# Patient Record
Sex: FEMALE | Race: BLACK OR AFRICAN AMERICAN | NOT HISPANIC OR LATINO | Employment: OTHER | URBAN - METROPOLITAN AREA
[De-identification: names, ages, dates, MRNs, and addresses within clinical notes are randomized per-mention and may not be internally consistent; named-entity substitution may affect disease eponyms.]

---

## 2019-04-19 ENCOUNTER — HOSPITAL ENCOUNTER (EMERGENCY)
Facility: HOSPITAL | Age: 79
Discharge: HOME | End: 2019-04-20
Attending: EMERGENCY MEDICINE
Payer: MEDICARE

## 2019-04-19 DIAGNOSIS — R55 NEAR SYNCOPE: Primary | ICD-10-CM

## 2019-04-19 LAB
ALBUMIN SERPL BCP-MCNC: 4.2 G/DL (ref 3.5–5.2)
ALP SERPL-CCNC: 55 U/L (ref 55–135)
ALT SERPL W/O P-5'-P-CCNC: <5 U/L (ref 10–44)
ANION GAP SERPL CALC-SCNC: 9 MMOL/L (ref 8–16)
APTT BLDCRRT: 25.9 SEC (ref 21–32)
AST SERPL-CCNC: 15 U/L (ref 10–40)
BASOPHILS # BLD AUTO: 0.01 K/UL (ref 0–0.2)
BASOPHILS NFR BLD: 0.2 % (ref 0–1.9)
BILIRUB SERPL-MCNC: 0.3 MG/DL (ref 0.1–1)
BUN SERPL-MCNC: 19 MG/DL (ref 8–23)
CALCIUM SERPL-MCNC: 9.7 MG/DL (ref 8.7–10.5)
CHLORIDE SERPL-SCNC: 106 MMOL/L (ref 95–110)
CO2 SERPL-SCNC: 24 MMOL/L (ref 23–29)
CREAT SERPL-MCNC: 1.2 MG/DL (ref 0.5–1.4)
DIFFERENTIAL METHOD: NORMAL
EOSINOPHIL # BLD AUTO: 0.1 K/UL (ref 0–0.5)
EOSINOPHIL NFR BLD: 0.8 % (ref 0–8)
ERYTHROCYTE [DISTWIDTH] IN BLOOD BY AUTOMATED COUNT: 13.7 % (ref 11.5–14.5)
EST. GFR  (AFRICAN AMERICAN): 49 ML/MIN/1.73 M^2
EST. GFR  (NON AFRICAN AMERICAN): 42 ML/MIN/1.73 M^2
GLUCOSE SERPL-MCNC: 168 MG/DL (ref 70–110)
HCT VFR BLD AUTO: 37.9 % (ref 37–48.5)
HGB BLD-MCNC: 12.4 G/DL (ref 12–16)
INR PPP: 1 (ref 0.8–1.2)
LYMPHOCYTES # BLD AUTO: 1.5 K/UL (ref 1–4.8)
LYMPHOCYTES NFR BLD: 22.8 % (ref 18–48)
MCH RBC QN AUTO: 30.3 PG (ref 27–31)
MCHC RBC AUTO-ENTMCNC: 32.7 G/DL (ref 32–36)
MCV RBC AUTO: 93 FL (ref 82–98)
MONOCYTES # BLD AUTO: 0.6 K/UL (ref 0.3–1)
MONOCYTES NFR BLD: 8.8 % (ref 4–15)
NEUTROPHILS # BLD AUTO: 4.3 K/UL (ref 1.8–7.7)
NEUTROPHILS NFR BLD: 67.4 % (ref 38–73)
PLATELET # BLD AUTO: 216 K/UL (ref 150–350)
PMV BLD AUTO: 10.4 FL (ref 9.2–12.9)
POCT GLUCOSE: 175 MG/DL (ref 70–110)
POTASSIUM SERPL-SCNC: 3.8 MMOL/L (ref 3.5–5.1)
PROT SERPL-MCNC: 7.3 G/DL (ref 6–8.4)
PROTHROMBIN TIME: 11.3 SEC (ref 9–12.5)
RBC # BLD AUTO: 4.09 M/UL (ref 4–5.4)
SODIUM SERPL-SCNC: 139 MMOL/L (ref 136–145)
TROPONIN I SERPL DL<=0.01 NG/ML-MCNC: 0.02 NG/ML (ref 0–0.03)
WBC # BLD AUTO: 6.39 K/UL (ref 3.9–12.7)

## 2019-04-19 PROCEDURE — 80053 COMPREHEN METABOLIC PANEL: CPT

## 2019-04-19 PROCEDURE — 93005 ELECTROCARDIOGRAM TRACING: CPT

## 2019-04-19 PROCEDURE — 85730 THROMBOPLASTIN TIME PARTIAL: CPT

## 2019-04-19 PROCEDURE — 85025 COMPLETE CBC W/AUTO DIFF WBC: CPT

## 2019-04-19 PROCEDURE — 84484 ASSAY OF TROPONIN QUANT: CPT

## 2019-04-19 PROCEDURE — 93010 EKG 12-LEAD: ICD-10-PCS | Mod: ,,, | Performed by: INTERNAL MEDICINE

## 2019-04-19 PROCEDURE — 99284 EMERGENCY DEPT VISIT MOD MDM: CPT

## 2019-04-19 PROCEDURE — 85610 PROTHROMBIN TIME: CPT

## 2019-04-19 PROCEDURE — 81000 URINALYSIS NONAUTO W/SCOPE: CPT

## 2019-04-19 PROCEDURE — 93010 ELECTROCARDIOGRAM REPORT: CPT | Mod: ,,, | Performed by: INTERNAL MEDICINE

## 2019-04-19 PROCEDURE — 82962 GLUCOSE BLOOD TEST: CPT

## 2019-04-19 RX ORDER — SPIRONOLACTONE 25 MG/1
25 TABLET ORAL DAILY
Status: ON HOLD | COMMUNITY
End: 2020-10-20 | Stop reason: HOSPADM

## 2019-04-19 RX ORDER — LOSARTAN POTASSIUM 100 MG/1
100 TABLET ORAL DAILY
COMMUNITY
End: 2021-01-27 | Stop reason: DRUGHIGH

## 2019-04-19 RX ORDER — CARBIDOPA AND LEVODOPA 25; 100 MG/1; MG/1
2 TABLET ORAL 4 TIMES DAILY
Status: ON HOLD | COMMUNITY
End: 2020-10-05 | Stop reason: HOSPADM

## 2019-04-19 RX ORDER — CLOPIDOGREL BISULFATE 75 MG/1
75 TABLET ORAL DAILY
Status: ON HOLD | COMMUNITY
End: 2022-03-29 | Stop reason: HOSPADM

## 2019-04-20 VITALS
SYSTOLIC BLOOD PRESSURE: 161 MMHG | HEIGHT: 62 IN | HEART RATE: 63 BPM | DIASTOLIC BLOOD PRESSURE: 90 MMHG | BODY MASS INDEX: 22.68 KG/M2 | RESPIRATION RATE: 11 BRPM | WEIGHT: 123.25 LBS | OXYGEN SATURATION: 100 % | TEMPERATURE: 98 F

## 2019-04-20 LAB
BACTERIA #/AREA URNS HPF: NORMAL /HPF
BILIRUB UR QL STRIP: NEGATIVE
CLARITY UR: CLEAR
COLOR UR: YELLOW
GLUCOSE UR QL STRIP: NEGATIVE
HGB UR QL STRIP: NEGATIVE
KETONES UR QL STRIP: NEGATIVE
LEUKOCYTE ESTERASE UR QL STRIP: ABNORMAL
MICROSCOPIC COMMENT: NORMAL
NITRITE UR QL STRIP: NEGATIVE
NON-SQ EPI CELLS #/AREA URNS HPF: 0 /HPF
PH UR STRIP: 6 [PH] (ref 5–8)
PROT UR QL STRIP: NEGATIVE
RBC #/AREA URNS HPF: 0 /HPF (ref 0–4)
SP GR UR STRIP: 1.01 (ref 1–1.03)
SQUAMOUS #/AREA URNS HPF: 0 /HPF
URN SPEC COLLECT METH UR: ABNORMAL
UROBILINOGEN UR STRIP-ACNC: NEGATIVE EU/DL
WBC #/AREA URNS HPF: 0 /HPF (ref 0–5)

## 2019-04-20 NOTE — ED NOTES
Pt with no c/o pain or discomfort at this time.   Discharge instructions explained to patient with verbalization of understanding of instructions.  Pt escorted per wc with daughter to registration desk by RN. Discharge paperwork given to registration for completion of discharge.  Pt to car per wc with RN

## 2019-04-20 NOTE — ED NOTES
Armband checked, patient verified. Verified by spelling and stated name on armband along with .   LOC: The patient is awake, alert and aware of environment with an appropriate affect, the patient is oriented x 3 and speaking appropriately. Pt has very soft voice due to polyps on vocal cord.   APPEARANCE: Patient resting comfortably and in no acute distress, patient is clean and well groomed  SKIN: The skin is warm and dry, color consistent with ethnicity, patient has normal skin turgor and moist mucus membranes, no breakdown or bruising noted.   MUSCULOSKELETAL: Patient moving all extremities to command. Pt right hand weaker from stroke a few years ago.   RESPIRATORY: Airway is open and patent, respirations are regular, even and non labored.  CARDIAC: Patient has a normal rate, no periphreal edema noted, capillary refill < 3 seconds.  ABDOMEN: Soft and non tender to palpation.  Daughter states pt gets low BP and passes out at home frequently. States had an episode again tonight. Pt denies pain, SOB or nausea.  Pt states had eaten tonight.   SR up x 2 with daughter at bedside. Call light in reach.

## 2019-04-20 NOTE — ED PROVIDER NOTES
SCRIBE #1 NOTE: I, Neha Villasenor, am scribing for, and in the presence of, Romain Kim Jr., MD. I have scribed the entire note.      History      Chief Complaint   Patient presents with    Loss of Consciousness       Review of patient's allergies indicates:  No Known Allergies     HPI   HPI    4/19/2019, 9:40 PM   History obtained from the patient and daughter      History of Present Illness: Tanya Madrid is a 79 y.o. female patient with a PMHx of HTN, laryngeal papillomatosis, who presents to the Emergency Department for an evaluation after a near-syncopal episode that occurred about 30-45 min ago. Symptoms are episodic and moderate in severity. Daughter reports patient began staring blankly and became dazed. Daughter states sxs usually occur with hypotension. Pt took BP medications before sxs occurred. No mitigating or exacerbating factors reported. No other sxs reported. Daughter states pt is at baseline at this time. Patient/daughter denies any CP, SOB, abd pain, back pain, fever, chills, diaphoresis, palpitations, extremity weakness/numbness, leg pain/swelling, dizziness, cough, n/v, and all other sxs at this time. No further complaints or concerns at this time.         Arrival mode: EMS      PCP: Marti Parsons MD       Past Medical History:  Past Medical History:   Diagnosis Date    Hyperlipemia     Hypertension     Parkinson's disease     Stroke 2016    Throat mass        Past Surgical History:  Past Surgical History:   Procedure Laterality Date    HYSTERECTOMY      THROAT SURGERY      TONSILLECTOMY           Family History:  History reviewed. No pertinent family history.    Social History:  Social History     Tobacco Use    Smoking status: Never Smoker    Smokeless tobacco: Never Used   Substance and Sexual Activity    Alcohol use: No    Drug use: No    Sexual activity: Not Currently       ROS   Review of Systems   Constitutional: Negative for chills, diaphoresis and fever.   HENT:  Negative for sore throat.    Respiratory: Negative for cough and shortness of breath.    Cardiovascular: Negative for chest pain, palpitations and leg swelling.        +hypotension   Gastrointestinal: Negative for abdominal pain, nausea and vomiting.   Genitourinary: Negative for dysuria.   Musculoskeletal: Negative for back pain and myalgias.   Skin: Negative for rash.   Neurological: Positive for syncope (near syncope). Negative for dizziness, weakness, numbness and headaches.   Hematological: Does not bruise/bleed easily.   All other systems reviewed and are negative.      Physical Exam      Initial Vitals [04/19/19 2138]   BP Pulse Resp Temp SpO2   (!) 149/90 64 12 97.8 °F (36.6 °C) 100 %      MAP       --          Physical Exam  Nursing Notes and Vital Signs Reviewed.  Constitutional: Patient is in no acute distress.   Head: Atraumatic. Normocephalic.  Eyes: PERRL. EOM intact. Conjunctivae are not pale. No scleral icterus.  ENT: Mucous membranes are moist. Oropharynx is clear and symmetric.  Chronic hoarseness.  Neck: Supple. Full ROM. No lymphadenopathy.  Cardiovascular: Regular rate. Regular rhythm. No murmurs, rubs, or gallops. Distal pulses are 2+ and symmetric.  Pulmonary/Chest: No respiratory distress. Clear to auscultation bilaterally. No wheezing or rales.  Abdominal: Soft and non-distended.  There is no tenderness.  No rebound, guarding, or rigidity.   Musculoskeletal: Moves all extremities. No obvious deformities. No edema. No calf tenderness.  Skin: Warm and dry.  Neurological:  Alert, awake, and appropriate.  Normal speech.  No acute focal neurological deficits are appreciated.  Psychiatric: Normal affect. Good eye contact. Appropriate in content.    ED Course    Procedures  ED Vital Signs:  Vitals:    04/19/19 2138 04/19/19 2201 04/19/19 2225 04/19/19 2226   BP: (!) 149/90  (!) 166/78 (!) 167/84   Pulse: 64  64 66   Resp: 12      Temp: 97.8 °F (36.6 °C)      TempSrc: Oral      SpO2: 100%     "  Weight:  55.9 kg (123 lb 4 oz)     Height: 5' 2" (1.575 m)       04/19/19 2227 04/19/19 2305   BP: (!) 172/115 (!) 189/84   Pulse: 72 65   Resp:  15   Temp:     TempSrc:     SpO2:  100%   Weight:     Height:         Abnormal Lab Results:  Labs Reviewed   COMPREHENSIVE METABOLIC PANEL - Abnormal; Notable for the following components:       Result Value    Glucose 168 (*)     ALT <5 (*)     eGFR if  49 (*)     eGFR if non  42 (*)     All other components within normal limits   URINALYSIS - Abnormal; Notable for the following components:    Leukocytes, UA Trace (*)     All other components within normal limits   POCT GLUCOSE - Abnormal; Notable for the following components:    POCT Glucose 175 (*)     All other components within normal limits   CBC W/ AUTO DIFFERENTIAL   PROTIME-INR   APTT   TROPONIN I   URINALYSIS MICROSCOPIC   POCT GLUCOSE MONITORING CONTINUOUS        All Lab Results:  Results for orders placed or performed during the hospital encounter of 04/19/19   CBC auto differential   Result Value Ref Range    WBC 6.39 3.90 - 12.70 K/uL    RBC 4.09 4.00 - 5.40 M/uL    Hemoglobin 12.4 12.0 - 16.0 g/dL    Hematocrit 37.9 37.0 - 48.5 %    MCV 93 82 - 98 fL    MCH 30.3 27.0 - 31.0 pg    MCHC 32.7 32.0 - 36.0 g/dL    RDW 13.7 11.5 - 14.5 %    Platelets 216 150 - 350 K/uL    MPV 10.4 9.2 - 12.9 fL    Gran # (ANC) 4.3 1.8 - 7.7 K/uL    Lymph # 1.5 1.0 - 4.8 K/uL    Mono # 0.6 0.3 - 1.0 K/uL    Eos # 0.1 0.0 - 0.5 K/uL    Baso # 0.01 0.00 - 0.20 K/uL    Gran% 67.4 38.0 - 73.0 %    Lymph% 22.8 18.0 - 48.0 %    Mono% 8.8 4.0 - 15.0 %    Eosinophil% 0.8 0.0 - 8.0 %    Basophil% 0.2 0.0 - 1.9 %    Differential Method Automated    Comprehensive metabolic panel   Result Value Ref Range    Sodium 139 136 - 145 mmol/L    Potassium 3.8 3.5 - 5.1 mmol/L    Chloride 106 95 - 110 mmol/L    CO2 24 23 - 29 mmol/L    Glucose 168 (H) 70 - 110 mg/dL    BUN, Bld 19 8 - 23 mg/dL    Creatinine 1.2 0.5 - " 1.4 mg/dL    Calcium 9.7 8.7 - 10.5 mg/dL    Total Protein 7.3 6.0 - 8.4 g/dL    Albumin 4.2 3.5 - 5.2 g/dL    Total Bilirubin 0.3 0.1 - 1.0 mg/dL    Alkaline Phosphatase 55 55 - 135 U/L    AST 15 10 - 40 U/L    ALT <5 (L) 10 - 44 U/L    Anion Gap 9 8 - 16 mmol/L    eGFR if African American 49 (A) >60 mL/min/1.73 m^2    eGFR if non African American 42 (A) >60 mL/min/1.73 m^2   Urinalysis - Clean Catch   Result Value Ref Range    Specimen UA Urine, Clean Catch     Color, UA Yellow Yellow, Straw, Kassi    Appearance, UA Clear Clear    pH, UA 6.0 5.0 - 8.0    Specific Gravity, UA 1.010 1.005 - 1.030    Protein, UA Negative Negative    Glucose, UA Negative Negative    Ketones, UA Negative Negative    Bilirubin (UA) Negative Negative    Occult Blood UA Negative Negative    Nitrite, UA Negative Negative    Urobilinogen, UA Negative <2.0 EU/dL    Leukocytes, UA Trace (A) Negative   Protime-INR   Result Value Ref Range    Prothrombin Time 11.3 9.0 - 12.5 sec    INR 1.0 0.8 - 1.2   APTT   Result Value Ref Range    aPTT 25.9 21.0 - 32.0 sec   Troponin I   Result Value Ref Range    Troponin I 0.016 0.000 - 0.026 ng/mL   Urinalysis Microscopic   Result Value Ref Range    RBC, UA 0 0 - 4 /hpf    WBC, UA 0 0 - 5 /hpf    Bacteria, UA None None-Occ /hpf    Squam Epithel, UA 0 /hpf    Non-Squam Epith 0 <1/hpf /hpf    Microscopic Comment SEE COMMENT    POCT glucose   Result Value Ref Range    POCT Glucose 175 (H) 70 - 110 mg/dL         Imaging Results:  Imaging Results          CT Head Without Contrast (Final result)  Result time 04/19/19 23:14:23    Final result by Abhijit Carmona Jr., MD (04/19/19 23:14:23)                 Impression:      1. No acute intracranial findings.  All CT scans at this facility are performed  using dose modulation techniques as appropriate to performed exam including the following:  automated exposure control; adjustment of mA and/or kV according to the patients size (this includes techniques or  standardized protocols for targeted exams where dose is matched to indication/reason for exam: i.e. extremities or head);  iterative reconstruction technique.      Electronically signed by: Abhijit Carmona Jr., MD  Date:    04/19/2019  Time:    23:14             Narrative:    EXAMINATION:  CT HEAD WITHOUT CONTRAST    CLINICAL HISTORY:  Syncope/fainting;    TECHNIQUE:  CT scan was obtained of the head without administration of contrast.    COMPARISON:  MRI on 08/09/2009.    FINDINGS:  Atrophy with periventricular white matter changes consistent with small vessel ischemic change.  Old lacunar infarct left basal ganglia.  No extra-axial acute fluid collection.Ventricles and basal cisterns are normal.  No hemorrhage, mass effect or midline shift.  No other cerebral or cerebellar parenchymal abnormality.  Mucosal thickening right this sphenoid sinus.  Mastoid air cells are clear.  Calvarium is intact.                               The EKG was ordered, reviewed, and independently interpreted by the ED provider.  Interpretation time: 2151  Rate: 66 BPM  Rhythm: normal sinus rhythm  Interpretation: Left axis deviation. Nonspecific T wave abnormality. No STEMI.             The Emergency Provider reviewed the vital signs and test results, which are outlined above.    ED Discussion     12:22 AM: Reassessed pt at this time.  Pt is awake, alert, and in NAD at this time. Discussed with pt all pertinent ED information and results. Discussed pt dx and plan of tx. Gave pt all f/u and return to the ED instructions. All questions and concerns were addressed at this time. Pt expresses understanding of information and instructions, and is comfortable with plan to discharge. Pt is stable for discharge.      Patient presents with a syncopal or near-syncopal episode. I have no suspicion that the event is secondary to an arrhythmia such as WPW, prolonged QT syndrome, or ventricular tachycardia. I have no suspicion for a seizure episode,  intracranial hemorrhage, pulmonary embolus, myocardial infarction, sepsis, ectopic pregnancy, hemorrhagic shock, or hypoglycemia. Based on my evaluation, there is no emergent medical condition. Syncope precautions were discussed with the patient and/or caretaker, specifically not to swim or bathe unattended, not to operate motor vehicles or other machinery, and to avoid heights or other areas where falls may occur until cleared by primary care physician. Patient is safe for discharge.    Current Discharge Medication List      CONTINUE these medications which have NOT CHANGED    Details   amlodipine (NORVASC) 10 MG tablet Take 5 mg by mouth once daily.       aspirin (ECOTRIN) 81 MG EC tablet Take 81 mg by mouth once daily.      carbidopa-levodopa  mg (SINEMET)  mg per tablet Take 2 tablets by mouth 4 (four) times daily.      clopidogrel (PLAVIX) 75 mg tablet Take 75 mg by mouth once daily.      losartan (COZAAR) 100 MG tablet Take 100 mg by mouth once daily.      metoprolol succinate (TOPROL-XL) 25 MG 24 hr tablet Take 50 mg by mouth 2 (two) times daily.       multivitamin capsule Take 1 capsule by mouth once daily.      omeprazole (PRILOSEC) 20 MG capsule Take 20 mg by mouth once daily.      spironolactone (ALDACTONE) 25 MG tablet Take 25 mg by mouth once daily.      potassium chloride (KLOR-CON) 10 MEQ TbSR Take 10 mEq by mouth once daily.      raloxifene (EVISTA) 60 mg tablet Take 60 mg by mouth once daily.      rosuvastatin (CRESTOR) 10 MG tablet Take 10 mg by mouth once daily.             ED Medication(s):  Medications - No data to display    Follow-up Information     Marti Parsons MD. Call in 3 days.    Specialty:  Family Medicine  Why:  to schedule appt for recheck  Contact information:  [address] [place] [address]  [address]  [facility] [id number] [phone]                     Medical Decision Making    Medical Decision Making:   Clinical Tests:   Lab Tests: Ordered and  Reviewed  Radiological Study: Ordered and Reviewed  Medical Tests: Ordered and Reviewed           Scribe Attestation:   Scribe #1: I performed the above scribed service and the documentation accurately describes the services I performed. I attest to the accuracy of the note.    Attending:   Physician Attestation Statement for Scribe #1: I, Romain Kim Jr., MD, personally performed the services described in this documentation, as scribed by Neha Villasenor, in my presence, and it is both accurate and complete.          Clinical Impression       ICD-10-CM ICD-9-CM   1. Near syncope R55 780.2       Disposition:   Disposition: Discharged  Condition: Stable         Romain Kim Jr., MD  04/20/19 0148

## 2019-07-23 ENCOUNTER — HOSPITAL ENCOUNTER (OUTPATIENT)
Facility: HOSPITAL | Age: 79
Discharge: HOME | End: 2019-07-24
Attending: EMERGENCY MEDICINE | Admitting: INTERNAL MEDICINE
Payer: MEDICARE

## 2019-07-23 DIAGNOSIS — R10.10 PAIN OF UPPER ABDOMEN: ICD-10-CM

## 2019-07-23 DIAGNOSIS — R06.02 SHORTNESS OF BREATH: ICD-10-CM

## 2019-07-23 DIAGNOSIS — R07.89 ATYPICAL CHEST PAIN: ICD-10-CM

## 2019-07-23 DIAGNOSIS — R91.1 PULMONARY NODULE, LEFT: ICD-10-CM

## 2019-07-23 DIAGNOSIS — I10 ACCELERATED HYPERTENSION: Primary | ICD-10-CM

## 2019-07-23 DIAGNOSIS — R79.89 ELEVATED TROPONIN: ICD-10-CM

## 2019-07-23 DIAGNOSIS — R06.00 DYSPNEA: ICD-10-CM

## 2019-07-23 DIAGNOSIS — R07.9 CHEST PAIN: ICD-10-CM

## 2019-07-23 LAB
ALBUMIN SERPL BCP-MCNC: 4.4 G/DL (ref 3.5–5.2)
ALP SERPL-CCNC: 66 U/L (ref 55–135)
ALT SERPL W/O P-5'-P-CCNC: 10 U/L (ref 10–44)
ANION GAP SERPL CALC-SCNC: 13 MMOL/L (ref 8–16)
AST SERPL-CCNC: 25 U/L (ref 10–40)
BASOPHILS # BLD AUTO: 0.01 K/UL (ref 0–0.2)
BASOPHILS NFR BLD: 0.2 % (ref 0–1.9)
BILIRUB SERPL-MCNC: 0.2 MG/DL (ref 0.1–1)
BNP SERPL-MCNC: 75 PG/ML (ref 0–99)
BUN SERPL-MCNC: 13 MG/DL (ref 8–23)
CALCIUM SERPL-MCNC: 10 MG/DL (ref 8.7–10.5)
CHLORIDE SERPL-SCNC: 105 MMOL/L (ref 95–110)
CO2 SERPL-SCNC: 25 MMOL/L (ref 23–29)
CREAT SERPL-MCNC: 0.9 MG/DL (ref 0.5–1.4)
DIFFERENTIAL METHOD: NORMAL
EOSINOPHIL # BLD AUTO: 0.1 K/UL (ref 0–0.5)
EOSINOPHIL NFR BLD: 0.9 % (ref 0–8)
ERYTHROCYTE [DISTWIDTH] IN BLOOD BY AUTOMATED COUNT: 14.3 % (ref 11.5–14.5)
EST. GFR  (AFRICAN AMERICAN): >60 ML/MIN/1.73 M^2
EST. GFR  (NON AFRICAN AMERICAN): >60 ML/MIN/1.73 M^2
GLUCOSE SERPL-MCNC: 85 MG/DL (ref 70–110)
HCT VFR BLD AUTO: 40.5 % (ref 37–48.5)
HGB BLD-MCNC: 13.5 G/DL (ref 12–16)
LIPASE SERPL-CCNC: 39 U/L (ref 4–60)
LYMPHOCYTES # BLD AUTO: 2 K/UL (ref 1–4.8)
LYMPHOCYTES NFR BLD: 29.9 % (ref 18–48)
MCH RBC QN AUTO: 30.5 PG (ref 27–31)
MCHC RBC AUTO-ENTMCNC: 33.3 G/DL (ref 32–36)
MCV RBC AUTO: 92 FL (ref 82–98)
MONOCYTES # BLD AUTO: 1 K/UL (ref 0.3–1)
MONOCYTES NFR BLD: 14.5 % (ref 4–15)
NEUTROPHILS # BLD AUTO: 3.6 K/UL (ref 1.8–7.7)
NEUTROPHILS NFR BLD: 54.8 % (ref 38–73)
PLATELET # BLD AUTO: 243 K/UL (ref 150–350)
PMV BLD AUTO: 10.2 FL (ref 9.2–12.9)
POTASSIUM SERPL-SCNC: 3.9 MMOL/L (ref 3.5–5.1)
PROT SERPL-MCNC: 8.1 G/DL (ref 6–8.4)
RBC # BLD AUTO: 4.42 M/UL (ref 4–5.4)
SODIUM SERPL-SCNC: 143 MMOL/L (ref 136–145)
TROPONIN I SERPL DL<=0.01 NG/ML-MCNC: 0.02 NG/ML (ref 0–0.03)
WBC # BLD AUTO: 6.56 K/UL (ref 3.9–12.7)

## 2019-07-23 PROCEDURE — 96374 THER/PROPH/DIAG INJ IV PUSH: CPT

## 2019-07-23 PROCEDURE — 80053 COMPREHEN METABOLIC PANEL: CPT

## 2019-07-23 PROCEDURE — 84484 ASSAY OF TROPONIN QUANT: CPT

## 2019-07-23 PROCEDURE — 63600175 PHARM REV CODE 636 W HCPCS: Performed by: EMERGENCY MEDICINE

## 2019-07-23 PROCEDURE — 93005 ELECTROCARDIOGRAM TRACING: CPT

## 2019-07-23 PROCEDURE — 93010 EKG 12-LEAD: ICD-10-PCS | Mod: ,,, | Performed by: INTERNAL MEDICINE

## 2019-07-23 PROCEDURE — 85025 COMPLETE CBC W/AUTO DIFF WBC: CPT

## 2019-07-23 PROCEDURE — 83690 ASSAY OF LIPASE: CPT

## 2019-07-23 PROCEDURE — 93010 ELECTROCARDIOGRAM REPORT: CPT | Mod: 76,,, | Performed by: INTERNAL MEDICINE

## 2019-07-23 PROCEDURE — 25000003 PHARM REV CODE 250: Performed by: EMERGENCY MEDICINE

## 2019-07-23 PROCEDURE — 93010 ELECTROCARDIOGRAM REPORT: CPT | Mod: ,,, | Performed by: INTERNAL MEDICINE

## 2019-07-23 PROCEDURE — 25500020 PHARM REV CODE 255: Performed by: EMERGENCY MEDICINE

## 2019-07-23 PROCEDURE — 99285 EMERGENCY DEPT VISIT HI MDM: CPT | Mod: 25

## 2019-07-23 PROCEDURE — 83880 ASSAY OF NATRIURETIC PEPTIDE: CPT

## 2019-07-23 RX ORDER — METOPROLOL SUCCINATE 25 MG/1
25 TABLET, EXTENDED RELEASE ORAL ONCE
Status: COMPLETED | OUTPATIENT
Start: 2019-07-23 | End: 2019-07-23

## 2019-07-23 RX ORDER — HYDRALAZINE HYDROCHLORIDE 20 MG/ML
10 INJECTION INTRAMUSCULAR; INTRAVENOUS
Status: COMPLETED | OUTPATIENT
Start: 2019-07-23 | End: 2019-07-23

## 2019-07-23 RX ORDER — NITROGLYCERIN 0.4 MG/1
0.4 TABLET SUBLINGUAL EVERY 5 MIN PRN
Status: COMPLETED | OUTPATIENT
Start: 2019-07-23 | End: 2019-07-23

## 2019-07-23 RX ADMIN — IOHEXOL 100 ML: 350 INJECTION, SOLUTION INTRAVENOUS at 11:07

## 2019-07-23 RX ADMIN — NITROGLYCERIN 0.4 MG: 0.4 TABLET, ORALLY DISINTEGRATING SUBLINGUAL at 09:07

## 2019-07-23 RX ADMIN — LIDOCAINE HYDROCHLORIDE 50 ML: 20 SOLUTION ORAL; TOPICAL at 10:07

## 2019-07-23 RX ADMIN — HYDRALAZINE HYDROCHLORIDE 10 MG: 20 INJECTION INTRAMUSCULAR; INTRAVENOUS at 09:07

## 2019-07-23 RX ADMIN — METOPROLOL SUCCINATE 25 MG: 25 TABLET, EXTENDED RELEASE ORAL at 09:07

## 2019-07-24 VITALS
SYSTOLIC BLOOD PRESSURE: 159 MMHG | HEIGHT: 62 IN | DIASTOLIC BLOOD PRESSURE: 74 MMHG | TEMPERATURE: 98 F | BODY MASS INDEX: 22.52 KG/M2 | HEART RATE: 81 BPM | OXYGEN SATURATION: 99 % | RESPIRATION RATE: 16 BRPM | WEIGHT: 122.38 LBS

## 2019-07-24 PROBLEM — R07.89 ATYPICAL CHEST PAIN: Status: ACTIVE | Noted: 2019-07-24

## 2019-07-24 PROBLEM — M06.9 RHEUMATOID ARTHRITIS: Status: ACTIVE | Noted: 2019-07-24

## 2019-07-24 PROBLEM — I10 MALIGNANT ESSENTIAL HYPERTENSION: Status: ACTIVE | Noted: 2019-07-24

## 2019-07-24 PROBLEM — I10 ACCELERATED HYPERTENSION: Status: RESOLVED | Noted: 2017-07-11 | Resolved: 2019-07-24

## 2019-07-24 PROBLEM — I10 ESSENTIAL HYPERTENSION: Chronic | Status: ACTIVE | Noted: 2017-07-11

## 2019-07-24 PROBLEM — R07.89 ATYPICAL CHEST PAIN: Status: RESOLVED | Noted: 2019-07-24 | Resolved: 2019-07-24

## 2019-07-24 PROBLEM — R13.14 PHARYNGOESOPHAGEAL DYSPHAGIA: Status: ACTIVE | Noted: 2017-07-11

## 2019-07-24 PROBLEM — G20.A1 PARKINSON DISEASE: Chronic | Status: ACTIVE | Noted: 2017-07-11

## 2019-07-24 PROBLEM — G47.00 INSOMNIA: Status: ACTIVE | Noted: 2019-07-24

## 2019-07-24 PROBLEM — E78.5 HYPERLIPIDEMIA: Status: ACTIVE | Noted: 2017-07-11

## 2019-07-24 PROBLEM — K21.9 GERD (GASTROESOPHAGEAL REFLUX DISEASE): Status: ACTIVE | Noted: 2017-07-11

## 2019-07-24 PROBLEM — R06.00 DYSPNEA: Status: ACTIVE | Noted: 2019-07-24

## 2019-07-24 PROBLEM — I10 ESSENTIAL HYPERTENSION: Status: ACTIVE | Noted: 2017-07-11

## 2019-07-24 PROBLEM — D14.1 LARYNGEAL PAPILLOMATOSIS: Status: ACTIVE | Noted: 2017-06-14

## 2019-07-24 PROBLEM — M40.204 KYPHOSIS OF THORACIC REGION: Status: ACTIVE | Noted: 2019-07-24

## 2019-07-24 PROBLEM — M41.9 SCOLIOSIS OF LUMBAR SPINE: Status: ACTIVE | Noted: 2019-07-24

## 2019-07-24 PROBLEM — G20.A1 PARKINSON DISEASE: Status: ACTIVE | Noted: 2017-07-11

## 2019-07-24 PROBLEM — R79.89 ELEVATED TROPONIN: Status: ACTIVE | Noted: 2019-07-24

## 2019-07-24 PROBLEM — Z86.73 HISTORY OF CVA (CEREBROVASCULAR ACCIDENT): Chronic | Status: ACTIVE | Noted: 2019-07-24

## 2019-07-24 PROBLEM — K21.9 GERD (GASTROESOPHAGEAL REFLUX DISEASE): Chronic | Status: ACTIVE | Noted: 2017-07-11

## 2019-07-24 LAB
ANION GAP SERPL CALC-SCNC: 10 MMOL/L (ref 8–16)
BASOPHILS # BLD AUTO: 0.01 K/UL (ref 0–0.2)
BASOPHILS NFR BLD: 0.2 % (ref 0–1.9)
BILIRUB UR QL STRIP: NEGATIVE
BUN SERPL-MCNC: 10 MG/DL (ref 8–23)
CALCIUM SERPL-MCNC: 9.4 MG/DL (ref 8.7–10.5)
CHLORIDE SERPL-SCNC: 105 MMOL/L (ref 95–110)
CHOLEST SERPL-MCNC: 197 MG/DL (ref 120–199)
CHOLEST/HDLC SERPL: 3.1 {RATIO} (ref 2–5)
CK MB SERPL-MCNC: 4.2 NG/ML (ref 0.1–6.5)
CK MB SERPL-RTO: 1.6 % (ref 0–5)
CK SERPL-CCNC: 269 U/L (ref 20–180)
CLARITY UR: CLEAR
CO2 SERPL-SCNC: 26 MMOL/L (ref 23–29)
COLOR UR: YELLOW
CREAT SERPL-MCNC: 0.8 MG/DL (ref 0.5–1.4)
DIASTOLIC DYSFUNCTION: NO
DIASTOLIC DYSFUNCTION: NO
DIFFERENTIAL METHOD: ABNORMAL
EOSINOPHIL # BLD AUTO: 0 K/UL (ref 0–0.5)
EOSINOPHIL NFR BLD: 0.3 % (ref 0–8)
ERYTHROCYTE [DISTWIDTH] IN BLOOD BY AUTOMATED COUNT: 14.1 % (ref 11.5–14.5)
EST. GFR  (AFRICAN AMERICAN): >60 ML/MIN/1.73 M^2
EST. GFR  (NON AFRICAN AMERICAN): >60 ML/MIN/1.73 M^2
ESTIMATED AVG GLUCOSE: 123 MG/DL (ref 68–131)
ESTIMATED PA SYSTOLIC PRESSURE: 34.63
GLUCOSE SERPL-MCNC: 116 MG/DL (ref 70–110)
GLUCOSE UR QL STRIP: NEGATIVE
HBA1C MFR BLD HPLC: 5.9 % (ref 4–5.6)
HCT VFR BLD AUTO: 36.5 % (ref 37–48.5)
HDLC SERPL-MCNC: 63 MG/DL (ref 40–75)
HDLC SERPL: 32 % (ref 20–50)
HGB BLD-MCNC: 11.9 G/DL (ref 12–16)
HGB UR QL STRIP: NEGATIVE
KETONES UR QL STRIP: NEGATIVE
LDLC SERPL CALC-MCNC: 120.6 MG/DL (ref 63–159)
LEUKOCYTE ESTERASE UR QL STRIP: NEGATIVE
LYMPHOCYTES # BLD AUTO: 1.3 K/UL (ref 1–4.8)
LYMPHOCYTES NFR BLD: 22 % (ref 18–48)
MCH RBC QN AUTO: 29.8 PG (ref 27–31)
MCHC RBC AUTO-ENTMCNC: 32.6 G/DL (ref 32–36)
MCV RBC AUTO: 91 FL (ref 82–98)
MONOCYTES # BLD AUTO: 0.7 K/UL (ref 0.3–1)
MONOCYTES NFR BLD: 12.7 % (ref 4–15)
NEUTROPHILS # BLD AUTO: 3.7 K/UL (ref 1.8–7.7)
NEUTROPHILS NFR BLD: 65 % (ref 38–73)
NITRITE UR QL STRIP: NEGATIVE
NONHDLC SERPL-MCNC: 134 MG/DL
PH UR STRIP: 8 [PH] (ref 5–8)
PLATELET # BLD AUTO: 233 K/UL (ref 150–350)
PMV BLD AUTO: 10.6 FL (ref 9.2–12.9)
POTASSIUM SERPL-SCNC: 3.7 MMOL/L (ref 3.5–5.1)
PROT UR QL STRIP: NEGATIVE
RBC # BLD AUTO: 4 M/UL (ref 4–5.4)
RETIRED EF AND QEF - SEE NOTES: 60 (ref 55–65)
SODIUM SERPL-SCNC: 141 MMOL/L (ref 136–145)
SP GR UR STRIP: 1.01 (ref 1–1.03)
TRIGL SERPL-MCNC: 67 MG/DL (ref 30–150)
TROPONIN I SERPL DL<=0.01 NG/ML-MCNC: 0.04 NG/ML (ref 0–0.03)
TROPONIN I SERPL DL<=0.01 NG/ML-MCNC: 0.04 NG/ML (ref 0–0.03)
TROPONIN I SERPL DL<=0.01 NG/ML-MCNC: 0.05 NG/ML (ref 0–0.03)
TSH SERPL DL<=0.005 MIU/L-ACNC: 1.01 UIU/ML (ref 0.4–4)
URN SPEC COLLECT METH UR: NORMAL
UROBILINOGEN UR STRIP-ACNC: NEGATIVE EU/DL
WBC # BLD AUTO: 5.74 K/UL (ref 3.9–12.7)

## 2019-07-24 PROCEDURE — 84484 ASSAY OF TROPONIN QUANT: CPT | Mod: 91

## 2019-07-24 PROCEDURE — 93018 CV STRESS TEST I&R ONLY: CPT | Mod: ,,, | Performed by: INTERNAL MEDICINE

## 2019-07-24 PROCEDURE — 63600175 PHARM REV CODE 636 W HCPCS: Performed by: NURSE PRACTITIONER

## 2019-07-24 PROCEDURE — 85025 COMPLETE CBC W/AUTO DIFF WBC: CPT

## 2019-07-24 PROCEDURE — 36415 COLL VENOUS BLD VENIPUNCTURE: CPT

## 2019-07-24 PROCEDURE — 93306 2D ECHO WITH COLOR FLOW DOPPLER: ICD-10-PCS | Mod: 26,,, | Performed by: INTERNAL MEDICINE

## 2019-07-24 PROCEDURE — 25000003 PHARM REV CODE 250: Performed by: NURSE PRACTITIONER

## 2019-07-24 PROCEDURE — 82550 ASSAY OF CK (CPK): CPT

## 2019-07-24 PROCEDURE — 80048 BASIC METABOLIC PNL TOTAL CA: CPT

## 2019-07-24 PROCEDURE — G0378 HOSPITAL OBSERVATION PER HR: HCPCS

## 2019-07-24 PROCEDURE — 99204 PR OFFICE/OUTPT VISIT, NEW, LEVL IV, 45-59 MIN: ICD-10-PCS | Mod: 25,,, | Performed by: INTERNAL MEDICINE

## 2019-07-24 PROCEDURE — 93306 TTE W/DOPPLER COMPLETE: CPT | Mod: 26,,, | Performed by: INTERNAL MEDICINE

## 2019-07-24 PROCEDURE — 94761 N-INVAS EAR/PLS OXIMETRY MLT: CPT

## 2019-07-24 PROCEDURE — 93306 TTE W/DOPPLER COMPLETE: CPT

## 2019-07-24 PROCEDURE — 78452 NM MULTI PHARM STRESS CARDIAC COMPONENT: ICD-10-PCS | Mod: 26,,, | Performed by: INTERNAL MEDICINE

## 2019-07-24 PROCEDURE — 84443 ASSAY THYROID STIM HORMONE: CPT

## 2019-07-24 PROCEDURE — 83036 HEMOGLOBIN GLYCOSYLATED A1C: CPT

## 2019-07-24 PROCEDURE — 81003 URINALYSIS AUTO W/O SCOPE: CPT

## 2019-07-24 PROCEDURE — 82553 CREATINE MB FRACTION: CPT

## 2019-07-24 PROCEDURE — 78452 HT MUSCLE IMAGE SPECT MULT: CPT | Mod: 26,,, | Performed by: INTERNAL MEDICINE

## 2019-07-24 PROCEDURE — 99204 OFFICE O/P NEW MOD 45 MIN: CPT | Mod: 25,,, | Performed by: INTERNAL MEDICINE

## 2019-07-24 PROCEDURE — 80061 LIPID PANEL: CPT

## 2019-07-24 PROCEDURE — 84484 ASSAY OF TROPONIN QUANT: CPT

## 2019-07-24 PROCEDURE — 93016 CV STRESS TEST SUPVJ ONLY: CPT | Mod: ,,, | Performed by: INTERNAL MEDICINE

## 2019-07-24 PROCEDURE — 93016 NM MULTI PHARM STRESS CARDIAC COMPONENT: ICD-10-PCS | Mod: ,,, | Performed by: INTERNAL MEDICINE

## 2019-07-24 PROCEDURE — 96372 THER/PROPH/DIAG INJ SC/IM: CPT | Mod: 59

## 2019-07-24 PROCEDURE — 93018 NM MULTI PHARM STRESS CARDIAC COMPONENT: ICD-10-PCS | Mod: ,,, | Performed by: INTERNAL MEDICINE

## 2019-07-24 PROCEDURE — 93017 CV STRESS TEST TRACING ONLY: CPT

## 2019-07-24 RX ORDER — NITROGLYCERIN 0.4 MG/1
0.4 TABLET SUBLINGUAL EVERY 5 MIN PRN
Status: DISCONTINUED | OUTPATIENT
Start: 2019-07-24 | End: 2019-07-24 | Stop reason: HOSPADM

## 2019-07-24 RX ORDER — AMLODIPINE BESYLATE 10 MG/1
10 TABLET ORAL DAILY
Status: DISCONTINUED | OUTPATIENT
Start: 2019-07-24 | End: 2019-07-24 | Stop reason: HOSPADM

## 2019-07-24 RX ORDER — LOSARTAN POTASSIUM 50 MG/1
100 TABLET ORAL DAILY
Status: DISCONTINUED | OUTPATIENT
Start: 2019-07-24 | End: 2019-07-24 | Stop reason: HOSPADM

## 2019-07-24 RX ORDER — RALOXIFENE HYDROCHLORIDE 60 MG/1
60 TABLET, FILM COATED ORAL DAILY
Status: DISCONTINUED | OUTPATIENT
Start: 2019-07-24 | End: 2019-07-24 | Stop reason: HOSPADM

## 2019-07-24 RX ORDER — HEPARIN SODIUM 5000 [USP'U]/ML
5000 INJECTION, SOLUTION INTRAVENOUS; SUBCUTANEOUS EVERY 8 HOURS
Status: DISCONTINUED | OUTPATIENT
Start: 2019-07-24 | End: 2019-07-24 | Stop reason: HOSPADM

## 2019-07-24 RX ORDER — CARBIDOPA AND LEVODOPA 25; 100 MG/1; MG/1
2 TABLET ORAL 4 TIMES DAILY
Status: DISCONTINUED | OUTPATIENT
Start: 2019-07-24 | End: 2019-07-24 | Stop reason: HOSPADM

## 2019-07-24 RX ORDER — AMLODIPINE BESYLATE 5 MG/1
5 TABLET ORAL DAILY
Status: DISCONTINUED | OUTPATIENT
Start: 2019-07-24 | End: 2019-07-24

## 2019-07-24 RX ORDER — HYDRALAZINE HYDROCHLORIDE 20 MG/ML
10 INJECTION INTRAMUSCULAR; INTRAVENOUS EVERY 8 HOURS PRN
Status: DISCONTINUED | OUTPATIENT
Start: 2019-07-24 | End: 2019-07-24 | Stop reason: HOSPADM

## 2019-07-24 RX ORDER — PANTOPRAZOLE SODIUM 40 MG/1
40 TABLET, DELAYED RELEASE ORAL DAILY
Status: DISCONTINUED | OUTPATIENT
Start: 2019-07-24 | End: 2019-07-24 | Stop reason: HOSPADM

## 2019-07-24 RX ORDER — ONDANSETRON 2 MG/ML
4 INJECTION INTRAMUSCULAR; INTRAVENOUS EVERY 6 HOURS PRN
Status: DISCONTINUED | OUTPATIENT
Start: 2019-07-24 | End: 2019-07-24 | Stop reason: HOSPADM

## 2019-07-24 RX ORDER — ASPIRIN 81 MG/1
81 TABLET ORAL DAILY
Status: DISCONTINUED | OUTPATIENT
Start: 2019-07-24 | End: 2019-07-24 | Stop reason: HOSPADM

## 2019-07-24 RX ORDER — CLOPIDOGREL BISULFATE 75 MG/1
75 TABLET ORAL DAILY
Status: DISCONTINUED | OUTPATIENT
Start: 2019-07-24 | End: 2019-07-24 | Stop reason: HOSPADM

## 2019-07-24 RX ORDER — ACETAMINOPHEN 325 MG/1
650 TABLET ORAL EVERY 6 HOURS PRN
Status: DISCONTINUED | OUTPATIENT
Start: 2019-07-24 | End: 2019-07-24 | Stop reason: HOSPADM

## 2019-07-24 RX ORDER — METOPROLOL SUCCINATE 25 MG/1
25 TABLET, EXTENDED RELEASE ORAL NIGHTLY
Status: DISCONTINUED | OUTPATIENT
Start: 2019-07-24 | End: 2019-07-24 | Stop reason: HOSPADM

## 2019-07-24 RX ORDER — IPRATROPIUM BROMIDE AND ALBUTEROL SULFATE 2.5; .5 MG/3ML; MG/3ML
3 SOLUTION RESPIRATORY (INHALATION) EVERY 6 HOURS PRN
Status: DISCONTINUED | OUTPATIENT
Start: 2019-07-24 | End: 2019-07-24 | Stop reason: HOSPADM

## 2019-07-24 RX ORDER — SODIUM CHLORIDE 9 MG/ML
INJECTION, SOLUTION INTRAVENOUS CONTINUOUS
Status: DISCONTINUED | OUTPATIENT
Start: 2019-07-24 | End: 2019-07-24

## 2019-07-24 RX ORDER — SPIRONOLACTONE 25 MG/1
25 TABLET ORAL DAILY
Status: DISCONTINUED | OUTPATIENT
Start: 2019-07-24 | End: 2019-07-24 | Stop reason: HOSPADM

## 2019-07-24 RX ORDER — METOPROLOL SUCCINATE 50 MG/1
50 TABLET, EXTENDED RELEASE ORAL EVERY MORNING
Status: DISCONTINUED | OUTPATIENT
Start: 2019-07-24 | End: 2019-07-24 | Stop reason: HOSPADM

## 2019-07-24 RX ORDER — REGADENOSON 0.08 MG/ML
0.4 INJECTION, SOLUTION INTRAVENOUS ONCE
Status: COMPLETED | OUTPATIENT
Start: 2019-07-24 | End: 2019-07-24

## 2019-07-24 RX ORDER — AMLODIPINE BESYLATE 10 MG/1
10 TABLET ORAL DAILY
Qty: 30 TABLET | Refills: 0 | Status: ON HOLD | OUTPATIENT
Start: 2019-07-25 | End: 2020-07-14 | Stop reason: SDUPTHER

## 2019-07-24 RX ORDER — ROSUVASTATIN CALCIUM 10 MG/1
10 TABLET, COATED ORAL DAILY
Status: DISCONTINUED | OUTPATIENT
Start: 2019-07-24 | End: 2019-07-24 | Stop reason: HOSPADM

## 2019-07-24 RX ORDER — POTASSIUM CHLORIDE 750 MG/1
10 TABLET, EXTENDED RELEASE ORAL DAILY
Status: DISCONTINUED | OUTPATIENT
Start: 2019-07-24 | End: 2019-07-24 | Stop reason: HOSPADM

## 2019-07-24 RX ADMIN — SPIRONOLACTONE 25 MG: 25 TABLET ORAL at 08:07

## 2019-07-24 RX ADMIN — HEPARIN SODIUM 5000 UNITS: 5000 INJECTION, SOLUTION INTRAVENOUS; SUBCUTANEOUS at 05:07

## 2019-07-24 RX ADMIN — METOPROLOL SUCCINATE 50 MG: 50 TABLET, EXTENDED RELEASE ORAL at 09:07

## 2019-07-24 RX ADMIN — METOPROLOL SUCCINATE 25 MG: 25 TABLET, EXTENDED RELEASE ORAL at 04:07

## 2019-07-24 RX ADMIN — AMLODIPINE BESYLATE 10 MG: 10 TABLET ORAL at 08:07

## 2019-07-24 RX ADMIN — HEPARIN SODIUM 5000 UNITS: 5000 INJECTION, SOLUTION INTRAVENOUS; SUBCUTANEOUS at 03:07

## 2019-07-24 RX ADMIN — SODIUM CHLORIDE: 0.9 INJECTION, SOLUTION INTRAVENOUS at 04:07

## 2019-07-24 RX ADMIN — REGADENOSON 0.4 MG: 0.08 INJECTION, SOLUTION INTRAVENOUS at 01:07

## 2019-07-24 RX ADMIN — RALOXIFENE 60 MG: 60 TABLET ORAL at 08:07

## 2019-07-24 RX ADMIN — CARBIDOPA AND LEVODOPA 2 TABLET: 25; 100 TABLET ORAL at 08:07

## 2019-07-24 RX ADMIN — ASPIRIN 81 MG: 81 TABLET, COATED ORAL at 08:07

## 2019-07-24 RX ADMIN — CARBIDOPA AND LEVODOPA 2 TABLET: 25; 100 TABLET ORAL at 03:07

## 2019-07-24 RX ADMIN — PANTOPRAZOLE SODIUM 40 MG: 40 TABLET, DELAYED RELEASE ORAL at 08:07

## 2019-07-24 RX ADMIN — POTASSIUM CHLORIDE 10 MEQ: 750 TABLET, FILM COATED, EXTENDED RELEASE ORAL at 08:07

## 2019-07-24 RX ADMIN — LOSARTAN POTASSIUM 100 MG: 50 TABLET, FILM COATED ORAL at 08:07

## 2019-07-24 RX ADMIN — CLOPIDOGREL BISULFATE 75 MG: 75 TABLET ORAL at 08:07

## 2019-07-24 RX ADMIN — ROSUVASTATIN CALCIUM 10 MG: 10 TABLET, COATED ORAL at 08:07

## 2019-07-24 NOTE — HOSPITAL COURSE
"On 7/23 patient was placed in OBS secondary to Atypical CP.  Initial troponin negative.  12 lead EKG unrevealing.  CTA chest negative for PE.  Her home ASA, Plavix, BB, and statin were continued.  Of note, patient is followed by Dr. Carmona with  Cardiology and underwent an MPI stress test in 7/2017 that showed no significant perfusion defects and a normal EF with no WMA.  BP also noted to be markedly elevated in the ER for which she was given IVP Hydralazine, and Toprol XL 25 mg, in addition to NTG SL x 3 doses with improvement.  Patient's home Amlodipine, Losartan, Toprol XL, and Spironolactone were continued.      As of 7/24 patient reports "I feel better than yesterday".  Second troponin 0.048, third pending.  Cardiology consulted and recommended an ECHO along with trending enzymes and adjusting medications to achieve better BP control.  Will await above results and further recommendations from cardiology.      As of this afternoon, patient is denying CP and has had an uneventful day.  Third troponin decreased to 0.040.  Nuclear ST today showed normal myocardial perfusion.  The perfusion scan is free of evidence for myocardial ischemia or injury.  There is a mild intensity fixed defect in the inferolateral wall of the left ventricle, likely secondary to diaphragm attenuation defect.  Resting wall motion is physiologic.  ECHO showed normal EF.  BP is under much better control with increasing Norvasc to 10 mg daily.  Case d/w Dr. Patel.  Ok to DC home today from cardiology standpoint with outpatient f/u with her cardiologist Dr. Carmona within 1 week.  Daughter was also instructed to f/u with PCP within 3 days for post hospital evaluation, and to keep a BP log for PCP and cardiology to review, verbalized + understanding.  Daughter is also aware of a left lower lobe pulmonary nodule, seen on CT scan that will need outpatient f/u and monitoring per PCP.  Case d/w Dr. Alvarez.  Patient seen and examined again this " afternoon and deemed medically stable to DC home today.  Medications reconciled for DC.

## 2019-07-24 NOTE — PLAN OF CARE
Informed patient of observation status , advised patient status may change per provider if needed . Patient understand ,signed , and received written notification . Informed patient of financial services contact number [phone] if needed. No further action taken .

## 2019-07-24 NOTE — ED PROVIDER NOTES
SCRIBE #1 NOTE: I, Natan Coughlin, am scribing for, and in the presence of, Le Meek DO. I have scribed the entire note.      History      Chief Complaint   Patient presents with    Shortness of Breath     reports having shortness of breath x 2 hrs PTA, reports having upper abd pain as well        Review of patient's allergies indicates:  No Known Allergies     HPI   HPI    7/23/2019, 8:05 PM   History obtained from the patient      History of Present Illness: Tanya Madrid is a 79 y.o. female patient who presents to the Emergency Department for SOB, onset 2 hours PTA. Pt states that her SOB had resolved upon arrival to the ED. No mitigating or exacerbating factors reported. Associated sxs include 7/10 upper abdominal tightness located in epigastric region.. Patient denies any fever, chills, n/v, CP, weakness, numbness, dizziness, headache, dysuria, urgency, frequency and all other sxs at this time. No prior Tx reported. No further complaints or concerns at this time.  Patient had taken 81 mg of aspirin and 75 of Plavix this morning.    Arrival mode: Personal vehicle    PCP: Marti Parsons MD       Past Medical History:  Past Medical History:   Diagnosis Date    Hyperlipemia     Hypertension     Parkinson's disease     Stroke 2016    Throat mass        Past Surgical History:  Past Surgical History:   Procedure Laterality Date    HYSTERECTOMY      THROAT SURGERY      TONSILLECTOMY           Family History:  History reviewed. No pertinent family history.    Social History:  Social History     Tobacco Use    Smoking status: Never Smoker    Smokeless tobacco: Never Used   Substance and Sexual Activity    Alcohol use: No    Drug use: No    Sexual activity: Not Currently       ROS   Review of Systems   Constitutional: Negative for chills, diaphoresis, fatigue and fever.   HENT: Negative for sore throat.    Respiratory: Positive for shortness of breath (resolved upon arrival to ED).     Cardiovascular: Negative for chest pain and leg swelling.   Gastrointestinal: Positive for abdominal pain (upper abdominal tightness). Negative for nausea.   Genitourinary: Negative for dysuria.   Musculoskeletal: Negative for back pain.   Skin: Negative for rash and wound.   Neurological: Negative for dizziness, weakness, light-headedness, numbness and headaches.   Hematological: Does not bruise/bleed easily.   All other systems reviewed and are negative.    Physical Exam      Initial Vitals [07/23/19 1814]   BP Pulse Resp Temp SpO2   (!) 195/93 87 16 98.1 °F (36.7 °C) 100 %      MAP       --          Physical Exam  Nursing Notes and Vital Signs Reviewed.  Constitutional: Patient is in no acute distress. Well-developed and well-nourished.  Head: Atraumatic. Normocephalic.  Eyes: PERRL. EOM intact. Conjunctivae are not pale. No scleral icterus.  ENT: Mucous membranes are moist. Oropharynx is clear and symmetric.    Neck: Supple. Full ROM. No lymphadenopathy.  Cardiovascular: Regular rate. Regular rhythm. No murmurs, rubs, or gallops. Distal pulses are 2+ and symmetric.  Pulmonary/Chest: No respiratory distress. Clear to auscultation bilaterally. No wheezing or rales.  Abdominal: Soft and non-distended.  There is mild epigastric tenderness.  No rebound, guarding, or rigidity. Good bowel sounds.  Musculoskeletal: Moves all extremities. No obvious deformities. No edema. No calf tenderness.  Skin: Warm and dry.  Neurological:  Alert, awake, and appropriate.  Normal speech.  No acute focal neurological deficits are appreciated.  Psychiatric: Normal affect. Good eye contact. Appropriate in content.    ED Course    Procedures  ED Vital Signs:  Vitals:    07/23/19 1814 07/23/19 2103 07/23/19 2113 07/23/19 2121   BP: (!) 195/93 (!) 201/94 (!) 167/85 (!) 183/84   Pulse: 87  83 91   Resp: 16  (!) 21 17   Temp: 98.1 °F (36.7 °C)      TempSrc: Oral      SpO2: 100%  100% 100%   Weight: 56.7 kg (124 lb 14.3 oz)      Height: 5'  "2" (1.575 m)       07/23/19 2147 07/23/19 2152 07/23/19 2159 07/23/19 2202   BP: (!) 191/89 (!) 173/85 (!) 146/79 (!) 148/80   Pulse: 93 108 102 103   Resp:  18 17 16   Temp:       TempSrc:       SpO2:  100% 100% 100%   Weight:       Height:        07/23/19 2252 07/23/19 2331 07/24/19 0035 07/24/19 0058   BP: (!) 146/82 (!) 152/79 (!) 161/76 (!) 158/79   Pulse: 89 82 72 71   Resp: 17 17 12 16   Temp:    98 °F (36.7 °C)   TempSrc:    Oral   SpO2: 100% 100% 100% 98%   Weight:    55.5 kg (122 lb 5.7 oz)   Height:           Abnormal Lab Results:  Labs Reviewed   CBC W/ AUTO DIFFERENTIAL   COMPREHENSIVE METABOLIC PANEL   TROPONIN I   B-TYPE NATRIURETIC PEPTIDE   LIPASE        All Lab Results:  Results for orders placed or performed during the hospital encounter of 07/23/19   CBC auto differential   Result Value Ref Range    WBC 6.56 3.90 - 12.70 K/uL    RBC 4.42 4.00 - 5.40 M/uL    Hemoglobin 13.5 12.0 - 16.0 g/dL    Hematocrit 40.5 37.0 - 48.5 %    Mean Corpuscular Volume 92 82 - 98 fL    Mean Corpuscular Hemoglobin 30.5 27.0 - 31.0 pg    Mean Corpuscular Hemoglobin Conc 33.3 32.0 - 36.0 g/dL    RDW 14.3 11.5 - 14.5 %    Platelets 243 150 - 350 K/uL    MPV 10.2 9.2 - 12.9 fL    Gran # (ANC) 3.6 1.8 - 7.7 K/uL    Lymph # 2.0 1.0 - 4.8 K/uL    Mono # 1.0 0.3 - 1.0 K/uL    Eos # 0.1 0.0 - 0.5 K/uL    Baso # 0.01 0.00 - 0.20 K/uL    Gran% 54.8 38.0 - 73.0 %    Lymph% 29.9 18.0 - 48.0 %    Mono% 14.5 4.0 - 15.0 %    Eosinophil% 0.9 0.0 - 8.0 %    Basophil% 0.2 0.0 - 1.9 %    Differential Method Automated    Comprehensive metabolic panel   Result Value Ref Range    Sodium 143 136 - 145 mmol/L    Potassium 3.9 3.5 - 5.1 mmol/L    Chloride 105 95 - 110 mmol/L    CO2 25 23 - 29 mmol/L    Glucose 85 70 - 110 mg/dL    BUN, Bld 13 8 - 23 mg/dL    Creatinine 0.9 0.5 - 1.4 mg/dL    Calcium 10.0 8.7 - 10.5 mg/dL    Total Protein 8.1 6.0 - 8.4 g/dL    Albumin 4.4 3.5 - 5.2 g/dL    Total Bilirubin 0.2 0.1 - 1.0 mg/dL    Alkaline " Phosphatase 66 55 - 135 U/L    AST 25 10 - 40 U/L    ALT 10 10 - 44 U/L    Anion Gap 13 8 - 16 mmol/L    eGFR if African American >60 >60 mL/min/1.73 m^2    eGFR if non African American >60 >60 mL/min/1.73 m^2   Troponin I   Result Value Ref Range    Troponin I 0.024 0.000 - 0.026 ng/mL   Brain natriuretic peptide   Result Value Ref Range    BNP 75 0 - 99 pg/mL   Lipase   Result Value Ref Range    Lipase 39 4 - 60 U/L     Imaging Results:  Imaging Results          CT Abdomen Pelvis With Contrast (Final result)  Result time 07/23/19 23:51:42    Final result by Abhijit Carmona Jr., MD (07/23/19 23:51:42)                 Impression:      <No abnormality identified in the abdomen or pelvis>.    All CT scans at this facility are performed  using dose modulation techniques as appropriate to performed exam including the following:  automated exposure control; adjustment of mA and/or kV according to the patients size (this includes techniques or standardized protocols for targeted exams where dose is matched to indication/reason for exam: i.e. extremities or head);  iterative reconstruction technique.      Electronically signed by: Abhijit Carmona Jr., MD  Date:    07/23/2019  Time:    23:51             Narrative:    EXAMINATION:  CT ABDOMEN PELVIS WITH CONTRAST    CLINICAL HISTORY:  epigastric pain;    TECHNIQUE:  Axial CT imaging was performed through the abdomen and pelvis with 100 mL of intravenous contrast. Multiplanar reformats were performed and interpreted.    COMPARISON:  None    FINDINGS:  <Lung bases are clear>.    1.7 cm cyst right hepatic lobe of the liver.  Portal vein is patent.  3.9 cm upper pole left renal cyst.  Right kidney is unremarkable.  No renal calculus or obstructive uropathy..    <The gallbladder is unremarkable>.    <No free fluid, free air, or inflammatory change>.    <The bowel is nondistended and within normal limits>.    <The urinary bladder is unremarkable>. Small bowel containing small right  inguinal hernia.    <No significant osseous abnormality is identified>.  Degenerative change at L4-L5 and L5-S1.                               CTA Chest Non-Coronary (PE Study) (Final result)  Result time 07/23/19 23:39:36    Final result by Abhijit Carmona Jr., MD (07/23/19 23:39:36)                 Impression:      No pulmonary embolus.    7 mm pulmonary nodule left lower lobe.  Recommend continued follow-up per Fleischner criteria.  For a solid nodule 6-8 mm, Fleischner Society 2017 guidelines recommend follow up with non-contrast chest CT at 6-12 months and 18-24 months after discovery.    All CT scan at this facility use dose modulation, iterative reconstruction, and/or weight base dosing when appropriate to reduce radiation dose to as low as reasonably achievable.      Electronically signed by: Abhijit Carmona Jr., MD  Date:    07/23/2019  Time:    23:39             Narrative:    EXAMINATION:  CTA CHEST NON CORONARY    CLINICAL HISTORY:  Chest pain, normal ekg;    TECHNIQUE:  Axial CTA images performed through the chest after the administration of 100 cc intravenous contrast. Maximum intensity projections were performed and interpreted.    FINDINGS:  There is no evidence of pulmonary embolus. Pulmonary artery caliber is normal. The heart, great vessels, and mediastinal structures are within normal limits. No thoracic adenopathy.  Densely calcified coronary arteries.    7 mm left lower lobe subpleural pulmonary nodule.  Dependent atelectasis.  Respiratory motion limits evaluation of the lower lobes.  No effusion or pneumothorax.    The upper abdominal visualized organs are within normal limits.    The bones are intact.                               X-Ray Chest AP Portable (Final result)  Result time 07/23/19 21:05:57    Final result by Cornell Meier MD (07/23/19 21:05:57)                 Impression:      No acute process seen.      Electronically signed by: Cornell Meier  MD  Date:    07/23/2019  Time:    21:05             Narrative:    EXAMINATION:  XR CHEST AP PORTABLE    CLINICAL HISTORY:  Shortness of breath    FINDINGS:  Single view of the chest.  Aorta demonstrates atherosclerotic disease.    Cardiac silhouette is normal.  The lungs demonstrate no evidence of active disease.  No evidence of pleural effusion or pneumothorax.  Bones demonstrate moderate degenerative changes.  Skin folds are seen overlying the left hemithorax.                               The EKG was ordered, reviewed, and independently interpreted by the ED provider.  Interpretation time: 18:18  Rate: 83 BPM  Rhythm: Sinus rhythm with occasional premature ventricular complexes.  Interpretation: Left axis deviation. No STEMI.    The EKG was ordered, reviewed, and independently interpreted by the ED provider.  Interpretation time: 21:41  Rate: 100 BPM  Rhythm: normal sinus rhythm  Interpretation: Left axis deviation. Nonspecific ST abnormality. No STEMI.           The Emergency Provider reviewed the vital signs and test results, which are outlined above.    ED Discussion     10:10 PM: Re-evaluated pt. Pt states that her upper abdominal pain has improved on NTG.  D/w pt all pertinent results. D/w pt any concerns expressed at this time. Answered all questions. Pt expresses understanding at this time.    12:05 AM: Discussed case with Dr. Tyler ([facility]). Dr. Tyler agrees with current care and management of pt and accepts admission.   Admitting Service: [facility]  Admitting Physician: Dr. Tyler  Admit to: Obs Tele    12:06 AM: Re-evaluated pt. I have discussed test results, shared treatment plan, and the need for admission with patient and family at bedside. Dicussed need to follow up of pulmonary nodule. Pt and family express understanding at this time and agree with all information. All questions answered. Pt and family have no further questions or concerns at this time. Pt is ready for  admit.        ED Medication(s):  Medications   hydrALAZINE injection 10 mg (10 mg Intravenous Given 7/23/19 2103)   nitroGLYCERIN SL tablet 0.4 mg (0.4 mg Sublingual Given 7/23/19 2158)   metoprolol succinate (TOPROL-XL) 24 hr tablet 25 mg (25 mg Oral Given 7/23/19 2147)   GI cocktail (mylanta 30 mL, lidocaine 2 % viscous 10 mL, dicyclomine 10 mL) 50 mL (50 mLs Oral Given 7/23/19 2232)   iohexol (OMNIPAQUE 350) injection 100 mL (100 mLs Intravenous Given 7/23/19 2320)          Current Discharge Medication List            Medical Decision Making    Medical Decision Making:   Initial Assessment:   Patient is a 79-year-old female presenting to emergency department with shortness of breath and lower chest/epigastric discomfort.  Onset was sudden.  No calf pain calf tenderness, orthopnea.  Differential Diagnosis:   Atypical ACS, pneumonia, pulmonary embolism, pancreatitis, biliary colic, GERD  Clinical Tests:   Lab Tests: Ordered and Reviewed  Radiological Study: Ordered and Reviewed  Medical Tests: Ordered and Reviewed  ED Management:  Patient had taken 81 mg of aspirin as well as 75 mg of Plavix this morning.  EKG did not show any acute ST segment elevation.  Chest x-ray was normal. Patient underwent CTA of the chest which was negative for pulmonary embolism.  There was a pulmonary nodule present.  This finding was discussed with the patient and family members.  I discussed the importance of surveillance of the pulmonary nodule.  Patient and family verbalized understanding.  Patient had partial relief of her discomfort was sublingual nitroglycerin.  Patient has elevated blood pressure was treated with oral metoprolol and IV hydralazine.  Patient had relief of the abdominal discomfort after GI cocktail.  Given patient's age and no recent nuclear medicine scan, the differential includes atypical ACS.  There for recommend patient be observed in the hospital.  Case was discussed with hospital medicine who recommended  observation status.  Other:   I have discussed this case with another health care provider.       <> Summary of the Discussion: Case discussed with Dr. Tyler.       Patient does not  Meet criteria for critical care.     Scribe Attestation:   Scribe #1: I performed the above scribed service and the documentation accurately describes the services I performed. I attest to the accuracy of the note.    Attending:   Physician Attestation Statement for Scribe #1: I, Le Meek DO, personally performed the services described in this documentation, as scribed by Natan Coughlin, in my presence, and it is both accurate and complete.          Clinical Impression       ICD-10-CM ICD-9-CM   1. Pulmonary nodule, left R91.1 793.11   2. Shortness of breath R06.02 786.05   3. Chest pain R07.9 786.50   4. Dyspnea R06.00 786.09   5. Pain of upper abdomen R10.10 789.09       Disposition:   Disposition: Placed in Observation  Condition: Fair         Le Meek DO  07/24/19 0253

## 2019-07-24 NOTE — ASSESSMENT & PLAN NOTE
- Likely secondary to #2.  No chest pain or anginal equivalent at present.  - Troponin negative x 1, EKG unrevealing.  Trend serial cardiac enzymes.  - Check FLP.  - Continue ASA, Plavix, BB, and high-intensity statin.  - Followed by Dr. Jeffrey Carmona ( Cardiology).  MPI stress test in 7/2017 at [facility] showed no significant perfusion defects and normal LVEF of 55% with no wall motion abnormalities.  - Further recs pending clinical course.

## 2019-07-24 NOTE — ASSESSMENT & PLAN NOTE
- Improved after 10 mg IV hydralazine, 25 mg Toprol XL, and SL NTG x 3 doses given in ED.  BP remains stable at present.  - Continue home amlodipine, losartan, Toprol XL, and spironolactone.  Follow BP trends and optimize as needed.

## 2019-07-24 NOTE — ASSESSMENT & PLAN NOTE
"-Presents with atypical epigastric "tightness" likely in setting of uncontrolled HTN  -Symptoms improved since admission  -Troponin 0.024>0.048>0.035  -Increase amlodipine to 10 mg daily for better BP control  -Continue Losartan, BB, Aldactone  -Consider addition of low dose nitrates if needed  -Check 2D echo  -MPI stress test today, further rec's to follow    "

## 2019-07-24 NOTE — PLAN OF CARE
Problem: Adult Inpatient Plan of Care  Goal: Plan of Care Review  Outcome: Ongoing (interventions implemented as appropriate)  POC reviewed. Pt and family verbalizes understanding. Telemetry monitoring. VSS. Comfort measures and safety measures addressed. Pt remains free from falls and injury. Will continue to monitor.

## 2019-07-24 NOTE — SIGNIFICANT EVENT
"On 7/23 patient was placed in OBS secondary to Atypical CP.  Initial troponin negative.  12 lead EKG unrevealing.  Her home ASA, Plavix, BB, and statin were continued.  Of note, patient is followed by Dr. Carmona with  Cardiology and underwent an MPI stress test in 7/2017 that showed no significant perfusion defects and a normal EF with no WMA.  BP also noted to be markedly elevated in the ER for which she was given IVP Hydralazine, and Toprol XL 25 mg, in addition to NTG SL x 3 doses with improvement.  Patient's home Amlodipine, Losartan, Toprol XL, and Spironolactone were continued.      As of 7/24 patient reports "I feel better than yesterday".  Second troponin 0.048, third pending.  Cardiology consulted and recommended an ECHO along with trending enzymes and adjusting medications to achieve better BP control.  Will await above results and further recommendations from cardiology.  Patient is awake and alert, resting comfortably in bed in NAD.  Daughter is present at the BS and updated on POC.    "

## 2019-07-24 NOTE — SUBJECTIVE & OBJECTIVE
Past Medical History:   Diagnosis Date    Hyperlipemia     Hypertension     Parkinson's disease     Stroke 2016    Throat mass        Past Surgical History:   Procedure Laterality Date    HYSTERECTOMY      THROAT SURGERY      TONSILLECTOMY         Review of patient's allergies indicates:  No Known Allergies    No current facility-administered medications on file prior to encounter.      Current Outpatient Medications on File Prior to Encounter   Medication Sig    amlodipine (NORVASC) 10 MG tablet Take 5 mg by mouth once daily.     aspirin (ECOTRIN) 81 MG EC tablet Take 81 mg by mouth once daily.    carbidopa-levodopa  mg (SINEMET)  mg per tablet Take 2 tablets by mouth 4 (four) times daily.    clopidogrel (PLAVIX) 75 mg tablet Take 75 mg by mouth once daily.    losartan (COZAAR) 100 MG tablet Take 100 mg by mouth once daily.    metoprolol succinate (TOPROL-XL) 25 MG 24 hr tablet Take 50 mg by mouth 2 (two) times daily.     multivitamin capsule Take 1 capsule by mouth once daily.    omeprazole (PRILOSEC) 20 MG capsule Take 20 mg by mouth once daily.    potassium chloride (KLOR-CON) 10 MEQ TbSR Take 10 mEq by mouth once daily.    rosuvastatin (CRESTOR) 10 MG tablet Take 10 mg by mouth once daily.    spironolactone (ALDACTONE) 25 MG tablet Take 25 mg by mouth once daily.    raloxifene (EVISTA) 60 mg tablet Take 60 mg by mouth once daily.     Family History     None        Tobacco Use    Smoking status: Never Smoker    Smokeless tobacco: Never Used   Substance and Sexual Activity    Alcohol use: No    Drug use: No    Sexual activity: Not Currently     Review of Systems   Constitution: Negative.   HENT: Negative.    Eyes: Negative.    Cardiovascular: Positive for chest pain.   Respiratory: Positive for shortness of breath.    Endocrine: Negative.    Hematologic/Lymphatic: Negative.    Skin: Negative.    Musculoskeletal: Negative.    Gastrointestinal: Negative.         Epigastric pain    Genitourinary: Negative.    Neurological: Negative.    Psychiatric/Behavioral: Negative.    Allergic/Immunologic: Negative.      Objective:     Vital Signs (Most Recent):  Temp: 98 °F (36.7 °C) (07/24/19 0817)  Pulse: 74 (07/24/19 0817)  Resp: 18 (07/24/19 0817)  BP: (!) 161/90 (07/24/19 0817)  SpO2: 100 % (07/24/19 0817) Vital Signs (24h Range):  Temp:  [98 °F (36.7 °C)-98.3 °F (36.8 °C)] 98 °F (36.7 °C)  Pulse:  [] 74  Resp:  [12-21] 18  SpO2:  [98 %-100 %] 100 %  BP: (146-201)/(76-94) 161/90     Weight: 55.5 kg (122 lb 5.7 oz)  Body mass index is 22.38 kg/m².    SpO2: 100 %  O2 Device (Oxygen Therapy): room air      Intake/Output Summary (Last 24 hours) at 7/24/2019 0924  Last data filed at 7/24/2019 0500  Gross per 24 hour   Intake --   Output 300 ml   Net -300 ml       Lines/Drains/Airways     Peripheral Intravenous Line                 Peripheral IV - Single Lumen 07/23/19 2301 20 G Left Antecubital less than 1 day                Physical Exam   Constitutional: She is oriented to person, place, and time. She appears well-developed and well-nourished. No distress.   HENT:   Head: Normocephalic and atraumatic.   Eyes: Pupils are equal, round, and reactive to light. Right eye exhibits no discharge. Left eye exhibits no discharge.   Neck: Neck supple. No JVD present.   Cardiovascular: Normal rate, regular rhythm, S1 normal, S2 normal and normal heart sounds.   No murmur heard.  Pulmonary/Chest: Effort normal and breath sounds normal. No respiratory distress. She has no wheezes. She has no rales.   Abdominal: Soft. She exhibits no distension.   Musculoskeletal: She exhibits no edema.   Neurological: She is alert and oriented to person, place, and time.   Skin: Skin is warm and dry. She is not diaphoretic. No erythema.   Psychiatric: She has a normal mood and affect. Her behavior is normal. Thought content normal.   Nursing note and vitals reviewed.      Significant Labs:   CMP   Recent Labs   Lab  07/23/19 2040 07/24/19  0349    141   K 3.9 3.7    105   CO2 25 26   GLU 85 116*   BUN 13 10   CREATININE 0.9 0.8   CALCIUM 10.0 9.4   PROT 8.1  --    ALBUMIN 4.4  --    BILITOT 0.2  --    ALKPHOS 66  --    AST 25  --    ALT 10  --    ANIONGAP 13 10   ESTGFRAFRICA >60 >60   EGFRNONAA >60 >60   , CBC   Recent Labs   Lab 07/23/19 2040 07/24/19  0349   WBC 6.56 5.74   HGB 13.5 11.9*   HCT 40.5 36.5*    233   , Troponin   Recent Labs   Lab 07/23/19 2040 07/24/19 0349 07/24/19  0835   TROPONINI 0.024 0.048* 0.035*    and All pertinent lab results from the last 24 hours have been reviewed.    Significant Imaging: Echocardiogram: 2D echo with color flow doppler: No results found for this or any previous visit., EKG: REviewed and X-Ray: CXR: X-Ray Chest 1 View (CXR): No results found for this visit on 07/23/19. and X-Ray Chest PA and Lateral (CXR): No results found for this visit on 07/23/19.

## 2019-07-24 NOTE — PLAN OF CARE
Problem: Adult Inpatient Plan of Care  Goal: Plan of Care Review  Outcome: Ongoing (interventions implemented as appropriate)  POC reviewed. Comfort measures and safety measures addressed. Pt remains free of falls and injury this shift. VSS. Telemetry monitoring as ordered. Will continue to monitor.

## 2019-07-24 NOTE — ASSESSMENT & PLAN NOTE
-Troponin 0.024>0.048>0.035  -Slightly elevated but flat, likely in setting of demand ischemia from accelerated HTN  -Continue ASA, Plavix, BB, ARB, statin  -Amlodipine dosage increased  -MPI stress test today; further rec's to follow

## 2019-07-24 NOTE — HPI
"Ms. Madrid is an 79 y.o. female with a PMHx of HTN, HLD, h/o CVA, and Parkinson's disease.  She presented to the ED with c/o epigastric chest pain that started suddenly ~2 hours PTA.  Pain was "tight" in nature, moderate in intensity, nonradiating, and nonexertional.  Associated SOB.  No aggravating or alleviating factors.  Symptoms had spontaneously resolved PTA.  Denies any diaphoresis, N/V, jaw or neck pain, palpitations, cough, orthopnea, edema, ABD pain or distention, diarrhea, constipation, dysuria, hematuria, back pain, HA, AMS, visual disturbance, lightheadedness, dizziness, syncope, focal deficits, weakness, fever or chills.  Patient had a MPI stress test in 7/2017 at [facility], which showed no perfusion defects and normal LVEF of 55%.  She is followed by Dr. Jeffrey Carmona ( Cardiology).  In the ED, patient noted to be significantly hypertensive with /94.  She received 10 mg IV hydralazine and SL NTG x 3 doses with improved BP of 146/79.  Work-up in ED was unremarkable with negative troponin, EKG, CXR, CT of ABD/pelvis, and CTA of the chest.  Hospital Medicine was called for admission.         "

## 2019-07-24 NOTE — DISCHARGE SUMMARY
"[facility] Medicine  Discharge Summary      Patient Name: Tanya Madrid  MRN: [id number]  Admission Date: 7/23/2019  Hospital Length of Stay: 0 days  Discharge Date and Time:  07/24/2019 5:45 PM  Attending Physician: Elvin Alvarez MD   Discharging Provider: Shannon Billingsley NP  Primary Care Provider: Marti Parsons MD      HPI:   Ms. Madrid is an 79 y.o. female  with a PMHx of HTN, HLD, h/o CVA, and Parkinson's disease.  She presented to the ED with c/o epigastric chest pain that started suddenly ~2 hours PTA.  Pain was "tight" in nature, moderate in intensity, nonradiating, and nonexertional.  Associated SOB.  No aggravating or alleviating factors.  Symptoms had spontaneously resolved PTA.  Denies any diaphoresis, N/V, jaw or neck pain, palpitations, cough, orthopnea, edema, ABD pain or distention, diarrhea, constipation, dysuria, hematuria, back pain, HA, AMS, visual disturbance, lightheadedness, dizziness, syncope, focal deficits, weakness, fever or chills.  Patient had a MPI stress test in 7/2017 at [facility], which showed no perfusion defects and normal LVEF of 55%.  She is followed by Dr. Jeffrey Carmona ( Cardiology).  In the ED, patient noted to be significantly hypertensive with /94.  She received 10 mg IV hydralazine and SL NTG x 3 doses with improved BP of 146/79.  Work-up in ED was unremarkable with negative troponin, EKG, CXR, CT of ABD/pelvis, and CTA of the chest.  Hospital Medicine was called for admission.              * No surgery found *      Hospital Course:   On 7/23 patient was placed in OBS secondary to Atypical CP.  Initial troponin negative.  12 lead EKG unrevealing.  CTA chest negative for PE.  Her home ASA, Plavix, BB, and statin were continued.  Of note, patient is followed by Dr. Carmona with  Cardiology and underwent an MPI stress test in 7/2017 that showed no significant perfusion defects and a normal EF with no WMA.  BP also noted to be " "markedly elevated in the ER for which she was given IVP Hydralazine, and Toprol XL 25 mg, in addition to NTG SL x 3 doses with improvement.  Patient's home Amlodipine, Losartan, Toprol XL, and Spironolactone were continued.      As of 7/24 patient reports "I feel better than yesterday".  Second troponin 0.048, third pending.  Cardiology consulted and recommended an ECHO along with trending enzymes and adjusting medications to achieve better BP control.  Will await above results and further recommendations from cardiology.      As of this afternoon, patient is denying CP and has had an uneventful day.  Third troponin decreased to 0.040.  Nuclear ST today showed normal myocardial perfusion.  The perfusion scan is free of evidence for myocardial ischemia or injury.  There is a mild intensity fixed defect in the inferolateral wall of the left ventricle, likely secondary to diaphragm attenuation defect.  Resting wall motion is physiologic.  ECHO showed normal EF.  BP is under much better control with increasing Norvasc to 10 mg daily.  Case d/w Dr. Patel.  Ok to DC home today from cardiology standpoint with outpatient f/u with her cardiologist Dr. Carmona within 1 week.  Daughter was also instructed to f/u with PCP within 3 days for post hospital evaluation, and to keep a BP log for PCP and cardiology to review, verbalized + understanding.  Daughter is also aware of a left lower lobe pulmonary nodule, seen on CT scan that will need outpatient f/u and monitoring per PCP.  Case d/w Dr. Alvarez.  Patient seen and examined again this afternoon and deemed medically stable to DC home today.  Medications reconciled for DC.       Consults:   Consults (From admission, onward)        Status Ordering Provider     Inpatient consult to Cardiology  Once     Provider:  Vern Patel MD    Completed AMAYA LEAL          No new Assessment & Plan notes have been filed under this hospital service since the last note was " generated.  Service: Hospital Medicine    Final Active Diagnoses:    Diagnosis Date Noted POA    History of CVA (cerebrovascular accident) [Z86.73] 07/24/2019 Not Applicable     Chronic    Elevated troponin [R74.8] 07/24/2019 Unknown    GERD (gastroesophageal reflux disease) [K21.9] 07/11/2017 Yes     Chronic    Parkinson disease [G20] 07/11/2017 Yes     Chronic      Problems Resolved During this Admission:    Diagnosis Date Noted Date Resolved POA    PRINCIPAL PROBLEM:  Atypical chest pain [R07.89] 07/24/2019 07/24/2019 Yes    Accelerated hypertension [I10] 07/11/2017 07/24/2019 Yes       Discharged Condition: good    Disposition: Home or Self Care    Follow Up:  Follow-up Information     Marti Parsons MD In 3 days.    Specialty:  Family Medicine  Why:  Follow up with PCP within 3 days for post hospital evaluation and monitoring of BP, and monitoring of pulmonary nodule seen on CT scan.  Contact information:  [address]  [address]  [facility]  [place] [address]  [phone]             Jeffrey Carmona MD In 1 week.    Specialty:  Cardiology  Why:  Follow up with Cardiology within 1 week for post hospital evaluation and monitoring of BP.  Contact information:  [address] [place] [address]  [phone]                 Patient Instructions:      Diet Cardiac     Notify your health care provider if you experience any of the following:  persistent nausea and vomiting or diarrhea     Notify your health care provider if you experience any of the following:  severe uncontrolled pain     Notify your health care provider if you experience any of the following:  difficulty breathing or increased cough     Notify your health care provider if you experience any of the following:  persistent dizziness, light-headedness, or visual disturbances     Notify your health care provider if you experience any of the following:  increased confusion or weakness     Activity as tolerated       Significant  Diagnostic Studies: Labs:   BMP:   Recent Labs   Lab 07/23/19 2040 07/24/19  0349   GLU 85 116*    141   K 3.9 3.7    105   CO2 25 26   BUN 13 10   CREATININE 0.9 0.8   CALCIUM 10.0 9.4   , CMP   Recent Labs   Lab 07/23/19 2040 07/24/19  0349    141   K 3.9 3.7    105   CO2 25 26   GLU 85 116*   BUN 13 10   CREATININE 0.9 0.8   CALCIUM 10.0 9.4   PROT 8.1  --    ALBUMIN 4.4  --    BILITOT 0.2  --    ALKPHOS 66  --    AST 25  --    ALT 10  --    ANIONGAP 13 10   ESTGFRAFRICA >60 >60   EGFRNONAA >60 >60   , CBC   Recent Labs   Lab 07/23/19 2040 07/24/19 0349   WBC 6.56 5.74   HGB 13.5 11.9*   HCT 40.5 36.5*    233   , Lipid Panel   Lab Results   Component Value Date    CHOL 197 07/24/2019    HDL 63 07/24/2019    LDLCALC 120.6 07/24/2019    TRIG 67 07/24/2019    CHOLHDL 32.0 07/24/2019   , Troponin   Recent Labs   Lab 07/24/19  1435   TROPONINI 0.040*    and A1C:   Recent Labs   Lab 07/24/19 0349   HGBA1C 5.9*       Pending Diagnostic Studies:     None         Imaging Results          CT Abdomen Pelvis With Contrast (Final result)  Result time 07/23/19 23:51:42    Final result by Abhijit Carmona Jr., MD (07/23/19 23:51:42)                 Impression:      <No abnormality identified in the abdomen or pelvis>.    All CT scans at this facility are performed  using dose modulation techniques as appropriate to performed exam including the following:  automated exposure control; adjustment of mA and/or kV according to the patients size (this includes techniques or standardized protocols for targeted exams where dose is matched to indication/reason for exam: i.e. extremities or head);  iterative reconstruction technique.      Electronically signed by: Abhijit Carmona Jr., MD  Date:    07/23/2019  Time:    23:51             Narrative:    EXAMINATION:  CT ABDOMEN PELVIS WITH CONTRAST    CLINICAL HISTORY:  epigastric pain;    TECHNIQUE:  Axial CT imaging was performed through the abdomen and  pelvis with 100 mL of intravenous contrast. Multiplanar reformats were performed and interpreted.    COMPARISON:  None    FINDINGS:  <Lung bases are clear>.    1.7 cm cyst right hepatic lobe of the liver.  Portal vein is patent.  3.9 cm upper pole left renal cyst.  Right kidney is unremarkable.  No renal calculus or obstructive uropathy..    <The gallbladder is unremarkable>.    <No free fluid, free air, or inflammatory change>.    <The bowel is nondistended and within normal limits>.    <The urinary bladder is unremarkable>. Small bowel containing small right inguinal hernia.    <No significant osseous abnormality is identified>.  Degenerative change at L4-L5 and L5-S1.                               CTA Chest Non-Coronary (PE Study) (Final result)  Result time 07/23/19 23:39:36    Final result by Abhijit Carmona Jr., MD (07/23/19 23:39:36)                 Impression:      No pulmonary embolus.    7 mm pulmonary nodule left lower lobe.  Recommend continued follow-up per Fleischner criteria.  For a solid nodule 6-8 mm, Fleischner Society 2017 guidelines recommend follow up with non-contrast chest CT at 6-12 months and 18-24 months after discovery.    All CT scan at this facility use dose modulation, iterative reconstruction, and/or weight base dosing when appropriate to reduce radiation dose to as low as reasonably achievable.      Electronically signed by: Abhijit Carmona Jr., MD  Date:    07/23/2019  Time:    23:39             Narrative:    EXAMINATION:  CTA CHEST NON CORONARY    CLINICAL HISTORY:  Chest pain, normal ekg;    TECHNIQUE:  Axial CTA images performed through the chest after the administration of 100 cc intravenous contrast. Maximum intensity projections were performed and interpreted.    FINDINGS:  There is no evidence of pulmonary embolus. Pulmonary artery caliber is normal. The heart, great vessels, and mediastinal structures are within normal limits. No thoracic adenopathy.  Densely calcified  coronary arteries.    7 mm left lower lobe subpleural pulmonary nodule.  Dependent atelectasis.  Respiratory motion limits evaluation of the lower lobes.  No effusion or pneumothorax.    The upper abdominal visualized organs are within normal limits.    The bones are intact.                               X-Ray Chest AP Portable (Final result)  Result time 07/23/19 21:05:57    Final result by Cornell Meire MD (07/23/19 21:05:57)                 Impression:      No acute process seen.      Electronically signed by: Cornell Meier MD  Date:    07/23/2019  Time:    21:05             Narrative:    EXAMINATION:  XR CHEST AP PORTABLE    CLINICAL HISTORY:  Shortness of breath    FINDINGS:  Single view of the chest.  Aorta demonstrates atherosclerotic disease.    Cardiac silhouette is normal.  The lungs demonstrate no evidence of active disease.  No evidence of pleural effusion or pneumothorax.  Bones demonstrate moderate degenerative changes.  Skin folds are seen overlying the left hemithorax.                                Medications:  Reconciled Home Medications:      Medication List      CHANGE how you take these medications    amLODIPine 10 MG tablet  Commonly known as:  NORVASC  Take 1 tablet (10 mg total) by mouth once daily.  Start taking on:  7/25/2019  What changed:  how much to take        CONTINUE taking these medications    aspirin 81 MG EC tablet  Commonly known as:  ECOTRIN  Take 81 mg by mouth once daily.     carbidopa-levodopa  mg  mg per tablet  Commonly known as:  SINEMET  Take 2 tablets by mouth 4 (four) times daily.     clopidogrel 75 mg tablet  Commonly known as:  PLAVIX  Take 75 mg by mouth once daily.     losartan 100 MG tablet  Commonly known as:  COZAAR  Take 100 mg by mouth once daily.     metoprolol succinate 25 MG 24 hr tablet  Commonly known as:  TOPROL-XL  Take 50 mg by mouth 2 (two) times daily.     multivitamin capsule  Take 1 capsule by mouth once daily.     omeprazole 20 MG  capsule  Commonly known as:  PRILOSEC  Take 20 mg by mouth once daily.     potassium chloride 10 MEQ Tbsr  Commonly known as:  KLOR-CON  Take 10 mEq by mouth once daily.     raloxifene 60 mg tablet  Commonly known as:  EVISTA  Take 60 mg by mouth once daily.     rosuvastatin 10 MG tablet  Commonly known as:  CRESTOR  Take 10 mg by mouth once daily.     spironolactone 25 MG tablet  Commonly known as:  ALDACTONE  Take 25 mg by mouth once daily.            Indwelling Lines/Drains at time of discharge:   Lines/Drains/Airways          None          Time spent on the discharge of patient: 45 minutes  Patient was seen and examined on the date of discharge and determined to be suitable for discharge.         Shannon Billingsley DNP, ACNP-BC  Department of Hospital Medicine  [facility]

## 2019-07-24 NOTE — CONSULTS
"[facility]  Cardiology  Consult Note    Patient Name: Tanya Madrid  MRN: [id number]  Admission Date: 7/23/2019  Hospital Length of Stay: 0 days  Code Status: Full Code   Attending Provider: Elvin Alvarez MD   Consulting Provider: Yeimy Gamez PA-C  Primary Care Physician: Marti Parsons MD  Principal Problem:Atypical chest pain    Patient information was obtained from patient, past medical records and ER records.     Inpatient consult to Cardiology  Consult performed by: Yeimy Gamez PA-C  Consult ordered by: Shannon Billingsley NP        Subjective:     Chief Complaint:  Chest pain    HPI:   Ms. Madrid is an 79 year old female patient whose current medical conditions include HTN, hyperlipidemia, Parkinson's disease, and history of CVA who presented to [facility] ED yesterday with a chief complaint of epigastric chest pain/"tightness" that began approximately 2 hours PTA while she was sitting on her cough. Associated symptoms included SOB. Patient denied any radiation of pain or any associated nausea, vomiting, diaphoresis, palpitations, near syncope, or syncope. Initial workup in ED revealed BP of 201/94 and patient was subsequently admitted for further evaluation and treatment. Overnight, repeat troponin increased to 0.048 and cardiology consulted to assist with management. Patient seen and examined today, resting in bed. Feels better since admission. Still complains of some mild epigastric "soreness". Denies emery pain. Reports SOB has resolved. Denies any prior history of CAD or MI. She reports compliance with her medications. States BP at home is variable. Followed as OP by Dr. Mathew Grady. Chart reviewed. Troponin 0.024>0.048>0.035. Repeat troponin and 2D echo pending. Previous stress test in 7/17 negative.     Past Medical History:   Diagnosis Date    Hyperlipemia     Hypertension     Parkinson's disease     Stroke 2016    Throat mass        Past Surgical History: "   Procedure Laterality Date    HYSTERECTOMY      THROAT SURGERY      TONSILLECTOMY         Review of patient's allergies indicates:  No Known Allergies    No current facility-administered medications on file prior to encounter.      Current Outpatient Medications on File Prior to Encounter   Medication Sig    amlodipine (NORVASC) 10 MG tablet Take 5 mg by mouth once daily.     aspirin (ECOTRIN) 81 MG EC tablet Take 81 mg by mouth once daily.    carbidopa-levodopa  mg (SINEMET)  mg per tablet Take 2 tablets by mouth 4 (four) times daily.    clopidogrel (PLAVIX) 75 mg tablet Take 75 mg by mouth once daily.    losartan (COZAAR) 100 MG tablet Take 100 mg by mouth once daily.    metoprolol succinate (TOPROL-XL) 25 MG 24 hr tablet Take 50 mg by mouth 2 (two) times daily.     multivitamin capsule Take 1 capsule by mouth once daily.    omeprazole (PRILOSEC) 20 MG capsule Take 20 mg by mouth once daily.    potassium chloride (KLOR-CON) 10 MEQ TbSR Take 10 mEq by mouth once daily.    rosuvastatin (CRESTOR) 10 MG tablet Take 10 mg by mouth once daily.    spironolactone (ALDACTONE) 25 MG tablet Take 25 mg by mouth once daily.    raloxifene (EVISTA) 60 mg tablet Take 60 mg by mouth once daily.     Family History     None        Tobacco Use    Smoking status: Never Smoker    Smokeless tobacco: Never Used   Substance and Sexual Activity    Alcohol use: No    Drug use: No    Sexual activity: Not Currently     Review of Systems   Constitution: Negative.   HENT: Negative.    Eyes: Negative.    Cardiovascular: Positive for chest pain.   Respiratory: Positive for shortness of breath.    Endocrine: Negative.    Hematologic/Lymphatic: Negative.    Skin: Negative.    Musculoskeletal: Negative.    Gastrointestinal: Negative.         Epigastric pain   Genitourinary: Negative.    Neurological: Negative.    Psychiatric/Behavioral: Negative.    Allergic/Immunologic: Negative.      Objective:     Vital Signs  (Most Recent):  Temp: 98 °F (36.7 °C) (07/24/19 0817)  Pulse: 74 (07/24/19 0817)  Resp: 18 (07/24/19 0817)  BP: (!) 161/90 (07/24/19 0817)  SpO2: 100 % (07/24/19 0817) Vital Signs (24h Range):  Temp:  [98 °F (36.7 °C)-98.3 °F (36.8 °C)] 98 °F (36.7 °C)  Pulse:  [] 74  Resp:  [12-21] 18  SpO2:  [98 %-100 %] 100 %  BP: (146-201)/(76-94) 161/90     Weight: 55.5 kg (122 lb 5.7 oz)  Body mass index is 22.38 kg/m².    SpO2: 100 %  O2 Device (Oxygen Therapy): room air      Intake/Output Summary (Last 24 hours) at 7/24/2019 0924  Last data filed at 7/24/2019 0500  Gross per 24 hour   Intake --   Output 300 ml   Net -300 ml       Lines/Drains/Airways     Peripheral Intravenous Line                 Peripheral IV - Single Lumen 07/23/19 2301 20 G Left Antecubital less than 1 day                Physical Exam   Constitutional: She is oriented to person, place, and time. She appears well-developed and well-nourished. No distress.   HENT:   Head: Normocephalic and atraumatic.   Eyes: Pupils are equal, round, and reactive to light. Right eye exhibits no discharge. Left eye exhibits no discharge.   Neck: Neck supple. No JVD present.   Cardiovascular: Normal rate, regular rhythm, S1 normal, S2 normal and normal heart sounds.   No murmur heard.  Pulmonary/Chest: Effort normal and breath sounds normal. No respiratory distress. She has no wheezes. She has no rales.   Abdominal: Soft. She exhibits no distension.   Musculoskeletal: She exhibits no edema.   Neurological: She is alert and oriented to person, place, and time.   Skin: Skin is warm and dry. She is not diaphoretic. No erythema.   Psychiatric: She has a normal mood and affect. Her behavior is normal. Thought content normal.   Nursing note and vitals reviewed.      Significant Labs:   CMP   Recent Labs   Lab 07/23/19 2040 07/24/19  0349    141   K 3.9 3.7    105   CO2 25 26   GLU 85 116*   BUN 13 10   CREATININE 0.9 0.8   CALCIUM 10.0 9.4   PROT 8.1  --   "  ALBUMIN 4.4  --    BILITOT 0.2  --    ALKPHOS 66  --    AST 25  --    ALT 10  --    ANIONGAP 13 10   ESTGFRAFRICA >60 >60   EGFRNONAA >60 >60   , CBC   Recent Labs   Lab 07/23/19 2040 07/24/19  0349   WBC 6.56 5.74   HGB 13.5 11.9*   HCT 40.5 36.5*    233   , Troponin   Recent Labs   Lab 07/23/19 2040 07/24/19  0349 07/24/19  0835   TROPONINI 0.024 0.048* 0.035*    and All pertinent lab results from the last 24 hours have been reviewed.    Significant Imaging: Echocardiogram: 2D echo with color flow doppler: No results found for this or any previous visit., EKG: REviewed and X-Ray: CXR: X-Ray Chest 1 View (CXR): No results found for this visit on 07/23/19. and X-Ray Chest PA and Lateral (CXR): No results found for this visit on 07/23/19.    Assessment and Plan:   Patient who presents with atypical chest pain and elevated troponin in setting of accelerated HTN. Meds adjusted, assess response. Check 2D echo and MPI stress test given age/risk factors. Further rec's to follow.    * Atypical chest pain  -Presents with atypical epigastric "tightness" likely in setting of uncontrolled HTN  -Symptoms improved since admission  -Troponin 0.024>0.048>0.035  -Increase amlodipine to 10 mg daily for better BP control  -Continue Losartan, BB, Aldactone  -Consider addition of low dose nitrates if needed  -Check 2D echo  -MPI stress test today, further rec's to follow      Elevated troponin  -Troponin 0.024>0.048>0.035  -Slightly elevated but flat, likely in setting of demand ischemia from accelerated HTN  -Continue ASA, Plavix, BB, ARB, statin  -Amlodipine dosage increased  -MPI stress test today; further rec's to follow    History of CVA (cerebrovascular accident)  -Continue ASA, Plavix statin    Parkinson disease  -Mgmt as per hospital medicine  -Continue home meds    Accelerated hypertension  -BP uncontrolled  -Continue Losartan, BB, Aldactone  -Increase amlodipine to 10 mg daily  -Monitor/assess response        VTE " Risk Mitigation (From admission, onward)        Ordered     heparin (porcine) injection 5,000 Units  Every 8 hours      07/24/19 0310     IP VTE HIGH RISK PATIENT  Once      07/24/19 0310     Place sequential compression device  Until discontinued      07/24/19 0310          Thank you for your consult. I will follow-up with patient. Please contact us if you have any additional questions.    Yeimy Gamez PA-C  Cardiology   [facility]     Chart reviewed. Dr. Patel examined patient and agrees with plan as outlined above.

## 2019-07-24 NOTE — SUBJECTIVE & OBJECTIVE
Past Medical History:   Diagnosis Date    Hyperlipemia     Hypertension     Parkinson's disease     Stroke 2016    Throat mass        Past Surgical History:   Procedure Laterality Date    HYSTERECTOMY      THROAT SURGERY      TONSILLECTOMY         Review of patient's allergies indicates:  No Known Allergies    No current facility-administered medications on file prior to encounter.      Current Outpatient Medications on File Prior to Encounter   Medication Sig    amlodipine (NORVASC) 10 MG tablet Take 5 mg by mouth once daily.     aspirin (ECOTRIN) 81 MG EC tablet Take 81 mg by mouth once daily.    carbidopa-levodopa  mg (SINEMET)  mg per tablet Take 2 tablets by mouth 4 (four) times daily.    clopidogrel (PLAVIX) 75 mg tablet Take 75 mg by mouth once daily.    losartan (COZAAR) 100 MG tablet Take 100 mg by mouth once daily.    metoprolol succinate (TOPROL-XL) 25 MG 24 hr tablet Take 50 mg by mouth 2 (two) times daily.     multivitamin capsule Take 1 capsule by mouth once daily.    omeprazole (PRILOSEC) 20 MG capsule Take 20 mg by mouth once daily.    potassium chloride (KLOR-CON) 10 MEQ TbSR Take 10 mEq by mouth once daily.    rosuvastatin (CRESTOR) 10 MG tablet Take 10 mg by mouth once daily.    spironolactone (ALDACTONE) 25 MG tablet Take 25 mg by mouth once daily.    raloxifene (EVISTA) 60 mg tablet Take 60 mg by mouth once daily.     Family History     Reviewed and not pertinent.         Tobacco Use    Smoking status: Never Smoker    Smokeless tobacco: Never Used   Substance and Sexual Activity    Alcohol use: No    Drug use: No    Sexual activity: Not Currently     Review of Systems   Constitutional: Negative for activity change, appetite change, chills, diaphoresis, fatigue, fever and unexpected weight change.   HENT: Negative for congestion, postnasal drip, rhinorrhea, sinus pressure and sore throat.    Respiratory: Positive for shortness of breath. Negative for cough and  wheezing.    Cardiovascular: Positive for chest pain. Negative for palpitations and leg swelling.   Gastrointestinal: Negative for abdominal distention, abdominal pain, blood in stool, constipation, diarrhea, nausea and vomiting.   Genitourinary: Negative for difficulty urinating, dysuria, flank pain, frequency, hematuria and urgency.   Musculoskeletal: Negative for arthralgias, back pain and myalgias.   Skin: Negative for pallor, rash and wound.   Neurological: Negative for dizziness, syncope, weakness, light-headedness, numbness and headaches.   Psychiatric/Behavioral: Negative for confusion. The patient is not nervous/anxious.    All other systems reviewed and are negative.    Objective:     Vital Signs (Most Recent):  Temp: 98.1 °F (36.7 °C) (07/23/19 1814)  Pulse: 82 (07/23/19 2331)  Resp: 17 (07/23/19 2331)  BP: (!) 152/79 (07/23/19 2331)  SpO2: 100 % (07/23/19 2331) Vital Signs (24h Range):  Temp:  [98.1 °F (36.7 °C)] 98.1 °F (36.7 °C)  Pulse:  [] 82  Resp:  [16-21] 17  SpO2:  [100 %] 100 %  BP: (146-201)/(79-94) 152/79     Weight: 56.7 kg (124 lb 14.3 oz)  Body mass index is 22.84 kg/m².    Physical Exam   Constitutional: She is oriented to person, place, and time. She appears well-developed and well-nourished. No distress.   Elderly.   HENT:   Head: Normocephalic and atraumatic.   Eyes: Conjunctivae are normal.   PERRL; EOM intact.   Neck: Normal range of motion. Neck supple. No JVD present.   Cardiovascular: Normal rate, regular rhythm, S1 normal, S2 normal and intact distal pulses.  No extrasystoles are present. Exam reveals no gallop and no friction rub.   No murmur heard.  Pulses:       Radial pulses are 2+ on the right side, and 2+ on the left side.        Dorsalis pedis pulses are 2+ on the right side, and 2+ on the left side.        Posterior tibial pulses are 2+ on the right side, and 2+ on the left side.   Pulmonary/Chest: Effort normal and breath sounds normal. No accessory muscle usage. No  tachypnea. No respiratory distress. She has no wheezes. She has no rhonchi. She has no rales. She exhibits tenderness (mild epigastric TTP).   Abdominal: Soft. Bowel sounds are normal. She exhibits no distension. There is no hepatosplenomegaly. There is tenderness in the epigastric area. There is no rigidity, no rebound, no guarding, no CVA tenderness and negative Guerra's sign. No hernia.   Musculoskeletal: Normal range of motion. She exhibits no edema, tenderness or deformity.   Neurological: She is alert and oriented to person, place, and time. No cranial nerve deficit or sensory deficit.   Skin: Skin is warm, dry and intact. Capillary refill takes less than 2 seconds. No rash noted. She is not diaphoretic. No cyanosis or erythema.   Psychiatric: She has a normal mood and affect. Her speech is normal and behavior is normal. Cognition and memory are normal.   Nursing note and vitals reviewed.          Significant Labs:  Results for orders placed or performed during the hospital encounter of 07/23/19   CBC auto differential   Result Value Ref Range    WBC 6.56 3.90 - 12.70 K/uL    RBC 4.42 4.00 - 5.40 M/uL    Hemoglobin 13.5 12.0 - 16.0 g/dL    Hematocrit 40.5 37.0 - 48.5 %    Mean Corpuscular Volume 92 82 - 98 fL    Mean Corpuscular Hemoglobin 30.5 27.0 - 31.0 pg    Mean Corpuscular Hemoglobin Conc 33.3 32.0 - 36.0 g/dL    RDW 14.3 11.5 - 14.5 %    Platelets 243 150 - 350 K/uL    MPV 10.2 9.2 - 12.9 fL    Gran # (ANC) 3.6 1.8 - 7.7 K/uL    Lymph # 2.0 1.0 - 4.8 K/uL    Mono # 1.0 0.3 - 1.0 K/uL    Eos # 0.1 0.0 - 0.5 K/uL    Baso # 0.01 0.00 - 0.20 K/uL    Gran% 54.8 38.0 - 73.0 %    Lymph% 29.9 18.0 - 48.0 %    Mono% 14.5 4.0 - 15.0 %    Eosinophil% 0.9 0.0 - 8.0 %    Basophil% 0.2 0.0 - 1.9 %    Differential Method Automated    Comprehensive metabolic panel   Result Value Ref Range    Sodium 143 136 - 145 mmol/L    Potassium 3.9 3.5 - 5.1 mmol/L    Chloride 105 95 - 110 mmol/L    CO2 25 23 - 29 mmol/L    Glucose  85 70 - 110 mg/dL    BUN, Bld 13 8 - 23 mg/dL    Creatinine 0.9 0.5 - 1.4 mg/dL    Calcium 10.0 8.7 - 10.5 mg/dL    Total Protein 8.1 6.0 - 8.4 g/dL    Albumin 4.4 3.5 - 5.2 g/dL    Total Bilirubin 0.2 0.1 - 1.0 mg/dL    Alkaline Phosphatase 66 55 - 135 U/L    AST 25 10 - 40 U/L    ALT 10 10 - 44 U/L    Anion Gap 13 8 - 16 mmol/L    eGFR if African American >60 >60 mL/min/1.73 m^2    eGFR if non African American >60 >60 mL/min/1.73 m^2   Troponin I   Result Value Ref Range    Troponin I 0.024 0.000 - 0.026 ng/mL   Brain natriuretic peptide   Result Value Ref Range    BNP 75 0 - 99 pg/mL   Lipase   Result Value Ref Range    Lipase 39 4 - 60 U/L      All pertinent labs within the past 24 hours have been reviewed.    Significant Imaging:  Imaging Results          CT Abdomen Pelvis With Contrast (Final result)  Result time 07/23/19 23:51:42    Final result by Abhijit Carmona Jr., MD (07/23/19 23:51:42)                 Impression:      <No abnormality identified in the abdomen or pelvis>.    All CT scans at this facility are performed  using dose modulation techniques as appropriate to performed exam including the following:  automated exposure control; adjustment of mA and/or kV according to the patients size (this includes techniques or standardized protocols for targeted exams where dose is matched to indication/reason for exam: i.e. extremities or head);  iterative reconstruction technique.      Electronically signed by: Abhijit Carmona Jr., MD  Date:    07/23/2019  Time:    23:51             Narrative:    EXAMINATION:  CT ABDOMEN PELVIS WITH CONTRAST    CLINICAL HISTORY:  epigastric pain;    TECHNIQUE:  Axial CT imaging was performed through the abdomen and pelvis with 100 mL of intravenous contrast. Multiplanar reformats were performed and interpreted.    COMPARISON:  None    FINDINGS:  <Lung bases are clear>.    1.7 cm cyst right hepatic lobe of the liver.  Portal vein is patent.  3.9 cm upper pole left renal cyst.   Right kidney is unremarkable.  No renal calculus or obstructive uropathy..    <The gallbladder is unremarkable>.    <No free fluid, free air, or inflammatory change>.    <The bowel is nondistended and within normal limits>.    <The urinary bladder is unremarkable>. Small bowel containing small right inguinal hernia.    <No significant osseous abnormality is identified>.  Degenerative change at L4-L5 and L5-S1.                               CTA Chest Non-Coronary (PE Study) (Final result)  Result time 07/23/19 23:39:36    Final result by Abhijit Carmona Jr., MD (07/23/19 23:39:36)                 Impression:      No pulmonary embolus.    7 mm pulmonary nodule left lower lobe.  Recommend continued follow-up per Fleischner criteria.  For a solid nodule 6-8 mm, Fleischner Society 2017 guidelines recommend follow up with non-contrast chest CT at 6-12 months and 18-24 months after discovery.    All CT scan at this facility use dose modulation, iterative reconstruction, and/or weight base dosing when appropriate to reduce radiation dose to as low as reasonably achievable.      Electronically signed by: Abhijit Carmona Jr., MD  Date:    07/23/2019  Time:    23:39             Narrative:    EXAMINATION:  CTA CHEST NON CORONARY    CLINICAL HISTORY:  Chest pain, normal ekg;    TECHNIQUE:  Axial CTA images performed through the chest after the administration of 100 cc intravenous contrast. Maximum intensity projections were performed and interpreted.    FINDINGS:  There is no evidence of pulmonary embolus. Pulmonary artery caliber is normal. The heart, great vessels, and mediastinal structures are within normal limits. No thoracic adenopathy.  Densely calcified coronary arteries.    7 mm left lower lobe subpleural pulmonary nodule.  Dependent atelectasis.  Respiratory motion limits evaluation of the lower lobes.  No effusion or pneumothorax.    The upper abdominal visualized organs are within normal limits.    The bones are  intact.                               X-Ray Chest AP Portable (Final result)  Result time 07/23/19 21:05:57    Final result by Cornell Meier MD (07/23/19 21:05:57)                 Impression:      No acute process seen.      Electronically signed by: Cornell Meier MD  Date:    07/23/2019  Time:    21:05             Narrative:    EXAMINATION:  XR CHEST AP PORTABLE    CLINICAL HISTORY:  Shortness of breath    FINDINGS:  Single view of the chest.  Aorta demonstrates atherosclerotic disease.    Cardiac silhouette is normal.  The lungs demonstrate no evidence of active disease.  No evidence of pleural effusion or pneumothorax.  Bones demonstrate moderate degenerative changes.  Skin folds are seen overlying the left hemithorax.                               I have reviewed all pertinent imaging results/findings within the past 24 hours.     EKG: (personally reviewed)  Normal sinus rhythm, LAD, nonspecific ST abnormality.  No acute ischemic ST-T changes from previous tracings.

## 2019-07-24 NOTE — NURSING
Went over discharge instructions with patient.   Stressed importance of making and keeping all follow ups as well as making prescribed medication changes.   Patient verbalized understanding and has no questions in regards to discharge.  IVs removed, catheters intact.  Telemetry box 8602 removed and returned to monitor tech.  Patient instructed to call nurse station once dressed and ready to be discharged via wheelchair to personal transportation.   Family present at bedside.  Primary nurse notified of pt's discharge status.

## 2019-07-24 NOTE — HPI
"Ms. Madrid is an 79 year old female patient whose current medical conditions include HTN, hyperlipidemia, Parkinson's disease, and history of CVA who presented to [facility] ED yesterday with a chief complaint of epigastric chest pain/"tightness" that began approximately 2 hours PTA while she was sitting on her cough. Associated symptoms included SOB. Patient denied any radiation of pain or any associated nausea, vomiting, diaphoresis, palpitations, near syncope, or syncope. Initial workup in ED revealed BP of 201/94 and patient was subsequently admitted for further evaluation and treatment. Overnight, repeat troponin increased to 0.048 and cardiology consulted to assist with management. Patient seen and examined today, resting in bed. Feels better since admission. Still complains of some mild epigastric "soreness". Denies emery pain. Reports SOB has resolved. Denies any prior history of CAD or MI. She reports compliance with her medications. States BP at home is variable. Followed as OP by Dr. Mathew Grady. Chart reviewed. Troponin 0.024>0.048>0.035. Repeat troponin and 2D echo pending. Previous stress test in 7/17 negative.   "

## 2019-07-24 NOTE — H&P
"[facility] Hospital Medicine  History & Physical    Patient Name: Tanya Madrid  MRN: [id number]  Admission Date: 7/24/2019  Attending Physician: Elvin Alvarez MD   Primary Care Provider: Marti Parsons MD         Patient information was obtained from patient, past medical records and ER records.     Subjective:     Principal Problem:Atypical chest pain    Chief Complaint:   Chief Complaint   Patient presents with    Shortness of Breath     reports having shortness of breath x 2 hrs PTA, reports having upper abd pain as well         HPI: Ms. Madrid is an 79 y.o. female  with a PMHx of HTN, HLD, h/o CVA, and Parkinson's disease.  She presented to the ED with c/o epigastric chest pain that started suddenly ~2 hours PTA.  Pain was "tight" in nature, moderate in intensity, nonradiating, and nonexertional.  Associated SOB.  No aggravating or alleviating factors.  Symptoms had spontaneously resolved PTA.  Denies any diaphoresis, N/V, jaw or neck pain, palpitations, cough, orthopnea, edema, ABD pain or distention, diarrhea, constipation, dysuria, hematuria, back pain, HA, AMS, visual disturbance, lightheadedness, dizziness, syncope, focal deficits, weakness, fever or chills.  Patient had a MPI stress test in 7/2017 at [facility], which showed no perfusion defects and normal LVEF of 55%.  She is followed by Dr. Jeffrey Carmona ( Cardiology).  In the ED, patient noted to be significantly hypertensive with /94.  She received 10 mg IV hydralazine and SL NTG x 3 doses with improved BP of 146/79.  Work-up in ED was unremarkable with negative troponin, EKG, CXR, CT of ABD/pelvis, and CTA of the chest.  Hospital Medicine was called for admission.            Past Medical History:   Diagnosis Date    Hyperlipemia     Hypertension     Parkinson's disease     Stroke 2016    Throat mass        Past Surgical History:   Procedure Laterality Date    HYSTERECTOMY      THROAT SURGERY      TONSILLECTOMY "         Review of patient's allergies indicates:  No Known Allergies    No current facility-administered medications on file prior to encounter.      Current Outpatient Medications on File Prior to Encounter   Medication Sig    amlodipine (NORVASC) 10 MG tablet Take 5 mg by mouth once daily.     aspirin (ECOTRIN) 81 MG EC tablet Take 81 mg by mouth once daily.    carbidopa-levodopa  mg (SINEMET)  mg per tablet Take 2 tablets by mouth 4 (four) times daily.    clopidogrel (PLAVIX) 75 mg tablet Take 75 mg by mouth once daily.    losartan (COZAAR) 100 MG tablet Take 100 mg by mouth once daily.    metoprolol succinate (TOPROL-XL) 25 MG 24 hr tablet Take 50 mg by mouth 2 (two) times daily.     multivitamin capsule Take 1 capsule by mouth once daily.    omeprazole (PRILOSEC) 20 MG capsule Take 20 mg by mouth once daily.    potassium chloride (KLOR-CON) 10 MEQ TbSR Take 10 mEq by mouth once daily.    rosuvastatin (CRESTOR) 10 MG tablet Take 10 mg by mouth once daily.    spironolactone (ALDACTONE) 25 MG tablet Take 25 mg by mouth once daily.    raloxifene (EVISTA) 60 mg tablet Take 60 mg by mouth once daily.     Family History     Reviewed and not pertinent.         Tobacco Use    Smoking status: Never Smoker    Smokeless tobacco: Never Used   Substance and Sexual Activity    Alcohol use: No    Drug use: No    Sexual activity: Not Currently     Review of Systems   Constitutional: Negative for activity change, appetite change, chills, diaphoresis, fatigue, fever and unexpected weight change.   HENT: Negative for congestion, postnasal drip, rhinorrhea, sinus pressure and sore throat.    Respiratory: Positive for shortness of breath. Negative for cough and wheezing.    Cardiovascular: Positive for chest pain. Negative for palpitations and leg swelling.   Gastrointestinal: Negative for abdominal distention, abdominal pain, blood in stool, constipation, diarrhea, nausea and vomiting.   Genitourinary:  Negative for difficulty urinating, dysuria, flank pain, frequency, hematuria and urgency.   Musculoskeletal: Negative for arthralgias, back pain and myalgias.   Skin: Negative for pallor, rash and wound.   Neurological: Negative for dizziness, syncope, weakness, light-headedness, numbness and headaches.   Psychiatric/Behavioral: Negative for confusion. The patient is not nervous/anxious.    All other systems reviewed and are negative.    Objective:     Vital Signs (Most Recent):  Temp: 98.1 °F (36.7 °C) (07/23/19 1814)  Pulse: 82 (07/23/19 2331)  Resp: 17 (07/23/19 2331)  BP: (!) 152/79 (07/23/19 2331)  SpO2: 100 % (07/23/19 2331) Vital Signs (24h Range):  Temp:  [98.1 °F (36.7 °C)] 98.1 °F (36.7 °C)  Pulse:  [] 82  Resp:  [16-21] 17  SpO2:  [100 %] 100 %  BP: (146-201)/(79-94) 152/79     Weight: 56.7 kg (124 lb 14.3 oz)  Body mass index is 22.84 kg/m².    Physical Exam   Constitutional: She is oriented to person, place, and time. She appears well-developed and well-nourished. No distress.   Elderly.   HENT:   Head: Normocephalic and atraumatic.   Eyes: Conjunctivae are normal.   PERRL; EOM intact.   Neck: Normal range of motion. Neck supple. No JVD present.   Cardiovascular: Normal rate, regular rhythm, S1 normal, S2 normal and intact distal pulses.  No extrasystoles are present. Exam reveals no gallop and no friction rub.   No murmur heard.  Pulses:       Radial pulses are 2+ on the right side, and 2+ on the left side.        Dorsalis pedis pulses are 2+ on the right side, and 2+ on the left side.        Posterior tibial pulses are 2+ on the right side, and 2+ on the left side.   Pulmonary/Chest: Effort normal and breath sounds normal. No accessory muscle usage. No tachypnea. No respiratory distress. She has no wheezes. She has no rhonchi. She has no rales. She exhibits tenderness (mild epigastric TTP).   Abdominal: Soft. Bowel sounds are normal. She exhibits no distension. There is no hepatosplenomegaly.  There is tenderness in the epigastric area. There is no rigidity, no rebound, no guarding, no CVA tenderness and negative Guerra's sign. No hernia.   Musculoskeletal: Normal range of motion. She exhibits no edema, tenderness or deformity.   Neurological: She is alert and oriented to person, place, and time. No cranial nerve deficit or sensory deficit.   Skin: Skin is warm, dry and intact. Capillary refill takes less than 2 seconds. No rash noted. She is not diaphoretic. No cyanosis or erythema.   Psychiatric: She has a normal mood and affect. Her speech is normal and behavior is normal. Cognition and memory are normal.   Nursing note and vitals reviewed.          Significant Labs:  Results for orders placed or performed during the hospital encounter of 07/23/19   CBC auto differential   Result Value Ref Range    WBC 6.56 3.90 - 12.70 K/uL    RBC 4.42 4.00 - 5.40 M/uL    Hemoglobin 13.5 12.0 - 16.0 g/dL    Hematocrit 40.5 37.0 - 48.5 %    Mean Corpuscular Volume 92 82 - 98 fL    Mean Corpuscular Hemoglobin 30.5 27.0 - 31.0 pg    Mean Corpuscular Hemoglobin Conc 33.3 32.0 - 36.0 g/dL    RDW 14.3 11.5 - 14.5 %    Platelets 243 150 - 350 K/uL    MPV 10.2 9.2 - 12.9 fL    Gran # (ANC) 3.6 1.8 - 7.7 K/uL    Lymph # 2.0 1.0 - 4.8 K/uL    Mono # 1.0 0.3 - 1.0 K/uL    Eos # 0.1 0.0 - 0.5 K/uL    Baso # 0.01 0.00 - 0.20 K/uL    Gran% 54.8 38.0 - 73.0 %    Lymph% 29.9 18.0 - 48.0 %    Mono% 14.5 4.0 - 15.0 %    Eosinophil% 0.9 0.0 - 8.0 %    Basophil% 0.2 0.0 - 1.9 %    Differential Method Automated    Comprehensive metabolic panel   Result Value Ref Range    Sodium 143 136 - 145 mmol/L    Potassium 3.9 3.5 - 5.1 mmol/L    Chloride 105 95 - 110 mmol/L    CO2 25 23 - 29 mmol/L    Glucose 85 70 - 110 mg/dL    BUN, Bld 13 8 - 23 mg/dL    Creatinine 0.9 0.5 - 1.4 mg/dL    Calcium 10.0 8.7 - 10.5 mg/dL    Total Protein 8.1 6.0 - 8.4 g/dL    Albumin 4.4 3.5 - 5.2 g/dL    Total Bilirubin 0.2 0.1 - 1.0 mg/dL    Alkaline Phosphatase 66  55 - 135 U/L    AST 25 10 - 40 U/L    ALT 10 10 - 44 U/L    Anion Gap 13 8 - 16 mmol/L    eGFR if African American >60 >60 mL/min/1.73 m^2    eGFR if non African American >60 >60 mL/min/1.73 m^2   Troponin I   Result Value Ref Range    Troponin I 0.024 0.000 - 0.026 ng/mL   Brain natriuretic peptide   Result Value Ref Range    BNP 75 0 - 99 pg/mL   Lipase   Result Value Ref Range    Lipase 39 4 - 60 U/L      All pertinent labs within the past 24 hours have been reviewed.    Significant Imaging:  Imaging Results          CT Abdomen Pelvis With Contrast (Final result)  Result time 07/23/19 23:51:42    Final result by Abhijit Carmona Jr., MD (07/23/19 23:51:42)                 Impression:      <No abnormality identified in the abdomen or pelvis>.    All CT scans at this facility are performed  using dose modulation techniques as appropriate to performed exam including the following:  automated exposure control; adjustment of mA and/or kV according to the patients size (this includes techniques or standardized protocols for targeted exams where dose is matched to indication/reason for exam: i.e. extremities or head);  iterative reconstruction technique.      Electronically signed by: Abhijit Carmona Jr., MD  Date:    07/23/2019  Time:    23:51             Narrative:    EXAMINATION:  CT ABDOMEN PELVIS WITH CONTRAST    CLINICAL HISTORY:  epigastric pain;    TECHNIQUE:  Axial CT imaging was performed through the abdomen and pelvis with 100 mL of intravenous contrast. Multiplanar reformats were performed and interpreted.    COMPARISON:  None    FINDINGS:  <Lung bases are clear>.    1.7 cm cyst right hepatic lobe of the liver.  Portal vein is patent.  3.9 cm upper pole left renal cyst.  Right kidney is unremarkable.  No renal calculus or obstructive uropathy..    <The gallbladder is unremarkable>.    <No free fluid, free air, or inflammatory change>.    <The bowel is nondistended and within normal limits>.    <The urinary  bladder is unremarkable>. Small bowel containing small right inguinal hernia.    <No significant osseous abnormality is identified>.  Degenerative change at L4-L5 and L5-S1.                               CTA Chest Non-Coronary (PE Study) (Final result)  Result time 07/23/19 23:39:36    Final result by Abhijit Carmona Jr., MD (07/23/19 23:39:36)                 Impression:      No pulmonary embolus.    7 mm pulmonary nodule left lower lobe.  Recommend continued follow-up per Fleischner criteria.  For a solid nodule 6-8 mm, Fleischner Society 2017 guidelines recommend follow up with non-contrast chest CT at 6-12 months and 18-24 months after discovery.    All CT scan at this facility use dose modulation, iterative reconstruction, and/or weight base dosing when appropriate to reduce radiation dose to as low as reasonably achievable.      Electronically signed by: Abhijit Carmona Jr., MD  Date:    07/23/2019  Time:    23:39             Narrative:    EXAMINATION:  CTA CHEST NON CORONARY    CLINICAL HISTORY:  Chest pain, normal ekg;    TECHNIQUE:  Axial CTA images performed through the chest after the administration of 100 cc intravenous contrast. Maximum intensity projections were performed and interpreted.    FINDINGS:  There is no evidence of pulmonary embolus. Pulmonary artery caliber is normal. The heart, great vessels, and mediastinal structures are within normal limits. No thoracic adenopathy.  Densely calcified coronary arteries.    7 mm left lower lobe subpleural pulmonary nodule.  Dependent atelectasis.  Respiratory motion limits evaluation of the lower lobes.  No effusion or pneumothorax.    The upper abdominal visualized organs are within normal limits.    The bones are intact.                               X-Ray Chest AP Portable (Final result)  Result time 07/23/19 21:05:57    Final result by Cornell Meier MD (07/23/19 21:05:57)                 Impression:      No acute process seen.      Electronically  signed by: Cornell Meier MD  Date:    07/23/2019  Time:    21:05             Narrative:    EXAMINATION:  XR CHEST AP PORTABLE    CLINICAL HISTORY:  Shortness of breath    FINDINGS:  Single view of the chest.  Aorta demonstrates atherosclerotic disease.    Cardiac silhouette is normal.  The lungs demonstrate no evidence of active disease.  No evidence of pleural effusion or pneumothorax.  Bones demonstrate moderate degenerative changes.  Skin folds are seen overlying the left hemithorax.                               I have reviewed all pertinent imaging results/findings within the past 24 hours.     EKG: (personally reviewed)  Normal sinus rhythm, LAD, nonspecific ST abnormality.  No acute ischemic ST-T changes from previous tracings.                 Assessment/Plan:     * Atypical chest pain  - Likely secondary to #2.  No chest pain or anginal equivalent at present.  - Troponin negative x 1, EKG unrevealing.  Trend serial cardiac enzymes.  - Check FLP.  - Continue ASA, Plavix, BB, and high-intensity statin.  - Followed by Dr. Jeffrey Carmona ( Cardiology).  MPI stress test in 7/2017 at [facility] showed no significant perfusion defects and normal LVEF of 55% with no wall motion abnormalities.  - Further recs pending clinical course.    Accelerated hypertension  - Improved after 10 mg IV hydralazine, 25 mg Toprol XL, and SL NTG x 3 doses given in ED.  BP remains stable at present.  - Continue home amlodipine, losartan, Toprol XL, and spironolactone.  Follow BP trends and optimize as needed.    History of CVA (cerebrovascular accident)  - Stable.  - Continue ASA, Plavix, and statin.    Parkinson disease  - Continue Sinemet.    GERD (gastroesophageal reflux disease)  - Possibly contributing to atypical chest pain.  - Continue PPI.      VTE Risk Mitigation (From admission, onward)        Ordered     heparin (porcine) injection 5,000 Units  Every 8 hours      07/24/19 0310     IP VTE HIGH RISK PATIENT  Once      07/24/19 0310      Place sequential compression device  Until discontinued      07/24/19 9102             Lissa Brown NP  Department of Hospital Medicine   [facility]

## 2019-07-24 NOTE — ASSESSMENT & PLAN NOTE
-BP uncontrolled  -Continue Losartan, BB, Aldactone  -Increase amlodipine to 10 mg daily  -Monitor/assess response

## 2020-01-05 ENCOUNTER — HOSPITAL ENCOUNTER (EMERGENCY)
Facility: HOSPITAL | Age: 80
Discharge: HOME | End: 2020-01-05
Attending: EMERGENCY MEDICINE
Payer: MEDICARE

## 2020-01-05 VITALS
SYSTOLIC BLOOD PRESSURE: 172 MMHG | OXYGEN SATURATION: 95 % | TEMPERATURE: 98 F | RESPIRATION RATE: 15 BRPM | HEART RATE: 61 BPM | DIASTOLIC BLOOD PRESSURE: 74 MMHG

## 2020-01-05 DIAGNOSIS — R55 NEAR SYNCOPE: Primary | ICD-10-CM

## 2020-01-05 DIAGNOSIS — Z86.69 HISTORY OF PARKINSON'S DISEASE: ICD-10-CM

## 2020-01-05 LAB
ALBUMIN SERPL BCP-MCNC: 4 G/DL (ref 3.5–5.2)
ALP SERPL-CCNC: 48 U/L (ref 55–135)
ALT SERPL W/O P-5'-P-CCNC: 6 U/L (ref 10–44)
ANION GAP SERPL CALC-SCNC: 12 MMOL/L (ref 8–16)
AST SERPL-CCNC: 20 U/L (ref 10–40)
BILIRUB SERPL-MCNC: 0.3 MG/DL (ref 0.1–1)
BUN SERPL-MCNC: 10 MG/DL (ref 8–23)
CALCIUM SERPL-MCNC: 9.2 MG/DL (ref 8.7–10.5)
CHLORIDE SERPL-SCNC: 105 MMOL/L (ref 95–110)
CK SERPL-CCNC: 184 U/L (ref 20–180)
CO2 SERPL-SCNC: 23 MMOL/L (ref 23–29)
CREAT SERPL-MCNC: 1.2 MG/DL (ref 0.5–1.4)
EST. GFR  (AFRICAN AMERICAN): 49 ML/MIN/1.73 M^2
EST. GFR  (NON AFRICAN AMERICAN): 42 ML/MIN/1.73 M^2
GLUCOSE SERPL-MCNC: 174 MG/DL (ref 70–110)
POTASSIUM SERPL-SCNC: 4.3 MMOL/L (ref 3.5–5.1)
PROT SERPL-MCNC: 7.3 G/DL (ref 6–8.4)
SODIUM SERPL-SCNC: 140 MMOL/L (ref 136–145)

## 2020-01-05 PROCEDURE — 80053 COMPREHEN METABOLIC PANEL: CPT

## 2020-01-05 PROCEDURE — 93005 ELECTROCARDIOGRAM TRACING: CPT

## 2020-01-05 PROCEDURE — 63600175 PHARM REV CODE 636 W HCPCS: Performed by: EMERGENCY MEDICINE

## 2020-01-05 PROCEDURE — 96361 HYDRATE IV INFUSION ADD-ON: CPT

## 2020-01-05 PROCEDURE — 93010 ELECTROCARDIOGRAM REPORT: CPT | Mod: ,,, | Performed by: INTERNAL MEDICINE

## 2020-01-05 PROCEDURE — 82550 ASSAY OF CK (CPK): CPT

## 2020-01-05 PROCEDURE — 99284 EMERGENCY DEPT VISIT MOD MDM: CPT | Mod: 25

## 2020-01-05 PROCEDURE — 93010 EKG 12-LEAD: ICD-10-PCS | Mod: ,,, | Performed by: INTERNAL MEDICINE

## 2020-01-05 PROCEDURE — 96360 HYDRATION IV INFUSION INIT: CPT

## 2020-01-05 RX ADMIN — SODIUM CHLORIDE 1000 ML: 0.9 INJECTION, SOLUTION INTRAVENOUS at 03:01

## 2020-01-05 NOTE — ED PROVIDER NOTES
"SCRIBE #1 NOTE: I, Shaniqua Ornelas, am scribing for, and in the presence of, Demetrice Dominguez MD. I have scribed the entire note.      History      Chief Complaint   Patient presents with    Fatigue     near syncope at [facility]       Review of patient's allergies indicates:  No Known Allergies     HPI   HPI    1/5/2020, 2:24 PM   History obtained from the patient and daughter      History of Present Illness: Tanya Madrid is a 79 y.o. female patient with a PMHx of Parkinson's disease, HTN, HLD, and Stroke who presents to the Emergency Department for evaluation of near-syncopal episode which occurred PTA. Per daughter, she noticed patient become "clammy" and fatigued stating she believes pt's BP had dropped at the time. Patient reports only eating a banana and juice this morning. Patient states she felt dizzy during the episode but currently feels better at this time. Patient denies any fever, N/V, visual disturbance, speech difficulty, chest pain, SOB, and all other sxs at this time. Patient is compliant with her HTN medications. No further complaints or concerns at this time.       Arrival mode: Ambulance Service    PCP: Marti Parsons MD       Past Medical History:  Past Medical History:   Diagnosis Date    Hyperlipemia     Hypertension     Parkinson's disease     Stroke 2016    Throat mass        Past Surgical History:  Past Surgical History:   Procedure Laterality Date    HYSTERECTOMY      THROAT SURGERY      TONSILLECTOMY           Family History:  History reviewed. No pertinent family history.    Social History:  Social History     Tobacco Use    Smoking status: Never Smoker    Smokeless tobacco: Never Used   Substance and Sexual Activity    Alcohol use: No    Drug use: No    Sexual activity: Not Currently       ROS   Review of Systems   Constitutional: Negative for fever.   HENT: Negative for sore throat.    Eyes: Negative for visual disturbance.   Respiratory: Negative for shortness of " breath.    Cardiovascular: Negative for chest pain.   Gastrointestinal: Negative for nausea and vomiting.   Genitourinary: Negative for dysuria.   Musculoskeletal: Negative for back pain.   Skin: Negative for rash.   Neurological: Positive for syncope (near). Negative for speech difficulty and weakness.   Hematological: Does not bruise/bleed easily.   All other systems reviewed and are negative.    Physical Exam      Initial Vitals   BP Pulse Resp Temp SpO2   01/05/20 1420 01/05/20 1408 01/05/20 1408 01/05/20 1408 01/05/20 1511   (!) 151/73 68 18 97.6 °F (36.4 °C) 100 %      MAP       --                 Physical Exam  Nursing Notes and Vital Signs Reviewed.  Constitutional: Patient is in no acute distress. Well-developed and well-nourished.  Head: Atraumatic. Normocephalic.  Eyes: PERRL. EOM intact. Conjunctivae are not pale. No scleral icterus.  ENT: Mucous membranes are moist.    Neck: Supple. Full ROM.  Cardiovascular: Regular rate. Regular rhythm. No murmurs, rubs, or gallops. Distal pulses are 2+ and symmetric.  Pulmonary/Chest: No respiratory distress. Clear to auscultation bilaterally. No wheezing or rales.  Abdominal: Soft and non-distended.  There is no tenderness.    Genitourinary: No CVA tenderness  Musculoskeletal: Moves all extremities. No obvious deformities. No edema.  Skin: Warm and dry.  Neurological:  Alert, awake, and appropriate.  Normal speech.  No acute focal neurological deficits are appreciated.  Psychiatric: Normal affect. Good eye contact. Appropriate in content.    ED Course    Procedures  ED Vital Signs:  Vitals:    01/05/20 1408 01/05/20 1416 01/05/20 1417 01/05/20 1420   BP:    (!) 151/73   Pulse: 68 60 60    Resp: 18  16    Temp: 97.6 °F (36.4 °C)      TempSrc: Oral      SpO2:        01/05/20 1422 01/05/20 1500 01/05/20 1501 01/05/20 1503   BP: (!) 151/73  (!) 159/73 (!) 168/91   Pulse: 63 63 64 71   Resp: 18 19     Temp:       TempSrc:       SpO2:        01/05/20 1505 01/05/20 1511  01/05/20 1532   BP: (!) 157/70  137/60   Pulse: 79  63   Resp:   20   Temp:      TempSrc:      SpO2:  100% 98%       Abnormal Lab Results:  Labs Reviewed   COMPREHENSIVE METABOLIC PANEL - Abnormal; Notable for the following components:       Result Value    Glucose 174 (*)     Alkaline Phosphatase 48 (*)     ALT 6 (*)     eGFR if  49 (*)     eGFR if non  42 (*)     All other components within normal limits   CK - Abnormal; Notable for the following components:     (*)     All other components within normal limits        All Lab Results:  Results for orders placed or performed during the hospital encounter of 01/05/20   Comprehensive metabolic panel   Result Value Ref Range    Sodium 140 136 - 145 mmol/L    Potassium 4.3 3.5 - 5.1 mmol/L    Chloride 105 95 - 110 mmol/L    CO2 23 23 - 29 mmol/L    Glucose 174 (H) 70 - 110 mg/dL    BUN, Bld 10 8 - 23 mg/dL    Creatinine 1.2 0.5 - 1.4 mg/dL    Calcium 9.2 8.7 - 10.5 mg/dL    Total Protein 7.3 6.0 - 8.4 g/dL    Albumin 4.0 3.5 - 5.2 g/dL    Total Bilirubin 0.3 0.1 - 1.0 mg/dL    Alkaline Phosphatase 48 (L) 55 - 135 U/L    AST 20 10 - 40 U/L    ALT 6 (L) 10 - 44 U/L    Anion Gap 12 8 - 16 mmol/L    eGFR if African American 49 (A) >60 mL/min/1.73 m^2    eGFR if non African American 42 (A) >60 mL/min/1.73 m^2   CPK   Result Value Ref Range     (H) 20 - 180 U/L         Imaging Results:  Imaging Results    None             The EKG was ordered, reviewed, and independently interpreted by the ED provider.  Interpretation time: 14:16  Rate: 60 BPM  Rhythm: normal sinus rhythm  Interpretation: T wave abnormality, consider inferior ischemia, consider anterolateral ischemia. No STEMI.        The Emergency Provider reviewed the vital signs and test results, which are outlined above.    ED Discussion     3:35 PM: Reassessed pt at this time.  Pt states her condition has improved at this time. Discussed with pt all pertinent ED information  and results. Discussed pt dx and plan of tx. Gave pt all f/u and return to the ED instructions. All questions and concerns were addressed at this time. Pt expresses understanding of information and instructions, and is comfortable with plan to discharge. Pt is stable for discharge.    I discussed with patient and/or family/caretaker that evaluation in the ED does not suggest any emergent or life threatening medical conditions requiring immediate intervention beyond what was provided in the ED, and I believe patient is safe for discharge.  Regardless, an unremarkable evaluation in the ED does not preclude the development or presence of a serious of life threatening condition. As such, patient was instructed to return immediately for any worsening or change in current symptoms.      ED Medication(s):  Medications   sodium chloride 0.9% bolus 1,000 mL (1,000 mLs Intravenous New Bag 1/5/20 3088)     Current Discharge Medication List          Follow-up Information     Marti Parsons MD. Schedule an appointment as soon as possible for a visit in 2 days.    Specialty:  Family Medicine  Why:  Return to the Emergency Room, If symptoms worsen  Contact information:  [address] [place] [address]  [address]  [facility] [address]  [phone]                     Medical Decision Making    Medical Decision Making:   Clinical Tests:   Lab Tests: Reviewed and Ordered  Medical Tests: Ordered and Reviewed           Scribe Attestation:   Scribe #1: I performed the above scribed service and the documentation accurately describes the services I performed. I attest to the accuracy of the note.    Attending:   Physician Attestation Statement for Scribe #1: I, Demetrice Dominguez MD, personally performed the services described in this documentation, as scribed by Shaniqua Ornelas, in my presence, and it is both accurate and complete.          Clinical Impression       ICD-10-CM ICD-9-CM   1. Near syncope R55 780.2   2. History of  Parkinson's disease Z86.69 V12.49       Disposition:   Disposition: Discharged  Condition: Stable           Demetrice Dominguez MD  01/05/20 1557

## 2020-01-05 NOTE — ED NOTES
Pt resting in ER stretcher, aaox4, rr e/u, NAD noted. Pt remains on cardiac monitor with vss noted. Bed low and locked, call light in reach, side rails up x2. Daughter at the bedside.  Pt verbalized understanding of status and POC; denies further needs. Will continue to monitor.

## 2020-02-13 ENCOUNTER — HOSPITAL ENCOUNTER (EMERGENCY)
Facility: HOSPITAL | Age: 80
Discharge: HOME | End: 2020-02-14
Attending: EMERGENCY MEDICINE
Payer: MEDICARE

## 2020-02-13 DIAGNOSIS — Z86.59 HISTORY OF PANIC ATTACKS: ICD-10-CM

## 2020-02-13 DIAGNOSIS — R41.82 ALTERED MENTAL STATUS, UNSPECIFIED ALTERED MENTAL STATUS TYPE: Primary | ICD-10-CM

## 2020-02-13 DIAGNOSIS — R41.82 ALTERED MENTAL STATUS: ICD-10-CM

## 2020-02-13 LAB
ALBUMIN SERPL BCP-MCNC: 4 G/DL (ref 3.5–5.2)
ALP SERPL-CCNC: 48 U/L (ref 55–135)
ALT SERPL W/O P-5'-P-CCNC: <5 U/L (ref 10–44)
ANION GAP SERPL CALC-SCNC: 9 MMOL/L (ref 8–16)
APTT BLDCRRT: 23.4 SEC (ref 21–32)
AST SERPL-CCNC: 20 U/L (ref 10–40)
BASOPHILS # BLD AUTO: 0.03 K/UL (ref 0–0.2)
BASOPHILS NFR BLD: 0.5 % (ref 0–1.9)
BILIRUB SERPL-MCNC: 0.3 MG/DL (ref 0.1–1)
BUN SERPL-MCNC: 13 MG/DL (ref 8–23)
CALCIUM SERPL-MCNC: 9.1 MG/DL (ref 8.7–10.5)
CHLORIDE SERPL-SCNC: 105 MMOL/L (ref 95–110)
CO2 SERPL-SCNC: 23 MMOL/L (ref 23–29)
CREAT SERPL-MCNC: 1.4 MG/DL (ref 0.5–1.4)
DIFFERENTIAL METHOD: ABNORMAL
EOSINOPHIL # BLD AUTO: 0 K/UL (ref 0–0.5)
EOSINOPHIL NFR BLD: 0.5 % (ref 0–8)
ERYTHROCYTE [DISTWIDTH] IN BLOOD BY AUTOMATED COUNT: 13.5 % (ref 11.5–14.5)
EST. GFR  (AFRICAN AMERICAN): 41 ML/MIN/1.73 M^2
EST. GFR  (NON AFRICAN AMERICAN): 35 ML/MIN/1.73 M^2
GLUCOSE SERPL-MCNC: 237 MG/DL (ref 70–110)
HCT VFR BLD AUTO: 36.4 % (ref 37–48.5)
HGB BLD-MCNC: 11.3 G/DL (ref 12–16)
IMM GRANULOCYTES # BLD AUTO: 0.04 K/UL (ref 0–0.04)
IMM GRANULOCYTES NFR BLD AUTO: 0.7 % (ref 0–0.5)
INR PPP: 1.1 (ref 0.8–1.2)
LYMPHOCYTES # BLD AUTO: 1.4 K/UL (ref 1–4.8)
LYMPHOCYTES NFR BLD: 23.7 % (ref 18–48)
MCH RBC QN AUTO: 30.2 PG (ref 27–31)
MCHC RBC AUTO-ENTMCNC: 31 G/DL (ref 32–36)
MCV RBC AUTO: 97 FL (ref 82–98)
MONOCYTES # BLD AUTO: 0.8 K/UL (ref 0.3–1)
MONOCYTES NFR BLD: 12.7 % (ref 4–15)
NEUTROPHILS # BLD AUTO: 3.7 K/UL (ref 1.8–7.7)
NEUTROPHILS NFR BLD: 61.9 % (ref 38–73)
NRBC BLD-RTO: 0 /100 WBC
PLATELET # BLD AUTO: 244 K/UL (ref 150–350)
PMV BLD AUTO: 10.3 FL (ref 9.2–12.9)
POCT GLUCOSE: 163 MG/DL (ref 70–110)
POTASSIUM SERPL-SCNC: 3.7 MMOL/L (ref 3.5–5.1)
PROT SERPL-MCNC: 7.2 G/DL (ref 6–8.4)
PROTHROMBIN TIME: 11.3 SEC (ref 9–12.5)
RBC # BLD AUTO: 3.74 M/UL (ref 4–5.4)
SODIUM SERPL-SCNC: 137 MMOL/L (ref 136–145)
TROPONIN I SERPL DL<=0.01 NG/ML-MCNC: 0.02 NG/ML (ref 0–0.03)
TSH SERPL DL<=0.005 MIU/L-ACNC: 1.55 UIU/ML (ref 0.4–4)
WBC # BLD AUTO: 6 K/UL (ref 3.9–12.7)

## 2020-02-13 PROCEDURE — 84484 ASSAY OF TROPONIN QUANT: CPT

## 2020-02-13 PROCEDURE — 85730 THROMBOPLASTIN TIME PARTIAL: CPT

## 2020-02-13 PROCEDURE — 36415 COLL VENOUS BLD VENIPUNCTURE: CPT

## 2020-02-13 PROCEDURE — 82962 GLUCOSE BLOOD TEST: CPT

## 2020-02-13 PROCEDURE — 93005 ELECTROCARDIOGRAM TRACING: CPT

## 2020-02-13 PROCEDURE — 85610 PROTHROMBIN TIME: CPT

## 2020-02-13 PROCEDURE — 85025 COMPLETE CBC W/AUTO DIFF WBC: CPT

## 2020-02-13 PROCEDURE — 80053 COMPREHEN METABOLIC PANEL: CPT

## 2020-02-13 PROCEDURE — 93010 EKG 12-LEAD: ICD-10-PCS | Mod: ,,, | Performed by: INTERNAL MEDICINE

## 2020-02-13 PROCEDURE — 99285 EMERGENCY DEPT VISIT HI MDM: CPT | Mod: 25

## 2020-02-13 PROCEDURE — 93010 ELECTROCARDIOGRAM REPORT: CPT | Mod: ,,, | Performed by: INTERNAL MEDICINE

## 2020-02-13 PROCEDURE — 84443 ASSAY THYROID STIM HORMONE: CPT

## 2020-02-13 PROCEDURE — 96374 THER/PROPH/DIAG INJ IV PUSH: CPT

## 2020-02-14 VITALS
WEIGHT: 122 LBS | RESPIRATION RATE: 16 BRPM | OXYGEN SATURATION: 100 % | SYSTOLIC BLOOD PRESSURE: 127 MMHG | TEMPERATURE: 99 F | HEART RATE: 69 BPM | BODY MASS INDEX: 22.45 KG/M2 | DIASTOLIC BLOOD PRESSURE: 68 MMHG | HEIGHT: 62 IN

## 2020-02-14 LAB
BILIRUB UR QL STRIP: NEGATIVE
CLARITY UR: CLEAR
COLOR UR: YELLOW
GLUCOSE UR QL STRIP: NEGATIVE
HGB UR QL STRIP: NEGATIVE
KETONES UR QL STRIP: NEGATIVE
LEUKOCYTE ESTERASE UR QL STRIP: NEGATIVE
NITRITE UR QL STRIP: NEGATIVE
PH UR STRIP: 6 [PH] (ref 5–8)
PROT UR QL STRIP: NEGATIVE
SP GR UR STRIP: 1.02 (ref 1–1.03)
URN SPEC COLLECT METH UR: NORMAL
UROBILINOGEN UR STRIP-ACNC: NEGATIVE EU/DL

## 2020-02-14 PROCEDURE — 63600175 PHARM REV CODE 636 W HCPCS: Performed by: EMERGENCY MEDICINE

## 2020-02-14 PROCEDURE — 81003 URINALYSIS AUTO W/O SCOPE: CPT

## 2020-02-14 RX ORDER — LORAZEPAM 0.5 MG/1
0.5 TABLET ORAL EVERY 8 HOURS PRN
Qty: 10 TABLET | Refills: 0 | Status: ON HOLD | OUTPATIENT
Start: 2020-02-14 | End: 2020-10-05

## 2020-02-14 RX ADMIN — LORAZEPAM 0.5 MG: 2 INJECTION INTRAMUSCULAR; INTRAVENOUS at 12:02

## 2020-02-14 NOTE — ED NOTES
Pt resting on cart in room. NAD. Pt very relaxed, no longer anxious after medication was given. VSS. No complaints at this time. Daughter at bedside, bed in low locked position, call light within reach. Will continue to monitor.

## 2020-02-14 NOTE — ED PROVIDER NOTES
"SCRIBE #1 NOTE: I, Shaniqua Ornelas, am scribing for, and in the presence of, Mj Nunez MD. I have scribed the entire note.      History      Chief Complaint   Patient presents with    Altered Mental Status     was at dinner sitting at table when she " zoned out" pt not responding appropriately       Review of patient's allergies indicates:  No Known Allergies     HPI   HPI    2/13/2020, 8:59 PM   History obtained from the daughter   HPI Limited due to: Mental Status change      History of Present Illness: Tanya Madrid is a 79 y.o. female patient who presents to the Emergency Department for evaluation of Altered Mental Status. Per EMS, patient was sitting at dinner table when she "zoned out" and became less responsive. Patient's daughter reports patient has had episodes similar to today's in the past in which her BP drops and responsiveness and alertness decreases after she takes her parkinson's disease medication. No further complaints or concerns at this time.       Arrival mode: EMS      PCP: Marti Parsons MD       Past Medical History:  Past Medical History:   Diagnosis Date    Hyperlipemia     Hypertension     Parkinson's disease     Stroke 2016    Throat mass        Past Surgical History:  Past Surgical History:   Procedure Laterality Date    HYSTERECTOMY      THROAT SURGERY      TONSILLECTOMY       Family History:  History reviewed. No pertinent family history.    Social History:  Social History     Tobacco Use    Smoking status: Never Smoker    Smokeless tobacco: Never Used   Substance and Sexual Activity    Alcohol use: No    Drug use: No    Sexual activity: Not Currently       ROS   Review of Systems   Unable to perform ROS: Mental status change     Physical Exam     Initial Vitals [02/13/20 2049]   BP Pulse Resp Temp SpO2   105/86 66 18 97 °F (36.1 °C) 100 %      MAP       --          Physical Exam  Nursing Notes and Vital Signs Reviewed.  Constitutional: Patient is in no acute distress. " Well-developed and well-nourished.  Head: Atraumatic. Normocephalic.  Eyes: PERRL. EOM intact. Conjunctivae are not pale. No scleral icterus.  ENT: Mucous membranes are moist.    Neck: Supple. Full ROM. No lymphadenopathy.  Cardiovascular: Regular rate. Regular rhythm. No murmurs, rubs, or gallops. Distal pulses are 2+ and symmetric.  Pulmonary/Chest: No respiratory distress. Clear to auscultation bilaterally. No wheezing or rales.  Abdominal: Soft and non-distended.  There is no tenderness.  No rebound, guarding, or rigidity.   Genitourinary: No CVA tenderness  Musculoskeletal: Moves all extremities. No obvious deformities. No edema.   Skin: Warm and dry.  Neurological:  Alert, awake, and appropriate. No acute focal neurological deficits are appreciated.  Psychiatric: Normal affect. Good eye contact. Appropriate in content.    ED Course    Procedures  ED Vital Signs:  Vitals:    02/13/20 2202 02/13/20 2245 02/13/20 2300 02/13/20 2302   BP: (!) 144/76   (!) 156/78   Pulse: 68 70 76    Resp: 13  12    Temp:       TempSrc:       SpO2: 100%  96%    Weight:       Height:        02/13/20 2337 02/14/20 0000 02/14/20 0030 02/14/20 0035   BP:   (!) 200/105    Pulse: 78 86 89    Resp: 18 16 15    Temp:       TempSrc:       SpO2: 100% 98%  100%   Weight:       Height:        02/14/20 0041 02/14/20 0048 02/14/20 0102 02/14/20 0103   BP: (!) 218/139   (!) 145/89   Pulse:  93  73   Resp:  16  14   Temp:       TempSrc:       SpO2:  99% 100%    Weight:       Height:        02/14/20 0121 02/14/20 0141 02/14/20 0145   BP: 130/74 127/68    Pulse: 89 69    Resp: 16 16    Temp:   98.8 °F (37.1 °C)   TempSrc:   Oral   SpO2: 100% 100%    Weight:      Height:          Abnormal Lab Results:  Labs Reviewed   CBC W/ AUTO DIFFERENTIAL - Abnormal; Notable for the following components:       Result Value    RBC 3.74 (*)     Hemoglobin 11.3 (*)     Hematocrit 36.4 (*)     Mean Corpuscular Hemoglobin Conc 31.0 (*)     Immature Granulocytes 0.7  (*)     All other components within normal limits   COMPREHENSIVE METABOLIC PANEL - Abnormal; Notable for the following components:    Glucose 237 (*)     Alkaline Phosphatase 48 (*)     ALT <5 (*)     eGFR if  41 (*)     eGFR if non  35 (*)     All other components within normal limits   POCT GLUCOSE - Abnormal; Notable for the following components:    POCT Glucose 163 (*)     All other components within normal limits   PROTIME-INR   APTT   TSH   TROPONIN I   URINALYSIS, REFLEX TO URINE CULTURE    Narrative:     Preferred Collection Type->Urine, Clean Catch        All Lab Results:  Results for orders placed or performed during the hospital encounter of 02/13/20   CBC auto differential   Result Value Ref Range    WBC 6.00 3.90 - 12.70 K/uL    RBC 3.74 (L) 4.00 - 5.40 M/uL    Hemoglobin 11.3 (L) 12.0 - 16.0 g/dL    Hematocrit 36.4 (L) 37.0 - 48.5 %    Mean Corpuscular Volume 97 82 - 98 fL    Mean Corpuscular Hemoglobin 30.2 27.0 - 31.0 pg    Mean Corpuscular Hemoglobin Conc 31.0 (L) 32.0 - 36.0 g/dL    RDW 13.5 11.5 - 14.5 %    Platelets 244 150 - 350 K/uL    MPV 10.3 9.2 - 12.9 fL    Immature Granulocytes 0.7 (H) 0.0 - 0.5 %    Gran # (ANC) 3.7 1.8 - 7.7 K/uL    Immature Grans (Abs) 0.04 0.00 - 0.04 K/uL    Lymph # 1.4 1.0 - 4.8 K/uL    Mono # 0.8 0.3 - 1.0 K/uL    Eos # 0.0 0.0 - 0.5 K/uL    Baso # 0.03 0.00 - 0.20 K/uL    nRBC 0 0 /100 WBC    Gran% 61.9 38.0 - 73.0 %    Lymph% 23.7 18.0 - 48.0 %    Mono% 12.7 4.0 - 15.0 %    Eosinophil% 0.5 0.0 - 8.0 %    Basophil% 0.5 0.0 - 1.9 %    Differential Method Automated    Comprehensive metabolic panel   Result Value Ref Range    Sodium 137 136 - 145 mmol/L    Potassium 3.7 3.5 - 5.1 mmol/L    Chloride 105 95 - 110 mmol/L    CO2 23 23 - 29 mmol/L    Glucose 237 (H) 70 - 110 mg/dL    BUN, Bld 13 8 - 23 mg/dL    Creatinine 1.4 0.5 - 1.4 mg/dL    Calcium 9.1 8.7 - 10.5 mg/dL    Total Protein 7.2 6.0 - 8.4 g/dL    Albumin 4.0 3.5 - 5.2 g/dL     Total Bilirubin 0.3 0.1 - 1.0 mg/dL    Alkaline Phosphatase 48 (L) 55 - 135 U/L    AST 20 10 - 40 U/L    ALT <5 (L) 10 - 44 U/L    Anion Gap 9 8 - 16 mmol/L    eGFR if African American 41 (A) >60 mL/min/1.73 m^2    eGFR if non African American 35 (A) >60 mL/min/1.73 m^2   Protime-INR   Result Value Ref Range    Prothrombin Time 11.3 9.0 - 12.5 sec    INR 1.1 0.8 - 1.2   APTT   Result Value Ref Range    aPTT 23.4 21.0 - 32.0 sec   TSH   Result Value Ref Range    TSH 1.551 0.400 - 4.000 uIU/mL   Troponin I   Result Value Ref Range    Troponin I 0.024 0.000 - 0.026 ng/mL   Urinalysis, Reflex to Urine Culture Urine, Clean Catch   Result Value Ref Range    Specimen UA Urine, Catheterized     Color, UA Yellow Yellow, Straw, Kassi    Appearance, UA Clear Clear    pH, UA 6.0 5.0 - 8.0    Specific Gravity, UA 1.020 1.005 - 1.030    Protein, UA Negative Negative    Glucose, UA Negative Negative    Ketones, UA Negative Negative    Bilirubin (UA) Negative Negative    Occult Blood UA Negative Negative    Nitrite, UA Negative Negative    Urobilinogen, UA Negative <2.0 EU/dL    Leukocytes, UA Negative Negative   POCT glucose   Result Value Ref Range    POCT Glucose 163 (H) 70 - 110 mg/dL       Imaging Results:  Imaging Results          X-Ray Chest AP Portable (Final result)  Result time 02/13/20 21:20:20    Final result by Ruben Mcadams MD (02/13/20 21:20:20)                 Impression:      No acute findings.      Electronically signed by: Ruben Mcadams MD  Date:    02/13/2020  Time:    21:20             Narrative:    EXAMINATION:  XR CHEST AP PORTABLE    CLINICAL HISTORY:  Respiratory distress.,    COMPARISON:  07/23/2019    FINDINGS:  Heart size is mildly enlarged.  The lung fields are clear. No acute cardiopulmonary infiltrate.                               CT Head Without Contrast (Final result)  Result time 02/13/20 21:16:44    Final result by Abhijit Carmona Jr., MD (02/13/20 21:16:44)                  Impression:      1. No acute intracranial findings.  Stable exam.  All CT scans at this facility are performed  using dose modulation techniques as appropriate to performed exam including the following:  automated exposure control; adjustment of mA and/or kV according to the patients size (this includes techniques or standardized protocols for targeted exams where dose is matched to indication/reason for exam: i.e. extremities or head);  iterative reconstruction technique.      Electronically signed by: Abhijit Carmona Jr., MD  Date:    02/13/2020  Time:    21:16             Narrative:    EXAMINATION:  CT HEAD WITHOUT CONTRAST    CLINICAL HISTORY:  Confusion/delirium, altered LOC, unexplained;    TECHNIQUE:  CT scan was obtained of the head without administration of contrast.    COMPARISON:  04/19/2019    FINDINGS:  Atrophy with periventricular white matter changes consistent with small vessel ischemic change.  Old lacunar infarcts within the basal ganglia.Ventricles and basal cisterns are normal.  No hemorrhage, mass effect or midline shift.  No other cerebral or cerebellar parenchymal abnormality.  Mucosal thickening of the right sphenoid and left maxillary sinuses.  Mastoid air cells are clear.  Calvarium is intact.                                    The EKG was ordered, reviewed, and independently interpreted by the ED provider.  Interpretation time: 21:35  Rate: 69 BPM  Rhythm: Sinus rhythm with ocassional premature ventricular complexes  Interpretation: Left axis deviation. Minimal voltage criteria for LVH. T wave abnormality, consider inferior ischemia. T wave abnormality, consider anterolateral ischemia. No STEMI.      The Emergency Provider reviewed the vital signs and test results, which are outlined above.    ED Discussion     11:06 PM: Re-evaluated pt. Pt is resting comfortably and is in no acute distress.  Pt's condition has improved and is more responsive at this time.      1:30 AM: Reassessed pt at this  time. Discussed with pt and daughter all pertinent ED information and results. Discussed pt dx and plan of tx. Gave pt and daughter all f/u and return to the ED instructions. All questions and concerns were addressed at this time. Pt and daughter expresses understanding of information and instructions, and is comfortable with plan to discharge. Pt is stable for discharge.    I discussed with patient and/or family/caretaker that evaluation in the ED does not suggest any emergent or life threatening medical conditions requiring immediate intervention beyond what was provided in the ED, and I believe patient is safe for discharge.  Regardless, an unremarkable evaluation in the ED does not preclude the development or presence of a serious of life threatening condition. As such, patient was instructed to return immediately for any worsening or change in current symptoms.           ED Medication(s):  Medications   lorazepam (ATIVAN) injection 0.5 mg (0.5 mg Intravenous Given 2/14/20 0050)     Current Discharge Medication List      START taking these medications    Details   LORazepam (ATIVAN) 0.5 MG tablet Take 1 tablet (0.5 mg total) by mouth every 8 (eight) hours as needed for Anxiety.  Qty: 10 tablet, Refills: 0             Follow-up Information     Marti Parsons MD In 3 days.    Specialty:  Family Medicine  Contact information:  [address]  [address]  [facility] [address] [phone]             [facility] .    Specialty:  Emergency Medicine  Why:  As needed, If symptoms worsen  Contact information:  [address]  [facility] [address] [phone]                   Medical Decision Making    Medical Decision Making:   Clinical Tests:   Lab Tests: Reviewed and Ordered  Radiological Study: Ordered and Reviewed  Medical Tests: Reviewed and Ordered           Scribe Attestation:   Scribe #1: I performed the above scribed service and the documentation  accurately describes the services I performed. I attest to the accuracy of the note.    Attending:   Physician Attestation Statement for Scribe #1: I, Mj Nunez MD, personally performed the services described in this documentation, as scribed by Shaniqua Ornelas, in my presence, and it is both accurate and complete.          Clinical Impression       ICD-10-CM ICD-9-CM   1. Altered mental status, unspecified altered mental status type R41.82 780.97   2. Altered mental status R41.82 780.97   3. History of panic attacks Z86.59 V11.8       Disposition:   Disposition: Discharged  Condition: Stable         Mj Nunez MD  02/14/20 0555

## 2020-02-14 NOTE — ED NOTES
Resting SF, awake and more alert than on arrival.  Daughter remains at bedside.  Patient requests water so that she can give urine specimen.

## 2020-02-14 NOTE — ED NOTES
Dr. Nunez informed about pt's blood pressure. Pt states she did not take any of her metoprolol today. Pt and daughter instructed to take her nighttime does of metoprolol, per Dr. Nunez. Pt given water to take her metoprolol.

## 2020-02-14 NOTE — ED NOTES
Pt resting on cart in room, NAD. Pt oriented to self. Pt and pt's daughter aware that we need urine sample for UA. Pt's daughter instructed to press call button when pt has to go to restroom for UA. VSS, call light within reach, bed in low locked position. Will continue to monitor.

## 2020-02-14 NOTE — ED NOTES
Resting SF without evidence of change.  Daughter attentive at bedside.  Respirations even and unlabored.  NAD noted.

## 2020-02-14 NOTE — ED NOTES
Pt stable for discharge at this time. Pt and pt's daughter verbalize understanding of discharge information and agree with discharge plan. Pt discharged without incident.

## 2020-02-14 NOTE — ED NOTES
Received patient resting SF on stretcher, alert and oriented to person. Able to follow command.  Physician to bedside for assessment when daughter entered stating this kind of episode has happened numerous times, that patient seems to go into a sort of stupor when she takes her Parkinson's med.

## 2020-07-11 ENCOUNTER — HOSPITAL ENCOUNTER (INPATIENT)
Facility: HOSPITAL | Age: 80
LOS: 2 days | Discharge: NURSING, REHAB OR LONG-TERM CARE | DRG: 469 | End: 2020-07-14
Attending: EMERGENCY MEDICINE | Admitting: INTERNAL MEDICINE
Payer: MEDICARE

## 2020-07-11 DIAGNOSIS — E87.5 HYPERKALEMIA: Primary | ICD-10-CM

## 2020-07-11 DIAGNOSIS — M25.552 LEFT HIP PAIN: ICD-10-CM

## 2020-07-11 DIAGNOSIS — S72.002A CLOSED LEFT HIP FRACTURE: ICD-10-CM

## 2020-07-11 DIAGNOSIS — S72.002A CLOSED FRACTURE OF LEFT HIP, INITIAL ENCOUNTER: ICD-10-CM

## 2020-07-11 DIAGNOSIS — R06.02 SOB (SHORTNESS OF BREATH): ICD-10-CM

## 2020-07-11 LAB
ALLENS TEST: ABNORMAL
BASOPHILS # BLD AUTO: 0.02 K/UL (ref 0–0.2)
BASOPHILS NFR BLD: 0.2 % (ref 0–1.9)
DELSYS: ABNORMAL
DIFFERENTIAL METHOD: ABNORMAL
EOSINOPHIL # BLD AUTO: 0.2 K/UL (ref 0–0.5)
EOSINOPHIL NFR BLD: 2 % (ref 0–8)
ERYTHROCYTE [DISTWIDTH] IN BLOOD BY AUTOMATED COUNT: 13.3 % (ref 11.5–14.5)
HCO3 UR-SCNC: 21.2 MMOL/L (ref 24–28)
HCT VFR BLD AUTO: 38.5 % (ref 37–48.5)
HGB BLD-MCNC: 12.4 G/DL (ref 12–16)
IMM GRANULOCYTES # BLD AUTO: 0.07 K/UL (ref 0–0.04)
IMM GRANULOCYTES NFR BLD AUTO: 0.6 % (ref 0–0.5)
LYMPHOCYTES # BLD AUTO: 1.7 K/UL (ref 1–4.8)
LYMPHOCYTES NFR BLD: 14.4 % (ref 18–48)
MCH RBC QN AUTO: 30.2 PG (ref 27–31)
MCHC RBC AUTO-ENTMCNC: 32.2 G/DL (ref 32–36)
MCV RBC AUTO: 94 FL (ref 82–98)
MODE: ABNORMAL
MONOCYTES # BLD AUTO: 1.2 K/UL (ref 0.3–1)
MONOCYTES NFR BLD: 9.8 % (ref 4–15)
NEUTROPHILS # BLD AUTO: 8.6 K/UL (ref 1.8–7.7)
NEUTROPHILS NFR BLD: 73 % (ref 38–73)
NRBC BLD-RTO: 0 /100 WBC
PCO2 BLDA: 27.2 MMHG (ref 35–45)
PH SMN: 7.5 [PH] (ref 7.35–7.45)
PLATELET # BLD AUTO: 250 K/UL (ref 150–350)
PMV BLD AUTO: 10.8 FL (ref 9.2–12.9)
PO2 BLDA: 85 MMHG (ref 80–100)
POC BE: -2 MMOL/L
POC SATURATED O2: 97 % (ref 95–100)
RBC # BLD AUTO: 4.1 M/UL (ref 4–5.4)
SAMPLE: ABNORMAL
SITE: ABNORMAL
WBC # BLD AUTO: 11.76 K/UL (ref 3.9–12.7)

## 2020-07-11 PROCEDURE — 93010 EKG 12-LEAD: ICD-10-PCS | Mod: ,,, | Performed by: INTERNAL MEDICINE

## 2020-07-11 PROCEDURE — 93010 ELECTROCARDIOGRAM REPORT: CPT | Mod: ,,, | Performed by: INTERNAL MEDICINE

## 2020-07-11 PROCEDURE — 51702 INSERT TEMP BLADDER CATH: CPT

## 2020-07-11 PROCEDURE — 96374 THER/PROPH/DIAG INJ IV PUSH: CPT

## 2020-07-11 PROCEDURE — 99900035 HC TECH TIME PER 15 MIN (STAT)

## 2020-07-11 PROCEDURE — 82803 BLOOD GASES ANY COMBINATION: CPT

## 2020-07-11 PROCEDURE — 25000242 PHARM REV CODE 250 ALT 637 W/ HCPCS: Performed by: EMERGENCY MEDICINE

## 2020-07-11 PROCEDURE — U0002 COVID-19 LAB TEST NON-CDC: HCPCS

## 2020-07-11 PROCEDURE — 36600 WITHDRAWAL OF ARTERIAL BLOOD: CPT

## 2020-07-11 PROCEDURE — 83880 ASSAY OF NATRIURETIC PEPTIDE: CPT

## 2020-07-11 PROCEDURE — 93005 ELECTROCARDIOGRAM TRACING: CPT

## 2020-07-11 PROCEDURE — 82550 ASSAY OF CK (CPK): CPT

## 2020-07-11 PROCEDURE — 80053 COMPREHEN METABOLIC PANEL: CPT

## 2020-07-11 PROCEDURE — 99285 EMERGENCY DEPT VISIT HI MDM: CPT | Mod: 25

## 2020-07-11 PROCEDURE — 96375 TX/PRO/DX INJ NEW DRUG ADDON: CPT

## 2020-07-11 PROCEDURE — 85025 COMPLETE CBC W/AUTO DIFF WBC: CPT

## 2020-07-11 PROCEDURE — 84484 ASSAY OF TROPONIN QUANT: CPT

## 2020-07-11 PROCEDURE — 83605 ASSAY OF LACTIC ACID: CPT

## 2020-07-11 PROCEDURE — 83735 ASSAY OF MAGNESIUM: CPT

## 2020-07-11 PROCEDURE — 94640 AIRWAY INHALATION TREATMENT: CPT

## 2020-07-11 RX ORDER — MORPHINE SULFATE 4 MG/ML
6 INJECTION, SOLUTION INTRAMUSCULAR; INTRAVENOUS
Status: COMPLETED | OUTPATIENT
Start: 2020-07-11 | End: 2020-07-12

## 2020-07-11 RX ORDER — IPRATROPIUM BROMIDE AND ALBUTEROL SULFATE 2.5; .5 MG/3ML; MG/3ML
3 SOLUTION RESPIRATORY (INHALATION)
Status: COMPLETED | OUTPATIENT
Start: 2020-07-11 | End: 2020-07-11

## 2020-07-11 RX ORDER — ONDANSETRON 2 MG/ML
4 INJECTION INTRAMUSCULAR; INTRAVENOUS
Status: COMPLETED | OUTPATIENT
Start: 2020-07-11 | End: 2020-07-12

## 2020-07-11 RX ADMIN — IPRATROPIUM BROMIDE AND ALBUTEROL SULFATE 3 ML: .5; 3 SOLUTION RESPIRATORY (INHALATION) at 11:07

## 2020-07-12 ENCOUNTER — ANESTHESIA EVENT (OUTPATIENT)
Dept: SURGERY | Facility: HOSPITAL | Age: 80
DRG: 469 | End: 2020-07-12
Payer: MEDICARE

## 2020-07-12 ENCOUNTER — ANESTHESIA (OUTPATIENT)
Dept: SURGERY | Facility: HOSPITAL | Age: 80
DRG: 469 | End: 2020-07-12
Payer: MEDICARE

## 2020-07-12 PROBLEM — E87.5 HYPERKALEMIA: Status: ACTIVE | Noted: 2020-07-12

## 2020-07-12 PROBLEM — S72.002A CLOSED LEFT HIP FRACTURE: Status: ACTIVE | Noted: 2020-07-12

## 2020-07-12 PROBLEM — I10 ESSENTIAL HYPERTENSION: Chronic | Status: ACTIVE | Noted: 2019-07-24

## 2020-07-12 LAB
ALBUMIN SERPL BCP-MCNC: 4.2 G/DL (ref 3.5–5.2)
ALP SERPL-CCNC: 58 U/L (ref 55–135)
ALT SERPL W/O P-5'-P-CCNC: 5 U/L (ref 10–44)
ANION GAP SERPL CALC-SCNC: 11 MMOL/L (ref 8–16)
ANION GAP SERPL CALC-SCNC: 17 MMOL/L (ref 8–16)
AST SERPL-CCNC: 38 U/L (ref 10–40)
BASOPHILS # BLD AUTO: 0.02 K/UL (ref 0–0.2)
BASOPHILS NFR BLD: 0.2 % (ref 0–1.9)
BILIRUB SERPL-MCNC: 0.4 MG/DL (ref 0.1–1)
BILIRUB UR QL STRIP: NEGATIVE
BNP SERPL-MCNC: 58 PG/ML (ref 0–99)
BUN SERPL-MCNC: 18 MG/DL (ref 8–23)
BUN SERPL-MCNC: 22 MG/DL (ref 8–23)
CALCIUM SERPL-MCNC: 10 MG/DL (ref 8.7–10.5)
CALCIUM SERPL-MCNC: 9.1 MG/DL (ref 8.7–10.5)
CHLORIDE SERPL-SCNC: 101 MMOL/L (ref 95–110)
CHLORIDE SERPL-SCNC: 103 MMOL/L (ref 95–110)
CK SERPL-CCNC: 211 U/L (ref 20–180)
CK SERPL-CCNC: 238 U/L (ref 20–180)
CLARITY UR: CLEAR
CO2 SERPL-SCNC: 19 MMOL/L (ref 23–29)
CO2 SERPL-SCNC: 23 MMOL/L (ref 23–29)
COLOR UR: YELLOW
CREAT SERPL-MCNC: 0.9 MG/DL (ref 0.5–1.4)
CREAT SERPL-MCNC: 1 MG/DL (ref 0.5–1.4)
DIFFERENTIAL METHOD: ABNORMAL
EOSINOPHIL # BLD AUTO: 0 K/UL (ref 0–0.5)
EOSINOPHIL NFR BLD: 0.5 % (ref 0–8)
ERYTHROCYTE [DISTWIDTH] IN BLOOD BY AUTOMATED COUNT: 13.2 % (ref 11.5–14.5)
EST. GFR  (AFRICAN AMERICAN): >60 ML/MIN/1.73 M^2
EST. GFR  (AFRICAN AMERICAN): >60 ML/MIN/1.73 M^2
EST. GFR  (NON AFRICAN AMERICAN): 53 ML/MIN/1.73 M^2
EST. GFR  (NON AFRICAN AMERICAN): 60 ML/MIN/1.73 M^2
GLUCOSE SERPL-MCNC: 105 MG/DL (ref 70–110)
GLUCOSE SERPL-MCNC: 156 MG/DL (ref 70–110)
GLUCOSE UR QL STRIP: NEGATIVE
HCT VFR BLD AUTO: 34.2 % (ref 37–48.5)
HGB BLD-MCNC: 10.8 G/DL (ref 12–16)
HGB UR QL STRIP: NEGATIVE
IMM GRANULOCYTES # BLD AUTO: 0.04 K/UL (ref 0–0.04)
IMM GRANULOCYTES NFR BLD AUTO: 0.5 % (ref 0–0.5)
KETONES UR QL STRIP: ABNORMAL
LACTATE SERPL-SCNC: 2.2 MMOL/L (ref 0.5–2.2)
LEUKOCYTE ESTERASE UR QL STRIP: NEGATIVE
LYMPHOCYTES # BLD AUTO: 0.6 K/UL (ref 1–4.8)
LYMPHOCYTES NFR BLD: 7.7 % (ref 18–48)
MAGNESIUM SERPL-MCNC: 1.9 MG/DL (ref 1.6–2.6)
MCH RBC QN AUTO: 30.3 PG (ref 27–31)
MCHC RBC AUTO-ENTMCNC: 31.6 G/DL (ref 32–36)
MCV RBC AUTO: 96 FL (ref 82–98)
MONOCYTES # BLD AUTO: 1 K/UL (ref 0.3–1)
MONOCYTES NFR BLD: 11.8 % (ref 4–15)
NEUTROPHILS # BLD AUTO: 6.5 K/UL (ref 1.8–7.7)
NEUTROPHILS NFR BLD: 79.3 % (ref 38–73)
NITRITE UR QL STRIP: NEGATIVE
NRBC BLD-RTO: 0 /100 WBC
PH UR STRIP: 6 [PH] (ref 5–8)
PLATELET # BLD AUTO: 222 K/UL (ref 150–350)
PMV BLD AUTO: 10.4 FL (ref 9.2–12.9)
POTASSIUM SERPL-SCNC: 4.3 MMOL/L (ref 3.5–5.1)
POTASSIUM SERPL-SCNC: 5.9 MMOL/L (ref 3.5–5.1)
PROT SERPL-MCNC: 8.8 G/DL (ref 6–8.4)
PROT UR QL STRIP: NEGATIVE
RBC # BLD AUTO: 3.57 M/UL (ref 4–5.4)
SARS-COV-2 RDRP RESP QL NAA+PROBE: NEGATIVE
SODIUM SERPL-SCNC: 137 MMOL/L (ref 136–145)
SODIUM SERPL-SCNC: 137 MMOL/L (ref 136–145)
SP GR UR STRIP: 1.01 (ref 1–1.03)
TROPONIN I SERPL DL<=0.01 NG/ML-MCNC: 0.01 NG/ML (ref 0–0.03)
URN SPEC COLLECT METH UR: ABNORMAL
UROBILINOGEN UR STRIP-ACNC: NEGATIVE EU/DL
WBC # BLD AUTO: 8.2 K/UL (ref 3.9–12.7)

## 2020-07-12 PROCEDURE — 99900037 HC PT THERAPY SCREENING (STAT)

## 2020-07-12 PROCEDURE — 25000003 PHARM REV CODE 250: Performed by: ORTHOPAEDIC SURGERY

## 2020-07-12 PROCEDURE — 21400001 HC TELEMETRY ROOM

## 2020-07-12 PROCEDURE — 27000221 HC OXYGEN, UP TO 24 HOURS

## 2020-07-12 PROCEDURE — 36000710: Performed by: ORTHOPAEDIC SURGERY

## 2020-07-12 PROCEDURE — 25000003 PHARM REV CODE 250: Performed by: NURSE ANESTHETIST, CERTIFIED REGISTERED

## 2020-07-12 PROCEDURE — 25000003 PHARM REV CODE 250: Performed by: NURSE PRACTITIONER

## 2020-07-12 PROCEDURE — 71000033 HC RECOVERY, INTIAL HOUR: Performed by: ORTHOPAEDIC SURGERY

## 2020-07-12 PROCEDURE — 36415 COLL VENOUS BLD VENIPUNCTURE: CPT

## 2020-07-12 PROCEDURE — 63600175 PHARM REV CODE 636 W HCPCS: Performed by: EMERGENCY MEDICINE

## 2020-07-12 PROCEDURE — 27201423 OPTIME MED/SURG SUP & DEVICES STERILE SUPPLY: Performed by: ORTHOPAEDIC SURGERY

## 2020-07-12 PROCEDURE — 63600175 PHARM REV CODE 636 W HCPCS: Performed by: NURSE ANESTHETIST, CERTIFIED REGISTERED

## 2020-07-12 PROCEDURE — 37000008 HC ANESTHESIA 1ST 15 MINUTES: Performed by: ORTHOPAEDIC SURGERY

## 2020-07-12 PROCEDURE — 37000009 HC ANESTHESIA EA ADD 15 MINS: Performed by: ORTHOPAEDIC SURGERY

## 2020-07-12 PROCEDURE — 36000711: Performed by: ORTHOPAEDIC SURGERY

## 2020-07-12 PROCEDURE — 99900035 HC TECH TIME PER 15 MIN (STAT)

## 2020-07-12 PROCEDURE — 11000001 HC ACUTE MED/SURG PRIVATE ROOM

## 2020-07-12 PROCEDURE — 85025 COMPLETE CBC W/AUTO DIFF WBC: CPT

## 2020-07-12 PROCEDURE — 25000003 PHARM REV CODE 250: Performed by: EMERGENCY MEDICINE

## 2020-07-12 PROCEDURE — 82550 ASSAY OF CK (CPK): CPT

## 2020-07-12 PROCEDURE — 80048 BASIC METABOLIC PNL TOTAL CA: CPT

## 2020-07-12 PROCEDURE — 63600175 PHARM REV CODE 636 W HCPCS: Performed by: ORTHOPAEDIC SURGERY

## 2020-07-12 PROCEDURE — 81003 URINALYSIS AUTO W/O SCOPE: CPT

## 2020-07-12 PROCEDURE — 94761 N-INVAS EAR/PLS OXIMETRY MLT: CPT

## 2020-07-12 PROCEDURE — 25000003 PHARM REV CODE 250: Performed by: INTERNAL MEDICINE

## 2020-07-12 PROCEDURE — C1776 JOINT DEVICE (IMPLANTABLE): HCPCS | Performed by: ORTHOPAEDIC SURGERY

## 2020-07-12 DEVICE — HEAD FEMORAL 28MM: Type: IMPLANTABLE DEVICE | Site: HIP | Status: FUNCTIONAL

## 2020-07-12 DEVICE — IMPLANTABLE DEVICE: Type: IMPLANTABLE DEVICE | Site: HIP | Status: FUNCTIONAL

## 2020-07-12 RX ORDER — ROSUVASTATIN CALCIUM 10 MG/1
10 TABLET, COATED ORAL DAILY
Status: DISCONTINUED | OUTPATIENT
Start: 2020-07-12 | End: 2020-07-14 | Stop reason: HOSPADM

## 2020-07-12 RX ORDER — PHENYLEPHRINE HYDROCHLORIDE 10 MG/ML
INJECTION INTRAVENOUS
Status: DISCONTINUED | OUTPATIENT
Start: 2020-07-12 | End: 2020-07-12

## 2020-07-12 RX ORDER — LORAZEPAM 0.5 MG/1
0.5 TABLET ORAL EVERY 8 HOURS PRN
Status: DISCONTINUED | OUTPATIENT
Start: 2020-07-12 | End: 2020-07-13

## 2020-07-12 RX ORDER — SODIUM CHLORIDE 9 MG/ML
INJECTION, SOLUTION INTRAVENOUS CONTINUOUS
Status: ACTIVE | OUTPATIENT
Start: 2020-07-12 | End: 2020-07-13

## 2020-07-12 RX ORDER — CEFAZOLIN SODIUM 1 G/3ML
INJECTION, POWDER, FOR SOLUTION INTRAMUSCULAR; INTRAVENOUS
Status: DISCONTINUED | OUTPATIENT
Start: 2020-07-12 | End: 2020-07-12

## 2020-07-12 RX ORDER — ONDANSETRON 2 MG/ML
INJECTION INTRAMUSCULAR; INTRAVENOUS
Status: DISCONTINUED | OUTPATIENT
Start: 2020-07-12 | End: 2020-07-12

## 2020-07-12 RX ORDER — HYDROCODONE BITARTRATE AND ACETAMINOPHEN 5; 325 MG/1; MG/1
1 TABLET ORAL EVERY 4 HOURS PRN
Status: DISCONTINUED | OUTPATIENT
Start: 2020-07-12 | End: 2020-07-14 | Stop reason: HOSPADM

## 2020-07-12 RX ORDER — IPRATROPIUM BROMIDE AND ALBUTEROL SULFATE 2.5; .5 MG/3ML; MG/3ML
3 SOLUTION RESPIRATORY (INHALATION) EVERY 4 HOURS PRN
Status: DISCONTINUED | OUTPATIENT
Start: 2020-07-12 | End: 2020-07-14 | Stop reason: HOSPADM

## 2020-07-12 RX ORDER — PANTOPRAZOLE SODIUM 40 MG/1
40 TABLET, DELAYED RELEASE ORAL DAILY
Status: DISCONTINUED | OUTPATIENT
Start: 2020-07-12 | End: 2020-07-14 | Stop reason: HOSPADM

## 2020-07-12 RX ORDER — HYDRALAZINE HYDROCHLORIDE 20 MG/ML
10 INJECTION INTRAMUSCULAR; INTRAVENOUS EVERY 8 HOURS PRN
Status: DISCONTINUED | OUTPATIENT
Start: 2020-07-12 | End: 2020-07-14 | Stop reason: HOSPADM

## 2020-07-12 RX ORDER — CHLORHEXIDINE GLUCONATE ORAL RINSE 1.2 MG/ML
10 SOLUTION DENTAL
Status: DISCONTINUED | OUTPATIENT
Start: 2020-07-12 | End: 2020-07-12

## 2020-07-12 RX ORDER — HYDROCODONE BITARTRATE AND ACETAMINOPHEN 10; 325 MG/1; MG/1
1 TABLET ORAL EVERY 6 HOURS PRN
Status: DISCONTINUED | OUTPATIENT
Start: 2020-07-12 | End: 2020-07-12

## 2020-07-12 RX ORDER — OXYCODONE AND ACETAMINOPHEN 5; 325 MG/1; MG/1
1 TABLET ORAL
Status: DISCONTINUED | OUTPATIENT
Start: 2020-07-12 | End: 2020-07-14 | Stop reason: HOSPADM

## 2020-07-12 RX ORDER — CARBIDOPA AND LEVODOPA 25; 100 MG/1; MG/1
2 TABLET ORAL 4 TIMES DAILY
Status: DISCONTINUED | OUTPATIENT
Start: 2020-07-12 | End: 2020-07-14 | Stop reason: HOSPADM

## 2020-07-12 RX ORDER — ASPIRIN 81 MG/1
81 TABLET ORAL DAILY
Status: DISCONTINUED | OUTPATIENT
Start: 2020-07-12 | End: 2020-07-14 | Stop reason: HOSPADM

## 2020-07-12 RX ORDER — ONDANSETRON 2 MG/ML
4 INJECTION INTRAMUSCULAR; INTRAVENOUS DAILY PRN
Status: DISCONTINUED | OUTPATIENT
Start: 2020-07-12 | End: 2020-07-14 | Stop reason: HOSPADM

## 2020-07-12 RX ORDER — CEFAZOLIN SODIUM 1 G/3ML
INJECTION, POWDER, FOR SOLUTION INTRAMUSCULAR; INTRAVENOUS
Status: DISCONTINUED | OUTPATIENT
Start: 2020-07-12 | End: 2020-07-12 | Stop reason: HOSPADM

## 2020-07-12 RX ORDER — SODIUM CHLORIDE 9 MG/ML
INJECTION, SOLUTION INTRAVENOUS CONTINUOUS
Status: ACTIVE | OUTPATIENT
Start: 2020-07-12 | End: 2020-07-12

## 2020-07-12 RX ORDER — CEFAZOLIN SODIUM 2 G/50ML
2 SOLUTION INTRAVENOUS
Status: DISCONTINUED | OUTPATIENT
Start: 2020-07-12 | End: 2020-07-12

## 2020-07-12 RX ORDER — PROPOFOL 10 MG/ML
VIAL (ML) INTRAVENOUS
Status: DISCONTINUED | OUTPATIENT
Start: 2020-07-12 | End: 2020-07-12

## 2020-07-12 RX ORDER — ACETAMINOPHEN 325 MG/1
650 TABLET ORAL EVERY 6 HOURS PRN
Status: DISCONTINUED | OUTPATIENT
Start: 2020-07-12 | End: 2020-07-14 | Stop reason: HOSPADM

## 2020-07-12 RX ORDER — AMLODIPINE BESYLATE 10 MG/1
10 TABLET ORAL DAILY
Status: DISCONTINUED | OUTPATIENT
Start: 2020-07-12 | End: 2020-07-13

## 2020-07-12 RX ORDER — ACETAMINOPHEN 500 MG
1000 TABLET ORAL EVERY 8 HOURS
Status: DISCONTINUED | OUTPATIENT
Start: 2020-07-12 | End: 2020-07-14 | Stop reason: HOSPADM

## 2020-07-12 RX ORDER — CHLORHEXIDINE GLUCONATE ORAL RINSE 1.2 MG/ML
10 SOLUTION DENTAL 2 TIMES DAILY
Status: DISCONTINUED | OUTPATIENT
Start: 2020-07-12 | End: 2020-07-14 | Stop reason: HOSPADM

## 2020-07-12 RX ORDER — ONDANSETRON 8 MG/1
8 TABLET, ORALLY DISINTEGRATING ORAL EVERY 8 HOURS PRN
Status: DISCONTINUED | OUTPATIENT
Start: 2020-07-12 | End: 2020-07-14 | Stop reason: HOSPADM

## 2020-07-12 RX ORDER — SODIUM CHLORIDE, SODIUM LACTATE, POTASSIUM CHLORIDE, CALCIUM CHLORIDE 600; 310; 30; 20 MG/100ML; MG/100ML; MG/100ML; MG/100ML
INJECTION, SOLUTION INTRAVENOUS CONTINUOUS PRN
Status: DISCONTINUED | OUTPATIENT
Start: 2020-07-12 | End: 2020-07-12

## 2020-07-12 RX ORDER — HYDROMORPHONE HYDROCHLORIDE 2 MG/ML
0.2 INJECTION, SOLUTION INTRAMUSCULAR; INTRAVENOUS; SUBCUTANEOUS EVERY 5 MIN PRN
Status: DISCONTINUED | OUTPATIENT
Start: 2020-07-12 | End: 2020-07-12

## 2020-07-12 RX ORDER — ONDANSETRON 2 MG/ML
4 INJECTION INTRAMUSCULAR; INTRAVENOUS DAILY PRN
Status: DISCONTINUED | OUTPATIENT
Start: 2020-07-12 | End: 2020-07-12

## 2020-07-12 RX ORDER — AMOXICILLIN 250 MG
1 CAPSULE ORAL 2 TIMES DAILY
Status: DISCONTINUED | OUTPATIENT
Start: 2020-07-12 | End: 2020-07-14 | Stop reason: HOSPADM

## 2020-07-12 RX ORDER — BACITRACIN 50000 [IU]/1
INJECTION, POWDER, FOR SOLUTION INTRAMUSCULAR
Status: DISCONTINUED | OUTPATIENT
Start: 2020-07-12 | End: 2020-07-12 | Stop reason: HOSPADM

## 2020-07-12 RX ORDER — HYDROMORPHONE HYDROCHLORIDE 2 MG/ML
0.2 INJECTION, SOLUTION INTRAMUSCULAR; INTRAVENOUS; SUBCUTANEOUS EVERY 5 MIN PRN
Status: DISCONTINUED | OUTPATIENT
Start: 2020-07-12 | End: 2020-07-14 | Stop reason: HOSPADM

## 2020-07-12 RX ORDER — FENTANYL CITRATE 50 UG/ML
INJECTION, SOLUTION INTRAMUSCULAR; INTRAVENOUS
Status: DISCONTINUED | OUTPATIENT
Start: 2020-07-12 | End: 2020-07-12

## 2020-07-12 RX ORDER — METOPROLOL SUCCINATE 50 MG/1
50 TABLET, EXTENDED RELEASE ORAL 2 TIMES DAILY
Status: DISCONTINUED | OUTPATIENT
Start: 2020-07-12 | End: 2020-07-14 | Stop reason: HOSPADM

## 2020-07-12 RX ORDER — ROCURONIUM BROMIDE 10 MG/ML
INJECTION, SOLUTION INTRAVENOUS
Status: DISCONTINUED | OUTPATIENT
Start: 2020-07-12 | End: 2020-07-12

## 2020-07-12 RX ORDER — TRAMADOL HYDROCHLORIDE 50 MG/1
50 TABLET ORAL EVERY 4 HOURS PRN
Status: DISCONTINUED | OUTPATIENT
Start: 2020-07-12 | End: 2020-07-14 | Stop reason: HOSPADM

## 2020-07-12 RX ORDER — ONDANSETRON 2 MG/ML
4 INJECTION INTRAMUSCULAR; INTRAVENOUS EVERY 6 HOURS PRN
Status: DISCONTINUED | OUTPATIENT
Start: 2020-07-12 | End: 2020-07-14 | Stop reason: HOSPADM

## 2020-07-12 RX ORDER — OXYCODONE HYDROCHLORIDE 5 MG/1
10 TABLET ORAL EVERY 4 HOURS PRN
Status: DISCONTINUED | OUTPATIENT
Start: 2020-07-12 | End: 2020-07-14 | Stop reason: HOSPADM

## 2020-07-12 RX ORDER — OMEPRAZOLE 20 MG/1
CAPSULE, DELAYED RELEASE ORAL
Status: ON HOLD | COMMUNITY
End: 2020-10-05 | Stop reason: HOSPADM

## 2020-07-12 RX ORDER — SUCCINYLCHOLINE CHLORIDE 20 MG/ML
INJECTION INTRAMUSCULAR; INTRAVENOUS
Status: DISCONTINUED | OUTPATIENT
Start: 2020-07-12 | End: 2020-07-12

## 2020-07-12 RX ORDER — LIDOCAINE HYDROCHLORIDE 20 MG/ML
INJECTION INTRAVENOUS
Status: DISCONTINUED | OUTPATIENT
Start: 2020-07-12 | End: 2020-07-12

## 2020-07-12 RX ORDER — LACTULOSE 10 G/15ML
20 SOLUTION ORAL EVERY 6 HOURS PRN
Status: DISCONTINUED | OUTPATIENT
Start: 2020-07-12 | End: 2020-07-14 | Stop reason: HOSPADM

## 2020-07-12 RX ORDER — NEOMYCIN AND POLYMYXIN B SULFATES 40; 200000 MG/ML; [USP'U]/ML
SOLUTION IRRIGATION
Status: DISCONTINUED | OUTPATIENT
Start: 2020-07-12 | End: 2020-07-12 | Stop reason: HOSPADM

## 2020-07-12 RX ORDER — HYDROCODONE BITARTRATE AND ACETAMINOPHEN 5; 325 MG/1; MG/1
1 TABLET ORAL EVERY 6 HOURS PRN
Status: DISCONTINUED | OUTPATIENT
Start: 2020-07-12 | End: 2020-07-12

## 2020-07-12 RX ADMIN — CARBIDOPA AND LEVODOPA 2 TABLET: 25; 100 TABLET ORAL at 04:07

## 2020-07-12 RX ADMIN — ASPIRIN 81 MG: 81 TABLET, COATED ORAL at 09:07

## 2020-07-12 RX ADMIN — CARBIDOPA AND LEVODOPA 2 TABLET: 25; 100 TABLET ORAL at 09:07

## 2020-07-12 RX ADMIN — MORPHINE SULFATE 6 MG: 4 INJECTION INTRAVENOUS at 12:07

## 2020-07-12 RX ADMIN — ROCURONIUM BROMIDE 5 MG: 10 INJECTION, SOLUTION INTRAVENOUS at 12:07

## 2020-07-12 RX ADMIN — SODIUM POLYSTYRENE SULFONATE 30 G: 15 SUSPENSION ORAL; RECTAL at 05:07

## 2020-07-12 RX ADMIN — ROSUVASTATIN 10 MG: 10 TABLET, FILM COATED ORAL at 09:07

## 2020-07-12 RX ADMIN — FENTANYL CITRATE 50 MCG: 50 INJECTION, SOLUTION INTRAMUSCULAR; INTRAVENOUS at 12:07

## 2020-07-12 RX ADMIN — Medication 100 MG: at 12:07

## 2020-07-12 RX ADMIN — ONDANSETRON 4 MG: 2 INJECTION, SOLUTION INTRAMUSCULAR; INTRAVENOUS at 01:07

## 2020-07-12 RX ADMIN — METOPROLOL SUCCINATE 50 MG: 50 TABLET, EXTENDED RELEASE ORAL at 09:07

## 2020-07-12 RX ADMIN — AMLODIPINE BESYLATE 10 MG: 10 TABLET ORAL at 09:07

## 2020-07-12 RX ADMIN — ONDANSETRON 4 MG: 2 INJECTION INTRAMUSCULAR; INTRAVENOUS at 12:07

## 2020-07-12 RX ADMIN — HYDROCODONE BITARTRATE AND ACETAMINOPHEN 1 TABLET: 10; 325 TABLET ORAL at 07:07

## 2020-07-12 RX ADMIN — SODIUM CHLORIDE: 0.9 INJECTION, SOLUTION INTRAVENOUS at 05:07

## 2020-07-12 RX ADMIN — STANDARDIZED SENNA CONCENTRATE AND DOCUSATE SODIUM 1 TABLET: 8.6; 5 TABLET ORAL at 09:07

## 2020-07-12 RX ADMIN — PROPOFOL 80 MG: 10 INJECTION, EMULSION INTRAVENOUS at 12:07

## 2020-07-12 RX ADMIN — SODIUM POLYSTYRENE SULFONATE 15 G: 15 SUSPENSION ORAL; RECTAL at 01:07

## 2020-07-12 RX ADMIN — PHENYLEPHRINE HYDROCHLORIDE 200 MCG: 10 INJECTION INTRAVENOUS at 01:07

## 2020-07-12 RX ADMIN — SODIUM CHLORIDE: 0.9 INJECTION, SOLUTION INTRAVENOUS at 03:07

## 2020-07-12 RX ADMIN — LORAZEPAM 0.5 MG: 0.5 TABLET ORAL at 11:07

## 2020-07-12 RX ADMIN — SUCCINYLCHOLINE CHLORIDE 140 MG: 20 INJECTION, SOLUTION INTRAMUSCULAR; INTRAVENOUS at 12:07

## 2020-07-12 RX ADMIN — SODIUM CHLORIDE, SODIUM LACTATE, POTASSIUM CHLORIDE, AND CALCIUM CHLORIDE: .6; .31; .03; .02 INJECTION, SOLUTION INTRAVENOUS at 12:07

## 2020-07-12 RX ADMIN — ACETAMINOPHEN 1000 MG: 500 TABLET ORAL at 09:07

## 2020-07-12 RX ADMIN — CHLORHEXIDINE GLUCONATE 0.12% ORAL RINSE 10 ML: 1.2 LIQUID ORAL at 09:07

## 2020-07-12 RX ADMIN — PANTOPRAZOLE SODIUM 40 MG: 40 TABLET, DELAYED RELEASE ORAL at 09:07

## 2020-07-12 RX ADMIN — CEFAZOLIN 2 G: 1 INJECTION, POWDER, FOR SOLUTION INTRAMUSCULAR; INTRAVENOUS at 12:07

## 2020-07-12 NOTE — ASSESSMENT & PLAN NOTE
- Initial potassium of 5.9.  EKG stable.  Monitor telemetry.   - Kayexalate given in the ED.  Will give additional dose now with repeat potassium level.  - Hold home losartan and spironolactone.   - IV hydration.   - Follow labs.

## 2020-07-12 NOTE — SUBJECTIVE & OBJECTIVE
Past Medical History:   Diagnosis Date    Hyperlipemia     Hypertension     Parkinson's disease     Stroke 2016    Throat mass        Past Surgical History:   Procedure Laterality Date    HYSTERECTOMY      THROAT SURGERY      TONSILLECTOMY         Review of patient's allergies indicates:  No Known Allergies    No current facility-administered medications on file prior to encounter.      Current Outpatient Medications on File Prior to Encounter   Medication Sig    amLODIPine (NORVASC) 10 MG tablet Take 1 tablet (10 mg total) by mouth once daily.    aspirin (ECOTRIN) 81 MG EC tablet Take 81 mg by mouth once daily.    carbidopa-levodopa  mg (SINEMET)  mg per tablet Take 2 tablets by mouth 4 (four) times daily.    clopidogrel (PLAVIX) 75 mg tablet Take 75 mg by mouth once daily.    LORazepam (ATIVAN) 0.5 MG tablet Take 1 tablet (0.5 mg total) by mouth every 8 (eight) hours as needed for Anxiety.    losartan (COZAAR) 100 MG tablet Take 100 mg by mouth once daily.    metoprolol succinate (TOPROL-XL) 25 MG 24 hr tablet Take 50 mg by mouth 2 (two) times daily.     multivitamin capsule Take 1 capsule by mouth once daily.    omeprazole (PRILOSEC) 20 MG capsule Take 20 mg by mouth once daily.    omeprazole (PRILOSEC) 20 MG capsule 1 capsule    potassium chloride (KLOR-CON) 10 MEQ TbSR Take 10 mEq by mouth once daily.    raloxifene (EVISTA) 60 mg tablet Take 60 mg by mouth once daily.    rosuvastatin (CRESTOR) 10 MG tablet Take 10 mg by mouth once daily.    spironolactone (ALDACTONE) 25 MG tablet Take 25 mg by mouth once daily.     Family History     Reviewed and not pertinent.         Tobacco Use    Smoking status: Never Smoker    Smokeless tobacco: Never Used   Substance and Sexual Activity    Alcohol use: No    Drug use: No    Sexual activity: Not Currently     Review of Systems   Constitutional: Negative for activity change, chills, diaphoresis, fatigue and fever.   HENT: Negative for  congestion, postnasal drip, rhinorrhea, sinus pressure and sore throat.    Respiratory: Negative for cough, shortness of breath and wheezing.    Cardiovascular: Negative for chest pain, palpitations and leg swelling.   Gastrointestinal: Negative for abdominal pain, constipation, diarrhea, nausea and vomiting.   Genitourinary: Negative for dysuria, hematuria and urgency.   Musculoskeletal: Positive for arthralgias (left hip pain) and gait problem. Negative for back pain, myalgias and neck pain.   Skin: Negative for pallor and rash.   Neurological: Negative for dizziness, syncope, weakness, light-headedness, numbness and headaches.   Psychiatric/Behavioral: The patient is not nervous/anxious.    All other systems reviewed and are negative.    Objective:     Vital Signs (Most Recent):  Temp: 98 °F (36.7 °C)   Pulse: 101   Resp: (!) 21   BP: (!) 159/78   SpO2: 99 %  Vital Signs (24h Range):  Temp:  [98 °F (36.7 °C)-99.8 °F (37.7 °C)] 98 °F (36.7 °C)  Pulse:  [] 101  Resp:  [17-60] 21  SpO2:  [95 %-100 %] 99 %  BP: (135-181)/() 159/78     Weight: 57.2 kg (126 lb 1.6 oz)  Body mass index is 23.06 kg/m².    Physical Exam  Vitals signs and nursing note reviewed.   Constitutional:       General: She is not in acute distress.     Appearance: She is well-developed. She is not diaphoretic.   HENT:      Head: Normocephalic and atraumatic.   Eyes:      Conjunctiva/sclera: Conjunctivae normal.      Comments: PERRL; EOM intact.   Neck:      Musculoskeletal: Normal range of motion and neck supple.   Cardiovascular:      Rate and Rhythm: Normal rate and regular rhythm.      Pulses:           Dorsalis pedis pulses are 2+ on the right side and 2+ on the left side.        Posterior tibial pulses are 2+ on the right side and 2+ on the left side.      Heart sounds: S1 normal and S2 normal. No murmur.   Pulmonary:      Effort: Pulmonary effort is normal. No tachypnea, accessory muscle usage or respiratory distress.      Breath  sounds: Normal breath sounds and air entry. No wheezing, rhonchi or rales.   Abdominal:      General: Bowel sounds are normal. There is no distension.      Palpations: Abdomen is soft.      Tenderness: There is no abdominal tenderness.   Musculoskeletal:      Left hip: She exhibits decreased range of motion (due to pain), tenderness and deformity.      Comments: Obvious deformity to left hip with tenderness.  Cap refill <2 seconds. 2+ DP and PT pulses bilaterally. No motor or sensory deficit.   Skin:     General: Skin is warm and dry.      Capillary Refill: Capillary refill takes less than 2 seconds.      Findings: No erythema or rash.   Neurological:      General: No focal deficit present.      Mental Status: She is alert and oriented to person, place, and time.   Psychiatric:         Mood and Affect: Mood and affect normal.         Behavior: Behavior normal.             Significant Labs:  Results for orders placed or performed during the hospital encounter of 07/11/20   CBC auto differential   Result Value Ref Range    WBC 11.76 3.90 - 12.70 K/uL    RBC 4.10 4.00 - 5.40 M/uL    Hemoglobin 12.4 12.0 - 16.0 g/dL    Hematocrit 38.5 37.0 - 48.5 %    Mean Corpuscular Volume 94 82 - 98 fL    Mean Corpuscular Hemoglobin 30.2 27.0 - 31.0 pg    Mean Corpuscular Hemoglobin Conc 32.2 32.0 - 36.0 g/dL    RDW 13.3 11.5 - 14.5 %    Platelets 250 150 - 350 K/uL    MPV 10.8 9.2 - 12.9 fL    Immature Granulocytes 0.6 (H) 0.0 - 0.5 %    Gran # (ANC) 8.6 (H) 1.8 - 7.7 K/uL    Immature Grans (Abs) 0.07 (H) 0.00 - 0.04 K/uL    Lymph # 1.7 1.0 - 4.8 K/uL    Mono # 1.2 (H) 0.3 - 1.0 K/uL    Eos # 0.2 0.0 - 0.5 K/uL    Baso # 0.02 0.00 - 0.20 K/uL    nRBC 0 0 /100 WBC    Gran% 73.0 38.0 - 73.0 %    Lymph% 14.4 (L) 18.0 - 48.0 %    Mono% 9.8 4.0 - 15.0 %    Eosinophil% 2.0 0.0 - 8.0 %    Basophil% 0.2 0.0 - 1.9 %    Differential Method Automated    Comprehensive metabolic panel   Result Value Ref Range    Sodium 137 136 - 145 mmol/L     Potassium 5.9 (H) 3.5 - 5.1 mmol/L    Chloride 101 95 - 110 mmol/L    CO2 19 (L) 23 - 29 mmol/L    Glucose 105 70 - 110 mg/dL    BUN, Bld 22 8 - 23 mg/dL    Creatinine 1.0 0.5 - 1.4 mg/dL    Calcium 10.0 8.7 - 10.5 mg/dL    Total Protein 8.8 (H) 6.0 - 8.4 g/dL    Albumin 4.2 3.5 - 5.2 g/dL    Total Bilirubin 0.4 0.1 - 1.0 mg/dL    Alkaline Phosphatase 58 55 - 135 U/L    AST 38 10 - 40 U/L    ALT 5 (L) 10 - 44 U/L    Anion Gap 17 (H) 8 - 16 mmol/L    eGFR if African American >60 >60 mL/min/1.73 m^2    eGFR if non African American 53 (A) >60 mL/min/1.73 m^2   Troponin I   Result Value Ref Range    Troponin I 0.013 0.000 - 0.026 ng/mL   Brain natriuretic peptide   Result Value Ref Range    BNP 58 0 - 99 pg/mL   Lactic acid, plasma   Result Value Ref Range    Lactate (Lactic Acid) 2.2 0.5 - 2.2 mmol/L   Magnesium   Result Value Ref Range    Magnesium 1.9 1.6 - 2.6 mg/dL   CPK   Result Value Ref Range     (H) 20 - 180 U/L   Urinalysis, Reflex to Urine Culture Urine, Clean Catch    Specimen: Urine   Result Value Ref Range    Specimen UA Urine, Catheterized     Color, UA Yellow Yellow, Straw, Kassi    Appearance, UA Clear Clear    pH, UA 6.0 5.0 - 8.0    Specific Gravity, UA 1.015 1.005 - 1.030    Protein, UA Negative Negative    Glucose, UA Negative Negative    Ketones, UA Trace (A) Negative    Bilirubin (UA) Negative Negative    Occult Blood UA Negative Negative    Nitrite, UA Negative Negative    Urobilinogen, UA Negative <2.0 EU/dL    Leukocytes, UA Negative Negative   COVID-19 Rapid Screening   Result Value Ref Range    SARS-CoV-2 RNA, Amplification, Qual Negative Negative   ISTAT PROCEDURE   Result Value Ref Range    POC PH 7.500 (H) 7.35 - 7.45    POC PCO2 27.2 (LL) 35 - 45 mmHg    POC PO2 85 80 - 100 mmHg    POC HCO3 21.2 (L) 24 - 28 mmol/L    POC BE -2 -2 to 2 mmol/L    POC SATURATED O2 97 95 - 100 %    Sample ARTERIAL     Site RR     Allens Test Pass     DelSys Room Air     Mode SPONT       All pertinent  labs within the past 24 hours have been reviewed.    Significant Imaging:  Imaging Results          X-Ray Hip 2 View Left (Preliminary result)  Result time 07/12/20 01:31:32    ED Interpretation by Ck Soto MD (07/12/20 01:31:32, [facility] - , Emergency Medicine)    Left hip fracture                             X-Ray Chest AP Portable (Preliminary result)  Result time 07/12/20 01:36:55    ED Interpretation by Ck Soto MD (07/12/20 01:36:55, [facility] [place], Emergency Medicine)    No acute infiltrate                             I have reviewed all pertinent imaging results/findings within the past 24 hours.     EKG: (personally reviewed)  Sinus tachycardia with occasional PVCs, no significant change from previous tracings.

## 2020-07-12 NOTE — ASSESSMENT & PLAN NOTE
- BP stable on admission.  - Continue home amlodipine and Toprol XL.  - Hold losartan and spironolactone as above.  - IV hydralazine PRN.  - Pain control.

## 2020-07-12 NOTE — ED PROVIDER NOTES
SCRIBE #1 NOTE: I, Shaniqua Ornelas, am scribing for, and in the presence of, Ck Soto MD. I have scribed the entire note.      History      Chief Complaint   Patient presents with    Respiratory Distress     patient found on floor by family, per ems pt breathing 60-80 x/min, left hip dislocation       Review of patient's allergies indicates:  No Known Allergies     HPI   HPI    7/11/2020, 11:40 PM   History obtained from the patient      History of Present Illness: Tanya Madrid is a 80 y.o. female patient who presents to the Emergency Department for evaluation of respiratory distress characterized by increased breathing rate that is severe. EMS report patient breathing 60-80 times per minute. Patient was found on the floor by family PTA, and report this is patient's second fall this week. Patient is c/o pain to her Left hip, which she states she previously fell on during her last fall. Patient denies any fever, N/V, head trauma, neck pain, back pain, leg pain/swelling, chest pain, cough, and all other sxs at this time. Patient is on 2L of oxygen nasal cannula at this time. No further complaints or concerns at this time.       Arrival mode: EMS    PCP: Marti Parsons MD       Past Medical History:  Past Medical History:   Diagnosis Date    Hyperlipemia     Hypertension     Parkinson's disease     Stroke 2016    Throat mass        Past Surgical History:  Past Surgical History:   Procedure Laterality Date    HYSTERECTOMY      THROAT SURGERY      TONSILLECTOMY        Family History:  History reviewed. No pertinent family history.    Social History:  Social History     Tobacco Use    Smoking status: Never Smoker    Smokeless tobacco: Never Used   Substance and Sexual Activity    Alcohol use: No    Drug use: No    Sexual activity: Not Currently       ROS   Review of Systems   Constitutional: Negative for fever.   HENT: Negative for sore throat.    Respiratory: Positive for shortness of breath. Negative  "for cough.    Cardiovascular: Negative for chest pain and leg swelling.   Gastrointestinal: Negative for nausea and vomiting.   Genitourinary: Negative for dysuria.   Musculoskeletal: Negative for back pain and neck pain.        (+) Left hip pain   Skin: Negative for rash.   Neurological: Negative for weakness.   Hematological: Does not bruise/bleed easily.   All other systems reviewed and are negative.    Physical Exam      Initial Vitals   BP Pulse Resp Temp SpO2   07/11/20 2342 07/11/20 2335 07/11/20 2335 07/11/20 2342 07/11/20 2335   (!) 181/109 105 (!) 45 99.8 °F (37.7 °C) 95 %      MAP       --                 Physical Exam  Nursing Notes and Vital Signs Reviewed.  Constitutional: Patient is in mild distress. Well-developed and well-nourished.  Head: Atraumatic. Normocephalic.  Eyes: PERRL. EOM intact. Conjunctivae are not pale. No scleral icterus.  ENT: Mucous membranes are moist.   Neck: Supple. Full ROM.   Cardiovascular: Regular rate. Regular rhythm. No murmurs, rubs, or gallops. Distal pulses are 2+ and symmetric.  Pulmonary/Chest: Patient is in respiratory distress and tachypneic. No wheezing or rales.  Abdominal: Soft and non-distended.  There is no tenderness.   MSK: No midline spine tenderness or stepoffs.   LLE: Obvious deformity to the hip. Left hip tenderness. Cap refill distally is <2 seconds. DP and PT pulses are equal and 2+ bilaterally. No motor deficit. No distal sensory deficit  Skin: Warm and dry.  Neurological:  Alert, awake, and appropriate.  Normal speech.  No acute focal neurological deficits are appreciated.  Psychiatric: Normal affect. Good eye contact. Appropriate in content.    ED Course    Procedures  ED Vital Signs:  Vitals:    07/11/20 2335 07/11/20 2342 07/12/20 0002 07/12/20 0031   BP:  (!) 181/109     Pulse: 105 (!) 111  110   Resp: (!) 45 (!) 60 18    Temp:  99.8 °F (37.7 °C)     TempSrc:  Rectal     SpO2: 95% 100%     Weight:  57.2 kg (126 lb 1.6 oz)     Height:  5' 2" (1.575 " m)      07/12/20 0047 07/12/20 0152 07/12/20 0230 07/12/20 0317   BP: (!) 168/74 (!) 142/77 135/73 (!) 159/78   Pulse: (!) 116 101 96 101   Resp: (!) 29 19 17 (!) 21   Temp:    98 °F (36.7 °C)   TempSrc:    Oral   SpO2: 99% 100% 100% 99%   Weight:       Height:        07/12/20 0533   BP:    Pulse: 82   Resp:    Temp:    TempSrc:    SpO2:    Weight:    Height:        Abnormal Lab Results:  Labs Reviewed   CBC W/ AUTO DIFFERENTIAL - Abnormal; Notable for the following components:       Result Value    Immature Granulocytes 0.6 (*)     Gran # (ANC) 8.6 (*)     Immature Grans (Abs) 0.07 (*)     Mono # 1.2 (*)     Lymph% 14.4 (*)     All other components within normal limits   COMPREHENSIVE METABOLIC PANEL - Abnormal; Notable for the following components:    Potassium 5.9 (*)     CO2 19 (*)     Total Protein 8.8 (*)     ALT 5 (*)     Anion Gap 17 (*)     eGFR if non  53 (*)     All other components within normal limits   CK - Abnormal; Notable for the following components:     (*)     All other components within normal limits   ISTAT PROCEDURE - Abnormal; Notable for the following components:    POC PH 7.500 (*)     POC PCO2 27.2 (*)     POC HCO3 21.2 (*)     All other components within normal limits   TROPONIN I   B-TYPE NATRIURETIC PEPTIDE   LACTIC ACID, PLASMA   MAGNESIUM   SARS-COV-2 RNA AMPLIFICATION, QUAL        All Lab Results:  Results for orders placed or performed during the hospital encounter of 07/11/20   CBC auto differential   Result Value Ref Range    WBC 11.76 3.90 - 12.70 K/uL    RBC 4.10 4.00 - 5.40 M/uL    Hemoglobin 12.4 12.0 - 16.0 g/dL    Hematocrit 38.5 37.0 - 48.5 %    Mean Corpuscular Volume 94 82 - 98 fL    Mean Corpuscular Hemoglobin 30.2 27.0 - 31.0 pg    Mean Corpuscular Hemoglobin Conc 32.2 32.0 - 36.0 g/dL    RDW 13.3 11.5 - 14.5 %    Platelets 250 150 - 350 K/uL    MPV 10.8 9.2 - 12.9 fL    Immature Granulocytes 0.6 (H) 0.0 - 0.5 %    Gran # (ANC) 8.6 (H) 1.8 - 7.7  K/uL    Immature Grans (Abs) 0.07 (H) 0.00 - 0.04 K/uL    Lymph # 1.7 1.0 - 4.8 K/uL    Mono # 1.2 (H) 0.3 - 1.0 K/uL    Eos # 0.2 0.0 - 0.5 K/uL    Baso # 0.02 0.00 - 0.20 K/uL    nRBC 0 0 /100 WBC    Gran% 73.0 38.0 - 73.0 %    Lymph% 14.4 (L) 18.0 - 48.0 %    Mono% 9.8 4.0 - 15.0 %    Eosinophil% 2.0 0.0 - 8.0 %    Basophil% 0.2 0.0 - 1.9 %    Differential Method Automated    Comprehensive metabolic panel   Result Value Ref Range    Sodium 137 136 - 145 mmol/L    Potassium 5.9 (H) 3.5 - 5.1 mmol/L    Chloride 101 95 - 110 mmol/L    CO2 19 (L) 23 - 29 mmol/L    Glucose 105 70 - 110 mg/dL    BUN, Bld 22 8 - 23 mg/dL    Creatinine 1.0 0.5 - 1.4 mg/dL    Calcium 10.0 8.7 - 10.5 mg/dL    Total Protein 8.8 (H) 6.0 - 8.4 g/dL    Albumin 4.2 3.5 - 5.2 g/dL    Total Bilirubin 0.4 0.1 - 1.0 mg/dL    Alkaline Phosphatase 58 55 - 135 U/L    AST 38 10 - 40 U/L    ALT 5 (L) 10 - 44 U/L    Anion Gap 17 (H) 8 - 16 mmol/L    eGFR if African American >60 >60 mL/min/1.73 m^2    eGFR if non African American 53 (A) >60 mL/min/1.73 m^2   Troponin I   Result Value Ref Range    Troponin I 0.013 0.000 - 0.026 ng/mL   Brain natriuretic peptide   Result Value Ref Range    BNP 58 0 - 99 pg/mL   Lactic acid, plasma   Result Value Ref Range    Lactate (Lactic Acid) 2.2 0.5 - 2.2 mmol/L   Magnesium   Result Value Ref Range    Magnesium 1.9 1.6 - 2.6 mg/dL   CPK   Result Value Ref Range     (H) 20 - 180 U/L   Urinalysis, Reflex to Urine Culture Urine, Clean Catch    Specimen: Urine   Result Value Ref Range    Specimen UA Urine, Catheterized     Color, UA Yellow Yellow, Straw, Kassi    Appearance, UA Clear Clear    pH, UA 6.0 5.0 - 8.0    Specific Gravity, UA 1.015 1.005 - 1.030    Protein, UA Negative Negative    Glucose, UA Negative Negative    Ketones, UA Trace (A) Negative    Bilirubin (UA) Negative Negative    Occult Blood UA Negative Negative    Nitrite, UA Negative Negative    Urobilinogen, UA Negative <2.0 EU/dL    Leukocytes, UA  Negative Negative   COVID-19 Rapid Screening   Result Value Ref Range    SARS-CoV-2 RNA, Amplification, Qual Negative Negative   CBC auto differential   Result Value Ref Range    WBC 8.20 3.90 - 12.70 K/uL    RBC 3.57 (L) 4.00 - 5.40 M/uL    Hemoglobin 10.8 (L) 12.0 - 16.0 g/dL    Hematocrit 34.2 (L) 37.0 - 48.5 %    Mean Corpuscular Volume 96 82 - 98 fL    Mean Corpuscular Hemoglobin 30.3 27.0 - 31.0 pg    Mean Corpuscular Hemoglobin Conc 31.6 (L) 32.0 - 36.0 g/dL    RDW 13.2 11.5 - 14.5 %    Platelets 222 150 - 350 K/uL    MPV 10.4 9.2 - 12.9 fL    Immature Granulocytes 0.5 0.0 - 0.5 %    Gran # (ANC) 6.5 1.8 - 7.7 K/uL    Immature Grans (Abs) 0.04 0.00 - 0.04 K/uL    Lymph # 0.6 (L) 1.0 - 4.8 K/uL    Mono # 1.0 0.3 - 1.0 K/uL    Eos # 0.0 0.0 - 0.5 K/uL    Baso # 0.02 0.00 - 0.20 K/uL    nRBC 0 0 /100 WBC    Gran% 79.3 (H) 38.0 - 73.0 %    Lymph% 7.7 (L) 18.0 - 48.0 %    Mono% 11.8 4.0 - 15.0 %    Eosinophil% 0.5 0.0 - 8.0 %    Basophil% 0.2 0.0 - 1.9 %    Differential Method Automated    Basic metabolic panel   Result Value Ref Range    Sodium 137 136 - 145 mmol/L    Potassium 4.3 3.5 - 5.1 mmol/L    Chloride 103 95 - 110 mmol/L    CO2 23 23 - 29 mmol/L    Glucose 156 (H) 70 - 110 mg/dL    BUN, Bld 18 8 - 23 mg/dL    Creatinine 0.9 0.5 - 1.4 mg/dL    Calcium 9.1 8.7 - 10.5 mg/dL    Anion Gap 11 8 - 16 mmol/L    eGFR if African American >60 >60 mL/min/1.73 m^2    eGFR if non African American 60 >60 mL/min/1.73 m^2   CK   Result Value Ref Range     (H) 20 - 180 U/L   ISTAT PROCEDURE   Result Value Ref Range    POC PH 7.500 (H) 7.35 - 7.45    POC PCO2 27.2 (LL) 35 - 45 mmHg    POC PO2 85 80 - 100 mmHg    POC HCO3 21.2 (L) 24 - 28 mmol/L    POC BE -2 -2 to 2 mmol/L    POC SATURATED O2 97 95 - 100 %    Sample ARTERIAL     Site RR     Allens Test Pass     DelSys Room Air     Mode SPONT          Imaging Results:  Imaging Results          X-Ray Hip 2 View Left (Preliminary result)  Result time 07/12/20 01:31:32     ED Interpretation by Ck Soto MD (07/12/20 01:31:32, [facility] , Emergency Medicine)    Left hip fracture                             X-Ray Chest AP Portable (Preliminary result)  Result time 07/12/20 01:36:55    ED Interpretation by Ck Soto MD (07/12/20 01:36:55, [facility], Emergency Medicine)    No acute infiltrate                                  The EKG was ordered, reviewed, and independently interpreted by the ED provider.  Interpretation time: 23:40  Rate: 112 BPM  Rhythm: Sinus tachycardia with occasional premature ventricular complexes  Interpretation: No acute ST changes. No STEMI.    The Emergency Provider reviewed the vital signs and test results, which are outlined above.    ED Discussion     1:38 AM: Discussed pt's case with Dr. Valdivia (Orthopedic Surgeon) who recommends admitting patient to hospital medicine.    1:48 AM: Discussed case with Lissa Brown NP (Hospital Medicine). Dr. Tyler agrees with current care and management of pt and accepts admission.   Admitting Service: Hospital Medicine  Admitting Physician: Dr. Tyler  Admit to: Inpatient Med [facility]    ED Course as of Jul 12 0711   Sun Jul 12, 2020   0710 Respirations normal after pain control.     [BA]      ED Course User Index  [BA] Ck oSto MD       ED Medication(s):  Medications   amLODIPine tablet 10 mg (has no administration in time range)   aspirin EC tablet 81 mg (has no administration in time range)   carbidopa-levodopa  mg per tablet 2 tablet (has no administration in time range)   LORazepam tablet 0.5 mg (has no administration in time range)   metoprolol succinate (TOPROL-XL) 24 hr tablet 50 mg (has no administration in time range)   pantoprazole EC tablet 40 mg (has no administration in time range)   rosuvastatin tablet 10 mg (has no administration in time range)   hydrALAZINE injection 10 mg (has no administration in time range)   ondansetron injection 4 mg (has no  administration in time range)   acetaminophen tablet 650 mg (has no administration in time range)   HYDROcodone-acetaminophen 5-325 mg per tablet 1 tablet (has no administration in time range)   HYDROcodone-acetaminophen  mg per tablet 1 tablet (has no administration in time range)   albuterol-ipratropium 2.5 mg-0.5 mg/3 mL nebulizer solution 3 mL (has no administration in time range)   0.9%  NaCl infusion ( Intravenous New Bag 7/12/20 0557)   albuterol-ipratropium 2.5 mg-0.5 mg/3 mL nebulizer solution 3 mL (3 mLs Nebulization Given 7/11/20 2335)   morphine injection 6 mg (6 mg Intravenous Given 7/12/20 0002)   ondansetron injection 4 mg (4 mg Intravenous Given 7/12/20 0002)   sodium polystyrene 15 gram/60 mL suspension 15 g (15 g Oral Given 7/12/20 0150)   sodium polystyrene 15 gram/60 mL suspension 30 g (30 g Oral Given 7/12/20 0558)             Medical Decision Making    Medical Decision Making:   Clinical Tests:   Lab Tests: Reviewed and Ordered  Radiological Study: Ordered and Reviewed  Medical Tests: Reviewed and Ordered           Scribe Attestation:   Scribe #1: I performed the above scribed service and the documentation accurately describes the services I performed. I attest to the accuracy of the note.    Attending:   Physician Attestation Statement for Scribe #1: I, Ck Soto MD, personally performed the services described in this documentation, as scribed by Shaniqua Ornelas, in my presence, and it is both accurate and complete.          Clinical Impression       ICD-10-CM ICD-9-CM   1. Hyperkalemia  E87.5 276.7   2. SOB (shortness of breath)  R06.02 786.05   3. Left hip pain  M25.552 719.45   4. Closed fracture of left hip, initial encounter  S72.002A 820.8   5. Closed left hip fracture  S72.002A 820.8       Disposition:   Disposition: Admitted  Condition: Stable         Ck Soto MD  07/12/20 3904

## 2020-07-12 NOTE — INTERVAL H&P NOTE
The patient has been examined and the H&P has been reviewed:    I concur with the findings and no changes have occurred since H&P was written.    Anesthesia/Surgery risks, benefits and alternative options discussed and understood by patient/family.          Active Hospital Problems    Diagnosis  POA    *Closed left hip fracture [S72.002A]  Yes    Hyperkalemia [E87.5]  Yes    Essential hypertension [I10]  Yes     Chronic    History of CVA (cerebrovascular accident) [Z86.73]  Not Applicable     Chronic    Parkinson disease [G20]  Yes     Chronic     Last Assessment & Plan:   Parkinson's likely contributing to symptoms as well she will be continued on Sinemet on discharge there is no interruption in therapy        Resolved Hospital Problems   No resolved problems to display.

## 2020-07-12 NOTE — CONSULTS
[facility]  Orthopedics  Consult Note    Patient Name: Tanya Madrid  MRN: [id number]  Admission Date: 7/11/2020  Hospital Length of Stay: 0 days  Attending Provider: Sj Juan, *  Primary Care Provider: Marti Parsons MD    Patient information was obtained from patient, past medical records and ER records.     Consults  Subjective:     Principal Problem:Closed left hip fracture    Chief Complaint:   Chief Complaint   Patient presents with    Respiratory Distress     patient found on floor by family, per ems pt breathing 60-80 x/min, left hip dislocation        HPI: 80 y.o. female  with a PMHx of HTN, HLD, h/o CVA, and Parkinson's disease.  She presented to the ED with c/o left hip pain that started after suffering a ground level fall just PTA.  Pain aggravating by movement; no alleviating factors.  Patient family reports this is her 2nd fall this week.  Upon arrival to the ED, patient was hyperventilating with RR in the 60s, O2 sat remained stable.  Patient reports respiratory distress due to uncontrolled pain.  6 mg IV Morphine given, which improved RR to 18.  She denies any head trauma or injury, LOC, neck or back pain, HA, lightheadedness, dizziness, AMS, weakness, numbness/tingling, CP, SOB, ABD pain, N/V/D, dysuria, hematuria, fever or chills.  Work-up in the ED showed: potassium of 5.9, , normal kidney function, unrevealing EKG, unremarkable CXR.  XR of left hip revealed acute fracture.  Case discussed with Ortho Surgery, who will see patient in consult with plans for ORIF.  Patient received Kayexalate x 1 dose in the ED.  Hospital Medicine was contacted for admission.     Past Medical History:   Diagnosis Date    Hyperlipemia     Hypertension     Parkinson's disease     Stroke 2016    Throat mass        Past Surgical History:   Procedure Laterality Date    HYSTERECTOMY      THROAT SURGERY      TONSILLECTOMY         Review of patient's allergies indicates:  No  "Known Allergies    Current Facility-Administered Medications   Medication    0.9%  NaCl infusion    acetaminophen tablet 650 mg    albuterol-ipratropium 2.5 mg-0.5 mg/3 mL nebulizer solution 3 mL    amLODIPine tablet 10 mg    aspirin EC tablet 81 mg    carbidopa-levodopa  mg per tablet 2 tablet    hydrALAZINE injection 10 mg    HYDROcodone-acetaminophen  mg per tablet 1 tablet    HYDROcodone-acetaminophen 5-325 mg per tablet 1 tablet    LORazepam tablet 0.5 mg    metoprolol succinate (TOPROL-XL) 24 hr tablet 50 mg    ondansetron injection 4 mg    pantoprazole EC tablet 40 mg    rosuvastatin tablet 10 mg     Family History     None        Tobacco Use    Smoking status: Never Smoker    Smokeless tobacco: Never Used   Substance and Sexual Activity    Alcohol use: No    Drug use: No    Sexual activity: Not Currently     Review of Systems   Constitution: Negative.   HENT: Negative.    Eyes: Negative.    Cardiovascular: Negative.    Respiratory: Negative.    Endocrine: Negative.    Hematologic/Lymphatic: Negative.    Skin: Negative.    Musculoskeletal: Negative.    Gastrointestinal: Negative.    Neurological: Negative.    Psychiatric/Behavioral: Negative.    Allergic/Immunologic: Negative.      Objective:     Vital Signs (Most Recent):  Temp: 98.8 °F (37.1 °C) (07/12/20 0722)  Pulse: 89 (07/12/20 0722)  Resp: 16 (07/12/20 0722)  BP: (!) 148/77 (07/12/20 0722)  SpO2: 100 % (07/12/20 0722) Vital Signs (24h Range):  Temp:  [98 °F (36.7 °C)-99.8 °F (37.7 °C)] 98.8 °F (37.1 °C)  Pulse:  [] 89  Resp:  [16-60] 16  SpO2:  [95 %-100 %] 100 %  BP: (135-181)/() 148/77     Weight: 57.2 kg (126 lb 1.6 oz)  Height: 5' 2" (157.5 cm)  Body mass index is 23.06 kg/m².      Intake/Output Summary (Last 24 hours) at 7/12/2020 0721  Last data filed at 7/12/2020 0629  Gross per 24 hour   Intake --   Output 450 ml   Net -450 ml       General    Nursing note and vitals reviewed.  Constitutional: She is " oriented to person, place, and time. She appears well-developed and well-nourished.   HENT:   Head: Normocephalic and atraumatic.   Eyes: EOM are normal.   Neck: Normal range of motion.   Cardiovascular: Normal rate, regular rhythm and intact distal pulses.    Pulmonary/Chest: Effort normal. No respiratory distress. She exhibits no tenderness.   Abdominal: Soft. She exhibits no distension. There is no abdominal tenderness.   Neurological: She is alert and oriented to person, place, and time.   Psychiatric: She has a normal mood and affect. Her behavior is normal. Judgment and thought content normal.             Left Knee Exam     Inspection   Alignment:  normal  Effusion: absentLeft Hip Exam     Inspection   Scars: absent  Swelling: present  Deformity of hip.  Quadriceps Atrophy:  negative  Erythema: absent  Bruising: present    Tenderness   The patient tender to palpation of the trochanteric bursa, psoas tendon and rectus insertion.    Crepitus   The patient has crepitus of the rectus insertion and psoas tendon.    Range of Motion   Abduction: abnormal   Adduction: abnormal   Extension: abnormal   Flexion: abnormal   External rotation: abnormal   Internal rotation: abnormal     Other   Sensation: normal    Comments:  Leg is shortened and externally rotated.  Neurovascularly intact. The skin is closed.  There is painful range of motion with flexion and extension as well as abduction and adduction.  Crepitus is present. Findings consistent with hip fracture.          Vascular Exam       Left Pulses  Dorsalis Pedis:      2+  Posterior Tibial:      2+        Capillary Refill  Left Hand: normal capillary refill      Significant Labs:   BMP:   Recent Labs   Lab 07/11/20 2352 07/12/20  0456    156*    137   K 5.9* 4.3    103   CO2 19* 23   BUN 22 18   CREATININE 1.0 0.9   CALCIUM 10.0 9.1   MG 1.9  --      CBC:   Recent Labs   Lab 07/11/20  2352 07/12/20  0456   WBC 11.76 8.20   HGB 12.4 10.8*   HCT  38.5 34.2*    222     CMP:   Recent Labs   Lab 07/11/20  2352 07/12/20  0456    137   K 5.9* 4.3    103   CO2 19* 23    156*   BUN 22 18   CREATININE 1.0 0.9   CALCIUM 10.0 9.1   PROT 8.8*  --    ALBUMIN 4.2  --    BILITOT 0.4  --    ALKPHOS 58  --    AST 38  --    ALT 5*  --    ANIONGAP 17* 11   EGFRNONAA 53* 60     Coagulation: No results for input(s): LABPROT, INR, APTT in the last 48 hours.  CRP: No results for input(s): CRP in the last 48 hours.  Lactic Acid:   Recent Labs   Lab 07/11/20  2352   LACTATE 2.2     All pertinent labs within the past 24 hours have been reviewed.    Significant Imaging: I have reviewed and interpreted all pertinent imaging results/findings.    Assessment/Plan:     * Closed left hip fracture  Patient will require bipolar hemiarthroplasty as treatment for her fracture.  Awaiting medical OK to proceed with OR.  If given today will try for this afternoon otherwise will schedule for tomorrow.        Thank you for your consult. I will follow-up with patient. Please contact us if you have any additional questions.    Humberto Valdivia, DO  Orthopedics  [facility]

## 2020-07-12 NOTE — OP NOTE
Preoperative diagnosis:  Displaced left subcapital hip fracture.    Postoperative diagnosis:  Same.    Procedure:  Bipolar hemiarthroplasty left hip for fracture.    Surgeon:  Shea.    Assistant:  None.    Anesthesia:  General.    Tourniquet time:  None.    Specimens:  None.    Estimated blood loss 100 cc.    Complications:  None.    Drains:  None.    Cultures:  None.    Disposition:  To recovery room in stable condition.    Indications:  This is an 80-year-old female who sustained a fall yesterday evening and was brought to the emergency room at [facility].  She underwent medical evaluation was deemed orthopedically and medically stable for bipolar hemiarthroplasty of the left hip on July 12, 2020.  The patient is explained the risks, benefits and alternatives to the procedure as well as her family.  They have elected to proceed.The patient was explained the surgical risks and benefits of the procedure which include but are not limited to infection, bleeding, stiffness, pain, nerve, artery, vein injury, hardware wear, hardware failure, fracture, nonunion, malunion, dislocation, subluxation, deep venous thrombosis, pulmonary embolus and death.      Procedure:  The patient was identified and marked preoperatively in the holding area at [facility] she was then transferred to the operative suite on her hospital bed where a general anesthetic is administered.  The patient was then transferred from her hospital bed to the surgical table.  She was positioned appropriately and sterilely prepped and draped in the standard fashion for anterior supine intermuscular bipolar hemiarthroplasty.  Once this was adequately completed 2 time-outs were performed identifying the operative extremity in the patient.  An incision is then made anterior to the hip through skin and subcutaneous tissue down to the level of the fascia.  The fascia was then split in line with the muscle fibers of  the tensor fascia flako and anterior lateral dissection is performed bluntly of the muscle beneath the fascia.  Perforating vessels beneath the tensor fascia flako identified and stat Bovie technique is utilized for hemostasis as well as the Aquamantys bipolar sealer.  The anterior hip fascia was then entered and retractors were placed superiorly and inferiorly about the hip capsule as well as 1 over the pelvic brim.  At this point a capsulectomy anteriorly, superiorly and inferiorly is performed resecting the anterior hip capsule in totality.  Once this was adequately resected the femoral neck is identified and osteotomy of the femoral neck is performed with an oscillating saw.  The resected femoral neck is removed and this allows for the femoral head to be grasped with a Steinmann pin and removed from the acetabulum.  This is sized to be a 44 mm head and a 44 mm trial from the Rescaleuy system is utilized to trial the acetabulum.  Once this is noted to fit well this was chosen as the final component and attention was then redirected towards the femur.    A box osteotome was then utilized to lateralize the insertion point of the femoral stem onto the femoral neck after the femur was appropriately positioned and posterior lateral releases were performed allowing for elevation of the femur into the incision.  Sequential broaching from a starter broach to a size 5 was then performed and a size 5 DePuy Tri Lock stem is then placed within the canal after it was copiously irrigated with antibiotic solution.  Having placed the stem in place a standard neck 28 mm inner head with a 44 mm outer head was assembled and impacted onto a dry trunnion.  An open reduction maneuver was then carried out reducing the femoral head into the acetabulum.  Once this was adequately seated copious irrigation again is carried out.  Again meticulous hemostasis was confirmed with the Aquamantys bipolar sealer.  Having adequately done this me like  lengths were measured at the medial malleoli and seen to be equal.  Therefore this was accepted and closure of the deep musculature was performed with an 0 Vicryl in an interrupted figure-of-eight stitch.  0 Vicryl was then utilized in a running locking stitch for closure of the fascia to the tensor fascia flako.  Two 0 Vicryl was utilized in a running stitch for subcutaneous approximation final skin closure was gained with staples and an Aquacel Ag dressing is placed as the patient was transferred recovery room in stable condition.    Postoperative recommendations:  The patient may be weight-bearing to tolerance on the left hip at this time.  Physical and occupational therapy will evaluate the patient for postoperative needs and possible placement.  The patient should have anticoagulation at the discretion of the hospitalist for 30 days.  Patient should follow up in the orthopedic clinic in 10-14 days for staple removal.  Anterior approach hip precautions are recommended.

## 2020-07-12 NOTE — TRANSFER OF CARE
"Anesthesia Transfer of Care Note    Patient: Tanya Madrid    Procedure(s) Performed: Procedure(s) (LRB):  HEMIARTHROPLASTY, HIP (Left)    Patient location: PACU    Anesthesia Type: general    Transport from OR: Transported from OR on room air with adequate spontaneous ventilation    Post pain: adequate analgesia    Post assessment: no apparent anesthetic complications and tolerated procedure well    Post vital signs: stable    Level of consciousness: awake    Nausea/Vomiting: no nausea/vomiting    Complications: none    Transfer of care protocol was followed      Last vitals:   Visit Vitals  BP (!) 148/77 (Patient Position: Lying)   Pulse 92   Temp 37.1 °C (98.8 °F) (Oral)   Resp 17   Ht 5' 2" (1.575 m)   Wt 57.2 kg (126 lb 1.6 oz)   SpO2 100%   Breastfeeding No   BMI 23.06 kg/m²     "

## 2020-07-12 NOTE — ANESTHESIA PREPROCEDURE EVALUATION
07/12/2020  Tanya Madrid is a 80 y.o., female.    Anesthesia Evaluation    I have reviewed the Patient Summary Reports.    I have reviewed the Nursing Notes.    I have reviewed the Medications.     Review of Systems  Anesthesia Hx:  No problems with previous Anesthesia    Social:  Non-Smoker    Hematology/Oncology:         -- Anemia:   Cardiovascular:   Hypertension hyperlipidemia    Pulmonary:  Pulmonary Normal    Renal/:  Renal/ Normal     Hepatic/GI:   GERD    Musculoskeletal:   Closed left hip fracture    Rheumatoid arthritis  Scoliosis of lumbar spine    Kyphosis of thoracic region   Neurological:   CVA Parkinson's disease   Endocrine:  Endocrine Normal      Patient Active Problem List   Diagnosis    Essential hypertension    GERD (gastroesophageal reflux disease)    Hyperlipidemia    Insomnia    Kyphosis of thoracic region    Benign neoplasm of larynx    Laryngeal papillomatosis    Laryngeal stenosis    Parkinson disease    Pharyngoesophageal dysphagia    Rheumatoid arthritis    Scoliosis of lumbar spine    History of CVA (cerebrovascular accident)    Elevated troponin    Closed left hip fracture    Hyperkalemia     No current facility-administered medications on file prior to encounter.      Current Outpatient Medications on File Prior to Encounter   Medication Sig Dispense Refill    amLODIPine (NORVASC) 10 MG tablet Take 1 tablet (10 mg total) by mouth once daily. 30 tablet 0    aspirin (ECOTRIN) 81 MG EC tablet Take 81 mg by mouth once daily.      carbidopa-levodopa  mg (SINEMET)  mg per tablet Take 2 tablets by mouth 4 (four) times daily.      clopidogrel (PLAVIX) 75 mg tablet Take 75 mg by mouth once daily.      LORazepam (ATIVAN) 0.5 MG tablet Take 1 tablet (0.5 mg total) by mouth every 8 (eight) hours as needed for Anxiety. 10 tablet 0    losartan  (COZAAR) 100 MG tablet Take 100 mg by mouth once daily.      metoprolol succinate (TOPROL-XL) 25 MG 24 hr tablet Take 50 mg by mouth 2 (two) times daily.       multivitamin capsule Take 1 capsule by mouth once daily.      omeprazole (PRILOSEC) 20 MG capsule Take 20 mg by mouth once daily.      omeprazole (PRILOSEC) 20 MG capsule 1 capsule      potassium chloride (KLOR-CON) 10 MEQ TbSR Take 10 mEq by mouth once daily.      raloxifene (EVISTA) 60 mg tablet Take 60 mg by mouth once daily.      rosuvastatin (CRESTOR) 10 MG tablet Take 10 mg by mouth once daily.      spironolactone (ALDACTONE) 25 MG tablet Take 25 mg by mouth once daily.       Past Surgical History:   Procedure Laterality Date    HYSTERECTOMY      THROAT SURGERY      TONSILLECTOMY           Physical Exam   Airway/Jaw/Neck:  Airway Findings: Mouth Opening: Normal Tongue: Normal  General Airway Assessment: Adult  Mallampati: II  TM Distance: 4 - 6 cm  Jaw/Neck Findings:  Neck ROM: Normal ROM      Dental:  Dental Findings: In tact   Chest/Lungs:  Chest/Lungs Findings: Clear to auscultation, Normal Respiratory Rate     Heart/Vascular:  Heart Findings: Rate: Normal  Rhythm: Regular Rhythm  Sounds: Normal        Mental Status:  Mental Status Findings:  Cooperative, Alert and Oriented         Anesthesia Plan  Type of Anesthesia, risks & benefits discussed:  Anesthesia Type:  general  Patient's Preference:   Intra-op Monitoring Plan: standard ASA monitors  Intra-op Monitoring Plan Comments:   Post Op Pain Control Plan: multimodal analgesia and per primary service following discharge from PACU  Post Op Pain Control Plan Comments:   Induction:   IV  Beta Blocker:  Patient is on a Beta-Blocker and has received one dose within the past 24 hours (No further documentation required).       Informed Consent: Patient understands risks and agrees with Anesthesia plan.  Questions answered. Anesthesia consent signed with patient.  ASA Score: 3     Day of Surgery  Review of History & Physical:  There are no significant changes.          Ready For Surgery From Anesthesia Perspective.       Chemistry        Component Value Date/Time     07/12/2020 0456    K 4.3 07/12/2020 0456     07/12/2020 0456    CO2 23 07/12/2020 0456    BUN 18 07/12/2020 0456    CREATININE 0.9 07/12/2020 0456     (H) 07/12/2020 0456        Component Value Date/Time    CALCIUM 9.1 07/12/2020 0456    ALKPHOS 58 07/11/2020 2352    AST 38 07/11/2020 2352    ALT 5 (L) 07/11/2020 2352    BILITOT 0.4 07/11/2020 2352    ESTGFRAFRICA >60 07/12/2020 0456    EGFRNONAA 60 07/12/2020 0456        Lab Results   Component Value Date    WBC 8.20 07/12/2020    HGB 10.8 (L) 07/12/2020    HCT 34.2 (L) 07/12/2020    MCV 96 07/12/2020     07/12/2020     Echo 7/24/19:  CONCLUSIONS     1 - Concentric hypertrophy.     2 - No wall motion abnormalities.     3 - Normal left ventricular systolic function (EF 60-65%).     4 - Normal left ventricular diastolic function.     5 - Normal right ventricular systolic function .     6 - The estimated PA systolic pressure is 35 mmHg.     7 - Trivial pulmonic regurgitation.     8 - Intermediate central venous pressure.       Pharm Stress 7/24/19:  Nuclear Quantitative Functional Analysis:   LVEF: 60 %   LVED Volume: 100 ml   LVES Volume: 40 ml     Impression: NORMAL MYOCARDIAL PERFUSION   1. The perfusion scan is free of evidence for myocardial ischemia or injury.   2. There is a mild intensity fixed defect in the inferolateral wall of the left ventricle, likely secondary to diaphragm attenuation defect.   3. Resting wall motion is physiologic.   4. Resting LV function is normal.   5. The ventricular volumes are normal at rest and stress.   6. The extracardiac distribution of radioactivity is normal.

## 2020-07-12 NOTE — ASSESSMENT & PLAN NOTE
Patient will require bipolar hemiarthroplasty as treatment for her fracture.  Awaiting medical OK to proceed with OR.  If given today will try for this afternoon otherwise will schedule for tomorrow.

## 2020-07-12 NOTE — PLAN OF CARE
Oriented pt to unit, hourly rounding and POC reviewed. Pt verbalized understanding no concerns at the moment. Fall precautions implemented. Pt remained free from falls/injuries. AAOx4 sinus tachy on monitor. . Family to remain at bedside. IVF infusing per orders. 2L O2 via NC pt tends to hyperventilate when she's anxious. C/o pain to L hip pain adequately managed. NPO status maintained. Safety precautions implemented. Chart reviewed. Will continue to monitor.

## 2020-07-12 NOTE — ANESTHESIA POSTPROCEDURE EVALUATION
Anesthesia Post Evaluation    Patient: Tanya Madrid    Procedure(s) Performed: Procedure(s) (LRB):  HEMIARTHROPLASTY, HIP (Left)    Final Anesthesia Type: general    Patient location during evaluation: PACU  Patient participation: Yes- Able to Participate  Level of consciousness: awake  Post-procedure vital signs: reviewed and stable  Pain management: adequate  Airway patency: patent    PONV status at discharge: No PONV  Anesthetic complications: no      Cardiovascular status: blood pressure returned to baseline and hemodynamically stable  Respiratory status: unassisted and spontaneous ventilation  Hydration status: euvolemic  Follow-up not needed.          Vitals Value Taken Time   /77 07/12/20 0722   Temp 37.1 °C (98.8 °F) 07/12/20 0722   Pulse 92 07/12/20 0752   Resp 17 07/12/20 0752   SpO2 100 % 07/12/20 0752         No case tracking events are documented in the log.      Pain/Annamarie Score: Pain Rating Prior to Med Admin: 7 (7/12/2020  7:11 AM)  Pain Rating Post Med Admin: 3 (7/12/2020  8:11 AM)

## 2020-07-12 NOTE — SUBJECTIVE & OBJECTIVE
Past Medical History:   Diagnosis Date    Hyperlipemia     Hypertension     Parkinson's disease     Stroke 2016    Throat mass        Past Surgical History:   Procedure Laterality Date    HYSTERECTOMY      THROAT SURGERY      TONSILLECTOMY         Review of patient's allergies indicates:  No Known Allergies    Current Facility-Administered Medications   Medication    0.9%  NaCl infusion    acetaminophen tablet 650 mg    albuterol-ipratropium 2.5 mg-0.5 mg/3 mL nebulizer solution 3 mL    amLODIPine tablet 10 mg    aspirin EC tablet 81 mg    carbidopa-levodopa  mg per tablet 2 tablet    hydrALAZINE injection 10 mg    HYDROcodone-acetaminophen  mg per tablet 1 tablet    HYDROcodone-acetaminophen 5-325 mg per tablet 1 tablet    LORazepam tablet 0.5 mg    metoprolol succinate (TOPROL-XL) 24 hr tablet 50 mg    ondansetron injection 4 mg    pantoprazole EC tablet 40 mg    rosuvastatin tablet 10 mg     Family History     None        Tobacco Use    Smoking status: Never Smoker    Smokeless tobacco: Never Used   Substance and Sexual Activity    Alcohol use: No    Drug use: No    Sexual activity: Not Currently     Review of Systems   Constitution: Negative.   HENT: Negative.    Eyes: Negative.    Cardiovascular: Negative.    Respiratory: Negative.    Endocrine: Negative.    Hematologic/Lymphatic: Negative.    Skin: Negative.    Musculoskeletal: Negative.    Gastrointestinal: Negative.    Neurological: Negative.    Psychiatric/Behavioral: Negative.    Allergic/Immunologic: Negative.      Objective:     Vital Signs (Most Recent):  Temp: 98.8 °F (37.1 °C) (07/12/20 0722)  Pulse: 89 (07/12/20 0722)  Resp: 16 (07/12/20 0722)  BP: (!) 148/77 (07/12/20 0722)  SpO2: 100 % (07/12/20 0722) Vital Signs (24h Range):  Temp:  [98 °F (36.7 °C)-99.8 °F (37.7 °C)] 98.8 °F (37.1 °C)  Pulse:  [] 89  Resp:  [16-60] 16  SpO2:  [95 %-100 %] 100 %  BP: (135-181)/() 148/77     Weight: 57.2 kg (126 lb  "1.6 oz)  Height: 5' 2" (157.5 cm)  Body mass index is 23.06 kg/m².      Intake/Output Summary (Last 24 hours) at 7/12/2020 0729  Last data filed at 7/12/2020 0629  Gross per 24 hour   Intake --   Output 450 ml   Net -450 ml       General    Nursing note and vitals reviewed.  Constitutional: She is oriented to person, place, and time. She appears well-developed and well-nourished.   HENT:   Head: Normocephalic and atraumatic.   Eyes: EOM are normal.   Neck: Normal range of motion.   Cardiovascular: Normal rate, regular rhythm and intact distal pulses.    Pulmonary/Chest: Effort normal. No respiratory distress. She exhibits no tenderness.   Abdominal: Soft. She exhibits no distension. There is no abdominal tenderness.   Neurological: She is alert and oriented to person, place, and time.   Psychiatric: She has a normal mood and affect. Her behavior is normal. Judgment and thought content normal.             Left Knee Exam     Inspection   Alignment:  normal  Effusion: absentLeft Hip Exam     Inspection   Scars: absent  Swelling: present  Deformity of hip.  Quadriceps Atrophy:  negative  Erythema: absent  Bruising: present    Tenderness   The patient tender to palpation of the trochanteric bursa, psoas tendon and rectus insertion.    Crepitus   The patient has crepitus of the rectus insertion and psoas tendon.    Range of Motion   Abduction: abnormal   Adduction: abnormal   Extension: abnormal   Flexion: abnormal   External rotation: abnormal   Internal rotation: abnormal     Other   Sensation: normal    Comments:  Leg is shortened and externally rotated.  Neurovascularly intact. The skin is closed.  There is painful range of motion with flexion and extension as well as abduction and adduction.  Crepitus is present. Findings consistent with hip fracture.          Vascular Exam       Left Pulses  Dorsalis Pedis:      2+  Posterior Tibial:      2+        Capillary Refill  Left Hand: normal capillary " refill      Significant Labs:   BMP:   Recent Labs   Lab 07/11/20 2352 07/12/20 0456    156*    137   K 5.9* 4.3    103   CO2 19* 23   BUN 22 18   CREATININE 1.0 0.9   CALCIUM 10.0 9.1   MG 1.9  --      CBC:   Recent Labs   Lab 07/11/20 2352 07/12/20 0456   WBC 11.76 8.20   HGB 12.4 10.8*   HCT 38.5 34.2*    222     CMP:   Recent Labs   Lab 07/11/20 2352 07/12/20 0456    137   K 5.9* 4.3    103   CO2 19* 23    156*   BUN 22 18   CREATININE 1.0 0.9   CALCIUM 10.0 9.1   PROT 8.8*  --    ALBUMIN 4.2  --    BILITOT 0.4  --    ALKPHOS 58  --    AST 38  --    ALT 5*  --    ANIONGAP 17* 11   EGFRNONAA 53* 60     Coagulation: No results for input(s): LABPROT, INR, APTT in the last 48 hours.  CRP: No results for input(s): CRP in the last 48 hours.  Lactic Acid:   Recent Labs   Lab 07/11/20 2352   LACTATE 2.2     All pertinent labs within the past 24 hours have been reviewed.    Significant Imaging: I have reviewed and interpreted all pertinent imaging results/findings.

## 2020-07-12 NOTE — HPI
80 y.o. female  with a PMHx of HTN, HLD, h/o CVA, and Parkinson's disease.  She presented to the ED with c/o left hip pain that started after suffering a ground level fall just PTA.  Pain aggravating by movement; no alleviating factors.  Patient family reports this is her 2nd fall this week.  Upon arrival to the ED, patient was hyperventilating with RR in the 60s, O2 sat remained stable.  Patient reports respiratory distress due to uncontrolled pain.  6 mg IV Morphine given, which improved RR to 18.  She denies any head trauma or injury, LOC, neck or back pain, HA, lightheadedness, dizziness, AMS, weakness, numbness/tingling, CP, SOB, ABD pain, N/V/D, dysuria, hematuria, fever or chills.  Work-up in the ED showed: potassium of 5.9, , normal kidney function, unrevealing EKG, unremarkable CXR.  XR of left hip revealed acute fracture.  Case discussed with Ortho Surgery, who will see patient in consult with plans for ORIF.  Patient received Kayexalate x 1 dose in the ED.  Hospital Medicine was contacted for admission.

## 2020-07-12 NOTE — PT/OT/SLP PROGRESS
Physical Therapy      Patient Name:  Tanya Madrid   MRN:  [id number]    Eval initiated via chart review in [facility]. Patient not seen today secondary to patient very sleepy and requested to start PT tomorrow. Will follow-up on next session.    Zac Betts, PT

## 2020-07-12 NOTE — PLAN OF CARE
Sw met with patient and her daughter Adenike at bedside to complete assessment. Pt recently returned from procedure and was asleep at time of visit. Pt lives with daughter Adenike. Dtr reports pt ambulated with a rolling walker around the home with some assistance. Ajay explained the discharge planning steps. Sw placed contact information on white board.    Pt's pcp is Qi Sue  Pt uses   [id number] - [place], [place] - [address] AT [facility], operated by [facility]  [address]  [place] [address]  Phone: [phone] Fax: [phone]    D/C Trans: Daughter Adenike       07/12/20 3140   Discharge Assessment   Assessment Type Discharge Planning Assessment   Confirmed/corrected address and phone number on facesheet? Yes   Assessment information obtained from? Patient;Caregiver   Communicated expected length of stay with patient/caregiver yes   Prior to hospitilization cognitive status: Alert/Oriented   Prior to hospitalization functional status: Assistive Equipment   Current cognitive status: Unable to Assess   Current Functional Status: Assistive Equipment   Facility Arrived From: home   Lives With child(anthony), adult   Able to Return to Prior Arrangements yes   Is patient able to care for self after discharge? Unable to determine at this time (comments)   Who are your caregiver(s) and their phone number(s)? Adenike Lemus, daughter [phone]   Patient's perception of discharge disposition home or selfcare   Readmission Within the Last 30 Days no previous admission in last 30 days   Patient currently being followed by outpatient case management? No   Patient currently receives any other outside agency services? No   Equipment Currently Used at Home walker, rolling   Do you have any problems affording any of your prescribed medications? No   Is the patient taking medications as prescribed? yes   Does the patient have transportation home? Yes   Transportation Anticipated family or  friend will provide   Does the patient receive services at the [facility]? No   Discharge Plan A Home with family   Discharge Plan B Home Health   DME Needed Upon Discharge  none   Patient/Family in Agreement with Plan yes

## 2020-07-12 NOTE — BRIEF OP NOTE
[facility] -   Brief Operative Note    SUMMARY     Surgery Date: 7/12/2020     Surgeon(s) and Role:     * Humberto Valdivia,  - Primary    Assisting Surgeon: None    Pre-op Diagnosis:  Closed fracture of left hip, initial encounter [S72.002A]    Post-op Diagnosis:  Post-Op Diagnosis Codes:     * Closed fracture of left hip, initial encounter [S72.002A]    Procedure(s) (LRB):  HEMIARTHROPLASTY, HIP (Left)    Anesthesia: General    Description of Procedure:  Bipolar hemiarthroplasty Left hip for fracture    Description of the findings of the procedure:  Displaced subcapital left hip fracture    Estimated Blood Loss: 100 mL         Specimens:   Specimen (12h ago, onward)    None

## 2020-07-12 NOTE — H&P
[facility]  History & Physical    Patient Name: Tanya Madrid  MRN: [id number]  Admission Date: 7/11/2020  Attending Physician: Sj Juan, *   Primary Care Provider: Marti Parsons MD         Patient information was obtained from patient, relative(s), EMS personnel, past medical records and ER records.     Subjective:     Principal Problem:Closed left hip fracture    Chief Complaint:   Chief Complaint   Patient presents with    Respiratory Distress     patient found on floor by family, per ems pt breathing 60-80 x/min, left hip dislocation        HPI: Ms. Madrid is an 80 y.o. female  with a PMHx of HTN, HLD, h/o CVA, and Parkinson's disease.  She presented to the ED with c/o left hip pain that started after suffering a ground level fall just PTA.  Pain aggravating by movement; no alleviating factors.  Patient family reports this is her 2nd fall this week.  Upon arrival to the ED, patient was hyperventilating with RR in the 60s, O2 sat remained stable.  Patient reports respiratory distress due to uncontrolled pain.  6 mg IV Morphine given, which improved RR to 18.  She denies any head trauma or injury, LOC, neck or back pain, HA, lightheadedness, dizziness, AMS, weakness, numbness/tingling, CP, SOB, ABD pain, N/V/D, dysuria, hematuria, fever or chills.  Work-up in the ED showed: potassium of 5.9, , normal kidney function, unrevealing EKG, unremarkable CXR.  XR of left hip revealed acute fracture.  Case discussed with Ortho Surgery, who will see patient in consult with plans for ORIF.  Patient received Kayexalate x 1 dose in the ED.  Hospital Medicine was contacted for admission.   Patient is a Full Code.  Adenike Lemus, daughter, is surrogate decision maker [phone].              Past Medical History:   Diagnosis Date    Hyperlipemia     Hypertension     Parkinson's disease     Stroke 2016    Throat mass        Past Surgical History:   Procedure  Laterality Date    HYSTERECTOMY      THROAT SURGERY      TONSILLECTOMY         Review of patient's allergies indicates:  No Known Allergies    No current facility-administered medications on file prior to encounter.      Current Outpatient Medications on File Prior to Encounter   Medication Sig    amLODIPine (NORVASC) 10 MG tablet Take 1 tablet (10 mg total) by mouth once daily.    aspirin (ECOTRIN) 81 MG EC tablet Take 81 mg by mouth once daily.    carbidopa-levodopa  mg (SINEMET)  mg per tablet Take 2 tablets by mouth 4 (four) times daily.    clopidogrel (PLAVIX) 75 mg tablet Take 75 mg by mouth once daily.    LORazepam (ATIVAN) 0.5 MG tablet Take 1 tablet (0.5 mg total) by mouth every 8 (eight) hours as needed for Anxiety.    losartan (COZAAR) 100 MG tablet Take 100 mg by mouth once daily.    metoprolol succinate (TOPROL-XL) 25 MG 24 hr tablet Take 50 mg by mouth 2 (two) times daily.     multivitamin capsule Take 1 capsule by mouth once daily.    omeprazole (PRILOSEC) 20 MG capsule Take 20 mg by mouth once daily.    omeprazole (PRILOSEC) 20 MG capsule 1 capsule    potassium chloride (KLOR-CON) 10 MEQ TbSR Take 10 mEq by mouth once daily.    raloxifene (EVISTA) 60 mg tablet Take 60 mg by mouth once daily.    rosuvastatin (CRESTOR) 10 MG tablet Take 10 mg by mouth once daily.    spironolactone (ALDACTONE) 25 MG tablet Take 25 mg by mouth once daily.     Family History     Reviewed and not pertinent.         Tobacco Use    Smoking status: Never Smoker    Smokeless tobacco: Never Used   Substance and Sexual Activity    Alcohol use: No    Drug use: No    Sexual activity: Not Currently     Review of Systems   Constitutional: Negative for activity change, chills, diaphoresis, fatigue and fever.   HENT: Negative for congestion, postnasal drip, rhinorrhea, sinus pressure and sore throat.    Respiratory: Negative for cough, shortness of breath and wheezing.    Cardiovascular: Negative for  chest pain, palpitations and leg swelling.   Gastrointestinal: Negative for abdominal pain, constipation, diarrhea, nausea and vomiting.   Genitourinary: Negative for dysuria, hematuria and urgency.   Musculoskeletal: Positive for arthralgias (left hip pain) and gait problem. Negative for back pain, myalgias and neck pain.   Skin: Negative for pallor and rash.   Neurological: Negative for dizziness, syncope, weakness, light-headedness, numbness and headaches.   Psychiatric/Behavioral: The patient is not nervous/anxious.    All other systems reviewed and are negative.    Objective:     Vital Signs (Most Recent):  Temp: 98 °F (36.7 °C)   Pulse: 101   Resp: (!) 21   BP: (!) 159/78   SpO2: 99 %  Vital Signs (24h Range):  Temp:  [98 °F (36.7 °C)-99.8 °F (37.7 °C)] 98 °F (36.7 °C)  Pulse:  [] 101  Resp:  [17-60] 21  SpO2:  [95 %-100 %] 99 %  BP: (135-181)/() 159/78     Weight: 57.2 kg (126 lb 1.6 oz)  Body mass index is 23.06 kg/m².    Physical Exam  Vitals signs and nursing note reviewed.   Constitutional:       General: She is not in acute distress.     Appearance: She is well-developed. She is not diaphoretic.   HENT:      Head: Normocephalic and atraumatic.   Eyes:      Conjunctiva/sclera: Conjunctivae normal.      Comments: PERRL; EOM intact.   Neck:      Musculoskeletal: Normal range of motion and neck supple.   Cardiovascular:      Rate and Rhythm: Normal rate and regular rhythm.      Pulses:           Dorsalis pedis pulses are 2+ on the right side and 2+ on the left side.        Posterior tibial pulses are 2+ on the right side and 2+ on the left side.      Heart sounds: S1 normal and S2 normal. No murmur.   Pulmonary:      Effort: Pulmonary effort is normal. No tachypnea, accessory muscle usage or respiratory distress.      Breath sounds: Normal breath sounds and air entry. No wheezing, rhonchi or rales.   Abdominal:      General: Bowel sounds are normal. There is no distension.      Palpations:  Abdomen is soft.      Tenderness: There is no abdominal tenderness.   Musculoskeletal:      Left hip: She exhibits decreased range of motion (due to pain), tenderness and deformity.      Comments: Obvious deformity to left hip with tenderness.  Cap refill <2 seconds. 2+ DP and PT pulses bilaterally. No motor or sensory deficit.   Skin:     General: Skin is warm and dry.      Capillary Refill: Capillary refill takes less than 2 seconds.      Findings: No erythema or rash.   Neurological:      General: No focal deficit present.      Mental Status: She is alert and oriented to person, place, and time.   Psychiatric:         Mood and Affect: Mood and affect normal.         Behavior: Behavior normal.             Significant Labs:  Results for orders placed or performed during the hospital encounter of 07/11/20   CBC auto differential   Result Value Ref Range    WBC 11.76 3.90 - 12.70 K/uL    RBC 4.10 4.00 - 5.40 M/uL    Hemoglobin 12.4 12.0 - 16.0 g/dL    Hematocrit 38.5 37.0 - 48.5 %    Mean Corpuscular Volume 94 82 - 98 fL    Mean Corpuscular Hemoglobin 30.2 27.0 - 31.0 pg    Mean Corpuscular Hemoglobin Conc 32.2 32.0 - 36.0 g/dL    RDW 13.3 11.5 - 14.5 %    Platelets 250 150 - 350 K/uL    MPV 10.8 9.2 - 12.9 fL    Immature Granulocytes 0.6 (H) 0.0 - 0.5 %    Gran # (ANC) 8.6 (H) 1.8 - 7.7 K/uL    Immature Grans (Abs) 0.07 (H) 0.00 - 0.04 K/uL    Lymph # 1.7 1.0 - 4.8 K/uL    Mono # 1.2 (H) 0.3 - 1.0 K/uL    Eos # 0.2 0.0 - 0.5 K/uL    Baso # 0.02 0.00 - 0.20 K/uL    nRBC 0 0 /100 WBC    Gran% 73.0 38.0 - 73.0 %    Lymph% 14.4 (L) 18.0 - 48.0 %    Mono% 9.8 4.0 - 15.0 %    Eosinophil% 2.0 0.0 - 8.0 %    Basophil% 0.2 0.0 - 1.9 %    Differential Method Automated    Comprehensive metabolic panel   Result Value Ref Range    Sodium 137 136 - 145 mmol/L    Potassium 5.9 (H) 3.5 - 5.1 mmol/L    Chloride 101 95 - 110 mmol/L    CO2 19 (L) 23 - 29 mmol/L    Glucose 105 70 - 110 mg/dL    BUN, Bld 22 8 - 23 mg/dL    Creatinine  1.0 0.5 - 1.4 mg/dL    Calcium 10.0 8.7 - 10.5 mg/dL    Total Protein 8.8 (H) 6.0 - 8.4 g/dL    Albumin 4.2 3.5 - 5.2 g/dL    Total Bilirubin 0.4 0.1 - 1.0 mg/dL    Alkaline Phosphatase 58 55 - 135 U/L    AST 38 10 - 40 U/L    ALT 5 (L) 10 - 44 U/L    Anion Gap 17 (H) 8 - 16 mmol/L    eGFR if African American >60 >60 mL/min/1.73 m^2    eGFR if non African American 53 (A) >60 mL/min/1.73 m^2   Troponin I   Result Value Ref Range    Troponin I 0.013 0.000 - 0.026 ng/mL   Brain natriuretic peptide   Result Value Ref Range    BNP 58 0 - 99 pg/mL   Lactic acid, plasma   Result Value Ref Range    Lactate (Lactic Acid) 2.2 0.5 - 2.2 mmol/L   Magnesium   Result Value Ref Range    Magnesium 1.9 1.6 - 2.6 mg/dL   CPK   Result Value Ref Range     (H) 20 - 180 U/L   Urinalysis, Reflex to Urine Culture Urine, Clean Catch    Specimen: Urine   Result Value Ref Range    Specimen UA Urine, Catheterized     Color, UA Yellow Yellow, Straw, Kassi    Appearance, UA Clear Clear    pH, UA 6.0 5.0 - 8.0    Specific Gravity, UA 1.015 1.005 - 1.030    Protein, UA Negative Negative    Glucose, UA Negative Negative    Ketones, UA Trace (A) Negative    Bilirubin (UA) Negative Negative    Occult Blood UA Negative Negative    Nitrite, UA Negative Negative    Urobilinogen, UA Negative <2.0 EU/dL    Leukocytes, UA Negative Negative   COVID-19 Rapid Screening   Result Value Ref Range    SARS-CoV-2 RNA, Amplification, Qual Negative Negative   ISTAT PROCEDURE   Result Value Ref Range    POC PH 7.500 (H) 7.35 - 7.45    POC PCO2 27.2 (LL) 35 - 45 mmHg    POC PO2 85 80 - 100 mmHg    POC HCO3 21.2 (L) 24 - 28 mmol/L    POC BE -2 -2 to 2 mmol/L    POC SATURATED O2 97 95 - 100 %    Sample ARTERIAL     Site RR     Allens Test Pass     DelSys Room Air     Mode SPONT       All pertinent labs within the past 24 hours have been reviewed.    Significant Imaging:  Imaging Results          X-Ray Hip 2 View Left (Preliminary result)  Result time 07/12/20  01:31:32    ED Interpretation by Ck Soto MD (07/12/20 01:31:32, [facility])    Left hip fracture                             X-Ray Chest AP Portable (Preliminary result)  Result time 07/12/20 01:36:55    ED Interpretation by Ck Soto MD (07/12/20 01:36:55, [facility])    No acute infiltrate                             I have reviewed all pertinent imaging results/findings within the past 24 hours.     EKG: (personally reviewed)  Sinus tachycardia with occasional PVCs, no significant change from previous tracings.           Assessment/Plan:     * Closed left hip fracture  - Ortho Surgery consult.  Will keep NPO for probable surgical intervention.   - Analgesics as needed.  - PT/OT post-op.  - Patient with full cardiac work-up in 7/2019.  2D echo showed LVEF 60-65% with no significant abnormalities.  MPI stress test negative for reversible perfusion defects.     Hyperkalemia  - Initial potassium of 5.9.  EKG stable.  Monitor telemetry.   - Kayexalate given in the ED.  Will give additional dose now with repeat potassium level.  - Hold home losartan and spironolactone.   - IV hydration.   - Follow labs.    History of CVA (cerebrovascular accident)  - Stable.  - Continue ASA and statin.  Plavix on hold for probable surgical intervention.    Parkinson disease  - Continue Sinemet.    Essential hypertension  - BP stable on admission.  - Continue home amlodipine and Toprol XL.  - Hold losartan and spironolactone as above.  - IV hydralazine PRN.  - Pain control.       VTE Risk Mitigation (From admission, onward)         Ordered     IP VTE HIGH RISK PATIENT  Once      07/12/20 0357     Place sequential compression device  Until discontinued      07/12/20 0357     Reason for No Pharmacological VTE Prophylaxis  Once     Question:  Reasons:  Answer:  Risk of Bleeding    07/12/20 0357                   Lissa Brown NP  Department of Hospital  Medicine   [facility]

## 2020-07-12 NOTE — ASSESSMENT & PLAN NOTE
- Ortho Surgery consult.  Will keep NPO for probable surgical intervention.   - Analgesics as needed.  - PT/OT post-op.  - Patient with full cardiac work-up in 7/2019.  2D echo showed LVEF 60-65% with no significant abnormalities.  MPI stress test negative for reversible perfusion defects.

## 2020-07-12 NOTE — HPI
Ms. Madrid is an 80 y.o. female  with a PMHx of HTN, HLD, h/o CVA, and Parkinson's disease.  She presented to the ED with c/o left hip pain that started after suffering a ground level fall just PTA.  Pain aggravating by movement; no alleviating factors.  Patient family reports this is her 2nd fall this week.  Upon arrival to the ED, patient was hyperventilating with RR in the 60s, O2 sat remained stable.  Patient reports respiratory distress due to uncontrolled pain.  6 mg IV Morphine given, which improved RR to 18.  She denies any head trauma or injury, LOC, neck or back pain, HA, lightheadedness, dizziness, AMS, weakness, numbness/tingling, CP, SOB, ABD pain, N/V/D, dysuria, hematuria, fever or chills.  Work-up in the ED showed: potassium of 5.9, , normal kidney function, unrevealing EKG, unremarkable CXR.  XR of left hip revealed acute fracture.  Case discussed with Ortho Surgery, who will see patient in consult with plans for ORIF.  Patient received Kayexalate x 1 dose in the ED.  Hospital Medicine was contacted for admission.   Patient is a Full Code.  Adenike Lemus, daughter, is surrogate decision maker [phone].

## 2020-07-13 PROBLEM — E87.5 HYPERKALEMIA: Status: RESOLVED | Noted: 2020-07-12 | Resolved: 2020-07-13

## 2020-07-13 PROBLEM — G93.41 ENCEPHALOPATHY, METABOLIC: Status: ACTIVE | Noted: 2020-07-13

## 2020-07-13 PROBLEM — D62 ACUTE BLOOD LOSS ANEMIA: Status: ACTIVE | Noted: 2020-07-13

## 2020-07-13 LAB
ABO + RH BLD: NORMAL
ANION GAP SERPL CALC-SCNC: 6 MMOL/L (ref 8–16)
BASOPHILS # BLD AUTO: 0.01 K/UL (ref 0–0.2)
BASOPHILS # BLD AUTO: 0.02 K/UL (ref 0–0.2)
BASOPHILS NFR BLD: 0.1 % (ref 0–1.9)
BASOPHILS NFR BLD: 0.2 % (ref 0–1.9)
BLD GP AB SCN CELLS X3 SERPL QL: NORMAL
BUN SERPL-MCNC: 14 MG/DL (ref 8–23)
CALCIUM SERPL-MCNC: 7.5 MG/DL (ref 8.7–10.5)
CHLORIDE SERPL-SCNC: 106 MMOL/L (ref 95–110)
CO2 SERPL-SCNC: 26 MMOL/L (ref 23–29)
CREAT SERPL-MCNC: 0.9 MG/DL (ref 0.5–1.4)
DIFFERENTIAL METHOD: ABNORMAL
DIFFERENTIAL METHOD: ABNORMAL
EOSINOPHIL # BLD AUTO: 0 K/UL (ref 0–0.5)
EOSINOPHIL # BLD AUTO: 0.1 K/UL (ref 0–0.5)
EOSINOPHIL NFR BLD: 0.2 % (ref 0–8)
EOSINOPHIL NFR BLD: 0.8 % (ref 0–8)
ERYTHROCYTE [DISTWIDTH] IN BLOOD BY AUTOMATED COUNT: 13.2 % (ref 11.5–14.5)
ERYTHROCYTE [DISTWIDTH] IN BLOOD BY AUTOMATED COUNT: 13.3 % (ref 11.5–14.5)
EST. GFR  (AFRICAN AMERICAN): >60 ML/MIN/1.73 M^2
EST. GFR  (NON AFRICAN AMERICAN): 60 ML/MIN/1.73 M^2
GLUCOSE SERPL-MCNC: 132 MG/DL (ref 70–110)
HCT VFR BLD AUTO: 24.8 % (ref 37–48.5)
HCT VFR BLD AUTO: 25.3 % (ref 37–48.5)
HGB BLD-MCNC: 7.5 G/DL (ref 12–16)
HGB BLD-MCNC: 7.7 G/DL (ref 12–16)
IMM GRANULOCYTES # BLD AUTO: 0.05 K/UL (ref 0–0.04)
IMM GRANULOCYTES # BLD AUTO: 0.06 K/UL (ref 0–0.04)
IMM GRANULOCYTES NFR BLD AUTO: 0.6 % (ref 0–0.5)
IMM GRANULOCYTES NFR BLD AUTO: 0.6 % (ref 0–0.5)
LYMPHOCYTES # BLD AUTO: 1 K/UL (ref 1–4.8)
LYMPHOCYTES # BLD AUTO: 1.1 K/UL (ref 1–4.8)
LYMPHOCYTES NFR BLD: 10.6 % (ref 18–48)
LYMPHOCYTES NFR BLD: 11.9 % (ref 18–48)
MCH RBC QN AUTO: 30 PG (ref 27–31)
MCH RBC QN AUTO: 30.6 PG (ref 27–31)
MCHC RBC AUTO-ENTMCNC: 29.6 G/DL (ref 32–36)
MCHC RBC AUTO-ENTMCNC: 31 G/DL (ref 32–36)
MCV RBC AUTO: 101 FL (ref 82–98)
MCV RBC AUTO: 98 FL (ref 82–98)
MONOCYTES # BLD AUTO: 1.4 K/UL (ref 0.3–1)
MONOCYTES # BLD AUTO: 1.4 K/UL (ref 0.3–1)
MONOCYTES NFR BLD: 13.8 % (ref 4–15)
MONOCYTES NFR BLD: 16.2 % (ref 4–15)
NEUTROPHILS # BLD AUTO: 6 K/UL (ref 1.8–7.7)
NEUTROPHILS # BLD AUTO: 7.7 K/UL (ref 1.8–7.7)
NEUTROPHILS NFR BLD: 71 % (ref 38–73)
NEUTROPHILS NFR BLD: 74 % (ref 38–73)
NRBC BLD-RTO: 0 /100 WBC
NRBC BLD-RTO: 0 /100 WBC
PLATELET # BLD AUTO: 164 K/UL (ref 150–350)
PLATELET # BLD AUTO: 165 K/UL (ref 150–350)
PMV BLD AUTO: 10.3 FL (ref 9.2–12.9)
PMV BLD AUTO: 11 FL (ref 9.2–12.9)
POCT GLUCOSE: 265 MG/DL (ref 70–110)
POTASSIUM SERPL-SCNC: 3.6 MMOL/L (ref 3.5–5.1)
RBC # BLD AUTO: 2.5 M/UL (ref 4–5.4)
RBC # BLD AUTO: 2.52 M/UL (ref 4–5.4)
SODIUM SERPL-SCNC: 138 MMOL/L (ref 136–145)
WBC # BLD AUTO: 10.43 K/UL (ref 3.9–12.7)
WBC # BLD AUTO: 8.51 K/UL (ref 3.9–12.7)

## 2020-07-13 PROCEDURE — 97530 THERAPEUTIC ACTIVITIES: CPT | Performed by: PHYSICAL THERAPIST

## 2020-07-13 PROCEDURE — 86901 BLOOD TYPING SEROLOGIC RH(D): CPT

## 2020-07-13 PROCEDURE — 97162 PT EVAL MOD COMPLEX 30 MIN: CPT

## 2020-07-13 PROCEDURE — 97530 THERAPEUTIC ACTIVITIES: CPT

## 2020-07-13 PROCEDURE — 25000003 PHARM REV CODE 250: Performed by: ORTHOPAEDIC SURGERY

## 2020-07-13 PROCEDURE — 36430 TRANSFUSION BLD/BLD COMPNT: CPT

## 2020-07-13 PROCEDURE — 85025 COMPLETE CBC W/AUTO DIFF WBC: CPT | Mod: 91

## 2020-07-13 PROCEDURE — 80048 BASIC METABOLIC PNL TOTAL CA: CPT

## 2020-07-13 PROCEDURE — 94799 UNLISTED PULMONARY SVC/PX: CPT

## 2020-07-13 PROCEDURE — 21400001 HC TELEMETRY ROOM

## 2020-07-13 PROCEDURE — 97166 OT EVAL MOD COMPLEX 45 MIN: CPT | Performed by: PHYSICAL THERAPIST

## 2020-07-13 PROCEDURE — 94761 N-INVAS EAR/PLS OXIMETRY MLT: CPT

## 2020-07-13 PROCEDURE — 86920 COMPATIBILITY TEST SPIN: CPT

## 2020-07-13 PROCEDURE — 27000221 HC OXYGEN, UP TO 24 HOURS

## 2020-07-13 PROCEDURE — 36415 COLL VENOUS BLD VENIPUNCTURE: CPT

## 2020-07-13 PROCEDURE — 99900035 HC TECH TIME PER 15 MIN (STAT)

## 2020-07-13 RX ORDER — HYDROCODONE BITARTRATE AND ACETAMINOPHEN 500; 5 MG/1; MG/1
TABLET ORAL
Status: DISCONTINUED | OUTPATIENT
Start: 2020-07-13 | End: 2020-07-14 | Stop reason: HOSPADM

## 2020-07-13 RX ADMIN — CHLORHEXIDINE GLUCONATE 0.12% ORAL RINSE 10 ML: 1.2 LIQUID ORAL at 09:07

## 2020-07-13 RX ADMIN — ACETAMINOPHEN 1000 MG: 500 TABLET ORAL at 09:07

## 2020-07-13 RX ADMIN — CARBIDOPA AND LEVODOPA 2 TABLET: 25; 100 TABLET ORAL at 12:07

## 2020-07-13 RX ADMIN — STANDARDIZED SENNA CONCENTRATE AND DOCUSATE SODIUM 1 TABLET: 8.6; 5 TABLET ORAL at 09:07

## 2020-07-13 RX ADMIN — ROSUVASTATIN 10 MG: 10 TABLET, FILM COATED ORAL at 08:07

## 2020-07-13 RX ADMIN — CARBIDOPA AND LEVODOPA 2 TABLET: 25; 100 TABLET ORAL at 09:07

## 2020-07-13 RX ADMIN — CARBIDOPA AND LEVODOPA 2 TABLET: 25; 100 TABLET ORAL at 05:07

## 2020-07-13 RX ADMIN — TRAMADOL HYDROCHLORIDE 50 MG: 50 TABLET, FILM COATED ORAL at 11:07

## 2020-07-13 RX ADMIN — METOPROLOL SUCCINATE 50 MG: 50 TABLET, EXTENDED RELEASE ORAL at 08:07

## 2020-07-13 RX ADMIN — CHLORHEXIDINE GLUCONATE 0.12% ORAL RINSE 10 ML: 1.2 LIQUID ORAL at 08:07

## 2020-07-13 RX ADMIN — ACETAMINOPHEN 1000 MG: 500 TABLET ORAL at 05:07

## 2020-07-13 RX ADMIN — AMLODIPINE BESYLATE 10 MG: 10 TABLET ORAL at 08:07

## 2020-07-13 RX ADMIN — ASPIRIN 81 MG: 81 TABLET, COATED ORAL at 08:07

## 2020-07-13 RX ADMIN — STANDARDIZED SENNA CONCENTRATE AND DOCUSATE SODIUM 1 TABLET: 8.6; 5 TABLET ORAL at 08:07

## 2020-07-13 RX ADMIN — ACETAMINOPHEN 1000 MG: 500 TABLET ORAL at 01:07

## 2020-07-13 RX ADMIN — LORAZEPAM 0.5 MG: 0.5 TABLET ORAL at 08:07

## 2020-07-13 RX ADMIN — CARBIDOPA AND LEVODOPA 2 TABLET: 25; 100 TABLET ORAL at 08:07

## 2020-07-13 RX ADMIN — PANTOPRAZOLE SODIUM 40 MG: 40 TABLET, DELAYED RELEASE ORAL at 08:07

## 2020-07-13 NOTE — HOSPITAL COURSE
Tanya Madrid is a 80 year old female who was admitted to [facility] for left hip fracture. Underwent bipolar hemiarthroplasty of left hip on 7/12/2020. PT/OT recommends SNF. On today 7/13/20 patient had became encephalopathic while sitting in chair. Unresponsive. Symptoms immediately improved upon transfer to bed in supine position. Daughter states this happens at home when blood pressure is low. Drop in hemoglobin today to 7.7. Her neurological symptoms likely related to anemia and hypovolemia. Will continue IVF's. Order one unit of blood today. CT head negative. Will hold amlodipine for now.    7/14/20  Patient is doing well today. Received one unit of PRBC last night. Hemoglobin up to 9.1 today. Amlodipine yesterday. Blood pressure up to 130's today. Will restart amlodipine at lower dose. [facility]. Will continue Eliquis for one week. Will need to follow up with Orthopedic in 1-2 weeks.

## 2020-07-13 NOTE — SUBJECTIVE & OBJECTIVE
"Principal Problem:Closed left hip fracture    Principal Orthopedic Problem: closed left hip fracture    Interval History: POD 1 s/p bipolar hemiarthroplasty of left hip. PT/OT recommending SNF. H&H decrease, seems relative vs true anemia    Review of patient's allergies indicates:  No Known Allergies    Current Facility-Administered Medications   Medication    0.9%  NaCl infusion    acetaminophen tablet 1,000 mg    acetaminophen tablet 650 mg    albuterol-ipratropium 2.5 mg-0.5 mg/3 mL nebulizer solution 3 mL    amLODIPine tablet 10 mg    aspirin EC tablet 81 mg    carbidopa-levodopa  mg per tablet 2 tablet    chlorhexidine 0.12 % solution 10 mL    hydrALAZINE injection 10 mg    HYDROcodone-acetaminophen 5-325 mg per tablet 1 tablet    hydromorphone (PF) injection 0.2 mg    lactulose 20 gram/30 mL solution Soln 20 g    LORazepam tablet 0.5 mg    metoprolol succinate (TOPROL-XL) 24 hr tablet 50 mg    nozaseptin (NOZIN) nasal     ondansetron disintegrating tablet 8 mg    ondansetron injection 4 mg    ondansetron injection 4 mg    oxyCODONE immediate release tablet 10 mg    oxyCODONE-acetaminophen 5-325 mg per tablet 1 tablet    pantoprazole EC tablet 40 mg    rosuvastatin tablet 10 mg    senna-docusate 8.6-50 mg per tablet 1 tablet    traMADoL tablet 50 mg     Objective:     Vital Signs (Most Recent):  Temp: 98.8 °F (37.1 °C) (07/13/20 0729)  Pulse: 78 (07/13/20 1035)  Resp: 16 (07/13/20 1144)  BP: (!) 147/57 (07/13/20 1035)  SpO2: 100 % (07/13/20 1035) Vital Signs (24h Range):  Temp:  [97.2 °F (36.2 °C)-98.9 °F (37.2 °C)] 98.8 °F (37.1 °C)  Pulse:  [] 78  Resp:  [14-24] 16  SpO2:  [96 %-100 %] 100 %  BP: (106-147)/(56-80) 147/57     Weight: 57.2 kg (126 lb 1.6 oz)  Height: 5' 2" (157.5 cm)  Body mass index is 23.06 kg/m².      Intake/Output Summary (Last 24 hours) at 7/13/2020 1419  Last data filed at 7/13/2020 0540  Gross per 24 hour   Intake --   Output 600 ml   Net -600 " ml       Ortho/SPM Exam   Dressing CDI. Minor quarter sized spotting seen contained w/n the Aquacel Ag.  Able to feel light touch sensation L4 L5 S1 dermatomes  Able to dorsi & plantar flex foot.  Calf soft non-tender      Significant Labs:   CBC:   Recent Labs   Lab 07/11/20  2352 07/12/20  0456 07/13/20  0504   WBC 11.76 8.20 8.51   HGB 12.4 10.8* 7.7*   HCT 38.5 34.2* 24.8*    222 165     All pertinent labs within the past 24 hours have been reviewed.    Significant Imaging: X-Ray: I have reviewed all pertinent results/findings and my personal findings are:  left hip: bipolar hemiarthroplasty seen well seated in near antomic positio. No signs of hardware complication

## 2020-07-13 NOTE — SUBJECTIVE & OBJECTIVE
Interval History: see hospital course    Review of Systems   Unable to perform ROS: Mental status change     Objective:     Vital Signs (Most Recent):  Temp: 97.8 °F (36.6 °C) (07/13/20 1533)  Pulse: 81 (07/13/20 1533)  Resp: 16 (07/13/20 1533)  BP: (!) 109/57 (07/13/20 1533)  SpO2: 100 % (07/13/20 1533) Vital Signs (24h Range):  Temp:  [97.2 °F (36.2 °C)-98.9 °F (37.2 °C)] 97.8 °F (36.6 °C)  Pulse:  [] 81  Resp:  [14-24] 16  SpO2:  [100 %] 100 %  BP: (106-147)/(56-65) 109/57     Weight: 57.2 kg (126 lb 1.6 oz)  Body mass index is 23.06 kg/m².    Intake/Output Summary (Last 24 hours) at 7/13/2020 171  Last data filed at 7/13/2020 0540  Gross per 24 hour   Intake --   Output 600 ml   Net -600 ml      Physical Exam  Vitals signs and nursing note reviewed.   Constitutional:       General: She is not in acute distress.     Appearance: She is well-developed. She is not diaphoretic.   HENT:      Head: Normocephalic and atraumatic.   Eyes:      Conjunctiva/sclera: Conjunctivae normal.      Comments: PERRL; EOM intact.   Neck:      Musculoskeletal: Normal range of motion and neck supple.   Cardiovascular:      Rate and Rhythm: Normal rate and regular rhythm.      Pulses:           Dorsalis pedis pulses are 2+ on the right side and 2+ on the left side.        Posterior tibial pulses are 2+ on the right side and 2+ on the left side.      Heart sounds: S1 normal and S2 normal. No murmur.   Pulmonary:      Effort: Pulmonary effort is normal. No tachypnea, accessory muscle usage or respiratory distress.      Breath sounds: Normal breath sounds and air entry. No wheezing, rhonchi or rales.   Abdominal:      General: Bowel sounds are normal. There is no distension.      Palpations: Abdomen is soft.      Tenderness: There is no abdominal tenderness.   Musculoskeletal:      Left hip: She exhibits decreased range of motion (due to pain), tenderness and deformity.      Comments: Right hip dressing in place   Skin:     General:  Skin is warm and dry.      Capillary Refill: Capillary refill takes less than 2 seconds.      Findings: No erythema or rash.   Neurological:      Mental Status: She is alert.      Motor: Weakness present.      Comments: Altered mental status change, but improved when supine   Psychiatric:         Mood and Affect: Mood and affect normal.         Behavior: Behavior normal.       Significant Labs:   CBC:   Recent Labs   Lab 07/11/20 2352 07/12/20  0456 07/13/20  0504   WBC 11.76 8.20 8.51   HGB 12.4 10.8* 7.7*   HCT 38.5 34.2* 24.8*    222 165     CMP:   Recent Labs   Lab 07/11/20 2352 07/12/20  0456 07/13/20  0504    137 138   K 5.9* 4.3 3.6    103 106   CO2 19* 23 26    156* 132*   BUN 22 18 14   CREATININE 1.0 0.9 0.9   CALCIUM 10.0 9.1 7.5*   PROT 8.8*  --   --    ALBUMIN 4.2  --   --    BILITOT 0.4  --   --    ALKPHOS 58  --   --    AST 38  --   --    ALT 5*  --   --    ANIONGAP 17* 11 6*   EGFRNONAA 53* 60 60       Significant Imaging: I have reviewed all pertinent imaging results/findings within the past 24 hours.

## 2020-07-13 NOTE — ASSESSMENT & PLAN NOTE
PT/OT continue current plan  -WBAT  Dressing change POD 7  Monitor H&H  SCDs for mechanical DVT prophylaxis, Anticoagulation recommended for 4 weeks agent to discretion to HM    F/u in clinic 2 weeks from DOS for xrays and staple removal

## 2020-07-13 NOTE — ASSESSMENT & PLAN NOTE
- Ortho Surgery consult.  Will keep NPO for probable surgical intervention.   - Analgesics as needed.  - PT/OT post-op.  - Patient with full cardiac work-up in 7/2019.  2D echo showed LVEF 60-65% with no significant abnormalities.  MPI stress test negative for reversible perfusion defects.     7/13/20  S/p hemiarthroplasty POD 1  Will need to transfuse one unit PRBC's  PT/OT recommends SNF

## 2020-07-13 NOTE — PLAN OF CARE
Spoke with patient's daughter.  She does not want patient to go to SNF.  Patient has been to [facility] in the past and would like her to go there.  Spoke with Shannon at [facility].  Requested records be sent and she will consider taking the patient.  Preference obtained for [facility].  Referral sent via [email or web address].       07/13/20 1543   Post-Acute Status   Post-Acute Authorization Placement   Post-Acute Placement Status Referrals Sent       Shannon from [facility] has accepted the patient pending being medically stable.

## 2020-07-13 NOTE — PLAN OF CARE
Fall precautions implemented. Pt remained free from falls/injuries.  IVF infusing per orders. Turned pt q2 with wedge. NSR on monitor. L hip surgical dressing intact with scant amount of dried drainage, area marked. LLE WBAT. Pain adequately controlled. Safety precautions implemented. Chart reviewed. Will continue to monitor.

## 2020-07-13 NOTE — PROGRESS NOTES
[facility] Hospital Medicine  Progress Note    Patient Name: Tanya Madrid  MRN: [id number]  Patient Class: IP- Inpatient   Admission Date: 7/11/2020  Length of Stay: 1 days  Attending Physician: Sj Juan, *  Primary Care Provider: Marti Parsons MD        Subjective:     Principal Problem:Closed left hip fracture        HPI:  Ms. Madrid is an 80 y.o. female  with a PMHx of HTN, HLD, h/o CVA, and Parkinson's disease.  She presented to the ED with c/o left hip pain that started after suffering a ground level fall just PTA.  Pain aggravating by movement; no alleviating factors.  Patient family reports this is her 2nd fall this week.  Upon arrival to the ED, patient was hyperventilating with RR in the 60s, O2 sat remained stable.  Patient reports respiratory distress due to uncontrolled pain.  6 mg IV Morphine given, which improved RR to 18.  She denies any head trauma or injury, LOC, neck or back pain, HA, lightheadedness, dizziness, AMS, weakness, numbness/tingling, CP, SOB, ABD pain, N/V/D, dysuria, hematuria, fever or chills.  Work-up in the ED showed: potassium of 5.9, , normal kidney function, unrevealing EKG, unremarkable CXR.  XR of left hip revealed acute fracture.  Case discussed with Ortho Surgery, who will see patient in consult with plans for ORIF.  Patient received Kayexalate x 1 dose in the ED.  Hospital Medicine was contacted for admission.   Patient is a Full Code.  Adenike Lemus, daughter, is surrogate decision maker [phone].              Overview/Hospital Course:  Tanya Madrid is a 80 year old female who was admitted to [facility] for left hip fracture. Underwent bipolar hemiarthroplasty of left hip on 7/12/2020. PT/OT recommends SNF. On today 7/13/20 patient had became encephalopathic while sitting in chair. Unresponsive. Symptoms immediately improved upon transfer to bed in supine position. Daughter states this happens at home  when blood pressure is low. Drop in hemoglobin today to 7.7. Her neurological symptoms likely related to anemia and hypovolemia. Will continue IVF's. Order one unit of blood today. CT head negative. Will hold amlodipine for now.    Interval History: see hospital course    Review of Systems   Unable to perform ROS: Mental status change     Objective:     Vital Signs (Most Recent):  Temp: 97.8 °F (36.6 °C) (07/13/20 1533)  Pulse: 81 (07/13/20 1533)  Resp: 16 (07/13/20 1533)  BP: (!) 109/57 (07/13/20 1533)  SpO2: 100 % (07/13/20 1533) Vital Signs (24h Range):  Temp:  [97.2 °F (36.2 °C)-98.9 °F (37.2 °C)] 97.8 °F (36.6 °C)  Pulse:  [] 81  Resp:  [14-24] 16  SpO2:  [100 %] 100 %  BP: (106-147)/(56-65) 109/57     Weight: 57.2 kg (126 lb 1.6 oz)  Body mass index is 23.06 kg/m².    Intake/Output Summary (Last 24 hours) at 7/13/2020 1717  Last data filed at 7/13/2020 0540  Gross per 24 hour   Intake --   Output 600 ml   Net -600 ml      Physical Exam  Vitals signs and nursing note reviewed.   Constitutional:       General: She is not in acute distress.     Appearance: She is well-developed. She is not diaphoretic.   HENT:      Head: Normocephalic and atraumatic.   Eyes:      Conjunctiva/sclera: Conjunctivae normal.      Comments: PERRL; EOM intact.   Neck:      Musculoskeletal: Normal range of motion and neck supple.   Cardiovascular:      Rate and Rhythm: Normal rate and regular rhythm.      Pulses:           Dorsalis pedis pulses are 2+ on the right side and 2+ on the left side.        Posterior tibial pulses are 2+ on the right side and 2+ on the left side.      Heart sounds: S1 normal and S2 normal. No murmur.   Pulmonary:      Effort: Pulmonary effort is normal. No tachypnea, accessory muscle usage or respiratory distress.      Breath sounds: Normal breath sounds and air entry. No wheezing, rhonchi or rales.   Abdominal:      General: Bowel sounds are normal. There is no distension.      Palpations: Abdomen is  soft.      Tenderness: There is no abdominal tenderness.   Musculoskeletal:      Left hip: She exhibits decreased range of motion (due to pain), tenderness and deformity.      Comments: Right hip dressing in place   Skin:     General: Skin is warm and dry.      Capillary Refill: Capillary refill takes less than 2 seconds.      Findings: No erythema or rash.   Neurological:      Mental Status: She is alert.      Motor: Weakness present.      Comments: Altered mental status change, but improved when supine   Psychiatric:         Mood and Affect: Mood and affect normal.         Behavior: Behavior normal.       Significant Labs:   CBC:   Recent Labs   Lab 07/11/20 2352 07/12/20 0456 07/13/20  0504   WBC 11.76 8.20 8.51   HGB 12.4 10.8* 7.7*   HCT 38.5 34.2* 24.8*    222 165     CMP:   Recent Labs   Lab 07/11/20 2352 07/12/20 0456 07/13/20  0504    137 138   K 5.9* 4.3 3.6    103 106   CO2 19* 23 26    156* 132*   BUN 22 18 14   CREATININE 1.0 0.9 0.9   CALCIUM 10.0 9.1 7.5*   PROT 8.8*  --   --    ALBUMIN 4.2  --   --    BILITOT 0.4  --   --    ALKPHOS 58  --   --    AST 38  --   --    ALT 5*  --   --    ANIONGAP 17* 11 6*   EGFRNONAA 53* 60 60       Significant Imaging: I have reviewed all pertinent imaging results/findings within the past 24 hours.      Assessment/Plan:      * Closed left hip fracture  - Ortho Surgery consult.  Will keep NPO for probable surgical intervention.   - Analgesics as needed.  - PT/OT post-op.  - Patient with full cardiac work-up in 7/2019.  2D echo showed LVEF 60-65% with no significant abnormalities.  MPI stress test negative for reversible perfusion defects.     7/13/20  S/p hemiarthroplasty POD 1  Will need to transfuse one unit PRBC's  PT/OT recommends SNF      History of CVA (cerebrovascular accident)  - Stable.  - Continue ASA and statin.  Plavix on hold for probable surgical intervention.    Parkinson disease  - Continue Sinemet.    Essential  hypertension  - BP stable on admission.  - Continue home amlodipine and Toprol XL.  - Hold losartan and spironolactone as above.  - IV hydralazine PRN.  - Pain control.     7/13/20  Blood pressure is marginally low.   Hold amlodipine    Encephalopathy  --likely from hypovolema and anemia  CT head, glucose were normal  Symptoms impoved once out of chair and in bed  Neuro checks every 4 hours    Acute blood loss anemia  --transfuse one unit today  VTE Risk Mitigation (From admission, onward)         Ordered     IP VTE HIGH RISK PATIENT  Once      07/12/20 0357     Place sequential compression device  Until discontinued      07/12/20 0357     Reason for No Pharmacological VTE Prophylaxis  Once     Question:  Reasons:  Answer:  Risk of Bleeding    07/12/20 0357                  Jose Saldivar NP  Department of Hospital Medicine   [facility]

## 2020-07-13 NOTE — PT/OT/SLP EVAL
Occupational Therapy   Evaluation    Name: Tanya Madrid  MRN: [id number]  Admitting Diagnosis:  Closed left hip fracture 1 Day Post-Op    Recommendations:     Discharge Recommendations: nursing facility, skilled  Discharge Equipment Recommendations:  (TBD)  Barriers to discharge:  None    Assessment:     Tanya Madrid is a 80 y.o. female with a medical diagnosis of Closed left hip fracture.  She presents with impaired functional mobility and ADLs. Performance deficits affecting function: weakness, impaired endurance, impaired self care skills, impaired functional mobilty, gait instability, impaired balance, decreased coordination, decreased lower extremity function, decreased safety awareness, pain.      Rehab Prognosis: Fair; patient would benefit from acute skilled OT services to address these deficits and reach maximum level of function.       Plan:     Patient to be seen 3 x/week to address the above listed problems via self-care/home management, therapeutic activities, therapeutic exercises  · Plan of Care Expires: 07/20/20  · Plan of Care Reviewed with: patient, daughter    Subjective     Chief Complaint: (L) Hip pain  Patient/Family Comments/goals: to get stronger    Occupational Profile:  Living Environment: lives with daughter in 1 story house with no steps  Previous level of function: Mod I with ADLs, Ambulates with RW  Roles and Routines: Does not drive  Equipment Used at Home:  walker, rolling  Assistance upon Discharge: daughter    Pain/Comfort:  · Pain Rating 1: 10/10  · Location - Side 1: Left  · Location 1: hip  · Pain Addressed 1: Pre-medicate for activity, Reposition, Distraction, Cessation of Activity, Nurse notified  · Pain Rating Post-Intervention 1: 10/10    Patients cultural, spiritual, [employer] conflicts given the current situation:      Objective:     Communicated with: Nurse Bentley and [facility] chart review prior to session.  Patient found supine with peripheral IV, telemetry, oxygen  upon OT entry to room.    General Precautions: Standard, fall   Orthopedic Precautions:LLE weight bearing as tolerated, LLE anterior precautions   Braces: N/A     Occupational Performance:    Bed Mobility:    · Patient completed Rolling/Turning to Left with  maximal assistance  · Patient completed Rolling/Turning to Right with maximal assistance  · Patient completed Scooting/Bridging with maximal assistance  · Patient completed Supine to Sit with maximal assistance and 2 persons    Functional Mobility/Transfers:  · Patient completed Sit <> Stand Transfer with maximal assistance and of 2 persons  with  rolling walker   · Patient completed Bed <> Chair Transfer using Step Transfer technique with maximal assistance and of 2 persons with rolling walker  · Functional Mobility: 3 steps to t/f to chair using RW with Max A x 2    Activities of Daily Living:  · Upper Body Dressing: moderate assistance .  · Lower Body Dressing: maximal assistance .    Cognitive/Visual Perceptual:  Cognitive/Psychosocial Skills:     -       Oriented to: Person, Place, Time and Situation   -       Follows Commands/attention:Follows two-step commands  -       Communication: clear/fluent and low voice  -       Memory: No Deficits noted  -       Safety awareness/insight to disability: impaired   Visual/Perceptual:      -Intact .    Physical Exam:  Dominant hand:    -       right  Upper Extremity Range of Motion:     -       Right Upper Extremity: WFL  -       Left Upper Extremity: WFL  Upper Extremity Strength:    -       Right Upper Extremity: 4/5 grossly  -       Left Upper Extremity: 4/5 grossly   Strength:    -       Right Upper Extremity: WFL  -       Left Upper Extremity: WFL    AMPAC 6 Click ADL:  AMPAC Total Score: 14    Treatment & Education:  OT eval completed as listed above. Pt educated in role of OT and POC.   Education:    Patient left up in chair with all lines intact, call button in reach, chair alarm on, Nurse Sheree notified  and daughter present    GOALS:   Multidisciplinary Problems     Occupational Therapy Goals        Problem: Occupational Therapy Goal    Goal Priority Disciplines Outcome Interventions   Occupational Therapy Goal     OT, PT/OT     Description: LTGs to be met by 7/20/20  1. Pt will perform LE dressing with Mod A  2. Pt will perform UE dressing with SBA  3. Pt will perform toilet t/f with Min A  4. Pt will perform (B) UE ROM exercises 1 x 20 reps                   History:     Past Medical History:   Diagnosis Date    Hyperlipemia     Hypertension     Parkinson's disease     Stroke 2016    Throat mass        Past Surgical History:   Procedure Laterality Date    HEMIARTHROPLASTY OF HIP Left 7/12/2020    Procedure: HEMIARTHROPLASTY, HIP;  Surgeon: Humberto Valdivia DO;  [facility]: [facility];  Service: Orthopedics;  Laterality: Left;    HYSTERECTOMY      THROAT SURGERY      TONSILLECTOMY         Time Tracking:     OT Date of Treatment: 07/13/20  OT Start Time: 1135  OT Stop Time: 1200  OT Total Time (min): 25 min    Billable Minutes:Evaluation 15 min  Therapeutic Activity 10 min    Vicki Bullock, PT/OT  7/13/2020

## 2020-07-13 NOTE — PLAN OF CARE
OT niko completed. Pt performed bed mobility with Max A x 2, t/f to chair with Max A x 2, UE dress Mod A, LE Max A.

## 2020-07-13 NOTE — ASSESSMENT & PLAN NOTE
- BP stable on admission.  - Continue home amlodipine and Toprol XL.  - Hold losartan and spironolactone as above.  - IV hydralazine PRN.  - Pain control.     7/13/20  Blood pressure is marginally low.   Hold amlodipine

## 2020-07-13 NOTE — PLAN OF CARE
PT T/F TO CHAIR WITH RW AND MAX A X 2 WITH INC LABORED BREATHING WITH INC ANXIETY. PT NURSE AWARE.

## 2020-07-13 NOTE — PT/OT/SLP EVAL
Physical Therapy Evaluation    Patient Name:  Tanya Madrid   MRN:  [id number]    Recommendations:     Discharge Recommendations:  nursing facility, skilled   Discharge Equipment Recommendations: (TBD)   Barriers to discharge: Decreased caregiver support    Assessment:     Tanya Madrid is a 80 y.o. female admitted with a medical diagnosis of Closed left hip fracture.  She presents with the following impairments/functional limitations:  weakness, impaired endurance, impaired self care skills, impaired functional mobilty, gait instability, impaired balance, pain, decreased safety awareness, decreased lower extremity function, decreased upper extremity function, decreased ROM .    Rehab Prognosis: Good; patient would benefit from acute skilled PT services to address these deficits and reach maximum level of function.    Recent Surgery: Procedure(s) (LRB):  HEMIARTHROPLASTY, HIP (Left) 1 Day Post-Op    Plan:     During this hospitalization, patient to be seen   to address the identified rehab impairments via gait training, therapeutic activities, therapeutic exercises and progress toward the following goals:    · Plan of Care Expires:  07/20/20    Subjective     Chief Complaint: PAIN /INC SOB  Patient/Family Comments/goals: NONE STATED   Pain/Comfort:  · Pain Rating 1: 10/10  · Location - Side 1: Left  · Location 1: leg  · Pain Rating Post-Intervention 1: 10/10    Patients cultural, spiritual, L[employer] conflicts given the current situation:      Living Environment:  PT LIVES AT HOME WITH DAUGHTER AND HAS NO STEPS TO ENTER HOME  Prior to admission, patients level of function was CHUCK WITH RW HH DISTANCES.  Equipment used at home: walker, rolling.  DME owned (not currently used): none.  Upon discharge, patient will have assistance from [facility].    Objective:     Communicated with NURSE ADLER AND Epic CHART REVIEW  prior to session.  Patient found LABORED BREATHING. with peripheral IV, telemetry, oxygen  upon PT  entry to room.    General Precautions: Standard, fall   Orthopedic Precautions:LLE weight bearing as tolerated   Braces:       Exams:  · RLE ROM: LIMITED  · RLE Strength: LIMITED  · LLE ROM: LIMITED  · LLE Strength: LIMITED    Functional Mobility:  PT MET IN RM SUP.SIT EOB WITH MAX A. PT SCOOTED TO EOB AND SEATED EOB AND EDUCATED ON PURSED LIP BREATHING. PT CALLED FOR PAIN MEDS AND TO CHECK ON PT D/T INC SOB. P.T. UNABLE TO GET O2 SAT READING AND RESPIRATORY AWARE TO ASSESS. PT STOOD WITH RW AND MAX A X 2 WITH RIGHT LATERAL LEAN . PT T/F TO CHAIR WITH RW AND MAX A WBAT. PT SEATED IN CHAIR FOR REST AND EDUCATED ON ROLL OF P.T.  PT LEFT WITH BREAKFAST SET UP AND ALL NEEDS MET     AM-PAC 6 CLICK MOBILITY  Total Score:10     Patient left up in chair with call button in reach and chair alarm on.    GOALS:   Multidisciplinary Problems     Physical Therapy Goals        Problem: Physical Therapy Goal    Goal Priority Disciplines Outcome Goal Variances Interventions   Physical Therapy Goal     PT, PT/OT      Description: PT WILL BE SEEN FOR P.T. FOR A MIN OF 5 OUT OF 7 DAYS A WEEK  LT20  1. PT WILL COMPLETE BED MOBILITY WITH MOD A  2. PT WILL STAND T/F TO CHAIR WITH RW AND MODA  3. PT WILL GT TRAIN X 25' WITH RW AND MOD A  4. PT WILL COMPLETE B LE TE X 20 REPS FOR STRENGTHENING.                    History:     Past Medical History:   Diagnosis Date    Hyperlipemia     Hypertension     Parkinson's disease     Stroke 2016    Throat mass        Past Surgical History:   Procedure Laterality Date    HEMIARTHROPLASTY OF HIP Left 2020    Procedure: HEMIARTHROPLASTY, HIP;  Surgeon: Humberto Valdivia DO;  Location: [facility];  Service: Orthopedics;  Laterality: Left;    HYSTERECTOMY      THROAT SURGERY      TONSILLECTOMY         Time Tracking:     PT Received On: 20  PT Start Time: 0745     PT Stop Time: 0810  PT Total Time (min): 25 min     Billable Minutes: Evaluation 15 and Therapeutic Activity  10      Michelle Franks, PT  07/13/2020

## 2020-07-13 NOTE — NURSING
Removed henley per orders using aseptic technique. Pt tolerated well no distress noted. Informed pt she has until 11:30 AM to void. Pt verbalized understanding.

## 2020-07-13 NOTE — PLAN OF CARE
AAO x 3. Up to chair. Pain controlled. Aseptic technique maintained. Free from injury. IV fluids. NADN. Call light in reach. Chart check completed.

## 2020-07-13 NOTE — PROGRESS NOTES
[facility]  Orthopedics  Progress Note    Patient Name: Tanya Madrid  MRN: [id number]  Admission Date: 7/11/2020  Hospital Length of Stay: 1 days  Attending Provider: Sj Juan, *  Primary Care Provider: Marti Parsons MD  Follow-up For: Procedure(s) (LRB):  HEMIARTHROPLASTY, HIP (Left)    Post-Operative Day: 1 Day Post-Op  Subjective:     Principal Problem:Closed left hip fracture    Principal Orthopedic Problem: closed left hip fracture    Interval History: POD 1 s/p bipolar hemiarthroplasty of left hip. PT/OT recommending SNF. H&H decrease, seems relative vs true anemia    Review of patient's allergies indicates:  No Known Allergies    Current Facility-Administered Medications   Medication    0.9%  NaCl infusion    acetaminophen tablet 1,000 mg    acetaminophen tablet 650 mg    albuterol-ipratropium 2.5 mg-0.5 mg/3 mL nebulizer solution 3 mL    amLODIPine tablet 10 mg    aspirin EC tablet 81 mg    carbidopa-levodopa  mg per tablet 2 tablet    chlorhexidine 0.12 % solution 10 mL    hydrALAZINE injection 10 mg    HYDROcodone-acetaminophen 5-325 mg per tablet 1 tablet    hydromorphone (PF) injection 0.2 mg    lactulose 20 gram/30 mL solution Soln 20 g    LORazepam tablet 0.5 mg    metoprolol succinate (TOPROL-XL) 24 hr tablet 50 mg    nozaseptin (NOZIN) nasal     ondansetron disintegrating tablet 8 mg    ondansetron injection 4 mg    ondansetron injection 4 mg    oxyCODONE immediate release tablet 10 mg    oxyCODONE-acetaminophen 5-325 mg per tablet 1 tablet    pantoprazole EC tablet 40 mg    rosuvastatin tablet 10 mg    senna-docusate 8.6-50 mg per tablet 1 tablet    traMADoL tablet 50 mg     Objective:     Vital Signs (Most Recent):  Temp: 98.8 °F (37.1 °C) (07/13/20 0729)  Pulse: 78 (07/13/20 1035)  Resp: 16 (07/13/20 1144)  BP: (!) 147/57 (07/13/20 1035)  SpO2: 100 % (07/13/20 1035) Vital Signs (24h Range):  Temp:  [97.2 °F (36.2 °C)-98.9 °F  "(37.2 °C)] 98.8 °F (37.1 °C)  Pulse:  [] 78  Resp:  [14-24] 16  SpO2:  [96 %-100 %] 100 %  BP: (106-147)/(56-80) 147/57     Weight: 57.2 kg (126 lb 1.6 oz)  Height: 5' 2" (157.5 cm)  Body mass index is 23.06 kg/m².      Intake/Output Summary (Last 24 hours) at 7/13/2020 1419  Last data filed at 7/13/2020 0540  Gross per 24 hour   Intake --   Output 600 ml   Net -600 ml       Ortho/SPM Exam   Dressing CDI. Minor quarter sized spotting seen contained w/n the Aquacel Ag.  Able to feel light touch sensation L4 L5 S1 dermatomes  Able to dorsi & plantar flex foot.  Calf soft non-tender      Significant Labs:   CBC:   Recent Labs   Lab 07/11/20  2352 07/12/20  0456 07/13/20  0504   WBC 11.76 8.20 8.51   HGB 12.4 10.8* 7.7*   HCT 38.5 34.2* 24.8*    222 165     All pertinent labs within the past 24 hours have been reviewed.    Significant Imaging: X-Ray: I have reviewed all pertinent results/findings and my personal findings are:  left hip: [facility] No signs of hardware complication    Assessment/Plan:     * Closed left hip fracture  PT/OT continue current plan  -WBAT  Dressing change POD 7  Monitor H&H  SCDs for mechanical DVT prophylaxis, Anticoagulation recommended for 4 weeks agent to discretion to HM    F/u in clinic 2 weeks from DOS for xrays and staple removal          Humberto Angulo PA-C  Orthopedics  [facility]  "

## 2020-07-14 VITALS
WEIGHT: 126.13 LBS | TEMPERATURE: 98 F | HEART RATE: 99 BPM | HEIGHT: 62 IN | RESPIRATION RATE: 20 BRPM | SYSTOLIC BLOOD PRESSURE: 133 MMHG | DIASTOLIC BLOOD PRESSURE: 70 MMHG | BODY MASS INDEX: 23.21 KG/M2 | OXYGEN SATURATION: 95 %

## 2020-07-14 PROBLEM — G93.41 ENCEPHALOPATHY, METABOLIC: Status: RESOLVED | Noted: 2020-07-13 | Resolved: 2020-07-14

## 2020-07-14 LAB
BASOPHILS # BLD AUTO: 0.02 K/UL (ref 0–0.2)
BASOPHILS NFR BLD: 0.2 % (ref 0–1.9)
BLD PROD TYP BPU: NORMAL
BLOOD UNIT EXPIRATION DATE: NORMAL
BLOOD UNIT TYPE CODE: 7300
BLOOD UNIT TYPE: NORMAL
CODING SYSTEM: NORMAL
DIFFERENTIAL METHOD: ABNORMAL
DISPENSE STATUS: NORMAL
EOSINOPHIL # BLD AUTO: 0 K/UL (ref 0–0.5)
EOSINOPHIL NFR BLD: 0.4 % (ref 0–8)
ERYTHROCYTE [DISTWIDTH] IN BLOOD BY AUTOMATED COUNT: 14.6 % (ref 11.5–14.5)
HCT VFR BLD AUTO: 29 % (ref 37–48.5)
HGB BLD-MCNC: 9.1 G/DL (ref 12–16)
IMM GRANULOCYTES # BLD AUTO: 0.07 K/UL (ref 0–0.04)
IMM GRANULOCYTES NFR BLD AUTO: 0.7 % (ref 0–0.5)
LYMPHOCYTES # BLD AUTO: 0.7 K/UL (ref 1–4.8)
LYMPHOCYTES NFR BLD: 6.9 % (ref 18–48)
MCH RBC QN AUTO: 29.8 PG (ref 27–31)
MCHC RBC AUTO-ENTMCNC: 31.4 G/DL (ref 32–36)
MCV RBC AUTO: 95 FL (ref 82–98)
MONOCYTES # BLD AUTO: 0.9 K/UL (ref 0.3–1)
MONOCYTES NFR BLD: 8.2 % (ref 4–15)
NEUTROPHILS # BLD AUTO: 8.8 K/UL (ref 1.8–7.7)
NEUTROPHILS NFR BLD: 83.6 % (ref 38–73)
NRBC BLD-RTO: 0 /100 WBC
NUM UNITS TRANS PACKED RBC: NORMAL
PLATELET # BLD AUTO: 187 K/UL (ref 150–350)
PMV BLD AUTO: 11.1 FL (ref 9.2–12.9)
RBC # BLD AUTO: 3.05 M/UL (ref 4–5.4)
WBC # BLD AUTO: 10.55 K/UL (ref 3.9–12.7)

## 2020-07-14 PROCEDURE — 97530 THERAPEUTIC ACTIVITIES: CPT

## 2020-07-14 PROCEDURE — 36415 COLL VENOUS BLD VENIPUNCTURE: CPT

## 2020-07-14 PROCEDURE — 94761 N-INVAS EAR/PLS OXIMETRY MLT: CPT

## 2020-07-14 PROCEDURE — 25000003 PHARM REV CODE 250: Performed by: ORTHOPAEDIC SURGERY

## 2020-07-14 PROCEDURE — P9016 RBC LEUKOCYTES REDUCED: HCPCS

## 2020-07-14 PROCEDURE — 97116 GAIT TRAINING THERAPY: CPT

## 2020-07-14 PROCEDURE — 94799 UNLISTED PULMONARY SVC/PX: CPT

## 2020-07-14 PROCEDURE — 85025 COMPLETE CBC W/AUTO DIFF WBC: CPT

## 2020-07-14 RX ORDER — HYDROCODONE BITARTRATE AND ACETAMINOPHEN 5; 325 MG/1; MG/1
1 TABLET ORAL EVERY 6 HOURS PRN
Qty: 10 TABLET | Refills: 0 | Status: ON HOLD | OUTPATIENT
Start: 2020-07-14 | End: 2020-10-05 | Stop reason: HOSPADM

## 2020-07-14 RX ORDER — OXYCODONE AND ACETAMINOPHEN 5; 325 MG/1; MG/1
1 TABLET ORAL EVERY 6 HOURS PRN
Qty: 12 TABLET | Refills: 0 | Status: ON HOLD
Start: 2020-07-14 | End: 2020-10-05 | Stop reason: HOSPADM

## 2020-07-14 RX ADMIN — CARBIDOPA AND LEVODOPA 2 TABLET: 25; 100 TABLET ORAL at 12:07

## 2020-07-14 RX ADMIN — PANTOPRAZOLE SODIUM 40 MG: 40 TABLET, DELAYED RELEASE ORAL at 08:07

## 2020-07-14 RX ADMIN — ASPIRIN 81 MG: 81 TABLET, COATED ORAL at 09:07

## 2020-07-14 RX ADMIN — HYDROCODONE BITARTRATE AND ACETAMINOPHEN 1 TABLET: 5; 325 TABLET ORAL at 09:07

## 2020-07-14 RX ADMIN — CARBIDOPA AND LEVODOPA 2 TABLET: 25; 100 TABLET ORAL at 08:07

## 2020-07-14 RX ADMIN — CHLORHEXIDINE GLUCONATE 0.12% ORAL RINSE 10 ML: 1.2 LIQUID ORAL at 09:07

## 2020-07-14 RX ADMIN — METOPROLOL SUCCINATE 50 MG: 50 TABLET, EXTENDED RELEASE ORAL at 08:07

## 2020-07-14 RX ADMIN — ACETAMINOPHEN 1000 MG: 500 TABLET ORAL at 02:07

## 2020-07-14 RX ADMIN — ROSUVASTATIN 10 MG: 10 TABLET, FILM COATED ORAL at 09:07

## 2020-07-14 RX ADMIN — HYDROCODONE BITARTRATE AND ACETAMINOPHEN 1 TABLET: 5; 325 TABLET ORAL at 02:07

## 2020-07-14 NOTE — PT/OT/SLP PROGRESS
Physical Therapy  Treatment    Tanya Madrid   MRN: [id number]   Admitting Diagnosis: Closed left hip fracture    PT Received On: 07/14/20  PT Start Time: 0850     PT Stop Time: 0915    PT Total Time (min): 25 min       Billable Minutes:  Gait Training 15 and Therapeutic Activity 10    Treatment Type: Treatment  PT/PTA: PT     PTA Visit Number: 0       General Precautions: Standard, fall  Orthopedic Precautions: LLE weight bearing as tolerated, LLE anterior precautions   Braces: N/A         Subjective:  Communicated with NURSE QUINTANA AND Epic CHART REVIEW  prior to session.  PT AGREED TO TX     Pain/Comfort  Pain Rating 1: 10/10  Location - Side 1: Left  Location 1: hip  Pain Rating Post-Intervention 1: 10/10    Objective:   Patient found with: peripheral IV, telemetry    Functional Mobility:  PT MET IN RM SUP.SIT EOB WITH MAX A . PT SCOOTED TO EOB WITH MAX A AND INC TIME TO COMPLETE. PT STOOD WITH RW AND MAX AX 2 WITH POST LEAN. PT GT TRAINED X 3' WITH RW AND MOD A X 2 WITH VERBAL CUES FOR SEQUENCING. PT SOILED SELF AND T/F TO BSC WITH MAXA. PT LEFT SEATED IN BSC FOR TOILETING WITH NURSE AWARE AND ALL NEEDS MET.      AM-PAC 6 CLICK MOBILITY  How much help from another person does this patient currently need?   1 = Unable, Total/Dependent Assistance  2 = A lot, Maximum/Moderate Assistance  3 = A little, Minimum/Contact Guard/Supervision  4 = None, Modified [place]/Independent    Turning over in bed (including adjusting bedclothes, sheets and blankets)?: 2  Sitting down on and standing up from a chair with arms (e.g., wheelchair, bedside commode, etc.): 2  Moving from lying on back to sitting on the side of the bed?: 2  Moving to and from a bed to a chair (including a wheelchair)?: 2  Need to walk in hospital room?: 2  Climbing 3-5 steps with a railing?: 1  Basic Mobility Total Score: 11    AM-PAC Raw Score CMS G-Code Modifier Level of Impairment Assistance   6 % Total / Unable   7 - 9 CM 80 - 100%  Maximal Assist   10 - 14 CL 60 - 80% Moderate Assist   15 - 19 CK 40 - 60% Moderate Assist   20 - 22 CJ 20 - 40% Minimal Assist   23 CI 1-20% SBA / CGA   24 CH 0% Independent/ Mod I     Patient left ON BSC  with call button in reach and NURSE present.    Assessment:  PT PROGRESSING WITH GT TRAINING HOWEVER LIMITED D/T FATIGUE AND SOILING SELF     Rehab identified problem list/impairments: Rehab identified problem list/impairments: weakness, impaired endurance, impaired self care skills, impaired functional mobilty, impaired balance, gait instability, pain, decreased safety awareness, decreased lower extremity function, decreased upper extremity function, orthopedic precautions, decreased ROM    Rehab potential is good.    Activity tolerance: Poor    Discharge recommendations: Discharge Facility/Level of Care Needs: nursing facility, skilled     Barriers to discharge:      Equipment recommendations: Equipment Needed After Discharge: none     GOALS:   Multidisciplinary Problems     Physical Therapy Goals        Problem: Physical Therapy Goal    Goal Priority Disciplines Outcome Goal Variances Interventions   Physical Therapy Goal     PT, PT/OT Ongoing, Progressing     Description: PT WILL BE SEEN FOR P.T. FOR A MIN OF 5 OUT OF 7 DAYS A WEEK  LT20  1. PT WILL COMPLETE BED MOBILITY WITH MOD A  2. PT WILL STAND T/F TO CHAIR WITH RW AND MODA  3. PT WILL GT TRAIN X 25' WITH RW AND MOD A  4. PT WILL COMPLETE B LE TE X 20 REPS FOR STRENGTHENING.                    PLAN:    Patient to be seen to address the above listed problems via gait training, therapeutic activities, therapeutic exercises  Plan of Care expires: 20  Plan of Care reviewed with: patient         Michelle Franks, PT  2020

## 2020-07-14 NOTE — PLAN OF CARE
Remains free from harm, no c/o pain, positioned every 2 hours, will continue to monitor. 24 chart check complete.

## 2020-07-14 NOTE — DISCHARGE SUMMARY
[facility] Hospital Medicine  Discharge Summary      Patient Name: Tanya Madrid  MRN: [id number]  Admission Date: 7/11/2020  Hospital Length of Stay: 2 days  Discharge Date and Time:  07/14/2020 3:32 PM  Attending Physician: Sj Juan, *   Discharging Provider: Jsoe Saldivar NP  Primary Care Provider: Marti Parsons MD      HPI:   Ms. Madrid is an 80 y.o. female  with a PMHx of HTN, HLD, h/o CVA, and Parkinson's disease.  She presented to the ED with c/o left hip pain that started after suffering a ground level fall just PTA.  Pain aggravating by movement; no alleviating factors.  Patient family reports this is her 2nd fall this week.  Upon arrival to the ED, patient was hyperventilating with RR in the 60s, O2 sat remained stable.  Patient reports respiratory distress due to uncontrolled pain.  6 mg IV Morphine given, which improved RR to 18.  She denies any head trauma or injury, LOC, neck or back pain, HA, lightheadedness, dizziness, AMS, weakness, numbness/tingling, CP, SOB, ABD pain, N/V/D, dysuria, hematuria, fever or chills.  Work-up in the ED showed: potassium of 5.9, , normal kidney function, unrevealing EKG, unremarkable CXR.  XR of left hip revealed acute fracture.  Case discussed with Ortho Surgery, who will see patient in consult with plans for ORIF.  Patient received Kayexalate x 1 dose in the ED.  Hospital Medicine was contacted for admission.   Patient is a Full Code.  Adenike Lemus, daughter, is surrogate decision maker [phone].              Procedure(s) (LRB):  HEMIARTHROPLASTY, HIP (Left)      Hospital Course:   Tanya Madrid is a 80 year old female who was admitted to [facility] for left hip fracture. Underwent bipolar hemiarthroplasty of left hip on 7/12/2020. PT/OT recommends SNF. On today 7/13/20 patient had became encephalopathic while sitting in chair. Unresponsive. Symptoms immediately improved upon transfer to bed in  supine position. Daughter states this happens at home when blood pressure is low. Drop in hemoglobin today to 7.7. Her neurological symptoms likely related to anemia and hypovolemia. Will continue IVF's. Order one unit of blood today. CT head negative. Will hold amlodipine for now.    7/14/20  Patient is doing well today. Received one unit of PRBC last night. Hemoglobin up to 9.1 today. Amlodipine yesterday. Blood pressure up to 130's today. Will restart amlodipine at lower dose. [facility]. Will continue Eliquis for one week. Will need to follow up with Orthopedic in 1-2 weeks.       Consults:   Consults (From admission, onward)        Status Ordering Provider     Inpatient consult to Orthopedic Surgery  Once     Provider:  DO Ekat Sam MICHAEL J.          No new Assessment & Plan notes have been filed under this hospital service since the last note was generated.  Service: Hospital Medicine    Final Active Diagnoses:    Diagnosis Date Noted POA    PRINCIPAL PROBLEM:  Closed left hip fracture [S72.002A] 07/12/2020 Yes    Acute blood loss anemia [D62] 07/13/2020 No    Essential hypertension [I10] 07/24/2019 Yes     Chronic    History of CVA (cerebrovascular accident) [Z86.73] 07/24/2019 Not Applicable     Chronic    Parkinson disease [G20] 07/11/2017 Yes     Chronic      Problems Resolved During this Admission:    Diagnosis Date Noted Date Resolved POA    Encephalopathy, metabolic [G93.41] 07/13/2020 07/14/2020 No    Hyperkalemia [E87.5] 07/12/2020 07/13/2020 Yes       Discharged Condition: stable    Disposition: Rehab Facility    Follow Up:    Patient Instructions:   No discharge procedures on file.    Significant Diagnostic Studies: Labs:   CMP   Recent Labs   Lab 07/13/20  0504      K 3.6      CO2 26   *   BUN 14   CREATININE 0.9   CALCIUM 7.5*   ANIONGAP 6*   ESTGFRAFRICA >60   EGFRNONAA 60    and CBC    Recent Labs   Lab 07/13/20  0504 07/13/20 2018 07/14/20  1000   WBC 8.51 10.43 10.55   HGB 7.7* 7.5* 9.1*   HCT 24.8* 25.3* 29.0*    164 187       Pending Diagnostic Studies:     None         Medications:  Reconciled Home Medications:      Medication List      START taking these medications    apixaban 2.5 mg Tab  Commonly known as: ELIQUIS  Take 1 tablet (2.5 mg total) by mouth 2 (two) times daily.     HYDROcodone-acetaminophen 5-325 mg per tablet  Commonly known as: NORCO  Take 1 tablet by mouth every 6 (six) hours as needed.     nozaseptin  nasal   Commonly known as: NOZIN  1 each by Each Nostril route 2 (two) times daily.     oxyCODONE-acetaminophen 5-325 mg per tablet  Commonly known as: PERCOCET  Take 1 tablet by mouth every 6 (six) hours as needed (moderate pain 2-5/10 pain scale).        CHANGE how you take these medications    INV amlodipine 2.5 MG Tab  Commonly known as: NORVASC  Take 1 tablet (2.5 mg total) by mouth once daily.  What changed:   · medication strength  · how much to take        CONTINUE taking these medications    aspirin 81 MG EC tablet  Commonly known as: ECOTRIN  Take 81 mg by mouth once daily.     carbidopa-levodopa  mg  mg per tablet  Commonly known as: SINEMET  Take 2 tablets by mouth 4 (four) times daily.     clopidogreL 75 mg tablet  Commonly known as: PLAVIX  Take 75 mg by mouth once daily.     LORazepam 0.5 MG tablet  Commonly known as: ATIVAN  Take 1 tablet (0.5 mg total) by mouth every 8 (eight) hours as needed for Anxiety.     losartan 100 MG tablet  Commonly known as: COZAAR  Take 100 mg by mouth once daily.     metoprolol succinate 25 MG 24 hr tablet  Commonly known as: TOPROL-XL  Take 50 mg by mouth 2 (two) times daily.     multivitamin capsule  Take 1 capsule by mouth once daily.     * omeprazole 20 MG capsule  Commonly known as: PRILOSEC  1 capsule     * omeprazole 20 MG capsule  Commonly known as: PRILOSEC  Take 20 mg by mouth once daily.      potassium chloride 10 MEQ Tbsr  Commonly known as: KLOR-CON  Take 10 mEq by mouth once daily.     raloxifene 60 mg tablet  Commonly known as: EVISTA  Take 60 mg by mouth once daily.     rosuvastatin 10 MG tablet  Commonly known as: CRESTOR  Take 10 mg by mouth once daily.     spironolactone 25 MG tablet  Commonly known as: ALDACTONE  Take 25 mg by mouth once daily.         * This list has 2 medication(s) that are the same as other medications prescribed for you. Read the directions carefully, and ask your doctor or other care provider to review them with you.                Indwelling Lines/Drains at time of discharge:   Lines/Drains/Airways     None                 Time spent on the discharge of patient: >35 minutes  Patient was seen and examined on the date of discharge and determined to be suitable for discharge.         Jose Saldivar NP  Department of Hospital Medicine  [facility]

## 2020-07-14 NOTE — SUBJECTIVE & OBJECTIVE
"Principal Problem:Closed left hip fracture    Principal Orthopedic Problem: closed left hip fracture    Interval History: POD 2 s/p bipolar hemiarthroplasty of left hip. PT/OT recommending SNF. 1 unit of PRBC transfused yesterday, H&H improved.    Review of patient's allergies indicates:  No Known Allergies    Current Facility-Administered Medications   Medication    0.9%  NaCl infusion (for blood administration)    acetaminophen tablet 1,000 mg    acetaminophen tablet 650 mg    albuterol-ipratropium 2.5 mg-0.5 mg/3 mL nebulizer solution 3 mL    apixaban tablet 2.5 mg    aspirin EC tablet 81 mg    carbidopa-levodopa  mg per tablet 2 tablet    chlorhexidine 0.12 % solution 10 mL    hydrALAZINE injection 10 mg    HYDROcodone-acetaminophen 5-325 mg per tablet 1 tablet    hydromorphone (PF) injection 0.2 mg    lactulose 20 gram/30 mL solution Soln 20 g    metoprolol succinate (TOPROL-XL) 24 hr tablet 50 mg    nozaseptin (NOZIN) nasal     ondansetron disintegrating tablet 8 mg    ondansetron injection 4 mg    ondansetron injection 4 mg    oxyCODONE immediate release tablet 10 mg    oxyCODONE-acetaminophen 5-325 mg per tablet 1 tablet    pantoprazole EC tablet 40 mg    rosuvastatin tablet 10 mg    senna-docusate 8.6-50 mg per tablet 1 tablet    traMADoL tablet 50 mg     Objective:     Vital Signs (Most Recent):  Temp: 97.7 °F (36.5 °C) (07/14/20 1405)  Pulse: 99 (07/14/20 1405)  Resp: (!) 22 (07/14/20 1405)  BP: 133/70 (07/14/20 1405)  SpO2: 95 % (07/14/20 0906) Vital Signs (24h Range):  Temp:  [97.4 °F (36.3 °C)-98.6 °F (37 °C)] 97.7 °F (36.5 °C)  Pulse:  [] 99  Resp:  [16-30] 22  SpO2:  [95 %-100 %] 95 %  BP: (109-162)/(53-78) 133/70     Weight: 57.2 kg (126 lb 1.6 oz)  Height: 5' 2" (157.5 cm)  Body mass index is 23.06 kg/m².      Intake/Output Summary (Last 24 hours) at 7/14/2020 1430  Last data filed at 7/14/2020 0546  Gross per 24 hour   Intake 888.33 ml   Output --   Net " 888.33 ml       Ortho/SPM Exam     Dressing CDI. Quarter sized serosanguinous spotting seen contained w/n the Aquacel Ag.  Able to feel light touch sensation L4 L5 S1 dermatomes  Able to dorsi & plantar flex foot.  Calf soft non-tender      Significant Labs:   CBC:   Recent Labs   Lab 07/13/20  0504 07/13/20 2018 07/14/20  1000   WBC 8.51 10.43 10.55   HGB 7.7* 7.5* 9.1*   HCT 24.8* 25.3* 29.0*    164 187     All pertinent labs within the past 24 hours have been reviewed.    Significant Imaging: X-Ray: I have reviewed all pertinent results/findings and my personal findings are:  left hip: bipolar hemiarthroplasty seen well seated in near antomic positio. No signs of hardware complication

## 2020-07-14 NOTE — PROGRESS NOTES
[facility]  Orthopedics  Progress Note    Patient Name: Tanya Madrid  MRN: [id number]  Admission Date: 7/11/2020  Hospital Length of Stay: 2 days  Attending Provider: Sj Juan, *  Primary Care Provider: Marti Parsons MD  Follow-up For: Procedure(s) (LRB):  HEMIARTHROPLASTY, HIP (Left)    Post-Operative Day: 2 Days Post-Op  Subjective:     Principal Problem:Closed left hip fracture    Principal Orthopedic Problem: closed left hip fracture    Interval History: POD 2 s/p bipolar hemiarthroplasty of left hip. PT/OT recommending SNF. 1 unit of PRBC transfused yesterday, H&H improved.    Review of patient's allergies indicates:  No Known Allergies    Current Facility-Administered Medications   Medication    0.9%  NaCl infusion (for blood administration)    acetaminophen tablet 1,000 mg    acetaminophen tablet 650 mg    albuterol-ipratropium 2.5 mg-0.5 mg/3 mL nebulizer solution 3 mL    apixaban tablet 2.5 mg    aspirin EC tablet 81 mg    carbidopa-levodopa  mg per tablet 2 tablet    chlorhexidine 0.12 % solution 10 mL    hydrALAZINE injection 10 mg    HYDROcodone-acetaminophen 5-325 mg per tablet 1 tablet    hydromorphone (PF) injection 0.2 mg    lactulose 20 gram/30 mL solution Soln 20 g    metoprolol succinate (TOPROL-XL) 24 hr tablet 50 mg    nozaseptin (NOZIN) nasal     ondansetron disintegrating tablet 8 mg    ondansetron injection 4 mg    ondansetron injection 4 mg    oxyCODONE immediate release tablet 10 mg    oxyCODONE-acetaminophen 5-325 mg per tablet 1 tablet    pantoprazole EC tablet 40 mg    rosuvastatin tablet 10 mg    senna-docusate 8.6-50 mg per tablet 1 tablet    traMADoL tablet 50 mg     Objective:     Vital Signs (Most Recent):  Temp: 97.7 °F (36.5 °C) (07/14/20 1405)  Pulse: 99 (07/14/20 1405)  Resp: (!) 22 (07/14/20 1405)  BP: 133/70 (07/14/20 1405)  SpO2: 95 % (07/14/20 0906) Vital Signs (24h Range):  Temp:  [97.4 °F (36.3  "°C)-98.6 °F (37 °C)] 97.7 °F (36.5 °C)  Pulse:  [] 99  Resp:  [16-30] 22  SpO2:  [95 %-100 %] 95 %  BP: (109-162)/(53-78) 133/70     Weight: 57.2 kg (126 lb 1.6 oz)  Height: 5' 2" (157.5 cm)  Body mass index is 23.06 kg/m².      Intake/Output Summary (Last 24 hours) at 7/14/2020 1430  Last data filed at 7/14/2020 0546  Gross per 24 hour   Intake 888.33 ml   Output --   Net 888.33 ml       Ortho/SPM Exam     Dressing CDI. Quarter sized serosanguinous spotting seen contained w/n the Aquacel Ag.  Able to feel light touch sensation L4 L5 S1 dermatomes  Able to dorsi & plantar flex foot.  Calf soft non-tender      Significant Labs:   CBC:   Recent Labs   Lab 07/13/20  0504 07/13/20  2018 07/14/20  1000   WBC 8.51 10.43 10.55   HGB 7.7* 7.5* 9.1*   HCT 24.8* 25.3* 29.0*    164 187     All pertinent labs within the past 24 hours have been reviewed.    Significant Imaging: X-Ray: I have reviewed all pertinent results/findings and my personal findings are:  left hip: bipolar [facility]arthroplasty [facility] No signs of hardware complication    Assessment/Plan:     * Closed left hip fracture  PT/OT continue current plan  -WBAT  Dressing change POD 7  Monitor H&H  SCDs for mechanical DVT prophylaxis, Anticoagulation recommended for 4 weeks agent to discretion to HM    F/u in clinic 2 weeks from DOS for xrays and staple removal          Humberto Angulo PA-C  Orthopedics  [facility]  "

## 2020-07-14 NOTE — PLAN OF CARE
07/14/20 1700   Final Note   Anticipated Discharge Disposition Rehab   Right Care Referral Info   Post Acute Recommendation IRF   Facility Name [facility]

## 2020-07-14 NOTE — PT/OT/SLP PROGRESS
Occupational Therapy   Treatment    Name: Tanya Madrid  MRN: [id number]  Admitting Diagnosis:  Closed left hip fracture  2 Days Post-Op    Recommendations:     Discharge Recommendations: nursing facility, skilled  Discharge Equipment Recommendations:  none  Barriers to discharge:       Assessment:     Tanya Madrid is a 80 y.o. female with a medical diagnosis of Closed left hip fracture.  She presents with debility and generalized weakness. Performance deficits affecting function are weakness, impaired functional mobilty, gait instability, impaired endurance, impaired balance, impaired self care skills.     Rehab Prognosis:  Fair; patient would benefit from acute skilled OT services to address these deficits and reach maximum level of function.       Plan:     Patient to be seen 3 x/week to address the above listed problems via self-care/home management, therapeutic activities, therapeutic exercises  · Plan of Care Expires:    · Plan of Care Reviewed with: patient    Subjective     Pain/Comfort:  Pain Rating 1: 0/10    Objective:     Communicated with: nurse and epic chart review prior to session.  Patient found HOB elevated with peripheral IV, telemetry, oxygen upon OT entry to room.    General Precautions: Standard, fall   Orthopedic Precautions:LLE weight bearing as tolerated, LLE anterior precautions   Braces: N/A     Occupational Performance:     Bed Mobility:    · Patient completed Rolling/Turning to Left with  maximal assistance  · Patient completed Scooting/Bridging with maximal assistance  · Patient completed Supine to Sit with maximal assistance     Functional Mobility/Transfers:  · Patient completed Sit <> Stand Transfer with maximal assistance and of 2 persons  with  rolling walker   · Patient completed Bed <> Chair Transfer using Step Transfer technique with maximal assistance and of 2 persons with rolling walker  · Patient completed Toilet Transfer Step Transfer technique with maximal  assistance and of 2 persons with  bedside commode  · Functional Mobility: pt ambulated max 2 with functional mobility x 3 feet    Activities of Daily Living:  · Upper Body Dressing: maximal assistance .  · Lower Body Dressing: maximal assistance .      AMPAC 6 Click ADL: 14    Treatment & Education:  Pt seen in room. Pt educated on proper breathing techniques and bed mobility. Pt req max a x 2 with scooting forward. Pt req max a x 2 with sit<>stand and ambulation x 3 feet with bsc.     Patient left up in chair with all lines intact, call button in reach, nurse larissa notified and nurse larissa presentEducation:      GOALS:   Multidisciplinary Problems     Occupational Therapy Goals        Problem: Occupational Therapy Goal    Goal Priority Disciplines Outcome Interventions   Occupational Therapy Goal     OT, PT/OT Ongoing, Progressing    Description: LTGs to be met by 7/20/20  1. Pt will perform LE dressing with Mod A  2. Pt will perform UE dressing with SBA  3. Pt will perform toilet t/f with Min A  4. Pt will perform (B) UE ROM exercises 1 x 20 reps                   Time Tracking:     OT Date of Treatment: 07/14/20  OT Start Time: 0855  OT Stop Time: 0920  OT Total Time (min): 25 min    Billable Minutes:Therapeutic Activity 25 minutes    Tanya Muse OT  7/14/2020

## 2020-07-14 NOTE — PLAN OF CARE
Patient transferring to [facility] today.  Please call report to [phone]. Transportation will  at 3:30pm    D/c orders, MAR, AVS sent via [facility].       07/14/20 1326   Post-Acute Status   Post-Acute Authorization Placement   Post-Acute Placement Status Set-up Complete

## 2020-07-14 NOTE — NURSING
During am assessment and VS, pt noted to be breathing fast, pulse ox 99 on RA. Encouraged pt to slow breathing and demonstrated relaxation exercises .Pt has hx of anxiety and panic attacks, and it was reported that in the am, anxiety is higher.

## 2020-07-24 ENCOUNTER — OFFICE VISIT (OUTPATIENT)
Dept: ORTHOPEDICS | Facility: CLINIC | Age: 80
End: 2020-07-24
Payer: MEDICARE

## 2020-07-24 ENCOUNTER — HOSPITAL ENCOUNTER (OUTPATIENT)
Dept: RADIOLOGY | Facility: HOSPITAL | Age: 80
Discharge: HOME | End: 2020-07-24
Attending: ORTHOPAEDIC SURGERY
Payer: MEDICARE

## 2020-07-24 ENCOUNTER — TELEPHONE (OUTPATIENT)
Dept: ORTHOPEDICS | Facility: CLINIC | Age: 80
End: 2020-07-24

## 2020-07-24 VITALS
SYSTOLIC BLOOD PRESSURE: 107 MMHG | WEIGHT: 126 LBS | DIASTOLIC BLOOD PRESSURE: 66 MMHG | BODY MASS INDEX: 23.19 KG/M2 | HEART RATE: 87 BPM | HEIGHT: 62 IN

## 2020-07-24 DIAGNOSIS — M25.552 LEFT HIP PAIN: Primary | ICD-10-CM

## 2020-07-24 DIAGNOSIS — Z96.649 STATUS POST HIP HEMIARTHROPLASTY: ICD-10-CM

## 2020-07-24 DIAGNOSIS — M25.552 LEFT HIP PAIN: ICD-10-CM

## 2020-07-24 DIAGNOSIS — S72.002D CLOSED FRACTURE OF LEFT HIP WITH ROUTINE HEALING, SUBSEQUENT ENCOUNTER: Primary | ICD-10-CM

## 2020-07-24 PROCEDURE — 99213 OFFICE O/P EST LOW 20 MIN: CPT | Mod: PBBFAC,25 | Performed by: PHYSICIAN ASSISTANT

## 2020-07-24 PROCEDURE — 99999 PR PBB SHADOW E&M-EST. PATIENT-LVL III: ICD-10-PCS | Mod: PBBFAC,,, | Performed by: PHYSICIAN ASSISTANT

## 2020-07-24 PROCEDURE — 73502 X-RAY EXAM HIP UNI 2-3 VIEWS: CPT | Mod: 26,LT,, | Performed by: RADIOLOGY

## 2020-07-24 PROCEDURE — 73502 X-RAY EXAM HIP UNI 2-3 VIEWS: CPT | Mod: TC,LT

## 2020-07-24 PROCEDURE — 99999 PR PBB SHADOW E&M-EST. PATIENT-LVL III: CPT | Mod: PBBFAC,,, | Performed by: PHYSICIAN ASSISTANT

## 2020-07-24 PROCEDURE — 73502 XR HIP 2 VIEW LEFT: ICD-10-PCS | Mod: 26,LT,, | Performed by: RADIOLOGY

## 2020-07-24 NOTE — PROGRESS NOTES
Post-Op Progress Note    Subjective:    Tanya Madrid is a 80 y.o.  female s/p bipolar hemiarthroplasty of left hip 2 weeks ago with Dr. Valdivia.  Patient has no complaints of pain today.  She [facility]  Family member room states she has been doing well.        Objective:  Incision well approximated, staples in place.  No signs of infection  Able to dorsi and plantar flex foot  Able to wiggle toes  Able to feel light touch sensation L4, L5, S1 dermatomes      Imaging:  X-ray of the left hip shows implant in good position without signs is subsidence.  Near anatomic.  No signs of hardware complication failure.  -please see radiologist dictation for complete report      Assessment/Plan:    Encounter Diagnoses   Name Primary?    Closed fracture of left hip with routine healing, subsequent encounter Yes    Status post hip hemiarthroplasty      X-rays show implant in near anatomic position.  Patient will continue weight-bearing as tolerated and continue her current physical therapy plan.  She will follow up in 4 weeks for subsequent x-rays.  Staples removed today    Follow-up:  In 4 weeks      -Discussed findings with patient. Patient expressed understanding. Patient was given the opportunity to ask questions and be an active participant in their medical care. Patient had no further questions or concerns at this time.     Disclaimer: This note was generated using a voice recognition system and may have sound alike errors within the text.

## 2020-07-25 ENCOUNTER — NURSE TRIAGE (OUTPATIENT)
Dept: ADMINISTRATIVE | Facility: CLINIC | Age: 80
End: 2020-07-25

## 2020-07-26 NOTE — TELEPHONE ENCOUNTER
Post Procedural Symptom Tracker. Pt had in office visit on 7/24/20 which was 12 days post-op from her procedure on 7/12/20. Per symptom tracker protocol, no contact made. No follow up needed.      Reason for Disposition   Caller has cancelled the call before the first contact    Protocols used: NO CONTACT OR DUPLICATE CONTACT CALL-A-AH

## 2020-08-24 ENCOUNTER — HOSPITAL ENCOUNTER (OUTPATIENT)
Dept: RADIOLOGY | Facility: HOSPITAL | Age: 80
Discharge: HOME | End: 2020-08-24
Attending: PHYSICIAN ASSISTANT
Payer: MEDICARE

## 2020-08-24 ENCOUNTER — OFFICE VISIT (OUTPATIENT)
Dept: ORTHOPEDICS | Facility: CLINIC | Age: 80
End: 2020-08-24
Payer: MEDICARE

## 2020-08-24 VITALS
HEIGHT: 62 IN | BODY MASS INDEX: 23.19 KG/M2 | WEIGHT: 126 LBS | HEART RATE: 71 BPM | DIASTOLIC BLOOD PRESSURE: 78 MMHG | SYSTOLIC BLOOD PRESSURE: 134 MMHG

## 2020-08-24 DIAGNOSIS — M25.552 LEFT HIP PAIN: ICD-10-CM

## 2020-08-24 DIAGNOSIS — M25.552 LEFT HIP PAIN: Primary | ICD-10-CM

## 2020-08-24 DIAGNOSIS — S72.002D CLOSED FRACTURE OF LEFT HIP WITH ROUTINE HEALING, SUBSEQUENT ENCOUNTER: Primary | ICD-10-CM

## 2020-08-24 PROCEDURE — 99999 PR PBB SHADOW E&M-EST. PATIENT-LVL III: CPT | Mod: PBBFAC,,, | Performed by: PHYSICIAN ASSISTANT

## 2020-08-24 PROCEDURE — 99999 PR PBB SHADOW E&M-EST. PATIENT-LVL III: ICD-10-PCS | Mod: PBBFAC,,, | Performed by: PHYSICIAN ASSISTANT

## 2020-08-24 PROCEDURE — 99213 OFFICE O/P EST LOW 20 MIN: CPT | Mod: PBBFAC,25 | Performed by: PHYSICIAN ASSISTANT

## 2020-08-24 PROCEDURE — 73502 XR HIP 2 VIEW LEFT: ICD-10-PCS | Mod: 26,LT,, | Performed by: RADIOLOGY

## 2020-08-24 PROCEDURE — 73502 X-RAY EXAM HIP UNI 2-3 VIEWS: CPT | Mod: 26,LT,, | Performed by: RADIOLOGY

## 2020-08-24 PROCEDURE — 73502 X-RAY EXAM HIP UNI 2-3 VIEWS: CPT | Mod: TC,LT

## 2020-08-24 NOTE — PROGRESS NOTES
Post-Op Progress Note    Subjective:    Tanya Madrid is a 80 y.o.  female s/p bipolar hemiarthroplasty of left hip 6 weeks ago.  Patient with complaints of stiffness at her hip and knee.  Minimal complaints of pain.  Has continued to do outpatient physical therapy for daughter.      Objective:  Incision well healed  Able to feel light touch sensation L4, L5, S1 dermatomes  Able dorsi and plantar flex ft  Calf soft nontender      Imaging:  X-ray left hip shows implant in near anatomic position.  No signs of hardware complication failure.  -please see radiologist dictation for complete report      Assessment/Plan:    Encounter Diagnosis   Name Primary?    Closed fracture of left hip with routine healing, subsequent encounter Yes     Overall alignment unchanged.  No signs of hardware complication failure.  Continue current PT/OT plan.  Follow-up in 6 weeks for anticipated finals.    Follow-up:  In 6 weeks      -Discussed findings with patient. Patient expressed understanding. Patient was given the opportunity to ask questions and be an active participant in their medical care. Patient had no further questions or concerns at this time.     Disclaimer: This note was generated using a voice recognition system and may have sound alike errors within the text.

## 2020-10-01 ENCOUNTER — HOSPITAL ENCOUNTER (INPATIENT)
Facility: HOSPITAL | Age: 80
LOS: 3 days | Discharge: NURSING, REHAB OR LONG-TERM CARE | DRG: 522 | End: 2020-10-05
Attending: EMERGENCY MEDICINE | Admitting: INTERNAL MEDICINE
Payer: MEDICARE

## 2020-10-01 DIAGNOSIS — S72.009A HIP FRACTURE: ICD-10-CM

## 2020-10-01 DIAGNOSIS — S72.001A CLOSED FRACTURE OF RIGHT HIP, INITIAL ENCOUNTER: Primary | ICD-10-CM

## 2020-10-01 DIAGNOSIS — S72.002D CLOSED FRACTURE OF LEFT HIP WITH ROUTINE HEALING: ICD-10-CM

## 2020-10-01 DIAGNOSIS — M25.551 RIGHT HIP PAIN: ICD-10-CM

## 2020-10-01 DIAGNOSIS — S72.001A CLOSED FRACTURE OF RIGHT HIP: ICD-10-CM

## 2020-10-01 LAB
ABO + RH BLD: NORMAL
ALBUMIN SERPL BCP-MCNC: 4 G/DL (ref 3.5–5.2)
ALP SERPL-CCNC: 81 U/L (ref 55–135)
ALT SERPL W/O P-5'-P-CCNC: 6 U/L (ref 10–44)
ANION GAP SERPL CALC-SCNC: 14 MMOL/L (ref 8–16)
APTT BLDCRRT: 24.1 SEC (ref 21–32)
AST SERPL-CCNC: 34 U/L (ref 10–40)
BASOPHILS # BLD AUTO: 0.04 K/UL (ref 0–0.2)
BASOPHILS NFR BLD: 0.4 % (ref 0–1.9)
BILIRUB SERPL-MCNC: 0.3 MG/DL (ref 0.1–1)
BLD GP AB SCN CELLS X3 SERPL QL: NORMAL
BUN SERPL-MCNC: 12 MG/DL (ref 8–23)
CALCIUM SERPL-MCNC: 9.8 MG/DL (ref 8.7–10.5)
CHLORIDE SERPL-SCNC: 101 MMOL/L (ref 95–110)
CO2 SERPL-SCNC: 24 MMOL/L (ref 23–29)
CREAT SERPL-MCNC: 0.9 MG/DL (ref 0.5–1.4)
DIFFERENTIAL METHOD: ABNORMAL
EOSINOPHIL # BLD AUTO: 0.1 K/UL (ref 0–0.5)
EOSINOPHIL NFR BLD: 0.6 % (ref 0–8)
ERYTHROCYTE [DISTWIDTH] IN BLOOD BY AUTOMATED COUNT: 14.1 % (ref 11.5–14.5)
EST. GFR  (AFRICAN AMERICAN): >60 ML/MIN/1.73 M^2
EST. GFR  (NON AFRICAN AMERICAN): 60 ML/MIN/1.73 M^2
GLUCOSE SERPL-MCNC: 124 MG/DL (ref 70–110)
HCT VFR BLD AUTO: 37.9 % (ref 37–48.5)
HGB BLD-MCNC: 11.8 G/DL (ref 12–16)
IMM GRANULOCYTES # BLD AUTO: 0.05 K/UL (ref 0–0.04)
IMM GRANULOCYTES NFR BLD AUTO: 0.5 % (ref 0–0.5)
INR PPP: 1 (ref 0.8–1.2)
LYMPHOCYTES # BLD AUTO: 1.2 K/UL (ref 1–4.8)
LYMPHOCYTES NFR BLD: 12.8 % (ref 18–48)
MCH RBC QN AUTO: 29.2 PG (ref 27–31)
MCHC RBC AUTO-ENTMCNC: 31.1 G/DL (ref 32–36)
MCV RBC AUTO: 94 FL (ref 82–98)
MONOCYTES # BLD AUTO: 0.9 K/UL (ref 0.3–1)
MONOCYTES NFR BLD: 9.2 % (ref 4–15)
NEUTROPHILS # BLD AUTO: 7.2 K/UL (ref 1.8–7.7)
NEUTROPHILS NFR BLD: 76.5 % (ref 38–73)
NRBC BLD-RTO: 0 /100 WBC
PLATELET # BLD AUTO: 359 K/UL (ref 150–350)
PMV BLD AUTO: 10.6 FL (ref 9.2–12.9)
POTASSIUM SERPL-SCNC: 3.8 MMOL/L (ref 3.5–5.1)
PROT SERPL-MCNC: 8.1 G/DL (ref 6–8.4)
PROTHROMBIN TIME: 10.9 SEC (ref 9–12.5)
RBC # BLD AUTO: 4.04 M/UL (ref 4–5.4)
SARS-COV-2 RDRP RESP QL NAA+PROBE: NEGATIVE
SODIUM SERPL-SCNC: 139 MMOL/L (ref 136–145)
WBC # BLD AUTO: 9.41 K/UL (ref 3.9–12.7)

## 2020-10-01 PROCEDURE — 51702 INSERT TEMP BLADDER CATH: CPT

## 2020-10-01 PROCEDURE — 85025 COMPLETE CBC W/AUTO DIFF WBC: CPT

## 2020-10-01 PROCEDURE — 96374 THER/PROPH/DIAG INJ IV PUSH: CPT

## 2020-10-01 PROCEDURE — 99285 EMERGENCY DEPT VISIT HI MDM: CPT | Mod: 25

## 2020-10-01 PROCEDURE — G0378 HOSPITAL OBSERVATION PER HR: HCPCS

## 2020-10-01 PROCEDURE — 36415 COLL VENOUS BLD VENIPUNCTURE: CPT

## 2020-10-01 PROCEDURE — 85610 PROTHROMBIN TIME: CPT

## 2020-10-01 PROCEDURE — 85730 THROMBOPLASTIN TIME PARTIAL: CPT

## 2020-10-01 PROCEDURE — 80053 COMPREHEN METABOLIC PANEL: CPT

## 2020-10-01 PROCEDURE — 96375 TX/PRO/DX INJ NEW DRUG ADDON: CPT

## 2020-10-01 PROCEDURE — 86850 RBC ANTIBODY SCREEN: CPT

## 2020-10-01 PROCEDURE — 63600175 PHARM REV CODE 636 W HCPCS: Performed by: EMERGENCY MEDICINE

## 2020-10-01 PROCEDURE — 25000003 PHARM REV CODE 250: Performed by: NURSE PRACTITIONER

## 2020-10-01 PROCEDURE — U0002 COVID-19 LAB TEST NON-CDC: HCPCS

## 2020-10-01 RX ORDER — ROSUVASTATIN CALCIUM 10 MG/1
10 TABLET, COATED ORAL DAILY
Status: DISCONTINUED | OUTPATIENT
Start: 2020-10-02 | End: 2020-10-05 | Stop reason: HOSPADM

## 2020-10-01 RX ORDER — MORPHINE SULFATE 4 MG/ML
2 INJECTION, SOLUTION INTRAMUSCULAR; INTRAVENOUS
Status: COMPLETED | OUTPATIENT
Start: 2020-10-01 | End: 2020-10-01

## 2020-10-01 RX ORDER — CARBIDOPA AND LEVODOPA 25; 100 MG/1; MG/1
2 TABLET ORAL 4 TIMES DAILY
Status: DISCONTINUED | OUTPATIENT
Start: 2020-10-01 | End: 2020-10-05

## 2020-10-01 RX ORDER — AMLODIPINE BESYLATE 2.5 MG/1
2.5 TABLET ORAL DAILY
Status: DISCONTINUED | OUTPATIENT
Start: 2020-10-02 | End: 2020-10-05 | Stop reason: HOSPADM

## 2020-10-01 RX ORDER — CLONAZEPAM 0.5 MG/1
0.5 TABLET ORAL 2 TIMES DAILY PRN
COMMUNITY
End: 2022-05-03

## 2020-10-01 RX ORDER — CARBIDOPA AND LEVODOPA 25; 100 MG/1; MG/1
2 TABLET ORAL 4 TIMES DAILY
Status: DISCONTINUED | OUTPATIENT
Start: 2020-10-02 | End: 2020-10-01

## 2020-10-01 RX ORDER — SPIRONOLACTONE 25 MG/1
25 TABLET ORAL DAILY
Status: DISCONTINUED | OUTPATIENT
Start: 2020-10-02 | End: 2020-10-05 | Stop reason: HOSPADM

## 2020-10-01 RX ORDER — METOPROLOL SUCCINATE 50 MG/1
50 TABLET, EXTENDED RELEASE ORAL 2 TIMES DAILY
Status: DISCONTINUED | OUTPATIENT
Start: 2020-10-01 | End: 2020-10-05 | Stop reason: HOSPADM

## 2020-10-01 RX ORDER — LOSARTAN POTASSIUM 50 MG/1
100 TABLET ORAL DAILY
Status: DISCONTINUED | OUTPATIENT
Start: 2020-10-02 | End: 2020-10-05 | Stop reason: HOSPADM

## 2020-10-01 RX ORDER — HYDRALAZINE HYDROCHLORIDE 20 MG/ML
10 INJECTION INTRAMUSCULAR; INTRAVENOUS EVERY 6 HOURS PRN
Status: DISCONTINUED | OUTPATIENT
Start: 2020-10-01 | End: 2020-10-01

## 2020-10-01 RX ORDER — SODIUM CHLORIDE 0.9 % (FLUSH) 0.9 %
10 SYRINGE (ML) INJECTION
Status: DISCONTINUED | OUTPATIENT
Start: 2020-10-01 | End: 2020-10-02 | Stop reason: SDUPTHER

## 2020-10-01 RX ORDER — ENALAPRILAT 1.25 MG/ML
1.25 INJECTION INTRAVENOUS EVERY 6 HOURS PRN
Status: DISCONTINUED | OUTPATIENT
Start: 2020-10-01 | End: 2020-10-05 | Stop reason: HOSPADM

## 2020-10-01 RX ORDER — PANTOPRAZOLE SODIUM 40 MG/1
40 TABLET, DELAYED RELEASE ORAL DAILY
Status: DISCONTINUED | OUTPATIENT
Start: 2020-10-02 | End: 2020-10-05 | Stop reason: HOSPADM

## 2020-10-01 RX ADMIN — MORPHINE SULFATE 2 MG: 4 INJECTION, SOLUTION INTRAMUSCULAR; INTRAVENOUS at 08:10

## 2020-10-01 RX ADMIN — CARBIDOPA AND LEVODOPA 2 TABLET: 25; 100 TABLET ORAL at 10:10

## 2020-10-01 RX ADMIN — METOPROLOL SUCCINATE 50 MG: 50 TABLET, EXTENDED RELEASE ORAL at 10:10

## 2020-10-01 RX ADMIN — ENALAPRILAT 1.25 MG: 1.25 INJECTION INTRAVENOUS at 10:10

## 2020-10-02 ENCOUNTER — ANESTHESIA EVENT (OUTPATIENT)
Dept: SURGERY | Facility: HOSPITAL | Age: 80
DRG: 522 | End: 2020-10-02
Payer: MEDICARE

## 2020-10-02 ENCOUNTER — ANESTHESIA (OUTPATIENT)
Dept: SURGERY | Facility: HOSPITAL | Age: 80
DRG: 522 | End: 2020-10-02
Payer: MEDICARE

## 2020-10-02 PROBLEM — M25.551 RIGHT HIP PAIN: Status: ACTIVE | Noted: 2020-10-02

## 2020-10-02 LAB
ANION GAP SERPL CALC-SCNC: 11 MMOL/L (ref 8–16)
BASOPHILS # BLD AUTO: 0.02 K/UL (ref 0–0.2)
BASOPHILS NFR BLD: 0.2 % (ref 0–1.9)
BUN SERPL-MCNC: 10 MG/DL (ref 8–23)
CALCIUM SERPL-MCNC: 8.9 MG/DL (ref 8.7–10.5)
CHLORIDE SERPL-SCNC: 106 MMOL/L (ref 95–110)
CO2 SERPL-SCNC: 22 MMOL/L (ref 23–29)
CREAT SERPL-MCNC: 0.8 MG/DL (ref 0.5–1.4)
DIFFERENTIAL METHOD: ABNORMAL
EOSINOPHIL # BLD AUTO: 0 K/UL (ref 0–0.5)
EOSINOPHIL NFR BLD: 0.4 % (ref 0–8)
ERYTHROCYTE [DISTWIDTH] IN BLOOD BY AUTOMATED COUNT: 14.1 % (ref 11.5–14.5)
EST. GFR  (AFRICAN AMERICAN): >60 ML/MIN/1.73 M^2
EST. GFR  (NON AFRICAN AMERICAN): >60 ML/MIN/1.73 M^2
GLUCOSE SERPL-MCNC: 106 MG/DL (ref 70–110)
HCT VFR BLD AUTO: 34.4 % (ref 37–48.5)
HGB BLD-MCNC: 10.7 G/DL (ref 12–16)
IMM GRANULOCYTES # BLD AUTO: 0.03 K/UL (ref 0–0.04)
IMM GRANULOCYTES NFR BLD AUTO: 0.4 % (ref 0–0.5)
LYMPHOCYTES # BLD AUTO: 1.1 K/UL (ref 1–4.8)
LYMPHOCYTES NFR BLD: 12.7 % (ref 18–48)
MAGNESIUM SERPL-MCNC: 2 MG/DL (ref 1.6–2.6)
MCH RBC QN AUTO: 29.3 PG (ref 27–31)
MCHC RBC AUTO-ENTMCNC: 31.1 G/DL (ref 32–36)
MCV RBC AUTO: 94 FL (ref 82–98)
MONOCYTES # BLD AUTO: 0.8 K/UL (ref 0.3–1)
MONOCYTES NFR BLD: 9.4 % (ref 4–15)
NEUTROPHILS # BLD AUTO: 6.4 K/UL (ref 1.8–7.7)
NEUTROPHILS NFR BLD: 76.9 % (ref 38–73)
NRBC BLD-RTO: 0 /100 WBC
PLATELET # BLD AUTO: 323 K/UL (ref 150–350)
PMV BLD AUTO: 10.4 FL (ref 9.2–12.9)
POTASSIUM SERPL-SCNC: 3.5 MMOL/L (ref 3.5–5.1)
RBC # BLD AUTO: 3.65 M/UL (ref 4–5.4)
SODIUM SERPL-SCNC: 139 MMOL/L (ref 136–145)
WBC # BLD AUTO: 8.28 K/UL (ref 3.9–12.7)

## 2020-10-02 PROCEDURE — 25000003 PHARM REV CODE 250: Performed by: NURSE PRACTITIONER

## 2020-10-02 PROCEDURE — 71000039 HC RECOVERY, EACH ADD'L HOUR: Performed by: ORTHOPAEDIC SURGERY

## 2020-10-02 PROCEDURE — C1776 JOINT DEVICE (IMPLANTABLE): HCPCS | Performed by: ORTHOPAEDIC SURGERY

## 2020-10-02 PROCEDURE — 37000009 HC ANESTHESIA EA ADD 15 MINS: Performed by: ORTHOPAEDIC SURGERY

## 2020-10-02 PROCEDURE — 36000711: Performed by: ORTHOPAEDIC SURGERY

## 2020-10-02 PROCEDURE — 85025 COMPLETE CBC W/AUTO DIFF WBC: CPT

## 2020-10-02 PROCEDURE — 27201423 OPTIME MED/SURG SUP & DEVICES STERILE SUPPLY: Performed by: ORTHOPAEDIC SURGERY

## 2020-10-02 PROCEDURE — 25000003 PHARM REV CODE 250: Performed by: ORTHOPAEDIC SURGERY

## 2020-10-02 PROCEDURE — 63600175 PHARM REV CODE 636 W HCPCS: Performed by: NURSE ANESTHETIST, CERTIFIED REGISTERED

## 2020-10-02 PROCEDURE — 37000008 HC ANESTHESIA 1ST 15 MINUTES: Performed by: ORTHOPAEDIC SURGERY

## 2020-10-02 PROCEDURE — C1713 ANCHOR/SCREW BN/BN,TIS/BN: HCPCS | Performed by: ORTHOPAEDIC SURGERY

## 2020-10-02 PROCEDURE — 11000001 HC ACUTE MED/SURG PRIVATE ROOM

## 2020-10-02 PROCEDURE — 63600175 PHARM REV CODE 636 W HCPCS: Performed by: ORTHOPAEDIC SURGERY

## 2020-10-02 PROCEDURE — 25000003 PHARM REV CODE 250: Performed by: NURSE ANESTHETIST, CERTIFIED REGISTERED

## 2020-10-02 PROCEDURE — 21400001 HC TELEMETRY ROOM

## 2020-10-02 PROCEDURE — 63600175 PHARM REV CODE 636 W HCPCS: Performed by: ANESTHESIOLOGY

## 2020-10-02 PROCEDURE — 36000710: Performed by: ORTHOPAEDIC SURGERY

## 2020-10-02 PROCEDURE — 71000033 HC RECOVERY, INTIAL HOUR: Performed by: ORTHOPAEDIC SURGERY

## 2020-10-02 PROCEDURE — 80048 BASIC METABOLIC PNL TOTAL CA: CPT

## 2020-10-02 PROCEDURE — 83735 ASSAY OF MAGNESIUM: CPT

## 2020-10-02 DEVICE — IMPLANTABLE DEVICE: Type: IMPLANTABLE DEVICE | Site: HIP | Status: FUNCTIONAL

## 2020-10-02 DEVICE — STEM FEM TAPER HIP SZ 5: Type: IMPLANTABLE DEVICE | Site: HIP | Status: FUNCTIONAL

## 2020-10-02 DEVICE — HEAD ARTICUL 28MM: Type: IMPLANTABLE DEVICE | Site: HIP | Status: FUNCTIONAL

## 2020-10-02 DEVICE — CEMENT BONE SMRTST GENTEC 40GR: Type: IMPLANTABLE DEVICE | Site: HIP | Status: FUNCTIONAL

## 2020-10-02 RX ORDER — ONDANSETRON 2 MG/ML
INJECTION INTRAMUSCULAR; INTRAVENOUS
Status: DISCONTINUED | OUTPATIENT
Start: 2020-10-02 | End: 2020-10-02

## 2020-10-02 RX ORDER — CEFAZOLIN SODIUM 2 G/50ML
2 SOLUTION INTRAVENOUS
Status: COMPLETED | OUTPATIENT
Start: 2020-10-02 | End: 2020-10-03

## 2020-10-02 RX ORDER — VANCOMYCIN HYDROCHLORIDE 1 G/20ML
INJECTION, POWDER, LYOPHILIZED, FOR SOLUTION INTRAVENOUS
Status: DISCONTINUED | OUTPATIENT
Start: 2020-10-02 | End: 2020-10-02 | Stop reason: ALTCHOICE

## 2020-10-02 RX ORDER — FENTANYL CITRATE 50 UG/ML
INJECTION, SOLUTION INTRAMUSCULAR; INTRAVENOUS
Status: DISCONTINUED | OUTPATIENT
Start: 2020-10-02 | End: 2020-10-02

## 2020-10-02 RX ORDER — CHLORHEXIDINE GLUCONATE ORAL RINSE 1.2 MG/ML
10 SOLUTION DENTAL
Status: DISCONTINUED | OUTPATIENT
Start: 2020-10-02 | End: 2020-10-02 | Stop reason: HOSPADM

## 2020-10-02 RX ORDER — TRANEXAMIC ACID 100 MG/ML
1000 INJECTION, SOLUTION INTRAVENOUS
Status: COMPLETED | OUTPATIENT
Start: 2020-10-02 | End: 2020-10-02

## 2020-10-02 RX ORDER — ENOXAPARIN SODIUM 100 MG/ML
40 INJECTION SUBCUTANEOUS EVERY 24 HOURS
Status: DISCONTINUED | OUTPATIENT
Start: 2020-10-03 | End: 2020-10-05 | Stop reason: HOSPADM

## 2020-10-02 RX ORDER — LIDOCAINE HYDROCHLORIDE 10 MG/ML
INJECTION, SOLUTION EPIDURAL; INFILTRATION; INTRACAUDAL; PERINEURAL
Status: DISCONTINUED | OUTPATIENT
Start: 2020-10-02 | End: 2020-10-02

## 2020-10-02 RX ORDER — PROPOFOL 10 MG/ML
VIAL (ML) INTRAVENOUS
Status: DISCONTINUED | OUTPATIENT
Start: 2020-10-02 | End: 2020-10-02

## 2020-10-02 RX ORDER — KETOROLAC TROMETHAMINE 30 MG/ML
INJECTION, SOLUTION INTRAMUSCULAR; INTRAVENOUS
Status: DISCONTINUED | OUTPATIENT
Start: 2020-10-02 | End: 2020-10-02 | Stop reason: HOSPADM

## 2020-10-02 RX ORDER — LORAZEPAM 0.5 MG/1
0.5 TABLET ORAL EVERY 8 HOURS PRN
Status: DISCONTINUED | OUTPATIENT
Start: 2020-10-02 | End: 2020-10-04

## 2020-10-02 RX ORDER — SODIUM CHLORIDE 0.9 % (FLUSH) 0.9 %
10 SYRINGE (ML) INJECTION
Status: DISCONTINUED | OUTPATIENT
Start: 2020-10-02 | End: 2020-10-05 | Stop reason: HOSPADM

## 2020-10-02 RX ORDER — HYDRALAZINE HYDROCHLORIDE 20 MG/ML
10 INJECTION INTRAMUSCULAR; INTRAVENOUS ONCE
Status: COMPLETED | OUTPATIENT
Start: 2020-10-02 | End: 2020-10-02

## 2020-10-02 RX ORDER — CEFAZOLIN SODIUM 2 G/50ML
2 SOLUTION INTRAVENOUS
Status: DISCONTINUED | OUTPATIENT
Start: 2020-10-02 | End: 2020-10-02 | Stop reason: HOSPADM

## 2020-10-02 RX ORDER — ONDANSETRON 2 MG/ML
4 INJECTION INTRAMUSCULAR; INTRAVENOUS DAILY PRN
Status: DISCONTINUED | OUTPATIENT
Start: 2020-10-02 | End: 2020-10-02 | Stop reason: HOSPADM

## 2020-10-02 RX ORDER — HYDROCODONE BITARTRATE AND ACETAMINOPHEN 5; 325 MG/1; MG/1
1 TABLET ORAL EVERY 4 HOURS PRN
Status: DISCONTINUED | OUTPATIENT
Start: 2020-10-02 | End: 2020-10-05 | Stop reason: HOSPADM

## 2020-10-02 RX ORDER — CEFAZOLIN SODIUM 1 G/3ML
INJECTION, POWDER, FOR SOLUTION INTRAMUSCULAR; INTRAVENOUS
Status: DISCONTINUED | OUTPATIENT
Start: 2020-10-02 | End: 2020-10-02

## 2020-10-02 RX ORDER — MORPHINE SULFATE 4 MG/ML
4 INJECTION, SOLUTION INTRAMUSCULAR; INTRAVENOUS ONCE
Status: COMPLETED | OUTPATIENT
Start: 2020-10-02 | End: 2020-10-02

## 2020-10-02 RX ORDER — SODIUM CHLORIDE, SODIUM LACTATE, POTASSIUM CHLORIDE, CALCIUM CHLORIDE 600; 310; 30; 20 MG/100ML; MG/100ML; MG/100ML; MG/100ML
INJECTION, SOLUTION INTRAVENOUS CONTINUOUS PRN
Status: DISCONTINUED | OUTPATIENT
Start: 2020-10-02 | End: 2020-10-02

## 2020-10-02 RX ORDER — ROCURONIUM BROMIDE 10 MG/ML
INJECTION, SOLUTION INTRAVENOUS
Status: DISCONTINUED | OUTPATIENT
Start: 2020-10-02 | End: 2020-10-02

## 2020-10-02 RX ORDER — PHENYLEPHRINE HYDROCHLORIDE 10 MG/ML
INJECTION INTRAVENOUS
Status: DISCONTINUED | OUTPATIENT
Start: 2020-10-02 | End: 2020-10-02

## 2020-10-02 RX ORDER — CEFAZOLIN SODIUM 2 G/50ML
2 SOLUTION INTRAVENOUS
Status: DISCONTINUED | OUTPATIENT
Start: 2020-10-02 | End: 2020-10-02

## 2020-10-02 RX ORDER — MIDAZOLAM HYDROCHLORIDE 1 MG/ML
INJECTION, SOLUTION INTRAMUSCULAR; INTRAVENOUS
Status: DISCONTINUED | OUTPATIENT
Start: 2020-10-02 | End: 2020-10-02

## 2020-10-02 RX ORDER — TRANEXAMIC ACID 100 MG/ML
1000 INJECTION, SOLUTION INTRAVENOUS
Status: DISCONTINUED | OUTPATIENT
Start: 2020-10-02 | End: 2020-10-02

## 2020-10-02 RX ORDER — CHLORHEXIDINE GLUCONATE ORAL RINSE 1.2 MG/ML
10 SOLUTION DENTAL 2 TIMES DAILY
Status: DISCONTINUED | OUTPATIENT
Start: 2020-10-02 | End: 2020-10-05 | Stop reason: HOSPADM

## 2020-10-02 RX ORDER — HYDROMORPHONE HYDROCHLORIDE 2 MG/ML
0.2 INJECTION, SOLUTION INTRAMUSCULAR; INTRAVENOUS; SUBCUTANEOUS EVERY 5 MIN PRN
Status: DISCONTINUED | OUTPATIENT
Start: 2020-10-02 | End: 2020-10-02 | Stop reason: HOSPADM

## 2020-10-02 RX ORDER — BUPIVACAINE HYDROCHLORIDE AND EPINEPHRINE 2.5; 5 MG/ML; UG/ML
INJECTION, SOLUTION EPIDURAL; INFILTRATION; INTRACAUDAL; PERINEURAL
Status: DISCONTINUED | OUTPATIENT
Start: 2020-10-02 | End: 2020-10-02 | Stop reason: HOSPADM

## 2020-10-02 RX ADMIN — TRANEXAMIC ACID 1000 MG: 100 INJECTION, SOLUTION INTRAVENOUS at 12:10

## 2020-10-02 RX ADMIN — CEFAZOLIN 2 G: 1 INJECTION, POWDER, FOR SOLUTION INTRAMUSCULAR; INTRAVENOUS at 12:10

## 2020-10-02 RX ADMIN — TRANEXAMIC ACID 1000 MG: 100 INJECTION, SOLUTION INTRAVENOUS at 01:10

## 2020-10-02 RX ADMIN — SODIUM CHLORIDE, SODIUM LACTATE, POTASSIUM CHLORIDE, AND CALCIUM CHLORIDE: .6; .31; .03; .02 INJECTION, SOLUTION INTRAVENOUS at 12:10

## 2020-10-02 RX ADMIN — SPIRONOLACTONE 25 MG: 25 TABLET ORAL at 09:10

## 2020-10-02 RX ADMIN — ROSUVASTATIN 10 MG: 10 TABLET, FILM COATED ORAL at 09:10

## 2020-10-02 RX ADMIN — PROPOFOL 70 MG: 10 INJECTION, EMULSION INTRAVENOUS at 12:10

## 2020-10-02 RX ADMIN — MORPHINE SULFATE 4 MG: 4 INJECTION, SOLUTION INTRAMUSCULAR; INTRAVENOUS at 11:10

## 2020-10-02 RX ADMIN — PANTOPRAZOLE SODIUM 40 MG: 40 TABLET, DELAYED RELEASE ORAL at 09:10

## 2020-10-02 RX ADMIN — METOPROLOL SUCCINATE 50 MG: 50 TABLET, EXTENDED RELEASE ORAL at 09:10

## 2020-10-02 RX ADMIN — ONDANSETRON 4 MG: 2 INJECTION, SOLUTION INTRAMUSCULAR; INTRAVENOUS at 02:10

## 2020-10-02 RX ADMIN — LORAZEPAM 0.5 MG: 0.5 TABLET ORAL at 01:10

## 2020-10-02 RX ADMIN — ROCURONIUM BROMIDE 30 MG: 10 INJECTION, SOLUTION INTRAVENOUS at 12:10

## 2020-10-02 RX ADMIN — CARBIDOPA AND LEVODOPA 2 TABLET: 25; 100 TABLET ORAL at 05:10

## 2020-10-02 RX ADMIN — PHENYLEPHRINE HYDROCHLORIDE 200 MCG: 10 INJECTION INTRAVENOUS at 01:10

## 2020-10-02 RX ADMIN — HYDRALAZINE HYDROCHLORIDE 10 MG: 20 INJECTION INTRAMUSCULAR; INTRAVENOUS at 03:10

## 2020-10-02 RX ADMIN — CHLORHEXIDINE GLUCONATE 0.12% ORAL RINSE 10 ML: 1.2 LIQUID ORAL at 09:10

## 2020-10-02 RX ADMIN — AMLODIPINE BESYLATE 2.5 MG: 2.5 TABLET ORAL at 09:10

## 2020-10-02 RX ADMIN — SODIUM CHLORIDE, SODIUM LACTATE, POTASSIUM CHLORIDE, AND CALCIUM CHLORIDE: .6; .31; .03; .02 INJECTION, SOLUTION INTRAVENOUS at 01:10

## 2020-10-02 RX ADMIN — MIDAZOLAM 2 MG: 1 INJECTION INTRAMUSCULAR; INTRAVENOUS at 12:10

## 2020-10-02 RX ADMIN — SUGAMMADEX 200 MG: 100 INJECTION, SOLUTION INTRAVENOUS at 02:10

## 2020-10-02 RX ADMIN — CARBIDOPA AND LEVODOPA 2 TABLET: 25; 100 TABLET ORAL at 09:10

## 2020-10-02 RX ADMIN — ROCURONIUM BROMIDE 20 MG: 10 INJECTION, SOLUTION INTRAVENOUS at 01:10

## 2020-10-02 RX ADMIN — LOSARTAN POTASSIUM 100 MG: 50 TABLET, FILM COATED ORAL at 09:10

## 2020-10-02 RX ADMIN — FENTANYL CITRATE 100 MCG: 50 INJECTION, SOLUTION INTRAMUSCULAR; INTRAVENOUS at 12:10

## 2020-10-02 RX ADMIN — CEFAZOLIN SODIUM 2 G: 2 SOLUTION INTRAVENOUS at 09:10

## 2020-10-02 RX ADMIN — ENALAPRILAT 1.25 MG: 1.25 INJECTION INTRAVENOUS at 03:10

## 2020-10-02 RX ADMIN — LIDOCAINE HYDROCHLORIDE 50 MG: 10 INJECTION, SOLUTION EPIDURAL; INFILTRATION; INTRACAUDAL; PERINEURAL at 12:10

## 2020-10-02 NOTE — H&P
[facility] Hospital Medicine  History & Physical    Patient Name: Tanya Madrid  MRN: [id number]  Admission Date: 10/1/2020  Attending Physician: Abhijit Fowler MD   Primary Care Provider: Marti Parsons MD         Patient information was obtained from patient, past medical records and ER records.     Subjective:     Principal Problem:Closed fracture of right hip    Chief Complaint:   Chief Complaint   Patient presents with    Knee Pain     slip and fall after taking double her dose of ativan, pt c/o R knee pain        HPI: 79 y/o AA F with hx of HTN, HLD, Parkinson's disease, stroked, L hip replacement to ED after experiencing an unwitnessed, ground-level trip and fall at home immediately PTA - daughter reports she found her mother on floor - pt denies LOC, states she lost her balance. This is the second fall in the past week per daughter, which immediately resulting in loss of ROM and pain to the R hip area. Denies chest pain, edema, palpitations, SOB, wheezing, abdominal pain, N/V/D, constipation, dysuria, flank pain, HA, dizziness, fever, cough, chills. Found in ED to have R femoral neck fracture - labs largely within normal limits and VSS, COVID-19 negative. Hospital medicine was called and pt was placed in observation on telemetry with plan for likely surgical intervention in AM. Pt is a full code - her daughter Josee Lemus is her surrogate decision maker.     Past Medical History:   Diagnosis Date    Hyperlipemia     Hypertension     Parkinson's disease     Stroke 2016    Throat mass        Past Surgical History:   Procedure Laterality Date    HEMIARTHROPLASTY OF HIP Left 7/12/2020    Procedure: HEMIARTHROPLASTY, HIP;  Surgeon: Humberto Valdivia DO;  Location: [facility] OR;  Service: Orthopedics;  Laterality: Left;    HYSTERECTOMY      THROAT SURGERY      TONSILLECTOMY         Review of patient's allergies indicates:  No Known Allergies    No current facility-administered  medications on file prior to encounter.      Current Outpatient Medications on File Prior to Encounter   Medication Sig    aspirin (ECOTRIN) 81 MG EC tablet Take 81 mg by mouth once daily.    carbidopa-levodopa  mg (SINEMET)  mg per tablet Take 2 tablets by mouth 4 (four) times daily.    clonazePAM (KLONOPIN) 0.5 MG tablet Take 0.5 mg by mouth 2 (two) times daily as needed for Anxiety.    LORazepam (ATIVAN) 0.5 MG tablet Take 1 tablet (0.5 mg total) by mouth every 8 (eight) hours as needed for Anxiety.    metoprolol succinate (TOPROL-XL) 25 MG 24 hr tablet Take 50 mg by mouth 2 (two) times daily.     omeprazole (PRILOSEC) 20 MG capsule 1 capsule    raloxifene (EVISTA) 60 mg tablet Take 60 mg by mouth once daily.    spironolactone (ALDACTONE) 25 MG tablet Take 25 mg by mouth once daily.    amLODIPine (NORVASC) 2.5 MG Tab Take 1 tablet (2.5 mg total) by mouth once daily.    clopidogrel (PLAVIX) 75 mg tablet Take 75 mg by mouth once daily.    HYDROcodone-acetaminophen (NORCO) 5-325 mg per tablet Take 1 tablet by mouth every 6 (six) hours as needed. (Patient not taking: Reported on 8/24/2020)    losartan (COZAAR) 100 MG tablet Take 100 mg by mouth once daily.    multivitamin capsule Take 1 capsule by mouth once daily.    nozaseptin (NOZIN) nasal  1 each by Each Nostril route 2 (two) times daily.    omeprazole (PRILOSEC) 20 MG capsule Take 20 mg by mouth once daily.    oxyCODONE-acetaminophen (PERCOCET) 5-325 mg per tablet Take 1 tablet by mouth every 6 (six) hours as needed (moderate pain 2-5/10 pain scale). (Patient not taking: Reported on 8/24/2020)    potassium chloride (KLOR-CON) 10 MEQ TbSR Take 10 mEq by mouth once daily.    rosuvastatin (CRESTOR) 10 MG tablet Take 10 mg by mouth once daily.     Family History     Reviewed and not pertinent.         Tobacco Use    Smoking status: Never Smoker    Smokeless tobacco: Never Used   Substance and Sexual Activity    Alcohol use:  No    Drug use: No    Sexual activity: Not Currently     Review of Systems   Constitutional: Positive for activity change. Negative for chills, diaphoresis, fatigue and fever.   HENT: Negative for congestion, trouble swallowing and voice change.    Eyes: Negative for photophobia and discharge.   Respiratory: Negative for cough, chest tightness, shortness of breath and wheezing.    Cardiovascular: Negative for chest pain, palpitations and leg swelling.   Gastrointestinal: Negative for abdominal pain, blood in stool, constipation, diarrhea, nausea and vomiting.   Endocrine: Negative for cold intolerance, heat intolerance, polydipsia, polyphagia and polyuria.   Genitourinary: Negative for difficulty urinating, dysuria, flank pain, frequency and urgency.   Musculoskeletal: Positive for arthralgias and gait problem (R hip fracture). Negative for back pain (R hip pain), joint swelling and myalgias.   Skin: Negative for rash and wound.   Neurological: Positive for weakness (R hip d/t fracture). Negative for dizziness, seizures, syncope, facial asymmetry, light-headedness, numbness and headaches.   Psychiatric/Behavioral: Negative for confusion and hallucinations.     Objective:     Vital Signs (Most Recent):  Temp: 98.8 °F (37.1 °C) (10/01/20 2136)  Pulse: 100 (10/01/20 2136)  Resp: 20 (10/01/20 2136)  BP: (!) 179/98 (10/01/20 2136)  SpO2: 99 % (10/01/20 2136) Vital Signs (24h Range):  Temp:  [97 °F (36.1 °C)-98.8 °F (37.1 °C)] 98.8 °F (37.1 °C)  Pulse:  [] 100  Resp:  [16-22] 20  SpO2:  [96 %-99 %] 99 %  BP: (134-179)/() 179/98     Weight: 59 kg (130 lb)  Body mass index is 23.78 kg/m².    Physical Exam  Vitals signs reviewed.   Constitutional:       General: She is not in acute distress.     Appearance: Normal appearance. She is not toxic-appearing or diaphoretic.   HENT:      Head: Normocephalic and atraumatic.      Nose: Nose normal. No congestion.      Mouth/Throat:      Mouth: Mucous membranes are  moist.      Pharynx: No oropharyngeal exudate.   Eyes:      General: No scleral icterus.     Pupils: Pupils are equal, round, and reactive to light.   Neck:      Musculoskeletal: Normal range of motion and neck supple. No muscular tenderness.   Cardiovascular:      Rate and Rhythm: Normal rate and regular rhythm.      Pulses: Normal pulses.      Heart sounds: Normal heart sounds.   Pulmonary:      Effort: Pulmonary effort is normal. No respiratory distress.      Breath sounds: Normal breath sounds. No wheezing or rhonchi.   Abdominal:      General: Abdomen is flat. Bowel sounds are normal. There is no distension.      Palpations: Abdomen is soft.      Tenderness: There is no abdominal tenderness. There is no rebound.      Hernia: A hernia (RLQ) is present.   Musculoskeletal: Normal range of motion.         General: No swelling or deformity.      Right lower leg: No edema.      Left lower leg: No edema.      Comments: RLE, shortened - externally rotated, limited ROM at hip   Skin:     General: Skin is warm and dry.      Capillary Refill: Capillary refill takes less than 2 seconds.      Coloration: Skin is not jaundiced.      Findings: No bruising or rash.      Comments: Scabbed abrasion to R knee, ALYSIA, no s/s infection   Neurological:      General: No focal deficit present.      Mental Status: She is alert and oriented to person, place, and time.      Cranial Nerves: No cranial nerve deficit.      Sensory: No sensory deficit.      Coordination: Coordination normal.   Psychiatric:         Mood and Affect: Mood normal. Affect is flat.         Speech: Speech is delayed.         Behavior: Behavior normal.         Thought Content: Thought content normal.         Cognition and Memory: Cognition is not impaired.         Judgment: Judgment normal.      Comments: Baseline per daughter (parkinsons)           CRANIAL NERVES     CN III, IV, VI   Pupils are equal, round, and reactive to light.       Significant Labs:   Results  for orders placed or performed during the hospital encounter of 10/01/20   CBC auto differential   Result Value Ref Range    WBC 9.41 3.90 - 12.70 K/uL    RBC 4.04 4.00 - 5.40 M/uL    Hemoglobin 11.8 (L) 12.0 - 16.0 g/dL    Hematocrit 37.9 37.0 - 48.5 %    Mean Corpuscular Volume 94 82 - 98 fL    Mean Corpuscular Hemoglobin 29.2 27.0 - 31.0 pg    Mean Corpuscular Hemoglobin Conc 31.1 (L) 32.0 - 36.0 g/dL    RDW 14.1 11.5 - 14.5 %    Platelets 359 (H) 150 - 350 K/uL    MPV 10.6 9.2 - 12.9 fL    Immature Granulocytes 0.5 0.0 - 0.5 %    Gran # (ANC) 7.2 1.8 - 7.7 K/uL    Immature Grans (Abs) 0.05 (H) 0.00 - 0.04 K/uL    Lymph # 1.2 1.0 - 4.8 K/uL    Mono # 0.9 0.3 - 1.0 K/uL    Eos # 0.1 0.0 - 0.5 K/uL    Baso # 0.04 0.00 - 0.20 K/uL    nRBC 0 0 /100 WBC    Gran% 76.5 (H) 38.0 - 73.0 %    Lymph% 12.8 (L) 18.0 - 48.0 %    Mono% 9.2 4.0 - 15.0 %    Eosinophil% 0.6 0.0 - 8.0 %    Basophil% 0.4 0.0 - 1.9 %    Differential Method Automated    Comprehensive metabolic panel   Result Value Ref Range    Sodium 139 136 - 145 mmol/L    Potassium 3.8 3.5 - 5.1 mmol/L    Chloride 101 95 - 110 mmol/L    CO2 24 23 - 29 mmol/L    Glucose 124 (H) 70 - 110 mg/dL    BUN, Bld 12 8 - 23 mg/dL    Creatinine 0.9 0.5 - 1.4 mg/dL    Calcium 9.8 8.7 - 10.5 mg/dL    Total Protein 8.1 6.0 - 8.4 g/dL    Albumin 4.0 3.5 - 5.2 g/dL    Total Bilirubin 0.3 0.1 - 1.0 mg/dL    Alkaline Phosphatase 81 55 - 135 U/L    AST 34 10 - 40 U/L    ALT 6 (L) 10 - 44 U/L    Anion Gap 14 8 - 16 mmol/L    eGFR if African American >60 >60 mL/min/1.73 m^2    eGFR if non African American 60 >60 mL/min/1.73 m^2   APTT   Result Value Ref Range    aPTT 24.1 21.0 - 32.0 sec   Protime-INR   Result Value Ref Range    Prothrombin Time 10.9 9.0 - 12.5 sec    INR 1.0 0.8 - 1.2   COVID-19 Rapid Screening   Result Value Ref Range    SARS-CoV-2 RNA, Amplification, Qual Negative Negative         Significant Imaging:   Imaging Results          X-Ray Hip 2 View Right (Final result)   Result time 10/01/20 19:58:16    Final result by Cornell Goldstein III, MD (10/01/20 19:58:16)                 Impression:      Acute/recent fracture through the right femoral neck with superolateral displacement of the femoral shaft.      Electronically signed by: Cornell Goldstein MD  Date:    10/01/2020  Time:    19:58             Narrative:    EXAMINATION:  XR HIP 2 VIEW RIGHT    CLINICAL HISTORY:  Pain in right hip    COMPARISON:  None    FINDINGS:  There is no acute/recent fracture through the right femoral neck with superolateral displacement of the main femoral shaft from the femoral head.  The head is projected over the acetabular cavity without dislocation.                              Assessment/Plan:     * Closed fracture of right hip  Orthopedic surgery consulted - plan to OR in AM  Type and screen sent  Neuro checks with VS  NPO after MN for AM procedure  PT/OT to eval/treat post-op  Holding home ASA, plavix for AM procedure - resume when cleared by surgery        Essential hypertension  Currently stable  Resumed home medications  Enlaprilat IV prn elevated BP  Monitor closely      Parkinson disease  Likely contributory to fall - continue home sinemet  Pt already scheduled for outpatient PT/OT at [place]  PT/OT to eval and treat while inpatient and post-op  Fall precautions      GERD (gastroesophageal reflux disease)  Continue home PPI      Hyperlipidemia  Continue home statin        VTE Risk Mitigation (From admission, onward)         Ordered     Place sequential compression device  Until discontinued      10/01/20 2132     Place sequential compression device  Until discontinued      10/01/20 2114     IP VTE HIGH RISK PATIENT  Once      10/01/20 2114                   Moon Martinez NP  Department of Hospital Medicine   [facility]

## 2020-10-02 NOTE — ASSESSMENT & PLAN NOTE
Likely contributory to fall - continue home sinemet  Pt already scheduled for outpatient PT/OT at [place]  PT/OT to eval and treat while inpatient and post-op  Fall precautions

## 2020-10-02 NOTE — ANESTHESIA PROCEDURE NOTES
Intubation  Performed by: Nalini Partida CRNA  Authorized by: Mariaelena Jones MD     Intubation:     Induction:  Intravenous    Intubated:  Postinduction    Mask Ventilation:  Easy mask    Attempts:  1    Attempted By:  CRNA    Method of Intubation:  Direct    Blade:  [place]field 2    Laryngeal View Grade: Grade I - full view of chords      Difficult Airway Encountered?: No      Complications:  None    Airway Device:  Oral endotracheal tube    Airway Device Size:  7.0    Style/Cuff Inflation:  Cuffed    Tube secured:  21    Secured at:  The lips    Placement Verified By:  Capnometry    Complicating Factors:  None    Findings Post-Intubation:  BS equal bilateral and atraumatic/condition of teeth unchanged

## 2020-10-02 NOTE — TRANSFER OF CARE
"Anesthesia Transfer of Care Note    Patient: Tanya Madrid    Procedure(s) Performed: Procedure(s) (LRB):  HEMIARTHROPLASTY, HIP (Right)    Patient location: PACU    Anesthesia Type: general    Transport from OR: Transported from OR on room air with adequate spontaneous ventilation    Post pain: adequate analgesia    Post assessment: no apparent anesthetic complications    Post vital signs: stable    Level of consciousness: responds to stimulation    Nausea/Vomiting: no nausea/vomiting    Complications: none    Transfer of care protocol was followed      Last vitals:   Visit Vitals  BP (!) 165/80 (BP Location: Left arm, Patient Position: Lying)   Pulse 100   Temp 37.7 °C (99.9 °F) (Oral)   Resp 16   Ht 5' 2" (1.575 m)   Wt 54.9 kg (121 lb)   SpO2 98%   Breastfeeding No   BMI 22.13 kg/m²     "

## 2020-10-02 NOTE — ASSESSMENT & PLAN NOTE
Likely contributory to fall - continue home sinemet  Pt already scheduled for outpatient PT/OT at [place]  PT/OT to eval and treat while inpatient and post-op  Fall precautions

## 2020-10-02 NOTE — OP NOTE
[facility]   General Surgery  Operative Note    SUMMARY     Date of Procedure: 10/2/2020     Procedure: Procedure(s) (LRB):  HEMIARTHROPLASTY, HIP (Right)       Surgeon(s) and Role:     * Loyd Kearney MD - Primary    Assisting Surgeon: None    Pre-Operative Diagnosis: Right hip pain [M25.551]    Post-Operative Diagnosis: Post-Op Diagnosis Codes:     * Right hip pain [M25.551]    Anesthesia: Choice    Technical Procedures Used:  Cemented right hip hemiarthroplasty    Description of the Findings of the Procedure:  Patient identified in preop holding area.  At that time we reviewed indications for surgery, advantages, disadvantages, alternatives, potential complications, likely postsurgical course.  This discussion was undertaken both the patient and her daughter who is power of .  Consent obtained surgical site marked.  Patient transferred the operating theater.  Anesthesia administered general anesthetic.  Patient is placed in lateral decubitus position.  All bony prominences well padded and axillary roll placed.  The extremity was prepped and draped in a standard aseptic manner.  Antibiotics administered time-out performed.  Posterolateral incision was made.  Sharp dissection down to the underlying fascia.  Fascia incised.  Charnley retractor placed.  Dissection down the external rotators.  The sciatic nerve was identified and protected.  The external rotators were tagged and ligated off their insertion on the proximal femur.  This allowed access the underlying femoral head.  An H-type capsulotomy was made.  Femoral head removed in its entirety.  The femoral neck cut was made approximately 1 fingerbreadth above the lesser trochanter in sequential broaching commenced insuring appropriate anteversion lateralization.  Final trial components placed hip reduced.  I achieved appropriate range of motion, stability, leg length Trial broach was removed.  Wound bed thoroughly irrigated and dried.   Cement placed.  Final stem placed.  Cement allowed to cure.  Trial head components replaced.  The hip reduced.  Patient had appropriate range of motion, stability, leg lengths.  Trial head components removed.  Wound bed thoroughly irrigated.  File head components placed.  Hip reduced.  Again achieved similar range of motion, leg length, and stability.  The external rotators repaired back to the proximal femur using bone tunnels.  The fascia was closed using 1 Vicryl.  Subcutaneous tissue 2 Monocryl cynthia 3 0 nylon for the skin.  Sterile dressings placed.  Abduction pillow placed.  Patient extubated returned to recovery without event.  Postop plan:  Patient will be weight-bearing as tolerated.  She will receive 24 hr IV antibiotics.  She will be on DVT prophylaxis for 4-6 weeks.  She will need her abduction pillow and posterior hip precautions for 6-8 weeks.    Significant Surgical Tasks Conducted by the Assistant(s), if Applicable: Humberto SON     Complications: No    Estimated Blood Loss (EBL): * No values recorded between 10/2/2020 12:39 PM and 10/2/2020  2:16 PM *           Implants:   Implant Name Type Inv. Item Serial No.  Lot No. LRB No. Used Action   CEMENT BONE SMRTST GENTEC 40GR - SN/A  CEMENT BONE SMRTST GENTEC 40GR N/A [employer] [id number] Right 2 Implanted   CENTRALIZER STEM FEM DST 11MM - SN/A  CENTRALIZER STEM FEM DST 11MM N/A [employer] [id number] Right 1 Implanted   STEM FEM TAPER HIP SZ 5 - SN/A  STEM FEM TAPER HIP SZ 5 N/A [employer] [id number] Right 1 Implanted   PREP KIT W/RESTRICTORS   N/A [employer] [id number] Right 1 Implanted   HEAD ARTICUL 28MM - SN/A  HEAD ARTICUL 28MM N/A [employer] [id number] Right 1 Implanted   SELF CENTERING BI-POLAR HEAD   N/A [employer] [id number] Right 1 Implanted       Specimens:   Specimen (12h ago, onward)    None                  Condition: Good    Disposition: PACU - hemodynamically stable.    Attestation: I was present and scrubbed for the entire procedure.

## 2020-10-02 NOTE — SUBJECTIVE & OBJECTIVE
Interval History: Surgery planned for today.     Review of Systems   Constitutional: Positive for activity change. Negative for chills, diaphoresis, fatigue and fever.   HENT: Negative for congestion, trouble swallowing and voice change.    Eyes: Negative for photophobia and discharge.   Respiratory: Negative for cough, chest tightness, shortness of breath and wheezing.    Cardiovascular: Negative for chest pain, palpitations and leg swelling.   Gastrointestinal: Negative for abdominal pain, blood in stool, constipation, diarrhea, nausea and vomiting.   Endocrine: Negative for cold intolerance, heat intolerance, polydipsia, polyphagia and polyuria.   Genitourinary: Negative for difficulty urinating, dysuria, flank pain, frequency and urgency.   Musculoskeletal: Positive for arthralgias and gait problem (R hip fracture). Negative for back pain (R hip pain), joint swelling and myalgias.   Skin: Negative for rash and wound.   Neurological: Positive for weakness (R hip d/t fracture). Negative for dizziness, seizures, syncope, facial asymmetry, light-headedness, numbness and headaches.   Psychiatric/Behavioral: Negative for confusion and hallucinations.     Objective:     Vital Signs (Most Recent):  Temp: 99.9 °F (37.7 °C) (10/02/20 0732)  Pulse: 100 (10/02/20 0732)  Resp: (Given to sterile field for MD use) (10/02/20 1145)  BP: (!) 165/80 (10/02/20 0732)  SpO2: 98 % (10/02/20 0732) Vital Signs (24h Range):  Temp:  [97 °F (36.1 °C)-99.9 °F (37.7 °C)] 99.9 °F (37.7 °C)  Pulse:  [] 100  Resp:  [16-22] 16  SpO2:  [96 %-100 %] 98 %  BP: (134-191)/() 165/80     Weight: 54.9 kg (121 lb)  Body mass index is 22.13 kg/m².    Intake/Output Summary (Last 24 hours) at 10/2/2020 1339  Last data filed at 10/2/2020 0600  Gross per 24 hour   Intake --   Output 1000 ml   Net -1000 ml      Physical Exam  Vitals signs reviewed.   Constitutional:       General: She is not in acute distress.     Appearance: Normal appearance. She  is not toxic-appearing or diaphoretic.   HENT:      Head: Normocephalic and atraumatic.      Nose: Nose normal. No congestion.      Mouth/Throat:      Mouth: Mucous membranes are moist.      Pharynx: No oropharyngeal exudate.   Eyes:      General: No scleral icterus.     Pupils: Pupils are equal, round, and reactive to light.   Neck:      Musculoskeletal: Normal range of motion and neck supple. No muscular tenderness.   Cardiovascular:      Rate and Rhythm: Normal rate and regular rhythm.      Pulses: Normal pulses.      Heart sounds: Normal heart sounds.   Pulmonary:      Effort: Pulmonary effort is normal. No respiratory distress.      Breath sounds: Normal breath sounds. No wheezing or rhonchi.   Abdominal:      General: Abdomen is flat. Bowel sounds are normal. There is no distension.      Palpations: Abdomen is soft.      Tenderness: There is no abdominal tenderness. There is no rebound.      Hernia: A hernia (RLQ) is present.   Musculoskeletal: Normal range of motion.         General: No swelling or deformity.      Right lower leg: No edema.      Left lower leg: No edema.      Comments: RLE, shortened - externally rotated, limited ROM at hip   Skin:     General: Skin is warm and dry.      Capillary Refill: Capillary refill takes less than 2 seconds.      Coloration: Skin is not jaundiced.      Findings: No bruising or rash.      Comments: Scabbed abrasion to R knee, ALYSIA, no s/s infection   Neurological:      General: No focal deficit present.      Mental Status: She is alert and oriented to person, place, and time.      Cranial Nerves: No cranial nerve deficit.      Sensory: No sensory deficit.      Coordination: Coordination normal.   Psychiatric:         Mood and Affect: Mood normal. Affect is flat.         Speech: Speech is delayed.         Behavior: Behavior normal.         Thought Content: Thought content normal.         Cognition and Memory: Cognition is not impaired.         Judgment: Judgment normal.       Comments: Baseline per daughter (parkinsons)         Significant Labs:   BMP:   Recent Labs   Lab 10/02/20  0717         K 3.5      CO2 22*   BUN 10   CREATININE 0.8   CALCIUM 8.9   MG 2.0     CBC:   Recent Labs   Lab 10/01/20  1951 10/02/20  0717   WBC 9.41 8.28   HGB 11.8* 10.7*   HCT 37.9 34.4*   * 323     CMP:   Recent Labs   Lab 10/01/20  1953 10/02/20  0717    139   K 3.8 3.5    106   CO2 24 22*   * 106   BUN 12 10   CREATININE 0.9 0.8   CALCIUM 9.8 8.9   PROT 8.1  --    ALBUMIN 4.0  --    BILITOT 0.3  --    ALKPHOS 81  --    AST 34  --    ALT 6*  --    ANIONGAP 14 11   EGFRNONAA 60 >60     Cardiac Markers: No results for input(s): CKMB, MYOGLOBIN, BNP, TROPISTAT in the last 48 hours.  Coagulation:   Recent Labs   Lab 10/01/20  1951   INR 1.0   APTT 24.1     All pertinent labs within the past 24 hours have been reviewed.    Significant Imaging:  Imaging Results          X-Ray Hip 2 View Right (Final result)  Result time 10/01/20 19:58:16    Final result by Cornell Goldstein III, MD (10/01/20 19:58:16)                 Impression:      Acute/recent fracture through the right femoral neck with superolateral displacement of the femoral shaft.      Electronically signed by: Cornell Goldstein MD  Date:    10/01/2020  Time:    19:58             Narrative:    EXAMINATION:  XR HIP 2 VIEW RIGHT    CLINICAL HISTORY:  Pain in right hip    COMPARISON:  None    FINDINGS:  There is no acute/recent fracture through the right femoral neck with superolateral displacement of the main femoral shaft from the femoral head.  The head is projected over the acetabular cavity without dislocation.

## 2020-10-02 NOTE — ASSESSMENT & PLAN NOTE
Orthopedic surgery consulted - plan to OR in AM  Type and screen sent  Neuro checks with VS  NPO after MN for AM procedure  PT/OT to eval/treat post-op  Holding home ASA, plavix for AM procedure - resume when cleared by surgery

## 2020-10-02 NOTE — CONSULTS
[facility] -   Orthopedics  Consult Note    Patient Name: Tanya Madrid  MRN: [id number]  Admission Date: 10/1/2020  Hospital Length of Stay: 0 days  Attending Provider: Elvin Alvarez MD  Primary Care Provider: Marti Parsons MD    Patient information was obtained from relative(s) and ER records.     Consults  Subjective:     Principal Problem:Closed fracture of right hip    Chief Complaint:   Chief Complaint   Patient presents with    Knee Pain     slip and fall after taking double her dose of ativan, pt c/o R knee pain        HPI: 79 y/o AA F with hx of HTN, HLD, Parkinson's disease, stroked, L hip replacement to ED after experiencing an unwitnessed, ground-level trip and fall at home immediately PTA - daughter reports she found her mother on floor - pt denies LOC, states she lost her balance. This is the second fall in the past week per daughter, which immediately resulting in loss of ROM and pain to the R hip area. Denies chest pain, edema, palpitations, SOB, wheezing, abdominal pain, N/V/D, constipation, dysuria, flank pain, HA, dizziness, fever, cough, chills. Found in ED to have R femoral neck fracture - labs largely within normal limits and VSS, COVID-19 negative. Hospital medicine was called and pt was placed in observation on telemetry with plan for likely surgical intervention in AM. Pt is a full code - her daughter Josee Lemus is her surrogate decision maker.     Past Medical History:   Diagnosis Date    Hyperlipemia     Hypertension     Parkinson's disease     Stroke 2016    Throat mass        Past Surgical History:   Procedure Laterality Date    HEMIARTHROPLASTY OF HIP Left 7/12/2020    Procedure: HEMIARTHROPLASTY, HIP;  Surgeon: Humberto Valdivia DO;  Location: [facility] OR;  Service: Orthopedics;  Laterality: Left;    HYSTERECTOMY      THROAT SURGERY      TONSILLECTOMY         Review of patient's allergies indicates:  No Known Allergies    Current Facility-Administered  Medications   Medication    amLODIPine tablet 2.5 mg    carbidopa-levodopa  mg per tablet 2 tablet    cefazolin (ANCEF) 2 gram in dextrose 5% 50 mL IVPB (premix)    enalaprilat injection 1.25 mg    influenza (QUADRIVALENT ADJUVANTED PF) vaccine 0.5 mL    LORazepam tablet 0.5 mg    losartan tablet 100 mg    metoprolol succinate (TOPROL-XL) 24 hr tablet 50 mg    morphine injection 4 mg    pantoprazole EC tablet 40 mg    rosuvastatin tablet 10 mg    sodium chloride 0.9% flush 10 mL    spironolactone tablet 25 mg     Family History     None        Tobacco Use    Smoking status: Never Smoker    Smokeless tobacco: Never Used   Substance and Sexual Activity    Alcohol use: No    Drug use: No    Sexual activity: Not Currently     ROS   Review of Systems   Constitutional: Positive for activity change. Negative for chills, diaphoresis, fatigue and fever.   HENT: Negative for congestion, trouble swallowing and voice change.    Eyes: Negative for photophobia and discharge.   Respiratory: Negative for cough, chest tightness, shortness of breath and wheezing.    Cardiovascular: Negative for chest pain, palpitations and leg swelling.   Gastrointestinal: Negative for abdominal pain, blood in stool, constipation, diarrhea, nausea and vomiting.   Endocrine: Negative for cold intolerance, heat intolerance, polydipsia, polyphagia and polyuria.   Genitourinary: Negative for difficulty urinating, dysuria, flank pain, frequency and urgency.   Musculoskeletal: Positive for arthralgias and gait problem (R hip fracture). Negative for back pain (R hip pain), joint swelling and myalgias.   Skin: Negative for rash and wound.   Neurological: Positive for weakness (R hip d/t fracture). Negative for dizziness, seizures, syncope, facial asymmetry, light-headedness, numbness and headaches.   Psychiatric/Behavioral: Negative for confusion and hallucinations.   Objective:     Vital Signs (Most Recent):  Temp: 99.9 °F (37.7 °C)  "(10/02/20 0732)  Pulse: 100 (10/02/20 0732)  Resp: 16 (10/02/20 0732)  BP: (!) 165/80 (10/02/20 0732)  SpO2: 98 % (10/02/20 0732) Vital Signs (24h Range):  Temp:  [97 °F (36.1 °C)-99.9 °F (37.7 °C)] 99.9 °F (37.7 °C)  Pulse:  [] 100  Resp:  [16-22] 16  SpO2:  [96 %-100 %] 98 %  BP: (134-191)/() 165/80     Weight: 54.9 kg (121 lb)  Height: 5' 2" (157.5 cm)  Body mass index is 22.13 kg/m².      Intake/Output Summary (Last 24 hours) at 10/2/2020 1133  Last data filed at 10/2/2020 0600  Gross per 24 hour   Intake --   Output 1000 ml   Net -1000 ml       Physical Exam  Vitals signs reviewed.   Constitutional:       General: She is not in acute distress.     Appearance: Normal appearance. She is not toxic-appearing or diaphoretic.   HENT:      Head: Normocephalic and atraumatic.      Nose: Nose normal. No congestion.      Mouth/Throat:      Mouth: Mucous membranes are moist.      Pharynx: No oropharyngeal exudate.   Eyes:      General: No scleral icterus.     Pupils: Pupils are equal, round, and reactive to light.   Neck:      Musculoskeletal: Normal range of motion and neck supple. No muscular tenderness.   Cardiovascular:      Rate and Rhythm: Normal rate and regular rhythm.      Pulses: Normal pulses.      Heart sounds: Normal heart sounds.   Pulmonary:      Effort: Pulmonary effort is normal. No respiratory distress.      Breath sounds: Normal breath sounds. No wheezing or rhonchi.   Abdominal:      General: Abdomen is flat. Bowel sounds are normal. There is no distension.      Palpations: Abdomen is soft.      Tenderness: There is no abdominal tenderness. There is no rebound.      Hernia: A hernia (RLQ) is present.   Musculoskeletal: Normal range of motion.         General: No swelling or deformity.      Right lower leg: No edema.      Left lower leg: No edema.      Comments: RLE, shortened - externally rotated, limited ROM at hip  hip flexion contractures  Skin:     General: Skin is warm and dry.      " Capillary Refill: Capillary refill takes less than 2 seconds.      Coloration: Skin is not jaundiced.      Findings: No bruising or rash.      Comments: Scabbed abrasion to R knee, ALYSIA, no s/s infection   Neurological:      General: No focal deficit present.      Mental Status: She is alert and oriented to person, place, and time.      Cranial Nerves: No cranial nerve deficit.      Sensory: No sensory deficit.      Coordination: Coordination normal.   Psychiatric:         Mood and Affect: Mood normal. Affect is flat.         Speech: Speech is delayed.         Behavior: Behavior normal.         Thought Content: Thought content normal.         Cognition and Memory: Cognition is not impaired.         Judgment: Judgment normal.      Comments: Baseline per daughter (parkinsons)      Significant Labs: All pertinent labs within the past 24 hours have been reviewed.    Significant Imaging: X-Ray: I have reviewed all pertinent results/findings and my personal findings are:  Displaced femoral neck fracture    Assessment/Plan:  Diagnosis and treatment options were discussed in detail.  Currently recommending right hip hemiarthroplasty.  I pursued a prolonged discussion with patient and her daughter who provides consent and power of .  I emphasized patient is high risk for surgery complication including but not limited to infection, wound complication, and  Dislocation.  Patient should be medically optimized, NPO, and anticipate surgery later today.     Active Diagnoses:    Diagnosis Date Noted POA    PRINCIPAL PROBLEM:  Closed fracture of right hip [S72.001A] 10/01/2020 Yes    Essential hypertension [I10] 07/24/2019 Yes     Chronic    GERD (gastroesophageal reflux disease) [K21.9] 07/11/2017 Yes     Chronic    Hyperlipidemia [E78.5] 07/11/2017 Yes    Parkinson disease [G20] 07/11/2017 Yes     Chronic      Problems Resolved During this Admission:       Thank you for your consult. I will follow-up with patient.  Please contact us if you have any additional questions.    Loyd Kearney MD  Orthopedics  [facility]

## 2020-10-02 NOTE — ED PROVIDER NOTES
SCRIBE #1 NOTE: I, Tamar uJan, am scribing for, and in the presence of, Cornell Salas MD. I have scribed the HPI, ROS, and PEx.      History     Chief Complaint   Patient presents with    Knee Pain     slip and fall after taking double her dose of ativan, pt c/o R knee pain     Review of patient's allergies indicates:  No Known Allergies      History of Present Illness     HPI    10/1/2020, 7:18 PM  History obtained from the patient and patient's daughter      History of Present Illness: Tanya Madrid is a 80 y.o. female patient with a h/o HLD, HTN, Parkinson's disease, stroke, who presents to the Emergency Department for evaluation of a fall which onset early this evening. Pt reports using a walker when ambulating and states that she slipped and fell. Pt's daughter found the pt on the floor when she got home from work and reports that the pt was unable to move her right leg. Daughter states that the pt broke her L hip back in July of this year. Associated sxs include R hip pain and R knee pain. Symptoms are constant and moderate in severity. Pt's sxs are worsened with movement. Patient denies hitting her head, any LOC, SOB, CP, light-headedness, dizziness, back pain, neck pain, HA, and all other sxs at this time. No prior Tx reported. No further complaints or concerns at this time.       Arrival mode: EMS    PCP: Marti Parsons MD      Past Medical History:  Past Medical History:   Diagnosis Date    Hyperlipemia     Hypertension     Parkinson's disease     Stroke 2016    Throat mass        Past Surgical History:  Past Surgical History:   Procedure Laterality Date    HEMIARTHROPLASTY OF HIP Left 7/12/2020    Procedure: HEMIARTHROPLASTY, HIP;  Surgeon: Humberto Valdivia DO;  Location: [facility];  Service: Orthopedics;  Laterality: Left;    HYSTERECTOMY      THROAT SURGERY      TONSILLECTOMY           Family History:  History reviewed. No pertinent family history.      Social History:   Social  History     Tobacco Use    Smoking status: Never Smoker    Smokeless tobacco: Never Used   Substance and Sexual Activity    Alcohol use: No    Drug use: No    Sexual activity: Not Currently        Review of Systems     Review of Systems   Constitutional: Negative for fever.   HENT: Negative for sore throat.    Respiratory: Negative for shortness of breath.    Cardiovascular: Negative for chest pain.   Gastrointestinal: Negative for nausea and vomiting.   Genitourinary: Negative for dysuria.   Musculoskeletal: Negative for back pain and neck pain.        (+) R hip pain  (+) R knee pain   Skin: Negative for rash.   Neurological: Negative for dizziness, weakness, light-headedness and headaches.        (-) LOC   Hematological: Does not bruise/bleed easily.   All other systems reviewed and are negative.       Physical Exam     Initial Vitals [10/01/20 1817]   BP Pulse Resp Temp SpO2   134/80 75 16 98.5 °F (36.9 °C) 98 %      MAP       --          Physical Exam  Nursing Notes and Vital Signs Reviewed.  Constitutional: Patient is in no acute distress distress. Awake and alert. Appropriate for age.   Head: Atraumatic. Midface is stable. No Raccoon's eyes. No Quintanilla's sign.   Eyes: EOM normal. Conjunctivae normal.   Ears: No hemotympanum.   Nose: No nasal deformity. No septal hematoma.   Mouth/Throat: Airway intact. No malocclusion. No dental trauma.   Neck: Trachea midline. No cervical bony tenderness, deformities, or step-offs.   Cardiovascular: Regular rate and rhythm. Heart sounds normal. Peripheral pulses are 2+ bilaterally in all extremities.   Pulmonary/Chest: Breath sounds are normal bilaterally. No decreased breath sounds. No respiratory distress. No external evidence of chest trauma based on inspection. Chest wall is non-tender. No crepitus. No asymmetric rise. No flail segment.   Abdominal: Soft and non-distended. No tenderness. No external evidence of abd trauma based on inspection.   Back: No midline bony  "tenderness, deformities, or step-offs of the T-spine or L-spine. No abrasions or ecchymosis.   Musculoskeletal: Tenderness to palpation of right hip. RLE is externally rotated and shortened. No TTP or effusion of the right knee. LLE exam WNL  Skin: Normal color. No abrasions. No lacerations.   Neurological: Alert and oriented x3. GCS 15. Strength is normal, equal, 5/5 in bilateral upper and lower extremities. No sensory deficits to light touch. Non-focal neurological examination.   Psychiatric: Normal affect.     ED Course   Procedures  ED Vital Signs:  Vitals:    10/01/20 1817   BP: 134/80   Pulse: 75   Resp: 16   Temp: 98.5 °F (36.9 °C)   TempSrc: Oral   SpO2: 98%   Weight: 59 kg (130 lb)   Height: 5' 2" (1.575 m)       Abnormal Lab Results:  Labs Reviewed   CBC W/ AUTO DIFFERENTIAL - Abnormal; Notable for the following components:       Result Value    Hemoglobin 11.8 (*)     Mean Corpuscular Hemoglobin Conc 31.1 (*)     Platelets 359 (*)     Immature Grans (Abs) 0.05 (*)     Gran% 76.5 (*)     Lymph% 12.8 (*)     All other components within normal limits   COMPREHENSIVE METABOLIC PANEL   APTT   PROTIME-INR   SARS-COV-2 RNA AMPLIFICATION, QUAL        All Lab Results:  Results for orders placed or performed during the hospital encounter of 10/01/20   CBC auto differential   Result Value Ref Range    WBC 9.41 3.90 - 12.70 K/uL    RBC 4.04 4.00 - 5.40 M/uL    Hemoglobin 11.8 (L) 12.0 - 16.0 g/dL    Hematocrit 37.9 37.0 - 48.5 %    Mean Corpuscular Volume 94 82 - 98 fL    Mean Corpuscular Hemoglobin 29.2 27.0 - 31.0 pg    Mean Corpuscular Hemoglobin Conc 31.1 (L) 32.0 - 36.0 g/dL    RDW 14.1 11.5 - 14.5 %    Platelets 359 (H) 150 - 350 K/uL    MPV 10.6 9.2 - 12.9 fL    Immature Granulocytes 0.5 0.0 - 0.5 %    Gran # (ANC) 7.2 1.8 - 7.7 K/uL    Immature Grans (Abs) 0.05 (H) 0.00 - 0.04 K/uL    Lymph # 1.2 1.0 - 4.8 K/uL    Mono # 0.9 0.3 - 1.0 K/uL    Eos # 0.1 0.0 - 0.5 K/uL    Baso # 0.04 0.00 - 0.20 K/uL    nRBC 0 " 0 /100 WBC    Gran% 76.5 (H) 38.0 - 73.0 %    Lymph% 12.8 (L) 18.0 - 48.0 %    Mono% 9.2 4.0 - 15.0 %    Eosinophil% 0.6 0.0 - 8.0 %    Basophil% 0.4 0.0 - 1.9 %    Differential Method Automated          Imaging Results          X-Ray Hip 2 View Right (Final result)  Result time 10/01/20 19:58:16    Final result by Cornell Goldstein III, MD (10/01/20 19:58:16)                 Impression:      Acute/recent fracture through the right femoral neck with superolateral displacement of the femoral shaft.      Electronically signed by: Cornell Goldstein MD  Date:    10/01/2020  Time:    19:58             Narrative:    EXAMINATION:  XR HIP 2 VIEW RIGHT    CLINICAL HISTORY:  Pain in right hip    COMPARISON:  None    FINDINGS:  There is no acute/recent fracture through the right femoral neck with superolateral displacement of the main femoral shaft from the femoral head.  The head is projected over the acetabular cavity without dislocation.                                   The Emergency Provider reviewed the vital signs and test results, which are outlined above.     ED Discussion     7:57 PM: Discussed pt's case with Dr. Kearney (Orthopedic Surgery) who recommends admitting pt to hospital medicine.      8:15 PM: Discussed case with Moon Martinez NP (Hospital Medicine). Dr. Fowler agrees with current care and management of pt and accepts admission.   Admitting Service: Hospital Medicine  Admit to: med-surg observation    Re-evaluated pt. I have discussed test results, shared treatment plan, and the need for admission with patient and family at bedside. Pt and family express understanding at this time and agree with all information. All questions answered. Pt and family have no further questions or concerns at this time. Pt is ready for admit.       MDM        Medical Decision Making:   Clinical Tests:   Lab Tests: Ordered and Reviewed  Radiological Study: Ordered and Reviewed           ED Medication(s):  Medications - No  data to display   morphine 2 mg IV    New Prescriptions    No medications on file               Scribe Attestation:   Scribe #1: I performed the above scribed service and the documentation accurately describes the services I performed. I attest to the accuracy of the note.     Attending:   Physician Attestation Statement for Scribe #1: I, Cornell Salas MD, personally performed the services described in this documentation, as scribed by Tamar Juan, in my presence, and it is both accurate and complete.       Scribe Attestation:   Scribe #2: I performed the above scribed service and the documentation accurately describes the services I performed. I attest to the accuracy of the note.    Attending Attestation:           Physician Attestation for Scribe:    Physician Attestation Statement for Scribe #2: I, Minerva Rondon MD, reviewed documentation, as scribed by Mis Fry in my presence, and it is both accurate and complete. I also acknowledge and confirm the content of the note done by Scribe #1.           Clinical Impression     1. Closed fracture of the right hip    Disposition:   Disposition: Placed in Observation  Condition: Fair         Cornell Salas MD  10/04/20 0055

## 2020-10-02 NOTE — PLAN OF CARE
Chart reviewed. Pt resting with call light and personal items within reach.  Vitals stable  Heart monitor 8660  Pain controlled  Anxiety controlled with PRN med  Pt is NPO  Neuro checks Q4H  Pt turns by self; frequent reminders  Pt has a henley in place  Pt daughter at bedside; bed alarm active  Pt is absent of injury will continue to monitor

## 2020-10-02 NOTE — ANESTHESIA POSTPROCEDURE EVALUATION
Anesthesia Post Evaluation    Patient: Tanya Madrid    Procedure(s) Performed: Procedure(s) (LRB):  HEMIARTHROPLASTY, HIP (Right)    Final Anesthesia Type: general    Patient location during evaluation: PACU  Patient participation: Yes- Able to Participate  Level of consciousness: awake and alert  Post-procedure vital signs: reviewed and stable  Pain management: adequate  Airway patency: patent  NIKKI mitigation strategies: Extubation while patient is awake  PONV status at discharge: No PONV  Anesthetic complications: no      Cardiovascular status: hemodynamically stable  Respiratory status: spontaneous ventilation  Hydration status: euvolemic  Follow-up not needed.          Vitals Value Taken Time   /56 10/02/20 1647   Temp 36.6 °C (97.8 °F) 10/02/20 1647   Pulse 81 10/02/20 1647   Resp 18 10/02/20 1647   SpO2 96 % 10/02/20 1647         Event Time   Out of Recovery 10/02/2020 15:26:07         Pain/Annamarie Score: Pain Rating Prior to Med Admin: 10 (10/1/2020  8:39 PM)  Annamarie Score: 8 (10/2/2020  3:15 PM)

## 2020-10-02 NOTE — PROGRESS NOTES
[facility] Medicine  Progress Note    Patient Name: Tanya Madrid  MRN: [id number]  Patient Class: IP- Inpatient   Admission Date: 10/1/2020  Length of Stay: 0 days  Attending Physician: Elvin Alvarez MD  Primary Care Provider: Marti Parsons MD        Subjective:     Principal Problem:Closed fracture of right hip        HPI:  79 y/o AA F with hx of HTN, HLD, Parkinson's disease, stroked, L hip replacement to ED after experiencing an unwitnessed, ground-level trip and fall at home immediately PTA - daughter reports she found her mother on floor - pt denies LOC, states she lost her balance. This is the second fall in the past week per daughter, which immediately resulting in loss of ROM and pain to the R hip area. Denies chest pain, edema, palpitations, SOB, wheezing, abdominal pain, N/V/D, constipation, dysuria, flank pain, HA, dizziness, fever, cough, chills. Found in ED to have R femoral neck fracture - labs largely within normal limits and VSS, COVID-19 negative. Hospital medicine was called and pt was placed in observation on telemetry with plan for likely surgical intervention in AM. Pt is a full code - her daughter Josee Lemus is her surrogate decision maker.     Overview/Hospital Course:  79 y/o female admitted for left hip fracture. Orthopedic surgery consulted. ASA and Plavix on hold. Surgery planned for today by Dr. Kearney.     Interval History: Surgery planned for today.     Review of Systems   Constitutional: Positive for activity change. Negative for chills, diaphoresis, fatigue and fever.   HENT: Negative for congestion, trouble swallowing and voice change.    Eyes: Negative for photophobia and discharge.   Respiratory: Negative for cough, chest tightness, shortness of breath and wheezing.    Cardiovascular: Negative for chest pain, palpitations and leg swelling.   Gastrointestinal: Negative for abdominal pain, blood in stool, constipation, diarrhea, nausea and  vomiting.   Endocrine: Negative for cold intolerance, heat intolerance, polydipsia, polyphagia and polyuria.   Genitourinary: Negative for difficulty urinating, dysuria, flank pain, frequency and urgency.   Musculoskeletal: Positive for arthralgias and gait problem (R hip fracture). Negative for back pain (R hip pain), joint swelling and myalgias.   Skin: Negative for rash and wound.   Neurological: Positive for weakness (R hip d/t fracture). Negative for dizziness, seizures, syncope, facial asymmetry, light-headedness, numbness and headaches.   Psychiatric/Behavioral: Negative for confusion and hallucinations.     Objective:     Vital Signs (Most Recent):  Temp: 99.9 °F (37.7 °C) (10/02/20 0732)  Pulse: 100 (10/02/20 0732)  Resp: (Given to sterile field for MD use) (10/02/20 1145)  BP: (!) 165/80 (10/02/20 0732)  SpO2: 98 % (10/02/20 0732) Vital Signs (24h Range):  Temp:  [97 °F (36.1 °C)-99.9 °F (37.7 °C)] 99.9 °F (37.7 °C)  Pulse:  [] 100  Resp:  [16-22] 16  SpO2:  [96 %-100 %] 98 %  BP: (134-191)/() 165/80     Weight: 54.9 kg (121 lb)  Body mass index is 22.13 kg/m².    Intake/Output Summary (Last 24 hours) at 10/2/2020 1339  Last data filed at 10/2/2020 0600  Gross per 24 hour   Intake --   Output 1000 ml   Net -1000 ml      Physical Exam  Vitals signs reviewed.   Constitutional:       General: She is not in acute distress.     Appearance: Normal appearance. She is not toxic-appearing or diaphoretic.   HENT:      Head: Normocephalic and atraumatic.      Nose: Nose normal. No congestion.      Mouth/Throat:      Mouth: Mucous membranes are moist.      Pharynx: No oropharyngeal exudate.   Eyes:      General: No scleral icterus.     Pupils: Pupils are equal, round, and reactive to light.   Neck:      Musculoskeletal: Normal range of motion and neck supple. No muscular tenderness.   Cardiovascular:      Rate and Rhythm: Normal rate and regular rhythm.      Pulses: Normal pulses.      Heart sounds: Normal  heart sounds.   Pulmonary:      Effort: Pulmonary effort is normal. No respiratory distress.      Breath sounds: Normal breath sounds. No wheezing or rhonchi.   Abdominal:      General: Abdomen is flat. Bowel sounds are normal. There is no distension.      Palpations: Abdomen is soft.      Tenderness: There is no abdominal tenderness. There is no rebound.      Hernia: A hernia (RLQ) is present.   Musculoskeletal: Normal range of motion.         General: No swelling or deformity.      Right lower leg: No edema.      Left lower leg: No edema.      Comments: RLE, shortened - externally rotated, limited ROM at hip   Skin:     General: Skin is warm and dry.      Capillary Refill: Capillary refill takes less than 2 seconds.      Coloration: Skin is not jaundiced.      Findings: No bruising or rash.      Comments: Scabbed abrasion to R knee, ALYSIA, no s/s infection   Neurological:      General: No focal deficit present.      Mental Status: She is alert and oriented to person, place, and time.      Cranial Nerves: No cranial nerve deficit.      Sensory: No sensory deficit.      Coordination: Coordination normal.   Psychiatric:         Mood and Affect: Mood normal. Affect is flat.         Speech: Speech is delayed.         Behavior: Behavior normal.         Thought Content: Thought content normal.         Cognition and Memory: Cognition is not impaired.         Judgment: Judgment normal.      Comments: Baseline per daughter (parkinsons)         Significant Labs:   BMP:   Recent Labs   Lab 10/02/20  0717         K 3.5      CO2 22*   BUN 10   CREATININE 0.8   CALCIUM 8.9   MG 2.0     CBC:   Recent Labs   Lab 10/01/20  1951 10/02/20  0717   WBC 9.41 8.28   HGB 11.8* 10.7*   HCT 37.9 34.4*   * 323     CMP:   Recent Labs   Lab 10/01/20  1953 10/02/20  0717    139   K 3.8 3.5    106   CO2 24 22*   * 106   BUN 12 10   CREATININE 0.9 0.8   CALCIUM 9.8 8.9   PROT 8.1  --    ALBUMIN 4.0  --     BILITOT 0.3  --    ALKPHOS 81  --    AST 34  --    ALT 6*  --    ANIONGAP 14 11   EGFRNONAA 60 >60     Cardiac Markers: No results for input(s): CKMB, MYOGLOBIN, BNP, TROPISTAT in the last 48 hours.  Coagulation:   Recent Labs   Lab 10/01/20  1951   INR 1.0   APTT 24.1     All pertinent labs within the past 24 hours have been reviewed.    Significant Imaging:  Imaging Results          X-Ray Hip 2 View Right (Final result)  Result time 10/01/20 19:58:16    Final result by Cornell Goldstein III, MD (10/01/20 19:58:16)                 Impression:      Acute/recent fracture through the right femoral neck with superolateral displacement of the femoral shaft.      Electronically signed by: Cornell Goldstein MD  Date:    10/01/2020  Time:    19:58             Narrative:    EXAMINATION:  XR HIP 2 VIEW RIGHT    CLINICAL HISTORY:  Pain in right hip    COMPARISON:  None    FINDINGS:  There is no acute/recent fracture through the right femoral neck with superolateral displacement of the main femoral shaft from the femoral head.  The head is projected over the acetabular cavity without dislocation.                                Assessment/Plan:      * Closed fracture of right hip  Orthopedic surgery consulted - plan to OR in AM  Type and screen sent  Neuro checks with VS  NPO after MN for AM procedure  PT/OT to eval/treat post-op  Holding home ASA, plavix for AM procedure - resume when cleared by surgery  10/2/20  -Surgery planned for today           Parkinson disease  Likely contributory to fall - continue home sinemet  Pt already scheduled for outpatient PT/OT at [place]  PT/OT to eval and treat while inpatient and post-op  Fall precautions      Hyperlipidemia  Continue home statin      GERD (gastroesophageal reflux disease)  Continue home PPI      Essential hypertension  Bp elevated   Resumed home medications  Enlaprilat IV prn   Monitor closely        VTE Risk Mitigation (From admission, onward)         Ordered     Place  sequential compression device  Until discontinued      10/01/20 2132     Place sequential compression device  Until discontinued      10/01/20 2114     IP VTE HIGH RISK PATIENT  Once      10/01/20 2114                Discharge Planning   PARMINDER:      Code Status: Full Code   Is the patient medically ready for discharge?:     Reason for patient still in hospital (select all that apply): Treatment                     Sandhya Harris NP  Department of Hospital Medicine   [facility]

## 2020-10-02 NOTE — NURSING
Called tele monitor tech room to notify them of admin of Enalaprilat for them to watch screen while med is given.

## 2020-10-02 NOTE — SUBJECTIVE & OBJECTIVE
Past Medical History:   Diagnosis Date    Hyperlipemia     Hypertension     Parkinson's disease     Stroke 2016    Throat mass        Past Surgical History:   Procedure Laterality Date    HEMIARTHROPLASTY OF HIP Left 7/12/2020    Procedure: HEMIARTHROPLASTY, HIP;  Surgeon: Humberto Valdivia DO;  Location: [facility];  Service: Orthopedics;  Laterality: Left;    HYSTERECTOMY      THROAT SURGERY      TONSILLECTOMY         Review of patient's allergies indicates:  No Known Allergies    No current facility-administered medications on file prior to encounter.      Current Outpatient Medications on File Prior to Encounter   Medication Sig    aspirin (ECOTRIN) 81 MG EC tablet Take 81 mg by mouth once daily.    carbidopa-levodopa  mg (SINEMET)  mg per tablet Take 2 tablets by mouth 4 (four) times daily.    clonazePAM (KLONOPIN) 0.5 MG tablet Take 0.5 mg by mouth 2 (two) times daily as needed for Anxiety.    LORazepam (ATIVAN) 0.5 MG tablet Take 1 tablet (0.5 mg total) by mouth every 8 (eight) hours as needed for Anxiety.    metoprolol succinate (TOPROL-XL) 25 MG 24 hr tablet Take 50 mg by mouth 2 (two) times daily.     omeprazole (PRILOSEC) 20 MG capsule 1 capsule    raloxifene (EVISTA) 60 mg tablet Take 60 mg by mouth once daily.    spironolactone (ALDACTONE) 25 MG tablet Take 25 mg by mouth once daily.    amLODIPine (NORVASC) 2.5 MG Tab Take 1 tablet (2.5 mg total) by mouth once daily.    clopidogrel (PLAVIX) 75 mg tablet Take 75 mg by mouth once daily.    HYDROcodone-acetaminophen (NORCO) 5-325 mg per tablet Take 1 tablet by mouth every 6 (six) hours as needed. (Patient not taking: Reported on 8/24/2020)    losartan (COZAAR) 100 MG tablet Take 100 mg by mouth once daily.    multivitamin capsule Take 1 capsule by mouth once daily.    nozaseptin (NOZIN) nasal  1 each by Each Nostril route 2 (two) times daily.    omeprazole (PRILOSEC) 20 MG capsule Take 20 mg by mouth once daily.     oxyCODONE-acetaminophen (PERCOCET) 5-325 mg per tablet Take 1 tablet by mouth every 6 (six) hours as needed (moderate pain 2-5/10 pain scale). (Patient not taking: Reported on 8/24/2020)    potassium chloride (KLOR-CON) 10 MEQ TbSR Take 10 mEq by mouth once daily.    rosuvastatin (CRESTOR) 10 MG tablet Take 10 mg by mouth once daily.     Family History     None        Tobacco Use    Smoking status: Never Smoker    Smokeless tobacco: Never Used   Substance and Sexual Activity    Alcohol use: No    Drug use: No    Sexual activity: Not Currently     Review of Systems   Constitutional: Positive for activity change. Negative for chills, diaphoresis, fatigue and fever.   HENT: Negative for congestion, trouble swallowing and voice change.    Eyes: Negative for photophobia and discharge.   Respiratory: Negative for cough, chest tightness, shortness of breath and wheezing.    Cardiovascular: Negative for chest pain, palpitations and leg swelling.   Gastrointestinal: Negative for abdominal pain, blood in stool, constipation, diarrhea, nausea and vomiting.   Endocrine: Negative for cold intolerance, heat intolerance, polydipsia, polyphagia and polyuria.   Genitourinary: Negative for difficulty urinating, dysuria, flank pain, frequency and urgency.   Musculoskeletal: Positive for arthralgias and gait problem (R hip fracture). Negative for back pain (R hip pain), joint swelling and myalgias.   Skin: Negative for rash and wound.   Neurological: Positive for weakness (R hip d/t fracture). Negative for dizziness, seizures, syncope, facial asymmetry, light-headedness, numbness and headaches.   Psychiatric/Behavioral: Negative for confusion and hallucinations.     Objective:     Vital Signs (Most Recent):  Temp: 98.8 °F (37.1 °C) (10/01/20 2136)  Pulse: 100 (10/01/20 2136)  Resp: 20 (10/01/20 2136)  BP: (!) 179/98 (10/01/20 2136)  SpO2: 99 % (10/01/20 2136) Vital Signs (24h Range):  Temp:  [97 °F (36.1 °C)-98.8 °F (37.1  °C)] 98.8 °F (37.1 °C)  Pulse:  [] 100  Resp:  [16-22] 20  SpO2:  [96 %-99 %] 99 %  BP: (134-179)/() 179/98     Weight: 59 kg (130 lb)  Body mass index is 23.78 kg/m².    Physical Exam  Vitals signs reviewed.   Constitutional:       General: She is not in acute distress.     Appearance: Normal appearance. She is not toxic-appearing or diaphoretic.   HENT:      Head: Normocephalic and atraumatic.      Nose: Nose normal. No congestion.      Mouth/Throat:      Mouth: Mucous membranes are moist.      Pharynx: No oropharyngeal exudate.   Eyes:      General: No scleral icterus.     Pupils: Pupils are equal, round, and reactive to light.   Neck:      Musculoskeletal: Normal range of motion and neck supple. No muscular tenderness.   Cardiovascular:      Rate and Rhythm: Normal rate and regular rhythm.      Pulses: Normal pulses.      Heart sounds: Normal heart sounds.   Pulmonary:      Effort: Pulmonary effort is normal. No respiratory distress.      Breath sounds: Normal breath sounds. No wheezing or rhonchi.   Abdominal:      General: Abdomen is flat. Bowel sounds are normal. There is no distension.      Palpations: Abdomen is soft.      Tenderness: There is no abdominal tenderness. There is no rebound.      Hernia: A hernia (RLQ) is present.   Musculoskeletal: Normal range of motion.         General: No swelling or deformity.      Right lower leg: No edema.      Left lower leg: No edema.      Comments: RLE, shortened - externally rotated, limited ROM at hip   Skin:     General: Skin is warm and dry.      Capillary Refill: Capillary refill takes less than 2 seconds.      Coloration: Skin is not jaundiced.      Findings: No bruising or rash.      Comments: Scabbed abrasion to R knee, ALYSIA, no s/s infection   Neurological:      General: No focal deficit present.      Mental Status: She is alert and oriented to person, place, and time.      Cranial Nerves: No cranial nerve deficit.      Sensory: No sensory  deficit.      Coordination: Coordination normal.   Psychiatric:         Mood and Affect: Mood normal. Affect is flat.         Speech: Speech is delayed.         Behavior: Behavior normal.         Thought Content: Thought content normal.         Cognition and Memory: Cognition is not impaired.         Judgment: Judgment normal.      Comments: Baseline per daughter (parkinsons)           CRANIAL NERVES     CN III, IV, VI   Pupils are equal, round, and reactive to light.       Significant Labs:   Results for orders placed or performed during the hospital encounter of 10/01/20   CBC auto differential   Result Value Ref Range    WBC 9.41 3.90 - 12.70 K/uL    RBC 4.04 4.00 - 5.40 M/uL    Hemoglobin 11.8 (L) 12.0 - 16.0 g/dL    Hematocrit 37.9 37.0 - 48.5 %    Mean Corpuscular Volume 94 82 - 98 fL    Mean Corpuscular Hemoglobin 29.2 27.0 - 31.0 pg    Mean Corpuscular Hemoglobin Conc 31.1 (L) 32.0 - 36.0 g/dL    RDW 14.1 11.5 - 14.5 %    Platelets 359 (H) 150 - 350 K/uL    MPV 10.6 9.2 - 12.9 fL    Immature Granulocytes 0.5 0.0 - 0.5 %    Gran # (ANC) 7.2 1.8 - 7.7 K/uL    Immature Grans (Abs) 0.05 (H) 0.00 - 0.04 K/uL    Lymph # 1.2 1.0 - 4.8 K/uL    Mono # 0.9 0.3 - 1.0 K/uL    Eos # 0.1 0.0 - 0.5 K/uL    Baso # 0.04 0.00 - 0.20 K/uL    nRBC 0 0 /100 WBC    Gran% 76.5 (H) 38.0 - 73.0 %    Lymph% 12.8 (L) 18.0 - 48.0 %    Mono% 9.2 4.0 - 15.0 %    Eosinophil% 0.6 0.0 - 8.0 %    Basophil% 0.4 0.0 - 1.9 %    Differential Method Automated    Comprehensive metabolic panel   Result Value Ref Range    Sodium 139 136 - 145 mmol/L    Potassium 3.8 3.5 - 5.1 mmol/L    Chloride 101 95 - 110 mmol/L    CO2 24 23 - 29 mmol/L    Glucose 124 (H) 70 - 110 mg/dL    BUN, Bld 12 8 - 23 mg/dL    Creatinine 0.9 0.5 - 1.4 mg/dL    Calcium 9.8 8.7 - 10.5 mg/dL    Total Protein 8.1 6.0 - 8.4 g/dL    Albumin 4.0 3.5 - 5.2 g/dL    Total Bilirubin 0.3 0.1 - 1.0 mg/dL    Alkaline Phosphatase 81 55 - 135 U/L    AST 34 10 - 40 U/L    ALT 6 (L) 10 - 44  U/L    Anion Gap 14 8 - 16 mmol/L    eGFR if African American >60 >60 mL/min/1.73 m^2    eGFR if non African American 60 >60 mL/min/1.73 m^2   APTT   Result Value Ref Range    aPTT 24.1 21.0 - 32.0 sec   Protime-INR   Result Value Ref Range    Prothrombin Time 10.9 9.0 - 12.5 sec    INR 1.0 0.8 - 1.2   COVID-19 Rapid Screening   Result Value Ref Range    SARS-CoV-2 RNA, Amplification, Qual Negative Negative         Significant Imaging:   Imaging Results          X-Ray Hip 2 View Right (Final result)  Result time 10/01/20 19:58:16    Final result by Cornell Goldstein III, MD (10/01/20 19:58:16)                 Impression:      Acute/recent fracture through the right femoral neck with superolateral displacement of the femoral shaft.      Electronically signed by: Cornell Goldstein MD  Date:    10/01/2020  Time:    19:58             Narrative:    EXAMINATION:  XR HIP 2 VIEW RIGHT    CLINICAL HISTORY:  Pain in right hip    COMPARISON:  None    FINDINGS:  There is no acute/recent fracture through the right femoral neck with superolateral displacement of the main femoral shaft from the femoral head.  The head is projected over the acetabular cavity without dislocation.

## 2020-10-02 NOTE — HOSPITAL COURSE
81 y/o female admitted for left hip fracture. Orthopedic surgery consulted. ASA and Plavix on hold. Surgery planned for today by Dr. Kearney. As of 10/3 POD 1 s/p right hip hemiarthroplasty, pt tolerated procedure well. Pain controlled. PT/OT consult. Lovenox for DVT prophylaxis. Social work consult for discharge planning. As of 10/4  POD 2, fever overnight 102.2F, WBCs 11K . CXR pending. Pt encouraged to deep breath and cough. PT/OT recommends SNF placement. As of 10/5 pt lying in bed, pain controlled. Labs reviewed and stable. CXR reviewed. Pt encouraged to use IS. Patient accepted at [facility]. Home meds reconciled. New prescriptions given. Eliquis for DVT prophylaxis. Patient to follow-up with PCP in 3 days for hospital follow-up. Patient also to follow-up with orthopedics outpatient. Patient seen and examined on the date of discharge and found suitable for discharge.

## 2020-10-02 NOTE — ANESTHESIA PREPROCEDURE EVALUATION
10/02/2020  Tanya Madrid is a 80 y.o., female.    Anesthesia Evaluation    I have reviewed the Patient Summary Reports.    I have reviewed the Nursing Notes. I have reviewed the NPO Status.   I have reviewed the Medications.     Review of Systems  Anesthesia Hx:  No problems with previous Anesthesia Denies Hx of Anesthetic complications  Denies Family Hx of Anesthesia complications.   Denies Personal Hx of Anesthesia complications.   Social:  Non-Smoker, No Alcohol Use    Cardiovascular:   Hypertension ECG has been reviewed.    Pulmonary:  Pulmonary Normal    Renal/:  Renal/ Normal     Hepatic/GI:   GERD    Neurological:   CVA    Endocrine:  Endocrine Normal    Psych:  Psychiatric Normal         Patient Active Problem List   Diagnosis    Essential hypertension    GERD (gastroesophageal reflux disease)    Hyperlipidemia    Insomnia    Kyphosis of thoracic region    Benign neoplasm of larynx    Laryngeal papillomatosis    Laryngeal stenosis    Parkinson disease    Pharyngoesophageal dysphagia    Rheumatoid arthritis    Scoliosis of lumbar spine    History of CVA (cerebrovascular accident)    Elevated troponin    Closed fracture of left hip with routine healing    Acute blood loss anemia    Status post hip hemiarthroplasty    Closed fracture of right hip     No current facility-administered medications on file prior to encounter.      Current Outpatient Medications on File Prior to Encounter   Medication Sig Dispense Refill    aspirin (ECOTRIN) 81 MG EC tablet Take 81 mg by mouth once daily.      carbidopa-levodopa  mg (SINEMET)  mg per tablet Take 2 tablets by mouth 4 (four) times daily.      clonazePAM (KLONOPIN) 0.5 MG tablet Take 0.5 mg by mouth 2 (two) times daily as needed for Anxiety.      LORazepam (ATIVAN) 0.5 MG tablet Take 1 tablet (0.5 mg total) by mouth  every 8 (eight) hours as needed for Anxiety. 10 tablet 0    metoprolol succinate (TOPROL-XL) 25 MG 24 hr tablet Take 50 mg by mouth 2 (two) times daily.       omeprazole (PRILOSEC) 20 MG capsule 1 capsule      raloxifene (EVISTA) 60 mg tablet Take 60 mg by mouth once daily.      spironolactone (ALDACTONE) 25 MG tablet Take 25 mg by mouth once daily.      amLODIPine (NORVASC) 2.5 MG Tab Take 1 tablet (2.5 mg total) by mouth once daily. 30 tablet 1    clopidogrel (PLAVIX) 75 mg tablet Take 75 mg by mouth once daily.      HYDROcodone-acetaminophen (NORCO) 5-325 mg per tablet Take 1 tablet by mouth every 6 (six) hours as needed. (Patient not taking: Reported on 8/24/2020) 10 tablet 0    losartan (COZAAR) 100 MG tablet Take 100 mg by mouth once daily.      multivitamin capsule Take 1 capsule by mouth once daily.      nozaseptin (NOZIN) nasal  1 each by Each Nostril route 2 (two) times daily. 1 applicator 0    omeprazole (PRILOSEC) 20 MG capsule Take 20 mg by mouth once daily.      oxyCODONE-acetaminophen (PERCOCET) 5-325 mg per tablet Take 1 tablet by mouth every 6 (six) hours as needed (moderate pain 2-5/10 pain scale). (Patient not taking: Reported on 8/24/2020) 12 tablet 0    potassium chloride (KLOR-CON) 10 MEQ TbSR Take 10 mEq by mouth once daily.      rosuvastatin (CRESTOR) 10 MG tablet Take 10 mg by mouth once daily.       Past Surgical History:   Procedure Laterality Date    HEMIARTHROPLASTY OF HIP Left 7/12/2020    Procedure: HEMIARTHROPLASTY, HIP;  Surgeon: Humberto Valdivia DO;  Location: [facility];  Service: Orthopedics;  Laterality: Left;    HYSTERECTOMY      THROAT SURGERY      TONSILLECTOMY           Physical Exam  General:  Malnutrition    Airway/Jaw/Neck:  Airway Findings: Mouth Opening: Normal Tongue: Normal  General Airway Assessment: Adult  Mallampati: II  TM Distance: 4 - 6 cm  Jaw/Neck Findings:  Neck ROM: Normal ROM      Dental:  Dental Findings: In tact    Chest/Lungs:  Chest/Lungs Findings: Clear to auscultation, Normal Respiratory Rate     Heart/Vascular:  Heart Findings: Rate: Normal  Rhythm: Regular Rhythm  Sounds: Normal        Mental Status:  Mental Status Findings:       Lab Results   Component Value Date    WBC 8.28 10/02/2020    HGB 10.7 (L) 10/02/2020    HCT 34.4 (L) 10/02/2020    MCV 94 10/02/2020     10/02/2020         Chemistry        Component Value Date/Time     10/02/2020 0717    K 3.5 10/02/2020 0717     10/02/2020 0717    CO2 22 (L) 10/02/2020 0717    BUN 10 10/02/2020 0717    CREATININE 0.8 10/02/2020 0717     10/02/2020 0717        Component Value Date/Time    CALCIUM 8.9 10/02/2020 0717    ALKPHOS 81 10/01/2020 1951    AST 34 10/01/2020 1951    ALT 6 (L) 10/01/2020 1951    BILITOT 0.3 10/01/2020 1951    ESTGFRAFRICA >60 10/02/2020 0717    EGFRNONAA >60 10/02/2020 0717        Echo:7/24/2019  CONCLUSIONS     1 - Concentric hypertrophy.     2 - No wall motion abnormalities.     3 - Normal left ventricular systolic function (EF 60-65%).     4 - Normal left ventricular diastolic function.     5 - Normal right ventricular systolic function .     6 - The estimated PA systolic pressure is 35 mmHg.     7 - Trivial pulmonic regurgitation.     8 - Intermediate central venous pressure.     Anesthesia Plan  Type of Anesthesia, risks & benefits discussed:  Anesthesia Type:  general  Patient's Preference:   Intra-op Monitoring Plan: standard ASA monitors  Intra-op Monitoring Plan Comments:   Post Op Pain Control Plan: per primary service following discharge from PACU  Post Op Pain Control Plan Comments:   Induction:   IV  Beta Blocker:  Patient is not currently on a Beta-Blocker (No further documentation required).       Informed Consent: Patient understands risks and agrees with Anesthesia plan.  Questions answered. Anesthesia consent signed with patient.  ASA Score: 3     Day of Surgery Review of History & Physical: I have  interviewed and examined the patient. I have reviewed the patient's H&P dated:  There are no significant changes.  H&P update referred to the surgeon.         Ready For Surgery From Anesthesia Perspective.

## 2020-10-02 NOTE — H&P (VIEW-ONLY)
[facility] -   Orthopedics  Consult Note    Patient Name: Tanya Madrid  MRN: [id number]  Admission Date: 10/1/2020  Hospital Length of Stay: 0 days  Attending Provider: Elvin Alvarez MD  Primary Care Provider: Marti Parsons MD    Patient information was obtained from relative(s) and ER records.     Consults  Subjective:     Principal Problem:Closed fracture of right hip    Chief Complaint:   Chief Complaint   Patient presents with    Knee Pain     slip and fall after taking double her dose of ativan, pt c/o R knee pain        HPI: 81 y/o AA F with hx of HTN, HLD, Parkinson's disease, stroked, L hip replacement to ED after experiencing an unwitnessed, ground-level trip and fall at home immediately PTA - daughter reports she found her mother on floor - pt denies LOC, states she lost her balance. This is the second fall in the past week per daughter, which immediately resulting in loss of ROM and pain to the R hip area. Denies chest pain, edema, palpitations, SOB, wheezing, abdominal pain, N/V/D, constipation, dysuria, flank pain, HA, dizziness, fever, cough, chills. Found in ED to have R femoral neck fracture - labs largely within normal limits and VSS, COVID-19 negative. Hospital medicine was called and pt was placed in observation on telemetry with plan for likely surgical intervention in AM. Pt is a full code - her daughter Josee Lemus is her surrogate decision maker.     Past Medical History:   Diagnosis Date    Hyperlipemia     Hypertension     Parkinson's disease     Stroke 2016    Throat mass        Past Surgical History:   Procedure Laterality Date    HEMIARTHROPLASTY OF HIP Left 7/12/2020    Procedure: HEMIARTHROPLASTY, HIP;  Surgeon: Humberto Valdivia DO;  Location: [facility] OR;  Service: Orthopedics;  Laterality: Left;    HYSTERECTOMY      THROAT SURGERY      TONSILLECTOMY         Review of patient's allergies indicates:  No Known Allergies    Current Facility-Administered  Medications   Medication    amLODIPine tablet 2.5 mg    carbidopa-levodopa  mg per tablet 2 tablet    cefazolin (ANCEF) 2 gram in dextrose 5% 50 mL IVPB (premix)    enalaprilat injection 1.25 mg    influenza (QUADRIVALENT ADJUVANTED PF) vaccine 0.5 mL    LORazepam tablet 0.5 mg    losartan tablet 100 mg    metoprolol succinate (TOPROL-XL) 24 hr tablet 50 mg    morphine injection 4 mg    pantoprazole EC tablet 40 mg    rosuvastatin tablet 10 mg    sodium chloride 0.9% flush 10 mL    spironolactone tablet 25 mg     Family History     None        Tobacco Use    Smoking status: Never Smoker    Smokeless tobacco: Never Used   Substance and Sexual Activity    Alcohol use: No    Drug use: No    Sexual activity: Not Currently     ROS   Review of Systems   Constitutional: Positive for activity change. Negative for chills, diaphoresis, fatigue and fever.   HENT: Negative for congestion, trouble swallowing and voice change.    Eyes: Negative for photophobia and discharge.   Respiratory: Negative for cough, chest tightness, shortness of breath and wheezing.    Cardiovascular: Negative for chest pain, palpitations and leg swelling.   Gastrointestinal: Negative for abdominal pain, blood in stool, constipation, diarrhea, nausea and vomiting.   Endocrine: Negative for cold intolerance, heat intolerance, polydipsia, polyphagia and polyuria.   Genitourinary: Negative for difficulty urinating, dysuria, flank pain, frequency and urgency.   Musculoskeletal: Positive for arthralgias and gait problem (R hip fracture). Negative for back pain (R hip pain), joint swelling and myalgias.   Skin: Negative for rash and wound.   Neurological: Positive for weakness (R hip d/t fracture). Negative for dizziness, seizures, syncope, facial asymmetry, light-headedness, numbness and headaches.   Psychiatric/Behavioral: Negative for confusion and hallucinations.   Objective:     Vital Signs (Most Recent):  Temp: 99.9 °F (37.7 °C)  "(10/02/20 0732)  Pulse: 100 (10/02/20 0732)  Resp: 16 (10/02/20 0732)  BP: (!) 165/80 (10/02/20 0732)  SpO2: 98 % (10/02/20 0732) Vital Signs (24h Range):  Temp:  [97 °F (36.1 °C)-99.9 °F (37.7 °C)] 99.9 °F (37.7 °C)  Pulse:  [] 100  Resp:  [16-22] 16  SpO2:  [96 %-100 %] 98 %  BP: (134-191)/() 165/80     Weight: 54.9 kg (121 lb)  Height: 5' 2" (157.5 cm)  Body mass index is 22.13 kg/m².      Intake/Output Summary (Last 24 hours) at 10/2/2020 1133  Last data filed at 10/2/2020 0600  Gross per 24 hour   Intake --   Output 1000 ml   Net -1000 ml       Physical Exam  Vitals signs reviewed.   Constitutional:       General: She is not in acute distress.     Appearance: Normal appearance. She is not toxic-appearing or diaphoretic.   HENT:      Head: Normocephalic and atraumatic.      Nose: Nose normal. No congestion.      Mouth/Throat:      Mouth: Mucous membranes are moist.      Pharynx: No oropharyngeal exudate.   Eyes:      General: No scleral icterus.     Pupils: Pupils are equal, round, and reactive to light.   Neck:      Musculoskeletal: Normal range of motion and neck supple. No muscular tenderness.   Cardiovascular:      Rate and Rhythm: Normal rate and regular rhythm.      Pulses: Normal pulses.      Heart sounds: Normal heart sounds.   Pulmonary:      Effort: Pulmonary effort is normal. No respiratory distress.      Breath sounds: Normal breath sounds. No wheezing or rhonchi.   Abdominal:      General: Abdomen is flat. Bowel sounds are normal. There is no distension.      Palpations: Abdomen is soft.      Tenderness: There is no abdominal tenderness. There is no rebound.      Hernia: A hernia (RLQ) is present.   Musculoskeletal: Normal range of motion.         General: No swelling or deformity.      Right lower leg: No edema.      Left lower leg: No edema.      Comments: RLE, shortened - externally rotated, limited ROM at hip  hip flexion contractures  Skin:     General: Skin is warm and dry.      " Capillary Refill: Capillary refill takes less than 2 seconds.      Coloration: Skin is not jaundiced.      Findings: No bruising or rash.      Comments: Scabbed abrasion to R knee, ALYSIA, no s/s infection   Neurological:      General: No focal deficit present.      Mental Status: She is alert and oriented to person, place, and time.      Cranial Nerves: No cranial nerve deficit.      Sensory: No sensory deficit.      Coordination: Coordination normal.   Psychiatric:         Mood and Affect: Mood normal. Affect is flat.         Speech: Speech is delayed.         Behavior: Behavior normal.         Thought Content: Thought content normal.         Cognition and Memory: Cognition is not impaired.         Judgment: Judgment normal.      Comments: Baseline per daughter (parkinsons)      Significant Labs: All pertinent labs within the past 24 hours have been reviewed.    Significant Imaging: X-Ray: I have reviewed all pertinent results/findings and my personal findings are:  Displaced femoral neck fracture    Assessment/Plan:  Diagnosis and treatment options were discussed in detail.  Currently recommending right hip hemiarthroplasty.  I pursued a prolonged discussion with patient and her daughter who provides consent and power of .  I emphasized patient is high risk for surgery complication including but not limited to infection, wound complication, and  Dislocation.  Patient should be medically optimized, NPO, and anticipate surgery later today.     Active Diagnoses:    Diagnosis Date Noted POA    PRINCIPAL PROBLEM:  Closed fracture of right hip [S72.001A] 10/01/2020 Yes    Essential hypertension [I10] 07/24/2019 Yes     Chronic    GERD (gastroesophageal reflux disease) [K21.9] 07/11/2017 Yes     Chronic    Hyperlipidemia [E78.5] 07/11/2017 Yes    Parkinson disease [G20] 07/11/2017 Yes     Chronic      Problems Resolved During this Admission:       Thank you for your consult. I will follow-up with patient.  Please contact us if you have any additional questions.    Loyd Kearney MD  Orthopedics  [facility]

## 2020-10-02 NOTE — ASSESSMENT & PLAN NOTE
Orthopedic surgery consulted - plan to OR in AM  Type and screen sent  Neuro checks with VS  NPO after MN for AM procedure  PT/OT to eval/treat post-op  Holding home ASA, plavix for AM procedure - resume when cleared by surgery  10/2/20  -Surgery planned for today

## 2020-10-03 LAB
ANION GAP SERPL CALC-SCNC: 11 MMOL/L (ref 8–16)
BASOPHILS # BLD AUTO: 0.04 K/UL (ref 0–0.2)
BASOPHILS NFR BLD: 0.4 % (ref 0–1.9)
BUN SERPL-MCNC: 13 MG/DL (ref 8–23)
CALCIUM SERPL-MCNC: 8.1 MG/DL (ref 8.7–10.5)
CHLORIDE SERPL-SCNC: 105 MMOL/L (ref 95–110)
CO2 SERPL-SCNC: 22 MMOL/L (ref 23–29)
CREAT SERPL-MCNC: 1 MG/DL (ref 0.5–1.4)
DIFFERENTIAL METHOD: ABNORMAL
EOSINOPHIL # BLD AUTO: 0.1 K/UL (ref 0–0.5)
EOSINOPHIL NFR BLD: 0.7 % (ref 0–8)
ERYTHROCYTE [DISTWIDTH] IN BLOOD BY AUTOMATED COUNT: 14.5 % (ref 11.5–14.5)
EST. GFR  (AFRICAN AMERICAN): >60 ML/MIN/1.73 M^2
EST. GFR  (NON AFRICAN AMERICAN): 53 ML/MIN/1.73 M^2
GLUCOSE SERPL-MCNC: 100 MG/DL (ref 70–110)
HCT VFR BLD AUTO: 30.2 % (ref 37–48.5)
HGB BLD-MCNC: 9.3 G/DL (ref 12–16)
IMM GRANULOCYTES # BLD AUTO: 0.07 K/UL (ref 0–0.04)
IMM GRANULOCYTES NFR BLD AUTO: 0.7 % (ref 0–0.5)
LYMPHOCYTES # BLD AUTO: 0.9 K/UL (ref 1–4.8)
LYMPHOCYTES NFR BLD: 9 % (ref 18–48)
MAGNESIUM SERPL-MCNC: 1.8 MG/DL (ref 1.6–2.6)
MCH RBC QN AUTO: 29.3 PG (ref 27–31)
MCHC RBC AUTO-ENTMCNC: 30.8 G/DL (ref 32–36)
MCV RBC AUTO: 95 FL (ref 82–98)
MONOCYTES # BLD AUTO: 1.1 K/UL (ref 0.3–1)
MONOCYTES NFR BLD: 10.5 % (ref 4–15)
NEUTROPHILS # BLD AUTO: 8.2 K/UL (ref 1.8–7.7)
NEUTROPHILS NFR BLD: 78.7 % (ref 38–73)
NRBC BLD-RTO: 0 /100 WBC
PLATELET # BLD AUTO: 275 K/UL (ref 150–350)
PMV BLD AUTO: 10.9 FL (ref 9.2–12.9)
POTASSIUM SERPL-SCNC: 3.4 MMOL/L (ref 3.5–5.1)
RBC # BLD AUTO: 3.17 M/UL (ref 4–5.4)
SODIUM SERPL-SCNC: 138 MMOL/L (ref 136–145)
WBC # BLD AUTO: 10.39 K/UL (ref 3.9–12.7)

## 2020-10-03 PROCEDURE — 25000003 PHARM REV CODE 250: Performed by: ORTHOPAEDIC SURGERY

## 2020-10-03 PROCEDURE — 97530 THERAPEUTIC ACTIVITIES: CPT

## 2020-10-03 PROCEDURE — 80048 BASIC METABOLIC PNL TOTAL CA: CPT

## 2020-10-03 PROCEDURE — 94761 N-INVAS EAR/PLS OXIMETRY MLT: CPT

## 2020-10-03 PROCEDURE — 21400001 HC TELEMETRY ROOM

## 2020-10-03 PROCEDURE — 97162 PT EVAL MOD COMPLEX 30 MIN: CPT

## 2020-10-03 PROCEDURE — 96372 THER/PROPH/DIAG INJ SC/IM: CPT

## 2020-10-03 PROCEDURE — 97167 OT EVAL HIGH COMPLEX 60 MIN: CPT

## 2020-10-03 PROCEDURE — 83735 ASSAY OF MAGNESIUM: CPT

## 2020-10-03 PROCEDURE — 11000001 HC ACUTE MED/SURG PRIVATE ROOM

## 2020-10-03 PROCEDURE — 85025 COMPLETE CBC W/AUTO DIFF WBC: CPT

## 2020-10-03 PROCEDURE — 36415 COLL VENOUS BLD VENIPUNCTURE: CPT

## 2020-10-03 PROCEDURE — 63600175 PHARM REV CODE 636 W HCPCS: Performed by: ORTHOPAEDIC SURGERY

## 2020-10-03 RX ADMIN — LORAZEPAM 0.5 MG: 0.5 TABLET ORAL at 10:10

## 2020-10-03 RX ADMIN — METOPROLOL SUCCINATE 50 MG: 50 TABLET, EXTENDED RELEASE ORAL at 09:10

## 2020-10-03 RX ADMIN — AMLODIPINE BESYLATE 2.5 MG: 2.5 TABLET ORAL at 09:10

## 2020-10-03 RX ADMIN — CHLORHEXIDINE GLUCONATE 0.12% ORAL RINSE 10 ML: 1.2 LIQUID ORAL at 09:10

## 2020-10-03 RX ADMIN — LORAZEPAM 0.5 MG: 0.5 TABLET ORAL at 12:10

## 2020-10-03 RX ADMIN — CARBIDOPA AND LEVODOPA 2 TABLET: 25; 100 TABLET ORAL at 09:10

## 2020-10-03 RX ADMIN — LORAZEPAM 0.5 MG: 0.5 TABLET ORAL at 01:10

## 2020-10-03 RX ADMIN — LOSARTAN POTASSIUM 100 MG: 50 TABLET, FILM COATED ORAL at 09:10

## 2020-10-03 RX ADMIN — CARBIDOPA AND LEVODOPA 2 TABLET: 25; 100 TABLET ORAL at 01:10

## 2020-10-03 RX ADMIN — CEFAZOLIN SODIUM 2 G: 2 SOLUTION INTRAVENOUS at 05:10

## 2020-10-03 RX ADMIN — CARBIDOPA AND LEVODOPA 2 TABLET: 25; 100 TABLET ORAL at 05:10

## 2020-10-03 RX ADMIN — ENOXAPARIN SODIUM 40 MG: 40 INJECTION SUBCUTANEOUS at 09:10

## 2020-10-03 RX ADMIN — SPIRONOLACTONE 25 MG: 25 TABLET ORAL at 09:10

## 2020-10-03 RX ADMIN — PANTOPRAZOLE SODIUM 40 MG: 40 TABLET, DELAYED RELEASE ORAL at 09:10

## 2020-10-03 RX ADMIN — ROSUVASTATIN 10 MG: 10 TABLET, FILM COATED ORAL at 09:10

## 2020-10-03 NOTE — ASSESSMENT & PLAN NOTE
Orthopedic surgery consulted - plan to OR in AM  Type and screen sent  Neuro checks with VS  NPO after MN for AM procedure  PT/OT to eval/treat post-op  Holding home ASA, plavix for AM procedure - resume when cleared by surgery  10/2/20  -Surgery planned for today   10/3/20  -POD 1 s/p right hip hemiarthroplasty, pt tolerated procedure well.   -Pain controlled.   -PT/OT consult.   -Lovenox for DVT prophylaxis.   -Social work consult for discharge planning.

## 2020-10-03 NOTE — SUBJECTIVE & OBJECTIVE
Interval History: POD 1 s/p right hip hemiarthroplasty, pt tolerated procedure well. Pain controlled. PT/OT consult. Lovenox for DVT prophylaxis. Social work consult for discharge planning.       Review of Systems   Constitutional: Positive for activity change. Negative for chills, diaphoresis, fatigue and fever.   HENT: Negative for congestion, trouble swallowing and voice change.    Eyes: Negative for photophobia and discharge.   Respiratory: Negative for cough, chest tightness, shortness of breath and wheezing.    Cardiovascular: Negative for chest pain, palpitations and leg swelling.   Gastrointestinal: Negative for abdominal pain, blood in stool, constipation, diarrhea, nausea and vomiting.   Endocrine: Negative for cold intolerance, heat intolerance, polydipsia, polyphagia and polyuria.   Genitourinary: Negative for difficulty urinating, dysuria, flank pain, frequency and urgency.   Musculoskeletal: Positive for arthralgias and gait problem (R hip fracture). Negative for back pain (R hip pain), joint swelling and myalgias.   Skin: Negative for rash and wound.   Neurological: Positive for weakness (R hip d/t fracture). Negative for dizziness, seizures, syncope, facial asymmetry, light-headedness, numbness and headaches.   Psychiatric/Behavioral: Negative for confusion and hallucinations.     Objective:     Vital Signs (Most Recent):  Temp: 98.8 °F (37.1 °C) (10/03/20 0735)  Pulse: 77 (10/03/20 0929)  Resp: 18 (10/03/20 0735)  BP: 139/77 (10/03/20 0735)  SpO2: 98 % (10/03/20 0735) Vital Signs (24h Range):  Temp:  [97.4 °F (36.3 °C)-99 °F (37.2 °C)] 98.8 °F (37.1 °C)  Pulse:  [] 77  Resp:  [12-26] 18  SpO2:  [92 %-100 %] 98 %  BP: (118-180)/() 139/77     Weight: 54.9 kg (121 lb)  Body mass index is 22.13 kg/m².    Intake/Output Summary (Last 24 hours) at 10/3/2020 1148  Last data filed at 10/3/2020 0600  Gross per 24 hour   Intake 1360 ml   Output 800 ml   Net 560 ml      Physical Exam  Vitals signs  reviewed.   Constitutional:       General: She is not in acute distress.     Appearance: Normal appearance. She is not toxic-appearing or diaphoretic.   HENT:      Head: Normocephalic and atraumatic.      Nose: Nose normal. No congestion.      Mouth/Throat:      Mouth: Mucous membranes are moist.      Pharynx: No oropharyngeal exudate.   Eyes:      General: No scleral icterus.     Pupils: Pupils are equal, round, and reactive to light.   Neck:      Musculoskeletal: Normal range of motion and neck supple. No muscular tenderness.   Cardiovascular:      Rate and Rhythm: Normal rate and regular rhythm.      Pulses: Normal pulses.      Heart sounds: Normal heart sounds.   Pulmonary:      Effort: Pulmonary effort is normal. No respiratory distress.      Breath sounds: Normal breath sounds. No wheezing or rhonchi.   Abdominal:      General: Abdomen is flat. Bowel sounds are normal. There is no distension.      Palpations: Abdomen is soft.      Tenderness: There is no abdominal tenderness. There is no rebound.      Hernia: A hernia (RLQ) is present.   Musculoskeletal:         General: No swelling or deformity.      Right hip: She exhibits decreased range of motion and tenderness.      Right lower leg: No edema.      Left lower leg: No edema.   Skin:     General: Skin is warm and dry.      Capillary Refill: Capillary refill takes less than 2 seconds.      Coloration: Skin is not jaundiced.      Findings: No bruising or rash.             Comments: Scabbed abrasion to R knee, ALYSIA, no s/s infection   Neurological:      General: No focal deficit present.      Mental Status: She is alert and oriented to person, place, and time.      Cranial Nerves: No cranial nerve deficit.      Sensory: No sensory deficit.      Coordination: Coordination normal.   Psychiatric:         Mood and Affect: Mood normal. Affect is flat.         Speech: Speech is delayed.         Behavior: Behavior normal.         Thought Content: Thought content  normal.         Cognition and Memory: Cognition is not impaired.         Judgment: Judgment normal.      Comments: Baseline per daughter (parkinsons)         Significant Labs:   BMP:   Recent Labs   Lab 10/03/20  0617         K 3.4*      CO2 22*   BUN 13   CREATININE 1.0   CALCIUM 8.1*   MG 1.8     CBC:   Recent Labs   Lab 10/01/20  1951 10/02/20  0717 10/03/20  0617   WBC 9.41 8.28 10.39   HGB 11.8* 10.7* 9.3*   HCT 37.9 34.4* 30.2*   * 323 275     CMP:   Recent Labs   Lab 10/01/20  1953 10/02/20  0717 10/03/20  0617    139 138   K 3.8 3.5 3.4*    106 105   CO2 24 22* 22*   * 106 100   BUN 12 10 13   CREATININE 0.9 0.8 1.0   CALCIUM 9.8 8.9 8.1*   PROT 8.1  --   --    ALBUMIN 4.0  --   --    BILITOT 0.3  --   --    ALKPHOS 81  --   --    AST 34  --   --    ALT 6*  --   --    ANIONGAP 14 11 11   EGFRNONAA 60 >60 53*       Significant Imaging:  Imaging Results          X-Ray Hip 2 View Right (Final result)  Result time 10/01/20 19:58:16    Final result by Cornell Goldstein III, MD (10/01/20 19:58:16)                 Impression:      Acute/recent fracture through the right femoral neck with superolateral displacement of the femoral shaft.      Electronically signed by: Cornell Goldstein MD  Date:    10/01/2020  Time:    19:58             Narrative:    EXAMINATION:  XR HIP 2 VIEW RIGHT    CLINICAL HISTORY:  Pain in right hip    COMPARISON:  None    FINDINGS:  There is no acute/recent fracture through the right femoral neck with superolateral displacement of the main femoral shaft from the femoral head.  The head is projected over the acetabular cavity without dislocation.

## 2020-10-03 NOTE — PT/OT/SLP EVAL
Physical Therapy Evaluation    Patient Name:  Tanya Madrid   MRN:  [id number]    Recommendations:     Discharge Recommendations:  nursing facility, skilled   Discharge Equipment Recommendations: bedside commode   Barriers to discharge: Decreased caregiver support    Assessment:     Tanya Madrid is a 80 y.o. female admitted with a medical diagnosis of Closed fracture of right hip.  She presents with the following impairments/functional limitations:  weakness, impaired endurance, impaired self care skills, impaired functional mobilty, gait instability, impaired balance, decreased lower extremity function, pain. Will benefit from acute care PT.    Rehab Prognosis: Good; patient would benefit from acute skilled PT services to address these deficits and reach maximum level of function.    Recent Surgery: Procedure(s) (LRB):  HEMIARTHROPLASTY, HIP (Right) 1 Day Post-Op    Plan:     During this hospitalization, patient to be seen 5 x/week to address the identified rehab impairments via gait training, therapeutic activities, therapeutic exercises and progress toward the following goals:    · Plan of Care Expires:       Subjective     Chief Complaint: pain  Patient/Family Comments/goals: improve mobility  Pain/Comfort:  · Pain Rating 1: 5/10  · Location - Side 1: Right  · Location 1: hip  · Pain Addressed 1: Reposition    Patients cultural, spiritual, Hoahaoism conflicts given the current situation:      Living Environment:  Obtained from Pt.  Pt disoriented so unsure of accuracy. Pt lives with daughter in 2 story house but stays on 1st floor. Uses RW for mobility and needs assist with dressing  Prior to admission, patients level of function was needs equipment and assist.  Equipment used at home: bath bench, walker, rolling.  DME owned (not currently used): none.  Upon discharge, patient will have assistance from daughter.    Objective:     Communicated with [facility] and nurse [place] prior to session.  Patient found  supine with    upon PT entry to room.    General Precautions: Standard,     Orthopedic Precautions:RLE posterior precautions   Braces:       Exams:  · RLE ROM: Deficits: impaired  · RLE Strength: Deficits: impaired  · LLE ROM: Deficits: impaired  · LLE Strength: Deficits: impaired    Functional Mobility:  · Bed Mobility:     · Supine to Sit: maximal assistance and of 2 persons  · Transfers:     · Sit to Stand:  total assistance and of 2 persons with rolling walker  · Gait: NT    Therapeutic Activities and Exercises:   Pt very rigid in upper and lower trunk. Poor sitting balance with rightward lead and R head tilt.  Total assist with sit to stand- unable to obtain full standing position    AM-PAC 6 CLICK MOBILITY  Total Score:9     Patient left supine with all lines intact, call button in reach and abd pillow.    GOALS:   Multidisciplinary Problems     Physical Therapy Goals        Problem: Physical Therapy Goal    Goal Priority Disciplines Outcome Goal Variances Interventions   Physical Therapy Goal     PT, PT/OT                      History:     Past Medical History:   Diagnosis Date    Hyperlipemia     Hypertension     Parkinson's disease     Stroke 2016    Throat mass        Past Surgical History:   Procedure Laterality Date    HEMIARTHROPLASTY OF HIP Left 7/12/2020    Procedure: HEMIARTHROPLASTY, HIP;  Surgeon: Humberto Valdivia DO;  Location: [facility];  Service: Orthopedics;  Laterality: Left;    HYSTERECTOMY      THROAT SURGERY      TONSILLECTOMY         Time Tracking:     PT Received On: 10/03/20  PT Start Time: 1000     PT Stop Time: 1030  PT Total Time (min): 30 min     Billable Minutes: Evaluation 15 and Therapeutic Activity 15      Ck Franks PT  10/03/2020

## 2020-10-03 NOTE — PLAN OF CARE
Pt remains free from falls/injuries this shift. Safety precautions maintained. Pain managed with oral medications. Abreu catheter in place post op. Neuro checks every 4 hours, no deficits noted. Abductor pillow in place. No s/s of acute distress noted. Will continue to monitor. Chart check completed.

## 2020-10-03 NOTE — PROGRESS NOTES
[facility] Medicine  Progress Note    Patient Name: Tanya Madrid  MRN: [id number]  Patient Class: IP- Inpatient   Admission Date: 10/1/2020  Length of Stay: 1 days  Attending Physician: Elvin Alvarez MD  Primary Care Provider: Marti Parsons MD        Subjective:     Principal Problem:Closed fracture of right hip        HPI:  81 y/o AA F with hx of HTN, HLD, Parkinson's disease, stroked, L hip replacement to ED after experiencing an unwitnessed, ground-level trip and fall at home immediately PTA - daughter reports she found her mother on floor - pt denies LOC, states she lost her balance. This is the second fall in the past week per daughter, which immediately resulting in loss of ROM and pain to the R hip area. Denies chest pain, edema, palpitations, SOB, wheezing, abdominal pain, N/V/D, constipation, dysuria, flank pain, HA, dizziness, fever, cough, chills. Found in ED to have R femoral neck fracture - labs largely within normal limits and VSS, COVID-19 negative. Hospital medicine was called and pt was placed in observation on telemetry with plan for likely surgical intervention in AM. Pt is a full code - her daughter Josee Lemus is her surrogate decision maker.     Overview/Hospital Course:  81 y/o female admitted for left hip fracture. Orthopedic surgery consulted. ASA and Plavix on hold. Surgery planned for today by Dr. Kearney. As of 10/3 POD 1 s/p right hip hemiarthroplasty, pt tolerated procedure well. Pain controlled. PT/OT consult. Lovenox for DVT prophylaxis. Social work consult for discharge planning.     Interval History: POD 1 s/p right hip hemiarthroplasty, pt tolerated procedure well. Pain controlled. PT/OT consult. Lovenox for DVT prophylaxis. Social work consult for discharge planning.       Review of Systems   Constitutional: Positive for activity change. Negative for chills, diaphoresis, fatigue and fever.   HENT: Negative for congestion, trouble swallowing  and voice change.    Eyes: Negative for photophobia and discharge.   Respiratory: Negative for cough, chest tightness, shortness of breath and wheezing.    Cardiovascular: Negative for chest pain, palpitations and leg swelling.   Gastrointestinal: Negative for abdominal pain, blood in stool, constipation, diarrhea, nausea and vomiting.   Endocrine: Negative for cold intolerance, heat intolerance, polydipsia, polyphagia and polyuria.   Genitourinary: Negative for difficulty urinating, dysuria, flank pain, frequency and urgency.   Musculoskeletal: Positive for arthralgias and gait problem (R hip fracture). Negative for back pain (R hip pain), joint swelling and myalgias.   Skin: Negative for rash and wound.   Neurological: Positive for weakness (R hip d/t fracture). Negative for dizziness, seizures, syncope, facial asymmetry, light-headedness, numbness and headaches.   Psychiatric/Behavioral: Negative for confusion and hallucinations.     Objective:     Vital Signs (Most Recent):  Temp: 98.8 °F (37.1 °C) (10/03/20 0735)  Pulse: 77 (10/03/20 0929)  Resp: 18 (10/03/20 0735)  BP: 139/77 (10/03/20 0735)  SpO2: 98 % (10/03/20 0735) Vital Signs (24h Range):  Temp:  [97.4 °F (36.3 °C)-99 °F (37.2 °C)] 98.8 °F (37.1 °C)  Pulse:  [] 77  Resp:  [12-26] 18  SpO2:  [92 %-100 %] 98 %  BP: (118-180)/() 139/77     Weight: 54.9 kg (121 lb)  Body mass index is 22.13 kg/m².    Intake/Output Summary (Last 24 hours) at 10/3/2020 1148  Last data filed at 10/3/2020 0600  Gross per 24 hour   Intake 1360 ml   Output 800 ml   Net 560 ml      Physical Exam  Vitals signs reviewed.   Constitutional:       General: She is not in acute distress.     Appearance: Normal appearance. She is not toxic-appearing or diaphoretic.   HENT:      Head: Normocephalic and atraumatic.      Nose: Nose normal. No congestion.      Mouth/Throat:      Mouth: Mucous membranes are moist.      Pharynx: No oropharyngeal exudate.   Eyes:      General: No  scleral icterus.     Pupils: Pupils are equal, round, and reactive to light.   Neck:      Musculoskeletal: Normal range of motion and neck supple. No muscular tenderness.   Cardiovascular:      Rate and Rhythm: Normal rate and regular rhythm.      Pulses: Normal pulses.      Heart sounds: Normal heart sounds.   Pulmonary:      Effort: Pulmonary effort is normal. No respiratory distress.      Breath sounds: Normal breath sounds. No wheezing or rhonchi.   Abdominal:      General: Abdomen is flat. Bowel sounds are normal. There is no distension.      Palpations: Abdomen is soft.      Tenderness: There is no abdominal tenderness. There is no rebound.      Hernia: A hernia (RLQ) is present.   Musculoskeletal:         General: No swelling or deformity.      Right hip: She exhibits decreased range of motion and tenderness.      Right lower leg: No edema.      Left lower leg: No edema.   Skin:     General: Skin is warm and dry.      Capillary Refill: Capillary refill takes less than 2 seconds.      Coloration: Skin is not jaundiced.      Findings: No bruising or rash.             Comments: Scabbed abrasion to R knee, ALYSIA, no s/s infection   Neurological:      General: No focal deficit present.      Mental Status: She is alert and oriented to person, place, and time.      Cranial Nerves: No cranial nerve deficit.      Sensory: No sensory deficit.      Coordination: Coordination normal.   Psychiatric:         Mood and Affect: Mood normal. Affect is flat.         Speech: Speech is delayed.         Behavior: Behavior normal.         Thought Content: Thought content normal.         Cognition and Memory: Cognition is not impaired.         Judgment: Judgment normal.      Comments: Baseline per daughter (parkinsons)         Significant Labs:   BMP:   Recent Labs   Lab 10/03/20  0617         K 3.4*      CO2 22*   BUN 13   CREATININE 1.0   CALCIUM 8.1*   MG 1.8     CBC:   Recent Labs   Lab 10/01/20  1951  10/02/20  0717 10/03/20  0617   WBC 9.41 8.28 10.39   HGB 11.8* 10.7* 9.3*   HCT 37.9 34.4* 30.2*   * 323 275     CMP:   Recent Labs   Lab 10/01/20  1951 10/02/20  0717 10/03/20  0617    139 138   K 3.8 3.5 3.4*    106 105   CO2 24 22* 22*   * 106 100   BUN 12 10 13   CREATININE 0.9 0.8 1.0   CALCIUM 9.8 8.9 8.1*   PROT 8.1  --   --    ALBUMIN 4.0  --   --    BILITOT 0.3  --   --    ALKPHOS 81  --   --    AST 34  --   --    ALT 6*  --   --    ANIONGAP 14 11 11   EGFRNONAA 60 >60 53*       Significant Imaging:  Imaging Results          X-Ray Hip 2 View Right (Final result)  Result time 10/01/20 19:58:16    Final result by Cornell Goldstein III, MD (10/01/20 19:58:16)                 Impression:      Acute/recent fracture through the right femoral neck with superolateral displacement of the femoral shaft.      Electronically signed by: Cornell Goldstein MD  Date:    10/01/2020  Time:    19:58             Narrative:    EXAMINATION:  XR HIP 2 VIEW RIGHT    CLINICAL HISTORY:  Pain in right hip    COMPARISON:  None    FINDINGS:  There is no acute/recent fracture through the right femoral neck with superolateral displacement of the main femoral shaft from the femoral head.  The head is projected over the acetabular cavity without dislocation.                                Assessment/Plan:      * Closed fracture of right hip  Orthopedic surgery consulted - plan to OR in AM  Type and screen sent  Neuro checks with VS  NPO after MN for AM procedure  PT/OT to eval/treat post-op  Holding home ASA, plavix for AM procedure - resume when cleared by surgery  10/2/20  -Surgery planned for today   10/3/20  -POD 1 s/p right hip hemiarthroplasty, pt tolerated procedure well.   -Pain controlled.   -PT/OT consult.   -Lovenox for DVT prophylaxis.   -Social work consult for discharge planning.           Parkinson disease  Likely contributory to fall - continue home sinemet  Pt already scheduled for outpatient  PT/OT at [place]  PT/OT   Fall precautions      Hyperlipidemia  Continue home statin      GERD (gastroesophageal reflux disease)  Continue home PPI      Essential hypertension  Bp stable  Resumed home medications  Enlaprilat IV prn   Monitor closely        VTE Risk Mitigation (From admission, onward)         Ordered     enoxaparin injection 40 mg  Every 24 hours      10/02/20 1915     IP VTE HIGH RISK PATIENT  Once      10/02/20 1915     Place sequential compression device  Until discontinued      10/01/20 2132     Place sequential compression device  Until discontinued      10/01/20 2114                Discharge Planning   PARMINDER:      Code Status: Full Code   Is the patient medically ready for discharge?:     Reason for patient still in hospital (select all that apply): Treatment  Discharge Plan A: Home with family                  Sandhya Harris NP  Department of Hospital Medicine   [facility]

## 2020-10-03 NOTE — PLAN OF CARE
PATIENT WOULD BENEFIT FROM CONT SKILLED OT INTERVENTION TO INCREASE SAFETY AND (I) TO (A) CAREGIVER WITH SELF CARE TASKS WITHIN HIP PRECAUTIONS.

## 2020-10-03 NOTE — PROGRESS NOTES
"[facility]  Orthopedics  Progress Note    Patient Name: Tanya Madrid  MRN: [id number]  Admission Date: 10/1/2020  Hospital Length of Stay: 1 days  Attending Provider: Elvin Alvarez MD  Primary Care Provider: Marti Parsons MD  Follow-up For: Procedure(s) (LRB):  HEMIARTHROPLASTY, HIP (Right)    Post-Operative Day: 1 Day Post-Op  Subjective:     Principal Problem:Closed fracture of right hip    Principal Orthopedic Problem:  Right femoral neck fracture    Interval History:  Resting comfortably in bed.  No complaints    Review of patient's allergies indicates:  No Known Allergies    Current Facility-Administered Medications   Medication    amLODIPine tablet 2.5 mg    carbidopa-levodopa  mg per tablet 2 tablet    chlorhexidine 0.12 % solution 10 mL    enalaprilat injection 1.25 mg    enoxaparin injection 40 mg    HYDROcodone-acetaminophen 5-325 mg per tablet 1 tablet    influenza (QUADRIVALENT ADJUVANTED PF) vaccine 0.5 mL    LORazepam tablet 0.5 mg    losartan tablet 100 mg    metoprolol succinate (TOPROL-XL) 24 hr tablet 50 mg    nozaseptin (NOZIN) nasal     pantoprazole EC tablet 40 mg    rosuvastatin tablet 10 mg    sodium chloride 0.9% flush 10 mL    spironolactone tablet 25 mg     Objective:     Vital Signs (Most Recent):  Temp: 98.8 °F (37.1 °C) (10/03/20 0735)  Pulse: 93 (10/03/20 0735)  Resp: 18 (10/03/20 0735)  BP: 139/77 (10/03/20 0735)  SpO2: 98 % (10/03/20 0735) Vital Signs (24h Range):  Temp:  [97.4 °F (36.3 °C)-99 °F (37.2 °C)] 98.8 °F (37.1 °C)  Pulse:  [] 93  Resp:  [12-26] 18  SpO2:  [92 %-100 %] 98 %  BP: (118-180)/() 139/77     Weight: 54.9 kg (121 lb)  Height: 5' 2" (157.5 cm)  Body mass index is 22.13 kg/m².      Intake/Output Summary (Last 24 hours) at 10/3/2020 0913  Last data filed at 10/3/2020 0600  Gross per 24 hour   Intake 1360 ml   Output 800 ml   Net 560 ml       Ortho/SPM Exam   Right thigh with mild " swelling  Soft  Dressing on saturated  Neuro stable  Brisk cap refill 5 digits    Significant Labs: All pertinent labs within the past 24 hours have been reviewed.    Significant Imaging: I have reviewed and interpreted all pertinent imaging results/findings.    Assessment/Plan:  Anticipated exam.  Healing appropriately.  Weight-bearing as tolerated  Posterior hip precautions  Abduction pillow at all times while lying in bed.     Active Diagnoses:    Diagnosis Date Noted POA    PRINCIPAL PROBLEM:  Closed fracture of right hip [S72.001A] 10/01/2020 Yes    Right hip pain [M25.551] 10/02/2020 Yes    Essential hypertension [I10] 07/24/2019 Yes     Chronic    GERD (gastroesophageal reflux disease) [K21.9] 07/11/2017 Yes     Chronic    Hyperlipidemia [E78.5] 07/11/2017 Yes    Parkinson disease [G20] 07/11/2017 Yes     Chronic      Problems Resolved During this Admission:         Loyd Kearney MD  Orthopedics  [facility]

## 2020-10-03 NOTE — ASSESSMENT & PLAN NOTE
Likely contributory to fall - continue home sinemet  Pt already scheduled for outpatient PT/OT at [place]  PT/OT   Fall precautions

## 2020-10-03 NOTE — PT/OT/SLP EVAL
"Occupational Therapy   Evaluation    Name: Tanya Madrid  MRN: [id number]  Admitting Diagnosis:  Closed fracture of right hip 1 Day Post-Op    Recommendations:     Discharge Recommendations: nursing facility, skilled  Discharge Equipment Recommendations:  (TBD)  Barriers to discharge:  None    Assessment:     Tanya Madrid is a 80 y.o. female with a medical diagnosis of Closed fracture of right hip.  She presents with SELF CARE DEBILITY. Performance deficits affecting function: weakness, impaired endurance, impaired self care skills, impaired functional mobilty, gait instability, impaired balance, impaired cognition, decreased lower extremity function, decreased safety awareness, pain.      Rehab Prognosis: Good; patient would benefit from acute skilled OT services to address these deficits and reach maximum level of function.       Plan:     Patient to be seen 3 x/week to address the above listed problems via self-care/home management, therapeutic activities, therapeutic exercises  · Plan of Care Expires: 10/10/20  · Plan of Care Reviewed with: patient    Subjective     Chief Complaint: "COLD."  Patient/Family Comments/goals: NONE STATED.    Occupational Profile:  Living Environment: PATIENT STATED SHE LIVES IN 2-STORY HOUSE WITH DTR WITH HER LIVING QUARTERS ON THE 1ST FLOOR.  Previous level of function: PATIENT STATED DTR DRESSES AND BATHES HER.  Roles and Routines: PATIENT STATED DTR PERFORMED ALL IADL'S.  Equipment Used at Home:  cane, straight, walker, rolling, bath bench  Assistance upon Discharge: DTR--PATIENT STATED DTR PERFORMS ALL IADL'S.    Pain/Comfort:  · Pain Rating 1: 0/10    Patients cultural, spiritual, Mu-ism conflicts given the current situation:      Objective:     Communicated with: NURSE prior to session.  Patient found right sidelying with telemetry, peripheral IV, henley catheter, hip abduction pillow, bed alarm upon OT entry to room.    General Precautions: Standard,     Orthopedic " "Precautions:RLE partial weight bearing   Braces:       Occupational Performance:    Bed Mobility:    · Patient completed Rolling/Turning to Left with  maximal assistance and 2  persons  · Patient completed Rolling/Turning to Right with maximal assistance and 2 persons  · Patient completed Scooting/Bridging with maximal assistance and 2 persons  · Patient completed Supine to Sit with maximal assistance  Patient completed Sit to Supine with maximal assistance and 2 persons     CORRECTION:  SUPINE > SIT IS MAX (A) OF 2 PERSONS.    Functional Mobility/Transfers:  · PATIENT REQUIRED MAX (A) OF 2 PERSONS WITH SIT <> STAND AND WITH STANDING BALANCE.  ·   Activities of Daily Living:  · Upper Body Dressing: dependence      · Lower Body Dressing: dependence      · Toileting: dependence        Cognitive/Visual Perceptual:  PATIENT STATED CURRENT MONTH IS "JANUARY"    Physical Exam:  PATIENT SITS WITH (L) LEAN AND (L) LATERAL NECK FLEXION.    AMPAC 6 Click ADL:  AMPAC Total Score: 8    Treatment & Education:  OT EVAL COMPLETED. PATIENT PRACTICED BED MOBILITY AS WELL SITTING & STANDING BALANCE.  Education:    Patient left supine with all lines intact, call button in reach and bed alarm on    GOALS:   Multidisciplinary Problems     Occupational Therapy Goals        Problem: Occupational Therapy Goal    Goal Priority Disciplines Outcome Interventions   Occupational Therapy Goal     OT, PT/OT     Description: Goals to be met by: 10/10/2020     Patient will increase functional independence with ADLs by performing:    UE Dressing with Minimal Assistance.  LE Dressing with Moderate Assistance.  Toilet transfer to bedside commode with Moderate Assistance.  Upper extremity exercise program x10-15 reps per handout, with assistance as needed.                     History:     Past Medical History:   Diagnosis Date    Hyperlipemia     Hypertension     Parkinson's disease     Stroke 2016    Throat mass        Past Surgical History: "   Procedure Laterality Date    HEMIARTHROPLASTY OF HIP Left 7/12/2020    Procedure: HEMIARTHROPLASTY, HIP;  Surgeon: Humberto Valdivia DO;  [facility];  Service: Orthopedics;  Laterality: Left;    HYSTERECTOMY      THROAT SURGERY      TONSILLECTOMY         Time Tracking:     OT Date of Treatment: 10/10/20  OT Start Time: 0930  OT Stop Time: 0954  OT Total Time (min): 24 min    Billable Minutes:Evaluation 10  Therapeutic Activity 14    Sheba Mcmullen OT  10/3/2020

## 2020-10-03 NOTE — PLAN OF CARE
Patient remains free from injury/fall.  IV antibiotics infusing as ordered, Telemetry intact.  Will continue to monitor, administer meds as ordered, provide comfort/safety measures and notify MD of any changes in condition.

## 2020-10-03 NOTE — PLAN OF CARE
Swer met with pt at bedside for initial assessment. Pt requested that swer speak with daughter. Swer spoke with pt's daughter Adenike. Pt lives with daughter, daughter's , and grand children and needed assistance prior to admission. Pt's daughter will provide transportation at discharge. Pt uses a walker, wheelchair, shower chair, and bedside commode. Adenike denied pt having home health in the past. Pt attended  rehab a couple of years ago and was scheduled to attend [facility] outpatient therapy on Monday 10/5. Adenike stated if therapy recommend rehab [facility]. Pt does not have a problem obtaining medication and daughter drives to medical appointments. Pt has bedside delivery. Pt does not have an advanced directive at this time. SWer provided a transitional care folder, information on advanced directives, information on pharmacy bedside delivery, and discharge planning begins on admission with contact information for any needs/questions.    Marti Parsons MD    [id number] - [place] - [address] AT [facility]  [address]  [place] [address]  Phone: [phone] Fax: [phone]     10/03/20 0930   Discharge Assessment   Assessment Type Discharge Planning Assessment   Confirmed/corrected address and phone number on facesheet? Yes   Assessment information obtained from? Caregiver   Communicated expected length of stay with patient/caregiver yes   Prior to hospitilization cognitive status: Alert/Oriented   Prior to hospitalization functional status: Assistive Equipment   Current cognitive status: Alert/Oriented   Current Functional Status: Assistive Equipment   Facility Arrived From: home   Lives With child(anthony), adult   Able to Return to Prior Arrangements yes   Is patient able to care for self after discharge? Unable to determine at this time (comments)   Who are your caregiver(s) and their phone number(s)? Adenike  Allan (Daughter) [phone]   Patient's perception of discharge disposition home or selfcare   Readmission Within the Last 30 Days no previous admission in last 30 days   Patient currently being followed by outpatient case management? No   Patient currently receives any other outside agency services? No   Equipment Currently Used at Home walker, rolling;wheelchair;shower chair;bedside commode   Do you have any problems affording any of your prescribed medications? No   Is the patient taking medications as prescribed? yes   Does the patient have transportation home? Yes   Transportation Anticipated car, drives self   Does the patient receive services at the [facility]? No   Discharge Plan A Home with family   DME Needed Upon Discharge  none   Patient/Family in Agreement with Plan yes

## 2020-10-04 LAB
ANION GAP SERPL CALC-SCNC: 12 MMOL/L (ref 8–16)
BASOPHILS # BLD AUTO: 0.03 K/UL (ref 0–0.2)
BASOPHILS NFR BLD: 0.3 % (ref 0–1.9)
BUN SERPL-MCNC: 15 MG/DL (ref 8–23)
CALCIUM SERPL-MCNC: 8.4 MG/DL (ref 8.7–10.5)
CHLORIDE SERPL-SCNC: 105 MMOL/L (ref 95–110)
CO2 SERPL-SCNC: 21 MMOL/L (ref 23–29)
CREAT SERPL-MCNC: 0.8 MG/DL (ref 0.5–1.4)
DIFFERENTIAL METHOD: ABNORMAL
EOSINOPHIL # BLD AUTO: 0.1 K/UL (ref 0–0.5)
EOSINOPHIL NFR BLD: 1.2 % (ref 0–8)
ERYTHROCYTE [DISTWIDTH] IN BLOOD BY AUTOMATED COUNT: 14.6 % (ref 11.5–14.5)
EST. GFR  (AFRICAN AMERICAN): >60 ML/MIN/1.73 M^2
EST. GFR  (NON AFRICAN AMERICAN): >60 ML/MIN/1.73 M^2
GLUCOSE SERPL-MCNC: 108 MG/DL (ref 70–110)
HCT VFR BLD AUTO: 30.1 % (ref 37–48.5)
HGB BLD-MCNC: 9.2 G/DL (ref 12–16)
IMM GRANULOCYTES # BLD AUTO: 0.08 K/UL (ref 0–0.04)
IMM GRANULOCYTES NFR BLD AUTO: 0.7 % (ref 0–0.5)
LYMPHOCYTES # BLD AUTO: 1 K/UL (ref 1–4.8)
LYMPHOCYTES NFR BLD: 9.2 % (ref 18–48)
MAGNESIUM SERPL-MCNC: 1.9 MG/DL (ref 1.6–2.6)
MCH RBC QN AUTO: 29 PG (ref 27–31)
MCHC RBC AUTO-ENTMCNC: 30.6 G/DL (ref 32–36)
MCV RBC AUTO: 95 FL (ref 82–98)
MONOCYTES # BLD AUTO: 1.2 K/UL (ref 0.3–1)
MONOCYTES NFR BLD: 10.9 % (ref 4–15)
NEUTROPHILS # BLD AUTO: 8.7 K/UL (ref 1.8–7.7)
NEUTROPHILS NFR BLD: 77.7 % (ref 38–73)
NRBC BLD-RTO: 0 /100 WBC
PLATELET # BLD AUTO: 246 K/UL (ref 150–350)
PMV BLD AUTO: 11 FL (ref 9.2–12.9)
POTASSIUM SERPL-SCNC: 3.5 MMOL/L (ref 3.5–5.1)
RBC # BLD AUTO: 3.17 M/UL (ref 4–5.4)
SODIUM SERPL-SCNC: 138 MMOL/L (ref 136–145)
WBC # BLD AUTO: 11.17 K/UL (ref 3.9–12.7)

## 2020-10-04 PROCEDURE — 25000003 PHARM REV CODE 250: Performed by: ORTHOPAEDIC SURGERY

## 2020-10-04 PROCEDURE — 97530 THERAPEUTIC ACTIVITIES: CPT

## 2020-10-04 PROCEDURE — 21400001 HC TELEMETRY ROOM

## 2020-10-04 PROCEDURE — 11000001 HC ACUTE MED/SURG PRIVATE ROOM

## 2020-10-04 PROCEDURE — 94761 N-INVAS EAR/PLS OXIMETRY MLT: CPT

## 2020-10-04 PROCEDURE — 80048 BASIC METABOLIC PNL TOTAL CA: CPT

## 2020-10-04 PROCEDURE — 96372 THER/PROPH/DIAG INJ SC/IM: CPT

## 2020-10-04 PROCEDURE — 25000003 PHARM REV CODE 250: Performed by: NURSE PRACTITIONER

## 2020-10-04 PROCEDURE — 51798 US URINE CAPACITY MEASURE: CPT

## 2020-10-04 PROCEDURE — 36415 COLL VENOUS BLD VENIPUNCTURE: CPT

## 2020-10-04 PROCEDURE — 83735 ASSAY OF MAGNESIUM: CPT

## 2020-10-04 PROCEDURE — 63600175 PHARM REV CODE 636 W HCPCS: Performed by: ORTHOPAEDIC SURGERY

## 2020-10-04 PROCEDURE — 85025 COMPLETE CBC W/AUTO DIFF WBC: CPT

## 2020-10-04 RX ORDER — ACETAMINOPHEN 325 MG/1
650 TABLET ORAL EVERY 6 HOURS PRN
Status: DISCONTINUED | OUTPATIENT
Start: 2020-10-04 | End: 2020-10-05 | Stop reason: HOSPADM

## 2020-10-04 RX ORDER — LORAZEPAM 0.5 MG/1
0.5 TABLET ORAL EVERY 8 HOURS PRN
Status: DISCONTINUED | OUTPATIENT
Start: 2020-10-04 | End: 2020-10-05 | Stop reason: HOSPADM

## 2020-10-04 RX ORDER — ALPRAZOLAM 0.5 MG/1
0.5 TABLET ORAL 3 TIMES DAILY PRN
Status: DISCONTINUED | OUTPATIENT
Start: 2020-10-04 | End: 2020-10-04

## 2020-10-04 RX ADMIN — ENOXAPARIN SODIUM 40 MG: 40 INJECTION SUBCUTANEOUS at 05:10

## 2020-10-04 RX ADMIN — SPIRONOLACTONE 25 MG: 25 TABLET ORAL at 09:10

## 2020-10-04 RX ADMIN — CHLORHEXIDINE GLUCONATE 0.12% ORAL RINSE 10 ML: 1.2 LIQUID ORAL at 09:10

## 2020-10-04 RX ADMIN — LOSARTAN POTASSIUM 100 MG: 50 TABLET, FILM COATED ORAL at 09:10

## 2020-10-04 RX ADMIN — CARBIDOPA AND LEVODOPA 2 TABLET: 25; 100 TABLET ORAL at 09:10

## 2020-10-04 RX ADMIN — LORAZEPAM 0.5 MG: 0.5 TABLET ORAL at 10:10

## 2020-10-04 RX ADMIN — ROSUVASTATIN 10 MG: 10 TABLET, FILM COATED ORAL at 09:10

## 2020-10-04 RX ADMIN — METOPROLOL SUCCINATE 50 MG: 50 TABLET, EXTENDED RELEASE ORAL at 09:10

## 2020-10-04 RX ADMIN — CARBIDOPA AND LEVODOPA 2 TABLET: 25; 100 TABLET ORAL at 05:10

## 2020-10-04 RX ADMIN — ACETAMINOPHEN 650 MG: 325 TABLET ORAL at 12:10

## 2020-10-04 RX ADMIN — AMLODIPINE BESYLATE 2.5 MG: 2.5 TABLET ORAL at 09:10

## 2020-10-04 RX ADMIN — ALPRAZOLAM 0.5 MG: 0.5 TABLET ORAL at 03:10

## 2020-10-04 RX ADMIN — PANTOPRAZOLE SODIUM 40 MG: 40 TABLET, DELAYED RELEASE ORAL at 09:10

## 2020-10-04 RX ADMIN — CARBIDOPA AND LEVODOPA 2 TABLET: 25; 100 TABLET ORAL at 01:10

## 2020-10-04 NOTE — PLAN OF CARE
Problem: Adult Inpatient Plan of Care  Goal: Plan of Care Review  Outcome: Ongoing, Progressing  Flowsheets (Taken 10/4/2020 0158)  Plan of Care Reviewed With: patient  Pt had no adverse events during shift. Pt free from falls. Call light in reach. SR's x2. Pain well controlled w/ PRN meds. Pt repositioned with wedge q2 hrs, pt tolerating well. Abductor pillow in place, pt tolerating well. IV saline locked as ordered. VTE prophylaxis - SCD's in use. Pt anxious during shift, prn medication given as needed to help relax patient. Essential visitor (daughter) at bedside. Elevated temp during shift, prn medication given as needed. Surgical incision CDI. Heart monitor in place (NSR. 80's - 90's). VSS. Chart reviewed. Will continue to monitor.

## 2020-10-04 NOTE — SUBJECTIVE & OBJECTIVE
Interval History:  POD 2, fever overnight 102.2F, WBCs 11K . CXR pending. Pt encouraged to deep breath and cough. PT/OT recommends SNF placement.     Review of Systems   Constitutional: Positive for activity change. Negative for chills, diaphoresis, fatigue and fever.   HENT: Negative for congestion, trouble swallowing and voice change.    Eyes: Negative for photophobia and discharge.   Respiratory: Negative for cough, chest tightness, shortness of breath and wheezing.    Cardiovascular: Negative for chest pain, palpitations and leg swelling.   Gastrointestinal: Negative for abdominal pain, blood in stool, constipation, diarrhea, nausea and vomiting.   Endocrine: Negative for cold intolerance, heat intolerance, polydipsia, polyphagia and polyuria.   Genitourinary: Negative for difficulty urinating, dysuria, flank pain, frequency and urgency.   Musculoskeletal: Positive for arthralgias and gait problem (R hip fracture). Negative for back pain (R hip pain), joint swelling and myalgias.   Skin: Negative for rash and wound.   Neurological: Positive for weakness (R hip d/t fracture). Negative for dizziness, seizures, syncope, facial asymmetry, light-headedness, numbness and headaches.   Psychiatric/Behavioral: Negative for confusion and hallucinations.     Objective:     Vital Signs (Most Recent):  Temp: 98.5 °F (36.9 °C) (10/04/20 0713)  Pulse: 91 (10/04/20 0929)  Resp: 18 (10/04/20 0713)  BP: 133/70 (10/04/20 0713)  SpO2: 99 % (10/04/20 0713) Vital Signs (24h Range):  Temp:  [97.9 °F (36.6 °C)-102.2 °F (39 °C)] 98.5 °F (36.9 °C)  Pulse:  [] 91  Resp:  [18-20] 18  SpO2:  [96 %-99 %] 99 %  BP: (101-148)/(59-72) 133/70     Weight: 54.9 kg (121 lb)  Body mass index is 22.13 kg/m².    Intake/Output Summary (Last 24 hours) at 10/4/2020 1136  Last data filed at 10/4/2020 1000  Gross per 24 hour   Intake 780 ml   Output 850 ml   Net -70 ml      Physical Exam  Vitals signs reviewed.   Constitutional:       General: She is  not in acute distress.     Appearance: Normal appearance. She is not toxic-appearing or diaphoretic.   HENT:      Head: Normocephalic and atraumatic.      Nose: Nose normal. No congestion.      Mouth/Throat:      Mouth: Mucous membranes are moist.      Pharynx: No oropharyngeal exudate.   Eyes:      General: No scleral icterus.     Pupils: Pupils are equal, round, and reactive to light.   Neck:      Musculoskeletal: Normal range of motion and neck supple. No muscular tenderness.   Cardiovascular:      Rate and Rhythm: Normal rate and regular rhythm.      Pulses: Normal pulses.      Heart sounds: Normal heart sounds.   Pulmonary:      Effort: Pulmonary effort is normal. No respiratory distress.      Breath sounds: Normal breath sounds. No wheezing or rhonchi.   Abdominal:      General: Abdomen is flat. Bowel sounds are normal. There is no distension.      Palpations: Abdomen is soft.      Tenderness: There is no abdominal tenderness. There is no rebound.      Hernia: A hernia (RLQ) is present.   Musculoskeletal:         General: No swelling or deformity.      Right hip: She exhibits decreased range of motion and tenderness.      Right lower leg: No edema.      Left lower leg: No edema.   Skin:     General: Skin is warm and dry.      Capillary Refill: Capillary refill takes less than 2 seconds.      Coloration: Skin is not jaundiced.      Findings: No bruising or rash.             Comments: Scabbed abrasion to R knee, ALYSIA, no s/s infection   Neurological:      General: No focal deficit present.      Mental Status: She is alert and oriented to person, place, and time.      Cranial Nerves: No cranial nerve deficit.      Sensory: No sensory deficit.      Coordination: Coordination normal.   Psychiatric:         Mood and Affect: Mood normal. Affect is flat.         Speech: Speech is delayed.         Behavior: Behavior normal.         Thought Content: Thought content normal.         Cognition and Memory: Cognition is not  impaired.         Judgment: Judgment normal.      Comments: Baseline per daughter (parkinsons)         Significant Labs:   BMP:   Recent Labs   Lab 10/04/20  0625         K 3.5      CO2 21*   BUN 15   CREATININE 0.8   CALCIUM 8.4*   MG 1.9     CBC:   Recent Labs   Lab 10/03/20  0617 10/04/20  0626   WBC 10.39 11.17   HGB 9.3* 9.2*   HCT 30.2* 30.1*    246     CMP:   Recent Labs   Lab 10/03/20  0617 10/04/20  0625    138   K 3.4* 3.5    105   CO2 22* 21*    108   BUN 13 15   CREATININE 1.0 0.8   CALCIUM 8.1* 8.4*   ANIONGAP 11 12   EGFRNONAA 53* >60       Significant Imaging:   Imaging Results          X-Ray Hip 2 View Right (Final result)  Result time 10/01/20 19:58:16    Final result by Cornell Goldstein III, MD (10/01/20 19:58:16)                 Impression:      Acute/recent fracture through the right femoral neck with superolateral displacement of the femoral shaft.      Electronically signed by: Cornell Goldstein MD  Date:    10/01/2020  Time:    19:58             Narrative:    EXAMINATION:  XR HIP 2 VIEW RIGHT    CLINICAL HISTORY:  Pain in right hip    COMPARISON:  None    FINDINGS:  There is no acute/recent fracture through the right femoral neck with superolateral displacement of the main femoral shaft from the femoral head.  The head is projected over the acetabular cavity without dislocation.

## 2020-10-04 NOTE — PT/OT/SLP PROGRESS
Physical Therapy Treatment    Patient Name:  Tanya Madrid   MRN:  [id number]    Recommendations:     Discharge Recommendations:  nursing facility, skilled   Discharge Equipment Recommendations: bedside commode   Barriers to discharge: Decreased caregiver support    Assessment:     Max A with bed mobility and max x 2 with sit to stand with RW.  To unsteady to attempt lois. Still unable to achieve full standing position    Rehab Prognosis: Fair; patient would benefit from acute skilled PT services to address these deficits and reach maximum level of function.    Recent Surgery: Procedure(s) (LRB):  HEMIARTHROPLASTY, HIP (Right) 2 Days Post-Op    Plan:     During this hospitalization, patient to be seen 5 x/week to address the identified rehab impairments via gait training, therapeutic activities, therapeutic exercises and progress toward the following goals:    · Plan of Care Expires:       Subjective     Chief Complaint: hip pain  Patient/Family Comments/goals: improve mobility  Pain/Comfort:  · Pain Rating 1: 7/10  · Location - Side 1: Right  · Location 1: hip  · Pain Addressed 2: Reposition      Objective:     Communicated with nursing prior to session.  Patient found supine with   upon PT entry to room.     General Precautions: Standard,     Orthopedic Precautions:RLE posterior precautions   Braces:       Functional Mobility:  · Bed Mobility:     · Supine to Sit: maximal assistance  · Transfers:     · Sit to Stand:  maximal assistance and of 2 persons with rolling walker      AM-PAC 6 CLICK MOBILITY  Turning over in bed (including adjusting bedclothes, sheets and blankets)?: 2  Sitting down on and standing up from a chair with arms (e.g., wheelchair, bedside commode, etc.): 2  Moving from lying on back to sitting on the side of the bed?: 2  Moving to and from a bed to a chair (including a wheelchair)?: 1  Need to walk in hospital room?: 1  Climbing 3-5 steps with a railing?: 1  Basic Mobility Total Score:  9       Therapeutic Activities and Exercises:   Max A with bed mobility and max x 2 with sit to stand. Unable to achieve full standing position. R hip and knee stay flexed with poor WB on RLE.  Max VC for post hip precautions    Patient left supine with all lines intact, call button in reach and ABD pillow..    GOALS:   Multidisciplinary Problems     Physical Therapy Goals        Problem: Physical Therapy Goal    Goal Priority Disciplines Outcome Goal Variances Interventions   Physical Therapy Goal     PT, PT/OT                      Time Tracking:     PT Received On: 10/04/20  PT Start Time: 1215     PT Stop Time: 1245  PT Total Time (min): 30 min     Billable Minutes: Therapeutic Activity 25    Treatment Type: Treatment  PT/PTA: PT           Ck Franks, PT  10/04/2020

## 2020-10-04 NOTE — NURSING
Patients henley was removed at 0600 on 10/4. Due to void in 6-8 hours ([id number]). Will continue to monitor.

## 2020-10-04 NOTE — ASSESSMENT & PLAN NOTE
Orthopedic surgery consulted - plan to OR in AM  Type and screen sent  Neuro checks with VS  NPO after MN for AM procedure  PT/OT to eval/treat post-op  Holding home ASA, plavix for AM procedure - resume when cleared by surgery  10/2/20  -Surgery planned for today   10/3/20  -POD 1 s/p right hip hemiarthroplasty, pt tolerated procedure well.   -Pain controlled.   -PT/OT consult.   -Lovenox for DVT prophylaxis.   -Social work consult for discharge planning.   10/4/20  - POD 2, fever overnight 102.2F, WBCs 11K .   -CXR pending.   -Pt encouraged to deep breath and cough.   -PT/OT recommends SNF placement.

## 2020-10-04 NOTE — PROGRESS NOTES
[facility] Hospital Medicine  Progress Note    Patient Name: Tanya Madrid  MRN: [id number]  Patient Class: IP- Inpatient   Admission Date: 10/1/2020  Length of Stay: 2 days  Attending Physician: Elvin Alvarez MD  Primary Care Provider: Marti Parsons MD        Subjective:     Principal Problem:Closed fracture of right hip        HPI:  81 y/o AA F with hx of HTN, HLD, Parkinson's disease, stroked, L hip replacement to ED after experiencing an unwitnessed, ground-level trip and fall at home immediately PTA - daughter reports she found her mother on floor - pt denies LOC, states she lost her balance. This is the second fall in the past week per daughter, which immediately resulting in loss of ROM and pain to the R hip area. Denies chest pain, edema, palpitations, SOB, wheezing, abdominal pain, N/V/D, constipation, dysuria, flank pain, HA, dizziness, fever, cough, chills. Found in ED to have R femoral neck fracture - labs largely within normal limits and VSS, COVID-19 negative. Hospital medicine was called and pt was placed in observation on telemetry with plan for likely surgical intervention in AM. Pt is a full code - her daughter Josee Lemus is her surrogate decision maker.     Overview/Hospital Course:  81 y/o female admitted for left hip fracture. Orthopedic surgery consulted. ASA and Plavix on hold. Surgery planned for today by Dr. Kearney. As of 10/3 POD 1 s/p right hip hemiarthroplasty, pt tolerated procedure well. Pain controlled. PT/OT consult. Lovenox for DVT prophylaxis. Social work consult for discharge planning. As of 10/4  POD 2, fever overnight 102.2F, WBCs 11K . CXR pending. Pt encouraged to deep breath and cough. PT/OT recommends SNF placement.     Interval History:  POD 2, fever overnight 102.2F, WBCs 11K . CXR pending. Pt encouraged to deep breath and cough. PT/OT recommends SNF placement.     Review of Systems   Constitutional: Positive for activity change. Negative for  chills, diaphoresis, fatigue and fever.   HENT: Negative for congestion, trouble swallowing and voice change.    Eyes: Negative for photophobia and discharge.   Respiratory: Negative for cough, chest tightness, shortness of breath and wheezing.    Cardiovascular: Negative for chest pain, palpitations and leg swelling.   Gastrointestinal: Negative for abdominal pain, blood in stool, constipation, diarrhea, nausea and vomiting.   Endocrine: Negative for cold intolerance, heat intolerance, polydipsia, polyphagia and polyuria.   Genitourinary: Negative for difficulty urinating, dysuria, flank pain, frequency and urgency.   Musculoskeletal: Positive for arthralgias and gait problem (R hip fracture). Negative for back pain (R hip pain), joint swelling and myalgias.   Skin: Negative for rash and wound.   Neurological: Positive for weakness (R hip d/t fracture). Negative for dizziness, seizures, syncope, facial asymmetry, light-headedness, numbness and headaches.   Psychiatric/Behavioral: Negative for confusion and hallucinations.     Objective:     Vital Signs (Most Recent):  Temp: 98.5 °F (36.9 °C) (10/04/20 0713)  Pulse: 91 (10/04/20 0929)  Resp: 18 (10/04/20 0713)  BP: 133/70 (10/04/20 0713)  SpO2: 99 % (10/04/20 0713) Vital Signs (24h Range):  Temp:  [97.9 °F (36.6 °C)-102.2 °F (39 °C)] 98.5 °F (36.9 °C)  Pulse:  [] 91  Resp:  [18-20] 18  SpO2:  [96 %-99 %] 99 %  BP: (101-148)/(59-72) 133/70     Weight: 54.9 kg (121 lb)  Body mass index is 22.13 kg/m².    Intake/Output Summary (Last 24 hours) at 10/4/2020 1136  Last data filed at 10/4/2020 1000  Gross per 24 hour   Intake 780 ml   Output 850 ml   Net -70 ml      Physical Exam  Vitals signs reviewed.   Constitutional:       General: She is not in acute distress.     Appearance: Normal appearance. She is not toxic-appearing or diaphoretic.   HENT:      Head: Normocephalic and atraumatic.      Nose: Nose normal. No congestion.      Mouth/Throat:      Mouth: Mucous  membranes are moist.      Pharynx: No oropharyngeal exudate.   Eyes:      General: No scleral icterus.     Pupils: Pupils are equal, round, and reactive to light.   Neck:      Musculoskeletal: Normal range of motion and neck supple. No muscular tenderness.   Cardiovascular:      Rate and Rhythm: Normal rate and regular rhythm.      Pulses: Normal pulses.      Heart sounds: Normal heart sounds.   Pulmonary:      Effort: Pulmonary effort is normal. No respiratory distress.      Breath sounds: Normal breath sounds. No wheezing or rhonchi.   Abdominal:      General: Abdomen is flat. Bowel sounds are normal. There is no distension.      Palpations: Abdomen is soft.      Tenderness: There is no abdominal tenderness. There is no rebound.      Hernia: A hernia (RLQ) is present.   Musculoskeletal:         General: No swelling or deformity.      Right hip: She exhibits decreased range of motion and tenderness.      Right lower leg: No edema.      Left lower leg: No edema.   Skin:     General: Skin is warm and dry.      Capillary Refill: Capillary refill takes less than 2 seconds.      Coloration: Skin is not jaundiced.      Findings: No bruising or rash.             Comments: Scabbed abrasion to R knee, ALYSIA, no s/s infection   Neurological:      General: No focal deficit present.      Mental Status: She is alert and oriented to person, place, and time.      Cranial Nerves: No cranial nerve deficit.      Sensory: No sensory deficit.      Coordination: Coordination normal.   Psychiatric:         Mood and Affect: Mood normal. Affect is flat.         Speech: Speech is delayed.         Behavior: Behavior normal.         Thought Content: Thought content normal.         Cognition and Memory: Cognition is not impaired.         Judgment: Judgment normal.      Comments: Baseline per daughter (parkinsons)         Significant Labs:   BMP:   Recent Labs   Lab 10/04/20  0625         K 3.5      CO2 21*   BUN 15    CREATININE 0.8   CALCIUM 8.4*   MG 1.9     CBC:   Recent Labs   Lab 10/03/20  0617 10/04/20  0626   WBC 10.39 11.17   HGB 9.3* 9.2*   HCT 30.2* 30.1*    246     CMP:   Recent Labs   Lab 10/03/20  0617 10/04/20  0625    138   K 3.4* 3.5    105   CO2 22* 21*    108   BUN 13 15   CREATININE 1.0 0.8   CALCIUM 8.1* 8.4*   ANIONGAP 11 12   EGFRNONAA 53* >60       Significant Imaging:   Imaging Results          X-Ray Hip 2 View Right (Final result)  Result time 10/01/20 19:58:16    Final result by Cornell Goldstein III, MD (10/01/20 19:58:16)                 Impression:      Acute/recent fracture through the right femoral neck with superolateral displacement of the femoral shaft.      Electronically signed by: Cornell Goldstein MD  Date:    10/01/2020  Time:    19:58             Narrative:    EXAMINATION:  XR HIP 2 VIEW RIGHT    CLINICAL HISTORY:  Pain in right hip    COMPARISON:  None    FINDINGS:  There is no acute/recent fracture through the right femoral neck with superolateral displacement of the main femoral shaft from the femoral head.  The head is projected over the acetabular cavity without dislocation.                                Assessment/Plan:      * Closed fracture of right hip  Orthopedic surgery consulted - plan to OR in AM  Type and screen sent  Neuro checks with VS  NPO after MN for AM procedure  PT/OT to eval/treat post-op  Holding home ASA, plavix for AM procedure - resume when cleared by surgery  10/2/20  -Surgery planned for today   10/3/20  -POD 1 s/p right hip hemiarthroplasty, pt tolerated procedure well.   -Pain controlled.   -PT/OT consult.   -Lovenox for DVT prophylaxis.   -Social work consult for discharge planning.   10/4/20  - POD 2, fever overnight 102.2F, WBCs 11K .   -CXR pending.   -Pt encouraged to deep breath and cough.   -PT/OT recommends SNF placement.             Parkinson disease  Likely contributory to fall - continue home sinemet  Pt already  scheduled for outpatient PT/OT at [facility]  PT/OT   Fall precautions      Hyperlipidemia  Continue home statin      GERD (gastroesophageal reflux disease)  Continue home PPI      Essential hypertension  Bp stable  Resumed home medications  Enlaprilat IV prn   Monitor closely        VTE Risk Mitigation (From admission, onward)         Ordered     enoxaparin injection 40 mg  Every 24 hours      10/02/20 1915     IP VTE HIGH RISK PATIENT  Once      10/02/20 1915     Place sequential compression device  Until discontinued      10/01/20 2132     Place sequential compression device  Until discontinued      10/01/20 2114                Discharge Planning   PARMINDER:      Code Status: Full Code   Is the patient medically ready for discharge?:     Reason for patient still in hospital (select all that apply): Treatment  Discharge Plan A: Home with family                  Sandhya Harris NP  Department of Hospital Medicine   [facility]

## 2020-10-04 NOTE — PLAN OF CARE
PT tx completed. Max A with bed mobility and transfers.  Still too unsafe to attempt gait.  Needs continued re-education about posterior hip precaution.

## 2020-10-04 NOTE — PLAN OF CARE
PATIENT WOULD BENEFIT FROM CONT SKILLED OT INTERVENTION TO INCREASE SAFETY AND (I) WITH SELF CARE TASKS WITHIN HIP PRECAUTIONS.

## 2020-10-04 NOTE — PT/OT/SLP PROGRESS
Occupational Therapy      Patient Name:  Tanya Madrid   MRN:  [id number]      TREATMENT TIME:[id number]    S:  PATIENT C/O BEING COLD.    O:  PATIENT FOUND WITH HOB ELEVATED TO APPROX 30 DEGREES WITH INCREASED LATERAL LEAN TO (L) DOZING OFF IN LUNCH TRAY.  PATIENT T/F'ED SUPINE > SIT WITH MAX (A), SCOOTED SEATED TO EOB WITH MAX (A) WITH INCREASED LEAN TO (R).  PATIENT PRACTICED ATTEMPTING TO MAINTAIN MIDLINE WITH MAX VC.  PATIENT T/F'ED SIT <> STAND WITH MAX (A) X2 WITH RW.  PATIENT STOOD WITH MAX (A) FOR BALANCE WITH (D) (A) TO CHANGE DIAPER.  PATIENT UNABLE TO TRY DONNING/DOFFING SOCKS.  AT END OF SESSION,  PATIENT POSITIONED WITH HOB ELEVATED SO THAT SHE COULD FINISH LUNCH.      A:  PATIENT WITH POOR SITTING BALANCE WHICH DECREASED (I) WITH LB DRESSING.    P:  CONT TO ADDRESS UNMET GOALS.      CHARGES:  TA2    Sheba Mcmullen OT  10/4/2020

## 2020-10-04 NOTE — PLAN OF CARE
Pt remains free from falls/injuries this shift. Safety precautions maintained. Neuro checks completed every 4 hours. Telemetry monitoring per orders. No s/s of acute distress noted. Will continue to monitor. Chart check completed.

## 2020-10-05 VITALS
BODY MASS INDEX: 22.26 KG/M2 | HEART RATE: 93 BPM | TEMPERATURE: 98 F | RESPIRATION RATE: 16 BRPM | OXYGEN SATURATION: 96 % | HEIGHT: 62 IN | DIASTOLIC BLOOD PRESSURE: 77 MMHG | SYSTOLIC BLOOD PRESSURE: 143 MMHG | WEIGHT: 121 LBS

## 2020-10-05 LAB
ANION GAP SERPL CALC-SCNC: 11 MMOL/L (ref 8–16)
BASOPHILS # BLD AUTO: 0.02 K/UL (ref 0–0.2)
BASOPHILS NFR BLD: 0.2 % (ref 0–1.9)
BUN SERPL-MCNC: 9 MG/DL (ref 8–23)
CALCIUM SERPL-MCNC: 8.1 MG/DL (ref 8.7–10.5)
CHLORIDE SERPL-SCNC: 105 MMOL/L (ref 95–110)
CO2 SERPL-SCNC: 19 MMOL/L (ref 23–29)
CREAT SERPL-MCNC: 0.8 MG/DL (ref 0.5–1.4)
DIFFERENTIAL METHOD: ABNORMAL
EOSINOPHIL # BLD AUTO: 0.2 K/UL (ref 0–0.5)
EOSINOPHIL NFR BLD: 2.3 % (ref 0–8)
ERYTHROCYTE [DISTWIDTH] IN BLOOD BY AUTOMATED COUNT: 14.2 % (ref 11.5–14.5)
EST. GFR  (AFRICAN AMERICAN): >60 ML/MIN/1.73 M^2
EST. GFR  (NON AFRICAN AMERICAN): >60 ML/MIN/1.73 M^2
GLUCOSE SERPL-MCNC: 107 MG/DL (ref 70–110)
HCT VFR BLD AUTO: 30.7 % (ref 37–48.5)
HGB BLD-MCNC: 9.5 G/DL (ref 12–16)
IMM GRANULOCYTES # BLD AUTO: 0.05 K/UL (ref 0–0.04)
IMM GRANULOCYTES NFR BLD AUTO: 0.5 % (ref 0–0.5)
LYMPHOCYTES # BLD AUTO: 1 K/UL (ref 1–4.8)
LYMPHOCYTES NFR BLD: 10.3 % (ref 18–48)
MAGNESIUM SERPL-MCNC: 1.8 MG/DL (ref 1.6–2.6)
MCH RBC QN AUTO: 29.1 PG (ref 27–31)
MCHC RBC AUTO-ENTMCNC: 30.9 G/DL (ref 32–36)
MCV RBC AUTO: 94 FL (ref 82–98)
MONOCYTES # BLD AUTO: 0.9 K/UL (ref 0.3–1)
MONOCYTES NFR BLD: 9 % (ref 4–15)
NEUTROPHILS # BLD AUTO: 7.9 K/UL (ref 1.8–7.7)
NEUTROPHILS NFR BLD: 77.7 % (ref 38–73)
NRBC BLD-RTO: 0 /100 WBC
PLATELET # BLD AUTO: 272 K/UL (ref 150–350)
PMV BLD AUTO: 11 FL (ref 9.2–12.9)
POTASSIUM SERPL-SCNC: 3.8 MMOL/L (ref 3.5–5.1)
RBC # BLD AUTO: 3.27 M/UL (ref 4–5.4)
SODIUM SERPL-SCNC: 135 MMOL/L (ref 136–145)
WBC # BLD AUTO: 10.13 K/UL (ref 3.9–12.7)

## 2020-10-05 PROCEDURE — 97110 THERAPEUTIC EXERCISES: CPT | Mod: CQ

## 2020-10-05 PROCEDURE — 99900035 HC TECH TIME PER 15 MIN (STAT)

## 2020-10-05 PROCEDURE — 36415 COLL VENOUS BLD VENIPUNCTURE: CPT

## 2020-10-05 PROCEDURE — 94761 N-INVAS EAR/PLS OXIMETRY MLT: CPT

## 2020-10-05 PROCEDURE — 85025 COMPLETE CBC W/AUTO DIFF WBC: CPT

## 2020-10-05 PROCEDURE — 25000003 PHARM REV CODE 250: Performed by: ORTHOPAEDIC SURGERY

## 2020-10-05 PROCEDURE — 97110 THERAPEUTIC EXERCISES: CPT

## 2020-10-05 PROCEDURE — 83735 ASSAY OF MAGNESIUM: CPT

## 2020-10-05 PROCEDURE — 25000003 PHARM REV CODE 250: Performed by: NURSE PRACTITIONER

## 2020-10-05 PROCEDURE — 80048 BASIC METABOLIC PNL TOTAL CA: CPT

## 2020-10-05 PROCEDURE — 97530 THERAPEUTIC ACTIVITIES: CPT

## 2020-10-05 PROCEDURE — 97530 THERAPEUTIC ACTIVITIES: CPT | Mod: CQ

## 2020-10-05 RX ORDER — CHLORHEXIDINE GLUCONATE ORAL RINSE 1.2 MG/ML
10 SOLUTION DENTAL 2 TIMES DAILY
Qty: 473 ML | Refills: 0 | Status: SHIPPED | OUTPATIENT
Start: 2020-10-05 | End: 2020-10-07

## 2020-10-05 RX ORDER — HYDROCODONE BITARTRATE AND ACETAMINOPHEN 5; 325 MG/1; MG/1
1 TABLET ORAL EVERY 4 HOURS PRN
Qty: 15 TABLET | Refills: 0 | Status: ON HOLD | OUTPATIENT
Start: 2020-10-05 | End: 2020-10-20 | Stop reason: HOSPADM

## 2020-10-05 RX ORDER — ASPIRIN 81 MG/1
81 TABLET ORAL DAILY
Status: DISCONTINUED | OUTPATIENT
Start: 2020-10-05 | End: 2020-10-05 | Stop reason: HOSPADM

## 2020-10-05 RX ORDER — CARBIDOPA AND LEVODOPA 25; 100 MG/1; MG/1
2 TABLET ORAL 4 TIMES DAILY
Status: DISCONTINUED | OUTPATIENT
Start: 2020-10-05 | End: 2020-10-05

## 2020-10-05 RX ORDER — CLOPIDOGREL BISULFATE 75 MG/1
75 TABLET ORAL DAILY
Status: DISCONTINUED | OUTPATIENT
Start: 2020-10-05 | End: 2020-10-05 | Stop reason: HOSPADM

## 2020-10-05 RX ORDER — CARBIDOPA AND LEVODOPA 25; 100 MG/1; MG/1
3 TABLET ORAL 4 TIMES DAILY
Status: ON HOLD
Start: 2020-10-05 | End: 2021-01-23 | Stop reason: SDUPTHER

## 2020-10-05 RX ORDER — LORAZEPAM 0.5 MG/1
0.5 TABLET ORAL EVERY 8 HOURS PRN
Qty: 10 TABLET | Refills: 0 | Status: SHIPPED | OUTPATIENT
Start: 2020-10-05 | End: 2021-01-27

## 2020-10-05 RX ORDER — CARBIDOPA AND LEVODOPA 25; 100 MG/1; MG/1
3 TABLET ORAL 4 TIMES DAILY
Status: DISCONTINUED | OUTPATIENT
Start: 2020-10-05 | End: 2020-10-05 | Stop reason: HOSPADM

## 2020-10-05 RX ADMIN — SPIRONOLACTONE 25 MG: 25 TABLET ORAL at 08:10

## 2020-10-05 RX ADMIN — ROSUVASTATIN 10 MG: 10 TABLET, FILM COATED ORAL at 08:10

## 2020-10-05 RX ADMIN — CLOPIDOGREL 75 MG: 75 TABLET, FILM COATED ORAL at 03:10

## 2020-10-05 RX ADMIN — CHLORHEXIDINE GLUCONATE 0.12% ORAL RINSE 10 ML: 1.2 LIQUID ORAL at 08:10

## 2020-10-05 RX ADMIN — CARBIDOPA AND LEVODOPA 2 TABLET: 25; 100 TABLET ORAL at 08:10

## 2020-10-05 RX ADMIN — ASPIRIN 81 MG: 81 TABLET, COATED ORAL at 03:10

## 2020-10-05 RX ADMIN — CARBIDOPA AND LEVODOPA 3 TABLET: 25; 100 TABLET ORAL at 01:10

## 2020-10-05 RX ADMIN — METOPROLOL SUCCINATE 50 MG: 50 TABLET, EXTENDED RELEASE ORAL at 08:10

## 2020-10-05 RX ADMIN — HYDROCODONE BITARTRATE AND ACETAMINOPHEN 1 TABLET: 5; 325 TABLET ORAL at 07:10

## 2020-10-05 RX ADMIN — AMLODIPINE BESYLATE 2.5 MG: 2.5 TABLET ORAL at 08:10

## 2020-10-05 RX ADMIN — PANTOPRAZOLE SODIUM 40 MG: 40 TABLET, DELAYED RELEASE ORAL at 08:10

## 2020-10-05 RX ADMIN — LOSARTAN POTASSIUM 100 MG: 50 TABLET, FILM COATED ORAL at 08:10

## 2020-10-05 NOTE — PROGRESS NOTES
[facility] Medicine  Progress Note    Patient Name: Tanya Madrid  MRN: [id number]  Patient Class: IP- Inpatient   Admission Date: 10/1/2020  Length of Stay: 3 days  Attending Physician: Elvin Alvarez MD  Primary Care Provider: Marti Parsons MD        Subjective:     Principal Problem:Closed fracture of right hip        HPI:  79 y/o AA F with hx of HTN, HLD, Parkinson's disease, stroked, L hip replacement to ED after experiencing an unwitnessed, ground-level trip and fall at home immediately PTA - daughter reports she found her mother on floor - pt denies LOC, states she lost her balance. This is the second fall in the past week per daughter, which immediately resulting in loss of ROM and pain to the R hip area. Denies chest pain, edema, palpitations, SOB, wheezing, abdominal pain, N/V/D, constipation, dysuria, flank pain, HA, dizziness, fever, cough, chills. Found in ED to have R femoral neck fracture - labs largely within normal limits and VSS, COVID-19 negative. Hospital medicine was called and pt was placed in observation on telemetry with plan for likely surgical intervention in AM. Pt is a full code - her daughter Josee Lemus is her surrogate decision maker.     Overview/Hospital Course:  79 y/o female admitted for left hip fracture. Orthopedic surgery consulted. ASA and Plavix on hold. Surgery planned for today by Dr. Kearney. As of 10/3 POD 1 s/p right hip hemiarthroplasty, pt tolerated procedure well. Pain controlled. PT/OT consult. Lovenox for DVT prophylaxis. Social work consult for discharge planning. As of 10/4  POD 2, fever overnight 102.2F, WBCs 11K . CXR pending. Pt encouraged to deep breath and cough. PT/OT recommends SNF placement. As of 10/5 pt lying in bed, pain controlled. Labs reviewed and stable. CXR reviewed. Pt encouraged to use IS. WBCs remain normal, afebrile. Awaiting on placement.     Interval History: No acute events overnight. pt lying in bed, pain  controlled. Labs reviewed and stable. CXR reviewed. Pt encouraged to use IS. WBCs remain normal, afebrile. Awaiting on placement.     Review of Systems   Constitutional: Positive for activity change. Negative for chills, diaphoresis, fatigue and fever.   HENT: Negative for congestion, trouble swallowing and voice change.    Eyes: Negative for photophobia and discharge.   Respiratory: Negative for cough, chest tightness, shortness of breath and wheezing.    Cardiovascular: Negative for chest pain, palpitations and leg swelling.   Gastrointestinal: Negative for abdominal pain, blood in stool, constipation, diarrhea, nausea and vomiting.   Endocrine: Negative for cold intolerance, heat intolerance, polydipsia, polyphagia and polyuria.   Genitourinary: Negative for difficulty urinating, dysuria, flank pain, frequency and urgency.   Musculoskeletal: Positive for arthralgias and gait problem (R hip fracture). Negative for back pain (R hip pain), joint swelling and myalgias.   Skin: Negative for rash and wound.   Neurological: Positive for weakness (R hip d/t fracture). Negative for dizziness, seizures, syncope, facial asymmetry, light-headedness, numbness and headaches.   Psychiatric/Behavioral: Negative for confusion and hallucinations.     Objective:     Vital Signs (Most Recent):  Temp: 98.8 °F (37.1 °C) (10/05/20 0706)  Pulse: 88 (10/05/20 1123)  Resp: 18 (10/05/20 0746)  BP: (!) 160/77 (10/05/20 0706)  SpO2: 96 % (10/05/20 0746) Vital Signs (24h Range):  Temp:  [97.5 °F (36.4 °C)-99.6 °F (37.6 °C)] 98.8 °F (37.1 °C)  Pulse:  [] 88  Resp:  [18] 18  SpO2:  [95 %-100 %] 96 %  BP: (125-160)/(60-77) 160/77     Weight: 54.9 kg (121 lb)  Body mass index is 22.13 kg/m².    Intake/Output Summary (Last 24 hours) at 10/5/2020 1315  Last data filed at 10/5/2020 0559  Gross per 24 hour   Intake 700 ml   Output 50 ml   Net 650 ml      Physical Exam  Vitals signs reviewed.   Constitutional:       General: She is not in acute  distress.     Appearance: Normal appearance. She is not toxic-appearing or diaphoretic.   HENT:      Head: Normocephalic and atraumatic.      Nose: Nose normal. No congestion.      Mouth/Throat:      Mouth: Mucous membranes are moist.      Pharynx: No oropharyngeal exudate.   Eyes:      General: No scleral icterus.     Pupils: Pupils are equal, round, and reactive to light.   Neck:      Musculoskeletal: Normal range of motion and neck supple. No muscular tenderness.   Cardiovascular:      Rate and Rhythm: Normal rate and regular rhythm.      Pulses: Normal pulses.      Heart sounds: Normal heart sounds.   Pulmonary:      Effort: Pulmonary effort is normal. No respiratory distress.      Breath sounds: Normal breath sounds. No wheezing or rhonchi.   Abdominal:      General: Abdomen is flat. Bowel sounds are normal. There is no distension.      Palpations: Abdomen is soft.      Tenderness: There is no abdominal tenderness. There is no rebound.      Hernia: A hernia (RLQ) is present.   Musculoskeletal:         General: No swelling or deformity.      Right hip: She exhibits decreased range of motion and tenderness.      Right lower leg: No edema.      Left lower leg: No edema.   Skin:     General: Skin is warm and dry.      Capillary Refill: Capillary refill takes less than 2 seconds.      Coloration: Skin is not jaundiced.      Findings: No bruising or rash.             Comments: Abrasion to R knee.    Neurological:      General: No focal deficit present.      Mental Status: She is alert and oriented to person, place, and time.      Cranial Nerves: No cranial nerve deficit.      Sensory: No sensory deficit.      Coordination: Coordination normal.   Psychiatric:         Mood and Affect: Mood normal. Affect is flat.         Speech: Speech is delayed.         Behavior: Behavior normal.         Thought Content: Thought content normal.         Cognition and Memory: Cognition is not impaired.         Judgment: Judgment  normal.      Comments: Baseline per daughter (parkinsons)         Significant Labs:   BMP:   Recent Labs   Lab 10/05/20  0843      *   K 3.8      CO2 19*   BUN 9   CREATININE 0.8   CALCIUM 8.1*   MG 1.8     CBC:   Recent Labs   Lab 10/04/20  0626 10/05/20  1100   WBC 11.17 10.13   HGB 9.2* 9.5*   HCT 30.1* 30.7*    272       Significant Imaging: I have reviewed all pertinent imaging results/findings within the past 24 hours.      Assessment/Plan:      * Closed fracture of right hip  Orthopedic surgery consulted - plan to OR in AM  Type and screen sent  Neuro checks with VS  NPO after MN for AM procedure  PT/OT to eval/treat post-op  Holding home ASA, plavix for AM procedure - resume when cleared by surgery  10/2/20  -Surgery planned for today   10/3/20  -POD 1 s/p right hip hemiarthroplasty, pt tolerated procedure well.   -Pain controlled.   -PT/OT consult.   -Lovenox for DVT prophylaxis.   -Social work consult for discharge planning.   10/4/20  - POD 2, fever overnight 102.2F, WBCs 11K .   -CXR pending.   -Pt encouraged to deep breath and cough.   -PT/OT recommends SNF placement.   10/5/20  -POD 3, pain controlled  -Awaiting SNF placement             History of CVA (cerebrovascular accident)  Resume home meds (ASA, Plavix, Statin)       Parkinson disease  Likely contributory to fall - continue home sinemet  PT/OT   Fall precautions      Hyperlipidemia  Continue home statin      GERD (gastroesophageal reflux disease)  Continue home PPI      Essential hypertension  Bp stable  Resumed home medications  Enlaprilat IV prn   Monitor closely        VTE Risk Mitigation (From admission, onward)         Ordered     enoxaparin injection 40 mg  Every 24 hours      10/02/20 1915     IP VTE HIGH RISK PATIENT  Once      10/02/20 1915     Place sequential compression device  Until discontinued      10/01/20 2132     Place sequential compression device  Until discontinued      10/01/20 2114                 Discharge Planning   PARMINDER:      Code Status: Full Code   Is the patient medically ready for discharge?:     Reason for patient still in hospital (select all that apply): Treatment  Discharge Plan A: Home with family                  Sandhya Harris NP  Department of Hospital Medicine   [facility]

## 2020-10-05 NOTE — NURSING
Pt being discharged to [facility] in stable condition. IV removed, catheter intact, pt tolerated well. Tele monitor removed, given to US. Discharge packet sent to [facility] by . Printed script will be given to [facility] staff at .

## 2020-10-05 NOTE — PLAN OF CARE
Accepted and authorized at [facility]. Received note that pt's daughter now wants her to go to [facility].    Damaso Dave LMSW 10/5/2020 2:21 PM

## 2020-10-05 NOTE — PLAN OF CARE
Problem: Adult Inpatient Plan of Care  Goal: Plan of Care Review  Outcome: Ongoing, Progressing  Flowsheets (Taken 10/5/2020 0301)  Plan of Care Reviewed With: patient  Pt had no adverse events during shift. Pt free from falls. Call light in reach. SR's x2. Pain well controlled w/ PRN meds. Pt repositioned with wedge q2 hrs, pt tolerating well. Abductor pillow in place, pt tolerating well. IV saline locked as ordered. VTE prophylaxis - SCD's in use. Essential visitor (daughter) at bedside. Surgical incision CDI. Heart monitor in place (NSR. 80's - 90's). VSS. Chart reviewed. Will continue to monitor.

## 2020-10-05 NOTE — ASSESSMENT & PLAN NOTE
Orthopedic surgery consulted - plan to OR in AM  Type and screen sent  Neuro checks with VS  NPO after MN for AM procedure  PT/OT to eval/treat post-op  Holding home ASA, plavix for AM procedure - resume when cleared by surgery  10/2/20  -Surgery planned for today   10/3/20  -POD 1 s/p right hip hemiarthroplasty, pt tolerated procedure well.   -Pain controlled.   -PT/OT consult.   -Lovenox for DVT prophylaxis.   -Social work consult for discharge planning.   10/4/20  - POD 2, fever overnight 102.2F, WBCs 11K .   -CXR pending.   -Pt encouraged to deep breath and cough.   -PT/OT recommends SNF placement.   10/5/20  -POD 3, pain controlled  -Awaiting SNF placement

## 2020-10-05 NOTE — PT/OT/SLP PROGRESS
Physical Therapy  Treatment    Tanya Madrid   MRN: [id number]   Admitting Diagnosis: Closed fracture of right hip    PT Received On: 10/05/20  PT Start Time: 1240     PT Stop Time: 1320    PT Total Time (min): 40 min       Billable Minutes:  Therapeutic Activity 30 and Therapeutic Exercise 10    Treatment Type: Treatment  PT/PTA: PTA     PTA Visit Number: 1       General Precautions: Standard, airborne  Orthopedic Precautions: RLE posterior precautions   Braces: N/A         Subjective:  Communicated with nurse Tar[place] and [facility] chart review completed prior to session.  Patient able to accurately verbalize 1/3 posterior hip precautions. Therapist reviewed precautions with patient but this will need further re enforcement.      Pain/Comfort  Pain Rating 1: 0/10    Objective:   Patient found with: peripheral IV, telemetry, hip abduction pillow    Functional Mobility:  Bed Mobility: supine <-> sit: Mod -Max A       Transfers: EOB <-> BSC: Max A x2 stand pivot (increased time to complete T/F)  STS from EOB to RW: Mod-Max A x2 trials      AM-PAC 6 CLICK MOBILITY  How much help from another person does this patient currently need?   1 = Unable, Total/Dependent Assistance  2 = A lot, Maximum/Moderate Assistance  3 = A little, Minimum/Contact Guard/Supervision  4 = None, Modified [place]/Independent    Turning over in bed (including adjusting bedclothes, sheets and blankets)?: 2  Sitting down on and standing up from a chair with arms (e.g., wheelchair, bedside commode, etc.): 2  Moving from lying on back to sitting on the side of the bed?: 2  Moving to and from a bed to a chair (including a wheelchair)?: 2  Need to walk in hospital room?: 1  Climbing 3-5 steps with a railing?: 1  Basic Mobility Total Score: 10    AM-PAC Raw Score CMS G-Code Modifier Level of Impairment Assistance   6 % Total / Unable   7 - 9 CM 80 - 100% Maximal Assist   10 - 14 CL 60 - 80% Moderate Assist   15 - 19 CK 40 - 60% Moderate Assist    20 - 22 CJ 20 - 40% Minimal Assist   23 CI 1-20% SBA / CGA   24 CH 0% Independent/ Mod I     Patient left with bed in chair position with call button in reach and daughter & nurse present.    Assessment:  Tanya Madrid is a 80 y.o. female with a medical diagnosis of Closed fracture of right hip and presents with overall decline in functional mobility. Patient would benefit from continued skilled PT to address functional limitations listed below to return to PLOF/ decrease caregiver burden. Patient required increased time to perform all activities due to significant SOB and shallow breathing throughout. Encouraged patient to perform pursed lip breathing but patient did not comply. Patient had significant difficulty during transfer to and from [facility].     Rehab identified problem list/impairments: Rehab identified problem list/impairments: weakness, impaired endurance, impaired self care skills, impaired functional mobilty, gait instability, impaired balance, decreased coordination, decreased lower extremity function, decreased safety awareness, pain, decreased ROM, impaired coordination, impaired cardiopulmonary response to activity, orthopedic precautions    Rehab potential is fair.    Activity tolerance: Poor    Discharge recommendations: Discharge Facility/Level of Care Needs: nursing facility, skilled     Barriers to discharge:      Equipment recommendations: Equipment Needed After Discharge: bedside commode     GOALS:   Multidisciplinary Problems     Physical Therapy Goals        Problem: Physical Therapy Goal    Goal Priority Disciplines Outcome Goal Variances Interventions   Physical Therapy Goal     PT, PT/OT                      PLAN:    Patient to be seen 5 x/week  to address the above listed problems via gait training, therapeutic activities, therapeutic exercises  Plan of Care expires:    Plan of Care reviewed with: patient, daughter         Purvi Peralta, PTA  10/05/2020

## 2020-10-05 NOTE — PLAN OF CARE
Pt remains free from falls/injuries this shift. Safety precautions maintained. Pain managed with PRN medications. Telemetry monitoring per orders. Abductor pillow in place. No s/s of acute distress noted. Will continue to monitor. Chart check completed.

## 2020-10-05 NOTE — DISCHARGE SUMMARY
[facility] Hospital Medicine  Discharge Summary      Patient Name: Tanya Madrid  MRN: [id number]  Admission Date: 10/1/2020  Hospital Length of Stay: 3 days  Discharge Date and Time:  10/05/2020 3:10 PM  Attending Physician: Elvin Alvarez MD   Discharging Provider: Sandhya Harris NP  Primary Care Provider: Matri Parsons MD      HPI:   81 y/o AA F with hx of HTN, HLD, Parkinson's disease, stroked, L hip replacement to ED after experiencing an unwitnessed, ground-level trip and fall at home immediately PTA - daughter reports she found her mother on floor - pt denies LOC, states she lost her balance. This is the second fall in the past week per daughter, which immediately resulting in loss of ROM and pain to the R hip area. Denies chest pain, edema, palpitations, SOB, wheezing, abdominal pain, N/V/D, constipation, dysuria, flank pain, HA, dizziness, fever, cough, chills. Found in ED to have R femoral neck fracture - labs largely within normal limits and VSS, COVID-19 negative. Hospital medicine was called and pt was placed in observation on telemetry with plan for likely surgical intervention in AM. Pt is a full code - her daughter Josee Lemus is her surrogate decision maker.     Procedure(s) (LRB):  HEMIARTHROPLASTY, HIP (Right)      Hospital Course:   81 y/o female admitted for left hip fracture. Orthopedic surgery consulted. ASA and Plavix on hold. Surgery planned for today by Dr. Kearney. As of 10/3 POD 1 s/p right hip hemiarthroplasty, pt tolerated procedure well. Pain controlled. PT/OT consult. Lovenox for DVT prophylaxis. Social work consult for discharge planning. As of 10/4  POD 2, fever overnight 102.2F, WBCs 11K . CXR pending. Pt encouraged to deep breath and cough. PT/OT recommends SNF placement. As of 10/5 pt lying in bed, pain controlled. Labs reviewed and stable. CXR reviewed. Pt encouraged to use IS. Patient accepted at [facility]. Home meds reconciled. New  prescriptions given. Eliquis for DVT prophylaxis. Patient to follow-up with PCP in 3 days for hospital follow-up. Patient also to follow-up with orthopedics outpatient. Patient seen and examined on the date of discharge and found suitable for discharge.      Consults:     No new Assessment & Plan notes have been filed under this hospital service since the last note was generated.  Service: Hospital Medicine    Final Active Diagnoses:    Diagnosis Date Noted POA    PRINCIPAL PROBLEM:  Closed fracture of right hip [S72.001A] 10/01/2020 Yes    Essential hypertension [I10] 07/24/2019 Yes     Chronic    History of CVA (cerebrovascular accident) [Z86.73] 07/24/2019 Not Applicable     Chronic    GERD (gastroesophageal reflux disease) [K21.9] 07/11/2017 Yes     Chronic    Hyperlipidemia [E78.5] 07/11/2017 Yes    Parkinson disease [G20] 07/11/2017 Yes     Chronic      Problems Resolved During this Admission:       Discharged Condition: stable    Disposition: Skilled Nursing Facility    Follow Up:  Follow-up Information     Marti Parsons MD In 3 days.    Specialty: Family Medicine  Why: for hospital follow-up   Contact information:  [address]  [address]  [facility]  [place] [address]  [phone]             Humberto Angulo PA-C In 2 weeks.    Specialty: Orthopedic Surgery  Why: for post-op  follow-up   Contact information:  [address] [place] [address] [address] [place] [address]  [phone]             [facility].    Contact information:  [address] [facility] [address]               Patient Instructions:      Notify your health care provider if you experience any of the following:  temperature >100.4     Notify your health care provider if you experience any of the following:  persistent nausea and vomiting or diarrhea     Notify your health care provider if you experience any of the following:  severe uncontrolled pain     Notify your health care provider if you  experience any of the following:  redness, tenderness, or signs of infection (pain, swelling, redness, odor or green/yellow discharge around incision site)     Notify your health care provider if you experience any of the following:  difficulty breathing or increased cough     Notify your health care provider if you experience any of the following:  persistent dizziness, light-headedness, or visual disturbances     Notify your health care provider if you experience any of the following:  increased confusion or weakness     Activity as tolerated       Significant Diagnostic Studies: Labs:   BMP:   Recent Labs   Lab 10/04/20  0625 10/05/20  0843    107    135*   K 3.5 3.8    105   CO2 21* 19*   BUN 15 9   CREATININE 0.8 0.8   CALCIUM 8.4* 8.1*   MG 1.9 1.8   , CMP   Recent Labs   Lab 10/04/20  0625 10/05/20  0843    135*   K 3.5 3.8    105   CO2 21* 19*    107   BUN 15 9   CREATININE 0.8 0.8   CALCIUM 8.4* 8.1*   ANIONGAP 12 11   ESTGFRAFRICA >60 >60   EGFRNONAA >60 >60   , CBC   Recent Labs   Lab 10/04/20  0626 10/05/20  1100   WBC 11.17 10.13   HGB 9.2* 9.5*   HCT 30.1* 30.7*    272    and All labs within the past 24 hours have been reviewed    Pending Diagnostic Studies:     None         Medications:  Reconciled Home Medications:      Medication List      START taking these medications    apixaban 2.5 mg Tab  Commonly known as: ELIQUIS  Take 1 tablet (2.5 mg total) by mouth 2 (two) times daily.     chlorhexidine 0.12 % solution  Commonly known as: PERIDEX  Use as directed 10 mLs in the mouth or throat 2 (two) times daily. for 2 days        CHANGE how you take these medications    carbidopa-levodopa  mg  mg per tablet  Commonly known as: SINEMET  Take 3 tablets by mouth 4 (four) times daily.  What changed: how much to take     HYDROcodone-acetaminophen 5-325 mg per tablet  Commonly known as: NORCO  Take 1 tablet by mouth every 4 (four) hours as needed.  What  changed: when to take this     omeprazole 20 MG capsule  Commonly known as: PRILOSEC  Take 20 mg by mouth once daily.  What changed: Another medication with the same name was removed. Continue taking this medication, and follow the directions you see here.        CONTINUE taking these medications    aspirin 81 MG EC tablet  Commonly known as: ECOTRIN  Take 81 mg by mouth once daily.     clonazePAM 0.5 MG tablet  Commonly known as: KLONOPIN  Take 0.5 mg by mouth 2 (two) times daily as needed for Anxiety.     clopidogreL 75 mg tablet  Commonly known as: PLAVIX  Take 75 mg by mouth once daily.     INV amlodipine 2.5 MG Tab  Commonly known as: NORVASC  Take 1 tablet (2.5 mg total) by mouth once daily.     LORazepam 0.5 MG tablet  Commonly known as: ATIVAN  Take 1 tablet (0.5 mg total) by mouth every 8 (eight) hours as needed for Anxiety.     losartan 100 MG tablet  Commonly known as: COZAAR  Take 100 mg by mouth once daily.     metoprolol succinate 25 MG 24 hr tablet  Commonly known as: TOPROL-XL  Take 50 mg by mouth 2 (two) times daily.     multivitamin capsule  Take 1 capsule by mouth once daily.     nozaseptin  nasal   Commonly known as: NOZIN  1 each by Each Nostril route 2 (two) times daily.     potassium chloride 10 MEQ Tbsr  Commonly known as: KLOR-CON  Take 10 mEq by mouth once daily.     raloxifene 60 mg tablet  Commonly known as: EVISTA  Take 60 mg by mouth once daily.     rosuvastatin 10 MG tablet  Commonly known as: CRESTOR  Take 10 mg by mouth once daily.     spironolactone 25 MG tablet  Commonly known as: ALDACTONE  Take 25 mg by mouth once daily.        STOP taking these medications    oxyCODONE-acetaminophen 5-325 mg per tablet  Commonly known as: PERCOCET            Indwelling Lines/Drains at time of discharge:   Lines/Drains/Airways     None                 Time spent on the discharge of patient: 64 minutes  Patient was seen and examined on the date of discharge and determined to be suitable  for discharge.         Sandhya Harris NP  Department of Hospital Medicine  [facility]

## 2020-10-05 NOTE — PLAN OF CARE
Received signed preference form for first available SNF. Pt's daughter wanted to check availability at [facility] and [facility] first.    Damaso Dave LMSW 10/5/2020 11:16 AM

## 2020-10-05 NOTE — SUBJECTIVE & OBJECTIVE
Interval History: No acute events overnight. pt lying in bed, pain controlled. Labs reviewed and stable. CXR reviewed. Pt encouraged to use IS. WBCs remain normal, afebrile. Awaiting on placement.     Review of Systems   Constitutional: Positive for activity change. Negative for chills, diaphoresis, fatigue and fever.   HENT: Negative for congestion, trouble swallowing and voice change.    Eyes: Negative for photophobia and discharge.   Respiratory: Negative for cough, chest tightness, shortness of breath and wheezing.    Cardiovascular: Negative for chest pain, palpitations and leg swelling.   Gastrointestinal: Negative for abdominal pain, blood in stool, constipation, diarrhea, nausea and vomiting.   Endocrine: Negative for cold intolerance, heat intolerance, polydipsia, polyphagia and polyuria.   Genitourinary: Negative for difficulty urinating, dysuria, flank pain, frequency and urgency.   Musculoskeletal: Positive for arthralgias and gait problem (R hip fracture). Negative for back pain (R hip pain), joint swelling and myalgias.   Skin: Negative for rash and wound.   Neurological: Positive for weakness (R hip d/t fracture). Negative for dizziness, seizures, syncope, facial asymmetry, light-headedness, numbness and headaches.   Psychiatric/Behavioral: Negative for confusion and hallucinations.     Objective:     Vital Signs (Most Recent):  Temp: 98.8 °F (37.1 °C) (10/05/20 0706)  Pulse: 88 (10/05/20 1123)  Resp: 18 (10/05/20 0746)  BP: (!) 160/77 (10/05/20 0706)  SpO2: 96 % (10/05/20 0746) Vital Signs (24h Range):  Temp:  [97.5 °F (36.4 °C)-99.6 °F (37.6 °C)] 98.8 °F (37.1 °C)  Pulse:  [] 88  Resp:  [18] 18  SpO2:  [95 %-100 %] 96 %  BP: (125-160)/(60-77) 160/77     Weight: 54.9 kg (121 lb)  Body mass index is 22.13 kg/m².    Intake/Output Summary (Last 24 hours) at 10/5/2020 1315  Last data filed at 10/5/2020 0595  Gross per 24 hour   Intake 700 ml   Output 50 ml   Net 650 ml      Physical Exam  Vitals  signs reviewed.   Constitutional:       General: She is not in acute distress.     Appearance: Normal appearance. She is not toxic-appearing or diaphoretic.   HENT:      Head: Normocephalic and atraumatic.      Nose: Nose normal. No congestion.      Mouth/Throat:      Mouth: Mucous membranes are moist.      Pharynx: No oropharyngeal exudate.   Eyes:      General: No scleral icterus.     Pupils: Pupils are equal, round, and reactive to light.   Neck:      Musculoskeletal: Normal range of motion and neck supple. No muscular tenderness.   Cardiovascular:      Rate and Rhythm: Normal rate and regular rhythm.      Pulses: Normal pulses.      Heart sounds: Normal heart sounds.   Pulmonary:      Effort: Pulmonary effort is normal. No respiratory distress.      Breath sounds: Normal breath sounds. No wheezing or rhonchi.   Abdominal:      General: Abdomen is flat. Bowel sounds are normal. There is no distension.      Palpations: Abdomen is soft.      Tenderness: There is no abdominal tenderness. There is no rebound.      Hernia: A hernia (RLQ) is present.   Musculoskeletal:         General: No swelling or deformity.      Right hip: She exhibits decreased range of motion and tenderness.      Right lower leg: No edema.      Left lower leg: No edema.   Skin:     General: Skin is warm and dry.      Capillary Refill: Capillary refill takes less than 2 seconds.      Coloration: Skin is not jaundiced.      Findings: No bruising or rash.             Comments: Abrasion to R knee.    Neurological:      General: No focal deficit present.      Mental Status: She is alert and oriented to person, place, and time.      Cranial Nerves: No cranial nerve deficit.      Sensory: No sensory deficit.      Coordination: Coordination normal.   Psychiatric:         Mood and Affect: Mood normal. Affect is flat.         Speech: Speech is delayed.         Behavior: Behavior normal.         Thought Content: Thought content normal.         Cognition and  Memory: Cognition is not impaired.         Judgment: Judgment normal.      Comments: Baseline per daughter (parkinsons)         Significant Labs:   BMP:   Recent Labs   Lab 10/05/20  0843      *   K 3.8      CO2 19*   BUN 9   CREATININE 0.8   CALCIUM 8.1*   MG 1.8     CBC:   Recent Labs   Lab 10/04/20  0626 10/05/20  1100   WBC 11.17 10.13   HGB 9.2* 9.5*   HCT 30.1* 30.7*    272       Significant Imaging: I have reviewed all pertinent imaging results/findings within the past 24 hours.

## 2020-10-05 NOTE — PT/OT/SLP PROGRESS
Occupational Therapy   Treatment    Name: Tanya Madrid  MRN: [id number]  Admitting Diagnosis:  Closed fracture of right hip  3 Days Post-Op    Recommendations:     Discharge Recommendations: nursing facility, skilled  Discharge Equipment Recommendations:  bedside commode  Barriers to discharge:  None    Assessment:     Tanya Madrid is a 80 y.o. female with a medical diagnosis of Closed fracture of right hip.  She presents with debility and generalized weakness. Performance deficits affecting function are weakness, impaired functional mobilty, impaired endurance, gait instability, impaired balance, impaired self care skills, decreased safety awareness.     Rehab Prognosis:  Fair; patient would benefit from acute skilled OT services to address these deficits and reach maximum level of function.       Plan:     Patient to be seen 3 x/week to address the above listed problems via self-care/home management, therapeutic activities, therapeutic exercises  · Plan of Care Expires: 10/12/20  · Plan of Care Reviewed with: patient, daughter    Subjective     Pain/Comfort:  · Pain Rating 1: 0/10    Objective:     Communicated with: nurse Sharma and epic chart review prior to session.  Patient found HOB elevated with telemetry, peripheral IV, henley catheter upon OT entry to room.    General Precautions: Standard, fall   Orthopedic Precautions:N/A   Braces: N/A     Occupational Performance:     Bed Mobility:    · Patient completed Rolling/Turning to Left with  maximal assistance  · Patient completed Rolling/Turning to Right with maximal assistance  · Patient completed Scooting/Bridging with maximal assistance  · Patient completed Supine to Sit with maximal assistance  · Patient completed Sit to Supine with maximal assistance     Functional Mobility/Transfers:  · Patient completed Sit <> Stand Transfer with maximal assistance  with  standard walker       Activities of Daily Living:  · Upper Body Dressing: moderate  assistance .      [facility] 6 Click ADL: 10    Treatment & Education:  Pt seen in room. Pt with no c/o pain  Secondary to receiving pain meds. Pt req max  With supine>sit, forward scooting and mod a with lateral scooting, pt req max a with sit<>stand x 4 attempts with max vc's for instructions and safety . Pt unable to complete task and perform a minimal  half stance.formed 1 set x 10 reps b ue rom exercise seated eob. Pt req max a with backward and lateral scooting.     Patient left HOB elevated with all lines intact, call button in reach, bed alarm on, nurse tan  notified and daughter presentEducation:      GOALS:   Multidisciplinary Problems     Occupational Therapy Goals        Problem: Occupational Therapy Goal    Goal Priority Disciplines Outcome Interventions   Occupational Therapy Goal     OT, PT/OT Ongoing, Progressing    Description: Goals to be met by: 10/10/2020     Patient will increase functional independence with ADLs by performing:    UE Dressing with Minimal Assistance.  LE Dressing with Moderate Assistance.  Toilet transfer to bedside commode with Moderate Assistance.  Upper extremity exercise program x10-15 reps per handout, with assistance as needed.                     Time Tracking:     OT Date of Treatment: 10/05/20  OT Start Time: 0915  OT Stop Time: 1000  OT Total Time (min): 45 min    Billable Minutes:Therapeutic Activity 25 minutes  Therapeutic Exercise 15 minutes    Tanya Muse OT  10/5/2020

## 2020-10-14 ENCOUNTER — HOSPITAL ENCOUNTER (INPATIENT)
Facility: HOSPITAL | Age: 80
LOS: 5 days | Discharge: HOME HEALTH | DRG: 498 | End: 2020-10-20
Attending: FAMILY MEDICINE | Admitting: INTERNAL MEDICINE
Payer: MEDICARE

## 2020-10-14 DIAGNOSIS — Z96.649 STATUS POST HIP HEMIARTHROPLASTY: ICD-10-CM

## 2020-10-14 DIAGNOSIS — S73.004A HIP DISLOCATION, RIGHT: Primary | ICD-10-CM

## 2020-10-14 DIAGNOSIS — M25.551 RIGHT HIP PAIN: ICD-10-CM

## 2020-10-14 DIAGNOSIS — Z01.818 PREOPERATIVE EVALUATION OF A MEDICAL CONDITION TO RULE OUT SURGICAL CONTRAINDICATIONS (TAR REQUIRED): ICD-10-CM

## 2020-10-14 LAB
ALBUMIN SERPL BCP-MCNC: 3 G/DL (ref 3.5–5.2)
ALP SERPL-CCNC: 79 U/L (ref 55–135)
ALT SERPL W/O P-5'-P-CCNC: 9 U/L (ref 10–44)
ANION GAP SERPL CALC-SCNC: 14 MMOL/L (ref 8–16)
AST SERPL-CCNC: 94 U/L (ref 10–40)
BASOPHILS # BLD AUTO: 0.05 K/UL (ref 0–0.2)
BASOPHILS NFR BLD: 0.4 % (ref 0–1.9)
BILIRUB SERPL-MCNC: 0.7 MG/DL (ref 0.1–1)
BUN SERPL-MCNC: 21 MG/DL (ref 8–23)
CALCIUM SERPL-MCNC: 9 MG/DL (ref 8.7–10.5)
CHLORIDE SERPL-SCNC: 100 MMOL/L (ref 95–110)
CO2 SERPL-SCNC: 20 MMOL/L (ref 23–29)
CREAT SERPL-MCNC: 0.8 MG/DL (ref 0.5–1.4)
DIFFERENTIAL METHOD: ABNORMAL
EOSINOPHIL # BLD AUTO: 0.3 K/UL (ref 0–0.5)
EOSINOPHIL NFR BLD: 2.2 % (ref 0–8)
ERYTHROCYTE [DISTWIDTH] IN BLOOD BY AUTOMATED COUNT: 14.5 % (ref 11.5–14.5)
EST. GFR  (AFRICAN AMERICAN): >60 ML/MIN/1.73 M^2
EST. GFR  (NON AFRICAN AMERICAN): >60 ML/MIN/1.73 M^2
GLUCOSE SERPL-MCNC: 102 MG/DL (ref 70–110)
HCT VFR BLD AUTO: 28 % (ref 37–48.5)
HGB BLD-MCNC: 8.5 G/DL (ref 12–16)
IMM GRANULOCYTES # BLD AUTO: 0.3 K/UL (ref 0–0.04)
IMM GRANULOCYTES NFR BLD AUTO: 2.2 % (ref 0–0.5)
INR PPP: 1.1 (ref 0.8–1.2)
LYMPHOCYTES # BLD AUTO: 1.6 K/UL (ref 1–4.8)
LYMPHOCYTES NFR BLD: 11.4 % (ref 18–48)
MCH RBC QN AUTO: 28.4 PG (ref 27–31)
MCHC RBC AUTO-ENTMCNC: 30.4 G/DL (ref 32–36)
MCV RBC AUTO: 94 FL (ref 82–98)
MONOCYTES # BLD AUTO: 1.7 K/UL (ref 0.3–1)
MONOCYTES NFR BLD: 12.1 % (ref 4–15)
NEUTROPHILS # BLD AUTO: 9.9 K/UL (ref 1.8–7.7)
NEUTROPHILS NFR BLD: 71.7 % (ref 38–73)
NRBC BLD-RTO: 0 /100 WBC
PLATELET # BLD AUTO: 671 K/UL (ref 150–350)
PMV BLD AUTO: 9.9 FL (ref 9.2–12.9)
POTASSIUM SERPL-SCNC: 5.2 MMOL/L (ref 3.5–5.1)
PROT SERPL-MCNC: 7.5 G/DL (ref 6–8.4)
PROTHROMBIN TIME: 11.4 SEC (ref 9–12.5)
RBC # BLD AUTO: 2.99 M/UL (ref 4–5.4)
SARS-COV-2 RDRP RESP QL NAA+PROBE: NEGATIVE
SODIUM SERPL-SCNC: 134 MMOL/L (ref 136–145)
WBC # BLD AUTO: 13.78 K/UL (ref 3.9–12.7)

## 2020-10-14 PROCEDURE — 96374 THER/PROPH/DIAG INJ IV PUSH: CPT

## 2020-10-14 PROCEDURE — G0378 HOSPITAL OBSERVATION PER HR: HCPCS

## 2020-10-14 PROCEDURE — U0002 COVID-19 LAB TEST NON-CDC: HCPCS

## 2020-10-14 PROCEDURE — 85610 PROTHROMBIN TIME: CPT

## 2020-10-14 PROCEDURE — 80053 COMPREHEN METABOLIC PANEL: CPT

## 2020-10-14 PROCEDURE — 85025 COMPLETE CBC W/AUTO DIFF WBC: CPT

## 2020-10-14 PROCEDURE — 99285 EMERGENCY DEPT VISIT HI MDM: CPT | Mod: 25

## 2020-10-14 PROCEDURE — 25000003 PHARM REV CODE 250: Performed by: FAMILY MEDICINE

## 2020-10-14 PROCEDURE — 27100108

## 2020-10-14 PROCEDURE — 27265 TREAT HIP DISLOCATION: CPT | Mod: 52

## 2020-10-14 PROCEDURE — 63600175 PHARM REV CODE 636 W HCPCS: Performed by: FAMILY MEDICINE

## 2020-10-14 PROCEDURE — 99900035 HC TECH TIME PER 15 MIN (STAT)

## 2020-10-14 PROCEDURE — 96361 HYDRATE IV INFUSION ADD-ON: CPT

## 2020-10-14 RX ORDER — CARBIDOPA AND LEVODOPA 25; 100 MG/1; MG/1
3 TABLET ORAL 4 TIMES DAILY
Status: DISCONTINUED | OUTPATIENT
Start: 2020-10-15 | End: 2020-10-20 | Stop reason: HOSPADM

## 2020-10-14 RX ORDER — AMLODIPINE BESYLATE 2.5 MG/1
2.5 TABLET ORAL DAILY
Status: DISCONTINUED | OUTPATIENT
Start: 2020-10-15 | End: 2020-10-20 | Stop reason: HOSPADM

## 2020-10-14 RX ORDER — ETOMIDATE 2 MG/ML
10 INJECTION INTRAVENOUS
Status: COMPLETED | OUTPATIENT
Start: 2020-10-14 | End: 2020-10-14

## 2020-10-14 RX ORDER — METOPROLOL SUCCINATE 50 MG/1
50 TABLET, EXTENDED RELEASE ORAL 2 TIMES DAILY
Status: DISCONTINUED | OUTPATIENT
Start: 2020-10-15 | End: 2020-10-16

## 2020-10-14 RX ORDER — CLONAZEPAM 0.5 MG/1
0.5 TABLET ORAL 2 TIMES DAILY PRN
Status: DISCONTINUED | OUTPATIENT
Start: 2020-10-15 | End: 2020-10-20 | Stop reason: HOSPADM

## 2020-10-14 RX ORDER — PANTOPRAZOLE SODIUM 40 MG/1
40 TABLET, DELAYED RELEASE ORAL DAILY
Status: DISCONTINUED | OUTPATIENT
Start: 2020-10-15 | End: 2020-10-16

## 2020-10-14 RX ORDER — LOSARTAN POTASSIUM 50 MG/1
100 TABLET ORAL DAILY
Status: DISCONTINUED | OUTPATIENT
Start: 2020-10-15 | End: 2020-10-20 | Stop reason: HOSPADM

## 2020-10-14 RX ORDER — SODIUM CHLORIDE 9 MG/ML
INJECTION, SOLUTION INTRAVENOUS CONTINUOUS
Status: DISCONTINUED | OUTPATIENT
Start: 2020-10-15 | End: 2020-10-20 | Stop reason: HOSPADM

## 2020-10-14 RX ORDER — ROSUVASTATIN CALCIUM 10 MG/1
10 TABLET, COATED ORAL DAILY
Status: DISCONTINUED | OUTPATIENT
Start: 2020-10-15 | End: 2020-10-16

## 2020-10-14 RX ORDER — CEFUROXIME AXETIL 500 MG/1
500 TABLET ORAL 2 TIMES DAILY
Status: ON HOLD | COMMUNITY
End: 2020-10-20 | Stop reason: HOSPADM

## 2020-10-14 RX ORDER — MORPHINE SULFATE 4 MG/ML
4 INJECTION, SOLUTION INTRAMUSCULAR; INTRAVENOUS
Status: COMPLETED | OUTPATIENT
Start: 2020-10-14 | End: 2020-10-14

## 2020-10-14 RX ORDER — ENALAPRILAT 1.25 MG/ML
1.25 INJECTION INTRAVENOUS EVERY 6 HOURS PRN
Status: DISCONTINUED | OUTPATIENT
Start: 2020-10-15 | End: 2020-10-20 | Stop reason: HOSPADM

## 2020-10-14 RX ORDER — MORPHINE SULFATE 4 MG/ML
2 INJECTION, SOLUTION INTRAMUSCULAR; INTRAVENOUS EVERY 4 HOURS PRN
Status: DISCONTINUED | OUTPATIENT
Start: 2020-10-15 | End: 2020-10-19

## 2020-10-14 RX ORDER — ONDANSETRON 2 MG/ML
4 INJECTION INTRAMUSCULAR; INTRAVENOUS EVERY 6 HOURS PRN
Status: DISCONTINUED | OUTPATIENT
Start: 2020-10-15 | End: 2020-10-20 | Stop reason: HOSPADM

## 2020-10-14 RX ADMIN — SODIUM CHLORIDE 1000 ML: 0.9 INJECTION, SOLUTION INTRAVENOUS at 06:10

## 2020-10-14 RX ADMIN — MORPHINE SULFATE 4 MG: 4 INJECTION, SOLUTION INTRAMUSCULAR; INTRAVENOUS at 05:10

## 2020-10-14 RX ADMIN — ETOMIDATE 10 MG: 2 INJECTION INTRAVENOUS at 08:10

## 2020-10-14 NOTE — ED PROVIDER NOTES
SCRIBE #1 NOTE: IPhong, am scribing for, and in the presence of, Minerva Rondon MD. I have scribed the entire note.       History     Chief Complaint   Patient presents with    Hip Pain     c/o pain to right hip - xray confirm dislocation after surgery. Pt at [facility]. Pt denies fall or any trauma to hip.     Review of patient's allergies indicates:   Allergen Reactions    Xanax [alprazolam]          History of Present Illness     HPI    10/14/2020, 5:20 PM  History obtained from the daughter, EMS and patient      History of Present Illness: Tanya Madrid is a 80 y.o. female patient with a PMHx of stroke, Parkinson's disease, HTN who presents to the Emergency Department for evaluation of right hip pain which onset this morning. Symptoms are constant and moderate in severity. No mitigating or exacerbating factors reported. X-ray from nursing home shows hip dislocation; unsure of mechanism. Pt is 2 weeks post op hip replacement secondary to fall with hip fracture. No associated sxs reported. Pt is hyperventilating, but daughter says this is her baseline. Patient denies any CP, SOB, fever, n/v/d, abd pain, back pain, leg pain, and all other sxs at this time. No prior Tx reported. No further complaints or concerns at this time.       Arrival mode: EMS    PCP: Maggie Sue NP        Past Medical History:  Past Medical History:   Diagnosis Date    Hyperlipemia     Hypertension     Parkinson's disease     Stroke 2016    Throat mass        Past Surgical History:  Past Surgical History:   Procedure Laterality Date    CLOSED REDUCTION Right 10/15/2020    Procedure: CLOSED REDUCTION;  Surgeon: Humberto Valdivia DO;  Location: [facility] OR;  Service: Orthopedics;  Laterality: Right;  ATTEMPTED    EVACUATION OF HEMATOMA Right 10/17/2020    Procedure: EVACUATION, HEMATOMA;  Surgeon: Humberto Valdivia DO;  Location: [facility] OR;  Service: Orthopedics;  Laterality: Right;    HEMIARTHROPLASTY OF  HIP Left 7/12/2020    Procedure: HEMIARTHROPLASTY, HIP;  Surgeon: Humberto Valdivia DO;  Location: [facility] OR;  Service: Orthopedics;  Laterality: Left;    HEMIARTHROPLASTY OF HIP Right 10/2/2020    Procedure: HEMIARTHROPLASTY, HIP;  Surgeon: Loyd Kearney MD;  Location: [facility] OR;  Service: Orthopedics;  Laterality: Right;    HYSTERECTOMY      OPEN REDUCTION OF DISLOCATION OF HIP Right 10/17/2020    Procedure: OPEN REDUCTION, DISLOCATION, HIP;  Surgeon: Humberto Valdivia DO;  Location: [facility] OR;  Service: Orthopedics;  Laterality: Right;    THROAT SURGERY      TONSILLECTOMY           Family History:  History reviewed. No pertinent family history.       Social History:  Social History     Tobacco Use    Smoking status: Never Smoker    Smokeless tobacco: Never Used   Substance and Sexual Activity    Alcohol use: No    Drug use: No    Sexual activity: Not Currently        Review of Systems     Review of Systems   Constitutional: Negative for activity change, appetite change, chills, diaphoresis and fever.   HENT: Negative for congestion, drooling, ear pain, mouth sores, rhinorrhea, sinus pain, sore throat and trouble swallowing.    Eyes: Negative for pain and discharge.   Respiratory: Negative for cough, chest tightness, shortness of breath, wheezing and stridor.    Cardiovascular: Negative for chest pain, palpitations and leg swelling.   Gastrointestinal: Negative for abdominal distention, abdominal pain, blood in stool, constipation, diarrhea, nausea and vomiting.   Genitourinary: Negative for difficulty urinating, dysuria, flank pain, frequency, hematuria and urgency.   Musculoskeletal: Positive for arthralgias (R hip). Negative for back pain, myalgias and neck pain.   Skin: Negative for pallor, rash and wound.   Neurological: Negative for dizziness, syncope, weakness, light-headedness and numbness.   All other systems reviewed and are negative.       Physical Exam     Initial Vitals [10/14/20 1710]   BP  "Pulse Resp Temp SpO2   103/79 66 (!) 25 98.1 °F (36.7 °C) 95 %      MAP       --          Physical Exam  Nursing Notes and Vital Signs Reviewed.  Constitutional: Patient is in no acute distress.   Head: Atraumatic. Normocephalic.  Eyes: PERRL. EOM intact. Conjunctivae are not pale. No scleral icterus.  ENT: Mucous membranes are moist. Oropharynx is clear and symmetric.    Neck: Supple. Full ROM. No lymphadenopathy.  Cardiovascular: Regular rate. Regular rhythm. No murmurs, rubs, or gallops. Distal pulses are 2+ and symmetric.  Pulmonary/Chest: Pt is hyperventilating, but at reported baseline.  Abdominal: Soft and non-distended.  There is no tenderness.  No rebound, guarding, or rigidity. Good bowel sounds.  Genitourinary: No CVA tenderness  Musculoskeletal: Hip is externally rotated and shortened. No edema. No calf tenderness.  Skin: Lateral hip incision is clean and intact. Warm and dry.  Neurological:  Alert, awake, and appropriate.  Normal speech.  No acute focal neurological deficits are appreciated.  Psychiatric: Normal affect. Good eye contact. Appropriate in content.     ED Course   Procedural Sedation        Date/Time: 10/14/2020 8:42 PM  Performed by: Minerva Rondon MD  Authorized by: Minerva Rondon MD   Consent Done: Yes  Consent: Verbal consent obtained.  Risks and benefits: risks, benefits and alternatives were discussed  Consent given by: patient  Patient understanding: patient states understanding of the procedure being performed  Patient consent: the patient's understanding of the procedure matches consent given  Required items: required blood products, implants, devices, and special equipment available  Patient identity confirmed:  and name  Time out: Immediately prior to procedure a "time out" was called to verify the correct patient, procedure, equipment, support staff and site/side marked as required.  ASA Class: Class 2 - Mild Illness without functional impairment.Equipment: on cardiac " monitor., on BP monitor., airway equipment available., suction available. and reversal drugs available.   Sedation type: moderate (conscious) sedation  (See MAR for exact dosages of medications).  Sedatives: etomidate  Sedation start date/time: 10/14/2020 8:30 PM  Sedation end date/time: 10/14/2020 8:40 PM  Vitals: Vital signs were monitored during sedation.  Complications: No complications.   Patient/Family history of anesthesia or sedation complications: No      ED Vital Signs:  Vitals:    10/18/20 0334 10/18/20 0718 10/18/20 1207 10/18/20 1547   BP: 128/69 136/72 (!) 121/57 (!) 157/78   Pulse: 81 95 81 92   Resp: 18 16 18 18   Temp: 97.9 °F (36.6 °C) 99 °F (37.2 °C) 98.8 °F (37.1 °C) 98.2 °F (36.8 °C)   TempSrc: Oral Oral Oral Oral   SpO2: 95% 99% 100% 100%   Weight:       Height:        10/18/20 1925 10/18/20 2030 10/18/20 2334 10/19/20 0331   BP: (!) 96/51  (!) 149/59 134/62   Pulse: 84  83 89   Resp: 18  18 18   Temp: 97.4 °F (36.3 °C)  98.2 °F (36.8 °C) 98.3 °F (36.8 °C)   TempSrc: Oral  Oral Oral   SpO2: 98% 98% 100% 95%   Weight:       Height:        10/19/20 0729 10/19/20 0745 10/19/20 1310 10/19/20 1331   BP:  (!) 129/100  (!) 140/65   Pulse: 101 104  95   Resp: 18 18 18 18   Temp:  99.3 °F (37.4 °C)  97.7 °F (36.5 °C)   TempSrc:    Oral   SpO2: 98% 99%  100%   Weight:       Height:        10/19/20 1723 10/19/20 1927 10/19/20 2055   BP: 126/62 130/64    Pulse: 83 83    Resp: 18 18    Temp: 98.4 °F (36.9 °C) 97.7 °F (36.5 °C)    TempSrc: Oral Oral    SpO2: 98% 100% 98%   Weight:      Height:          Abnormal Lab Results:  Labs Reviewed   CBC W/ AUTO DIFFERENTIAL - Abnormal; Notable for the following components:       Result Value    WBC 13.78 (*)     RBC 2.99 (*)     Hemoglobin 8.5 (*)     Hematocrit 28.0 (*)     Mean Corpuscular Hemoglobin Conc 30.4 (*)     Platelets 671 (*)     Immature Granulocytes 2.2 (*)     Gran # (ANC) 9.9 (*)     Immature Grans (Abs) 0.30 (*)     Mono # 1.7 (*)     Lymph% 11.4 (*)      All other components within normal limits   COMPREHENSIVE METABOLIC PANEL - Abnormal; Notable for the following components:    Sodium 134 (*)     Potassium 5.2 (*)     CO2 20 (*)     Albumin 3.0 (*)     AST 94 (*)     ALT 9 (*)     All other components within normal limits   BASIC METABOLIC PANEL - Abnormal; Notable for the following components:    Sodium 133 (*)     CO2 21 (*)     Calcium 8.2 (*)     Anion Gap 7 (*)     All other components within normal limits   CBC W/ AUTO DIFFERENTIAL - Abnormal; Notable for the following components:    RBC 2.31 (*)     Hemoglobin 6.7 (*)     Hematocrit 21.4 (*)     Mean Corpuscular Hemoglobin Conc 31.3 (*)     Platelets 573 (*)     Immature Granulocytes 1.9 (*)     Immature Grans (Abs) 0.18 (*)     Mono # 1.2 (*)     Lymph% 11.2 (*)     All other components within normal limits   BASIC METABOLIC PANEL - Abnormal; Notable for the following components:    Sodium 135 (*)     CO2 22 (*)     Calcium 8.1 (*)     Anion Gap 7 (*)     All other components within normal limits   PROTIME-INR   SARS-COV-2 RNA AMPLIFICATION, QUAL   MAGNESIUM   TYPE & SCREEN        All Lab Results:  Results for orders placed or performed during the hospital encounter of 10/14/20   CBC auto differential   Result Value Ref Range    WBC 13.78 (H) 3.90 - 12.70 K/uL    RBC 2.99 (L) 4.00 - 5.40 M/uL    Hemoglobin 8.5 (L) 12.0 - 16.0 g/dL    Hematocrit 28.0 (L) 37.0 - 48.5 %    Mean Corpuscular Volume 94 82 - 98 fL    Mean Corpuscular Hemoglobin 28.4 27.0 - 31.0 pg    Mean Corpuscular Hemoglobin Conc 30.4 (L) 32.0 - 36.0 g/dL    RDW 14.5 11.5 - 14.5 %    Platelets 671 (H) 150 - 350 K/uL    MPV 9.9 9.2 - 12.9 fL    Immature Granulocytes 2.2 (H) 0.0 - 0.5 %    Gran # (ANC) 9.9 (H) 1.8 - 7.7 K/uL    Immature Grans (Abs) 0.30 (H) 0.00 - 0.04 K/uL    Lymph # 1.6 1.0 - 4.8 K/uL    Mono # 1.7 (H) 0.3 - 1.0 K/uL    Eos # 0.3 0.0 - 0.5 K/uL    Baso # 0.05 0.00 - 0.20 K/uL    nRBC 0 0 /100 WBC    Gran% 71.7 38.0 - 73.0 %     Lymph% 11.4 (L) 18.0 - 48.0 %    Mono% 12.1 4.0 - 15.0 %    Eosinophil% 2.2 0.0 - 8.0 %    Basophil% 0.4 0.0 - 1.9 %    Differential Method Automated    Comprehensive metabolic panel   Result Value Ref Range    Sodium 134 (L) 136 - 145 mmol/L    Potassium 5.2 (H) 3.5 - 5.1 mmol/L    Chloride 100 95 - 110 mmol/L    CO2 20 (L) 23 - 29 mmol/L    Glucose 102 70 - 110 mg/dL    BUN, Bld 21 8 - 23 mg/dL    Creatinine 0.8 0.5 - 1.4 mg/dL    Calcium 9.0 8.7 - 10.5 mg/dL    Total Protein 7.5 6.0 - 8.4 g/dL    Albumin 3.0 (L) 3.5 - 5.2 g/dL    Total Bilirubin 0.7 0.1 - 1.0 mg/dL    Alkaline Phosphatase 79 55 - 135 U/L    AST 94 (H) 10 - 40 U/L    ALT 9 (L) 10 - 44 U/L    Anion Gap 14 8 - 16 mmol/L    eGFR if African American >60 >60 mL/min/1.73 m^2    eGFR if non African American >60 >60 mL/min/1.73 m^2   Protime-INR   Result Value Ref Range    Prothrombin Time 11.4 9.0 - 12.5 sec    INR 1.1 0.8 - 1.2   COVID-19 Rapid Screening   Result Value Ref Range    SARS-CoV-2 RNA, Amplification, Qual Negative Negative   Basic metabolic panel   Result Value Ref Range    Sodium 133 (L) 136 - 145 mmol/L    Potassium 4.3 3.5 - 5.1 mmol/L    Chloride 105 95 - 110 mmol/L    CO2 21 (L) 23 - 29 mmol/L    Glucose 92 70 - 110 mg/dL    BUN, Bld 15 8 - 23 mg/dL    Creatinine 0.7 0.5 - 1.4 mg/dL    Calcium 8.2 (L) 8.7 - 10.5 mg/dL    Anion Gap 7 (L) 8 - 16 mmol/L    eGFR if African American >60 >60 mL/min/1.73 m^2    eGFR if non African American >60 >60 mL/min/1.73 m^2   CBC auto differential   Result Value Ref Range    WBC 9.64 3.90 - 12.70 K/uL    RBC 2.31 (L) 4.00 - 5.40 M/uL    Hemoglobin 6.7 (L) 12.0 - 16.0 g/dL    Hematocrit 21.4 (L) 37.0 - 48.5 %    Mean Corpuscular Volume 93 82 - 98 fL    Mean Corpuscular Hemoglobin 29.0 27.0 - 31.0 pg    Mean Corpuscular Hemoglobin Conc 31.3 (L) 32.0 - 36.0 g/dL    RDW 14.5 11.5 - 14.5 %    Platelets 573 (H) 150 - 350 K/uL    MPV 9.8 9.2 - 12.9 fL    Immature Granulocytes 1.9 (H) 0.0 - 0.5 %    Gran #  (ANC) 6.7 1.8 - 7.7 K/uL    Immature Grans (Abs) 0.18 (H) 0.00 - 0.04 K/uL    Lymph # 1.1 1.0 - 4.8 K/uL    Mono # 1.2 (H) 0.3 - 1.0 K/uL    Eos # 0.4 0.0 - 0.5 K/uL    Baso # 0.04 0.00 - 0.20 K/uL    nRBC 0 0 /100 WBC    Gran% 69.9 38.0 - 73.0 %    Lymph% 11.2 (L) 18.0 - 48.0 %    Mono% 12.3 4.0 - 15.0 %    Eosinophil% 4.3 0.0 - 8.0 %    Basophil% 0.4 0.0 - 1.9 %    Differential Method Automated    Basic Metabolic Panel   Result Value Ref Range    Sodium 135 (L) 136 - 145 mmol/L    Potassium 4.3 3.5 - 5.1 mmol/L    Chloride 106 95 - 110 mmol/L    CO2 22 (L) 23 - 29 mmol/L    Glucose 86 70 - 110 mg/dL    BUN, Bld 14 8 - 23 mg/dL    Creatinine 0.7 0.5 - 1.4 mg/dL    Calcium 8.1 (L) 8.7 - 10.5 mg/dL    Anion Gap 7 (L) 8 - 16 mmol/L    eGFR if African American >60 >60 mL/min/1.73 m^2    eGFR if non African American >60 >60 mL/min/1.73 m^2   Magnesium   Result Value Ref Range    Magnesium 2.0 1.6 - 2.6 mg/dL   Urinalysis, Reflex to Urine Culture Urine, Clean Catch    Specimen: Urine   Result Value Ref Range    Specimen UA Urine, Catheterized     Color, UA Yellow Yellow, Straw, Kassi    Appearance, UA Clear Clear    pH, UA 6.0 5.0 - 8.0    Specific Gravity, UA 1.020 1.005 - 1.030    Protein, UA Negative Negative    Glucose, UA Negative Negative    Ketones, UA Negative Negative    Bilirubin (UA) Negative Negative    Occult Blood UA Negative Negative    Nitrite, UA Negative Negative    Urobilinogen, UA 1.0 <2.0 EU/dL    Leukocytes, UA Negative Negative   CBC auto differential   Result Value Ref Range    WBC 10.16 3.90 - 12.70 K/uL    RBC 2.94 (L) 4.00 - 5.40 M/uL    Hemoglobin 8.4 (L) 12.0 - 16.0 g/dL    Hematocrit 27.4 (L) 37.0 - 48.5 %    Mean Corpuscular Volume 93 82 - 98 fL    Mean Corpuscular Hemoglobin 28.6 27.0 - 31.0 pg    Mean Corpuscular Hemoglobin Conc 30.7 (L) 32.0 - 36.0 g/dL    RDW 14.9 (H) 11.5 - 14.5 %    Platelets 599 (H) 150 - 350 K/uL    MPV 9.9 9.2 - 12.9 fL    Immature Granulocytes 3.1 (H) 0.0 - 0.5 %     Gran # (ANC) 7.2 1.8 - 7.7 K/uL    Immature Grans (Abs) 0.31 (H) 0.00 - 0.04 K/uL    Lymph # 1.0 1.0 - 4.8 K/uL    Mono # 1.1 (H) 0.3 - 1.0 K/uL    Eos # 0.5 0.0 - 0.5 K/uL    Baso # 0.05 0.00 - 0.20 K/uL    nRBC 0 0 /100 WBC    Gran% 71.3 38.0 - 73.0 %    Lymph% 9.6 (L) 18.0 - 48.0 %    Mono% 10.8 4.0 - 15.0 %    Eosinophil% 4.7 0.0 - 8.0 %    Basophil% 0.5 0.0 - 1.9 %    Differential Method Automated    Basic Metabolic Panel   Result Value Ref Range    Sodium 136 136 - 145 mmol/L    Potassium 4.2 3.5 - 5.1 mmol/L    Chloride 104 95 - 110 mmol/L    CO2 22 (L) 23 - 29 mmol/L    Glucose 103 70 - 110 mg/dL    BUN, Bld 14 8 - 23 mg/dL    Creatinine 0.7 0.5 - 1.4 mg/dL    Calcium 8.1 (L) 8.7 - 10.5 mg/dL    Anion Gap 10 8 - 16 mmol/L    eGFR if African American >60 >60 mL/min/1.73 m^2    eGFR if non African American >60 >60 mL/min/1.73 m^2   Magnesium   Result Value Ref Range    Magnesium 1.8 1.6 - 2.6 mg/dL   Basic Metabolic Panel   Result Value Ref Range    Sodium 136 136 - 145 mmol/L    Potassium 4.3 3.5 - 5.1 mmol/L    Chloride 106 95 - 110 mmol/L    CO2 22 (L) 23 - 29 mmol/L    Glucose 91 70 - 110 mg/dL    BUN, Bld 13 8 - 23 mg/dL    Creatinine 0.7 0.5 - 1.4 mg/dL    Calcium 8.6 (L) 8.7 - 10.5 mg/dL    Anion Gap 8 8 - 16 mmol/L    eGFR if African American >60 >60 mL/min/1.73 m^2    eGFR if non African American >60 >60 mL/min/1.73 m^2   Magnesium   Result Value Ref Range    Magnesium 1.9 1.6 - 2.6 mg/dL   CBC auto differential   Result Value Ref Range    WBC 10.67 3.90 - 12.70 K/uL    RBC 3.23 (L) 4.00 - 5.40 M/uL    Hemoglobin 9.2 (L) 12.0 - 16.0 g/dL    Hematocrit 30.6 (L) 37.0 - 48.5 %    Mean Corpuscular Volume 95 82 - 98 fL    Mean Corpuscular Hemoglobin 28.5 27.0 - 31.0 pg    Mean Corpuscular Hemoglobin Conc 30.1 (L) 32.0 - 36.0 g/dL    RDW 14.8 (H) 11.5 - 14.5 %    Platelets 654 (H) 150 - 350 K/uL    MPV 9.8 9.2 - 12.9 fL    Immature Granulocytes 2.6 (H) 0.0 - 0.5 %    Gran # (ANC) 7.6 1.8 - 7.7 K/uL     Immature Grans (Abs) 0.28 (H) 0.00 - 0.04 K/uL    Lymph # 1.1 1.0 - 4.8 K/uL    Mono # 1.1 (H) 0.3 - 1.0 K/uL    Eos # 0.6 (H) 0.0 - 0.5 K/uL    Baso # 0.04 0.00 - 0.20 K/uL    nRBC 0 0 /100 WBC    Gran% 70.9 38.0 - 73.0 %    Lymph% 9.9 (L) 18.0 - 48.0 %    Mono% 10.6 4.0 - 15.0 %    Eosinophil% 5.6 0.0 - 8.0 %    Basophil% 0.4 0.0 - 1.9 %    Differential Method Automated    Basic Metabolic Panel   Result Value Ref Range    Sodium 135 (L) 136 - 145 mmol/L    Potassium 4.4 3.5 - 5.1 mmol/L    Chloride 107 95 - 110 mmol/L    CO2 22 (L) 23 - 29 mmol/L    Glucose 91 70 - 110 mg/dL    BUN, Bld 10 8 - 23 mg/dL    Creatinine 0.7 0.5 - 1.4 mg/dL    Calcium 8.1 (L) 8.7 - 10.5 mg/dL    Anion Gap 6 (L) 8 - 16 mmol/L    eGFR if African American >60 >60 mL/min/1.73 m^2    eGFR if non African American >60 >60 mL/min/1.73 m^2   Magnesium   Result Value Ref Range    Magnesium 1.8 1.6 - 2.6 mg/dL   CBC auto differential   Result Value Ref Range    WBC 11.89 3.90 - 12.70 K/uL    RBC 2.83 (L) 4.00 - 5.40 M/uL    Hemoglobin 8.2 (L) 12.0 - 16.0 g/dL    Hematocrit 27.1 (L) 37.0 - 48.5 %    Mean Corpuscular Volume 96 82 - 98 fL    Mean Corpuscular Hemoglobin 29.0 27.0 - 31.0 pg    Mean Corpuscular Hemoglobin Conc 30.3 (L) 32.0 - 36.0 g/dL    RDW 14.8 (H) 11.5 - 14.5 %    Platelets 565 (H) 150 - 350 K/uL    MPV 9.7 9.2 - 12.9 fL    Immature Granulocytes 3.6 (H) 0.0 - 0.5 %    Gran # (ANC) 8.1 (H) 1.8 - 7.7 K/uL    Immature Grans (Abs) 0.43 (H) 0.00 - 0.04 K/uL    Lymph # 1.2 1.0 - 4.8 K/uL    Mono # 1.5 (H) 0.3 - 1.0 K/uL    Eos # 0.6 (H) 0.0 - 0.5 K/uL    Baso # 0.03 0.00 - 0.20 K/uL    nRBC 0 0 /100 WBC    Gran% 68.4 38.0 - 73.0 %    Lymph% 9.8 (L) 18.0 - 48.0 %    Mono% 12.8 4.0 - 15.0 %    Eosinophil% 5.1 0.0 - 8.0 %    Basophil% 0.3 0.0 - 1.9 %    Differential Method Automated    CBC auto differential   Result Value Ref Range    WBC 11.89 3.90 - 12.70 K/uL    RBC 2.83 (L) 4.00 - 5.40 M/uL    Hemoglobin 8.2 (L) 12.0 - 16.0 g/dL     Hematocrit 27.1 (L) 37.0 - 48.5 %    Mean Corpuscular Volume 96 82 - 98 fL    Mean Corpuscular Hemoglobin 29.0 27.0 - 31.0 pg    Mean Corpuscular Hemoglobin Conc 30.3 (L) 32.0 - 36.0 g/dL    RDW 14.8 (H) 11.5 - 14.5 %    Platelets 565 (H) 150 - 350 K/uL    MPV 9.7 9.2 - 12.9 fL    Immature Granulocytes 3.6 (H) 0.0 - 0.5 %    Gran # (ANC) 8.1 (H) 1.8 - 7.7 K/uL    Immature Grans (Abs) 0.43 (H) 0.00 - 0.04 K/uL    Lymph # 1.2 1.0 - 4.8 K/uL    Mono # 1.5 (H) 0.3 - 1.0 K/uL    Eos # 0.6 (H) 0.0 - 0.5 K/uL    Baso # 0.03 0.00 - 0.20 K/uL    nRBC 0 0 /100 WBC    Gran% 68.4 38.0 - 73.0 %    Lymph% 9.8 (L) 18.0 - 48.0 %    Mono% 12.8 4.0 - 15.0 %    Eosinophil% 5.1 0.0 - 8.0 %    Basophil% 0.3 0.0 - 1.9 %    Differential Method Automated    Basic Metabolic Panel   Result Value Ref Range    Sodium 137 136 - 145 mmol/L    Potassium 4.0 3.5 - 5.1 mmol/L    Chloride 107 95 - 110 mmol/L    CO2 23 23 - 29 mmol/L    Glucose 93 70 - 110 mg/dL    BUN, Bld 8 8 - 23 mg/dL    Creatinine 0.6 0.5 - 1.4 mg/dL    Calcium 8.0 (L) 8.7 - 10.5 mg/dL    Anion Gap 7 (L) 8 - 16 mmol/L    eGFR if African American >60 >60 mL/min/1.73 m^2    eGFR if non African American >60 >60 mL/min/1.73 m^2   Magnesium   Result Value Ref Range    Magnesium 1.7 1.6 - 2.6 mg/dL   CBC auto differential   Result Value Ref Range    WBC 10.44 3.90 - 12.70 K/uL    RBC 2.73 (L) 4.00 - 5.40 M/uL    Hemoglobin 7.8 (L) 12.0 - 16.0 g/dL    Hematocrit 25.5 (L) 37.0 - 48.5 %    Mean Corpuscular Volume 93 82 - 98 fL    Mean Corpuscular Hemoglobin 28.6 27.0 - 31.0 pg    Mean Corpuscular Hemoglobin Conc 30.6 (L) 32.0 - 36.0 g/dL    RDW 14.8 (H) 11.5 - 14.5 %    Platelets 570 (H) 150 - 350 K/uL    MPV 9.3 9.2 - 12.9 fL    Immature Granulocytes 3.1 (H) 0.0 - 0.5 %    Gran # (ANC) 6.8 1.8 - 7.7 K/uL    Immature Grans (Abs) 0.32 (H) 0.00 - 0.04 K/uL    Lymph # 1.3 1.0 - 4.8 K/uL    Mono # 1.3 (H) 0.3 - 1.0 K/uL    Eos # 0.7 (H) 0.0 - 0.5 K/uL    Baso # 0.04 0.00 - 0.20 K/uL    nRBC  0 0 /100 WBC    Gran% 65.4 38.0 - 73.0 %    Lymph% 12.5 (L) 18.0 - 48.0 %    Mono% 12.1 4.0 - 15.0 %    Eosinophil% 6.5 0.0 - 8.0 %    Basophil% 0.4 0.0 - 1.9 %    Differential Method Automated    Type & Screen   Result Value Ref Range    Group & Rh B POS     Indirect Kenia NEG    Prepare RBC 1 Unit   Result Value Ref Range    UNIT NUMBER [id number]     Product Code G0904A84     DISPENSE STATUS TRANSFUSED     CODING SYSTEM DULL929     Unit Blood Type Code 7300     Unit Blood Type B POS     Unit Expiration [id number]    Prepare RBC 1 Unit   Result Value Ref Range    UNIT NUMBER [id number]     Product Code P2458D90     DISPENSE STATUS CROSSMATCHED     CODING SYSTEM [id number]     Unit Blood Type Code 7300     Unit Blood Type B POS     Unit Expiration [id number]          Imaging Results:  Imaging Results          X-Ray Hip 2 View Right (Final result)  Result time 10/14/20 21:54:40    Final result by Elvira Vásquez MD (10/14/20 21:54:40)                 Impression:      As above      Electronically signed by: Fahad Felix  Date:    10/14/2020  Time:    21:54             Narrative:    EXAMINATION:  XR HIP 2 VIEW RIGHT    CLINICAL HISTORY:  Unspecified dislocation of right hip, initial encounter    TECHNIQUE:  AP view of the pelvis and frog leg lateral view of the right hip were performed.    COMPARISON:  Prior    FINDINGS:  No change with right hip prosthesis dislocation.  Stable findings.  See prior report.                               X-Ray Hip 2 View Right (Final result)  Result time 10/14/20 18:32:46    Final result by Elvira Vásquez MD (10/14/20 18:32:46)                 Impression:      Dislocation of the right hip  prosthesis      Electronically signed by: Fahad Felix  Date:    10/14/2020  Time:    18:32             Narrative:    EXAMINATION:  XR HIP 2 VIEW RIGHT    CLINICAL HISTORY:  Pain in right hip    TECHNIQUE:  AP view of the pelvis and frog leg lateral view of the right hip were  performed.    COMPARISON:  Prior    FINDINGS:  Dislocation of the right hip prosthesis.  Left hip prosthesis appears intact.  Pubic osteitis.  Osteopenia.  Degenerative joint disease of the spine.                                          The Emergency Provider reviewed the vital signs and test results, which are outlined above.     ED Discussion       6:35 PM: Discussed pt's case with Dr. Kramer (orthopedic surgery) who recommends try to reduce here in ED or admit and he will do it in OR tomorrow.    8:53 PM: Allis maneuver for hip reduction unsuccessful.    9:52 PM: Discussed case with Moon Martinez, VANITA (Hospital Medicine). Dr. Fowler agrees with current care and management of pt and accepts admission.   Admitting Service: Hospital Medicine  Admitting Physician: Dr. Fowler  Admit to: Med surg, obs    9:56 PM: Re-evaluated pt. I have discussed test results, shared treatment plan, and the need for admission with patient and family at bedside. Pt and family express understanding at this time and agree with all information. All questions answered. Pt and family have no further questions or concerns at this time. Pt is ready for admit.           Medical Decision Making:   Clinical Tests:   Lab Tests: Ordered and Reviewed  Radiological Study: Ordered and Reviewed           ED Medication(s):  Medications   enalaprilat injection 1.25 mg (has no administration in time range)   0.9%  NaCl infusion ( Intravenous New Bag 10/19/20 1908)   ondansetron injection 4 mg (has no administration in time range)   amLODIPine tablet 2.5 mg (2.5 mg Oral Given 10/19/20 0833)   carbidopa-levodopa  mg per tablet 3 tablet (3 tablets Oral Given 10/19/20 2139)   clonazePAM tablet 0.5 mg (0.5 mg Oral Given 10/19/20 2141)   losartan tablet 100 mg (100 mg Oral Given 10/19/20 0833)   0.9%  NaCl infusion (for blood administration) (has no administration in time range)   acetaminophen tablet 1,000 mg (1,000 mg Oral Given 10/18/20 1536)    traMADoL tablet 50 mg (has no administration in time range)   HYDROcodone-acetaminophen 5-325 mg per tablet 1 tablet (1 tablet Oral Given 10/19/20 1310)   oxyCODONE immediate release tablet 10 mg (has no administration in time range)   ondansetron disintegrating tablet 8 mg (has no administration in time range)   metoprolol tartrate (LOPRESSOR) tablet 25 mg (25 mg Oral Given 10/19/20 2139)   polyethylene glycol packet 17 g (17 g Oral Given 10/19/20 0836)   chlorhexidine 0.12 % solution 10 mL (10 mLs Mouth/Throat Given 10/19/20 2139)   nozaseptin (NOZIN) nasal  ( Each Nostril Given 10/19/20 2140)   senna-docusate 8.6-50 mg per tablet 1 tablet (1 tablet Oral Given 10/19/20 2139)   lactulose 20 gram/30 mL solution Soln 20 g (has no administration in time range)   morphine injection 2 mg (has no administration in time range)   apixaban tablet 2.5 mg (2.5 mg Oral Given 10/19/20 2140)   morphine injection 4 mg (4 mg Intravenous Given 10/14/20 1746)   sodium chloride 0.9% bolus 1,000 mL (0 mLs Intravenous Stopped 10/14/20 2148)   etomidate injection 10 mg (10 mg Intravenous Given by Other 10/14/20 2037)   cefazolin (ANCEF) 2 gram in dextrose 5% 50 mL IVPB (premix) (2 g Intravenous Given 10/15/20 0942)   tranexamic acid (CYKLOKAPRON) 1,000 mg in sodium chloride 0.9 % 100 mL (ready to mix system) (1,000 mg Intravenous Given 10/17/20 0935)       Current Discharge Medication List                  Scribe Attestation:   Scribe #1: I performed the above scribed service and the documentation accurately describes the services I performed. I attest to the accuracy of the note.     Attending:   Physician Attestation Statement for Scribe #1: I, Minerva Rondon MD, personally performed the services described in this documentation, as scribed by Phong Sutton, in my presence, and it is both accurate and complete.           Clinical Impression       ICD-10-CM ICD-9-CM   1. Hip dislocation, right  S73.004A 835.00   2. Right  hip pain  M25.551 719.45   3. Preoperative evaluation of a medical condition to rule out surgical contraindications (TAR required)  Z01.818 V72.83   4. Status post hip hemiarthroplasty  Z96.649 V43.64       Disposition:   Disposition: Placed in Observation  Condition: Fair         Minerva Rondon MD  10/19/20 7461

## 2020-10-15 ENCOUNTER — ANESTHESIA EVENT (OUTPATIENT)
Dept: SURGERY | Facility: HOSPITAL | Age: 80
DRG: 498 | End: 2020-10-15
Payer: MEDICARE

## 2020-10-15 ENCOUNTER — ANESTHESIA (OUTPATIENT)
Dept: SURGERY | Facility: HOSPITAL | Age: 80
DRG: 498 | End: 2020-10-15
Payer: MEDICARE

## 2020-10-15 PROBLEM — S72.001A CLOSED FRACTURE OF RIGHT HIP: Status: RESOLVED | Noted: 2020-10-01 | Resolved: 2020-10-15

## 2020-10-15 PROBLEM — D64.9 NORMOCYTIC ANEMIA: Status: ACTIVE | Noted: 2020-10-15

## 2020-10-15 LAB
ABO + RH BLD: NORMAL
ANION GAP SERPL CALC-SCNC: 7 MMOL/L (ref 8–16)
ANION GAP SERPL CALC-SCNC: 7 MMOL/L (ref 8–16)
BASOPHILS # BLD AUTO: 0.04 K/UL (ref 0–0.2)
BASOPHILS NFR BLD: 0.4 % (ref 0–1.9)
BILIRUB UR QL STRIP: NEGATIVE
BLD GP AB SCN CELLS X3 SERPL QL: NORMAL
BLD PROD TYP BPU: NORMAL
BLOOD UNIT EXPIRATION DATE: NORMAL
BLOOD UNIT TYPE CODE: 7300
BLOOD UNIT TYPE: NORMAL
BUN SERPL-MCNC: 14 MG/DL (ref 8–23)
BUN SERPL-MCNC: 15 MG/DL (ref 8–23)
CALCIUM SERPL-MCNC: 8.1 MG/DL (ref 8.7–10.5)
CALCIUM SERPL-MCNC: 8.2 MG/DL (ref 8.7–10.5)
CHLORIDE SERPL-SCNC: 105 MMOL/L (ref 95–110)
CHLORIDE SERPL-SCNC: 106 MMOL/L (ref 95–110)
CLARITY UR: CLEAR
CO2 SERPL-SCNC: 21 MMOL/L (ref 23–29)
CO2 SERPL-SCNC: 22 MMOL/L (ref 23–29)
CODING SYSTEM: NORMAL
COLOR UR: YELLOW
CREAT SERPL-MCNC: 0.7 MG/DL (ref 0.5–1.4)
CREAT SERPL-MCNC: 0.7 MG/DL (ref 0.5–1.4)
DIFFERENTIAL METHOD: ABNORMAL
DISPENSE STATUS: NORMAL
EOSINOPHIL # BLD AUTO: 0.4 K/UL (ref 0–0.5)
EOSINOPHIL NFR BLD: 4.3 % (ref 0–8)
ERYTHROCYTE [DISTWIDTH] IN BLOOD BY AUTOMATED COUNT: 14.5 % (ref 11.5–14.5)
EST. GFR  (AFRICAN AMERICAN): >60 ML/MIN/1.73 M^2
EST. GFR  (AFRICAN AMERICAN): >60 ML/MIN/1.73 M^2
EST. GFR  (NON AFRICAN AMERICAN): >60 ML/MIN/1.73 M^2
EST. GFR  (NON AFRICAN AMERICAN): >60 ML/MIN/1.73 M^2
GLUCOSE SERPL-MCNC: 86 MG/DL (ref 70–110)
GLUCOSE SERPL-MCNC: 92 MG/DL (ref 70–110)
GLUCOSE UR QL STRIP: NEGATIVE
HCT VFR BLD AUTO: 21.4 % (ref 37–48.5)
HGB BLD-MCNC: 6.7 G/DL (ref 12–16)
HGB UR QL STRIP: NEGATIVE
IMM GRANULOCYTES # BLD AUTO: 0.18 K/UL (ref 0–0.04)
IMM GRANULOCYTES NFR BLD AUTO: 1.9 % (ref 0–0.5)
KETONES UR QL STRIP: NEGATIVE
LEUKOCYTE ESTERASE UR QL STRIP: NEGATIVE
LYMPHOCYTES # BLD AUTO: 1.1 K/UL (ref 1–4.8)
LYMPHOCYTES NFR BLD: 11.2 % (ref 18–48)
MAGNESIUM SERPL-MCNC: 2 MG/DL (ref 1.6–2.6)
MCH RBC QN AUTO: 29 PG (ref 27–31)
MCHC RBC AUTO-ENTMCNC: 31.3 G/DL (ref 32–36)
MCV RBC AUTO: 93 FL (ref 82–98)
MONOCYTES # BLD AUTO: 1.2 K/UL (ref 0.3–1)
MONOCYTES NFR BLD: 12.3 % (ref 4–15)
NEUTROPHILS # BLD AUTO: 6.7 K/UL (ref 1.8–7.7)
NEUTROPHILS NFR BLD: 69.9 % (ref 38–73)
NITRITE UR QL STRIP: NEGATIVE
NRBC BLD-RTO: 0 /100 WBC
NUM UNITS TRANS PACKED RBC: NORMAL
PH UR STRIP: 6 [PH] (ref 5–8)
PLATELET # BLD AUTO: 573 K/UL (ref 150–350)
PMV BLD AUTO: 9.8 FL (ref 9.2–12.9)
POTASSIUM SERPL-SCNC: 4.3 MMOL/L (ref 3.5–5.1)
POTASSIUM SERPL-SCNC: 4.3 MMOL/L (ref 3.5–5.1)
PROT UR QL STRIP: NEGATIVE
RBC # BLD AUTO: 2.31 M/UL (ref 4–5.4)
SODIUM SERPL-SCNC: 133 MMOL/L (ref 136–145)
SODIUM SERPL-SCNC: 135 MMOL/L (ref 136–145)
SP GR UR STRIP: 1.02 (ref 1–1.03)
URN SPEC COLLECT METH UR: NORMAL
UROBILINOGEN UR STRIP-ACNC: 1 EU/DL
WBC # BLD AUTO: 9.64 K/UL (ref 3.9–12.7)

## 2020-10-15 PROCEDURE — 63600175 PHARM REV CODE 636 W HCPCS: Performed by: NURSE ANESTHETIST, CERTIFIED REGISTERED

## 2020-10-15 PROCEDURE — 94799 UNLISTED PULMONARY SVC/PX: CPT

## 2020-10-15 PROCEDURE — P9016 RBC LEUKOCYTES REDUCED: HCPCS

## 2020-10-15 PROCEDURE — 86850 RBC ANTIBODY SCREEN: CPT

## 2020-10-15 PROCEDURE — 80048 BASIC METABOLIC PNL TOTAL CA: CPT | Mod: 91

## 2020-10-15 PROCEDURE — 37000008 HC ANESTHESIA 1ST 15 MINUTES: Performed by: ORTHOPAEDIC SURGERY

## 2020-10-15 PROCEDURE — 36000704 HC OR TIME LEV I 1ST 15 MIN: Performed by: ORTHOPAEDIC SURGERY

## 2020-10-15 PROCEDURE — 86920 COMPATIBILITY TEST SPIN: CPT

## 2020-10-15 PROCEDURE — 36415 COLL VENOUS BLD VENIPUNCTURE: CPT

## 2020-10-15 PROCEDURE — 63600175 PHARM REV CODE 636 W HCPCS: Performed by: NURSE PRACTITIONER

## 2020-10-15 PROCEDURE — 25000003 PHARM REV CODE 250: Performed by: ORTHOPAEDIC SURGERY

## 2020-10-15 PROCEDURE — 25000003 PHARM REV CODE 250: Performed by: NURSE ANESTHETIST, CERTIFIED REGISTERED

## 2020-10-15 PROCEDURE — 25000003 PHARM REV CODE 250: Performed by: NURSE PRACTITIONER

## 2020-10-15 PROCEDURE — 63600175 PHARM REV CODE 636 W HCPCS: Performed by: ORTHOPAEDIC SURGERY

## 2020-10-15 PROCEDURE — 96361 HYDRATE IV INFUSION ADD-ON: CPT

## 2020-10-15 PROCEDURE — 71000033 HC RECOVERY, INTIAL HOUR: Performed by: ORTHOPAEDIC SURGERY

## 2020-10-15 PROCEDURE — 83735 ASSAY OF MAGNESIUM: CPT

## 2020-10-15 PROCEDURE — 85025 COMPLETE CBC W/AUTO DIFF WBC: CPT

## 2020-10-15 PROCEDURE — 80048 BASIC METABOLIC PNL TOTAL CA: CPT

## 2020-10-15 PROCEDURE — 96376 TX/PRO/DX INJ SAME DRUG ADON: CPT

## 2020-10-15 PROCEDURE — 37000009 HC ANESTHESIA EA ADD 15 MINS: Performed by: ORTHOPAEDIC SURGERY

## 2020-10-15 PROCEDURE — 36000705 HC OR TIME LEV I EA ADD 15 MIN: Performed by: ORTHOPAEDIC SURGERY

## 2020-10-15 PROCEDURE — 11000001 HC ACUTE MED/SURG PRIVATE ROOM

## 2020-10-15 PROCEDURE — 81003 URINALYSIS AUTO W/O SCOPE: CPT

## 2020-10-15 PROCEDURE — 94761 N-INVAS EAR/PLS OXIMETRY MLT: CPT

## 2020-10-15 RX ORDER — HYDROCODONE BITARTRATE AND ACETAMINOPHEN 5; 325 MG/1; MG/1
1 TABLET ORAL EVERY 4 HOURS PRN
Status: DISCONTINUED | OUTPATIENT
Start: 2020-10-15 | End: 2020-10-20 | Stop reason: HOSPADM

## 2020-10-15 RX ORDER — CHLORHEXIDINE GLUCONATE ORAL RINSE 1.2 MG/ML
10 SOLUTION DENTAL
Status: DISCONTINUED | OUTPATIENT
Start: 2020-10-15 | End: 2020-10-15

## 2020-10-15 RX ORDER — SUCCINYLCHOLINE CHLORIDE 20 MG/ML
INJECTION INTRAMUSCULAR; INTRAVENOUS
Status: DISCONTINUED | OUTPATIENT
Start: 2020-10-15 | End: 2020-10-15

## 2020-10-15 RX ORDER — PHENYLEPHRINE HYDROCHLORIDE 10 MG/ML
INJECTION INTRAVENOUS
Status: DISCONTINUED | OUTPATIENT
Start: 2020-10-15 | End: 2020-10-15

## 2020-10-15 RX ORDER — HYDROCODONE BITARTRATE AND ACETAMINOPHEN 500; 5 MG/1; MG/1
TABLET ORAL
Status: DISCONTINUED | OUTPATIENT
Start: 2020-10-15 | End: 2020-10-20 | Stop reason: HOSPADM

## 2020-10-15 RX ORDER — ACETAMINOPHEN 500 MG
1000 TABLET ORAL EVERY 8 HOURS
Status: DISPENSED | OUTPATIENT
Start: 2020-10-15 | End: 2020-10-18

## 2020-10-15 RX ORDER — PROPOFOL 10 MG/ML
VIAL (ML) INTRAVENOUS
Status: DISCONTINUED | OUTPATIENT
Start: 2020-10-15 | End: 2020-10-15

## 2020-10-15 RX ORDER — FENTANYL CITRATE 50 UG/ML
25 INJECTION, SOLUTION INTRAMUSCULAR; INTRAVENOUS EVERY 5 MIN PRN
Status: DISCONTINUED | OUTPATIENT
Start: 2020-10-15 | End: 2020-10-15

## 2020-10-15 RX ORDER — AMOXICILLIN 250 MG
1 CAPSULE ORAL 2 TIMES DAILY
Status: DISCONTINUED | OUTPATIENT
Start: 2020-10-15 | End: 2020-10-16

## 2020-10-15 RX ORDER — LIDOCAINE HYDROCHLORIDE 10 MG/ML
INJECTION, SOLUTION EPIDURAL; INFILTRATION; INTRACAUDAL; PERINEURAL
Status: DISCONTINUED | OUTPATIENT
Start: 2020-10-15 | End: 2020-10-15

## 2020-10-15 RX ORDER — OXYCODONE AND ACETAMINOPHEN 5; 325 MG/1; MG/1
1 TABLET ORAL
Status: DISCONTINUED | OUTPATIENT
Start: 2020-10-15 | End: 2020-10-15

## 2020-10-15 RX ORDER — CEFAZOLIN SODIUM 2 G/50ML
2 SOLUTION INTRAVENOUS
Status: COMPLETED | OUTPATIENT
Start: 2020-10-15 | End: 2020-10-15

## 2020-10-15 RX ORDER — ONDANSETRON 2 MG/ML
4 INJECTION INTRAMUSCULAR; INTRAVENOUS DAILY PRN
Status: DISCONTINUED | OUTPATIENT
Start: 2020-10-15 | End: 2020-10-15

## 2020-10-15 RX ORDER — TRAMADOL HYDROCHLORIDE 50 MG/1
50 TABLET ORAL EVERY 4 HOURS PRN
Status: DISCONTINUED | OUTPATIENT
Start: 2020-10-15 | End: 2020-10-20 | Stop reason: HOSPADM

## 2020-10-15 RX ORDER — CHLORHEXIDINE GLUCONATE ORAL RINSE 1.2 MG/ML
10 SOLUTION DENTAL 2 TIMES DAILY
Status: DISCONTINUED | OUTPATIENT
Start: 2020-10-15 | End: 2020-10-17 | Stop reason: SDUPTHER

## 2020-10-15 RX ORDER — ONDANSETRON 8 MG/1
8 TABLET, ORALLY DISINTEGRATING ORAL EVERY 8 HOURS PRN
Status: DISCONTINUED | OUTPATIENT
Start: 2020-10-15 | End: 2020-10-20 | Stop reason: HOSPADM

## 2020-10-15 RX ORDER — LACTULOSE 10 G/15ML
20 SOLUTION ORAL EVERY 6 HOURS PRN
Status: DISCONTINUED | OUTPATIENT
Start: 2020-10-15 | End: 2020-10-17 | Stop reason: SDUPTHER

## 2020-10-15 RX ORDER — OXYCODONE HYDROCHLORIDE 5 MG/1
10 TABLET ORAL EVERY 4 HOURS PRN
Status: DISCONTINUED | OUTPATIENT
Start: 2020-10-15 | End: 2020-10-20 | Stop reason: HOSPADM

## 2020-10-15 RX ADMIN — CARBIDOPA AND LEVODOPA 3 TABLET: 25; 100 TABLET ORAL at 04:10

## 2020-10-15 RX ADMIN — METOPROLOL SUCCINATE 50 MG: 50 TABLET, EXTENDED RELEASE ORAL at 01:10

## 2020-10-15 RX ADMIN — PANTOPRAZOLE SODIUM 40 MG: 40 TABLET, DELAYED RELEASE ORAL at 12:10

## 2020-10-15 RX ADMIN — PROPOFOL 50 MG: 10 INJECTION, EMULSION INTRAVENOUS at 09:10

## 2020-10-15 RX ADMIN — SODIUM CHLORIDE: 0.9 INJECTION, SOLUTION INTRAVENOUS at 12:10

## 2020-10-15 RX ADMIN — STANDARDIZED SENNA CONCENTRATE AND DOCUSATE SODIUM 1 TABLET: 8.6; 5 TABLET ORAL at 08:10

## 2020-10-15 RX ADMIN — AMLODIPINE BESYLATE 2.5 MG: 2.5 TABLET ORAL at 12:10

## 2020-10-15 RX ADMIN — METOPROLOL SUCCINATE 50 MG: 50 TABLET, EXTENDED RELEASE ORAL at 08:10

## 2020-10-15 RX ADMIN — SODIUM CHLORIDE: 0.9 INJECTION, SOLUTION INTRAVENOUS at 09:10

## 2020-10-15 RX ADMIN — MORPHINE SULFATE 2 MG: 4 INJECTION, SOLUTION INTRAMUSCULAR; INTRAVENOUS at 02:10

## 2020-10-15 RX ADMIN — CARBIDOPA AND LEVODOPA 3 TABLET: 25; 100 TABLET ORAL at 08:10

## 2020-10-15 RX ADMIN — CARBIDOPA AND LEVODOPA 3 TABLET: 25; 100 TABLET ORAL at 12:10

## 2020-10-15 RX ADMIN — SUCCINYLCHOLINE CHLORIDE 80 MG: 20 INJECTION, SOLUTION INTRAMUSCULAR; INTRAVENOUS at 09:10

## 2020-10-15 RX ADMIN — SODIUM CHLORIDE: 0.9 INJECTION, SOLUTION INTRAVENOUS at 04:10

## 2020-10-15 RX ADMIN — CHLORHEXIDINE GLUCONATE 0.12% ORAL RINSE 10 ML: 1.2 LIQUID ORAL at 08:10

## 2020-10-15 RX ADMIN — CEFAZOLIN SODIUM 2 G: 2 SOLUTION INTRAVENOUS at 09:10

## 2020-10-15 RX ADMIN — PHENYLEPHRINE HYDROCHLORIDE 100 MCG: 10 INJECTION INTRAVENOUS at 10:10

## 2020-10-15 RX ADMIN — LOSARTAN POTASSIUM 100 MG: 50 TABLET, FILM COATED ORAL at 12:10

## 2020-10-15 RX ADMIN — LIDOCAINE HYDROCHLORIDE 50 MG: 10 INJECTION, SOLUTION EPIDURAL; INFILTRATION; INTRACAUDAL; PERINEURAL at 09:10

## 2020-10-15 NOTE — PROGRESS NOTES
[facility] Hospital Medicine  Progress Note    Patient Name: Tanya Madrid  MRN: [id number]  Patient Class: IP- Inpatient   Admission Date: 10/14/2020  Length of Stay: 0 days  Attending Physician: Abhijit Fowler MD  Primary Care Provider: Maggie Sue NP        Subjective:     Principal Problem:Hip dislocation, right        HPI:   79 y/o AA F with hx of HTN, HLD, Parkinson's disease, stroke, B hip replacement sent from [facility] to ED after complaining of sudden onset of R hip pain and being found to have a dislocated R hip prosthesis - unsure as to how this occurred, no family was present and pt states she did not fall but does not know how the injury happened. Symptoms are persistent and moderate in severity with pain exacerbated by movement/palpation and improved at rest. Of note, pt was admitted from 10/01/2020 to 10/05/2020 after experiencing a fall and R hip fracture and subsequent ORIF. Denies chest pain, edema, palpitations, SOB, wheezing, abdominal pain, N/V/D, constipation, dysuria, flank pain, HA, dizziness, weakness, fever, cough, chills, blurred vision. Found in ED to have dislocation of the right hip prosthesis, while labs were largely WNL aside from mild hyperkalemia (5.2), VSS and COVID-19 negative. Attempts were made to reduce the right hip but were unsuccessful. Pt was given etomidate, morphine and 1L IV fluid bolus with improvement in pain. Hospital medicine was called and pt was placed in observation on telemetry with plan for likely reduction in OR in AM. Pt is a full code - her daughter Josee Lemus is her surrogate decision maker.        Overview/Hospital Course:  10/15/20 Ortho attempted closed reduction of the right hip which was unsuccessful. Plan for open reduction of the right hip on Saturday once patient has been medically optimized. H/H today was noted to be 6.7/21.4. Will transfuse 1 unit PRBC today and recheck labs. Continue to hold plavix and eliquis.      Interval History: Ortho attempted closed reduction of the right hip which was unsuccessful. Plan for open reduction of the right hip on Saturday once patient has been medically optimized. H/H today was noted to be 6.7/21.4. Will transfuse 1 unit PRBC today and recheck labs. Continue to hold plavix and eliquis.       Review of Systems   Constitutional: Positive for activity change (d/t R hip pain). Negative for appetite change, chills, diaphoresis, fatigue, fever and unexpected weight change.   HENT: Negative for congestion, drooling, facial swelling, rhinorrhea, sinus pressure, sneezing, sore throat, trouble swallowing and voice change.    Eyes: Negative for photophobia, discharge, redness, itching and visual disturbance.   Respiratory: Negative for apnea, cough, choking, chest tightness, shortness of breath, wheezing and stridor.    Cardiovascular: Negative for chest pain, palpitations and leg swelling.   Gastrointestinal: Negative for abdominal distention, abdominal pain, anal bleeding, blood in stool, constipation, diarrhea, nausea and vomiting.   Endocrine: Negative for cold intolerance, heat intolerance, polydipsia, polyphagia and polyuria.   Genitourinary: Negative for decreased urine volume, difficulty urinating, dysuria, flank pain, frequency, hematuria, pelvic pain, urgency, vaginal bleeding and vaginal discharge.   Musculoskeletal: Positive for arthralgias and gait problem (R hip dislocation). Negative for back pain, joint swelling, myalgias, neck pain and neck stiffness.   Skin: Negative for color change, pallor, rash and wound.   Neurological: Positive for weakness (with limited ROM RLE). Negative for dizziness, seizures, syncope, facial asymmetry, speech difficulty, light-headedness, numbness and headaches.   Psychiatric/Behavioral: Negative for agitation, confusion, hallucinations and suicidal ideas.   All other systems reviewed and are negative.    Objective:     Vital Signs (Most Recent):  Temp:  98.2 °F (36.8 °C) (10/15/20 1215)  Pulse: 92 (10/15/20 1215)  Resp: 17 (10/15/20 1215)  BP: 137/75 (10/15/20 1215)  SpO2: (!) 79 % (10/15/20 1215) Vital Signs (24h Range):  Temp:  [97.9 °F (36.6 °C)-99.3 °F (37.4 °C)] 98.2 °F (36.8 °C)  Pulse:  [] 92  Resp:  [15-25] 17  SpO2:  [79 %-100 %] 79 %  BP: (103-184)/(58-89) 137/75     Weight: 54.9 kg (121 lb)  Body mass index is 22.13 kg/m².    Intake/Output Summary (Last 24 hours) at 10/15/2020 1229  Last data filed at 10/15/2020 1035  Gross per 24 hour   Intake 1300 ml   Output --   Net 1300 ml      Physical Exam  Vitals signs and nursing note reviewed.   Constitutional:       General: She is not in acute distress.     Appearance: Normal appearance. She is well-developed. She is not toxic-appearing or diaphoretic.   HENT:      Head: Normocephalic and atraumatic.      Nose: Nose normal. No congestion.      Mouth/Throat:      Mouth: Mucous membranes are moist.   Eyes:      General: No scleral icterus.     Conjunctiva/sclera: Conjunctivae normal.      Pupils: Pupils are equal, round, and reactive to light.   Neck:      Musculoskeletal: Normal range of motion and neck supple. No muscular tenderness.   Cardiovascular:      Rate and Rhythm: Normal rate and regular rhythm.      Pulses: Normal pulses.      Heart sounds: Normal heart sounds. No murmur.   Pulmonary:      Effort: Pulmonary effort is normal. No respiratory distress.      Breath sounds: Normal breath sounds. No wheezing or rhonchi.   Abdominal:      General: Abdomen is flat. Bowel sounds are normal. There is no distension.      Palpations: Abdomen is soft.      Tenderness: There is no abdominal tenderness.      Hernia: A hernia (RLQ) is present.   Musculoskeletal: Normal range of motion.         General: Deformity (RLE shortened with external rotation) present. No swelling or tenderness.   Skin:     General: Skin is warm and dry.      Capillary Refill: Capillary refill takes less than 2 seconds.       Coloration: Skin is not jaundiced.      Findings: No bruising, erythema or rash.      Comments: R hip incision CDI - no evidence of infection.   Neurological:      General: No focal deficit present.      Mental Status: She is alert and oriented to person, place, and time.      Deep Tendon Reflexes: Reflexes are normal and symmetric.   Psychiatric:         Mood and Affect: Mood normal. Affect is flat.         Speech: Speech is delayed.         Behavior: Behavior normal.         Thought Content: Thought content normal.         Judgment: Judgment normal.      Comments: Baseline per daughter - parkinson's dx         Significant Labs: All pertinent labs within the past 24 hours have been reviewed.    Significant Imaging:   Imaging Results          X-Ray Hip 2 View Right (Final result)  Result time 10/14/20 21:54:40    Final result by Elvria Vásquez MD (10/14/20 21:54:40)                 Impression:      As above      Electronically signed by: Fahad Felix  Date:    10/14/2020  Time:    21:54             Narrative:    EXAMINATION:  XR HIP 2 VIEW RIGHT    CLINICAL HISTORY:  Unspecified dislocation of right hip, initial encounter    TECHNIQUE:  AP view of the pelvis and frog leg lateral view of the right hip were performed.    COMPARISON:  Prior    FINDINGS:  No change with right hip prosthesis dislocation.  Stable findings.  See prior report.                               X-Ray Hip 2 View Right (Final result)  Result time 10/14/20 18:32:46    Final result by Elvira Vásquez MD (10/14/20 18:32:46)                 Impression:      Dislocation of the right hip  prosthesis      Electronically signed by: Fahad Felix  Date:    10/14/2020  Time:    18:32             Narrative:    EXAMINATION:  XR HIP 2 VIEW RIGHT    CLINICAL HISTORY:  Pain in right hip    TECHNIQUE:  AP view of the pelvis and frog leg lateral view of the right hip were performed.    COMPARISON:  Prior    FINDINGS:  Dislocation of the right hip prosthesis.  Left  hip prosthesis appears intact.  Pubic osteitis.  Osteopenia.  Degenerative joint disease of the spine.                                    Assessment/Plan:      * Hip prosthesis dislocation, right  Orthopedic surgery consulted - plan to OR in AM  Neuro checks with VS  NPO after MN for AM procedure  PT/OT to eval/treat post-op  Holding home ASA, plavix, xarelto in case surgery is required in AM  Resume above meds as appropriate  10/15/20 Ortho attempted closed reduction of the right hip which was unsuccessful. Plan for open reduction of the right hip on Saturday once patient has been medically optimized. H/H today was noted to be 6.7/21.4. Will transfuse 1 unit PRBC today and recheck labs. Continue to hold plavix and eliquis.       Normocytic anemia  10/15/20  H/H today was noted to be 6.7/21.4. Will transfuse 1 unit PRBC today and recheck labs. CBC in am       Hyperkalemia  K+ at 5.2 in ED  Hold home potassium supplementation  Trend levels closely - treat as appropriate  Gentle IV hydration  10/15/20 Resolved     Parkinson disease  Continue home sinemet      Hyperlipidemia  Continue home statin        GERD (gastroesophageal reflux disease)  10/15/20 Continue PPI      Essential hypertension  BP stable on admission  Continue home medications  IV enlaprilat prn elevated BP  Monitor closely      VTE Risk Mitigation (From admission, onward)         Ordered     Place sequential compression device  Until discontinued      10/14/20 2334     IP VTE LOW RISK PATIENT  Once      10/14/20 2156                Discharge Planning   PARMINDER:      Code Status: Full Code   Is the patient medically ready for discharge?:     Reason for patient still in hospital (select all that apply): Patient new problem, Patient trending condition, Laboratory test and Consult recommendations  Discharge Plan A: Rehab                  Humberto Clay NP  Department of Hospital Medicine   [facility]

## 2020-10-15 NOTE — HOSPITAL COURSE
10/15/20 Ortho attempted closed reduction of the right hip which was unsuccessful. Plan for open reduction of the right hip on Saturday once patient has been medically optimized. H/H today was noted to be 6.7/21.4. Will transfuse 1 unit PRBC today and recheck labs. Continue to hold plavix and eliquis.     10/16/2020 - Pt is planned for open reduction of right bipolar hemiarthroplasty, I & D and hardware exchange per Orthopedics. Pt reports mild pain to the right hip. Pt was given 1 unit PRBCs at time of admission due to blood loss anemia. Pt on ASA and Plavix due to hx of stroke - these meds held. Also, Eliquis DVT prophylaxis on hold for pending surgery.     10/17/2020 - Procedure(s) (LRB):  OPEN REDUCTION, DISLOCATION, HIP (Right)  EVACUATION, HEMATOMA (Right). S/P procedure today. Seen and examined upon return to the room. Pt is awake and denies any complaints. Daughter is at bedside and verbalizes no concerns. Daughter does not want mother to return to [facility]. Pt has hip abduction brace that must remain intact. PT/OT eval pending. Pt is taking Tylenol for pain control.     10/18/2020 - Vital signs and labs are stable. No concerns for acute blood loss anemia. Pt is wearing the abductor brace. She describes lower back pain. Other symptoms denied. PT/OT eval pending. To obtain disposition options from the patient's daughter.     10/19/2020 - H/H slowly trending down - pt did receive 1unit of PRBC upon admission. H/H 8.2/27.1 >> 7.8/25.5 Will continue to monitor. Pt denies any complaints. Discussed disposition with daughter and she verbalized home  VS SNF placement.  consult placed. S/P surgery 24 hours and anticoagulation held as recommended by Orthopedics.   10/20/2020 - Eliquis DVT prophylaxis resumed. Daughter has elected to take patient home as there is not a SNF that will allow family members to stay with the patient. [facility]  with nursing, PT/OT arranged. Daughter was advised regarding  toe touch weight bearing to the right leg and pt must wear the abduction brace at all time. Pt has worked with PT/OT and her mobility is very limited. DME ordered includes W/C, Gagan lift and hospital bed. Pt's vital signs and labs are stable. According to Orthopedics, wound vac to right lateral hip should be removed on 10/23/2020 and incision can be left open to air. Daughter was advised regarding all instructions and follow up appointment with Orthopedics in 10 to 14 days. Pt was seen and examined and determined to be safe and stable for discharge.

## 2020-10-15 NOTE — TRANSFER OF CARE
Anesthesia Transfer of Care Note    Patient: Tanya Madrid    Procedure(s) Performed: Procedure(s) (LRB):  CLOSED REDUCTION (Right)    Patient location: PACU    Anesthesia Type: general    Transport from OR: Transported from OR on room air with adequate spontaneous ventilation    Post pain: adequate analgesia    Post assessment: no apparent anesthetic complications    Post vital signs: stable    Level of consciousness: awake    Nausea/Vomiting: no nausea/vomiting    Complications: none    Transfer of care protocol was followed      Last vitals:   Visit Vitals  BP (!) 154/70   Pulse 90   Temp (P) 37.4 °C (99.3 °F) (Temporal)   Resp 17   Wt 54.9 kg (121 lb)   SpO2 100%   BMI 22.13 kg/m²

## 2020-10-15 NOTE — HPI
79 y/o AA F with hx of HTN, HLD, Parkinson's disease, stroke, B hip replacement sent from [facility] to ED after complaining of sudden onset of R hip pain and being found to have a dislocated R hip prosthesis - unsure as to how this occurred, no family was present and pt states she did not fall but does not know how the injury happened. Symptoms are persistent and moderate in severity with pain exacerbated by movement/palpation and improved at rest. Of note, pt was admitted from 10/01/2020 to 10/05/2020 after experiencing a fall and R hip fracture and subsequent ORIF. Denies chest pain, edema, palpitations, SOB, wheezing, abdominal pain, N/V/D, constipation, dysuria, flank pain, HA, dizziness, weakness, fever, cough, chills, blurred vision. Found in ED to have dislocation of the right hip prosthesis, while labs were largely WNL aside from mild hyperkalemia (5.2), VSS and COVID-19 negative. Attempts were made to reduce the right hip but were unsuccessful. Pt was given etomidate, morphine and 1L IV fluid bolus with improvement in pain. Hospital medicine was called and pt was placed in observation on telemetry with plan for likely reduction in OR in AM. Pt is a full code - her daughter Josee Lemus is her surrogate decision maker.

## 2020-10-15 NOTE — ASSESSMENT & PLAN NOTE
K+ at 5.2 in ED  Hold home potassium supplementation  Trend levels closely - treat as appropriate  Gentle IV hydration

## 2020-10-15 NOTE — ASSESSMENT & PLAN NOTE
10/15/20  H/H today was noted to be 6.7/21.4. Will transfuse 1 unit PRBC today and recheck labs. CBC in am

## 2020-10-15 NOTE — SUBJECTIVE & OBJECTIVE
Past Medical History:   Diagnosis Date    Hyperlipemia     Hypertension     Parkinson's disease     Stroke 2016    Throat mass        Past Surgical History:   Procedure Laterality Date    HEMIARTHROPLASTY OF HIP Left 7/12/2020    Procedure: HEMIARTHROPLASTY, HIP;  Surgeon: Humberto Valdivia DO;  Location: [facility] OR;  Service: Orthopedics;  Laterality: Left;    HEMIARTHROPLASTY OF HIP Right 10/2/2020    Procedure: HEMIARTHROPLASTY, HIP;  Surgeon: Loyd Kearney MD;  Location: [facility] OR;  Service: Orthopedics;  Laterality: Right;    HYSTERECTOMY      THROAT SURGERY      TONSILLECTOMY         Review of patient's allergies indicates:   Allergen Reactions    Xanax [alprazolam]        No current facility-administered medications on file prior to encounter.      Current Outpatient Medications on File Prior to Encounter   Medication Sig    amLODIPine (NORVASC) 2.5 MG Tab Take 1 tablet (2.5 mg total) by mouth once daily.    apixaban (ELIQUIS) 2.5 mg Tab Take 1 tablet (2.5 mg total) by mouth 2 (two) times daily.    carbidopa-levodopa  mg (SINEMET)  mg per tablet Take 3 tablets by mouth 4 (four) times daily.    cefUROXime (CEFTIN) 500 MG tablet Take 500 mg by mouth 2 (two) times daily.    clonazePAM (KLONOPIN) 0.5 MG tablet Take 0.5 mg by mouth 2 (two) times daily as needed for Anxiety.    HYDROcodone-acetaminophen (NORCO) 5-325 mg per tablet Take 1 tablet by mouth every 4 (four) hours as needed.    metoprolol succinate (TOPROL-XL) 25 MG 24 hr tablet Take 50 mg by mouth 2 (two) times daily.     omeprazole (PRILOSEC) 20 MG capsule Take 20 mg by mouth once daily.    potassium chloride (KLOR-CON) 10 MEQ TbSR Take 10 mEq by mouth once daily.    raloxifene (EVISTA) 60 mg tablet Take 60 mg by mouth once daily.    spironolactone (ALDACTONE) 25 MG tablet Take 25 mg by mouth once daily.    aspirin (ECOTRIN) 81 MG EC tablet Take 81 mg by mouth once daily.    clopidogrel (PLAVIX) 75 mg tablet Take 75 mg  by mouth once daily.    LORazepam (ATIVAN) 0.5 MG tablet Take 1 tablet (0.5 mg total) by mouth every 8 (eight) hours as needed for Anxiety.    losartan (COZAAR) 100 MG tablet Take 100 mg by mouth once daily.    multivitamin capsule Take 1 capsule by mouth once daily.    nozaseptin (NOZIN) nasal  1 each by Each Nostril route 2 (two) times daily.    rosuvastatin (CRESTOR) 10 MG tablet Take 10 mg by mouth once daily.     Family History     None        Tobacco Use    Smoking status: Never Smoker    Smokeless tobacco: Never Used   Substance and Sexual Activity    Alcohol use: No    Drug use: No    Sexual activity: Not Currently     Review of Systems   Constitutional: Positive for activity change (d/t R hip pain). Negative for chills, diaphoresis, fatigue and fever.   HENT: Negative for congestion, trouble swallowing and voice change.    Eyes: Negative for photophobia and discharge.   Respiratory: Negative for cough, chest tightness, shortness of breath and wheezing.    Cardiovascular: Negative for chest pain, palpitations and leg swelling.   Gastrointestinal: Negative for abdominal pain, blood in stool, constipation, diarrhea, nausea and vomiting.   Endocrine: Negative for cold intolerance, heat intolerance, polydipsia, polyphagia and polyuria.   Genitourinary: Negative for difficulty urinating, dysuria, flank pain, frequency and urgency.   Musculoskeletal: Positive for arthralgias and gait problem (R hip dislocation). Negative for back pain, joint swelling and myalgias.   Skin: Negative for rash and wound.   Neurological: Positive for weakness (with limited ROM RLE). Negative for dizziness, seizures, syncope, facial asymmetry, light-headedness, numbness and headaches.   Psychiatric/Behavioral: Negative for confusion and hallucinations.     Objective:     Vital Signs (Most Recent):  Temp: 98.1 °F (36.7 °C) (10/14/20 1710)  Pulse: 94 (10/15/20 0021)  Resp: 18 (10/15/20 0021)  BP: 134/71 (10/15/20  0021)  SpO2: 99 % (10/14/20 2204) Vital Signs (24h Range):  Temp:  [98.1 °F (36.7 °C)] 98.1 °F (36.7 °C)  Pulse:  [66-94] 94  Resp:  [15-25] 18  SpO2:  [94 %-100 %] 99 %  BP: (103-184)/(63-89) 134/71     Weight: 54.9 kg (121 lb)  Body mass index is 22.13 kg/m².    Physical Exam  Vitals signs reviewed.   Constitutional:       General: She is not in acute distress.     Appearance: Normal appearance. She is not toxic-appearing or diaphoretic.   HENT:      Head: Normocephalic and atraumatic.      Nose: Nose normal. No congestion.      Mouth/Throat:      Mouth: Mucous membranes are moist.   Eyes:      General: No scleral icterus.     Pupils: Pupils are equal, round, and reactive to light.   Neck:      Musculoskeletal: Normal range of motion and neck supple. No muscular tenderness.   Cardiovascular:      Rate and Rhythm: Normal rate and regular rhythm.      Pulses: Normal pulses.      Heart sounds: Normal heart sounds.   Pulmonary:      Effort: Pulmonary effort is normal. No respiratory distress.      Breath sounds: Normal breath sounds. No wheezing or rhonchi.   Abdominal:      General: Abdomen is flat. Bowel sounds are normal. There is no distension.      Palpations: Abdomen is soft.      Tenderness: There is no abdominal tenderness.      Hernia: A hernia (RLQ) is present.   Musculoskeletal: Normal range of motion.         General: Deformity (RLE shortened with external rotation) present. No swelling.   Skin:     General: Skin is warm and dry.      Capillary Refill: Capillary refill takes less than 2 seconds.      Coloration: Skin is not jaundiced.      Findings: No bruising or rash.      Comments: R hip incision CDI - no evidence of infection.   Neurological:      General: No focal deficit present.      Mental Status: She is alert and oriented to person, place, and time.   Psychiatric:         Mood and Affect: Mood normal. Affect is flat.         Speech: Speech is delayed.         Behavior: Behavior normal.          Thought Content: Thought content normal.         Judgment: Judgment normal.      Comments: Baseline per daughter - parkinson's dx          Significant Labs:   Results for orders placed or performed during the hospital encounter of 10/14/20   CBC auto differential   Result Value Ref Range    WBC 13.78 (H) 3.90 - 12.70 K/uL    RBC 2.99 (L) 4.00 - 5.40 M/uL    Hemoglobin 8.5 (L) 12.0 - 16.0 g/dL    Hematocrit 28.0 (L) 37.0 - 48.5 %    Mean Corpuscular Volume 94 82 - 98 fL    Mean Corpuscular Hemoglobin 28.4 27.0 - 31.0 pg    Mean Corpuscular Hemoglobin Conc 30.4 (L) 32.0 - 36.0 g/dL    RDW 14.5 11.5 - 14.5 %    Platelets 671 (H) 150 - 350 K/uL    MPV 9.9 9.2 - 12.9 fL    Immature Granulocytes 2.2 (H) 0.0 - 0.5 %    Gran # (ANC) 9.9 (H) 1.8 - 7.7 K/uL    Immature Grans (Abs) 0.30 (H) 0.00 - 0.04 K/uL    Lymph # 1.6 1.0 - 4.8 K/uL    Mono # 1.7 (H) 0.3 - 1.0 K/uL    Eos # 0.3 0.0 - 0.5 K/uL    Baso # 0.05 0.00 - 0.20 K/uL    nRBC 0 0 /100 WBC    Gran% 71.7 38.0 - 73.0 %    Lymph% 11.4 (L) 18.0 - 48.0 %    Mono% 12.1 4.0 - 15.0 %    Eosinophil% 2.2 0.0 - 8.0 %    Basophil% 0.4 0.0 - 1.9 %    Differential Method Automated    Comprehensive metabolic panel   Result Value Ref Range    Sodium 134 (L) 136 - 145 mmol/L    Potassium 5.2 (H) 3.5 - 5.1 mmol/L    Chloride 100 95 - 110 mmol/L    CO2 20 (L) 23 - 29 mmol/L    Glucose 102 70 - 110 mg/dL    BUN, Bld 21 8 - 23 mg/dL    Creatinine 0.8 0.5 - 1.4 mg/dL    Calcium 9.0 8.7 - 10.5 mg/dL    Total Protein 7.5 6.0 - 8.4 g/dL    Albumin 3.0 (L) 3.5 - 5.2 g/dL    Total Bilirubin 0.7 0.1 - 1.0 mg/dL    Alkaline Phosphatase 79 55 - 135 U/L    AST 94 (H) 10 - 40 U/L    ALT 9 (L) 10 - 44 U/L    Anion Gap 14 8 - 16 mmol/L    eGFR if African American >60 >60 mL/min/1.73 m^2    eGFR if non African American >60 >60 mL/min/1.73 m^2   Protime-INR   Result Value Ref Range    Prothrombin Time 11.4 9.0 - 12.5 sec    INR 1.1 0.8 - 1.2   COVID-19 Rapid Screening   Result Value Ref Range     SARS-CoV-2 RNA, Amplification, Qual Negative Negative         Significant Imaging:   Imaging Results          X-Ray Hip 2 View Right (Final result)  Result time 10/14/20 21:54:40    Final result by Elvira Vásquez MD (10/14/20 21:54:40)                 Impression:      As above      Electronically signed by: Fahad Felix  Date:    10/14/2020  Time:    21:54             Narrative:    EXAMINATION:  XR HIP 2 VIEW RIGHT    CLINICAL HISTORY:  Unspecified dislocation of right hip, initial encounter    TECHNIQUE:  AP view of the pelvis and frog leg lateral view of the right hip were performed.    COMPARISON:  Prior    FINDINGS:  No change with right hip prosthesis dislocation.  Stable findings.  See prior report.                               X-Ray Hip 2 View Right (Final result)  Result time 10/14/20 18:32:46    Final result by Elvira Vásquez MD (10/14/20 18:32:46)                 Impression:      Dislocation of the right hip  prosthesis      Electronically signed by: Fahad Felix  Date:    10/14/2020  Time:    18:32             Narrative:    EXAMINATION:  XR HIP 2 VIEW RIGHT    CLINICAL HISTORY:  Pain in right hip    TECHNIQUE:  AP view of the pelvis and frog leg lateral view of the right hip were performed.    COMPARISON:  Prior    FINDINGS:  Dislocation of the right hip prosthesis.  Left hip prosthesis appears intact.  Pubic osteitis.  Osteopenia.  Degenerative joint disease of the spine.

## 2020-10-15 NOTE — SUBJECTIVE & OBJECTIVE
Interval History: Ortho attempted closed reduction of the right hip which was unsuccessful. Plan for open reduction of the right hip on Saturday once patient has been medically optimized. H/H today was noted to be 6.7/21.4. Will transfuse 1 unit PRBC today and recheck labs. Continue to hold plavix and eliquis.       Review of Systems   Constitutional: Positive for activity change (d/t R hip pain). Negative for appetite change, chills, diaphoresis, fatigue, fever and unexpected weight change.   HENT: Negative for congestion, drooling, facial swelling, rhinorrhea, sinus pressure, sneezing, sore throat, trouble swallowing and voice change.    Eyes: Negative for photophobia, discharge, redness, itching and visual disturbance.   Respiratory: Negative for apnea, cough, choking, chest tightness, shortness of breath, wheezing and stridor.    Cardiovascular: Negative for chest pain, palpitations and leg swelling.   Gastrointestinal: Negative for abdominal distention, abdominal pain, anal bleeding, blood in stool, constipation, diarrhea, nausea and vomiting.   Endocrine: Negative for cold intolerance, heat intolerance, polydipsia, polyphagia and polyuria.   Genitourinary: Negative for decreased urine volume, difficulty urinating, dysuria, flank pain, frequency, hematuria, pelvic pain, urgency, vaginal bleeding and vaginal discharge.   Musculoskeletal: Positive for arthralgias and gait problem (R hip dislocation). Negative for back pain, joint swelling, myalgias, neck pain and neck stiffness.   Skin: Negative for color change, pallor, rash and wound.   Neurological: Positive for weakness (with limited ROM RLE). Negative for dizziness, seizures, syncope, facial asymmetry, speech difficulty, light-headedness, numbness and headaches.   Psychiatric/Behavioral: Negative for agitation, confusion, hallucinations and suicidal ideas.   All other systems reviewed and are negative.    Objective:     Vital Signs (Most Recent):  Temp: 98.2  °F (36.8 °C) (10/15/20 1215)  Pulse: 92 (10/15/20 1215)  Resp: 17 (10/15/20 1215)  BP: 137/75 (10/15/20 1215)  SpO2: (!) 79 % (10/15/20 1215) Vital Signs (24h Range):  Temp:  [97.9 °F (36.6 °C)-99.3 °F (37.4 °C)] 98.2 °F (36.8 °C)  Pulse:  [] 92  Resp:  [15-25] 17  SpO2:  [79 %-100 %] 79 %  BP: (103-184)/(58-89) 137/75     Weight: 54.9 kg (121 lb)  Body mass index is 22.13 kg/m².    Intake/Output Summary (Last 24 hours) at 10/15/2020 1229  Last data filed at 10/15/2020 1035  Gross per 24 hour   Intake 1300 ml   Output --   Net 1300 ml      Physical Exam  Vitals signs and nursing note reviewed.   Constitutional:       General: She is not in acute distress.     Appearance: Normal appearance. She is well-developed. She is not toxic-appearing or diaphoretic.   HENT:      Head: Normocephalic and atraumatic.      Nose: Nose normal. No congestion.      Mouth/Throat:      Mouth: Mucous membranes are moist.   Eyes:      General: No scleral icterus.     Conjunctiva/sclera: Conjunctivae normal.      Pupils: Pupils are equal, round, and reactive to light.   Neck:      Musculoskeletal: Normal range of motion and neck supple. No muscular tenderness.   Cardiovascular:      Rate and Rhythm: Normal rate and regular rhythm.      Pulses: Normal pulses.      Heart sounds: Normal heart sounds. No murmur.   Pulmonary:      Effort: Pulmonary effort is normal. No respiratory distress.      Breath sounds: Normal breath sounds. No wheezing or rhonchi.   Abdominal:      General: Abdomen is flat. Bowel sounds are normal. There is no distension.      Palpations: Abdomen is soft.      Tenderness: There is no abdominal tenderness.      Hernia: A hernia (RLQ) is present.   Musculoskeletal: Normal range of motion.         General: Deformity (RLE shortened with external rotation) present. No swelling or tenderness.   Skin:     General: Skin is warm and dry.      Capillary Refill: Capillary refill takes less than 2 seconds.      Coloration:  Skin is not jaundiced.      Findings: No bruising, erythema or rash.      Comments: R hip incision CDI - no evidence of infection.   Neurological:      General: No focal deficit present.      Mental Status: She is alert and oriented to person, place, and time.      Deep Tendon Reflexes: Reflexes are normal and symmetric.   Psychiatric:         Mood and Affect: Mood normal. Affect is flat.         Speech: Speech is delayed.         Behavior: Behavior normal.         Thought Content: Thought content normal.         Judgment: Judgment normal.      Comments: Baseline per daughter - parkinson's dx         Significant Labs: All pertinent labs within the past 24 hours have been reviewed.    Significant Imaging:   Imaging Results          X-Ray Hip 2 View Right (Final result)  Result time 10/14/20 21:54:40    Final result by Elvira Vásquez MD (10/14/20 21:54:40)                 Impression:      As above      Electronically signed by: Fahad Felix  Date:    10/14/2020  Time:    21:54             Narrative:    EXAMINATION:  XR HIP 2 VIEW RIGHT    CLINICAL HISTORY:  Unspecified dislocation of right hip, initial encounter    TECHNIQUE:  AP view of the pelvis and frog leg lateral view of the right hip were performed.    COMPARISON:  Prior    FINDINGS:  No change with right hip prosthesis dislocation.  Stable findings.  See prior report.                               X-Ray Hip 2 View Right (Final result)  Result time 10/14/20 18:32:46    Final result by Elvira Vásquez MD (10/14/20 18:32:46)                 Impression:      Dislocation of the right hip  prosthesis      Electronically signed by: Fahad Felix  Date:    10/14/2020  Time:    18:32             Narrative:    EXAMINATION:  XR HIP 2 VIEW RIGHT    CLINICAL HISTORY:  Pain in right hip    TECHNIQUE:  AP view of the pelvis and frog leg lateral view of the right hip were performed.    COMPARISON:  Prior    FINDINGS:  Dislocation of the right hip prosthesis.  Left hip  prosthesis appears intact.  Pubic osteitis.  Osteopenia.  Degenerative joint disease of the spine.

## 2020-10-15 NOTE — PLAN OF CARE
Patient received laying in bed awake, x3, in no acute distress. Vitals present within stable limits. Medications tolerated well. Safety precautions maintained. Will continue to monitor progress.

## 2020-10-15 NOTE — ASSESSMENT & PLAN NOTE
Orthopedic surgery consulted - plan to OR in AM  Neuro checks with VS  NPO after MN for AM procedure  PT/OT to eval/treat post-op  Holding home ASA, plavix, xarelto in case surgery is required in AM  Resume above meds as appropriate

## 2020-10-15 NOTE — OP NOTE
Orthopaedic Surgery  Consult Note    Ganesh Malik  06/[birth year]2019    CC: L arm injury    HPI: Ganesh Malik is a 7 y.o. male with no significant PMH who presents with a L distal forearm fracture he sustained last night while roller skating. He states that he lost his balance, fell onto his outstretched hand and rolled over his left arm. He had immediate pain and deformity to the forearm. He denies numbness or tingling. He freely moves all fingers. He did not hit his head or lose consciousness. He has never injured this arm before.       Past Medical History:   Diagnosis Date    Asthma     Eczema     Pneumonia     Seizures     Wheeze      Past Surgical History:   Procedure Laterality Date    ADENOIDECTOMY      IMAGING-(MRI) N/A 5/2/2017    Performed by [facility] Surgeon at [facility]    TYMPANOSTOMY TUBE PLACEMENT       Family History   Problem Relation Age of Onset    Seizures Mother     Asthma Sister     Cancer Maternal Grandfather      Social History     Socioeconomic History    Marital status: Single     Spouse name: Not on file    Number of children: Not on file    Years of education: Not on file    Highest education level: Not on file   Occupational History    Not on file   Social Needs    Financial resource strain: Not on file    Food insecurity:     Worry: Not on file     Inability: Not on file    Transportation needs:     Medical: Not on file     Non-medical: Not on file   Tobacco Use    Smoking status: Passive Smoke Exposure - Never Smoker    Tobacco comment: mother smokes   Substance and Sexual Activity    Alcohol use: Not on file    Drug use: Not on file    Sexual activity: Not on file   Lifestyle    Physical activity:     Days per week: Not on file     Minutes per session: Not on file    Stress: Not on file   Relationships    Social connections:     Talks on phone: Not on file     Gets together: Not on file     Attends Judaism service: Not on file     Active member of club or  Preoperative diagnosis:  Dislocated right hip hemiarthroplasty.    Postoperative diagnosis:  Same.    Procedure:  Attempted closed reduction right hip hemiarthroplasty.    Surgeon:  Shea.    Assistant:  None.    Specimen:  None.    Drains:  None.    Anesthesia:  General with paralysis.    Complications:  None.    Estimated blood loss:  0.    Disposition:  To recovery room stable.    Indications:  This is an 80-year-old female who was brought to the emergency room at [facility] yesterday from her [facility] care facility.  Evaluation of her right hip revealed a dislocated hemiarthroplasty.  The patient is approximately 2 weeks status post hemiarthroplasty of her right hip for fracture.  An attempt at closed reduction of the right hip in the emergency room was unsuccessful.  The patient was then admitted to the hospital for surgical intervention with paralysis attempting a closed reduction.  The patient and family were informed of the risks, benefits and alternatives of this.  The patient was also seen to be extremely anemic and therefore transfusion was started intraoperatively.The patient was explained the surgical risks and benefits of the procedure which include but are not limited to infection, bleeding, stiffness, pain, nerve, artery, vein injury, hardware wear, hardware failure, fracture, nonunion, malunion, dislocation, subluxation, deep venous thrombosis, pulmonary embolus and death.    Procedure:  The patient was identified and marked appropriately on October 15, 2020.  She was subsequently transferred to the operative suite on her hospital bed where a general anesthetic is administered.  The patient was then transferred from the hospital bed to the operative table and positioned appropriately.  Two time-outs were then performed identifying the operative extremity in the patient.  At this time paralysis is gained with a paralytic agent.  Fluoroscopic imaging is then utilized to  attempt a closed reduction of the right hip.  The femoral head of the prosthetic is noted to be high riding above the acetabulum and does not descend with traction or reduction maneuver.  It is felt this dislocation is likely older than 1-2 days and therefore will require open reduction of the right hip.  Because of this in the patient's medical condition the procedure is terminated and the patient is transferred to recovery room for medical optimization prior to invasive surgical reduction.    Disposition:  Patient is recommended nonweightbearing at this time to the right lower extremity.  Medical optimization over the next 24-48 hours with an anticipation of open reduction of the right hip to be performed on Saturday.  Attempts to discuss this with the family have been unsuccessful at this time.  Further information will be discussed with them in the immediate future.   organization: Not on file     Attends meetings of clubs or organizations: Not on file     Relationship status: Not on file   Other Topics Concern    Not on file   Social History Narrative    Pt lives with mom, sister, and grand mother. Dad . Cat outside.     No current facility-administered medications on file prior to encounter.      Current Outpatient Medications on File Prior to Encounter   Medication Sig    montelukast 4 MG chewable tablet TAKE ONE TABLET (4MG TOTAL) BY MOUTH EVERY EVENING    PHENobarbital (LUMINAL) 64.8 MG tablet 1 po q hs    [DISCONTINUED] albuterol (PROAIR HFA) 90 mcg/actuation inhaler Inhale 2 puffs into the lungs every 6 (six) hours as needed. INHALE 2 PUFFS INTO LUNGS EVERY 6 HOURS AS NEEDED FOR WHEEZING    [DISCONTINUED] albuterol (PROVENTIL) 2.5 mg /3 mL (0.083 %) nebulizer solution INHALE 3MLS BY NEBULIZATION EVERY 4 HOURS AS NEEDED    [DISCONTINUED] albuterol 90 mcg/actuation inhaler Inhale 2 puffs into the lungs every 4 (four) hours as needed for Wheezing or Shortness of Breath.    [DISCONTINUED] budesonide (PULMICORT) 0.5 mg/2 mL nebulizer solution Take 2 mLs (0.5 mg total) by nebulization 2 (two) times daily.    [DISCONTINUED] FLOVENT HFA 44 mcg/actuation inhaler INHALE TWO PUFFS BY MOUTH INTO THE LUNGS TWICE DAILY    [DISCONTINUED] fluticasone (FLONASE) 50 mcg/actuation nasal spray ONE SPRAY (50MCG TOTAL) BY EACH NARE ONCE DAILY    [DISCONTINUED] inhalation device (AEROCHAMBER PLUS FLOW-VU) Use as directed for inhalation.    [DISCONTINUED] ondansetron (ZOFRAN-ODT) 4 MG TbDL Take 1 tablet (4 mg total) by mouth every 8 (eight) hours as needed.    [DISCONTINUED] pediatric multivitamin chewable tablet Take 2 tablets by mouth once daily.     [DISCONTINUED] prednisoLONE (ORAPRED) 15 mg/5 mL (3 mg/mL) solution          Review of Systems:  Constitutional: negative for fevers  Eyes: negative visual changes  ENT: negative for hearing loss  Respiratory: negative for  dyspnea  Cardiovascular: negative for chest pain  Gastrointestinal: negative for abdominal pain  Genitourinary: negative for dysuria  Neurological: negative for headaches  Behavioral/Psych: negative for hallucinations  Endocrine: negative for temperature intolerance      Physical Exam:    Temp:  [98.1 °F (36.7 °C)-99.2 °F (37.3 °C)] 99.2 °F (37.3 °C)  Pulse:  [] 88  Resp:  [17-37] 29  SpO2:  [96 %-100 %] 97 %  BP: (103-134)/(57-99) 113/76    PE:    AA&O x 4.  NAD  HEENT:  NCAT, sclera nonicteric  Lungs:  Respirations are equal and unlabored.  CV:  2+ bilateral upper and lower extremity pulses.  Skin:  Intact throughout.    LUE:  Skin intact  No ecchymoses, erythema  Mild swelling and deformity to distal forearm   Sensation intact in M/U/R nerve distributions  Motor intact AIN/PIN/U/R nerves  TTP at distal third forearm  2+ DR pulse      Diagnostic Results: radiographs of the L forearm show displaced distal third radius fracture, nondisplaced distal third ulnar fracture    A/P:  7 y.o. male with L both bone forearm  - Reduced and splinted in ED under conscious sedation  - NWB to LUE, pt encouraged to keep extremity iced and elevated at all times  - signs and symptoms of compartment syndrome explained to parents who agree to bring patient back to ED immediately if symptoms arise  - Follow-up with [facility] next week(patient will be contacted with appointment details)    Procedure Note L forearm reduction under sedation  All risks, benefits, and alternatives to treatment explained and verbalized consent to proceed was given. Time out was performed and patient name, , site, and procedure were confirmed. Patient was sedated by ER staff physician. Fracture was reduced under fluoroscopy. Sugar tong splint was applied in typical fashion. Post-reduction films were performed and confirmed adequate reduction. Patient tolerated procedure well. No complications from sedation were encountered by the ED staff  physician.         Clive Hendrix MD  Orthopaedic Surgery Resident    Reviewed case and xrays with Dr. Hendrix.  Agree with above treatment and plan.

## 2020-10-15 NOTE — INTERVAL H&P NOTE
The patient has been examined and the H&P has been reviewed:    I concur with the findings and changes have been noted since the H&P was written: There has been an interval dislocation of the right hip.    Surgery risks, benefits and alternative options discussed and understood by patient/family.          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      Hyperlipidemia [E78.5]  Yes     Last Assessment & Plan:   1. Patient has been on Crestor and this will be continued.        Resolved Hospital Problems   No resolved problems to display.

## 2020-10-15 NOTE — PLAN OF CARE
CM met with patient/daughter at the bedside to assess for discharge needs.  Patient was at [facility] prior to this admission.  Daughter (Adenike) does not want her mother to go back to [facility].  Adenike stated that prior to the last surgery, patient was independent without the use of equipment.  She would like her mother to go to inpatient rehab if she meets the criteria.  CM provided both a SNF Medicare compare and a Rehab list.  Adenike understands that the patient will have to meet the criteria.  Plan is for surgery on Saturday.  CM will continue to follow.  CM provided a transitional care folder, information on advanced directives, information on pharmacy bedside delivery, and discharge planning begins on admission with contact information for any needs/questions.     D/C Plan: Rehab vs SNF  PCP: Dr Maggie Sue  [facility]erred [facility]acy[facility]  Discharge transportation: Facility   [facility]: Pending  Pharmacy Bedside Delivery: No     10/15/20 1119   Discharge Assessment   Assessment Type Discharge Planning Assessment   Confirmed/corrected address and phone number on facesheet? Yes   Assessment information obtained from? Patient;Medical Record;Caregiver   Expected Length of Stay (days)   (3-4)   Communicated expected length of stay with patient/caregiver yes   Prior to hospitilization cognitive status: Alert/Oriented   Prior to hospitalization functional status: Needs Assistance;Partially Dependent   Current cognitive status: Unable to Assess   Current Functional Status: Partially Dependent   Facility Arrived From: [facility]   Able to Return to Prior Arrangements no   Is patient able to care for self after discharge? No   Who are your caregiver(s) and their phone number(s)? Adenike Lemus, daughter [phone]   Patient's perception of discharge disposition rehab facility   Patient currently being followed by outpatient case management? No   Patient currently receives any other outside agency  services? No   Do you have any problems affording any of your prescribed medications? No   Is the patient taking medications as prescribed? yes   Does the patient have transportation home? Yes   Transportation Anticipated other (see comments)  (TBD)   Dialysis Name and Scheduled days NA   Does the patient receive services at the [facility]? No   Discharge Plan A Rehab   Discharge Plan B Skilled Nursing Facility   Patient/Family in Agreement with Plan yes

## 2020-10-15 NOTE — BRIEF OP NOTE
[facility] -   Brief Operative Note    SUMMARY     Surgery Date: 10/15/2020     Surgeon(s) and Role:     * Humberto Valdivia,  - Primary    Assisting Surgeon: None    Pre-op Diagnosis:  Hip dislocation, right [S73.004A]    Post-op Diagnosis:  Post-Op Diagnosis Codes:     * Hip dislocation, right [S73.004A]    Procedure(s) (LRB):  CLOSED REDUCTION (Right)    Anesthesia: General    Description of Procedure: Attempted closed reduction of right hip dislocation    Description of the findings of the procedure: Dislocated right Bipolar hip hemiarthroplasty    Estimated Blood Loss: * No values recorded between 10/15/2020  9:55 AM and 10/15/2020 10:13 AM *    Estimated Blood Loss has been documented.         Specimens:   Specimen (12h ago, onward)    None          [id number]

## 2020-10-15 NOTE — ASSESSMENT & PLAN NOTE
Orthopedic surgery consulted - plan to OR in AM  Neuro checks with VS  NPO after MN for AM procedure  PT/OT to eval/treat post-op  Holding home ASA, plavix, xarelto in case surgery is required in AM  Resume above meds as appropriate  10/15/20 Ortho attempted closed reduction of the right hip which was unsuccessful. Plan for open reduction of the right hip on Saturday once patient has been medically optimized. H/H today was noted to be 6.7/21.4. Will transfuse 1 unit PRBC today and recheck labs. Continue to hold plavix and eliquis.

## 2020-10-15 NOTE — ANESTHESIA POSTPROCEDURE EVALUATION
Anesthesia Post Evaluation    Patient: Tanya Madrid    Procedure(s) Performed: Procedure(s) (LRB):  CLOSED REDUCTION (Right)    Final Anesthesia Type: general    Patient location during evaluation: PACU  Patient participation: Yes- Able to Participate  Level of consciousness: awake and alert, oriented and awake  Post-procedure vital signs: reviewed and stable  Pain management: adequate  Airway patency: patent    PONV status at discharge: No PONV  Anesthetic complications: no      Cardiovascular status: blood pressure returned to baseline  Respiratory status: unassisted, spontaneous ventilation and room air  Hydration status: euvolemic  Follow-up not needed.          Vitals Value Taken Time   /58 10/15/20 1020   Temp 37.4 °C (99.3 °F) 10/15/20 1015   Pulse 94 10/15/20 1021   Resp 21 10/15/20 1021   SpO2 99 % 10/15/20 1020   Vitals shown include unvalidated device data.      No case tracking events are documented in the log.      Pain/Annamarie Score: Pain Rating Prior to Med Admin: 9 (10/15/2020  2:58 AM)  Annamarie Score: 9 (10/14/2020  9:00 PM)

## 2020-10-15 NOTE — ASSESSMENT & PLAN NOTE
K+ at 5.2 in ED  Hold home potassium supplementation  Trend levels closely - treat as appropriate  Gentle IV hydration  10/15/20 Resolved

## 2020-10-15 NOTE — H&P
[facility] Hospital Medicine  History & Physical    Patient Name: Tanya Madrid  MRN: [id number]  Admission Date: 10/14/2020  Attending Physician: Abhijit Fowler MD  Primary Care Provider: Marti Parsons MD         Patient information was obtained from patient, relative(s), past medical records and ER records.     Subjective:     Principal Problem:Hip dislocation, right    Chief Complaint:   Chief Complaint   Patient presents with    Hip Pain     c/o pain to right hip - xray confirm dislocation after surgery. Pt at [facility] rehab. Pt denies fall or any trauma to hip.        HPI:  81 y/o AA F with hx of HTN, HLD, Parkinson's disease, stroke, B hip replacement sent from [facility] SN[facility] to ED after complaining of sudden onset of R hip pain and being found to have a dislocated R hip prosthesis - unsure as to how this occurred, no family was present and pt states she did not fall but does not know how the injury happened. Symptoms are persistent and moderate in severity with pain exacerbated by movement/palpation and improved at rest. Of note, pt was admitted from 10/01/2020 to 10/05/2020 after experiencing a fall and R hip fracture and subsequent ORIF. Denies chest pain, edema, palpitations, SOB, wheezing, abdominal pain, N/V/D, constipation, dysuria, flank pain, HA, dizziness, weakness, fever, cough, chills, blurred vision. Found in ED to have dislocation of the right hip prosthesis, while labs were largely WNL aside from mild hyperkalemia (5.2), VSS and COVID-19 negative. Attempts were made to reduce the right hip but were unsuccessful. Pt was given etomidate, morphine and 1L IV fluid bolus with improvement in pain. Hospital medicine was called and pt was placed in observation on telemetry with plan for likely reduction in OR in AM. Pt is a full code - her daughter Josee Lemus is her surrogate decision maker.        Past Medical History:   Diagnosis Date    Hyperlipemia     Hypertension      Parkinson's disease     Stroke 2016    Throat mass        Past Surgical History:   Procedure Laterality Date    HEMIARTHROPLASTY OF HIP Left 7/12/2020    Procedure: HEMIARTHROPLASTY, HIP;  Surgeon: Humberto Valdivia DO;  Location: [facility] OR;  Service: Orthopedics;  Laterality: Left;    HEMIARTHROPLASTY OF HIP Right 10/2/2020    Procedure: HEMIARTHROPLASTY, HIP;  Surgeon: Loyd Kearney MD;  Location: [facility] OR;  Service: Orthopedics;  Laterality: Right;    HYSTERECTOMY      THROAT SURGERY      TONSILLECTOMY         Review of patient's allergies indicates:   Allergen Reactions    Xanax [alprazolam]        No current facility-administered medications on file prior to encounter.      Current Outpatient Medications on File Prior to Encounter   Medication Sig    amLODIPine (NORVASC) 2.5 MG Tab Take 1 tablet (2.5 mg total) by mouth once daily.    apixaban (ELIQUIS) 2.5 mg Tab Take 1 tablet (2.5 mg total) by mouth 2 (two) times daily.    carbidopa-levodopa  mg (SINEMET)  mg per tablet Take 3 tablets by mouth 4 (four) times daily.    cefUROXime (CEFTIN) 500 MG tablet Take 500 mg by mouth 2 (two) times daily.    clonazePAM (KLONOPIN) 0.5 MG tablet Take 0.5 mg by mouth 2 (two) times daily as needed for Anxiety.    HYDROcodone-acetaminophen (NORCO) 5-325 mg per tablet Take 1 tablet by mouth every 4 (four) hours as needed.    metoprolol succinate (TOPROL-XL) 25 MG 24 hr tablet Take 50 mg by mouth 2 (two) times daily.     omeprazole (PRILOSEC) 20 MG capsule Take 20 mg by mouth once daily.    potassium chloride (KLOR-CON) 10 MEQ TbSR Take 10 mEq by mouth once daily.    raloxifene (EVISTA) 60 mg tablet Take 60 mg by mouth once daily.    spironolactone (ALDACTONE) 25 MG tablet Take 25 mg by mouth once daily.    aspirin (ECOTRIN) 81 MG EC tablet Take 81 mg by mouth once daily.    clopidogrel (PLAVIX) 75 mg tablet Take 75 mg by mouth once daily.    LORazepam (ATIVAN) 0.5 MG tablet Take 1 tablet (0.5 mg  total) by mouth every 8 (eight) hours as needed for Anxiety.    losartan (COZAAR) 100 MG tablet Take 100 mg by mouth once daily.    multivitamin capsule Take 1 capsule by mouth once daily.    nozaseptin (NOZIN) nasal  1 each by Each Nostril route 2 (two) times daily.    rosuvastatin (CRESTOR) 10 MG tablet Take 10 mg by mouth once daily.     Family History     Reviewed and not pertinent.         Tobacco Use    Smoking status: Never Smoker    Smokeless tobacco: Never Used   Substance and Sexual Activity    Alcohol use: No    Drug use: No    Sexual activity: Not Currently     Review of Systems   Constitutional: Positive for activity change (d/t R hip pain). Negative for chills, diaphoresis, fatigue and fever.   HENT: Negative for congestion, trouble swallowing and voice change.    Eyes: Negative for photophobia and discharge.   Respiratory: Negative for cough, chest tightness, shortness of breath and wheezing.    Cardiovascular: Negative for chest pain, palpitations and leg swelling.   Gastrointestinal: Negative for abdominal pain, blood in stool, constipation, diarrhea, nausea and vomiting.   Endocrine: Negative for cold intolerance, heat intolerance, polydipsia, polyphagia and polyuria.   Genitourinary: Negative for difficulty urinating, dysuria, flank pain, frequency and urgency.   Musculoskeletal: Positive for arthralgias and gait problem (R hip dislocation). Negative for back pain, joint swelling and myalgias.   Skin: Negative for rash and wound.   Neurological: Positive for weakness (with limited ROM RLE). Negative for dizziness, seizures, syncope, facial asymmetry, light-headedness, numbness and headaches.   Psychiatric/Behavioral: Negative for confusion and hallucinations.     Objective:     Vital Signs (Most Recent):  Temp: 98.1 °F (36.7 °C) (10/14/20 1710)  Pulse: 94 (10/15/20 0021)  Resp: 18 (10/15/20 0021)  BP: 134/71 (10/15/20 0021)  SpO2: 99 % (10/14/20 2204) Vital Signs (24h  Range):  Temp:  [98.1 °F (36.7 °C)] 98.1 °F (36.7 °C)  Pulse:  [66-94] 94  Resp:  [15-25] 18  SpO2:  [94 %-100 %] 99 %  BP: (103-184)/(63-89) 134/71     Weight: 54.9 kg (121 lb)  Body mass index is 22.13 kg/m².    Physical Exam  Vitals signs reviewed.   Constitutional:       General: She is not in acute distress.     Appearance: Normal appearance. She is not toxic-appearing or diaphoretic.   HENT:      Head: Normocephalic and atraumatic.      Nose: Nose normal. No congestion.      Mouth/Throat:      Mouth: Mucous membranes are moist.   Eyes:      General: No scleral icterus.     Pupils: Pupils are equal, round, and reactive to light.   Neck:      Musculoskeletal: Normal range of motion and neck supple. No muscular tenderness.   Cardiovascular:      Rate and Rhythm: Normal rate and regular rhythm.      Pulses: Normal pulses.      Heart sounds: Normal heart sounds.   Pulmonary:      Effort: Pulmonary effort is normal. No respiratory distress.      Breath sounds: Normal breath sounds. No wheezing or rhonchi.   Abdominal:      General: Abdomen is flat. Bowel sounds are normal. There is no distension.      Palpations: Abdomen is soft.      Tenderness: There is no abdominal tenderness.      Hernia: A hernia (RLQ) is present.   Musculoskeletal: Normal range of motion.         General: Deformity (RLE shortened with external rotation) present. No swelling.   Skin:     General: Skin is warm and dry.      Capillary Refill: Capillary refill takes less than 2 seconds.      Coloration: Skin is not jaundiced.      Findings: No bruising or rash.      Comments: R hip incision CDI - no evidence of infection.   Neurological:      General: No focal deficit present.      Mental Status: She is alert and oriented to person, place, and time.   Psychiatric:         Mood and Affect: Mood normal. Affect is flat.         Speech: Speech is delayed.         Behavior: Behavior normal.         Thought Content: Thought content normal.          Judgment: Judgment normal.      Comments: Baseline per daughter - parkinson's dx          Significant Labs:   Results for orders placed or performed during the hospital encounter of 10/14/20   CBC auto differential   Result Value Ref Range    WBC 13.78 (H) 3.90 - 12.70 K/uL    RBC 2.99 (L) 4.00 - 5.40 M/uL    Hemoglobin 8.5 (L) 12.0 - 16.0 g/dL    Hematocrit 28.0 (L) 37.0 - 48.5 %    Mean Corpuscular Volume 94 82 - 98 fL    Mean Corpuscular Hemoglobin 28.4 27.0 - 31.0 pg    Mean Corpuscular Hemoglobin Conc 30.4 (L) 32.0 - 36.0 g/dL    RDW 14.5 11.5 - 14.5 %    Platelets 671 (H) 150 - 350 K/uL    MPV 9.9 9.2 - 12.9 fL    Immature Granulocytes 2.2 (H) 0.0 - 0.5 %    Gran # (ANC) 9.9 (H) 1.8 - 7.7 K/uL    Immature Grans (Abs) 0.30 (H) 0.00 - 0.04 K/uL    Lymph # 1.6 1.0 - 4.8 K/uL    Mono # 1.7 (H) 0.3 - 1.0 K/uL    Eos # 0.3 0.0 - 0.5 K/uL    Baso # 0.05 0.00 - 0.20 K/uL    nRBC 0 0 /100 WBC    Gran% 71.7 38.0 - 73.0 %    Lymph% 11.4 (L) 18.0 - 48.0 %    Mono% 12.1 4.0 - 15.0 %    Eosinophil% 2.2 0.0 - 8.0 %    Basophil% 0.4 0.0 - 1.9 %    Differential Method Automated    Comprehensive metabolic panel   Result Value Ref Range    Sodium 134 (L) 136 - 145 mmol/L    Potassium 5.2 (H) 3.5 - 5.1 mmol/L    Chloride 100 95 - 110 mmol/L    CO2 20 (L) 23 - 29 mmol/L    Glucose 102 70 - 110 mg/dL    BUN, Bld 21 8 - 23 mg/dL    Creatinine 0.8 0.5 - 1.4 mg/dL    Calcium 9.0 8.7 - 10.5 mg/dL    Total Protein 7.5 6.0 - 8.4 g/dL    Albumin 3.0 (L) 3.5 - 5.2 g/dL    Total Bilirubin 0.7 0.1 - 1.0 mg/dL    Alkaline Phosphatase 79 55 - 135 U/L    AST 94 (H) 10 - 40 U/L    ALT 9 (L) 10 - 44 U/L    Anion Gap 14 8 - 16 mmol/L    eGFR if African American >60 >60 mL/min/1.73 m^2    eGFR if non African American >60 >60 mL/min/1.73 m^2   Protime-INR   Result Value Ref Range    Prothrombin Time 11.4 9.0 - 12.5 sec    INR 1.1 0.8 - 1.2   COVID-19 Rapid Screening   Result Value Ref Range    SARS-CoV-2 RNA, Amplification, Qual Negative Negative          Significant Imaging:   Imaging Results          X-Ray Hip 2 View Right (Final result)  Result time 10/14/20 21:54:40    Final result by Elvira Vásquez MD (10/14/20 21:54:40)                 Impression:      As above      Electronically signed by: Fahad Felix  Date:    10/14/2020  Time:    21:54             Narrative:    EXAMINATION:  XR HIP 2 VIEW RIGHT    CLINICAL HISTORY:  Unspecified dislocation of right hip, initial encounter    TECHNIQUE:  AP view of the pelvis and frog leg lateral view of the right hip were performed.    COMPARISON:  Prior    FINDINGS:  No change with right hip prosthesis dislocation.  Stable findings.  See prior report.                               X-Ray Hip 2 View Right (Final result)  Result time 10/14/20 18:32:46    Final result by Elvira Vásquez MD (10/14/20 18:32:46)                 Impression:      Dislocation of the right hip  prosthesis      Electronically signed by: Fahad Felix  Date:    10/14/2020  Time:    18:32             Narrative:    EXAMINATION:  XR HIP 2 VIEW RIGHT    CLINICAL HISTORY:  Pain in right hip    TECHNIQUE:  AP view of the pelvis and frog leg lateral view of the right hip were performed.    COMPARISON:  Prior    FINDINGS:  Dislocation of the right hip prosthesis.  Left hip prosthesis appears intact.  Pubic osteitis.  Osteopenia.  Degenerative joint disease of the spine.                                    Assessment/Plan:     * Hip prosthesis dislocation, right  Orthopedic surgery consulted - plan to OR in AM  Neuro checks with VS  NPO after MN for AM procedure  PT/OT to eval/treat post-op  Holding home ASA, plavix, xarelto in case surgery is required in AM  Resume above meds as appropriate    Hyperkalemia  K+ at 5.2 in ED  Hold home potassium supplementation  Trend levels closely - treat as appropriate  Gentle IV hydration      Essential hypertension  BP stable on admission  Continue home medications  IV enlaprilat prn elevated BP  Monitor  closely    GERD (gastroesophageal reflux disease)  Continue home PPI      Hyperlipidemia  Continue home statin        Parkinson disease  Continue home sinemet        VTE Risk Mitigation (From admission, onward)         Ordered     Place sequential compression device  Until discontinued      10/14/20 2334     IP VTE LOW RISK PATIENT  Once      10/14/20 2156                   Moon Martinez NP  Department of Hospital Medicine   [facility]

## 2020-10-15 NOTE — ANESTHESIA PREPROCEDURE EVALUATION
10/15/2020  Tanya Madrid is a 80 y.o., female.      Patient Active Problem List   Diagnosis    Essential hypertension    GERD (gastroesophageal reflux disease)    Hyperlipidemia    Insomnia    Kyphosis of thoracic region    Benign neoplasm of larynx    Laryngeal papillomatosis    Laryngeal stenosis    Parkinson disease    Pharyngoesophageal dysphagia    Rheumatoid arthritis    Scoliosis of lumbar spine    History of CVA (cerebrovascular accident)    Elevated troponin    Closed fracture of left hip with routine healing    Hyperkalemia    Acute blood loss anemia    Status post hip hemiarthroplasty    Right hip pain    Hip prosthesis dislocation, right       Past Surgical History:   Procedure Laterality Date    HEMIARTHROPLASTY OF HIP Left 7/12/2020    Procedure: HEMIARTHROPLASTY, HIP;  Surgeon: Humberto Valdivia DO;  Location: [facility] OR;  Service: Orthopedics;  Laterality: Left;    HEMIARTHROPLASTY OF HIP Right 10/2/2020    Procedure: HEMIARTHROPLASTY, HIP;  Surgeon: Loyd Kearney MD;  Location: [facility] OR;  Service: Orthopedics;  Laterality: Right;    HYSTERECTOMY      THROAT SURGERY      TONSILLECTOMY         Anesthesia Evaluation    I have reviewed the Patient Summary Reports.    I have reviewed the Nursing Notes.    I have reviewed the Medications.     Review of Systems  Anesthesia Hx:  No problems with previous Anesthesia    Social:  Non-Smoker    Hematology/Oncology:         -- Anemia: Hematology Comments: Severe anemia. PRBC's ordered.    Cardiovascular:   Hypertension    Pulmonary:  Pulmonary Normal    Renal/:  Renal/ Normal     Hepatic/GI:   GERD    Neurological:   CVA    Endocrine:  Endocrine Normal        Physical Exam  General:  Well nourished    Airway/Jaw/Neck:  Airway Findings: Mouth Opening: Normal Tongue: Normal  General Airway Assessment: Adult  Mallampati: II   TM Distance: 4 - 6 cm  Jaw/Neck Findings:  Neck ROM: Normal ROM      Dental:  Dental Findings:   Chest/Lungs:  Chest/Lungs Findings: Clear to auscultation, Normal Respiratory Rate     Heart/Vascular:  Heart Findings: Rate: Normal  Rhythm: Regular Rhythm  Sounds: Normal        Mental Status:  Mental Status Findings:  Cooperative, Alert and Oriented         Anesthesia Plan  Type of Anesthesia, risks & benefits discussed:  Anesthesia Type:  general  Patient's Preference:   Intra-op Monitoring Plan: standard ASA monitors  Intra-op Monitoring Plan Comments:   Post Op Pain Control Plan: multimodal analgesia and per primary service following discharge from PACU  Post Op Pain Control Plan Comments:   Induction:   IV  Beta Blocker:  Patient is on a Beta-Blocker and has received one dose within the past 24 hours (No further documentation required).       Informed Consent: Patient understands risks and agrees with Anesthesia plan.  Questions answered. Anesthesia consent signed with patient.  ASA Score: 3     Day of Surgery Review of History & Physical:  There are no significant changes.          Ready For Surgery From Anesthesia Perspective.       Chemistry        Component Value Date/Time     (L) 10/15/2020 0616    K 4.3 10/15/2020 0616     10/15/2020 0616    CO2 22 (L) 10/15/2020 0616    BUN 14 10/15/2020 0616    CREATININE 0.7 10/15/2020 0616    GLU 86 10/15/2020 0616        Component Value Date/Time    CALCIUM 8.1 (L) 10/15/2020 0616    ALKPHOS 79 10/14/2020 1741    AST 94 (H) 10/14/2020 1741    ALT 9 (L) 10/14/2020 1741    BILITOT 0.7 10/14/2020 1741    ESTGFRAFRICA >60 10/15/2020 0616    EGFRNONAA >60 10/15/2020 0616        Lab Results   Component Value Date    WBC 9.64 10/15/2020    HGB 6.7 (L) 10/15/2020    HCT 21.4 (L) 10/15/2020    MCV 93 10/15/2020     (H) 10/15/2020       Echo 7/24/19:  CONCLUSIONS     1 - Concentric hypertrophy.     2 - No wall motion abnormalities.     3 - Normal left  ventricular systolic function (EF 60-65%).     4 - Normal left ventricular diastolic function.     5 - Normal right ventricular systolic function .     6 - The estimated PA systolic pressure is 35 mmHg.     7 - Trivial pulmonic regurgitation.     8 - Intermediate central venous pressure.       Pharm stress 7/24/19:  Nuclear Quantitative Functional Analysis:   LVEF: 60 %   LVED Volume: 100 ml   LVES Volume: 40 ml     Impression: NORMAL MYOCARDIAL PERFUSION   1. The perfusion scan is free of evidence for myocardial ischemia or injury.   2. There is a mild intensity fixed defect in the inferolateral wall of the left ventricle, likely secondary to diaphragm attenuation defect.   3. Resting wall motion is physiologic.   4. Resting LV function is normal.   5. The ventricular volumes are normal at rest and stress.   6. The extracardiac distribution of radioactivity is normal.

## 2020-10-16 ENCOUNTER — ANESTHESIA EVENT (OUTPATIENT)
Dept: SURGERY | Facility: HOSPITAL | Age: 80
DRG: 498 | End: 2020-10-16
Payer: MEDICARE

## 2020-10-16 ENCOUNTER — TELEPHONE (OUTPATIENT)
Dept: ORTHOPEDICS | Facility: CLINIC | Age: 80
End: 2020-10-16

## 2020-10-16 PROBLEM — E87.5 HYPERKALEMIA: Status: RESOLVED | Noted: 2020-07-12 | Resolved: 2020-10-16

## 2020-10-16 LAB
ANION GAP SERPL CALC-SCNC: 10 MMOL/L (ref 8–16)
BASOPHILS # BLD AUTO: 0.05 K/UL (ref 0–0.2)
BASOPHILS NFR BLD: 0.5 % (ref 0–1.9)
BUN SERPL-MCNC: 14 MG/DL (ref 8–23)
CALCIUM SERPL-MCNC: 8.1 MG/DL (ref 8.7–10.5)
CHLORIDE SERPL-SCNC: 104 MMOL/L (ref 95–110)
CO2 SERPL-SCNC: 22 MMOL/L (ref 23–29)
CREAT SERPL-MCNC: 0.7 MG/DL (ref 0.5–1.4)
DIFFERENTIAL METHOD: ABNORMAL
EOSINOPHIL # BLD AUTO: 0.5 K/UL (ref 0–0.5)
EOSINOPHIL NFR BLD: 4.7 % (ref 0–8)
ERYTHROCYTE [DISTWIDTH] IN BLOOD BY AUTOMATED COUNT: 14.9 % (ref 11.5–14.5)
EST. GFR  (AFRICAN AMERICAN): >60 ML/MIN/1.73 M^2
EST. GFR  (NON AFRICAN AMERICAN): >60 ML/MIN/1.73 M^2
GLUCOSE SERPL-MCNC: 103 MG/DL (ref 70–110)
HCT VFR BLD AUTO: 27.4 % (ref 37–48.5)
HGB BLD-MCNC: 8.4 G/DL (ref 12–16)
IMM GRANULOCYTES # BLD AUTO: 0.31 K/UL (ref 0–0.04)
IMM GRANULOCYTES NFR BLD AUTO: 3.1 % (ref 0–0.5)
LYMPHOCYTES # BLD AUTO: 1 K/UL (ref 1–4.8)
LYMPHOCYTES NFR BLD: 9.6 % (ref 18–48)
MAGNESIUM SERPL-MCNC: 1.8 MG/DL (ref 1.6–2.6)
MCH RBC QN AUTO: 28.6 PG (ref 27–31)
MCHC RBC AUTO-ENTMCNC: 30.7 G/DL (ref 32–36)
MCV RBC AUTO: 93 FL (ref 82–98)
MONOCYTES # BLD AUTO: 1.1 K/UL (ref 0.3–1)
MONOCYTES NFR BLD: 10.8 % (ref 4–15)
NEUTROPHILS # BLD AUTO: 7.2 K/UL (ref 1.8–7.7)
NEUTROPHILS NFR BLD: 71.3 % (ref 38–73)
NRBC BLD-RTO: 0 /100 WBC
PLATELET # BLD AUTO: 599 K/UL (ref 150–350)
PMV BLD AUTO: 9.9 FL (ref 9.2–12.9)
POTASSIUM SERPL-SCNC: 4.2 MMOL/L (ref 3.5–5.1)
RBC # BLD AUTO: 2.94 M/UL (ref 4–5.4)
SODIUM SERPL-SCNC: 136 MMOL/L (ref 136–145)
WBC # BLD AUTO: 10.16 K/UL (ref 3.9–12.7)

## 2020-10-16 PROCEDURE — 85025 COMPLETE CBC W/AUTO DIFF WBC: CPT

## 2020-10-16 PROCEDURE — 25000003 PHARM REV CODE 250: Performed by: NURSE PRACTITIONER

## 2020-10-16 PROCEDURE — 94799 UNLISTED PULMONARY SVC/PX: CPT

## 2020-10-16 PROCEDURE — 83735 ASSAY OF MAGNESIUM: CPT

## 2020-10-16 PROCEDURE — 36415 COLL VENOUS BLD VENIPUNCTURE: CPT

## 2020-10-16 PROCEDURE — 80048 BASIC METABOLIC PNL TOTAL CA: CPT

## 2020-10-16 PROCEDURE — 99900035 HC TECH TIME PER 15 MIN (STAT)

## 2020-10-16 PROCEDURE — 11000001 HC ACUTE MED/SURG PRIVATE ROOM

## 2020-10-16 PROCEDURE — 86920 COMPATIBILITY TEST SPIN: CPT

## 2020-10-16 PROCEDURE — 94761 N-INVAS EAR/PLS OXIMETRY MLT: CPT

## 2020-10-16 PROCEDURE — 25000003 PHARM REV CODE 250: Performed by: ORTHOPAEDIC SURGERY

## 2020-10-16 RX ORDER — POLYETHYLENE GLYCOL 3350 17 G/17G
17 POWDER, FOR SOLUTION ORAL DAILY
Status: DISCONTINUED | OUTPATIENT
Start: 2020-10-16 | End: 2020-10-20 | Stop reason: HOSPADM

## 2020-10-16 RX ORDER — METOPROLOL TARTRATE 25 MG/1
25 TABLET, FILM COATED ORAL 2 TIMES DAILY
Status: DISCONTINUED | OUTPATIENT
Start: 2020-10-16 | End: 2020-10-20 | Stop reason: HOSPADM

## 2020-10-16 RX ADMIN — METOPROLOL TARTRATE 25 MG: 25 TABLET, FILM COATED ORAL at 08:10

## 2020-10-16 RX ADMIN — LOSARTAN POTASSIUM 100 MG: 50 TABLET, FILM COATED ORAL at 08:10

## 2020-10-16 RX ADMIN — CARBIDOPA AND LEVODOPA 3 TABLET: 25; 100 TABLET ORAL at 12:10

## 2020-10-16 RX ADMIN — AMLODIPINE BESYLATE 2.5 MG: 2.5 TABLET ORAL at 08:10

## 2020-10-16 RX ADMIN — CHLORHEXIDINE GLUCONATE 0.12% ORAL RINSE 10 ML: 1.2 LIQUID ORAL at 08:10

## 2020-10-16 RX ADMIN — CARBIDOPA AND LEVODOPA 3 TABLET: 25; 100 TABLET ORAL at 08:10

## 2020-10-16 RX ADMIN — CARBIDOPA AND LEVODOPA 3 TABLET: 25; 100 TABLET ORAL at 04:10

## 2020-10-16 RX ADMIN — STANDARDIZED SENNA CONCENTRATE AND DOCUSATE SODIUM 1 TABLET: 8.6; 5 TABLET ORAL at 08:10

## 2020-10-16 RX ADMIN — POLYETHYLENE GLYCOL 3350 17 G: 17 POWDER, FOR SOLUTION ORAL at 12:10

## 2020-10-16 RX ADMIN — METOPROLOL TARTRATE 25 MG: 25 TABLET, FILM COATED ORAL at 12:10

## 2020-10-16 RX ADMIN — ACETAMINOPHEN 1000 MG: 500 TABLET ORAL at 01:10

## 2020-10-16 NOTE — CONSULTS
CM contacted the [facility] @[phone]    and verified that the brace order was received and processed.

## 2020-10-16 NOTE — PLAN OF CARE
Patient received lying in bed sleeping but easily awoken, x3, in no acute distress or discomfort. Vitals present within stable limits. Medications tolerated well. Dressing to RLE remains clean and intact; however pt. Refuses ice to site as  per MD recommendation. Pt. Remains in stable condition. Safety precautions maintained will continue to monitor progress.

## 2020-10-16 NOTE — ASSESSMENT & PLAN NOTE
Orthopedic surgery consulted - plan to OR in AM  Neuro checks with VS  NPO after MN for AM procedure  PT/OT to eval/treat post-op  Holding home ASA, plavix, xarelto in case surgery is required in AM  Resume above meds as appropriate  10/15/20 Ortho attempted closed reduction of the right hip which was unsuccessful. Plan for open reduction of the right hip on Saturday once patient has been medically optimized. H/H today was noted to be 6.7/21.4. Will transfuse 1 unit PRBC today and recheck labs. Continue to hold plavix and eliquis.   10/16/2020 - (open reduction of right bipolar hemiarthroplasty, Debridement and irrigation with hardware exchange as indicated)

## 2020-10-16 NOTE — SUBJECTIVE & OBJECTIVE
Interval History: Pt reports mild right hip pain. Daughter reports patient is sleeping mostly.     Review of Systems   Constitutional: Positive for activity change (d/t R hip pain). Negative for appetite change, chills, diaphoresis, fatigue, fever and unexpected weight change.   HENT: Negative for congestion, drooling, facial swelling, rhinorrhea, sinus pressure, sneezing, sore throat, trouble swallowing and voice change.    Eyes: Negative for photophobia, discharge, redness, itching and visual disturbance.   Respiratory: Negative for apnea, cough, choking, chest tightness, shortness of breath, wheezing and stridor.    Cardiovascular: Negative for chest pain, palpitations and leg swelling.   Gastrointestinal: Negative for abdominal distention, abdominal pain, anal bleeding, blood in stool, constipation, diarrhea, nausea and vomiting.   Endocrine: Negative for cold intolerance, heat intolerance, polydipsia, polyphagia and polyuria.   Genitourinary: Negative for decreased urine volume, difficulty urinating, dysuria, flank pain, frequency, hematuria, pelvic pain, urgency, vaginal bleeding and vaginal discharge.   Musculoskeletal: Positive for arthralgias and gait problem (R hip dislocation). Negative for back pain, joint swelling, myalgias, neck pain and neck stiffness.   Skin: Negative for color change, pallor, rash and wound.   Neurological: Positive for weakness (with limited ROM RLE). Negative for dizziness, seizures, syncope, facial asymmetry, speech difficulty, light-headedness, numbness and headaches.   Psychiatric/Behavioral: Negative for agitation, confusion, hallucinations and suicidal ideas.   All other systems reviewed and are negative.    Objective:     Vital Signs (Most Recent):  Temp: 98.5 °F (36.9 °C) (10/16/20 1303)  Pulse: 91 (10/16/20 1303)  Resp: 18 (10/16/20 1303)  BP: (!) 141/89 (10/16/20 1303)  SpO2: 98 % (10/16/20 1303) Vital Signs (24h Range):  Temp:  [96.8 °F (36 °C)-99.5 °F (37.5 °C)] 98.5 °F  (36.9 °C)  Pulse:  [86-98] 91  Resp:  [14-18] 18  SpO2:  [95 %-100 %] 98 %  BP: (120-146)/(56-89) 141/89     Weight: 54.9 kg (121 lb 0.5 oz)  Body mass index is 22.14 kg/m².    Intake/Output Summary (Last 24 hours) at 10/16/2020 1613  Last data filed at 10/16/2020 0737  Gross per 24 hour   Intake 1550 ml   Output --   Net 1550 ml      Physical Exam  Vitals signs and nursing note reviewed.   Constitutional:       General: She is not in acute distress.     Appearance: Normal appearance. She is not toxic-appearing or diaphoretic.   HENT:      Head: Normocephalic and atraumatic.      Nose: Nose normal. No congestion.      Mouth/Throat:      Mouth: Mucous membranes are moist.   Eyes:      General: No scleral icterus.     Conjunctiva/sclera: Conjunctivae normal.   Neck:      Musculoskeletal: Normal range of motion and neck supple. No muscular tenderness.   Cardiovascular:      Rate and Rhythm: Normal rate and regular rhythm.      Pulses: Normal pulses.      Heart sounds: Normal heart sounds.   Pulmonary:      Effort: Pulmonary effort is normal. No respiratory distress.      Breath sounds: Normal breath sounds. No wheezing or rhonchi.   Abdominal:      General: Abdomen is flat. Bowel sounds are normal. There is no distension.      Palpations: Abdomen is soft.      Tenderness: There is no abdominal tenderness.      Hernia: A hernia (RLQ) is present.   Musculoskeletal: Normal range of motion.         General: Deformity (RLE shortened with external rotation) present. No swelling.   Skin:     General: Skin is warm and dry.      Capillary Refill: Capillary refill takes less than 2 seconds.      Coloration: Skin is not jaundiced.      Findings: No bruising or rash.      Comments: R hip incision CDI - no evidence of infection.   Neurological:      General: No focal deficit present.      Mental Status: She is alert and oriented to person, place, and time.   Psychiatric:         Mood and Affect: Mood normal. Affect is flat.          Speech: Speech is delayed.         Behavior: Behavior normal.         Thought Content: Thought content normal.         Judgment: Judgment normal.      Comments: Baseline per daughter - parkinson's dx         Significant Labs:   CBC:   Recent Labs   Lab 10/14/20  1741 10/15/20  0616 10/16/20  0704   WBC 13.78* 9.64 10.16   HGB 8.5* 6.7* 8.4*   HCT 28.0* 21.4* 27.4*   * 573* 599*     CMP:   Recent Labs   Lab 10/14/20  1741 10/15/20  0251 10/15/20  0616 10/16/20  0704   * 133* 135* 136   K 5.2* 4.3 4.3 4.2    105 106 104   CO2 20* 21* 22* 22*    92 86 103   BUN 21 15 14 14   CREATININE 0.8 0.7 0.7 0.7   CALCIUM 9.0 8.2* 8.1* 8.1*   PROT 7.5  --   --   --    ALBUMIN 3.0*  --   --   --    BILITOT 0.7  --   --   --    ALKPHOS 79  --   --   --    AST 94*  --   --   --    ALT 9*  --   --   --    ANIONGAP 14 7* 7* 10   EGFRNONAA >60 >60 >60 >60     All pertinent labs within the past 24 hours have been reviewed.    Significant Imaging: I have reviewed and interpreted all pertinent imaging results/findings within the past 24 hours.

## 2020-10-16 NOTE — PROGRESS NOTES
([facility] XR1-DR)   Ref Range & Units 07:04 1d ago   WBC 3.90 - 12.70 K/uL 10.16  9.64    RBC 4.00 - 5.40 M/uL 2.94Low   2.31Low     Hemoglobin 12.0 - 16.0 g/dL 8.4Low   6.7Low     Hematocrit 37.0 - 48.5 % 27.4Low   21.4Low     Mean Corpuscular Volume 82 - 98 fL 93  93    Mean Corpuscular Hemoglobin 27.0 - 31.0 pg 28.6  29.0    Mean Corpuscular Hemoglobin Conc 32.0 - 36.0 g/dL 30.7Low   31.3Low     RDW 11.5 - 14.5 % 14.9High   14.5    Platelets 150 - 350 K/uL 599High   573High     MPV 9.2 - 12.9 fL 9.9  9.8    Immature Granulocytes 0.0 - 0.5 % 3.1High   1.9High     Gran # (ANC) 1.8 - 7.7 K/uL 7.2  6.7    Immature Grans (Abs) 0.00 - 0.04 K/uL 0.31High   0.18High  CM            A: Dislocated right Bipolar Hemiarthroplasty (irreducible closed)    P: Plan for OR tomorrow at 9 AM (open reduction of right bipolar hemiarthroplasty, Debridement and irrigation with hardware exchange as indicated)   NPO after MN   Antibiotics preop   TXA preop   Blood ordered with AM preop CBC   Equipment ordered

## 2020-10-16 NOTE — ASSESSMENT & PLAN NOTE
Continue home statin - daughter says that patient is not taking this medication - STATIN discontinued

## 2020-10-16 NOTE — PLAN OF CARE
Problem: Adult Inpatient Plan of Care  Goal: Plan of Care Review  Outcome: Ongoing, Progressing  Flowsheets (Taken 10/16/2020 0429)  Plan of Care Reviewed With: patient  Pt had no adverse events during shift. Pt free from falls. Call light in reach. SR's x2. Patient denies pain during shift. Pt repositioned with wedge q2 hrs, pt tolerating well. IVF's administered as ordered. VTE prophylaxis - SCD's in use, pt tolerating well. Daughter at bedside to reduce anxiety. VSS. Chart reviewed. Will continue to monitor.

## 2020-10-16 NOTE — ANESTHESIA PREPROCEDURE EVALUATION
10/16/2020  Tanya Madrid is a 80 y.o., female. Dislocated R hip prothesis.  Tried to reduce under sedation 2 d ago, unsuccessful.      Patient Active Problem List   Diagnosis    Essential hypertension    GERD (gastroesophageal reflux disease)    Hyperlipidemia    Insomnia    Kyphosis of thoracic region    Benign neoplasm of larynx    Laryngeal papillomatosis    Laryngeal stenosis    Parkinson disease    Pharyngoesophageal dysphagia    Rheumatoid arthritis    Scoliosis of lumbar spine    History of CVA (cerebrovascular accident)    Elevated troponin    Closed fracture of left hip with routine healing    Hyperkalemia    Acute blood loss anemia    Status post hip hemiarthroplasty    Right hip pain    Hip prosthesis dislocation, right    Normocytic anemia       Past Surgical History:   Procedure Laterality Date    CLOSED REDUCTION Right 10/15/2020    Procedure: CLOSED REDUCTION;  Surgeon: Humberto Valdivia DO;  Location: [facility] OR;  Service: Orthopedics;  Laterality: Right;  ATTEMPTED    HEMIARTHROPLASTY OF HIP Left 7/12/2020    Procedure: HEMIARTHROPLASTY, HIP;  Surgeon: Humberto Valdivia DO;  Location: [facility] OR;  Service: Orthopedics;  Laterality: Left;    HEMIARTHROPLASTY OF HIP Right 10/2/2020    Procedure: HEMIARTHROPLASTY, HIP;  Surgeon: Loyd Kearney MD;  Location: [facility] OR;  Service: Orthopedics;  Laterality: Right;    HYSTERECTOMY      THROAT SURGERY      TONSILLECTOMY         Pre-op Assessment    I have reviewed the Patient Summary Reports.     I have reviewed the Nursing Notes.    I have reviewed the Medications.     Review of Systems  Anesthesia Hx:  No problems with previous Anesthesia  History of prior surgery of interest to airway management or planning: Denies Family Hx of Anesthesia complications.   Denies Personal Hx of Anesthesia complications.    Social:  Non-Smoker    Hematology/Oncology:         -- Anemia: Hematology Comments: Severe anemia. PRBC's ordered.    Cardiovascular:   Hypertension    Pulmonary:  Pulmonary Normal    Renal/:  Renal/ Normal     Hepatic/GI:   GERD    Neurological:   CVA    Endocrine:  Endocrine Normal        Physical Exam  General:  Well nourished    Airway/Jaw/Neck:  Airway Findings: Mouth Opening: Small, but > 3cm Tongue: Normal  General Airway Assessment: Adult  Mallampati: II  TM Distance: 4 - 6 cm  Jaw/Neck Findings:  Neck ROM: Normal ROM      Dental:  Dental Findings: In tact   Chest/Lungs:  Chest/Lungs Findings: Clear to auscultation, Normal Respiratory Rate     Heart/Vascular:  Heart Findings: Rate: Normal  Rhythm: Regular Rhythm  Sounds: Normal        Mental Status:  Mental Status Findings: (Frail, quiet. Verbal- answers questions slowly. )  Cooperative, Alert and Oriented         Anesthesia Plan  Type of Anesthesia, risks & benefits discussed:  Anesthesia Type:  general  Patient's Preference:   Intra-op Monitoring Plan: standard ASA monitors  Intra-op Monitoring Plan Comments:   Post Op Pain Control Plan: multimodal analgesia and per primary service following discharge from PACU  Post Op Pain Control Plan Comments:   Induction:   IV  Beta Blocker:  Patient is on a Beta-Blocker and has received one dose within the past 24 hours (No further documentation required).       Informed Consent: Patient understands risks and agrees with Anesthesia plan.  Questions answered. Anesthesia consent signed with patient.  ASA Score: 3  emergent   Day of Surgery Review of History & Physical:  There are no significant changes.          Ready For Surgery From Anesthesia Perspective.       Chemistry        Component Value Date/Time     10/16/2020 0704    K 4.2 10/16/2020 0704     10/16/2020 0704    CO2 22 (L) 10/16/2020 0704    BUN 14 10/16/2020 0704    CREATININE 0.7 10/16/2020 0704     10/16/2020 0704        Component  Value Date/Time    CALCIUM 8.1 (L) 10/16/2020 0704    ALKPHOS 79 10/14/2020 1741    AST 94 (H) 10/14/2020 1741    ALT 9 (L) 10/14/2020 1741    BILITOT 0.7 10/14/2020 1741    ESTGFRAFRICA >60 10/16/2020 0704    EGFRNONAA >60 10/16/2020 0704        Lab Results   Component Value Date    WBC 10.16 10/16/2020    HGB 8.4 (L) 10/16/2020    HCT 27.4 (L) 10/16/2020    MCV 93 10/16/2020     (H) 10/16/2020       Echo 7/24/19:  CONCLUSIONS     1 - Concentric hypertrophy.     2 - No wall motion abnormalities.     3 - Normal left ventricular systolic function (EF 60-65%).     4 - Normal left ventricular diastolic function.     5 - Normal right ventricular systolic function .     6 - The estimated PA systolic pressure is 35 mmHg.     7 - Trivial pulmonic regurgitation.     8 - Intermediate central venous pressure.       Pharm stress 7/24/19:  Nuclear Quantitative Functional Analysis:   LVEF: 60 %   LVED Volume: 100 ml   LVES Volume: 40 ml     Impression: NORMAL MYOCARDIAL PERFUSION   1. The perfusion scan is free of evidence for myocardial ischemia or injury.   2. There is a mild intensity fixed defect in the inferolateral wall of the left ventricle, likely secondary to diaphragm attenuation defect.   3. Resting wall motion is physiologic.   4. Resting LV function is normal.   5. The ventricular volumes are normal at rest and stress.   6. The extracardiac distribution of radioactivity is normal.

## 2020-10-16 NOTE — PROGRESS NOTES
[facility] Hospital Medicine  Progress Note    Patient Name: Tanya Madrid  MRN: [id number]  Patient Class: IP- Inpatient   Admission Date: 10/14/2020  Length of Stay: 1 days  Attending Physician: Sj Juan, *  Primary Care Provider: Maggie Sue NP        Subjective:     Principal Problem:Hip dislocation, right        HPI:   79 y/o AA F with hx of HTN, HLD, Parkinson's disease, stroke, B hip replacement sent from [facility] to ED after complaining of sudden onset of R hip pain and being found to have a dislocated R hip prosthesis - unsure as to how this occurred, no family was present and pt states she did not fall but does not know how the injury happened. Symptoms are persistent and moderate in severity with pain exacerbated by movement/palpation and improved at rest. Of note, pt was admitted from 10/01/2020 to 10/05/2020 after experiencing a fall and R hip fracture and subsequent ORIF. Denies chest pain, edema, palpitations, SOB, wheezing, abdominal pain, N/V/D, constipation, dysuria, flank pain, HA, dizziness, weakness, fever, cough, chills, blurred vision. Found in ED to have dislocation of the right hip prosthesis, while labs were largely WNL aside from mild hyperkalemia (5.2), VSS and COVID-19 negative. Attempts were made to reduce the right hip but were unsuccessful. Pt was given etomidate, morphine and 1L IV fluid bolus with improvement in pain. Hospital medicine was called and pt was placed in observation on telemetry with plan for likely reduction in OR in AM. Pt is a full code - her daughter Josee Lemus is her surrogate decision maker.        Overview/Hospital Course:  10/15/20 Ortho attempted closed reduction of the right hip which was unsuccessful. Plan for open reduction of the right hip on Saturday once patient has been medically optimized. H/H today was noted to be 6.7/21.4. Will transfuse 1 unit PRBC today and recheck labs. Continue to hold plavix and  eliquis.     10/16/2020 - Pt is planned for open reduction of right bipolar hemiarthroplasty, I & D and hardware exchange per Orthopedics. Pt reports mild pain to the right hip. Pt was given 1 unit PRBCs at time of admission due to blood loss anemia. Pt on ASA and Plavix due to hx of stroke - these meds held. Also, Eliquis DVT prophylaxis on hold for pending surgery.     Interval History: Pt reports mild right hip pain. Daughter reports patient is sleeping mostly.     Review of Systems   Constitutional: Positive for activity change (d/t R hip pain). Negative for appetite change, chills, diaphoresis, fatigue, fever and unexpected weight change.   HENT: Negative for congestion, drooling, facial swelling, rhinorrhea, sinus pressure, sneezing, sore throat, trouble swallowing and voice change.    Eyes: Negative for photophobia, discharge, redness, itching and visual disturbance.   Respiratory: Negative for apnea, cough, choking, chest tightness, shortness of breath, wheezing and stridor.    Cardiovascular: Negative for chest pain, palpitations and leg swelling.   Gastrointestinal: Negative for abdominal distention, abdominal pain, anal bleeding, blood in stool, constipation, diarrhea, nausea and vomiting.   Endocrine: Negative for cold intolerance, heat intolerance, polydipsia, polyphagia and polyuria.   Genitourinary: Negative for decreased urine volume, difficulty urinating, dysuria, flank pain, frequency, hematuria, pelvic pain, urgency, vaginal bleeding and vaginal discharge.   Musculoskeletal: Positive for arthralgias and gait problem (R hip dislocation). Negative for back pain, joint swelling, myalgias, neck pain and neck stiffness.   Skin: Negative for color change, pallor, rash and wound.   Neurological: Positive for weakness (with limited ROM RLE). Negative for dizziness, seizures, syncope, facial asymmetry, speech difficulty, light-headedness, numbness and headaches.   Psychiatric/Behavioral: Negative for  agitation, confusion, hallucinations and suicidal ideas.   All other systems reviewed and are negative.    Objective:     Vital Signs (Most Recent):  Temp: 98.5 °F (36.9 °C) (10/16/20 1303)  Pulse: 91 (10/16/20 1303)  Resp: 18 (10/16/20 1303)  BP: (!) 141/89 (10/16/20 1303)  SpO2: 98 % (10/16/20 1303) Vital Signs (24h Range):  Temp:  [96.8 °F (36 °C)-99.5 °F (37.5 °C)] 98.5 °F (36.9 °C)  Pulse:  [86-98] 91  Resp:  [14-18] 18  SpO2:  [95 %-100 %] 98 %  BP: (120-146)/(56-89) 141/89     Weight: 54.9 kg (121 lb 0.5 oz)  Body mass index is 22.14 kg/m².    Intake/Output Summary (Last 24 hours) at 10/16/2020 1613  Last data filed at 10/16/2020 0737  Gross per 24 hour   Intake 1550 ml   Output --   Net 1550 ml      Physical Exam  Vitals signs and nursing note reviewed.   Constitutional:       General: She is not in acute distress.     Appearance: Normal appearance. She is not toxic-appearing or diaphoretic.   HENT:      Head: Normocephalic and atraumatic.      Nose: Nose normal. No congestion.      Mouth/Throat:      Mouth: Mucous membranes are moist.   Eyes:      General: No scleral icterus.     Conjunctiva/sclera: Conjunctivae normal.   Neck:      Musculoskeletal: Normal range of motion and neck supple. No muscular tenderness.   Cardiovascular:      Rate and Rhythm: Normal rate and regular rhythm.      Pulses: Normal pulses.      Heart sounds: Normal heart sounds.   Pulmonary:      Effort: Pulmonary effort is normal. No respiratory distress.      Breath sounds: Normal breath sounds. No wheezing or rhonchi.   Abdominal:      General: Abdomen is flat. Bowel sounds are normal. There is no distension.      Palpations: Abdomen is soft.      Tenderness: There is no abdominal tenderness.      Hernia: A hernia (RLQ) is present.   Musculoskeletal: Normal range of motion.         General: Deformity (RLE shortened with external rotation) present. No swelling.   Skin:     General: Skin is warm and dry.      Capillary Refill:  Capillary refill takes less than 2 seconds.      Coloration: Skin is not jaundiced.      Findings: No bruising or rash.      Comments: R hip incision CDI - no evidence of infection.   Neurological:      General: No focal deficit present.      Mental Status: She is alert and oriented to person, place, and time.   Psychiatric:         Mood and Affect: Mood normal. Affect is flat.         Speech: Speech is delayed.         Behavior: Behavior normal.         Thought Content: Thought content normal.         Judgment: Judgment normal.      Comments: Baseline per daughter - parkinson's dx         Significant Labs:   CBC:   Recent Labs   Lab 10/14/20  1741 10/15/20  0616 10/16/20  0704   WBC 13.78* 9.64 10.16   HGB 8.5* 6.7* 8.4*   HCT 28.0* 21.4* 27.4*   * 573* 599*     CMP:   Recent Labs   Lab 10/14/20  1741 10/15/20  0251 10/15/20  0616 10/16/20  0704   * 133* 135* 136   K 5.2* 4.3 4.3 4.2    105 106 104   CO2 20* 21* 22* 22*    92 86 103   BUN 21 15 14 14   CREATININE 0.8 0.7 0.7 0.7   CALCIUM 9.0 8.2* 8.1* 8.1*   PROT 7.5  --   --   --    ALBUMIN 3.0*  --   --   --    BILITOT 0.7  --   --   --    ALKPHOS 79  --   --   --    AST 94*  --   --   --    ALT 9*  --   --   --    ANIONGAP 14 7* 7* 10   EGFRNONAA >60 >60 >60 >60     All pertinent labs within the past 24 hours have been reviewed.    Significant Imaging: I have reviewed and interpreted all pertinent imaging results/findings within the past 24 hours.      Assessment/Plan:      * Hip prosthesis dislocation, right  Orthopedic surgery consulted - plan to OR in AM  Neuro checks with VS  NPO after MN for AM procedure  PT/OT to eval/treat post-op  Holding home ASA, plavix, xarelto in case surgery is required in AM  Resume above meds as appropriate  10/15/20 Ortho attempted closed reduction of the right hip which was unsuccessful. Plan for open reduction of the right hip on Saturday once patient has been medically optimized. H/H today was noted  to be 6.7/21.4. Will transfuse 1 unit PRBC today and recheck labs. Continue to hold plavix and eliquis.   10/16/2020 - (open reduction of right bipolar hemiarthroplasty, Debridement and irrigation with hardware exchange as indicated)      Normocytic anemia  10/15/20  H/H today was noted to be 6.7/21.4. Will transfuse 1 unit PRBC today and recheck labs. CBC in am       Parkinson disease  Continue home sinemet      Hyperlipidemia  Continue home statin - daughter says that patient is not taking this medication - STATIN discontinued        GERD (gastroesophageal reflux disease)  10/16/20 Continue PPI - Protonix stopped as daughter reports that patient is not taking      Essential hypertension  BP stable on admission  Continue home medications  IV enlaprilat prn elevated BP  Monitor closely      VTE Risk Mitigation (From admission, onward)         Ordered     Place sequential compression device  Until discontinued      10/14/20 2334     IP VTE LOW RISK PATIENT  Once      10/14/20 2156                Discharge Planning   PARMINDER:      Code Status: Full Code   Is the patient medically ready for discharge?:     Reason for patient still in hospital (select all that apply): Patient trending condition and Treatment  Discharge Plan A: Rehab                  Rose Mary Blackman NP  Department of Hospital Medicine   [facility]

## 2020-10-17 ENCOUNTER — ANESTHESIA (OUTPATIENT)
Dept: SURGERY | Facility: HOSPITAL | Age: 80
DRG: 498 | End: 2020-10-17
Payer: MEDICARE

## 2020-10-17 LAB
ANION GAP SERPL CALC-SCNC: 8 MMOL/L (ref 8–16)
BASOPHILS # BLD AUTO: 0.04 K/UL (ref 0–0.2)
BASOPHILS NFR BLD: 0.4 % (ref 0–1.9)
BUN SERPL-MCNC: 13 MG/DL (ref 8–23)
CALCIUM SERPL-MCNC: 8.6 MG/DL (ref 8.7–10.5)
CHLORIDE SERPL-SCNC: 106 MMOL/L (ref 95–110)
CO2 SERPL-SCNC: 22 MMOL/L (ref 23–29)
CREAT SERPL-MCNC: 0.7 MG/DL (ref 0.5–1.4)
DIFFERENTIAL METHOD: ABNORMAL
EOSINOPHIL # BLD AUTO: 0.6 K/UL (ref 0–0.5)
EOSINOPHIL NFR BLD: 5.6 % (ref 0–8)
ERYTHROCYTE [DISTWIDTH] IN BLOOD BY AUTOMATED COUNT: 14.8 % (ref 11.5–14.5)
EST. GFR  (AFRICAN AMERICAN): >60 ML/MIN/1.73 M^2
EST. GFR  (NON AFRICAN AMERICAN): >60 ML/MIN/1.73 M^2
GLUCOSE SERPL-MCNC: 91 MG/DL (ref 70–110)
HCT VFR BLD AUTO: 30.6 % (ref 37–48.5)
HGB BLD-MCNC: 9.2 G/DL (ref 12–16)
IMM GRANULOCYTES # BLD AUTO: 0.28 K/UL (ref 0–0.04)
IMM GRANULOCYTES NFR BLD AUTO: 2.6 % (ref 0–0.5)
LYMPHOCYTES # BLD AUTO: 1.1 K/UL (ref 1–4.8)
LYMPHOCYTES NFR BLD: 9.9 % (ref 18–48)
MAGNESIUM SERPL-MCNC: 1.9 MG/DL (ref 1.6–2.6)
MCH RBC QN AUTO: 28.5 PG (ref 27–31)
MCHC RBC AUTO-ENTMCNC: 30.1 G/DL (ref 32–36)
MCV RBC AUTO: 95 FL (ref 82–98)
MONOCYTES # BLD AUTO: 1.1 K/UL (ref 0.3–1)
MONOCYTES NFR BLD: 10.6 % (ref 4–15)
NEUTROPHILS # BLD AUTO: 7.6 K/UL (ref 1.8–7.7)
NEUTROPHILS NFR BLD: 70.9 % (ref 38–73)
NRBC BLD-RTO: 0 /100 WBC
PLATELET # BLD AUTO: 654 K/UL (ref 150–350)
PMV BLD AUTO: 9.8 FL (ref 9.2–12.9)
POTASSIUM SERPL-SCNC: 4.3 MMOL/L (ref 3.5–5.1)
RBC # BLD AUTO: 3.23 M/UL (ref 4–5.4)
SODIUM SERPL-SCNC: 136 MMOL/L (ref 136–145)
WBC # BLD AUTO: 10.67 K/UL (ref 3.9–12.7)

## 2020-10-17 PROCEDURE — 25000003 PHARM REV CODE 250: Performed by: ORTHOPAEDIC SURGERY

## 2020-10-17 PROCEDURE — 25000003 PHARM REV CODE 250: Performed by: NURSE ANESTHETIST, CERTIFIED REGISTERED

## 2020-10-17 PROCEDURE — 83735 ASSAY OF MAGNESIUM: CPT

## 2020-10-17 PROCEDURE — 27201423 OPTIME MED/SURG SUP & DEVICES STERILE SUPPLY: Performed by: ORTHOPAEDIC SURGERY

## 2020-10-17 PROCEDURE — 11000001 HC ACUTE MED/SURG PRIVATE ROOM

## 2020-10-17 PROCEDURE — 80048 BASIC METABOLIC PNL TOTAL CA: CPT

## 2020-10-17 PROCEDURE — 36415 COLL VENOUS BLD VENIPUNCTURE: CPT

## 2020-10-17 PROCEDURE — 37000008 HC ANESTHESIA 1ST 15 MINUTES: Performed by: ORTHOPAEDIC SURGERY

## 2020-10-17 PROCEDURE — 25000003 PHARM REV CODE 250: Performed by: NURSE PRACTITIONER

## 2020-10-17 PROCEDURE — 99900038 HC OT GENERIC THERAPY SCREENING (STAT)

## 2020-10-17 PROCEDURE — 94799 UNLISTED PULMONARY SVC/PX: CPT

## 2020-10-17 PROCEDURE — 99900037 HC PT THERAPY SCREENING (STAT)

## 2020-10-17 PROCEDURE — 85025 COMPLETE CBC W/AUTO DIFF WBC: CPT

## 2020-10-17 PROCEDURE — 36000710: Performed by: ORTHOPAEDIC SURGERY

## 2020-10-17 PROCEDURE — 71000033 HC RECOVERY, INTIAL HOUR: Performed by: ORTHOPAEDIC SURGERY

## 2020-10-17 PROCEDURE — 36000711: Performed by: ORTHOPAEDIC SURGERY

## 2020-10-17 PROCEDURE — 27000221 HC OXYGEN, UP TO 24 HOURS

## 2020-10-17 PROCEDURE — 37000009 HC ANESTHESIA EA ADD 15 MINS: Performed by: ORTHOPAEDIC SURGERY

## 2020-10-17 PROCEDURE — 94761 N-INVAS EAR/PLS OXIMETRY MLT: CPT

## 2020-10-17 PROCEDURE — 63600175 PHARM REV CODE 636 W HCPCS: Performed by: NURSE ANESTHETIST, CERTIFIED REGISTERED

## 2020-10-17 PROCEDURE — 99900035 HC TECH TIME PER 15 MIN (STAT)

## 2020-10-17 RX ORDER — ONDANSETRON 8 MG/1
8 TABLET, ORALLY DISINTEGRATING ORAL EVERY 8 HOURS PRN
Status: DISCONTINUED | OUTPATIENT
Start: 2020-10-17 | End: 2020-10-17 | Stop reason: SDUPTHER

## 2020-10-17 RX ORDER — CHLORHEXIDINE GLUCONATE ORAL RINSE 1.2 MG/ML
10 SOLUTION DENTAL
Status: DISCONTINUED | OUTPATIENT
Start: 2020-10-17 | End: 2020-10-17

## 2020-10-17 RX ORDER — SODIUM CHLORIDE 0.9 % (FLUSH) 0.9 %
3 SYRINGE (ML) INJECTION EVERY 8 HOURS
Status: DISCONTINUED | OUTPATIENT
Start: 2020-10-17 | End: 2020-10-17

## 2020-10-17 RX ORDER — LACTULOSE 10 G/15ML
20 SOLUTION ORAL EVERY 6 HOURS PRN
Status: DISCONTINUED | OUTPATIENT
Start: 2020-10-17 | End: 2020-10-20 | Stop reason: HOSPADM

## 2020-10-17 RX ORDER — HYDROMORPHONE HYDROCHLORIDE 2 MG/ML
0.2 INJECTION, SOLUTION INTRAMUSCULAR; INTRAVENOUS; SUBCUTANEOUS EVERY 5 MIN PRN
Status: DISCONTINUED | OUTPATIENT
Start: 2020-10-17 | End: 2020-10-17

## 2020-10-17 RX ORDER — SODIUM CHLORIDE, SODIUM LACTATE, POTASSIUM CHLORIDE, CALCIUM CHLORIDE 600; 310; 30; 20 MG/100ML; MG/100ML; MG/100ML; MG/100ML
INJECTION, SOLUTION INTRAVENOUS CONTINUOUS PRN
Status: DISCONTINUED | OUTPATIENT
Start: 2020-10-17 | End: 2020-10-17

## 2020-10-17 RX ORDER — ROCURONIUM BROMIDE 10 MG/ML
INJECTION, SOLUTION INTRAVENOUS
Status: DISCONTINUED | OUTPATIENT
Start: 2020-10-17 | End: 2020-10-17

## 2020-10-17 RX ORDER — AMOXICILLIN 250 MG
1 CAPSULE ORAL 2 TIMES DAILY
Status: DISCONTINUED | OUTPATIENT
Start: 2020-10-17 | End: 2020-10-20 | Stop reason: HOSPADM

## 2020-10-17 RX ORDER — TRANEXAMIC ACID 100 MG/ML
1000 INJECTION, SOLUTION INTRAVENOUS
Status: COMPLETED | OUTPATIENT
Start: 2020-10-17 | End: 2020-10-17

## 2020-10-17 RX ORDER — METOCLOPRAMIDE HYDROCHLORIDE 5 MG/ML
10 INJECTION INTRAMUSCULAR; INTRAVENOUS EVERY 10 MIN PRN
Status: DISCONTINUED | OUTPATIENT
Start: 2020-10-17 | End: 2020-10-17

## 2020-10-17 RX ORDER — PHENYLEPHRINE HYDROCHLORIDE 10 MG/ML
INJECTION INTRAVENOUS
Status: DISCONTINUED | OUTPATIENT
Start: 2020-10-17 | End: 2020-10-17

## 2020-10-17 RX ORDER — CEFAZOLIN SODIUM 1 G/3ML
INJECTION, POWDER, FOR SOLUTION INTRAMUSCULAR; INTRAVENOUS
Status: DISCONTINUED | OUTPATIENT
Start: 2020-10-17 | End: 2020-10-17

## 2020-10-17 RX ORDER — FENTANYL CITRATE 50 UG/ML
INJECTION, SOLUTION INTRAMUSCULAR; INTRAVENOUS
Status: DISCONTINUED | OUTPATIENT
Start: 2020-10-17 | End: 2020-10-17

## 2020-10-17 RX ORDER — PROPOFOL 10 MG/ML
VIAL (ML) INTRAVENOUS
Status: DISCONTINUED | OUTPATIENT
Start: 2020-10-17 | End: 2020-10-17

## 2020-10-17 RX ORDER — CHLORHEXIDINE GLUCONATE ORAL RINSE 1.2 MG/ML
10 SOLUTION DENTAL 2 TIMES DAILY
Status: DISCONTINUED | OUTPATIENT
Start: 2020-10-17 | End: 2020-10-20 | Stop reason: HOSPADM

## 2020-10-17 RX ORDER — MIDAZOLAM HYDROCHLORIDE 1 MG/ML
INJECTION, SOLUTION INTRAMUSCULAR; INTRAVENOUS
Status: DISCONTINUED | OUTPATIENT
Start: 2020-10-17 | End: 2020-10-17

## 2020-10-17 RX ORDER — SUCCINYLCHOLINE CHLORIDE 20 MG/ML
INJECTION INTRAMUSCULAR; INTRAVENOUS
Status: DISCONTINUED | OUTPATIENT
Start: 2020-10-17 | End: 2020-10-17

## 2020-10-17 RX ORDER — LIDOCAINE HYDROCHLORIDE 10 MG/ML
INJECTION, SOLUTION EPIDURAL; INFILTRATION; INTRACAUDAL; PERINEURAL
Status: DISCONTINUED | OUTPATIENT
Start: 2020-10-17 | End: 2020-10-17

## 2020-10-17 RX ORDER — DIPHENHYDRAMINE HYDROCHLORIDE 50 MG/ML
25 INJECTION INTRAMUSCULAR; INTRAVENOUS EVERY 6 HOURS PRN
Status: DISCONTINUED | OUTPATIENT
Start: 2020-10-17 | End: 2020-10-17

## 2020-10-17 RX ORDER — CEFAZOLIN SODIUM 2 G/50ML
2 SOLUTION INTRAVENOUS
Status: DISCONTINUED | OUTPATIENT
Start: 2020-10-17 | End: 2020-10-17

## 2020-10-17 RX ORDER — MEPERIDINE HYDROCHLORIDE 25 MG/ML
12.5 INJECTION INTRAMUSCULAR; INTRAVENOUS; SUBCUTANEOUS ONCE AS NEEDED
Status: DISCONTINUED | OUTPATIENT
Start: 2020-10-17 | End: 2020-10-17

## 2020-10-17 RX ADMIN — ACETAMINOPHEN 1000 MG: 500 TABLET ORAL at 01:10

## 2020-10-17 RX ADMIN — CHLORHEXIDINE GLUCONATE 0.12% ORAL RINSE 10 ML: 1.2 LIQUID ORAL at 09:10

## 2020-10-17 RX ADMIN — STANDARDIZED SENNA CONCENTRATE AND DOCUSATE SODIUM 1 TABLET: 8.6; 5 TABLET ORAL at 12:10

## 2020-10-17 RX ADMIN — PHENYLEPHRINE HYDROCHLORIDE 100 MCG: 10 INJECTION INTRAVENOUS at 10:10

## 2020-10-17 RX ADMIN — ACETAMINOPHEN 1000 MG: 500 TABLET ORAL at 09:10

## 2020-10-17 RX ADMIN — STANDARDIZED SENNA CONCENTRATE AND DOCUSATE SODIUM 1 TABLET: 8.6; 5 TABLET ORAL at 09:10

## 2020-10-17 RX ADMIN — CLONAZEPAM 0.5 MG: 0.5 TABLET ORAL at 09:10

## 2020-10-17 RX ADMIN — SUCCINYLCHOLINE CHLORIDE 120 MG: 20 INJECTION, SOLUTION INTRAMUSCULAR; INTRAVENOUS at 09:10

## 2020-10-17 RX ADMIN — LIDOCAINE HYDROCHLORIDE 50 MG: 10 INJECTION, SOLUTION EPIDURAL; INFILTRATION; INTRACAUDAL; PERINEURAL at 09:10

## 2020-10-17 RX ADMIN — CARBIDOPA AND LEVODOPA 3 TABLET: 25; 100 TABLET ORAL at 08:10

## 2020-10-17 RX ADMIN — TRANEXAMIC ACID 1000 MG: 100 INJECTION, SOLUTION INTRAVENOUS at 09:10

## 2020-10-17 RX ADMIN — LOSARTAN POTASSIUM 100 MG: 50 TABLET, FILM COATED ORAL at 08:10

## 2020-10-17 RX ADMIN — CEFAZOLIN 2 G: 1 INJECTION, POWDER, FOR SOLUTION INTRAMUSCULAR; INTRAVENOUS at 09:10

## 2020-10-17 RX ADMIN — FENTANYL CITRATE 25 MCG: 50 INJECTION, SOLUTION INTRAMUSCULAR; INTRAVENOUS at 10:10

## 2020-10-17 RX ADMIN — CARBIDOPA AND LEVODOPA 3 TABLET: 25; 100 TABLET ORAL at 04:10

## 2020-10-17 RX ADMIN — HYDROCODONE BITARTRATE AND ACETAMINOPHEN 1 TABLET: 5; 325 TABLET ORAL at 07:10

## 2020-10-17 RX ADMIN — CHLORHEXIDINE GLUCONATE 0.12% ORAL RINSE 10 ML: 1.2 LIQUID ORAL at 08:10

## 2020-10-17 RX ADMIN — CARBIDOPA AND LEVODOPA 3 TABLET: 25; 100 TABLET ORAL at 12:10

## 2020-10-17 RX ADMIN — SODIUM CHLORIDE: 0.9 INJECTION, SOLUTION INTRAVENOUS at 12:10

## 2020-10-17 RX ADMIN — MIDAZOLAM 2 MG: 1 INJECTION INTRAMUSCULAR; INTRAVENOUS at 09:10

## 2020-10-17 RX ADMIN — FENTANYL CITRATE 50 MCG: 50 INJECTION, SOLUTION INTRAMUSCULAR; INTRAVENOUS at 09:10

## 2020-10-17 RX ADMIN — AMLODIPINE BESYLATE 2.5 MG: 2.5 TABLET ORAL at 08:10

## 2020-10-17 RX ADMIN — CARBIDOPA AND LEVODOPA 3 TABLET: 25; 100 TABLET ORAL at 09:10

## 2020-10-17 RX ADMIN — PROPOFOL 70 MG: 10 INJECTION, EMULSION INTRAVENOUS at 09:10

## 2020-10-17 RX ADMIN — POLYETHYLENE GLYCOL 3350 17 G: 17 POWDER, FOR SOLUTION ORAL at 08:10

## 2020-10-17 RX ADMIN — SODIUM CHLORIDE, SODIUM LACTATE, POTASSIUM CHLORIDE, AND CALCIUM CHLORIDE: 600; 310; 30; 20 INJECTION, SOLUTION INTRAVENOUS at 09:10

## 2020-10-17 RX ADMIN — METOPROLOL TARTRATE 25 MG: 25 TABLET, FILM COATED ORAL at 09:10

## 2020-10-17 RX ADMIN — ROCURONIUM BROMIDE 30 MG: 10 INJECTION, SOLUTION INTRAVENOUS at 09:10

## 2020-10-17 RX ADMIN — METOPROLOL TARTRATE 25 MG: 25 TABLET, FILM COATED ORAL at 08:10

## 2020-10-17 RX ADMIN — SUGAMMADEX 200 MG: 100 INJECTION, SOLUTION INTRAVENOUS at 10:10

## 2020-10-17 NOTE — ASSESSMENT & PLAN NOTE
Orthopedic surgery consulted - plan to OR in AM  Neuro checks with VS  NPO after MN for AM procedure  PT/OT to eval/treat post-op  Holding home ASA, plavix, xarelto in case surgery is required in AM  Resume above meds as appropriate  10/15/20 Ortho attempted closed reduction of the right hip which was unsuccessful. Plan for open reduction of the right hip on Saturday once patient has been medically optimized. H/H today was noted to be 6.7/21.4. Will transfuse 1 unit PRBC today and recheck labs. Continue to hold plavix and eliquis.   10/16/2020 - (open reduction of right bipolar hemiarthroplasty, Debridement and irrigation with hardware exchange as indicated) - scheduled for AM 10/17  10/17 - Procedure(s) (LRB):  OPEN REDUCTION, DISLOCATION, HIP (Right)  EVACUATION, HEMATOMA (Right)  Pt without complaints - abduction brace intact

## 2020-10-17 NOTE — INTERVAL H&P NOTE
The patient has been examined and the H&P has been reviewed:    I concur with the findings and no changes have occurred since H&P was written.    Surgery risks, benefits and alternative options discussed and understood by patient/family.          Active Hospital Problems    Diagnosis  POA    *Hip prosthesis dislocation, right [S73.004A]  Yes    Normocytic anemia [D64.9]  Yes    Essential hypertension [I10]  Yes     Chronic    Parkinson disease [G20]  Yes     Chronic     Last Assessment & Plan:   Parkinson's likely contributing to symptoms as well she will be continued on Sinemet on discharge there is no interruption in therapy      GERD (gastroesophageal reflux disease) [K21.9]  Yes     Chronic     Last Assessment & Plan:   We are doubling her PPI dose of omeprazole from 20-40 mg on discharge given that some of this may be component of uncontrolled GERD.      Hyperlipidemia [E78.5]  Yes     Last Assessment & Plan:   1. Patient has been on Crestor and this will be continued.        Resolved Hospital Problems    Diagnosis Date Resolved POA    Hyperkalemia [E87.5] 10/16/2020 Yes

## 2020-10-17 NOTE — BRIEF OP NOTE
[facility] -   Brief Operative Note    SUMMARY     Surgery Date: 10/17/2020     Surgeon(s) and Role:     * Humberto Valdivia,  - Primary    Assisting Surgeon: None    Pre-op Diagnosis:  Hip dislocation, right [S73.004A]    Post-op Diagnosis:  Post-Op Diagnosis Codes:     * Hip dislocation, right [S73.004A]    Procedure(s) (LRB):  OPEN REDUCTION, DISLOCATION, HIP (Right)  EVACUATION, HEMATOMA (Right)    Anesthesia: General    Description of Procedure:  Open reduction right bipolar hemiarthroplasty    Description of the findings of the procedure:  Dislocated right bipolar hemiarthroplasty    Estimated Blood Loss: * No values recorded between 10/17/2020  9:49 AM and 10/17/2020 11:09 AM *    Estimated Blood Loss has been documented.         Specimens:   Specimen (12h ago, onward)    None          [id number]

## 2020-10-17 NOTE — OP NOTE
Preoperative diagnosis:  Irreducible right hip hemiarthroplasty dislocation.    Postoperative diagnosis:  Same.    Procedure:  Open reduction right hip hemiarthroplasty dislocation with irrigation debridement hematoma.    Surgeon:  Shea.    Assistant:  None.    Anesthesia:  General.    Drains:  Prevena wound VAC.    Specimens:  None.    Estimated blood loss:  150 cc.    Complications:  None.    Disposition:  To PACU in stable condition.    Indications:  This is a 80-year-old female who underwent bipolar hemiarthroplasty approximately 2 weeks ago by Dr. Kearney.  She subsequently presented to the emergency room on Thursday with a dislocation of her right bipolar hemiarthroplasty.  Attempts at closed reduction both in the emergency room as well as the operating room were unsuccessful and the patient was thought to require open reduction internal fixation.  Patient's hemoglobin was noted to be severely depressed and medical optimization was recommended.  After obtaining medical optimization of the patient the patient and family have elected to proceed with open reduction of the right bipolar hemiarthroplasty.  They are informed of the risks, benefits and alternatives of this.The patient was explained the surgical risks and benefits of the procedure which include but are not limited to infection, bleeding, stiffness, pain, nerve, artery, vein injury, hardware wear, hardware failure, fracture, nonunion, malunion, dislocation, subluxation, deep venous thrombosis, pulmonary embolus and death.      Procedure:  Patient was identified on October 17, 2020 marked appropriately the preop holding area at [facility] and subsequently transferred to the operative suite where a general anesthetic is administered on her hospital bed.  She was then transferred from the hospital bed to the operative table and positioned in the lateral recumbent position on a pegboard.  All bony prominences are padded at this  time.  The right lower extremity is sterilely prepped and draped in the standard fashion for hip surgery after the sutures are removed from the right hip wound.  Two time-outs were then performed identifying the operative extremity and the patient at which point an incision through the previous scar through skin and subcutaneous tissue is made.  A rent within the tensor fascia flako was identified and this was expanded proximally and distally along the previous incision.  An abundant hematoma was identified and the gluteus muscle and evacuated at this time.  Once this was adequately evacuated the femoral head is identified incarcerated within the abductors anteriorly.  An open extraction of the femoral head from its incarcerated position is performed without incident and it is translated posteriorly.  Hematoma again is evacuated and copious irrigation with 3000 cc of normal saline solution removes the remaining hematoma.  Torn short posterior rotators were identified and noted to be irreparable at this time.  An open reduction maneuver was then carried out reducing the hip however the patient is noted to have a flexion contraction of the right hip at this time which contributed to the it is irreducible nature.  After copious irrigation is completed the gluteus musculature was closed posteriorly with a 1.  Looped PDS.  The tensor fascia flako was then approximated with a 2nd 1.  Looped PDS in a running stitch.  Once this was adequately closed an 0 PDS is used for subcutaneous approximation in a running stitch.  Final skin closure is gained with a number 0 Prolene in a horizontal mattress running stitch.  A Prevena suction dressing is applied to the wound at this time.  The patient is then rolled onto her hospital bed once awakened and the abduction orthosis is placed in the operative suite.  The patient was then transferred recovery room in stable condition having tolerated procedure well.    Disposition:  Patient  transferred to recovery room in stable condition.  We will make her toe-touch weight-bearing on the right at this time with the abduction brace on at all times.  Follow up with orthopedics in 10-14 days.  I recommend holding the anticoagulant for 48 more hours at which point resumption of the anticoagulant could be performed.

## 2020-10-17 NOTE — PLAN OF CARE
Problem: Adult Inpatient Plan of Care  Goal: Plan of Care Review  Outcome: Ongoing, Progressing  Flowsheets (Taken 10/17/2020 0139)  Plan of Care Reviewed With:   patient   daughter  Pt had no adverse events during shift. Pt free from falls. Call light in reach. SR's x2. Patient denies pain during shift. Pt repositioned with wedge q2 hrs, pt tolerating well. IVF's administered as ordered. VTE prophylaxis - low risk patient. Daughter at bedside to reduce anxiety. VSS. NPO diet in use, surgery planned for this morning, pt tolerating diet well. Chart reviewed. Will continue to monitor.

## 2020-10-17 NOTE — TRANSFER OF CARE
"Anesthesia Transfer of Care Note    Patient: Tanya Madrid    Procedure(s) Performed: Procedure(s) (LRB):  OPEN REDUCTION, DISLOCATION, HIP (Right)  EVACUATION, HEMATOMA (Right)    Patient location: PACU    Anesthesia Type: general    Transport from OR: Transported from OR on room air with adequate spontaneous ventilation    Post pain: adequate analgesia    Post assessment: no apparent anesthetic complications    Post vital signs: stable    Level of consciousness: responds to stimulation    Nausea/Vomiting: no nausea/vomiting    Complications: none    Transfer of care protocol was followed      Last vitals:   Visit Vitals  BP (!) 162/75 (Patient Position: Lying)   Pulse 89   Temp 36.9 °C (98.4 °F) (Oral)   Resp 18   Ht 5' 2" (1.575 m)   Wt 54.9 kg (121 lb 0.5 oz)   SpO2 98%   BMI 22.14 kg/m²     "

## 2020-10-17 NOTE — ANESTHESIA PROCEDURE NOTES
Intubation  Performed by: Nalini Partida CRNA  Authorized by: Andrea Shrestha MD     Intubation:     Induction:  Intravenous    Intubated:  Postinduction    Mask Ventilation:  Easy mask    Attempts:  1    Attempted By:  CRNA    Method of Intubation:  Direct    Blade:  Whitfield 2    Laryngeal View Grade: Grade I - full view of chords      Difficult Airway Encountered?: No      Complications:  None    Airway Device:  Oral endotracheal tube    Airway Device Size:  7.0    Style/Cuff Inflation:  Cuffed    Tube secured:  21    Secured at:  The lips    Placement Verified By:  Capnometry    Complicating Factors:  None    Findings Post-Intubation:  BS equal bilateral and atraumatic/condition of teeth unchanged

## 2020-10-17 NOTE — PROGRESS NOTES
[facility] Hospital Medicine  Progress Note    Patient Name: Tanya Madrid  MRN: [id number]  Patient Class: IP- Inpatient   Admission Date: 10/14/2020  Length of Stay: 2 days  Attending Physician: Sj Juan, *  Primary Care Provider: Maggie Sue NP        Subjective:     Principal Problem:Hip dislocation, right        HPI:   81 y/o AA F with hx of HTN, HLD, Parkinson's disease, stroke, B hip replacement sent from [facility] to ED after complaining of sudden onset of R hip pain and being found to have a dislocated R hip prosthesis - unsure as to how this occurred, no family was present and pt states she did not fall but does not know how the injury happened. Symptoms are persistent and moderate in severity with pain exacerbated by movement/palpation and improved at rest. Of note, pt was admitted from 10/01/2020 to 10/05/2020 after experiencing a fall and R hip fracture and subsequent ORIF. Denies chest pain, edema, palpitations, SOB, wheezing, abdominal pain, N/V/D, constipation, dysuria, flank pain, HA, dizziness, weakness, fever, cough, chills, blurred vision. Found in ED to have dislocation of the right hip prosthesis, while labs were largely WNL aside from mild hyperkalemia (5.2), VSS and COVID-19 negative. Attempts were made to reduce the right hip but were unsuccessful. Pt was given etomidate, morphine and 1L IV fluid bolus with improvement in pain. Hospital medicine was called and pt was placed in observation on telemetry with plan for likely reduction in OR in AM. Pt is a full code - her daughter Josee Lemus is her surrogate decision maker.        Overview/Hospital Course:  10/15/20 Ortho attempted closed reduction of the right hip which was unsuccessful. Plan for open reduction of the right hip on Saturday once patient has been medically optimized. H/H today was noted to be 6.7/21.4. Will transfuse 1 unit PRBC today and recheck labs. Continue to hold plavix and  eliquis.     10/16/2020 - Pt is planned for open reduction of right bipolar hemiarthroplasty, I & D and hardware exchange per Orthopedics. Pt reports mild pain to the right hip. Pt was given 1 unit PRBCs at time of admission due to blood loss anemia. Pt on ASA and Plavix due to hx of stroke - these meds held. Also, Eliquis DVT prophylaxis on hold for pending surgery.     10/17/2020 - Procedure(s) (LRB):  OPEN REDUCTION, DISLOCATION, HIP (Right)  EVACUATION, HEMATOMA (Right). S/P procedure today. Seen and examined upon return to the room. Pt is awake and denies any complaints. Daughter is at bedside and verbalizes no concerns. Daughter does not want mother to return to [facility]. Pt has hip abduction brace that must remain intact. PT/OT eval pending. Pt is taking Tylenol for pain control.     Interval History:  Pt denies any complaints currently. She has eaten lunch. Discussed plan of care with daughter and agreeable.     Review of Systems   Constitutional: Positive for activity change (d/t R hip pain). Negative for appetite change, chills, diaphoresis, fatigue, fever and unexpected weight change.   HENT: Negative for congestion, drooling, facial swelling, rhinorrhea, sinus pressure, sneezing, sore throat, trouble swallowing and voice change.    Eyes: Negative for photophobia, discharge, redness, itching and visual disturbance.   Respiratory: Negative for apnea, cough, choking, chest tightness, shortness of breath, wheezing and stridor.    Cardiovascular: Negative for chest pain, palpitations and leg swelling.   Gastrointestinal: Negative for abdominal distention, abdominal pain, anal bleeding, blood in stool, constipation, diarrhea, nausea and vomiting.   Endocrine: Negative for cold intolerance, heat intolerance, polydipsia, polyphagia and polyuria.   Genitourinary: Negative for decreased urine volume, difficulty urinating, dysuria, flank pain, frequency, hematuria, pelvic pain, urgency, vaginal bleeding and  vaginal discharge.   Musculoskeletal: Positive for arthralgias and gait problem (R hip dislocation). Negative for back pain, joint swelling, myalgias, neck pain and neck stiffness.   Skin: Negative for color change, pallor, rash and wound.   Neurological: Positive for weakness (with limited ROM RLE). Negative for dizziness, seizures, syncope, facial asymmetry, speech difficulty, light-headedness, numbness and headaches.   Psychiatric/Behavioral: Negative for agitation, confusion, hallucinations and suicidal ideas.   All other systems reviewed and are negative.    Objective:     Vital Signs (Most Recent):  Temp: 98.2 °F (36.8 °C) (10/17/20 1211)  Pulse: 82 (10/17/20 1211)  Resp: 18 (10/17/20 1211)  BP: (!) 145/92 (10/17/20 1211)  SpO2: (!) 94 % (10/17/20 1211) Vital Signs (24h Range):  Temp:  [96.4 °F (35.8 °C)-98.9 °F (37.2 °C)] 98.2 °F (36.8 °C)  Pulse:  [68-89] 82  Resp:  [18-27] 18  SpO2:  [93 %-99 %] 94 %  BP: (111-163)/(62-92) 145/92     Weight: 54.9 kg (121 lb 0.5 oz)  Body mass index is 22.14 kg/m².    Intake/Output Summary (Last 24 hours) at 10/17/2020 1525  Last data filed at 10/17/2020 1119  Gross per 24 hour   Intake 2703.75 ml   Output 50 ml   Net 2653.75 ml      Physical Exam  Vitals signs and nursing note reviewed.   Constitutional:       General: She is not in acute distress.     Appearance: Normal appearance. She is not toxic-appearing or diaphoretic.   HENT:      Head: Normocephalic and atraumatic.      Nose: Nose normal. No congestion.      Mouth/Throat:      Mouth: Mucous membranes are moist.   Eyes:      General: No scleral icterus.     Conjunctiva/sclera: Conjunctivae normal.   Neck:      Musculoskeletal: Normal range of motion and neck supple. No muscular tenderness.   Cardiovascular:      Rate and Rhythm: Normal rate and regular rhythm.      Heart sounds: Normal heart sounds.   Pulmonary:      Effort: Pulmonary effort is normal. No respiratory distress.      Breath sounds: Normal breath  sounds. No wheezing or rhonchi.   Abdominal:      General: Abdomen is flat. Bowel sounds are normal. There is no distension.      Palpations: Abdomen is soft.      Tenderness: There is no abdominal tenderness.      Hernia: A hernia (RLQ) is present.      Comments: Wearing incontinent brief   Musculoskeletal: Normal range of motion.         General: No swelling or deformity.      Comments: Abduction brace to right hip/thigh area. Small wound vac to right lateral thigh. Sensation intact to right foot.    Skin:     General: Skin is warm and dry.      Capillary Refill: Capillary refill takes less than 2 seconds.      Coloration: Skin is not jaundiced.      Findings: No bruising or rash.      Comments: R hip incision CDI - no evidence of infection.   Neurological:      General: No focal deficit present.      Mental Status: She is alert and oriented to person, place, and time.   Psychiatric:         Mood and Affect: Mood normal. Affect is flat.         Speech: Speech is delayed.         Behavior: Behavior normal.      Comments: Baseline per daughter - parkinson's dx         Significant Labs:   CBC:   Recent Labs   Lab 10/16/20  0704 10/17/20  0726   WBC 10.16 10.67   HGB 8.4* 9.2*   HCT 27.4* 30.6*   * 654*     CMP:   Recent Labs   Lab 10/16/20  0704 10/17/20  0726    136   K 4.2 4.3    106   CO2 22* 22*    91   BUN 14 13   CREATININE 0.7 0.7   CALCIUM 8.1* 8.6*   ANIONGAP 10 8   EGFRNONAA >60 >60     All pertinent labs within the past 24 hours have been reviewed.    Significant Imaging: I have reviewed all pertinent imaging results/findings within the past 24 hours.      Assessment/Plan:      * Hip prosthesis dislocation, right  Orthopedic surgery consulted - plan to OR in AM  Neuro checks with VS  NPO after MN for AM procedure  PT/OT to eval/treat post-op  Holding home ASA, plavix, xarelto in case surgery is required in AM  Resume above meds as appropriate  10/15/20 Ortho attempted closed  reduction of the right hip which was unsuccessful. Plan for open reduction of the right hip on Saturday once patient has been medically optimized. H/H today was noted to be 6.7/21.4. Will transfuse 1 unit PRBC today and recheck labs. Continue to hold plavix and eliquis.   10/16/2020 - (open reduction of right bipolar hemiarthroplasty, Debridement and irrigation with hardware exchange as indicated) - scheduled for AM 10/17  10/17 - Procedure(s) (LRB):  OPEN REDUCTION, DISLOCATION, HIP (Right)  EVACUATION, HEMATOMA (Right)  Pt without complaints - abduction brace intact      Normocytic anemia  10/15/20  H/H today was noted to be 6.7/21.4. Will transfuse 1 unit PRBC today and recheck labs. CBC in am   Monitoring for acute blood loss anemia      Parkinson disease  Continue home sinemet      Hyperlipidemia  Continue home statin - daughter says that patient is not taking this medication - STATIN discontinued        GERD (gastroesophageal reflux disease)  10/16/20 Continue PPI - Protonix stopped as daughter reports that patient is not taking      Essential hypertension  BP stable on admission  Continue home medications  IV enlaprilat prn elevated BP  Monitor closely      VTE Risk Mitigation (From admission, onward)         Ordered     Place sequential compression device  Until discontinued      10/14/20 2334     IP VTE LOW RISK PATIENT  Once      10/14/20 2156                Discharge Planning   PARMINDER:      Code Status: Full Code   Is the patient medically ready for discharge?:     Reason for patient still in hospital (select all that apply): Treatment and Pending disposition  Discharge Plan A: Rehab                  Rose Mary Blackman NP  Department of Hospital Medicine   [facility]

## 2020-10-17 NOTE — SUBJECTIVE & OBJECTIVE
Interval History:  Pt denies any complaints currently. She has eaten lunch. Discussed plan of care with daughter and agreeable.     Review of Systems   Constitutional: Positive for activity change (d/t R hip pain). Negative for appetite change, chills, diaphoresis, fatigue, fever and unexpected weight change.   HENT: Negative for congestion, drooling, facial swelling, rhinorrhea, sinus pressure, sneezing, sore throat, trouble swallowing and voice change.    Eyes: Negative for photophobia, discharge, redness, itching and visual disturbance.   Respiratory: Negative for apnea, cough, choking, chest tightness, shortness of breath, wheezing and stridor.    Cardiovascular: Negative for chest pain, palpitations and leg swelling.   Gastrointestinal: Negative for abdominal distention, abdominal pain, anal bleeding, blood in stool, constipation, diarrhea, nausea and vomiting.   Endocrine: Negative for cold intolerance, heat intolerance, polydipsia, polyphagia and polyuria.   Genitourinary: Negative for decreased urine volume, difficulty urinating, dysuria, flank pain, frequency, hematuria, pelvic pain, urgency, vaginal bleeding and vaginal discharge.   Musculoskeletal: Positive for arthralgias and gait problem (R hip dislocation). Negative for back pain, joint swelling, myalgias, neck pain and neck stiffness.   Skin: Negative for color change, pallor, rash and wound.   Neurological: Positive for weakness (with limited ROM RLE). Negative for dizziness, seizures, syncope, facial asymmetry, speech difficulty, light-headedness, numbness and headaches.   Psychiatric/Behavioral: Negative for agitation, confusion, hallucinations and suicidal ideas.   All other systems reviewed and are negative.    Objective:     Vital Signs (Most Recent):  Temp: 98.2 °F (36.8 °C) (10/17/20 1211)  Pulse: 82 (10/17/20 1211)  Resp: 18 (10/17/20 1211)  BP: (!) 145/92 (10/17/20 1211)  SpO2: (!) 94 % (10/17/20 1211) Vital Signs (24h Range):  Temp:  [96.4  °F (35.8 °C)-98.9 °F (37.2 °C)] 98.2 °F (36.8 °C)  Pulse:  [68-89] 82  Resp:  [18-27] 18  SpO2:  [93 %-99 %] 94 %  BP: (111-163)/(62-92) 145/92     Weight: 54.9 kg (121 lb 0.5 oz)  Body mass index is 22.14 kg/m².    Intake/Output Summary (Last 24 hours) at 10/17/2020 1525  Last data filed at 10/17/2020 1119  Gross per 24 hour   Intake 2703.75 ml   Output 50 ml   Net 2653.75 ml      Physical Exam  Vitals signs and nursing note reviewed.   Constitutional:       General: She is not in acute distress.     Appearance: Normal appearance. She is not toxic-appearing or diaphoretic.   HENT:      Head: Normocephalic and atraumatic.      Nose: Nose normal. No congestion.      Mouth/Throat:      Mouth: Mucous membranes are moist.   Eyes:      General: No scleral icterus.     Conjunctiva/sclera: Conjunctivae normal.   Neck:      Musculoskeletal: Normal range of motion and neck supple. No muscular tenderness.   Cardiovascular:      Rate and Rhythm: Normal rate and regular rhythm.      Heart sounds: Normal heart sounds.   Pulmonary:      Effort: Pulmonary effort is normal. No respiratory distress.      Breath sounds: Normal breath sounds. No wheezing or rhonchi.   Abdominal:      General: Abdomen is flat. Bowel sounds are normal. There is no distension.      Palpations: Abdomen is soft.      Tenderness: There is no abdominal tenderness.      Hernia: A hernia (RLQ) is present.      Comments: Wearing incontinent brief   Musculoskeletal: Normal range of motion.         General: No swelling or deformity.      Comments: Abduction brace to right hip/thigh area. Small wound vac to right lateral thigh. Sensation intact to right foot.    Skin:     General: Skin is warm and dry.      Capillary Refill: Capillary refill takes less than 2 seconds.      Coloration: Skin is not jaundiced.      Findings: No bruising or rash.      Comments: R hip incision CDI - no evidence of infection.   Neurological:      General: No focal deficit present.       Mental Status: She is alert and oriented to person, place, and time.   Psychiatric:         Mood and Affect: Mood normal. Affect is flat.         Speech: Speech is delayed.         Behavior: Behavior normal.      Comments: Baseline per daughter - parkinson's dx         Significant Labs:   CBC:   Recent Labs   Lab 10/16/20  0704 10/17/20  0726   WBC 10.16 10.67   HGB 8.4* 9.2*   HCT 27.4* 30.6*   * 654*     CMP:   Recent Labs   Lab 10/16/20  0704 10/17/20  0726    136   K 4.2 4.3    106   CO2 22* 22*    91   BUN 14 13   CREATININE 0.7 0.7   CALCIUM 8.1* 8.6*   ANIONGAP 10 8   EGFRNONAA >60 >60     All pertinent labs within the past 24 hours have been reviewed.    Significant Imaging: I have reviewed all pertinent imaging results/findings within the past 24 hours.

## 2020-10-17 NOTE — ANESTHESIA POSTPROCEDURE EVALUATION
Anesthesia Post Evaluation    Patient: Tanya Madrid    Procedure(s) Performed: Procedure(s) (LRB):  OPEN REDUCTION, DISLOCATION, HIP (Right)  EVACUATION, HEMATOMA (Right)    Final Anesthesia Type: general    Patient location during evaluation: PACU  Patient participation: Yes- Able to Participate  Level of consciousness: confused and lethargic  Post-procedure vital signs: reviewed and stable  Pain management: adequate  Airway patency: patent    PONV status at discharge: No PONV  Anesthetic complications: no      Cardiovascular status: blood pressure returned to baseline  Respiratory status: unassisted  Hydration status: euvolemic  Follow-up not needed.  Comments: More confused than baseline.  No recall of intraop.  Mild pain.           Vitals Value Taken Time   /69 10/17/20 1145   Temp 37.2 °C (98.9 °F) 10/17/20 1125   Pulse 85 10/17/20 1151   Resp 49 10/17/20 1151   SpO2 86 % 10/17/20 1151   Vitals shown include unvalidated device data.      No case tracking events are documented in the log.      Pain/Annamarie Score: Pain Rating Prior to Med Admin: 2 (10/16/2020  1:02 PM)  Annamarie Score: 10 (10/17/2020 11:45 AM)

## 2020-10-17 NOTE — ASSESSMENT & PLAN NOTE
10/15/20  H/H today was noted to be 6.7/21.4. Will transfuse 1 unit PRBC today and recheck labs. CBC in am   Monitoring for acute blood loss anemia

## 2020-10-18 LAB
ANION GAP SERPL CALC-SCNC: 6 MMOL/L (ref 8–16)
BASOPHILS # BLD AUTO: 0.03 K/UL (ref 0–0.2)
BASOPHILS # BLD AUTO: 0.03 K/UL (ref 0–0.2)
BASOPHILS NFR BLD: 0.3 % (ref 0–1.9)
BASOPHILS NFR BLD: 0.3 % (ref 0–1.9)
BUN SERPL-MCNC: 10 MG/DL (ref 8–23)
CALCIUM SERPL-MCNC: 8.1 MG/DL (ref 8.7–10.5)
CHLORIDE SERPL-SCNC: 107 MMOL/L (ref 95–110)
CO2 SERPL-SCNC: 22 MMOL/L (ref 23–29)
CREAT SERPL-MCNC: 0.7 MG/DL (ref 0.5–1.4)
DIFFERENTIAL METHOD: ABNORMAL
DIFFERENTIAL METHOD: ABNORMAL
EOSINOPHIL # BLD AUTO: 0.6 K/UL (ref 0–0.5)
EOSINOPHIL # BLD AUTO: 0.6 K/UL (ref 0–0.5)
EOSINOPHIL NFR BLD: 5.1 % (ref 0–8)
EOSINOPHIL NFR BLD: 5.1 % (ref 0–8)
ERYTHROCYTE [DISTWIDTH] IN BLOOD BY AUTOMATED COUNT: 14.8 % (ref 11.5–14.5)
ERYTHROCYTE [DISTWIDTH] IN BLOOD BY AUTOMATED COUNT: 14.8 % (ref 11.5–14.5)
EST. GFR  (AFRICAN AMERICAN): >60 ML/MIN/1.73 M^2
EST. GFR  (NON AFRICAN AMERICAN): >60 ML/MIN/1.73 M^2
GLUCOSE SERPL-MCNC: 91 MG/DL (ref 70–110)
HCT VFR BLD AUTO: 27.1 % (ref 37–48.5)
HCT VFR BLD AUTO: 27.1 % (ref 37–48.5)
HGB BLD-MCNC: 8.2 G/DL (ref 12–16)
HGB BLD-MCNC: 8.2 G/DL (ref 12–16)
IMM GRANULOCYTES # BLD AUTO: 0.43 K/UL (ref 0–0.04)
IMM GRANULOCYTES # BLD AUTO: 0.43 K/UL (ref 0–0.04)
IMM GRANULOCYTES NFR BLD AUTO: 3.6 % (ref 0–0.5)
IMM GRANULOCYTES NFR BLD AUTO: 3.6 % (ref 0–0.5)
LYMPHOCYTES # BLD AUTO: 1.2 K/UL (ref 1–4.8)
LYMPHOCYTES # BLD AUTO: 1.2 K/UL (ref 1–4.8)
LYMPHOCYTES NFR BLD: 9.8 % (ref 18–48)
LYMPHOCYTES NFR BLD: 9.8 % (ref 18–48)
MAGNESIUM SERPL-MCNC: 1.8 MG/DL (ref 1.6–2.6)
MCH RBC QN AUTO: 29 PG (ref 27–31)
MCH RBC QN AUTO: 29 PG (ref 27–31)
MCHC RBC AUTO-ENTMCNC: 30.3 G/DL (ref 32–36)
MCHC RBC AUTO-ENTMCNC: 30.3 G/DL (ref 32–36)
MCV RBC AUTO: 96 FL (ref 82–98)
MCV RBC AUTO: 96 FL (ref 82–98)
MONOCYTES # BLD AUTO: 1.5 K/UL (ref 0.3–1)
MONOCYTES # BLD AUTO: 1.5 K/UL (ref 0.3–1)
MONOCYTES NFR BLD: 12.8 % (ref 4–15)
MONOCYTES NFR BLD: 12.8 % (ref 4–15)
NEUTROPHILS # BLD AUTO: 8.1 K/UL (ref 1.8–7.7)
NEUTROPHILS # BLD AUTO: 8.1 K/UL (ref 1.8–7.7)
NEUTROPHILS NFR BLD: 68.4 % (ref 38–73)
NEUTROPHILS NFR BLD: 68.4 % (ref 38–73)
NRBC BLD-RTO: 0 /100 WBC
NRBC BLD-RTO: 0 /100 WBC
PLATELET # BLD AUTO: 565 K/UL (ref 150–350)
PLATELET # BLD AUTO: 565 K/UL (ref 150–350)
PMV BLD AUTO: 9.7 FL (ref 9.2–12.9)
PMV BLD AUTO: 9.7 FL (ref 9.2–12.9)
POTASSIUM SERPL-SCNC: 4.4 MMOL/L (ref 3.5–5.1)
RBC # BLD AUTO: 2.83 M/UL (ref 4–5.4)
RBC # BLD AUTO: 2.83 M/UL (ref 4–5.4)
SODIUM SERPL-SCNC: 135 MMOL/L (ref 136–145)
WBC # BLD AUTO: 11.89 K/UL (ref 3.9–12.7)
WBC # BLD AUTO: 11.89 K/UL (ref 3.9–12.7)

## 2020-10-18 PROCEDURE — 25000003 PHARM REV CODE 250: Performed by: ORTHOPAEDIC SURGERY

## 2020-10-18 PROCEDURE — 97110 THERAPEUTIC EXERCISES: CPT

## 2020-10-18 PROCEDURE — 27000221 HC OXYGEN, UP TO 24 HOURS

## 2020-10-18 PROCEDURE — 94799 UNLISTED PULMONARY SVC/PX: CPT

## 2020-10-18 PROCEDURE — 36415 COLL VENOUS BLD VENIPUNCTURE: CPT

## 2020-10-18 PROCEDURE — 97535 SELF CARE MNGMENT TRAINING: CPT

## 2020-10-18 PROCEDURE — 83735 ASSAY OF MAGNESIUM: CPT

## 2020-10-18 PROCEDURE — 97530 THERAPEUTIC ACTIVITIES: CPT

## 2020-10-18 PROCEDURE — 11000001 HC ACUTE MED/SURG PRIVATE ROOM

## 2020-10-18 PROCEDURE — 94761 N-INVAS EAR/PLS OXIMETRY MLT: CPT

## 2020-10-18 PROCEDURE — 97167 OT EVAL HIGH COMPLEX 60 MIN: CPT

## 2020-10-18 PROCEDURE — 85025 COMPLETE CBC W/AUTO DIFF WBC: CPT

## 2020-10-18 PROCEDURE — 97162 PT EVAL MOD COMPLEX 30 MIN: CPT

## 2020-10-18 PROCEDURE — 80048 BASIC METABOLIC PNL TOTAL CA: CPT

## 2020-10-18 PROCEDURE — 99900035 HC TECH TIME PER 15 MIN (STAT)

## 2020-10-18 RX ADMIN — POLYETHYLENE GLYCOL 3350 17 G: 17 POWDER, FOR SOLUTION ORAL at 09:10

## 2020-10-18 RX ADMIN — CHLORHEXIDINE GLUCONATE 0.12% ORAL RINSE 10 ML: 1.2 LIQUID ORAL at 09:10

## 2020-10-18 RX ADMIN — CARBIDOPA AND LEVODOPA 3 TABLET: 25; 100 TABLET ORAL at 09:10

## 2020-10-18 RX ADMIN — LOSARTAN POTASSIUM 100 MG: 50 TABLET, FILM COATED ORAL at 09:10

## 2020-10-18 RX ADMIN — SODIUM CHLORIDE: 0.9 INJECTION, SOLUTION INTRAVENOUS at 05:10

## 2020-10-18 RX ADMIN — AMLODIPINE BESYLATE 2.5 MG: 2.5 TABLET ORAL at 09:10

## 2020-10-18 RX ADMIN — CARBIDOPA AND LEVODOPA 3 TABLET: 25; 100 TABLET ORAL at 03:10

## 2020-10-18 RX ADMIN — STANDARDIZED SENNA CONCENTRATE AND DOCUSATE SODIUM 1 TABLET: 8.6; 5 TABLET ORAL at 08:10

## 2020-10-18 RX ADMIN — STANDARDIZED SENNA CONCENTRATE AND DOCUSATE SODIUM 1 TABLET: 8.6; 5 TABLET ORAL at 09:10

## 2020-10-18 RX ADMIN — ACETAMINOPHEN 1000 MG: 500 TABLET ORAL at 03:10

## 2020-10-18 RX ADMIN — ACETAMINOPHEN 1000 MG: 500 TABLET ORAL at 05:10

## 2020-10-18 RX ADMIN — METOPROLOL TARTRATE 25 MG: 25 TABLET, FILM COATED ORAL at 09:10

## 2020-10-18 RX ADMIN — CARBIDOPA AND LEVODOPA 3 TABLET: 25; 100 TABLET ORAL at 08:10

## 2020-10-18 RX ADMIN — CHLORHEXIDINE GLUCONATE 0.12% ORAL RINSE 10 ML: 1.2 LIQUID ORAL at 08:10

## 2020-10-18 RX ADMIN — CARBIDOPA AND LEVODOPA 3 TABLET: 25; 100 TABLET ORAL at 05:10

## 2020-10-18 RX ADMIN — METOPROLOL TARTRATE 25 MG: 25 TABLET, FILM COATED ORAL at 08:10

## 2020-10-18 NOTE — PLAN OF CARE
Patient remains free from injury/fall.  IVFs infusing as ordered.  Brace to right leg in place.  Pain controlled with po pain med.  Negative pressure wound therapy in place. Will continue to monitor, administer meds as ordered, provide comfort/safety measures and notify MD of any changes in condtion.

## 2020-10-18 NOTE — PROGRESS NOTES
[facility] Hospital Medicine  Progress Note    Patient Name: Tanya Madrid  MRN: [id number]  Patient Class: IP- Inpatient   Admission Date: 10/14/2020  Length of Stay: 3 days  Attending Physician: Sj Juan, *  Primary Care Provider: Maggie Sue NP        Subjective:     Principal Problem:Hip dislocation, right        HPI:   81 y/o AA F with hx of HTN, HLD, Parkinson's disease, stroke, B hip replacement sent from [facility] to ED after complaining of sudden onset of R hip pain and being found to have a dislocated R hip prosthesis - unsure as to how this occurred, no family was present and pt states she did not fall but does not know how the injury happened. Symptoms are persistent and moderate in severity with pain exacerbated by movement/palpation and improved at rest. Of note, pt was admitted from 10/01/2020 to 10/05/2020 after experiencing a fall and R hip fracture and subsequent ORIF. Denies chest pain, edema, palpitations, SOB, wheezing, abdominal pain, N/V/D, constipation, dysuria, flank pain, HA, dizziness, weakness, fever, cough, chills, blurred vision. Found in ED to have dislocation of the right hip prosthesis, while labs were largely WNL aside from mild hyperkalemia (5.2), VSS and COVID-19 negative. Attempts were made to reduce the right hip but were unsuccessful. Pt was given etomidate, morphine and 1L IV fluid bolus with improvement in pain. Hospital medicine was called and pt was placed in observation on telemetry with plan for likely reduction in OR in AM. Pt is a full code - her daughter Josee Lemus is her surrogate decision maker.        Overview/Hospital Course:  10/15/20 Ortho attempted closed reduction of the right hip which was unsuccessful. Plan for open reduction of the right hip on Saturday once patient has been medically optimized. H/H today was noted to be 6.7/21.4. Will transfuse 1 unit PRBC today and recheck labs. Continue to hold plavix and  eliquis.     10/16/2020 - Pt is planned for open reduction of right bipolar hemiarthroplasty, I & D and hardware exchange per Orthopedics. Pt reports mild pain to the right hip. Pt was given 1 unit PRBCs at time of admission due to blood loss anemia. Pt on ASA and Plavix due to hx of stroke - these meds held. Also, Eliquis DVT prophylaxis on hold for pending surgery.     10/17/2020 - Procedure(s) (LRB):  OPEN REDUCTION, DISLOCATION, HIP (Right)  EVACUATION, HEMATOMA (Right). S/P procedure today. Seen and examined upon return to the room. Pt is awake and denies any complaints. Daughter is at bedside and verbalizes no concerns. Daughter does not want mother to return to [facility]. Pt has hip abduction brace that must remain intact. PT/OT eval pending. Pt is taking Tylenol for pain control.     10/18/2020 - Vital signs and labs are stable. No concerns for acute blood loss anemia. Pt is wearing the abductor brace. She describes lower back pain. Other symptoms denied. PT/OT eval pending. To obtain disposition options from the patient's daughter.     Interval History: Pt reports lower back pain. Other symptoms denied. PT/OT eval pending.    Review of Systems   Constitutional: Positive for activity change (d/t R hip pain). Negative for appetite change, chills, diaphoresis, fatigue, fever and unexpected weight change.   HENT: Negative for congestion, drooling, facial swelling, rhinorrhea, sinus pressure, sneezing, sore throat, trouble swallowing and voice change.    Eyes: Negative for photophobia, discharge, redness, itching and visual disturbance.   Respiratory: Negative for apnea, cough, choking, chest tightness, shortness of breath, wheezing and stridor.    Cardiovascular: Negative for chest pain, palpitations and leg swelling.   Gastrointestinal: Negative for abdominal distention, abdominal pain, anal bleeding, blood in stool, constipation, diarrhea, nausea and vomiting.   Endocrine: Negative for cold intolerance, heat  intolerance, polydipsia, polyphagia and polyuria.   Genitourinary: Negative for decreased urine volume, difficulty urinating, dysuria, flank pain, frequency, hematuria, pelvic pain, urgency, vaginal bleeding and vaginal discharge.   Musculoskeletal: Positive for arthralgias and gait problem (R hip dislocation). Negative for back pain, joint swelling, myalgias, neck pain and neck stiffness.   Skin: Negative for color change, pallor, rash and wound.   Neurological: Positive for weakness (with limited ROM RLE). Negative for dizziness, seizures, syncope, facial asymmetry, speech difficulty, light-headedness, numbness and headaches.   Psychiatric/Behavioral: Negative for agitation, confusion, hallucinations and suicidal ideas.   All other systems reviewed and are negative.    Objective:     Vital Signs (Most Recent):  Temp: 98.8 °F (37.1 °C) (10/18/20 1207)  Pulse: 81 (10/18/20 1207)  Resp: 18 (10/18/20 1207)  BP: (!) 121/57 (10/18/20 1207)  SpO2: 100 % (10/18/20 1207) Vital Signs (24h Range):  Temp:  [97.4 °F (36.3 °C)-99 °F (37.2 °C)] 98.8 °F (37.1 °C)  Pulse:  [81-95] 81  Resp:  [16-20] 18  SpO2:  [95 %-100 %] 100 %  BP: (112-137)/(55-72) 121/57     Weight: 54.9 kg (121 lb 0.5 oz)  Body mass index is 22.14 kg/m².    Intake/Output Summary (Last 24 hours) at 10/18/2020 1346  Last data filed at 10/18/2020 0800  Gross per 24 hour   Intake 1205 ml   Output --   Net 1205 ml      Physical Exam  Vitals signs and nursing note reviewed.   Constitutional:       General: She is not in acute distress.     Appearance: Normal appearance. She is not toxic-appearing or diaphoretic.   HENT:      Head: Normocephalic and atraumatic.      Nose: Nose normal. No congestion.      Mouth/Throat:      Mouth: Mucous membranes are moist.   Eyes:      General: No scleral icterus.     Conjunctiva/sclera: Conjunctivae normal.   Neck:      Musculoskeletal: Normal range of motion and neck supple. No muscular tenderness.   Cardiovascular:      Rate and  Rhythm: Normal rate and regular rhythm.      Heart sounds: Normal heart sounds.   Pulmonary:      Effort: Pulmonary effort is normal. No respiratory distress.      Breath sounds: Normal breath sounds. No wheezing or rhonchi.   Abdominal:      General: Abdomen is flat. Bowel sounds are normal. There is no distension.      Palpations: Abdomen is soft.      Tenderness: There is no abdominal tenderness.      Hernia: A hernia (RLQ) is present.      Comments: Wearing incontinent brief   Musculoskeletal: Normal range of motion.         General: No swelling or deformity.      Comments: Abduction brace to right hip/thigh area. Small wound vac to right lateral thigh. Sensation intact to right foot.    Skin:     General: Skin is warm and dry.      Capillary Refill: Capillary refill takes less than 2 seconds.      Coloration: Skin is not jaundiced.      Findings: No bruising or rash.      Comments: R hip incision CDI - no evidence of infection.   Neurological:      General: No focal deficit present.      Mental Status: She is alert and oriented to person, place, and time.   Psychiatric:         Mood and Affect: Mood normal. Affect is flat.         Behavior: Behavior normal.      Comments: Baseline per daughter - parkinson's dx         Significant Labs:   CBC:   Recent Labs   Lab 10/17/20  0726 10/18/20  0712   WBC 10.67 11.89  11.89   HGB 9.2* 8.2*  8.2*   HCT 30.6* 27.1*  27.1*   * 565*  565*     CMP:   Recent Labs   Lab 10/17/20  0726 10/18/20  0712    135*   K 4.3 4.4    107   CO2 22* 22*   GLU 91 91   BUN 13 10   CREATININE 0.7 0.7   CALCIUM 8.6* 8.1*   ANIONGAP 8 6*   EGFRNONAA >60 >60     All pertinent labs within the past 24 hours have been reviewed.    Significant Imaging: I have reviewed all pertinent imaging results/findings within the past 24 hours.      Assessment/Plan:      * Hip prosthesis dislocation, right  Orthopedic surgery consulted - plan to OR in AM  Neuro checks with VS  NPO after MN  for AM procedure  PT/OT to eval/treat post-op  Holding home ASA, plavix, xarelto in case surgery is required in AM  Resume above meds as appropriate  10/15/20 Ortho attempted closed reduction of the right hip which was unsuccessful. Plan for open reduction of the right hip on Saturday once patient has been medically optimized. H/H today was noted to be 6.7/21.4. Will transfuse 1 unit PRBC today and recheck labs. Continue to hold plavix and eliquis.   10/16/2020 - (open reduction of right bipolar hemiarthroplasty, Debridement and irrigation with hardware exchange as indicated) - scheduled for AM 10/17  10/17 - Procedure(s) (LRB):  OPEN REDUCTION, DISLOCATION, HIP (Right)  EVACUATION, HEMATOMA (Right)  Pt without complaints - abduction brace intact  10/18/2020 - PT/OT eval pending      Normocytic anemia  10/15/20  H/H today was noted to be 6.7/21.4. Will transfuse 1 unit PRBC today and recheck labs. CBC in am   Monitoring for acute blood loss anemia      Parkinson disease  Continue home sinemet      Hyperlipidemia  Continue home statin - daughter says that patient is not taking this medication - STATIN discontinued        GERD (gastroesophageal reflux disease)  10/16/20 Continue PPI - Protonix stopped as daughter reports that patient is not taking      Essential hypertension  BP stable on admission  Continue home medications  IV enlaprilat prn elevated BP  Monitor closely      VTE Risk Mitigation (From admission, onward)         Ordered     Place sequential compression device  Until discontinued      10/14/20 2334     IP VTE LOW RISK PATIENT  Once      10/14/20 2156                Discharge Planning   PARMINDER:      Code Status: Full Code   Is the patient medically ready for discharge?:     Reason for patient still in hospital (select all that apply): Patient trending condition and Pending disposition  Discharge Plan A: Rehab                  Rose Mary Blackman NP  Department of Hospital Medicine   [facility]

## 2020-10-18 NOTE — PT/OT/SLP PROGRESS
Occupational Therapy      Patient Name:  Tanya Madrid   MRN:  [id number]    MD ORDER RECEIVED FOR OT EVALUATION.  EVAL INITIATED WITH [facility] CHART REVIEW.  WILL FOLLOW UP.    Sheba Mcmullen OT  10/17/2020

## 2020-10-18 NOTE — PT/OT/SLP EVAL
Occupational Therapy   Evaluation    Name: Tanya Madrid  MRN: [id number]  Admitting Diagnosis:  Hip dislocation, right 1 Day Post-Op    Recommendations:     Discharge Recommendations: nursing facility, skilled  Discharge Equipment Recommendations:  (TBD)  Barriers to discharge:  (PATIENT WAS A SNF RECEIVING THERAPY)    Assessment:     Tanya Madrid is a 80 y.o. female with a medical diagnosis of Hip dislocation, right.  She presents with SELF CARE DEBILITY. Performance deficits affecting function: weakness, impaired endurance, impaired self care skills, impaired functional mobilty, gait instability, impaired balance, visual deficits, impaired cognition, decreased coordination, decreased upper extremity function, decreased lower extremity function, decreased safety awareness, pain, decreased ROM.      Rehab Prognosis: Good; patient would benefit from acute skilled OT services to address these deficits and reach maximum level of function.       Plan:     Patient to be seen 3 x/week to address the above listed problems via self-care/home management, therapeutic activities, therapeutic exercises  · Plan of Care Expires:    · Plan of Care Reviewed with: patient    Subjective     Chief Complaint: (R) HIP DISCOMFORT  Patient/Family Comments/goals: NONE STATED    Occupational Profile:  Living Environment: PATIENT WAS RECEIVING THERAPY AT SNF.  Previous level of function: PATIENT UNABLE TO PROVIDE INFO RE: PTA LEVEL.  Roles and Routines: PATIENT WAS RECEIVING THERAPY AT SNF.  Equipment Used at Home:  walker, rolling, cane, straight(PATIENT WITH DECREASED ALERTNESS...NOT SURE IF CURRENT EQUIP IS ACCURATE.)  Assistance upon Discharge: TBD    Pain/Comfort:  · Pain Rating 1: 5/10  · Location - Side 1: Right  · Location 1: hip  · Pain Addressed 1: Reposition, Distraction, Cessation of Activity    Patients cultural, spiritual, [employer] conflicts given the current situation:      Objective:     Communicated with: NURSE  prior to session.  Patient found HOB elevated with oxygen, peripheral IV(LLE SCD) upon OT entry to room.    General Precautions: Standard, fall   Orthopedic Precautions:RLE anterior precautions, RLE toe touch weight bearing   Braces: Hip abduction brace(RLE)     Occupational Performance:    Bed Mobility:    · Patient completed Rolling/Turning to Left with  maximal assistance and 2 persons  · Patient completed Rolling/Turning to Right with maximal assistance  · Patient completed Scooting/Bridging with maximal assistance and 2 persons  · Patient completed Supine to Sit with maximal assistance and 2 persons  · Patient completed Sit to Supine with maximal assistance and 2 persons    Functional Mobility/Transfers:  · Patient completed Sit <> Stand Transfer with maximal assistance  with  PATIENT HOLDING ON TO THERAPIST   · Functional Mobility: NT    Activities of Daily Living:  · Feeding:  maximal assistance      · Grooming: total assistance      · Bathing: total assistance      · Upper Body Dressing: total assistance      · Lower Body Dressing: total assistance      · Toileting: total assistance        Cognitive/Visual Perceptual:  PATIENT WITH DECREASED ALERTNESS WITH INCREASED C/O (R) HIP DISCOMFORT AND FEELING COLD.    Physical Exam:  Balance:    -       MAX (A) WITH STATIC STANDING  Upper Extremity Range of Motion:     -       Right Upper Extremity: DIFFICULTY TO ASSESS DUE TO DECREASED ALERTNESS.  PATIENT WAS ABLE TO USE BUE'S TO WRAP AROUND THERAPIST WITH SIT <> STAND.  -       Left Upper Extremity: DIFFICULT TO ASSESS DUE TO DECREASED ALERTNESS.  PATIENT WAS ABLE TO USE BUE'S TO WRAP AROUND THERAPIST WITH SIT  <> STAND.    AMPAC 6 Click ADL:  AMPAC Total Score: 8    Treatment & Education:  OT EVAL PERFORMED.  PATIENT REMINDED OF HIP PRECAUTIONS.  PATIENT PRACTICED BED MOBILITY DURING DIAPER CHANGE, SITTING BALANCE EOB, AND SIT <> STAND.  Education:    Patient left supine with all lines intact and NURSE AND PCT  present    GOALS:   Multidisciplinary Problems     Occupational Therapy Goals     Not on file                History:     Past Medical History:   Diagnosis Date    Hyperlipemia     Hypertension     Parkinson's disease     Stroke 2016    Throat mass        Past Surgical History:   Procedure Laterality Date    CLOSED REDUCTION Right 10/15/2020    Procedure: CLOSED REDUCTION;  Surgeon: Humberto Valdivia DO;  Location: [facility];  Service: Orthopedics;  Laterality: Right;  ATTEMPTED    HEMIARTHROPLASTY OF HIP Left 7/12/2020    Procedure: HEMIARTHROPLASTY, HIP;  Surgeon: Humberto Valdivia DO;  Location: [facility] OR;  Service: Orthopedics;  Laterality: Left;    HEMIARTHROPLASTY OF HIP Right 10/2/2020    Procedure: HEMIARTHROPLASTY, HIP;  Surgeon: Loyd Kearney MD;  Location: [facility] OR;  Service: Orthopedics;  Laterality: Right;    HYSTERECTOMY      THROAT SURGERY      TONSILLECTOMY         Time Tracking:     OT Date of Treatment: 10/18/20  OT Start Time: 0830  OT Stop Time: 0853  OT Total Time (min): 23 min    Billable Minutes:Evaluation 10  Self Care/Home Management 13    Sheba Mcmullen OT  10/18/2020

## 2020-10-18 NOTE — PT/OT/SLP EVAL
"Physical Therapy Evaluation    Patient Name:  Tanya Madrid   MRN:  [id number]    Recommendations:     Discharge Recommendations:  nursing facility, skilled   Discharge Equipment Recommendations: (tbd by next level of care)   Barriers to discharge: family unable to care for her patient at current mobiity status    Assessment:     Tanya Madrid is a 80 y.o. female admitted with a medical diagnosis of Hip dislocation, right.  She presents with the following impairments/functional limitations:  weakness, gait instability, decreased lower extremity function, impaired balance, impaired endurance, decreased safety awareness, orthopedic precautions, decreased ROM, decreased coordination, impaired functional mobilty, impaired self care skills, pain, impaired cognition.    Rehab Prognosis: Fair; patient would benefit from acute skilled PT services to address these deficits and reach maximum level of function.    Recent Surgery: Procedure(s) (LRB):  OPEN REDUCTION, DISLOCATION, HIP (Right)  EVACUATION, HEMATOMA (Right) 1 Day Post-Op    Plan:     During this hospitalization, patient to be seen 5 x/week to address the identified rehab impairments via gait training, therapeutic activities, therapeutic exercises and progress toward the following goals:    · Plan of Care Expires:  10/25/20    Subjective     Chief Complaint: pain and weakness; "cold"  Patient/Family Comments/goals: to go home  Pain/Comfort:  · Pain Rating 1: 5/10  · Location - Side 1: Right  · Location - Orientation 1: generalized  · Location 1: hip  · Pain Addressed 1: Reposition, Distraction, Cessation of Activity  · Pain Rating Post-Intervention 1: 0/10(at rest)    Patients cultural, spiritual, [employer] conflicts given the current situation:      Living Environment:  Lives with dtr per patient report; unsure of accuracy  Prior to admission, patients level of function was- patient was in SNF for therapy; unsure of prior level before first surgery on R " hip.  Equipment used at home: cane, straight, walker, rolling(unsure of accuracy - needs further assessment).  DME owned (not currently used): unknown.  Upon discharge, patient will have assistance from unsure of family support; needs more PT in SNF.    Objective:     Communicated with RN prior to session.  Patient found HOB elevated with wound vac, hip abduction brace, oxygen, pulse ox (continuous), peripheral IV  upon PT entry to room.    General Precautions: Standard, fall   Orthopedic Precautions:RLE toe touch weight bearing, RLE anterior precautions   Braces: Hip abduction brace     Exams:  · L LE ROM WFL except dec active DF and strength at least 3+/5; R LE rom wfl within hip abd brace/strength limited    Functional Mobility:  · Bed Mobility: B Rolling and supine to/from sit max A x 2 with cues for technique/move legs together; limited by fear of falling  · Transfers: sit to/from partial stand x 3 reps max A from elevated bed, with ue support of PT standing in front of patient  · Gt - unable to take a step    Therapeutic Activities and Exercises:   PT educated patient on on POC, B LE TE to prep mobility/dec stiffness AND ant hip/safety/fall precautions with mobility    AM-PAC 6 CLICK MOBILITY  Total Score:9     Patient left HOB elevated/wedge under L side with all lines intact, call button in reach and rN notified.    GOALS:   Multidisciplinary Problems     Physical Therapy Goals        Problem: Physical Therapy Goal    Goal Priority Disciplines Outcome Goal Variances Interventions   Physical Therapy Goal     PT, PT/OT      Description: 1. Patient will perform supine to/from sit mod A  2. Patient will perform sit to/from stand with RW mod A TTWB R LE  3. Patient will perform B LE TE with hip abd brace donned x 20 reps                   History:     Past Medical History:   Diagnosis Date    Hyperlipemia     Hypertension     Parkinson's disease     Stroke 2016    Throat mass        Past Surgical History:    Procedure Laterality Date    CLOSED REDUCTION Right 10/15/2020    Procedure: CLOSED REDUCTION;  Surgeon: Humberto Valdivia DO;  Location: [facility] OR;  Service: Orthopedics;  Laterality: Right;  ATTEMPTED    HEMIARTHROPLASTY OF HIP Left 7/12/2020    Procedure: HEMIARTHROPLASTY, HIP;  Surgeon: Humberto Valdivia DO;  Location: [facility] OR;  Service: Orthopedics;  Laterality: Left;    HEMIARTHROPLASTY OF HIP Right 10/2/2020    Procedure: HEMIARTHROPLASTY, HIP;  Surgeon: Loyd Kearney MD;  Location: [facility] OR;  Service: Orthopedics;  Laterality: Right;    HYSTERECTOMY      THROAT SURGERY      TONSILLECTOMY         Time Tracking:     PT Received On: 10/17/20  PT Start Time: 0710     PT Stop Time: 0750  PT Total Time (min): 40 min     Billable Minutes: Evaluation 15, Therapeutic Activity 15 and Therapeutic Exercise 10      Zac Betts, PT  10/18/2020

## 2020-10-18 NOTE — ASSESSMENT & PLAN NOTE
Orthopedic surgery consulted - plan to OR in AM  Neuro checks with VS  NPO after MN for AM procedure  PT/OT to eval/treat post-op  Holding home ASA, plavix, xarelto in case surgery is required in AM  Resume above meds as appropriate  10/15/20 Ortho attempted closed reduction of the right hip which was unsuccessful. Plan for open reduction of the right hip on Saturday once patient has been medically optimized. H/H today was noted to be 6.7/21.4. Will transfuse 1 unit PRBC today and recheck labs. Continue to hold plavix and eliquis.   10/16/2020 - (open reduction of right bipolar hemiarthroplasty, Debridement and irrigation with hardware exchange as indicated) - scheduled for AM 10/17  10/17 - Procedure(s) (LRB):  OPEN REDUCTION, DISLOCATION, HIP (Right)  EVACUATION, HEMATOMA (Right)  Pt without complaints - abduction brace intact  10/18/2020 - PT/OT eval pending

## 2020-10-18 NOTE — SUBJECTIVE & OBJECTIVE
Interval History: Pt reports lower back pain. Other symptoms denied. PT/OT eval pending.    Review of Systems   Constitutional: Positive for activity change (d/t R hip pain). Negative for appetite change, chills, diaphoresis, fatigue, fever and unexpected weight change.   HENT: Negative for congestion, drooling, facial swelling, rhinorrhea, sinus pressure, sneezing, sore throat, trouble swallowing and voice change.    Eyes: Negative for photophobia, discharge, redness, itching and visual disturbance.   Respiratory: Negative for apnea, cough, choking, chest tightness, shortness of breath, wheezing and stridor.    Cardiovascular: Negative for chest pain, palpitations and leg swelling.   Gastrointestinal: Negative for abdominal distention, abdominal pain, anal bleeding, blood in stool, constipation, diarrhea, nausea and vomiting.   Endocrine: Negative for cold intolerance, heat intolerance, polydipsia, polyphagia and polyuria.   Genitourinary: Negative for decreased urine volume, difficulty urinating, dysuria, flank pain, frequency, hematuria, pelvic pain, urgency, vaginal bleeding and vaginal discharge.   Musculoskeletal: Positive for arthralgias and gait problem (R hip dislocation). Negative for back pain, joint swelling, myalgias, neck pain and neck stiffness.   Skin: Negative for color change, pallor, rash and wound.   Neurological: Positive for weakness (with limited ROM RLE). Negative for dizziness, seizures, syncope, facial asymmetry, speech difficulty, light-headedness, numbness and headaches.   Psychiatric/Behavioral: Negative for agitation, confusion, hallucinations and suicidal ideas.   All other systems reviewed and are negative.    Objective:     Vital Signs (Most Recent):  Temp: 98.8 °F (37.1 °C) (10/18/20 1207)  Pulse: 81 (10/18/20 1207)  Resp: 18 (10/18/20 1207)  BP: (!) 121/57 (10/18/20 1207)  SpO2: 100 % (10/18/20 1207) Vital Signs (24h Range):  Temp:  [97.4 °F (36.3 °C)-99 °F (37.2 °C)] 98.8 °F (37.1  °C)  Pulse:  [81-95] 81  Resp:  [16-20] 18  SpO2:  [95 %-100 %] 100 %  BP: (112-137)/(55-72) 121/57     Weight: 54.9 kg (121 lb 0.5 oz)  Body mass index is 22.14 kg/m².    Intake/Output Summary (Last 24 hours) at 10/18/2020 1346  Last data filed at 10/18/2020 0800  Gross per 24 hour   Intake 1205 ml   Output --   Net 1205 ml      Physical Exam  Vitals signs and nursing note reviewed.   Constitutional:       General: She is not in acute distress.     Appearance: Normal appearance. She is not toxic-appearing or diaphoretic.   HENT:      Head: Normocephalic and atraumatic.      Nose: Nose normal. No congestion.      Mouth/Throat:      Mouth: Mucous membranes are moist.   Eyes:      General: No scleral icterus.     Conjunctiva/sclera: Conjunctivae normal.   Neck:      Musculoskeletal: Normal range of motion and neck supple. No muscular tenderness.   Cardiovascular:      Rate and Rhythm: Normal rate and regular rhythm.      Heart sounds: Normal heart sounds.   Pulmonary:      Effort: Pulmonary effort is normal. No respiratory distress.      Breath sounds: Normal breath sounds. No wheezing or rhonchi.   Abdominal:      General: Abdomen is flat. Bowel sounds are normal. There is no distension.      Palpations: Abdomen is soft.      Tenderness: There is no abdominal tenderness.      Hernia: A hernia (RLQ) is present.      Comments: Wearing incontinent brief   Musculoskeletal: Normal range of motion.         General: No swelling or deformity.      Comments: Abduction brace to right hip/thigh area. Small wound vac to right lateral thigh. Sensation intact to right foot.    Skin:     General: Skin is warm and dry.      Capillary Refill: Capillary refill takes less than 2 seconds.      Coloration: Skin is not jaundiced.      Findings: No bruising or rash.      Comments: R hip incision CDI - no evidence of infection.   Neurological:      General: No focal deficit present.      Mental Status: She is alert and oriented to person,  place, and time.   Psychiatric:         Mood and Affect: Mood normal. Affect is flat.         Behavior: Behavior normal.      Comments: Baseline per daughter - parkinson's dx         Significant Labs:   CBC:   Recent Labs   Lab 10/17/20  0726 10/18/20  0712   WBC 10.67 11.89  11.89   HGB 9.2* 8.2*  8.2*   HCT 30.6* 27.1*  27.1*   * 565*  565*     CMP:   Recent Labs   Lab 10/17/20  0726 10/18/20  0712    135*   K 4.3 4.4    107   CO2 22* 22*   GLU 91 91   BUN 13 10   CREATININE 0.7 0.7   CALCIUM 8.6* 8.1*   ANIONGAP 8 6*   EGFRNONAA >60 >60     All pertinent labs within the past 24 hours have been reviewed.    Significant Imaging: I have reviewed all pertinent imaging results/findings within the past 24 hours.

## 2020-10-18 NOTE — PLAN OF CARE
Bed mobility max A x 2; sit to/from partial stand x 3 reps with max A/PT standing in front of patient; Rec snf

## 2020-10-18 NOTE — PT/OT/SLP PROGRESS
Physical Therapy      Patient Name:  Tanya Madrid   MRN:  [id number]    Eval initiated via chart review in Epic and discussion with RN. Patient not seen today secondary to family requested to start tomorrow; patient just had surgery today. Will follow-up on next session.    Zac Betts, PT

## 2020-10-18 NOTE — PLAN OF CARE
PATIENT WOULD BENEFIT FROM CONT SKILLED OT INTERVENTION TO INCREASE STRENGTH/ENDURANCE/BALANCE TO INCREASE SAFETY AND (I) WITH SELF CARE WITHIN (R) HIP PRECAUTIONS.

## 2020-10-18 NOTE — PLAN OF CARE
NS @ 75mL/hr. Abduction brace on and in place. Requires max assist with turning & ADL's. Continuous negative pressure wound therapy to right hip. Toe touch weight bearing status to RLE. Neuro checks Q4hrs. Remains free from incident/injury. Call light in reach.

## 2020-10-19 LAB
ANION GAP SERPL CALC-SCNC: 7 MMOL/L (ref 8–16)
BASOPHILS # BLD AUTO: 0.04 K/UL (ref 0–0.2)
BASOPHILS NFR BLD: 0.4 % (ref 0–1.9)
BUN SERPL-MCNC: 8 MG/DL (ref 8–23)
CALCIUM SERPL-MCNC: 8 MG/DL (ref 8.7–10.5)
CHLORIDE SERPL-SCNC: 107 MMOL/L (ref 95–110)
CO2 SERPL-SCNC: 23 MMOL/L (ref 23–29)
CREAT SERPL-MCNC: 0.6 MG/DL (ref 0.5–1.4)
DIFFERENTIAL METHOD: ABNORMAL
EOSINOPHIL # BLD AUTO: 0.7 K/UL (ref 0–0.5)
EOSINOPHIL NFR BLD: 6.5 % (ref 0–8)
ERYTHROCYTE [DISTWIDTH] IN BLOOD BY AUTOMATED COUNT: 14.8 % (ref 11.5–14.5)
EST. GFR  (AFRICAN AMERICAN): >60 ML/MIN/1.73 M^2
EST. GFR  (NON AFRICAN AMERICAN): >60 ML/MIN/1.73 M^2
GLUCOSE SERPL-MCNC: 93 MG/DL (ref 70–110)
HCT VFR BLD AUTO: 25.5 % (ref 37–48.5)
HGB BLD-MCNC: 7.8 G/DL (ref 12–16)
IMM GRANULOCYTES # BLD AUTO: 0.32 K/UL (ref 0–0.04)
IMM GRANULOCYTES NFR BLD AUTO: 3.1 % (ref 0–0.5)
LYMPHOCYTES # BLD AUTO: 1.3 K/UL (ref 1–4.8)
LYMPHOCYTES NFR BLD: 12.5 % (ref 18–48)
MAGNESIUM SERPL-MCNC: 1.7 MG/DL (ref 1.6–2.6)
MCH RBC QN AUTO: 28.6 PG (ref 27–31)
MCHC RBC AUTO-ENTMCNC: 30.6 G/DL (ref 32–36)
MCV RBC AUTO: 93 FL (ref 82–98)
MONOCYTES # BLD AUTO: 1.3 K/UL (ref 0.3–1)
MONOCYTES NFR BLD: 12.1 % (ref 4–15)
NEUTROPHILS # BLD AUTO: 6.8 K/UL (ref 1.8–7.7)
NEUTROPHILS NFR BLD: 65.4 % (ref 38–73)
NRBC BLD-RTO: 0 /100 WBC
PLATELET # BLD AUTO: 570 K/UL (ref 150–350)
PMV BLD AUTO: 9.3 FL (ref 9.2–12.9)
POTASSIUM SERPL-SCNC: 4 MMOL/L (ref 3.5–5.1)
RBC # BLD AUTO: 2.73 M/UL (ref 4–5.4)
SODIUM SERPL-SCNC: 137 MMOL/L (ref 136–145)
WBC # BLD AUTO: 10.44 K/UL (ref 3.9–12.7)

## 2020-10-19 PROCEDURE — 83735 ASSAY OF MAGNESIUM: CPT

## 2020-10-19 PROCEDURE — 25000003 PHARM REV CODE 250: Performed by: NURSE PRACTITIONER

## 2020-10-19 PROCEDURE — 99900035 HC TECH TIME PER 15 MIN (STAT)

## 2020-10-19 PROCEDURE — 25000003 PHARM REV CODE 250: Performed by: ORTHOPAEDIC SURGERY

## 2020-10-19 PROCEDURE — 97110 THERAPEUTIC EXERCISES: CPT

## 2020-10-19 PROCEDURE — 94761 N-INVAS EAR/PLS OXIMETRY MLT: CPT

## 2020-10-19 PROCEDURE — 36415 COLL VENOUS BLD VENIPUNCTURE: CPT

## 2020-10-19 PROCEDURE — 97530 THERAPEUTIC ACTIVITIES: CPT

## 2020-10-19 PROCEDURE — 27000221 HC OXYGEN, UP TO 24 HOURS

## 2020-10-19 PROCEDURE — 11000001 HC ACUTE MED/SURG PRIVATE ROOM

## 2020-10-19 PROCEDURE — 80048 BASIC METABOLIC PNL TOTAL CA: CPT

## 2020-10-19 PROCEDURE — 85025 COMPLETE CBC W/AUTO DIFF WBC: CPT

## 2020-10-19 RX ORDER — ENOXAPARIN SODIUM 100 MG/ML
40 INJECTION SUBCUTANEOUS EVERY 24 HOURS
Status: DISCONTINUED | OUTPATIENT
Start: 2020-10-19 | End: 2020-10-19

## 2020-10-19 RX ORDER — MORPHINE SULFATE 4 MG/ML
2 INJECTION, SOLUTION INTRAMUSCULAR; INTRAVENOUS EVERY 4 HOURS PRN
Status: DISCONTINUED | OUTPATIENT
Start: 2020-10-19 | End: 2020-10-20 | Stop reason: HOSPADM

## 2020-10-19 RX ADMIN — STANDARDIZED SENNA CONCENTRATE AND DOCUSATE SODIUM 1 TABLET: 8.6; 5 TABLET ORAL at 08:10

## 2020-10-19 RX ADMIN — APIXABAN 2.5 MG: 2.5 TABLET, FILM COATED ORAL at 09:10

## 2020-10-19 RX ADMIN — CARBIDOPA AND LEVODOPA 3 TABLET: 25; 100 TABLET ORAL at 01:10

## 2020-10-19 RX ADMIN — CHLORHEXIDINE GLUCONATE 0.12% ORAL RINSE 10 ML: 1.2 LIQUID ORAL at 09:10

## 2020-10-19 RX ADMIN — CARBIDOPA AND LEVODOPA 3 TABLET: 25; 100 TABLET ORAL at 09:10

## 2020-10-19 RX ADMIN — CLONAZEPAM 0.5 MG: 0.5 TABLET ORAL at 09:10

## 2020-10-19 RX ADMIN — CARBIDOPA AND LEVODOPA 3 TABLET: 25; 100 TABLET ORAL at 04:10

## 2020-10-19 RX ADMIN — STANDARDIZED SENNA CONCENTRATE AND DOCUSATE SODIUM 1 TABLET: 8.6; 5 TABLET ORAL at 09:10

## 2020-10-19 RX ADMIN — CARBIDOPA AND LEVODOPA 3 TABLET: 25; 100 TABLET ORAL at 08:10

## 2020-10-19 RX ADMIN — POLYETHYLENE GLYCOL 3350 17 G: 17 POWDER, FOR SOLUTION ORAL at 08:10

## 2020-10-19 RX ADMIN — LOSARTAN POTASSIUM 100 MG: 50 TABLET, FILM COATED ORAL at 08:10

## 2020-10-19 RX ADMIN — SODIUM CHLORIDE: 0.9 INJECTION, SOLUTION INTRAVENOUS at 07:10

## 2020-10-19 RX ADMIN — METOPROLOL TARTRATE 25 MG: 25 TABLET, FILM COATED ORAL at 08:10

## 2020-10-19 RX ADMIN — CHLORHEXIDINE GLUCONATE 0.12% ORAL RINSE 10 ML: 1.2 LIQUID ORAL at 08:10

## 2020-10-19 RX ADMIN — HYDROCODONE BITARTRATE AND ACETAMINOPHEN 1 TABLET: 5; 325 TABLET ORAL at 01:10

## 2020-10-19 RX ADMIN — METOPROLOL TARTRATE 25 MG: 25 TABLET, FILM COATED ORAL at 09:10

## 2020-10-19 RX ADMIN — AMLODIPINE BESYLATE 2.5 MG: 2.5 TABLET ORAL at 08:10

## 2020-10-19 NOTE — PT/OT/SLP PROGRESS
Occupational Therapy   Treatment    Name: Tanya Madrid  MRN: [id number]  Admitting Diagnosis:  Hip dislocation, right  2 Days Post-Op    Recommendations:     Discharge Recommendations: nursing facility, skilled  Discharge Equipment Recommendations:  bedside commode, wheelchair, lift device, hospital bed  Barriers to discharge:       Assessment:     Tanya Madrid is a 80 y.o. female with a medical diagnosis of Hip dislocation, right.  She presents with DEBILITY AND GENERALIZED WEAKNESS. Performance deficits affecting function are weakness, impaired functional mobilty, decreased safety awareness, impaired endurance, gait instability, impaired balance, impaired self care skills.     Rehab Prognosis:  Fair; patient would benefit from acute skilled OT services to address these deficits and reach maximum level of function.       Plan:     Patient to be seen 3 x/week to address the above listed problems via self-care/home management, therapeutic activities, therapeutic exercises  · Plan of Care Expires:    · Plan of Care Reviewed with: patient    Subjective     Pain/Comfort:       Objective:     Communicated with: NURSE AND Epic CHART REVIEW  prior to session.  Patient found supine with peripheral IV, oxygen upon OT entry to room.    General Precautions: Standard, fall   Orthopedic Precautions:RLE anterior precautions, RLE toe touch weight bearing   Braces: N/A(HIP ABDUCTIONN BRACE)     Occupational Performance:     Bed Mobility:    · Patient completed Rolling/Turning to Left with  maximal assistance  · Patient completed Rolling/Turning to Right with maximal assistance  · Patient completed Scooting/Bridging with maximal assistance  · Patient completed Supine to Sit with total assistance  · Patient completed Sit to Supine with total assistance     Functional Mobility/Transfers:  · Patient completed Sit <> Stand Transfer with total assistance  with  hand-held assist   · Functional Mobility: UNABLE TO PERFORM  TASK  ·     Activities of Daily Living:  · Upper Body Dressing: maximal assistance .  · Lower Body Dressing: total assistance .      AMPAC 6 Click ADL: 6    Treatment & Education:  PT EDUCATED ON HEP . PT PERFORMED 1 SET X 10 REPS B UE A/AAROM IN ALL AVAIALABLE PLANES AND RANGES SEATED EOB  WITH POOR TO POOR- SITTING BALANCE.     Patient left supine with all lines intact, call button in reach and NURSE notifiedEducation:      GOALS:   Multidisciplinary Problems     Occupational Therapy Goals        Problem: Occupational Therapy Goal    Goal Priority Disciplines Outcome Interventions   Occupational Therapy Goal     OT, PT/OT     Description: OT GOALS TO BE MET BY 10-26-20  MOD A WITH BE MOBILITY  PT WILL TOLERATE 1 SET X 15 REPS B UE ROM EXERCISE   MOD A WITH SIMPLE G/H TASK SEATED EOB                   Time Tracking:     OT Date of Treatment: 10/19/20  OT Start Time: 1021  OT Stop Time: 1045  OT Total Time (min): 24 min    Billable Minutes:Therapeutic Activity 14 MINUTES  Therapeutic Exercise 10 MINUTES    Tanya Muse OT  10/19/2020

## 2020-10-19 NOTE — PLAN OF CARE
Pt is resting in bed with daughter at the bedside, remains free of falls and injuries this shift. VSS. No obvious s/sx of distress noted. POC reviewed with the pt and her daughter, verbalized understanding. Will continue to monitor.

## 2020-10-19 NOTE — PLAN OF CARE
Cm met with patient/Adenike (daughter) at the bedside to discuss the discharge plan.  CM updated Adenike on the recommendation by physical therapy for SNF.  Adenike had the list provided Friday 10-16 but does not feel comfortable with her mother going to another SNF.  She did not want her mother to go to a facility that she was not allowed to stay at.  Her plan is to care for her mother at home.  She is agreeable to home health services.  SHARI provided a Medicare Compare list for home health agencies.  Adenike will review the list and update SHARI when she decides which agency she would like to use.  Patient currently has a wheelchair, bedside commode, tub bench, and walker at home.  CM awaiting a call from Adenike with preference of home health agency.     10/19/20 1828   Discharge Reassessment   Assessment Type Discharge Planning Reassessment   Provided patient/caregiver education on the expected discharge date and the discharge plan Yes   Do you have any problems affording any of your prescribed medications? No   Discharge Plan A Home with family;Home Health   Discharge Plan B Home with family;Home Health   DME Needed Upon Discharge  none   Patient choice form signed by patient/caregiver No   Anticipated Discharge Disposition Home-Health

## 2020-10-19 NOTE — ASSESSMENT & PLAN NOTE
10/15/20  H/H today was noted to be 6.7/21.4. Will transfuse 1 unit PRBC today and recheck labs. CBC in am   Monitoring for acute blood loss anemia  10/19/2020 - slowly trending down - will continue to monitor

## 2020-10-19 NOTE — PROGRESS NOTES
[facility]  Orthopedics  Progress Note    Patient Name: Tanya Madrid  MRN: [id number]  Admission Date: 10/14/2020  Hospital Length of Stay: 4 days  Attending Provider: Sj Juan, *  Primary Care Provider: Maggie Sue NP  Follow-up For: Procedure(s) (LRB):  OPEN REDUCTION, DISLOCATION, HIP (Right)  EVACUATION, HEMATOMA (Right)    Post-Operative Day: 2 Days Post-Op  Subjective:     Principal Problem:Hip dislocation, right    Principal Orthopedic Problem: Right hip dislocation    Interval History: POD 2 s/p Open reduction left dislocated hip. Doing well. Participated in PT/OT today. Decided on HH upon d/c    Review of patient's allergies indicates:   Allergen Reactions    Xanax [alprazolam]        Current Facility-Administered Medications   Medication    0.9%  NaCl infusion (for blood administration)    0.9%  NaCl infusion    amLODIPine tablet 2.5 mg    carbidopa-levodopa  mg per tablet 3 tablet    chlorhexidine 0.12 % solution 10 mL    clonazePAM tablet 0.5 mg    enalaprilat injection 1.25 mg    HYDROcodone-acetaminophen 5-325 mg per tablet 1 tablet    lactulose 20 gram/30 mL solution Soln 20 g    losartan tablet 100 mg    metoprolol tartrate (LOPRESSOR) tablet 25 mg    morphine injection 2 mg    nozaseptin (NOZIN) nasal     ondansetron disintegrating tablet 8 mg    ondansetron injection 4 mg    oxyCODONE immediate release tablet 10 mg    polyethylene glycol packet 17 g    senna-docusate 8.6-50 mg per tablet 1 tablet    traMADoL tablet 50 mg     Objective:     Vital Signs (Most Recent):  Temp: 99.3 °F (37.4 °C) (10/19/20 0745)  Pulse: 104 (10/19/20 0745)  Resp: 18 (10/19/20 1310)  BP: (!) 129/100 (10/19/20 0745)  SpO2: 99 % (10/19/20 0745) Vital Signs (24h Range):  Temp:  [97.4 °F (36.3 °C)-99.3 °F (37.4 °C)] 99.3 °F (37.4 °C)  Pulse:  [] 104  Resp:  [18] 18  SpO2:  [95 %-100 %] 99 %  BP: ()/() 129/100     Weight: 54.9 kg (121  "lb 0.5 oz)  Height: 5' 2" (157.5 cm)  Body mass index is 22.14 kg/m².      Intake/Output Summary (Last 24 hours) at 10/19/2020 1328  Last data filed at 10/18/2020 1700  Gross per 24 hour   Intake 825 ml   Output --   Net 825 ml       Ortho/SPM Exam   Wound vac working appropriately  Hip abduction brace well applied  Able to plantar and dorsiflex foot  Able to feel light touch L4 L5 S1 dermatomes    Significant Labs:   CBC:   Recent Labs   Lab 10/18/20  0712 10/19/20  0719   WBC 11.89  11.89 10.44   HGB 8.2*  8.2* 7.8*   HCT 27.1*  27.1* 25.5*   *  565* 570*     All pertinent labs within the past 24 hours have been reviewed.    Significant Imaging: I have reviewed all pertinent imaging results/findings.    Assessment/Plan:     * Hip prosthesis dislocation, right  PT/OT continue current plan  -TTWB, hip abduction brace  Monitor H&H  Wound vac change POD 7  SCDs for mechanical dvt prophylaxis, anticoagulation per  team     F/u in clinic 10-14 days post op          Humberto Angulo PA-C  Orthopedics  [facility]  "

## 2020-10-19 NOTE — PHYSICIAN QUERY
PT Name: Tanya Madrid  MR #: [id number]    HEMATOLOGY CLARIFICATION      CDS/: Nikki Ding RN               Contact information: [email or web address]    This form is a permanent document in the medical record.      Query Date: October 19, 2020    By submitting this query, we are merely seeking further clarification of documentation. Please utilize your independent clinical judgment when addressing the question(s) below.    The Medical Record contains the following:   Indicators  Supporting Clinical Findings Location in Medical Record   x Anemia documented Pt was given 1 unit PRBCs at time of admission due to blood loss anemia.    Monitoring for acute blood loss anemia  10/19/2020 - slowly trending down - will continue to monitor Hospital Medicine PN 10/17      Hospital medicine note 10/19   x H&H H&H= 11.8/37.9  H&H=   8.5/28  H&H=   6.7/21.4  H&H=   9.2.30.6  H&H=   7.8/25.5  H&H=   7.3/24.2 Labs 10/1  Labs 10/14  Labs 10/15  Labs 10/17  Labs 10/19  Labs 10/20   x BP                    HR   BP: 137/75  HR 92  BP  141/89  HR 91  BP  162/75  HR 89  BP    96/51  HR 84   Vital signs 10/15@1215  Vital signs 10/16@1303  Vital signs 10/17@0707  Vital signs 10/18@1925    GI bleeding documented      Acute bleeding (Non GI site)     x Transfusion(s) Transfused with 1 unit PRBC's  Blood bank record 10/15   x Acute/Chronic illness R hip prosthesis dislocation, hyperkalemia, HTN, GERD,HLD, parkinson's disease, stroke history Hospital Medicine H&P    Treatments     x Other Procedure:  Open reduction right hip hemiarthroplasty dislocation with irrigation debridement hematoma.   cc OP note 10/17     Provider, please specify diagnosis or diagnoses associated with above clinical findings.   [   ] Acute blood loss anemia    [  x ] Acute blood loss anemia expected post-operatively    [   ] Other Hematological Diagnosis (please specify): _________________   [   ] Clinically Undetermined            Please  document in your progress notes daily for the duration of treatment, until resolved, and include in your discharge summary.

## 2020-10-19 NOTE — SUBJECTIVE & OBJECTIVE
Review of Systems   Constitutional: Positive for activity change (d/t R hip pain). Negative for appetite change, chills, diaphoresis, fatigue, fever and unexpected weight change.   HENT: Negative for congestion, drooling, facial swelling, rhinorrhea, sinus pressure, sneezing, sore throat, trouble swallowing and voice change.    Eyes: Negative for photophobia, discharge, redness, itching and visual disturbance.   Respiratory: Negative for apnea, cough, choking, chest tightness, shortness of breath, wheezing and stridor.    Cardiovascular: Negative for chest pain, palpitations and leg swelling.   Gastrointestinal: Negative for abdominal distention, abdominal pain, anal bleeding, blood in stool, constipation, diarrhea, nausea and vomiting.   Endocrine: Negative for cold intolerance, heat intolerance, polydipsia, polyphagia and polyuria.   Genitourinary: Negative for decreased urine volume, difficulty urinating, dysuria, flank pain, frequency, hematuria, pelvic pain, urgency, vaginal bleeding and vaginal discharge.   Musculoskeletal: Positive for arthralgias and gait problem (R hip dislocation). Negative for back pain, joint swelling, myalgias, neck pain and neck stiffness.   Skin: Negative for color change, pallor, rash and wound.   Neurological: Positive for weakness (with limited ROM RLE). Negative for dizziness, seizures, syncope, facial asymmetry, speech difficulty, light-headedness, numbness and headaches.   Psychiatric/Behavioral: Negative for agitation, confusion, hallucinations and suicidal ideas.   All other systems reviewed and are negative.    Objective:     Vital Signs (Most Recent):  Temp: 99.3 °F (37.4 °C) (10/19/20 0745)  Pulse: 104 (10/19/20 0745)  Resp: 18 (10/19/20 0745)  BP: (!) 129/100 (10/19/20 0745)  SpO2: 99 % (10/19/20 0745) Vital Signs (24h Range):  Temp:  [97.4 °F (36.3 °C)-99.3 °F (37.4 °C)] 99.3 °F (37.4 °C)  Pulse:  [] 104  Resp:  [18] 18  SpO2:  [95 %-100 %] 99 %  BP:  ()/() 129/100     Weight: 54.9 kg (121 lb 0.5 oz)  Body mass index is 22.14 kg/m².    Intake/Output Summary (Last 24 hours) at 10/19/2020 1232  Last data filed at 10/18/2020 1700  Gross per 24 hour   Intake 825 ml   Output --   Net 825 ml      Physical Exam  Vitals signs and nursing note reviewed.   Constitutional:       General: She is not in acute distress.     Appearance: Normal appearance. She is not toxic-appearing or diaphoretic.   HENT:      Head: Normocephalic and atraumatic.      Nose: Nose normal. No congestion.      Mouth/Throat:      Mouth: Mucous membranes are moist.   Eyes:      General: No scleral icterus.     Conjunctiva/sclera: Conjunctivae normal.   Neck:      Musculoskeletal: Normal range of motion and neck supple. No muscular tenderness.   Cardiovascular:      Rate and Rhythm: Normal rate and regular rhythm.      Heart sounds: Normal heart sounds.   Pulmonary:      Effort: Pulmonary effort is normal. No respiratory distress.      Breath sounds: Normal breath sounds. No wheezing or rhonchi.   Abdominal:      General: Abdomen is flat. Bowel sounds are normal. There is no distension.      Palpations: Abdomen is soft.      Tenderness: There is no abdominal tenderness.      Hernia: A hernia (RLQ) is present.      Comments: Wearing incontinent brief   Musculoskeletal: Normal range of motion.         General: No swelling or deformity.      Comments: Abduction brace to right hip/thigh area. Small wound vac to right lateral thigh. Sensation intact to right foot.    Skin:     General: Skin is warm and dry.      Capillary Refill: Capillary refill takes less than 2 seconds.      Coloration: Skin is not jaundiced.      Findings: No bruising or rash.      Comments: R hip incision CDI - no evidence of infection.   Neurological:      General: No focal deficit present.      Mental Status: She is alert and oriented to person, place, and time.   Psychiatric:         Mood and Affect: Mood normal. Affect  is flat.         Behavior: Behavior normal.      Comments: Baseline per daughter - parkinson's dx         Significant Labs:   CBC:   Recent Labs   Lab 10/18/20  0712 10/19/20  0719   WBC 11.89  11.89 10.44   HGB 8.2*  8.2* 7.8*   HCT 27.1*  27.1* 25.5*   *  565* 570*     CMP:   Recent Labs   Lab 10/18/20  0712 10/19/20  0719   * 137   K 4.4 4.0    107   CO2 22* 23   GLU 91 93   BUN 10 8   CREATININE 0.7 0.6   CALCIUM 8.1* 8.0*   ANIONGAP 6* 7*   EGFRNONAA >60 >60     All pertinent labs within the past 24 hours have been reviewed.    Significant Imaging: I have reviewed all pertinent imaging results/findings within the past 24 hours.

## 2020-10-19 NOTE — ASSESSMENT & PLAN NOTE
Orthopedic surgery consulted - plan to OR in AM  Neuro checks with VS  NPO after MN for AM procedure  PT/OT to eval/treat post-op  Holding home ASA, plavix, xarelto in case surgery is required in AM  Resume above meds as appropriate  10/15/20 Ortho attempted closed reduction of the right hip which was unsuccessful. Plan for open reduction of the right hip on Saturday once patient has been medically optimized. H/H today was noted to be 6.7/21.4. Will transfuse 1 unit PRBC today and recheck labs. Continue to hold plavix and eliquis.   10/16/2020 - (open reduction of right bipolar hemiarthroplasty, Debridement and irrigation with hardware exchange as indicated) - scheduled for AM 10/17  10/17 - Procedure(s) (LRB):  OPEN REDUCTION, DISLOCATION, HIP (Right)  EVACUATION, HEMATOMA (Right)  Pt without complaints - abduction brace intact  10/19/2020 - PT/OT eval - SNF vs home with home health

## 2020-10-19 NOTE — SUBJECTIVE & OBJECTIVE
"Principal Problem:Hip dislocation, right    Principal Orthopedic Problem: Right hip dislocation    Interval History: POD 2 s/p Open reduction left dislocated hip. Doing well. Participated in PT/OT today. Decided on HH upon d/c    Review of patient's allergies indicates:   Allergen Reactions    Xanax [alprazolam]        Current Facility-Administered Medications   Medication    0.9%  NaCl infusion (for blood administration)    0.9%  NaCl infusion    amLODIPine tablet 2.5 mg    carbidopa-levodopa  mg per tablet 3 tablet    chlorhexidine 0.12 % solution 10 mL    clonazePAM tablet 0.5 mg    enalaprilat injection 1.25 mg    HYDROcodone-acetaminophen 5-325 mg per tablet 1 tablet    lactulose 20 gram/30 mL solution Soln 20 g    losartan tablet 100 mg    metoprolol tartrate (LOPRESSOR) tablet 25 mg    morphine injection 2 mg    nozaseptin (NOZIN) nasal     ondansetron disintegrating tablet 8 mg    ondansetron injection 4 mg    oxyCODONE immediate release tablet 10 mg    polyethylene glycol packet 17 g    senna-docusate 8.6-50 mg per tablet 1 tablet    traMADoL tablet 50 mg     Objective:     Vital Signs (Most Recent):  Temp: 99.3 °F (37.4 °C) (10/19/20 0745)  Pulse: 104 (10/19/20 0745)  Resp: 18 (10/19/20 1310)  BP: (!) 129/100 (10/19/20 0745)  SpO2: 99 % (10/19/20 0745) Vital Signs (24h Range):  Temp:  [97.4 °F (36.3 °C)-99.3 °F (37.4 °C)] 99.3 °F (37.4 °C)  Pulse:  [] 104  Resp:  [18] 18  SpO2:  [95 %-100 %] 99 %  BP: ()/() 129/100     Weight: 54.9 kg (121 lb 0.5 oz)  Height: 5' 2" (157.5 cm)  Body mass index is 22.14 kg/m².      Intake/Output Summary (Last 24 hours) at 10/19/2020 1328  Last data filed at 10/18/2020 1700  Gross per 24 hour   Intake 825 ml   Output --   Net 825 ml       Ortho/SPM Exam   Wound vac working appropriately  Hip abduction brace well applied  Able to plantar and dorsiflex foot  Able to feel light touch L4 L5 S1 dermatomes    Significant Labs: "   CBC:   Recent Labs   Lab 10/18/20  0712 10/19/20  0719   WBC 11.89  11.89 10.44   HGB 8.2*  8.2* 7.8*   HCT 27.1*  27.1* 25.5*   *  565* 570*     All pertinent labs within the past 24 hours have been reviewed.    Significant Imaging: I have reviewed all pertinent imaging results/findings.

## 2020-10-19 NOTE — PROGRESS NOTES
[facility] Hospital Medicine  Progress Note    Patient Name: Tanya Madrid  MRN: [id number]  Patient Class: IP- Inpatient   Admission Date: 10/14/2020  Length of Stay: 4 days  Attending Physician: Sj Juan, *  Primary Care Provider: Maggie Sue NP        Subjective:     Principal Problem:Hip dislocation, right        HPI:   81 y/o AA F with hx of HTN, HLD, Parkinson's disease, stroke, B hip replacement sent from [facility] to ED after complaining of sudden onset of R hip pain and being found to have a dislocated R hip prosthesis - unsure as to how this occurred, no family was present and pt states she did not fall but does not know how the injury happened. Symptoms are persistent and moderate in severity with pain exacerbated by movement/palpation and improved at rest. Of note, pt was admitted from 10/01/2020 to 10/05/2020 after experiencing a fall and R hip fracture and subsequent ORIF. Denies chest pain, edema, palpitations, SOB, wheezing, abdominal pain, N/V/D, constipation, dysuria, flank pain, HA, dizziness, weakness, fever, cough, chills, blurred vision. Found in ED to have dislocation of the right hip prosthesis, while labs were largely WNL aside from mild hyperkalemia (5.2), VSS and COVID-19 negative. Attempts were made to reduce the right hip but were unsuccessful. Pt was given etomidate, morphine and 1L IV fluid bolus with improvement in pain. Hospital medicine was called and pt was placed in observation on telemetry with plan for likely reduction in OR in AM. Pt is a full code - her daughter Josee Lemus is her surrogate decision maker.        Overview/Hospital Course:  10/15/20 Ortho attempted closed reduction of the right hip which was unsuccessful. Plan for open reduction of the right hip on Saturday once patient has been medically optimized. H/H today was noted to be 6.7/21.4. Will transfuse 1 unit PRBC today and recheck labs. Continue to hold plavix and  eliquis.     10/16/2020 - Pt is planned for open reduction of right bipolar hemiarthroplasty, I & D and hardware exchange per Orthopedics. Pt reports mild pain to the right hip. Pt was given 1 unit PRBCs at time of admission due to blood loss anemia. Pt on ASA and Plavix due to hx of stroke - these meds held. Also, Eliquis DVT prophylaxis on hold for pending surgery.     10/17/2020 - Procedure(s) (LRB):  OPEN REDUCTION, DISLOCATION, HIP (Right)  EVACUATION, HEMATOMA (Right). S/P procedure today. Seen and examined upon return to the room. Pt is awake and denies any complaints. Daughter is at bedside and verbalizes no concerns. Daughter does not want mother to return to [facility]. Pt has hip abduction brace that must remain intact. PT/OT eval pending. Pt is taking Tylenol for pain control.     10/18/2020 - Vital signs and labs are stable. No concerns for acute blood loss anemia. Pt is wearing the abductor brace. She describes lower back pain. Other symptoms denied. PT/OT eval pending. To obtain disposition options from the patient's daughter.     10/19/2020 - H/H slowly trending down - pt did receive 1unit of PRBC upon admission. H/H 8.2/27.1 >> 7.8/25.5 Will continue to monitor. Pt denies any complaints. Discussed disposition with daughter and she verbalized home  VS SNF placement.  consult placed. S/P surgery 24 hours and anticoagulation held as recommended by Orthopedics.         Review of Systems   Constitutional: Positive for activity change (d/t R hip pain). Negative for appetite change, chills, diaphoresis, fatigue, fever and unexpected weight change.   HENT: Negative for congestion, drooling, facial swelling, rhinorrhea, sinus pressure, sneezing, sore throat, trouble swallowing and voice change.    Eyes: Negative for photophobia, discharge, redness, itching and visual disturbance.   Respiratory: Negative for apnea, cough, choking, chest tightness, shortness of breath, wheezing and stridor.     Cardiovascular: Negative for chest pain, palpitations and leg swelling.   Gastrointestinal: Negative for abdominal distention, abdominal pain, anal bleeding, blood in stool, constipation, diarrhea, nausea and vomiting.   Endocrine: Negative for cold intolerance, heat intolerance, polydipsia, polyphagia and polyuria.   Genitourinary: Negative for decreased urine volume, difficulty urinating, dysuria, flank pain, frequency, hematuria, pelvic pain, urgency, vaginal bleeding and vaginal discharge.   Musculoskeletal: Positive for arthralgias and gait problem (R hip dislocation). Negative for back pain, joint swelling, myalgias, neck pain and neck stiffness.   Skin: Negative for color change, pallor, rash and wound.   Neurological: Positive for weakness (with limited ROM RLE). Negative for dizziness, seizures, syncope, facial asymmetry, speech difficulty, light-headedness, numbness and headaches.   Psychiatric/Behavioral: Negative for agitation, confusion, hallucinations and suicidal ideas.   All other systems reviewed and are negative.    Objective:     Vital Signs (Most Recent):  Temp: 99.3 °F (37.4 °C) (10/19/20 0745)  Pulse: 104 (10/19/20 0745)  Resp: 18 (10/19/20 0745)  BP: (!) 129/100 (10/19/20 0745)  SpO2: 99 % (10/19/20 0745) Vital Signs (24h Range):  Temp:  [97.4 °F (36.3 °C)-99.3 °F (37.4 °C)] 99.3 °F (37.4 °C)  Pulse:  [] 104  Resp:  [18] 18  SpO2:  [95 %-100 %] 99 %  BP: ()/() 129/100     Weight: 54.9 kg (121 lb 0.5 oz)  Body mass index is 22.14 kg/m².    Intake/Output Summary (Last 24 hours) at 10/19/2020 1232  Last data filed at 10/18/2020 1700  Gross per 24 hour   Intake 825 ml   Output --   Net 825 ml      Physical Exam  Vitals signs and nursing note reviewed.   Constitutional:       General: She is not in acute distress.     Appearance: Normal appearance. She is not toxic-appearing or diaphoretic.   HENT:      Head: Normocephalic and atraumatic.      Nose: Nose normal. No congestion.       Mouth/Throat:      Mouth: Mucous membranes are moist.   Eyes:      General: No scleral icterus.     Conjunctiva/sclera: Conjunctivae normal.   Neck:      Musculoskeletal: Normal range of motion and neck supple. No muscular tenderness.   Cardiovascular:      Rate and Rhythm: Normal rate and regular rhythm.      Heart sounds: Normal heart sounds.   Pulmonary:      Effort: Pulmonary effort is normal. No respiratory distress.      Breath sounds: Normal breath sounds. No wheezing or rhonchi.   Abdominal:      General: Abdomen is flat. Bowel sounds are normal. There is no distension.      Palpations: Abdomen is soft.      Tenderness: There is no abdominal tenderness.      Hernia: A hernia (RLQ) is present.      Comments: Wearing incontinent brief   Musculoskeletal: Normal range of motion.         General: No swelling or deformity.      Comments: Abduction brace to right hip/thigh area. Small wound vac to right lateral thigh. Sensation intact to right foot.    Skin:     General: Skin is warm and dry.      Capillary Refill: Capillary refill takes less than 2 seconds.      Coloration: Skin is not jaundiced.      Findings: No bruising or rash.      Comments: R hip incision CDI - no evidence of infection.   Neurological:      General: No focal deficit present.      Mental Status: She is alert and oriented to person, place, and time.   Psychiatric:         Mood and Affect: Mood normal. Affect is flat.         Behavior: Behavior normal.      Comments: Baseline per daughter - parkinson's dx         Significant Labs:   CBC:   Recent Labs   Lab 10/18/20  0712 10/19/20  0719   WBC 11.89  11.89 10.44   HGB 8.2*  8.2* 7.8*   HCT 27.1*  27.1* 25.5*   *  565* 570*     CMP:   Recent Labs   Lab 10/18/20  0712 10/19/20  0719   * 137   K 4.4 4.0    107   CO2 22* 23   GLU 91 93   BUN 10 8   CREATININE 0.7 0.6   CALCIUM 8.1* 8.0*   ANIONGAP 6* 7*   EGFRNONAA >60 >60     All pertinent labs within the past 24 hours  have been reviewed.    Significant Imaging: I have reviewed all pertinent imaging results/findings within the past 24 hours.      Assessment/Plan:      * Hip prosthesis dislocation, right  Orthopedic surgery consulted - plan to OR in AM  Neuro checks with VS  NPO after MN for AM procedure  PT/OT to eval/treat post-op  Holding home ASA, plavix, xarelto in case surgery is required in AM  Resume above meds as appropriate  10/15/20 Ortho attempted closed reduction of the right hip which was unsuccessful. Plan for open reduction of the right hip on Saturday once patient has been medically optimized. H/H today was noted to be 6.7/21.4. Will transfuse 1 unit PRBC today and recheck labs. Continue to hold plavix and eliquis.   10/16/2020 - (open reduction of right bipolar hemiarthroplasty, Debridement and irrigation with hardware exchange as indicated) - scheduled for AM 10/17  10/17 - Procedure(s) (LRB):  OPEN REDUCTION, DISLOCATION, HIP (Right)  EVACUATION, HEMATOMA (Right)  Pt without complaints - abduction brace intact  10/19/2020 - PT/OT eval - SNF vs home with home health      Normocytic anemia  10/15/20  H/H today was noted to be 6.7/21.4. Will transfuse 1 unit PRBC today and recheck labs. CBC in am   Monitoring for acute blood loss anemia  10/19/2020 - slowly trending down - will continue to monitor      Parkinson disease  Continue home sinemet      Hyperlipidemia  Continue home statin - daughter says that patient is not taking this medication - STATIN discontinued        GERD (gastroesophageal reflux disease)  10/16/20 Continue PPI - Protonix stopped as daughter reports that patient is not taking      Essential hypertension  BP stable on admission  Continue home medications  IV enlaprilat prn elevated BP  Monitor closely      VTE Risk Mitigation (From admission, onward)         Ordered     Place sequential compression device  Until discontinued      10/14/20 4904     IP VTE LOW RISK PATIENT  Once      10/14/20 6473                 Discharge Planning   PARMINDER:      Code Status: Full Code   Is the patient medically ready for discharge?:     Reason for patient still in hospital (select all that apply): Patient trending condition and Pending disposition  Discharge Plan A: Rehab                  Rose Mary Blackman NP  Department of Hospital Medicine   [facility]

## 2020-10-19 NOTE — PT/OT/SLP PROGRESS
Physical Therapy  Treatment    Tanya Madrid   MRN: [id number]   Admitting Diagnosis: Hip dislocation, right    PT Received On: 10/19/20  PT Start Time: 0850     PT Stop Time: 0913    PT Total Time (min): 23 min       Billable Minutes:  Therapeutic Activity 13 and Therapeutic Exercise 10    Treatment Type: Treatment  PT/PTA: PT     PTA Visit Number: 0       General Precautions: Standard, fall  Orthopedic Precautions: RLE toe touch weight bearing, RLE posterior precautions   Braces: Hip abduction brace         Subjective:  Communicated with NURSE BENITES AND Epic CHART REVIEW  prior to session.  PT AGREED TO TX     Pain/Comfort  Pain Rating 1: 3/10  Location - Side 1: Right  Location 1: hip  Pain Addressed 1: Reposition  Pain Rating Post-Intervention 1: 3/10    Objective:   Patient found with: peripheral IV, oxygen    Functional Mobility:  PT MET IN RM SUP>SIT EOB WITH MAX A X 2. PT SCOOTED TO EOB AND PROPPED WITH PILLOWS FOR SUPPORT. PT COMPLETED AP, TKE, AND QUAD SETS X 10 REPS WITH VERBAL CUES AND LIMITED ROM. P.T. RE-EDUCATED PT AND SITTER ON POST HIP PRECAUTIONS. PT SCOOTED LEFT SEATED EOB PROPPED WITH  PILLOWS FOR SITTING TOLERANCE WITH SITTER PRESENT AND NURSE AWARE.     AM-PAC 6 CLICK MOBILITY  How much help from another person does this patient currently need?   1 = Unable, Total/Dependent Assistance  2 = A lot, Maximum/Moderate Assistance  3 = A little, Minimum/Contact Guard/Supervision  4 = None, Modified [facility]/Independent    Turning over in bed (including adjusting bedclothes, sheets and blankets)?: 2  Sitting down on and standing up from a chair with arms (e.g., wheelchair, bedside commode, etc.): 1  Moving from lying on back to sitting on the side of the bed?: 2  Moving to and from a bed to a chair (including a wheelchair)?: 1  Need to walk in hospital room?: 1  Climbing 3-5 steps with a railing?: 1  Basic Mobility Total Score: 8    AM-PAC Raw Score CMS G-Code Modifier Level of Impairment  Assistance   6 % Total / Unable   7 - 9 CM 80 - 100% Maximal Assist   10 - 14 CL 60 - 80% Moderate Assist   15 - 19 CK 40 - 60% Moderate Assist   20 - 22 CJ 20 - 40% Minimal Assist   23 CI 1-20% SBA / CGA   24 CH 0% Independent/ Mod I     Patient left SEATED EOB  with all lines intact, call button in reach, bed alarm on and SITTER present.    Assessment:  PT DOMINGA MIN TX. PT FATIGUES QUICKLY AND CONT TO NEED INC ASSIST.     Rehab identified problem list/impairments: Rehab identified problem list/impairments: weakness, impaired endurance, impaired self care skills, impaired functional mobilty, gait instability, impaired balance, pain, decreased safety awareness, decreased lower extremity function, decreased upper extremity function, decreased ROM, impaired fine motor    Rehab potential is fair.    Activity tolerance: Poor    Discharge recommendations: Discharge Facility/Level of Care Needs: nursing facility, skilled     Barriers to discharge:      Equipment recommendations: Equipment Needed After Discharge: none     GOALS:   Multidisciplinary Problems     Physical Therapy Goals        Problem: Physical Therapy Goal    Goal Priority Disciplines Outcome Goal Variances Interventions   Physical Therapy Goal     PT, PT/OT Ongoing, Progressing     Description: 1. Patient will perform supine to/from sit mod A  2. Patient will perform sit to/from stand with RW mod A TTWB R LE  3. Patient will perform B LE TE with hip abd brace donned x 20 reps                   PLAN:    Patient to be seen 5 x/week  to address the above listed problems via gait training, therapeutic activities, therapeutic exercises  Plan of Care expires: 10/25/20  Plan of Care reviewed with: patient         Michelle Franks, PT  10/19/2020

## 2020-10-19 NOTE — PLAN OF CARE
Patient remains free from injury/fall, pain controlled at this time, IVFs infusing as ordered.  Ortho brace and negative pressure wound therapy in place. Will continue to monitor, administer meds as ordered, provide comfort/safety measures and notify MD of any changes in condition.

## 2020-10-19 NOTE — ASSESSMENT & PLAN NOTE
PT/OT continue current plan  -TTWB, hip abduction brace  Monitor H&H  Wound vac change POD 7  SCDs for mechanical dvt prophylaxis, anticoagulation per HM team     F/u in clinic 10-14 days post op

## 2020-10-20 VITALS
RESPIRATION RATE: 18 BRPM | TEMPERATURE: 98 F | WEIGHT: 121.06 LBS | DIASTOLIC BLOOD PRESSURE: 60 MMHG | HEART RATE: 80 BPM | HEIGHT: 62 IN | SYSTOLIC BLOOD PRESSURE: 128 MMHG | BODY MASS INDEX: 22.28 KG/M2 | OXYGEN SATURATION: 98 %

## 2020-10-20 LAB
ANION GAP SERPL CALC-SCNC: 6 MMOL/L (ref 8–16)
BASOPHILS # BLD AUTO: 0.03 K/UL (ref 0–0.2)
BASOPHILS NFR BLD: 0.4 % (ref 0–1.9)
BLD PROD TYP BPU: NORMAL
BLOOD UNIT EXPIRATION DATE: NORMAL
BLOOD UNIT TYPE CODE: 7300
BLOOD UNIT TYPE: NORMAL
BUN SERPL-MCNC: 8 MG/DL (ref 8–23)
CALCIUM SERPL-MCNC: 8.1 MG/DL (ref 8.7–10.5)
CHLORIDE SERPL-SCNC: 108 MMOL/L (ref 95–110)
CO2 SERPL-SCNC: 23 MMOL/L (ref 23–29)
CODING SYSTEM: NORMAL
CREAT SERPL-MCNC: 0.6 MG/DL (ref 0.5–1.4)
DIFFERENTIAL METHOD: ABNORMAL
DISPENSE STATUS: NORMAL
EOSINOPHIL # BLD AUTO: 0.7 K/UL (ref 0–0.5)
EOSINOPHIL NFR BLD: 8.3 % (ref 0–8)
ERYTHROCYTE [DISTWIDTH] IN BLOOD BY AUTOMATED COUNT: 14.7 % (ref 11.5–14.5)
EST. GFR  (AFRICAN AMERICAN): >60 ML/MIN/1.73 M^2
EST. GFR  (NON AFRICAN AMERICAN): >60 ML/MIN/1.73 M^2
GLUCOSE SERPL-MCNC: 91 MG/DL (ref 70–110)
HCT VFR BLD AUTO: 24.2 % (ref 37–48.5)
HGB BLD-MCNC: 7.3 G/DL (ref 12–16)
IMM GRANULOCYTES # BLD AUTO: 0.27 K/UL (ref 0–0.04)
IMM GRANULOCYTES NFR BLD AUTO: 3.3 % (ref 0–0.5)
LYMPHOCYTES # BLD AUTO: 1 K/UL (ref 1–4.8)
LYMPHOCYTES NFR BLD: 12.1 % (ref 18–48)
MAGNESIUM SERPL-MCNC: 1.8 MG/DL (ref 1.6–2.6)
MCH RBC QN AUTO: 28.7 PG (ref 27–31)
MCHC RBC AUTO-ENTMCNC: 30.2 G/DL (ref 32–36)
MCV RBC AUTO: 95 FL (ref 82–98)
MONOCYTES # BLD AUTO: 1.1 K/UL (ref 0.3–1)
MONOCYTES NFR BLD: 13.3 % (ref 4–15)
NEUTROPHILS # BLD AUTO: 5.1 K/UL (ref 1.8–7.7)
NEUTROPHILS NFR BLD: 62.6 % (ref 38–73)
NRBC BLD-RTO: 0 /100 WBC
NUM UNITS TRANS PACKED RBC: NORMAL
PLATELET # BLD AUTO: 504 K/UL (ref 150–350)
PMV BLD AUTO: 9.3 FL (ref 9.2–12.9)
POTASSIUM SERPL-SCNC: 3.9 MMOL/L (ref 3.5–5.1)
RBC # BLD AUTO: 2.54 M/UL (ref 4–5.4)
SODIUM SERPL-SCNC: 137 MMOL/L (ref 136–145)
WBC # BLD AUTO: 8.15 K/UL (ref 3.9–12.7)

## 2020-10-20 PROCEDURE — 27000221 HC OXYGEN, UP TO 24 HOURS

## 2020-10-20 PROCEDURE — 25000003 PHARM REV CODE 250: Performed by: ORTHOPAEDIC SURGERY

## 2020-10-20 PROCEDURE — 97530 THERAPEUTIC ACTIVITIES: CPT

## 2020-10-20 PROCEDURE — 25000003 PHARM REV CODE 250: Performed by: NURSE PRACTITIONER

## 2020-10-20 PROCEDURE — 97110 THERAPEUTIC EXERCISES: CPT

## 2020-10-20 PROCEDURE — 80048 BASIC METABOLIC PNL TOTAL CA: CPT

## 2020-10-20 PROCEDURE — 99900035 HC TECH TIME PER 15 MIN (STAT)

## 2020-10-20 PROCEDURE — 36415 COLL VENOUS BLD VENIPUNCTURE: CPT

## 2020-10-20 PROCEDURE — 94761 N-INVAS EAR/PLS OXIMETRY MLT: CPT

## 2020-10-20 PROCEDURE — 83735 ASSAY OF MAGNESIUM: CPT

## 2020-10-20 PROCEDURE — 85025 COMPLETE CBC W/AUTO DIFF WBC: CPT

## 2020-10-20 RX ORDER — METOPROLOL TARTRATE 25 MG/1
25 TABLET, FILM COATED ORAL 2 TIMES DAILY
Qty: 60 TABLET | Refills: 1 | Status: ON HOLD | OUTPATIENT
Start: 2020-10-20 | End: 2021-01-23 | Stop reason: SDUPTHER

## 2020-10-20 RX ORDER — TRAMADOL HYDROCHLORIDE 50 MG/1
50 TABLET ORAL EVERY 6 HOURS PRN
Qty: 12 EACH | Refills: 0 | Status: SHIPPED | OUTPATIENT
Start: 2020-10-20 | End: 2021-01-27

## 2020-10-20 RX ORDER — POLYETHYLENE GLYCOL 3350 17 G/17G
17 POWDER, FOR SOLUTION ORAL DAILY
Refills: 0
Start: 2020-10-21 | End: 2021-01-27

## 2020-10-20 RX ADMIN — STANDARDIZED SENNA CONCENTRATE AND DOCUSATE SODIUM 1 TABLET: 8.6; 5 TABLET ORAL at 08:10

## 2020-10-20 RX ADMIN — POLYETHYLENE GLYCOL 3350 17 G: 17 POWDER, FOR SOLUTION ORAL at 08:10

## 2020-10-20 RX ADMIN — APIXABAN 2.5 MG: 2.5 TABLET, FILM COATED ORAL at 08:10

## 2020-10-20 RX ADMIN — HYDROCODONE BITARTRATE AND ACETAMINOPHEN 1 TABLET: 5; 325 TABLET ORAL at 08:10

## 2020-10-20 RX ADMIN — LOSARTAN POTASSIUM 100 MG: 50 TABLET, FILM COATED ORAL at 08:10

## 2020-10-20 RX ADMIN — AMLODIPINE BESYLATE 2.5 MG: 2.5 TABLET ORAL at 08:10

## 2020-10-20 RX ADMIN — CARBIDOPA AND LEVODOPA 3 TABLET: 25; 100 TABLET ORAL at 05:10

## 2020-10-20 RX ADMIN — METOPROLOL TARTRATE 25 MG: 25 TABLET, FILM COATED ORAL at 08:10

## 2020-10-20 RX ADMIN — CARBIDOPA AND LEVODOPA 3 TABLET: 25; 100 TABLET ORAL at 01:10

## 2020-10-20 RX ADMIN — CHLORHEXIDINE GLUCONATE 0.12% ORAL RINSE 10 ML: 1.2 LIQUID ORAL at 08:10

## 2020-10-20 RX ADMIN — CARBIDOPA AND LEVODOPA 3 TABLET: 25; 100 TABLET ORAL at 08:10

## 2020-10-20 NOTE — PLAN OF CARE
Wheelchair delivered to the bedside by [facility].  Ave with [facility] has arranged delivery of the hospital bed and patient lift with Adenike (daughter)

## 2020-10-20 NOTE — PT/OT/SLP PROGRESS
Physical Therapy  Treatment    Tanya Madrid   MRN: [id number]   Admitting Diagnosis: Hip dislocation, right    PT Received On: 10/20/20  PT Start Time: 0950     PT Stop Time: 1015    PT Total Time (min): 25 min       Billable Minutes:  Therapeutic Activity 15 and Therapeutic Exercise 10    Treatment Type: Treatment  PT/PTA: PT     PTA Visit Number: 0       General Precautions: Standard, fall  Orthopedic Precautions: RLE toe touch weight bearing, RLE posterior precautions   Braces: N/A         Subjective:  Communicated with NURSE MEIER AND [facility] CHART REVIEW  prior to session.   PT AGREED TO TX     Pain/Comfort  Pain Rating 1: 4/10  Location - Side 1: Right  Location 1: hip  Pain Rating Post-Intervention 1: 4/10    Objective:   Patient found with: peripheral IV    Functional Mobility:  PT MET IN RM SUP.SIT EOB WITH MAX A X 2. PT SCOOTED TO EOB AND SEATED WITH B UE SUPPORT AND MIN>SBA. PT COMPLETED B LE TE X 10 REPS OF AP, TKE. P.T. REVIEWED HIP PRECAUTIONS WITH PT AND DAUGHTER. PT LEFT SEATED EOB AND PROPPED WITH PILLOWS AND LEFT WITH ALL NEEDS MET. PT DAUGHTER PRESENT.     AM-PAC 6 CLICK MOBILITY  How much help from another person does this patient currently need?   1 = Unable, Total/Dependent Assistance  2 = A lot, Maximum/Moderate Assistance  3 = A little, Minimum/Contact Guard/Supervision  4 = None, Modified [place]/Independent    Turning over in bed (including adjusting bedclothes, sheets and blankets)?: 2  Sitting down on and standing up from a chair with arms (e.g., wheelchair, bedside commode, etc.): 1  Moving from lying on back to sitting on the side of the bed?: 2  Moving to and from a bed to a chair (including a wheelchair)?: 1  Need to walk in hospital room?: 1  Climbing 3-5 steps with a railing?: 1  Basic Mobility Total Score: 8    AM-PAC Raw Score CMS G-Code Modifier Level of Impairment Assistance   6 % Total / Unable   7 - 9 CM 80 - 100% Maximal Assist   10 - 14 CL 60 - 80% Moderate  Assist   15 - 19 CK 40 - 60% Moderate Assist   20 - 22 CJ 20 - 40% Minimal Assist   23 CI 1-20% SBA / CGA   24 CH 0% Independent/ Mod I     Patient left SEATED EOB with call button in reach.    Assessment:  PT PROGRESSING WITH SITTING TOLERANCE TODAY.     Rehab identified problem list/impairments: Rehab identified problem list/impairments: weakness, impaired endurance, impaired self care skills, impaired functional mobilty, gait instability, impaired balance, pain, decreased safety awareness, decreased lower extremity function, orthopedic precautions, decreased ROM    Rehab potential is fair.    Activity tolerance: Poor    Discharge recommendations: Discharge Facility/Level of Care Needs: nursing facility, skilled     Barriers to discharge:      Equipment recommendations: Equipment Needed After Discharge: wheelchair, lift device, hospital bed     GOALS:   Multidisciplinary Problems     Physical Therapy Goals        Problem: Physical Therapy Goal    Goal Priority Disciplines Outcome Goal Variances Interventions   Physical Therapy Goal     PT, PT/OT Ongoing, Progressing     Description: 1. Patient will perform supine to/from sit mod A  2. Patient will perform sit to/from stand with RW mod A TTWB R LE  3. Patient will perform B LE TE with hip abd brace donned x 20 reps                   PLAN:    Patient to be seen 5 x/week  to address the above listed problems via gait training, therapeutic activities, therapeutic exercises  Plan of Care expires: 10/25/20  Plan of Care reviewed with: patient         Michelle Blandonkrystian, PT  10/20/2020

## 2020-10-20 NOTE — SUBJECTIVE & OBJECTIVE
"Principal Problem:Hip dislocation, right    Principal Orthopedic Problem: Right hip dislocation    Interval History: POD 3 s/p Open reduction left dislocated hip. Only complaint is of abduction brace; will need to wear x 3 months. Ortho stable    Review of patient's allergies indicates:   Allergen Reactions    Xanax [alprazolam]        Current Facility-Administered Medications   Medication    0.9%  NaCl infusion (for blood administration)    0.9%  NaCl infusion    amLODIPine tablet 2.5 mg    apixaban tablet 2.5 mg    carbidopa-levodopa  mg per tablet 3 tablet    chlorhexidine 0.12 % solution 10 mL    clonazePAM tablet 0.5 mg    enalaprilat injection 1.25 mg    HYDROcodone-acetaminophen 5-325 mg per tablet 1 tablet    lactulose 20 gram/30 mL solution Soln 20 g    losartan tablet 100 mg    metoprolol tartrate (LOPRESSOR) tablet 25 mg    morphine injection 2 mg    nozaseptin (NOZIN) nasal     ondansetron disintegrating tablet 8 mg    ondansetron injection 4 mg    oxyCODONE immediate release tablet 10 mg    polyethylene glycol packet 17 g    senna-docusate 8.6-50 mg per tablet 1 tablet    traMADoL tablet 50 mg     Objective:     Vital Signs (Most Recent):  Temp: 98.7 °F (37.1 °C) (10/20/20 0732)  Pulse: 93 (10/20/20 0732)  Resp: 16 (10/20/20 0857)  BP: 129/60 (10/20/20 0732)  SpO2: 100 % (10/20/20 0740) Vital Signs (24h Range):  Temp:  [97.7 °F (36.5 °C)-99 °F (37.2 °C)] 98.7 °F (37.1 °C)  Pulse:  [83-95] 93  Resp:  [14-18] 16  SpO2:  [98 %-100 %] 100 %  BP: (126-140)/(60-65) 129/60     Weight: 54.9 kg (121 lb 0.5 oz)  Height: 5' 2" (157.5 cm)  Body mass index is 22.14 kg/m².      Intake/Output Summary (Last 24 hours) at 10/20/2020 1125  Last data filed at 10/20/2020 0935  Gross per 24 hour   Intake 2224 ml   Output --   Net 2224 ml       Ortho/SPM Exam     Wound vac working appropriately  Hip abduction brace well applied  Able to plantar and dorsiflex foot  Able to feel light touch " L4 L5 S1 dermatomes    Significant Labs:   CBC:   Recent Labs   Lab 10/19/20  0719 10/20/20  0721   WBC 10.44 8.15   HGB 7.8* 7.3*   HCT 25.5* 24.2*   * 504*     All pertinent labs within the past 24 hours have been reviewed.    Significant Imaging: I have reviewed all pertinent imaging results/findings.

## 2020-10-20 NOTE — ASSESSMENT & PLAN NOTE
PT/OT continue current plan  -TTWB, hip abduction brace x 3 months  Monitor H&H  Wound vac change POD 7  SCDs for mechanical dvt prophylaxis, anticoagulation per HM team   Ortho stable, ok to d/c when medically appropriate    F/u in clinic 10-14 days post op

## 2020-10-20 NOTE — PT/OT/SLP PROGRESS
Occupational Therapy   Treatment    Name: Tanya Madrid  MRN: [id number]  Admitting Diagnosis:  Hip dislocation, right  3 Days Post-Op    Recommendations:     Discharge Recommendations: nursing facility, skilled  Discharge Equipment Recommendations:  wheelchair, lift device, hospital bed  Barriers to discharge:       Assessment:     Tanya Madrid is a 80 y.o. female with a medical diagnosis of Hip dislocation, right.  She presents with  WITH DEBILITY AND GENERALIZED WEAKNESS. Performance deficits affecting function are weakness, impaired functional mobilty, impaired endurance, gait instability, impaired balance, impaired self care skills, decreased safety awareness.     Rehab Prognosis:  Fair; patient would benefit from acute skilled OT services to address these deficits and reach maximum level of function.       Plan:     Patient to be seen 3 x/week to address the above listed problems via self-care/home management, therapeutic activities, therapeutic exercises  · Plan of Care Expires:    · Plan of Care Reviewed with: patient    Subjective     Pain/Comfort:  · Pain Rating 1: 4/10  · Location - Side 1: Right  · Location - Orientation 1: generalized  · Location 1: hip    Objective:     Communicated with: DEBILITY AND GENERALIZED WEAKNESS prior to session.  Patient found HOB elevated with peripheral IV, telemetry upon OT entry to room.    General Precautions: Standard, fall   Orthopedic Precautions:RLE toe touch weight bearing, RLE posterior precautions   Braces: N/A     Occupational Performance:     Bed Mobility:    · Patient completed Rolling/Turning to Right with maximal assistance and 2 persons  · Patient completed Scooting/Bridging with total assistance  · Patient completed Supine to Sit with maximal assistance and 2 persons       [facility] 6 Click ADL: 6    Treatment & Education:  PT SEEN IN ROOM MAX A X 2 WITH BED MOBILITY . PT REQ TOTAL A WITH FORWARD SCOOTING. PT SAT EOB X 15 MINUTES POOR- TO POOR SITTING  BALANCE INTERMITTENTLY    Patient left SITTING EOB SECURE AND SUPPORTED WITH DAUGHTER PRESENT with all lines intact, call button in reach and DAUGHTER presentEducation:      GOALS:   Multidisciplinary Problems     Occupational Therapy Goals        Problem: Occupational Therapy Goal    Goal Priority Disciplines Outcome Interventions   Occupational Therapy Goal     OT, PT/OT Ongoing, Progressing    Description: OT GOALS TO BE MET BY 10-26-20  MOD A WITH BE MOBILITY  PT WILL TOLERATE 1 SET X 15 REPS B UE ROM EXERCISE   MOD A WITH SIMPLE G/H TASK SEATED EOB                   Time Tracking:     OT Date of Treatment: 10/20/20  OT Start Time: 1020  OT Stop Time: 1045  OT Total Time (min): 25 min    Billable Minutes:Therapeutic Activity 15 MINUTES  Therapeutic Exercise 8 MINUTES    Tanya Muse OT  10/20/2020

## 2020-10-20 NOTE — PROGRESS NOTES
[facility]  Orthopedics  Progress Note    Patient Name: Tanya Madrid  MRN: [id number]  Admission Date: 10/14/2020  Hospital Length of Stay: 5 days  Attending Provider: Sj Juan, *  Primary Care Provider: Maggie Sue NP  Follow-up For: Procedure(s) (LRB):  OPEN REDUCTION, DISLOCATION, HIP (Right)  EVACUATION, HEMATOMA (Right)    Post-Operative Day: 3 Days Post-Op  Subjective:     Principal Problem:Hip dislocation, right    Principal Orthopedic Problem: Right hip dislocation    Interval History: POD 3 s/p Open reduction left dislocated hip. Only complaint is of abduction brace; will need to wear x 3 months. Ortho stable    Review of patient's allergies indicates:   Allergen Reactions    Xanax [alprazolam]        Current Facility-Administered Medications   Medication    0.9%  NaCl infusion (for blood administration)    0.9%  NaCl infusion    amLODIPine tablet 2.5 mg    apixaban tablet 2.5 mg    carbidopa-levodopa  mg per tablet 3 tablet    chlorhexidine 0.12 % solution 10 mL    clonazePAM tablet 0.5 mg    enalaprilat injection 1.25 mg    HYDROcodone-acetaminophen 5-325 mg per tablet 1 tablet    lactulose 20 gram/30 mL solution Soln 20 g    losartan tablet 100 mg    metoprolol tartrate (LOPRESSOR) tablet 25 mg    morphine injection 2 mg    nozaseptin (NOZIN) nasal     ondansetron disintegrating tablet 8 mg    ondansetron injection 4 mg    oxyCODONE immediate release tablet 10 mg    polyethylene glycol packet 17 g    senna-docusate 8.6-50 mg per tablet 1 tablet    traMADoL tablet 50 mg     Objective:     Vital Signs (Most Recent):  Temp: 98.7 °F (37.1 °C) (10/20/20 0732)  Pulse: 93 (10/20/20 0732)  Resp: 16 (10/20/20 0857)  BP: 129/60 (10/20/20 0732)  SpO2: 100 % (10/20/20 0740) Vital Signs (24h Range):  Temp:  [97.7 °F (36.5 °C)-99 °F (37.2 °C)] 98.7 °F (37.1 °C)  Pulse:  [83-95] 93  Resp:  [14-18] 16  SpO2:  [98 %-100 %] 100 %  BP:  "(126-140)/(60-65) 129/60     Weight: 54.9 kg (121 lb 0.5 oz)  Height: 5' 2" (157.5 cm)  Body mass index is 22.14 kg/m².      Intake/Output Summary (Last 24 hours) at 10/20/2020 1125  Last data filed at 10/20/2020 0935  Gross per 24 hour   Intake 2224 ml   Output --   Net 2224 ml       Ortho/SPM Exam     Wound vac working appropriately  Hip abduction brace well applied  Able to plantar and dorsiflex foot  Able to feel light touch L4 L5 S1 dermatomes    Significant Labs:   CBC:   Recent Labs   Lab 10/19/20  0719 10/20/20  0721   WBC 10.44 8.15   HGB 7.8* 7.3*   HCT 25.5* 24.2*   * 504*     All pertinent labs within the past 24 hours have been reviewed.    Significant Imaging: I have reviewed all pertinent imaging results/findings.    Assessment/Plan:     * Hip prosthesis dislocation, right  PT/OT continue current plan  -TTWB, hip abduction brace x 3 months  Monitor H&H  Wound vac change POD 7  SCDs for mechanical dvt prophylaxis, anticoagulation per  team   Ortho stable, ok to d/c when medically appropriate    F/u in clinic 10-14 days post op          Humberto Angulo PA-C  Orthopedics  [facility]  "

## 2020-10-20 NOTE — DISCHARGE SUMMARY
[facility] Hospital Medicine  Discharge Summary      Patient Name: Tanya Madrid  MRN: [id number]  Admission Date: 10/14/2020  Hospital Length of Stay: 5 days  Discharge Date and Time:  10/20/2020 1:44 PM  Attending Physician: Sj Juan, *   Discharging Provider: Rose Mary Blackman NP  Primary Care Provider: Maggie Sue NP      HPI:    81 y/o AA F with hx of HTN, HLD, Parkinson's disease, stroke, B hip replacement sent from [facility] to ED after complaining of sudden onset of R hip pain and being found to have a dislocated R hip prosthesis - unsure as to how this occurred, no family was present and pt states she did not fall but does not know how the injury happened. Symptoms are persistent and moderate in severity with pain exacerbated by movement/palpation and improved at rest. Of note, pt was admitted from 10/01/2020 to 10/05/2020 after experiencing a fall and R hip fracture and subsequent ORIF. Denies chest pain, edema, palpitations, SOB, wheezing, abdominal pain, N/V/D, constipation, dysuria, flank pain, HA, dizziness, weakness, fever, cough, chills, blurred vision. Found in ED to have dislocation of the right hip prosthesis, while labs were largely WNL aside from mild hyperkalemia (5.2), VSS and COVID-19 negative. Attempts were made to reduce the right hip but were unsuccessful. Pt was given etomidate, morphine and 1L IV fluid bolus with improvement in pain. Hospital medicine was called and pt was placed in observation on telemetry with plan for likely reduction in OR in AM. Pt is a full code - her daughter Josee Lemus is her surrogate decision maker.        Procedure(s) (LRB):  OPEN REDUCTION, DISLOCATION, HIP (Right)  EVACUATION, HEMATOMA (Right)      Hospital Course:   10/15/20 Ortho attempted closed reduction of the right hip which was unsuccessful. Plan for open reduction of the right hip on Saturday once patient has been medically optimized. H/H today was  noted to be 6.7/21.4. Will transfuse 1 unit PRBC today and recheck labs. Continue to hold plavix and eliquis.     10/16/2020 - Pt is planned for open reduction of right bipolar hemiarthroplasty, I & D and hardware exchange per Orthopedics. Pt reports mild pain to the right hip. Pt was given 1 unit PRBCs at time of admission due to blood loss anemia. Pt on ASA and Plavix due to hx of stroke - these meds held. Also, Eliquis DVT prophylaxis on hold for pending surgery.     10/17/2020 - Procedure(s) (LRB):  OPEN REDUCTION, DISLOCATION, HIP (Right)  EVACUATION, HEMATOMA (Right). S/P procedure today. Seen and examined upon return to the room. Pt is awake and denies any complaints. Daughter is at bedside and verbalizes no concerns. Daughter does not want mother to return to [facility]. Pt has hip abduction brace that must remain intact. PT/OT eval pending. Pt is taking Tylenol for pain control.     10/18/2020 - Vital signs and labs are stable. No concerns for acute blood loss anemia. Pt is wearing the abductor brace. She describes lower back pain. Other symptoms denied. PT/OT eval pending. To obtain disposition options from the patient's daughter.     10/19/2020 - H/H slowly trending down - pt did receive 1unit of PRBC upon admission. H/H 8.2/27.1 >> 7.8/25.5 Will continue to monitor. Pt denies any complaints. Discussed disposition with daughter and she verbalized home  VS SNF placement.  consult placed. S/P surgery 24 hours and anticoagulation held as recommended by Orthopedics.   10/20/2020 - Eliquis DVT prophylaxis resumed. Daughter has elected to take patient home as there is not a SNF that will allow family members to stay with the patient. [facility]  with nursing, PT/OT arranged. Daughter was advised regarding toe touch weight bearing to the right leg and pt must wear the abduction brace at all time. Pt has worked with PT/OT and her mobility is very limited. DME ordered includes W/C, Gagan lift and  hospital bed. Pt's vital signs and labs are stable. According to Orthopedics, wound vac to right lateral hip should be removed on 10/23/2020 and incision can be left open to air. Daughter was advised regarding all instructions and follow up appointment with Orthopedics in 10 to 14 days. Pt was seen and examined and determined to be safe and stable for discharge.      Consults:   Consults (From admission, onward)        Status Ordering Provider     Inpatient consult to Orthopedic Surgery  Once     Provider:  Humberto Valdivia, HUMBERTO Ibarra     Inpatient consult to Social Work  Once     Provider:  (Not yet assigned)    Completed WEST SHARMA     Inpatient consult to Social Work  Once     Provider:  (Not yet assigned)    Completed HUMBERTO VALDIVIA     Inpatient consult to Social Work  Once     Provider:  (Not yet assigned)    Completed EMELIA MACHUCA          No new Assessment & Plan notes have been filed under this hospital service since the last note was generated.  Service: Hospital Medicine    Final Active Diagnoses:    Diagnosis Date Noted POA    PRINCIPAL PROBLEM:  Hip prosthesis dislocation, right [S73.004A] 10/14/2020 Yes    Normocytic anemia [D64.9] 10/15/2020 Yes    Essential hypertension [I10] 07/24/2019 Yes     Chronic    Parkinson disease [G20] 07/11/2017 Yes     Chronic    GERD (gastroesophageal reflux disease) [K21.9] 07/11/2017 Yes     Chronic    Hyperlipidemia [E78.5] 07/11/2017 Yes      Problems Resolved During this Admission:    Diagnosis Date Noted Date Resolved POA    Hyperkalemia [E87.5] 07/12/2020 10/16/2020 Yes       Discharged Condition: stable    Disposition: Home-Health Care [facility]    Follow Up:  Follow-up Information     [facility] In 10 days.    Why: Hospital Follow Up - See orthopedics in 10 to 14 days for follow up post surgery           [facility].    Specialty: Home Health Services  Why: Home Health  Contact  "information:  [address], [address]  [place] [address]  [phone]             [facility]    Specialty: DME Provider  Why: DME-   Contact information:  Jessie ROSS  [facility] [address]  [place] [address]  [phone]                 Patient Instructions:      HME - OTHER     Order Specific Question Answer Comments   Type of Equipment: Hip abduction orthosis for right hip    Height: 5' 2" (1.575 m)    Weight: 54.9 kg (121 lb 0.5 oz)      HOSPITAL BED FOR HOME USE     Order Specific Question Answer Comments   Type: Semi-electric    Length of need (1-99 months): 99    Does patient have medical equipment at home? walker, rolling    Height: 5' 2" (1.575 m)    Weight: 54.9 kg (121 lb 0.5 oz)    Please check all that apply: Patient requires frequent changes in body position and/or has an immediate need for a change in body position.      PATIENT (MIHIR) LIFT FOR HOME USE     Order Specific Question Answer Comments   Height: 5' 2" (1.575 m)    Weight: 54.9 kg (121 lb 0.5 oz)    Does patient have medical equipment at home? walker, rolling    Length of need (1-99 months): 99      WHEELCHAIR FOR HOME USE     Order Specific Question Answer Comments   Hours in W/C per day: 8    Type of Wheelchair: Standard    Size(Width): 18"(STD adult)    Leg Support: STD footrests    Arm Height: Detachable    Lap Belt: Velcro    Cushion: Basic    Height: 5' 2" (1.575 m)    Weight: 54.9 kg (121 lb 0.5 oz)    Does patient have medical equipment at home? walker, rolling    Length of need (1-99 months): 99    Please check all that apply: Caregiver is capable and willing to operate wheelchair safely.    Please check all that apply: The patient requires the use of a w/c for activities of daily living within the Home.    Please check all that apply: Patient mobility limitations cannot be sufficiently resolved by the use of other ambulatory therapies.    Vendor: [facility] delivered from [facility]   Expected Date of Delivery: " 10/20/2020      Other restrictions (specify):   Order Comments: Wear abductor brace at all times  May toe touch weight bearing to right leg     Notify your health care provider if you experience any of the following:  temperature >100.4     Notify your health care provider if you experience any of the following:  severe uncontrolled pain       Significant Diagnostic Studies: Labs:   CMP   Recent Labs   Lab 10/19/20  0719 10/20/20  0721    137   K 4.0 3.9    108   CO2 23 23   GLU 93 91   BUN 8 8   CREATININE 0.6 0.6   CALCIUM 8.0* 8.1*   ANIONGAP 7* 6*   ESTGFRAFRICA >60 >60   EGFRNONAA >60 >60    and CBC   Recent Labs   Lab 10/19/20  0719 10/20/20  0721   WBC 10.44 8.15   HGB 7.8* 7.3*   HCT 25.5* 24.2*   * 504*       Pending Diagnostic Studies:     None         Medications:  Reconciled Home Medications:      Medication List      START taking these medications    metoprolol tartrate 25 MG tablet  Commonly known as: LOPRESSOR  Take 1 tablet (25 mg total) by mouth 2 (two) times daily.     polyethylene glycol 17 gram Pwpk  Commonly known as: GLYCOLAX  Take 17 g by mouth once daily.  Start taking on: October 21, 2020     traMADoL 50 mg tablet  Commonly known as: ULTRAM  Take 1 tablet (50 mg total) by mouth every 6 (six) hours as needed for Pain.        CONTINUE taking these medications    apixaban 2.5 mg Tab  Commonly known as: ELIQUIS  Take 1 tablet (2.5 mg total) by mouth 2 (two) times daily.     aspirin 81 MG EC tablet  Commonly known as: ECOTRIN  Take 81 mg by mouth once daily.     carbidopa-levodopa  mg  mg per tablet  Commonly known as: SINEMET  Take 3 tablets by mouth 4 (four) times daily.     clonazePAM 0.5 MG tablet  Commonly known as: KLONOPIN  Take 0.5 mg by mouth 2 (two) times daily as needed for Anxiety.     clopidogreL 75 mg tablet  Commonly known as: PLAVIX  Take 75 mg by mouth once daily.     INV amlodipine 2.5 MG Tab  Commonly known as: NORVASC  Take 1 tablet (2.5 mg  total) by mouth once daily.     LORazepam 0.5 MG tablet  Commonly known as: ATIVAN  Take 1 tablet (0.5 mg total) by mouth every 8 (eight) hours as needed for Anxiety.     losartan 100 MG tablet  Commonly known as: COZAAR  Take 100 mg by mouth once daily.     multivitamin capsule  Take 1 capsule by mouth once daily.     nozaseptin  nasal   Commonly known as: NOZIN  1 each by Each Nostril route 2 (two) times daily.     omeprazole 20 MG capsule  Commonly known as: PRILOSEC  Take 20 mg by mouth once daily.     potassium chloride 10 MEQ Tbsr  Commonly known as: KLOR-CON  Take 10 mEq by mouth once daily.     raloxifene 60 mg tablet  Commonly known as: EVISTA  Take 60 mg by mouth once daily.        STOP taking these medications    cefUROXime 500 MG tablet  Commonly known as: CEFTIN     HYDROcodone-acetaminophen 5-325 mg per tablet  Commonly known as: NORCO     metoprolol succinate 25 MG 24 hr tablet  Commonly known as: TOPROL-XL     rosuvastatin 10 MG tablet  Commonly known as: CRESTOR     spironolactone 25 MG tablet  Commonly known as: ALDACTONE            Indwelling Lines/Drains at time of discharge:   Lines/Drains/Airways     None                 Time spent on the discharge of patient: > 30 minutes  Patient was seen and examined on the date of discharge and determined to be suitable for discharge.         Rose Mary Blackman NP  Department of Hospital Medicine  [facility]

## 2020-10-20 NOTE — PLAN OF CARE
SHARI received a call from Adenike (daughter) in regards to the discharge plan.  She would like to use [facility].  Choice form completed and witnessed.  Adenike also requested the recommended equipment (hospital bed, zachary lift, and wheelchair).  SHARI requested equipment orders from VANITA Bazzi

## 2020-10-20 NOTE — PLAN OF CARE
Patient rested quietly this shift with daughter at bedside. Free from fall/injury. No s/s of distress. No changes in condition. Will continue to monitor, administer meds as ordered, provide comfort/safety measures and notify MD of any changes in condition.

## 2020-10-21 ENCOUNTER — PATIENT OUTREACH (OUTPATIENT)
Dept: ADMINISTRATIVE | Facility: CLINIC | Age: 80
End: 2020-10-21

## 2020-10-21 NOTE — PLAN OF CARE
Pt rates her pain at a 10 with out pain medication... after pain med administration pt rates her pain level at a 0. Pt remains free from falls/injuries. Chart check done. IV removed with no problems. AVS printed. Will educate on AVS. Then wheel Pt down with transportation,.

## 2020-10-21 NOTE — PATIENT INSTRUCTIONS
Wound Check After Surgery, No Complication  Surgery involves cutting through layers of skin, fatty tissue, muscle, and sometimes bone and cartilage. Sutures (stitches) or staples are used to close all layers of the wound. The sutures on the inside will dissolve in about 2 to 3 weeks. Any sutures or staples used on the outside need to be removed in about 7 to 14 days, depending on the location.  It is normal to have some clear or bloody discharge on the wound covering or bandage (dressing) for the first few days after surgery. If your wound was sutured (sewn) closed, you should not have to change the dressing more than twice a day in the first few days. Bleeding or discharge requiring more frequent dressing changes can be a sign of a problem.  It is normal to feel pain at the incision site. The pain decreases as the wound heals. Most of the pain and soreness from the skin incision should go away by the time the sutures or staples are removed. Soreness and pain from deeper tissues may last another week or two.  Pain that continues more than a few weeks after surgery or pain that worsens anytime after surgery can be a sign of a problem, such as:  · Infection  · Separation of wound edges  · Collection of blood or other below the skin  Home care  Different types of surgery require different types of care and dressing changes. It is important to follow all instructions and advice from your surgeon, as well as other members of your healthcare team.  Wound care  · Keep the wound clean, as directed by your healthcare provider.  · Change the dressing as directed. Change the dressing sooner if it becomes wet or stained with blood or fluid from the wound.  · Bathe with a sponge (no shower or tub baths) for the first few days after surgery, or until there is no more drainage from the wound. Unless you received different instructions from your surgeon, you can then shower. Do not soak the area in water (no baths or swimming)  until the tape, sutures, or staples are removed and any wound opening has dried out and healed.  Changing the dressing  · Wash your hands before changing the dressings.  · Carefully remove the dressing and tape; dont just yank it off. If it sticks to the wound, you may need to wet it a little to remove it, unless your healthcare provider told you not to wet it.  · Wash your hands again before putting on a new, clean dressing.  · Gently clean the wound with clean water (or saline) using gauze or a clean washcloth. Do not rub it or pick at it.  · Do not use soap, alcohol, hydrogen peroxide, or any other cleanser.  · If you were told to dry the wound before putting on a new dressing, gently pat it dry. Do not rub.  · Throw out the old dressing. Do not reuse it!  · Wash your hands again when you are done.  Types of dressings  Your healthcare team will tell you what type of dressing to put on your wound. Follow your healthcare teams instructions carefully, and contact them if you have any questions. Two common types of dressings are described below. You may have one of these or another type.  · Dry dressing. Use dry gauze. If the wound is still draining, use a nonadherent dressing, which shouldnt stick to the wound.  · Wet-to-dry dressing. Wet the gauze, and squeeze out the excess water (or saline), before putting it on. Then, cover this with a dry pad.  Medicines  · If you were given antibiotics, take them until they are used up or your healthcare provider tells you to stop. It is important to finish the antibiotics even though you feel better, to make sure the infection has cleared.  · You can take acetaminophen or ibuprofen for pain, unless you were given a different pain medicine to use. (Note: If you have chronic liver or kidney disease, or have ever had a stomach ulcer or gastrointestinal bleeding, or are taking blood thinner medicines, talk with your healthcare provider before using these  medicines.)  · Aspirin should never be used in anyone under 18 years of age who is ill with a fever. It may cause severe liver damage.  Follow-up care  Follow up with your healthcare provider, or as advised, for your next wound check or removal of your sutures, staples, or tape.  · If a culture was done, you will be notified if the results will affect your treatment. You can call as directed for the results.  · If imaging tests, such as X-rays, an ultrasound, or CT scan were done, they will be reviewed by a specialist. You will be notified of the results, especially if they affect treatment.  Call 911  Call emergency services right away if any of these occur:  · Trouble breathing or swallowing, wheezing  · Hoarse voice or trouble speaking  · Extreme confusion  · Extreme drowsiness or trouble awakening  · Fainting or loss of consciousness  · Rapid heart rate or very slow heart rate  · Vomiting blood, or large amounts of blood in stool  · Discomfort in the center of the chest that feels like pressure, squeezing, a sense of fullness, or pain.  · Discomfort or pain in other upper body areas, such as the back, one or both arms, neck, jaw, or stomach  · Stroke symptoms (spot a stroke FAST)  ¨ F: Face drooping. One side of the face is numb or droops.  ¨ A: Arm weakness. One arm feels weak or numb.  ¨ S: Speech difficulty: Speech is slurred, or the person is unable to speak.  ¨ T: Time to call 911. Even if symptoms go away, call 911.  When to seek medical advice  Call your healthcare provider right away if any of the following occur:  · Increasing pain at the site of surgery  · Fever over 100.4º F (38º C)  · Redness around the wound  · Fluid, pus, or blood draining from the wound  · Vomiting, constipation, or diarrhea  Date Last Reviewed: 9/27/2015  © 0743-5266 [employer]. [address] [place] [address], [place] [address]. All rights reserved. This information is not intended as a substitute for professional  medical care. Always follow your healthcare professional's instructions.

## 2020-10-21 NOTE — PLAN OF CARE
[facility] for home home medical equipment     10/21/20 0729   Final Note   Assessment Type Final Discharge Note   Anticipated Discharge Disposition Home-Health   Hospital Follow Up  Appt(s) scheduled? Yes   Right Care Referral Info   Post Acute Recommendation Home-care   Facility Name [facility]

## 2020-10-22 ENCOUNTER — TELEPHONE (OUTPATIENT)
Dept: ORTHOPEDICS | Facility: HOSPITAL | Age: 80
End: 2020-10-22

## 2020-10-22 DIAGNOSIS — Z96.649 STATUS POST HIP HEMIARTHROPLASTY: Primary | ICD-10-CM

## 2020-10-22 DIAGNOSIS — S72.002D CLOSED FRACTURE OF LEFT HIP WITH ROUTINE HEALING, SUBSEQUENT ENCOUNTER: ICD-10-CM

## 2020-10-22 DIAGNOSIS — S73.004D DISLOCATION OF RIGHT HIP, SUBSEQUENT ENCOUNTER: ICD-10-CM

## 2020-10-22 NOTE — TELEPHONE ENCOUNTER
Orders placed for anti tippers for WC and elevating swing away leg rest. We will fax over to the number below      Humberto Angulo PA-C  Orthopedic Surgery      ----- Message from Anaid Templeton MA sent at 10/21/2020  3:57 PM CDT -----  Contact: Tiana    ----- Message -----  From: Sharan Mcmullen  Sent: 10/21/2020   3:30 PM CDT  To: On Ortho Clinical Staff    Would like to consult with nurse regarding a swing away elevating leg rest and anti tippers for patient's wheelchair.  Please contact Tiana/[facility] @ [phone]. Please send order in [facility] or fax order to [phone].   Thanks/As

## 2020-10-27 ENCOUNTER — DOCUMENT SCAN (OUTPATIENT)
Dept: HOME HEALTH SERVICES | Facility: HOSPITAL | Age: 80
End: 2020-10-27
Payer: MEDICARE

## 2020-10-28 ENCOUNTER — EXTERNAL HOME HEALTH (OUTPATIENT)
Dept: HOME HEALTH SERVICES | Facility: HOSPITAL | Age: 80
End: 2020-10-28

## 2020-10-28 ENCOUNTER — NURSE TRIAGE (OUTPATIENT)
Dept: ADMINISTRATIVE | Facility: CLINIC | Age: 80
End: 2020-10-28

## 2020-10-28 NOTE — TELEPHONE ENCOUNTER
Pt contacted through Post Procedural Symptom Tracking. Denies any cough, fever, or difficulty breathing since procedure.  Pt instructed to call [facility] or contact provider with any changes of condition or concerns.     Reason for Disposition   [1] Follow-up call to recent contact AND [2] information only call, no triage required    Protocols used: INFORMATION ONLY CALL - NO TRIAGE-A-

## 2020-11-02 ENCOUNTER — OFFICE VISIT (OUTPATIENT)
Dept: ORTHOPEDICS | Facility: CLINIC | Age: 80
End: 2020-11-02
Payer: MEDICARE

## 2020-11-02 ENCOUNTER — HOSPITAL ENCOUNTER (OUTPATIENT)
Dept: RADIOLOGY | Facility: HOSPITAL | Age: 80
Discharge: HOME | End: 2020-11-02
Attending: PHYSICIAN ASSISTANT
Payer: MEDICARE

## 2020-11-02 VITALS
HEIGHT: 62 IN | WEIGHT: 121 LBS | BODY MASS INDEX: 22.26 KG/M2 | SYSTOLIC BLOOD PRESSURE: 114 MMHG | HEART RATE: 95 BPM | DIASTOLIC BLOOD PRESSURE: 72 MMHG

## 2020-11-02 DIAGNOSIS — M25.552 LEFT HIP PAIN: ICD-10-CM

## 2020-11-02 DIAGNOSIS — S73.004D DISLOCATION OF RIGHT HIP, SUBSEQUENT ENCOUNTER: ICD-10-CM

## 2020-11-02 DIAGNOSIS — Z96.649 STATUS POST HIP HEMIARTHROPLASTY: Primary | ICD-10-CM

## 2020-11-02 PROCEDURE — 99999 PR PBB SHADOW E&M-EST. PATIENT-LVL V: CPT | Mod: PBBFAC,,, | Performed by: ORTHOPAEDIC SURGERY

## 2020-11-02 PROCEDURE — 99024 PR POST-OP FOLLOW-UP VISIT: ICD-10-PCS | Mod: POP,,, | Performed by: ORTHOPAEDIC SURGERY

## 2020-11-02 PROCEDURE — 73502 X-RAY EXAM HIP UNI 2-3 VIEWS: CPT | Mod: TC,RT

## 2020-11-02 PROCEDURE — 73502 X-RAY EXAM HIP UNI 2-3 VIEWS: CPT | Mod: 26,RT,, | Performed by: RADIOLOGY

## 2020-11-02 PROCEDURE — 99999 PR PBB SHADOW E&M-EST. PATIENT-LVL V: ICD-10-PCS | Mod: PBBFAC,,, | Performed by: ORTHOPAEDIC SURGERY

## 2020-11-02 PROCEDURE — 99024 POSTOP FOLLOW-UP VISIT: CPT | Mod: POP,,, | Performed by: ORTHOPAEDIC SURGERY

## 2020-11-02 PROCEDURE — 99215 OFFICE O/P EST HI 40 MIN: CPT | Mod: PBBFAC,25 | Performed by: ORTHOPAEDIC SURGERY

## 2020-11-02 PROCEDURE — 73502 XR HIP 2 VIEW RIGHT: ICD-10-PCS | Mod: 26,RT,, | Performed by: RADIOLOGY

## 2020-11-03 NOTE — PROGRESS NOTES
"Tanya Madrid is a 80 y.o. female patient.   1. Status post hip hemiarthroplasty    2. Dislocation of right hip, subsequent encounter      Past Medical History:   Diagnosis Date    Hyperlipemia     Hypertension     Parkinson's disease     Stroke 2016    Throat mass      No past surgical history pertinent negatives on file.  Scheduled Meds:  Continuous Infusions:  PRN Meds:    Review of patient's allergies indicates:   Allergen Reactions    Xanax [alprazolam]      There are no hospital problems to display for this patient.    Blood pressure 114/72, pulse 95, height 5' 2" (1.575 m), weight 54.9 kg (121 lb).    Subjective patient returns for follow-up of right hip bipolar hemiarthroplasty done October 2, 2020.  She is brought in by her daughter.  She had a postop complication of hip dislocation requiring reduction on October 15, now 18 days post dislocation.  She is in a hip abduction brace.  Still has sutures in place.  Not having much pain according to the daughter that she has been confined to bed with a nonweightbearing status.  Previously had left hip hemiarthroplasty July 11th.      Objective comes in by wheelchair.  She is awake and alert, minimally verbal.  Right hip with healed lateral incision.  The sutures are removed although with difficulty given the time since her surgical procedure.  She is in the abduction brace which fits adequately.  Skin is intact    X-rays of the right hip were reviewed.  She has a right-sided cemented hemiarthroplasty.  On the left there is a bipolar Press-Fit hemiarthroplasty.       Assessment & Plan the patient is recovering from right hip hemiarthroplasty.  The hip seems to be doing well at this point.  Recommended in-home therapy with progressive weight-bearing as tolerated with the brace on.  Return in 2 weeks with x-rays and consider discontinuing the brace.  She can full weightbear present.       Papo Ochoa MD  11/3/2020  "

## 2020-11-16 ENCOUNTER — OFFICE VISIT (OUTPATIENT)
Dept: ORTHOPEDICS | Facility: CLINIC | Age: 80
End: 2020-11-16
Payer: MEDICARE

## 2020-11-16 VITALS
HEART RATE: 95 BPM | DIASTOLIC BLOOD PRESSURE: 92 MMHG | WEIGHT: 121 LBS | BODY MASS INDEX: 22.26 KG/M2 | HEIGHT: 62 IN | SYSTOLIC BLOOD PRESSURE: 153 MMHG

## 2020-11-16 DIAGNOSIS — S73.004D DISLOCATION OF RIGHT HIP, SUBSEQUENT ENCOUNTER: Primary | ICD-10-CM

## 2020-11-16 PROCEDURE — 99024 POSTOP FOLLOW-UP VISIT: CPT | Mod: POP,,, | Performed by: ORTHOPAEDIC SURGERY

## 2020-11-16 PROCEDURE — 99024 PR POST-OP FOLLOW-UP VISIT: ICD-10-PCS | Mod: POP,,, | Performed by: ORTHOPAEDIC SURGERY

## 2020-11-16 PROCEDURE — 99999 PR PBB SHADOW E&M-EST. PATIENT-LVL IV: ICD-10-PCS | Mod: PBBFAC,,, | Performed by: ORTHOPAEDIC SURGERY

## 2020-11-16 PROCEDURE — 99999 PR PBB SHADOW E&M-EST. PATIENT-LVL IV: CPT | Mod: PBBFAC,,, | Performed by: ORTHOPAEDIC SURGERY

## 2020-11-16 PROCEDURE — 99214 OFFICE O/P EST MOD 30 MIN: CPT | Mod: PBBFAC | Performed by: ORTHOPAEDIC SURGERY

## 2020-11-16 NOTE — PROGRESS NOTES
"Tanya Madrid is a 80 y.o. female patient.   1. Dislocation of right hip, subsequent encounter      Past Medical History:   Diagnosis Date    Hyperlipemia     Hypertension     Parkinson's disease     Stroke 2016    Throat mass      No past surgical history pertinent negatives on file.  Scheduled Meds:  Continuous Infusions:  PRN Meds:    Review of patient's allergies indicates:   Allergen Reactions    Xanax [alprazolam]      There are no hospital problems to display for this patient.    Blood pressure (!) 153/92, pulse 95, height 5' 2" (1.575 m), weight 54.9 kg (121 lb).    Subjective the patient is brought in by her daughter.  Had right hip hemiarthroplasty for fracture October 2nd.  Complicated by dislocation with reduction on October 17.  Has been in a brace.  He gets out of bed.  Brace makes it very difficult for her to move and get about.  Does get in-home therapy.    Objective comes in by wheelchair.  The brace is intact.     Assessment & Plan the patient is now 30 days out from dislocation.  Will allow the therapist to remove the brace during the therapy sessions over the next week.  If that goes well the brace will be discontinued and she will return here in 2 weeks with x-rays of the right hip.  Discussed the risk of further dislocation.       Papo Ochoa MD  11/16/2020  "

## 2020-11-16 NOTE — PATIENT INSTRUCTIONS
The patient is now 30 days out from right hip dislocation following hemiarthroplasty October 2nd.  Dislocation was on October 17.  She has been in the hip brace.  Now instructed the physical therapist to see her at home, remove the brace during the next 3 visits for walker ambulation, weight-bearing as tolerated.  If all goes well at the end of the 3rd visit the brace can be discontinued.  Return here in 2 weeks with x-rays of the right hip.

## 2020-11-17 ENCOUNTER — DOCUMENT SCAN (OUTPATIENT)
Dept: HOME HEALTH SERVICES | Facility: HOSPITAL | Age: 80
End: 2020-11-17
Payer: MEDICARE

## 2020-11-18 ENCOUNTER — TELEPHONE (OUTPATIENT)
Dept: ORTHOPEDICS | Facility: HOSPITAL | Age: 80
End: 2020-11-18

## 2020-11-18 NOTE — TELEPHONE ENCOUNTER
Returned call to [place] LEMA. States there is a small lump around the dorsal incision area. No drainage, erythema, afebile, pain free. Stated for now we will monitor. Should any of those signs of infection arise or the lump increases in size we will see the patient back sooner.      Humberto Angulo PA-C  Orthopedic Surgery      ----- Message from Korin Dolan MA sent at 11/17/2020  4:10 PM CST -----  Regarding: FW: hip  [facility]  Please advise   ----- Message -----  From: Ashly Copeland  Sent: 11/17/2020   2:26 PM CST  To: [facility] Staff  Subject: hip                                              Mr Pate states that patient states that there is a lump right under the skin where incision is, he states he could not see anything but he could feel it. Please call Mr Stover back at [phone]

## 2020-11-30 ENCOUNTER — HOSPITAL ENCOUNTER (OUTPATIENT)
Dept: RADIOLOGY | Facility: HOSPITAL | Age: 80
Discharge: HOME | End: 2020-11-30
Attending: ORTHOPAEDIC SURGERY
Payer: MEDICARE

## 2020-11-30 ENCOUNTER — CLINICAL SUPPORT (OUTPATIENT)
Dept: REHABILITATION | Facility: HOSPITAL | Age: 80
End: 2020-11-30
Attending: ORTHOPAEDIC SURGERY
Payer: MEDICARE

## 2020-11-30 ENCOUNTER — OFFICE VISIT (OUTPATIENT)
Dept: ORTHOPEDICS | Facility: CLINIC | Age: 80
End: 2020-11-30
Payer: MEDICARE

## 2020-11-30 VITALS
HEIGHT: 62 IN | SYSTOLIC BLOOD PRESSURE: 183 MMHG | DIASTOLIC BLOOD PRESSURE: 107 MMHG | BODY MASS INDEX: 22.26 KG/M2 | HEART RATE: 79 BPM | WEIGHT: 121 LBS

## 2020-11-30 DIAGNOSIS — M25.551 RIGHT HIP PAIN: Primary | ICD-10-CM

## 2020-11-30 DIAGNOSIS — S73.004D DISLOCATION OF RIGHT HIP, SUBSEQUENT ENCOUNTER: ICD-10-CM

## 2020-11-30 DIAGNOSIS — Z96.649 STATUS POST HIP HEMIARTHROPLASTY: ICD-10-CM

## 2020-11-30 DIAGNOSIS — M25.669 DECREASED RANGE OF MOTION OF LOWER EXTREMITY, UNSPECIFIED LATERALITY: ICD-10-CM

## 2020-11-30 DIAGNOSIS — Z96.641 CHRONIC HIP PAIN AFTER TOTAL REPLACEMENT OF RIGHT HIP JOINT: Primary | ICD-10-CM

## 2020-11-30 DIAGNOSIS — R26.9 ABNORMALITY OF GAIT AND MOBILITY: ICD-10-CM

## 2020-11-30 DIAGNOSIS — S73.004D DISLOCATION OF RIGHT HIP, SUBSEQUENT ENCOUNTER: Primary | ICD-10-CM

## 2020-11-30 DIAGNOSIS — G89.29 CHRONIC HIP PAIN AFTER TOTAL REPLACEMENT OF RIGHT HIP JOINT: Primary | ICD-10-CM

## 2020-11-30 DIAGNOSIS — M25.551 CHRONIC HIP PAIN AFTER TOTAL REPLACEMENT OF RIGHT HIP JOINT: Primary | ICD-10-CM

## 2020-11-30 PROCEDURE — 97161 PT EVAL LOW COMPLEX 20 MIN: CPT

## 2020-11-30 PROCEDURE — 99024 PR POST-OP FOLLOW-UP VISIT: ICD-10-PCS | Mod: POP,,, | Performed by: PHYSICIAN ASSISTANT

## 2020-11-30 PROCEDURE — 99999 PR PBB SHADOW E&M-EST. PATIENT-LVL III: CPT | Mod: PBBFAC,,, | Performed by: PHYSICIAN ASSISTANT

## 2020-11-30 PROCEDURE — 99999 PR PBB SHADOW E&M-EST. PATIENT-LVL III: ICD-10-PCS | Mod: PBBFAC,,, | Performed by: PHYSICIAN ASSISTANT

## 2020-11-30 PROCEDURE — 73502 XR HIP 2 VIEW RIGHT: ICD-10-PCS | Mod: 26,RT,, | Performed by: RADIOLOGY

## 2020-11-30 PROCEDURE — 99213 OFFICE O/P EST LOW 20 MIN: CPT | Mod: PBBFAC,25 | Performed by: PHYSICIAN ASSISTANT

## 2020-11-30 PROCEDURE — 73502 X-RAY EXAM HIP UNI 2-3 VIEWS: CPT | Mod: TC,RT

## 2020-11-30 PROCEDURE — 73502 X-RAY EXAM HIP UNI 2-3 VIEWS: CPT | Mod: 26,RT,, | Performed by: RADIOLOGY

## 2020-11-30 PROCEDURE — 99024 POSTOP FOLLOW-UP VISIT: CPT | Mod: POP,,, | Performed by: PHYSICIAN ASSISTANT

## 2020-11-30 NOTE — PROGRESS NOTES
Post-Op Progress Note    Subjective:    Tanya Madrid is a 80 y.o.  female s/p bipolar hemiarthroplasty right hip 8 weeks ago with subsequent dislocation and open reduction 6 weeks ago.  At patient's visit 2 weeks ago she is instructed by Dr. Ochoa that she may begin to take off her hip abduction brace during therapy sessions and if all goes well she may discontinue with the brace completely.  Patient is with her daughter in clinic today.  States that she has mild pain that occurs occasionally over the area of the incision.  She has continued her physical therapy and has begun going outpatient.  There is persistent weakness and stiffness of the extremities for patient's daughter.      Objective:  Incision well healed and has scarred, there appears to be mild adhesions of the scar tissue underlying tissue.  No signs of infection  The area for light touch sensation L4, L5, S1 dermatomes  Able to wiggle all toes  Able to do a straight leg raise with assistance, able to flex hip  Persistent weakness      Imaging:  X-rays of the right hip shows implant articulating well within the joint.  No signs of dislocation or hardware failure.  -please see radiologist dictation for complete report      Assessment/Plan:    Encounter Diagnoses   Name Primary?    Dislocation of right hip, subsequent encounter Yes    Status post hip hemiarthroplasty      Doing well.  Has progressed in physical therapy.  Will continue weight-bearing as tolerated and on her current therapy plan.  Instructed to begin massage of the scar which may help the underlying adhesions.  Counseled to continue a home exercise program for improving strength and range of motion.    Follow-up:  In 6 weeks       Humberto Angulo PA-C  Orthopedic Surgery      -Discussed findings with patient. Patient expressed understanding. Patient was given the opportunity to ask questions and be an active participant in their medical care. Patient had no further questions or  concerns at this time.     Disclaimer: This note was generated using a voice recognition system and may have sound alike errors within the text.

## 2020-12-01 ENCOUNTER — DOCUMENT SCAN (OUTPATIENT)
Dept: HOME HEALTH SERVICES | Facility: HOSPITAL | Age: 80
End: 2020-12-01
Payer: MEDICARE

## 2020-12-01 NOTE — PLAN OF CARE
[facility]  Physical Therapy Initial Evaluation    Name: Tanya Madrid  Clinic Number: [id number]    Therapy Diagnosis:   Encounter Diagnoses   Name Primary?    Status post hip hemiarthroplasty     Chronic hip pain after total replacement of right hip joint Yes    Decreased range of motion of lower extremity, unspecified laterality     Abnormality of gait and mobility      Physician: Papo Ochoa MD    Physician Orders: PT Eval and Treat   Medical Diagnosis from Referral: Dislocation of right hip  Evaluation Date: 11/30/2020  Authorization Period Expiration: 11/16/2021  Plan of Care Expiration: 12/29/2020  Visit # / Visits authorized:  1/1    Time In: 4:15 pm  Time Out: 4:55 pm  Total Billable Time: 0 minutes    Precautions: Fall risk, Previous right hip dislocation, and Parkinson's    Subjective   Date of onset: 11/16/2020  History of current condition - Tanya reports: Hip fracture with hip replacement and dislocation.  Received home health.  She independent with gait, cooking, washing, etc..   Falls attributed to parkinson's disease.  Walked with home health using a walker.  States brace was discharged today by home health   Medical History:   Past Medical History:   Diagnosis Date    Hyperlipemia     Hypertension     Parkinson's disease     Stroke 2016    Throat mass        Surgical History:   Tanya Madrid  has a past surgical history that includes Throat surgery; Hysterectomy; Tonsillectomy; Hemiarthroplasty of hip (Left, 7/12/2020); Hemiarthroplasty of hip (Right, 10/2/2020); Closed reduction (Right, 10/15/2020); Open reduction of dislocation of hip (Right, 10/17/2020); and Evacuation of hematoma (Right, 10/17/2020).    Medications:   Tanya has a current medication list which includes the following prescription(s): inv amlodipine, aspirin, carbidopa-levodopa  mg, clonazepam, clopidogrel, lorazepam, losartan, metoprolol tartrate, multivitamin,  nozaseptin, omeprazole, polyethylene glycol, potassium chloride, raloxifene, and tramadol.    Allergies:   Review of patient's allergies indicates:   Allergen Reactions    Xanax [alprazolam]         Imaging:    Narrative & Impression     EXAMINATION:  XR HIP 2 VIEW RIGHT     CLINICAL HISTORY:  Unspecified dislocation of right hip, subsequent encounter     TECHNIQUE:  AP view of the pelvis and frog leg lateral view of the right hip were performed.     COMPARISON:  11/02/2020     FINDINGS:  Stable appearance of the bilateral hip arthroplasties without complicating process.  Suspected interval increased heterotopic ossification about the right hip arthroplasty.  Discogenic degenerative changes of the lower lumbar spine.  Degenerative changes of the SI joints and pubic symphysis noted as well.  No acute osseous abnormality.  Atherosclerosis.     Impression:     As above        Electronically signed by: Aime Horner MD  Date:                                            11/30/2020  Time:                                           09:26     Prior Therapy: Rehab and home health  Social History:  lives with their family  Occupation: none  Prior Level of Function: *Prior to initial hip fracture she was independent  Current Level of Function: Limited gait with rolling walker    Pain:  Current 5/10  Location: right hip surgical site   Description: Aching  Aggravating Factors: Sitting and Walking  Easing Factors: rest    Pts goals: to walk better    Objective       ROM:      Right (Degrees) Left (Degrees)   Hip Flexion 90 115   Hip Extension Unable to extend thigh flat to mat with head of bed elevated 60+ degrees Unable to extend thigh flat to mat with head of bed elevated 60+ degrees   Knee Flexion 115 130   Knee Extension  -15 -15     Strength   Right    Left   Gluteus Umberto 3-/5 3-/5   Gluteus Medius 3-/5 3-/5   Psoas NT 4+/5   Quadriceps 4/5 4/5   Hamstrings 4-/5 4-/5   Anterior Tibialis 4/5 4-/5   Gastroc/Soleus 4/5 4/5      Special Tests: Not indicated    Muscle Length: Decreased hip and knee extension    Gait Analysis: The patient ambulated with the use of rolling walker and presents with the following gait abnormalities: Decreased step length and decreased foot clearance    Other: Pt presents with postural abnormalities which include: forward head and rounded shoulders          CMS Impairment/Limitation/Restriction for FOTO Hip Survey    Therapist reviewed FOTO scores for Tanya Madrid on 11/30/2020.   FOTO documents entered into [employer] - see Media section.    Limitation Score: 69%         Education provided:   - The patient is to continue home program provided by home health which will be update as she progresses    Assessment   Tanya is a 80 y.o. female referred to outpatient Physical Therapy with a medical diagnosis of dislocation of the right hip. Pt presents with weakness and immobility from 2 separate hip fractures and a dislocation as well as a dx of Parkinson's with impairment in gait.  She has decreased ROM (B) hips and knees.  She demonstrates (B) weakness in the lower extremities.  The patient reports pain in the right hip incision area.   Transfers are slow and guarded with supervision to min assist.  Gait with RW short household distances with min assist for safety.   Gait demonstrated with short low steps and a fear of falling was noted.   She will benefit from PT to improve AROM, strength, balance, and mobility to improve functional independence.    Pt prognosis is Good.   Pt will benefit from skilled outpatient Physical Therapy to address the deficits stated above and in the chart below, provide pt/family education, and to maximize pt's level of independence.     Plan of care discussed with patient: Yes  Pt's spiritual, cultural and educational needs considered and patient is agreeable to the plan of care and goals as stated below:     Anticipated Barriers for therapy: Parkinson's Disease    Medical Necessity  is demonstrated by the following  History  Co-morbidities and personal factors that may impact the plan of care Co-morbidities:   advanced age and prior hip surgery    Personal Factors:   age     low   Examination  Body Structures and Functions, activity limitations and participation restrictions that may impact the plan of care Body Regions:   lower extremities    Body Systems:    gross symmetry  ROM  strength  gross coordinated movement  balance  gait  transfers  transitions    Participation Restrictions:   none    Activity limitations:   Learning and applying knowledge  no deficits    General Tasks and Commands  undertaking multiple tasks    Communication  no deficits    Mobility  lifting and carrying objects  fine hand use (grasping/picking up)  walking  moving around using equipment (WC)  using transportation (bus, train, plane, car)  driving (bike, car, motorcycle)    Self care  washing oneself (bathing, drying, washing hands)  caring for body parts (brushing teeth, shaving, grooming)  toileting  dressing  eating  drinking    Domestic Life  shopping  cooking  doing house work (cleaning house, washing dishes, laundry)  assisting others    Interactions/Relationships  no deficits    Life Areas  no deficits    Community and Social Life  community life  recreation and leisure         low   Clinical Presentation stable and uncomplicated low   Decision Making/ Complexity Score: low     Goals:  Short Term Goals: In 4 weeks:  1.I with HEP  2.Patient to demo increased AROM /PROM fromn-10 to -5 knee extension (B)  3.Patient to demo increase LE strength from 4-/5 to 4/5  4.Patient to have decreased pain to 4/10.  5.Patient to ambulate with  feet with min assist  6.Pt will improve FOTO disability score to 65% disability or less in order to improve overall QOL and return to PLOF.      Long Term Goals: In 8 weeks  1. Patient to perform daily activities including walking with device without assistance  2. Patient to  demonstrate increased knee AROM/PROM to 125 degrees flexion (R)  3.  Patient to demonstrate increased knee AROM/PROM to 0 degrees extension (B)  3. Patient to demonstrate increased LE strength to 4+/5  4. Patient to have decreased pain to 2/10.  5. Pt will improve FOTO disability score to 55% disability or less in order to improve overall QOL and return to PLOF.      Plan   Plan of care Certification: 11/30/2020 to 12/29/2020.    Outpatient Physical Therapy 2 times weekly for 8 weeks to include the following interventions: Electrical Stimulation as needed, Gait Training, Manual Therapy, Moist Heat/ Ice, Neuromuscular Re-ed, Patient Education, Therapeutic Activites and Therapeutic Exercise.     Abhijit Rodriguez, PT

## 2020-12-09 ENCOUNTER — CLINICAL SUPPORT (OUTPATIENT)
Dept: REHABILITATION | Facility: HOSPITAL | Age: 80
End: 2020-12-09
Attending: ORTHOPAEDIC SURGERY
Payer: MEDICARE

## 2020-12-09 DIAGNOSIS — R26.9 ABNORMALITY OF GAIT AND MOBILITY: ICD-10-CM

## 2020-12-09 DIAGNOSIS — G89.29 CHRONIC HIP PAIN AFTER TOTAL REPLACEMENT OF RIGHT HIP JOINT: ICD-10-CM

## 2020-12-09 DIAGNOSIS — M25.669 DECREASED RANGE OF MOTION OF LOWER EXTREMITY, UNSPECIFIED LATERALITY: ICD-10-CM

## 2020-12-09 DIAGNOSIS — Z96.641 CHRONIC HIP PAIN AFTER TOTAL REPLACEMENT OF RIGHT HIP JOINT: ICD-10-CM

## 2020-12-09 DIAGNOSIS — M25.551 CHRONIC HIP PAIN AFTER TOTAL REPLACEMENT OF RIGHT HIP JOINT: ICD-10-CM

## 2020-12-09 PROCEDURE — 97530 THERAPEUTIC ACTIVITIES: CPT

## 2020-12-09 PROCEDURE — 97110 THERAPEUTIC EXERCISES: CPT

## 2020-12-10 NOTE — PROGRESS NOTES
Physical Therapy Treatment Note     [id number]    Therapy Diagnosis:   Encounter Diagnoses   Name Primary?    Decreased range of motion of lower extremity, unspecified laterality     Abnormality of gait and mobility     Chronic hip pain after total replacement of right hip joint      Physician: Papo Ochoa MD    Visit Date: 12/9/2020    Physician Orders: PT Eval and Treat   Medical Diagnosis from Referral: Dislocation of right hip  Evaluation Date: 11/30/2020  Authorization Period Expiration: 11/16/2021  Plan of Care Expiration: 12/29/2020  Visit # / Visits authorized:  2/20    Time In: 1:10 pm  Time Out: 1:50 pm  Total Billable Time: 40 minutes    Precautions: Fall risk, Previous right hip dislocation, and Parkinson's    Subjective     Pt reports: she does not walk much at home.  She was compliant with home exercise program.  Response to previous treatment: N/A  Functional change: None at this time    Pain: 5/10   Location: right lateral thigh and knee      Objective     Tanya received therapeutic exercises to develop strength and ROM for 30 minutes including:  Heel slides assisted right  Quad sets with cues  iso hip adduction with ball  Hip abduction with yellow band  Bridges (low)  LAQ    Tanya participated in dynamic functional therapeutic activities to improve functional performance for 10 minutes, including:  Standing weight shifts  Gait in parallel bars  Backwards stepping    Tanya participated in gait training to improve functional mobility and safety for 0  minutes, including:      Home Exercises Provided and Patient Education Provided     Education provided:   - Review of home program    Written Home Exercises Provided: Patient instructed to cont prior HEP.  Exercises were reviewed and Tanya was able to demonstrate them prior to the end of the session.  Tanya demonstrated good  understanding of the education provided.     See EMR under Patient  Instructions for exercises provided prior visit.    Assessment     The patient is difficult to hear as she speaks with a very low voice which is reported to be prior to Parkinson's dx..  She is slow with movement and requires cues and support in gait activity.  She has anxiety with standing.   She requires assistance in performance of exercises with right LE and reports pain laterally in right thigh  Tanya is progressing well towards her goals.   Pt prognosis is Good.     Pt will continue to benefit from skilled outpatient physical therapy to address the deficits listed in the problem list box on initial evaluation, provide pt/family education and to maximize pt's level of independence in the home and community environment.     Pt's spiritual, cultural and educational needs considered and pt agreeable to plan of care and goals.     Anticipated barriers to physical therapy: Parkinson's    Goals: Short Term Goals: In 4 weeks:  1.I with HEP  2.Patient to demo increased AROM /PROM fromn-10 to -5 knee extension (B)  3.Patient to demo increase LE strength from 4-/5 to 4/5  4.Patient to have decreased pain to 4/10.  5.Patient to ambulate with  feet with min assist  6.Pt will improve FOTO disability score to 65% disability or less in order to improve overall QOL and return to PLOF.       Long Term Goals: In 8 weeks  1. Patient to perform daily activities including walking with device without assistance  2. Patient to demonstrate increased knee AROM/PROM to 125 degrees flexion (R)  3.  Patient to demonstrate increased knee AROM/PROM to 0 degrees extension (B)  3. Patient to demonstrate increased LE strength to 4+/5  4. Patient to have decreased pain to 2/10.  5. Pt will improve FOTO disability score to 55% disability or less in order to improve overall QOL and return to PLOF.      Plan     Outpatient Physical Therapy 2 times weekly for 8 weeks to include the following interventions: Electrical Stimulation as needed,  Gait Training, Manual Therapy, Moist Heat/ Ice, Neuromuscular Re-ed, Patient Education, Therapeutic Activites and Therapeutic Exercise.     Abhijit Rodriguez, PT

## 2020-12-11 ENCOUNTER — CLINICAL SUPPORT (OUTPATIENT)
Dept: REHABILITATION | Facility: HOSPITAL | Age: 80
End: 2020-12-11
Attending: ORTHOPAEDIC SURGERY
Payer: MEDICARE

## 2020-12-11 DIAGNOSIS — R26.9 ABNORMALITY OF GAIT AND MOBILITY: ICD-10-CM

## 2020-12-11 DIAGNOSIS — M25.551 CHRONIC HIP PAIN AFTER TOTAL REPLACEMENT OF RIGHT HIP JOINT: ICD-10-CM

## 2020-12-11 DIAGNOSIS — M25.669 DECREASED RANGE OF MOTION OF LOWER EXTREMITY, UNSPECIFIED LATERALITY: ICD-10-CM

## 2020-12-11 DIAGNOSIS — Z96.641 CHRONIC HIP PAIN AFTER TOTAL REPLACEMENT OF RIGHT HIP JOINT: ICD-10-CM

## 2020-12-11 DIAGNOSIS — G89.29 CHRONIC HIP PAIN AFTER TOTAL REPLACEMENT OF RIGHT HIP JOINT: ICD-10-CM

## 2020-12-11 PROCEDURE — 97110 THERAPEUTIC EXERCISES: CPT

## 2020-12-11 NOTE — PROGRESS NOTES
Physical Therapy Treatment Note     [id number]    Therapy Diagnosis:   Encounter Diagnoses   Name Primary?    Decreased range of motion of lower extremity, unspecified laterality     Abnormality of gait and mobility     Chronic hip pain after total replacement of right hip joint      Physician: Papo Ochoa MD    Visit Date: 12/11/2020    Physician Orders: PT Eval and Treat   Medical Diagnosis from Referral: Dislocation of right hip  Evaluation Date: 11/30/2020  Authorization Period Expiration: 11/16/2021  Plan of Care Expiration: 12/29/2020  Visit # / Visits authorized:  2/20    Time In: 1:45 pm  Time Out: 2:30 pm  Total Billable Time: 45 minutes    Precautions: Fall risk, Previous right hip dislocation, and Parkinson's    Subjective     Pt reports: her right thigh hurts  She was compliant with home exercise program.  Response to previous treatment: N/A  Functional change: None at this time    Pain: 5/10   Location: right lateral thigh and knee      Objective     Tanya received therapeutic exercises to develop strength and ROM for 30 minutes including:  Heel slides assisted right  Quad sets with cues  iso hip adduction with ball  Hip abduction with yellow band  Bridges (low)  LAQ  Seated march  Sit to stand from wheelchair    Tanya participated in dynamic functional therapeutic activities to improve functional performance for 10 minutes, including:  Standing weight shifts  Gait in parallel bars  Backwards stepping    Tanya participated in gait training to improve functional mobility and safety for 0  minutes, including:      Home Exercises Provided and Patient Education Provided     Education provided:   - Review of home program    Written Home Exercises Provided: Patient instructed to cont prior HEP.  Exercises were reviewed and Tanya was able to demonstrate them prior to the end of the session.  Tayna demonstrated good  understanding of the education  provided.     See EMR under Patient Instructions for exercises provided prior visit.    Assessment     The patient is difficult to hear as she speaks with a very low voice which is reported to be prior to Parkinson's dx..  She is slow with movement and requires cues and support in gait activity.  She has anxiety with standing.   She requires assistance in performance of exercises with right LE and reports pain laterally in right thigh  Tanya is progressing well towards her goals.   Pt prognosis is Good.     Pt will continue to benefit from skilled outpatient physical therapy to address the deficits listed in the problem list box on initial evaluation, provide pt/family education and to maximize pt's level of independence in the home and community environment.     Pt's spiritual, cultural and educational needs considered and pt agreeable to plan of care and goals.     Anticipated barriers to physical therapy: Parkinson's    Goals: Short Term Goals: In 4 weeks:  1.I with HEP  2.Patient to demo increased AROM /PROM fromn-10 to -5 knee extension (B)  3.Patient to demo increase LE strength from 4-/5 to 4/5  4.Patient to have decreased pain to 4/10.  5.Patient to ambulate with  feet with min assist  6.Pt will improve FOTO disability score to 65% disability or less in order to improve overall QOL and return to PLOF.       Long Term Goals: In 8 weeks  1. Patient to perform daily activities including walking with device without assistance  2. Patient to demonstrate increased knee AROM/PROM to 125 degrees flexion (R)  3.  Patient to demonstrate increased knee AROM/PROM to 0 degrees extension (B)  3. Patient to demonstrate increased LE strength to 4+/5  4. Patient to have decreased pain to 2/10.  5. Pt will improve FOTO disability score to 55% disability or less in order to improve overall QOL and return to PLOF.      Plan     Outpatient Physical Therapy 2 times weekly for 8 weeks to include the following  interventions: Electrical Stimulation as needed, Gait Training, Manual Therapy, Moist Heat/ Ice, Neuromuscular Re-ed, Patient Education, Therapeutic Activites and Therapeutic Exercise.     Abhijit Rodriguez, PT

## 2020-12-15 ENCOUNTER — CLINICAL SUPPORT (OUTPATIENT)
Dept: REHABILITATION | Facility: HOSPITAL | Age: 80
End: 2020-12-15
Attending: ORTHOPAEDIC SURGERY
Payer: MEDICARE

## 2020-12-15 DIAGNOSIS — M25.551 CHRONIC HIP PAIN AFTER TOTAL REPLACEMENT OF RIGHT HIP JOINT: ICD-10-CM

## 2020-12-15 DIAGNOSIS — R26.9 ABNORMALITY OF GAIT AND MOBILITY: ICD-10-CM

## 2020-12-15 DIAGNOSIS — M25.669 DECREASED RANGE OF MOTION OF LOWER EXTREMITY, UNSPECIFIED LATERALITY: ICD-10-CM

## 2020-12-15 DIAGNOSIS — Z96.641 CHRONIC HIP PAIN AFTER TOTAL REPLACEMENT OF RIGHT HIP JOINT: ICD-10-CM

## 2020-12-15 DIAGNOSIS — G89.29 CHRONIC HIP PAIN AFTER TOTAL REPLACEMENT OF RIGHT HIP JOINT: ICD-10-CM

## 2020-12-15 PROCEDURE — 97110 THERAPEUTIC EXERCISES: CPT

## 2020-12-17 ENCOUNTER — CLINICAL SUPPORT (OUTPATIENT)
Dept: REHABILITATION | Facility: HOSPITAL | Age: 80
End: 2020-12-17
Attending: ORTHOPAEDIC SURGERY
Payer: MEDICARE

## 2020-12-17 ENCOUNTER — DOCUMENTATION ONLY (OUTPATIENT)
Dept: REHABILITATION | Facility: HOSPITAL | Age: 80
End: 2020-12-17

## 2020-12-17 DIAGNOSIS — M25.669 DECREASED RANGE OF MOTION OF LOWER EXTREMITY, UNSPECIFIED LATERALITY: Primary | ICD-10-CM

## 2020-12-17 DIAGNOSIS — R26.9 ABNORMALITY OF GAIT AND MOBILITY: ICD-10-CM

## 2020-12-17 DIAGNOSIS — M25.551 CHRONIC HIP PAIN AFTER TOTAL REPLACEMENT OF RIGHT HIP JOINT: ICD-10-CM

## 2020-12-17 DIAGNOSIS — G89.29 CHRONIC HIP PAIN AFTER TOTAL REPLACEMENT OF RIGHT HIP JOINT: ICD-10-CM

## 2020-12-17 DIAGNOSIS — Z96.641 CHRONIC HIP PAIN AFTER TOTAL REPLACEMENT OF RIGHT HIP JOINT: ICD-10-CM

## 2020-12-17 PROCEDURE — 97110 THERAPEUTIC EXERCISES: CPT | Mod: CQ

## 2020-12-17 PROCEDURE — 97112 NEUROMUSCULAR REEDUCATION: CPT | Mod: CQ

## 2020-12-17 PROCEDURE — 97116 GAIT TRAINING THERAPY: CPT | Mod: CQ

## 2020-12-17 NOTE — PROGRESS NOTES
PT/PTA met face to face to discuss pt's treatment plan and progress towards established goals. Pt will be seen by a physical therapist minimally every 6th visit or every 30 days.        Bri Hoyos PTA

## 2020-12-17 NOTE — PROGRESS NOTES
Physical Therapist Assistant Treatment Note     Name: Tanya Machucaham  Clinic Number: [id number]    Therapy Diagnosis:   Encounter Diagnoses   Name Primary?    Decreased range of motion of lower extremity, unspecified laterality Yes    Abnormality of gait and mobility     Chronic hip pain after total replacement of right hip joint      Physician: Papo Ochoa MD    Visit Date: 12/17/2020    Physician Orders: PT Eval and Treat   Medical Diagnosis from Referral: Dislocation of right hip  Evaluation Date: 11/30/2020  Authorization Period Expiration: 11/16/2021  Plan of Care Expiration: 12/29/2020  Visit # / Visits authorized:  4/20    PTA VISIT #1    Time In: 1:05 pm  Time Out: 2:05 pm  Total Billable Time: 55 minutes    Precautions:  Standard and Fall risk, Previous right hip dislocation, and Parkinson's    Subjective     Pt reports: she continues to have pain in her right anterior/lateral thigh and groin  She was compliant with home exercise program.  Response to previous treatment: no issues  Functional change: None reported at this time however gait w/rw with improved gait mechanics    Pain: 5/10   Location: right anteriolateral thigh/ groin    Objective     Tanya received therapeutic exercises to develop strength and ROM for 37 minutes including:  Heel slides assisted on the right  Quad sets  Supine hip abduction slides on right  SAQ B    Hook lying Hip abduction/ER from neutral Hook lying hip flexion on the right  Supine alt arms/cervical rotation to left from neutral  PROM B ankles  Sit to stand from wheelchair    Tanya participated in dynamic functional therapeutic activities to improve functional performance for 0 minutes, including: na today  Tanya participated in neuro re-ed activities to improve posture , alignment, balance for 10 min , including:  Seated postural correction activities with mod to max verbal cues; min tactile cues  Tanya participated in gait training to improve functional  mobility and safety for 8 minutes, including: gait w/rw in the gym approximately 115 ft with min assist for postural stabilization; max verbal cues       Home Exercises Provided and Patient Education Provided     Education provided:   - Review of home program    Written Home Exercises Provided: Patient instructed to cont prior HEP.  Exercises were reviewed and Tanya was able to demonstrate them prior to the end of the session.  Tanya demonstrated good  understanding of the education provided.     See EMR under Patient Instructions for exercises provided prior visit.    Assessment     Tanay presents today slumped in her wheelchair with her neck positioned forward in right lateral flexion and rotation . She complains intermittently of right thigh pain with her exercises however no distress noted. Feel some of this may be just muscle activation as she is very sedentary at home. Today exercises were performed as previously with assist as needed for her RLE; addition of postural activities today sitting edge of mat followed by gait with a rolling walker. Tactile cues provided for improved hip extension/low back is very limited in extension. Verbal cues provided for increased base of support, steppage. She was more engaged in her walking today and demonstrated improvement in her gait mechanics and endurance as well as less anxiety. Spoke with patient and daughter regarding increasing activities at home; pt's daughter states patient does little.   Tanya is progressing well towards her goals.   Pt prognosis is Good.     Pt will continue to benefit from skilled outpatient physical therapy to address the deficits listed in the problem list box on initial evaluation, provide pt/family education and to maximize pt's level of independence in the home and community environment.     Pt's spiritual, cultural and educational needs considered and pt agreeable to plan of care and goals.     Anticipated barriers to physical  therapy: Parkinson's; HEP compliance    Goals: Short Term Goals: In 4 weeks:  1.I with HEP  2.Patient to demo increased AROM /PROM fromn-10 to -5 knee extension (B)  3.Patient to demo increase LE strength from 4-/5 to 4/5  4.Patient to have decreased pain to 4/10.  5.Patient to ambulate with  feet with min assist  6.Pt will improve FOTO disability score to 65% disability or less in order to improve overall QOL and return to PLOF.       Long Term Goals: In 8 weeks  1. Patient to perform daily activities including walking with device without assistance  2. Patient to demonstrate increased knee AROM/PROM to 125 degrees flexion (R)  3.  Patient to demonstrate increased knee AROM/PROM to 0 degrees extension (B)  3. Patient to demonstrate increased LE strength to 4+/5  4. Patient to have decreased pain to 2/10.  5. Pt will improve FOTO disability score to 55% disability or less in order to improve overall QOL and return to PLOF.      Plan     Outpatient Physical Therapy 2 times weekly for 8 weeks to include the following interventions: Electrical Stimulation as needed, Gait Training, Manual Therapy, Moist Heat/ Ice, Neuromuscular Re-ed, Patient Education, Therapeutic Activites and Therapeutic Exercise.     Bri Hoyos, PTA

## 2020-12-21 ENCOUNTER — CLINICAL SUPPORT (OUTPATIENT)
Dept: REHABILITATION | Facility: HOSPITAL | Age: 80
End: 2020-12-21
Attending: ORTHOPAEDIC SURGERY
Payer: MEDICARE

## 2020-12-21 DIAGNOSIS — G89.29 CHRONIC HIP PAIN AFTER TOTAL REPLACEMENT OF RIGHT HIP JOINT: ICD-10-CM

## 2020-12-21 DIAGNOSIS — Z96.641 CHRONIC HIP PAIN AFTER TOTAL REPLACEMENT OF RIGHT HIP JOINT: ICD-10-CM

## 2020-12-21 DIAGNOSIS — M25.669 DECREASED RANGE OF MOTION OF LOWER EXTREMITY, UNSPECIFIED LATERALITY: ICD-10-CM

## 2020-12-21 DIAGNOSIS — M25.551 CHRONIC HIP PAIN AFTER TOTAL REPLACEMENT OF RIGHT HIP JOINT: ICD-10-CM

## 2020-12-21 DIAGNOSIS — R26.9 ABNORMALITY OF GAIT AND MOBILITY: ICD-10-CM

## 2020-12-21 PROCEDURE — 97110 THERAPEUTIC EXERCISES: CPT | Mod: CQ

## 2020-12-21 PROCEDURE — 97116 GAIT TRAINING THERAPY: CPT | Mod: CQ

## 2020-12-21 NOTE — PROGRESS NOTES
Physical Therapist Assistant Treatment Note     Name: Tanya Madrid  Clinic Number: [id number]    Therapy Diagnosis:   Encounter Diagnoses   Name Primary?    Decreased range of motion of lower extremity, unspecified laterality     Abnormality of gait and mobility     Chronic hip pain after total replacement of right hip joint      Physician: Papo Ochoa MD    Visit Date: 12/21/2020    Physician Orders: PT Eval and Treat   Medical Diagnosis from Referral: Dislocation of right hip  Evaluation Date: 11/30/2020  Authorization Period Expiration: 11/16/2021  Plan of Care Expiration: 12/29/2020  Visit # / Visits authorized:  5/20    FOTO next visit    PTA VISIT #2    Time In: 1:55 pm  Patient 10 min late  Time Out: 2:40 pm  Total Billable Time: 45 minutes    Precautions:  Standard and Fall risk, Previous right hip dislocation, and Parkinson's    Subjective     Pt reports: she continues to have pain in her right anterior/lateral thigh and groin  She was not compliant with home exercise program.  Response to previous treatment: no issues  Functional change: None reported at this time however gait w/rw with improved gait mechanics    Pain: 5/10   Location: right anteriolateral thigh/ groin    Objective     Tanya received therapeutic exercises to develop strength and ROM for 37 minutes including:  Heel slides assisted on the right    Supine hip abduction slides on right      Hook lying Hip abduction/ER from neutral  -assistedHook lying hip flexion on the right-assisted  Supine alt arms/cervical rotation to left from neutral  PROM B ankles / hips  Sit to stand from wheelchair/mat    Tanya participated in dynamic functional therapeutic activities to improve functional performance for 0 minutes, including: na today  Tanya participated in neuro re-ed activities to improve posture , alignment, balance for 0 min , including:Na Today  Seated postural correction activities with mod to max verbal cues; min tactile  cues  Tanya participated in gait training to improve functional mobility and safety for 8 minutes, including: gait w/rw in the gym approximately 160 ft with min assist for postural stabilization; max verbal cues       Home Exercises Provided and Patient Education Provided     Education provided:   - Review of home program    Written Home Exercises Provided: Patient instructed to cont prior HEP.  Exercises were reviewed and Tanya was able to demonstrate them prior to the end of the session.  Tanya demonstrated good  understanding of the education provided.     See EMR under Patient Instructions for exercises provided prior visit.    Assessment     Tanya presents today slumped in her wheelchair with her neck positioned forward in right lateral flexion and rotation .Her alignment looks worse today than last session. She complains intermittently of right thigh pain with her exercises however no distress noted. Feel some of this may be just muscle activation as she is very sedentary at home. Today Tanya had increased muscle tone/rigidity and required assist with most mobility activities on the mat.. Her trunk was very limited. With gait, verbal cues were provided for increased base of support, steppage. She demonstrated improvement in her LE gait mechanics and endurance with distance up to approximately 160 ft. Tactile cues required for her trunk as she had more challenges with alignment today remaining very flexed. Patient's daughter exchanged the leg rests on the wheelchair as recommended on previous visit as Tanya needed longer leg rests for improved alignment.   Tanya is progressing well towards her goals.   Pt prognosis is Good.     Pt will continue to benefit from skilled outpatient physical therapy to address the deficits listed in the problem list box on initial evaluation, provide pt/family education and to maximize pt's level of independence in the home and community environment.     Pt's  spiritual, cultural and educational needs considered and pt agreeable to plan of care and goals.     Anticipated barriers to physical therapy: Parkinson's; HEP compliance    Goals: Short Term Goals: In 4 weeks:  1.I with HEP  2.Patient to demo increased AROM /PROM fromn-10 to -5 knee extension (B)  3.Patient to demo increase LE strength from 4-/5 to 4/5  4.Patient to have decreased pain to 4/10.  5.Patient to ambulate with  feet with min assist  6.Pt will improve FOTO disability score to 65% disability or less in order to improve overall QOL and return to PLOF.       Long Term Goals: In 8 weeks  1. Patient to perform daily activities including walking with device without assistance  2. Patient to demonstrate increased knee AROM/PROM to 125 degrees flexion (R)  3.  Patient to demonstrate increased knee AROM/PROM to 0 degrees extension (B)  3. Patient to demonstrate increased LE strength to 4+/5  4. Patient to have decreased pain to 2/10.  5. Pt will improve FOTO disability score to 55% disability or less in order to improve overall QOL and return to PLOF.      Plan     Outpatient Physical Therapy 2 times weekly for 8 weeks to include the following interventions: Electrical Stimulation as needed, Gait Training, Manual Therapy, Moist Heat/ Ice, Neuromuscular Re-ed, Patient Education, Therapeutic Activites and Therapeutic Exercise.     Bri Hoyos, PTA

## 2020-12-23 ENCOUNTER — CLINICAL SUPPORT (OUTPATIENT)
Dept: REHABILITATION | Facility: HOSPITAL | Age: 80
End: 2020-12-23
Attending: ORTHOPAEDIC SURGERY
Payer: MEDICARE

## 2020-12-23 DIAGNOSIS — Z96.641 CHRONIC HIP PAIN AFTER TOTAL REPLACEMENT OF RIGHT HIP JOINT: ICD-10-CM

## 2020-12-23 DIAGNOSIS — R53.1 WEAKNESS: Primary | ICD-10-CM

## 2020-12-23 DIAGNOSIS — G89.29 CHRONIC HIP PAIN AFTER TOTAL REPLACEMENT OF RIGHT HIP JOINT: ICD-10-CM

## 2020-12-23 DIAGNOSIS — M25.551 CHRONIC HIP PAIN AFTER TOTAL REPLACEMENT OF RIGHT HIP JOINT: ICD-10-CM

## 2020-12-23 DIAGNOSIS — R26.9 ABNORMALITY OF GAIT AND MOBILITY: ICD-10-CM

## 2020-12-23 DIAGNOSIS — M25.669 DECREASED RANGE OF MOTION OF LOWER EXTREMITY, UNSPECIFIED LATERALITY: ICD-10-CM

## 2020-12-23 PROCEDURE — 97110 THERAPEUTIC EXERCISES: CPT | Mod: CQ

## 2020-12-23 PROCEDURE — 97112 NEUROMUSCULAR REEDUCATION: CPT | Mod: CQ

## 2020-12-23 NOTE — PROGRESS NOTES
Physical Therapist Assistant Treatment Note     Name: Tanya Machucaham  Clinic Number: [id number]    Therapy Diagnosis:   Encounter Diagnoses   Name Primary?    Decreased range of motion of lower extremity, unspecified laterality     Abnormality of gait and mobility     Chronic hip pain after total replacement of right hip joint     Weakness Yes     Physician: Papo Ochoa MD    Visit Date: 12/23/2020    Physician Orders: PT Eval and Treat   Medical Diagnosis from Referral: Dislocation of right hip  Evaluation Date: 11/30/2020  Authorization Period Expiration: 11/16/2021  Plan of Care Expiration: 12/29/2020  Visit # / Visits authorized:  6/20    FOTO TAKEN 12/23/2020 (6TH VISIT)    PTA VISIT #3    Time In: 1:50  Time Out: 2:45  Total Billable Time: 53 minutes    Precautions:  Standard and Fall risk, Previous right hip dislocation, and Parkinson's    Subjective     Pt reports: she continues to have pain in her right anterior/lateral thigh and groin  She was not compliant with home exercise program.  Response to previous treatment: no issues  Functional change: None reported at this time however gait w/rw with improved gait mechanics    Pain: 5/10   Location: right anteriolateral thigh/ groin    Objective     Tanya received therapeutic exercises to develop strength and ROM for 30 minutes including:  NuStep for mobility/reciprical extremity movements 3.5 minLAQ  Seated lateral step forward/ step lateralSemi supine alt arms/cervical rotation to left from neutral  Sit to stand from wheelchair/mat      Tanya participated in dynamic functional therapeutic activities to improve functional performance for 0 minutes, including: na today  Tanya participated in neuro re-ed activities to improve posture , alignment, balance for 23 min , including:  Seated postural correction activities with mod to max verbal cues; min tactile cues  Sit to and from supine   Tanya participated in gait training to improve  "functional mobility and safety for 0 minutes, including:Na today  gait w/rw in the gym approximately 160 ft with min assist for postural stabilization; max verbal cues       Home Exercises Provided and Patient Education Provided     Education provided:   - Review of home program    Written Home Exercises Provided: Patient instructed to cont prior HEP.  Exercises were reviewed and Tanya was able to demonstrate them prior to the end of the session.  Tanya demonstrated good  understanding of the education provided.     See EMR under Patient Instructions for exercises provided prior visit.    Assessment     Tanya presents today slumped in her wheelchair with her neck positioned forward in right lateral flexion and rotation .Her alignment however looks a bit better today than last session. She complains intermittently of right thigh pain with her exercises however no distress noted. Feel some of this may be just muscle activation as she is very sedentary at home. Today, Tanya demonstrated improved performance with all of her activities and appeared to be more engaged. She was able to engage some of her spinal extensors in sitting today with tactile/verbal cueing. She did however demonstrate some shortness of breath not noted on previous treatments. Her SPO2 was 98%, her HR was irregular  and her BP was 160/90. Gait training was with held today. Tanya was in no noted distress and stated she felt "fine". Her daughter was informed of her vitals and was instructed to monitor them and call her doctor with changes or to call 911 if in distress/if needed. Abhijit Rodriguez PT notified of patient's status today.     FOTO TODAY WITH 7% IMPROVEMENT.    Tanya is progressing well towards her goals.   Pt prognosis is Good.     Pt will continue to benefit from skilled outpatient physical therapy to address the deficits listed in the problem list box on initial evaluation, provide pt/family education and to maximize pt's " level of independence in the home and community environment.     Pt's spiritual, cultural and educational needs considered and pt agreeable to plan of care and goals.     Anticipated barriers to physical therapy: Parkinson's; HEP compliance    Goals: Short Term Goals: In 4 weeks:  1.I with HEP  2.Patient to demo increased AROM /PROM fromn-10 to -5 knee extension (B)  3.Patient to demo increase LE strength from 4-/5 to 4/5  4.Patient to have decreased pain to 4/10.  5.Patient to ambulate with  feet with min assist  6.Pt will improve FOTO disability score to 65% disability or less in order to improve overall QOL and return to PLOF.       Long Term Goals: In 8 weeks  1. Patient to perform daily activities including walking with device without assistance  2. Patient to demonstrate increased knee AROM/PROM to 125 degrees flexion (R)  3.  Patient to demonstrate increased knee AROM/PROM to 0 degrees extension (B)  3. Patient to demonstrate increased LE strength to 4+/5  4. Patient to have decreased pain to 2/10.  5. Pt will improve FOTO disability score to 55% disability or less in order to improve overall QOL and return to PLOF.      Plan     Outpatient Physical Therapy 2 times weekly for 8 weeks to include the following interventions: Electrical Stimulation as needed, Gait Training, Manual Therapy, Moist Heat/ Ice, Neuromuscular Re-ed, Patient Education, Therapeutic Activites and Therapeutic Exercise.     Bri Hoyos PTA

## 2020-12-30 ENCOUNTER — CLINICAL SUPPORT (OUTPATIENT)
Dept: REHABILITATION | Facility: HOSPITAL | Age: 80
End: 2020-12-30
Attending: ORTHOPAEDIC SURGERY
Payer: MEDICARE

## 2020-12-30 DIAGNOSIS — Z96.641 CHRONIC HIP PAIN AFTER TOTAL REPLACEMENT OF RIGHT HIP JOINT: ICD-10-CM

## 2020-12-30 DIAGNOSIS — R26.9 ABNORMALITY OF GAIT AND MOBILITY: ICD-10-CM

## 2020-12-30 DIAGNOSIS — M25.551 CHRONIC HIP PAIN AFTER TOTAL REPLACEMENT OF RIGHT HIP JOINT: ICD-10-CM

## 2020-12-30 DIAGNOSIS — M25.669 DECREASED RANGE OF MOTION OF LOWER EXTREMITY, UNSPECIFIED LATERALITY: ICD-10-CM

## 2020-12-30 DIAGNOSIS — G89.29 CHRONIC HIP PAIN AFTER TOTAL REPLACEMENT OF RIGHT HIP JOINT: ICD-10-CM

## 2020-12-30 PROCEDURE — 97110 THERAPEUTIC EXERCISES: CPT

## 2020-12-31 NOTE — PLAN OF CARE
Outpatient Therapy Updated Plan of Care     Visit Date: 12/30/2020  Name: Tanya Madrid  Clinic Number: [id number]    Therapy Diagnosis:   Encounter Diagnoses   Name Primary?    Decreased range of motion of lower extremity, unspecified laterality     Abnormality of gait and mobility     Chronic hip pain after total replacement of right hip joint      Physician: Papo Ochoa MD    Physician Orders: PT Eval and Treat   Medical Diagnosis from Referral: Dislocation of right hip  Evaluation Date: 11/30/2020    Total Visits Received: 8  Cancelled Visits: 0  No Show Visits:0    Current Certification Period:  11/30/2020 to 12/29/2020  Precautions: Fall risk, Previous right hip dislocation, and Parkinson's  Visits from Evaluation Date:  7  Functional Level Prior to Evaluation:  Limited gait with rolling walker    Subjective     Update: The patient reports right lateral thigh pain and tenderness    Objective     Update: ROM:       Right (Degrees) Left (Degrees)   Hip Flexion 95 115   Hip Extension -20    Knee Flexion 115 130   Knee Extension  -15 -10      Strength    Right     Left   Gluteus Umberto 3/5 3/5   Gluteus Medius 3/5 3/5   Psoas NT 4+/5   Quadriceps 4/5 4/5   Hamstrings 4-/5 4-/5   Anterior Tibialis 4/5 4-/5   Gastroc/Soleus 4/5 4/5     Gait with  feet with cues and min assist/supervision    Assessment     Update: The patient has co-morbidity of Parkinson's which impairs progress.  She has made gains in strength, motion, and gait.  She requires continued PT to increase functional mobility    Previous Short Term Goals Status:   Goals are in progress  New Short Term Goals Status:   none  Long Term Goal Status:   continue per initial plan of care.  Reasons for Recertification of Therapy:   To increase strength, balance, and mobility    Plan     Updated Certification Period: 12/30/2020 to 1/29/2020  Recommended Treatment Plan: 2 times per week for 6 weeks: Gait Training, Manual Therapy, Moist Heat/  Ice, Neuromuscular Re-ed, Patient Education, Therapeutic Activites and Therapeutic Exercise  Other Recommendations: none    Abhijit Rodriguez, PT  12/30/2020      I CERTIFY THE NEED FOR THESE SERVICES FURNISHED UNDER THIS PLAN OF TREATMENT AND WHILE UNDER MY CARE    Physician's comments:        Physician's Signature: ___________________________________________________

## 2020-12-31 NOTE — PROGRESS NOTES
Physical Therapist Treatment Note     Name: Tanya Madrid  Clinic Number: [id number]    Therapy Diagnosis:   Encounter Diagnoses   Name Primary?    Decreased range of motion of lower extremity, unspecified laterality     Abnormality of gait and mobility     Chronic hip pain after total replacement of right hip joint      Physician: Papo Ochoa MD    Visit Date: 12/30/2020    Physician Orders: PT Eval and Treat   Medical Diagnosis from Referral: Dislocation of right hip  Evaluation Date: 11/30/2020  Authorization Period Expiration: 11/16/2021  Plan of Care Expiration: 1/29/2021  Visit # / Visits authorized:  7/20    FOTO TAKEN 12/23/2020 (6TH VISIT)    PTA VISIT #0    Time In: 2:05  Time Out: 2:50  Total Billable Time: 45 minutes    Precautions:  Standard and Fall risk, Previous right hip dislocation, and Parkinson's    Subjective     Pt reports: she complains of right lateral leg pain  She was not compliant with home exercise program.  Response to previous treatment: no issues  Functional change: None reported at this time however gait w/rw with improved gait mechanics    Pain: 4/10   Location: right anteriolateral thigh/ groin    Objective     Tanya received therapeutic exercises to develop strength and ROM for 30 minutes including:  Heel slides assisted on the right  Quad setsSAQ BHook lying Hip abduction/ER from neutral with yellow band -assistedBridges (low)NuStep for mobility/reciprical extremity movements 3.5 minLAQ  Seated lateral step forward/ step lateralSemi supine alt arms/cervical rotation to left from neutral  Sit to stand from wheelchair/mat      Tanya participated in dynamic functional therapeutic activities to improve functional performance for 0 minutes, including: na today  Tanya participated in neuro re-ed activities to improve posture , alignment, balance for 15 min , including:  Seated postural correction activities with mod to max verbal cues; min tactile cues  Sit to and  "from supine   Tanya participated in gait training to improve functional mobility and safety for 0 minutes, including:Na today  gait w/rw in the gym approximately 100 ft with min assist for postural stabilization; max verbal cues       Home Exercises Provided and Patient Education Provided     Education provided:   - Review of home program    Written Home Exercises Provided: Patient instructed to cont prior HEP.  Exercises were reviewed and Tanya was able to demonstrate them prior to the end of the session.  Tanya demonstrated good  understanding of the education provided.     See EMR under Patient Instructions for exercises provided prior visit.    Assessment     Tanya presents today slumped in her wheelchair with her neck positioned forward in right lateral flexion and rotation .Her alignment however looks a bit better today than last session. She complains intermittently of right thigh pain with her exercises however no distress noted. Feel some of this may be just muscle activation as she is very sedentary at home. Today, Tanya demonstrated improved performance with all of her activities and appeared to be more engaged. She was able to engage some of her spinal extensors in sitting today with tactile/verbal cueing. She did however demonstrate some shortness of breath not noted on previous treatments. Her SPO2 was 98%, her HR was irregular  and her BP was 160/90. Gait training was with held today. Tanya was in no noted distress and stated she felt "fine". Her daughter was informed of her vitals and was instructed to monitor them and call her doctor with changes or to call 911 if in distress/if needed. Abhijit Rodriguez PT notified of patient's status today.     FOTO TODAY WITH 7% IMPROVEMENT.    Tanya is progressing well towards her goals.   Pt prognosis is Good.     Pt will continue to benefit from skilled outpatient physical therapy to address the deficits listed in the problem list box on initial " evaluation, provide pt/family education and to maximize pt's level of independence in the home and community environment.     Pt's spiritual, cultural and educational needs considered and pt agreeable to plan of care and goals.     Anticipated barriers to physical therapy: Parkinson's; HEP compliance    Goals: Short Term Goals: In 4 weeks:  1.I with HEP  2.Patient to demo increased AROM /PROM fromn-10 to -5 knee extension (B)  3.Patient to demo increase LE strength from 4-/5 to 4/5  4.Patient to have decreased pain to 4/10.  5.Patient to ambulate with  feet with min assist  6.Pt will improve FOTO disability score to 65% disability or less in order to improve overall QOL and return to PLOF.       Long Term Goals: In 8 weeks  1. Patient to perform daily activities including walking with device without assistance  2. Patient to demonstrate increased knee AROM/PROM to 125 degrees flexion (R)  3.  Patient to demonstrate increased knee AROM/PROM to 0 degrees extension (B)  3. Patient to demonstrate increased LE strength to 4+/5  4. Patient to have decreased pain to 2/10.  5. Pt will improve FOTO disability score to 55% disability or less in order to improve overall QOL and return to PLOF.      Plan     Outpatient Physical Therapy 2 times weekly for 8 weeks to include the following interventions: Electrical Stimulation as needed, Gait Training, Manual Therapy, Moist Heat/ Ice, Neuromuscular Re-ed, Patient Education, Therapeutic Activites and Therapeutic Exercise.     Abhijit Rodriguez, PT

## 2021-01-05 ENCOUNTER — CLINICAL SUPPORT (OUTPATIENT)
Dept: REHABILITATION | Facility: HOSPITAL | Age: 81
End: 2021-01-05
Attending: ORTHOPAEDIC SURGERY
Payer: MEDICARE

## 2021-01-05 DIAGNOSIS — Z96.641 CHRONIC HIP PAIN AFTER TOTAL REPLACEMENT OF RIGHT HIP JOINT: ICD-10-CM

## 2021-01-05 DIAGNOSIS — R26.9 ABNORMALITY OF GAIT AND MOBILITY: ICD-10-CM

## 2021-01-05 DIAGNOSIS — M25.669 DECREASED RANGE OF MOTION OF LOWER EXTREMITY, UNSPECIFIED LATERALITY: ICD-10-CM

## 2021-01-05 DIAGNOSIS — G89.29 CHRONIC HIP PAIN AFTER TOTAL REPLACEMENT OF RIGHT HIP JOINT: ICD-10-CM

## 2021-01-05 DIAGNOSIS — M25.551 CHRONIC HIP PAIN AFTER TOTAL REPLACEMENT OF RIGHT HIP JOINT: ICD-10-CM

## 2021-01-05 DIAGNOSIS — R53.1 WEAKNESS: Primary | ICD-10-CM

## 2021-01-05 PROCEDURE — 97110 THERAPEUTIC EXERCISES: CPT | Mod: CQ

## 2021-01-05 PROCEDURE — 97116 GAIT TRAINING THERAPY: CPT | Mod: CQ

## 2021-01-05 PROCEDURE — 97112 NEUROMUSCULAR REEDUCATION: CPT | Mod: CQ

## 2021-01-07 ENCOUNTER — CLINICAL SUPPORT (OUTPATIENT)
Dept: REHABILITATION | Facility: HOSPITAL | Age: 81
End: 2021-01-07
Attending: ORTHOPAEDIC SURGERY
Payer: MEDICARE

## 2021-01-07 DIAGNOSIS — G89.29 CHRONIC HIP PAIN AFTER TOTAL REPLACEMENT OF RIGHT HIP JOINT: ICD-10-CM

## 2021-01-07 DIAGNOSIS — M25.551 CHRONIC HIP PAIN AFTER TOTAL REPLACEMENT OF RIGHT HIP JOINT: ICD-10-CM

## 2021-01-07 DIAGNOSIS — Z96.641 CHRONIC HIP PAIN AFTER TOTAL REPLACEMENT OF RIGHT HIP JOINT: ICD-10-CM

## 2021-01-07 DIAGNOSIS — R53.1 WEAKNESS: Primary | ICD-10-CM

## 2021-01-07 DIAGNOSIS — R26.9 ABNORMALITY OF GAIT AND MOBILITY: ICD-10-CM

## 2021-01-07 DIAGNOSIS — M25.669 DECREASED RANGE OF MOTION OF LOWER EXTREMITY, UNSPECIFIED LATERALITY: ICD-10-CM

## 2021-01-07 PROCEDURE — 97112 NEUROMUSCULAR REEDUCATION: CPT | Mod: CQ

## 2021-01-07 PROCEDURE — 97110 THERAPEUTIC EXERCISES: CPT | Mod: CQ

## 2021-01-07 PROCEDURE — 97116 GAIT TRAINING THERAPY: CPT | Mod: CQ

## 2021-01-12 ENCOUNTER — TELEPHONE (OUTPATIENT)
Dept: ORTHOPEDICS | Facility: CLINIC | Age: 81
End: 2021-01-12

## 2021-01-12 ENCOUNTER — CLINICAL SUPPORT (OUTPATIENT)
Dept: REHABILITATION | Facility: HOSPITAL | Age: 81
End: 2021-01-12
Attending: ORTHOPAEDIC SURGERY
Payer: MEDICARE

## 2021-01-12 DIAGNOSIS — R26.9 ABNORMALITY OF GAIT AND MOBILITY: ICD-10-CM

## 2021-01-12 DIAGNOSIS — Z96.641 CHRONIC HIP PAIN AFTER TOTAL REPLACEMENT OF RIGHT HIP JOINT: ICD-10-CM

## 2021-01-12 DIAGNOSIS — G89.29 CHRONIC HIP PAIN AFTER TOTAL REPLACEMENT OF RIGHT HIP JOINT: ICD-10-CM

## 2021-01-12 DIAGNOSIS — M25.669 DECREASED RANGE OF MOTION OF LOWER EXTREMITY, UNSPECIFIED LATERALITY: ICD-10-CM

## 2021-01-12 DIAGNOSIS — M25.551 CHRONIC HIP PAIN AFTER TOTAL REPLACEMENT OF RIGHT HIP JOINT: ICD-10-CM

## 2021-01-12 PROCEDURE — 97110 THERAPEUTIC EXERCISES: CPT

## 2021-01-14 ENCOUNTER — CLINICAL SUPPORT (OUTPATIENT)
Dept: REHABILITATION | Facility: HOSPITAL | Age: 81
End: 2021-01-14
Attending: ORTHOPAEDIC SURGERY
Payer: MEDICARE

## 2021-01-14 DIAGNOSIS — Z96.641 CHRONIC HIP PAIN AFTER TOTAL REPLACEMENT OF RIGHT HIP JOINT: ICD-10-CM

## 2021-01-14 DIAGNOSIS — G89.29 CHRONIC HIP PAIN AFTER TOTAL REPLACEMENT OF RIGHT HIP JOINT: ICD-10-CM

## 2021-01-14 DIAGNOSIS — M25.669 DECREASED RANGE OF MOTION OF LOWER EXTREMITY, UNSPECIFIED LATERALITY: ICD-10-CM

## 2021-01-14 DIAGNOSIS — R26.9 ABNORMALITY OF GAIT AND MOBILITY: ICD-10-CM

## 2021-01-14 DIAGNOSIS — M25.551 CHRONIC HIP PAIN AFTER TOTAL REPLACEMENT OF RIGHT HIP JOINT: ICD-10-CM

## 2021-01-14 PROCEDURE — 97110 THERAPEUTIC EXERCISES: CPT

## 2021-01-15 ENCOUNTER — HOSPITAL ENCOUNTER (OUTPATIENT)
Dept: RADIOLOGY | Facility: HOSPITAL | Age: 81
Discharge: HOME | DRG: 291 | End: 2021-01-15
Attending: PHYSICIAN ASSISTANT
Payer: MEDICARE

## 2021-01-15 ENCOUNTER — OFFICE VISIT (OUTPATIENT)
Dept: ORTHOPEDICS | Facility: CLINIC | Age: 81
DRG: 291 | End: 2021-01-15
Payer: MEDICARE

## 2021-01-15 ENCOUNTER — HOSPITAL ENCOUNTER (INPATIENT)
Facility: HOSPITAL | Age: 81
LOS: 7 days | Discharge: HOME | DRG: 291 | End: 2021-01-23
Attending: STUDENT IN AN ORGANIZED HEALTH CARE EDUCATION/TRAINING PROGRAM | Admitting: STUDENT IN AN ORGANIZED HEALTH CARE EDUCATION/TRAINING PROGRAM
Payer: MEDICARE

## 2021-01-15 VITALS
HEIGHT: 62 IN | WEIGHT: 121 LBS | HEART RATE: 86 BPM | DIASTOLIC BLOOD PRESSURE: 149 MMHG | SYSTOLIC BLOOD PRESSURE: 199 MMHG | BODY MASS INDEX: 22.26 KG/M2

## 2021-01-15 DIAGNOSIS — R06.02 SHORTNESS OF BREATH: ICD-10-CM

## 2021-01-15 DIAGNOSIS — S72.002D CLOSED FRACTURE OF LEFT HIP WITH ROUTINE HEALING, SUBSEQUENT ENCOUNTER: ICD-10-CM

## 2021-01-15 DIAGNOSIS — S73.004D DISLOCATION OF RIGHT HIP, SUBSEQUENT ENCOUNTER: Primary | ICD-10-CM

## 2021-01-15 DIAGNOSIS — R06.00 DYSPNEA: ICD-10-CM

## 2021-01-15 DIAGNOSIS — J90 BILATERAL PLEURAL EFFUSION: ICD-10-CM

## 2021-01-15 DIAGNOSIS — G20.A1 PARKINSON DISEASE: Chronic | ICD-10-CM

## 2021-01-15 DIAGNOSIS — R06.02 SOB (SHORTNESS OF BREATH): ICD-10-CM

## 2021-01-15 DIAGNOSIS — M25.551 RIGHT HIP PAIN: ICD-10-CM

## 2021-01-15 DIAGNOSIS — Z96.649 STATUS POST HIP HEMIARTHROPLASTY: ICD-10-CM

## 2021-01-15 DIAGNOSIS — R00.1 BRADYCARDIA: ICD-10-CM

## 2021-01-15 DIAGNOSIS — J96.01 ACUTE HYPOXEMIC RESPIRATORY FAILURE: ICD-10-CM

## 2021-01-15 DIAGNOSIS — I50.9 ACUTE CONGESTIVE HEART FAILURE, UNSPECIFIED HEART FAILURE TYPE: Primary | ICD-10-CM

## 2021-01-15 DIAGNOSIS — I50.9 CHF (CONGESTIVE HEART FAILURE): ICD-10-CM

## 2021-01-15 LAB
ALLENS TEST: ABNORMAL
BASOPHILS # BLD AUTO: 0.03 K/UL (ref 0–0.2)
BASOPHILS NFR BLD: 0.5 % (ref 0–1.9)
CTP QC/QA: YES
DELSYS: ABNORMAL
DIFFERENTIAL METHOD: ABNORMAL
EOSINOPHIL # BLD AUTO: 0.1 K/UL (ref 0–0.5)
EOSINOPHIL NFR BLD: 1.1 % (ref 0–8)
ERYTHROCYTE [DISTWIDTH] IN BLOOD BY AUTOMATED COUNT: 15.7 % (ref 11.5–14.5)
HCO3 UR-SCNC: 22.6 MMOL/L (ref 24–28)
HCT VFR BLD AUTO: 36.4 % (ref 37–48.5)
HGB BLD-MCNC: 11.1 G/DL (ref 12–16)
IMM GRANULOCYTES # BLD AUTO: 0.02 K/UL (ref 0–0.04)
IMM GRANULOCYTES NFR BLD AUTO: 0.3 % (ref 0–0.5)
LYMPHOCYTES # BLD AUTO: 1.6 K/UL (ref 1–4.8)
LYMPHOCYTES NFR BLD: 26 % (ref 18–48)
MCH RBC QN AUTO: 26.9 PG (ref 27–31)
MCHC RBC AUTO-ENTMCNC: 30.5 G/DL (ref 32–36)
MCV RBC AUTO: 88 FL (ref 82–98)
MODE: ABNORMAL
MONOCYTES # BLD AUTO: 1 K/UL (ref 0.3–1)
MONOCYTES NFR BLD: 15.2 % (ref 4–15)
NEUTROPHILS # BLD AUTO: 3.6 K/UL (ref 1.8–7.7)
NEUTROPHILS NFR BLD: 56.9 % (ref 38–73)
NRBC BLD-RTO: 0 /100 WBC
PCO2 BLDA: 32.4 MMHG (ref 35–45)
PH SMN: 7.45 [PH] (ref 7.35–7.45)
PLATELET # BLD AUTO: 288 K/UL (ref 150–350)
PMV BLD AUTO: 10.8 FL (ref 9.2–12.9)
PO2 BLDA: 69 MMHG (ref 80–100)
POC BE: -1 MMOL/L
POC SATURATED O2: 95 % (ref 95–100)
RBC # BLD AUTO: 4.13 M/UL (ref 4–5.4)
SAMPLE: ABNORMAL
SARS-COV-2 RDRP RESP QL NAA+PROBE: NEGATIVE
SITE: ABNORMAL
SP02: 93
WBC # BLD AUTO: 6.24 K/UL (ref 3.9–12.7)

## 2021-01-15 PROCEDURE — 73502 X-RAY EXAM HIP UNI 2-3 VIEWS: CPT | Mod: TC,RT

## 2021-01-15 PROCEDURE — 36415 COLL VENOUS BLD VENIPUNCTURE: CPT

## 2021-01-15 PROCEDURE — 99024 POSTOP FOLLOW-UP VISIT: CPT | Mod: POP,,, | Performed by: PHYSICIAN ASSISTANT

## 2021-01-15 PROCEDURE — 84484 ASSAY OF TROPONIN QUANT: CPT

## 2021-01-15 PROCEDURE — 93005 ELECTROCARDIOGRAM TRACING: CPT

## 2021-01-15 PROCEDURE — 83605 ASSAY OF LACTIC ACID: CPT

## 2021-01-15 PROCEDURE — 99024 PR POST-OP FOLLOW-UP VISIT: ICD-10-PCS | Mod: POP,,, | Performed by: PHYSICIAN ASSISTANT

## 2021-01-15 PROCEDURE — 93010 ELECTROCARDIOGRAM REPORT: CPT | Mod: ,,, | Performed by: INTERNAL MEDICINE

## 2021-01-15 PROCEDURE — 83880 ASSAY OF NATRIURETIC PEPTIDE: CPT

## 2021-01-15 PROCEDURE — 85610 PROTHROMBIN TIME: CPT

## 2021-01-15 PROCEDURE — 85025 COMPLETE CBC W/AUTO DIFF WBC: CPT

## 2021-01-15 PROCEDURE — 99900035 HC TECH TIME PER 15 MIN (STAT)

## 2021-01-15 PROCEDURE — 96367 TX/PROPH/DG ADDL SEQ IV INF: CPT

## 2021-01-15 PROCEDURE — 73502 X-RAY EXAM HIP UNI 2-3 VIEWS: CPT | Mod: 26,RT,, | Performed by: RADIOLOGY

## 2021-01-15 PROCEDURE — 82803 BLOOD GASES ANY COMBINATION: CPT

## 2021-01-15 PROCEDURE — 93010 EKG 12-LEAD: ICD-10-PCS | Mod: ,,, | Performed by: INTERNAL MEDICINE

## 2021-01-15 PROCEDURE — 80053 COMPREHEN METABOLIC PANEL: CPT

## 2021-01-15 PROCEDURE — 36600 WITHDRAWAL OF ARTERIAL BLOOD: CPT

## 2021-01-15 PROCEDURE — 99999 PR PBB SHADOW E&M-EST. PATIENT-LVL III: ICD-10-PCS | Mod: PBBFAC,,, | Performed by: PHYSICIAN ASSISTANT

## 2021-01-15 PROCEDURE — 96365 THER/PROPH/DIAG IV INF INIT: CPT

## 2021-01-15 PROCEDURE — 99999 PR PBB SHADOW E&M-EST. PATIENT-LVL III: CPT | Mod: PBBFAC,,, | Performed by: PHYSICIAN ASSISTANT

## 2021-01-15 PROCEDURE — 85379 FIBRIN DEGRADATION QUANT: CPT

## 2021-01-15 PROCEDURE — 99291 CRITICAL CARE FIRST HOUR: CPT | Mod: 25

## 2021-01-15 PROCEDURE — 73502 XR HIP 2 VIEW RIGHT: ICD-10-PCS | Mod: 26,RT,, | Performed by: RADIOLOGY

## 2021-01-15 PROCEDURE — U0002 COVID-19 LAB TEST NON-CDC: HCPCS | Performed by: NURSE PRACTITIONER

## 2021-01-15 PROCEDURE — 96375 TX/PRO/DX INJ NEW DRUG ADDON: CPT

## 2021-01-15 PROCEDURE — 99213 OFFICE O/P EST LOW 20 MIN: CPT | Mod: PBBFAC,25 | Performed by: PHYSICIAN ASSISTANT

## 2021-01-16 PROBLEM — J90 BILATERAL PLEURAL EFFUSION: Status: ACTIVE | Noted: 2021-01-16

## 2021-01-16 PROBLEM — J90 PLEURAL EFFUSION, BILATERAL: Status: ACTIVE | Noted: 2021-01-16

## 2021-01-16 PROBLEM — J96.01 ACUTE HYPOXEMIC RESPIRATORY FAILURE: Status: ACTIVE | Noted: 2021-01-16

## 2021-01-16 LAB
ALBUMIN SERPL BCP-MCNC: 4 G/DL (ref 3.5–5.2)
ALP SERPL-CCNC: 156 U/L (ref 55–135)
ALT SERPL W/O P-5'-P-CCNC: 103 U/L (ref 10–44)
ANION GAP SERPL CALC-SCNC: 11 MMOL/L (ref 8–16)
AST SERPL-CCNC: 71 U/L (ref 10–40)
BACTERIA #/AREA URNS HPF: NORMAL /HPF
BILIRUB SERPL-MCNC: 0.4 MG/DL (ref 0.1–1)
BILIRUB UR QL STRIP: NEGATIVE
BNP SERPL-MCNC: 2807 PG/ML (ref 0–99)
BUN SERPL-MCNC: 31 MG/DL (ref 8–23)
CALCIUM SERPL-MCNC: 9.3 MG/DL (ref 8.7–10.5)
CHLORIDE SERPL-SCNC: 108 MMOL/L (ref 95–110)
CLARITY UR: CLEAR
CO2 SERPL-SCNC: 23 MMOL/L (ref 23–29)
COLOR UR: YELLOW
CREAT SERPL-MCNC: 0.9 MG/DL (ref 0.5–1.4)
D DIMER PPP IA.FEU-MCNC: 5.77 MG/L FEU
EST. GFR  (AFRICAN AMERICAN): >60 ML/MIN/1.73 M^2
EST. GFR  (NON AFRICAN AMERICAN): 60 ML/MIN/1.73 M^2
GLUCOSE SERPL-MCNC: 109 MG/DL (ref 70–110)
GLUCOSE UR QL STRIP: NEGATIVE
HGB UR QL STRIP: NEGATIVE
HYALINE CASTS #/AREA URNS LPF: 0 /LPF
INR PPP: 1.2 (ref 0.8–1.2)
KETONES UR QL STRIP: NEGATIVE
LACTATE SERPL-SCNC: 1.3 MMOL/L (ref 0.5–2.2)
LACTATE SERPL-SCNC: 1.5 MMOL/L (ref 0.5–2.2)
LACTATE SERPL-SCNC: 2.1 MMOL/L (ref 0.5–2.2)
LACTATE SERPL-SCNC: 2.5 MMOL/L (ref 0.5–2.2)
LEUKOCYTE ESTERASE UR QL STRIP: NEGATIVE
MICROSCOPIC COMMENT: NORMAL
NITRITE UR QL STRIP: NEGATIVE
PH UR STRIP: 6 [PH] (ref 5–8)
POTASSIUM SERPL-SCNC: 4.2 MMOL/L (ref 3.5–5.1)
PROT SERPL-MCNC: 7.1 G/DL (ref 6–8.4)
PROT UR QL STRIP: ABNORMAL
PROTHROMBIN TIME: 12.3 SEC (ref 9–12.5)
RBC #/AREA URNS HPF: 0 /HPF (ref 0–4)
SODIUM SERPL-SCNC: 142 MMOL/L (ref 136–145)
SP GR UR STRIP: 1.02 (ref 1–1.03)
TROPONIN I SERPL DL<=0.01 NG/ML-MCNC: 0.03 NG/ML (ref 0–0.03)
TROPONIN I SERPL DL<=0.01 NG/ML-MCNC: 0.03 NG/ML (ref 0–0.03)
TROPONIN I SERPL DL<=0.01 NG/ML-MCNC: 0.04 NG/ML (ref 0–0.03)
URN SPEC COLLECT METH UR: ABNORMAL
UROBILINOGEN UR STRIP-ACNC: NEGATIVE EU/DL
WBC #/AREA URNS HPF: 0 /HPF (ref 0–5)

## 2021-01-16 PROCEDURE — 11000001 HC ACUTE MED/SURG PRIVATE ROOM

## 2021-01-16 PROCEDURE — 21400001 HC TELEMETRY ROOM

## 2021-01-16 PROCEDURE — 97530 THERAPEUTIC ACTIVITIES: CPT

## 2021-01-16 PROCEDURE — 25000003 PHARM REV CODE 250: Performed by: STUDENT IN AN ORGANIZED HEALTH CARE EDUCATION/TRAINING PROGRAM

## 2021-01-16 PROCEDURE — 25500020 PHARM REV CODE 255: Performed by: FAMILY MEDICINE

## 2021-01-16 PROCEDURE — 83605 ASSAY OF LACTIC ACID: CPT

## 2021-01-16 PROCEDURE — 81000 URINALYSIS NONAUTO W/SCOPE: CPT

## 2021-01-16 PROCEDURE — 36415 COLL VENOUS BLD VENIPUNCTURE: CPT

## 2021-01-16 PROCEDURE — 83605 ASSAY OF LACTIC ACID: CPT | Mod: 91

## 2021-01-16 PROCEDURE — 97166 OT EVAL MOD COMPLEX 45 MIN: CPT

## 2021-01-16 PROCEDURE — 25000003 PHARM REV CODE 250: Performed by: INTERNAL MEDICINE

## 2021-01-16 PROCEDURE — 63600175 PHARM REV CODE 636 W HCPCS: Performed by: STUDENT IN AN ORGANIZED HEALTH CARE EDUCATION/TRAINING PROGRAM

## 2021-01-16 PROCEDURE — 84484 ASSAY OF TROPONIN QUANT: CPT | Mod: 91

## 2021-01-16 PROCEDURE — 63600175 PHARM REV CODE 636 W HCPCS: Performed by: NURSE PRACTITIONER

## 2021-01-16 PROCEDURE — 87040 BLOOD CULTURE FOR BACTERIA: CPT

## 2021-01-16 PROCEDURE — 97162 PT EVAL MOD COMPLEX 30 MIN: CPT

## 2021-01-16 RX ORDER — ONDANSETRON 2 MG/ML
4 INJECTION INTRAMUSCULAR; INTRAVENOUS EVERY 8 HOURS PRN
Status: DISCONTINUED | OUTPATIENT
Start: 2021-01-16 | End: 2021-01-23 | Stop reason: HOSPADM

## 2021-01-16 RX ORDER — FUROSEMIDE 10 MG/ML
40 INJECTION INTRAMUSCULAR; INTRAVENOUS
Status: DISCONTINUED | OUTPATIENT
Start: 2021-01-16 | End: 2021-01-21

## 2021-01-16 RX ORDER — CLONAZEPAM 0.5 MG/1
0.5 TABLET ORAL 2 TIMES DAILY PRN
Status: DISCONTINUED | OUTPATIENT
Start: 2021-01-16 | End: 2021-01-23 | Stop reason: HOSPADM

## 2021-01-16 RX ORDER — LOSARTAN POTASSIUM 50 MG/1
100 TABLET ORAL DAILY
Status: DISCONTINUED | OUTPATIENT
Start: 2021-01-16 | End: 2021-01-18

## 2021-01-16 RX ORDER — LABETALOL HYDROCHLORIDE 5 MG/ML
10 INJECTION, SOLUTION INTRAVENOUS
Status: COMPLETED | OUTPATIENT
Start: 2021-01-16 | End: 2021-01-16

## 2021-01-16 RX ORDER — METOPROLOL TARTRATE 25 MG/1
25 TABLET, FILM COATED ORAL 2 TIMES DAILY
Status: DISCONTINUED | OUTPATIENT
Start: 2021-01-16 | End: 2021-01-18

## 2021-01-16 RX ORDER — IPRATROPIUM BROMIDE AND ALBUTEROL SULFATE 2.5; .5 MG/3ML; MG/3ML
3 SOLUTION RESPIRATORY (INHALATION) EVERY 4 HOURS PRN
Status: DISCONTINUED | OUTPATIENT
Start: 2021-01-16 | End: 2021-01-23 | Stop reason: HOSPADM

## 2021-01-16 RX ORDER — GUAIFENESIN 100 MG/5ML
200 SOLUTION ORAL EVERY 4 HOURS PRN
Status: DISCONTINUED | OUTPATIENT
Start: 2021-01-16 | End: 2021-01-23 | Stop reason: HOSPADM

## 2021-01-16 RX ORDER — ACETAMINOPHEN 325 MG/1
650 TABLET ORAL EVERY 6 HOURS PRN
Status: DISCONTINUED | OUTPATIENT
Start: 2021-01-16 | End: 2021-01-23 | Stop reason: HOSPADM

## 2021-01-16 RX ORDER — CARBIDOPA AND LEVODOPA 25; 100 MG/1; MG/1
2 TABLET ORAL 4 TIMES DAILY
Status: DISCONTINUED | OUTPATIENT
Start: 2021-01-16 | End: 2021-01-23

## 2021-01-16 RX ORDER — FUROSEMIDE 10 MG/ML
40 INJECTION INTRAMUSCULAR; INTRAVENOUS
Status: COMPLETED | OUTPATIENT
Start: 2021-01-16 | End: 2021-01-16

## 2021-01-16 RX ORDER — ASPIRIN 81 MG/1
81 TABLET ORAL DAILY
Status: DISCONTINUED | OUTPATIENT
Start: 2021-01-16 | End: 2021-01-23 | Stop reason: HOSPADM

## 2021-01-16 RX ADMIN — FUROSEMIDE 40 MG: 10 INJECTION, SOLUTION INTRAVENOUS at 06:01

## 2021-01-16 RX ADMIN — ASPIRIN 81 MG: 81 TABLET, COATED ORAL at 10:01

## 2021-01-16 RX ADMIN — CARBIDOPA AND LEVODOPA 2 TABLET: 25; 100 TABLET ORAL at 10:01

## 2021-01-16 RX ADMIN — FUROSEMIDE 40 MG: 10 INJECTION, SOLUTION INTRAMUSCULAR; INTRAVENOUS at 12:01

## 2021-01-16 RX ADMIN — LOSARTAN POTASSIUM 100 MG: 50 TABLET, FILM COATED ORAL at 10:01

## 2021-01-16 RX ADMIN — CARBIDOPA AND LEVODOPA 2 TABLET: 25; 100 TABLET ORAL at 06:01

## 2021-01-16 RX ADMIN — VANCOMYCIN HYDROCHLORIDE 1250 MG: 1.25 INJECTION, POWDER, LYOPHILIZED, FOR SOLUTION INTRAVENOUS at 03:01

## 2021-01-16 RX ADMIN — CARBIDOPA AND LEVODOPA 2 TABLET: 25; 100 TABLET ORAL at 08:01

## 2021-01-16 RX ADMIN — LABETALOL HYDROCHLORIDE 10 MG: 5 INJECTION INTRAVENOUS at 12:01

## 2021-01-16 RX ADMIN — METOPROLOL TARTRATE 25 MG: 25 TABLET, FILM COATED ORAL at 10:01

## 2021-01-16 RX ADMIN — PIPERACILLIN AND TAZOBACTAM 4.5 G: 4; .5 INJECTION, POWDER, LYOPHILIZED, FOR SOLUTION INTRAVENOUS; PARENTERAL at 02:01

## 2021-01-16 RX ADMIN — CARBIDOPA AND LEVODOPA 2 TABLET: 25; 100 TABLET ORAL at 01:01

## 2021-01-16 RX ADMIN — METOPROLOL TARTRATE 25 MG: 25 TABLET, FILM COATED ORAL at 08:01

## 2021-01-16 RX ADMIN — IOHEXOL 100 ML: 350 INJECTION, SOLUTION INTRAVENOUS at 08:01

## 2021-01-17 PROBLEM — I50.9 ACUTE CHF (CONGESTIVE HEART FAILURE): Status: ACTIVE | Noted: 2021-01-17

## 2021-01-17 LAB
ALBUMIN SERPL BCP-MCNC: 3.6 G/DL (ref 3.5–5.2)
ALP SERPL-CCNC: 148 U/L (ref 55–135)
ALT SERPL W/O P-5'-P-CCNC: 39 U/L (ref 10–44)
ANION GAP SERPL CALC-SCNC: 13 MMOL/L (ref 8–16)
AST SERPL-CCNC: 54 U/L (ref 10–40)
BASOPHILS # BLD AUTO: 0.03 K/UL (ref 0–0.2)
BASOPHILS NFR BLD: 0.5 % (ref 0–1.9)
BILIRUB SERPL-MCNC: 0.6 MG/DL (ref 0.1–1)
BUN SERPL-MCNC: 18 MG/DL (ref 8–23)
CALCIUM SERPL-MCNC: 8.7 MG/DL (ref 8.7–10.5)
CHLORIDE SERPL-SCNC: 102 MMOL/L (ref 95–110)
CO2 SERPL-SCNC: 23 MMOL/L (ref 23–29)
CREAT SERPL-MCNC: 0.9 MG/DL (ref 0.5–1.4)
DIFFERENTIAL METHOD: ABNORMAL
EOSINOPHIL # BLD AUTO: 0.1 K/UL (ref 0–0.5)
EOSINOPHIL NFR BLD: 1.7 % (ref 0–8)
ERYTHROCYTE [DISTWIDTH] IN BLOOD BY AUTOMATED COUNT: 15.4 % (ref 11.5–14.5)
EST. GFR  (AFRICAN AMERICAN): >60 ML/MIN/1.73 M^2
EST. GFR  (NON AFRICAN AMERICAN): 60 ML/MIN/1.73 M^2
GLUCOSE SERPL-MCNC: 93 MG/DL (ref 70–110)
HCT VFR BLD AUTO: 38.1 % (ref 37–48.5)
HGB BLD-MCNC: 11.3 G/DL (ref 12–16)
IMM GRANULOCYTES # BLD AUTO: 0.03 K/UL (ref 0–0.04)
IMM GRANULOCYTES NFR BLD AUTO: 0.5 % (ref 0–0.5)
LYMPHOCYTES # BLD AUTO: 1.1 K/UL (ref 1–4.8)
LYMPHOCYTES NFR BLD: 16.7 % (ref 18–48)
MAGNESIUM SERPL-MCNC: 1.7 MG/DL (ref 1.6–2.6)
MCH RBC QN AUTO: 26.7 PG (ref 27–31)
MCHC RBC AUTO-ENTMCNC: 29.7 G/DL (ref 32–36)
MCV RBC AUTO: 90 FL (ref 82–98)
MONOCYTES # BLD AUTO: 0.9 K/UL (ref 0.3–1)
MONOCYTES NFR BLD: 14.1 % (ref 4–15)
NEUTROPHILS # BLD AUTO: 4.3 K/UL (ref 1.8–7.7)
NEUTROPHILS NFR BLD: 66.5 % (ref 38–73)
NRBC BLD-RTO: 0 /100 WBC
PHOSPHATE SERPL-MCNC: 3.9 MG/DL (ref 2.7–4.5)
PLATELET # BLD AUTO: 267 K/UL (ref 150–350)
PMV BLD AUTO: 11.2 FL (ref 9.2–12.9)
POTASSIUM SERPL-SCNC: 3.9 MMOL/L (ref 3.5–5.1)
PROT SERPL-MCNC: 6.7 G/DL (ref 6–8.4)
RBC # BLD AUTO: 4.24 M/UL (ref 4–5.4)
SODIUM SERPL-SCNC: 138 MMOL/L (ref 136–145)
WBC # BLD AUTO: 6.45 K/UL (ref 3.9–12.7)

## 2021-01-17 PROCEDURE — 80053 COMPREHEN METABOLIC PANEL: CPT

## 2021-01-17 PROCEDURE — 97530 THERAPEUTIC ACTIVITIES: CPT

## 2021-01-17 PROCEDURE — 25000003 PHARM REV CODE 250: Performed by: INTERNAL MEDICINE

## 2021-01-17 PROCEDURE — 97110 THERAPEUTIC EXERCISES: CPT

## 2021-01-17 PROCEDURE — 94761 N-INVAS EAR/PLS OXIMETRY MLT: CPT

## 2021-01-17 PROCEDURE — 27000221 HC OXYGEN, UP TO 24 HOURS

## 2021-01-17 PROCEDURE — 83735 ASSAY OF MAGNESIUM: CPT

## 2021-01-17 PROCEDURE — 11000001 HC ACUTE MED/SURG PRIVATE ROOM

## 2021-01-17 PROCEDURE — 21400001 HC TELEMETRY ROOM

## 2021-01-17 PROCEDURE — 84100 ASSAY OF PHOSPHORUS: CPT

## 2021-01-17 PROCEDURE — 85025 COMPLETE CBC W/AUTO DIFF WBC: CPT

## 2021-01-17 PROCEDURE — 25000242 PHARM REV CODE 250 ALT 637 W/ HCPCS: Performed by: INTERNAL MEDICINE

## 2021-01-17 PROCEDURE — 63600175 PHARM REV CODE 636 W HCPCS: Performed by: NURSE PRACTITIONER

## 2021-01-17 PROCEDURE — 99900035 HC TECH TIME PER 15 MIN (STAT)

## 2021-01-17 PROCEDURE — 36415 COLL VENOUS BLD VENIPUNCTURE: CPT

## 2021-01-17 PROCEDURE — 94640 AIRWAY INHALATION TREATMENT: CPT

## 2021-01-17 RX ORDER — ENOXAPARIN SODIUM 100 MG/ML
40 INJECTION SUBCUTANEOUS EVERY 24 HOURS
Status: DISCONTINUED | OUTPATIENT
Start: 2021-01-17 | End: 2021-01-23 | Stop reason: HOSPADM

## 2021-01-17 RX ADMIN — FUROSEMIDE 40 MG: 10 INJECTION, SOLUTION INTRAVENOUS at 05:01

## 2021-01-17 RX ADMIN — IPRATROPIUM BROMIDE AND ALBUTEROL SULFATE 3 ML: .5; 3 SOLUTION RESPIRATORY (INHALATION) at 10:01

## 2021-01-17 RX ADMIN — CARBIDOPA AND LEVODOPA 2 TABLET: 25; 100 TABLET ORAL at 09:01

## 2021-01-17 RX ADMIN — METOPROLOL TARTRATE 25 MG: 25 TABLET, FILM COATED ORAL at 09:01

## 2021-01-17 RX ADMIN — CARBIDOPA AND LEVODOPA 2 TABLET: 25; 100 TABLET ORAL at 12:01

## 2021-01-17 RX ADMIN — ASPIRIN 81 MG: 81 TABLET, COATED ORAL at 08:01

## 2021-01-17 RX ADMIN — ENOXAPARIN SODIUM 40 MG: 40 INJECTION SUBCUTANEOUS at 05:01

## 2021-01-17 RX ADMIN — LOSARTAN POTASSIUM 100 MG: 50 TABLET, FILM COATED ORAL at 08:01

## 2021-01-17 RX ADMIN — CARBIDOPA AND LEVODOPA 2 TABLET: 25; 100 TABLET ORAL at 08:01

## 2021-01-17 RX ADMIN — METOPROLOL TARTRATE 25 MG: 25 TABLET, FILM COATED ORAL at 08:01

## 2021-01-17 RX ADMIN — CARBIDOPA AND LEVODOPA 2 TABLET: 25; 100 TABLET ORAL at 05:01

## 2021-01-18 LAB
ASCENDING AORTA: 2.6 CM
AV INDEX (PROSTH): 0.55
AV MEAN GRADIENT: 4 MMHG
AV PEAK GRADIENT: 7 MMHG
AV VALVE AREA: 1.37 CM2
AV VELOCITY RATIO: 0.62
BSA FOR ECHO PROCEDURE: 1.54 M2
CV ECHO LV RWT: 0.32 CM
DOP CALC AO PEAK VEL: 1.33 M/S
DOP CALC AO VTI: 23.21 CM
DOP CALC LVOT AREA: 2.5 CM2
DOP CALC LVOT DIAMETER: 1.78 CM
DOP CALC LVOT PEAK VEL: 0.83 M/S
DOP CALC LVOT STROKE VOLUME: 31.74 CM3
DOP CALC RVOT PEAK VEL: 0.62 M/S
DOP CALC RVOT VTI: 11.64 CM
DOP CALCLVOT PEAK VEL VTI: 12.76 CM
E WAVE DECELERATION TIME: 174.5 MSEC
E/A RATIO: 0.95
E/E' RATIO: 40.4 M/S
ECHO LV POSTERIOR WALL: 0.87 CM (ref 0.6–1.1)
FRACTIONAL SHORTENING: 11 % (ref 28–44)
INTERVENTRICULAR SEPTUM: 0.85 CM (ref 0.6–1.1)
IVRT: 117.03 MSEC
LA MAJOR: 7.4 CM
LA MINOR: 6.75 CM
LA WIDTH: 4.31 CM
LEFT ATRIUM SIZE: 4.52 CM
LEFT ATRIUM VOLUME INDEX: 76.3 ML/M2
LEFT ATRIUM VOLUME: 116.91 CM3
LEFT INTERNAL DIMENSION IN SYSTOLE: 4.77 CM (ref 2.1–4)
LEFT VENTRICLE DIASTOLIC VOLUME INDEX: 91.15 ML/M2
LEFT VENTRICLE DIASTOLIC VOLUME: 139.74 ML
LEFT VENTRICLE MASS INDEX: 110 G/M2
LEFT VENTRICLE SYSTOLIC VOLUME INDEX: 69.2 ML/M2
LEFT VENTRICLE SYSTOLIC VOLUME: 106.04 ML
LEFT VENTRICULAR INTERNAL DIMENSION IN DIASTOLE: 5.37 CM (ref 3.5–6)
LEFT VENTRICULAR MASS: 168.29 G
LV LATERAL E/E' RATIO: 50.5 M/S
LV SEPTAL E/E' RATIO: 33.67 M/S
MV PEAK A VEL: 1.06 M/S
MV PEAK E VEL: 1.01 M/S
PISA TR MAX VEL: 3.24 M/S
PV MEAN GRADIENT: 1 MMHG
RA MAJOR: 5 CM
RA PRESSURE: 8 MMHG
RA WIDTH: 4.26 CM
RIGHT VENTRICULAR END-DIASTOLIC DIMENSION: 2.61 CM
SINUS: 2.76 CM
STJ: 2.41 CM
TDI LATERAL: 0.02 M/S
TDI SEPTAL: 0.03 M/S
TDI: 0.03 M/S
TR MAX PG: 42 MMHG
TRICUSPID ANNULAR PLANE SYSTOLIC EXCURSION: 1.44 CM
TV REST PULMONARY ARTERY PRESSURE: 50 MMHG

## 2021-01-18 PROCEDURE — 97110 THERAPEUTIC EXERCISES: CPT

## 2021-01-18 PROCEDURE — 25000003 PHARM REV CODE 250: Performed by: INTERNAL MEDICINE

## 2021-01-18 PROCEDURE — 97110 THERAPEUTIC EXERCISES: CPT | Mod: CQ

## 2021-01-18 PROCEDURE — 21400001 HC TELEMETRY ROOM

## 2021-01-18 PROCEDURE — 99223 1ST HOSP IP/OBS HIGH 75: CPT | Mod: ,,, | Performed by: INTERNAL MEDICINE

## 2021-01-18 PROCEDURE — 97530 THERAPEUTIC ACTIVITIES: CPT

## 2021-01-18 PROCEDURE — 99223 PR INITIAL HOSPITAL CARE,LEVL III: ICD-10-PCS | Mod: ,,, | Performed by: INTERNAL MEDICINE

## 2021-01-18 PROCEDURE — 63600175 PHARM REV CODE 636 W HCPCS: Performed by: NURSE PRACTITIONER

## 2021-01-18 PROCEDURE — 11000001 HC ACUTE MED/SURG PRIVATE ROOM

## 2021-01-18 PROCEDURE — 97116 GAIT TRAINING THERAPY: CPT | Mod: CQ

## 2021-01-18 PROCEDURE — 96372 THER/PROPH/DIAG INJ SC/IM: CPT

## 2021-01-18 RX ORDER — METOPROLOL SUCCINATE 25 MG/1
25 TABLET, EXTENDED RELEASE ORAL DAILY
Status: DISCONTINUED | OUTPATIENT
Start: 2021-01-19 | End: 2021-01-23 | Stop reason: HOSPADM

## 2021-01-18 RX ORDER — AMLODIPINE BESYLATE 5 MG/1
5 TABLET ORAL DAILY
Status: DISCONTINUED | OUTPATIENT
Start: 2021-01-18 | End: 2021-01-20

## 2021-01-18 RX ADMIN — CARBIDOPA AND LEVODOPA 2 TABLET: 25; 100 TABLET ORAL at 08:01

## 2021-01-18 RX ADMIN — METOPROLOL TARTRATE 25 MG: 25 TABLET, FILM COATED ORAL at 08:01

## 2021-01-18 RX ADMIN — SACUBITRIL AND VALSARTAN 1 TABLET: 24; 26 TABLET, FILM COATED ORAL at 11:01

## 2021-01-18 RX ADMIN — FUROSEMIDE 40 MG: 10 INJECTION, SOLUTION INTRAVENOUS at 05:01

## 2021-01-18 RX ADMIN — ENOXAPARIN SODIUM 40 MG: 40 INJECTION SUBCUTANEOUS at 05:01

## 2021-01-18 RX ADMIN — CARBIDOPA AND LEVODOPA 2 TABLET: 25; 100 TABLET ORAL at 05:01

## 2021-01-18 RX ADMIN — CARBIDOPA AND LEVODOPA 2 TABLET: 25; 100 TABLET ORAL at 09:01

## 2021-01-18 RX ADMIN — FUROSEMIDE 40 MG: 10 INJECTION, SOLUTION INTRAVENOUS at 06:01

## 2021-01-18 RX ADMIN — CARBIDOPA AND LEVODOPA 2 TABLET: 25; 100 TABLET ORAL at 03:01

## 2021-01-18 RX ADMIN — LOSARTAN POTASSIUM 100 MG: 50 TABLET, FILM COATED ORAL at 08:01

## 2021-01-18 RX ADMIN — ASPIRIN 81 MG: 81 TABLET, COATED ORAL at 08:01

## 2021-01-18 RX ADMIN — AMLODIPINE BESYLATE 5 MG: 5 TABLET ORAL at 11:01

## 2021-01-19 LAB
ALBUMIN SERPL BCP-MCNC: 3.9 G/DL (ref 3.5–5.2)
ALP SERPL-CCNC: 142 U/L (ref 55–135)
ALT SERPL W/O P-5'-P-CCNC: 32 U/L (ref 10–44)
ANION GAP SERPL CALC-SCNC: 22 MMOL/L (ref 8–16)
AST SERPL-CCNC: 52 U/L (ref 10–40)
BASOPHILS # BLD AUTO: 0.03 K/UL (ref 0–0.2)
BASOPHILS NFR BLD: 0.3 % (ref 0–1.9)
BILIRUB SERPL-MCNC: 0.6 MG/DL (ref 0.1–1)
BUN SERPL-MCNC: 26 MG/DL (ref 8–23)
CALCIUM SERPL-MCNC: 9.2 MG/DL (ref 8.7–10.5)
CHLORIDE SERPL-SCNC: 100 MMOL/L (ref 95–110)
CO2 SERPL-SCNC: 15 MMOL/L (ref 23–29)
CREAT SERPL-MCNC: 1.2 MG/DL (ref 0.5–1.4)
DIFFERENTIAL METHOD: ABNORMAL
EOSINOPHIL # BLD AUTO: 0.2 K/UL (ref 0–0.5)
EOSINOPHIL NFR BLD: 1.7 % (ref 0–8)
ERYTHROCYTE [DISTWIDTH] IN BLOOD BY AUTOMATED COUNT: 15 % (ref 11.5–14.5)
EST. GFR  (AFRICAN AMERICAN): 49 ML/MIN/1.73 M^2
EST. GFR  (NON AFRICAN AMERICAN): 42 ML/MIN/1.73 M^2
GLUCOSE SERPL-MCNC: 148 MG/DL (ref 70–110)
HCT VFR BLD AUTO: 45.9 % (ref 37–48.5)
HGB BLD-MCNC: 13.9 G/DL (ref 12–16)
IMM GRANULOCYTES # BLD AUTO: 0.08 K/UL (ref 0–0.04)
IMM GRANULOCYTES NFR BLD AUTO: 0.9 % (ref 0–0.5)
LYMPHOCYTES # BLD AUTO: 1.8 K/UL (ref 1–4.8)
LYMPHOCYTES NFR BLD: 20.8 % (ref 18–48)
MAGNESIUM SERPL-MCNC: 2 MG/DL (ref 1.6–2.6)
MCH RBC QN AUTO: 26.7 PG (ref 27–31)
MCHC RBC AUTO-ENTMCNC: 30.3 G/DL (ref 32–36)
MCV RBC AUTO: 88 FL (ref 82–98)
MONOCYTES # BLD AUTO: 1.7 K/UL (ref 0.3–1)
MONOCYTES NFR BLD: 19.9 % (ref 4–15)
NEUTROPHILS # BLD AUTO: 4.9 K/UL (ref 1.8–7.7)
NEUTROPHILS NFR BLD: 56.4 % (ref 38–73)
NRBC BLD-RTO: 0 /100 WBC
PLATELET # BLD AUTO: 349 K/UL (ref 150–350)
PMV BLD AUTO: 11.2 FL (ref 9.2–12.9)
POTASSIUM SERPL-SCNC: 4.6 MMOL/L (ref 3.5–5.1)
PROT SERPL-MCNC: 8 G/DL (ref 6–8.4)
RBC # BLD AUTO: 5.21 M/UL (ref 4–5.4)
SODIUM SERPL-SCNC: 137 MMOL/L (ref 136–145)
WBC # BLD AUTO: 8.64 K/UL (ref 3.9–12.7)

## 2021-01-19 PROCEDURE — 11000001 HC ACUTE MED/SURG PRIVATE ROOM

## 2021-01-19 PROCEDURE — 80053 COMPREHEN METABOLIC PANEL: CPT

## 2021-01-19 PROCEDURE — 85025 COMPLETE CBC W/AUTO DIFF WBC: CPT

## 2021-01-19 PROCEDURE — 63600175 PHARM REV CODE 636 W HCPCS: Performed by: NURSE PRACTITIONER

## 2021-01-19 PROCEDURE — 36415 COLL VENOUS BLD VENIPUNCTURE: CPT

## 2021-01-19 PROCEDURE — 83735 ASSAY OF MAGNESIUM: CPT

## 2021-01-19 PROCEDURE — 99233 PR SUBSEQUENT HOSPITAL CARE,LEVL III: ICD-10-PCS | Mod: ,,, | Performed by: INTERNAL MEDICINE

## 2021-01-19 PROCEDURE — 21400001 HC TELEMETRY ROOM

## 2021-01-19 PROCEDURE — 97530 THERAPEUTIC ACTIVITIES: CPT

## 2021-01-19 PROCEDURE — 25000242 PHARM REV CODE 250 ALT 637 W/ HCPCS: Performed by: INTERNAL MEDICINE

## 2021-01-19 PROCEDURE — 97110 THERAPEUTIC EXERCISES: CPT

## 2021-01-19 PROCEDURE — 25000003 PHARM REV CODE 250: Performed by: INTERNAL MEDICINE

## 2021-01-19 PROCEDURE — 94640 AIRWAY INHALATION TREATMENT: CPT

## 2021-01-19 PROCEDURE — 97116 GAIT TRAINING THERAPY: CPT

## 2021-01-19 PROCEDURE — 97535 SELF CARE MNGMENT TRAINING: CPT

## 2021-01-19 PROCEDURE — 25000003 PHARM REV CODE 250: Performed by: NURSE PRACTITIONER

## 2021-01-19 PROCEDURE — 99233 SBSQ HOSP IP/OBS HIGH 50: CPT | Mod: ,,, | Performed by: INTERNAL MEDICINE

## 2021-01-19 RX ORDER — DOCUSATE SODIUM 100 MG/1
100 CAPSULE, LIQUID FILLED ORAL DAILY
Status: DISCONTINUED | OUTPATIENT
Start: 2021-01-19 | End: 2021-01-23 | Stop reason: HOSPADM

## 2021-01-19 RX ORDER — POLYETHYLENE GLYCOL 3350 17 G/17G
17 POWDER, FOR SOLUTION ORAL DAILY
Status: DISCONTINUED | OUTPATIENT
Start: 2021-01-19 | End: 2021-01-23 | Stop reason: HOSPADM

## 2021-01-19 RX ADMIN — DOCUSATE SODIUM 100 MG: 100 CAPSULE, LIQUID FILLED ORAL at 01:01

## 2021-01-19 RX ADMIN — IPRATROPIUM BROMIDE AND ALBUTEROL SULFATE 3 ML: .5; 3 SOLUTION RESPIRATORY (INHALATION) at 04:01

## 2021-01-19 RX ADMIN — FUROSEMIDE 40 MG: 10 INJECTION, SOLUTION INTRAVENOUS at 06:01

## 2021-01-19 RX ADMIN — CARBIDOPA AND LEVODOPA 2 TABLET: 25; 100 TABLET ORAL at 09:01

## 2021-01-19 RX ADMIN — AMLODIPINE BESYLATE 5 MG: 5 TABLET ORAL at 09:01

## 2021-01-19 RX ADMIN — CARBIDOPA AND LEVODOPA 2 TABLET: 25; 100 TABLET ORAL at 06:01

## 2021-01-19 RX ADMIN — FUROSEMIDE 40 MG: 10 INJECTION, SOLUTION INTRAVENOUS at 05:01

## 2021-01-19 RX ADMIN — SACUBITRIL AND VALSARTAN 1 TABLET: 24; 26 TABLET, FILM COATED ORAL at 09:01

## 2021-01-19 RX ADMIN — POLYETHYLENE GLYCOL 3350 17 G: 17 POWDER, FOR SOLUTION ORAL at 01:01

## 2021-01-19 RX ADMIN — METOPROLOL SUCCINATE 25 MG: 25 TABLET, EXTENDED RELEASE ORAL at 09:01

## 2021-01-19 RX ADMIN — ASPIRIN 81 MG: 81 TABLET, COATED ORAL at 09:01

## 2021-01-19 RX ADMIN — CARBIDOPA AND LEVODOPA 2 TABLET: 25; 100 TABLET ORAL at 12:01

## 2021-01-19 RX ADMIN — ENOXAPARIN SODIUM 40 MG: 40 INJECTION SUBCUTANEOUS at 06:01

## 2021-01-20 LAB
ALBUMIN SERPL BCP-MCNC: 3.4 G/DL (ref 3.5–5.2)
ALBUMIN SERPL BCP-MCNC: 3.4 G/DL (ref 3.5–5.2)
ALP SERPL-CCNC: 120 U/L (ref 55–135)
ALP SERPL-CCNC: 122 U/L (ref 55–135)
ALT SERPL W/O P-5'-P-CCNC: 20 U/L (ref 10–44)
ALT SERPL W/O P-5'-P-CCNC: 9 U/L (ref 10–44)
ANION GAP SERPL CALC-SCNC: 9 MMOL/L (ref 8–16)
ANION GAP SERPL CALC-SCNC: 9 MMOL/L (ref 8–16)
AST SERPL-CCNC: 22 U/L (ref 10–40)
AST SERPL-CCNC: 22 U/L (ref 10–40)
BASOPHILS # BLD AUTO: 0.02 K/UL (ref 0–0.2)
BASOPHILS NFR BLD: 0.4 % (ref 0–1.9)
BILIRUB SERPL-MCNC: 0.3 MG/DL (ref 0.1–1)
BILIRUB SERPL-MCNC: 0.4 MG/DL (ref 0.1–1)
BNP SERPL-MCNC: 207 PG/ML (ref 0–99)
BUN SERPL-MCNC: 25 MG/DL (ref 8–23)
BUN SERPL-MCNC: 26 MG/DL (ref 8–23)
CALCIUM SERPL-MCNC: 9 MG/DL (ref 8.7–10.5)
CALCIUM SERPL-MCNC: 9.1 MG/DL (ref 8.7–10.5)
CHLORIDE SERPL-SCNC: 100 MMOL/L (ref 95–110)
CHLORIDE SERPL-SCNC: 99 MMOL/L (ref 95–110)
CO2 SERPL-SCNC: 31 MMOL/L (ref 23–29)
CO2 SERPL-SCNC: 32 MMOL/L (ref 23–29)
CREAT SERPL-MCNC: 1 MG/DL (ref 0.5–1.4)
CREAT SERPL-MCNC: 1.2 MG/DL (ref 0.5–1.4)
DIFFERENTIAL METHOD: ABNORMAL
EOSINOPHIL # BLD AUTO: 0.2 K/UL (ref 0–0.5)
EOSINOPHIL NFR BLD: 3.1 % (ref 0–8)
ERYTHROCYTE [DISTWIDTH] IN BLOOD BY AUTOMATED COUNT: 15.1 % (ref 11.5–14.5)
EST. GFR  (AFRICAN AMERICAN): 49 ML/MIN/1.73 M^2
EST. GFR  (AFRICAN AMERICAN): >60 ML/MIN/1.73 M^2
EST. GFR  (NON AFRICAN AMERICAN): 42 ML/MIN/1.73 M^2
EST. GFR  (NON AFRICAN AMERICAN): 53 ML/MIN/1.73 M^2
GLUCOSE SERPL-MCNC: 104 MG/DL (ref 70–110)
GLUCOSE SERPL-MCNC: 107 MG/DL (ref 70–110)
HCT VFR BLD AUTO: 42 % (ref 37–48.5)
HGB BLD-MCNC: 12.6 G/DL (ref 12–16)
IMM GRANULOCYTES # BLD AUTO: 0.03 K/UL (ref 0–0.04)
IMM GRANULOCYTES NFR BLD AUTO: 0.6 % (ref 0–0.5)
LACTATE SERPL-SCNC: 3.3 MMOL/L (ref 0.5–2.2)
LYMPHOCYTES # BLD AUTO: 1.3 K/UL (ref 1–4.8)
LYMPHOCYTES NFR BLD: 25.7 % (ref 18–48)
MAGNESIUM SERPL-MCNC: 1.9 MG/DL (ref 1.6–2.6)
MCH RBC QN AUTO: 26.6 PG (ref 27–31)
MCHC RBC AUTO-ENTMCNC: 30 G/DL (ref 32–36)
MCV RBC AUTO: 89 FL (ref 82–98)
MONOCYTES # BLD AUTO: 1 K/UL (ref 0.3–1)
MONOCYTES NFR BLD: 19 % (ref 4–15)
NEUTROPHILS # BLD AUTO: 2.7 K/UL (ref 1.8–7.7)
NEUTROPHILS NFR BLD: 51.2 % (ref 38–73)
NRBC BLD-RTO: 0 /100 WBC
PHOSPHATE SERPL-MCNC: 3.5 MG/DL (ref 2.7–4.5)
PLATELET # BLD AUTO: 326 K/UL (ref 150–350)
PMV BLD AUTO: 10.8 FL (ref 9.2–12.9)
POCT GLUCOSE: 165 MG/DL (ref 70–110)
POTASSIUM SERPL-SCNC: 3.2 MMOL/L (ref 3.5–5.1)
POTASSIUM SERPL-SCNC: 3.9 MMOL/L (ref 3.5–5.1)
PROCALCITONIN SERPL IA-MCNC: 0.08 NG/ML
PROT SERPL-MCNC: 6.5 G/DL (ref 6–8.4)
PROT SERPL-MCNC: 6.6 G/DL (ref 6–8.4)
RBC # BLD AUTO: 4.73 M/UL (ref 4–5.4)
SODIUM SERPL-SCNC: 140 MMOL/L (ref 136–145)
SODIUM SERPL-SCNC: 140 MMOL/L (ref 136–145)
WBC # BLD AUTO: 5.21 K/UL (ref 3.9–12.7)

## 2021-01-20 PROCEDURE — 83735 ASSAY OF MAGNESIUM: CPT

## 2021-01-20 PROCEDURE — 94761 N-INVAS EAR/PLS OXIMETRY MLT: CPT

## 2021-01-20 PROCEDURE — 99233 SBSQ HOSP IP/OBS HIGH 50: CPT | Mod: ,,, | Performed by: INTERNAL MEDICINE

## 2021-01-20 PROCEDURE — 25000003 PHARM REV CODE 250: Performed by: INTERNAL MEDICINE

## 2021-01-20 PROCEDURE — 93010 EKG 12-LEAD: ICD-10-PCS | Mod: ,,, | Performed by: INTERNAL MEDICINE

## 2021-01-20 PROCEDURE — 97110 THERAPEUTIC EXERCISES: CPT | Mod: CQ

## 2021-01-20 PROCEDURE — 84100 ASSAY OF PHOSPHORUS: CPT

## 2021-01-20 PROCEDURE — 85025 COMPLETE CBC W/AUTO DIFF WBC: CPT

## 2021-01-20 PROCEDURE — 83605 ASSAY OF LACTIC ACID: CPT

## 2021-01-20 PROCEDURE — 80053 COMPREHEN METABOLIC PANEL: CPT | Mod: 91

## 2021-01-20 PROCEDURE — 83880 ASSAY OF NATRIURETIC PEPTIDE: CPT

## 2021-01-20 PROCEDURE — 63600175 PHARM REV CODE 636 W HCPCS: Performed by: NURSE PRACTITIONER

## 2021-01-20 PROCEDURE — 93005 ELECTROCARDIOGRAM TRACING: CPT

## 2021-01-20 PROCEDURE — 25000003 PHARM REV CODE 250: Performed by: NURSE PRACTITIONER

## 2021-01-20 PROCEDURE — 21400001 HC TELEMETRY ROOM

## 2021-01-20 PROCEDURE — 36415 COLL VENOUS BLD VENIPUNCTURE: CPT

## 2021-01-20 PROCEDURE — 93010 ELECTROCARDIOGRAM REPORT: CPT | Mod: ,,, | Performed by: INTERNAL MEDICINE

## 2021-01-20 PROCEDURE — 84145 PROCALCITONIN (PCT): CPT

## 2021-01-20 PROCEDURE — 99233 PR SUBSEQUENT HOSPITAL CARE,LEVL III: ICD-10-PCS | Mod: ,,, | Performed by: INTERNAL MEDICINE

## 2021-01-20 PROCEDURE — 97116 GAIT TRAINING THERAPY: CPT | Mod: CQ

## 2021-01-20 PROCEDURE — 96372 THER/PROPH/DIAG INJ SC/IM: CPT

## 2021-01-20 RX ORDER — CLOPIDOGREL BISULFATE 75 MG/1
75 TABLET ORAL DAILY
Status: DISCONTINUED | OUTPATIENT
Start: 2021-01-20 | End: 2021-01-23 | Stop reason: HOSPADM

## 2021-01-20 RX ORDER — REGADENOSON 0.08 MG/ML
0.4 INJECTION, SOLUTION INTRAVENOUS ONCE
Status: DISCONTINUED | OUTPATIENT
Start: 2021-01-20 | End: 2021-01-22

## 2021-01-20 RX ORDER — FAMOTIDINE 20 MG/1
20 TABLET, FILM COATED ORAL DAILY
Status: DISCONTINUED | OUTPATIENT
Start: 2021-01-20 | End: 2021-01-23 | Stop reason: HOSPADM

## 2021-01-20 RX ORDER — SODIUM CHLORIDE 9 MG/ML
INJECTION, SOLUTION INTRAVENOUS ONCE
Status: COMPLETED | OUTPATIENT
Start: 2021-01-20 | End: 2021-01-20

## 2021-01-20 RX ADMIN — POTASSIUM BICARBONATE 25 MEQ: 977.5 TABLET, EFFERVESCENT ORAL at 11:01

## 2021-01-20 RX ADMIN — AMLODIPINE BESYLATE 5 MG: 5 TABLET ORAL at 08:01

## 2021-01-20 RX ADMIN — FAMOTIDINE 20 MG: 20 TABLET, FILM COATED ORAL at 02:01

## 2021-01-20 RX ADMIN — SACUBITRIL AND VALSARTAN 1 TABLET: 24; 26 TABLET, FILM COATED ORAL at 08:01

## 2021-01-20 RX ADMIN — CARBIDOPA AND LEVODOPA 2 TABLET: 25; 100 TABLET ORAL at 08:01

## 2021-01-20 RX ADMIN — SODIUM CHLORIDE 250 ML/HR: 0.9 INJECTION, SOLUTION INTRAVENOUS at 11:01

## 2021-01-20 RX ADMIN — ASPIRIN 81 MG: 81 TABLET, COATED ORAL at 08:01

## 2021-01-20 RX ADMIN — DOCUSATE SODIUM 100 MG: 100 CAPSULE, LIQUID FILLED ORAL at 08:01

## 2021-01-20 RX ADMIN — CARBIDOPA AND LEVODOPA 2 TABLET: 25; 100 TABLET ORAL at 04:01

## 2021-01-20 RX ADMIN — POTASSIUM BICARBONATE 25 MEQ: 977.5 TABLET, EFFERVESCENT ORAL at 09:01

## 2021-01-20 RX ADMIN — FUROSEMIDE 40 MG: 10 INJECTION, SOLUTION INTRAVENOUS at 05:01

## 2021-01-20 RX ADMIN — METOPROLOL SUCCINATE 25 MG: 25 TABLET, EXTENDED RELEASE ORAL at 08:01

## 2021-01-20 RX ADMIN — ENOXAPARIN SODIUM 40 MG: 40 INJECTION SUBCUTANEOUS at 04:01

## 2021-01-20 RX ADMIN — POLYETHYLENE GLYCOL 3350 17 G: 17 POWDER, FOR SOLUTION ORAL at 08:01

## 2021-01-20 RX ADMIN — CLOPIDOGREL 75 MG: 75 TABLET, FILM COATED ORAL at 11:01

## 2021-01-21 LAB
ALBUMIN SERPL BCP-MCNC: 3.5 G/DL (ref 3.5–5.2)
ALP SERPL-CCNC: 122 U/L (ref 55–135)
ALT SERPL W/O P-5'-P-CCNC: 13 U/L (ref 10–44)
ANION GAP SERPL CALC-SCNC: 9 MMOL/L (ref 8–16)
AST SERPL-CCNC: 25 U/L (ref 10–40)
BACTERIA BLD CULT: NORMAL
BACTERIA BLD CULT: NORMAL
BASOPHILS # BLD AUTO: 0.03 K/UL (ref 0–0.2)
BASOPHILS NFR BLD: 0.7 % (ref 0–1.9)
BILIRUB SERPL-MCNC: 0.4 MG/DL (ref 0.1–1)
BUN SERPL-MCNC: 22 MG/DL (ref 8–23)
CALCIUM SERPL-MCNC: 9.2 MG/DL (ref 8.7–10.5)
CHLORIDE SERPL-SCNC: 99 MMOL/L (ref 95–110)
CO2 SERPL-SCNC: 33 MMOL/L (ref 23–29)
CREAT SERPL-MCNC: 1 MG/DL (ref 0.5–1.4)
DIFFERENTIAL METHOD: ABNORMAL
EOSINOPHIL # BLD AUTO: 0.2 K/UL (ref 0–0.5)
EOSINOPHIL NFR BLD: 4.4 % (ref 0–8)
ERYTHROCYTE [DISTWIDTH] IN BLOOD BY AUTOMATED COUNT: 14.8 % (ref 11.5–14.5)
EST. GFR  (AFRICAN AMERICAN): >60 ML/MIN/1.73 M^2
EST. GFR  (NON AFRICAN AMERICAN): 53 ML/MIN/1.73 M^2
GLUCOSE SERPL-MCNC: 98 MG/DL (ref 70–110)
HCT VFR BLD AUTO: 42.1 % (ref 37–48.5)
HGB BLD-MCNC: 12.6 G/DL (ref 12–16)
IMM GRANULOCYTES # BLD AUTO: 0.02 K/UL (ref 0–0.04)
IMM GRANULOCYTES NFR BLD AUTO: 0.5 % (ref 0–0.5)
LYMPHOCYTES # BLD AUTO: 1.3 K/UL (ref 1–4.8)
LYMPHOCYTES NFR BLD: 30.8 % (ref 18–48)
MCH RBC QN AUTO: 26.4 PG (ref 27–31)
MCHC RBC AUTO-ENTMCNC: 29.9 G/DL (ref 32–36)
MCV RBC AUTO: 88 FL (ref 82–98)
MONOCYTES # BLD AUTO: 0.8 K/UL (ref 0.3–1)
MONOCYTES NFR BLD: 18.3 % (ref 4–15)
NEUTROPHILS # BLD AUTO: 2 K/UL (ref 1.8–7.7)
NEUTROPHILS NFR BLD: 45.3 % (ref 38–73)
NRBC BLD-RTO: 0 /100 WBC
PLATELET # BLD AUTO: 337 K/UL (ref 150–350)
PMV BLD AUTO: 10.4 FL (ref 9.2–12.9)
POTASSIUM SERPL-SCNC: 3.7 MMOL/L (ref 3.5–5.1)
PROT SERPL-MCNC: 6.7 G/DL (ref 6–8.4)
RBC # BLD AUTO: 4.77 M/UL (ref 4–5.4)
SODIUM SERPL-SCNC: 141 MMOL/L (ref 136–145)
WBC # BLD AUTO: 4.32 K/UL (ref 3.9–12.7)

## 2021-01-21 PROCEDURE — 80053 COMPREHEN METABOLIC PANEL: CPT

## 2021-01-21 PROCEDURE — 99233 PR SUBSEQUENT HOSPITAL CARE,LEVL III: ICD-10-PCS | Mod: ,,, | Performed by: INTERNAL MEDICINE

## 2021-01-21 PROCEDURE — 21400001 HC TELEMETRY ROOM

## 2021-01-21 PROCEDURE — 25000003 PHARM REV CODE 250: Performed by: INTERNAL MEDICINE

## 2021-01-21 PROCEDURE — 85025 COMPLETE CBC W/AUTO DIFF WBC: CPT

## 2021-01-21 PROCEDURE — 25000003 PHARM REV CODE 250: Performed by: NURSE PRACTITIONER

## 2021-01-21 PROCEDURE — 96372 THER/PROPH/DIAG INJ SC/IM: CPT

## 2021-01-21 PROCEDURE — 99233 SBSQ HOSP IP/OBS HIGH 50: CPT | Mod: ,,, | Performed by: INTERNAL MEDICINE

## 2021-01-21 PROCEDURE — 63600175 PHARM REV CODE 636 W HCPCS: Performed by: NURSE PRACTITIONER

## 2021-01-21 PROCEDURE — 36415 COLL VENOUS BLD VENIPUNCTURE: CPT

## 2021-01-21 RX ORDER — SODIUM CHLORIDE 9 MG/ML
INJECTION, SOLUTION INTRAVENOUS ONCE
Status: COMPLETED | OUTPATIENT
Start: 2021-01-21 | End: 2021-01-21

## 2021-01-21 RX ADMIN — ASPIRIN 81 MG: 81 TABLET, COATED ORAL at 09:01

## 2021-01-21 RX ADMIN — FAMOTIDINE 20 MG: 20 TABLET, FILM COATED ORAL at 09:01

## 2021-01-21 RX ADMIN — GUAIFENESIN 200 MG: 200 SOLUTION ORAL at 02:01

## 2021-01-21 RX ADMIN — CARBIDOPA AND LEVODOPA 2 TABLET: 25; 100 TABLET ORAL at 09:01

## 2021-01-21 RX ADMIN — DOCUSATE SODIUM 100 MG: 100 CAPSULE, LIQUID FILLED ORAL at 09:01

## 2021-01-21 RX ADMIN — FUROSEMIDE 40 MG: 10 INJECTION, SOLUTION INTRAVENOUS at 05:01

## 2021-01-21 RX ADMIN — CLOPIDOGREL 75 MG: 75 TABLET, FILM COATED ORAL at 09:01

## 2021-01-21 RX ADMIN — CARBIDOPA AND LEVODOPA 2 TABLET: 25; 100 TABLET ORAL at 05:01

## 2021-01-21 RX ADMIN — SODIUM CHLORIDE 200 ML/HR: 0.9 INJECTION, SOLUTION INTRAVENOUS at 05:01

## 2021-01-21 RX ADMIN — ENOXAPARIN SODIUM 40 MG: 40 INJECTION SUBCUTANEOUS at 05:01

## 2021-01-21 RX ADMIN — POLYETHYLENE GLYCOL 3350 17 G: 17 POWDER, FOR SOLUTION ORAL at 09:01

## 2021-01-22 LAB
ALBUMIN SERPL BCP-MCNC: 3.5 G/DL (ref 3.5–5.2)
ALP SERPL-CCNC: 114 U/L (ref 55–135)
ALT SERPL W/O P-5'-P-CCNC: 30 U/L (ref 10–44)
ANION GAP SERPL CALC-SCNC: 11 MMOL/L (ref 8–16)
AST SERPL-CCNC: 22 U/L (ref 10–40)
BILIRUB SERPL-MCNC: 0.3 MG/DL (ref 0.1–1)
BUN SERPL-MCNC: 23 MG/DL (ref 8–23)
CALCIUM SERPL-MCNC: 8.9 MG/DL (ref 8.7–10.5)
CHLORIDE SERPL-SCNC: 101 MMOL/L (ref 95–110)
CO2 SERPL-SCNC: 27 MMOL/L (ref 23–29)
CREAT SERPL-MCNC: 0.9 MG/DL (ref 0.5–1.4)
EST. GFR  (AFRICAN AMERICAN): >60 ML/MIN/1.73 M^2
EST. GFR  (NON AFRICAN AMERICAN): 60 ML/MIN/1.73 M^2
GLUCOSE SERPL-MCNC: 98 MG/DL (ref 70–110)
POCT GLUCOSE: 121 MG/DL (ref 70–110)
POTASSIUM SERPL-SCNC: 3.7 MMOL/L (ref 3.5–5.1)
PROT SERPL-MCNC: 6.6 G/DL (ref 6–8.4)
SODIUM SERPL-SCNC: 139 MMOL/L (ref 136–145)

## 2021-01-22 PROCEDURE — 25000003 PHARM REV CODE 250: Performed by: NURSE PRACTITIONER

## 2021-01-22 PROCEDURE — 36415 COLL VENOUS BLD VENIPUNCTURE: CPT

## 2021-01-22 PROCEDURE — 97116 GAIT TRAINING THERAPY: CPT

## 2021-01-22 PROCEDURE — 21400001 HC TELEMETRY ROOM

## 2021-01-22 PROCEDURE — 80053 COMPREHEN METABOLIC PANEL: CPT

## 2021-01-22 PROCEDURE — 63600175 PHARM REV CODE 636 W HCPCS: Performed by: NURSE PRACTITIONER

## 2021-01-22 PROCEDURE — 99233 SBSQ HOSP IP/OBS HIGH 50: CPT | Mod: ,,, | Performed by: INTERNAL MEDICINE

## 2021-01-22 PROCEDURE — 97530 THERAPEUTIC ACTIVITIES: CPT

## 2021-01-22 PROCEDURE — 94761 N-INVAS EAR/PLS OXIMETRY MLT: CPT

## 2021-01-22 PROCEDURE — 99233 PR SUBSEQUENT HOSPITAL CARE,LEVL III: ICD-10-PCS | Mod: ,,, | Performed by: INTERNAL MEDICINE

## 2021-01-22 PROCEDURE — 96372 THER/PROPH/DIAG INJ SC/IM: CPT

## 2021-01-22 PROCEDURE — 25000003 PHARM REV CODE 250: Performed by: INTERNAL MEDICINE

## 2021-01-22 PROCEDURE — 97535 SELF CARE MNGMENT TRAINING: CPT

## 2021-01-22 RX ORDER — LOSARTAN POTASSIUM 25 MG/1
25 TABLET ORAL DAILY
Status: DISCONTINUED | OUTPATIENT
Start: 2021-01-22 | End: 2021-01-22

## 2021-01-22 RX ADMIN — ASPIRIN 81 MG: 81 TABLET, COATED ORAL at 09:01

## 2021-01-22 RX ADMIN — METOPROLOL SUCCINATE 25 MG: 25 TABLET, EXTENDED RELEASE ORAL at 02:01

## 2021-01-22 RX ADMIN — FAMOTIDINE 20 MG: 20 TABLET, FILM COATED ORAL at 09:01

## 2021-01-22 RX ADMIN — DOCUSATE SODIUM 100 MG: 100 CAPSULE, LIQUID FILLED ORAL at 09:01

## 2021-01-22 RX ADMIN — CLOPIDOGREL 75 MG: 75 TABLET, FILM COATED ORAL at 09:01

## 2021-01-22 RX ADMIN — ENOXAPARIN SODIUM 40 MG: 40 INJECTION SUBCUTANEOUS at 05:01

## 2021-01-22 RX ADMIN — POLYETHYLENE GLYCOL 3350 17 G: 17 POWDER, FOR SOLUTION ORAL at 09:01

## 2021-01-22 RX ADMIN — CARBIDOPA AND LEVODOPA 2 TABLET: 25; 100 TABLET ORAL at 10:01

## 2021-01-23 VITALS
HEART RATE: 89 BPM | OXYGEN SATURATION: 99 % | HEIGHT: 62 IN | TEMPERATURE: 98 F | BODY MASS INDEX: 20.9 KG/M2 | WEIGHT: 113.56 LBS | SYSTOLIC BLOOD PRESSURE: 139 MMHG | DIASTOLIC BLOOD PRESSURE: 96 MMHG | RESPIRATION RATE: 18 BRPM

## 2021-01-23 PROBLEM — J96.01 ACUTE HYPOXEMIC RESPIRATORY FAILURE: Status: RESOLVED | Noted: 2021-01-16 | Resolved: 2021-01-23

## 2021-01-23 PROBLEM — J90 BILATERAL PLEURAL EFFUSION: Status: RESOLVED | Noted: 2021-01-16 | Resolved: 2021-01-23

## 2021-01-23 PROBLEM — R79.89 ELEVATED TROPONIN: Status: RESOLVED | Noted: 2019-07-24 | Resolved: 2021-01-23

## 2021-01-23 PROBLEM — R26.9 ABNORMALITY OF GAIT AND MOBILITY: Status: RESOLVED | Noted: 2020-11-30 | Resolved: 2021-01-23

## 2021-01-23 PROCEDURE — 97166 OT EVAL MOD COMPLEX 45 MIN: CPT

## 2021-01-23 PROCEDURE — 25000003 PHARM REV CODE 250: Performed by: NURSE PRACTITIONER

## 2021-01-23 PROCEDURE — 97110 THERAPEUTIC EXERCISES: CPT | Mod: CQ

## 2021-01-23 PROCEDURE — 99233 SBSQ HOSP IP/OBS HIGH 50: CPT | Mod: ,,, | Performed by: NURSE PRACTITIONER

## 2021-01-23 PROCEDURE — 25000003 PHARM REV CODE 250: Performed by: INTERNAL MEDICINE

## 2021-01-23 PROCEDURE — 99238 PR HOSPITAL DISCHARGE DAY,<30 MIN: ICD-10-PCS | Mod: ,,, | Performed by: FAMILY MEDICINE

## 2021-01-23 PROCEDURE — 99238 HOSP IP/OBS DSCHRG MGMT 30/<: CPT | Mod: ,,, | Performed by: FAMILY MEDICINE

## 2021-01-23 PROCEDURE — 97116 GAIT TRAINING THERAPY: CPT | Mod: CQ

## 2021-01-23 PROCEDURE — 99233 PR SUBSEQUENT HOSPITAL CARE,LEVL III: ICD-10-PCS | Mod: ,,, | Performed by: NURSE PRACTITIONER

## 2021-01-23 PROCEDURE — 25000003 PHARM REV CODE 250: Performed by: FAMILY MEDICINE

## 2021-01-23 PROCEDURE — 97530 THERAPEUTIC ACTIVITIES: CPT

## 2021-01-23 RX ORDER — CARBIDOPA AND LEVODOPA 25; 100 MG/1; MG/1
2 TABLET ORAL 3 TIMES DAILY
Status: DISCONTINUED | OUTPATIENT
Start: 2021-01-23 | End: 2021-01-23 | Stop reason: HOSPADM

## 2021-01-23 RX ORDER — CARBIDOPA AND LEVODOPA 25; 100 MG/1; MG/1
2 TABLET ORAL 3 TIMES DAILY
Qty: 180 TABLET | Refills: 11 | Status: ON HOLD | OUTPATIENT
Start: 2021-01-23 | End: 2022-03-29 | Stop reason: HOSPADM

## 2021-01-23 RX ORDER — FUROSEMIDE 20 MG/1
40 TABLET ORAL DAILY PRN
Qty: 120 TABLET | Refills: 11 | Status: SHIPPED | OUTPATIENT
Start: 2021-01-23 | End: 2021-01-27

## 2021-01-23 RX ORDER — CARBIDOPA AND LEVODOPA 25; 100 MG/1; MG/1
2 TABLET ORAL 3 TIMES DAILY
Status: DISCONTINUED | OUTPATIENT
Start: 2021-01-23 | End: 2021-01-23 | Stop reason: SDUPTHER

## 2021-01-23 RX ORDER — METOPROLOL TARTRATE 25 MG/1
12.5 TABLET, FILM COATED ORAL 2 TIMES DAILY
Qty: 60 TABLET | Refills: 11 | Status: SHIPPED | OUTPATIENT
Start: 2021-01-23 | End: 2021-01-27 | Stop reason: ALTCHOICE

## 2021-01-23 RX ADMIN — POLYETHYLENE GLYCOL 3350 17 G: 17 POWDER, FOR SOLUTION ORAL at 08:01

## 2021-01-23 RX ADMIN — CARBIDOPA AND LEVODOPA 2 TABLET: 25; 100 TABLET ORAL at 02:01

## 2021-01-23 RX ADMIN — FAMOTIDINE 20 MG: 20 TABLET, FILM COATED ORAL at 08:01

## 2021-01-23 RX ADMIN — METOPROLOL SUCCINATE 25 MG: 25 TABLET, EXTENDED RELEASE ORAL at 11:01

## 2021-01-23 RX ADMIN — ASPIRIN 81 MG: 81 TABLET, COATED ORAL at 08:01

## 2021-01-23 RX ADMIN — DOCUSATE SODIUM 100 MG: 100 CAPSULE, LIQUID FILLED ORAL at 08:01

## 2021-01-23 RX ADMIN — CLOPIDOGREL 75 MG: 75 TABLET, FILM COATED ORAL at 08:01

## 2021-01-25 ENCOUNTER — TELEPHONE (OUTPATIENT)
Dept: TRANSPLANT | Facility: CLINIC | Age: 81
End: 2021-01-25

## 2021-01-25 ENCOUNTER — TELEPHONE (OUTPATIENT)
Dept: CARDIOLOGY | Facility: CLINIC | Age: 81
End: 2021-01-25

## 2021-01-25 ENCOUNTER — PATIENT OUTREACH (OUTPATIENT)
Dept: ADMINISTRATIVE | Facility: CLINIC | Age: 81
End: 2021-01-25

## 2021-01-27 ENCOUNTER — OFFICE VISIT (OUTPATIENT)
Dept: CARDIOLOGY | Facility: CLINIC | Age: 81
End: 2021-01-27
Payer: MEDICARE

## 2021-01-27 VITALS
DIASTOLIC BLOOD PRESSURE: 72 MMHG | BODY MASS INDEX: 19.52 KG/M2 | OXYGEN SATURATION: 99 % | SYSTOLIC BLOOD PRESSURE: 130 MMHG | HEART RATE: 78 BPM | WEIGHT: 106.69 LBS

## 2021-01-27 DIAGNOSIS — Z86.73 HISTORY OF CVA (CEREBROVASCULAR ACCIDENT): Chronic | ICD-10-CM

## 2021-01-27 DIAGNOSIS — I10 ESSENTIAL HYPERTENSION: Chronic | ICD-10-CM

## 2021-01-27 DIAGNOSIS — I50.41 ACUTE COMBINED SYSTOLIC AND DIASTOLIC CONGESTIVE HEART FAILURE: Primary | ICD-10-CM

## 2021-01-27 PROBLEM — J90 PLEURAL EFFUSION, BILATERAL: Status: RESOLVED | Noted: 2021-01-16 | Resolved: 2021-01-27

## 2021-01-27 PROCEDURE — 99214 PR OFFICE/OUTPT VISIT, EST, LEVL IV, 30-39 MIN: ICD-10-PCS | Mod: S$PBB,,, | Performed by: NURSE PRACTITIONER

## 2021-01-27 PROCEDURE — 99999 PR PBB SHADOW E&M-EST. PATIENT-LVL III: ICD-10-PCS | Mod: PBBFAC,,, | Performed by: NURSE PRACTITIONER

## 2021-01-27 PROCEDURE — 99213 OFFICE O/P EST LOW 20 MIN: CPT | Mod: PBBFAC | Performed by: NURSE PRACTITIONER

## 2021-01-27 PROCEDURE — 99999 PR PBB SHADOW E&M-EST. PATIENT-LVL III: CPT | Mod: PBBFAC,,, | Performed by: NURSE PRACTITIONER

## 2021-01-27 PROCEDURE — 99214 OFFICE O/P EST MOD 30 MIN: CPT | Mod: S$PBB,,, | Performed by: NURSE PRACTITIONER

## 2021-01-27 RX ORDER — LOSARTAN POTASSIUM 25 MG/1
12.5 TABLET ORAL DAILY
Qty: 15 TABLET | Refills: 6 | Status: SHIPPED | OUTPATIENT
Start: 2021-01-27 | End: 2021-02-10 | Stop reason: SDUPTHER

## 2021-01-27 RX ORDER — FUROSEMIDE 20 MG/1
20 TABLET ORAL DAILY PRN
Qty: 120 TABLET | Refills: 11
Start: 2021-01-27 | End: 2021-04-01

## 2021-01-27 RX ORDER — METOPROLOL SUCCINATE 25 MG/1
25 TABLET, EXTENDED RELEASE ORAL DAILY
Qty: 30 TABLET | Refills: 6
Start: 2021-01-27 | End: 2021-02-02 | Stop reason: SDUPTHER

## 2021-01-27 RX ORDER — POTASSIUM CHLORIDE 750 MG/1
10 TABLET, EXTENDED RELEASE ORAL DAILY
Qty: 30 TABLET | Refills: 3 | Status: SHIPPED | OUTPATIENT
Start: 2021-01-27 | End: 2021-05-25

## 2021-02-02 DIAGNOSIS — I50.41 ACUTE COMBINED SYSTOLIC AND DIASTOLIC CONGESTIVE HEART FAILURE: Primary | ICD-10-CM

## 2021-02-03 ENCOUNTER — EXTERNAL HOME HEALTH (OUTPATIENT)
Dept: HOME HEALTH SERVICES | Facility: HOSPITAL | Age: 81
End: 2021-02-03
Payer: MEDICARE

## 2021-02-03 ENCOUNTER — TELEPHONE (OUTPATIENT)
Dept: TRANSPLANT | Facility: CLINIC | Age: 81
End: 2021-02-03

## 2021-02-03 RX ORDER — METOPROLOL SUCCINATE 25 MG/1
25 TABLET, EXTENDED RELEASE ORAL DAILY
Qty: 30 TABLET | Refills: 6 | Status: SHIPPED | OUTPATIENT
Start: 2021-02-03 | End: 2021-04-01

## 2021-02-10 ENCOUNTER — TELEPHONE (OUTPATIENT)
Dept: CARDIOLOGY | Facility: CLINIC | Age: 81
End: 2021-02-10

## 2021-02-10 RX ORDER — LOSARTAN POTASSIUM 25 MG/1
25 TABLET ORAL DAILY
Qty: 30 TABLET | Refills: 6 | Status: SHIPPED | OUTPATIENT
Start: 2021-02-10 | End: 2021-02-18

## 2021-02-18 ENCOUNTER — OFFICE VISIT (OUTPATIENT)
Dept: CARDIOLOGY | Facility: CLINIC | Age: 81
End: 2021-02-18
Payer: MEDICARE

## 2021-02-18 VITALS — DIASTOLIC BLOOD PRESSURE: 118 MMHG | BODY MASS INDEX: 20.12 KG/M2 | WEIGHT: 110 LBS | SYSTOLIC BLOOD PRESSURE: 162 MMHG

## 2021-02-18 DIAGNOSIS — I10 ESSENTIAL HYPERTENSION: Chronic | ICD-10-CM

## 2021-02-18 DIAGNOSIS — I50.42 CHRONIC COMBINED SYSTOLIC AND DIASTOLIC CONGESTIVE HEART FAILURE: Primary | ICD-10-CM

## 2021-02-18 PROCEDURE — 99213 PR OFFICE/OUTPT VISIT, EST, LEVL III, 20-29 MIN: ICD-10-PCS | Mod: 95,,, | Performed by: NURSE PRACTITIONER

## 2021-02-18 PROCEDURE — 99213 OFFICE O/P EST LOW 20 MIN: CPT | Mod: 95,,, | Performed by: NURSE PRACTITIONER

## 2021-02-18 RX ORDER — LOSARTAN POTASSIUM 50 MG/1
50 TABLET ORAL DAILY
Qty: 30 TABLET | Refills: 6 | Status: SHIPPED | OUTPATIENT
Start: 2021-02-18 | End: 2021-03-11

## 2021-03-11 ENCOUNTER — OFFICE VISIT (OUTPATIENT)
Dept: CARDIOLOGY | Facility: CLINIC | Age: 81
End: 2021-03-11
Payer: MEDICARE

## 2021-03-11 VITALS — SYSTOLIC BLOOD PRESSURE: 140 MMHG | HEART RATE: 73 BPM | DIASTOLIC BLOOD PRESSURE: 84 MMHG

## 2021-03-11 DIAGNOSIS — I10 ESSENTIAL HYPERTENSION: Chronic | ICD-10-CM

## 2021-03-11 DIAGNOSIS — I50.42 CHRONIC COMBINED SYSTOLIC AND DIASTOLIC CONGESTIVE HEART FAILURE: Primary | ICD-10-CM

## 2021-03-11 PROCEDURE — 99213 OFFICE O/P EST LOW 20 MIN: CPT | Mod: 95,,, | Performed by: NURSE PRACTITIONER

## 2021-03-11 PROCEDURE — 99213 PR OFFICE/OUTPT VISIT, EST, LEVL III, 20-29 MIN: ICD-10-PCS | Mod: 95,,, | Performed by: NURSE PRACTITIONER

## 2021-03-11 RX ORDER — LOSARTAN POTASSIUM 50 MG/1
50 TABLET ORAL 2 TIMES DAILY
Qty: 60 TABLET | Refills: 6 | Status: SHIPPED | OUTPATIENT
Start: 2021-03-11 | End: 2021-04-20

## 2021-04-01 ENCOUNTER — TELEPHONE (OUTPATIENT)
Dept: CARDIOLOGY | Facility: CLINIC | Age: 81
End: 2021-04-01

## 2021-04-01 ENCOUNTER — OFFICE VISIT (OUTPATIENT)
Dept: CARDIOLOGY | Facility: CLINIC | Age: 81
End: 2021-04-01
Payer: MEDICARE

## 2021-04-01 VITALS — SYSTOLIC BLOOD PRESSURE: 135 MMHG | DIASTOLIC BLOOD PRESSURE: 81 MMHG | HEART RATE: 82 BPM

## 2021-04-01 DIAGNOSIS — I10 ESSENTIAL HYPERTENSION: Primary | Chronic | ICD-10-CM

## 2021-04-01 DIAGNOSIS — I50.42 CHRONIC COMBINED SYSTOLIC AND DIASTOLIC CONGESTIVE HEART FAILURE: ICD-10-CM

## 2021-04-01 DIAGNOSIS — I50.41 ACUTE COMBINED SYSTOLIC AND DIASTOLIC CONGESTIVE HEART FAILURE: ICD-10-CM

## 2021-04-01 PROCEDURE — 99213 OFFICE O/P EST LOW 20 MIN: CPT | Mod: 95,,, | Performed by: NURSE PRACTITIONER

## 2021-04-01 PROCEDURE — 99213 PR OFFICE/OUTPT VISIT, EST, LEVL III, 20-29 MIN: ICD-10-PCS | Mod: 95,,, | Performed by: NURSE PRACTITIONER

## 2021-04-01 RX ORDER — METOPROLOL SUCCINATE 50 MG/1
50 TABLET, EXTENDED RELEASE ORAL 2 TIMES DAILY
Qty: 60 TABLET | Refills: 6 | Status: ON HOLD | OUTPATIENT
Start: 2021-04-01 | End: 2022-03-29 | Stop reason: HOSPADM

## 2021-04-01 RX ORDER — FUROSEMIDE 20 MG/1
TABLET ORAL
Qty: 20 TABLET | Refills: 11 | Status: SHIPPED | OUTPATIENT
Start: 2021-04-01 | End: 2021-04-14

## 2021-04-08 ENCOUNTER — HOSPITAL ENCOUNTER (INPATIENT)
Facility: HOSPITAL | Age: 81
LOS: 3 days | Discharge: HOME HEALTH | DRG: 304 | End: 2021-04-11
Attending: EMERGENCY MEDICINE | Admitting: INTERNAL MEDICINE
Payer: MEDICARE

## 2021-04-08 DIAGNOSIS — R06.00 DYSPNEA: ICD-10-CM

## 2021-04-08 DIAGNOSIS — R06.02 SOB (SHORTNESS OF BREATH): ICD-10-CM

## 2021-04-08 DIAGNOSIS — I16.1 HYPERTENSIVE EMERGENCY: ICD-10-CM

## 2021-04-08 DIAGNOSIS — R79.89 TROPONIN I ABOVE REFERENCE RANGE: ICD-10-CM

## 2021-04-08 DIAGNOSIS — I50.9 ACUTE CONGESTIVE HEART FAILURE, UNSPECIFIED HEART FAILURE TYPE: Primary | ICD-10-CM

## 2021-04-08 PROBLEM — E78.5 HYPERLIPIDEMIA: Chronic | Status: ACTIVE | Noted: 2017-07-11

## 2021-04-08 PROBLEM — I50.43 ACUTE ON CHRONIC COMBINED SYSTOLIC AND DIASTOLIC CONGESTIVE HEART FAILURE: Status: ACTIVE | Noted: 2021-01-17

## 2021-04-08 LAB
ALBUMIN SERPL BCP-MCNC: 4 G/DL (ref 3.5–5.2)
ALP SERPL-CCNC: 122 U/L (ref 55–135)
ALT SERPL W/O P-5'-P-CCNC: 56 U/L (ref 10–44)
ANION GAP SERPL CALC-SCNC: 16 MMOL/L (ref 8–16)
AST SERPL-CCNC: 60 U/L (ref 10–40)
BASOPHILS # BLD AUTO: 0.03 K/UL (ref 0–0.2)
BASOPHILS NFR BLD: 0.6 % (ref 0–1.9)
BILIRUB SERPL-MCNC: 0.7 MG/DL (ref 0.1–1)
BILIRUB UR QL STRIP: NEGATIVE
BNP SERPL-MCNC: 3064 PG/ML (ref 0–99)
BUN SERPL-MCNC: 23 MG/DL (ref 8–23)
CALCIUM SERPL-MCNC: 9.3 MG/DL (ref 8.7–10.5)
CHLORIDE SERPL-SCNC: 105 MMOL/L (ref 95–110)
CLARITY UR: CLEAR
CO2 SERPL-SCNC: 22 MMOL/L (ref 23–29)
COLOR UR: YELLOW
CREAT SERPL-MCNC: 0.9 MG/DL (ref 0.5–1.4)
DIFFERENTIAL METHOD: ABNORMAL
EOSINOPHIL # BLD AUTO: 0 K/UL (ref 0–0.5)
EOSINOPHIL NFR BLD: 0.8 % (ref 0–8)
ERYTHROCYTE [DISTWIDTH] IN BLOOD BY AUTOMATED COUNT: 20.5 % (ref 11.5–14.5)
EST. GFR  (AFRICAN AMERICAN): >60 ML/MIN/1.73 M^2
EST. GFR  (NON AFRICAN AMERICAN): 59 ML/MIN/1.73 M^2
GLUCOSE SERPL-MCNC: 127 MG/DL (ref 70–110)
GLUCOSE UR QL STRIP: NEGATIVE
HCT VFR BLD AUTO: 41.8 % (ref 37–48.5)
HGB BLD-MCNC: 13.2 G/DL (ref 12–16)
HGB UR QL STRIP: ABNORMAL
IMM GRANULOCYTES # BLD AUTO: 0.03 K/UL (ref 0–0.04)
IMM GRANULOCYTES NFR BLD AUTO: 0.6 % (ref 0–0.5)
KETONES UR QL STRIP: NEGATIVE
LEUKOCYTE ESTERASE UR QL STRIP: NEGATIVE
LYMPHOCYTES # BLD AUTO: 1 K/UL (ref 1–4.8)
LYMPHOCYTES NFR BLD: 20.5 % (ref 18–48)
MCH RBC QN AUTO: 27.9 PG (ref 27–31)
MCHC RBC AUTO-ENTMCNC: 31.6 G/DL (ref 32–36)
MCV RBC AUTO: 88 FL (ref 82–98)
MONOCYTES # BLD AUTO: 0.8 K/UL (ref 0.3–1)
MONOCYTES NFR BLD: 15.1 % (ref 4–15)
NEUTROPHILS # BLD AUTO: 3.1 K/UL (ref 1.8–7.7)
NEUTROPHILS NFR BLD: 62.4 % (ref 38–73)
NITRITE UR QL STRIP: NEGATIVE
NRBC BLD-RTO: 0 /100 WBC
PH UR STRIP: 7 [PH] (ref 5–8)
PLATELET # BLD AUTO: 206 K/UL (ref 150–450)
PMV BLD AUTO: 11.1 FL (ref 9.2–12.9)
POTASSIUM SERPL-SCNC: 3.7 MMOL/L (ref 3.5–5.1)
PROT SERPL-MCNC: 7.5 G/DL (ref 6–8.4)
PROT UR QL STRIP: ABNORMAL
RBC # BLD AUTO: 4.73 M/UL (ref 4–5.4)
SARS-COV-2 RDRP RESP QL NAA+PROBE: NEGATIVE
SODIUM SERPL-SCNC: 143 MMOL/L (ref 136–145)
SP GR UR STRIP: 1.01 (ref 1–1.03)
TROPONIN I SERPL DL<=0.01 NG/ML-MCNC: 0.03 NG/ML (ref 0–0.03)
URN SPEC COLLECT METH UR: ABNORMAL
UROBILINOGEN UR STRIP-ACNC: NEGATIVE EU/DL
WBC # BLD AUTO: 5.03 K/UL (ref 3.9–12.7)

## 2021-04-08 PROCEDURE — 94640 AIRWAY INHALATION TREATMENT: CPT

## 2021-04-08 PROCEDURE — 96375 TX/PRO/DX INJ NEW DRUG ADDON: CPT

## 2021-04-08 PROCEDURE — 81003 URINALYSIS AUTO W/O SCOPE: CPT | Performed by: EMERGENCY MEDICINE

## 2021-04-08 PROCEDURE — 96374 THER/PROPH/DIAG INJ IV PUSH: CPT

## 2021-04-08 PROCEDURE — 11000001 HC ACUTE MED/SURG PRIVATE ROOM

## 2021-04-08 PROCEDURE — 83880 ASSAY OF NATRIURETIC PEPTIDE: CPT | Performed by: EMERGENCY MEDICINE

## 2021-04-08 PROCEDURE — 93005 ELECTROCARDIOGRAM TRACING: CPT

## 2021-04-08 PROCEDURE — 25000003 PHARM REV CODE 250: Performed by: EMERGENCY MEDICINE

## 2021-04-08 PROCEDURE — 93010 EKG 12-LEAD: ICD-10-PCS | Mod: ,,, | Performed by: INTERNAL MEDICINE

## 2021-04-08 PROCEDURE — 85025 COMPLETE CBC W/AUTO DIFF WBC: CPT | Performed by: EMERGENCY MEDICINE

## 2021-04-08 PROCEDURE — 63600175 PHARM REV CODE 636 W HCPCS: Performed by: EMERGENCY MEDICINE

## 2021-04-08 PROCEDURE — 93010 ELECTROCARDIOGRAM REPORT: CPT | Mod: ,,, | Performed by: INTERNAL MEDICINE

## 2021-04-08 PROCEDURE — 25000003 PHARM REV CODE 250: Performed by: NURSE PRACTITIONER

## 2021-04-08 PROCEDURE — 93010 ELECTROCARDIOGRAM REPORT: CPT | Mod: 76,,, | Performed by: INTERNAL MEDICINE

## 2021-04-08 PROCEDURE — 25000242 PHARM REV CODE 250 ALT 637 W/ HCPCS: Performed by: EMERGENCY MEDICINE

## 2021-04-08 PROCEDURE — 80053 COMPREHEN METABOLIC PANEL: CPT | Performed by: EMERGENCY MEDICINE

## 2021-04-08 PROCEDURE — 99291 CRITICAL CARE FIRST HOUR: CPT | Mod: 25

## 2021-04-08 PROCEDURE — 82962 GLUCOSE BLOOD TEST: CPT

## 2021-04-08 PROCEDURE — U0002 COVID-19 LAB TEST NON-CDC: HCPCS | Performed by: EMERGENCY MEDICINE

## 2021-04-08 PROCEDURE — 84484 ASSAY OF TROPONIN QUANT: CPT | Performed by: EMERGENCY MEDICINE

## 2021-04-08 RX ORDER — LOSARTAN POTASSIUM 50 MG/1
50 TABLET ORAL 2 TIMES DAILY
Status: DISCONTINUED | OUTPATIENT
Start: 2021-04-08 | End: 2021-04-09

## 2021-04-08 RX ORDER — CARBIDOPA AND LEVODOPA 25; 100 MG/1; MG/1
2 TABLET ORAL 3 TIMES DAILY
Status: DISCONTINUED | OUTPATIENT
Start: 2021-04-09 | End: 2021-04-11 | Stop reason: HOSPADM

## 2021-04-08 RX ORDER — FUROSEMIDE 10 MG/ML
60 INJECTION INTRAMUSCULAR; INTRAVENOUS
Status: COMPLETED | OUTPATIENT
Start: 2021-04-08 | End: 2021-04-08

## 2021-04-08 RX ORDER — ASPIRIN 325 MG
325 TABLET ORAL
Status: COMPLETED | OUTPATIENT
Start: 2021-04-08 | End: 2021-04-08

## 2021-04-08 RX ORDER — IPRATROPIUM BROMIDE AND ALBUTEROL SULFATE 2.5; .5 MG/3ML; MG/3ML
3 SOLUTION RESPIRATORY (INHALATION)
Status: COMPLETED | OUTPATIENT
Start: 2021-04-08 | End: 2021-04-08

## 2021-04-08 RX ORDER — METOPROLOL SUCCINATE 50 MG/1
50 TABLET, EXTENDED RELEASE ORAL 2 TIMES DAILY
Status: DISCONTINUED | OUTPATIENT
Start: 2021-04-08 | End: 2021-04-09

## 2021-04-08 RX ORDER — POTASSIUM CHLORIDE 20 MEQ/1
20 TABLET, EXTENDED RELEASE ORAL ONCE
Status: COMPLETED | OUTPATIENT
Start: 2021-04-08 | End: 2021-04-08

## 2021-04-08 RX ORDER — LABETALOL HYDROCHLORIDE 5 MG/ML
20 INJECTION, SOLUTION INTRAVENOUS
Status: COMPLETED | OUTPATIENT
Start: 2021-04-08 | End: 2021-04-08

## 2021-04-08 RX ORDER — ASPIRIN 81 MG/1
81 TABLET ORAL DAILY
Status: DISCONTINUED | OUTPATIENT
Start: 2021-04-09 | End: 2021-04-11 | Stop reason: HOSPADM

## 2021-04-08 RX ORDER — ACETAMINOPHEN 325 MG/1
650 TABLET ORAL EVERY 6 HOURS PRN
Status: DISCONTINUED | OUTPATIENT
Start: 2021-04-08 | End: 2021-04-11 | Stop reason: HOSPADM

## 2021-04-08 RX ORDER — FUROSEMIDE 10 MG/ML
40 INJECTION INTRAMUSCULAR; INTRAVENOUS DAILY
Status: DISCONTINUED | OUTPATIENT
Start: 2021-04-09 | End: 2021-04-11 | Stop reason: HOSPADM

## 2021-04-08 RX ORDER — CLONIDINE HYDROCHLORIDE 0.2 MG/1
0.2 TABLET ORAL EVERY 8 HOURS PRN
Status: DISCONTINUED | OUTPATIENT
Start: 2021-04-08 | End: 2021-04-11 | Stop reason: HOSPADM

## 2021-04-08 RX ORDER — IPRATROPIUM BROMIDE AND ALBUTEROL SULFATE 2.5; .5 MG/3ML; MG/3ML
3 SOLUTION RESPIRATORY (INHALATION) EVERY 4 HOURS PRN
Status: DISCONTINUED | OUTPATIENT
Start: 2021-04-08 | End: 2021-04-11 | Stop reason: HOSPADM

## 2021-04-08 RX ORDER — SODIUM CHLORIDE 0.9 % (FLUSH) 0.9 %
10 SYRINGE (ML) INJECTION
Status: DISCONTINUED | OUTPATIENT
Start: 2021-04-08 | End: 2021-04-11 | Stop reason: HOSPADM

## 2021-04-08 RX ORDER — CLOPIDOGREL BISULFATE 75 MG/1
75 TABLET ORAL DAILY
Status: DISCONTINUED | OUTPATIENT
Start: 2021-04-09 | End: 2021-04-11 | Stop reason: HOSPADM

## 2021-04-08 RX ORDER — ONDANSETRON 2 MG/ML
4 INJECTION INTRAMUSCULAR; INTRAVENOUS EVERY 8 HOURS PRN
Status: DISCONTINUED | OUTPATIENT
Start: 2021-04-08 | End: 2021-04-11 | Stop reason: HOSPADM

## 2021-04-08 RX ORDER — CLONIDINE HYDROCHLORIDE 0.2 MG/1
0.2 TABLET ORAL
Status: COMPLETED | OUTPATIENT
Start: 2021-04-08 | End: 2021-04-08

## 2021-04-08 RX ADMIN — CLONIDINE HYDROCHLORIDE 0.2 MG: 0.2 TABLET ORAL at 07:04

## 2021-04-08 RX ADMIN — ASPIRIN 325 MG ORAL TABLET 325 MG: 325 PILL ORAL at 09:04

## 2021-04-08 RX ADMIN — POTASSIUM CHLORIDE 20 MEQ: 1500 TABLET, EXTENDED RELEASE ORAL at 11:04

## 2021-04-08 RX ADMIN — METOPROLOL SUCCINATE 50 MG: 50 TABLET, EXTENDED RELEASE ORAL at 11:04

## 2021-04-08 RX ADMIN — IPRATROPIUM BROMIDE AND ALBUTEROL SULFATE 3 ML: .5; 2.5 SOLUTION RESPIRATORY (INHALATION) at 07:04

## 2021-04-08 RX ADMIN — NITROGLYCERIN 1 INCH: 20 OINTMENT TOPICAL at 09:04

## 2021-04-08 RX ADMIN — FUROSEMIDE 60 MG: 10 INJECTION, SOLUTION INTRAMUSCULAR; INTRAVENOUS at 09:04

## 2021-04-08 RX ADMIN — LABETALOL HYDROCHLORIDE 20 MG: 5 INJECTION INTRAVENOUS at 09:04

## 2021-04-09 PROBLEM — R79.89 ELEVATED TROPONIN: Status: ACTIVE | Noted: 2021-04-09

## 2021-04-09 LAB
ANION GAP SERPL CALC-SCNC: 13 MMOL/L (ref 8–16)
BASOPHILS # BLD AUTO: 0.03 K/UL (ref 0–0.2)
BASOPHILS NFR BLD: 0.7 % (ref 0–1.9)
BUN SERPL-MCNC: 20 MG/DL (ref 8–23)
CALCIUM SERPL-MCNC: 8.5 MG/DL (ref 8.7–10.5)
CHLORIDE SERPL-SCNC: 106 MMOL/L (ref 95–110)
CO2 SERPL-SCNC: 24 MMOL/L (ref 23–29)
CREAT SERPL-MCNC: 0.9 MG/DL (ref 0.5–1.4)
DIFFERENTIAL METHOD: ABNORMAL
EOSINOPHIL # BLD AUTO: 0.1 K/UL (ref 0–0.5)
EOSINOPHIL NFR BLD: 1.5 % (ref 0–8)
ERYTHROCYTE [DISTWIDTH] IN BLOOD BY AUTOMATED COUNT: 20.5 % (ref 11.5–14.5)
EST. GFR  (AFRICAN AMERICAN): >60 ML/MIN/1.73 M^2
EST. GFR  (NON AFRICAN AMERICAN): 59 ML/MIN/1.73 M^2
GLUCOSE SERPL-MCNC: 96 MG/DL (ref 70–110)
HCT VFR BLD AUTO: 36.5 % (ref 37–48.5)
HGB BLD-MCNC: 10.9 G/DL (ref 12–16)
IMM GRANULOCYTES # BLD AUTO: 0.01 K/UL (ref 0–0.04)
IMM GRANULOCYTES NFR BLD AUTO: 0.2 % (ref 0–0.5)
LYMPHOCYTES # BLD AUTO: 1.2 K/UL (ref 1–4.8)
LYMPHOCYTES NFR BLD: 29.4 % (ref 18–48)
MAGNESIUM SERPL-MCNC: 1.8 MG/DL (ref 1.6–2.6)
MCH RBC QN AUTO: 27.5 PG (ref 27–31)
MCHC RBC AUTO-ENTMCNC: 29.9 G/DL (ref 32–36)
MCV RBC AUTO: 92 FL (ref 82–98)
MONOCYTES # BLD AUTO: 0.8 K/UL (ref 0.3–1)
MONOCYTES NFR BLD: 19.4 % (ref 4–15)
NEUTROPHILS # BLD AUTO: 2 K/UL (ref 1.8–7.7)
NEUTROPHILS NFR BLD: 48.8 % (ref 38–73)
NRBC BLD-RTO: 0 /100 WBC
PHOSPHATE SERPL-MCNC: 3.7 MG/DL (ref 2.7–4.5)
PLATELET # BLD AUTO: 196 K/UL (ref 150–450)
PMV BLD AUTO: 11.1 FL (ref 9.2–12.9)
POCT GLUCOSE: 113 MG/DL (ref 70–110)
POTASSIUM SERPL-SCNC: 3.2 MMOL/L (ref 3.5–5.1)
RBC # BLD AUTO: 3.97 M/UL (ref 4–5.4)
SODIUM SERPL-SCNC: 143 MMOL/L (ref 136–145)
TROPONIN I SERPL DL<=0.01 NG/ML-MCNC: 0.03 NG/ML (ref 0–0.03)
TROPONIN I SERPL DL<=0.01 NG/ML-MCNC: 0.03 NG/ML (ref 0–0.03)
WBC # BLD AUTO: 4.02 K/UL (ref 3.9–12.7)

## 2021-04-09 PROCEDURE — 83735 ASSAY OF MAGNESIUM: CPT | Performed by: NURSE PRACTITIONER

## 2021-04-09 PROCEDURE — 63600175 PHARM REV CODE 636 W HCPCS: Performed by: NURSE PRACTITIONER

## 2021-04-09 PROCEDURE — 99223 PR INITIAL HOSPITAL CARE,LEVL III: ICD-10-PCS | Mod: ,,, | Performed by: INTERNAL MEDICINE

## 2021-04-09 PROCEDURE — 99223 1ST HOSP IP/OBS HIGH 75: CPT | Mod: ,,, | Performed by: INTERNAL MEDICINE

## 2021-04-09 PROCEDURE — 85025 COMPLETE CBC W/AUTO DIFF WBC: CPT | Performed by: NURSE PRACTITIONER

## 2021-04-09 PROCEDURE — 36415 COLL VENOUS BLD VENIPUNCTURE: CPT | Performed by: NURSE PRACTITIONER

## 2021-04-09 PROCEDURE — 25000003 PHARM REV CODE 250: Performed by: FAMILY MEDICINE

## 2021-04-09 PROCEDURE — 25000003 PHARM REV CODE 250: Performed by: NURSE PRACTITIONER

## 2021-04-09 PROCEDURE — 84100 ASSAY OF PHOSPHORUS: CPT | Performed by: NURSE PRACTITIONER

## 2021-04-09 PROCEDURE — 84484 ASSAY OF TROPONIN QUANT: CPT | Performed by: NURSE PRACTITIONER

## 2021-04-09 PROCEDURE — 80048 BASIC METABOLIC PNL TOTAL CA: CPT | Performed by: NURSE PRACTITIONER

## 2021-04-09 PROCEDURE — 21400001 HC TELEMETRY ROOM

## 2021-04-09 RX ORDER — LOSARTAN POTASSIUM 50 MG/1
50 TABLET ORAL 2 TIMES DAILY
Status: DISCONTINUED | OUTPATIENT
Start: 2021-04-09 | End: 2021-04-11 | Stop reason: HOSPADM

## 2021-04-09 RX ORDER — POTASSIUM CHLORIDE 20 MEQ/1
40 TABLET, EXTENDED RELEASE ORAL ONCE
Status: COMPLETED | OUTPATIENT
Start: 2021-04-09 | End: 2021-04-09

## 2021-04-09 RX ORDER — CLONAZEPAM 0.5 MG/1
0.5 TABLET ORAL 2 TIMES DAILY PRN
Status: DISCONTINUED | OUTPATIENT
Start: 2021-04-09 | End: 2021-04-11 | Stop reason: HOSPADM

## 2021-04-09 RX ORDER — METOPROLOL SUCCINATE 50 MG/1
50 TABLET, EXTENDED RELEASE ORAL 2 TIMES DAILY
Status: DISCONTINUED | OUTPATIENT
Start: 2021-04-09 | End: 2021-04-11 | Stop reason: HOSPADM

## 2021-04-09 RX ADMIN — THERA TABS 1 TABLET: TAB at 08:04

## 2021-04-09 RX ADMIN — METOPROLOL SUCCINATE 50 MG: 50 TABLET, EXTENDED RELEASE ORAL at 05:04

## 2021-04-09 RX ADMIN — LOSARTAN POTASSIUM 50 MG: 50 TABLET, FILM COATED ORAL at 05:04

## 2021-04-09 RX ADMIN — FUROSEMIDE 40 MG: 10 INJECTION, SOLUTION INTRAMUSCULAR; INTRAVENOUS at 08:04

## 2021-04-09 RX ADMIN — CARBIDOPA AND LEVODOPA 2 TABLET: 25; 100 TABLET ORAL at 08:04

## 2021-04-09 RX ADMIN — ACETAMINOPHEN 650 MG: 325 TABLET ORAL at 01:04

## 2021-04-09 RX ADMIN — CLOPIDOGREL 75 MG: 75 TABLET, FILM COATED ORAL at 08:04

## 2021-04-09 RX ADMIN — POTASSIUM CHLORIDE 40 MEQ: 1500 TABLET, EXTENDED RELEASE ORAL at 12:04

## 2021-04-09 RX ADMIN — ASPIRIN 81 MG: 81 TABLET, COATED ORAL at 08:04

## 2021-04-09 RX ADMIN — CARBIDOPA AND LEVODOPA 2 TABLET: 25; 100 TABLET ORAL at 02:04

## 2021-04-10 LAB
ANION GAP SERPL CALC-SCNC: 13 MMOL/L (ref 8–16)
BASOPHILS # BLD AUTO: 0.04 K/UL (ref 0–0.2)
BASOPHILS NFR BLD: 0.9 % (ref 0–1.9)
BUN SERPL-MCNC: 25 MG/DL (ref 8–23)
CALCIUM SERPL-MCNC: 8.9 MG/DL (ref 8.7–10.5)
CHLORIDE SERPL-SCNC: 106 MMOL/L (ref 95–110)
CO2 SERPL-SCNC: 23 MMOL/L (ref 23–29)
CREAT SERPL-MCNC: 1 MG/DL (ref 0.5–1.4)
DIFFERENTIAL METHOD: ABNORMAL
EOSINOPHIL # BLD AUTO: 0.1 K/UL (ref 0–0.5)
EOSINOPHIL NFR BLD: 1.9 % (ref 0–8)
ERYTHROCYTE [DISTWIDTH] IN BLOOD BY AUTOMATED COUNT: 20.6 % (ref 11.5–14.5)
EST. GFR  (AFRICAN AMERICAN): >60 ML/MIN/1.73 M^2
EST. GFR  (NON AFRICAN AMERICAN): 52 ML/MIN/1.73 M^2
GLUCOSE SERPL-MCNC: 111 MG/DL (ref 70–110)
HCT VFR BLD AUTO: 40.5 % (ref 37–48.5)
HGB BLD-MCNC: 12.5 G/DL (ref 12–16)
IMM GRANULOCYTES # BLD AUTO: 0.03 K/UL (ref 0–0.04)
IMM GRANULOCYTES NFR BLD AUTO: 0.7 % (ref 0–0.5)
LYMPHOCYTES # BLD AUTO: 1.2 K/UL (ref 1–4.8)
LYMPHOCYTES NFR BLD: 28.4 % (ref 18–48)
MCH RBC QN AUTO: 27.4 PG (ref 27–31)
MCHC RBC AUTO-ENTMCNC: 30.9 G/DL (ref 32–36)
MCV RBC AUTO: 89 FL (ref 82–98)
MONOCYTES # BLD AUTO: 0.6 K/UL (ref 0.3–1)
MONOCYTES NFR BLD: 14.6 % (ref 4–15)
NEUTROPHILS # BLD AUTO: 2.3 K/UL (ref 1.8–7.7)
NEUTROPHILS NFR BLD: 53.5 % (ref 38–73)
NRBC BLD-RTO: 0 /100 WBC
PLATELET # BLD AUTO: 218 K/UL (ref 150–450)
PMV BLD AUTO: 11.6 FL (ref 9.2–12.9)
POTASSIUM SERPL-SCNC: 3.9 MMOL/L (ref 3.5–5.1)
RBC # BLD AUTO: 4.56 M/UL (ref 4–5.4)
SODIUM SERPL-SCNC: 142 MMOL/L (ref 136–145)
WBC # BLD AUTO: 4.26 K/UL (ref 3.9–12.7)

## 2021-04-10 PROCEDURE — 97530 THERAPEUTIC ACTIVITIES: CPT

## 2021-04-10 PROCEDURE — 21400001 HC TELEMETRY ROOM

## 2021-04-10 PROCEDURE — 85025 COMPLETE CBC W/AUTO DIFF WBC: CPT | Performed by: NURSE PRACTITIONER

## 2021-04-10 PROCEDURE — 97166 OT EVAL MOD COMPLEX 45 MIN: CPT

## 2021-04-10 PROCEDURE — 97162 PT EVAL MOD COMPLEX 30 MIN: CPT

## 2021-04-10 PROCEDURE — 36415 COLL VENOUS BLD VENIPUNCTURE: CPT | Performed by: NURSE PRACTITIONER

## 2021-04-10 PROCEDURE — 99232 SBSQ HOSP IP/OBS MODERATE 35: CPT | Mod: ,,, | Performed by: INTERNAL MEDICINE

## 2021-04-10 PROCEDURE — 25000003 PHARM REV CODE 250: Performed by: FAMILY MEDICINE

## 2021-04-10 PROCEDURE — 80048 BASIC METABOLIC PNL TOTAL CA: CPT | Performed by: NURSE PRACTITIONER

## 2021-04-10 PROCEDURE — 94761 N-INVAS EAR/PLS OXIMETRY MLT: CPT

## 2021-04-10 PROCEDURE — 99232 PR SUBSEQUENT HOSPITAL CARE,LEVL II: ICD-10-PCS | Mod: ,,, | Performed by: INTERNAL MEDICINE

## 2021-04-10 PROCEDURE — 63600175 PHARM REV CODE 636 W HCPCS: Performed by: NURSE PRACTITIONER

## 2021-04-10 PROCEDURE — 25000003 PHARM REV CODE 250: Performed by: NURSE PRACTITIONER

## 2021-04-10 RX ADMIN — CARBIDOPA AND LEVODOPA 2 TABLET: 25; 100 TABLET ORAL at 08:04

## 2021-04-10 RX ADMIN — ASPIRIN 81 MG: 81 TABLET, COATED ORAL at 08:04

## 2021-04-10 RX ADMIN — LOSARTAN POTASSIUM 50 MG: 50 TABLET, FILM COATED ORAL at 05:04

## 2021-04-10 RX ADMIN — CLOPIDOGREL 75 MG: 75 TABLET, FILM COATED ORAL at 08:04

## 2021-04-10 RX ADMIN — CARBIDOPA AND LEVODOPA 2 TABLET: 25; 100 TABLET ORAL at 02:04

## 2021-04-10 RX ADMIN — THERA TABS 1 TABLET: TAB at 08:04

## 2021-04-10 RX ADMIN — METOPROLOL SUCCINATE 50 MG: 50 TABLET, EXTENDED RELEASE ORAL at 05:04

## 2021-04-10 RX ADMIN — LOSARTAN POTASSIUM 50 MG: 50 TABLET, FILM COATED ORAL at 06:04

## 2021-04-10 RX ADMIN — METOPROLOL SUCCINATE 50 MG: 50 TABLET, EXTENDED RELEASE ORAL at 06:04

## 2021-04-10 RX ADMIN — FUROSEMIDE 40 MG: 10 INJECTION, SOLUTION INTRAMUSCULAR; INTRAVENOUS at 08:04

## 2021-04-11 VITALS
TEMPERATURE: 97 F | RESPIRATION RATE: 18 BRPM | BODY MASS INDEX: 20.45 KG/M2 | OXYGEN SATURATION: 94 % | HEIGHT: 62 IN | DIASTOLIC BLOOD PRESSURE: 99 MMHG | HEART RATE: 75 BPM | SYSTOLIC BLOOD PRESSURE: 166 MMHG | WEIGHT: 111.13 LBS

## 2021-04-11 PROBLEM — I16.1 HYPERTENSIVE EMERGENCY: Status: RESOLVED | Noted: 2021-04-08 | Resolved: 2021-04-11

## 2021-04-11 PROBLEM — I50.43 ACUTE ON CHRONIC COMBINED SYSTOLIC AND DIASTOLIC CONGESTIVE HEART FAILURE: Status: RESOLVED | Noted: 2021-01-17 | Resolved: 2021-04-11

## 2021-04-11 PROCEDURE — 25000003 PHARM REV CODE 250: Performed by: FAMILY MEDICINE

## 2021-04-11 PROCEDURE — 99231 SBSQ HOSP IP/OBS SF/LOW 25: CPT | Mod: ,,, | Performed by: INTERNAL MEDICINE

## 2021-04-11 PROCEDURE — 99231 PR SUBSEQUENT HOSPITAL CARE,LEVL I: ICD-10-PCS | Mod: ,,, | Performed by: INTERNAL MEDICINE

## 2021-04-11 PROCEDURE — 25000003 PHARM REV CODE 250: Performed by: NURSE PRACTITIONER

## 2021-04-11 PROCEDURE — 97530 THERAPEUTIC ACTIVITIES: CPT | Mod: CQ

## 2021-04-11 PROCEDURE — 63600175 PHARM REV CODE 636 W HCPCS: Performed by: NURSE PRACTITIONER

## 2021-04-11 PROCEDURE — 97110 THERAPEUTIC EXERCISES: CPT | Mod: CQ

## 2021-04-11 RX ADMIN — LOSARTAN POTASSIUM 50 MG: 50 TABLET, FILM COATED ORAL at 05:04

## 2021-04-11 RX ADMIN — METOPROLOL SUCCINATE 50 MG: 50 TABLET, EXTENDED RELEASE ORAL at 05:04

## 2021-04-11 RX ADMIN — ASPIRIN 81 MG: 81 TABLET, COATED ORAL at 08:04

## 2021-04-11 RX ADMIN — FUROSEMIDE 40 MG: 10 INJECTION, SOLUTION INTRAMUSCULAR; INTRAVENOUS at 08:04

## 2021-04-11 RX ADMIN — THERA TABS 1 TABLET: TAB at 08:04

## 2021-04-11 RX ADMIN — CLOPIDOGREL 75 MG: 75 TABLET, FILM COATED ORAL at 08:04

## 2021-04-11 RX ADMIN — CARBIDOPA AND LEVODOPA 2 TABLET: 25; 100 TABLET ORAL at 08:04

## 2021-04-12 ENCOUNTER — PATIENT OUTREACH (OUTPATIENT)
Dept: ADMINISTRATIVE | Facility: CLINIC | Age: 81
End: 2021-04-12

## 2021-04-13 ENCOUNTER — TELEPHONE (OUTPATIENT)
Dept: TRANSPLANT | Facility: CLINIC | Age: 81
End: 2021-04-13

## 2021-04-14 ENCOUNTER — TELEPHONE (OUTPATIENT)
Dept: CARDIOLOGY | Facility: HOSPITAL | Age: 81
End: 2021-04-14

## 2021-04-14 ENCOUNTER — TELEPHONE (OUTPATIENT)
Dept: CARDIOLOGY | Facility: CLINIC | Age: 81
End: 2021-04-14

## 2021-04-14 ENCOUNTER — OFFICE VISIT (OUTPATIENT)
Dept: CARDIOLOGY | Facility: CLINIC | Age: 81
End: 2021-04-14
Payer: MEDICARE

## 2021-04-14 ENCOUNTER — LAB VISIT (OUTPATIENT)
Dept: LAB | Facility: HOSPITAL | Age: 81
End: 2021-04-14
Attending: PHYSICIAN ASSISTANT
Payer: MEDICARE

## 2021-04-14 VITALS
OXYGEN SATURATION: 97 % | SYSTOLIC BLOOD PRESSURE: 128 MMHG | WEIGHT: 108.25 LBS | BODY MASS INDEX: 19.8 KG/M2 | HEART RATE: 72 BPM | DIASTOLIC BLOOD PRESSURE: 68 MMHG

## 2021-04-14 DIAGNOSIS — G20.A1 PARKINSON DISEASE: Chronic | ICD-10-CM

## 2021-04-14 DIAGNOSIS — I50.43 ACUTE ON CHRONIC COMBINED SYSTOLIC AND DIASTOLIC CHF (CONGESTIVE HEART FAILURE): Primary | ICD-10-CM

## 2021-04-14 DIAGNOSIS — I50.43 ACUTE ON CHRONIC COMBINED SYSTOLIC AND DIASTOLIC CHF (CONGESTIVE HEART FAILURE): ICD-10-CM

## 2021-04-14 DIAGNOSIS — Z86.73 HISTORY OF CVA (CEREBROVASCULAR ACCIDENT): Chronic | ICD-10-CM

## 2021-04-14 DIAGNOSIS — E78.5 HYPERLIPIDEMIA, UNSPECIFIED HYPERLIPIDEMIA TYPE: Chronic | ICD-10-CM

## 2021-04-14 LAB
ANION GAP SERPL CALC-SCNC: 11 MMOL/L (ref 8–16)
BNP SERPL-MCNC: 2910 PG/ML (ref 0–99)
BUN SERPL-MCNC: 21 MG/DL (ref 8–23)
CALCIUM SERPL-MCNC: 9.1 MG/DL (ref 8.7–10.5)
CHLORIDE SERPL-SCNC: 106 MMOL/L (ref 95–110)
CO2 SERPL-SCNC: 23 MMOL/L (ref 23–29)
CREAT SERPL-MCNC: 0.8 MG/DL (ref 0.5–1.4)
EST. GFR  (AFRICAN AMERICAN): >60 ML/MIN/1.73 M^2
EST. GFR  (NON AFRICAN AMERICAN): >60 ML/MIN/1.73 M^2
GLUCOSE SERPL-MCNC: 106 MG/DL (ref 70–110)
POTASSIUM SERPL-SCNC: 3.7 MMOL/L (ref 3.5–5.1)
SODIUM SERPL-SCNC: 140 MMOL/L (ref 136–145)

## 2021-04-14 PROCEDURE — 99214 PR OFFICE/OUTPT VISIT, EST, LEVL IV, 30-39 MIN: ICD-10-PCS | Mod: S$PBB,,, | Performed by: PHYSICIAN ASSISTANT

## 2021-04-14 PROCEDURE — 99213 OFFICE O/P EST LOW 20 MIN: CPT | Mod: PBBFAC | Performed by: PHYSICIAN ASSISTANT

## 2021-04-14 PROCEDURE — 99999 PR PBB SHADOW E&M-EST. PATIENT-LVL III: CPT | Mod: PBBFAC,,, | Performed by: PHYSICIAN ASSISTANT

## 2021-04-14 PROCEDURE — 80048 BASIC METABOLIC PNL TOTAL CA: CPT | Performed by: PHYSICIAN ASSISTANT

## 2021-04-14 PROCEDURE — 83880 ASSAY OF NATRIURETIC PEPTIDE: CPT | Performed by: PHYSICIAN ASSISTANT

## 2021-04-14 PROCEDURE — 99214 OFFICE O/P EST MOD 30 MIN: CPT | Mod: S$PBB,,, | Performed by: PHYSICIAN ASSISTANT

## 2021-04-14 PROCEDURE — 36415 COLL VENOUS BLD VENIPUNCTURE: CPT | Performed by: PHYSICIAN ASSISTANT

## 2021-04-14 PROCEDURE — 99999 PR PBB SHADOW E&M-EST. PATIENT-LVL III: ICD-10-PCS | Mod: PBBFAC,,, | Performed by: PHYSICIAN ASSISTANT

## 2021-04-14 RX ORDER — FUROSEMIDE 20 MG/1
TABLET ORAL
Qty: 40 TABLET | Refills: 11 | Status: ON HOLD | OUTPATIENT
Start: 2021-04-14 | End: 2022-03-29 | Stop reason: SDUPTHER

## 2021-04-20 ENCOUNTER — TELEPHONE (OUTPATIENT)
Dept: CARDIOLOGY | Facility: CLINIC | Age: 81
End: 2021-04-20

## 2021-04-20 ENCOUNTER — HOSPITAL ENCOUNTER (INPATIENT)
Facility: HOSPITAL | Age: 81
LOS: 4 days | Discharge: HOME HEALTH | DRG: 292 | End: 2021-04-24
Attending: EMERGENCY MEDICINE | Admitting: INTERNAL MEDICINE
Payer: MEDICARE

## 2021-04-20 DIAGNOSIS — R06.02 SHORTNESS OF BREATH: ICD-10-CM

## 2021-04-20 DIAGNOSIS — I50.43 ACUTE ON CHRONIC COMBINED SYSTOLIC AND DIASTOLIC HEART FAILURE: Primary | ICD-10-CM

## 2021-04-20 DIAGNOSIS — J90 CHRONIC BILATERAL PLEURAL EFFUSIONS: ICD-10-CM

## 2021-04-20 DIAGNOSIS — R65.10 SIRS (SYSTEMIC INFLAMMATORY RESPONSE SYNDROME): ICD-10-CM

## 2021-04-20 LAB
ALBUMIN SERPL BCP-MCNC: 4.1 G/DL (ref 3.5–5.2)
ALP SERPL-CCNC: 107 U/L (ref 55–135)
ALT SERPL W/O P-5'-P-CCNC: 22 U/L (ref 10–44)
ANION GAP SERPL CALC-SCNC: 17 MMOL/L (ref 8–16)
APTT BLDCRRT: 25 SEC (ref 21–32)
AST SERPL-CCNC: 21 U/L (ref 10–40)
BASOPHILS # BLD AUTO: 0.02 K/UL (ref 0–0.2)
BASOPHILS NFR BLD: 0.2 % (ref 0–1.9)
BILIRUB SERPL-MCNC: 1.1 MG/DL (ref 0.1–1)
BILIRUB UR QL STRIP: NEGATIVE
BNP SERPL-MCNC: 2806 PG/ML (ref 0–99)
BUN SERPL-MCNC: 17 MG/DL (ref 8–23)
CALCIUM SERPL-MCNC: 9.5 MG/DL (ref 8.7–10.5)
CHLORIDE SERPL-SCNC: 103 MMOL/L (ref 95–110)
CLARITY UR: CLEAR
CO2 SERPL-SCNC: 21 MMOL/L (ref 23–29)
COLOR UR: YELLOW
CREAT SERPL-MCNC: 1 MG/DL (ref 0.5–1.4)
CTP QC/QA: YES
DIFFERENTIAL METHOD: ABNORMAL
EOSINOPHIL # BLD AUTO: 0 K/UL (ref 0–0.5)
EOSINOPHIL NFR BLD: 0 % (ref 0–8)
ERYTHROCYTE [DISTWIDTH] IN BLOOD BY AUTOMATED COUNT: 19.4 % (ref 11.5–14.5)
EST. GFR  (AFRICAN AMERICAN): >60 ML/MIN/1.73 M^2
EST. GFR  (NON AFRICAN AMERICAN): 52 ML/MIN/1.73 M^2
EXTRA GOLD TOP RAINBOW: NORMAL
EXTRA RED TOP RAINBOW: NORMAL
GLUCOSE SERPL-MCNC: 133 MG/DL (ref 70–110)
GLUCOSE UR QL STRIP: NEGATIVE
HCT VFR BLD AUTO: 43.9 % (ref 37–48.5)
HGB BLD-MCNC: 14 G/DL (ref 12–16)
HGB UR QL STRIP: NEGATIVE
IMM GRANULOCYTES # BLD AUTO: 0.05 K/UL (ref 0–0.04)
IMM GRANULOCYTES NFR BLD AUTO: 0.5 % (ref 0–0.5)
INR PPP: 1.1 (ref 0.8–1.2)
KETONES UR QL STRIP: NEGATIVE
LACTATE SERPL-SCNC: 2 MMOL/L (ref 0.5–2.2)
LEUKOCYTE ESTERASE UR QL STRIP: NEGATIVE
LYMPHOCYTES # BLD AUTO: 0.9 K/UL (ref 1–4.8)
LYMPHOCYTES NFR BLD: 9.5 % (ref 18–48)
MCH RBC QN AUTO: 28.2 PG (ref 27–31)
MCHC RBC AUTO-ENTMCNC: 31.9 G/DL (ref 32–36)
MCV RBC AUTO: 89 FL (ref 82–98)
MONOCYTES # BLD AUTO: 1.4 K/UL (ref 0.3–1)
MONOCYTES NFR BLD: 14.8 % (ref 4–15)
NEUTROPHILS # BLD AUTO: 6.9 K/UL (ref 1.8–7.7)
NEUTROPHILS NFR BLD: 75 % (ref 38–73)
NITRITE UR QL STRIP: NEGATIVE
NRBC BLD-RTO: 0 /100 WBC
PH UR STRIP: 7 [PH] (ref 5–8)
PLATELET # BLD AUTO: 263 K/UL (ref 150–450)
PLATELET BLD QL SMEAR: ABNORMAL
PMV BLD AUTO: 11.6 FL (ref 9.2–12.9)
POTASSIUM SERPL-SCNC: 3.8 MMOL/L (ref 3.5–5.1)
PROCALCITONIN SERPL IA-MCNC: 0.1 NG/ML
PROT SERPL-MCNC: 7.9 G/DL (ref 6–8.4)
PROT UR QL STRIP: NEGATIVE
PROTHROMBIN TIME: 12 SEC (ref 9–12.5)
RBC # BLD AUTO: 4.96 M/UL (ref 4–5.4)
SARS-COV-2 RDRP RESP QL NAA+PROBE: NEGATIVE
SODIUM SERPL-SCNC: 141 MMOL/L (ref 136–145)
SP GR UR STRIP: 1.01 (ref 1–1.03)
TROPONIN I SERPL DL<=0.01 NG/ML-MCNC: 0.03 NG/ML (ref 0–0.03)
URN SPEC COLLECT METH UR: NORMAL
UROBILINOGEN UR STRIP-ACNC: NEGATIVE EU/DL
WBC # BLD AUTO: 9.26 K/UL (ref 3.9–12.7)

## 2021-04-20 PROCEDURE — 25000003 PHARM REV CODE 250: Performed by: EMERGENCY MEDICINE

## 2021-04-20 PROCEDURE — 93005 ELECTROCARDIOGRAM TRACING: CPT

## 2021-04-20 PROCEDURE — 85730 THROMBOPLASTIN TIME PARTIAL: CPT | Performed by: EMERGENCY MEDICINE

## 2021-04-20 PROCEDURE — 93010 ELECTROCARDIOGRAM REPORT: CPT | Mod: ,,, | Performed by: INTERNAL MEDICINE

## 2021-04-20 PROCEDURE — 83880 ASSAY OF NATRIURETIC PEPTIDE: CPT | Performed by: EMERGENCY MEDICINE

## 2021-04-20 PROCEDURE — 81003 URINALYSIS AUTO W/O SCOPE: CPT | Performed by: EMERGENCY MEDICINE

## 2021-04-20 PROCEDURE — 99291 CRITICAL CARE FIRST HOUR: CPT | Mod: 25

## 2021-04-20 PROCEDURE — 93010 EKG 12-LEAD: ICD-10-PCS | Mod: ,,, | Performed by: INTERNAL MEDICINE

## 2021-04-20 PROCEDURE — 80053 COMPREHEN METABOLIC PANEL: CPT | Performed by: EMERGENCY MEDICINE

## 2021-04-20 PROCEDURE — 36415 COLL VENOUS BLD VENIPUNCTURE: CPT | Performed by: EMERGENCY MEDICINE

## 2021-04-20 PROCEDURE — 21400001 HC TELEMETRY ROOM

## 2021-04-20 PROCEDURE — 83605 ASSAY OF LACTIC ACID: CPT | Performed by: EMERGENCY MEDICINE

## 2021-04-20 PROCEDURE — 25000003 PHARM REV CODE 250: Performed by: INTERNAL MEDICINE

## 2021-04-20 PROCEDURE — 84145 PROCALCITONIN (PCT): CPT | Performed by: EMERGENCY MEDICINE

## 2021-04-20 PROCEDURE — 96372 THER/PROPH/DIAG INJ SC/IM: CPT

## 2021-04-20 PROCEDURE — 85025 COMPLETE CBC W/AUTO DIFF WBC: CPT | Performed by: EMERGENCY MEDICINE

## 2021-04-20 PROCEDURE — 84484 ASSAY OF TROPONIN QUANT: CPT | Performed by: EMERGENCY MEDICINE

## 2021-04-20 PROCEDURE — 87040 BLOOD CULTURE FOR BACTERIA: CPT | Performed by: EMERGENCY MEDICINE

## 2021-04-20 PROCEDURE — 85610 PROTHROMBIN TIME: CPT | Performed by: EMERGENCY MEDICINE

## 2021-04-20 PROCEDURE — 96374 THER/PROPH/DIAG INJ IV PUSH: CPT

## 2021-04-20 PROCEDURE — 63600175 PHARM REV CODE 636 W HCPCS: Performed by: INTERNAL MEDICINE

## 2021-04-20 PROCEDURE — U0002 COVID-19 LAB TEST NON-CDC: HCPCS | Performed by: EMERGENCY MEDICINE

## 2021-04-20 PROCEDURE — 63600175 PHARM REV CODE 636 W HCPCS: Performed by: EMERGENCY MEDICINE

## 2021-04-20 RX ORDER — ENOXAPARIN SODIUM 100 MG/ML
40 INJECTION SUBCUTANEOUS EVERY 24 HOURS
Status: DISCONTINUED | OUTPATIENT
Start: 2021-04-20 | End: 2021-04-24 | Stop reason: HOSPADM

## 2021-04-20 RX ORDER — FUROSEMIDE 10 MG/ML
60 INJECTION INTRAMUSCULAR; INTRAVENOUS
Status: COMPLETED | OUTPATIENT
Start: 2021-04-20 | End: 2021-04-20

## 2021-04-20 RX ORDER — DIPHENHYDRAMINE HCL 25 MG
25 CAPSULE ORAL EVERY 6 HOURS PRN
Status: DISCONTINUED | OUTPATIENT
Start: 2021-04-20 | End: 2021-04-24 | Stop reason: HOSPADM

## 2021-04-20 RX ORDER — FUROSEMIDE 10 MG/ML
40 INJECTION INTRAMUSCULAR; INTRAVENOUS
Status: COMPLETED | OUTPATIENT
Start: 2021-04-21 | End: 2021-04-21

## 2021-04-20 RX ORDER — CARBIDOPA AND LEVODOPA 25; 100 MG/1; MG/1
2 TABLET ORAL 3 TIMES DAILY
Status: DISCONTINUED | OUTPATIENT
Start: 2021-04-20 | End: 2021-04-23

## 2021-04-20 RX ORDER — ONDANSETRON 2 MG/ML
4 INJECTION INTRAMUSCULAR; INTRAVENOUS EVERY 8 HOURS PRN
Status: DISCONTINUED | OUTPATIENT
Start: 2021-04-20 | End: 2021-04-24 | Stop reason: HOSPADM

## 2021-04-20 RX ORDER — MAG HYDROX/ALUMINUM HYD/SIMETH 200-200-20
30 SUSPENSION, ORAL (FINAL DOSE FORM) ORAL EVERY 6 HOURS PRN
Status: DISCONTINUED | OUTPATIENT
Start: 2021-04-20 | End: 2021-04-24 | Stop reason: HOSPADM

## 2021-04-20 RX ORDER — ASPIRIN 81 MG/1
81 TABLET ORAL DAILY
Status: DISCONTINUED | OUTPATIENT
Start: 2021-04-21 | End: 2021-04-24 | Stop reason: HOSPADM

## 2021-04-20 RX ORDER — ACETAMINOPHEN 325 MG/1
650 TABLET ORAL
Status: COMPLETED | OUTPATIENT
Start: 2021-04-20 | End: 2021-04-20

## 2021-04-20 RX ORDER — ACETAMINOPHEN 325 MG/1
650 TABLET ORAL EVERY 6 HOURS PRN
Status: DISCONTINUED | OUTPATIENT
Start: 2021-04-20 | End: 2021-04-24 | Stop reason: HOSPADM

## 2021-04-20 RX ORDER — IPRATROPIUM BROMIDE AND ALBUTEROL SULFATE 2.5; .5 MG/3ML; MG/3ML
3 SOLUTION RESPIRATORY (INHALATION) EVERY 4 HOURS PRN
Status: DISCONTINUED | OUTPATIENT
Start: 2021-04-20 | End: 2021-04-24 | Stop reason: HOSPADM

## 2021-04-20 RX ORDER — GUAIFENESIN 100 MG/5ML
200 SOLUTION ORAL EVERY 4 HOURS PRN
Status: DISCONTINUED | OUTPATIENT
Start: 2021-04-20 | End: 2021-04-24 | Stop reason: HOSPADM

## 2021-04-20 RX ORDER — METOPROLOL SUCCINATE 50 MG/1
50 TABLET, EXTENDED RELEASE ORAL 2 TIMES DAILY
Status: DISCONTINUED | OUTPATIENT
Start: 2021-04-20 | End: 2021-04-24 | Stop reason: HOSPADM

## 2021-04-20 RX ADMIN — CARBIDOPA AND LEVODOPA 2 TABLET: 25; 100 TABLET ORAL at 10:04

## 2021-04-20 RX ADMIN — CEFTRIAXONE 1 G: 1 INJECTION, SOLUTION INTRAVENOUS at 08:04

## 2021-04-20 RX ADMIN — FUROSEMIDE 60 MG: 10 INJECTION, SOLUTION INTRAMUSCULAR; INTRAVENOUS at 07:04

## 2021-04-20 RX ADMIN — METOPROLOL SUCCINATE 50 MG: 50 TABLET, EXTENDED RELEASE ORAL at 10:04

## 2021-04-20 RX ADMIN — ACETAMINOPHEN 650 MG: 325 TABLET ORAL at 08:04

## 2021-04-20 RX ADMIN — ENOXAPARIN SODIUM 40 MG: 40 INJECTION SUBCUTANEOUS at 10:04

## 2021-04-21 LAB
ALBUMIN SERPL BCP-MCNC: 3.9 G/DL (ref 3.5–5.2)
ALP SERPL-CCNC: 102 U/L (ref 55–135)
ALT SERPL W/O P-5'-P-CCNC: 7 U/L (ref 10–44)
ANION GAP SERPL CALC-SCNC: 15 MMOL/L (ref 8–16)
AST SERPL-CCNC: 21 U/L (ref 10–40)
BASOPHILS # BLD AUTO: 0.04 K/UL (ref 0–0.2)
BASOPHILS NFR BLD: 0.5 % (ref 0–1.9)
BILIRUB SERPL-MCNC: 0.9 MG/DL (ref 0.1–1)
BUN SERPL-MCNC: 15 MG/DL (ref 8–23)
CALCIUM SERPL-MCNC: 9.3 MG/DL (ref 8.7–10.5)
CHLORIDE SERPL-SCNC: 102 MMOL/L (ref 95–110)
CO2 SERPL-SCNC: 23 MMOL/L (ref 23–29)
CREAT SERPL-MCNC: 1 MG/DL (ref 0.5–1.4)
DIFFERENTIAL METHOD: ABNORMAL
EOSINOPHIL # BLD AUTO: 0 K/UL (ref 0–0.5)
EOSINOPHIL NFR BLD: 0.4 % (ref 0–8)
ERYTHROCYTE [DISTWIDTH] IN BLOOD BY AUTOMATED COUNT: 19.8 % (ref 11.5–14.5)
EST. GFR  (AFRICAN AMERICAN): >60 ML/MIN/1.73 M^2
EST. GFR  (NON AFRICAN AMERICAN): 52 ML/MIN/1.73 M^2
GLUCOSE SERPL-MCNC: 143 MG/DL (ref 70–110)
HCT VFR BLD AUTO: 43.6 % (ref 37–48.5)
HGB BLD-MCNC: 13.5 G/DL (ref 12–16)
IMM GRANULOCYTES # BLD AUTO: 0.04 K/UL (ref 0–0.04)
IMM GRANULOCYTES NFR BLD AUTO: 0.5 % (ref 0–0.5)
LYMPHOCYTES # BLD AUTO: 1.1 K/UL (ref 1–4.8)
LYMPHOCYTES NFR BLD: 13.4 % (ref 18–48)
MAGNESIUM SERPL-MCNC: 1.9 MG/DL (ref 1.6–2.6)
MCH RBC QN AUTO: 28.1 PG (ref 27–31)
MCHC RBC AUTO-ENTMCNC: 31 G/DL (ref 32–36)
MCV RBC AUTO: 91 FL (ref 82–98)
MONOCYTES # BLD AUTO: 1.3 K/UL (ref 0.3–1)
MONOCYTES NFR BLD: 15.3 % (ref 4–15)
NEUTROPHILS # BLD AUTO: 5.8 K/UL (ref 1.8–7.7)
NEUTROPHILS NFR BLD: 69.9 % (ref 38–73)
NRBC BLD-RTO: 0 /100 WBC
PLATELET # BLD AUTO: 240 K/UL (ref 150–450)
PMV BLD AUTO: 11.6 FL (ref 9.2–12.9)
POTASSIUM SERPL-SCNC: 4.2 MMOL/L (ref 3.5–5.1)
PROT SERPL-MCNC: 7.7 G/DL (ref 6–8.4)
RBC # BLD AUTO: 4.8 M/UL (ref 4–5.4)
SODIUM SERPL-SCNC: 140 MMOL/L (ref 136–145)
WBC # BLD AUTO: 8.23 K/UL (ref 3.9–12.7)

## 2021-04-21 PROCEDURE — 25000003 PHARM REV CODE 250: Performed by: PHYSICIAN ASSISTANT

## 2021-04-21 PROCEDURE — 83735 ASSAY OF MAGNESIUM: CPT | Performed by: INTERNAL MEDICINE

## 2021-04-21 PROCEDURE — 99222 1ST HOSP IP/OBS MODERATE 55: CPT | Mod: ,,, | Performed by: PHYSICIAN ASSISTANT

## 2021-04-21 PROCEDURE — 25000003 PHARM REV CODE 250: Performed by: INTERNAL MEDICINE

## 2021-04-21 PROCEDURE — 36415 COLL VENOUS BLD VENIPUNCTURE: CPT | Performed by: INTERNAL MEDICINE

## 2021-04-21 PROCEDURE — 99222 PR INITIAL HOSPITAL CARE,LEVL II: ICD-10-PCS | Mod: ,,, | Performed by: PHYSICIAN ASSISTANT

## 2021-04-21 PROCEDURE — 63600175 PHARM REV CODE 636 W HCPCS: Performed by: INTERNAL MEDICINE

## 2021-04-21 PROCEDURE — 21400001 HC TELEMETRY ROOM

## 2021-04-21 PROCEDURE — 85025 COMPLETE CBC W/AUTO DIFF WBC: CPT | Performed by: INTERNAL MEDICINE

## 2021-04-21 PROCEDURE — 63600175 PHARM REV CODE 636 W HCPCS: Performed by: EMERGENCY MEDICINE

## 2021-04-21 PROCEDURE — 80053 COMPREHEN METABOLIC PANEL: CPT | Performed by: INTERNAL MEDICINE

## 2021-04-21 RX ORDER — METOLAZONE 5 MG/1
5 TABLET ORAL ONCE
Status: COMPLETED | OUTPATIENT
Start: 2021-04-21 | End: 2021-04-21

## 2021-04-21 RX ORDER — LOSARTAN POTASSIUM 50 MG/1
50 TABLET ORAL DAILY
Status: DISCONTINUED | OUTPATIENT
Start: 2021-04-21 | End: 2021-04-22

## 2021-04-21 RX ADMIN — CARBIDOPA AND LEVODOPA 2 TABLET: 25; 100 TABLET ORAL at 04:04

## 2021-04-21 RX ADMIN — ASPIRIN 81 MG: 81 TABLET, COATED ORAL at 10:04

## 2021-04-21 RX ADMIN — LOSARTAN POTASSIUM 50 MG: 50 TABLET, FILM COATED ORAL at 02:04

## 2021-04-21 RX ADMIN — FUROSEMIDE 40 MG: 10 INJECTION, SOLUTION INTRAMUSCULAR; INTRAVENOUS at 10:04

## 2021-04-21 RX ADMIN — FUROSEMIDE 40 MG: 10 INJECTION, SOLUTION INTRAMUSCULAR; INTRAVENOUS at 08:04

## 2021-04-21 RX ADMIN — ENOXAPARIN SODIUM 40 MG: 40 INJECTION SUBCUTANEOUS at 04:04

## 2021-04-21 RX ADMIN — CARBIDOPA AND LEVODOPA 2 TABLET: 25; 100 TABLET ORAL at 10:04

## 2021-04-21 RX ADMIN — METOPROLOL SUCCINATE 50 MG: 50 TABLET, EXTENDED RELEASE ORAL at 10:04

## 2021-04-21 RX ADMIN — CEFTRIAXONE 1 G: 1 INJECTION, SOLUTION INTRAVENOUS at 08:04

## 2021-04-21 RX ADMIN — CARBIDOPA AND LEVODOPA 2 TABLET: 25; 100 TABLET ORAL at 08:04

## 2021-04-21 RX ADMIN — METOLAZONE 5 MG: 5 TABLET ORAL at 02:04

## 2021-04-22 LAB
ALBUMIN SERPL BCP-MCNC: 3.4 G/DL (ref 3.5–5.2)
ANION GAP SERPL CALC-SCNC: 14 MMOL/L (ref 8–16)
BASOPHILS # BLD AUTO: 0.02 K/UL (ref 0–0.2)
BASOPHILS NFR BLD: 0.2 % (ref 0–1.9)
BUN SERPL-MCNC: 20 MG/DL (ref 8–23)
CALCIUM SERPL-MCNC: 9 MG/DL (ref 8.7–10.5)
CHLORIDE SERPL-SCNC: 98 MMOL/L (ref 95–110)
CO2 SERPL-SCNC: 24 MMOL/L (ref 23–29)
CREAT SERPL-MCNC: 1 MG/DL (ref 0.5–1.4)
DIFFERENTIAL METHOD: ABNORMAL
EOSINOPHIL # BLD AUTO: 0.1 K/UL (ref 0–0.5)
EOSINOPHIL NFR BLD: 0.7 % (ref 0–8)
ERYTHROCYTE [DISTWIDTH] IN BLOOD BY AUTOMATED COUNT: 19.4 % (ref 11.5–14.5)
EST. GFR  (AFRICAN AMERICAN): >60 ML/MIN/1.73 M^2
EST. GFR  (NON AFRICAN AMERICAN): 52 ML/MIN/1.73 M^2
GLUCOSE SERPL-MCNC: 91 MG/DL (ref 70–110)
HCT VFR BLD AUTO: 40.1 % (ref 37–48.5)
HGB BLD-MCNC: 12.9 G/DL (ref 12–16)
IMM GRANULOCYTES # BLD AUTO: 0.06 K/UL (ref 0–0.04)
IMM GRANULOCYTES NFR BLD AUTO: 0.7 % (ref 0–0.5)
LYMPHOCYTES # BLD AUTO: 1.3 K/UL (ref 1–4.8)
LYMPHOCYTES NFR BLD: 14.5 % (ref 18–48)
MAGNESIUM SERPL-MCNC: 1.8 MG/DL (ref 1.6–2.6)
MCH RBC QN AUTO: 28.5 PG (ref 27–31)
MCHC RBC AUTO-ENTMCNC: 32.2 G/DL (ref 32–36)
MCV RBC AUTO: 89 FL (ref 82–98)
MONOCYTES # BLD AUTO: 1.3 K/UL (ref 0.3–1)
MONOCYTES NFR BLD: 14.2 % (ref 4–15)
NEUTROPHILS # BLD AUTO: 6.3 K/UL (ref 1.8–7.7)
NEUTROPHILS NFR BLD: 69.7 % (ref 38–73)
NRBC BLD-RTO: 0 /100 WBC
PHOSPHATE SERPL-MCNC: 3.5 MG/DL (ref 2.7–4.5)
PLATELET # BLD AUTO: 235 K/UL (ref 150–450)
PMV BLD AUTO: 11.9 FL (ref 9.2–12.9)
POCT GLUCOSE: 147 MG/DL (ref 70–110)
POCT GLUCOSE: 161 MG/DL (ref 70–110)
POTASSIUM SERPL-SCNC: 3.2 MMOL/L (ref 3.5–5.1)
RBC # BLD AUTO: 4.53 M/UL (ref 4–5.4)
SODIUM SERPL-SCNC: 136 MMOL/L (ref 136–145)
WBC # BLD AUTO: 9.01 K/UL (ref 3.9–12.7)

## 2021-04-22 PROCEDURE — 85025 COMPLETE CBC W/AUTO DIFF WBC: CPT | Performed by: INTERNAL MEDICINE

## 2021-04-22 PROCEDURE — 63600175 PHARM REV CODE 636 W HCPCS: Performed by: EMERGENCY MEDICINE

## 2021-04-22 PROCEDURE — 25000003 PHARM REV CODE 250: Performed by: NURSE PRACTITIONER

## 2021-04-22 PROCEDURE — 80069 RENAL FUNCTION PANEL: CPT | Performed by: INTERNAL MEDICINE

## 2021-04-22 PROCEDURE — 25000003 PHARM REV CODE 250: Performed by: INTERNAL MEDICINE

## 2021-04-22 PROCEDURE — 83735 ASSAY OF MAGNESIUM: CPT | Performed by: INTERNAL MEDICINE

## 2021-04-22 PROCEDURE — 63600175 PHARM REV CODE 636 W HCPCS: Performed by: INTERNAL MEDICINE

## 2021-04-22 PROCEDURE — 25000003 PHARM REV CODE 250: Performed by: PHYSICIAN ASSISTANT

## 2021-04-22 PROCEDURE — 99233 SBSQ HOSP IP/OBS HIGH 50: CPT | Mod: ,,, | Performed by: NURSE PRACTITIONER

## 2021-04-22 PROCEDURE — 36415 COLL VENOUS BLD VENIPUNCTURE: CPT | Performed by: INTERNAL MEDICINE

## 2021-04-22 PROCEDURE — 21400001 HC TELEMETRY ROOM

## 2021-04-22 PROCEDURE — 99233 PR SUBSEQUENT HOSPITAL CARE,LEVL III: ICD-10-PCS | Mod: ,,, | Performed by: NURSE PRACTITIONER

## 2021-04-22 RX ORDER — POTASSIUM CHLORIDE 20 MEQ/1
40 TABLET, EXTENDED RELEASE ORAL ONCE
Status: COMPLETED | OUTPATIENT
Start: 2021-04-22 | End: 2021-04-22

## 2021-04-22 RX ORDER — METOLAZONE 5 MG/1
5 TABLET ORAL ONCE
Status: COMPLETED | OUTPATIENT
Start: 2021-04-22 | End: 2021-04-22

## 2021-04-22 RX ADMIN — POTASSIUM CHLORIDE 40 MEQ: 1500 TABLET, EXTENDED RELEASE ORAL at 11:04

## 2021-04-22 RX ADMIN — ENOXAPARIN SODIUM 40 MG: 40 INJECTION SUBCUTANEOUS at 04:04

## 2021-04-22 RX ADMIN — CEFTRIAXONE 1 G: 1 INJECTION, SOLUTION INTRAVENOUS at 09:04

## 2021-04-22 RX ADMIN — METOLAZONE 5 MG: 5 TABLET ORAL at 09:04

## 2021-04-22 RX ADMIN — SODIUM CHLORIDE, SODIUM LACTATE, POTASSIUM CHLORIDE, AND CALCIUM CHLORIDE 250 ML: .6; .31; .03; .02 INJECTION, SOLUTION INTRAVENOUS at 06:04

## 2021-04-22 RX ADMIN — CARBIDOPA AND LEVODOPA 2 TABLET: 25; 100 TABLET ORAL at 09:04

## 2021-04-22 RX ADMIN — LOSARTAN POTASSIUM 50 MG: 50 TABLET, FILM COATED ORAL at 09:04

## 2021-04-22 RX ADMIN — METOPROLOL SUCCINATE 50 MG: 50 TABLET, EXTENDED RELEASE ORAL at 09:04

## 2021-04-22 RX ADMIN — CARBIDOPA AND LEVODOPA 2 TABLET: 25; 100 TABLET ORAL at 04:04

## 2021-04-22 RX ADMIN — ASPIRIN 81 MG: 81 TABLET, COATED ORAL at 09:04

## 2021-04-23 LAB
ALBUMIN SERPL BCP-MCNC: 3 G/DL (ref 3.5–5.2)
ANION GAP SERPL CALC-SCNC: 11 MMOL/L (ref 8–16)
BASOPHILS # BLD AUTO: 0.02 K/UL (ref 0–0.2)
BASOPHILS NFR BLD: 0.3 % (ref 0–1.9)
BUN SERPL-MCNC: 21 MG/DL (ref 8–23)
CALCIUM SERPL-MCNC: 8.9 MG/DL (ref 8.7–10.5)
CHLORIDE SERPL-SCNC: 100 MMOL/L (ref 95–110)
CO2 SERPL-SCNC: 24 MMOL/L (ref 23–29)
CREAT SERPL-MCNC: 0.9 MG/DL (ref 0.5–1.4)
DIFFERENTIAL METHOD: ABNORMAL
EOSINOPHIL # BLD AUTO: 0 K/UL (ref 0–0.5)
EOSINOPHIL NFR BLD: 0.5 % (ref 0–8)
ERYTHROCYTE [DISTWIDTH] IN BLOOD BY AUTOMATED COUNT: 18.6 % (ref 11.5–14.5)
EST. GFR  (AFRICAN AMERICAN): >60 ML/MIN/1.73 M^2
EST. GFR  (NON AFRICAN AMERICAN): 59 ML/MIN/1.73 M^2
GLUCOSE SERPL-MCNC: 173 MG/DL (ref 70–110)
HCT VFR BLD AUTO: 37.6 % (ref 37–48.5)
HGB BLD-MCNC: 12.1 G/DL (ref 12–16)
IMM GRANULOCYTES # BLD AUTO: 0.04 K/UL (ref 0–0.04)
IMM GRANULOCYTES NFR BLD AUTO: 0.7 % (ref 0–0.5)
LYMPHOCYTES # BLD AUTO: 0.8 K/UL (ref 1–4.8)
LYMPHOCYTES NFR BLD: 13.1 % (ref 18–48)
MCH RBC QN AUTO: 28.3 PG (ref 27–31)
MCHC RBC AUTO-ENTMCNC: 32.2 G/DL (ref 32–36)
MCV RBC AUTO: 88 FL (ref 82–98)
MONOCYTES # BLD AUTO: 0.8 K/UL (ref 0.3–1)
MONOCYTES NFR BLD: 12.6 % (ref 4–15)
NEUTROPHILS # BLD AUTO: 4.4 K/UL (ref 1.8–7.7)
NEUTROPHILS NFR BLD: 72.8 % (ref 38–73)
NRBC BLD-RTO: 0 /100 WBC
PHOSPHATE SERPL-MCNC: 2.7 MG/DL (ref 2.7–4.5)
PLATELET # BLD AUTO: 259 K/UL (ref 150–450)
PMV BLD AUTO: 11.6 FL (ref 9.2–12.9)
POTASSIUM SERPL-SCNC: 3.3 MMOL/L (ref 3.5–5.1)
RBC # BLD AUTO: 4.27 M/UL (ref 4–5.4)
SODIUM SERPL-SCNC: 135 MMOL/L (ref 136–145)
WBC # BLD AUTO: 6.1 K/UL (ref 3.9–12.7)

## 2021-04-23 PROCEDURE — 80069 RENAL FUNCTION PANEL: CPT | Performed by: INTERNAL MEDICINE

## 2021-04-23 PROCEDURE — 25000003 PHARM REV CODE 250: Performed by: NURSE PRACTITIONER

## 2021-04-23 PROCEDURE — 21400001 HC TELEMETRY ROOM

## 2021-04-23 PROCEDURE — 25000003 PHARM REV CODE 250: Performed by: INTERNAL MEDICINE

## 2021-04-23 PROCEDURE — 63600175 PHARM REV CODE 636 W HCPCS: Performed by: INTERNAL MEDICINE

## 2021-04-23 PROCEDURE — 85025 COMPLETE CBC W/AUTO DIFF WBC: CPT | Performed by: INTERNAL MEDICINE

## 2021-04-23 PROCEDURE — 99233 PR SUBSEQUENT HOSPITAL CARE,LEVL III: ICD-10-PCS | Mod: ,,, | Performed by: NURSE PRACTITIONER

## 2021-04-23 PROCEDURE — 63600175 PHARM REV CODE 636 W HCPCS: Performed by: EMERGENCY MEDICINE

## 2021-04-23 PROCEDURE — 99233 SBSQ HOSP IP/OBS HIGH 50: CPT | Mod: ,,, | Performed by: NURSE PRACTITIONER

## 2021-04-23 RX ORDER — CARBIDOPA AND LEVODOPA 25; 100 MG/1; MG/1
2 TABLET ORAL
Status: DISCONTINUED | OUTPATIENT
Start: 2021-04-23 | End: 2021-04-24 | Stop reason: HOSPADM

## 2021-04-23 RX ADMIN — ENOXAPARIN SODIUM 40 MG: 40 INJECTION SUBCUTANEOUS at 04:04

## 2021-04-23 RX ADMIN — SACUBITRIL AND VALSARTAN 1 TABLET: 24; 26 TABLET, FILM COATED ORAL at 08:04

## 2021-04-23 RX ADMIN — CEFTRIAXONE 1 G: 1 INJECTION, SOLUTION INTRAVENOUS at 09:04

## 2021-04-23 RX ADMIN — CARBIDOPA AND LEVODOPA 2 TABLET: 25; 100 TABLET ORAL at 04:04

## 2021-04-23 RX ADMIN — ASPIRIN 81 MG: 81 TABLET, COATED ORAL at 08:04

## 2021-04-23 RX ADMIN — CARBIDOPA AND LEVODOPA 2 TABLET: 25; 100 TABLET ORAL at 08:04

## 2021-04-24 VITALS
TEMPERATURE: 97 F | HEIGHT: 62 IN | BODY MASS INDEX: 20.05 KG/M2 | HEART RATE: 75 BPM | WEIGHT: 108.94 LBS | SYSTOLIC BLOOD PRESSURE: 130 MMHG | RESPIRATION RATE: 16 BRPM | DIASTOLIC BLOOD PRESSURE: 95 MMHG | OXYGEN SATURATION: 94 %

## 2021-04-24 PROBLEM — R65.10 SIRS (SYSTEMIC INFLAMMATORY RESPONSE SYNDROME): Status: RESOLVED | Noted: 2021-04-20 | Resolved: 2021-04-24

## 2021-04-24 PROBLEM — I50.43 ACUTE ON CHRONIC COMBINED SYSTOLIC AND DIASTOLIC HEART FAILURE: Status: RESOLVED | Noted: 2021-04-20 | Resolved: 2021-04-24

## 2021-04-24 LAB
ALBUMIN SERPL BCP-MCNC: 2.9 G/DL (ref 3.5–5.2)
ANION GAP SERPL CALC-SCNC: 12 MMOL/L (ref 8–16)
BASOPHILS # BLD AUTO: 0.02 K/UL (ref 0–0.2)
BASOPHILS NFR BLD: 0.4 % (ref 0–1.9)
BUN SERPL-MCNC: 22 MG/DL (ref 8–23)
CALCIUM SERPL-MCNC: 8.8 MG/DL (ref 8.7–10.5)
CHLORIDE SERPL-SCNC: 101 MMOL/L (ref 95–110)
CO2 SERPL-SCNC: 24 MMOL/L (ref 23–29)
CREAT SERPL-MCNC: 0.8 MG/DL (ref 0.5–1.4)
DIFFERENTIAL METHOD: ABNORMAL
EOSINOPHIL # BLD AUTO: 0.1 K/UL (ref 0–0.5)
EOSINOPHIL NFR BLD: 1.4 % (ref 0–8)
ERYTHROCYTE [DISTWIDTH] IN BLOOD BY AUTOMATED COUNT: 18.6 % (ref 11.5–14.5)
EST. GFR  (AFRICAN AMERICAN): >60 ML/MIN/1.73 M^2
EST. GFR  (NON AFRICAN AMERICAN): >60 ML/MIN/1.73 M^2
GLUCOSE SERPL-MCNC: 100 MG/DL (ref 70–110)
HCT VFR BLD AUTO: 43.6 % (ref 37–48.5)
HGB BLD-MCNC: 13.4 G/DL (ref 12–16)
IMM GRANULOCYTES # BLD AUTO: 0.05 K/UL (ref 0–0.04)
IMM GRANULOCYTES NFR BLD AUTO: 0.9 % (ref 0–0.5)
LYMPHOCYTES # BLD AUTO: 1.1 K/UL (ref 1–4.8)
LYMPHOCYTES NFR BLD: 19.1 % (ref 18–48)
MCH RBC QN AUTO: 27.7 PG (ref 27–31)
MCHC RBC AUTO-ENTMCNC: 30.7 G/DL (ref 32–36)
MCV RBC AUTO: 90 FL (ref 82–98)
MONOCYTES # BLD AUTO: 0.9 K/UL (ref 0.3–1)
MONOCYTES NFR BLD: 15.7 % (ref 4–15)
NEUTROPHILS # BLD AUTO: 3.5 K/UL (ref 1.8–7.7)
NEUTROPHILS NFR BLD: 62.5 % (ref 38–73)
NRBC BLD-RTO: 0 /100 WBC
PHOSPHATE SERPL-MCNC: 3 MG/DL (ref 2.7–4.5)
PLATELET # BLD AUTO: 276 K/UL (ref 150–450)
PMV BLD AUTO: 10.7 FL (ref 9.2–12.9)
POTASSIUM SERPL-SCNC: 3.7 MMOL/L (ref 3.5–5.1)
RBC # BLD AUTO: 4.84 M/UL (ref 4–5.4)
SODIUM SERPL-SCNC: 137 MMOL/L (ref 136–145)
WBC # BLD AUTO: 5.55 K/UL (ref 3.9–12.7)

## 2021-04-24 PROCEDURE — 36415 COLL VENOUS BLD VENIPUNCTURE: CPT | Performed by: INTERNAL MEDICINE

## 2021-04-24 PROCEDURE — 25000003 PHARM REV CODE 250: Performed by: NURSE PRACTITIONER

## 2021-04-24 PROCEDURE — 25000003 PHARM REV CODE 250: Performed by: INTERNAL MEDICINE

## 2021-04-24 PROCEDURE — 99233 SBSQ HOSP IP/OBS HIGH 50: CPT | Mod: ,,, | Performed by: NURSE PRACTITIONER

## 2021-04-24 PROCEDURE — 99233 PR SUBSEQUENT HOSPITAL CARE,LEVL III: ICD-10-PCS | Mod: ,,, | Performed by: NURSE PRACTITIONER

## 2021-04-24 PROCEDURE — 80069 RENAL FUNCTION PANEL: CPT | Performed by: INTERNAL MEDICINE

## 2021-04-24 PROCEDURE — 85025 COMPLETE CBC W/AUTO DIFF WBC: CPT | Performed by: INTERNAL MEDICINE

## 2021-04-24 RX ADMIN — CARBIDOPA AND LEVODOPA 2 TABLET: 25; 100 TABLET ORAL at 11:04

## 2021-04-24 RX ADMIN — SACUBITRIL AND VALSARTAN 1 TABLET: 24; 26 TABLET, FILM COATED ORAL at 09:04

## 2021-04-24 RX ADMIN — METOPROLOL SUCCINATE 50 MG: 50 TABLET, EXTENDED RELEASE ORAL at 09:04

## 2021-04-24 RX ADMIN — CARBIDOPA AND LEVODOPA 2 TABLET: 25; 100 TABLET ORAL at 05:04

## 2021-04-24 RX ADMIN — ASPIRIN 81 MG: 81 TABLET, COATED ORAL at 09:04

## 2021-04-26 ENCOUNTER — PATIENT OUTREACH (OUTPATIENT)
Dept: ADMINISTRATIVE | Facility: CLINIC | Age: 81
End: 2021-04-26

## 2021-04-26 ENCOUNTER — PATIENT MESSAGE (OUTPATIENT)
Dept: ADMINISTRATIVE | Facility: CLINIC | Age: 81
End: 2021-04-26

## 2021-04-26 LAB
BACTERIA BLD CULT: NORMAL
BACTERIA BLD CULT: NORMAL

## 2021-04-27 ENCOUNTER — TELEPHONE (OUTPATIENT)
Dept: TRANSPLANT | Facility: CLINIC | Age: 81
End: 2021-04-27

## 2021-04-28 ENCOUNTER — TELEPHONE (OUTPATIENT)
Dept: TRANSPLANT | Facility: CLINIC | Age: 81
End: 2021-04-28

## 2021-05-05 ENCOUNTER — OFFICE VISIT (OUTPATIENT)
Dept: CARDIOLOGY | Facility: CLINIC | Age: 81
End: 2021-05-05
Payer: MEDICARE

## 2021-05-05 ENCOUNTER — LAB VISIT (OUTPATIENT)
Dept: LAB | Facility: HOSPITAL | Age: 81
End: 2021-05-05
Attending: NURSE PRACTITIONER
Payer: MEDICARE

## 2021-05-05 ENCOUNTER — TELEPHONE (OUTPATIENT)
Dept: CARDIOLOGY | Facility: CLINIC | Age: 81
End: 2021-05-05

## 2021-05-05 VITALS
SYSTOLIC BLOOD PRESSURE: 128 MMHG | BODY MASS INDEX: 19.27 KG/M2 | HEART RATE: 72 BPM | DIASTOLIC BLOOD PRESSURE: 90 MMHG | WEIGHT: 105.38 LBS

## 2021-05-05 DIAGNOSIS — R79.89 ELEVATED TROPONIN: ICD-10-CM

## 2021-05-05 DIAGNOSIS — Z86.73 HISTORY OF CVA (CEREBROVASCULAR ACCIDENT): Chronic | ICD-10-CM

## 2021-05-05 DIAGNOSIS — I50.43 ACUTE ON CHRONIC COMBINED SYSTOLIC AND DIASTOLIC CHF (CONGESTIVE HEART FAILURE): ICD-10-CM

## 2021-05-05 DIAGNOSIS — I50.43 ACUTE ON CHRONIC COMBINED SYSTOLIC AND DIASTOLIC CHF (CONGESTIVE HEART FAILURE): Primary | ICD-10-CM

## 2021-05-05 DIAGNOSIS — E78.5 HYPERLIPIDEMIA, UNSPECIFIED HYPERLIPIDEMIA TYPE: Chronic | ICD-10-CM

## 2021-05-05 DIAGNOSIS — I10 ESSENTIAL HYPERTENSION: Chronic | ICD-10-CM

## 2021-05-05 PROCEDURE — 99214 PR OFFICE/OUTPT VISIT, EST, LEVL IV, 30-39 MIN: ICD-10-PCS | Mod: S$PBB,,, | Performed by: PHYSICIAN ASSISTANT

## 2021-05-05 PROCEDURE — 99213 OFFICE O/P EST LOW 20 MIN: CPT | Mod: PBBFAC | Performed by: PHYSICIAN ASSISTANT

## 2021-05-05 PROCEDURE — 99999 PR PBB SHADOW E&M-EST. PATIENT-LVL III: ICD-10-PCS | Mod: PBBFAC,,, | Performed by: PHYSICIAN ASSISTANT

## 2021-05-05 PROCEDURE — 99214 OFFICE O/P EST MOD 30 MIN: CPT | Mod: S$PBB,,, | Performed by: PHYSICIAN ASSISTANT

## 2021-05-05 PROCEDURE — 99999 PR PBB SHADOW E&M-EST. PATIENT-LVL III: CPT | Mod: PBBFAC,,, | Performed by: PHYSICIAN ASSISTANT

## 2021-05-07 ENCOUNTER — TELEPHONE (OUTPATIENT)
Dept: CARDIOLOGY | Facility: CLINIC | Age: 81
End: 2021-05-07

## 2021-05-07 DIAGNOSIS — I50.43 ACUTE ON CHRONIC COMBINED SYSTOLIC AND DIASTOLIC CHF (CONGESTIVE HEART FAILURE): Primary | ICD-10-CM

## 2021-05-10 ENCOUNTER — LAB VISIT (OUTPATIENT)
Dept: LAB | Facility: HOSPITAL | Age: 81
End: 2021-05-10
Attending: PHYSICIAN ASSISTANT
Payer: MEDICARE

## 2021-05-10 DIAGNOSIS — I50.43 ACUTE ON CHRONIC COMBINED SYSTOLIC AND DIASTOLIC HEART FAILURE: Primary | ICD-10-CM

## 2021-05-10 LAB
ANION GAP SERPL CALC-SCNC: 13 MMOL/L (ref 8–16)
BUN SERPL-MCNC: 14 MG/DL (ref 8–23)
CALCIUM SERPL-MCNC: 9.4 MG/DL (ref 8.7–10.5)
CHLORIDE SERPL-SCNC: 103 MMOL/L (ref 95–110)
CO2 SERPL-SCNC: 24 MMOL/L (ref 23–29)
CREAT SERPL-MCNC: 0.8 MG/DL (ref 0.5–1.4)
EST. GFR  (AFRICAN AMERICAN): >60 ML/MIN/1.73 M^2
EST. GFR  (NON AFRICAN AMERICAN): >60 ML/MIN/1.73 M^2
GLUCOSE SERPL-MCNC: 78 MG/DL (ref 70–110)
POTASSIUM SERPL-SCNC: 4.4 MMOL/L (ref 3.5–5.1)
SODIUM SERPL-SCNC: 140 MMOL/L (ref 136–145)

## 2021-05-10 PROCEDURE — 36415 COLL VENOUS BLD VENIPUNCTURE: CPT | Performed by: PHYSICIAN ASSISTANT

## 2021-05-10 PROCEDURE — 80048 BASIC METABOLIC PNL TOTAL CA: CPT | Performed by: PHYSICIAN ASSISTANT

## 2021-05-25 ENCOUNTER — OFFICE VISIT (OUTPATIENT)
Dept: CARDIOLOGY | Facility: CLINIC | Age: 81
End: 2021-05-25
Payer: MEDICARE

## 2021-05-25 ENCOUNTER — HOSPITAL ENCOUNTER (OUTPATIENT)
Dept: RADIOLOGY | Facility: HOSPITAL | Age: 81
Discharge: HOME | End: 2021-05-25
Attending: PHYSICIAN ASSISTANT
Payer: MEDICARE

## 2021-05-25 ENCOUNTER — TELEPHONE (OUTPATIENT)
Dept: CARDIOLOGY | Facility: CLINIC | Age: 81
End: 2021-05-25

## 2021-05-25 VITALS
DIASTOLIC BLOOD PRESSURE: 86 MMHG | OXYGEN SATURATION: 99 % | WEIGHT: 107.13 LBS | HEART RATE: 60 BPM | BODY MASS INDEX: 19.6 KG/M2 | SYSTOLIC BLOOD PRESSURE: 158 MMHG

## 2021-05-25 DIAGNOSIS — R79.89 ELEVATED TROPONIN: ICD-10-CM

## 2021-05-25 DIAGNOSIS — Z86.73 HISTORY OF CVA (CEREBROVASCULAR ACCIDENT): Chronic | ICD-10-CM

## 2021-05-25 DIAGNOSIS — R60.9 EDEMA, UNSPECIFIED TYPE: ICD-10-CM

## 2021-05-25 DIAGNOSIS — M79.89 LEFT LEG SWELLING: ICD-10-CM

## 2021-05-25 DIAGNOSIS — R60.9 EDEMA, UNSPECIFIED TYPE: Primary | ICD-10-CM

## 2021-05-25 DIAGNOSIS — I50.43 ACUTE ON CHRONIC COMBINED SYSTOLIC AND DIASTOLIC CHF (CONGESTIVE HEART FAILURE): ICD-10-CM

## 2021-05-25 DIAGNOSIS — E78.5 HYPERLIPIDEMIA, UNSPECIFIED HYPERLIPIDEMIA TYPE: Chronic | ICD-10-CM

## 2021-05-25 DIAGNOSIS — I10 ESSENTIAL HYPERTENSION: Chronic | ICD-10-CM

## 2021-05-25 DIAGNOSIS — G20.A1 PARKINSON DISEASE: Chronic | ICD-10-CM

## 2021-05-25 PROCEDURE — 99999 PR PBB SHADOW E&M-EST. PATIENT-LVL III: ICD-10-PCS | Mod: PBBFAC,,, | Performed by: PHYSICIAN ASSISTANT

## 2021-05-25 PROCEDURE — 99214 PR OFFICE/OUTPT VISIT, EST, LEVL IV, 30-39 MIN: ICD-10-PCS | Mod: S$PBB,,, | Performed by: PHYSICIAN ASSISTANT

## 2021-05-25 PROCEDURE — 99213 OFFICE O/P EST LOW 20 MIN: CPT | Mod: PBBFAC | Performed by: PHYSICIAN ASSISTANT

## 2021-05-25 PROCEDURE — 99214 OFFICE O/P EST MOD 30 MIN: CPT | Mod: S$PBB,,, | Performed by: PHYSICIAN ASSISTANT

## 2021-05-25 PROCEDURE — 93971 EXTREMITY STUDY: CPT | Mod: TC,LT

## 2021-05-25 PROCEDURE — 99999 PR PBB SHADOW E&M-EST. PATIENT-LVL III: CPT | Mod: PBBFAC,,, | Performed by: PHYSICIAN ASSISTANT

## 2021-05-28 ENCOUNTER — TELEPHONE (OUTPATIENT)
Dept: CARDIOLOGY | Facility: CLINIC | Age: 81
End: 2021-05-28

## 2021-05-28 ENCOUNTER — LAB VISIT (OUTPATIENT)
Dept: LAB | Facility: HOSPITAL | Age: 81
End: 2021-05-28
Attending: PHYSICIAN ASSISTANT
Payer: MEDICARE

## 2021-05-28 DIAGNOSIS — J96.01 ACUTE RESPIRATORY FAILURE WITH HYPOXIA: ICD-10-CM

## 2021-05-28 DIAGNOSIS — I11.0 HYPERTENSIVE HEART DISEASE WITH CONGESTIVE HEART FAILURE: Primary | ICD-10-CM

## 2021-05-28 DIAGNOSIS — Z91.81 HISTORY OF FALLING: ICD-10-CM

## 2021-05-28 DIAGNOSIS — Z86.73 PERSONAL HISTORY OF TRANSIENT CEREBRAL ISCHEMIA: ICD-10-CM

## 2021-05-28 DIAGNOSIS — G20.A1 PARKINSON DISEASE: ICD-10-CM

## 2021-05-28 DIAGNOSIS — I50.43 ACUTE ON CHRONIC COMBINED SYSTOLIC AND DIASTOLIC HEART FAILURE: ICD-10-CM

## 2021-05-28 DIAGNOSIS — I50.43 ACUTE ON CHRONIC COMBINED SYSTOLIC AND DIASTOLIC CHF (CONGESTIVE HEART FAILURE): Primary | ICD-10-CM

## 2021-05-28 LAB
ANION GAP SERPL CALC-SCNC: 9 MMOL/L (ref 8–16)
BUN SERPL-MCNC: 20 MG/DL (ref 8–23)
CALCIUM SERPL-MCNC: 8.6 MG/DL (ref 8.7–10.5)
CHLORIDE SERPL-SCNC: 107 MMOL/L (ref 95–110)
CO2 SERPL-SCNC: 23 MMOL/L (ref 23–29)
CREAT SERPL-MCNC: 0.8 MG/DL (ref 0.5–1.4)
EST. GFR  (AFRICAN AMERICAN): >60 ML/MIN/1.73 M^2
EST. GFR  (NON AFRICAN AMERICAN): >60 ML/MIN/1.73 M^2
GLUCOSE SERPL-MCNC: 115 MG/DL (ref 70–110)
POTASSIUM SERPL-SCNC: 4.3 MMOL/L (ref 3.5–5.1)
SODIUM SERPL-SCNC: 139 MMOL/L (ref 136–145)

## 2021-05-28 PROCEDURE — 36415 COLL VENOUS BLD VENIPUNCTURE: CPT | Performed by: PHYSICIAN ASSISTANT

## 2021-05-28 PROCEDURE — 80048 BASIC METABOLIC PNL TOTAL CA: CPT | Performed by: PHYSICIAN ASSISTANT

## 2021-05-31 ENCOUNTER — DOCUMENT SCAN (OUTPATIENT)
Dept: HOME HEALTH SERVICES | Facility: HOSPITAL | Age: 81
End: 2021-05-31
Payer: MEDICARE

## 2021-06-02 ENCOUNTER — LAB VISIT (OUTPATIENT)
Dept: LAB | Facility: HOSPITAL | Age: 81
End: 2021-06-02
Attending: PHYSICIAN ASSISTANT
Payer: MEDICARE

## 2021-06-02 DIAGNOSIS — I50.43 ACUTE ON CHRONIC COMBINED SYSTOLIC AND DIASTOLIC HEART FAILURE: Primary | ICD-10-CM

## 2021-06-02 LAB
ANION GAP SERPL CALC-SCNC: 15 MMOL/L (ref 8–16)
BUN SERPL-MCNC: 19 MG/DL (ref 8–23)
CALCIUM SERPL-MCNC: 9 MG/DL (ref 8.7–10.5)
CHLORIDE SERPL-SCNC: 103 MMOL/L (ref 95–110)
CO2 SERPL-SCNC: 23 MMOL/L (ref 23–29)
CREAT SERPL-MCNC: 0.8 MG/DL (ref 0.5–1.4)
EST. GFR  (AFRICAN AMERICAN): >60 ML/MIN/1.73 M^2
EST. GFR  (NON AFRICAN AMERICAN): >60 ML/MIN/1.73 M^2
GLUCOSE SERPL-MCNC: 125 MG/DL (ref 70–110)
POTASSIUM SERPL-SCNC: 4.4 MMOL/L (ref 3.5–5.1)
SODIUM SERPL-SCNC: 141 MMOL/L (ref 136–145)

## 2021-06-02 PROCEDURE — 36415 COLL VENOUS BLD VENIPUNCTURE: CPT | Performed by: PHYSICIAN ASSISTANT

## 2021-06-02 PROCEDURE — 80048 BASIC METABOLIC PNL TOTAL CA: CPT | Performed by: PHYSICIAN ASSISTANT

## 2021-06-03 ENCOUNTER — TELEPHONE (OUTPATIENT)
Dept: CARDIOLOGY | Facility: CLINIC | Age: 81
End: 2021-06-03

## 2021-06-14 ENCOUNTER — TELEPHONE (OUTPATIENT)
Dept: TRANSPLANT | Facility: CLINIC | Age: 81
End: 2021-06-14

## 2021-06-15 ENCOUNTER — TELEPHONE (OUTPATIENT)
Dept: TRANSPLANT | Facility: CLINIC | Age: 81
End: 2021-06-15

## 2021-06-15 DIAGNOSIS — I50.43 ACUTE ON CHRONIC COMBINED SYSTOLIC AND DIASTOLIC CHF (CONGESTIVE HEART FAILURE): Primary | ICD-10-CM

## 2021-06-16 DIAGNOSIS — I50.43 ACUTE ON CHRONIC COMBINED SYSTOLIC AND DIASTOLIC CHF (CONGESTIVE HEART FAILURE): ICD-10-CM

## 2021-06-22 ENCOUNTER — LAB VISIT (OUTPATIENT)
Dept: LAB | Facility: HOSPITAL | Age: 81
End: 2021-06-22
Attending: PHYSICIAN ASSISTANT
Payer: MEDICARE

## 2021-06-22 ENCOUNTER — TELEPHONE (OUTPATIENT)
Dept: CARDIOLOGY | Facility: CLINIC | Age: 81
End: 2021-06-22

## 2021-06-22 DIAGNOSIS — I50.43 ACUTE ON CHRONIC COMBINED SYSTOLIC AND DIASTOLIC CHF (CONGESTIVE HEART FAILURE): ICD-10-CM

## 2021-06-22 LAB
ANION GAP SERPL CALC-SCNC: 12 MMOL/L (ref 8–16)
BUN SERPL-MCNC: 22 MG/DL (ref 8–23)
CALCIUM SERPL-MCNC: 9.6 MG/DL (ref 8.7–10.5)
CHLORIDE SERPL-SCNC: 104 MMOL/L (ref 95–110)
CO2 SERPL-SCNC: 26 MMOL/L (ref 23–29)
CREAT SERPL-MCNC: 0.9 MG/DL (ref 0.5–1.4)
EST. GFR  (AFRICAN AMERICAN): >60 ML/MIN/1.73 M^2
EST. GFR  (NON AFRICAN AMERICAN): 59 ML/MIN/1.73 M^2
GLUCOSE SERPL-MCNC: 75 MG/DL (ref 70–110)
POTASSIUM SERPL-SCNC: 4.1 MMOL/L (ref 3.5–5.1)
SODIUM SERPL-SCNC: 142 MMOL/L (ref 136–145)

## 2021-06-22 PROCEDURE — 36415 COLL VENOUS BLD VENIPUNCTURE: CPT | Performed by: PHYSICIAN ASSISTANT

## 2021-06-22 PROCEDURE — 80048 BASIC METABOLIC PNL TOTAL CA: CPT | Performed by: PHYSICIAN ASSISTANT

## 2021-06-25 ENCOUNTER — DOCUMENT SCAN (OUTPATIENT)
Dept: HOME HEALTH SERVICES | Facility: HOSPITAL | Age: 81
End: 2021-06-25
Payer: MEDICARE

## 2021-06-28 ENCOUNTER — DOCUMENT SCAN (OUTPATIENT)
Dept: HOME HEALTH SERVICES | Facility: HOSPITAL | Age: 81
End: 2021-06-28
Payer: MEDICARE

## 2021-06-29 ENCOUNTER — TELEPHONE (OUTPATIENT)
Dept: CARDIOLOGY | Facility: CLINIC | Age: 81
End: 2021-06-29

## 2021-07-01 ENCOUNTER — TELEPHONE (OUTPATIENT)
Dept: TRANSPLANT | Facility: CLINIC | Age: 81
End: 2021-07-01

## 2021-07-07 ENCOUNTER — OFFICE VISIT (OUTPATIENT)
Dept: CARDIOLOGY | Facility: CLINIC | Age: 81
End: 2021-07-07
Payer: MEDICARE

## 2021-07-07 ENCOUNTER — LAB VISIT (OUTPATIENT)
Dept: LAB | Facility: HOSPITAL | Age: 81
End: 2021-07-07
Payer: MEDICARE

## 2021-07-07 ENCOUNTER — HOSPITAL ENCOUNTER (OUTPATIENT)
Dept: CARDIOLOGY | Facility: HOSPITAL | Age: 81
Discharge: HOME | End: 2021-07-07
Payer: MEDICARE

## 2021-07-07 VITALS
WEIGHT: 108 LBS | OXYGEN SATURATION: 90 % | DIASTOLIC BLOOD PRESSURE: 56 MMHG | HEART RATE: 48 BPM | BODY MASS INDEX: 19.75 KG/M2 | SYSTOLIC BLOOD PRESSURE: 112 MMHG

## 2021-07-07 DIAGNOSIS — I50.43 ACUTE ON CHRONIC COMBINED SYSTOLIC AND DIASTOLIC CHF (CONGESTIVE HEART FAILURE): ICD-10-CM

## 2021-07-07 DIAGNOSIS — R00.1 BRADYCARDIA: ICD-10-CM

## 2021-07-07 DIAGNOSIS — Z86.73 HISTORY OF CVA (CEREBROVASCULAR ACCIDENT): Chronic | ICD-10-CM

## 2021-07-07 DIAGNOSIS — K21.9 GASTROESOPHAGEAL REFLUX DISEASE, UNSPECIFIED WHETHER ESOPHAGITIS PRESENT: Chronic | ICD-10-CM

## 2021-07-07 DIAGNOSIS — E78.5 HYPERLIPIDEMIA, UNSPECIFIED HYPERLIPIDEMIA TYPE: Chronic | ICD-10-CM

## 2021-07-07 DIAGNOSIS — G20.A1 PARKINSON DISEASE: Chronic | ICD-10-CM

## 2021-07-07 DIAGNOSIS — I10 ESSENTIAL HYPERTENSION: Chronic | ICD-10-CM

## 2021-07-07 DIAGNOSIS — R00.1 BRADYCARDIA: Primary | ICD-10-CM

## 2021-07-07 LAB
ANION GAP SERPL CALC-SCNC: 10 MMOL/L (ref 8–16)
BUN SERPL-MCNC: 24 MG/DL (ref 8–23)
CALCIUM SERPL-MCNC: 9.4 MG/DL (ref 8.7–10.5)
CHLORIDE SERPL-SCNC: 108 MMOL/L (ref 95–110)
CO2 SERPL-SCNC: 24 MMOL/L (ref 23–29)
CREAT SERPL-MCNC: 0.9 MG/DL (ref 0.5–1.4)
EST. GFR  (AFRICAN AMERICAN): >60 ML/MIN/1.73 M^2
EST. GFR  (NON AFRICAN AMERICAN): 59 ML/MIN/1.73 M^2
GLUCOSE SERPL-MCNC: 105 MG/DL (ref 70–110)
MAGNESIUM SERPL-MCNC: 2.1 MG/DL (ref 1.6–2.6)
POTASSIUM SERPL-SCNC: 3.8 MMOL/L (ref 3.5–5.1)
SODIUM SERPL-SCNC: 142 MMOL/L (ref 136–145)

## 2021-07-07 PROCEDURE — 99999 PR PBB SHADOW E&M-EST. PATIENT-LVL III: CPT | Mod: PBBFAC,,, | Performed by: PHYSICIAN ASSISTANT

## 2021-07-07 PROCEDURE — 93010 EKG 12-LEAD: ICD-10-PCS | Mod: ,,, | Performed by: INTERNAL MEDICINE

## 2021-07-07 PROCEDURE — 99213 OFFICE O/P EST LOW 20 MIN: CPT | Mod: PBBFAC | Performed by: PHYSICIAN ASSISTANT

## 2021-07-07 PROCEDURE — 36415 COLL VENOUS BLD VENIPUNCTURE: CPT | Performed by: PHYSICIAN ASSISTANT

## 2021-07-07 PROCEDURE — 83735 ASSAY OF MAGNESIUM: CPT | Performed by: PHYSICIAN ASSISTANT

## 2021-07-07 PROCEDURE — 93005 ELECTROCARDIOGRAM TRACING: CPT

## 2021-07-07 PROCEDURE — 99999 PR PBB SHADOW E&M-EST. PATIENT-LVL III: ICD-10-PCS | Mod: PBBFAC,,, | Performed by: PHYSICIAN ASSISTANT

## 2021-07-07 PROCEDURE — 99214 OFFICE O/P EST MOD 30 MIN: CPT | Mod: S$PBB,,, | Performed by: PHYSICIAN ASSISTANT

## 2021-07-07 PROCEDURE — 80048 BASIC METABOLIC PNL TOTAL CA: CPT | Performed by: PHYSICIAN ASSISTANT

## 2021-07-07 PROCEDURE — 99214 PR OFFICE/OUTPT VISIT, EST, LEVL IV, 30-39 MIN: ICD-10-PCS | Mod: S$PBB,,, | Performed by: PHYSICIAN ASSISTANT

## 2021-07-07 PROCEDURE — 93010 ELECTROCARDIOGRAM REPORT: CPT | Mod: ,,, | Performed by: INTERNAL MEDICINE

## 2021-07-09 ENCOUNTER — TELEPHONE (OUTPATIENT)
Dept: CARDIOLOGY | Facility: CLINIC | Age: 81
End: 2021-07-09

## 2021-07-24 PROCEDURE — G0180 PR HOME HEALTH MD CERTIFICATION: ICD-10-PCS | Mod: ,,, | Performed by: PHYSICIAN ASSISTANT

## 2021-07-24 PROCEDURE — G0180 MD CERTIFICATION HHA PATIENT: HCPCS | Mod: ,,, | Performed by: PHYSICIAN ASSISTANT

## 2021-07-30 ENCOUNTER — EXTERNAL HOME HEALTH (OUTPATIENT)
Dept: HOME HEALTH SERVICES | Facility: HOSPITAL | Age: 81
End: 2021-07-30
Payer: MEDICARE

## 2021-08-06 ENCOUNTER — IMMUNIZATION (OUTPATIENT)
Dept: INTERNAL MEDICINE | Facility: CLINIC | Age: 81
End: 2021-08-06
Payer: MEDICARE

## 2021-08-06 DIAGNOSIS — Z23 NEED FOR VACCINATION: Primary | ICD-10-CM

## 2021-08-06 PROCEDURE — 0001A COVID-19, MRNA, LNP-S, PF, 30 MCG/0.3 ML DOSE VACCINE: CPT | Mod: CV19,,, | Performed by: FAMILY MEDICINE

## 2021-08-06 PROCEDURE — 91300 COVID-19, MRNA, LNP-S, PF, 30 MCG/0.3 ML DOSE VACCINE: ICD-10-PCS | Mod: ,,, | Performed by: FAMILY MEDICINE

## 2021-08-06 PROCEDURE — 0001A COVID-19, MRNA, LNP-S, PF, 30 MCG/0.3 ML DOSE VACCINE: ICD-10-PCS | Mod: CV19,,, | Performed by: FAMILY MEDICINE

## 2021-08-06 PROCEDURE — 91300 COVID-19, MRNA, LNP-S, PF, 30 MCG/0.3 ML DOSE VACCINE: CPT | Mod: ,,, | Performed by: FAMILY MEDICINE

## 2021-08-09 ENCOUNTER — TELEPHONE (OUTPATIENT)
Dept: CARDIOLOGY | Facility: HOSPITAL | Age: 81
End: 2021-08-09

## 2021-08-27 ENCOUNTER — IMMUNIZATION (OUTPATIENT)
Dept: PRIMARY CARE CLINIC | Facility: CLINIC | Age: 81
End: 2021-08-27
Payer: MEDICARE

## 2021-08-27 DIAGNOSIS — Z23 NEED FOR VACCINATION: Primary | ICD-10-CM

## 2021-08-27 PROCEDURE — 0002A COVID-19, MRNA, LNP-S, PF, 30 MCG/0.3 ML DOSE VACCINE: CPT | Mod: CV19,S$GLB,, | Performed by: FAMILY MEDICINE

## 2021-08-27 PROCEDURE — 91300 COVID-19, MRNA, LNP-S, PF, 30 MCG/0.3 ML DOSE VACCINE: ICD-10-PCS | Mod: S$GLB,,, | Performed by: FAMILY MEDICINE

## 2021-08-27 PROCEDURE — 0002A COVID-19, MRNA, LNP-S, PF, 30 MCG/0.3 ML DOSE VACCINE: ICD-10-PCS | Mod: CV19,S$GLB,, | Performed by: FAMILY MEDICINE

## 2021-08-27 PROCEDURE — 91300 COVID-19, MRNA, LNP-S, PF, 30 MCG/0.3 ML DOSE VACCINE: CPT | Mod: S$GLB,,, | Performed by: FAMILY MEDICINE

## 2021-09-10 ENCOUNTER — DOCUMENT SCAN (OUTPATIENT)
Dept: HOME HEALTH SERVICES | Facility: HOSPITAL | Age: 81
End: 2021-09-10
Payer: MEDICARE

## 2021-09-12 ENCOUNTER — DOCUMENT SCAN (OUTPATIENT)
Dept: ADMINISTRATIVE | Facility: CLINIC | Age: 81
End: 2021-09-12

## 2021-10-19 DIAGNOSIS — I50.43 ACUTE ON CHRONIC COMBINED SYSTOLIC AND DIASTOLIC CHF (CONGESTIVE HEART FAILURE): ICD-10-CM

## 2021-10-19 RX ORDER — SACUBITRIL AND VALSARTAN 49; 51 MG/1; MG/1
TABLET, FILM COATED ORAL
Qty: 60 TABLET | Refills: 3 | Status: SHIPPED | OUTPATIENT
Start: 2021-10-19 | End: 2022-02-24

## 2021-10-20 ENCOUNTER — LAB VISIT (OUTPATIENT)
Dept: LAB | Facility: HOSPITAL | Age: 81
End: 2021-10-20
Attending: NURSE PRACTITIONER
Payer: MEDICARE

## 2021-10-20 ENCOUNTER — OFFICE VISIT (OUTPATIENT)
Dept: CARDIOLOGY | Facility: CLINIC | Age: 81
End: 2021-10-20
Payer: MEDICARE

## 2021-10-20 VITALS
BODY MASS INDEX: 20.65 KG/M2 | WEIGHT: 112.88 LBS | SYSTOLIC BLOOD PRESSURE: 154 MMHG | HEART RATE: 66 BPM | OXYGEN SATURATION: 99 % | DIASTOLIC BLOOD PRESSURE: 100 MMHG

## 2021-10-20 DIAGNOSIS — I10 ESSENTIAL HYPERTENSION: Chronic | ICD-10-CM

## 2021-10-20 DIAGNOSIS — I50.43 ACUTE ON CHRONIC COMBINED SYSTOLIC AND DIASTOLIC CHF (CONGESTIVE HEART FAILURE): Primary | ICD-10-CM

## 2021-10-20 DIAGNOSIS — G20.A1 PARKINSON DISEASE: Chronic | ICD-10-CM

## 2021-10-20 DIAGNOSIS — Z86.73 HISTORY OF CVA (CEREBROVASCULAR ACCIDENT): Chronic | ICD-10-CM

## 2021-10-20 DIAGNOSIS — R79.89 ELEVATED TROPONIN: ICD-10-CM

## 2021-10-20 DIAGNOSIS — I50.43 ACUTE ON CHRONIC COMBINED SYSTOLIC AND DIASTOLIC CHF (CONGESTIVE HEART FAILURE): ICD-10-CM

## 2021-10-20 DIAGNOSIS — E78.5 HYPERLIPIDEMIA, UNSPECIFIED HYPERLIPIDEMIA TYPE: Chronic | ICD-10-CM

## 2021-10-20 LAB
ANION GAP SERPL CALC-SCNC: 12 MMOL/L (ref 8–16)
BUN SERPL-MCNC: 18 MG/DL (ref 8–23)
CALCIUM SERPL-MCNC: 10 MG/DL (ref 8.7–10.5)
CHLORIDE SERPL-SCNC: 106 MMOL/L (ref 95–110)
CO2 SERPL-SCNC: 27 MMOL/L (ref 23–29)
CREAT SERPL-MCNC: 0.9 MG/DL (ref 0.5–1.4)
EST. GFR  (AFRICAN AMERICAN): >60 ML/MIN/1.73 M^2
EST. GFR  (NON AFRICAN AMERICAN): 59 ML/MIN/1.73 M^2
GLUCOSE SERPL-MCNC: 88 MG/DL (ref 70–110)
POTASSIUM SERPL-SCNC: 3.7 MMOL/L (ref 3.5–5.1)
SODIUM SERPL-SCNC: 145 MMOL/L (ref 136–145)

## 2021-10-20 PROCEDURE — 99214 OFFICE O/P EST MOD 30 MIN: CPT | Mod: S$PBB,,, | Performed by: PHYSICIAN ASSISTANT

## 2021-10-20 PROCEDURE — 80048 BASIC METABOLIC PNL TOTAL CA: CPT | Performed by: PHYSICIAN ASSISTANT

## 2021-10-20 PROCEDURE — 99214 OFFICE O/P EST MOD 30 MIN: CPT | Mod: PBBFAC | Performed by: PHYSICIAN ASSISTANT

## 2021-10-20 PROCEDURE — 99999 PR PBB SHADOW E&M-EST. PATIENT-LVL IV: CPT | Mod: PBBFAC,,, | Performed by: PHYSICIAN ASSISTANT

## 2021-10-20 PROCEDURE — 99214 PR OFFICE/OUTPT VISIT, EST, LEVL IV, 30-39 MIN: ICD-10-PCS | Mod: S$PBB,,, | Performed by: PHYSICIAN ASSISTANT

## 2021-10-20 PROCEDURE — 36415 COLL VENOUS BLD VENIPUNCTURE: CPT | Performed by: PHYSICIAN ASSISTANT

## 2021-10-20 PROCEDURE — 99999 PR PBB SHADOW E&M-EST. PATIENT-LVL IV: ICD-10-PCS | Mod: PBBFAC,,, | Performed by: PHYSICIAN ASSISTANT

## 2021-10-27 ENCOUNTER — TELEPHONE (OUTPATIENT)
Dept: TRANSPLANT | Facility: CLINIC | Age: 81
End: 2021-10-27
Payer: MEDICARE

## 2021-11-03 ENCOUNTER — TELEPHONE (OUTPATIENT)
Dept: TRANSPLANT | Facility: CLINIC | Age: 81
End: 2021-11-03
Payer: MEDICARE

## 2022-02-23 DIAGNOSIS — D84.9 IMMUNOSUPPRESSED STATUS: ICD-10-CM

## 2022-02-24 ENCOUNTER — TELEPHONE (OUTPATIENT)
Dept: TRANSPLANT | Facility: CLINIC | Age: 82
End: 2022-02-24
Payer: MEDICARE

## 2022-02-24 DIAGNOSIS — I50.43 ACUTE ON CHRONIC COMBINED SYSTOLIC AND DIASTOLIC CHF (CONGESTIVE HEART FAILURE): Primary | ICD-10-CM

## 2022-02-24 NOTE — TELEPHONE ENCOUNTER
"I spoke with Adenike, pts daughter. States pt is doing well.   States pt is not having any SOB. Swelling is "the same" for her , only slight LE swelling in ankles and feet.     bp running 123/81, 138/84, 112/80-after meds.     No other c/o at this time.    Labs and f/u scheduled in 1 week.   "

## 2022-02-24 NOTE — TELEPHONE ENCOUNTER
----- Message from Yeimy Gamez PA-C sent at 2/24/2022  9:08 AM CST -----  Symptom CHF check, she needs appt

## 2022-03-03 ENCOUNTER — OFFICE VISIT (OUTPATIENT)
Dept: TRANSPLANT | Facility: CLINIC | Age: 82
End: 2022-03-03
Payer: MEDICARE

## 2022-03-03 ENCOUNTER — LAB VISIT (OUTPATIENT)
Dept: LAB | Facility: HOSPITAL | Age: 82
End: 2022-03-03
Attending: NURSE PRACTITIONER
Payer: MEDICARE

## 2022-03-03 VITALS
BODY MASS INDEX: 20.08 KG/M2 | SYSTOLIC BLOOD PRESSURE: 104 MMHG | HEIGHT: 62 IN | WEIGHT: 109.13 LBS | DIASTOLIC BLOOD PRESSURE: 84 MMHG

## 2022-03-03 DIAGNOSIS — I50.42 CHRONIC COMBINED SYSTOLIC AND DIASTOLIC CHF (CONGESTIVE HEART FAILURE): Primary | ICD-10-CM

## 2022-03-03 DIAGNOSIS — I35.0 MILD AORTIC STENOSIS: ICD-10-CM

## 2022-03-03 DIAGNOSIS — Z86.73 H/O: CVA (CEREBROVASCULAR ACCIDENT): ICD-10-CM

## 2022-03-03 DIAGNOSIS — I50.43 ACUTE ON CHRONIC COMBINED SYSTOLIC AND DIASTOLIC CHF (CONGESTIVE HEART FAILURE): ICD-10-CM

## 2022-03-03 DIAGNOSIS — I10 ESSENTIAL HYPERTENSION: ICD-10-CM

## 2022-03-03 DIAGNOSIS — E78.5 HYPERLIPIDEMIA, UNSPECIFIED HYPERLIPIDEMIA TYPE: ICD-10-CM

## 2022-03-03 DIAGNOSIS — G20.A1 PARKINSON DISEASE: ICD-10-CM

## 2022-03-03 LAB
ANION GAP SERPL CALC-SCNC: 18 MMOL/L (ref 8–16)
BUN SERPL-MCNC: 20 MG/DL (ref 8–23)
CALCIUM SERPL-MCNC: 9.9 MG/DL (ref 8.7–10.5)
CHLORIDE SERPL-SCNC: 103 MMOL/L (ref 95–110)
CO2 SERPL-SCNC: 21 MMOL/L (ref 23–29)
CREAT SERPL-MCNC: 1 MG/DL (ref 0.5–1.4)
EST. GFR  (AFRICAN AMERICAN): 60 ML/MIN/1.73 M^2
EST. GFR  (NON AFRICAN AMERICAN): 52 ML/MIN/1.73 M^2
GLUCOSE SERPL-MCNC: 146 MG/DL (ref 70–110)
POTASSIUM SERPL-SCNC: 3.7 MMOL/L (ref 3.5–5.1)
SODIUM SERPL-SCNC: 142 MMOL/L (ref 136–145)

## 2022-03-03 PROCEDURE — 99214 OFFICE O/P EST MOD 30 MIN: CPT | Mod: S$PBB,,, | Performed by: NURSE PRACTITIONER

## 2022-03-03 PROCEDURE — 83880 ASSAY OF NATRIURETIC PEPTIDE: CPT | Performed by: PHYSICIAN ASSISTANT

## 2022-03-03 PROCEDURE — 80048 BASIC METABOLIC PNL TOTAL CA: CPT | Performed by: PHYSICIAN ASSISTANT

## 2022-03-03 PROCEDURE — 99214 PR OFFICE/OUTPT VISIT, EST, LEVL IV, 30-39 MIN: ICD-10-PCS | Mod: S$PBB,,, | Performed by: NURSE PRACTITIONER

## 2022-03-03 PROCEDURE — 99213 OFFICE O/P EST LOW 20 MIN: CPT | Mod: PBBFAC | Performed by: NURSE PRACTITIONER

## 2022-03-03 PROCEDURE — 99999 PR PBB SHADOW E&M-EST. PATIENT-LVL III: ICD-10-PCS | Mod: PBBFAC,,, | Performed by: NURSE PRACTITIONER

## 2022-03-03 PROCEDURE — 36415 COLL VENOUS BLD VENIPUNCTURE: CPT | Performed by: PHYSICIAN ASSISTANT

## 2022-03-03 PROCEDURE — 99999 PR PBB SHADOW E&M-EST. PATIENT-LVL III: CPT | Mod: PBBFAC,,, | Performed by: NURSE PRACTITIONER

## 2022-03-03 NOTE — PROGRESS NOTES
"Subjective:   Patient ID:  Tanya Madrid is a 82 y.o. female who presents for follow-up of Congestive Heart Failure    Primary Cardiologist - has seen Yeimy Gamez PA-C    Cardiac Problems  1. Chronic combined systolic and diastolic heart failure//HFreF; stage C, FC 3; EF 25% (1/2021)  2. HTN  3. HLD  4. H/o CVA  5. Mild aortic stenosis  6. Parkinson Disease     HPI   Ms. Madrid presents today for routine 6mo follow up of the problems listed above. Overall, the patient is feeling well. She continues to have intermittent leg edema that her daughter gives her an extra lasix for. She also continues with tachypnea or what her daughter calls "anxiety breathing." Her daughter tells me she has had an extensive workup for this in the past and ultimately was told it had to do mainly with anxiety. She does have Parkinson's and is fairly sedentary at baseline.    Patient denies new or worsening chest pain/tightness, SOB, FORD, PND, orthopnea, edema, palpitations, lightheadedness, dizziness, fainting, vision changes, weakness, or abnormal bleeding.     Patient states they are compliant with all medications and offers no side effects.     Review of Systems   Constitutional: Negative.   HENT: Negative.    Eyes: Negative.  Negative for blurred vision, double vision, vision loss in left eye, vision loss in right eye, visual disturbance and visual halos.   Cardiovascular: Positive for dyspnea on exertion. Negative for chest pain, claudication, cyanosis, irregular heartbeat, leg swelling, near-syncope, orthopnea, palpitations and paroxysmal nocturnal dyspnea.   Respiratory: Positive for shortness of breath. Negative for cough, hemoptysis, sleep disturbances due to breathing and wheezing.    Endocrine: Negative.    Hematologic/Lymphatic: Negative.  Negative for bleeding problem. Does not bruise/bleed easily.   Skin: Negative.  Negative for nail changes.   Musculoskeletal: Negative.  Negative for falls, muscle cramps, muscle " weakness and myalgias.   Gastrointestinal: Negative.  Negative for bloating, abdominal pain, anorexia, hematemesis, hematochezia, melena, nausea and vomiting.   Genitourinary: Negative.  Negative for dysuria, flank pain and hematuria.   Neurological: Negative.  Negative for brief paralysis, dizziness, focal weakness, headaches, light-headedness, numbness, paresthesias and weakness.   Psychiatric/Behavioral: Negative.    Allergic/Immunologic: Negative.        Objective:   Physical Exam  Vitals and nursing note reviewed.   Constitutional:       General: She is not in acute distress.     Appearance: Normal appearance. She is not ill-appearing.   HENT:      Head: Normocephalic and atraumatic.   Eyes:      Extraocular Movements: Extraocular movements intact.      Pupils: Pupils are equal, round, and reactive to light.   Neck:      Vascular: No carotid bruit.   Cardiovascular:      Rate and Rhythm: Normal rate and regular rhythm.      Pulses: Normal pulses.      Heart sounds: Normal heart sounds. No murmur heard.  Pulmonary:      Effort: Pulmonary effort is normal. Tachypnea present.      Breath sounds: Decreased breath sounds present.   Abdominal:      General: Bowel sounds are normal. There is no distension.      Palpations: Abdomen is soft.      Tenderness: There is no abdominal tenderness.   Musculoskeletal:         General: No swelling or tenderness. Normal range of motion.      Cervical back: Normal range of motion and neck supple. No tenderness.      Right lower leg: Edema present.      Left lower leg: Edema present.      Comments: Trace BLE edema   Lymphadenopathy:      Cervical: No cervical adenopathy.   Skin:     General: Skin is warm and dry.      Capillary Refill: Capillary refill takes less than 2 seconds.   Neurological:      General: No focal deficit present.      Mental Status: She is alert and oriented to person, place, and time. Mental status is at baseline.   Psychiatric:         Mood and Affect: Mood  "normal.         Behavior: Behavior normal.         Thought Content: Thought content normal.         Judgment: Judgment normal.         /84 (BP Location: Right arm, Patient Position: Sitting)   Ht 5' 2" (1.575 m)   Wt 49.5 kg (109 lb 2 oz)   BMI 19.96 kg/m²     Assessment:     1. Chronic combined systolic and diastolic CHF (congestive heart failure)    2. Essential hypertension    3. Hyperlipidemia, unspecified hyperlipidemia type    4. H/O: CVA (cerebrovascular accident)    5. Mild aortic stenosis    6. Parkinson disease      Labs/Imaging Data Reviewed:  Component      Latest Ref Rng & Units 3/3/2022   Sodium      136 - 145 mmol/L 142   Potassium      3.5 - 5.1 mmol/L 3.7   Chloride      95 - 110 mmol/L 103   CO2      23 - 29 mmol/L 21 (L)   Glucose      70 - 110 mg/dL 146 (H)   BUN      8 - 23 mg/dL 20   Creatinine      0.5 - 1.4 mg/dL 1.0   Calcium      8.7 - 10.5 mg/dL 9.9   Anion Gap      8 - 16 mmol/L 18 (H)   eGFR if African American      >60 mL/min/1.73 m:2 60   eGFR if non African American      >60 mL/min/1.73 m:2 52 (A)     1. TTE 1/17/2021  Summary  · The left ventricle is severely enlarged with eccentric hypertrophy and severely decreased systolic function. The estimated ejection fraction is 25%  · Grade II left ventricular diastolic dysfunction.  · Normal right ventricular size with moderately reduced right ventricular systolic function.  · Severe left atrial enlargement.  · There is pulmonary hypertension.  · There is mild aortic valve stenosis.  · Aortic valve area is 1.37 cm2; peak velocity is 1.33 m/s; mean gradient is 4 mmHg.  · Mild to moderate tricuspid regurgitation.  · Intermediate central venous pressure (8 mmHg).  · The estimated PA systolic pressure is 50 mmHg.  · Trivial pericardial effusion.    2. Nuclear Stress Test 7/24/2019  Impression: NORMAL MYOCARDIAL PERFUSION   The perfusion scan is free of evidence for myocardial ischemia or injury.   There is a mild intensity fixed defect " in the inferolateral wall of the left ventricle, likely secondary to diaphragm attenuation defect.   Resting wall motion is physiologic.   Resting LV function is normal.   The ventricular volumes are normal at rest and stress.   The extracardiac distribution of radioactivity is normal.     Plan:   1. Chronic combined systolic and diastolic CHF (congestive heart failure)  - overall stable, well compensated  - continue Entresto 49-51mg BID   - continue metop XL 50mg BID  - continue lasix 20mg daily  - BMP today  - not a great candidate for any invasive interventions, would not want a device  - strict daily weights  - 2L daily (64oz) fluid restriction  - 2000mg daily sodium restriction  - instructed patient to call if weight gain 2-3lbs overnight or 5lbs in one week  - instructed patient to call if they develop worsening shortness of breath, increased leg swelling, increased abdominal bloating, or shortness of breath when you are sleeping     2. Essential hypertension  - BP WNL    3. Hyperlipidemia, unspecified hyperlipidemia type  - last  (7/2019)  - ASCVD N/A d/t age     4. H/O: CVA (cerebrovascular accident)  - stable  - continue aspirin 81mg daily  - continue Plavix 75mg daily     5. Mild aortic stenosis  - get updated echo    6. Parkinson disease  - stable          Yessenia Herring NP  [facility] [place]    This note was created using M*Modal voice recognition software that occasionally misinterpreted phrases or words. Please excuse any grammatical errors.

## 2022-03-05 LAB — NT-PROBNP SERPL-MCNC: ABNORMAL PG/ML

## 2022-03-10 ENCOUNTER — HOSPITAL ENCOUNTER (INPATIENT)
Facility: HOSPITAL | Age: 82
LOS: 19 days | Discharge: HOME HEALTH | DRG: 870 | End: 2022-03-29
Attending: FAMILY MEDICINE | Admitting: INTERNAL MEDICINE
Payer: MEDICARE

## 2022-03-10 DIAGNOSIS — J34.89 SPHENOID MASS, RIGHT: ICD-10-CM

## 2022-03-10 DIAGNOSIS — G93.1 ANOXIC ENCEPHALOPATHY: ICD-10-CM

## 2022-03-10 DIAGNOSIS — J38.6 LARYNGEAL STENOSIS: Primary | ICD-10-CM

## 2022-03-10 DIAGNOSIS — I50.9 ACUTE ON CHRONIC CONGESTIVE HEART FAILURE, UNSPECIFIED HEART FAILURE TYPE: ICD-10-CM

## 2022-03-10 DIAGNOSIS — N17.9 AKI (ACUTE KIDNEY INJURY): ICD-10-CM

## 2022-03-10 DIAGNOSIS — I46.9 CARDIAC ARREST: ICD-10-CM

## 2022-03-10 DIAGNOSIS — Z45.2 ENCOUNTER FOR CENTRAL LINE PLACEMENT: ICD-10-CM

## 2022-03-10 DIAGNOSIS — J34.89 MASS OF SPHENOID SINUS: ICD-10-CM

## 2022-03-10 DIAGNOSIS — Z51.5 PALLIATIVE CARE ENCOUNTER: ICD-10-CM

## 2022-03-10 DIAGNOSIS — I50.43 ACUTE ON CHRONIC COMBINED SYSTOLIC AND DIASTOLIC CHF (CONGESTIVE HEART FAILURE): ICD-10-CM

## 2022-03-10 LAB
ALBUMIN SERPL BCP-MCNC: 2.9 G/DL (ref 3.5–5.2)
ALLENS TEST: ABNORMAL
ALP SERPL-CCNC: 90 U/L (ref 55–135)
ALT SERPL W/O P-5'-P-CCNC: 12 U/L (ref 10–44)
ANION GAP SERPL CALC-SCNC: 17 MMOL/L (ref 8–16)
AST SERPL-CCNC: 53 U/L (ref 10–40)
BACTERIA #/AREA URNS HPF: NORMAL /HPF
BASOPHILS # BLD AUTO: 0.03 K/UL (ref 0–0.2)
BASOPHILS NFR BLD: 0.2 % (ref 0–1.9)
BILIRUB SERPL-MCNC: 0.9 MG/DL (ref 0.1–1)
BILIRUB UR QL STRIP: NEGATIVE
BNP SERPL-MCNC: >4900 PG/ML (ref 0–99)
BUN SERPL-MCNC: 19 MG/DL (ref 8–23)
CALCIUM SERPL-MCNC: 8.1 MG/DL (ref 8.7–10.5)
CHLORIDE SERPL-SCNC: 105 MMOL/L (ref 95–110)
CLARITY UR: CLEAR
CO2 SERPL-SCNC: 19 MMOL/L (ref 23–29)
COLOR UR: YELLOW
CREAT SERPL-MCNC: 1.2 MG/DL (ref 0.5–1.4)
CTP QC/QA: YES
DELSYS: ABNORMAL
DIFFERENTIAL METHOD: ABNORMAL
EOSINOPHIL # BLD AUTO: 0.1 K/UL (ref 0–0.5)
EOSINOPHIL NFR BLD: 0.4 % (ref 0–8)
ERYTHROCYTE [DISTWIDTH] IN BLOOD BY AUTOMATED COUNT: 15.8 % (ref 11.5–14.5)
ERYTHROCYTE [SEDIMENTATION RATE] IN BLOOD BY WESTERGREN METHOD: 12 MM/H
EST. GFR  (AFRICAN AMERICAN): 48 ML/MIN/1.73 M^2
EST. GFR  (NON AFRICAN AMERICAN): 42 ML/MIN/1.73 M^2
FIO2: 100
GLUCOSE SERPL-MCNC: 179 MG/DL (ref 70–110)
GLUCOSE UR QL STRIP: NEGATIVE
HCO3 UR-SCNC: 23.3 MMOL/L (ref 24–28)
HCT VFR BLD AUTO: 29.8 % (ref 37–48.5)
HGB BLD-MCNC: 9.4 G/DL (ref 12–16)
HGB UR QL STRIP: ABNORMAL
HYALINE CASTS #/AREA URNS LPF: NORMAL /LPF
IMM GRANULOCYTES # BLD AUTO: 0.25 K/UL (ref 0–0.04)
IMM GRANULOCYTES NFR BLD AUTO: 1.3 % (ref 0–0.5)
KETONES UR QL STRIP: NEGATIVE
LACTATE SERPL-SCNC: 3.6 MMOL/L (ref 0.5–2.2)
LACTATE SERPL-SCNC: 5.7 MMOL/L (ref 0.5–2.2)
LEUKOCYTE ESTERASE UR QL STRIP: NEGATIVE
LYMPHOCYTES # BLD AUTO: 0.5 K/UL (ref 1–4.8)
LYMPHOCYTES NFR BLD: 2.8 % (ref 18–48)
MCH RBC QN AUTO: 30 PG (ref 27–31)
MCHC RBC AUTO-ENTMCNC: 31.5 G/DL (ref 32–36)
MCV RBC AUTO: 95 FL (ref 82–98)
MICROSCOPIC COMMENT: NORMAL
MODE: ABNORMAL
MONOCYTES # BLD AUTO: 0.6 K/UL (ref 0.3–1)
MONOCYTES NFR BLD: 2.9 % (ref 4–15)
NEUTROPHILS # BLD AUTO: 17.9 K/UL (ref 1.8–7.7)
NEUTROPHILS NFR BLD: 92.4 % (ref 38–73)
NITRITE UR QL STRIP: NEGATIVE
NRBC BLD-RTO: 0 /100 WBC
PCO2 BLDA: 40.1 MMHG (ref 35–45)
PEEP: 5
PH SMN: 7.37 [PH] (ref 7.35–7.45)
PH UR STRIP: 6 [PH] (ref 5–8)
PLATELET # BLD AUTO: 258 K/UL (ref 150–450)
PMV BLD AUTO: 11 FL (ref 9.2–12.9)
PO2 BLDA: 209 MMHG (ref 80–100)
POC BE: -2 MMOL/L
POC SATURATED O2: 100 % (ref 95–100)
POTASSIUM SERPL-SCNC: 2.9 MMOL/L (ref 3.5–5.1)
PROCALCITONIN SERPL IA-MCNC: 2.08 NG/ML
PROT SERPL-MCNC: 6.2 G/DL (ref 6–8.4)
PROT UR QL STRIP: ABNORMAL
RBC # BLD AUTO: 3.13 M/UL (ref 4–5.4)
RBC #/AREA URNS HPF: NORMAL /HPF (ref 0–4)
SAMPLE: ABNORMAL
SARS-COV-2 RDRP RESP QL NAA+PROBE: NEGATIVE
SITE: ABNORMAL
SODIUM SERPL-SCNC: 141 MMOL/L (ref 136–145)
SP GR UR STRIP: 1.02 (ref 1–1.03)
TROPONIN I SERPL DL<=0.01 NG/ML-MCNC: 0.04 NG/ML (ref 0–0.03)
URN SPEC COLLECT METH UR: ABNORMAL
UROBILINOGEN UR STRIP-ACNC: NEGATIVE EU/DL
VT: 450
WBC # BLD AUTO: 19.37 K/UL (ref 3.9–12.7)
WBC #/AREA URNS HPF: NORMAL /HPF (ref 0–5)

## 2022-03-10 PROCEDURE — 94761 N-INVAS EAR/PLS OXIMETRY MLT: CPT

## 2022-03-10 PROCEDURE — 87077 CULTURE AEROBIC IDENTIFY: CPT | Performed by: FAMILY MEDICINE

## 2022-03-10 PROCEDURE — 36556 INSERT NON-TUNNEL CV CATH: CPT | Mod: RT

## 2022-03-10 PROCEDURE — 25000003 PHARM REV CODE 250

## 2022-03-10 PROCEDURE — 63600175 PHARM REV CODE 636 W HCPCS: Performed by: FAMILY MEDICINE

## 2022-03-10 PROCEDURE — 93010 ELECTROCARDIOGRAM REPORT: CPT | Mod: ,,, | Performed by: INTERNAL MEDICINE

## 2022-03-10 PROCEDURE — 87186 SC STD MICRODIL/AGAR DIL: CPT | Performed by: FAMILY MEDICINE

## 2022-03-10 PROCEDURE — U0002 COVID-19 LAB TEST NON-CDC: HCPCS | Performed by: FAMILY MEDICINE

## 2022-03-10 PROCEDURE — 84145 PROCALCITONIN (PCT): CPT | Performed by: FAMILY MEDICINE

## 2022-03-10 PROCEDURE — 25000003 PHARM REV CODE 250: Performed by: FAMILY MEDICINE

## 2022-03-10 PROCEDURE — 87040 BLOOD CULTURE FOR BACTERIA: CPT | Performed by: FAMILY MEDICINE

## 2022-03-10 PROCEDURE — 99900026 HC AIRWAY MAINTENANCE (STAT)

## 2022-03-10 PROCEDURE — 99900035 HC TECH TIME PER 15 MIN (STAT)

## 2022-03-10 PROCEDURE — 20000000 HC ICU ROOM

## 2022-03-10 PROCEDURE — 93010 EKG 12-LEAD: ICD-10-PCS | Mod: ,,, | Performed by: INTERNAL MEDICINE

## 2022-03-10 PROCEDURE — 83880 ASSAY OF NATRIURETIC PEPTIDE: CPT | Performed by: FAMILY MEDICINE

## 2022-03-10 PROCEDURE — 51702 INSERT TEMP BLADDER CATH: CPT

## 2022-03-10 PROCEDURE — 85347 COAGULATION TIME ACTIVATED: CPT

## 2022-03-10 PROCEDURE — 96366 THER/PROPH/DIAG IV INF ADDON: CPT

## 2022-03-10 PROCEDURE — 27100108

## 2022-03-10 PROCEDURE — 94002 VENT MGMT INPAT INIT DAY: CPT

## 2022-03-10 PROCEDURE — 99291 CRITICAL CARE FIRST HOUR: CPT | Mod: 25

## 2022-03-10 PROCEDURE — 96375 TX/PRO/DX INJ NEW DRUG ADDON: CPT | Mod: 59

## 2022-03-10 PROCEDURE — 83605 ASSAY OF LACTIC ACID: CPT | Mod: 91 | Performed by: FAMILY MEDICINE

## 2022-03-10 PROCEDURE — 36600 WITHDRAWAL OF ARTERIAL BLOOD: CPT

## 2022-03-10 PROCEDURE — 27200966 HC CLOSED SUCTION SYSTEM

## 2022-03-10 PROCEDURE — 31500 INSERT EMERGENCY AIRWAY: CPT

## 2022-03-10 PROCEDURE — 63600175 PHARM REV CODE 636 W HCPCS

## 2022-03-10 PROCEDURE — 93005 ELECTROCARDIOGRAM TRACING: CPT

## 2022-03-10 PROCEDURE — 84484 ASSAY OF TROPONIN QUANT: CPT | Performed by: FAMILY MEDICINE

## 2022-03-10 PROCEDURE — 85025 COMPLETE CBC W/AUTO DIFF WBC: CPT | Performed by: FAMILY MEDICINE

## 2022-03-10 PROCEDURE — 96374 THER/PROPH/DIAG INJ IV PUSH: CPT | Mod: 59

## 2022-03-10 PROCEDURE — 96365 THER/PROPH/DIAG IV INF INIT: CPT

## 2022-03-10 PROCEDURE — 81000 URINALYSIS NONAUTO W/SCOPE: CPT | Performed by: FAMILY MEDICINE

## 2022-03-10 PROCEDURE — 96361 HYDRATE IV INFUSION ADD-ON: CPT | Mod: 59

## 2022-03-10 PROCEDURE — 80053 COMPREHEN METABOLIC PANEL: CPT | Performed by: FAMILY MEDICINE

## 2022-03-10 RX ORDER — PROPOFOL 10 MG/ML
INJECTION, EMULSION INTRAVENOUS
Status: COMPLETED
Start: 2022-03-10 | End: 2022-03-10

## 2022-03-10 RX ORDER — HEPARIN SODIUM 5000 [USP'U]/ML
5000 INJECTION, SOLUTION INTRAVENOUS; SUBCUTANEOUS EVERY 8 HOURS
Status: DISCONTINUED | OUTPATIENT
Start: 2022-03-11 | End: 2022-03-29 | Stop reason: HOSPADM

## 2022-03-10 RX ORDER — ROCURONIUM BROMIDE 10 MG/ML
INJECTION, SOLUTION INTRAVENOUS
Status: COMPLETED
Start: 2022-03-10 | End: 2022-03-10

## 2022-03-10 RX ORDER — ETOMIDATE 2 MG/ML
INJECTION INTRAVENOUS
Status: DISPENSED
Start: 2022-03-10 | End: 2022-03-11

## 2022-03-10 RX ORDER — CHLORHEXIDINE GLUCONATE ORAL RINSE 1.2 MG/ML
15 SOLUTION DENTAL 2 TIMES DAILY
Status: DISCONTINUED | OUTPATIENT
Start: 2022-03-11 | End: 2022-03-18

## 2022-03-10 RX ORDER — FUROSEMIDE 10 MG/ML
40 INJECTION INTRAMUSCULAR; INTRAVENOUS
Status: COMPLETED | OUTPATIENT
Start: 2022-03-10 | End: 2022-03-10

## 2022-03-10 RX ORDER — FAMOTIDINE 10 MG/ML
20 INJECTION INTRAVENOUS DAILY
Status: DISCONTINUED | OUTPATIENT
Start: 2022-03-11 | End: 2022-03-21

## 2022-03-10 RX ORDER — DILTIAZEM HYDROCHLORIDE 5 MG/ML
INJECTION INTRAVENOUS
Status: COMPLETED
Start: 2022-03-10 | End: 2022-03-10

## 2022-03-10 RX ORDER — ROCURONIUM BROMIDE 10 MG/ML
INJECTION, SOLUTION INTRAVENOUS
Status: DISCONTINUED
Start: 2022-03-10 | End: 2022-03-10 | Stop reason: WASHOUT

## 2022-03-10 RX ORDER — POTASSIUM CHLORIDE 7.45 MG/ML
10 INJECTION INTRAVENOUS
Status: COMPLETED | OUTPATIENT
Start: 2022-03-10 | End: 2022-03-10

## 2022-03-10 RX ORDER — PROPOFOL 10 MG/ML
0-50 INJECTION, EMULSION INTRAVENOUS CONTINUOUS
Status: DISCONTINUED | OUTPATIENT
Start: 2022-03-10 | End: 2022-03-11

## 2022-03-10 RX ORDER — PROPOFOL 10 MG/ML
INJECTION, EMULSION INTRAVENOUS
Status: DISPENSED
Start: 2022-03-10 | End: 2022-03-11

## 2022-03-10 RX ORDER — PROPOFOL 10 MG/ML
INJECTION, EMULSION INTRAVENOUS
Status: DISCONTINUED
Start: 2022-03-10 | End: 2022-03-10 | Stop reason: WASHOUT

## 2022-03-10 RX ADMIN — PROPOFOL 5 MCG/KG/MIN: 10 INJECTION, EMULSION INTRAVENOUS at 07:03

## 2022-03-10 RX ADMIN — PIPERACILLIN SODIUM AND TAZOBACTAM SODIUM 4.5 G: 4; .5 INJECTION, POWDER, FOR SOLUTION INTRAVENOUS at 09:03

## 2022-03-10 RX ADMIN — SODIUM CHLORIDE, SODIUM LACTATE, POTASSIUM CHLORIDE, AND CALCIUM CHLORIDE 1572 ML: .6; .31; .03; .02 INJECTION, SOLUTION INTRAVENOUS at 08:03

## 2022-03-10 RX ADMIN — FUROSEMIDE 40 MG: 10 INJECTION, SOLUTION INTRAMUSCULAR; INTRAVENOUS at 09:03

## 2022-03-10 RX ADMIN — POTASSIUM CHLORIDE 10 MEQ: 7.46 INJECTION, SOLUTION INTRAVENOUS at 10:03

## 2022-03-10 RX ADMIN — DILTIAZEM HYDROCHLORIDE 10 MG: 5 INJECTION INTRAVENOUS at 07:03

## 2022-03-11 PROBLEM — E87.6 HYPOKALEMIA: Status: ACTIVE | Noted: 2022-03-11

## 2022-03-11 PROBLEM — J69.0 ASPIRATION PNEUMONIA: Status: ACTIVE | Noted: 2022-03-11

## 2022-03-11 PROBLEM — J34.89 MASS OF SPHENOID SINUS: Status: ACTIVE | Noted: 2022-03-11

## 2022-03-11 PROBLEM — I46.9 CARDIAC ARREST: Status: ACTIVE | Noted: 2022-03-11

## 2022-03-11 PROBLEM — Z99.11 ON MECHANICALLY ASSISTED VENTILATION: Status: ACTIVE | Noted: 2022-03-11

## 2022-03-11 PROBLEM — G93.1 ANOXIC ENCEPHALOPATHY: Status: ACTIVE | Noted: 2022-03-11

## 2022-03-11 LAB
ALBUMIN SERPL BCP-MCNC: 3 G/DL (ref 3.5–5.2)
ALLENS TEST: ABNORMAL
ALP SERPL-CCNC: 92 U/L (ref 55–135)
ALT SERPL W/O P-5'-P-CCNC: 21 U/L (ref 10–44)
ANION GAP SERPL CALC-SCNC: 15 MMOL/L (ref 8–16)
ANION GAP SERPL CALC-SCNC: 20 MMOL/L (ref 8–16)
ANION GAP SERPL CALC-SCNC: 21 MMOL/L (ref 8–16)
ANION GAP SERPL CALC-SCNC: 23 MMOL/L (ref 8–16)
ANISOCYTOSIS BLD QL SMEAR: SLIGHT
AORTIC ROOT ANNULUS: 2.71 CM
APTT BLDCRRT: 28.6 SEC (ref 21–32)
ASCENDING AORTA: 2.55 CM
AST SERPL-CCNC: 59 U/L (ref 10–40)
AV INDEX (PROSTH): 0.38
AV MEAN GRADIENT: 5 MMHG
AV PEAK GRADIENT: 10 MMHG
AV VALVE AREA: 1.08 CM2
AV VELOCITY RATIO: 0.35
BASOPHILS # BLD AUTO: 0.03 K/UL (ref 0–0.2)
BASOPHILS NFR BLD: 0.1 % (ref 0–1.9)
BILIRUB SERPL-MCNC: 1.1 MG/DL (ref 0.1–1)
BSA FOR ECHO PROCEDURE: 1.52 M2
BUN SERPL-MCNC: 21 MG/DL (ref 8–23)
BUN SERPL-MCNC: 22 MG/DL (ref 8–23)
BUN SERPL-MCNC: 23 MG/DL (ref 8–23)
BUN SERPL-MCNC: 24 MG/DL (ref 8–23)
BUN SERPL-MCNC: 26 MG/DL (ref 8–23)
BUN SERPL-MCNC: 28 MG/DL (ref 8–23)
CALCIUM SERPL-MCNC: 8.2 MG/DL (ref 8.7–10.5)
CALCIUM SERPL-MCNC: 8.5 MG/DL (ref 8.7–10.5)
CALCIUM SERPL-MCNC: 8.6 MG/DL (ref 8.7–10.5)
CALCIUM SERPL-MCNC: 8.6 MG/DL (ref 8.7–10.5)
CALCIUM SERPL-MCNC: 8.7 MG/DL (ref 8.7–10.5)
CALCIUM SERPL-MCNC: 8.7 MG/DL (ref 8.7–10.5)
CHLORIDE SERPL-SCNC: 100 MMOL/L (ref 95–110)
CHLORIDE SERPL-SCNC: 101 MMOL/L (ref 95–110)
CHLORIDE SERPL-SCNC: 102 MMOL/L (ref 95–110)
CK SERPL-CCNC: 260 U/L (ref 20–180)
CO2 SERPL-SCNC: 15 MMOL/L (ref 23–29)
CO2 SERPL-SCNC: 15 MMOL/L (ref 23–29)
CO2 SERPL-SCNC: 16 MMOL/L (ref 23–29)
CO2 SERPL-SCNC: 23 MMOL/L (ref 23–29)
CREAT SERPL-MCNC: 1.6 MG/DL (ref 0.5–1.4)
CREAT SERPL-MCNC: 1.8 MG/DL (ref 0.5–1.4)
CREAT SERPL-MCNC: 1.9 MG/DL (ref 0.5–1.4)
CREAT SERPL-MCNC: 1.9 MG/DL (ref 0.5–1.4)
CREAT SERPL-MCNC: 2 MG/DL (ref 0.5–1.4)
CREAT SERPL-MCNC: 2.1 MG/DL (ref 0.5–1.4)
CV ECHO LV RWT: 0.42 CM
DELSYS: ABNORMAL
DIFFERENTIAL METHOD: ABNORMAL
DOP CALC AO PEAK VEL: 1.62 M/S
DOP CALC AO VTI: 29.8 CM
DOP CALC LVOT AREA: 2.9 CM2
DOP CALC LVOT DIAMETER: 1.91 CM
DOP CALC LVOT PEAK VEL: 0.56 M/S
DOP CALC LVOT STROKE VOLUME: 32.07 CM3
DOP CALC RVOT PEAK VEL: 0.3 M/S
DOP CALC RVOT VTI: 6.5 CM
DOP CALCLVOT PEAK VEL VTI: 11.2 CM
E WAVE DECELERATION TIME: 196.02 MSEC
E/A RATIO: 2.13
E/E' RATIO: 2.26 M/S
ECHO EF ESTIMATED: 9 %
ECHO LV POSTERIOR WALL: 1.06 CM (ref 0.6–1.1)
EJECTION FRACTION: 10 %
EOSINOPHIL # BLD AUTO: 0 K/UL (ref 0–0.5)
EOSINOPHIL NFR BLD: 0 % (ref 0–8)
ERYTHROCYTE [DISTWIDTH] IN BLOOD BY AUTOMATED COUNT: 15.9 % (ref 11.5–14.5)
ERYTHROCYTE [SEDIMENTATION RATE] IN BLOOD BY WESTERGREN METHOD: 10 MM/H
EST. GFR  (AFRICAN AMERICAN): 24 ML/MIN/1.73 M^2
EST. GFR  (AFRICAN AMERICAN): 26 ML/MIN/1.73 M^2
EST. GFR  (AFRICAN AMERICAN): 28 ML/MIN/1.73 M^2
EST. GFR  (AFRICAN AMERICAN): 28 ML/MIN/1.73 M^2
EST. GFR  (AFRICAN AMERICAN): 29 ML/MIN/1.73 M^2
EST. GFR  (AFRICAN AMERICAN): 34 ML/MIN/1.73 M^2
EST. GFR  (NON AFRICAN AMERICAN): 21 ML/MIN/1.73 M^2
EST. GFR  (NON AFRICAN AMERICAN): 22 ML/MIN/1.73 M^2
EST. GFR  (NON AFRICAN AMERICAN): 24 ML/MIN/1.73 M^2
EST. GFR  (NON AFRICAN AMERICAN): 24 ML/MIN/1.73 M^2
EST. GFR  (NON AFRICAN AMERICAN): 25 ML/MIN/1.73 M^2
EST. GFR  (NON AFRICAN AMERICAN): 29 ML/MIN/1.73 M^2
FIO2: 35
FIO2: 35
FIO2: 40
FIO2: 65
FRACTIONAL SHORTENING: 4 % (ref 28–44)
GLUCOSE SERPL-MCNC: 105 MG/DL (ref 70–110)
GLUCOSE SERPL-MCNC: 115 MG/DL (ref 70–110)
GLUCOSE SERPL-MCNC: 115 MG/DL (ref 70–110)
GLUCOSE SERPL-MCNC: 135 MG/DL (ref 70–110)
GLUCOSE SERPL-MCNC: 135 MG/DL (ref 70–110)
GLUCOSE SERPL-MCNC: 165 MG/DL (ref 70–110)
GLUCOSE SERPL-MCNC: 165 MG/DL (ref 70–110)
GLUCOSE SERPL-MCNC: 179 MG/DL (ref 70–110)
GLUCOSE SERPL-MCNC: 181 MG/DL (ref 70–110)
HCO3 UR-SCNC: 15.2 MMOL/L (ref 24–28)
HCO3 UR-SCNC: 20.6 MMOL/L (ref 24–28)
HCO3 UR-SCNC: 24 MMOL/L (ref 24–28)
HCO3 UR-SCNC: 26.2 MMOL/L (ref 24–28)
HCT VFR BLD AUTO: 34.3 % (ref 37–48.5)
HGB BLD-MCNC: 10.8 G/DL (ref 12–16)
HYPOCHROMIA BLD QL SMEAR: ABNORMAL
IMM GRANULOCYTES # BLD AUTO: 0.25 K/UL (ref 0–0.04)
IMM GRANULOCYTES NFR BLD AUTO: 1 % (ref 0–0.5)
INR PPP: 1.2 (ref 0.8–1.2)
INTERVENTRICULAR SEPTUM: 1.15 CM (ref 0.6–1.1)
IP: 8
IVC DIAMETER: 2.99 CM
IVRT: 121.79 MSEC
LA MAJOR: 6.36 CM
LA MINOR: 4.19 CM
LA WIDTH: 4.79 CM
LACTATE SERPL-SCNC: 10.8 MMOL/L (ref 0.5–2.2)
LACTATE SERPL-SCNC: 10.8 MMOL/L (ref 0.5–2.2)
LACTATE SERPL-SCNC: 3.4 MMOL/L (ref 0.5–2.2)
LACTATE SERPL-SCNC: 8.5 MMOL/L (ref 0.5–2.2)
LEFT ATRIUM SIZE: 4.61 CM
LEFT ATRIUM VOLUME INDEX: 62.4 ML/M2
LEFT ATRIUM VOLUME: 94.82 CM3
LEFT INTERNAL DIMENSION IN SYSTOLE: 4.82 CM (ref 2.1–4)
LEFT VENTRICLE DIASTOLIC VOLUME INDEX: 78.86 ML/M2
LEFT VENTRICLE DIASTOLIC VOLUME: 119.87 ML
LEFT VENTRICLE MASS INDEX: 138 G/M2
LEFT VENTRICLE SYSTOLIC VOLUME INDEX: 71.5 ML/M2
LEFT VENTRICLE SYSTOLIC VOLUME: 108.61 ML
LEFT VENTRICULAR INTERNAL DIMENSION IN DIASTOLE: 5.03 CM (ref 3.5–6)
LEFT VENTRICULAR MASS: 210.46 G
LV LATERAL E/E' RATIO: 37.67 M/S
LV SEPTAL E/E' RATIO: 1.16 M/S
LVOT MG: 0.6 MMHG
LVOT MV: 0.36 CM/S
LYMPHOCYTES # BLD AUTO: 1 K/UL (ref 1–4.8)
LYMPHOCYTES NFR BLD: 3.9 % (ref 18–48)
MAGNESIUM SERPL-MCNC: 1.7 MG/DL (ref 1.6–2.6)
MAGNESIUM SERPL-MCNC: 2.2 MG/DL (ref 1.6–2.6)
MAGNESIUM SERPL-MCNC: 2.2 MG/DL (ref 1.6–2.6)
MAGNESIUM SERPL-MCNC: 2.3 MG/DL (ref 1.6–2.6)
MAGNESIUM SERPL-MCNC: 2.4 MG/DL (ref 1.6–2.6)
MAGNESIUM SERPL-MCNC: 2.4 MG/DL (ref 1.6–2.6)
MCH RBC QN AUTO: 29.9 PG (ref 27–31)
MCHC RBC AUTO-ENTMCNC: 31.5 G/DL (ref 32–36)
MCV RBC AUTO: 95 FL (ref 82–98)
MODE: ABNORMAL
MONOCYTES # BLD AUTO: 1 K/UL (ref 0.3–1)
MONOCYTES NFR BLD: 4 % (ref 4–15)
MV PEAK A VEL: 0.53 M/S
MV PEAK E VEL: 1.13 M/S
MV STENOSIS PRESSURE HALF TIME: 56.84 MS
MV VALVE AREA P 1/2 METHOD: 3.87 CM2
NEUTROPHILS # BLD AUTO: 22.9 K/UL (ref 1.8–7.7)
NEUTROPHILS NFR BLD: 91 % (ref 38–73)
NRBC BLD-RTO: 0 /100 WBC
OVALOCYTES BLD QL SMEAR: ABNORMAL
PCO2 BLDA: 23.1 MMHG (ref 35–45)
PCO2 BLDA: 27.5 MMHG (ref 35–45)
PCO2 BLDA: 30.1 MMHG (ref 35–45)
PCO2 BLDA: 35.4 MMHG (ref 35–45)
PEEP: 5
PH SMN: 7.43 [PH] (ref 7.35–7.45)
PH SMN: 7.48 [PH] (ref 7.35–7.45)
PH SMN: 7.48 [PH] (ref 7.35–7.45)
PH SMN: 7.51 [PH] (ref 7.35–7.45)
PHOSPHATE SERPL-MCNC: 4.6 MG/DL (ref 2.7–4.5)
PHOSPHATE SERPL-MCNC: 4.8 MG/DL (ref 2.7–4.5)
PHOSPHATE SERPL-MCNC: 4.8 MG/DL (ref 2.7–4.5)
PHOSPHATE SERPL-MCNC: 4.9 MG/DL (ref 2.7–4.5)
PHOSPHATE SERPL-MCNC: 4.9 MG/DL (ref 2.7–4.5)
PISA TR MAX VEL: 4.11 M/S
PLATELET # BLD AUTO: 271 K/UL (ref 150–450)
PLATELET BLD QL SMEAR: ABNORMAL
PMV BLD AUTO: 11.3 FL (ref 9.2–12.9)
PO2 BLDA: 188 MMHG (ref 80–100)
PO2 BLDA: 194 MMHG (ref 80–100)
PO2 BLDA: 199 MMHG (ref 80–100)
PO2 BLDA: 233 MMHG (ref 80–100)
POC BE: -3 MMOL/L
POC BE: -9 MMOL/L
POC BE: 1 MMOL/L
POC BE: 3 MMOL/L
POC SATURATED O2: 100 % (ref 95–100)
POIKILOCYTOSIS BLD QL SMEAR: SLIGHT
POLYCHROMASIA BLD QL SMEAR: ABNORMAL
POTASSIUM SERPL-SCNC: 3.6 MMOL/L (ref 3.5–5.1)
POTASSIUM SERPL-SCNC: 3.7 MMOL/L (ref 3.5–5.1)
POTASSIUM SERPL-SCNC: 3.9 MMOL/L (ref 3.5–5.1)
POTASSIUM SERPL-SCNC: 4 MMOL/L (ref 3.5–5.1)
POTASSIUM SERPL-SCNC: 4.1 MMOL/L (ref 3.5–5.1)
POTASSIUM SERPL-SCNC: 4.8 MMOL/L (ref 3.5–5.1)
PROT SERPL-MCNC: 6.2 G/DL (ref 6–8.4)
PROTHROMBIN TIME: 13.5 SEC (ref 9–12.5)
PV MEAN GRADIENT: 0.19 MMHG
RA MAJOR: 5.34 CM
RA WIDTH: 5.53 CM
RBC # BLD AUTO: 3.61 M/UL (ref 4–5.4)
RIGHT VENTRICULAR END-DIASTOLIC DIMENSION: 4.5 CM
SAMPLE: ABNORMAL
SINUS: 2.81 CM
SITE: ABNORMAL
SODIUM SERPL-SCNC: 137 MMOL/L (ref 136–145)
SODIUM SERPL-SCNC: 138 MMOL/L (ref 136–145)
SODIUM SERPL-SCNC: 138 MMOL/L (ref 136–145)
SODIUM SERPL-SCNC: 139 MMOL/L (ref 136–145)
SODIUM SERPL-SCNC: 139 MMOL/L (ref 136–145)
SODIUM SERPL-SCNC: 140 MMOL/L (ref 136–145)
STJ: 2.1 CM
TDI LATERAL: 0.03 M/S
TDI SEPTAL: 0.97 M/S
TDI: 0.5 M/S
TR MAX PG: 68 MMHG
TRICUSPID ANNULAR PLANE SYSTOLIC EXCURSION: 1.28 CM
TROPONIN I SERPL DL<=0.01 NG/ML-MCNC: 0.24 NG/ML (ref 0–0.03)
TROPONIN I SERPL DL<=0.01 NG/ML-MCNC: 0.4 NG/ML (ref 0–0.03)
TROPONIN I SERPL DL<=0.01 NG/ML-MCNC: 0.44 NG/ML (ref 0–0.03)
VT: 450
WBC # BLD AUTO: 25.17 K/UL (ref 3.9–12.7)

## 2022-03-11 PROCEDURE — 99900026 HC AIRWAY MAINTENANCE (STAT)

## 2022-03-11 PROCEDURE — 80048 BASIC METABOLIC PNL TOTAL CA: CPT | Mod: 91 | Performed by: INTERNAL MEDICINE

## 2022-03-11 PROCEDURE — 51702 INSERT TEMP BLADDER CATH: CPT

## 2022-03-11 PROCEDURE — 99900035 HC TECH TIME PER 15 MIN (STAT)

## 2022-03-11 PROCEDURE — 63600175 PHARM REV CODE 636 W HCPCS: Performed by: NURSE PRACTITIONER

## 2022-03-11 PROCEDURE — 84100 ASSAY OF PHOSPHORUS: CPT | Mod: 91 | Performed by: INTERNAL MEDICINE

## 2022-03-11 PROCEDURE — 99497 ADVNCD CARE PLAN 30 MIN: CPT | Mod: 25,,, | Performed by: NURSE PRACTITIONER

## 2022-03-11 PROCEDURE — 83735 ASSAY OF MAGNESIUM: CPT | Mod: 91 | Performed by: INTERNAL MEDICINE

## 2022-03-11 PROCEDURE — 63600175 PHARM REV CODE 636 W HCPCS: Performed by: INTERNAL MEDICINE

## 2022-03-11 PROCEDURE — 99497 PR ADVNCD CARE PLAN 30 MIN: ICD-10-PCS | Mod: 25,,, | Performed by: NURSE PRACTITIONER

## 2022-03-11 PROCEDURE — 85730 THROMBOPLASTIN TIME PARTIAL: CPT | Performed by: INTERNAL MEDICINE

## 2022-03-11 PROCEDURE — 83735 ASSAY OF MAGNESIUM: CPT | Performed by: INTERNAL MEDICINE

## 2022-03-11 PROCEDURE — 99223 1ST HOSP IP/OBS HIGH 75: CPT | Mod: ,,, | Performed by: INTERNAL MEDICINE

## 2022-03-11 PROCEDURE — 85025 COMPLETE CBC W/AUTO DIFF WBC: CPT | Performed by: INTERNAL MEDICINE

## 2022-03-11 PROCEDURE — 84484 ASSAY OF TROPONIN QUANT: CPT | Mod: 91 | Performed by: INTERNAL MEDICINE

## 2022-03-11 PROCEDURE — 43752 NASAL/OROGASTRIC W/TUBE PLMT: CPT

## 2022-03-11 PROCEDURE — 87205 SMEAR GRAM STAIN: CPT | Performed by: INTERNAL MEDICINE

## 2022-03-11 PROCEDURE — 20000000 HC ICU ROOM

## 2022-03-11 PROCEDURE — 83605 ASSAY OF LACTIC ACID: CPT | Mod: 91 | Performed by: NURSE PRACTITIONER

## 2022-03-11 PROCEDURE — 25000003 PHARM REV CODE 250: Performed by: INTERNAL MEDICINE

## 2022-03-11 PROCEDURE — 80053 COMPREHEN METABOLIC PANEL: CPT | Performed by: INTERNAL MEDICINE

## 2022-03-11 PROCEDURE — 82803 BLOOD GASES ANY COMBINATION: CPT

## 2022-03-11 PROCEDURE — A4216 STERILE WATER/SALINE, 10 ML: HCPCS | Performed by: INTERNAL MEDICINE

## 2022-03-11 PROCEDURE — 85610 PROTHROMBIN TIME: CPT | Performed by: INTERNAL MEDICINE

## 2022-03-11 PROCEDURE — 93005 ELECTROCARDIOGRAM TRACING: CPT

## 2022-03-11 PROCEDURE — 93010 EKG 12-LEAD: ICD-10-PCS | Mod: ,,, | Performed by: INTERNAL MEDICINE

## 2022-03-11 PROCEDURE — 99223 1ST HOSP IP/OBS HIGH 75: CPT | Mod: ,,, | Performed by: NURSE PRACTITIONER

## 2022-03-11 PROCEDURE — 93010 ELECTROCARDIOGRAM REPORT: CPT | Mod: ,,, | Performed by: INTERNAL MEDICINE

## 2022-03-11 PROCEDURE — 99223 PR INITIAL HOSPITAL CARE,LEVL III: ICD-10-PCS | Mod: ,,, | Performed by: INTERNAL MEDICINE

## 2022-03-11 PROCEDURE — 37799 UNLISTED PX VASCULAR SURGERY: CPT

## 2022-03-11 PROCEDURE — 63600175 PHARM REV CODE 636 W HCPCS: Performed by: FAMILY MEDICINE

## 2022-03-11 PROCEDURE — 99223 PR INITIAL HOSPITAL CARE,LEVL III: ICD-10-PCS | Mod: ,,, | Performed by: NURSE PRACTITIONER

## 2022-03-11 PROCEDURE — 94003 VENT MGMT INPAT SUBQ DAY: CPT

## 2022-03-11 PROCEDURE — 87070 CULTURE OTHR SPECIMN AEROBIC: CPT | Performed by: INTERNAL MEDICINE

## 2022-03-11 PROCEDURE — 82550 ASSAY OF CK (CPK): CPT | Performed by: INTERNAL MEDICINE

## 2022-03-11 PROCEDURE — 83605 ASSAY OF LACTIC ACID: CPT | Performed by: INTERNAL MEDICINE

## 2022-03-11 PROCEDURE — 83520 IMMUNOASSAY QUANT NOS NONAB: CPT | Performed by: INTERNAL MEDICINE

## 2022-03-11 PROCEDURE — 99291 CRITICAL CARE FIRST HOUR: CPT | Mod: ,,, | Performed by: NURSE PRACTITIONER

## 2022-03-11 PROCEDURE — 99291 PR CRITICAL CARE, E/M 30-74 MINUTES: ICD-10-PCS | Mod: ,,, | Performed by: NURSE PRACTITIONER

## 2022-03-11 RX ORDER — ACETAMINOPHEN 325 MG/1
650 TABLET ORAL EVERY 4 HOURS PRN
Status: DISCONTINUED | OUTPATIENT
Start: 2022-03-11 | End: 2022-03-11

## 2022-03-11 RX ORDER — FUROSEMIDE 10 MG/ML
60 INJECTION INTRAMUSCULAR; INTRAVENOUS ONCE
Status: COMPLETED | OUTPATIENT
Start: 2022-03-11 | End: 2022-03-11

## 2022-03-11 RX ORDER — MELATONIN 1 MG/ML
6 LIQUID (ML) ORAL NIGHTLY PRN
Status: DISCONTINUED | OUTPATIENT
Start: 2022-03-11 | End: 2022-03-29 | Stop reason: HOSPADM

## 2022-03-11 RX ORDER — HYDROCODONE BITARTRATE AND ACETAMINOPHEN 5; 325 MG/1; MG/1
1 TABLET ORAL EVERY 6 HOURS PRN
Status: DISCONTINUED | OUTPATIENT
Start: 2022-03-11 | End: 2022-03-29 | Stop reason: HOSPADM

## 2022-03-11 RX ORDER — IBUPROFEN 200 MG
16 TABLET ORAL
Status: DISCONTINUED | OUTPATIENT
Start: 2022-03-11 | End: 2022-03-11

## 2022-03-11 RX ORDER — ACETAMINOPHEN 650 MG/20.3ML
650 LIQUID ORAL EVERY 6 HOURS
Status: DISCONTINUED | OUTPATIENT
Start: 2022-03-11 | End: 2022-03-11

## 2022-03-11 RX ORDER — SODIUM,POTASSIUM PHOSPHATES 280-250MG
2 POWDER IN PACKET (EA) ORAL
Status: DISCONTINUED | OUTPATIENT
Start: 2022-03-11 | End: 2022-03-11

## 2022-03-11 RX ORDER — ASPIRIN 81 MG/1
81 TABLET ORAL DAILY
Status: DISCONTINUED | OUTPATIENT
Start: 2022-03-11 | End: 2022-03-11

## 2022-03-11 RX ORDER — IBUPROFEN 200 MG
24 TABLET ORAL
Status: DISCONTINUED | OUTPATIENT
Start: 2022-03-11 | End: 2022-03-29 | Stop reason: HOSPADM

## 2022-03-11 RX ORDER — SODIUM CHLORIDE 0.9 % (FLUSH) 0.9 %
10 SYRINGE (ML) INJECTION EVERY 8 HOURS
Status: DISCONTINUED | OUTPATIENT
Start: 2022-03-11 | End: 2022-03-29 | Stop reason: HOSPADM

## 2022-03-11 RX ORDER — MUPIROCIN 20 MG/G
OINTMENT TOPICAL 2 TIMES DAILY
Status: COMPLETED | OUTPATIENT
Start: 2022-03-11 | End: 2022-03-15

## 2022-03-11 RX ORDER — CLOPIDOGREL BISULFATE 75 MG/1
75 TABLET ORAL DAILY
Status: DISCONTINUED | OUTPATIENT
Start: 2022-03-11 | End: 2022-03-11

## 2022-03-11 RX ORDER — GLUCAGON 1 MG
1 KIT INJECTION
Status: DISCONTINUED | OUTPATIENT
Start: 2022-03-11 | End: 2022-03-17

## 2022-03-11 RX ORDER — ACETAMINOPHEN 650 MG/20.3ML
650 LIQUID ORAL EVERY 4 HOURS PRN
Status: DISCONTINUED | OUTPATIENT
Start: 2022-03-11 | End: 2022-03-29 | Stop reason: HOSPADM

## 2022-03-11 RX ORDER — NAPROXEN SODIUM 220 MG/1
81 TABLET, FILM COATED ORAL DAILY
Status: DISCONTINUED | OUTPATIENT
Start: 2022-03-12 | End: 2022-03-21

## 2022-03-11 RX ORDER — TALC
6 POWDER (GRAM) TOPICAL NIGHTLY PRN
Status: DISCONTINUED | OUTPATIENT
Start: 2022-03-11 | End: 2022-03-11

## 2022-03-11 RX ORDER — IBUPROFEN 200 MG
16 TABLET ORAL
Status: DISCONTINUED | OUTPATIENT
Start: 2022-03-11 | End: 2022-03-29 | Stop reason: HOSPADM

## 2022-03-11 RX ORDER — LANOLIN ALCOHOL/MO/W.PET/CERES
800 CREAM (GRAM) TOPICAL
Status: DISCONTINUED | OUTPATIENT
Start: 2022-03-11 | End: 2022-03-11

## 2022-03-11 RX ORDER — DOBUTAMINE HYDROCHLORIDE 400 MG/100ML
2.5 INJECTION INTRAVENOUS CONTINUOUS
Status: DISCONTINUED | OUTPATIENT
Start: 2022-03-11 | End: 2022-03-18

## 2022-03-11 RX ORDER — HYDRALAZINE HYDROCHLORIDE 20 MG/ML
10 INJECTION INTRAMUSCULAR; INTRAVENOUS EVERY 6 HOURS PRN
Status: DISCONTINUED | OUTPATIENT
Start: 2022-03-11 | End: 2022-03-17

## 2022-03-11 RX ORDER — POLYETHYLENE GLYCOL 3350 17 G/17G
17 POWDER, FOR SOLUTION ORAL DAILY PRN
Status: DISCONTINUED | OUTPATIENT
Start: 2022-03-11 | End: 2022-03-11

## 2022-03-11 RX ORDER — POLYETHYLENE GLYCOL 3350 17 G/17G
17 POWDER, FOR SOLUTION ORAL DAILY PRN
Status: DISCONTINUED | OUTPATIENT
Start: 2022-03-11 | End: 2022-03-29 | Stop reason: HOSPADM

## 2022-03-11 RX ORDER — MAGNESIUM SULFATE HEPTAHYDRATE 40 MG/ML
2 INJECTION, SOLUTION INTRAVENOUS
Status: COMPLETED | OUTPATIENT
Start: 2022-03-11 | End: 2022-03-11

## 2022-03-11 RX ORDER — HYDROCODONE BITARTRATE AND ACETAMINOPHEN 5; 325 MG/1; MG/1
1 TABLET ORAL EVERY 6 HOURS PRN
Status: DISCONTINUED | OUTPATIENT
Start: 2022-03-11 | End: 2022-03-11

## 2022-03-11 RX ORDER — CLOPIDOGREL BISULFATE 75 MG/1
75 TABLET ORAL DAILY
Status: DISCONTINUED | OUTPATIENT
Start: 2022-03-12 | End: 2022-03-17

## 2022-03-11 RX ORDER — ACETAMINOPHEN 650 MG/20.3ML
650 LIQUID ORAL EVERY 6 HOURS
Status: DISCONTINUED | OUTPATIENT
Start: 2022-03-11 | End: 2022-03-15

## 2022-03-11 RX ORDER — IBUPROFEN 200 MG
24 TABLET ORAL
Status: DISCONTINUED | OUTPATIENT
Start: 2022-03-11 | End: 2022-03-11

## 2022-03-11 RX ORDER — NALOXONE HCL 0.4 MG/ML
0.02 VIAL (ML) INJECTION
Status: DISCONTINUED | OUTPATIENT
Start: 2022-03-11 | End: 2022-03-29 | Stop reason: HOSPADM

## 2022-03-11 RX ORDER — PROPOFOL 10 MG/ML
0-50 INJECTION, EMULSION INTRAVENOUS CONTINUOUS
Status: DISCONTINUED | OUTPATIENT
Start: 2022-03-11 | End: 2022-03-14

## 2022-03-11 RX ADMIN — PROPOFOL 5 MCG/KG/MIN: 10 INJECTION, EMULSION INTRAVENOUS at 07:03

## 2022-03-11 RX ADMIN — CHLORHEXIDINE GLUCONATE 0.12% ORAL RINSE 15 ML: 1.2 LIQUID ORAL at 09:03

## 2022-03-11 RX ADMIN — HYDRALAZINE HYDROCHLORIDE 10 MG: 20 INJECTION, SOLUTION INTRAMUSCULAR; INTRAVENOUS at 04:03

## 2022-03-11 RX ADMIN — Medication 10 ML: at 09:03

## 2022-03-11 RX ADMIN — FUROSEMIDE 60 MG: 10 INJECTION, SOLUTION INTRAVENOUS at 01:03

## 2022-03-11 RX ADMIN — CLOPIDOGREL 75 MG: 75 TABLET, FILM COATED ORAL at 09:03

## 2022-03-11 RX ADMIN — MUPIROCIN: 20 OINTMENT TOPICAL at 09:03

## 2022-03-11 RX ADMIN — HEPARIN SODIUM 5000 UNITS: 5000 INJECTION INTRAVENOUS; SUBCUTANEOUS at 06:03

## 2022-03-11 RX ADMIN — FAMOTIDINE 20 MG: 10 INJECTION INTRAVENOUS at 08:03

## 2022-03-11 RX ADMIN — MAGNESIUM SULFATE HEPTAHYDRATE 2 G: 2 INJECTION, SOLUTION INTRAVENOUS at 02:03

## 2022-03-11 RX ADMIN — CHLORHEXIDINE GLUCONATE 0.12% ORAL RINSE 15 ML: 1.2 LIQUID ORAL at 08:03

## 2022-03-11 RX ADMIN — PROPOFOL 30 MCG/KG/MIN: 10 INJECTION, EMULSION INTRAVENOUS at 06:03

## 2022-03-11 RX ADMIN — ACETAMINOPHEN 650 MG: 160 SOLUTION ORAL at 06:03

## 2022-03-11 RX ADMIN — HEPARIN SODIUM 5000 UNITS: 5000 INJECTION INTRAVENOUS; SUBCUTANEOUS at 01:03

## 2022-03-11 RX ADMIN — SACUBITRIL AND VALSARTAN 1 TABLET: 49; 51 TABLET, FILM COATED ORAL at 09:03

## 2022-03-11 RX ADMIN — DOBUTAMINE IN DEXTROSE 2.5 MCG/KG/MIN: 400 INJECTION, SOLUTION INTRAVENOUS at 01:03

## 2022-03-11 RX ADMIN — MUPIROCIN: 20 OINTMENT TOPICAL at 08:03

## 2022-03-11 RX ADMIN — HEPARIN SODIUM 5000 UNITS: 5000 INJECTION INTRAVENOUS; SUBCUTANEOUS at 09:03

## 2022-03-11 RX ADMIN — ACETAMINOPHEN 650 MG: 160 SOLUTION ORAL at 12:03

## 2022-03-11 RX ADMIN — CEFTRIAXONE 2 G: 2 INJECTION, SOLUTION INTRAVENOUS at 01:03

## 2022-03-11 RX ADMIN — POTASSIUM BICARBONATE 40 MEQ: 391 TABLET, EFFERVESCENT ORAL at 01:03

## 2022-03-11 RX ADMIN — Medication 10 ML: at 06:03

## 2022-03-11 RX ADMIN — ASPIRIN 81 MG: 81 TABLET, COATED ORAL at 09:03

## 2022-03-11 RX ADMIN — Medication 10 ML: at 04:03

## 2022-03-11 RX ADMIN — FAMOTIDINE 20 MG: 10 INJECTION INTRAVENOUS at 01:03

## 2022-03-11 NOTE — ASSESSMENT & PLAN NOTE
She had witnessed cardiac arrest.  Last Echo -1/21-  EF -25%  Diastolic dysfunction.  EKG -Sinus rhythm with occasional Premature ventricular complexes and Premature atrial complexes  Left axis deviation  Cardiology consult   Trend troponin  Diuresis with lasix -monitor urine output .  She is well known to cardiology -last clinic visit -03/03/22  Resume Entresto

## 2022-03-11 NOTE — HPI
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 CPR initiated after call to EMS. ROSC obtained by EMS (downtime not reported but required one shock and arrived to ED ~ one hour after EMS called)  Intubated in ED  Eval revealed leukocytosis, hypokalemia 2.9, compensated anion gap acidosis, BNP > 4900, troponin 0.041, lactate 3.6, procalcitonin 2.08  Admitted to ICU on mechanical ventilation; cooling for targeted temperature management for coma post arrest initiated

## 2022-03-11 NOTE — SUBJECTIVE & OBJECTIVE
Past Medical History:   Diagnosis Date    Acute CHF (congestive heart failure) 1/17/2021    Hyperlipemia     Hypertension     Parkinson's disease     Stroke 2016    Throat mass        Past Surgical History:   Procedure Laterality Date    CLOSED REDUCTION Right 10/15/2020    Procedure: CLOSED REDUCTION;  Surgeon: Humberto Valdivia DO;  Location: [facility];  Service: Orthopedics;  Laterality: Right;  ATTEMPTED    EVACUATION OF HEMATOMA Right 10/17/2020    Procedure: EVACUATION, HEMATOMA;  Surgeon: Humberto Valdivia DO;  Location: [facility] OR;  Service: Orthopedics;  Laterality: Right;    HEMIARTHROPLASTY OF HIP Left 7/12/2020    Procedure: HEMIARTHROPLASTY, HIP;  Surgeon: Humberto Valdivia DO;  Location: [facility] OR;  Service: Orthopedics;  Laterality: Left;    HEMIARTHROPLASTY OF HIP Right 10/2/2020    Procedure: HEMIARTHROPLASTY, HIP;  Surgeon: Loyd Kearney MD;  Location: [facility] OR;  Service: Orthopedics;  Laterality: Right;    HYSTERECTOMY      OPEN REDUCTION OF DISLOCATION OF HIP Right 10/17/2020    Procedure: OPEN REDUCTION, DISLOCATION, HIP;  Surgeon: Humberto Valdivia DO;  Location: [facility];  Service: Orthopedics;  Laterality: Right;    THROAT SURGERY      TONSILLECTOMY         Review of patient's allergies indicates:   Allergen Reactions    Xanax [alprazolam]        No current facility-administered medications on file prior to encounter.     Current Outpatient Medications on File Prior to Encounter   Medication Sig    aspirin (ECOTRIN) 81 MG EC tablet Take 81 mg by mouth once daily.    carbidopa-levodopa  mg (SINEMET)  mg per tablet Take 2 tablets by mouth 3 (three) times daily.    clonazePAM (KLONOPIN) 0.5 MG tablet Take 0.5 mg by mouth 2 (two) times daily as needed for Anxiety.    clopidogrel (PLAVIX) 75 mg tablet Take 75 mg by mouth once daily.    ENTRESTO 49-51 mg per tablet TAKE 1 TABLET BY MOUTH TWICE DAILY    furosemide (LASIX) 20 MG tablet Take 1 pill daily and an extra tab as needed for edema,  SOB or weight gain (Patient taking differently: once daily.)    multivitamin capsule Take 1 capsule by mouth once daily.    metoprolol succinate (TOPROL-XL) 50 MG 24 hr tablet Take 1 tablet (50 mg total) by mouth 2 (two) times daily.    [DISCONTINUED] losartan (COZAAR) 50 MG tablet Take 1 tablet (50 mg total) by mouth 2 (two) times a day.     Family History    None       Tobacco Use    Smoking status: Never Smoker    Smokeless tobacco: Never Used   Substance and Sexual Activity    Alcohol use: No    Drug use: No    Sexual activity: Not Currently     Review of Systems   Unable to perform ROS: Intubated   Objective:     Vital Signs (Most Recent):  Temp: 98.3 °F (36.8 °C) (03/11/22 0000)  Pulse: 68 (03/11/22 0000)  Resp: 15 (03/11/22 0000)  BP: (!) 110/53 (03/11/22 0000)  SpO2: 99 % (03/10/22 2217)   Vital Signs (24h Range):  Temp:  [98.3 °F (36.8 °C)-100.3 °F (37.9 °C)] 98.3 °F (36.8 °C)  Pulse:  [] 68  Resp:  [10-36] 15  SpO2:  [77 %-100 %] 99 %  BP: (102-157)/() 110/53     Weight: 53 kg (116 lb 13.5 oz)  Body mass index is 21.37 kg/m².    Physical Exam  Vitals and nursing note reviewed.   Constitutional:       Comments: Intubated, sedated    HENT:      Head: Normocephalic.      Right Ear: Tympanic membrane normal.      Nose: Nose normal.      Mouth/Throat:      Mouth: Mucous membranes are moist.   Eyes:      Comments: Pupils pinpoint   Cardiovascular:      Rate and Rhythm: Tachycardia present.   Pulmonary:      Comments: Intubated,sedated  Abdominal:      General: Abdomen is flat.   Musculoskeletal:         General: Swelling present.   Skin:     General: Skin is warm.   Neurological:      Comments: Intubated, has gag reflex,sedated           Significant Labs: All pertinent labs within the past 24 hours have been reviewed.  BMP:   Recent Labs   Lab 03/10/22  2018   *      K 2.9*      CO2 19*   BUN 19   CREATININE 1.2   CALCIUM 8.1*     CBC:   Recent Labs   Lab 03/10/22  2018   WBC  19.37*   HGB 9.4*   HCT 29.8*        CMP:   Recent Labs   Lab 03/10/22  2018      K 2.9*      CO2 19*   *   BUN 19   CREATININE 1.2   CALCIUM 8.1*   PROT 6.2   ALBUMIN 2.9*   BILITOT 0.9   ALKPHOS 90   AST 53*   ALT 12   ANIONGAP 17*   EGFRNONAA 42*       Significant Imaging: I have reviewed all pertinent imaging results/findings within the past 24 hours.

## 2022-03-11 NOTE — PLAN OF CARE
RD Recommendations     1. As medically able, initiate enteral nutrition: Isosource 1.5, continuous    - Advance as tolerated to goal: 45 mL/hr     - 100mL H2O flush q 4-6 hours or per MD/NP.     - Provides 1620 calories (100%EEN), 73g protein (100%EPN), 190g CHO, and 825ml H20 + FWF.     - Monitor Na, Mg, K, Phos, and glucose; correct as indicated.      2. If/when pt able to tolerate PO diet, consider SLP consult for swallow study and texture recommendation   - As medically able, advance diet as tolerated, goal: cardiac (texture per SLP) (fluid per MD/NP)    - Consider feeding assistance with all meals    3. RD to follow up to monitor intake, tolerance, and labs.   *Please send consult if acute nutrition needs arise.    Goals:    1. Initiate enteral nutrition within 48 hours.     2. Pt will tolerate >75% estimated energy and protein needs by RD follow up.       Jes Carroll, MS, RD, LDN

## 2022-03-11 NOTE — ASSESSMENT & PLAN NOTE
-Troponin 0.041>0.239, repeat pending  -Elevation likely secondary to demand ischemia  -Prior MPI stress test 2/19 negative for ischemia/injury  -Echo pending  -Continue OMT as tolerated-ASA, Plavix, Toprol  -Not candidate for any invasive workup given advanced age, co-morbidities, frailty

## 2022-03-11 NOTE — HOSPITAL COURSE
Seen and examined at start of shift  Sedated on propofol (for shivering), stopped for exam.  3/12 Patient seen and examined.Day 1post intubation.Paralysis None Sedation Propofol Vasopressors Dobutamine Urine output last 24 hrs 175 ml  Fluid balance since admit 2 L Tmax 100.3 T minimum 92, started rewarming Last BM 3/12 Chest X-ray and ABG reviewed .  3/13 Patient seen and examined.Day 2post intubation.Paralysis None Sedation None Vasopressors Dobutamine Urine output last 24 hrs 235 ml   creatinine increasing to 3.1Fluid balance since admit 4 L Tmax 98 Last BM 3/11 Chest X-ray and ABG reviewed .  Blood culture alpha strep     03/14: seen and examined: on Vent RR 10 breathing 15/min, flexes to stimuli, spont eye opening, T max: 98.6F, UO: 525mls, Spoke with Adenike today,scopolamine was added    3/15: chart reviewed. On vent. Off all sedation for 2 days. Open eyes, not f/u commands. On minimal vent setting. BP stable. No fever.  3/16:  Appears to be more alert.  Open eyes and tracking.  Not following commands.  Events noted yesterday with tachycardia, to tachypnea and increased P pressure when biting the tube. Precedex drip started for comfort. No Fever. BP stable.   3/17:  Patient is minimal settings on the vent.  Tolerating SBT, but his poor mental status.  ETT with cuff leak. Plan to extubate.   3/18: stable post extubation. On 2 L NC. No fever. Mild hypertensive.  Remains nonverbal, not follow commands.  Poor cough effort    03/19: seen and examined on nasal canula, T max: 98.2F, UO  805 mls on tube feeds, weak cough. Na 150, Blood cultures from 03/15 NGTD.    03/20: seen and examined on nasal canula, T max: 99.0 F, UO  1520 mls on tube feeds, weak cough. Na 148, Blood cultures from 03/15 NGTD. ,

## 2022-03-11 NOTE — EICU
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  PMH/O:EF 25%(1/2021),Throat mass,HTN,HLD,Stroke,Parkinsons disease    On camera assessment intubated and sedated on propofol 35 mcg/kg/min  GA 81,/58,RR10,SPO2 100%  CMV/10/450/65%/PEEP5  MV 4.05,PIP 25    Lab Data  Potassium 2.9  Magnesium 1.7  Lactate 5.7  pro calcitonin 2.08  WBC 19.37  Troponin 0.041  BNP>4900  EKG with PVC s  ABG 7.509/30.1/194      Assessment and plan:  1.Cardiac arrest-cardiogenic most likely,may be related to hypokalemia-trend troponin,potassium correction,ECHO in AM,hypothermia protocol initiated,cardiology recommendations  2.Sphenoid mass-ENT recommendations,keep intubated for airway protection now  3.Acute respiratory failure-daily SAT/SBT,avoid alkalosis by keeping lowe minute ventilation  4.sepsis-?source -on ceftriaxone,deescalate with cultures  DVT prophylaxis-SQ heparin  SUP-famotidine

## 2022-03-11 NOTE — SUBJECTIVE & OBJECTIVE
Past Medical History:   Diagnosis Date    Acute CHF (congestive heart failure) 1/17/2021    Hyperlipemia     Hypertension     Parkinson's disease     Stroke 2016    Throat mass        Past Surgical History:   Procedure Laterality Date    CLOSED REDUCTION Right 10/15/2020    Procedure: CLOSED REDUCTION;  Surgeon: Humberto Valdivia DO;  Location: [facility];  Service: Orthopedics;  Laterality: Right;  ATTEMPTED    EVACUATION OF HEMATOMA Right 10/17/2020    Procedure: EVACUATION, HEMATOMA;  Surgeon: Humberto Valdivia DO;  Location: [facility] OR;  Service: Orthopedics;  Laterality: Right;    HEMIARTHROPLASTY OF HIP Left 7/12/2020    Procedure: HEMIARTHROPLASTY, HIP;  Surgeon: Humberto Valdivia DO;  Location: [facility] OR;  Service: Orthopedics;  Laterality: Left;    HEMIARTHROPLASTY OF HIP Right 10/2/2020    Procedure: HEMIARTHROPLASTY, HIP;  Surgeon: Loyd Kearney MD;  Location: [facility] OR;  Service: Orthopedics;  Laterality: Right;    HYSTERECTOMY      OPEN REDUCTION OF DISLOCATION OF HIP Right 10/17/2020    Procedure: OPEN REDUCTION, DISLOCATION, HIP;  Surgeon: Humberto Valdivia DO;  Location: [facility];  Service: Orthopedics;  Laterality: Right;    THROAT SURGERY      TONSILLECTOMY         Review of patient's allergies indicates:   Allergen Reactions    Xanax [alprazolam]        No current facility-administered medications on file prior to encounter.     Current Outpatient Medications on File Prior to Encounter   Medication Sig    aspirin (ECOTRIN) 81 MG EC tablet Take 81 mg by mouth once daily.    carbidopa-levodopa  mg (SINEMET)  mg per tablet Take 2 tablets by mouth 3 (three) times daily.    clonazePAM (KLONOPIN) 0.5 MG tablet Take 0.5 mg by mouth 2 (two) times daily as needed for Anxiety.    clopidogrel (PLAVIX) 75 mg tablet Take 75 mg by mouth once daily.    ENTRESTO 49-51 mg per tablet TAKE 1 TABLET BY MOUTH TWICE DAILY    furosemide (LASIX) 20 MG tablet Take 1 pill daily and an extra tab as needed for edema,  SOB or weight gain (Patient taking differently: once daily.)    multivitamin capsule Take 1 capsule by mouth once daily.    metoprolol succinate (TOPROL-XL) 50 MG 24 hr tablet Take 1 tablet (50 mg total) by mouth 2 (two) times daily.    [DISCONTINUED] losartan (COZAAR) 50 MG tablet Take 1 tablet (50 mg total) by mouth 2 (two) times a day.     Family History    None       Tobacco Use    Smoking status: Never Smoker    Smokeless tobacco: Never Used   Substance and Sexual Activity    Alcohol use: No    Drug use: No    Sexual activity: Not Currently     Review of Systems   Unable to perform ROS: Intubated   Objective:     Vital Signs (Most Recent):  Temp: (!) 91.58 °F (33.1 °C) (03/11/22 0905)  Pulse: 71 (03/11/22 0905)  Resp: (!) 22 (03/11/22 0905)  BP: (!) 132/91 (03/11/22 0715)  SpO2: 100 % (03/11/22 0531)   Vital Signs (24h Range):  Temp:  [89.96 °F (32.2 °C)-100.3 °F (37.9 °C)] 91.58 °F (33.1 °C)  Pulse:  [] 71  Resp:  [10-36] 22  SpO2:  [77 %-100 %] 100 %  BP: (102-179)/() 132/91  Arterial Line BP: (108-143)/(67-84) 140/81     Weight: 52.6 kg (116 lb)  Body mass index is 21.22 kg/m².    SpO2: 100 %  O2 Device (Oxygen Therapy): ventilator      Intake/Output Summary (Last 24 hours) at 3/11/2022 0940  Last data filed at 3/11/2022 0800  Gross per 24 hour   Intake 2146.13 ml   Output 3 ml   Net 2143.13 ml       Lines/Drains/Airways       Central Venous Catheter Line  Duration             Percutaneous Central Line Insertion/Assessment - Triple Lumen  03/10/22 2215 right subclavian <1 day              Drain  Duration                  NG/OG Tube 03/10/22 1957 16 Fr. Center mouth <1 day         Urethral Catheter 03/10/22 2032 Non-latex 16 Fr. <1 day              Airway  Duration                  Airway <1 day         Airway - Non-Surgical 03/10/22 1945 Endotracheal Tube <1 day              Arterial Line  Duration             Arterial Line 03/11/22 0310 Right Radial <1 day              Peripheral Intravenous  Line  Duration                  Peripheral IV - Single Lumen 03/10/22 1945 18 G Right Forearm <1 day         Peripheral IV - Single Lumen 03/10/22 2200 18 G Anterior;Distal;Left Upper Arm <1 day                    Physical Exam  Vitals and nursing note reviewed.   Constitutional:       General: She is not in acute distress.     Appearance: She is well-developed. She is ill-appearing. She is not diaphoretic.      Comments: Intubated on TTM   HENT:      Head: Normocephalic and atraumatic.   Eyes:      General:         Right eye: No discharge.         Left eye: No discharge.      Pupils: Pupils are equal, round, and reactive to light.   Neck:      Thyroid: No thyromegaly.      Vascular: No JVD.      Trachea: No tracheal deviation.   Cardiovascular:      Rate and Rhythm: Normal rate and regular rhythm. Occasional Extrasystoles are present.     Heart sounds: Normal heart sounds, S1 normal and S2 normal. No murmur heard.  Pulmonary:      Effort: Pulmonary effort is normal. No respiratory distress.      Breath sounds: No wheezing.      Comments: Diminished BS at bases/rales  Abdominal:      General: There is no distension.      Tenderness: There is no rebound.   Musculoskeletal:      Cervical back: Neck supple.      Right lower leg: No edema.      Left lower leg: Edema present.   Skin:     General: Skin is warm and dry.      Findings: No erythema.   Neurological:      Mental Status: She is alert.      Comments: Intubated       Significant Labs: CMP   Recent Labs   Lab 03/10/22  2018 03/11/22  0228 03/11/22  0545    138 137   K 2.9* 4.8 4.1    100 102   CO2 19* 23 15*   * 179*  179*  179* 181*  181*  181*   BUN 19 21 23   CREATININE 1.2 1.6* 1.8*   CALCIUM 8.1* 8.2* 8.5*   PROT 6.2 6.2  --    ALBUMIN 2.9* 3.0*  --    BILITOT 0.9 1.1*  --    ALKPHOS 90 92  --    AST 53* 59*  --    ALT 12 21  --    ANIONGAP 17* 15 20*   ESTGFRAFRICA 48* 34* 29*   EGFRNONAA 42* 29* 25*   , CBC   Recent Labs   Lab  03/10/22  2018 03/11/22  0545   WBC 19.37* 25.17*   HGB 9.4* 10.8*   HCT 29.8* 34.3*    271   , Troponin   Recent Labs   Lab 03/10/22  2018 03/11/22  0228   TROPONINI 0.041* 0.239*   , and All pertinent lab results from the last 24 hours have been reviewed.    Significant Imaging: Echocardiogram: Transthoracic echo (TTE) complete (Cupid Only):   Results for orders placed or performed during the hospital encounter of 03/10/22   Echo   Result Value Ref Range    BSA 1.52 m2    TDI SEPTAL 0.97 m/s    LV LATERAL E/E' RATIO 37.67 m/s    LV SEPTAL E/E' RATIO 1.16 m/s    LA WIDTH 4.79 cm    IVC diameter 2.99 cm    Left Ventricular Outflow Tract Mean Velocity 0.08041640222970 cm/s    Left Ventricular Outflow Tract Mean Gradient 0.60 mmHg    TDI LATERAL 0.03 m/s    LVIDd 5.03 3.5 - 6.0 cm    IVS 1.15 (A) 0.6 - 1.1 cm    Posterior Wall 1.06 0.6 - 1.1 cm    Ao root annulus 2.71 cm    LVIDs 4.82 (A) 2.1 - 4.0 cm    FS 4 28 - 44 %    LA volume 94.82 cm3    Sinus 2.81 cm    STJ 2.10 cm    Ascending aorta 2.55 cm    LV mass 210.46 g    LA size 4.61 cm    RVDD 4.50 cm    TAPSE 1.28 cm    Left Ventricle Relative Wall Thickness 0.42 cm    AV mean gradient 5 mmHg    AV valve area 1.08 cm2    AV Velocity Ratio 0.35     AV index (prosthetic) 0.38     MV valve area p 1/2 method 3.87 cm2    E/A ratio 2.13     Mean e' 0.50 m/s    E wave deceleration time 196.609055279274236 msec    IVRT 121.962301958142218 msec    LVOT diameter 1.91 cm    LVOT area 2.9 cm2    LVOT peak harsha 0.56 m/s    LVOT peak VTI 11.20 cm    Ao peak harsha 1.62 m/s    Ao VTI 29.8 cm    RVOT peak harsha 0.30 m/s    RVOT peak VTI 6.5 cm    LVOT stroke volume 32.07 cm3    AV peak gradient 10 mmHg    PV mean gradient 0.19 mmHg    E/E' ratio 2.26 m/s    MV Peak E Harsha 1.13 m/s    TR Max Harsha 4.11 m/s    MV stenosis pressure 1/2 time 56.706128769 ms    MV Peak A Harsha 0.53 m/s    LV Systolic Volume 108.61 mL    LV Systolic Volume Index 71.5 mL/m2    LV Diastolic Volume 119.87 mL     LV Diastolic Volume Index 78.86 mL/m2    LA Volume Index 62.4 mL/m2    LV Mass Index 138 g/m2    Echo EF Estimated 9 %    RA Major Axis 5.34 cm    Left Atrium Minor Axis 4.19 cm    Left Atrium Major Axis 6.36 cm    Triscuspid Valve Regurgitation Peak Gradient 68 mmHg    RA Width 5.53 cm   , EKG: Reviewed, and X-Ray: CXR: X-Ray Chest 1 View (CXR):   Results for orders placed or performed during the hospital encounter of 03/10/22   X-Ray Chest 1 View    Narrative    EXAMINATION:  XR CHEST 1 VIEW    CLINICAL HISTORY:  et position;    TECHNIQUE:  Single frontal view of the chest was performed.    COMPARISON:  Multiple priors.    FINDINGS:  Endotracheal tube tip 4.5 cm from the kermit in good position.  Right central venous catheter in place unchanged.  Enteric tube tip and side hole overlie the stomach.    Slightly improved appearance of the lungs.      Impression    Endotracheal tube tip 4.5 cm from the kermit in good position.      Electronically signed by: Levi Booker  Date:    03/10/2022  Time:    23:46    and X-Ray Chest PA and Lateral (CXR): No results found for this visit on 03/10/22.

## 2022-03-11 NOTE — CONSULTS
Advance Care Planning    Consult Note  Palliative Medicine      Consult Requested By: SONA Baldwin NP with ICU  Reason for Consult: Goals of care and Advance care planning  SUBJECTIVE:     History of Present Illness:  Tanya Madrid is a 82 y.o. 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  She presented to hospital after sustained witnessed cardiac arrest.  Admitted to ICU on mechanical ventilation; cooling for targeted temperature management for coma post arrest initiated.  We were consulted to assist with goals of care in elderly patient critically ill in ICU with unknown down time.      NARRATIVE     I, along with palliative RN JONO Guerrier, had the opportunity to meet patient's only daughter, Adenike, at the bedside.  Introduced palliative medicine, our supportive role and why we have been invited to participate in her mom's care in the ICU.  We reviewed her mom's current condition, events leading up the hospital and baseline condition.  Her daughter has been her primary caregiver and able to articulate most of the medical facts.  However, their seems to be some disconnect between what her mom would want versus current interventions in place.  We talked openly about her mom's wishes to avoid aggressive interventions for her weak heart (ie lifevest, invasive procedures) but currently intubated on mechanical ventilation following cardiac arrest.  On one hand, daughter is able to say mom would not want to be prolonged in this position but also feels the need to give her every opportunity to overcome her current circumstances.  I prepared her for the fact she may face difficult decisions in the near future depending on how things unfold in the next 24-48 hours.  I pointed out her mom's frailty, multiple chronic life-limiting conditions that may prevent a good recovery.  She seems to understand but  obviously still processing events.  She was asked about code status during cardiology's visit and she wishes for her mom to remain a full code.  I did explain chest compressions/shocking of the heart for someone already intubated.  She was counseled on the appropriateness of a DNR and will give this some more thought before making a decision.      In any case, she is aware palliative medicine is not here on the weekends.  Encouraged her to consider her mom's experience and be mindful of any potential suffering.  We will follow up on Monday and re-engage pending her course.        ASSESSEMENT / PLAN     1.  Encounter for Palliative Care   -Full Code, contemplating DNR   -Continue current management   -Family/Emotional Support    2.   ? Anoxic encephaloapthy   -s/p cardiac arrest   -TTM initiated; await completion of protocol   -sedation held; still unresponsive    3.   Acute on chronic CHF   -cardiology consulted   -awaiting echo (last EF 25%)   -bnp >4900   -continue ICU care    4.   Parkinson's disease/Frailty   -supportive care    5.   Sphenoid mass, h/o papillomas   -ENT    Thank you for allowing Palliative Medicine to be involved in the care of Tanya Madrid.    Past Medical History:   Diagnosis Date    Acute CHF (congestive heart failure) 1/17/2021    Hyperlipemia     Hypertension     Parkinson's disease     Stroke 2016    Throat mass      Past Surgical History:   Procedure Laterality Date    CLOSED REDUCTION Right 10/15/2020    Procedure: CLOSED REDUCTION;  Surgeon: Humberto Valdivia DO;  Location: [facility] OR;  Service: Orthopedics;  Laterality: Right;  ATTEMPTED    EVACUATION OF HEMATOMA Right 10/17/2020    Procedure: EVACUATION, HEMATOMA;  Surgeon: Humberto Valdivia DO;  Location: [facility] OR;  Service: Orthopedics;  Laterality: Right;    HEMIARTHROPLASTY OF HIP Left 7/12/2020    Procedure: HEMIARTHROPLASTY, HIP;  Surgeon: Humberto Valdivia DO;  Location: [facility] OR;  Service: Orthopedics;   Laterality: Left;    HEMIARTHROPLASTY OF HIP Right 10/2/2020    Procedure: HEMIARTHROPLASTY, HIP;  Surgeon: Loyd Kearney MD;  Location: [facility] OR;  Service: Orthopedics;  Laterality: Right;    HYSTERECTOMY      OPEN REDUCTION OF DISLOCATION OF HIP Right 10/17/2020    Procedure: OPEN REDUCTION, DISLOCATION, HIP;  Surgeon: Humberto Valdivia DO;  Location: [facility] OR;  Service: Orthopedics;  Laterality: Right;    THROAT SURGERY      TONSILLECTOMY       No family history on file.  Social History     Socioeconomic History    Marital status:    Tobacco Use    Smoking status: Never Smoker    Smokeless tobacco: Never Used   Substance and Sexual Activity    Alcohol use: No    Drug use: No    Sexual activity: Not Currently      Review of patient's allergies indicates:   Allergen Reactions    Xanax [alprazolam]        Medications:    Current Facility-Administered Medications:     acetaminophen oral solution 650 mg, 650 mg, Per NG tube, Q6H, Donavan Rangel MD, 650 mg at 03/11/22 0601    acetaminophen tablet 650 mg, 650 mg, Oral, Q4H PRN, Abhijit Fowler MD    aspirin EC tablet 81 mg, 81 mg, Oral, Daily, Abhijit Fowler MD, 81 mg at 03/11/22 0900    cefTRIAXone (ROCEPHIN) 2 g/50 mL D5W IVPB, 2 g, Intravenous, Q24H, Abhijit Fowler MD, Stopped at 03/11/22 0203    chlorhexidine 0.12 % solution 15 mL, 15 mL, Mouth/Throat, BID, Donavan Rangel MD, 15 mL at 03/11/22 0859    clopidogreL tablet 75 mg, 75 mg, Oral, Daily, Abhijit Fowler MD, 75 mg at 03/11/22 0900    dextrose 10% bolus 125 mL, 12.5 g, Intravenous, PRN, Abhijit Fowler MD    dextrose 10% bolus 250 mL, 25 g, Intravenous, PRN, Abhijit Fowler MD    famotidine (PF) injection 20 mg, 20 mg, Intravenous, Daily, Donavan Rangel MD, 20 mg at 03/11/22 0859    glucagon (human recombinant) injection 1 mg, 1 mg, Intramuscular, PRN, Abhijit Fowler MD    glucose chewable tablet 16 g, 16 g, Oral, PRN, Abhijit Fowler MD     glucose chewable tablet 24 g, 24 g, Oral, PRN, Abhijit Fowler MD    heparin (porcine) injection 5,000 Units, 5,000 Units, Subcutaneous, Q8H, Donavan Rangel MD, 5,000 Units at 03/11/22 0601    HYDROcodone-acetaminophen 5-325 mg per tablet 1 tablet, 1 tablet, Oral, Q6H PRN, Abhijit Fowler MD    melatonin tablet 6 mg, 6 mg, Oral, Nightly PRN, Abhijit Fowler MD    mupirocin 2 % ointment, , Nasal, BID, Sj Juan MD, Given at 03/11/22 0859    naloxone 0.4 mg/mL injection 0.02 mg, 0.02 mg, Intravenous, PRN, Abhijit Fowler MD    pneumoc 13-lopez conj-dip cr(PF) (PREVNAR 13 (PF)) 0.5 mL, 0.5 mL, Intramuscular, vaccine x 1 dose, Sj Juan MD    polyethylene glycol packet 17 g, 17 g, Oral, Daily PRN, Abhijit Fowler MD    sacubitriL-valsartan 49-51 mg per tablet 1 tablet, 1 tablet, Oral, BID, Abhijit Fowler MD, 1 tablet at 03/11/22 0900    sodium chloride 0.9% flush 10 mL, 10 mL, Intravenous, Q8H, Abhijit Fowler MD, 10 mL at 03/11/22 0602    ROS:  Review of Systems   Unable to perform ROS: Intubated       OBJECTIVE:     Physical Exam:  Vitals: Temp: (!) 91.76 °F (33.2 °C) (03/11/22 1030)  Pulse: 75 (03/11/22 1030)  Resp: (!) 22 (03/11/22 1030)  BP: (!) 155/107 (03/11/22 1000)  SpO2: 100 % (03/11/22 0531)    Physical Exam  Vitals reviewed.   Constitutional:       Appearance: She is well-groomed. She is ill-appearing.      Interventions: She is intubated.   HENT:      Head: Atraumatic.   Cardiovascular:      Rate and Rhythm: Normal rate. Rhythm irregular.   Pulmonary:      Effort: She is intubated.   Abdominal:      General: Abdomen is flat. There is no distension.   Genitourinary:     Comments: Indwelling catheter  Musculoskeletal:      Right lower leg: Edema present.      Left lower leg: Edema present.   Skin:     General: Skin is warm and dry.   Neurological:      Mental Status: She is unresponsive.   Psychiatric:      Comments: Unable to assess due to cognition         Review of  Symptoms    Symptom Assessment (ESAS 0-10 Scale)  Unable to complete assessment due to Intubated         Living Arrangements:  Lives with family and Lives in home    Psychosocial/Cultural: Lives with daughter and granddaughter, she had twin daughter's but one is  at 33 due to MI      Advance Directives:   [place]POST: No    Do Not Resuscitate Status: No    Medical Power of : No      Decision Making:  Family answered questions and Patient unable to communicate due to disease severity/cognitive impairment      Labs:  WBC   Date Value Ref Range Status   2022 25.17 (H) 3.90 - 12.70 K/uL Final     Hemoglobin   Date Value Ref Range Status   2022 10.8 (L) 12.0 - 16.0 g/dL Final     Hematocrit   Date Value Ref Range Status   2022 34.3 (L) 37.0 - 48.5 % Final     MCV   Date Value Ref Range Status   2022 95 82 - 98 fL Final     Platelets   Date Value Ref Range Status   2022 271 150 - 450 K/uL Final       BMP  Lab Results   Component Value Date     2022    K 4.0 2022     2022    CO2 16 (L) 2022    BUN 22 2022    CREATININE 1.9 (H) 2022    CALCIUM 8.6 (L) 2022    ANIONGAP 21 (H) 2022    ESTGFRAFRICA 28 (A) 2022    EGFRNONAA 24 (A) 2022       Lab Results   Component Value Date    AST 59 (H) 2022    ALKPHOS 92 2022    BILITOT 1.1 (H) 2022       Albumin   Date Value Ref Range Status   2022 3.0 (L) 3.5 - 5.2 g/dL Final       Radiology:CT: I have reviewed all pertinent results/findings within the past 24 hours:  03/10/2022   Impression:   No hemorrhage or acute major vascular distribution infarction.   Dehiscence of the posterior wall of the sphenoid sinus such as on series 4, image [id number]. There is high density material possibly related to paranasal sinus disease or neoplasm which extends towards the pituitary and along the right towards the intracranial ICA.  Further evaluation with contrast  "enhanced MRI when clinically appropriate would be recommended.  Suggest including 3D/volumetric postcontrast imaging.  ENT consult as clinically warranted.    Chest xray 03/10/2022    Impression:   Right central venous catheter in good position.   Worsening right-sided pulmonary opacities possibly infection, aspiration, or edema.  Correlation is advised.     Discussed case and visit details with cardiology, ICU       Medical decision making: HIGH based on high risk of death high risk medications poor prognosis management of more than one chronic illness in exacerbation or progression of disease    Plan required increased review of medication choice, interaction, dosing, frequency, and route due to patient complexity. Patient complexity increased by: advanced age, altered mentation, renal insufficiency and NPO    25 min ACP time spent discussing: code status, assessed patient specific goals and addressed the best way to achieve them, coordination of care and emotional support, formulating and communicating prognosis, exploring burden/ benefit of various approaches of treatment, inquired about existing or willingness to complete advance directive documents.        Essence Whitfield, NP  Palliative Medicine    Portions of this note may have been created with voice recognition software. Occasional "wrong word" or "sound alike" substitutions may have occurred due to the inherent limitations of voice recognition software. Please, read the note carefully and recognize, using context, where substitutions have occurred.     "

## 2022-03-11 NOTE — CONSULTS
"[facility] ([facility])  Cardiology  Consult Note    Patient Name: Tanya Madrid  MRN: [id number]  Admission Date: 3/10/2022  Hospital Length of Stay: 1 days  Code Status: Full Code   Attending Provider: Sj Juan, *   Consulting Provider: Yeimy Gamez PA-C  Primary Care Physician: Maggie Sue NP  Principal Problem:Cardiac arrest    Patient information was obtained from relative(s), past medical records and ER records.     Inpatient consult to Cardiology  Consult performed by: Yeimy Gamez PA-C  Consult ordered by: Sj Juan MD        Subjective:     Chief Complaint: Cardiac arrest    HPI:   HPI obtained from chart and patient's daughter as patient is currently intubated on TTM protocol    Ms. 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 Patient was reportedly eating supper when she developed a "blank stare" and become unresponsive. CPR was initiated by her daughter and ROSC was obtained en route after 1 shock by EMS (actual downtime remains unknown). Initial workup in ED revealed K of 2.9, BNP > 4,9000, troponin of 0.041, and lactic acidosis (5.7) and patient was emergently intubated and admitted to ICU and placed on hypothermia protocol. Cardiology consulted to assist with management. Patient seen and examined today, remains intubated. Ill appearing. Well known to cardiology service and recently evaluated in CHF clinic on 3/3/22 and was stable at that time per clinic notes. Daughter reported increased SOB over the past few days and recently increased po Lasix dosage (unfortunately CHF clinic was unaware of worsening symptoms). Patient is compliant with OP medications. BNP is chronically elevated. Chart reviewed. Troponin " 0.041>0.239. lactate still rising. Echo showed EF of 10%, BI-V failure, mod-severe MR, pulmonic regurgitation. CXR with worsening right-sided opacities, concerning for edema, infectious process, and possibly aspiration. 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 ENT consulted. Palliative care following.                Past Medical History:   Diagnosis Date    Acute CHF (congestive heart failure) 1/17/2021    Hyperlipemia     Hypertension     Parkinson's disease     Stroke 2016    Throat mass        Past Surgical History:   Procedure Laterality Date    CLOSED REDUCTION Right 10/15/2020    Procedure: CLOSED REDUCTION;  Surgeon: Humberto Valdivia DO;  Location: [facility] OR;  Service: Orthopedics;  Laterality: Right;  ATTEMPTED    EVACUATION OF HEMATOMA Right 10/17/2020    Procedure: EVACUATION, HEMATOMA;  Surgeon: Humberto Valdivia DO;  Location: [facility] OR;  Service: Orthopedics;  Laterality: Right;    HEMIARTHROPLASTY OF HIP Left 7/12/2020    Procedure: HEMIARTHROPLASTY, HIP;  Surgeon: Humberto Valdivia DO;  Location: [facility] OR;  Service: Orthopedics;  Laterality: Left;    HEMIARTHROPLASTY OF HIP Right 10/2/2020    Procedure: HEMIARTHROPLASTY, HIP;  Surgeon: Loyd Kearney MD;  Location: [facility] OR;  Service: Orthopedics;  Laterality: Right;    HYSTERECTOMY      OPEN REDUCTION OF DISLOCATION OF HIP Right 10/17/2020    Procedure: OPEN REDUCTION, DISLOCATION, HIP;  Surgeon: Humberto Valdivia DO;  Location: [facility] OR;  Service: Orthopedics;  Laterality: Right;    THROAT SURGERY      TONSILLECTOMY         Review of patient's allergies indicates:   Allergen Reactions    Xanax [alprazolam]        No current facility-administered medications on file prior to encounter.     Current Outpatient Medications on File Prior to Encounter   Medication Sig    aspirin (ECOTRIN) 81 MG EC tablet Take 81 mg by  mouth once daily.    carbidopa-levodopa  mg (SINEMET)  mg per tablet Take 2 tablets by mouth 3 (three) times daily.    clonazePAM (KLONOPIN) 0.5 MG tablet Take 0.5 mg by mouth 2 (two) times daily as needed for Anxiety.    clopidogrel (PLAVIX) 75 mg tablet Take 75 mg by mouth once daily.    ENTRESTO 49-51 mg per tablet TAKE 1 TABLET BY MOUTH TWICE DAILY    furosemide (LASIX) 20 MG tablet Take 1 pill daily and an extra tab as needed for edema, SOB or weight gain (Patient taking differently: once daily.)    multivitamin capsule Take 1 capsule by mouth once daily.    metoprolol succinate (TOPROL-XL) 50 MG 24 hr tablet Take 1 tablet (50 mg total) by mouth 2 (two) times daily.    [DISCONTINUED] losartan (COZAAR) 50 MG tablet Take 1 tablet (50 mg total) by mouth 2 (two) times a day.     Family History    None       Tobacco Use    Smoking status: Never Smoker    Smokeless tobacco: Never Used   Substance and Sexual Activity    Alcohol use: No    Drug use: No    Sexual activity: Not Currently     Review of Systems   Unable to perform ROS: Intubated   Objective:     Vital Signs (Most Recent):  Temp: (!) 91.58 °F (33.1 °C) (03/11/22 0905)  Pulse: 71 (03/11/22 0905)  Resp: (!) 22 (03/11/22 0905)  BP: (!) 132/91 (03/11/22 0715)  SpO2: 100 % (03/11/22 0531)   Vital Signs (24h Range):  Temp:  [89.96 °F (32.2 °C)-100.3 °F (37.9 °C)] 91.58 °F (33.1 °C)  Pulse:  [] 71  Resp:  [10-36] 22  SpO2:  [77 %-100 %] 100 %  BP: (102-179)/() 132/91  Arterial Line BP: (108-143)/(67-84) 140/81     Weight: 52.6 kg (116 lb)  Body mass index is 21.22 kg/m².    SpO2: 100 %  O2 Device (Oxygen Therapy): ventilator      Intake/Output Summary (Last 24 hours) at 3/11/2022 0940  Last data filed at 3/11/2022 0800  Gross per 24 hour   Intake 2146.13 ml   Output 3 ml   Net 2143.13 ml       Lines/Drains/Airways       Central Venous Catheter Line  Duration             Percutaneous Central Line Insertion/Assessment - Triple  Lumen  03/10/22 2215 right subclavian <1 day              Drain  Duration                  NG/OG Tube 03/10/22 1957 16 Fr. Center mouth <1 day         Urethral Catheter 03/10/22 2032 Non-latex 16 Fr. <1 day              Airway  Duration                  Airway <1 day         Airway - Non-Surgical 03/10/22 1945 Endotracheal Tube <1 day              Arterial Line  Duration             Arterial Line 03/11/22 0310 Right Radial <1 day              Peripheral Intravenous Line  Duration                  Peripheral IV - Single Lumen 03/10/22 1945 18 G Right Forearm <1 day         Peripheral IV - Single Lumen 03/10/22 2200 18 G Anterior;Distal;Left Upper Arm <1 day                    Physical Exam  Vitals and nursing note reviewed.   Constitutional:       General: She is not in acute distress.     Appearance: She is well-developed. She is ill-appearing. She is not diaphoretic.      Comments: Intubated on TTM   HENT:      Head: Normocephalic and atraumatic.   Eyes:      General:         Right eye: No discharge.         Left eye: No discharge.      Pupils: Pupils are equal, round, and reactive to light.   Neck:      Thyroid: No thyromegaly.      Vascular: No JVD.      Trachea: No tracheal deviation.   Cardiovascular:      Rate and Rhythm: Normal rate and regular rhythm. Occasional Extrasystoles are present.     Heart sounds: Normal heart sounds, S1 normal and S2 normal. No murmur heard.  Pulmonary:      Effort: Pulmonary effort is normal. No respiratory distress.      Breath sounds: No wheezing.      Comments: Diminished BS at bases/rales  Abdominal:      General: There is no distension.      Tenderness: There is no rebound.   Musculoskeletal:      Cervical back: Neck supple.      Right lower leg: No edema.      Left lower leg: Edema present.   Skin:     General: Skin is warm and dry.      Findings: No erythema.   Neurological:      Mental Status: She is alert.      Comments: Intubated       Significant Labs: CMP   Recent Labs    Lab 03/10/22  2018 03/11/22  0228 03/11/22  0545    138 137   K 2.9* 4.8 4.1    100 102   CO2 19* 23 15*   * 179*  179*  179* 181*  181*  181*   BUN 19 21 23   CREATININE 1.2 1.6* 1.8*   CALCIUM 8.1* 8.2* 8.5*   PROT 6.2 6.2  --    ALBUMIN 2.9* 3.0*  --    BILITOT 0.9 1.1*  --    ALKPHOS 90 92  --    AST 53* 59*  --    ALT 12 21  --    ANIONGAP 17* 15 20*   ESTGFRAFRICA 48* 34* 29*   EGFRNONAA 42* 29* 25*   , CBC   Recent Labs   Lab 03/10/22  2018 03/11/22  0545   WBC 19.37* 25.17*   HGB 9.4* 10.8*   HCT 29.8* 34.3*    271   , Troponin   Recent Labs   Lab 03/10/22  2018 03/11/22  0228   TROPONINI 0.041* 0.239*   , and All pertinent lab results from the last 24 hours have been reviewed.    Significant Imaging: Echocardiogram: Transthoracic echo (TTE) complete (Cupid Only):   Results for orders placed or performed during the hospital encounter of 03/10/22   Echo   Result Value Ref Range    BSA 1.52 m2    TDI SEPTAL 0.97 m/s    LV LATERAL E/E' RATIO 37.67 m/s    LV SEPTAL E/E' RATIO 1.16 m/s    LA WIDTH 4.79 cm    IVC diameter 2.99 cm    Left Ventricular Outflow Tract Mean Velocity 0.86351943223269 cm/s    Left Ventricular Outflow Tract Mean Gradient 0.60 mmHg    TDI LATERAL 0.03 m/s    LVIDd 5.03 3.5 - 6.0 cm    IVS 1.15 (A) 0.6 - 1.1 cm    Posterior Wall 1.06 0.6 - 1.1 cm    Ao root annulus 2.71 cm    LVIDs 4.82 (A) 2.1 - 4.0 cm    FS 4 28 - 44 %    LA volume 94.82 cm3    Sinus 2.81 cm    STJ 2.10 cm    Ascending aorta 2.55 cm    LV mass 210.46 g    LA size 4.61 cm    RVDD 4.50 cm    TAPSE 1.28 cm    Left Ventricle Relative Wall Thickness 0.42 cm    AV mean gradient 5 mmHg    AV valve area 1.08 cm2    AV Velocity Ratio 0.35     AV index (prosthetic) 0.38     MV valve area p 1/2 method 3.87 cm2    E/A ratio 2.13     Mean e' 0.50 m/s    E wave deceleration time 196.609646487333918 msec    IVRT 121.245679800316691 msec    LVOT diameter 1.91 cm    LVOT area 2.9 cm2    LVOT peak angel 0.56  m/s    LVOT peak VTI 11.20 cm    Ao peak harsha 1.62 m/s    Ao VTI 29.8 cm    RVOT peak harsha 0.30 m/s    RVOT peak VTI 6.5 cm    LVOT stroke volume 32.07 cm3    AV peak gradient 10 mmHg    PV mean gradient 0.19 mmHg    E/E' ratio 2.26 m/s    MV Peak E Harsha 1.13 m/s    TR Max Harsha 4.11 m/s    MV stenosis pressure 1/2 time 56.083331942 ms    MV Peak A Harsha 0.53 m/s    LV Systolic Volume 108.61 mL    LV Systolic Volume Index 71.5 mL/m2    LV Diastolic Volume 119.87 mL    LV Diastolic Volume Index 78.86 mL/m2    LA Volume Index 62.4 mL/m2    LV Mass Index 138 g/m2    Echo EF Estimated 9 %    RA Major Axis 5.34 cm    Left Atrium Minor Axis 4.19 cm    Left Atrium Major Axis 6.36 cm    Triscuspid Valve Regurgitation Peak Gradient 68 mmHg    RA Width 5.53 cm   , EKG: Reviewed, and X-Ray: CXR: X-Ray Chest 1 View (CXR):   Results for orders placed or performed during the hospital encounter of 03/10/22   X-Ray Chest 1 View    Narrative    EXAMINATION:  XR CHEST 1 VIEW    CLINICAL HISTORY:  et position;    TECHNIQUE:  Single frontal view of the chest was performed.    COMPARISON:  Multiple priors.    FINDINGS:  Endotracheal tube tip 4.5 cm from the kermit in good position.  Right central venous catheter in place unchanged.  Enteric tube tip and side hole overlie the stomach.    Slightly improved appearance of the lungs.      Impression    Endotracheal tube tip 4.5 cm from the kermit in good position.      Electronically signed by: Levi Booker  Date:    03/10/2022  Time:    23:46    and X-Ray Chest PA and Lateral (CXR): No results found for this visit on 03/10/22.    Assessment and Plan:   Patient who presents s/p cardiac arrest. Remains critically ill. EF worsened, 10% with BI-V failure. Agree with dobutamine initiation. Continue optimal medical management. Not candidate for any invasive cardiac procedures.       * Cardiac arrest  -Unclear etiology, certainly has pre-disposition for cardiac events/arrythmias but may have also had  aspiration event/respiratory arrest as well  -Known severe NICM with, recently seen in CHF clinic on 3/3/22  -Repeat echo this admission shows EF of 10%, Bi-V failure  -Agree with dobutamine initiation  -Continue Entresto, BB as tolerated  -Diurese if able  -Conservative medical management  -Patient is not candidate for any invasive workup given advanced age co-morbidities, frailty  -Previously declined AICD      Anoxic encephalopathy  -Mgmt as per primary team    Hypokalemia  -Repletion as per primary team, K > 4    Aspiration pneumonia  -On abx, mgmt as per primary team    Mass of sphenoid sinus  -ENT consulted    Elevated troponin  -Troponin 0.041>0.239, repeat pending  -Elevation likely secondary to demand ischemia  -Prior MPI stress test 2/19 negative for ischemia/injury  -Echo pending  -Continue OMT as tolerated-ASA, Plavix, Toprol  -Not candidate for any invasive workup given advanced age, co-morbidities, frailty    Acute on chronic combined systolic and diastolic CHF (congestive heart failure)  -Decompensated, BNP chronically elevated  -Assess response to inotropic support  -Continue BB, Entresto as BP permits  -Diurese if able  -Echo shows worsening EF of 10%, Bi-V failure          History of CVA (cerebrovascular accident)  -Continue ASA, Plavix    Parkinson disease  -Mgmt as per primary team    Essential hypertension  -Continue BB, Entresto        VTE Risk Mitigation (From admission, onward)         Ordered     heparin (porcine) injection 5,000 Units  Every 8 hours         03/10/22 2351     IP VTE HIGH RISK PATIENT  Once         03/10/22 2345     Place sequential compression device  Until discontinued         03/10/22 2345                Thank you for your consult. I will follow-up with patient. Please contact us if you have any additional questions.    Yeimy Gamez PA-C  Cardiology   [facility] ([facility])

## 2022-03-11 NOTE — CONSULTS
[facility] (H[facility])  Adult Nutrition  Consult Note    SUMMARY     Recommendations     1. As medically able, initiate enteral nutrition: Isosource 1.5, continuous    - Advance as tolerated to goal: 45 mL/hr     - 100mL H2O flush q 4-6 hours or per MD/NP.     - Provides 1620 calories (100%EEN), 73g protein (100%EPN), 190g CHO, and 825ml H20 + FWF.     - Monitor Na, Mg, K, Phos, and glucose; correct as indicated.      2. If/when pt able to tolerate PO diet, consider SLP consult for swallow study and texture recommendation   - As medically able, advance diet as tolerated, goal: cardiac (texture per SLP) (fluid per MD/NP)    - Consider feeding assistance with all meals    3. RD to follow up to monitor intake, tolerance, and labs.   *Please send consult if acute nutrition needs arise.    Goals:    1. Initiate enteral nutrition within 48 hours.     2. Pt will tolerate >75% estimated energy and protein needs by RD follow up.    Nutrition Goal Status: new   Communication of RD recs: POC, sticky note, secure chat and second sign    Assessment and Plan  Nutrition Problem    Inadequate energy and protein intake  Related to (etiology):      Inability to consume adequate energy    Increased energy and protein needs    Medical condition  Signs and Symptoms (as evidenced by):     NPO with no alternate source of nutrition  Interventions:    Enteral nutrition    Collaboration with other care providers  Nutrition Diagnosis Status:   New     Reason for Assessment  Reason for assessment: consult (enteral feeds)  Diagnosis: Cardiac arrest   Relevant medical history: CHF, HTN, HLD, GERD, CVA, dysphagia, anoxic encephalopathy    General information comments:   03/11/2022: RD consulted for TF recs. Pt admitted today following cardiac arrest, now intubated in ICU. Propofol d/c'd today. Altered renal labs noted. Will continue to monitor.     Nutrition Discharge Planning - pending medical course    Nutrition Risk Screen       "  Nutrition Risk Screen: tube feeding or parenteral nutrition, dysphagia or difficulty swallowing    Nutrition/Diet History  Patient Reported Diet/Restrictions/Preferences: [place]   Food Allergies: NKFA  Factors Affecting Nutritional Intake: difficulty/impaired swallowing, NPO and on mechanical ventilation   Spiritual, Cultural Beliefs, [employer]ybeterian Practices, Values that Affect Care: no Spiritual, Cultural Beliefs, [employer]ybeterian Practices, Values that Affect Care: no    Anthropometrics  Temp: (!) 91.76 °F (33.2 °C)  Height: 5' 2" (157.5 cm)  Height (inches): 62 in  Weight Method: Bed Scale  Weight: 52.6 kg (116 lb)  Weight (lb): 116 lb  Ideal Body Weight (IBW), Female: 110 lb  % Ideal Body Weight, Female (lb): 105.45 %  BMI (Calculated): 21.2       Labs  Pertinent Labs: reviewed  Lab Results   Component Value Date    HGB 10.8 (L) 03/11/2022    HCT 34.3 (L) 03/11/2022     03/11/2022    CALCIUM 8.6 (L) 03/11/2022    K 4.0 03/11/2022    PHOS 4.9 (H) 03/11/2022    BUN 22 03/11/2022    CREATININE 1.9 (H) 03/11/2022    ESTGFRAFRICA 28 (A) 03/11/2022    EGFRNONAA 24 (A) 03/11/2022    ALBUMIN 3.0 (L) 03/11/2022     Lab Results   Component Value Date    ALT 21 03/11/2022    AST 59 (H) 03/11/2022    ALKPHOS 92 03/11/2022    BILITOT 1.1 (H) 03/11/2022     Meds  Pertinent Medications: reviewed    acetaminophen  650 mg Per NG tube Q6H    aspirin  81 mg Oral Daily    cefTRIAXone (ROCEPHIN) IVPB  2 g Intravenous Q24H    chlorhexidine  15 mL Mouth/Throat BID    clopidogreL  75 mg Oral Daily    famotidine (PF)  20 mg Intravenous Daily    heparin (porcine)  5,000 Units Subcutaneous Q8H    mupirocin   Nasal BID    sacubitriL-valsartan  1 tablet Oral BID    sodium chloride 0.9%  10 mL Intravenous Q8H     Continuous Infusions:  PRN Meds:acetaminophen, dextrose 10%, dextrose 10%, glucagon (human recombinant), glucose, glucose, HYDROcodone-acetaminophen, melatonin, naloxone, pneumoc 13-lopez conj-dip cr(PF), polyethylene glycol "     Physical Findings/Assessment  Nausea/Vomiting Signs/Symptoms: other (see comments) (no s/s)  Wounds: not indicated   Edema:   not indicated   Last Bowel Movement: 03/11/22      Carlos Score: 13  Mouth/Teeth WDL: WDL    O2 Device (Oxygen Therapy): ventilator  NFPE to be performed by on site RD at f/u    Malnutrition Assessment  Patient does not meet at least 2 ASPEN criteria for malnutrition at this time.   Will continue to monitor.       Estimated/Assessed Needs  Weight Used For Calorie Calculations: 52.6 kg (115 lb 15.4 oz)  Energy Calorie Requirements (kcal): 1537 (ICU on vent, non-obese)  Energy Need Method: [facility]  Weight Used For Protein Calculations: 52.6 kg (115 lb 15.4 oz)  Protein Requirements:  (1.2-2g/kg, ICU on vent)  RDA Method (mL): 1537ml fluid or per MD/NP  CHO Requirement: 192g (50% CHO)    Nutrition Prescription Ordered  NPO     Evaluation of Received Nutrients  Energy Calories Required: not meeting needs  Protein Required: not meeting needs  Tolerance: n/a, pt is NPO  % Intake of Estimated Energy Needs: 0 - 25 %  % Meal Intake: NPO    Monitor and Evaluation  Food and Nutrient Intake: energy intake  Food and Nutrient Administration: diet order and enteral nutrition administration  Anthropometric Measurements: weight, weight change, body mass index  Biochemical Data, Medical Tests and Procedures: electrolyte and renal panel, lipid profile, gastrointestinal profile, glucose/endocrine profile, Inflammatory profile  Nutrition-Focused Physical Findings: overall appearance     Nutrition Follow-Up  Level of nutrition risk: moderate  Frequency of follow-up: Twice weekly   Tentative Next Date to be Seen by RD: 03/15/22  Jes Carroll, MS, RD, LDN

## 2022-03-11 NOTE — HPI
"HPI obtained from chart and patient's daughter as patient is currently intubated on TTM protocol    Ms. 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 Patient was reportedly eating supper when she developed a "blank stare" and become unresponsive. CPR was initiated by her daughter and ROSC was obtained en route after 1 shock by EMS (actual downtime remains unknown). Initial workup in ED revealed K of 2.9, BNP > 4,9000, troponin of 0.041, and lactic acidosis (5.7) and patient was emergently intubated and admitted to ICU and placed on hypothermia protocol. Cardiology consulted to assist with management. Patient seen and examined today, remains intubated. Ill appearing. Well known to cardiology service and recently evaluated in CHF clinic on 3/3/22 and was stable at that time per clinic notes. Daughter reported increased SOB over the past few days and recently increased po Lasix dosage (unfortunately CHF clinic was unaware of worsening symptoms). Patient is compliant with OP medications. BNP is chronically elevated. Chart reviewed. Troponin 0.041>0.239. lactate still rising. Echo showed EF of 10%, BI-V failure, mod-severe MR, pulmonic regurgitation. CXR with worsening right-sided opacities, concerning for edema, infectious process, and possibly aspiration. 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 ENT consulted. Palliative care following.            "

## 2022-03-11 NOTE — PROGRESS NOTES
Echo findings noted. Previous EF 25%, now 10%  Repeat lactate continues rising, now > 10  Will initiate dobutamine to optimize tissue perfusion  Monitor hemodynamics for new arrhythmia or hypotension    JOSUE Brenner-BC  [facility]

## 2022-03-11 NOTE — ASSESSMENT & PLAN NOTE
Ct head showed  Dehiscence of the posterior wall of the sphenoid sinus such as on series 4, image [id number]. There is high density material possibly related to paranasal sinus disease or neoplasm which extends towards the pituitary and along the right towards the intracranial ICA.  Further evaluation with contrast enhanced MRI when clinically appropriate would be recommended.  Suggest including 3D/volumetric postcontrast imaging.  ENT consult as clinically warranted.    Will consult ENT

## 2022-03-11 NOTE — ASSESSMENT & PLAN NOTE
Recurrent since childhood with MANY surgical interventions  Followed by Dr Harrison, last rigid laryngoscopy on 10/26/2021 per EMR  Unclear if choking/airway compromise played role in cardiac arrest at dinner

## 2022-03-11 NOTE — PROGRESS NOTES
Spoke with patient's daughter regarding course of events that lead up to admission.  Daughter, Adenike states she is the patient's primary caregiver and had noticed that her mother was breathing harder than usual the past few days.  She reports on 3/9, she administered Lasix 20mg PO daily as perscribed but noticed her mother was breathing hard later in the day so she administered 1 more Lasix.  As the day progressed, she administered 2 more Lasix tablets for a total of 80mg on 3/9.  She stated this appeared to help her mother's breathing.  On 3/10, she administered an additional dose of Lasix for a total of 40mg for the same observations.  When asked about K+ supplements, she stated they have some at home but that her mother does not take them because everytime she gets lab work, they are told her K+ level is WDL.  When asked if she administered K+ when administering the extra doses of Lasix the past 2 days, she stated she had not.

## 2022-03-11 NOTE — PLAN OF CARE
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  Home Phone: [phone]  Mobile Phone: [phone]  Relation: Daughter    Discharge Plan A: Other (TBD)         [id number] - [place] - [address] AT [facility]  [address]  [place] [address]  Phone: [phone] Fax: [phone]      Initial Assessment (most recent)     Adult Discharge Assessment - 03/11/22 1333        Discharge Assessment    Assessment Type Discharge Planning Assessment     Confirmed/corrected address, phone number and insurance Yes     Confirmed Demographics Correct on Facesheet     Source of Information family     When was your last doctors appointment? 03/03/22   Cardiology    Communicated PARMINDER with patient/caregiver Date not available/Unable to determine     Reason For Admission Cardiac arrest     Lives With child(anthony), adult     Facility Arrived From: home     Do you expect to return to your current living situation? Yes     Do you have help at home or someone to help you manage your care at home? Yes     Who are your caregiver(s) and their phone number(s)? Adenike Lemus (Daughter)     Prior to hospitilization cognitive status: Alert/Oriented     Current cognitive status: Coma/Sedated/Intubated     Walking or Climbing Stairs Difficulty ambulation difficulty, requires equipment     Mobility Management rolling  walker     Dressing/Bathing Difficulty bathing difficulty, requires equipment     Dressing/Bathing Management shower chair     Home Accessibility wheelchair accessible     Home Layout Able to live on 1st floor     Equipment Currently Used at Home bedside commode;grab bar;walker, rolling;shower chair     Readmission within 30 days? No     Patient currently being followed by outpatient case management? No     Do you currently have service(s) that help you manage your care at home? No     Do you take prescription medications? Yes     Do you have prescription coverage? Yes     Do you have any problems affording any of your prescribed medications? No     Is the patient taking medications as prescribed? yes     Who is going to help you get home at discharge? Adenike Lemus (Daughter)     How do you get to doctors appointments? family or friend will provide     Are you on dialysis? No     Do you take coumadin? No     Discharge Plan A Other   TBD    Discharge Plan discussed with: Adult children        Relationship/Environment    Name(s) of Who Lives With Patient Adenike Lemus (Daughter)               Anticipated DC dispo: TBD  Prior Level of Function: Dependent with ADLS, uses walker for ambulation, lives with daughter   PCP: Maggie Sue NP    Comments:  CM met with patient's daughter at bedside to introduce role and discuss discharge planning. Patient lives with daughterAdenike who will also be help at home and can provide transport. CM discharge needs depends on hospital progress. CM will continue following to assist with other needs.

## 2022-03-11 NOTE — ASSESSMENT & PLAN NOTE
-Decompensated, BNP chronically elevated  -Assess response to inotropic support  -Continue BB, Entresto as BP permits  -Diurese if able  -Echo shows worsening EF of 10%, Bi-V failure

## 2022-03-11 NOTE — SUBJECTIVE & OBJECTIVE
Past Medical History:   Diagnosis Date    Acute CHF (congestive heart failure) 1/17/2021    Hyperlipemia     Hypertension     Parkinson's disease     Stroke 2016    Throat mass        Past Surgical History:   Procedure Laterality Date    CLOSED REDUCTION Right 10/15/2020    Procedure: CLOSED REDUCTION;  Surgeon: Humberto Valdivia DO;  Location: [facility] OR;  Service: Orthopedics;  Laterality: Right;  ATTEMPTED    EVACUATION OF HEMATOMA Right 10/17/2020    Procedure: EVACUATION, HEMATOMA;  Surgeon: Humberto Valdivia DO;  Location: [facility] OR;  Service: Orthopedics;  Laterality: Right;    HEMIARTHROPLASTY OF HIP Left 7/12/2020    Procedure: HEMIARTHROPLASTY, HIP;  Surgeon: Humberto Valdivia DO;  Location: [facility] OR;  Service: Orthopedics;  Laterality: Left;    HEMIARTHROPLASTY OF HIP Right 10/2/2020    Procedure: HEMIARTHROPLASTY, HIP;  Surgeon: Loyd Kearney MD;  Location: [facility] OR;  Service: Orthopedics;  Laterality: Right;    HYSTERECTOMY      OPEN REDUCTION OF DISLOCATION OF HIP Right 10/17/2020    Procedure: OPEN REDUCTION, DISLOCATION, HIP;  Surgeon: Humberto Valdivia DO;  Location: [facility];  Service: Orthopedics;  Laterality: Right;    THROAT SURGERY      TONSILLECTOMY         Review of patient's allergies indicates:   Allergen Reactions    Xanax [alprazolam]        Family History    None       Tobacco Use    Smoking status: Never Smoker    Smokeless tobacco: Never Used   Substance and Sexual Activity    Alcohol use: No    Drug use: No    Sexual activity: Not Currently         Review of Systems   Unable to perform ROS: Patient unresponsive   Objective:     Vital Signs (Most Recent):  Temp: (!) 90.14 °F (32.3 °C) (03/11/22 0800)  Pulse: 64 (03/11/22 0800)  Resp: 19 (03/11/22 0800)  BP: (!) 132/91 (03/11/22 0715)  SpO2: 100 % (03/11/22 0531)   Vital Signs (24h Range):  Temp:  [89.96 °F (32.2 °C)-100.3 °F (37.9 °C)] 90.14 °F (32.3 °C)  Pulse:  [] 64  Resp:  [10-36] 19  SpO2:  [77 %-100 %] 100 %  BP:  (102-179)/() 132/91  Arterial Line BP: (108-143)/(67-84) 140/81     Weight: 53 kg (116 lb 13.5 oz)  Body mass index is 21.37 kg/m².      Intake/Output Summary (Last 24 hours) at 3/11/2022 0818  Last data filed at 3/11/2022 0800  Gross per 24 hour   Intake 2146.13 ml   Output 3 ml   Net 2143.13 ml      propofoL Stopped (22 07)       Physical Exam  Vitals and nursing note reviewed.   Constitutional:       Appearance: She is underweight. She is ill-appearing.      Interventions: She is intubated.   HENT:      Head: Atraumatic.   Eyes:      Conjunctiva/sclera: Conjunctivae normal.      Comments: Pupils 2mm bilaterally and reactive to light  No corneal reflex noted   Cardiovascular:      Rate and Rhythm: Regular rhythm. Bradycardia present. Occasional Extrasystoles are present.     Pulses:           Radial pulses are 2+ on the right side and 2+ on the left side.        Dorsalis pedis pulses are 2+ on the right side and 2+ on the left side.      Heart sounds: Murmur heard.   Systolic murmur is present.   Pulmonary:      Effort: She is intubated.      Breath sounds: Decreased breath sounds present. No wheezing, rhonchi or rales.   Abdominal:      General: Abdomen is flat. Bowel sounds are absent. There is no distension.      Palpations: Abdomen is soft.   Musculoskeletal:      Right lower le+ Edema present.      Left lower le+ Edema present.   Skin:     General: Skin is cool and dry.      Capillary Refill: Capillary refill takes 2 to 3 seconds.   Neurological:      GCS: GCS eye subscore is 1. GCS verbal subscore is 1. GCS motor subscore is 4.      Comments: Positive cough and gag reflexes  Withdraws from noxious stimuli with BLE and RUE       Vents:  Vent Mode: A/C (22)  Set Rate: (S) 8 BPM (22)  Vt Set: 450 mL (22)  Pressure Support: 0 cmH20 (22)  PEEP/CPAP: 5 cmH20 (22)  Oxygen Concentration (%): 35 (22)  Peak Airway Pressure: 40  cmH2O (03/11/22 0531)  Plateau Pressure: 8.2 cmH20 (03/11/22 0531)  Total Ve: 15.6 mL (03/11/22 0531)  F/VT Ratio<105 (RSBI): (!) 40.42 (03/11/22 0531)    Lines/Drains/Airways       Central Venous Catheter Line  Duration             Percutaneous Central Line Insertion/Assessment - Triple Lumen  03/10/22 2215 right subclavian <1 day              Drain  Duration                  NG/OG Tube 03/10/22 1957 16 Fr. Center mouth <1 day         Urethral Catheter 03/10/22 2032 Non-latex 16 Fr. <1 day              Airway  Duration                  Airway <1 day         Airway - Non-Surgical 03/10/22 1945 Endotracheal Tube <1 day              Arterial Line  Duration             Arterial Line 03/11/22 0310 Right Radial <1 day              Peripheral Intravenous Line  Duration                  Peripheral IV - Single Lumen 03/10/22 1945 18 G Right Forearm <1 day         Peripheral IV - Single Lumen 03/10/22 2200 18 G Anterior;Distal;Left Upper Arm <1 day                    Significant Labs:    CBC/Anemia Profile:  Recent Labs   Lab 03/10/22  2018 03/11/22  0545   WBC 19.37* 25.17*   HGB 9.4* 10.8*   HCT 29.8* 34.3*    271   MCV 95 95   RDW 15.8* 15.9*        Chemistries:  Recent Labs   Lab 03/10/22  2018 03/11/22  0130 03/11/22  0228 03/11/22  0545     --  138 137   K 2.9*  --  4.8 4.1     --  100 102   CO2 19*  --  23 15*   BUN 19  --  21 23   CREATININE 1.2  --  1.6* 1.8*   CALCIUM 8.1*  --  8.2* 8.5*   ALBUMIN 2.9*  --  3.0*  --    PROT 6.2  --  6.2  --    BILITOT 0.9  --  1.1*  --    ALKPHOS 90  --  92  --    ALT 12  --  21  --    AST 53*  --  59*  --    MG  --  1.7  --  2.3   PHOS  --   --   --  4.9*       Lactic Acid:   Recent Labs   Lab 03/10/22  2247 03/11/22  0228 03/11/22  0545   LACTATE 3.6* 3.4* 8.5*     All pertinent labs within the past 24 hours have been reviewed.    Significant Imaging:   I have reviewed all pertinent imaging results/findings within the past 24 hours.

## 2022-03-11 NOTE — HPI
"History was taken from the electronic chart and from the daughter -  Adenike Lemus Daughter [phone]        82 y.o. female patient with a PMHx of HTN,  CHF-last EF-01/2021-25%, with diastolic dysfunction  , and HLD who presents to the Emergency Department for evaluation of cardiac arrest which onset suddenly 1 hour pta.  Daughter reports that over the last 3 days -she has been complaining of worsening dyspnoea  and was given extra lasix tablets. She usually takes 20mg lasix daily and was given 2 extra tablets with the episodes of  dyspnea.  This evening while having dinner, she "looked blank" and stopped breathing . Daughter called EMS and was advised to start CPR.. Pt achieved ROSC en route. After I shock . Prior Tx includes 50 mcg of fentanyl and 50 mg of ketamine from EMS.   Code status discussed.  She is Full code.  Labs and imaging test reviewed.  CT head -No hemorrhage or acute major vascular distribution infarction.   Dehiscence of the posterior wall of the sphenoid sinus such as on series 4, image 49-52. There is high density material possibly related to paranasal sinus disease or neoplasm which extends towards the pituitary and along the right towards the intracranial ICA.  Further evaluation with contrast enhanced MRI when clinically appropriate would be recommended.       Component      Latest Ref Rng & Units 3/10/2022 3/3/2022              Potassium      3.5 - 5.1 mmol/L 2.9 (L) 3.7   CBC showed wbc -19   Component      Latest Ref Rng & Units 3/10/2022 3/10/2022 4/20/2021          10:47 PM  8:18 PM    Lactate, Lex      0.5 - 2.2 mmol/L 3.6 (HH) 5.7 (HH) 2.0     Component      Latest Ref Rng & Units 3/10/2022 4/20/2021   BNP      0 - 99 pg/mL >4,900 (H) 2,806 (H)   Code status discussed -she is full code  Her daughter is the SDM.  Adenike Lemus Daughter [phone]     "

## 2022-03-11 NOTE — ASSESSMENT & PLAN NOTE
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

## 2022-03-11 NOTE — ASSESSMENT & PLAN NOTE
Post cardiac arrest  Current high quality data suggests limited if any benefit but cooling for TTM initiated overnight and reached goal temp prior to our consult/exam. Will complete TTM per protocol  Updated daughter at bedside on exam findings, plan of care, and expected clinical course  Palliative care team consulted for additional support with expected difficult conversations over next 48-72 hours

## 2022-03-11 NOTE — CONSULTS
Consulted on this 83 y/o F patient by RRT due to wound to upper lip. She was admitted s/p out of hospital cardiac arrest. She is currently intubated, TTM protocol. TTM pads to body noted, unable to assess skin underlying. Foam pad applied to bilateral ankles under attachment site to prevent breakdown. Nurse reports blanchable redness to sacrum, turning q2 hours with wedge.  Small tears x2 noted to upper lip consistent with trauma during intubation, bruising also noted to inner lower lip also consistent with trauma from intubation. sween cream applied to lips.   Recommend pressure injury prevention measures. Heel offloading boots in use, turn q2 hours. On ICU MADHURI bed. Will follow prn.

## 2022-03-11 NOTE — PLAN OF CARE
81 y/o AAF admitted to ICU with a dx of cardiac arrest , CODY , acute hypoxic respiratory failure  and  Anoxic encephalopathy . Started on hypothermia protocol , IVAB , asa , plavix  And entresto . Cardiology and palliative care consulted. Tx reviewed . Cont current tx . Prognosis Poor

## 2022-03-11 NOTE — PLAN OF CARE
Pt admitted to ICU, post cardiac arrest at home. Daughter was educated about proper use and dosage of medications and their side effects by CN. Pt VSS. Remains intubated. An arterial line was placed earlier this morning and TTM started at 0410 with a goal of 33 degrees.PT Aberu still has not produced much but scant hematuria. Pt observed to have possible inguinal hernia in groin. Pt turned q2 hours and oral care was performed q4 hours. MD aware. All safety precautions in place.

## 2022-03-11 NOTE — ASSESSMENT & PLAN NOTE
Last EF 25%  Recent increase in home lasix due to BLE edema  Per cardiology notes, pt would not desire invasive procedures or support  Now with CODY, anuria; fluid volume management will be complicated  Avoid extraneous fluid as able  Continue entresto as tolerated  ICU hemodynamic monitoring  Monitor electrolytes and keep K 4-5 and Mag > 2.0

## 2022-03-11 NOTE — H&P
"[facility] ([facility] Medicine  History & Physical    Patient Name: Tanya Madrid  MRN: [id number]  Patient Class: IP- Inpatient  Admission Date: 3/10/2022  Attending Physician: Abhijit Fowler MD   Primary Care Provider: Maggie Sue NP         Patient information was obtained from patient, past medical records and ER records.     Subjective:     Principal Problem:Cardiac arrest    Chief Complaint:   Chief Complaint   Patient presents with    Cardiac Arrest     Witnessed cardiac arrest while eating dinner. CPR x3, shock x1. ROSC achieved en route.         HPI:   History was taken from the electronic chart and from the daughter -  Adenike Lemus Daughter [phone]        82 y.o. female patient with a PMHx of HTN,  CHF-last EF-01/2021-25%, with diastolic dysfunction  , and HLD who presents to the Emergency Department for evaluation of cardiac arrest which onset suddenly 1 hour pta.  Daughter reports that over the last 3 days -she has been complaining of worsening dyspnoea  and was given extra lasix tablets. She usually takes 20mg lasix daily and was given 2 extra tablets with the episodes of  dyspnea.  This evening while having dinner, she "looked blank" and stopped breathing . Daughter called EMS and was advised to start CPR.. Pt achieved ROSC en route. After I shock . Prior Tx includes 50 mcg of fentanyl and 50 mg of ketamine from EMS.   Code status discussed.  She is Full code.  Labs and imaging test reviewed.  CT head -No hemorrhage or acute major vascular distribution infarction.   Dehiscence of the posterior wall of the sphenoid sinus such as on series 4, image 49-52. There is high density material possibly related to paranasal sinus disease or neoplasm which extends towards the pituitary and along the right towards the intracranial ICA.  Further evaluation with contrast enhanced MRI when clinically appropriate would be recommended.       Component      Latest Ref " Rng & Units 3/10/2022 3/3/2022              Potassium      3.5 - 5.1 mmol/L 2.9 (L) 3.7   CBC showed wbc -19   Component      Latest Ref Rng & Units 3/10/2022 3/10/2022 4/20/2021          10:47 PM  8:18 PM    Lactate, Lex      0.5 - 2.2 mmol/L 3.6 (HH) 5.7 (HH) 2.0     Component      Latest Ref Rng & Units 3/10/2022 4/20/2021   BNP      0 - 99 pg/mL >4,900 (H) 2,806 (H)   Code status discussed -she is full code  Her daughter is the SDM.  Adenike Lemus Daughter [phone]       Past Medical History:   Diagnosis Date    Acute CHF (congestive heart failure) 1/17/2021    Hyperlipemia     Hypertension     Parkinson's disease     Stroke 2016    Throat mass        Past Surgical History:   Procedure Laterality Date    CLOSED REDUCTION Right 10/15/2020    Procedure: CLOSED REDUCTION;  Surgeon: Humberto Valdivia DO;  Location: [facility] OR;  Service: Orthopedics;  Laterality: Right;  ATTEMPTED    EVACUATION OF HEMATOMA Right 10/17/2020    Procedure: EVACUATION, HEMATOMA;  Surgeon: Humberto Valdivia DO;  Location: [facility] OR;  Service: Orthopedics;  Laterality: Right;    HEMIARTHROPLASTY OF HIP Left 7/12/2020    Procedure: HEMIARTHROPLASTY, HIP;  Surgeon: Humberto Valdivia DO;  Location: [facility] OR;  Service: Orthopedics;  Laterality: Left;    HEMIARTHROPLASTY OF HIP Right 10/2/2020    Procedure: HEMIARTHROPLASTY, HIP;  Surgeon: Loyd Kearney MD;  Location: [facility] OR;  Service: Orthopedics;  Laterality: Right;    HYSTERECTOMY      OPEN REDUCTION OF DISLOCATION OF HIP Right 10/17/2020    Procedure: OPEN REDUCTION, DISLOCATION, HIP;  Surgeon: Humberto Valdivia DO;  Location: [facility] OR;  Service: Orthopedics;  Laterality: Right;    THROAT SURGERY      TONSILLECTOMY         Review of patient's allergies indicates:   Allergen Reactions    Xanax [alprazolam]        No current facility-administered medications on file prior to encounter.     Current Outpatient Medications on File Prior to Encounter   Medication  Sig    aspirin (ECOTRIN) 81 MG EC tablet Take 81 mg by mouth once daily.    carbidopa-levodopa  mg (SINEMET)  mg per tablet Take 2 tablets by mouth 3 (three) times daily.    clonazePAM (KLONOPIN) 0.5 MG tablet Take 0.5 mg by mouth 2 (two) times daily as needed for Anxiety.    clopidogrel (PLAVIX) 75 mg tablet Take 75 mg by mouth once daily.    ENTRESTO 49-51 mg per tablet TAKE 1 TABLET BY MOUTH TWICE DAILY    furosemide (LASIX) 20 MG tablet Take 1 pill daily and an extra tab as needed for edema, SOB or weight gain (Patient taking differently: once daily.)    multivitamin capsule Take 1 capsule by mouth once daily.    metoprolol succinate (TOPROL-XL) 50 MG 24 hr tablet Take 1 tablet (50 mg total) by mouth 2 (two) times daily.    [DISCONTINUED] losartan (COZAAR) 50 MG tablet Take 1 tablet (50 mg total) by mouth 2 (two) times a day.     Family History    None       Tobacco Use    Smoking status: Never Smoker    Smokeless tobacco: Never Used   Substance and Sexual Activity    Alcohol use: No    Drug use: No    Sexual activity: Not Currently     Review of Systems   Unable to perform ROS: Intubated   Objective:     Vital Signs (Most Recent):  Temp: 98.3 °F (36.8 °C) (03/11/22 0000)  Pulse: 68 (03/11/22 0000)  Resp: 15 (03/11/22 0000)  BP: (!) 110/53 (03/11/22 0000)  SpO2: 99 % (03/10/22 2217)   Vital Signs (24h Range):  Temp:  [98.3 °F (36.8 °C)-100.3 °F (37.9 °C)] 98.3 °F (36.8 °C)  Pulse:  [] 68  Resp:  [10-36] 15  SpO2:  [77 %-100 %] 99 %  BP: (102-157)/() 110/53     Weight: 53 kg (116 lb 13.5 oz)  Body mass index is 21.37 kg/m².    Physical Exam  Vitals and nursing note reviewed.   Constitutional:       Comments: Intubated, sedated    HENT:      Head: Normocephalic.      Right Ear: Tympanic membrane normal.      Nose: Nose normal.      Mouth/Throat:      Mouth: Mucous membranes are moist.   Eyes:      Comments: Pupils pinpoint   Cardiovascular:      Rate and Rhythm: Tachycardia  present.   Pulmonary:      Comments: Intubated,sedated  Abdominal:      General: Abdomen is flat.   Musculoskeletal:         General: Swelling present.   Skin:     General: Skin is warm.   Neurological:      Comments: Intubated, has gag reflex,sedated           Significant Labs: All pertinent labs within the past 24 hours have been reviewed.  BMP:   Recent Labs   Lab 03/10/22  2018   *      K 2.9*      CO2 19*   BUN 19   CREATININE 1.2   CALCIUM 8.1*     CBC:   Recent Labs   Lab 03/10/22  2018   WBC 19.37*   HGB 9.4*   HCT 29.8*        CMP:   Recent Labs   Lab 03/10/22  2018      K 2.9*      CO2 19*   *   BUN 19   CREATININE 1.2   CALCIUM 8.1*   PROT 6.2   ALBUMIN 2.9*   BILITOT 0.9   ALKPHOS 90   AST 53*   ALT 12   ANIONGAP 17*   EGFRNONAA 42*       Significant Imaging: I have reviewed all pertinent imaging results/findings within the past 24 hours.    Assessment/Plan:     * Cardiac arrest    She had witnessed cardiac arrest.  Last Echo -1/21-  EF -25%  Diastolic dysfunction.  EKG -Sinus rhythm with occasional Premature ventricular complexes and Premature atrial complexes  Left axis deviation  Cardiology consult   Trend troponin  Diuresis with lasix -monitor urine output .  She is well known to cardiology -last clinic visit -03/03/22  Resume Entresto    Mass of sphenoid sinus    Ct head showed  Dehiscence of the posterior wall of the sphenoid sinus such as on series 4, image 49-52. There is high density material possibly related to paranasal sinus disease or neoplasm which extends towards the pituitary and along the right towards the intracranial ICA.  Further evaluation with contrast enhanced MRI when clinically appropriate would be recommended.  Suggest including 3D/volumetric postcontrast imaging.  ENT consult as clinically warranted.    Will consult ENT    Acute on chronic combined systolic and diastolic CHF (congestive heart failure)  Patient is identified as having  Combined Systolic and Diastolic heart failure that is Acute on chronic. CHF is currently uncontrolled due to Pulmonary edema/pleural effusion on CXR. Latest ECHO performed and demonstrates- Results for orders placed during the hospital encounter of 01/15/21    Echo Color Flow Doppler? Yes    Interpretation Summary  · The left ventricle is severely enlarged with eccentric hypertrophy and severely decreased systolic function. The estimated ejection fraction is 25%  · Grade II left ventricular diastolic dysfunction.  · Normal right ventricular size with moderately reduced right ventricular systolic function.  · Severe left atrial enlargement.  · There is pulmonary hypertension.  · There is mild aortic valve stenosis.  · Aortic valve area is 1.37 cm2; peak velocity is 1.33 m/s; mean gradient is 4 mmHg.  · Mild to moderate tricuspid regurgitation.  · Intermediate central venous pressure (8 mmHg).  · The estimated PA systolic pressure is 50 mmHg.  · Trivial pericardial effusion.  . Continue Nitrate/Vasodilator and monitor clinical status closely. Monitor on telemetry. Patient is on CHF pathway.  Monitor strict Is&Os and daily weights.  Place on fluid restriction of 1.5 L. Continue to stress to patient importance of self efficacy and  on diet for CHF. Last BNP reviewed- and noted below   Recent Labs   Lab 03/10/22  2018   BNP >4,900*   .      History of CVA (cerebrovascular accident)    On aspirin/plavix    Parkinson disease  Continue supportive care       Essential hypertension    continue entresto , monitor BP      VTE Risk Mitigation (From admission, onward)         Ordered     heparin (porcine) injection 5,000 Units  Every 8 hours         03/10/22 2351     IP VTE HIGH RISK PATIENT  Once         03/10/22 2345     Place sequential compression device  Until discontinued         03/10/22 2345            Aspiration pneumonia - continue Rocephin , follow blood cultures  Hypokalemia-Will replete, add serum mag , follow  BMP in AM  Critical care time spent on the evaluation and treatment of severe organ dysfunction, review of pertinent labs and imaging studies, discussions with consulting providers and discussions with patient/family:  50 minutes.     Abhijit Fowler MD  Department of Hospital Medicine   [facility] - Intensive Care ([facility])

## 2022-03-11 NOTE — ASSESSMENT & PLAN NOTE
-Unclear etiology, certainly has pre-disposition for cardiac events/arrythmias but may have also had aspiration event/respiratory arrest as well  -Known severe NICM with, recently seen in CHF clinic on 3/3/22  -Repeat echo this admission shows EF of 10%, Bi-V failure  -Agree with dobutamine initiation  -Continue Entresto, BB as tolerated  -Diurese if able  -Conservative medical management  -Patient is not candidate for any invasive workup given advanced age co-morbidities, frailty  -Previously declined AICD

## 2022-03-11 NOTE — ASSESSMENT & PLAN NOTE
Etiology unclear but airway compromise, hypokalemia, and sudden cardiac death related to poor EF all possible primary or contributing factors  Repeat echo pending  Cardiology consulted

## 2022-03-11 NOTE — ASSESSMENT & PLAN NOTE
For airway protection post cardiac arrest  Vent settings reviewed and adjusted to optimize gas exchange  VAP prophylaxis  Empiric abx for high risk aspiration with arrest + significant leukocytosis  abg daily and prn to assess response to therapy

## 2022-03-11 NOTE — CONSULTS
[facility] - [facility] (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 CPR initiated after call to EMS. ROSC obtained by EMS (downtime not reported but required one shock and arrived to ED ~ one hour after EMS called)  Intubated in ED  Eval revealed leukocytosis, hypokalemia 2.9, compensated anion gap acidosis, BNP > 4900, troponin 0.041, lactate 3.6, procalcitonin 2.08  Admitted to ICU on mechanical ventilation; cooling for targeted temperature management for coma post arrest initiated       Hospital/ICU Course:  Seen and examined at start of shift  Sedated on propofol (for shivering), stopped for exam.      Past Medical History:   Diagnosis Date    Acute CHF (congestive heart failure) 1/17/2021    Hyperlipemia     Hypertension     Parkinson's disease     Stroke 2016    Throat mass        Past Surgical History:   Procedure Laterality Date    CLOSED REDUCTION Right 10/15/2020    Procedure: CLOSED REDUCTION;  Surgeon: Humberto Valdivia DO;  Location: [facility] OR;  Service: Orthopedics;  Laterality: Right;  ATTEMPTED    EVACUATION OF HEMATOMA Right 10/17/2020    Procedure: EVACUATION, HEMATOMA;  Surgeon: Humberto Valdivia DO;  Location: [facility] OR;  Service: Orthopedics;  Laterality: Right;    HEMIARTHROPLASTY OF HIP Left 7/12/2020    Procedure: HEMIARTHROPLASTY, HIP;  Surgeon: Humberto Valdivia DO;  Location: [facility] OR;   Service: Orthopedics;  Laterality: Left;    HEMIARTHROPLASTY OF HIP Right 10/2/2020    Procedure: HEMIARTHROPLASTY, HIP;  Surgeon: Loyd Kearney MD;  Location: [facility] OR;  Service: Orthopedics;  Laterality: Right;    HYSTERECTOMY      OPEN REDUCTION OF DISLOCATION OF HIP Right 10/17/2020    Procedure: OPEN REDUCTION, DISLOCATION, HIP;  Surgeon: Humberto Valdivia DO;  Location: [facility] OR;  Service: Orthopedics;  Laterality: Right;    THROAT SURGERY      TONSILLECTOMY         Review of patient's allergies indicates:   Allergen Reactions    Xanax [alprazolam]        Family History    None       Tobacco Use    Smoking status: Never Smoker    Smokeless tobacco: Never Used   Substance and Sexual Activity    Alcohol use: No    Drug use: No    Sexual activity: Not Currently         Review of Systems   Unable to perform ROS: Patient unresponsive   Objective:     Vital Signs (Most Recent):  Temp: (!) 90.14 °F (32.3 °C) (03/11/22 0800)  Pulse: 64 (03/11/22 0800)  Resp: 19 (03/11/22 0800)  BP: (!) 132/91 (03/11/22 0715)  SpO2: 100 % (03/11/22 0531)   Vital Signs (24h Range):  Temp:  [89.96 °F (32.2 °C)-100.3 °F (37.9 °C)] 90.14 °F (32.3 °C)  Pulse:  [] 64  Resp:  [10-36] 19  SpO2:  [77 %-100 %] 100 %  BP: (102-179)/() 132/91  Arterial Line BP: (108-143)/(67-84) 140/81     Weight: 53 kg (116 lb 13.5 oz)  Body mass index is 21.37 kg/m².      Intake/Output Summary (Last 24 hours) at 3/11/2022 0818  Last data filed at 3/11/2022 0800  Gross per 24 hour   Intake 2146.13 ml   Output 3 ml   Net 2143.13 ml      propofoL Stopped (03/11/22 0733)       Physical Exam  Vitals and nursing note reviewed.   Constitutional:       Appearance: She is underweight. She is ill-appearing.      Interventions: She is intubated.   HENT:      Head: Atraumatic.   Eyes:      Conjunctiva/sclera: Conjunctivae normal.      Comments: Pupils 2mm bilaterally and reactive to light  No corneal reflex noted   Cardiovascular:      Rate and  Rhythm: Regular rhythm. Bradycardia present. Occasional Extrasystoles are present.     Pulses:           Radial pulses are 2+ on the right side and 2+ on the left side.        Dorsalis pedis pulses are 2+ on the right side and 2+ on the left side.      Heart sounds: Murmur heard.   Systolic murmur is present.   Pulmonary:      Effort: She is intubated.      Breath sounds: Decreased breath sounds present. No wheezing, rhonchi or rales.   Abdominal:      General: Abdomen is flat. Bowel sounds are absent. There is no distension.      Palpations: Abdomen is soft.   Musculoskeletal:      Right lower le+ Edema present.      Left lower le+ Edema present.   Skin:     General: Skin is cool and dry.      Capillary Refill: Capillary refill takes 2 to 3 seconds.   Neurological:      GCS: GCS eye subscore is 1. GCS verbal subscore is 1. GCS motor subscore is 4.      Comments: Positive cough and gag reflexes  Withdraws from noxious stimuli with BLE and RUE       Vents:  Vent Mode: A/C (22)  Set Rate: (S) 8 BPM (22)  Vt Set: 450 mL (22)  Pressure Support: 0 cmH20 (22)  PEEP/CPAP: 5 cmH20 (22)  Oxygen Concentration (%): 35 (22 0800)  Peak Airway Pressure: 40 cmH2O (22)  Plateau Pressure: 8.2 cmH20 (22)  Total Ve: 15.6 mL (22)  F/VT Ratio<105 (RSBI): (!) 40.42 (22)    Lines/Drains/Airways       Central Venous Catheter Line  Duration             Percutaneous Central Line Insertion/Assessment - Triple Lumen  03/10/22 2215 right subclavian <1 day              Drain  Duration                  NG/OG Tube 03/10/22 1957 16 Fr. Center mouth <1 day         Urethral Catheter 03/10/22 2032 Non-latex 16 Fr. <1 day              Airway  Duration                  Airway <1 day         Airway - Non-Surgical 03/10/22 194 Endotracheal Tube <1 day              Arterial Line  Duration             Arterial Line 22 0310 Right Radial <1  day              Peripheral Intravenous Line  Duration                  Peripheral IV - Single Lumen 03/10/22 1945 18 G Right Forearm <1 day         Peripheral IV - Single Lumen 03/10/22 2200 18 G Anterior;Distal;Left Upper Arm <1 day                    Significant Labs:    CBC/Anemia Profile:  Recent Labs   Lab 03/10/22  2018 03/11/22  0545   WBC 19.37* 25.17*   HGB 9.4* 10.8*   HCT 29.8* 34.3*    271   MCV 95 95   RDW 15.8* 15.9*        Chemistries:  Recent Labs   Lab 03/10/22  2018 03/11/22  0130 03/11/22 0228 03/11/22  0545     --  138 137   K 2.9*  --  4.8 4.1     --  100 102   CO2 19*  --  23 15*   BUN 19  --  21 23   CREATININE 1.2  --  1.6* 1.8*   CALCIUM 8.1*  --  8.2* 8.5*   ALBUMIN 2.9*  --  3.0*  --    PROT 6.2  --  6.2  --    BILITOT 0.9  --  1.1*  --    ALKPHOS 90  --  92  --    ALT 12  --  21  --    AST 53*  --  59*  --    MG  --  1.7  --  2.3   PHOS  --   --   --  4.9*       Lactic Acid:   Recent Labs   Lab 03/10/22  2247 03/11/22  0228 03/11/22  0545   LACTATE 3.6* 3.4* 8.5*     All pertinent labs within the past 24 hours have been reviewed.    Significant Imaging:   I have reviewed all pertinent imaging results/findings within the past 24 hours.      ABG  Recent Labs   Lab 03/11/22  0503   PH 7.481*   PO2 188*   PCO2 27.5*   HCO3 20.6*   BE -3     Assessment/Plan:     Neuro  Anoxic encephalopathy  Post cardiac arrest  Current high quality data suggests limited if any benefit but cooling for TTM initiated overnight and reached goal temp prior to our consult/exam. Will complete TTM per protocol  Updated daughter at bedside on exam findings, plan of care, and expected clinical course  Palliative care team consulted for additional support with expected difficult conversations over next 48-72 hours    ENT  Mass of sphenoid sinus  Noted on CT head post cardiac arrest  ?relationship to recurrent laryngeal papillomas  ENT consulted  Anticipate work up delay until neuro  recovery/prognosis post cardiac arrest resulting in comatose state    Pulmonary  On mechanically assisted ventilation  For airway protection post cardiac arrest  Vent settings reviewed and adjusted to optimize gas exchange  VAP prophylaxis  Empiric abx for high risk aspiration with arrest + significant leukocytosis  abg daily and prn to assess response to therapy    Cardiac/Vascular  * Cardiac arrest  Etiology unclear but airway compromise, hypokalemia, and sudden cardiac death related to poor EF all possible primary or contributing factors  Repeat echo pending  Cardiology consulted    Acute on chronic combined systolic and diastolic CHF (congestive heart failure)  Last EF 25%  Recent increase in home lasix due to BLE edema  Per cardiology notes, pt would not desire invasive procedures or support  Now with CODY, anuria; fluid volume management will be complicated  Avoid extraneous fluid as able  Continue entresto as tolerated  ICU hemodynamic monitoring  Monitor electrolytes and keep K 4-5 and Mag > 2.0    Oncology  Laryngeal papillomatosis  Recurrent since childhood with MANY surgical interventions  Followed by Dr Harrison, last rigid laryngoscopy on 10/26/2021 per EMR  Unclear if choking/airway compromise played role in cardiac arrest at dinner      Critical Care Daily Checklist:    A: Awake: RASS Goal/Actual Goal: RASS Goal: -4-->deep sedation  Actual: Coffey Agitation Sedation Scale (RASS): Deep sedation   B: Spontaneous Breathing Trial Performed?     C: SAT & SBT Coordinated?  Not candidate                      D: Delirium: CAM-ICU Overall CAM-ICU: Positive   E: Early Mobility Performed? Yes   F: Feeding Goal:    Status:     Current Diet Order   Procedures    Diet NPO      AS: Analgesia/Sedation none   T: Thromboembolic Prophylaxis heparin   H: HOB > 300 Yes   U: Stress Ulcer Prophylaxis (if needed) pepcid   G: Glucose Control Monitor with Q 4 BMP   B: Bowel Function Stool Occurrence: 0   I: Indwelling  Catheter (Lines & Abreu) Necessity reviewed   D: De-escalation of Antimicrobials/Pharmacotherapies reviewed    Plan for the day/ETD As above    Code Status:  Family/Goals of Care: Full Code  Daughter Adenike is SDM   I have discussed case and plan of care in detail with Dr Vargas and Dr Hart; Status and plan of care were discussed with team on multidisciplinary rounds.    Critical Care Time: 70 minutes  Critical secondary to anoxic encephalopathy post cardiac arrest out of hospital witnessed but undocumented down [place]     Critical care was time spent personally by me on the following activities: development of treatment plan with patient or surrogate and bedside caregivers, discussions with consultants, evaluation of patient's response to treatment, examination of patient, ordering and performing treatments and interventions, ordering and review of laboratory studies, ordering and review of radiographic studies, pulse oximetry, re-evaluation of patient's condition. This critical care time did not overlap with that of any other provider or involve time for any procedures.    Thank you for your consult.      JOSUE Brenner-BC  Critical Care Medicine  [facility] - Intensive Care ([facility])

## 2022-03-12 PROBLEM — R78.81 GRAM-POSITIVE BACTEREMIA: Status: ACTIVE | Noted: 2022-03-12

## 2022-03-12 PROBLEM — N17.9 AKI (ACUTE KIDNEY INJURY): Status: ACTIVE | Noted: 2022-03-12

## 2022-03-12 LAB
ALLENS TEST: ABNORMAL
ANION GAP SERPL CALC-SCNC: 13 MMOL/L (ref 8–16)
ANION GAP SERPL CALC-SCNC: 13 MMOL/L (ref 8–16)
ANION GAP SERPL CALC-SCNC: 14 MMOL/L (ref 8–16)
ANION GAP SERPL CALC-SCNC: 16 MMOL/L (ref 8–16)
ANION GAP SERPL CALC-SCNC: 19 MMOL/L (ref 8–16)
ANION GAP SERPL CALC-SCNC: 21 MMOL/L (ref 8–16)
APTT BLDCRRT: 34.2 SEC (ref 21–32)
BASOPHILS # BLD AUTO: 0.02 K/UL (ref 0–0.2)
BASOPHILS NFR BLD: 0.1 % (ref 0–1.9)
BUN SERPL-MCNC: 30 MG/DL (ref 8–23)
BUN SERPL-MCNC: 34 MG/DL (ref 8–23)
BUN SERPL-MCNC: 35 MG/DL (ref 8–23)
BUN SERPL-MCNC: 35 MG/DL (ref 8–23)
BUN SERPL-MCNC: 36 MG/DL (ref 8–23)
BUN SERPL-MCNC: 37 MG/DL (ref 8–23)
CALCIUM SERPL-MCNC: 7.6 MG/DL (ref 8.7–10.5)
CALCIUM SERPL-MCNC: 7.7 MG/DL (ref 8.7–10.5)
CALCIUM SERPL-MCNC: 7.8 MG/DL (ref 8.7–10.5)
CALCIUM SERPL-MCNC: 8.4 MG/DL (ref 8.7–10.5)
CHLORIDE SERPL-SCNC: 101 MMOL/L (ref 95–110)
CHLORIDE SERPL-SCNC: 101 MMOL/L (ref 95–110)
CHLORIDE SERPL-SCNC: 102 MMOL/L (ref 95–110)
CHLORIDE SERPL-SCNC: 104 MMOL/L (ref 95–110)
CHLORIDE SERPL-SCNC: 104 MMOL/L (ref 95–110)
CHLORIDE SERPL-SCNC: 105 MMOL/L (ref 95–110)
CO2 SERPL-SCNC: 18 MMOL/L (ref 23–29)
CO2 SERPL-SCNC: 20 MMOL/L (ref 23–29)
CO2 SERPL-SCNC: 20 MMOL/L (ref 23–29)
CO2 SERPL-SCNC: 21 MMOL/L (ref 23–29)
CO2 SERPL-SCNC: 21 MMOL/L (ref 23–29)
CO2 SERPL-SCNC: 22 MMOL/L (ref 23–29)
CREAT SERPL-MCNC: 2.1 MG/DL (ref 0.5–1.4)
CREAT SERPL-MCNC: 2.2 MG/DL (ref 0.5–1.4)
CREAT SERPL-MCNC: 2.3 MG/DL (ref 0.5–1.4)
CREAT SERPL-MCNC: 2.3 MG/DL (ref 0.5–1.4)
CREAT SERPL-MCNC: 2.4 MG/DL (ref 0.5–1.4)
CREAT SERPL-MCNC: 2.6 MG/DL (ref 0.5–1.4)
DELSYS: ABNORMAL
DIFFERENTIAL METHOD: ABNORMAL
EOSINOPHIL # BLD AUTO: 0 K/UL (ref 0–0.5)
EOSINOPHIL NFR BLD: 0.1 % (ref 0–8)
ERYTHROCYTE [DISTWIDTH] IN BLOOD BY AUTOMATED COUNT: 15.6 % (ref 11.5–14.5)
ERYTHROCYTE [SEDIMENTATION RATE] IN BLOOD BY WESTERGREN METHOD: 10 MM/H
EST. GFR  (AFRICAN AMERICAN): 19 ML/MIN/1.73 M^2
EST. GFR  (AFRICAN AMERICAN): 21 ML/MIN/1.73 M^2
EST. GFR  (AFRICAN AMERICAN): 22 ML/MIN/1.73 M^2
EST. GFR  (AFRICAN AMERICAN): 22 ML/MIN/1.73 M^2
EST. GFR  (AFRICAN AMERICAN): 23 ML/MIN/1.73 M^2
EST. GFR  (AFRICAN AMERICAN): 24 ML/MIN/1.73 M^2
EST. GFR  (NON AFRICAN AMERICAN): 16 ML/MIN/1.73 M^2
EST. GFR  (NON AFRICAN AMERICAN): 18 ML/MIN/1.73 M^2
EST. GFR  (NON AFRICAN AMERICAN): 19 ML/MIN/1.73 M^2
EST. GFR  (NON AFRICAN AMERICAN): 19 ML/MIN/1.73 M^2
EST. GFR  (NON AFRICAN AMERICAN): 20 ML/MIN/1.73 M^2
EST. GFR  (NON AFRICAN AMERICAN): 21 ML/MIN/1.73 M^2
FIO2: 35
GLUCOSE SERPL-MCNC: 104 MG/DL (ref 70–110)
GLUCOSE SERPL-MCNC: 106 MG/DL (ref 70–110)
GLUCOSE SERPL-MCNC: 111 MG/DL (ref 70–110)
GLUCOSE SERPL-MCNC: 116 MG/DL (ref 70–110)
GLUCOSE SERPL-MCNC: 124 MG/DL (ref 70–110)
GLUCOSE SERPL-MCNC: 130 MG/DL (ref 70–110)
HCO3 UR-SCNC: 21.1 MMOL/L (ref 24–28)
HCT VFR BLD AUTO: 38.4 % (ref 37–48.5)
HGB BLD-MCNC: 12.2 G/DL (ref 12–16)
IMM GRANULOCYTES # BLD AUTO: 0.2 K/UL (ref 0–0.04)
IMM GRANULOCYTES NFR BLD AUTO: 1 % (ref 0–0.5)
INR PPP: 1.4 (ref 0.8–1.2)
IP: 8
IT: 1
LACTATE SERPL-SCNC: 4.2 MMOL/L (ref 0.5–2.2)
LYMPHOCYTES # BLD AUTO: 0.6 K/UL (ref 1–4.8)
LYMPHOCYTES NFR BLD: 3.1 % (ref 18–48)
MAGNESIUM SERPL-MCNC: 1.9 MG/DL (ref 1.6–2.6)
MAGNESIUM SERPL-MCNC: 2.1 MG/DL (ref 1.6–2.6)
MAGNESIUM SERPL-MCNC: 2.2 MG/DL (ref 1.6–2.6)
MAGNESIUM SERPL-MCNC: 2.4 MG/DL (ref 1.6–2.6)
MCH RBC QN AUTO: 29.6 PG (ref 27–31)
MCHC RBC AUTO-ENTMCNC: 31.8 G/DL (ref 32–36)
MCV RBC AUTO: 93 FL (ref 82–98)
MODE: ABNORMAL
MONOCYTES # BLD AUTO: 0.6 K/UL (ref 0.3–1)
MONOCYTES NFR BLD: 2.9 % (ref 4–15)
NEUTROPHILS # BLD AUTO: 18 K/UL (ref 1.8–7.7)
NEUTROPHILS NFR BLD: 92.8 % (ref 38–73)
NRBC BLD-RTO: 0 /100 WBC
PCO2 BLDA: 30.7 MMHG (ref 35–45)
PEEP: 5
PH SMN: 7.45 [PH] (ref 7.35–7.45)
PHOSPHATE SERPL-MCNC: 4.7 MG/DL (ref 2.7–4.5)
PHOSPHATE SERPL-MCNC: 4.8 MG/DL (ref 2.7–4.5)
PHOSPHATE SERPL-MCNC: 4.8 MG/DL (ref 2.7–4.5)
PHOSPHATE SERPL-MCNC: 5.1 MG/DL (ref 2.7–4.5)
PHOSPHATE SERPL-MCNC: 5.1 MG/DL (ref 2.7–4.5)
PHOSPHATE SERPL-MCNC: 5.2 MG/DL (ref 2.7–4.5)
PLATELET # BLD AUTO: 245 K/UL (ref 150–450)
PMV BLD AUTO: 11.6 FL (ref 9.2–12.9)
PO2 BLDA: 159 MMHG (ref 80–100)
POC BE: -3 MMOL/L
POC SATURATED O2: 100 % (ref 95–100)
POTASSIUM SERPL-SCNC: 3 MMOL/L (ref 3.5–5.1)
POTASSIUM SERPL-SCNC: 3.2 MMOL/L (ref 3.5–5.1)
POTASSIUM SERPL-SCNC: 3.5 MMOL/L (ref 3.5–5.1)
POTASSIUM SERPL-SCNC: 3.6 MMOL/L (ref 3.5–5.1)
POTASSIUM SERPL-SCNC: 3.6 MMOL/L (ref 3.5–5.1)
POTASSIUM SERPL-SCNC: 3.7 MMOL/L (ref 3.5–5.1)
PROTHROMBIN TIME: 15.1 SEC (ref 9–12.5)
RBC # BLD AUTO: 4.12 M/UL (ref 4–5.4)
SAMPLE: ABNORMAL
SITE: ABNORMAL
SODIUM SERPL-SCNC: 138 MMOL/L (ref 136–145)
SODIUM SERPL-SCNC: 138 MMOL/L (ref 136–145)
SODIUM SERPL-SCNC: 139 MMOL/L (ref 136–145)
SODIUM SERPL-SCNC: 140 MMOL/L (ref 136–145)
WBC # BLD AUTO: 19.41 K/UL (ref 3.9–12.7)

## 2022-03-12 PROCEDURE — 85730 THROMBOPLASTIN TIME PARTIAL: CPT | Performed by: INTERNAL MEDICINE

## 2022-03-12 PROCEDURE — 63600175 PHARM REV CODE 636 W HCPCS: Performed by: NURSE PRACTITIONER

## 2022-03-12 PROCEDURE — 63600175 PHARM REV CODE 636 W HCPCS: Performed by: INTERNAL MEDICINE

## 2022-03-12 PROCEDURE — 80048 BASIC METABOLIC PNL TOTAL CA: CPT | Mod: 91 | Performed by: INTERNAL MEDICINE

## 2022-03-12 PROCEDURE — 82803 BLOOD GASES ANY COMBINATION: CPT

## 2022-03-12 PROCEDURE — 83605 ASSAY OF LACTIC ACID: CPT | Performed by: INTERNAL MEDICINE

## 2022-03-12 PROCEDURE — 94003 VENT MGMT INPAT SUBQ DAY: CPT

## 2022-03-12 PROCEDURE — 84100 ASSAY OF PHOSPHORUS: CPT | Mod: 91 | Performed by: INTERNAL MEDICINE

## 2022-03-12 PROCEDURE — 99900035 HC TECH TIME PER 15 MIN (STAT)

## 2022-03-12 PROCEDURE — A4216 STERILE WATER/SALINE, 10 ML: HCPCS | Performed by: INTERNAL MEDICINE

## 2022-03-12 PROCEDURE — 99291 CRITICAL CARE FIRST HOUR: CPT | Mod: ,,, | Performed by: INTERNAL MEDICINE

## 2022-03-12 PROCEDURE — 85610 PROTHROMBIN TIME: CPT | Performed by: INTERNAL MEDICINE

## 2022-03-12 PROCEDURE — 99291 PR CRITICAL CARE, E/M 30-74 MINUTES: ICD-10-PCS | Mod: ,,, | Performed by: INTERNAL MEDICINE

## 2022-03-12 PROCEDURE — 27000221 HC OXYGEN, UP TO 24 HOURS

## 2022-03-12 PROCEDURE — 25000003 PHARM REV CODE 250: Performed by: INTERNAL MEDICINE

## 2022-03-12 PROCEDURE — 83735 ASSAY OF MAGNESIUM: CPT | Mod: 91 | Performed by: INTERNAL MEDICINE

## 2022-03-12 PROCEDURE — 83735 ASSAY OF MAGNESIUM: CPT | Performed by: INTERNAL MEDICINE

## 2022-03-12 PROCEDURE — 80048 BASIC METABOLIC PNL TOTAL CA: CPT | Performed by: INTERNAL MEDICINE

## 2022-03-12 PROCEDURE — 25000003 PHARM REV CODE 250: Performed by: HOSPITALIST

## 2022-03-12 PROCEDURE — 84100 ASSAY OF PHOSPHORUS: CPT | Performed by: INTERNAL MEDICINE

## 2022-03-12 PROCEDURE — 37799 UNLISTED PX VASCULAR SURGERY: CPT

## 2022-03-12 PROCEDURE — 20000000 HC ICU ROOM

## 2022-03-12 PROCEDURE — 85025 COMPLETE CBC W/AUTO DIFF WBC: CPT | Performed by: INTERNAL MEDICINE

## 2022-03-12 RX ORDER — POTASSIUM CHLORIDE 20 MEQ/1
20 TABLET, EXTENDED RELEASE ORAL ONCE
Status: DISCONTINUED | OUTPATIENT
Start: 2022-03-12 | End: 2022-03-12

## 2022-03-12 RX ORDER — SCOLOPAMINE TRANSDERMAL SYSTEM 1 MG/1
1 PATCH, EXTENDED RELEASE TRANSDERMAL
Status: DISCONTINUED | OUTPATIENT
Start: 2022-03-12 | End: 2022-03-16

## 2022-03-12 RX ORDER — NOREPINEPHRINE BITARTRATE/D5W 4MG/250ML
0-3 PLASTIC BAG, INJECTION (ML) INTRAVENOUS CONTINUOUS
Status: DISCONTINUED | OUTPATIENT
Start: 2022-03-12 | End: 2022-03-14

## 2022-03-12 RX ORDER — MAGNESIUM SULFATE 1 G/100ML
1 INJECTION INTRAVENOUS ONCE
Status: COMPLETED | OUTPATIENT
Start: 2022-03-12 | End: 2022-03-12

## 2022-03-12 RX ADMIN — POTASSIUM BICARBONATE 20 MEQ: 391 TABLET, EFFERVESCENT ORAL at 03:03

## 2022-03-12 RX ADMIN — HEPARIN SODIUM 5000 UNITS: 5000 INJECTION INTRAVENOUS; SUBCUTANEOUS at 09:03

## 2022-03-12 RX ADMIN — SCOPOLAMINE 1 PATCH: 1.5 PATCH, EXTENDED RELEASE TRANSDERMAL at 09:03

## 2022-03-12 RX ADMIN — Medication 10 ML: at 05:03

## 2022-03-12 RX ADMIN — Medication 10 ML: at 09:03

## 2022-03-12 RX ADMIN — PROPOFOL 5 MCG/KG/MIN: 10 INJECTION, EMULSION INTRAVENOUS at 05:03

## 2022-03-12 RX ADMIN — ACETAMINOPHEN 650 MG: 160 SOLUTION ORAL at 05:03

## 2022-03-12 RX ADMIN — MAGNESIUM SULFATE 1 G: 1 INJECTION INTRAVENOUS at 03:03

## 2022-03-12 RX ADMIN — CEFTRIAXONE 2 G: 2 INJECTION, SOLUTION INTRAVENOUS at 01:03

## 2022-03-12 RX ADMIN — ACETAMINOPHEN 650 MG: 160 SOLUTION ORAL at 12:03

## 2022-03-12 RX ADMIN — SACUBITRIL AND VALSARTAN 1 TABLET: 49; 51 TABLET, FILM COATED ORAL at 08:03

## 2022-03-12 RX ADMIN — HEPARIN SODIUM 5000 UNITS: 5000 INJECTION INTRAVENOUS; SUBCUTANEOUS at 02:03

## 2022-03-12 RX ADMIN — ASPIRIN 81 MG CHEWABLE TABLET 81 MG: 81 TABLET CHEWABLE at 08:03

## 2022-03-12 RX ADMIN — CHLORHEXIDINE GLUCONATE 0.12% ORAL RINSE 15 ML: 1.2 LIQUID ORAL at 09:03

## 2022-03-12 RX ADMIN — Medication 10 ML: at 03:03

## 2022-03-12 RX ADMIN — FAMOTIDINE 20 MG: 10 INJECTION INTRAVENOUS at 08:03

## 2022-03-12 RX ADMIN — SODIUM CHLORIDE 1000 ML: 0.9 INJECTION, SOLUTION INTRAVENOUS at 12:03

## 2022-03-12 RX ADMIN — SODIUM CHLORIDE 500 ML: 0.9 INJECTION, SOLUTION INTRAVENOUS at 10:03

## 2022-03-12 RX ADMIN — MUPIROCIN: 20 OINTMENT TOPICAL at 08:03

## 2022-03-12 RX ADMIN — CHLORHEXIDINE GLUCONATE 0.12% ORAL RINSE 15 ML: 1.2 LIQUID ORAL at 08:03

## 2022-03-12 RX ADMIN — CLOPIDOGREL 75 MG: 75 TABLET, FILM COATED ORAL at 08:03

## 2022-03-12 RX ADMIN — SODIUM CHLORIDE 500 ML: 0.9 INJECTION, SOLUTION INTRAVENOUS at 08:03

## 2022-03-12 RX ADMIN — HEPARIN SODIUM 5000 UNITS: 5000 INJECTION INTRAVENOUS; SUBCUTANEOUS at 05:03

## 2022-03-12 RX ADMIN — Medication 0.02 MCG/KG/MIN: at 02:03

## 2022-03-12 RX ADMIN — MUPIROCIN: 20 OINTMENT TOPICAL at 09:03

## 2022-03-12 NOTE — ASSESSMENT & PLAN NOTE
3/12 gram-positive bacteremia resembling strep x2.  On IV Rocephin.  Respiratory cultures normal marcial

## 2022-03-12 NOTE — PROGRESS NOTES
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 CPR initiated after call to EMS. ROSC obtained by EMS (downtime not reported but required one shock and arrived to ED ~ one hour after EMS called)  Intubated in ED  Eval revealed leukocytosis, hypokalemia 2.9, compensated anion gap acidosis, BNP > 4900, troponin 0.041, lactate 3.6, procalcitonin 2.08  Admitted to ICU on mechanical ventilation; cooling for targeted temperature management for coma post arrest initiated       Hospital/ICU Course:  Seen and examined at start of shift  Sedated on propofol (for shivering), stopped for exam.  3/12 Patient seen and examined.Day 1post intubation.Paralysis None Sedation Propofol Vasopressors Dobutamine Urine output last 24 hrs 175 ml  Fluid balance since admit 2 L Tmax 100.3 T minimum 92, started rewarming Last BM 3/12 Chest X-ray and ABG reviewed .        Interval History: 3/12 Patient seen and examined.Day 1post intubation.Paralysis None Sedation Propofol Vasopressors Dobutamine Urine output last 24 hrs 175 ml  Fluid balance since admit 2 L Tmax 100.3 T minimum 92, started rewarming Last BM 3/12 Chest X-ray and ABG reviewed .    Review of Systems   Unable to perform ROS: Intubated       Objective:     Vital Signs (Most Recent):  Temp: (!) 92.12 °F (33.4 °C) (03/12/22 1124)  Pulse: 77 (03/12/22 1124)  Resp: 16 (03/12/22  1124)  BP: (!) 68/50 (03/12/22 1100)  SpO2: 95 % (03/11/22 2300)   Vital Signs (24h Range):  Temp:  [89.42 °F (31.9 °C)-92.12 °F (33.4 °C)] 92.12 °F (33.4 °C)  Pulse:  [61-92] 77  Resp:  [14-27] 16  SpO2:  [53 %-99 %] 95 %  BP: ()/() 68/50  Arterial Line BP: ()/() 87/44     Weight: 52.6 kg (116 lb)  Body mass index is 21.22 kg/m².      Intake/Output Summary (Last 24 hours) at 3/12/2022 1206  Last data filed at 3/12/2022 1000  Gross per 24 hour   Intake 287.58 ml   Output 165 ml   Net 122.58 ml       Physical Exam  Constitutional:       Appearance: She is ill-appearing and toxic-appearing.   HENT:      Mouth/Throat:      Comments: ET tube OG tube  Neck:      Comments: right subclavian triple-lumen CT  Cardiovascular:      Rate and Rhythm: Normal rate and regular rhythm.   Pulmonary:      Breath sounds: Rhonchi present.   Genitourinary:     Comments: Abreu catheter  Musculoskeletal:         General: Deformity present.   Skin:     General: Skin is warm and dry.   Neurological:      Comments: Breathing over the vent with going to pain not following commands       Vents:  Vent Mode: A/C (03/12/22 1124)  Set Rate: 10 BPM (03/12/22 1124)  Vt Set: 0 mL (03/12/22 1124)  Pressure Support: 0 cmH20 (03/12/22 1124)  PEEP/CPAP: 5 cmH20 (03/12/22 1124)  Oxygen Concentration (%): 30 (03/12/22 1124)  Peak Airway Pressure: 14 cmH2O (03/12/22 1124)  Plateau Pressure: 13 cmH20 (03/12/22 1124)  Total Ve: 13.3 mL (03/12/22 1124)  F/VT Ratio<105 (RSBI): (!) 35.32 (03/12/22 1124)    Lines/Drains/Airways       Central Venous Catheter Line  Duration             Percutaneous Central Line Insertion/Assessment - Triple Lumen  03/10/22 2215 right subclavian 1 day              Drain  Duration                  NG/OG Tube 03/10/22 1957 16 Fr. Center mouth 1 day         Urethral Catheter 03/10/22 2032 Non-latex 16 Fr. 1 day              Airway  Duration                  Airway 1 day         Airway - Non-Surgical 03/10/22 1945  Endotracheal Tube 1 day              Arterial Line  Duration             Arterial Line 03/11/22 0310 Right Radial 1 day              Peripheral Intravenous Line  Duration                  Peripheral IV - Single Lumen 03/10/22 1945 18 G Right Forearm 1 day         Peripheral IV - Single Lumen 03/10/22 2200 18 G Anterior;Distal;Left Upper Arm 1 day                    Significant Labs:    CBC/Anemia Profile:  Recent Labs   Lab 03/10/22  2018 03/11/22  0545 03/12/22  0526   WBC 19.37* 25.17* 19.41*   HGB 9.4* 10.8* 12.2   HCT 29.8* 34.3* 38.4    271 245   MCV 95 95 93   RDW 15.8* 15.9* 15.6*        Chemistries:  Recent Labs   Lab 03/10/22  2018 03/11/22  0130 03/11/22  0228 03/11/22 0545 03/12/22  0004 03/12/22  0541 03/12/22  0837     --  138   < > 140 140 138   K 2.9*  --  4.8   < > 3.6 3.7 3.6     --  100   < > 101 101 102   CO2 19*  --  23   < > 18* 20* 20*   BUN 19  --  21   < > 30* 34* 35*   CREATININE 1.2  --  1.6*   < > 2.1* 2.3* 2.3*   CALCIUM 8.1*  --  8.2*   < > 8.4* 8.4* 8.4*   ALBUMIN 2.9*  --  3.0*  --   --   --   --    PROT 6.2  --  6.2  --   --   --   --    BILITOT 0.9  --  1.1*  --   --   --   --    ALKPHOS 90  --  92  --   --   --   --    ALT 12  --  21  --   --   --   --    AST 53*  --  59*  --   --   --   --    MG  --    < >  --    < > 2.1 2.1 2.1   PHOS  --   --   --    < > 4.8* 5.1* 5.1*    < > = values in this interval not displayed.       A1C: No results for input(s): HGBA1C in the last 48 hours.  ABGs:   Recent Labs   Lab 03/12/22  0438   PH 7.445   PCO2 30.7*   HCO3 21.1*   POCSATURATED 100   BE -3     Bilirubin:   Recent Labs   Lab 03/10/22  2018 03/11/22  0228   BILITOT 0.9 1.1*     Blood Culture:   Recent Labs   Lab 03/10/22  2002 03/10/22  2129   LABBLOO Gram stain aer bottle: Gram positive cocci in chains resembling Strep  Positive results previously called 03/12/2022  11:08 Gram stain aer bottle: Gram positive cocci in chains resembling Strep   Results called to and  read back by:Nalini Mora 03/12/2022  05:03  Gram stain laurie bottle: Gram positive cocci in chains resembling Strep   Positive results previously called 03/12/2022  11:07     Cardiac Markers: No results for input(s): CKMB, TROPONINT, MYOGLOBIN in the last 48 hours.  Coagulation:   Recent Labs   Lab 03/12/22  0418   INR 1.4*   APTT 34.2*      Latest Reference Range & Units 03/10/22 20:18 03/11/22 02:28 03/11/22 10:25 03/11/22 18:13   BNP 0 - 99 pg/mL >4,900 (H) [1]      CPK 20 - 180 U/L  260 (H)     Troponin I 0.000 - 0.026 ng/mL 0.041 (H) [2] 0.239 (H) [3] 0.438 (H) [4] 0.398 (H) [5]   (H): Data is abnormally high  [1] Values of less than 100 pg/ml are consistent with non-CHF populations.  [2] The reference interval for Troponin I represents the 99th percentile   cutoff   for our facility and is consistent with 3rd generation assay   performance.    [3] The reference interval for Troponin I represents the 99th percentile   cutoff   for our facility and is consistent with 3rd generation assay   performance.    [4] The reference interval for Troponin I represents the 99th percentile   cutoff   for our facility and is consistent with 3rd generation assay   performance.    [5] The reference interval for Troponin I represents the 99th percentile   cutoff   for our facility and is consistent with 3rd generation assay   performance.    Lactic Acid:   Recent Labs   Lab 03/11/22  1232 03/11/22  1813 03/12/22  0852   LACTATE 10.8* 10.8* 4.2*     Respiratory Culture:   Recent Labs   Lab 03/11/22  0319   GSRESP <10 epithelial cells per low power field.  Moderate WBC's  Many Gram positive cocci  Few Gram positive rods   RESPIRATORYC Normal respiratory marcial     Urine Culture: No results for input(s): LABURIN in the last 48 hours.    2D echo 03/11/2022  · The left ventricle is mildly enlarged with eccentric hypertrophy and severely decreased systolic function.  · Grade III left ventricular diastolic dysfunction.  · Moderate  right ventricular enlargement with moderately reduced right ventricular systolic function.  · Severe left atrial enlargement.  · The estimated ejection fraction is 10%.  · There is severe left ventricular global hypokinesis.  · Severe right atrial enlargement.  · Moderate-to-severe mitral regurgitation.  · Moderate to severe tricuspid regurgitation.  · Moderate pulmonic regurgitation.  · Mechanically ventilated; cannot use inferior caval vein diameter to estimate central venous pressure.  · There is a large left pleural effusion.          2D echo 2019    1 - Concentric hypertrophy.     2 - No wall motion abnormalities.     3 - Normal left ventricular systolic function (EF 60-65%).     4 - Normal left ventricular diastolic function.     5 - Normal right ventricular systolic function .     6 - The estimated PA systolic pressure is 35 mmHg.     7 - Trivial pulmonic regurgitation.     8 - Intermediate central venous pressure.  Significant Imaging:      CT head 0 3/10 no acute findings    Chest x-ray 03/10/2022      COMPARISON:  Multiple priors.     FINDINGS:  Endotracheal tube tip 4.5 cm from the kermit in good position.  Right central venous catheter in place unchanged.  Enteric tube tip and side hole overlie the stomach.     Slightly improved appearance of the lungs.     Impression:     Endotracheal tube tip 4.5 cm from the kermit in good position.              ABG  Recent Labs   Lab 03/12/22  0438   PH 7.445   PO2 159*   PCO2 30.7*   HCO3 21.1*   BE -3     Assessment/Plan:     Neuro  Anoxic encephalopathy  Post cardiac arrest  Current high quality data suggests limited if any benefit but cooling for TTM initiated overnight and reached goal temp prior to our consult/exam. Will complete TTM per protocol  Updated daughter at bedside on exam findings, plan of care, and expected clinical course  Palliative care team consulted for additional support with expected difficult conversations over next 48-72 hours  3/12 assess  mental status after rewarming completed and of sedation.  Patient DNR.  Palliative care consult for comfort measures.    ENT  Mass of sphenoid sinus  Noted on CT head post cardiac arrest  ?relationship to recurrent laryngeal papillomas  ENT consulted  Anticipate work up delay until neuro recovery/prognosis post cardiac arrest resulting in comatose state    Pulmonary  On mechanically assisted ventilation  For airway protection post cardiac arrest  Vent settings reviewed and adjusted to optimize gas exchange  VAP prophylaxis  Empiric abx for high risk aspiration with arrest + significant leukocytosis  abg daily and prn to assess response to therapy  3/12 on FiO2 35%.  PaO2 160 mm Hg.  Check daily chest x-ray and ABG.  Assess mental status after rewarming completed and of sedation.    Cardiac/Vascular  * Cardiac arrest  Etiology unclear but airway compromise, hypokalemia, and sudden cardiac death related to poor EF all possible primary or contributing factors  Repeat echo pending  Cardiology consulted    Acute on chronic combined systolic and diastolic CHF (congestive heart failure)  Last EF 25%  Recent increase in home lasix due to BLE edema  Per cardiology notes, pt would not desire invasive procedures or support  Now with CODY, anuria; fluid volume management will be complicated  Avoid extraneous fluid as able  Continue entresto as tolerated  ICU hemodynamic monitoring  Monitor electrolytes and keep K 4-5 and Mag > 2.0  3/12 strict I&Os.  On dobutamine.  Cardiogenic plus septic shock.  Treat gram-positive bacteremia.  On target map above 65 mm Hg.  Add Levophed.  Received 2 L bolus    Renal/  CODY (acute kidney injury)  3/12 oliguric strict I&Os.  Receiving NS boluses today.    ID  Gram-positive bacteremia  3/12 gram-positive bacteremia resembling strep x2.  On IV Rocephin.  Respiratory cultures normal marcial    Oncology  Laryngeal papillomatosis  Recurrent since childhood with MANY surgical interventions  Followed  by Dr Harrison, last rigid laryngoscopy on 10/26/2021 per EMR  Unclear if choking/airway compromise played role in cardiac arrest at dinner       Critical Care Daily Checklist:    A: Awake: RASS Goal/Actual Goal: RASS Goal: -3-->moderate sedation  Actual: Coffey Agitation Sedation Scale (RASS): Moderate sedation   B: Spontaneous Breathing Trial Performed?     C: SAT & SBT Coordinated?  Being rewarmed today                      D: Delirium: CAM-ICU Overall CAM-ICU: Positive   E: Early Mobility Performed? Yes   F: Feeding Goal:    Status:     Current Diet Order   Procedures    Diet NPO      AS: Analgesia/Sedation Off sedation   T: Thromboembolic Prophylaxis Subcutaneous heparin   H: HOB > 300 Yes   U: Stress Ulcer Prophylaxis (if needed) Famotidine   G: Glucose Control Fingerstick p.r.n.   B: Bowel Function Stool Occurrence: 0   I: Indwelling Catheter (Lines & Abreu) Necessity Critically ill keep Abreu central line A-line   D: De-escalation of Antimicrobials/Pharmacotherapies IV Rocephin for bacteremia resembling strep    Plan for the day/ETD Y    Code Status:  Family/Goals of Care: DNR  Y     Critical Care Time: 35 minutes  Critical secondary to Patient has a condition that poses threat to life and bodily function:  Shock cardiogenic and septic complicating gram-positive bacteremia complicated by CODY complicated by endotracheal intubation      Critical care was time spent personally by me on the following activities: development of treatment plan with patient or surrogate and bedside caregivers, discussions with consultants, evaluation of patient's response to treatment, examination of patient, ordering and performing treatments and interventions, ordering and review of laboratory studies, ordering and review of radiographic studies, pulse oximetry, re-evaluation of patient's condition. This critical care time did not overlap with that of any other provider or involve time for any procedures.     Charli HEALY  MD Sam  Critical Care Medicine  Formerly [facility] - Intensive Care [facility])

## 2022-03-12 NOTE — ASSESSMENT & PLAN NOTE
She had witnessed cardiac arrest.  Last Echo -1/21-  EF -25%  Diastolic dysfunction.  EKG -Sinus rhythm with occasional Premature ventricular complexes and Premature atrial complexes  Left axis deviation  Cardiology consult   Prognosis poor

## 2022-03-12 NOTE — ASSESSMENT & PLAN NOTE
Last EF 25%  Recent increase in home lasix due to BLE edema  Per cardiology notes, pt would not desire invasive procedures or support  Now with CODY, anuria; fluid volume management will be complicated  Avoid extraneous fluid as able  Continue entresto as tolerated  ICU hemodynamic monitoring  Monitor electrolytes and keep K 4-5 and Mag > 2.0  3/12 strict I&Os.  On dobutamine.  Cardiogenic plus septic shock.  Treat gram-positive bacteremia.  On target map above 65 mm Hg.  Add Levophed.  Received 2 L bolus

## 2022-03-12 NOTE — PLAN OF CARE
Pt remains intubated and ongoing TTM at goal of 33 C. Will begin rewarming at 0615. Pt is waking up more but only grimacing and posturing. She is not following commands, tracking or showing purposeful movement. Pt had a few episodes of hypotension last night but pausing/turning off propofol corrected this. She is becoming increasingly agitated this am so is back on low dose propofol at 10mg/kg/min. She also remains on Dobutamine. Pt arterial line is not reading correctly and is extremely positional r/t patient agitation and wrist movement at the beginning of shift. Pt oliguric. Pt also has copious, clear oral secretions. She was turned q2 hours and oral care/suctioning performed q4 hours and PRN. POC reviewed with family and all safety precautions maintained.

## 2022-03-12 NOTE — NURSING
No change in BP after bolus. Dr. Vargas notified. Discussed pt EF, fluid status, and trouble with SpO2 monitoring. Additional 1000cc bolus ordered. MD en route to speak with family.

## 2022-03-12 NOTE — SUBJECTIVE & OBJECTIVE
Interval History:     Review of Systems   Unable to perform ROS: Patient unresponsive   Objective:     Vital Signs (Most Recent):  Temp: (!) 91.04 °F (32.8 °C) (22)  Pulse: 75 (22)  Resp: 18 (22)  BP: (!) 72/52 (22)  SpO2: 95 % (22 2300)   Vital Signs (24h Range):  Temp:  [89.42 °F (31.9 °C)-91.76 °F (33.2 °C)] 91.04 °F (32.8 °C)  Pulse:  [61-92] 75  Resp:  [14-27] 18  SpO2:  [53 %-99 %] 95 %  BP: ()/() 72/52  Arterial Line BP: ()/() 89/44     Weight: 52.6 kg (116 lb)  Body mass index is 21.22 kg/m².    Intake/Output Summary (Last 24 hours) at 3/12/2022 1004  Last data filed at 3/12/2022 0900  Gross per 24 hour   Intake 295.61 ml   Output 175 ml   Net 120.61 ml      Physical Exam  Vitals and nursing note reviewed.   Constitutional:       Appearance: She is underweight. She is ill-appearing.      Interventions: She is intubated.   HENT:      Head: Atraumatic.   Eyes:      Conjunctiva/sclera: Conjunctivae normal.      Comments: Pupils 2mm bilaterally and reactive to light  No corneal reflex noted   Cardiovascular:      Rate and Rhythm: Regular rhythm. Bradycardia present. Occasional Extrasystoles are present.     Pulses:           Radial pulses are 2+ on the right side and 2+ on the left side.        Dorsalis pedis pulses are 2+ on the right side and 2+ on the left side.      Heart sounds: Murmur heard.   Systolic murmur is present.   Pulmonary:      Effort: She is intubated.      Breath sounds: Decreased breath sounds present. No wheezing, rhonchi or rales.   Abdominal:      General: Abdomen is flat. Bowel sounds are absent. There is no distension.      Palpations: Abdomen is soft.   Musculoskeletal:      Right lower le+ Edema present.      Left lower le+ Edema present.   Skin:     General: Skin is cool and dry.      Capillary Refill: Capillary refill takes 2 to 3 seconds.   Neurological:      GCS: GCS eye subscore is 1. GCS verbal  subscore is 1. GCS motor subscore is 4.      Comments: Positive cough and gag reflexes  Withdraws from noxious stimuli with BLE and RUE       Significant Labs: All pertinent labs within the past 24 hours have been reviewed.      Significant Imaging: I have reviewed all pertinent imaging results/findings within the past 24 hours.

## 2022-03-12 NOTE — PROGRESS NOTES
"[facility] - Int[facility] ([facility])  [facility] Medicine  Progress Note    Patient Name: Tanya Madrid  MRN: [id number]  Patient Class: IP- Inpatient   Admission Date: 3/10/2022  Length of Stay: 2 days  Attending Physician: Sj Juan, *  Primary Care Provider: Maggie Sue NP        Subjective:     Principal Problem:Cardiac arrest        HPI:  History was taken from the electronic chart and from the daughter -  Adenike Lemus Daughter [phone]        82 y.o. female patient with a PMHx of HTN,  CHF-last EF-01/2021-25%, with diastolic dysfunction  , and HLD who presents to the Emergency Department for evaluation of cardiac arrest which onset suddenly 1 hour pta.  Daughter reports that over the last 3 days -she has been complaining of worsening dyspnoea  and was given extra lasix tablets. She usually takes 20mg lasix daily and was given 2 extra tablets with the episodes of  dyspnea.  This evening while having dinner, she "looked blank" and stopped breathing . Daughter called EMS and was advised to start CPR.. Pt achieved ROSC en route. After I shock . Prior Tx includes 50 mcg of fentanyl and 50 mg of ketamine from EMS.   Code status discussed.  She is Full code.  Labs and imaging test reviewed.  CT head -No hemorrhage or acute major vascular distribution infarction.   Dehiscence of the posterior wall of the sphenoid sinus such as on series 4, image 49-52. There is high density material possibly related to paranasal sinus disease or neoplasm which extends towards the pituitary and along the right towards the intracranial ICA.  Further evaluation with contrast enhanced MRI when clinically appropriate would be recommended.       Component      Latest Ref Rng & Units 3/10/2022 3/3/2022              Potassium      3.5 - 5.1 mmol/L 2.9 (L) 3.7   CBC showed wbc -19   Component      Latest Ref Rng & Units 3/10/2022 3/10/2022 4/20/2021          10:47 PM  8:18 PM    Lactate, Lex      0.5 - 2.2 " mmol/L 3.6 (HH) 5.7 (HH) 2.0     Component      Latest Ref Rng & Units 3/10/2022 4/20/2021   BNP      0 - 99 pg/mL >4,900 (H) 2,806 (H)   Code status discussed -she is full code  Her daughter is the SDM.  Adenike Lemus Daughter [phone]       Overview/Hospital Course:  81 y/o AAF admitted to ICU with a dx of cardiac arrest , CODY , acute hypoxic respiratory failure  and  Anoxic encephalopathy . Started on hypothermia protocol , IVAB , asa , plavix  And entresto . Cardiology and palliative care consulted. Tx reviewed . Cont current tx . Prognosis Poor      3/12  She remain critically ill . Now on rewarming phase . Started on propofol due to  only grimacing and posturing . Kidney  function is trending up . She  is on low dose of dobutamine . Case d/w he daughter at bedside . She changed code status to DNR . Prognosis poor .      Interval History:     Review of Systems   Unable to perform ROS: Patient unresponsive   Objective:     Vital Signs (Most Recent):  Temp: (!) 91.04 °F (32.8 °C) (03/12/22 0906)  Pulse: 75 (03/12/22 0906)  Resp: 18 (03/12/22 0906)  BP: (!) 72/52 (03/12/22 0906)  SpO2: 95 % (03/11/22 2300)   Vital Signs (24h Range):  Temp:  [89.42 °F (31.9 °C)-91.76 °F (33.2 °C)] 91.04 °F (32.8 °C)  Pulse:  [61-92] 75  Resp:  [14-27] 18  SpO2:  [53 %-99 %] 95 %  BP: ()/() 72/52  Arterial Line BP: ()/() 89/44     Weight: 52.6 kg (116 lb)  Body mass index is 21.22 kg/m².    Intake/Output Summary (Last 24 hours) at 3/12/2022 1004  Last data filed at 3/12/2022 0900  Gross per 24 hour   Intake 295.61 ml   Output 175 ml   Net 120.61 ml      Physical Exam  Vitals and nursing note reviewed.   Constitutional:       Appearance: She is underweight. She is ill-appearing.      Interventions: She is intubated.   HENT:      Head: Atraumatic.   Eyes:      Conjunctiva/sclera: Conjunctivae normal.      Comments: Pupils 2mm bilaterally and reactive to light  No corneal reflex noted    Cardiovascular:      Rate and Rhythm: Regular rhythm. Bradycardia present. Occasional Extrasystoles are present.     Pulses:           Radial pulses are 2+ on the right side and 2+ on the left side.        Dorsalis pedis pulses are 2+ on the right side and 2+ on the left side.      Heart sounds: Murmur heard.   Systolic murmur is present.   Pulmonary:      Effort: She is intubated.      Breath sounds: Decreased breath sounds present. No wheezing, rhonchi or rales.   Abdominal:      General: Abdomen is flat. Bowel sounds are absent. There is no distension.      Palpations: Abdomen is soft.   Musculoskeletal:      Right lower le+ Edema present.      Left lower le+ Edema present.   Skin:     General: Skin is cool and dry.      Capillary Refill: Capillary refill takes 2 to 3 seconds.   Neurological:      GCS: GCS eye subscore is 1. GCS verbal subscore is 1. GCS motor subscore is 4.      Comments: Positive cough and gag reflexes  Withdraws from noxious stimuli with BLE and RUE       Significant Labs: All pertinent labs within the past 24 hours have been reviewed.      Significant Imaging: I have reviewed all pertinent imaging results/findings within the past 24 hours.        Assessment/Plan:      * Cardiac arrest    She had witnessed cardiac arrest.  Last Echo --  EF -25%  Diastolic dysfunction.  EKG -Sinus rhythm with occasional Premature ventricular complexes and Premature atrial complexes  Left axis deviation  Cardiology consult   Prognosis poor     CODY (acute kidney injury)  2/2 to cardiac arrest   No a good candidate for CRT/RRT      Anoxic encephalopathy  2/2 to cardiac arrest       Hypokalemia    Will replete, add serum mag , follow BMP in AM    Aspiration pneumonia    Will add Rocephin, follow blood cultures    Mass of sphenoid sinus    Ct head showed  Dehiscence of the posterior wall of the sphenoid sinus such as on series 4, image 49-52. There is high density material possibly related to paranasal  sinus disease or neoplasm which extends towards the pituitary and along the right towards the intracranial ICA.  Further evaluation with contrast enhanced MRI when clinically appropriate would be recommended.  Suggest including 3D/volumetric postcontrast imaging.  ENT consult as clinically warranted.    Will consult ENT    Elevated troponin  Cont asa/plavix  Cardiology on board       Acute on chronic combined systolic and diastolic CHF (congestive heart failure)  Patient is identified as having Combined Systolic and Diastolic heart failure that is Acute on chronic. CHF is currently uncontrolled due to Pulmonary edema/pleural effusion on CXR. Latest ECHO performed and demonstrates- Results for orders placed during the hospital encounter of 01/15/21    Echo Color Flow Doppler? Yes    Interpretation Summary  · The left ventricle is severely enlarged with eccentric hypertrophy and severely decreased systolic function. The estimated ejection fraction is 25%  · Grade II left ventricular diastolic dysfunction.  · Normal right ventricular size with moderately reduced right ventricular systolic function.  · Severe left atrial enlargement.  · There is pulmonary hypertension.  · There is mild aortic valve stenosis.  · Aortic valve area is 1.37 cm2; peak velocity is 1.33 m/s; mean gradient is 4 mmHg.  · Mild to moderate tricuspid regurgitation.  · Intermediate central venous pressure (8 mmHg).  · The estimated PA systolic pressure is 50 mmHg.  · Trivial pericardial effusion.  . Continue Nitrate/Vasodilator and monitor clinical status closely. Monitor on telemetry. Patient is on CHF pathway.  Monitor strict Is&Os and daily weights.  Place on fluid restriction of 1.5 L. Continue to stress to patient importance of self efficacy and  on diet for CHF. Last BNP reviewed- and noted below   Recent Labs   Lab 03/10/22  2018   BNP >4,900*   .      History of CVA (cerebrovascular accident)    On aspirin/plavix    Parkinson  disease  Continue supportive care       Laryngeal papillomatosis  Cont supportive tx       Essential hypertension    Hold  entresto due to hypotension  On dobutamine due to map < 65       VTE Risk Mitigation (From admission, onward)         Ordered     heparin (porcine) injection 5,000 Units  Every 8 hours         03/10/22 2351     IP VTE HIGH RISK PATIENT  Once         03/10/22 2345     Place sequential compression device  Until discontinued         03/10/22 2345                Discharge Planning   PARMINDER:      Code Status: DNR   Is the patient medically ready for discharge?:     Reason for patient still in hospital (select all that apply): Patient unstable and Treatment  Discharge Plan A: Other (TBD)            Critical care time spent on the evaluation and treatment of severe organ dysfunction, review of pertinent labs and imaging studies, discussions with consulting providers and discussions with patient/family: 38 minutes.      Sj Juan MD  Department of Hospital Medicine   '[place] - Intensive [facility] ([facility])

## 2022-03-12 NOTE — ASSESSMENT & PLAN NOTE
Patient is identified as having Combined Systolic and Diastolic heart failure that is Acute on chronic. CHF is currently uncontrolled due to Pulmonary edema/pleural effusion on CXR. Latest ECHO performed and demonstrates- Results for orders placed during the hospital encounter of 01/15/21    Echo Color Flow Doppler? Yes    Interpretation Summary  · The left ventricle is severely enlarged with eccentric hypertrophy and severely decreased systolic function. The estimated ejection fraction is 25%  · Grade II left ventricular diastolic dysfunction.  · Normal right ventricular size with moderately reduced right ventricular systolic function.  · Severe left atrial enlargement.  · There is pulmonary hypertension.  · There is mild aortic valve stenosis.  · Aortic valve area is 1.37 cm2; peak velocity is 1.33 m/s; mean gradient is 4 mmHg.  · Mild to moderate tricuspid regurgitation.  · Intermediate central venous pressure (8 mmHg).  · The estimated PA systolic pressure is 50 mmHg.  · Trivial pericardial effusion.  . Continue Nitrate/Vasodilator and monitor clinical status closely. Monitor on telemetry. Patient is on CHF pathway.  Monitor strict Is&Os and daily weights.  Place on fluid restriction of 1.5 L. Continue to stress to patient importance of self efficacy and  on diet for CHF. Last BNP reviewed- and noted below   Recent Labs   Lab 03/10/22  2018   BNP >4,900*   .

## 2022-03-12 NOTE — PLAN OF CARE
Advance Care Planning     Date: 03/12/2022    Code Status  In light of the patients advanced and life limiting illness,I engaged the the family and healthcare power of   in a conversation about the patient's preferences for care  at the very end of life. The patient wishes to have a natural, peaceful death.  Along those lines, the patient does not wish to have CPR or other invasive treatments performed when her heart and/or breathing stops. I communicated to the family and healthcare power of   that a DNR order would be placed in her medical record to reflect this preference.  I spent a total of 35 minutes engaging the patient in this advance care planning discussion.           Advance Care Planning     Date: 03/12/2022    [facility]  I engaged the family and healthcare power of   in a conversation about advance care planning and we specifically addressed what the goals of care would be moving forward, in light of the patient's change in clinical status, specifically .  We did specifically address the patient's likely prognosis, which is poor.  We explored the patient's values and preferences for future care.  The family and healthcare power of   endorses that what is most important right now is to focus on improvement in condition but with limits to invasive therapies    Accordingly, we have decided that the best plan to meet the patient's goals includes continuing with treatment    I did explain the role for hospice care at this stage of the patient's illness, including its ability to help the patient live with the best quality of life possible.    I spent a total of 35 minutes engaging the patient in this advance care planning discussion.

## 2022-03-12 NOTE — HOSPITAL COURSE
81 y/o AAF admitted to ICU with a dx of cardiac arrest , CODY , acute hypoxic respiratory failure  and  Anoxic encephalopathy . Started on hypothermia protocol , IVAB , asa , plavix  And entresto . Cardiology and palliative care consulted. Tx reviewed . Cont current tx . Prognosis Poor      3/12  She remain critically ill . Now on rewarming phase . Started on propofol due to  only grimacing and posturing . Kidney  function is trending up . She  is on low dose of dobutamine . Case d/w he daughter at bedside . She changed code status to DNR . Prognosis poor .    3/13 Reamin critically ill  on mechanical ventilation . Temp is now > 36 . Last night  was on levophed and dobutamine to keep a map > 65  . Levophed wean off this am . She has been on propofol on and off for posturing  movement . Sedation stop for 2 hrs  .  She has positve  corneal , pupil and gag reflex .  No respond to verval or pain stimuli . She withdraw to pain . She is breathing overt the vent .  Neurology consulted . The UOP has sligly increased since yesterday . The kidney function  cont trending up . The Blood cx are (+) ALPHA HEMOLYTIC STREP    3/14 No significant neurologuical changes since yesterday . She opne her eyes on tactile simlus . She  does not follow commads . Ha been odff sedation for more than 24 hrs .  The Blood cx (+) for STREPTOCOCCUS ANGINOSUS  and EIKENELLA CORRODENS . The kidney function is sligly improving  . Prognsosis POOR .  POC d/w  her duaghter .    3/15- Remains intubated with FiO2 30%/PEEP 5 . Afebrile. Off sedation x 2 days. Remains comatose.  Intermittent spontaneous eye opening but does not seem purposeful. No verbal  response , does not follow commands or track voice. Intermittent withdrawal to pain stimulus. Corneal, pupillary , cough and gag reflex present. Remains on Dobutamine gtt. EF 10%. Tube feed in progress. UOP improved 1L yesterday, however creatinine bumped to 3.8. Rocephin for Streptococcal  bacteremia  continued. TTE negative for vegetation. Palliative Medicine is following. Prognosis appears poor.     3/16- Aferbile.Off sedation. Remains intubated with minimal vent support FiO2 30%/ PEEP 5. However pt is not awake enough to manage secretion and protect airways. Intermittent eye opening but does not seem purposeful. Brain stem reflexes are present. Withdraws to pain stimulus and facial grimace noted with sternal rub. Remains on dobutamine gtt.  ml yesterday . BNPCreatinine stable at 3.7. BNP down to 1493 from 4900 on admit. Will re consult Neurology to assess prognosis . Palliative Medicine is following.     3/17- Eyes are open but not purposeful, No verbal response , withdraws to pain stimulus. ETT cuff leak noted and needed to be exchanged. CC team extubated pt after SBT. Plan to assess clinical course from respiratory stand point with no plan for re intubation. NG tube placement will be attempted for feeding and medication administration. Dobutamine gtt continues.  High risk of sudden demise from cardiac stand point given EF 10%.     3/18- No verbal response, does not follow commands, eyes are open, intermittently seems pt tracks voice other time not. Withdraws to pain stimulus. Weak cough , requiring frequent suctioning to clear airway . Pt bites down suction tube. SpO2 satisfactory on FiO2 2L/min. Afebrile . NG tube feeds in progress , so far tolerating . UOP 1 liter yesterday. Cr 3.2. WBC dhara to 15K. Antibiotic Rocephin continues . Pt remains on Dobutamine gtt. Awaiting Cardiology input. Family declined Palliative Medicine follow up. Will get follow up CT head today.     3/19- Afebrile . On RA. SpO2 100%. Repeat CT head with preserved gray-white matter junction and no cerebral edema, hemorrhage or other acute process. GCS is difficult to assess. Eyes remains open. No verbal response , withdraws to pain. Pt remains with poor cough effort and lot of secretion requiring suction and breatrh sounds are  "coarse. CXR with no new new infiltrate or consolidation as of today. WBC 14K, Cr down to 2.9. . Gentle hydration initiated .  ml yesterday. Spoke to marlin at bedside . She is debating continuing current care and PEG tube placement vs Hospice referral. Pt had 10 beats NSVT yesterday .Dobutamine gtt discontinued.     3/20- Afebrile. Remains on RA. At times seems more awake and tracks voice. Moves head and ext intermittently. UOP increased to 1.5L yesterday . Creatinine down to 2.2. NG tube accidentally removed during PT. Daughter has not made her mind on PEG tube placement yet. WBC 14K. Flagyl added. Na 148.     3/21- On RA. Pt with poor secretion management . Daughter elected PEG tube placement . Plavix and ASA held from today. IR consulted for PEG tube. Notify IR once pt remains off antiplatelet x 5 days. WBC 17K. PCT down to 0.51. Falgyl added with current Rocephin. Cr further improved to 1.8    3/22 She is aao x 0  in nad .Intermittent spontaneous  upper extremities movement but does not seem purposeful. No verbal  response , does not follow commands but track voice. Plavix  on hold yesterday  . NAEON . No family at bedside .     3/23 No neurological changes from yesterday .  Plan for PEG tube placement . Decadron  IV wean down . NAEON .     3/24 Last night  the NGT displaced and d/c . RN has not be able to placed a new one despite multiple attempts .  She has   multiple episode of hypoglycemia which resolved with D5 . The Na level is trending up  . Pt started on d5 at 50 cc/hr due to hypoglycemia and  hypernatremia / IR contacted for PEG tube placement tomorrow . The  WBC is trending up , Pt has  been on decadron iv which was d/c  today .     3/25- Afebrile . On RA. Pt is awake and non verbal . Spontaneous head and left arm movement noted. Does not follow commands, however daughter at bedside reports she would occasionally says " what" and squeeze her hands. WBC normalized . Noted Hgb 8.5 today. " No signs of active bleeding reported. Na 148. Creatinine 1.3. Possible PEG tube placement by  IR today. Disposition- PT suggested basic NH placement , however family wishes to take pt home.     3/26- S/P PEG tube placement yesterday by IR. No immediate post procedure complications reported . Awaiting IR clearance to use PEG tube. No new concern at this time. Hgb stable at 8.6, Na 147, Cr 1.3. Clinically does not seem volume overloaded , however BNP is elevated at 2382.     3/27- Pt seems more awake today. Opens eyes and move head to commands , tracks voice and occasionally squeeze hands to commands. PEG feeding started yesterday and tolerating . BP is labile. Coreg dose adjusted and Entresto held. RD consulted for bolus PEF feeding . Disposition - Possible DC home to family care in the next 24h     3/28- PEG tube feeds transitioned to bolus . CM consulted to setup home Health and Home enteral feeding formula . Possible DC  home with Home Health to family care once home enteral feeding setup completed     3/29- Pt is awake , mostly non verbal , not necessarily follows commands but tracks voices and moves eyes and facial expression seems she is oriented. Breathing is spontaneous on RA, non labored  and no distress noted. No peripheral edema noted on exam. Abreu catheter exchanged today. Pt is examined and deemed stable and suitable for discharge. Family desires to take pt home with Home Health service PT/OT, SN, speech therapy , Aide service and Social service , home NP visit. Virtual visit with PCP and Cardiology post discharge.

## 2022-03-12 NOTE — SUBJECTIVE & OBJECTIVE
Interval History: 3/12 Patient seen and examined.Day 1post intubation.Paralysis None Sedation Propofol Vasopressors Dobutamine Urine output last 24 hrs 175 ml  Fluid balance since admit 2 L Tmax 100.3 T minimum 92, started rewarming Last BM 3/12 Chest X-ray and ABG reviewed .    Review of Systems   Unable to perform ROS: Intubated       Objective:     Vital Signs (Most Recent):  Temp: (!) 92.12 °F (33.4 °C) (03/12/22 1124)  Pulse: 77 (03/12/22 1124)  Resp: 16 (03/12/22 1124)  BP: (!) 68/50 (03/12/22 1100)  SpO2: 95 % (03/11/22 2300)   Vital Signs (24h Range):  Temp:  [89.42 °F (31.9 °C)-92.12 °F (33.4 °C)] 92.12 °F (33.4 °C)  Pulse:  [61-92] 77  Resp:  [14-27] 16  SpO2:  [53 %-99 %] 95 %  BP: ()/() 68/50  Arterial Line BP: ()/() 87/44     Weight: 52.6 kg (116 lb)  Body mass index is 21.22 kg/m².      Intake/Output Summary (Last 24 hours) at 3/12/2022 1206  Last data filed at 3/12/2022 1000  Gross per 24 hour   Intake 287.58 ml   Output 165 ml   Net 122.58 ml       Physical Exam  Constitutional:       Appearance: She is ill-appearing and toxic-appearing.   HENT:      Mouth/Throat:      Comments: ET tube OG tube  Neck:      Comments: right subclavian triple-lumen CT  Cardiovascular:      Rate and Rhythm: Normal rate and regular rhythm.   Pulmonary:      Breath sounds: Rhonchi present.   Genitourinary:     Comments: Abreu catheter  Musculoskeletal:         General: Deformity present.   Skin:     General: Skin is warm and dry.   Neurological:      Comments: Breathing over the vent with going to pain not following commands       Vents:  Vent Mode: A/C (03/12/22 1124)  Set Rate: 10 BPM (03/12/22 1124)  Vt Set: 0 mL (03/12/22 1124)  Pressure Support: 0 cmH20 (03/12/22 1124)  PEEP/CPAP: 5 cmH20 (03/12/22 1124)  Oxygen Concentration (%): 30 (03/12/22 1124)  Peak Airway Pressure: 14 cmH2O (03/12/22 1124)  Plateau Pressure: 13 cmH20 (03/12/22 1124)  Total Ve: 13.3 mL (03/12/22 1124)  F/VT Ratio<105  (RSBI): (!) 35.32 (03/12/22 1124)    Lines/Drains/Airways       Central Venous Catheter Line  Duration             Percutaneous Central Line Insertion/Assessment - Triple Lumen  03/10/22 2215 right subclavian 1 day              Drain  Duration                  NG/OG Tube 03/10/22 1957 16 Fr. Center mouth 1 day         Urethral Catheter 03/10/22 2032 Non-latex 16 Fr. 1 day              Airway  Duration                  Airway 1 day         Airway - Non-Surgical 03/10/22 1945 Endotracheal Tube 1 day              Arterial Line  Duration             Arterial Line 03/11/22 0310 Right Radial 1 day              Peripheral Intravenous Line  Duration                  Peripheral IV - Single Lumen 03/10/22 1945 18 G Right Forearm 1 day         Peripheral IV - Single Lumen 03/10/22 2200 18 G Anterior;Distal;Left Upper Arm 1 day                    Significant Labs:    CBC/Anemia Profile:  Recent Labs   Lab 03/10/22  2018 03/11/22  0545 03/12/22  0526   WBC 19.37* 25.17* 19.41*   HGB 9.4* 10.8* 12.2   HCT 29.8* 34.3* 38.4    271 245   MCV 95 95 93   RDW 15.8* 15.9* 15.6*        Chemistries:  Recent Labs   Lab 03/10/22  2018 03/11/22  0130 03/11/22  0228 03/11/22  0545 03/12/22  0004 03/12/22  0541 03/12/22  0837     --  138   < > 140 140 138   K 2.9*  --  4.8   < > 3.6 3.7 3.6     --  100   < > 101 101 102   CO2 19*  --  23   < > 18* 20* 20*   BUN 19  --  21   < > 30* 34* 35*   CREATININE 1.2  --  1.6*   < > 2.1* 2.3* 2.3*   CALCIUM 8.1*  --  8.2*   < > 8.4* 8.4* 8.4*   ALBUMIN 2.9*  --  3.0*  --   --   --   --    PROT 6.2  --  6.2  --   --   --   --    BILITOT 0.9  --  1.1*  --   --   --   --    ALKPHOS 90  --  92  --   --   --   --    ALT 12  --  21  --   --   --   --    AST 53*  --  59*  --   --   --   --    MG  --    < >  --    < > 2.1 2.1 2.1   PHOS  --   --   --    < > 4.8* 5.1* 5.1*    < > = values in this interval not displayed.       A1C: No results for input(s): HGBA1C in the last 48 hours.  ABGs:    Recent Labs   Lab 03/12/22  0438   PH 7.445   PCO2 30.7*   HCO3 21.1*   POCSATURATED 100   BE -3     Bilirubin:   Recent Labs   Lab 03/10/22  2018 03/11/22  0228   BILITOT 0.9 1.1*     Blood Culture:   Recent Labs   Lab 03/10/22  2002 03/10/22  2129   LABBLOO Gram stain aer bottle: Gram positive cocci in chains resembling Strep  Positive results previously called 03/12/2022  11:08 Gram stain aer bottle: Gram positive cocci in chains resembling Strep   Results called to and read back by:Nalini Mora 03/12/2022  05:03  Gram stain laurie bottle: Gram positive cocci in chains resembling Strep   Positive results previously called 03/12/2022  11:07     Cardiac Markers: No results for input(s): CKMB, TROPONINT, MYOGLOBIN in the last 48 hours.  Coagulation:   Recent Labs   Lab 03/12/22 0418   INR 1.4*   APTT 34.2*      Latest Reference Range & Units 03/10/22 20:18 03/11/22 02:28 03/11/22 10:25 03/11/22 18:13   BNP 0 - 99 pg/mL >4,900 (H) [1]      CPK 20 - 180 U/L  260 (H)     Troponin I 0.000 - 0.026 ng/mL 0.041 (H) [2] 0.239 (H) [3] 0.438 (H) [4] 0.398 (H) [5]   (H): Data is abnormally high  [1] Values of less than 100 pg/ml are consistent with non-CHF populations.  [2] The reference interval for Troponin I represents the 99th percentile   cutoff   for our facility and is consistent with 3rd generation assay   performance.    [3] The reference interval for Troponin I represents the 99th percentile   cutoff   for our facility and is consistent with 3rd generation assay   performance.    [4] The reference interval for Troponin I represents the 99th percentile   cutoff   for our facility and is consistent with 3rd generation assay   performance.    [5] The reference interval for Troponin I represents the 99th percentile   cutoff   for our facility and is consistent with 3rd generation assay   performance.    Lactic Acid:   Recent Labs   Lab 03/11/22  1232 03/11/22  1813 03/12/22  0852   LACTATE 10.8* 10.8* 4.2*      Respiratory Culture:   Recent Labs   Lab 03/11/22  0319   GSRESP <10 epithelial cells per low power field.  Moderate WBC's  Many Gram positive cocci  Few Gram positive rods   RESPIRATORYC Normal respiratory marcial     Urine Culture: No results for input(s): LABURIN in the last 48 hours.    2D echo 03/11/2022  The left ventricle is mildly enlarged with eccentric hypertrophy and severely decreased systolic function.  Grade III left ventricular diastolic dysfunction.  Moderate right ventricular enlargement with moderately reduced right ventricular systolic function.  Severe left atrial enlargement.  The estimated ejection fraction is 10%.  There is severe left ventricular global hypokinesis.  Severe right atrial enlargement.  Moderate-to-severe mitral regurgitation.  Moderate to severe tricuspid regurgitation.  Moderate pulmonic regurgitation.  Mechanically ventilated; cannot use inferior caval vein diameter to estimate central venous pressure.  There is a large left pleural effusion.          2D echo 2019    1 - Concentric hypertrophy.     2 - No wall motion abnormalities.     3 - Normal left ventricular systolic function (EF 60-65%).     4 - Normal left ventricular diastolic function.     5 - Normal right ventricular systolic function .     6 - The estimated PA systolic pressure is 35 mmHg.     7 - Trivial pulmonic regurgitation.     8 - Intermediate central venous pressure.  Significant Imaging:      CT head 0 3/10 no acute findings    Chest x-ray 03/10/2022      COMPARISON:  Multiple priors.     FINDINGS:  Endotracheal tube tip 4.5 cm from the kermit in good position.  Right central venous catheter in place unchanged.  Enteric tube tip and side hole overlie the stomach.     Slightly improved appearance of the lungs.     Impression:     Endotracheal tube tip 4.5 cm from the kermit in good position.

## 2022-03-12 NOTE — ASSESSMENT & PLAN NOTE
For airway protection post cardiac arrest  Vent settings reviewed and adjusted to optimize gas exchange  VAP prophylaxis  Empiric abx for high risk aspiration with arrest + significant leukocytosis  abg daily and prn to assess response to therapy  3/12 on FiO2 35%.  PaO2 160 mm Hg.  Check daily chest x-ray and ABG.  Assess mental status after rewarming completed and of sedation.

## 2022-03-12 NOTE — ASSESSMENT & PLAN NOTE
Post cardiac arrest  Current high quality data suggests limited if any benefit but cooling for TTM initiated overnight and reached goal temp prior to our consult/exam. Will complete TTM per protocol  Updated daughter at bedside on exam findings, plan of care, and expected clinical course  Palliative care team consulted for additional support with expected difficult conversations over next 48-72 hours  3/12 assess mental status after rewarming completed and of sedation.  Patient DNR.  Palliative care consult for comfort measures.

## 2022-03-13 LAB
ALLENS TEST: ABNORMAL
ANION GAP SERPL CALC-SCNC: 14 MMOL/L (ref 8–16)
ANION GAP SERPL CALC-SCNC: 14 MMOL/L (ref 8–16)
ANION GAP SERPL CALC-SCNC: 15 MMOL/L (ref 8–16)
ANION GAP SERPL CALC-SCNC: 16 MMOL/L (ref 8–16)
APTT BLDCRRT: 57 SEC (ref 21–32)
BASOPHILS # BLD AUTO: 0.02 K/UL (ref 0–0.2)
BASOPHILS NFR BLD: 0.1 % (ref 0–1.9)
BUN SERPL-MCNC: 38 MG/DL (ref 8–23)
BUN SERPL-MCNC: 39 MG/DL (ref 8–23)
BUN SERPL-MCNC: 41 MG/DL (ref 8–23)
BUN SERPL-MCNC: 42 MG/DL (ref 8–23)
BUN SERPL-MCNC: 43 MG/DL (ref 8–23)
BUN SERPL-MCNC: 43 MG/DL (ref 8–23)
CALCIUM SERPL-MCNC: 7.6 MG/DL (ref 8.7–10.5)
CALCIUM SERPL-MCNC: 7.8 MG/DL (ref 8.7–10.5)
CALCIUM SERPL-MCNC: 8 MG/DL (ref 8.7–10.5)
CALCIUM SERPL-MCNC: 8.1 MG/DL (ref 8.7–10.5)
CALCIUM SERPL-MCNC: 8.2 MG/DL (ref 8.7–10.5)
CALCIUM SERPL-MCNC: 8.4 MG/DL (ref 8.7–10.5)
CHLORIDE SERPL-SCNC: 102 MMOL/L (ref 95–110)
CHLORIDE SERPL-SCNC: 103 MMOL/L (ref 95–110)
CHLORIDE SERPL-SCNC: 104 MMOL/L (ref 95–110)
CHLORIDE SERPL-SCNC: 105 MMOL/L (ref 95–110)
CO2 SERPL-SCNC: 20 MMOL/L (ref 23–29)
CO2 SERPL-SCNC: 20 MMOL/L (ref 23–29)
CO2 SERPL-SCNC: 21 MMOL/L (ref 23–29)
CREAT SERPL-MCNC: 2.7 MG/DL (ref 0.5–1.4)
CREAT SERPL-MCNC: 2.9 MG/DL (ref 0.5–1.4)
CREAT SERPL-MCNC: 3.1 MG/DL (ref 0.5–1.4)
CREAT SERPL-MCNC: 3.2 MG/DL (ref 0.5–1.4)
CREAT SERPL-MCNC: 3.3 MG/DL (ref 0.5–1.4)
CREAT SERPL-MCNC: 3.5 MG/DL (ref 0.5–1.4)
DELSYS: ABNORMAL
DIFFERENTIAL METHOD: ABNORMAL
EOSINOPHIL # BLD AUTO: 0 K/UL (ref 0–0.5)
EOSINOPHIL NFR BLD: 0.1 % (ref 0–8)
ERYTHROCYTE [DISTWIDTH] IN BLOOD BY AUTOMATED COUNT: 15.3 % (ref 11.5–14.5)
ERYTHROCYTE [SEDIMENTATION RATE] IN BLOOD BY WESTERGREN METHOD: 10 MM/H
EST. GFR  (AFRICAN AMERICAN): 13 ML/MIN/1.73 M^2
EST. GFR  (AFRICAN AMERICAN): 14 ML/MIN/1.73 M^2
EST. GFR  (AFRICAN AMERICAN): 15 ML/MIN/1.73 M^2
EST. GFR  (AFRICAN AMERICAN): 15 ML/MIN/1.73 M^2
EST. GFR  (AFRICAN AMERICAN): 17 ML/MIN/1.73 M^2
EST. GFR  (AFRICAN AMERICAN): 18 ML/MIN/1.73 M^2
EST. GFR  (NON AFRICAN AMERICAN): 11 ML/MIN/1.73 M^2
EST. GFR  (NON AFRICAN AMERICAN): 12 ML/MIN/1.73 M^2
EST. GFR  (NON AFRICAN AMERICAN): 13 ML/MIN/1.73 M^2
EST. GFR  (NON AFRICAN AMERICAN): 13 ML/MIN/1.73 M^2
EST. GFR  (NON AFRICAN AMERICAN): 14 ML/MIN/1.73 M^2
EST. GFR  (NON AFRICAN AMERICAN): 16 ML/MIN/1.73 M^2
FIO2: 30
GLUCOSE SERPL-MCNC: 122 MG/DL (ref 70–110)
GLUCOSE SERPL-MCNC: 134 MG/DL (ref 70–110)
GLUCOSE SERPL-MCNC: 134 MG/DL (ref 70–110)
GLUCOSE SERPL-MCNC: 138 MG/DL (ref 70–110)
GLUCOSE SERPL-MCNC: 142 MG/DL (ref 70–110)
GLUCOSE SERPL-MCNC: 150 MG/DL (ref 70–110)
HCO3 UR-SCNC: 22.1 MMOL/L (ref 24–28)
HCT VFR BLD AUTO: 33.5 % (ref 37–48.5)
HGB BLD-MCNC: 11.1 G/DL (ref 12–16)
IMM GRANULOCYTES # BLD AUTO: 0.19 K/UL (ref 0–0.04)
IMM GRANULOCYTES NFR BLD AUTO: 1 % (ref 0–0.5)
INR PPP: 1.2 (ref 0.8–1.2)
IP: 8
LYMPHOCYTES # BLD AUTO: 0.6 K/UL (ref 1–4.8)
LYMPHOCYTES NFR BLD: 3.1 % (ref 18–48)
MAGNESIUM SERPL-MCNC: 2.3 MG/DL (ref 1.6–2.6)
MCH RBC QN AUTO: 29.9 PG (ref 27–31)
MCHC RBC AUTO-ENTMCNC: 33.1 G/DL (ref 32–36)
MCV RBC AUTO: 90 FL (ref 82–98)
MODE: ABNORMAL
MONOCYTES # BLD AUTO: 0.8 K/UL (ref 0.3–1)
MONOCYTES NFR BLD: 4.4 % (ref 4–15)
NEUTROPHILS # BLD AUTO: 17.4 K/UL (ref 1.8–7.7)
NEUTROPHILS NFR BLD: 91.3 % (ref 38–73)
NRBC BLD-RTO: 0 /100 WBC
PCO2 BLDA: 32.6 MMHG (ref 35–45)
PEEP: 5
PH SMN: 7.44 [PH] (ref 7.35–7.45)
PHOSPHATE SERPL-MCNC: 5.4 MG/DL (ref 2.7–4.5)
PHOSPHATE SERPL-MCNC: 5.7 MG/DL (ref 2.7–4.5)
PHOSPHATE SERPL-MCNC: 5.9 MG/DL (ref 2.7–4.5)
PLATELET # BLD AUTO: 263 K/UL (ref 150–450)
PMV BLD AUTO: 12 FL (ref 9.2–12.9)
PO2 BLDA: 111 MMHG (ref 80–100)
POC BE: -2 MMOL/L
POC SATURATED O2: 99 % (ref 95–100)
POTASSIUM SERPL-SCNC: 3.3 MMOL/L (ref 3.5–5.1)
POTASSIUM SERPL-SCNC: 3.4 MMOL/L (ref 3.5–5.1)
POTASSIUM SERPL-SCNC: 3.5 MMOL/L (ref 3.5–5.1)
POTASSIUM SERPL-SCNC: 3.5 MMOL/L (ref 3.5–5.1)
POTASSIUM SERPL-SCNC: 3.6 MMOL/L (ref 3.5–5.1)
POTASSIUM SERPL-SCNC: 3.7 MMOL/L (ref 3.5–5.1)
PROTHROMBIN TIME: 13.2 SEC (ref 9–12.5)
RBC # BLD AUTO: 3.71 M/UL (ref 4–5.4)
SAMPLE: ABNORMAL
SITE: ABNORMAL
SODIUM SERPL-SCNC: 137 MMOL/L (ref 136–145)
SODIUM SERPL-SCNC: 139 MMOL/L (ref 136–145)
SODIUM SERPL-SCNC: 140 MMOL/L (ref 136–145)
WBC # BLD AUTO: 19.08 K/UL (ref 3.9–12.7)

## 2022-03-13 PROCEDURE — 63600175 PHARM REV CODE 636 W HCPCS: Performed by: INTERNAL MEDICINE

## 2022-03-13 PROCEDURE — 99291 CRITICAL CARE FIRST HOUR: CPT | Mod: ,,, | Performed by: PSYCHIATRY & NEUROLOGY

## 2022-03-13 PROCEDURE — 80048 BASIC METABOLIC PNL TOTAL CA: CPT | Performed by: INTERNAL MEDICINE

## 2022-03-13 PROCEDURE — 25000003 PHARM REV CODE 250: Performed by: INTERNAL MEDICINE

## 2022-03-13 PROCEDURE — 83520 IMMUNOASSAY QUANT NOS NONAB: CPT | Performed by: INTERNAL MEDICINE

## 2022-03-13 PROCEDURE — 99291 PR CRITICAL CARE, E/M 30-74 MINUTES: ICD-10-PCS | Mod: ,,, | Performed by: PSYCHIATRY & NEUROLOGY

## 2022-03-13 PROCEDURE — 99900035 HC TECH TIME PER 15 MIN (STAT)

## 2022-03-13 PROCEDURE — 82803 BLOOD GASES ANY COMBINATION: CPT

## 2022-03-13 PROCEDURE — 99232 SBSQ HOSP IP/OBS MODERATE 35: CPT | Mod: ,,, | Performed by: INTERNAL MEDICINE

## 2022-03-13 PROCEDURE — 80048 BASIC METABOLIC PNL TOTAL CA: CPT | Mod: 91 | Performed by: INTERNAL MEDICINE

## 2022-03-13 PROCEDURE — 27000221 HC OXYGEN, UP TO 24 HOURS

## 2022-03-13 PROCEDURE — 85025 COMPLETE CBC W/AUTO DIFF WBC: CPT | Performed by: INTERNAL MEDICINE

## 2022-03-13 PROCEDURE — 20000000 HC ICU ROOM

## 2022-03-13 PROCEDURE — 85610 PROTHROMBIN TIME: CPT | Performed by: INTERNAL MEDICINE

## 2022-03-13 PROCEDURE — 84100 ASSAY OF PHOSPHORUS: CPT | Mod: 91 | Performed by: INTERNAL MEDICINE

## 2022-03-13 PROCEDURE — 85730 THROMBOPLASTIN TIME PARTIAL: CPT | Performed by: INTERNAL MEDICINE

## 2022-03-13 PROCEDURE — 63600175 PHARM REV CODE 636 W HCPCS: Performed by: NURSE PRACTITIONER

## 2022-03-13 PROCEDURE — A4216 STERILE WATER/SALINE, 10 ML: HCPCS | Performed by: INTERNAL MEDICINE

## 2022-03-13 PROCEDURE — 37799 UNLISTED PX VASCULAR SURGERY: CPT

## 2022-03-13 PROCEDURE — 83735 ASSAY OF MAGNESIUM: CPT | Mod: 91 | Performed by: INTERNAL MEDICINE

## 2022-03-13 PROCEDURE — 99291 CRITICAL CARE FIRST HOUR: CPT | Mod: ,,, | Performed by: INTERNAL MEDICINE

## 2022-03-13 PROCEDURE — 84100 ASSAY OF PHOSPHORUS: CPT | Performed by: INTERNAL MEDICINE

## 2022-03-13 PROCEDURE — 99232 PR SUBSEQUENT HOSPITAL CARE,LEVL II: ICD-10-PCS | Mod: ,,, | Performed by: INTERNAL MEDICINE

## 2022-03-13 PROCEDURE — 99291 PR CRITICAL CARE, E/M 30-74 MINUTES: ICD-10-PCS | Mod: ,,, | Performed by: INTERNAL MEDICINE

## 2022-03-13 PROCEDURE — 94003 VENT MGMT INPAT SUBQ DAY: CPT

## 2022-03-13 PROCEDURE — 83735 ASSAY OF MAGNESIUM: CPT | Performed by: INTERNAL MEDICINE

## 2022-03-13 RX ADMIN — Medication 10 ML: at 02:03

## 2022-03-13 RX ADMIN — CHLORHEXIDINE GLUCONATE 0.12% ORAL RINSE 15 ML: 1.2 LIQUID ORAL at 09:03

## 2022-03-13 RX ADMIN — ACETAMINOPHEN 650 MG: 160 SOLUTION ORAL at 12:03

## 2022-03-13 RX ADMIN — CLOPIDOGREL 75 MG: 75 TABLET, FILM COATED ORAL at 08:03

## 2022-03-13 RX ADMIN — Medication 10 ML: at 06:03

## 2022-03-13 RX ADMIN — CEFTRIAXONE 2 G: 2 INJECTION, SOLUTION INTRAVENOUS at 01:03

## 2022-03-13 RX ADMIN — ASPIRIN 81 MG CHEWABLE TABLET 81 MG: 81 TABLET CHEWABLE at 08:03

## 2022-03-13 RX ADMIN — ACETAMINOPHEN 650 MG: 160 SOLUTION ORAL at 06:03

## 2022-03-13 RX ADMIN — MUPIROCIN: 20 OINTMENT TOPICAL at 09:03

## 2022-03-13 RX ADMIN — CHLORHEXIDINE GLUCONATE 0.12% ORAL RINSE 15 ML: 1.2 LIQUID ORAL at 08:03

## 2022-03-13 RX ADMIN — PROPOFOL 10 MCG/KG/MIN: 10 INJECTION, EMULSION INTRAVENOUS at 07:03

## 2022-03-13 RX ADMIN — HEPARIN SODIUM 5000 UNITS: 5000 INJECTION INTRAVENOUS; SUBCUTANEOUS at 02:03

## 2022-03-13 RX ADMIN — HEPARIN SODIUM 5000 UNITS: 5000 INJECTION INTRAVENOUS; SUBCUTANEOUS at 06:03

## 2022-03-13 RX ADMIN — Medication 10 ML: at 10:03

## 2022-03-13 RX ADMIN — HEPARIN SODIUM 5000 UNITS: 5000 INJECTION INTRAVENOUS; SUBCUTANEOUS at 09:03

## 2022-03-13 RX ADMIN — FAMOTIDINE 20 MG: 10 INJECTION INTRAVENOUS at 08:03

## 2022-03-13 NOTE — SUBJECTIVE & OBJECTIVE
Interval History:     Review of Systems   Unable to perform ROS: Patient unresponsive   Objective:     Vital Signs (Most Recent):  Temp: 98.6 °F (37 °C) (22 0615)  Pulse: 70 (22 1430)  Resp: 13 (22 1430)  BP: 122/73 (22 1400)  SpO2: 100 % (22 1430) Vital Signs (24h Range):  Temp:  [70.88 °F (21.6 °C)-98.8 °F (37.1 °C)] 98.6 °F (37 °C)  Pulse:  [58-97] 70  Resp:  [11-46] 13  SpO2:  [81 %-100 %] 100 %  BP: ()/(46-77) 122/73  Arterial Line BP: ()/(31-73) 127/59     Weight: 52.6 kg (116 lb)  Body mass index is 21.22 kg/m².    Intake/Output Summary (Last 24 hours) at 3/13/2022 1453  Last data filed at 3/13/2022 1400  Gross per 24 hour   Intake 1659.75 ml   Output 220 ml   Net 1439.75 ml        Physical Exam  Vitals and nursing note reviewed.   Constitutional:       Appearance: She is underweight. She is ill-appearing.      Interventions: She is intubated.   HENT:      Head: Atraumatic.   Eyes:      Conjunctiva/sclera: Conjunctivae normal.      Comments: Pupils reactive to light  (+) corneal      Cardiovascular:      Rate and Rhythm: Normal rate and regular rhythm. Occasional Extrasystoles are present.     Pulses:           Radial pulses are 2+ on the right side and 2+ on the left side.        Dorsalis pedis pulses are 2+ on the right side and 2+ on the left side.      Heart sounds: Murmur heard.   Systolic murmur is present.   Pulmonary:      Effort: She is intubated.      Breath sounds: Decreased breath sounds present. No wheezing, rhonchi or rales.   Abdominal:      General: Abdomen is flat. Bowel sounds are absent. There is no distension.      Palpations: Abdomen is soft.   Musculoskeletal:      Right lower le+ Edema present.      Left lower le+ Edema present.   Skin:     General: Skin is cool and dry.      Capillary Refill: Capillary refill takes 2 to 3 seconds.   Neurological:      GCS: GCS eye subscore is 1. GCS verbal subscore is 1. GCS motor subscore is 4.       Comments: Positive cough and gag reflexes  Withdraws from noxious stimuli with BLE and RUE       Significant Labs: All pertinent labs within the past 24 hours have been reviewed.      Significant Imaging: I have reviewed all pertinent imaging results/findings within the past 24 hours.

## 2022-03-13 NOTE — ASSESSMENT & PLAN NOTE
Last EF 25%  Recent increase in home lasix due to BLE edema  Per cardiology notes, pt would not desire invasive procedures or support  Now with CODY, anuria; fluid volume management will be complicated  Avoid extraneous fluid as able  Continue entresto as tolerated  ICU hemodynamic monitoring  Monitor electrolytes and keep K 4-5 and Mag > 2.0  3/12 strict I&Os.  On dobutamine.  Cardiogenic plus septic shock.  Treat gram-positive bacteremia.  On target map above 65 mm Hg.  Add Levophed.  Received 2 L bolus  3/13 strict I&Os.  Oliguric.  Needed 2 L NS bolus yesterday for severe hypotension.  On Levophed drip.  Continue dobutamine.   If she can tolerate hemodynamically will start Lasix drip.  Volume contraction with dialysis might not benefit the overall outcome

## 2022-03-13 NOTE — SUBJECTIVE & OBJECTIVE
Interval History:   3/13 Patient seen and examined.Day 2post intubation.Paralysis None Sedation None Vasopressors Dobutamine Urine output last 24 hrs 235 ml   creatinine increasing to 3.1Fluid balance since admit 4 L Tmax 98 Last BM 3/11 Chest X-ray and ABG reviewed .  Blood culture alpha strep         Review of Systems   Unable to perform ROS: Intubated       Objective:     Vital Signs (Most Recent):  Temp: 98.6 °F (37 °C) (03/13/22 0615)  Pulse: 82 (03/13/22 0930)  Resp: 15 (03/13/22 0930)  BP: (!) 114/58 (03/13/22 0900)  SpO2: 100 % (03/13/22 0930)   Vital Signs (24h Range):  Temp:  [70.88 °F (21.6 °C)-98.8 °F (37.1 °C)] 98.6 °F (37 °C)  Pulse:  [58-97] 82  Resp:  [13-46] 15  SpO2:  [81 %-100 %] 100 %  BP: ()/(46-77) 114/58  Arterial Line BP: ()/(31-72) 128/65     Weight: 52.6 kg (116 lb)  Body mass index is 21.22 kg/m².      Intake/Output Summary (Last 24 hours) at 3/13/2022 0945  Last data filed at 3/13/2022 0900  Gross per 24 hour   Intake 2640.91 ml   Output 255 ml   Net 2385.91 ml         Physical Exam  Constitutional:       Appearance: She is ill-appearing and toxic-appearing.   HENT:      Mouth/Throat:      Comments: ET tube OG tube  Neck:      Comments: right subclavian triple-lumen CT  Cardiovascular:      Rate and Rhythm: Normal rate and regular rhythm.   Pulmonary:      Breath sounds: Rhonchi present.   Genitourinary:     Comments: Abreu catheter  Musculoskeletal:         General: Deformity present.      Comments: Right radial arterial line   Skin:     General: Skin is warm and dry.   Neurological:      Comments: Breathing over the vent with going to pain not following commands       Vents:  Vent Mode: A/C (03/13/22 0918)  Set Rate: 10 BPM (03/13/22 0918)  Vt Set: 0 mL (03/13/22 0918)  Pressure Support: 0 cmH20 (03/13/22 0918)  PEEP/CPAP: 5 cmH20 (03/13/22 0918)  Oxygen Concentration (%): 30 (03/13/22 0930)  Peak Airway Pressure: 13 cmH2O (03/13/22 0918)  Plateau Pressure: 13 cmH20  (03/13/22 0918)  Total Ve: 8.13 mL (03/13/22 0918)  F/VT Ratio<105 (RSBI): (!) 44.04 (03/13/22 0918)    Lines/Drains/Airways       Central Venous Catheter Line  Duration             Percutaneous Central Line Insertion/Assessment - Triple Lumen  03/10/22 2215 right subclavian 2 days              Drain  Duration                  NG/OG Tube 03/10/22 1957 16 Fr. Center mouth 2 days         Urethral Catheter 03/10/22 2032 Non-latex 16 Fr. 2 days              Airway  Duration                  Airway 2 days         Airway - Non-Surgical 03/10/22 1945 Endotracheal Tube 2 days              Arterial Line  Duration             Arterial Line 03/11/22 0310 Right Radial 2 days              Peripheral Intravenous Line  Duration                  Peripheral IV - Single Lumen 03/10/22 1945 18 G Right Forearm 2 days         Peripheral IV - Single Lumen 03/10/22 2200 18 G Anterior;Distal;Left Upper Arm 2 days                    Significant Labs:    CBC/Anemia Profile:  Recent Labs   Lab 03/12/22  0526 03/13/22 0428   WBC 19.41* 19.08*   HGB 12.2 11.1*   HCT 38.4 33.5*    263   MCV 93 90   RDW 15.6* 15.3*          Chemistries:  Recent Labs   Lab 03/13/22  0000 03/13/22  0428 03/13/22  0837    139 139   K 3.6 3.7 3.5    103 103   CO2 20* 21* 21*   BUN 38* 39* 41*   CREATININE 2.7* 2.9* 3.1*   CALCIUM 7.6* 7.8* 8.0*   MG 2.3 2.3 2.3   PHOS 5.4* 5.9* 5.7*         A1C: No results for input(s): HGBA1C in the last 48 hours.  ABGs:   Recent Labs   Lab 03/13/22  0342   PH 7.440   PCO2 32.6*   HCO3 22.1*   POCSATURATED 99   BE -2       Bilirubin:   Recent Labs   Lab 03/10/22  2018 03/11/22  0228   BILITOT 0.9 1.1*       Blood Culture:   No results for input(s): LABBLOO in the last 48 hours.    Cardiac Markers: No results for input(s): CKMB, TROPONINT, MYOGLOBIN in the last 48 hours.  Coagulation:   Recent Labs   Lab 03/13/22  0428   INR 1.2   APTT 57.0*        Latest Reference Range & Units 03/10/22 20:18 03/11/22 02:28  03/11/22 10:25 03/11/22 18:13   BNP 0 - 99 pg/mL >4,900 (H) [1]      CPK 20 - 180 U/L  260 (H)     Troponin I 0.000 - 0.026 ng/mL 0.041 (H) [2] 0.239 (H) [3] 0.438 (H) [4] 0.398 (H) [5]      Latest Reference Range & Units 03/13/22 03:42   POC PH 7.35 - 7.45  7.440   POC PCO2 35 - 45 mmHg 32.6 (L)   POC PO2 80 - 100 mmHg 111 (H)   POC BE -2 to 2 mmol/L -2   POC HCO3 24 - 28 mmol/L 22.1 (L)   POC SATURATED O2 95 - 100 % 99   FiO2  30   PEEP  5   Sample  ARTERIAL   DelSys  Adult Vent   Allens Test  N/A   Site  Bi/UAC   Mode  PCV   Rate  10   (L): Data is abnormally low  (H): Data is abnormally high  Recent Labs   Lab 03/11/22  1232 03/11/22  1813 03/12/22  0852   LACTATE 10.8* 10.8* 4.2*     Blood culture x two cultures. Draw prior to antibiotics. [[id number]] (Abnormal) Collected: 03/10/22 2129   Order Status: Completed Specimen: Blood from Line, Subclavian, Right Updated: 03/13/22 0754    Blood Culture, Routine Gram stain aer bottle: Gram positive cocci in chains resembling Strep      Results called to and read back by:Nalini Mora 03/12/2022  05:03     Gram stain laurie bottle: Gram positive cocci in chains resembling Strep      Positive results previously called 03/12/2022  11:07     ALPHA HEMOLYTIC STREP   Identification pending   For susceptibility see order #[id number]      Respiratory Culture:   No results for input(s): GSRESP, RESPIRATORYC in the last 48 hours.    Urine Culture: No results for input(s): LABURIN in the last 48 hours.    2D echo 03/11/2022  The left ventricle is mildly enlarged with eccentric hypertrophy and severely decreased systolic function.  Grade III left ventricular diastolic dysfunction.  Moderate right ventricular enlargement with moderately reduced right ventricular systolic function.  Severe left atrial enlargement.  The estimated ejection fraction is 10%.  There is severe left ventricular global hypokinesis.  Severe right atrial enlargement.  Moderate-to-severe mitral  regurgitation.  Moderate to severe tricuspid regurgitation.  Moderate pulmonic regurgitation.  Mechanically ventilated; cannot use inferior caval vein diameter to estimate central venous pressure.  There is a large left pleural effusion.          2D echo 2019    1 - Concentric hypertrophy.     2 - No wall motion abnormalities.     3 - Normal left ventricular systolic function (EF 60-65%).     4 - Normal left ventricular diastolic function.     5 - Normal right ventricular systolic function .     6 - The estimated PA systolic pressure is 35 mmHg.     7 - Trivial pulmonic regurgitation.     8 - Intermediate central venous pressure.  Significant Imaging:      CT head 0 3/10 no acute findings    Chest x-ray 03/13/2022    COMPARISON:  03/10/2022     FINDINGS:  Endotracheal tube at the T3 level.  NG tube courses through the films in good position.     The cardiac silhouette is enlarged.  Blunting of the costophrenic angles bilaterally consistent with bilateral pleural effusions.     Central line present with the tip overlying the superior vena cava.     Impression:     Tubes in good position.  Cardiomegaly.  Bibasilar pulmonary infiltrates and or bibasilar effusions.

## 2022-03-13 NOTE — ASSESSMENT & PLAN NOTE
3/12 oliguric strict I&Os.  Receiving NS boluses today.  3/13 strict I&Os.  Oliguric.  Needed 2 L NS bolus yesterday for severe hypotension.  On Levophed drip.  Continue dobutamine.   If she can tolerate hemodynamically will start Lasix drip.  Volume contraction with dialysis might not benefit the overall outcome

## 2022-03-13 NOTE — ASSESSMENT & PLAN NOTE
Post cardiac arrest  Current high quality data suggests limited if any benefit but cooling for TTM initiated overnight and reached goal temp prior to our consult/exam. Will complete TTM per protocol  Updated daughter at bedside on exam findings, plan of care, and expected clinical course  Palliative care team consulted for additional support with expected difficult conversations over next 48-72 hours  3/12 assess mental status after rewarming completed and of sedation.  Patient DNR.  Palliative care consult for comfort measures.  3/13 of sedation breathes over the vent opens eyes spontaneously does not track does not follow commands positive gag and cough reflexes.

## 2022-03-13 NOTE — H&P (VIEW-ONLY)
"Consult Note  Neurological ICU      Consult Requested By: Sj Juan, *  Reason for Consult: Hypoxic encephalopathy due to out of hospital arrest    SUBJECTIVE:     History of Present Illness:  The patient's daughter provides history and the chart has been reviewed.    The daughter states that the patient was sitting in a chair when she was seen to suddenly slump over with a blank stare and was unresponsive.  The daughter was able to dial 911 and was instructed to place the patient on the floor and begin chest compressions, which she did do until EMS arrived in 5-10 minutes time.  The daughter indicates that EMS took over resuscitation efforts and were finally able to obtain a pulse, but apparently required at least one shock.  She was also given fentanyl 50 mcg and ketamine 50 mcg.    In the ER she was emergently intubated and central line access was obtained.  She was initially cooled, but has now been rewarmed.  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  ECHO showed ejection fraction of 10%.    Since admit to the ICU, the patient has remained on the ventilator.  The daughter indicates that yesterday, the patient was "more alert" and would squeeze the hand of her granddaughter. She had her eyes open.  Today, the patient has been much less active, keeping eyes closed and not following any commands from the family.  She retains brainstem reflexes and is breathing over the vent settings.         Review of patient's allergies indicates:   Allergen Reactions    Xanax [alprazolam]        Past Medical History:   Diagnosis Date    Acute CHF (congestive heart failure) 1/17/2021    Hyperlipemia     Hypertension     Parkinson's disease     Stroke 2016    Throat mass      Past Surgical History:   Procedure Laterality Date    CLOSED REDUCTION Right 10/15/2020    Procedure: CLOSED REDUCTION;  Surgeon: Humberto Valdivia DO;  Location: [facility];  Service: Orthopedics;  Laterality: Right;  ATTEMPTED    " EVACUATION OF HEMATOMA Right 10/17/2020    Procedure: EVACUATION, HEMATOMA;  Surgeon: Humberto Valdivia DO;  Location: [facility] OR;  Service: Orthopedics;  Laterality: Right;    HEMIARTHROPLASTY OF HIP Left 7/12/2020    Procedure: HEMIARTHROPLASTY, HIP;  Surgeon: Humberto Valdivia DO;  Location: [facility] OR;  Service: Orthopedics;  Laterality: Left;    HEMIARTHROPLASTY OF HIP Right 10/2/2020    Procedure: HEMIARTHROPLASTY, HIP;  Surgeon: Loyd Kearney MD;  Location: [facility] OR;  Service: Orthopedics;  Laterality: Right;    HYSTERECTOMY      OPEN REDUCTION OF DISLOCATION OF HIP Right 10/17/2020    Procedure: OPEN REDUCTION, DISLOCATION, HIP;  Surgeon: Humberto Valdivia DO;  Location: [facility] OR;  Service: Orthopedics;  Laterality: Right;    THROAT SURGERY      TONSILLECTOMY       No family history on file.  Social History     Tobacco Use    Smoking status: Never Smoker    Smokeless tobacco: Never Used   Substance Use Topics    Alcohol use: No    Drug use: No        Review of Systems:  Review of systems not obtained due to patient factors Patient is unresponisve, on the vent and intubated.    OBJECTIVE:     Vital Signs (Most Recent)  Temp: 98.6 °F (37 °C) (03/13/22 0615)  Pulse: 75 (03/13/22 1400)  Resp: 15 (03/13/22 1400)  BP: 122/73 (03/13/22 1400)  SpO2: 100 % (03/13/22 1400)    Vital Signs Range (Last 24H):  Temp:  [70.88 °F (21.6 °C)-98.8 °F (37.1 °C)]   Pulse:  [58-97]   Resp:  [11-46]   BP: ()/(46-77)   SpO2:  [81 %-100 %]   Arterial Line BP: ()/(31-73)   Ventilator Data (Last 24H):     Vent Mode: A/C  Oxygen Concentration (%):  [30] 30  Resp Rate Total:  [12 br/min-49 br/min] 12 br/min  Vt Set:  [0 mL] 0 mL  PEEP/CPAP:  [5 cmH20] 5 cmH20  Pressure Support:  [0 cmH20] 0 cmH20  Mean Airway Pressure:  [7.2 cmH20-9.5 cmH20] 8 cmH20    Hemodynamic Parameters (Last 24H):       Physical Exam:  General: appears acutely ill, intubated, appears stated age  Head: normocephalic, atraumatic  Eyes:  pupils  responsive  Neck: supple, symmetrical, trachea midline, no JVD  Lungs:  rhonchi bilaterally  Heart: regular rate and rhythm  Neurologic: Mental status: alertness: stuperous  Cranial nerves: II: pupils direct pupil reaction to light bilaterally, III,IV,VI: extraocular muscles Eyes conjugate with intact oculocephalic reflexes, V,VII: corneal reflex present bilaterally, IX,X: gag reflex present  Sensory: Prompt withdrawal from nail bed pressure, both upper and lower extremities  Motor:Muscle tone is flaccid with PROM, but has active withdrawal from pain stimulus  Reflexes: Has 1+ stretch reflexes in both upper and both lower extremities.  Plantar stimulation produces withdrawal but no obvious Babinski signs    Lines/Drains:  Percutaneous Central Line Insertion/Assessment - Triple Lumen  03/10/22 2215 right subclavian (Active)   Verification by X-ray Yes 03/13/22 1115   Site Assessment No drainage;No redness;No swelling;No warmth 03/13/22 1115   Line Securement Device Secured with sutures 03/13/22 1115   Dressing Type Biopatch in place;Central line dressing 03/13/22 1115   Dressing Status Clean;Dry;Intact 03/13/22 1115   Dressing Intervention Integrity maintained 03/13/22 1115   Date on Dressing 03/10/22 03/13/22 1115   Dressing Due to be Changed 03/14/22 03/13/22 1115   Distal Patency/Care flushed w/o difficulty;normal saline locked 03/13/22 1115   Medial 1 Patency/Care infusing 03/13/22 1115   Proximal 1 Patency/Care flushed w/o difficulty;normal saline locked 03/13/22 1115   Line Necessity Review Poor venous access 03/13/22 1115   Number of days: 2            Peripheral IV - Single Lumen 03/10/22 1945 18 G Right Forearm (Active)   Site Assessment Clean;Intact;Dry 03/13/22 1115   Extremity Assessment Distal to IV No abnormal discoloration 03/13/22 1115   Line Status Flushed 03/13/22 1115   Dressing Status Clean;Dry;Intact 03/13/22 1115   Dressing Intervention Integrity maintained 03/13/22 1115   Dressing Change Due  03/14/22 03/13/22 1115   Site Change Due 03/14/22 03/13/22 1115   Reason Not Rotated Poor venous access 03/13/22 1115   Number of days: 2            Peripheral IV - Single Lumen 03/10/22 2200 18 G Anterior;Distal;Left Upper Arm (Active)   Site Assessment Clean;Dry;Intact 03/13/22 1115   Extremity Assessment Distal to IV No abnormal discoloration;No redness;No swelling;No warmth 03/13/22 1115   Line Status Flushed;Saline locked 03/13/22 1115   Dressing Status Clean;Dry;Intact 03/13/22 1115   Dressing Intervention Integrity maintained 03/13/22 1115   Dressing Change Due 03/14/22 03/13/22 1115   Site Change Due 03/14/22 03/13/22 1115   Reason Not Rotated Not due 03/13/22 1115   Number of days: 2       Arterial Line 03/11/22 0310 Right Radial (Active)   Site Assessment Dry;Intact;Clean 03/13/22 1115   Line Status Pulsatile blood flow 03/13/22 1115   Art Line Waveform Appropriate 03/13/22 1115   Arterial Line Interventions Zeroed and calibrated;Leveled;Connections checked and tightened;Tubing changed;Flushed per protocol;Line pulled back 03/13/22 1115   Color/Movement/Sensation Cool fingers/toes 03/13/22 1115   Dressing Type Biopatch in place;Central line dressing 03/13/22 1115   Dressing Status Clean;Dry;Intact 03/13/22 1115   Dressing Intervention Integrity maintained 03/13/22 1115   Dressing Change Due 03/18/22 03/13/22 1115   Tubing Change Due 03/18/22 03/13/22 1115   Number of days: 2            NG/OG Tube 03/10/22 1957 16 Fr. Center mouth (Active)   $ NG/OG Tube Placement Complete 03/11/22 0000   Placement Check placement verified by x-ray 03/13/22 1115   Tolerance no signs/symptoms of discomfort 03/13/22 1115   Securement secured to commercial device 03/13/22 1115   Clamp Status/Tolerance unclamped;no abdominal discomfort;no abdominal distention 03/13/22 1115   Suction Setting/Drainage Method suction at the bedside 03/13/22 1115   Water Bolus (mL) 60 mL 03/13/22 0600   Number of days: 2            Urethral  "Catheter 03/10/22 2032 Non-latex 16 Fr. (Active)   $ Abreu Insertion Complete 03/11/22 0000   Site Assessment Clean;Intact 03/13/22 1115   Collection Container Urimeter 03/13/22 1115   Securement Method secured to top of thigh w/ adhesive device 03/13/22 1115   Catheter Care Performed yes 03/13/22 1115   Reason for Continuing Urinary Catheterization Critically ill in ICU and requiring hourly monitoring of intake/output 03/13/22 1115   CAUTI Prevention Bundle Securement Device in place with 1" slack;Intact seal between catheter & drainage tubing;Drainage bag/urimeter off the floor;No dependent loops or kinks;Drainage bag/urimeter not overfilled (<2/3 full);Drainage bag/urimeter below bladder 03/13/22 1115   Output (mL) 20 mL 03/13/22 0800   Number of days: 2       Laboratory:  I have reviewed all pertinent lab results within the past 24 hours.  CBC:   Recent Labs   Lab 03/13/22  0428   WBC 19.08*   RBC 3.71*   HGB 11.1*   HCT 33.5*      MCV 90   MCH 29.9   MCHC 33.1     CMP:   Recent Labs   Lab 03/11/22  0228 03/11/22  0545 03/13/22  1245   *  179*  179*   < > 122*   CALCIUM 8.2*   < > 8.1*   ALBUMIN 3.0*  --   --    PROT 6.2  --   --       < > 137   K 4.8   < > 3.3*   CO2 23   < > 21*      < > 102   BUN 21   < > 42*   CREATININE 1.6*   < > 3.2*   ALKPHOS 92  --   --    ALT 21  --   --    AST 59*  --   --    BILITOT 1.1*  --   --     < > = values in this interval not displayed.     ABGs:   Recent Labs   Lab 03/13/22  0342   PH 7.440   PCO2 32.6*   PO2 111*   HCO3 22.1*   POCSATURATED 99   BE -2       Chest X-Ray: cardiomegaly, infiltrates: lower lobe bilaterally    Diagnostic Results:  No Further    ASSESSMENT/PLAN:     1.  Hypoxic encephalopathy due to out of hospital arrest  2.  CHF  3.  Sphenoid sinus mass  Current FOUR score is 7/16    Discussion:  The patient has evidence of encephalopathy but has shown some signs of recovery.  The family clearly understands the poor prognosis and has " decided on a DNR order, which is appropriate in this situation.  In the interim, continue aggressive medical support, understanding that return to home and return to previous level of functioning is not likely.      Plan:  See comments above.  Family voices understanding of prognosis.    Critical Care Time greater than: 1 Hour 15 Minutes

## 2022-03-13 NOTE — ASSESSMENT & PLAN NOTE
For airway protection post cardiac arrest  Vent settings reviewed and adjusted to optimize gas exchange  VAP prophylaxis  Empiric abx for high risk aspiration with arrest + significant leukocytosis  abg daily and prn to assess response to therapy  3/12 on FiO2 35%.  PaO2 160 mm Hg.  Check daily chest x-ray and ABG.  Assess mental status after rewarming completed and of sedation.  3/13 O2/MV /pulmonary toilet.  Chest x-ray reviewed.  Treatment alpha strep bacteremia.  Start Lasix if hemodynamically stable.  Discuss goal of care/ comfort care with family.  Patient is currently DNR.

## 2022-03-13 NOTE — PLAN OF CARE
Pt remains intubated and sedated on propofol for shivers and vent compliance. Off sedation, pt does not do anything purposeful- coughs and gags on breathing tube. Pt began rewarming yesterday and achieved goal temp about 1900. Pt has frequent PVCs and bigeminy on EKG. BP extremely sensitive and labile requiring frequent titration of levophed and propofol (see chart). Pt is currently a DNR. Pt has excessive oral secretions/bubbling so a scopolamine pt was ordered and placed behind left eat. Pt turned q2 hours and oral care performed q4 hours. POC reviewed with family and all safety measures followed throughout shift

## 2022-03-13 NOTE — PROGRESS NOTES
"[facility] - Intensive Care (Hospital)  Cardiology  Progress Note    Patient Name: Tanya Madrid  MRN: [id number]  Admission Date: 3/10/2022  Hospital Length of Stay: 3 days  Code Status: DNR   Attending Physician: Sj Juan, *   Primary Care Physician: Maggie Sue NP  Expected Discharge Date:   Principal Problem:Cardiac arrest    Subjective:     Hospital Course:   Ms. 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 Patient was reportedly eating supper when she developed a "blank stare" and become unresponsive. CPR was initiated by her daughter and ROSC was obtained en route after 1 shock by EMS (actual downtime remains unknown). Initial workup in ED revealed K of 2.9, BNP > 4,9000, troponin of 0.041, and lactic acidosis (5.7) and patient was emergently intubated and admitted to ICU and placed on hypothermia protocol. Cardiology consulted to assist with management. Patient seen and examined today, remains intubated. Ill appearing. Well known to cardiology service and recently evaluated in CHF clinic on 3/3/22 and was stable at that time per clinic notes. Daughter reported increased SOB over the past few days and recently increased po Lasix dosage (unfortunately CHF clinic was unaware of worsening symptoms). Patient is compliant with OP medications. BNP is chronically elevated. Chart reviewed. Troponin 0.041>0.239. lactate still rising. Echo showed EF of 10%, BI-V failure, mod-severe MR, pulmonic regurgitation. CXR with worsening right-sided opacities, concerning for edema, infectious process, and possibly aspiration. CT of head with high density material related to paransal sinus disease or neoplasm extending toward the pituitary and along the right towards the " intracranial ICA. ENT consulted. Palliative care following.    3.13.2022  Rewarmed , not tolerating Ventilation when off sedation   Still sedated   Spoke to her daughter       Interval History:     Review of Systems   Unable to perform ROS: Patient unresponsive   Objective:     Vital Signs (Most Recent):  Temp: 98.6 °F (37 °C) (22 0615)  Pulse: 70 (22 1430)  Resp: 13 (22 1430)  BP: 122/73 (22 1400)  SpO2: 100 % (22 1430) Vital Signs (24h Range):  Temp:  [70.88 °F (21.6 °C)-98.8 °F (37.1 °C)] 98.6 °F (37 °C)  Pulse:  [58-97] 70  Resp:  [11-46] 13  SpO2:  [81 %-100 %] 100 %  BP: ()/(46-77) 122/73  Arterial Line BP: ()/(31-73) 127/59     Weight: 52.6 kg (116 lb)  Body mass index is 21.22 kg/m².    Intake/Output Summary (Last 24 hours) at 3/13/2022 1453  Last data filed at 3/13/2022 1400  Gross per 24 hour   Intake 1659.75 ml   Output 220 ml   Net 1439.75 ml        Physical Exam  Vitals and nursing note reviewed.   Constitutional:       Appearance: She is underweight. She is ill-appearing.      Interventions: She is intubated.   HENT:      Head: Atraumatic.   Eyes:      Conjunctiva/sclera: Conjunctivae normal.      Comments: Pupils reactive to light  (+) corneal      Cardiovascular:      Rate and Rhythm: Normal rate and regular rhythm. Occasional Extrasystoles are present.     Pulses:           Radial pulses are 2+ on the right side and 2+ on the left side.        Dorsalis pedis pulses are 2+ on the right side and 2+ on the left side.      Heart sounds: Murmur heard.   Systolic murmur is present.   Pulmonary:      Effort: She is intubated.      Breath sounds: Decreased breath sounds present. No wheezing, rhonchi or rales.   Abdominal:      General: Abdomen is flat. Bowel sounds are absent. There is no distension.      Palpations: Abdomen is soft.   Musculoskeletal:      Right lower le+ Edema present.      Left lower le+ Edema present.   Skin:     General: Skin is cool  and dry.      Capillary Refill: Capillary refill takes 2 to 3 seconds.   Neurological:      GCS: GCS eye subscore is 1. GCS verbal subscore is 1. GCS motor subscore is 4.      Comments: Positive cough and gag reflexes  Withdraws from noxious stimuli with BLE and RUE       Significant Labs: All pertinent labs within the past 24 hours have been reviewed.      Significant Imaging: I have reviewed all pertinent imaging results/findings within the past 24 hours.        Assessment and Plan:         * Cardiac arrest  -Unclear etiology, certainly has pre-disposition for cardiac events/arrythmias but may have also had aspiration event/respiratory arrest as well  -Known severe NICM with, recently seen in CHF clinic on 3/3/22  -Repeat echo this admission shows EF of 10%, Bi-V failure  -Agree with dobutamine initiation  -Continue Entresto, BB as tolerated  -Diurese if able  -Conservative medical management  -Patient is not candidate for any invasive workup given advanced age co-morbidities, frailty  -Previously declined AICD      Anoxic encephalopathy  -Mgmt as per primary team    Hypokalemia  -Repletion as per primary team, K > 4    Aspiration pneumonia  -On abx, mgmt as per primary team    Mass of sphenoid sinus  -ENT consulted    Elevated troponin  -Troponin 0.041>0.239, repeat pending  -Elevation likely secondary to demand ischemia  -Prior MPI stress test 2/19 negative for ischemia/injury  -Echo pending  -Continue OMT as tolerated-ASA, Plavix, Toprol  -Not candidate for any invasive workup given advanced age, co-morbidities, frailty    Acute on chronic combined systolic and diastolic CHF (congestive heart failure)  -Decompensated, BNP chronically elevated  -Assess response to inotropic support  -Continue BB, Entresto as BP permits  -Diurese if able  -Echo shows worsening EF of 10%, Bi-V failure    3.13.2022  Supportive pressors   Wean when tolerated   Poor prognosis   DNR   UO improving   Prognosis remains grim            History of CVA (cerebrovascular accident)  -Continue ASA, Plavix    Parkinson disease  -Mgmt as per primary team    Essential hypertension  -Continue BB, Entresto      PLEASE CALL PRN WITH QUESTIONS   VTE Risk Mitigation (From admission, onward)         Ordered     heparin (porcine) injection 5,000 Units  Every 8 hours         03/10/22 2351     IP VTE HIGH RISK PATIENT  Once         03/10/22 2345     Place sequential compression device  Until discontinued         03/10/22 2345                Violetta Vargas MD  Cardiology  [facility] - Intensive Care ([facility])

## 2022-03-13 NOTE — EICU
Rounding (Video Assessment):  No    Intervention Initiated From:  Bedside    Sae Communicated with Bedside Nurse regarding:  Medication    Nurse Notified:  No    Doctor Notified:  Yes    Comments: bedside nurse called to get order for a Scopolamine patch, due to excessive oral secretions. Informed .

## 2022-03-13 NOTE — ASSESSMENT & PLAN NOTE
Etiology unclear but airway compromise, hypokalemia, and sudden cardiac death related to poor EF all possible primary or contributing factors  Repeat echo pending  Cardiology consulted  3/13 low EF.  On dobutamine.  Complicated by anoxic brain injury.  Does not follow commands currently.  DNR.  Will continue discussion about goal of care/comfort care with family

## 2022-03-13 NOTE — PLAN OF CARE
Patient GCS of 7. Propofol restarted for vent compliance. NSR, Dobutamine continued, Levophed initiated to keep MAP>65, Oxygenating well on FiO2 30%, PEEP +5, Voiding per catheter with minimal urine output 5-10 ml/hr. New lip lesion noted today. Patient began rewarming at 0630. Patient turned with wedge support, heel boots in place. Q4 BMP continue. Code status changed to DNR today. Bed low, wheels locked. Alarms audible. POC reviewed with daughter today.

## 2022-03-13 NOTE — PROGRESS NOTES
"[facility] ([facility])  [facility]  Progress Note    Patient Name: Tanya Madrid  MRN: [id number]  Patient Class: IP- Inpatient   Admission Date: 3/10/2022  Length of Stay: 3 days  Attending Physician: Sj Juan, *  Primary Care Provider: Maggie Sue NP        Subjective:     Principal Problem:Cardiac arrest        HPI:  History was taken from the electronic chart and from the daughter -  Adenike Lemus Daughter [phone]        82 y.o. female patient with a PMHx of HTN,  CHF-last EF-01/2021-25%, with diastolic dysfunction  , and HLD who presents to the Emergency Department for evaluation of cardiac arrest which onset suddenly 1 hour pta.  Daughter reports that over the last 3 days -she has been complaining of worsening dyspnoea  and was given extra lasix tablets. She usually takes 20mg lasix daily and was given 2 extra tablets with the episodes of  dyspnea.  This evening while having dinner, she "looked blank" and stopped breathing . Daughter called EMS and was advised to start CPR.. Pt achieved ROSC en route. After I shock . Prior Tx includes 50 mcg of fentanyl and 50 mg of ketamine from EMS.   Code status discussed.  She is Full code.  Labs and imaging test reviewed.  CT head -No hemorrhage or acute major vascular distribution infarction.   Dehiscence of the posterior wall of the sphenoid sinus such as on series 4, image 49-52. There is high density material possibly related to paranasal sinus disease or neoplasm which extends towards the pituitary and along the right towards the intracranial ICA.  Further evaluation with contrast enhanced MRI when clinically appropriate would be recommended.       Component      Latest Ref Rng & Units 3/10/2022 3/3/2022              Potassium      3.5 - 5.1 mmol/L 2.9 (L) 3.7   CBC showed wbc -19   Component      Latest Ref Rng & Units 3/10/2022 3/10/2022 4/20/2021          10:47 PM  8:18 PM    Lactate, Lex      0.5 - 2.2 " mmol/L 3.6 (HH) 5.7 (HH) 2.0     Component      Latest Ref Rng & Units 3/10/2022 4/20/2021   BNP      0 - 99 pg/mL >4,900 (H) 2,806 (H)   Code status discussed -she is full code  Her daughter is the SDM.  Adenike Lemus Daughter [phone]       Overview/Hospital Course:  83 y/o AAF admitted to ICU with a dx of cardiac arrest , CODY , acute hypoxic respiratory failure  and  Anoxic encephalopathy . Started on hypothermia protocol , IVAB , asa , plavix  And entresto . Cardiology and palliative care consulted. Tx reviewed . Cont current tx . Prognosis Poor      3/12  She remain critically ill . Now on rewarming phase . Started on propofol due to  only grimacing and posturing . Kidney  function is trending up . She  is on low dose of dobutamine . Case d/w he daughter at bedside . She changed code status to DNR . Prognosis poor .    3/13 Reamin critically ill  on mechanical ventilation . Temp is now > 36 . Last night  was on levophed and dobutamine to keep a map > 65  . Levophed wean off this am . She has been on propofol on and off for posturing  movement . Sedation stop for 2 hrs  .  She has positve  corneal , pupil and gag reflex .  No respond to verval or pain stimuli . She withdraw to pain . She is breathing overt the vent .  Neurology consulted . The UOP has sligly increased since yesterday . The kidney function  cont trending up . The Blood cx are (+) ALPHA HEMOLYTIC STREP      Interval History:     Review of Systems   Unable to perform ROS: Patient unresponsive   Objective:     Vital Signs (Most Recent):  Temp: 98.6 °F (37 °C) (03/13/22 0615)  Pulse: 69 (03/13/22 1145)  Resp: 18 (03/13/22 1145)  BP: 109/69 (03/13/22 1130)  SpO2: 100 % (03/13/22 1145) Vital Signs (24h Range):  Temp:  [70.88 °F (21.6 °C)-98.8 °F (37.1 °C)] 98.6 °F (37 °C)  Pulse:  [58-97] 69  Resp:  [13-46] 18  SpO2:  [81 %-100 %] 100 %  BP: ()/(46-77) 109/69  Arterial Line BP: ()/(31-73) 127/59     Weight: 52.6 kg (116  lb)  Body mass index is 21.22 kg/m².    Intake/Output Summary (Last 24 hours) at 3/13/2022 1241  Last data filed at 3/13/2022 1200  Gross per 24 hour   Intake 2641.13 ml   Output 240 ml   Net 2401.13 ml      Physical Exam  Vitals and nursing note reviewed.   Constitutional:       Appearance: She is underweight. She is ill-appearing.      Interventions: She is intubated.   HENT:      Head: Atraumatic.   Eyes:      Conjunctiva/sclera: Conjunctivae normal.      Comments: Pupils reactive to light  (+) corneal      Cardiovascular:      Rate and Rhythm: Normal rate and regular rhythm. Occasional Extrasystoles are present.     Pulses:           Radial pulses are 2+ on the right side and 2+ on the left side.        Dorsalis pedis pulses are 2+ on the right side and 2+ on the left side.      Heart sounds: Murmur heard.   Systolic murmur is present.   Pulmonary:      Effort: She is intubated.      Breath sounds: Decreased breath sounds present. No wheezing, rhonchi or rales.   Abdominal:      General: Abdomen is flat. Bowel sounds are absent. There is no distension.      Palpations: Abdomen is soft.   Musculoskeletal:      Right lower le+ Edema present.      Left lower le+ Edema present.   Skin:     General: Skin is cool and dry.      Capillary Refill: Capillary refill takes 2 to 3 seconds.   Neurological:      GCS: GCS eye subscore is 1. GCS verbal subscore is 1. GCS motor subscore is 4.      Comments: Positive cough and gag reflexes  Withdraws from noxious stimuli with BLE and RUE       Significant Labs: All pertinent labs within the past 24 hours have been reviewed.      Significant Imaging: I have reviewed all pertinent imaging results/findings within the past 24 hours.        Assessment/Plan:      * Cardiac arrest    She had witnessed cardiac arrest.  Last Echo --  EF -25%  Diastolic dysfunction.  EKG -Sinus rhythm with occasional Premature ventricular complexes and Premature atrial complexes  Left axis  deviation  Cardiology consult   Prognosis poor     Gram-positive bacteremia  Cont rocephin   ALPHA HEMOLYTIC STREP      CODY (acute kidney injury)  2/2 to cardiac arrest   No a good candidate for CRT/RRT  Monitor UOP         Anoxic encephalopathy  2/2 to cardiac arrest   Neurology consulted        Hypokalemia    Will replete, add serum mag , follow BMP in AM    Aspiration pneumonia  Cont  Rocephin   Blood cx (+) for ALPHA HEMOLYTIC STREP    Mass of sphenoid sinus    Ct head showed  Dehiscence of the posterior wall of the sphenoid sinus such as on series 4, image 49-52. There is high density material possibly related to paranasal sinus disease or neoplasm which extends towards the pituitary and along the right towards the intracranial ICA.  Further evaluation with contrast enhanced MRI when clinically appropriate would be recommended.  Suggest including 3D/volumetric postcontrast imaging.  ENT consult as clinically warranted.    Will consult ENT    Elevated troponin  Cont asa/plavix  Cardiology on board       Acute on chronic combined systolic and diastolic CHF (congestive heart failure)  Patient is identified as having Combined Systolic and Diastolic heart failure that is Acute on chronic. CHF is currently uncontrolled due to Pulmonary edema/pleural effusion on CXR. Latest ECHO performed and demonstrates- Results for orders placed during the hospital encounter of 01/15/21    Echo Color Flow Doppler? Yes    Interpretation Summary  · The left ventricle is severely enlarged with eccentric hypertrophy and severely decreased systolic function. The estimated ejection fraction is 25%  · Grade II left ventricular diastolic dysfunction.  · Normal right ventricular size with moderately reduced right ventricular systolic function.  · Severe left atrial enlargement.  · There is pulmonary hypertension.  · There is mild aortic valve stenosis.  · Aortic valve area is 1.37 cm2; peak velocity is 1.33 m/s; mean gradient is 4 mmHg.  ·  Mild to moderate tricuspid regurgitation.  · Intermediate central venous pressure (8 mmHg).  · The estimated PA systolic pressure is 50 mmHg.  · Trivial pericardial effusion.  . Continue Nitrate/Vasodilator and monitor clinical status closely. Monitor on telemetry. Patient is on CHF pathway.  Monitor strict Is&Os and daily weights.  Place on fluid restriction of 1.5 L. Continue to stress to patient importance of self efficacy and  on diet for CHF. Last BNP reviewed- and noted below   Recent Labs   Lab 03/10/22  2018   BNP >4,900*   .      History of CVA (cerebrovascular accident)    On aspirin/plavix    Parkinson disease  Continue supportive care       Laryngeal papillomatosis  Cont supportive tx       Essential hypertension    Hold  entresto due to hypotension  On dobutamine due to map < 65       VTE Risk Mitigation (From admission, onward)         Ordered     heparin (porcine) injection 5,000 Units  Every 8 hours         03/10/22 2351     IP VTE HIGH RISK PATIENT  Once         03/10/22 2345     Place sequential compression device  Until discontinued         03/10/22 2345                Discharge Planning   PARMINDER:      Code Status: DNR   Is the patient medically ready for discharge?:     Reason for patient still in hospital (select all that apply): Treatment  Discharge Plan A: Other (TBD)            Critical care time spent on the evaluation and treatment of severe organ dysfunction, review of pertinent labs and imaging studies, discussions with consulting providers and discussions with patient/family: 38 minutes.      Sj Juan MD  Department of Hospital Medicine   [facility] ([facility])

## 2022-03-13 NOTE — CONSULTS
"Consult Note  Neurological ICU      Consult Requested By: Sj Juan, *  Reason for Consult: Hypoxic encephalopathy due to out of hospital arrest    SUBJECTIVE:     History of Present Illness:  The patient's daughter provides history and the chart has been reviewed.    The daughter states that the patient was sitting in a chair when she was seen to suddenly slump over with a blank stare and was unresponsive.  The daughter was able to dial 911 and was instructed to place the patient on the floor and begin chest compressions, which she did do until EMS arrived in 5-10 minutes time.  The daughter indicates that EMS took over resuscitation efforts and were finally able to obtain a pulse, but apparently required at least one shock.  She was also given fentanyl 50 mcg and ketamine 50 mcg.    In the ER she was emergently intubated and central line access was obtained.  She was initially cooled, but has now been rewarmed.  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  ECHO showed ejection fraction of 10%.    Since admit to the ICU, the patient has remained on the ventilator.  The daughter indicates that yesterday, the patient was "more alert" and would squeeze the hand of her granddaughter. She had her eyes open.  Today, the patient has been much less active, keeping eyes closed and not following any commands from the family.  She retains brainstem reflexes and is breathing over the vent settings.         Review of patient's allergies indicates:   Allergen Reactions    Xanax [alprazolam]        Past Medical History:   Diagnosis Date    Acute CHF (congestive heart failure) 1/17/2021    Hyperlipemia     Hypertension     Parkinson's disease     Stroke 2016    Throat mass      Past Surgical History:   Procedure Laterality Date    CLOSED REDUCTION Right 10/15/2020    Procedure: CLOSED REDUCTION;  Surgeon: Humberto Valdivia DO;  Location: [facility];  Service: Orthopedics;  Laterality: Right;  ATTEMPTED    " EVACUATION OF HEMATOMA Right 10/17/2020    Procedure: EVACUATION, HEMATOMA;  Surgeon: Humberto Valdivia DO;  Location: [facility] OR;  Service: Orthopedics;  Laterality: Right;    HEMIARTHROPLASTY OF HIP Left 7/12/2020    Procedure: HEMIARTHROPLASTY, HIP;  Surgeon: Humberto Valdivia DO;  Location: [facility] OR;  Service: Orthopedics;  Laterality: Left;    HEMIARTHROPLASTY OF HIP Right 10/2/2020    Procedure: HEMIARTHROPLASTY, HIP;  Surgeon: Loyd Kearney MD;  Location: [facility] OR;  Service: Orthopedics;  Laterality: Right;    HYSTERECTOMY      OPEN REDUCTION OF DISLOCATION OF HIP Right 10/17/2020    Procedure: OPEN REDUCTION, DISLOCATION, HIP;  Surgeon: Humberto Valdivia DO;  Location: [facility] OR;  Service: Orthopedics;  Laterality: Right;    THROAT SURGERY      TONSILLECTOMY       No family history on file.  Social History     Tobacco Use    Smoking status: Never Smoker    Smokeless tobacco: Never Used   Substance Use Topics    Alcohol use: No    Drug use: No        Review of Systems:  Review of systems not obtained due to patient factors Patient is unresponisve, on the vent and intubated.    OBJECTIVE:     Vital Signs (Most Recent)  Temp: 98.6 °F (37 °C) (03/13/22 0615)  Pulse: 75 (03/13/22 1400)  Resp: 15 (03/13/22 1400)  BP: 122/73 (03/13/22 1400)  SpO2: 100 % (03/13/22 1400)    Vital Signs Range (Last 24H):  Temp:  [70.88 °F (21.6 °C)-98.8 °F (37.1 °C)]   Pulse:  [58-97]   Resp:  [11-46]   BP: ()/(46-77)   SpO2:  [81 %-100 %]   Arterial Line BP: ()/(31-73)   Ventilator Data (Last 24H):     Vent Mode: A/C  Oxygen Concentration (%):  [30] 30  Resp Rate Total:  [12 br/min-49 br/min] 12 br/min  Vt Set:  [0 mL] 0 mL  PEEP/CPAP:  [5 cmH20] 5 cmH20  Pressure Support:  [0 cmH20] 0 cmH20  Mean Airway Pressure:  [7.2 cmH20-9.5 cmH20] 8 cmH20    Hemodynamic Parameters (Last 24H):       Physical Exam:  General: appears acutely ill, intubated, appears stated age  Head: normocephalic, atraumatic  Eyes:  pupils  responsive  Neck: supple, symmetrical, trachea midline, no JVD  Lungs:  rhonchi bilaterally  Heart: regular rate and rhythm  Neurologic: Mental status: alertness: stuperous  Cranial nerves: II: pupils direct pupil reaction to light bilaterally, III,IV,VI: extraocular muscles Eyes conjugate with intact oculocephalic reflexes, V,VII: corneal reflex present bilaterally, IX,X: gag reflex present  Sensory: Prompt withdrawal from nail bed pressure, both upper and lower extremities  Motor:Muscle tone is flaccid with PROM, but has active withdrawal from pain stimulus  Reflexes: Has 1+ stretch reflexes in both upper and both lower extremities.  Plantar stimulation produces withdrawal but no obvious Babinski signs    Lines/Drains:  Percutaneous Central Line Insertion/Assessment - Triple Lumen  03/10/22 2215 right subclavian (Active)   Verification by X-ray Yes 03/13/22 1115   Site Assessment No drainage;No redness;No swelling;No warmth 03/13/22 1115   Line Securement Device Secured with sutures 03/13/22 1115   Dressing Type Biopatch in place;Central line dressing 03/13/22 1115   Dressing Status Clean;Dry;Intact 03/13/22 1115   Dressing Intervention Integrity maintained 03/13/22 1115   Date on Dressing 03/10/22 03/13/22 1115   Dressing Due to be Changed 03/14/22 03/13/22 1115   Distal Patency/Care flushed w/o difficulty;normal saline locked 03/13/22 1115   Medial 1 Patency/Care infusing 03/13/22 1115   Proximal 1 Patency/Care flushed w/o difficulty;normal saline locked 03/13/22 1115   Line Necessity Review Poor venous access 03/13/22 1115   Number of days: 2            Peripheral IV - Single Lumen 03/10/22 1945 18 G Right Forearm (Active)   Site Assessment Clean;Intact;Dry 03/13/22 1115   Extremity Assessment Distal to IV No abnormal discoloration 03/13/22 1115   Line Status Flushed 03/13/22 1115   Dressing Status Clean;Dry;Intact 03/13/22 1115   Dressing Intervention Integrity maintained 03/13/22 1115   Dressing Change Due  03/14/22 03/13/22 1115   Site Change Due 03/14/22 03/13/22 1115   Reason Not Rotated Poor venous access 03/13/22 1115   Number of days: 2            Peripheral IV - Single Lumen 03/10/22 2200 18 G Anterior;Distal;Left Upper Arm (Active)   Site Assessment Clean;Dry;Intact 03/13/22 1115   Extremity Assessment Distal to IV No abnormal discoloration;No redness;No swelling;No warmth 03/13/22 1115   Line Status Flushed;Saline locked 03/13/22 1115   Dressing Status Clean;Dry;Intact 03/13/22 1115   Dressing Intervention Integrity maintained 03/13/22 1115   Dressing Change Due 03/14/22 03/13/22 1115   Site Change Due 03/14/22 03/13/22 1115   Reason Not Rotated Not due 03/13/22 1115   Number of days: 2       Arterial Line 03/11/22 0310 Right Radial (Active)   Site Assessment Dry;Intact;Clean 03/13/22 1115   Line Status Pulsatile blood flow 03/13/22 1115   Art Line Waveform Appropriate 03/13/22 1115   Arterial Line Interventions Zeroed and calibrated;Leveled;Connections checked and tightened;Tubing changed;Flushed per protocol;Line pulled back 03/13/22 1115   Color/Movement/Sensation Cool fingers/toes 03/13/22 1115   Dressing Type Biopatch in place;Central line dressing 03/13/22 1115   Dressing Status Clean;Dry;Intact 03/13/22 1115   Dressing Intervention Integrity maintained 03/13/22 1115   Dressing Change Due 03/18/22 03/13/22 1115   Tubing Change Due 03/18/22 03/13/22 1115   Number of days: 2            NG/OG Tube 03/10/22 1957 16 Fr. Center mouth (Active)   $ NG/OG Tube Placement Complete 03/11/22 0000   Placement Check placement verified by x-ray 03/13/22 1115   Tolerance no signs/symptoms of discomfort 03/13/22 1115   Securement secured to commercial device 03/13/22 1115   Clamp Status/Tolerance unclamped;no abdominal discomfort;no abdominal distention 03/13/22 1115   Suction Setting/Drainage Method suction at the bedside 03/13/22 1115   Water Bolus (mL) 60 mL 03/13/22 0600   Number of days: 2            Urethral  "Catheter 03/10/22 2032 Non-latex 16 Fr. (Active)   $ Abreu Insertion Complete 03/11/22 0000   Site Assessment Clean;Intact 03/13/22 1115   Collection Container Urimeter 03/13/22 1115   Securement Method secured to top of thigh w/ adhesive device 03/13/22 1115   Catheter Care Performed yes 03/13/22 1115   Reason for Continuing Urinary Catheterization Critically ill in ICU and requiring hourly monitoring of intake/output 03/13/22 1115   CAUTI Prevention Bundle Securement Device in place with 1" slack;Intact seal between catheter & drainage tubing;Drainage bag/urimeter off the floor;No dependent loops or kinks;Drainage bag/urimeter not overfilled (<2/3 full);Drainage bag/urimeter below bladder 03/13/22 1115   Output (mL) 20 mL 03/13/22 0800   Number of days: 2       Laboratory:  I have reviewed all pertinent lab results within the past 24 hours.  CBC:   Recent Labs   Lab 03/13/22  0428   WBC 19.08*   RBC 3.71*   HGB 11.1*   HCT 33.5*      MCV 90   MCH 29.9   MCHC 33.1     CMP:   Recent Labs   Lab 03/11/22  0228 03/11/22  0545 03/13/22  1245   *  179*  179*   < > 122*   CALCIUM 8.2*   < > 8.1*   ALBUMIN 3.0*  --   --    PROT 6.2  --   --       < > 137   K 4.8   < > 3.3*   CO2 23   < > 21*      < > 102   BUN 21   < > 42*   CREATININE 1.6*   < > 3.2*   ALKPHOS 92  --   --    ALT 21  --   --    AST 59*  --   --    BILITOT 1.1*  --   --     < > = values in this interval not displayed.     ABGs:   Recent Labs   Lab 03/13/22  0342   PH 7.440   PCO2 32.6*   PO2 111*   HCO3 22.1*   POCSATURATED 99   BE -2       Chest X-Ray: cardiomegaly, infiltrates: lower lobe bilaterally    Diagnostic Results:  No Further    ASSESSMENT/PLAN:     1.  Hypoxic encephalopathy due to out of hospital arrest  2.  CHF  3.  Sphenoid sinus mass  Current FOUR score is 7/16    Discussion:  The patient has evidence of encephalopathy but has shown some signs of recovery.  The family clearly understands the poor prognosis and has " decided on a DNR order, which is appropriate in this situation.  In the interim, continue aggressive medical support, understanding that return to home and return to previous level of functioning is not likely.      Plan:  See comments above.  Family voices understanding of prognosis.    Critical Care Time greater than: 1 Hour 15 Minutes

## 2022-03-13 NOTE — PLAN OF CARE
Patient GCS of 7. Propofol and levophed off since morning. NSR, Dobutamine continued, Oxygenating well on FiO2 30%, PEEP +5, Voiding per catheter with slightly increased urine output 20-30 ml/hr.Patient will complete rewarming at 1900. Patient turned with wedge support, heel boots in place. Q4 BMP continue. Code status DNR. Bed low, wheels locked. Alarms audible. POC reviewed with daughter today.

## 2022-03-13 NOTE — SUBJECTIVE & OBJECTIVE
Interval History:     Review of Systems   Unable to perform ROS: Patient unresponsive   Objective:     Vital Signs (Most Recent):  Temp: 98.6 °F (37 °C) (22 0615)  Pulse: 69 (22 1145)  Resp: 18 (22 1145)  BP: 109/69 (22 1130)  SpO2: 100 % (22 1145) Vital Signs (24h Range):  Temp:  [70.88 °F (21.6 °C)-98.8 °F (37.1 °C)] 98.6 °F (37 °C)  Pulse:  [58-97] 69  Resp:  [13-46] 18  SpO2:  [81 %-100 %] 100 %  BP: ()/(46-77) 109/69  Arterial Line BP: ()/(31-73) 127/59     Weight: 52.6 kg (116 lb)  Body mass index is 21.22 kg/m².    Intake/Output Summary (Last 24 hours) at 3/13/2022 1241  Last data filed at 3/13/2022 1200  Gross per 24 hour   Intake 2641.13 ml   Output 240 ml   Net 2401.13 ml      Physical Exam  Vitals and nursing note reviewed.   Constitutional:       Appearance: She is underweight. She is ill-appearing.      Interventions: She is intubated.   HENT:      Head: Atraumatic.   Eyes:      Conjunctiva/sclera: Conjunctivae normal.      Comments: Pupils reactive to light  (+) corneal      Cardiovascular:      Rate and Rhythm: Normal rate and regular rhythm. Occasional Extrasystoles are present.     Pulses:           Radial pulses are 2+ on the right side and 2+ on the left side.        Dorsalis pedis pulses are 2+ on the right side and 2+ on the left side.      Heart sounds: Murmur heard.   Systolic murmur is present.   Pulmonary:      Effort: She is intubated.      Breath sounds: Decreased breath sounds present. No wheezing, rhonchi or rales.   Abdominal:      General: Abdomen is flat. Bowel sounds are absent. There is no distension.      Palpations: Abdomen is soft.   Musculoskeletal:      Right lower le+ Edema present.      Left lower le+ Edema present.   Skin:     General: Skin is cool and dry.      Capillary Refill: Capillary refill takes 2 to 3 seconds.   Neurological:      GCS: GCS eye subscore is 1. GCS verbal subscore is 1. GCS motor subscore is 4.       Comments: Positive cough and gag reflexes  Withdraws from noxious stimuli with BLE and RUE       Significant Labs: All pertinent labs within the past 24 hours have been reviewed.      Significant Imaging: I have reviewed all pertinent imaging results/findings within the past 24 hours.

## 2022-03-13 NOTE — PROGRESS NOTES
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 CPR initiated after call to EMS. ROSC obtained by EMS (downtime not reported but required one shock and arrived to ED ~ one hour after EMS called)  Intubated in ED  Eval revealed leukocytosis, hypokalemia 2.9, compensated anion gap acidosis, BNP > 4900, troponin 0.041, lactate 3.6, procalcitonin 2.08  Admitted to ICU on mechanical ventilation; cooling for targeted temperature management for coma post arrest initiated       Hospital/ICU Course:  Seen and examined at start of shift  Sedated on propofol (for shivering), stopped for exam.  3/12 Patient seen and examined.Day 1post intubation.Paralysis None Sedation Propofol Vasopressors Dobutamine Urine output last 24 hrs 175 ml  Fluid balance since admit 2 L Tmax 100.3 T minimum 92, started rewarming Last BM 3/12 Chest X-ray and ABG reviewed .  3/13 Patient seen and examined.Day 2post intubation.Paralysis None Sedation None Vasopressors Dobutamine Urine output last 24 hrs 235 ml   creatinine increasing to 3.1Fluid balance since admit 4 L Tmax 98 Last BM 3/11 Chest X-ray and ABG reviewed .  Blood culture alpha strep           Interval History:   3/13 Patient seen and examined.Day 2post intubation.Paralysis None Sedation None Vasopressors Dobutamine Urine output last 24 hrs 235 ml   creatinine increasing to 3.1Fluid  balance since admit 4 L Tmax 98 Last BM 3/11 Chest X-ray and ABG reviewed .  Blood culture alpha strep         Review of Systems   Unable to perform ROS: Intubated       Objective:     Vital Signs (Most Recent):  Temp: 98.6 °F (37 °C) (03/13/22 0615)  Pulse: 82 (03/13/22 0930)  Resp: 15 (03/13/22 0930)  BP: (!) 114/58 (03/13/22 0900)  SpO2: 100 % (03/13/22 0930)   Vital Signs (24h Range):  Temp:  [70.88 °F (21.6 °C)-98.8 °F (37.1 °C)] 98.6 °F (37 °C)  Pulse:  [58-97] 82  Resp:  [13-46] 15  SpO2:  [81 %-100 %] 100 %  BP: ()/(46-77) 114/58  Arterial Line BP: ()/(31-72) 128/65     Weight: 52.6 kg (116 lb)  Body mass index is 21.22 kg/m².      Intake/Output Summary (Last 24 hours) at 3/13/2022 0945  Last data filed at 3/13/2022 0900  Gross per 24 hour   Intake 2640.91 ml   Output 255 ml   Net 2385.91 ml         Physical Exam  Constitutional:       Appearance: She is ill-appearing and toxic-appearing.   HENT:      Mouth/Throat:      Comments: ET tube OG tube  Neck:      Comments: right subclavian triple-lumen CT  Cardiovascular:      Rate and Rhythm: Normal rate and regular rhythm.   Pulmonary:      Breath sounds: Rhonchi present.   Genitourinary:     Comments: Abreu catheter  Musculoskeletal:         General: Deformity present.      Comments: Right radial arterial line   Skin:     General: Skin is warm and dry.   Neurological:      Comments: Breathing over the vent with going to pain not following commands       Vents:  Vent Mode: A/C (03/13/22 0918)  Set Rate: 10 BPM (03/13/22 0918)  Vt Set: 0 mL (03/13/22 0918)  Pressure Support: 0 cmH20 (03/13/22 0918)  PEEP/CPAP: 5 cmH20 (03/13/22 0918)  Oxygen Concentration (%): 30 (03/13/22 0930)  Peak Airway Pressure: 13 cmH2O (03/13/22 0918)  Plateau Pressure: 13 cmH20 (03/13/22 0918)  Total Ve: 8.13 mL (03/13/22 0918)  F/VT Ratio<105 (RSBI): (!) 44.04 (03/13/22 0918)    Lines/Drains/Airways       Central Venous Catheter Line  Duration             Percutaneous  Central Line Insertion/Assessment - Triple Lumen  03/10/22 2215 right subclavian 2 days              Drain  Duration                  NG/OG Tube 03/10/22 1957 16 Fr. Center mouth 2 days         Urethral Catheter 03/10/22 2032 Non-latex 16 Fr. 2 days              Airway  Duration                  Airway 2 days         Airway - Non-Surgical 03/10/22 1945 Endotracheal Tube 2 days              Arterial Line  Duration             Arterial Line 03/11/22 0310 Right Radial 2 days              Peripheral Intravenous Line  Duration                  Peripheral IV - Single Lumen 03/10/22 1945 18 G Right Forearm 2 days         Peripheral IV - Single Lumen 03/10/22 2200 18 G Anterior;Distal;Left Upper Arm 2 days                    Significant Labs:    CBC/Anemia Profile:  Recent Labs   Lab 03/12/22  0526 03/13/22 0428   WBC 19.41* 19.08*   HGB 12.2 11.1*   HCT 38.4 33.5*    263   MCV 93 90   RDW 15.6* 15.3*          Chemistries:  Recent Labs   Lab 03/13/22  0000 03/13/22  0428 03/13/22  0837    139 139   K 3.6 3.7 3.5    103 103   CO2 20* 21* 21*   BUN 38* 39* 41*   CREATININE 2.7* 2.9* 3.1*   CALCIUM 7.6* 7.8* 8.0*   MG 2.3 2.3 2.3   PHOS 5.4* 5.9* 5.7*         A1C: No results for input(s): HGBA1C in the last 48 hours.  ABGs:   Recent Labs   Lab 03/13/22  0342   PH 7.440   PCO2 32.6*   HCO3 22.1*   POCSATURATED 99   BE -2       Bilirubin:   Recent Labs   Lab 03/10/22  2018 03/11/22  0228   BILITOT 0.9 1.1*       Blood Culture:   No results for input(s): LABBLOO in the last 48 hours.    Cardiac Markers: No results for input(s): CKMB, TROPONINT, MYOGLOBIN in the last 48 hours.  Coagulation:   Recent Labs   Lab 03/13/22 0428   INR 1.2   APTT 57.0*        Latest Reference Range & Units 03/10/22 20:18 03/11/22 02:28 03/11/22 10:25 03/11/22 18:13   BNP 0 - 99 pg/mL >4,900 (H) [1]      CPK 20 - 180 U/L  260 (H)     Troponin I 0.000 - 0.026 ng/mL 0.041 (H) [2] 0.239 (H) [3] 0.438 (H) [4] 0.398 (H) [5]      Latest  Reference Range & Units 03/13/22 03:42   POC PH 7.35 - 7.45  7.440   POC PCO2 35 - 45 mmHg 32.6 (L)   POC PO2 80 - 100 mmHg 111 (H)   POC BE -2 to 2 mmol/L -2   POC HCO3 24 - 28 mmol/L 22.1 (L)   POC SATURATED O2 95 - 100 % 99   FiO2  30   PEEP  5   Sample  ARTERIAL   DelSys  Adult Vent   Allens Test  N/A   Site  Bi/UAC   Mode  PCV   Rate  10   (L): Data is abnormally low  (H): Data is abnormally high  Recent Labs   Lab 03/11/22  1232 03/11/22  1813 03/12/22  0852   LACTATE 10.8* 10.8* 4.2*     Blood culture x two cultures. Draw prior to antibiotics. [[id number]] (Abnormal) Collected: 03/10/22 2129   Order Status: Completed Specimen: Blood from Line, Subclavian, Right Updated: 03/13/22 0754    Blood Culture, Routine Gram stain aer bottle: Gram positive cocci in chains resembling Strep      Results called to and read back by:Nalini Mora 03/12/2022  05:03     Gram stain laurie bottle: Gram positive cocci in chains resembling Strep      Positive results previously called 03/12/2022  11:07     ALPHA HEMOLYTIC STREP   Identification pending   For susceptibility see order #[id number]      Respiratory Culture:   No results for input(s): GSRESP, RESPIRATORYC in the last 48 hours.    Urine Culture: No results for input(s): LABURIN in the last 48 hours.    2D echo 03/11/2022  · The left ventricle is mildly enlarged with eccentric hypertrophy and severely decreased systolic function.  · Grade III left ventricular diastolic dysfunction.  · Moderate right ventricular enlargement with moderately reduced right ventricular systolic function.  · Severe left atrial enlargement.  · The estimated ejection fraction is 10%.  · There is severe left ventricular global hypokinesis.  · Severe right atrial enlargement.  · Moderate-to-severe mitral regurgitation.  · Moderate to severe tricuspid regurgitation.  · Moderate pulmonic regurgitation.  · Mechanically ventilated; cannot use inferior caval vein diameter to estimate central venous  pressure.  · There is a large left pleural effusion.          2D echo 2019    1 - Concentric hypertrophy.     2 - No wall motion abnormalities.     3 - Normal left ventricular systolic function (EF 60-65%).     4 - Normal left ventricular diastolic function.     5 - Normal right ventricular systolic function .     6 - The estimated PA systolic pressure is 35 mmHg.     7 - Trivial pulmonic regurgitation.     8 - Intermediate central venous pressure.  Significant Imaging:      CT head 0 3/10 no acute findings    Chest x-ray 03/13/2022    COMPARISON:  03/10/2022     FINDINGS:  Endotracheal tube at the T3 level.  NG tube courses through the films in good position.     The cardiac silhouette is enlarged.  Blunting of the costophrenic angles bilaterally consistent with bilateral pleural effusions.     Central line present with the tip overlying the superior vena cava.     Impression:     Tubes in good position.  Cardiomegaly.  Bibasilar pulmonary infiltrates and or bibasilar effusions.                      ABG  Recent Labs   Lab 03/13/22  0342   PH 7.440   PO2 111*   PCO2 32.6*   HCO3 22.1*   BE -2     Assessment/Plan:     Neuro  Anoxic encephalopathy  Post cardiac arrest  Current high quality data suggests limited if any benefit but cooling for TTM initiated overnight and reached goal temp prior to our consult/exam. Will complete TTM per protocol  Updated daughter at bedside on exam findings, plan of care, and expected clinical course  Palliative care team consulted for additional support with expected difficult conversations over next 48-72 hours  3/12 assess mental status after rewarming completed and of sedation.  Patient DNR.  Palliative care consult for comfort measures.  3/13 of sedation breathes over the vent opens eyes spontaneously does not track does not follow commands positive gag and cough reflexes.    ENT  Mass of sphenoid sinus  Noted on CT head post cardiac arrest  ?relationship to recurrent laryngeal  papillomas  ENT consulted  Anticipate work up delay until neuro recovery/prognosis post cardiac arrest resulting in comatose state    Pulmonary  On mechanically assisted ventilation  For airway protection post cardiac arrest  Vent settings reviewed and adjusted to optimize gas exchange  VAP prophylaxis  Empiric abx for high risk aspiration with arrest + significant leukocytosis  abg daily and prn to assess response to therapy  3/12 on FiO2 35%.  PaO2 160 mm Hg.  Check daily chest x-ray and ABG.  Assess mental status after rewarming completed and of sedation.  3/13 O2/MV /pulmonary toilet.  Chest x-ray reviewed.  Treatment alpha strep bacteremia.  Start Lasix if hemodynamically stable.  Discuss goal of care/ comfort care with family.  Patient is currently DNR.    Cardiac/Vascular  * Cardiac arrest  Etiology unclear but airway compromise, hypokalemia, and sudden cardiac death related to poor EF all possible primary or contributing factors  Repeat echo pending  Cardiology consulted  3/13 low EF.  On dobutamine.  Complicated by anoxic brain injury.  Does not follow commands currently.  DNR.  Will continue discussion about goal of care/comfort care with family    Acute on chronic combined systolic and diastolic CHF (congestive heart failure)  Last EF 25%  Recent increase in home lasix due to BLE edema  Per cardiology notes, pt would not desire invasive procedures or support  Now with CODY, anuria; fluid volume management will be complicated  Avoid extraneous fluid as able  Continue entresto as tolerated  ICU hemodynamic monitoring  Monitor electrolytes and keep K 4-5 and Mag > 2.0  3/12 strict I&Os.  On dobutamine.  Cardiogenic plus septic shock.  Treat gram-positive bacteremia.  On target map above 65 mm Hg.  Add Levophed.  Received 2 L bolus  3/13 strict I&Os.  Oliguric.  Needed 2 L NS bolus yesterday for severe hypotension.  On Levophed drip.  Continue dobutamine.   If she can tolerate hemodynamically will start Lasix  drip.  Volume contraction with dialysis might not benefit the overall outcome    Renal/  CODY (acute kidney injury)  3/12 oliguric strict I&Os.  Receiving NS boluses today.  3/13 strict I&Os.  Oliguric.  Needed 2 L NS bolus yesterday for severe hypotension.  On Levophed drip.  Continue dobutamine.   If she can tolerate hemodynamically will start Lasix drip.  Volume contraction with dialysis might not benefit the overall outcome    ID  Gram-positive bacteremia  3/12 gram-positive bacteremia resembling strep x2.  On IV Rocephin.  Respiratory cultures normal marcial  3/13 of aspirin bacteremia.  On IV Rocephin 2 g daily.  Possible endocarditis.  No evidence of endocarditis on 2D echo.  Will have to discuss with family goal of care /comfort measures prior to deciding on pursuing MACHO.    Oncology  Laryngeal papillomatosis  Recurrent since childhood with MANY surgical interventions  Followed by Dr Harrison, last rigid laryngoscopy on 10/26/2021 per EMR  Unclear if choking/airway compromise played role in cardiac arrest at dinner     Critical Care Daily Checklist:    A: Awake: RASS Goal/Actual Goal: RASS Goal: -3-->moderate sedation  Actual: Coffey Agitation Sedation Scale (RASS): Moderate sedation   B: Spontaneous Breathing Trial Performed?  Y   C: SAT & SBT Coordinated?   yes                    D: Delirium: CAM-ICU Overall CAM-ICU: Positive   E: Early Mobility Performed? Yes   F: Feeding Goal:    Status:     Current Diet Order   Procedures    Diet NPO      AS: Analgesia/Sedation Off sedation   T: Thromboembolic Prophylaxis Subcutaneous heparin   H: HOB > 300 Yes   U: Stress Ulcer Prophylaxis (if needed) Famotidine   G: Glucose Control Fingerstick p.r.n.   B: Bowel Function Stool Occurrence: 0   I: Indwelling Catheter (Lines & Abreu) Necessity Critically ill keep Abreu central line A-line   D: De-escalation of Antimicrobials/Pharmacotherapies IV Rocephin for  ultrasound    Plan for the day/ETD Y    Code  Status:  Family/Goals of Care: DNR  Y daughter at bedside     Critical Care Time: 35 minutes  Critical secondary to Patient has a condition that poses threat to life and bodily function:  Shock cardiogenic and septic complicating  ALPHA  strep bacteremia complicated by CODY complicated by endotracheal intubation      Critical care was time spent personally by me on the following activities: development of treatment plan with patient or surrogate and bedside caregivers, discussions with consultants, evaluation of patient's response to treatment, examination of patient, ordering and performing treatments and interventions, ordering and review of laboratory studies, ordering and review of radiographic studies, pulse oximetry, re-evaluation of patient's condition. This critical care time did not overlap with that of any other provider or involve time for any procedures.     Charli Vargas MD  Critical Care Medicine  [facility] - Intensive Care ([facility])

## 2022-03-13 NOTE — ASSESSMENT & PLAN NOTE
3/12 gram-positive bacteremia resembling strep x2.  On IV Rocephin.  Respiratory cultures normal marcial  3/13 of aspirin bacteremia.  On IV Rocephin 2 g daily.  Possible endocarditis.  No evidence of endocarditis on 2D echo.  Will have to discuss with family goal of care /comfort measures prior to deciding on pursuing MACHO.

## 2022-03-13 NOTE — HOSPITAL COURSE
"Ms. 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 Patient was reportedly eating supper when she developed a "blank stare" and become unresponsive. CPR was initiated by her daughter and ROSC was obtained en route after 1 shock by EMS (actual downtime remains unknown). Initial workup in ED revealed K of 2.9, BNP > 4,9000, troponin of 0.041, and lactic acidosis (5.7) and patient was emergently intubated and admitted to ICU and placed on hypothermia protocol. Cardiology consulted to assist with management. Patient seen and examined today, remains intubated. Ill appearing. Well known to cardiology service and recently evaluated in CHF clinic on 3/3/22 and was stable at that time per clinic notes. Daughter reported increased SOB over the past few days and recently increased po Lasix dosage (unfortunately CHF clinic was unaware of worsening symptoms). Patient is compliant with OP medications. BNP is chronically elevated. Chart reviewed. Troponin 0.041>0.239. lactate still rising. Echo showed EF of 10%, BI-V failure, mod-severe MR, pulmonic regurgitation. CXR with worsening right-sided opacities, concerning for edema, infectious process, and possibly aspiration. 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 ENT consulted. Palliative care following.    3.13.2022  Rewarmed , not tolerating Ventilation when off sedation   Still sedated   Spoke to her daughter   "

## 2022-03-13 NOTE — ASSESSMENT & PLAN NOTE
-Decompensated, BNP chronically elevated  -Assess response to inotropic support  -Continue BB, Entresto as BP permits  -Diurese if able  -Echo shows worsening EF of 10%, Bi-V failure    3.13.2022  Supportive pressors   Wean when tolerated   Poor prognosis   DNR   UO improving   Prognosis remains grim

## 2022-03-14 LAB
ALLENS TEST: ABNORMAL
ANION GAP SERPL CALC-SCNC: 15 MMOL/L (ref 8–16)
ANION GAP SERPL CALC-SCNC: 15 MMOL/L (ref 8–16)
APTT BLDCRRT: 31.2 SEC (ref 21–32)
BACTERIA SPEC AEROBE CULT: NORMAL
BACTERIA SPEC AEROBE CULT: NORMAL
BASOPHILS # BLD AUTO: 0.02 K/UL (ref 0–0.2)
BASOPHILS NFR BLD: 0.2 % (ref 0–1.9)
BUN SERPL-MCNC: 46 MG/DL (ref 8–23)
BUN SERPL-MCNC: 48 MG/DL (ref 8–23)
CALCIUM SERPL-MCNC: 8.4 MG/DL (ref 8.7–10.5)
CALCIUM SERPL-MCNC: 8.5 MG/DL (ref 8.7–10.5)
CHLORIDE SERPL-SCNC: 104 MMOL/L (ref 95–110)
CHLORIDE SERPL-SCNC: 104 MMOL/L (ref 95–110)
CO2 SERPL-SCNC: 20 MMOL/L (ref 23–29)
CO2 SERPL-SCNC: 20 MMOL/L (ref 23–29)
CREAT SERPL-MCNC: 3.3 MG/DL (ref 0.5–1.4)
CREAT SERPL-MCNC: 3.5 MG/DL (ref 0.5–1.4)
DELSYS: ABNORMAL
DIFFERENTIAL METHOD: ABNORMAL
EOSINOPHIL # BLD AUTO: 0 K/UL (ref 0–0.5)
EOSINOPHIL NFR BLD: 0.2 % (ref 0–8)
ERYTHROCYTE [DISTWIDTH] IN BLOOD BY AUTOMATED COUNT: 15.8 % (ref 11.5–14.5)
ERYTHROCYTE [SEDIMENTATION RATE] IN BLOOD BY WESTERGREN METHOD: 10 MM/H
EST. GFR  (AFRICAN AMERICAN): 13 ML/MIN/1.73 M^2
EST. GFR  (AFRICAN AMERICAN): 14 ML/MIN/1.73 M^2
EST. GFR  (NON AFRICAN AMERICAN): 11 ML/MIN/1.73 M^2
EST. GFR  (NON AFRICAN AMERICAN): 12 ML/MIN/1.73 M^2
FIO2: 30
GLUCOSE SERPL-MCNC: 110 MG/DL (ref 70–110)
GLUCOSE SERPL-MCNC: 133 MG/DL (ref 70–110)
GRAM STN SPEC: NORMAL
HCO3 UR-SCNC: 22.8 MMOL/L (ref 24–28)
HCT VFR BLD AUTO: 33.2 % (ref 37–48.5)
HGB BLD-MCNC: 11 G/DL (ref 12–16)
IMM GRANULOCYTES # BLD AUTO: 0.19 K/UL (ref 0–0.04)
IMM GRANULOCYTES NFR BLD AUTO: 1.5 % (ref 0–0.5)
INR PPP: 1 (ref 0.8–1.2)
IP: 8
LYMPHOCYTES # BLD AUTO: 0.7 K/UL (ref 1–4.8)
LYMPHOCYTES NFR BLD: 5.5 % (ref 18–48)
MAGNESIUM SERPL-MCNC: 2.3 MG/DL (ref 1.6–2.6)
MAGNESIUM SERPL-MCNC: 2.4 MG/DL (ref 1.6–2.6)
MCH RBC QN AUTO: 29.6 PG (ref 27–31)
MCHC RBC AUTO-ENTMCNC: 33.1 G/DL (ref 32–36)
MCV RBC AUTO: 89 FL (ref 82–98)
MODE: ABNORMAL
MONOCYTES # BLD AUTO: 0.8 K/UL (ref 0.3–1)
MONOCYTES NFR BLD: 6.3 % (ref 4–15)
NEUTROPHILS # BLD AUTO: 10.8 K/UL (ref 1.8–7.7)
NEUTROPHILS NFR BLD: 86.3 % (ref 38–73)
NRBC BLD-RTO: 0 /100 WBC
PCO2 BLDA: 34.9 MMHG (ref 35–45)
PEEP: 5
PH SMN: 7.42 [PH] (ref 7.35–7.45)
PHOSPHATE SERPL-MCNC: 5.9 MG/DL (ref 2.7–4.5)
PHOSPHATE SERPL-MCNC: 5.9 MG/DL (ref 2.7–4.5)
PLATELET # BLD AUTO: 247 K/UL (ref 150–450)
PMV BLD AUTO: 12.1 FL (ref 9.2–12.9)
PO2 BLDA: 121 MMHG (ref 80–100)
POC BE: -2 MMOL/L
POC SATURATED O2: 99 % (ref 95–100)
POTASSIUM SERPL-SCNC: 3.7 MMOL/L (ref 3.5–5.1)
POTASSIUM SERPL-SCNC: 3.7 MMOL/L (ref 3.5–5.1)
PROTHROMBIN TIME: 11.6 SEC (ref 9–12.5)
RBC # BLD AUTO: 3.72 M/UL (ref 4–5.4)
SAMPLE: ABNORMAL
SITE: ABNORMAL
SODIUM SERPL-SCNC: 139 MMOL/L (ref 136–145)
SODIUM SERPL-SCNC: 139 MMOL/L (ref 136–145)
WBC # BLD AUTO: 12.52 K/UL (ref 3.9–12.7)

## 2022-03-14 PROCEDURE — 25000003 PHARM REV CODE 250: Performed by: INTERNAL MEDICINE

## 2022-03-14 PROCEDURE — A4216 STERILE WATER/SALINE, 10 ML: HCPCS | Performed by: INTERNAL MEDICINE

## 2022-03-14 PROCEDURE — 20000000 HC ICU ROOM

## 2022-03-14 PROCEDURE — 94003 VENT MGMT INPAT SUBQ DAY: CPT

## 2022-03-14 PROCEDURE — 37799 UNLISTED PX VASCULAR SURGERY: CPT

## 2022-03-14 PROCEDURE — 63600175 PHARM REV CODE 636 W HCPCS: Performed by: INTERNAL MEDICINE

## 2022-03-14 PROCEDURE — 99900026 HC AIRWAY MAINTENANCE (STAT)

## 2022-03-14 PROCEDURE — 80048 BASIC METABOLIC PNL TOTAL CA: CPT | Performed by: INTERNAL MEDICINE

## 2022-03-14 PROCEDURE — 85610 PROTHROMBIN TIME: CPT | Performed by: INTERNAL MEDICINE

## 2022-03-14 PROCEDURE — 84100 ASSAY OF PHOSPHORUS: CPT | Performed by: INTERNAL MEDICINE

## 2022-03-14 PROCEDURE — 99291 PR CRITICAL CARE, E/M 30-74 MINUTES: ICD-10-PCS | Mod: ,,, | Performed by: INTERNAL MEDICINE

## 2022-03-14 PROCEDURE — 63600175 PHARM REV CODE 636 W HCPCS: Performed by: NURSE PRACTITIONER

## 2022-03-14 PROCEDURE — 99497 ADVNCD CARE PLAN 30 MIN: CPT | Mod: ,,, | Performed by: NURSE PRACTITIONER

## 2022-03-14 PROCEDURE — 83520 IMMUNOASSAY QUANT NOS NONAB: CPT | Performed by: INTERNAL MEDICINE

## 2022-03-14 PROCEDURE — 99900035 HC TECH TIME PER 15 MIN (STAT)

## 2022-03-14 PROCEDURE — 99497 PR ADVNCD CARE PLAN 30 MIN: ICD-10-PCS | Mod: ,,, | Performed by: NURSE PRACTITIONER

## 2022-03-14 PROCEDURE — 99233 SBSQ HOSP IP/OBS HIGH 50: CPT | Mod: ,,, | Performed by: NURSE PRACTITIONER

## 2022-03-14 PROCEDURE — 85025 COMPLETE CBC W/AUTO DIFF WBC: CPT | Performed by: INTERNAL MEDICINE

## 2022-03-14 PROCEDURE — 82803 BLOOD GASES ANY COMBINATION: CPT

## 2022-03-14 PROCEDURE — 85730 THROMBOPLASTIN TIME PARTIAL: CPT | Performed by: INTERNAL MEDICINE

## 2022-03-14 PROCEDURE — 99233 PR SUBSEQUENT HOSPITAL CARE,LEVL III: ICD-10-PCS | Mod: ,,, | Performed by: NURSE PRACTITIONER

## 2022-03-14 PROCEDURE — 99291 CRITICAL CARE FIRST HOUR: CPT | Mod: ,,, | Performed by: INTERNAL MEDICINE

## 2022-03-14 PROCEDURE — 83735 ASSAY OF MAGNESIUM: CPT | Performed by: INTERNAL MEDICINE

## 2022-03-14 PROCEDURE — 27000221 HC OXYGEN, UP TO 24 HOURS

## 2022-03-14 RX ORDER — FUROSEMIDE 10 MG/ML
40 INJECTION INTRAMUSCULAR; INTRAVENOUS ONCE
Status: COMPLETED | OUTPATIENT
Start: 2022-03-14 | End: 2022-03-14

## 2022-03-14 RX ORDER — SYRING-NEEDL,DISP,INSUL,0.3 ML 29 G X1/2"
296 SYRINGE, EMPTY DISPOSABLE MISCELLANEOUS ONCE
Status: COMPLETED | OUTPATIENT
Start: 2022-03-14 | End: 2022-03-14

## 2022-03-14 RX ADMIN — FAMOTIDINE 20 MG: 10 INJECTION INTRAVENOUS at 09:03

## 2022-03-14 RX ADMIN — HYDRALAZINE HYDROCHLORIDE 10 MG: 20 INJECTION, SOLUTION INTRAMUSCULAR; INTRAVENOUS at 11:03

## 2022-03-14 RX ADMIN — MUPIROCIN: 20 OINTMENT TOPICAL at 09:03

## 2022-03-14 RX ADMIN — CLOPIDOGREL 75 MG: 75 TABLET, FILM COATED ORAL at 10:03

## 2022-03-14 RX ADMIN — MUPIROCIN: 20 OINTMENT TOPICAL at 08:03

## 2022-03-14 RX ADMIN — Medication 10 ML: at 05:03

## 2022-03-14 RX ADMIN — MAGNESIUM CITRATE 296 ML: 1.75 LIQUID ORAL at 09:03

## 2022-03-14 RX ADMIN — Medication 10 ML: at 06:03

## 2022-03-14 RX ADMIN — CHLORHEXIDINE GLUCONATE 0.12% ORAL RINSE 15 ML: 1.2 LIQUID ORAL at 09:03

## 2022-03-14 RX ADMIN — FUROSEMIDE 40 MG: 40 INJECTION, SOLUTION INTRAMUSCULAR; INTRAVENOUS at 09:03

## 2022-03-14 RX ADMIN — CEFTRIAXONE 2 G: 2 INJECTION, SOLUTION INTRAVENOUS at 01:03

## 2022-03-14 RX ADMIN — ACETAMINOPHEN 650 MG: 160 SOLUTION ORAL at 11:03

## 2022-03-14 RX ADMIN — HEPARIN SODIUM 5000 UNITS: 5000 INJECTION INTRAVENOUS; SUBCUTANEOUS at 09:03

## 2022-03-14 RX ADMIN — Medication 10 ML: at 10:03

## 2022-03-14 RX ADMIN — HEPARIN SODIUM 5000 UNITS: 5000 INJECTION INTRAVENOUS; SUBCUTANEOUS at 05:03

## 2022-03-14 RX ADMIN — ACETAMINOPHEN 650 MG: 160 SOLUTION ORAL at 05:03

## 2022-03-14 RX ADMIN — ASPIRIN 81 MG CHEWABLE TABLET 81 MG: 81 TABLET CHEWABLE at 09:03

## 2022-03-14 RX ADMIN — CHLORHEXIDINE GLUCONATE 0.12% ORAL RINSE 15 ML: 1.2 LIQUID ORAL at 08:03

## 2022-03-14 RX ADMIN — HEPARIN SODIUM 5000 UNITS: 5000 INJECTION INTRAVENOUS; SUBCUTANEOUS at 06:03

## 2022-03-14 NOTE — PROGRESS NOTES
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 CPR initiated after call to EMS. ROSC obtained by EMS (downtime not reported but required one shock and arrived to ED ~ one hour after EMS called)  Intubated in ED  Eval revealed leukocytosis, hypokalemia 2.9, compensated anion gap acidosis, BNP > 4900, troponin 0.041, lactate 3.6, procalcitonin 2.08  Admitted to ICU on mechanical ventilation; cooling for targeted temperature management for coma post arrest initiated       Hospital/ICU Course:  Seen and examined at start of shift  Sedated on propofol (for shivering), stopped for exam.  3/12 Patient seen and examined.Day 1post intubation.Paralysis None Sedation Propofol Vasopressors Dobutamine Urine output last 24 hrs 175 ml  Fluid balance since admit 2 L Tmax 100.3 T minimum 92, started rewarming Last BM 3/12 Chest X-ray and ABG reviewed .  3/13 Patient seen and examined.Day 2post intubation.Paralysis None Sedation None Vasopressors Dobutamine Urine output last 24 hrs 235 ml   creatinine increasing to 3.1Fluid balance since admit 4 L Tmax 98 Last BM 3/11 Chest X-ray and ABG reviewed .  Blood culture alpha strep     03/14: seen and examined: on Vent RR 10 breathing 15/min, flexes to stimuli, spont eye opening, T max: 98.6F, UO: 525mls, Spoke with Adenike today,scopolamine was added      Interval History:  03/14:  seen and examined: on Vent RR 10 breathing 15/min, flexes to stimuli, spont eye opening, T max: 98.6F, UO: 525mls, Spoke with daughter Adenike,scopolamine was added     DOBUTamine IV infusion (non-titrating) 2.5 mcg/kg/min (03/14/22 0600)    NORepinephrine bitartrate-D5W Stopped (03/13/22 0905)    propofoL Stopped (03/13/22 0815)      Review of Systems   Unable to perform ROS: Intubated     Vent Mode: A/C  Oxygen Concentration (%):  [30] 30  Resp Rate Total:  [10 br/min-49 br/min] 12 br/min  Vt Set:  [0 mL] 0 mL  PEEP/CPAP:  [5 cmH20] 5 cmH20  Pressure Support:  [0 cmH20] 0 cmH20  Mean Airway Pressure:  [7.5 iiG18-70 cmH20] 9 cmH20     Objective:     Vital Signs (Most Recent):  Temp: 98.6 °F (37 °C) (03/14/22 0315)  Pulse: 74 (03/14/22 0530)  Resp: 10 (03/14/22 0530)  BP: 136/78 (03/14/22 0400)  SpO2: 99 % (03/14/22 0530) Vital Signs (24h Range):  Temp:  [98.6 °F (37 °C)] 98.6 °F (37 °C)  Pulse:  [63-92] 74  Resp:  [10-23] 10  SpO2:  [98 %-100 %] 99 %  BP: ()/(45-78) 136/78  Arterial Line BP: ()/() 153/75     Weight: 50.9 kg (112 lb 3.4 oz)  Body mass index is 20.52 kg/m².      Intake/Output Summary (Last 24 hours) at 3/14/2022 0721  Last data filed at 3/14/2022 0600  Gross per 24 hour   Intake 74.52 ml   Output 625 ml   Net -550.48 ml       Physical Exam  Vitals and nursing note reviewed.   Constitutional:       Appearance: She is ill-appearing.      Interventions: She is intubated.       HENT:      Head: Normocephalic and atraumatic.      Nose: Nose normal.   Cardiovascular:      Rate and Rhythm: Normal rate.      Pulses: Normal pulses.   Pulmonary:      Effort: Pulmonary effort is normal. She is intubated.   Abdominal:      General: Bowel sounds are normal.      Palpations: Abdomen is soft.   Musculoskeletal:         General: Normal range of motion.   Skin:     General: Skin is warm.   Neurological:      Mental Status: She is lethargic.       Vents:  Vent Mode: A/C (03/14/22 0437)  Set Rate:  10 BPM (03/14/22 0437)  Vt Set: 0 mL (03/14/22 0437)  Pressure Support: 0 cmH20 (03/14/22 0437)  PEEP/CPAP: 5 cmH20 (03/14/22 0437)  Oxygen Concentration (%): 30 (03/14/22 0530)  Peak Airway Pressure: 14 cmH2O (03/14/22 0437)  Plateau Pressure: 16 cmH20 (03/14/22 0437)  Total Ve: 6.96 mL (03/14/22 0437)  F/VT Ratio<105 (RSBI): (!) 19.97 (03/14/22 0437)    Lines/Drains/Airways       Central Venous Catheter Line  Duration             Percutaneous Central Line Insertion/Assessment - Triple Lumen  03/10/22 2215 right subclavian 3 days              Drain  Duration                  NG/OG Tube 03/10/22 1957 16 Fr. Center mouth 3 days         Urethral Catheter 03/10/22 2032 Non-latex 16 Fr. 3 days              Airway  Duration                  Airway 3 days         Airway - Non-Surgical 03/10/22 1945 Endotracheal Tube 3 days              Arterial Line  Duration             Arterial Line 03/11/22 0310 Right Radial 3 days              Peripheral Intravenous Line  Duration                  Peripheral IV - Single Lumen 03/10/22 1945 18 G Right Forearm 3 days         Peripheral IV - Single Lumen 03/10/22 2200 18 G Anterior;Distal;Left Upper Arm 3 days                    Significant Labs:    CBC/Anemia Profile:  Recent Labs   Lab 03/13/22 0428 03/14/22 0457   WBC 19.08* 12.52   HGB 11.1* 11.0*   HCT 33.5* 33.2*    247   MCV 90 89   RDW 15.3* 15.8*        Chemistries:  Recent Labs   Lab 03/13/22 2120 03/13/22  2319 03/14/22 0457    139 139   K 3.5 3.7 3.7    104 104   CO2 20* 20* 20*   BUN 43* 46* 48*   CREATININE 3.5* 3.5* 3.3*   CALCIUM 8.4* 8.4* 8.5*   MG 2.3 2.3 2.4   PHOS 5.7* 5.9* 5.9*      Blood Culture, Routine Gram stain aer bottle: Gram positive cocci in chains resembling Strep      Results called to and read back by:Nalini Mora 03/12/2022  05:03     Gram stain laurie bottle: Gram positive cocci in chains resembling Strep      Positive results previously called 03/12/2022  11:07     STREPTOCOCCUS  SLADE   For susceptibility see order #[id number]       ABGs:   Recent Labs   Lab 03/14/22  0434   PH 7.423   PCO2 34.9*   HCO3 22.8*   POCSATURATED 99   BE -2     Cardiac Markers: No results for input(s): CKMB, TROPONINT, MYOGLOBIN in the last 48 hours.  POCT Glucose: No results for input(s): POCTGLUCOSE in the last 48 hours.  Respiratory Culture: No results for input(s): GSRESP, RESPIRATORYC in the last 48 hours.  All pertinent labs within the past 24 hours have been reviewed.    Significant Imaging:  I have reviewed all pertinent imaging results/findings within the past 24 hours.    X-Ray Chest AP Portable  Narrative: EXAMINATION:  XR CHEST AP PORTABLE    CLINICAL HISTORY:  ET Placement;    TECHNIQUE:  Single frontal view of the chest was performed.    COMPARISON:  03/10/2022    FINDINGS:  Endotracheal tube at the T3 level.  NG tube courses through the films in good position.    The cardiac silhouette is enlarged.  Blunting of the costophrenic angles bilaterally consistent with bilateral pleural effusions.    Central line present with the tip overlying the superior vena cava.  Impression: Tubes in good position.  Cardiomegaly.  Bibasilar pulmonary infiltrates and or bibasilar effusions.  Chest x-ray similar to 03/10/2022.    Electronically signed by: Cornell Joshi MD  Date:    03/13/2022  Time:    09:33       ABG  Recent Labs   Lab 03/14/22  0434   PH 7.423   PO2 121*   PCO2 34.9*   HCO3 22.8*   BE -2     Assessment/Plan:     Neuro  Anoxic encephalopathy  Post cardiac arrest  Current high quality data suggests limited if any benefit but cooling for TTM initiated overnight and reached goal temp prior to our consult/exam. Will complete TTM per protocol  Updated daughter at bedside on exam findings, plan of care, and expected clinical course  Palliative care team consulted for additional support with expected difficult conversations over next 48-72 hours  3/12 assess mental status after rewarming completed and of  sedation.  Patient DNR.  Palliative care consult for comfort measures.  3/13 of sedation breathes over the vent opens eyes spontaneously does not track does not follow commands positive gag and cough reflexes.  03/14: FOUR SCORE 10 points E3 M2 B4 R1: Lower scores indicate higher coma severity: DNR, daily monitor progress, will need trach, PEG, PICC , spoke with  Daughter Adenike    ENT  Mass of sphenoid sinus  Noted on CT head post cardiac arrest  ?relationship to recurrent laryngeal papillomas  ENT consulted  Anticipate work up delay until neuro recovery/prognosis post cardiac arrest resulting in comatose state    Pulmonary  On mechanically assisted ventilation  For airway protection post cardiac arrest  Vent settings reviewed and adjusted to optimize gas exchange  VAP prophylaxis  Empiric abx for high risk aspiration with arrest + significant leukocytosis  abg daily and prn to assess response to therapy  3/12 on FiO2 35%.  PaO2 160 mm Hg.  Check daily chest x-ray and ABG.  Assess mental status after rewarming completed and of sedation.  3/13 O2/MV /pulmonary toilet.  Chest x-ray reviewed.  Treatment alpha strep bacteremia.  Start Lasix if hemodynamically stable.  Discuss goal of care/ comfort care with family.  Patient is currently DNR.  03/14: cont Vent support: not awake for SBT or extubation, Trach recommended, Monitor ETT for cuff intergity    Cardiac/Vascular  * Cardiac arrest  Etiology unclear but airway compromise, hypokalemia, and sudden cardiac death related to poor EF all possible primary or contributing factors  Repeat echo pending  Cardiology consulted  3/13 low EF.  On dobutamine.  Complicated by anoxic brain injury.  Does not follow commands currently.  DNR.  Will continue discussion about goal of care/comfort care with family  03/14: Low EF 10%, on Dobutamine 2.5 mcg. Lasix bolus given, GFR 14, Four score 10 points E3 M2 B4 R1    Acute on chronic combined systolic and diastolic CHF (congestive  heart failure)  Last EF 25%  Recent increase in home lasix due to BLE edema  Per cardiology notes, pt would not desire invasive procedures or support  Now with CODY, anuria; fluid volume management will be complicated  Avoid extraneous fluid as able  Continue entresto as tolerated  ICU hemodynamic monitoring  Monitor electrolytes and keep K 4-5 and Mag > 2.0  3/12 strict I&Os.  On dobutamine.  Cardiogenic plus septic shock.  Treat gram-positive bacteremia.  On target map above 65 mm Hg.  Add Levophed.  Received 2 L bolus  3/13 strict I&Os.  Oliguric.  Needed 2 L NS bolus yesterday for severe hypotension.  On Levophed drip.  Continue dobutamine.   If she can tolerate hemodynamically will start Lasix drip.  Volume contraction with dialysis might not benefit the overall outcome  03/14: continue Dobutamine    Renal/  CODY (acute kidney injury)  3/12 oliguric strict I&Os.  Receiving NS boluses today.  3/13 strict I&Os.  Oliguric.  Needed 2 L NS bolus yesterday for severe hypotension.  On Levophed drip.  Continue dobutamine.   If she can tolerate hemodynamically will start Lasix drip.  Volume contraction with dialysis might not benefit the overall outcome  03/14:Monitor I&O, Lasix, estimated creatinine clearance is 10 mL/min (A) (based on SCr of 3.3 mg/dL (H)).     ID  Gram-positive bacteremia  3/12 gram-positive bacteremia resembling strep x2.  On IV Rocephin.  Respiratory cultures normal marcial  3/13 of aspirin bacteremia.  On IV Rocephin 2 g daily.  Possible endocarditis.  No evidence of endocarditis on 2D echo.  Will have to discuss with family goal of care /comfort measures prior to deciding on pursuing MACHO.  03/14: repeat cultures, ABX CEFTRIAXONE    Oncology  Laryngeal papillomatosis  Recurrent since childhood with MANY surgical interventions  Followed by Dr Harrison, last rigid laryngoscopy on 10/26/2021 per EMR  Unclear if choking/airway compromise played role in cardiac arrest at dinner     Critical Care Daily  Checklist:    A: Awake: RASS Goal/Actual Goal: RASS Goal: -1-->drowsy  Actual: Coffey Agitation Sedation Scale (RASS): Moderate sedation   B: Spontaneous Breathing Trial Performed?     C: SAT & SBT Coordinated?  N/A                      D: Delirium: CAM-ICU Overall CAM-ICU: Positive   E: Early Mobility Performed? Yes   F: Feeding Goal:    Status:     Current Diet Order   Procedures    Diet NPO      AS: Analgesia/Sedation Monitor   T: Thromboembolic Prophylaxis HEPARIN   H: HOB > 300 Yes   U: Stress Ulcer Prophylaxis (if needed) PEPCID   G: Glucose Control SSI   B: Bowel Function Stool Occurrence: 0   I: Indwelling Catheter (Lines & Watson) Necessity TLC, WATSON,    D: De-escalation of Antimicrobials/Pharmacotherapies CEFTRIAXONE    Plan for the day/ETD LAsix    Code Status:  Family/Goals of Care: DNR  Discuss GOC with Daughter Adenike     Critical Care Time: 35 minutes  Critical secondary to Patient has a condition that poses threat to life and bodily function: Acute Renal Failure and Hypoxic encephalopathy, Acute respiratory failure, bacteremia  Patient is currently on drug therapy requiring intensive monitoring for toxicity: Dobutrex      Critical care was time spent personally by me on the following activities: development of treatment plan with patient or surrogate and bedside caregivers, discussions with consultants, evaluation of patient's response to treatment, examination of patient, ordering and performing treatments and interventions, ordering and review of laboratory studies, ordering and review of radiographic studies, pulse oximetry, re-evaluation of patient's condition. This critical care time did not overlap with that of any other provider or involve time for any procedures.     Chencho Lechuga MD  Critical Care Medicine  formerly [facility])

## 2022-03-14 NOTE — PROGRESS NOTES
"[facility] ([facility]  [facility] Medicine  Progress Note    Patient Name: Tanya Madrid  MRN: [id number]  Patient Class: IP- Inpatient   Admission Date: 3/10/2022  Length of Stay: 4 days  Attending Physician: Sj Juan, *  Primary Care Provider: Maggie Sue NP        Subjective:     Principal Problem:Cardiac arrest        HPI:  History was taken from the electronic chart and from the daughter -  Adenike Lemus Daughter [phone]        82 y.o. female patient with a PMHx of HTN,  CHF-last EF-01/2021-25%, with diastolic dysfunction  , and HLD who presents to the Emergency Department for evaluation of cardiac arrest which onset suddenly 1 hour pta.  Daughter reports that over the last 3 days -she has been complaining of worsening dyspnoea  and was given extra lasix tablets. She usually takes 20mg lasix daily and was given 2 extra tablets with the episodes of  dyspnea.  This evening while having dinner, she "looked blank" and stopped breathing . Daughter called EMS and was advised to start CPR.. Pt achieved ROSC en route. After I shock . Prior Tx includes 50 mcg of fentanyl and 50 mg of ketamine from EMS.   Code status discussed.  She is Full code.  Labs and imaging test reviewed.  CT head -No hemorrhage or acute major vascular distribution infarction.   Dehiscence of the posterior wall of the sphenoid sinus such as on series 4, image 49-52. There is high density material possibly related to paranasal sinus disease or neoplasm which extends towards the pituitary and along the right towards the intracranial ICA.  Further evaluation with contrast enhanced MRI when clinically appropriate would be recommended.       Component      Latest Ref Rng & Units 3/10/2022 3/3/2022              Potassium      3.5 - 5.1 mmol/L 2.9 (L) 3.7   CBC showed wbc -19   Component      Latest Ref Rng & Units 3/10/2022 3/10/2022 4/20/2021          10:47 PM  8:18 PM    Lactate, Lex      0.5 - 2.2 " mmol/L 3.6 (HH) 5.7 (HH) 2.0     Component      Latest Ref Rng & Units 3/10/2022 4/20/2021   BNP      0 - 99 pg/mL >4,900 (H) 2,806 (H)   Code status discussed -she is full code  Her daughter is the SDM.  Adenike Lemus Daughter [phone]       Overview/Hospital Course:  81 y/o AAF admitted to ICU with a dx of cardiac arrest , CODY , acute hypoxic respiratory failure  and  Anoxic encephalopathy . Started on hypothermia protocol , IVAB , asa , plavix  And entresto . Cardiology and palliative care consulted. Tx reviewed . Cont current tx . Prognosis Poor      3/12  She remain critically ill . Now on rewarming phase . Started on propofol due to  only grimacing and posturing . Kidney  function is trending up . She  is on low dose of dobutamine . Case d/w he daughter at bedside . She changed code status to DNR . Prognosis poor .    3/13 Reamin critically ill  on mechanical ventilation . Temp is now > 36 . Last night  was on levophed and dobutamine to keep a map > 65  . Levophed wean off this am . She has been on propofol on and off for posturing  movement . Sedation stop for 2 hrs  .  She has positve  corneal , pupil and gag reflex .  No respond to verval or pain stimuli . She withdraw to pain . She is breathing overt the vent .  Neurology consulted . The UOP has sligly increased since yesterday . The kidney function  cont trending up . The Blood cx are (+) ALPHA HEMOLYTIC STREP    3/14 No significant neurologuical changes since yesterday . She opne her eyes on tactile simlus . She  does not follow commads . Ha been odff sedation for more than 24 hrs .  The Blood cx (+) for STREPTOCOCCUS ANGINOSUS  and EIKENELLA CORRODENS . The kidney function is sligly improving  . Prognsosis POOR .  POC d/w  her duaghter .      Interval History:     Review of Systems   Unable to perform ROS: Patient unresponsive   Objective:     Vital Signs (Most Recent):  Temp: 97.8 °F (36.6 °C) (03/14/22 0700)  Pulse: 78 (03/14/22  1539)  Resp: 12 (03/14/22 1539)  BP: (!) 177/86 (03/14/22 1142)  SpO2: 100 % (03/14/22 1539)   Vital Signs (24h Range):  Temp:  [97.8 °F (36.6 °C)-98.6 °F (37 °C)] 97.8 °F (36.6 °C)  Pulse:  [65-92] 78  Resp:  [10-21] 12  SpO2:  [97 %-100 %] 100 %  BP: (109-177)/(61-86) 177/86  Arterial Line BP: ()/() 108/57     Weight: 50.9 kg (112 lb 3.4 oz)  Body mass index is 20.52 kg/m².    Intake/Output Summary (Last 24 hours) at 3/14/2022 1628  Last data filed at 3/14/2022 1300  Gross per 24 hour   Intake 27.55 ml   Output 745 ml   Net -717.45 ml      Physical Exam  Vitals and nursing note reviewed.   Constitutional:       Appearance: She is ill-appearing.      Interventions: She is intubated.       HENT:      Head: Normocephalic and atraumatic.      Nose: Nose normal.   Cardiovascular:      Rate and Rhythm: Normal rate.      Pulses: Normal pulses.   Pulmonary:      Effort: Pulmonary effort is normal. She is intubated.   Abdominal:      General: Bowel sounds are normal.      Palpations: Abdomen is soft.   Musculoskeletal:         General: Normal range of motion.   Skin:     General: Skin is warm.   Neurological:      Mental Status: She is lethargic.       Significant Labs: All pertinent labs within the past 24 hours have been reviewed.      Significant Imaging: I have reviewed all pertinent imaging results/findings within the past 24 hours.        Assessment/Plan:      * Cardiac arrest    She had witnessed cardiac arrest.  Last Echo -1/21-  EF -25%  Diastolic dysfunction.  EKG -Sinus rhythm with occasional Premature ventricular complexes and Premature atrial complexes  Left axis deviation  Cardiology consult   Prognosis poor     Gram-positive bacteremia  Cont rocephin   ALPHA HEMOLYTIC STREP and EIKENELLA CORRODENS  F/U final sensitivity       CODY (acute kidney injury)  2/2 to cardiac arrest   No a good candidate for CRT/RRT  Monitor UOP         Anoxic encephalopathy  2/2 to cardiac arrest   Neurology consulted         Hypokalemia    Will replete, add serum mag , follow BMP in AM    Aspiration pneumonia  Cont  Rocephin   Blood cx (+) for ALPHA HEMOLYTIC STREP    Mass of sphenoid sinus    Ct head showed  Dehiscence of the posterior wall of the sphenoid sinus such as on series 4, image 49-52. There is high density material possibly related to paranasal sinus disease or neoplasm which extends towards the pituitary and along the right towards the intracranial ICA.  Further evaluation with contrast enhanced MRI when clinically appropriate would be recommended.  Suggest including 3D/volumetric postcontrast imaging.  ENT consult as clinically warranted.    Will consult ENT    Elevated troponin  Cont asa/plavix  Cardiology on board       Acute on chronic combined systolic and diastolic CHF (congestive heart failure)  Patient is identified as having Combined Systolic and Diastolic heart failure that is Acute on chronic. CHF is currently uncontrolled due to Pulmonary edema/pleural effusion on CXR. Latest ECHO performed and demonstrates- Results for orders placed during the hospital encounter of 01/15/21    Echo Color Flow Doppler? Yes    Interpretation Summary  · The left ventricle is severely enlarged with eccentric hypertrophy and severely decreased systolic function. The estimated ejection fraction is 25%  · Grade II left ventricular diastolic dysfunction.  · Normal right ventricular size with moderately reduced right ventricular systolic function.  · Severe left atrial enlargement.  · There is pulmonary hypertension.  · There is mild aortic valve stenosis.  · Aortic valve area is 1.37 cm2; peak velocity is 1.33 m/s; mean gradient is 4 mmHg.  · Mild to moderate tricuspid regurgitation.  · Intermediate central venous pressure (8 mmHg).  · The estimated PA systolic pressure is 50 mmHg.  · Trivial pericardial effusion.  . Continue Nitrate/Vasodilator and monitor clinical status closely. Monitor on telemetry. Patient is on CHF pathway.   Monitor strict Is&Os and daily weights.  Place on fluid restriction of 1.5 L. Continue to stress to patient importance of self efficacy and  on diet for CHF. Last BNP reviewed- and noted below   Recent Labs   Lab 03/10/22  2018   BNP >4,900*   .      History of CVA (cerebrovascular accident)    On aspirin/plavix    Parkinson disease  Continue supportive care       Laryngeal papillomatosis  Cont supportive tx       Essential hypertension    Hold  entresto due to hypotension  On dobutamine due to map < 65       VTE Risk Mitigation (From admission, onward)         Ordered     heparin (porcine) injection 5,000 Units  Every 8 hours         03/10/22 2351     IP VTE HIGH RISK PATIENT  Once         03/10/22 2345     Place sequential compression device  Until discontinued         03/10/22 2345                Discharge Planning   PARMINDER:      Code Status: DNR   Is the patient medically ready for discharge?:     Reason for patient still in hospital (select all that apply): Patient unstable and Treatment  Discharge Plan A: Other (TBD)            Critical care time spent on the evaluation and treatment of severe organ dysfunction, review of pertinent labs and imaging studies, discussions with consulting providers and discussions with patient/family: 39 minutes.      Sj Juan MD  Department of Hospital Medicine   [facility] ([facility])

## 2022-03-14 NOTE — ASSESSMENT & PLAN NOTE
3/12 oliguric strict I&Os.  Receiving NS boluses today.  3/13 strict I&Os.  Oliguric.  Needed 2 L NS bolus yesterday for severe hypotension.  On Levophed drip.  Continue dobutamine.   If she can tolerate hemodynamically will start Lasix drip.  Volume contraction with dialysis might not benefit the overall outcome  03/14:Monitor I&O, Lasix, estimated creatinine clearance is 10 mL/min (A) (based on SCr of 3.3 mg/dL (H)).

## 2022-03-14 NOTE — ASSESSMENT & PLAN NOTE
3/12 gram-positive bacteremia resembling strep x2.  On IV Rocephin.  Respiratory cultures normal marcial  3/13 of aspirin bacteremia.  On IV Rocephin 2 g daily.  Possible endocarditis.  No evidence of endocarditis on 2D echo.  Will have to discuss with family goal of care /comfort measures prior to deciding on pursuing MACHO.  03/14: repeat cultures, ABX CEFTRIAXONE

## 2022-03-14 NOTE — SUBJECTIVE & OBJECTIVE
Interval History:  03/14: seen and examined: on Vent RR 10 breathing 15/min, flexes to stimuli, spont eye opening, T max: 98.6F, UO: 525mls, Spoke with daughter Adenike,scopolamine was added     DOBUTamine IV infusion (non-titrating) 2.5 mcg/kg/min (03/14/22 0600)    NORepinephrine bitartrate-D5W Stopped (03/13/22 0905)    propofoL Stopped (03/13/22 0815)      Review of Systems   Unable to perform ROS: Intubated     Vent Mode: A/C  Oxygen Concentration (%):  [30] 30  Resp Rate Total:  [10 br/min-49 br/min] 12 br/min  Vt Set:  [0 mL] 0 mL  PEEP/CPAP:  [5 cmH20] 5 cmH20  Pressure Support:  [0 cmH20] 0 cmH20  Mean Airway Pressure:  [7.5 nsN83-48 cmH20] 9 cmH20     Objective:     Vital Signs (Most Recent):  Temp: 98.6 °F (37 °C) (03/14/22 0315)  Pulse: 74 (03/14/22 0530)  Resp: 10 (03/14/22 0530)  BP: 136/78 (03/14/22 0400)  SpO2: 99 % (03/14/22 0530) Vital Signs (24h Range):  Temp:  [98.6 °F (37 °C)] 98.6 °F (37 °C)  Pulse:  [63-92] 74  Resp:  [10-23] 10  SpO2:  [98 %-100 %] 99 %  BP: ()/(45-78) 136/78  Arterial Line BP: ()/() 153/75     Weight: 50.9 kg (112 lb 3.4 oz)  Body mass index is 20.52 kg/m².      Intake/Output Summary (Last 24 hours) at 3/14/2022 0721  Last data filed at 3/14/2022 0600  Gross per 24 hour   Intake 74.52 ml   Output 625 ml   Net -550.48 ml       Physical Exam  Vitals and nursing note reviewed.   Constitutional:       Appearance: She is ill-appearing.      Interventions: She is intubated.       HENT:      Head: Normocephalic and atraumatic.      Nose: Nose normal.   Cardiovascular:      Rate and Rhythm: Normal rate.      Pulses: Normal pulses.   Pulmonary:      Effort: Pulmonary effort is normal. She is intubated.   Abdominal:      General: Bowel sounds are normal.      Palpations: Abdomen is soft.   Musculoskeletal:         General: Normal range of motion.   Skin:     General: Skin is warm.   Neurological:      Mental Status: She is lethargic.       Vents:  Vent Mode: A/C  (03/14/22 0437)  Set Rate: 10 BPM (03/14/22 0437)  Vt Set: 0 mL (03/14/22 0437)  Pressure Support: 0 cmH20 (03/14/22 0437)  PEEP/CPAP: 5 cmH20 (03/14/22 0437)  Oxygen Concentration (%): 30 (03/14/22 0530)  Peak Airway Pressure: 14 cmH2O (03/14/22 0437)  Plateau Pressure: 16 cmH20 (03/14/22 0437)  Total Ve: 6.96 mL (03/14/22 0437)  F/VT Ratio<105 (RSBI): (!) 19.97 (03/14/22 0437)    Lines/Drains/Airways       Central Venous Catheter Line  Duration             Percutaneous Central Line Insertion/Assessment - Triple Lumen  03/10/22 2215 right subclavian 3 days              Drain  Duration                  NG/OG Tube 03/10/22 1957 16 Fr. Center mouth 3 days         Urethral Catheter 03/10/22 2032 Non-latex 16 Fr. 3 days              Airway  Duration                  Airway 3 days         Airway - Non-Surgical 03/10/22 1945 Endotracheal Tube 3 days              Arterial Line  Duration             Arterial Line 03/11/22 0310 Right Radial 3 days              Peripheral Intravenous Line  Duration                  Peripheral IV - Single Lumen 03/10/22 1945 18 G Right Forearm 3 days         Peripheral IV - Single Lumen 03/10/22 2200 18 G Anterior;Distal;Left Upper Arm 3 days                    Significant Labs:    CBC/Anemia Profile:  Recent Labs   Lab 03/13/22 0428 03/14/22  0457   WBC 19.08* 12.52   HGB 11.1* 11.0*   HCT 33.5* 33.2*    247   MCV 90 89   RDW 15.3* 15.8*        Chemistries:  Recent Labs   Lab 03/13/22 2120 03/13/22  2319 03/14/22  0457    139 139   K 3.5 3.7 3.7    104 104   CO2 20* 20* 20*   BUN 43* 46* 48*   CREATININE 3.5* 3.5* 3.3*   CALCIUM 8.4* 8.4* 8.5*   MG 2.3 2.3 2.4   PHOS 5.7* 5.9* 5.9*      Blood Culture, Routine Gram stain aer bottle: Gram positive cocci in chains resembling Strep      Results called to and read back by:Nalini Mora 03/12/2022  05:03     Gram stain laurie bottle: Gram positive cocci in chains resembling Strep      Positive results previously called 03/12/2022   11:07     STREPTOCOCCUS ANGINOSUS   For susceptibility see order #[id number]       ABGs:   Recent Labs   Lab 03/14/22  0434   PH 7.423   PCO2 34.9*   HCO3 22.8*   POCSATURATED 99   BE -2     Cardiac Markers: No results for input(s): CKMB, TROPONINT, MYOGLOBIN in the last 48 hours.  POCT Glucose: No results for input(s): POCTGLUCOSE in the last 48 hours.  Respiratory Culture: No results for input(s): GSRESP, RESPIRATORYC in the last 48 hours.  All pertinent labs within the past 24 hours have been reviewed.    Significant Imaging:  I have reviewed all pertinent imaging results/findings within the past 24 hours.    X-Ray Chest AP Portable  Narrative: EXAMINATION:  XR CHEST AP PORTABLE    CLINICAL HISTORY:  ET Placement;    TECHNIQUE:  Single frontal view of the chest was performed.    COMPARISON:  03/10/2022    FINDINGS:  Endotracheal tube at the T3 level.  NG tube courses through the films in good position.    The cardiac silhouette is enlarged.  Blunting of the costophrenic angles bilaterally consistent with bilateral pleural effusions.    Central line present with the tip overlying the superior vena cava.  Impression: Tubes in good position.  Cardiomegaly.  Bibasilar pulmonary infiltrates and or bibasilar effusions.  Chest x-ray similar to 03/10/2022.    Electronically signed by: Cornell Joshi MD  Date:    03/13/2022  Time:    09:33

## 2022-03-14 NOTE — ASSESSMENT & PLAN NOTE
Last EF 25%  Recent increase in home lasix due to BLE edema  Per cardiology notes, pt would not desire invasive procedures or support  Now with CODY, anuria; fluid volume management will be complicated  Avoid extraneous fluid as able  Continue entresto as tolerated  ICU hemodynamic monitoring  Monitor electrolytes and keep K 4-5 and Mag > 2.0  3/12 strict I&Os.  On dobutamine.  Cardiogenic plus septic shock.  Treat gram-positive bacteremia.  On target map above 65 mm Hg.  Add Levophed.  Received 2 L bolus  3/13 strict I&Os.  Oliguric.  Needed 2 L NS bolus yesterday for severe hypotension.  On Levophed drip.  Continue dobutamine.   If she can tolerate hemodynamically will start Lasix drip.  Volume contraction with dialysis might not benefit the overall outcome  03/14: continue Dobutamine

## 2022-03-14 NOTE — CONSULTS
New consult noted from RRT for Ms. Madrid. She remains intubated in ICU. Wound to upper lip again noted from intubation, scabbed over, no drainage. Right lower inner lip mucosa with bite marks previously unseen due to location. Deep red/purple discoloration, open lesions line up with upper teeth. No pressure related injuries noted. Recommend good oral care with ROSCOE system for intubation per usual ICU procedure. Sacrum intact with preventative foam dressing in place. Will continue to follow prn.

## 2022-03-14 NOTE — ASSESSMENT & PLAN NOTE
For airway protection post cardiac arrest  Vent settings reviewed and adjusted to optimize gas exchange  VAP prophylaxis  Empiric abx for high risk aspiration with arrest + significant leukocytosis  abg daily and prn to assess response to therapy  3/12 on FiO2 35%.  PaO2 160 mm Hg.  Check daily chest x-ray and ABG.  Assess mental status after rewarming completed and of sedation.  3/13 O2/MV /pulmonary toilet.  Chest x-ray reviewed.  Treatment alpha strep bacteremia.  Start Lasix if hemodynamically stable.  Discuss goal of care/ comfort care with family.  Patient is currently DNR.  03/14: cont Vent support: not awake for SBT or extubation, Trach recommended, Monitor ETT for cuff intergity

## 2022-03-14 NOTE — ASSESSMENT & PLAN NOTE
Post cardiac arrest  Current high quality data suggests limited if any benefit but cooling for TTM initiated overnight and reached goal temp prior to our consult/exam. Will complete TTM per protocol  Updated daughter at bedside on exam findings, plan of care, and expected clinical course  Palliative care team consulted for additional support with expected difficult conversations over next 48-72 hours  3/12 assess mental status after rewarming completed and of sedation.  Patient DNR.  Palliative care consult for comfort measures.  3/13 of sedation breathes over the vent opens eyes spontaneously does not track does not follow commands positive gag and cough reflexes.  03/14: FOUR SCORE 10 points E3 M2 B4 R1: Lower scores indicate higher coma severity: DNR, daily monitor progress, will need trach, PEG, PICC , spoke with  Daughter Adenike

## 2022-03-14 NOTE — PROGRESS NOTES
[facility] - Intensive Care (Hospital)  Adult Nutrition  Consult Note    SUMMARY     Recommendations     1. Recommend to continue: Isosource 1.5, continuous    - Advance as tolerated to goal: 45 mL/hr     - 100mL H2O flush q 4-6 hours or per MD/NP.     - Provides 1620 calories (100%EEN), 73g protein (100%EPN), 190g CHO, and 825ml H20 + FWF.     - Monitor Na, Mg, K, Phos, and glucose; correct as indicated.      2. If/when pt able to tolerate PO diet, consider SLP consult for swallow study and texture recommendation   - As medically able, advance diet as tolerated, goal: cardiac (texture per SLP) (fluid per MD/NP)    - Consider feeding assistance with all meals    3. RD to follow up to monitor intake, tolerance, and labs.   *Please send consult if acute nutrition needs arise.    Goals:    1. Pt will tolerate >75% estimated energy needs by RD follow up.      Nutrition Goal Status: goal met, continues     Communication of RD recs: POC, sticky note, secure chat and second sign    Assessment and Plan  Nutrition Problem    Inadequate oral intake  Related to (etiology):      Decreased ability to consume sufficient energy  Signs and Symptoms (as evidenced by):     NPO, intubated  Interventions:    Enteral nutrition    Collaboration with other care providers  Nutrition Diagnosis Status:   Continues     Reason for Assessment  Reason for assessment: RD follow-up (enteral feeds)  Diagnosis: Cardiac arrest   Relevant medical history: CHF, HTN, HLD, GERD, CVA, dysphagia, anoxic encephalopathy    General information comments:     03/11/2022: RD consulted for TF recs. Pt admitted today following cardiac arrest, now intubated in ICU. Propofol d/c'd today. Altered renal labs noted. Will continue to monitor.     3/14: Patient seen for follow up. Patient is NPO and intubated. Patient is not receiving propofol. Patient has no edema noted. Patients weight has been stable since admit. Patient is tolerating TF @ 35mL/hr and increasing to meet  "goal rate. Patients last BM 3/11. Will continue to monitor.     Nutrition Discharge Planning - pending medical course    Nutrition Risk Screen        Nutrition Risk Screen: (P) tube feeding or parenteral nutrition    Nutrition/Diet History  Patient Reported Diet/Restrictions/Preferences: ASUTIN   Food Allergies: NKFA  Factors Affecting Nutritional Intake: difficulty/impaired swallowing, NPO and on mechanical ventilation   Spiritual, Cultural Beliefs, Latter-day Practices, Values that Affect Care: no Spiritual, Cultural Beliefs, Latter-day Practices, Values that Affect Care: no    Anthropometrics  Temp: 97.8 °F (36.6 °C)  Height: 5' 2" (157.5 cm)  Height (inches): 62 in  Weight Method: Bed Scale  Weight: 50.9 kg (112 lb 3.4 oz)  Weight (lb): 112.22 lb  Ideal Body Weight (IBW), Female: 110 lb  % Ideal Body Weight, Female (lb): 105.45 %  BMI (Calculated): 20.5       Labs  Pertinent Labs: reviewed  Lab Results   Component Value Date    HGB 11.0 (L) 03/14/2022    HCT 33.2 (L) 03/14/2022     03/14/2022    CALCIUM 8.5 (L) 03/14/2022    K 3.7 03/14/2022    PHOS 5.9 (H) 03/14/2022    BUN 48 (H) 03/14/2022    CREATININE 3.3 (H) 03/14/2022    ESTGFRAFRICA 14 (A) 03/14/2022    EGFRNONAA 12 (A) 03/14/2022    ALBUMIN 3.0 (L) 03/11/2022     Lab Results   Component Value Date    ALT 21 03/11/2022    AST 59 (H) 03/11/2022    ALKPHOS 92 03/11/2022    BILITOT 1.1 (H) 03/11/2022     Meds  Pertinent Medications: reviewed    acetaminophen  650 mg Per OG tube Q6H    aspirin  81 mg Per OG tube Daily    cefTRIAXone (ROCEPHIN) IVPB  2 g Intravenous Q24H    chlorhexidine  15 mL Mouth/Throat BID    clopidogreL  75 mg Per OG tube Daily    famotidine (PF)  20 mg Intravenous Daily    heparin (porcine)  5,000 Units Subcutaneous Q8H    mupirocin   Nasal BID    scopolamine  1 patch Transdermal Q3 Days    sodium chloride 0.9%  10 mL Intravenous Q8H     Continuous Infusions:   DOBUTamine IV infusion (non-titrating) 2.5 mcg/kg/min (03/14/22 " 0600)     PRN Meds:acetaminophen, dextrose 10%, dextrose 10%, glucagon (human recombinant), glucose, glucose, hydrALAZINE, HYDROcodone-acetaminophen, melatonin, naloxone, pneumoc 13-lopez conj-dip cr(PF), polyethylene glycol     Physical Findings/Assessment  Nausea/Vomiting Signs/Symptoms: (P)  (no s/sx)  Wounds: not indicated   Edema:   not indicated   Last Bowel Movement: (P) 03/11/22   GI Signs/Symptoms: (P) fecal incontinence  Carlos Score: (P) 12  Mouth/Teeth WDL: (P) WDL    O2 Device (Oxygen Therapy): ventilator  NFPE unable to be performed due to patients status    Malnutrition Assessment  Patient does not meet at least 2 ASPEN criteria for malnutrition at this time.   Will continue to monitor.       Estimated/Assessed Needs  Weight Used For Calorie Calculations: 52.6 kg (115 lb 15.4 oz)  Energy Calorie Requirements (kcal): 1537 (ICU on vent, non-obese)  Energy Need Method: [facility]  Weight Used For Protein Calculations: 52.6 kg (115 lb 15.4 oz)  Protein Requirements:  (1.2-2g/kg, ICU on vent)  RDA Method (mL): 1537ml fluid or per MD/NP  CHO Requirement: 192g (50% CHO)    Nutrition Prescription Ordered  NPO     Evaluation of Received Nutrients  Enteral Calories: 1260  Enteral Protein: 57  Energy Calories Required: meeting needs  Protein Required: meeting needs  Tolerance: tolerating  % Intake of Estimated Energy Needs: 75 - 100 %  % Meal Intake: NPO    Monitor and Evaluation  Food and Nutrient Intake: energy intake  Food and Nutrient Administration: diet order and enteral nutrition administration  Anthropometric Measurements: weight, weight change, body mass index  Biochemical Data, Medical Tests and Procedures: electrolyte and renal panel, lipid profile, gastrointestinal profile, glucose/endocrine profile, Inflammatory profile  Nutrition-Focused Physical Findings: overall appearance     Nutrition Follow-Up  Level of nutrition risk: moderate  Frequency of follow-up: Once weekly   Magalis Hernández RD,LDN

## 2022-03-14 NOTE — SUBJECTIVE & OBJECTIVE
Interval History:     Review of Systems   Unable to perform ROS: Patient unresponsive   Objective:     Vital Signs (Most Recent):  Temp: 97.8 °F (36.6 °C) (03/14/22 0700)  Pulse: 78 (03/14/22 1539)  Resp: 12 (03/14/22 1539)  BP: (!) 177/86 (03/14/22 1142)  SpO2: 100 % (03/14/22 1539)   Vital Signs (24h Range):  Temp:  [97.8 °F (36.6 °C)-98.6 °F (37 °C)] 97.8 °F (36.6 °C)  Pulse:  [65-92] 78  Resp:  [10-21] 12  SpO2:  [97 %-100 %] 100 %  BP: (109-177)/(61-86) 177/86  Arterial Line BP: ()/() 108/57     Weight: 50.9 kg (112 lb 3.4 oz)  Body mass index is 20.52 kg/m².    Intake/Output Summary (Last 24 hours) at 3/14/2022 1628  Last data filed at 3/14/2022 1300  Gross per 24 hour   Intake 27.55 ml   Output 745 ml   Net -717.45 ml      Physical Exam  Vitals and nursing note reviewed.   Constitutional:       Appearance: She is ill-appearing.      Interventions: She is intubated.       HENT:      Head: Normocephalic and atraumatic.      Nose: Nose normal.   Cardiovascular:      Rate and Rhythm: Normal rate.      Pulses: Normal pulses.   Pulmonary:      Effort: Pulmonary effort is normal. She is intubated.   Abdominal:      General: Bowel sounds are normal.      Palpations: Abdomen is soft.   Musculoskeletal:         General: Normal range of motion.   Skin:     General: Skin is warm.   Neurological:      Mental Status: She is lethargic.       Significant Labs: All pertinent labs within the past 24 hours have been reviewed.      Significant Imaging: I have reviewed all pertinent imaging results/findings within the past 24 hours.

## 2022-03-14 NOTE — CONSULTS
Discussed placement options with patient's daughter, Adenike, if she decides to pursue trach/PEG. Advised of two local facilities that are able to admit patients that are vent dependent. [facility] in  and [facility] in [place]. Advised Adenike she may tour each facility. She does not wish to place her mother in a nursing home. We also discussed the possibility of taking patient home with a home ventilator through [facility] with the addition of home health. Informed Karla her mother would need 24/7 care and can arrange for private sitters as well to assist in the care. This will be an out of pocket expense. She is agreeable to this. If trach is placed, Adenike would like to give her mother some time at home to see if there is any improvement before possibly transitioning to home hospice. Discussed different HH agencies that also have hospice and [facility]all[facility]ospice will be 1st choice. Referral sent to [facility] and CM reached out to [facility] for home invasive Tr[facility]. [facility] will do bedside assessment if and when trach is placed. CM will continue to follow.

## 2022-03-14 NOTE — PROGRESS NOTES
.crm  Advance Care Planning    Consult Note  Palliative Medicine      Consult Requested By: SONA Baldwin NP with ICU  Reason for Consult: Goals of care and Advance care planning  SUBJECTIVE:     History of Present Illness:  Tanya Madrid is a 82 y.o. 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  She presented to hospital after sustained witnessed cardiac arrest.  Admitted to ICU on mechanical ventilation; cooling for targeted temperature management for coma post arrest initiated.  We were consulted to assist with goals of care in elderly patient critically ill in ICU with unknown down time.      NARRATIVE     Follow up with patient.  Events over weekend noted.  Reviewed neurology note.  No major improvements in her neurological status.  She is on minimal vent settings.  Her daughter has remained vigilent at the bedside.  She is struggling with decision making.  She has met with the ICU team and [facility] team today re: goals of care and I followed up on this discussion.     On one hand, her daughter is able to say her mom would not want to be prolonged in her current condition if she could not get better but yet feels she has not been given enough time.  She understands her options are transitioning to comfort care which would include a compassionate extubation or moving forward with trach/peg and long term support to see if her mentation improves.  She feels like she would have enough insight to known we to transition to comfort care if this is the decision made.  She is grappling with these options and undecided.  We talked openly about the fact her mom declined invasive procedures such as AICD and life-vest and why this should be taken into account.  Adenike is clearly overwhelmed at present and I did not push her for a decision in effort to maintain rapport.      She is asking for more time before  she commits to a decision.  I asked her to keep her mom's experience at the center of every decision.  Continue time-limited trial of current interventions.      ASSESSEMENT / PLAN     1.  Encounter for Palliative Care   -DNR   -Continue current management   -Family/Emotional Support    2.   ? Anoxic encephaloapthy   -s/p cardiac arrest   -poorly responsive   -undecided re: palliative ventilar withdrawal versus trach/peg long term support    3.   Acute on chronic CHF   -cardiology consulted   -EF 10%   -dobutamine gtt   -overall poor prognosis    4.   Parkinson's disease/Frailty   -supportive care    5.   Sphenoid mass, h/o papillomas   -ENT eval    Thank you for allowing Palliative Medicine to be involved in the care of Tanya Madrid.    Past Medical History:   Diagnosis Date    Acute CHF (congestive heart failure) 1/17/2021    Hyperlipemia     Hypertension     Parkinson's disease     Stroke 2016    Throat mass      Past Surgical History:   Procedure Laterality Date    CLOSED REDUCTION Right 10/15/2020    Procedure: CLOSED REDUCTION;  Surgeon: Humberto Valdivia DO;  Location: [facility] OR;  Service: Orthopedics;  Laterality: Right;  ATTEMPTED    EVACUATION OF HEMATOMA Right 10/17/2020    Procedure: EVACUATION, HEMATOMA;  Surgeon: Humberto Valdivia DO;  Location: [facility] OR;  Service: Orthopedics;  Laterality: Right;    HEMIARTHROPLASTY OF HIP Left 7/12/2020    Procedure: HEMIARTHROPLASTY, HIP;  Surgeon: Humberto Valdivia DO;  Location: [facility] OR;  Service: Orthopedics;  Laterality: Left;    HEMIARTHROPLASTY OF HIP Right 10/2/2020    Procedure: HEMIARTHROPLASTY, HIP;  Surgeon: Loyd Kearney MD;  Location: [facility] OR;  Service: Orthopedics;  Laterality: Right;    HYSTERECTOMY      OPEN REDUCTION OF DISLOCATION OF HIP Right 10/17/2020    Procedure: OPEN REDUCTION, DISLOCATION, HIP;  Surgeon: Humberto Valdivia DO;  Location: [facility] OR;  Service: Orthopedics;  Laterality: Right;    THROAT SURGERY       TONSILLECTOMY       No family history on file.    Social History     Socioeconomic History    Marital status:    Tobacco Use    Smoking status: Never Smoker    Smokeless tobacco: Never Used   Substance and Sexual Activity    Alcohol use: No    Drug use: No    Sexual activity: Not Currently      Review of patient's allergies indicates:   Allergen Reactions    Xanax [alprazolam]        Medications:    Current Facility-Administered Medications:     acetaminophen oral solution 650 mg, 650 mg, Per OG tube, Q6H, Sj Juan MD, 650 mg at 03/14/22 1142    acetaminophen oral solution 650 mg, 650 mg, Per OG tube, Q4H PRN, Sj Juan MD    aspirin chewable tablet 81 mg, 81 mg, Per OG tube, Daily, Sj Juan MD, 81 mg at 03/14/22 0952    cefTRIAXone (ROCEPHIN) 2 g/50 mL D5W IVPB, 2 g, Intravenous, Q24H, Abhijit Fowler MD, Stopped at 03/14/22 0252    chlorhexidine 0.12 % solution 15 mL, 15 mL, Mouth/Throat, BID, Donavan Rangel MD, 15 mL at 03/14/22 0952    clopidogreL tablet 75 mg, 75 mg, Per OG tube, Daily, Sj Juan MD, 75 mg at 03/14/22 1012    dextrose 10% bolus 125 mL, 12.5 g, Intravenous, PRN, Abhijit Fowler MD    dextrose 10% bolus 250 mL, 25 g, Intravenous, PRN, Abhijit Fowler MD    DOBUtamine 1000 mg in D5W 250 mL infusion, 2.5 mcg/kg/min, Intravenous, Continuous, JOSUE Brenner-BC, Last Rate: 2 mL/hr at 03/14/22 0600, 2.5 mcg/kg/min at 03/14/22 0600    famotidine (PF) injection 20 mg, 20 mg, Intravenous, Daily, Donavan Rangel MD, 20 mg at 03/14/22 0952    glucagon (human recombinant) injection 1 mg, 1 mg, Intramuscular, PRN, Abhijit Fowler MD    glucose chewable tablet 16 g, 16 g, Per OG tube, PRN, Sj Juan MD    glucose chewable tablet 24 g, 24 g, Per OG tube, PRN, Sj Juan MD    heparin (porcine) injection 5,000 Units, 5,000 Units, Subcutaneous, Q8H, Donavan Rangel,  MD, 5,000 Units at 03/14/22 0503    hydrALAZINE injection 10 mg, 10 mg, Intravenous, Q6H PRN, DEBORAH BrennerP-BC, 10 mg at 03/14/22 1143    HYDROcodone-acetaminophen 5-325 mg per tablet 1 tablet, 1 tablet, Per OG tube, Q6H PRN, Sj Juan MD    melatonin oral solution 6 mg, 6 mg, Per OG tube, Nightly PRN, Sj Juan MD    mupirocin 2 % ointment, , Nasal, BID, Sj Juan MD, Given at 03/14/22 0952    naloxone 0.4 mg/mL injection 0.02 mg, 0.02 mg, Intravenous, PRN, Abhijit Fowler MD    pneumoc 13-lopez conj-dip cr(PF) (PREVNAR 13 (PF)) 0.5 mL, 0.5 mL, Intramuscular, vaccine x 1 dose, Sj Juan MD    polyethylene glycol packet 17 g, 17 g, Per NG tube, Daily PRN, Sj Juan MD    scopolamine 1.3-1.5 mg (1 mg over 3 days) 1 patch, 1 patch, Transdermal, Q3 Days, Otf Peralta MD, 1 patch at 03/12/22 2106    sodium chloride 0.9% flush 10 mL, 10 mL, Intravenous, Q8H, Abhijit Fowler MD, 10 mL at 03/14/22 0600    ROS:  Review of Systems   Unable to perform ROS: Intubated       OBJECTIVE:     Physical Exam:  Vitals: Temp: 97.8 °F (36.6 °C) (03/14/22 0700)  Pulse: 70 (03/14/22 1333)  Resp: 12 (03/14/22 1333)  BP: (!) 177/86 (03/14/22 1142)  SpO2: 98 % (03/14/22 1333)    Physical Exam  Vitals reviewed.   Constitutional:       Appearance: She is well-groomed. She is ill-appearing.      Interventions: She is intubated.   HENT:      Head: Atraumatic.   Cardiovascular:      Rate and Rhythm: Normal rate. Rhythm irregular.   Pulmonary:      Effort: She is intubated.   Abdominal:      General: Abdomen is flat. There is no distension.   Genitourinary:     Comments: Indwelling catheter  Musculoskeletal:      Right lower leg: Edema present.      Left lower leg: Edema present.   Skin:     General: Skin is warm and dry.   Neurological:      Mental Status: She is unresponsive.   Psychiatric:      Comments: Unable to assess due to cognition         Review of  Symptoms    Symptom Assessment (ESAS 0-10 Scale)  Pain:  0  Dyspnea:  0  Anxiety:  0  Nausea:  0  Depression:  0  Anorexia:  0  Fatigue:  0  Insomnia:  0  Restlessness:  0  Agitation:  0  Unable to complete assessment due to Intubated         Living Arrangements:  Lives with family and Lives in home    Psychosocial/Cultural: Lives with daughter and granddaughter, she had twin daughter's but one is  at 33 due to MI      Advance Directives:   [place]POST: No    Do Not Resuscitate Status: Yes    Medical Power of : No      Decision Making:  Family answered questions and Patient unable to communicate due to disease severity/cognitive impairment      Labs:  WBC   Date Value Ref Range Status   2022 12.52 3.90 - 12.70 K/uL Final       Hemoglobin   Date Value Ref Range Status   2022 11.0 (L) 12.0 - 16.0 g/dL Final       Hematocrit   Date Value Ref Range Status   2022 33.2 (L) 37.0 - 48.5 % Final       MCV   Date Value Ref Range Status   2022 89 82 - 98 fL Final       Platelets   Date Value Ref Range Status   2022 247 150 - 450 K/uL Final       BMP  Lab Results   Component Value Date     2022    K 3.7 2022     2022    CO2 20 (L) 2022    BUN 48 (H) 2022    CREATININE 3.3 (H) 2022    CALCIUM 8.5 (L) 2022    ANIONGAP 15 2022    ESTGFRAFRICA 14 (A) 2022    EGFRNONAA 12 (A) 2022       Lab Results   Component Value Date    AST 59 (H) 2022    ALKPHOS 92 2022    BILITOT 1.1 (H) 2022       Albumin   Date Value Ref Range Status   2022 3.0 (L) 3.5 - 5.2 g/dL Final       Radiology:CT: I have reviewed all pertinent results/findings within the past 24 hours:  03/10/2022   Impression:   No hemorrhage or acute major vascular distribution infarction.   Dehiscence of the posterior wall of the sphenoid sinus such as on series 4, image [id number]. There is high density material possibly related to paranasal sinus  "disease or neoplasm which extends towards the pituitary and along the right towards the intracranial ICA.  Further evaluation with contrast enhanced MRI when clinically appropriate would be recommended.  Suggest including 3D/volumetric postcontrast imaging.  ENT consult as clinically warranted.    Chest xray 03/10/2022    Impression:   Right central venous catheter in good position.   Worsening right-sided pulmonary opacities possibly infection, aspiration, or edema.  Correlation is advised.     Discussed case and visit details with cardiology, ICU       Medical decision making: HIGH based on high risk of death high risk medications poor prognosis management of more than one chronic illness in exacerbation or progression of disease    Plan required increased review of medication choice, interaction, dosing, frequency, and route due to patient complexity. Patient complexity increased by: advanced age, altered mentation, renal insufficiency and NPO    25 min ACP time spent discussing: code status, assessed patient specific goals and addressed the best way to achieve them, coordination of care and emotional support, formulating and communicating prognosis, exploring burden/ benefit of various approaches of treatment, inquired about existing or willingness to complete advance directive documents.        Essence Whitfield NP  Palliative Medicine    Portions of this note may have been created with voice recognition software. Occasional "wrong word" or "sound alike" substitutions may have occurred due to the inherent limitations of voice recognition software. Please, read the note carefully and recognize, using context, where substitutions have occurred.     "

## 2022-03-15 PROBLEM — Z51.5 PALLIATIVE CARE ENCOUNTER: Status: ACTIVE | Noted: 2022-03-15

## 2022-03-15 LAB
ALLENS TEST: ABNORMAL
ANION GAP SERPL CALC-SCNC: 14 MMOL/L (ref 8–16)
APTT BLDCRRT: 45.6 SEC (ref 21–32)
BACTERIA BLD CULT: ABNORMAL
BASOPHILS # BLD AUTO: 0.02 K/UL (ref 0–0.2)
BASOPHILS NFR BLD: 0.2 % (ref 0–1.9)
BUN SERPL-MCNC: 56 MG/DL (ref 8–23)
CALCIUM SERPL-MCNC: 8.7 MG/DL (ref 8.7–10.5)
CHLORIDE SERPL-SCNC: 106 MMOL/L (ref 95–110)
CO2 SERPL-SCNC: 22 MMOL/L (ref 23–29)
CREAT SERPL-MCNC: 3.8 MG/DL (ref 0.5–1.4)
DELSYS: ABNORMAL
DIFFERENTIAL METHOD: ABNORMAL
EOSINOPHIL # BLD AUTO: 0.1 K/UL (ref 0–0.5)
EOSINOPHIL NFR BLD: 0.5 % (ref 0–8)
ERYTHROCYTE [DISTWIDTH] IN BLOOD BY AUTOMATED COUNT: 15.8 % (ref 11.5–14.5)
ERYTHROCYTE [SEDIMENTATION RATE] IN BLOOD BY WESTERGREN METHOD: 10 MM/H
EST. GFR  (AFRICAN AMERICAN): 12 ML/MIN/1.73 M^2
EST. GFR  (NON AFRICAN AMERICAN): 10 ML/MIN/1.73 M^2
FIO2: 30
GLUCOSE SERPL-MCNC: 129 MG/DL (ref 70–110)
HCO3 UR-SCNC: 24.3 MMOL/L (ref 24–28)
HCT VFR BLD AUTO: 32.2 % (ref 37–48.5)
HGB BLD-MCNC: 10.4 G/DL (ref 12–16)
IMM GRANULOCYTES # BLD AUTO: 0.35 K/UL (ref 0–0.04)
IMM GRANULOCYTES NFR BLD AUTO: 3.7 % (ref 0–0.5)
INR PPP: 1 (ref 0.8–1.2)
IP: 8
IT: 2
LYMPHOCYTES # BLD AUTO: 0.8 K/UL (ref 1–4.8)
LYMPHOCYTES NFR BLD: 8.7 % (ref 18–48)
MAGNESIUM SERPL-MCNC: 2.5 MG/DL (ref 1.6–2.6)
MCH RBC QN AUTO: 29.6 PG (ref 27–31)
MCHC RBC AUTO-ENTMCNC: 32.3 G/DL (ref 32–36)
MCV RBC AUTO: 92 FL (ref 82–98)
MODE: ABNORMAL
MONOCYTES # BLD AUTO: 0.9 K/UL (ref 0.3–1)
MONOCYTES NFR BLD: 9 % (ref 4–15)
NEUTROPHILS # BLD AUTO: 7.5 K/UL (ref 1.8–7.7)
NEUTROPHILS NFR BLD: 77.9 % (ref 38–73)
NRBC BLD-RTO: 0 /100 WBC
NSE SERPL-MCNC: 19 NG/ML
NSE SERPL-MCNC: 24 NG/ML
NSE SERPL-MCNC: 31 NG/ML
PCO2 BLDA: 34.4 MMHG (ref 35–45)
PEEP: 5
PH SMN: 7.46 [PH] (ref 7.35–7.45)
PHOSPHATE SERPL-MCNC: 6 MG/DL (ref 2.7–4.5)
PLATELET # BLD AUTO: 244 K/UL (ref 150–450)
PMV BLD AUTO: 11.9 FL (ref 9.2–12.9)
PO2 BLDA: 156 MMHG (ref 80–100)
POC BE: 0 MMOL/L
POC SATURATED O2: 99 % (ref 95–100)
POTASSIUM SERPL-SCNC: 3.1 MMOL/L (ref 3.5–5.1)
PROTHROMBIN TIME: 11.4 SEC (ref 9–12.5)
RBC # BLD AUTO: 3.51 M/UL (ref 4–5.4)
SAMPLE: ABNORMAL
SITE: ABNORMAL
SODIUM SERPL-SCNC: 142 MMOL/L (ref 136–145)
WBC # BLD AUTO: 9.56 K/UL (ref 3.9–12.7)

## 2022-03-15 PROCEDURE — 27201109 HC SYSTEM FECAL MANAGEMENT

## 2022-03-15 PROCEDURE — 84100 ASSAY OF PHOSPHORUS: CPT | Performed by: INTERNAL MEDICINE

## 2022-03-15 PROCEDURE — 63600175 PHARM REV CODE 636 W HCPCS

## 2022-03-15 PROCEDURE — 85610 PROTHROMBIN TIME: CPT | Performed by: INTERNAL MEDICINE

## 2022-03-15 PROCEDURE — 27000221 HC OXYGEN, UP TO 24 HOURS

## 2022-03-15 PROCEDURE — 83735 ASSAY OF MAGNESIUM: CPT | Performed by: INTERNAL MEDICINE

## 2022-03-15 PROCEDURE — 25000003 PHARM REV CODE 250

## 2022-03-15 PROCEDURE — C9399 UNCLASSIFIED DRUGS OR BIOLOG: HCPCS

## 2022-03-15 PROCEDURE — 25000003 PHARM REV CODE 250: Performed by: INTERNAL MEDICINE

## 2022-03-15 PROCEDURE — 99291 PR CRITICAL CARE, E/M 30-74 MINUTES: ICD-10-PCS | Mod: ,,, | Performed by: INTERNAL MEDICINE

## 2022-03-15 PROCEDURE — 99900035 HC TECH TIME PER 15 MIN (STAT)

## 2022-03-15 PROCEDURE — 25000003 PHARM REV CODE 250: Performed by: HOSPITALIST

## 2022-03-15 PROCEDURE — 63600175 PHARM REV CODE 636 W HCPCS: Performed by: INTERNAL MEDICINE

## 2022-03-15 PROCEDURE — 63600175 PHARM REV CODE 636 W HCPCS: Mod: JG | Performed by: INTERNAL MEDICINE

## 2022-03-15 PROCEDURE — 80048 BASIC METABOLIC PNL TOTAL CA: CPT | Performed by: INTERNAL MEDICINE

## 2022-03-15 PROCEDURE — 85730 THROMBOPLASTIN TIME PARTIAL: CPT | Performed by: INTERNAL MEDICINE

## 2022-03-15 PROCEDURE — 94761 N-INVAS EAR/PLS OXIMETRY MLT: CPT

## 2022-03-15 PROCEDURE — 99900026 HC AIRWAY MAINTENANCE (STAT)

## 2022-03-15 PROCEDURE — P9047 ALBUMIN (HUMAN), 25%, 50ML: HCPCS | Mod: JG | Performed by: INTERNAL MEDICINE

## 2022-03-15 PROCEDURE — 94003 VENT MGMT INPAT SUBQ DAY: CPT

## 2022-03-15 PROCEDURE — 87040 BLOOD CULTURE FOR BACTERIA: CPT | Mod: 59 | Performed by: INTERNAL MEDICINE

## 2022-03-15 PROCEDURE — 99291 CRITICAL CARE FIRST HOUR: CPT | Mod: ,,, | Performed by: INTERNAL MEDICINE

## 2022-03-15 PROCEDURE — 36415 COLL VENOUS BLD VENIPUNCTURE: CPT | Performed by: INTERNAL MEDICINE

## 2022-03-15 PROCEDURE — 20000000 HC ICU ROOM

## 2022-03-15 PROCEDURE — 85025 COMPLETE CBC W/AUTO DIFF WBC: CPT | Performed by: INTERNAL MEDICINE

## 2022-03-15 PROCEDURE — A4216 STERILE WATER/SALINE, 10 ML: HCPCS | Performed by: INTERNAL MEDICINE

## 2022-03-15 RX ORDER — LORAZEPAM 2 MG/ML
1 INJECTION INTRAMUSCULAR ONCE
Status: COMPLETED | OUTPATIENT
Start: 2022-03-15 | End: 2022-03-15

## 2022-03-15 RX ORDER — LORAZEPAM 2 MG/ML
INJECTION INTRAMUSCULAR
Status: COMPLETED
Start: 2022-03-15 | End: 2022-03-15

## 2022-03-15 RX ORDER — LORAZEPAM 2 MG/ML
2 INJECTION INTRAMUSCULAR
Status: CANCELLED | OUTPATIENT
Start: 2022-03-15

## 2022-03-15 RX ORDER — DEXMEDETOMIDINE HYDROCHLORIDE 4 UG/ML
0-1.4 INJECTION INTRAVENOUS CONTINUOUS
Status: DISCONTINUED | OUTPATIENT
Start: 2022-03-15 | End: 2022-03-17

## 2022-03-15 RX ORDER — DEXMEDETOMIDINE HYDROCHLORIDE 4 UG/ML
INJECTION INTRAVENOUS
Status: COMPLETED
Start: 2022-03-15 | End: 2022-03-15

## 2022-03-15 RX ORDER — ALBUMIN HUMAN 250 G/1000ML
25 SOLUTION INTRAVENOUS ONCE
Status: COMPLETED | OUTPATIENT
Start: 2022-03-15 | End: 2022-03-15

## 2022-03-15 RX ADMIN — HEPARIN SODIUM 5000 UNITS: 5000 INJECTION INTRAVENOUS; SUBCUTANEOUS at 10:03

## 2022-03-15 RX ADMIN — ASPIRIN 81 MG CHEWABLE TABLET 81 MG: 81 TABLET CHEWABLE at 09:03

## 2022-03-15 RX ADMIN — HEPARIN SODIUM 5000 UNITS: 5000 INJECTION INTRAVENOUS; SUBCUTANEOUS at 02:03

## 2022-03-15 RX ADMIN — SCOPOLAMINE 1 PATCH: 1.5 PATCH, EXTENDED RELEASE TRANSDERMAL at 10:03

## 2022-03-15 RX ADMIN — CEFTRIAXONE 2 G: 2 INJECTION, SOLUTION INTRAVENOUS at 01:03

## 2022-03-15 RX ADMIN — MUPIROCIN: 20 OINTMENT TOPICAL at 09:03

## 2022-03-15 RX ADMIN — Medication 10 ML: at 10:03

## 2022-03-15 RX ADMIN — POTASSIUM BICARBONATE 40 MEQ: 391 TABLET, EFFERVESCENT ORAL at 09:03

## 2022-03-15 RX ADMIN — LORAZEPAM 1 MG: 2 INJECTION INTRAMUSCULAR; INTRAVENOUS at 02:03

## 2022-03-15 RX ADMIN — LORAZEPAM 1 MG: 2 INJECTION INTRAMUSCULAR at 02:03

## 2022-03-15 RX ADMIN — DEXMEDETOMIDINE HYDROCHLORIDE 0.2 MCG/KG/HR: 4 INJECTION INTRAVENOUS at 02:03

## 2022-03-15 RX ADMIN — HEPARIN SODIUM 5000 UNITS: 5000 INJECTION INTRAVENOUS; SUBCUTANEOUS at 05:03

## 2022-03-15 RX ADMIN — MUPIROCIN: 20 OINTMENT TOPICAL at 10:03

## 2022-03-15 RX ADMIN — CLOPIDOGREL 75 MG: 75 TABLET, FILM COATED ORAL at 09:03

## 2022-03-15 RX ADMIN — Medication 10 ML: at 06:03

## 2022-03-15 RX ADMIN — ALBUMIN (HUMAN) 25 G: 12.5 SOLUTION INTRAVENOUS at 03:03

## 2022-03-15 RX ADMIN — FAMOTIDINE 20 MG: 10 INJECTION INTRAVENOUS at 09:03

## 2022-03-15 RX ADMIN — CHLORHEXIDINE GLUCONATE 0.12% ORAL RINSE 15 ML: 1.2 LIQUID ORAL at 10:03

## 2022-03-15 RX ADMIN — Medication 10 ML: at 02:03

## 2022-03-15 RX ADMIN — CHLORHEXIDINE GLUCONATE 0.12% ORAL RINSE 15 ML: 1.2 LIQUID ORAL at 09:03

## 2022-03-15 NOTE — SUBJECTIVE & OBJECTIVE
Interval History:  Remains comatose . Brainstem reflexes are present .      Review of Systems   Unable to perform ROS: Intubated   Objective:     Vital Signs (Most Recent):  Temp: 97.8 °F (36.6 °C) (03/15/22 0700)  Pulse: 78 (03/15/22 1307)  Resp: 15 (03/15/22 1307)  BP: (!) 177/86 (03/14/22 1142)  SpO2: 100 % (03/15/22 1307)   Vital Signs (24h Range):  Temp:  [97.8 °F (36.6 °C)-98.4 °F (36.9 °C)] 97.8 °F (36.6 °C)  Pulse:  [67-84] 78  Resp:  [10-16] 15  SpO2:  [87 %-100 %] 100 %  Arterial Line BP: ()/(12-88) 144/63     Weight: 49.7 kg (109 lb 9.1 oz)  Body mass index is 20.04 kg/m².    Intake/Output Summary (Last 24 hours) at 3/15/2022 1449  Last data filed at 3/15/2022 0913  Gross per 24 hour   Intake 300.96 ml   Output 695 ml   Net -394.04 ml      Physical Exam  Constitutional:       Comments: On mechanical ventilation , Comatose , off sedation    HENT:      Head: Normocephalic and atraumatic.   Eyes:      Pupils: Pupils are equal, round, and reactive to light.      Comments: (+) corneal reflex    Cardiovascular:      Rate and Rhythm: Normal rate.      Heart sounds: Normal heart sounds.   Pulmonary:      Comments: Breath sounds are bronchial  Abdominal:      General: Bowel sounds are normal.   Genitourinary:     Comments: Abreu catheter in place  Musculoskeletal:      Cervical back: Neck supple.      Right lower leg: Edema present.      Left lower leg: Edema present.   Neurological:      Comments: Off sedation x 2 days . Comatose        Significant Labs: All pertinent labs within the past 24 hours have been reviewed.  BMP:   Recent Labs   Lab 03/15/22  0400   *      K 3.1*      CO2 22*   BUN 56*   CREATININE 3.8*   CALCIUM 8.7   MG 2.5     CBC:   Recent Labs   Lab 03/14/22  0457 03/15/22  0400   WBC 12.52 9.56   HGB 11.0* 10.4*   HCT 33.2* 32.2*    244     CMP:   Recent Labs   Lab 03/13/22  2319 03/14/22  0457 03/15/22  0400    139 142   K 3.7 3.7 3.1*    104 106   CO2  20* 20* 22*   * 110 129*   BUN 46* 48* 56*   CREATININE 3.5* 3.3* 3.8*   CALCIUM 8.4* 8.5* 8.7   ANIONGAP 15 15 14   EGFRNONAA 11* 12* 10*     Cardiac Markers: No results for input(s): CKMB, MYOGLOBIN, BNP, TROPISTAT in the last 48 hours.    Significant Imaging:

## 2022-03-15 NOTE — ASSESSMENT & PLAN NOTE
She had witnessed cardiac arrest.  Last Echo -1/21-EF -25%, Repeat echo with EF 10%  GIII Diastolic dysfunction.  EKG -Sinus rhythm with occasional Premature ventricular complexes and Premature atrial complexes  Cardiology consulted   Prognosis poor

## 2022-03-15 NOTE — PROGRESS NOTES
"[place] - Intensive Care ([facility])  [facility] Medicine  Progress Note    Patient Name: Tanya Madrid  MRN: [id number]  Patient Class: IP- Inpatient   Admission Date: 3/10/2022  Length of Stay: 5 days  Attending Physician: Tim Reilly MD  Primary Care Provider: Maggie Sue NP        Subjective:     Principal Problem:Cardiac arrest        HPI:  History was taken from the electronic chart and from the daughter -  Adenike Lemus Daughter [phone]        82 y.o. female patient with a PMHx of HTN,  CHF-last EF-01/2021-25%, with diastolic dysfunction  , and HLD who presents to the Emergency Department for evaluation of cardiac arrest which onset suddenly 1 hour pta.  Daughter reports that over the last 3 days -she has been complaining of worsening dyspnoea  and was given extra lasix tablets. She usually takes 20mg lasix daily and was given 2 extra tablets with the episodes of  dyspnea.  This evening while having dinner, she "looked blank" and stopped breathing . Daughter called EMS and was advised to start CPR.. Pt achieved ROSC en route. After I shock . Prior Tx includes 50 mcg of fentanyl and 50 mg of ketamine from EMS.   Code status discussed.  She is Full code.  Labs and imaging test reviewed.  CT head -No hemorrhage or acute major vascular distribution infarction.   Dehiscence of the posterior wall of the sphenoid sinus such as on series 4, image 49-52. There is high density material possibly related to paranasal sinus disease or neoplasm which extends towards the pituitary and along the right towards the intracranial ICA.  Further evaluation with contrast enhanced MRI when clinically appropriate would be recommended.       Component      Latest Ref Rng & Units 3/10/2022 3/3/2022              Potassium      3.5 - 5.1 mmol/L 2.9 (L) 3.7   CBC showed wbc -19   Component      Latest Ref Rng & Units 3/10/2022 3/10/2022 4/20/2021          10:47 PM  8:18 PM    Lactate, Lex      0.5 - 2.2 mmol/L " 3.6 (HH) 5.7 (HH) 2.0     Component      Latest Ref Rng & Units 3/10/2022 4/20/2021   BNP      0 - 99 pg/mL >4,900 (H) 2,806 (H)   Code status discussed -she is full code  Her daughter is the SDM.  Adenike Lemus Daughter [phone]       Overview/Hospital Course:  81 y/o AAF admitted to ICU with a dx of cardiac arrest , CODY , acute hypoxic respiratory failure  and  Anoxic encephalopathy . Started on hypothermia protocol , IVAB , asa , plavix  And entresto . Cardiology and palliative care consulted. Tx reviewed . Cont current tx . Prognosis Poor      3/12  She remain critically ill . Now on rewarming phase . Started on propofol due to  only grimacing and posturing . Kidney  function is trending up . She  is on low dose of dobutamine . Case d/w he daughter at bedside . She changed code status to DNR . Prognosis poor .    3/13 Reamin critically ill  on mechanical ventilation . Temp is now > 36 . Last night  was on levophed and dobutamine to keep a map > 65  . Levophed wean off this am . She has been on propofol on and off for posturing  movement . Sedation stop for 2 hrs  .  She has positve  corneal , pupil and gag reflex .  No respond to verval or pain stimuli . She withdraw to pain . She is breathing overt the vent .  Neurology consulted . The UOP has sligly increased since yesterday . The kidney function  cont trending up . The Blood cx are (+) ALPHA HEMOLYTIC STREP    3/14 No significant neurologuical changes since yesterday . She opne her eyes on tactile simlus . She  does not follow commads . Ha been odff sedation for more than 24 hrs .  The Blood cx (+) for STREPTOCOCCUS ANGINOSUS  and EIKENELLA CORRODENS . The kidney function is sligly improving  . Prognsosis POOR .  POC d/w  her duaghter .    3/15- Remains intubated with FiO2 30%/PEEP 5 . Afebrile. Off sedation x 2 days. Remains comatose.  Intermittent spontaneous eye opening but does not seem purposeful. No verbal  response , does not follow  commands or track voice. Intermittent withdrawal to pain stimulus. Corneal, pupillary , cough and gag reflex present. Remains on Dobutamine gtt. EF 10%. Tube feed in progress. UOP improved 1L yesterday, however creatinine bumped to 3.8. Rocephin for Streptococcal  bacteremia continued. TTE negative for vegetation. Palliative Medicine is following. Prognosis appears poor.           Interval History:  Remains comatose . Brainstem reflexes are present .      Review of Systems   Unable to perform ROS: Intubated   Objective:     Vital Signs (Most Recent):  Temp: 97.8 °F (36.6 °C) (03/15/22 0700)  Pulse: 78 (03/15/22 1307)  Resp: 15 (03/15/22 1307)  BP: (!) 177/86 (03/14/22 1142)  SpO2: 100 % (03/15/22 1307)   Vital Signs (24h Range):  Temp:  [97.8 °F (36.6 °C)-98.4 °F (36.9 °C)] 97.8 °F (36.6 °C)  Pulse:  [67-84] 78  Resp:  [10-16] 15  SpO2:  [87 %-100 %] 100 %  Arterial Line BP: ()/(12-88) 144/63     Weight: 49.7 kg (109 lb 9.1 oz)  Body mass index is 20.04 kg/m².    Intake/Output Summary (Last 24 hours) at 3/15/2022 1449  Last data filed at 3/15/2022 0913  Gross per 24 hour   Intake 300.96 ml   Output 695 ml   Net -394.04 ml      Physical Exam  Constitutional:       Comments: On mechanical ventilation , Comatose , off sedation    HENT:      Head: Normocephalic and atraumatic.   Eyes:      Pupils: Pupils are equal, round, and reactive to light.      Comments: (+) corneal reflex    Cardiovascular:      Rate and Rhythm: Normal rate.      Heart sounds: Normal heart sounds.   Pulmonary:      Comments: Breath sounds are bronchial  Abdominal:      General: Bowel sounds are normal.   Genitourinary:     Comments: Abreu catheter in place  Musculoskeletal:      Cervical back: Neck supple.      Right lower leg: Edema present.      Left lower leg: Edema present.   Neurological:      Comments: Off sedation x 2 days . Comatose        Significant Labs: All pertinent labs within the past 24 hours have been reviewed.  BMP:    Recent Labs   Lab 03/15/22  0400   *      K 3.1*      CO2 22*   BUN 56*   CREATININE 3.8*   CALCIUM 8.7   MG 2.5     CBC:   Recent Labs   Lab 03/14/22  0457 03/15/22  0400   WBC 12.52 9.56   HGB 11.0* 10.4*   HCT 33.2* 32.2*    244     CMP:   Recent Labs   Lab 03/13/22  2319 03/14/22  0457 03/15/22  0400    139 142   K 3.7 3.7 3.1*    104 106   CO2 20* 20* 22*   * 110 129*   BUN 46* 48* 56*   CREATININE 3.5* 3.3* 3.8*   CALCIUM 8.4* 8.5* 8.7   ANIONGAP 15 15 14   EGFRNONAA 11* 12* 10*     Cardiac Markers: No results for input(s): CKMB, MYOGLOBIN, BNP, TROPISTAT in the last 48 hours.    Significant Imaging:       Assessment/Plan:      * Cardiac arrest  She had witnessed cardiac arrest.  Last Echo -1/21-EF -25%, Repeat echo with EF 10%  GIII Diastolic dysfunction.  EKG -Sinus rhythm with occasional Premature ventricular complexes and Premature atrial complexes  Cardiology consulted   Prognosis poor     Acute on chronic combined systolic and diastolic CHF (congestive heart failure)  Patient is identified as having Combined Systolic and Diastolic heart failure that is Acute on chronic. CHF is currently uncontrolled due to Pulmonary edema/pleural effusion on CXR. Latest ECHO performed and demonstrates- Results for orders placed during the hospital encounter of 01/15/21    Echo Color Flow Doppler? Yes    Interpretation Summary  · The left ventricle is severely enlarged with eccentric hypertrophy and severely decreased systolic function. The estimated ejection fraction is 25%  · Grade II left ventricular diastolic dysfunction.  · Normal right ventricular size with moderately reduced right ventricular systolic function.  · Severe left atrial enlargement.  · There is pulmonary hypertension.  · There is mild aortic valve stenosis.  · Aortic valve area is 1.37 cm2; peak velocity is 1.33 m/s; mean gradient is 4 mmHg.  · Mild to moderate tricuspid regurgitation.  · Intermediate  central venous pressure (8 mmHg).  · The estimated PA systolic pressure is 50 mmHg.  · Trivial pericardial effusion.  . Continue Nitrate/Vasodilator and monitor clinical status closely. Monitor on telemetry. Patient is on CHF pathway.  Monitor strict Is&Os and daily weights.  Place on fluid restriction of 1.5 L. Continue to stress to patient importance of self efficacy and  on diet for CHF. Last BNP reviewed- and noted below   Recent Labs   Lab 03/10/22  2018   BNP >4,900*   .  3/15-  Off Entresto , BB and diuretics given hypotension     Elevated troponin  In the setting of Cardiac arrest  Cont asa/plavix  Cardiology on board       Anoxic encephalopathy  2/2 to cardiac arrest   Neurology consulted  To assess prognosis       CODY (acute kidney injury)  2/2 to cardiac arrest   No a good candidate for CRT/RRT  Monitor UOP and renal indices         Gram-positive bacteremia  Source of infection is unclear   Cont rocephin   Blood culture positive for ALPHA HEMOLYTIC STREP and EIKENELLA CORRODENS  F/U final sensitivity       Aspiration pneumonia  Cont  Rocephin   Blood cx (+) for ALPHA HEMOLYTIC STREP    Palliative care encounter  - Palliative medicine consulted for [facility] discussion   -Current code status is DNR      Hypokalemia  -Replete as indicated     Mass of sphenoid sinus    Ct head showed  Dehiscence of the posterior wall of the sphenoid sinus such as on series 4, image 49-52. There is high density material possibly related to paranasal sinus disease or neoplasm which extends towards the pituitary and along the right towards the intracranial ICA.  Further evaluation with contrast enhanced MRI when clinically appropriate would be recommended.  Suggest including 3D/volumetric postcontrast imaging.  ENT consult as clinically warranted.    Will consult ENT    History of CVA (cerebrovascular accident)  On aspirin/plavix    Parkinson disease  Continue supportive care       Laryngeal papillomatosis  Cont supportive tx        Essential hypertension  Hold  entresto due to hypotension and CODY        VTE Risk Mitigation (From admission, onward)         Ordered     heparin (porcine) injection 5,000 Units  Every 8 hours         03/10/22 2351     IP VTE HIGH RISK PATIENT  Once         03/10/22 2345     Place sequential compression device  Until discontinued         03/10/22 2345                Discharge Planning   PARMINDER:      Code Status: DNR   Is the patient medically ready for discharge?:     Reason for patient still in hospital (select all that apply): Patient trending condition  Discharge Plan A: Other (TBD)            Critical care time spent on the evaluation and treatment of severe organ dysfunction, review of pertinent labs and imaging studies, discussions with consulting providers and discussions with patient/family: 30  minutes.      Tim Reilly MD  Department of Hospital Medicine   '[place] - Intensive Care ([facility])

## 2022-03-15 NOTE — ASSESSMENT & PLAN NOTE
Source of infection is unclear   Cont rocephin   Blood culture positive for ALPHA HEMOLYTIC STREP and EIKENELLA CORRODENS  F/U final sensitivity

## 2022-03-15 NOTE — PLAN OF CARE
Remains Intubated on dobutamine gtt. Patient more lethargic overnight. VSS. Afebrile. IJ and Abreu in place. Good UOP. Bed set to lateral rotation. Patient repositioned for comfort.

## 2022-03-15 NOTE — ASSESSMENT & PLAN NOTE
Patient is identified as having Combined Systolic and Diastolic heart failure that is Acute on chronic. CHF is currently uncontrolled due to Pulmonary edema/pleural effusion on CXR. Latest ECHO performed and demonstrates- Results for orders placed during the hospital encounter of 01/15/21    Echo Color Flow Doppler? Yes    Interpretation Summary  · The left ventricle is severely enlarged with eccentric hypertrophy and severely decreased systolic function. The estimated ejection fraction is 25%  · Grade II left ventricular diastolic dysfunction.  · Normal right ventricular size with moderately reduced right ventricular systolic function.  · Severe left atrial enlargement.  · There is pulmonary hypertension.  · There is mild aortic valve stenosis.  · Aortic valve area is 1.37 cm2; peak velocity is 1.33 m/s; mean gradient is 4 mmHg.  · Mild to moderate tricuspid regurgitation.  · Intermediate central venous pressure (8 mmHg).  · The estimated PA systolic pressure is 50 mmHg.  · Trivial pericardial effusion.  . Continue Nitrate/Vasodilator and monitor clinical status closely. Monitor on telemetry. Patient is on CHF pathway.  Monitor strict Is&Os and daily weights.  Place on fluid restriction of 1.5 L. Continue to stress to patient importance of self efficacy and  on diet for CHF. Last BNP reviewed- and noted below   Recent Labs   Lab 03/10/22  2018   BNP >4,900*   .  3/15-  Off Entresto , BB and diuretics given hypotension

## 2022-03-15 NOTE — PROGRESS NOTES
Full Assessment performed with family at bedside. When repositioning patient with pillows patient oxygen saturation decreased from 97-77% with good waveforms on monitor.  In line Suction and high o2 provided. Patient was able to recover after oral and tracheal suction, decreased stimuli and repositioning. Copious thick secretions removed.  Family expressed sadness and grief regarding patient unstable condition. Therapeutic communication and privacy provided to family members.

## 2022-03-16 LAB
ALLENS TEST: ABNORMAL
AMMONIA PLAS-SCNC: 28 UMOL/L (ref 10–50)
ANION GAP SERPL CALC-SCNC: 13 MMOL/L (ref 8–16)
BASOPHILS # BLD AUTO: 0.01 K/UL (ref 0–0.2)
BASOPHILS NFR BLD: 0.1 % (ref 0–1.9)
BNP SERPL-MCNC: 1493 PG/ML (ref 0–99)
BUN SERPL-MCNC: 59 MG/DL (ref 8–23)
CALCIUM SERPL-MCNC: 9 MG/DL (ref 8.7–10.5)
CHLORIDE SERPL-SCNC: 106 MMOL/L (ref 95–110)
CO2 SERPL-SCNC: 25 MMOL/L (ref 23–29)
CREAT SERPL-MCNC: 3.7 MG/DL (ref 0.5–1.4)
DELSYS: ABNORMAL
DIFFERENTIAL METHOD: ABNORMAL
EOSINOPHIL # BLD AUTO: 0.1 K/UL (ref 0–0.5)
EOSINOPHIL NFR BLD: 0.8 % (ref 0–8)
ERYTHROCYTE [DISTWIDTH] IN BLOOD BY AUTOMATED COUNT: 15.8 % (ref 11.5–14.5)
ERYTHROCYTE [SEDIMENTATION RATE] IN BLOOD BY WESTERGREN METHOD: 10 MM/H
EST. GFR  (AFRICAN AMERICAN): 12 ML/MIN/1.73 M^2
EST. GFR  (NON AFRICAN AMERICAN): 11 ML/MIN/1.73 M^2
FIO2: 30
GLUCOSE SERPL-MCNC: 108 MG/DL (ref 70–110)
HCO3 UR-SCNC: 26.6 MMOL/L (ref 24–28)
HCT VFR BLD AUTO: 29.5 % (ref 37–48.5)
HGB BLD-MCNC: 9.5 G/DL (ref 12–16)
IMM GRANULOCYTES # BLD AUTO: 0.07 K/UL (ref 0–0.04)
IMM GRANULOCYTES NFR BLD AUTO: 0.9 % (ref 0–0.5)
LYMPHOCYTES # BLD AUTO: 0.9 K/UL (ref 1–4.8)
LYMPHOCYTES NFR BLD: 11.3 % (ref 18–48)
MAGNESIUM SERPL-MCNC: 2.5 MG/DL (ref 1.6–2.6)
MCH RBC QN AUTO: 29.1 PG (ref 27–31)
MCHC RBC AUTO-ENTMCNC: 32.2 G/DL (ref 32–36)
MCV RBC AUTO: 91 FL (ref 82–98)
MODE: ABNORMAL
MONOCYTES # BLD AUTO: 0.9 K/UL (ref 0.3–1)
MONOCYTES NFR BLD: 11.8 % (ref 4–15)
NEUTROPHILS # BLD AUTO: 5.8 K/UL (ref 1.8–7.7)
NEUTROPHILS NFR BLD: 75.1 % (ref 38–73)
NRBC BLD-RTO: 0 /100 WBC
PCO2 BLDA: 35.9 MMHG (ref 35–45)
PEEP: 5
PH SMN: 7.48 [PH] (ref 7.35–7.45)
PHOSPHATE SERPL-MCNC: 4.9 MG/DL (ref 2.7–4.5)
PLATELET # BLD AUTO: 209 K/UL (ref 150–450)
PMV BLD AUTO: 11.4 FL (ref 9.2–12.9)
PO2 BLDA: 147 MMHG (ref 80–100)
POC BE: 3 MMOL/L
POC SATURATED O2: 99 % (ref 95–100)
POTASSIUM SERPL-SCNC: 3.3 MMOL/L (ref 3.5–5.1)
PROCALCITONIN SERPL IA-MCNC: 3.68 NG/ML
RBC # BLD AUTO: 3.26 M/UL (ref 4–5.4)
SAMPLE: ABNORMAL
SITE: ABNORMAL
SODIUM SERPL-SCNC: 144 MMOL/L (ref 136–145)
TSH SERPL DL<=0.005 MIU/L-ACNC: 1.37 UIU/ML (ref 0.4–4)
VT: 350
WBC # BLD AUTO: 7.73 K/UL (ref 3.9–12.7)

## 2022-03-16 PROCEDURE — 27201109 HC SYSTEM FECAL MANAGEMENT

## 2022-03-16 PROCEDURE — 63600175 PHARM REV CODE 636 W HCPCS: Performed by: INTERNAL MEDICINE

## 2022-03-16 PROCEDURE — 94761 N-INVAS EAR/PLS OXIMETRY MLT: CPT

## 2022-03-16 PROCEDURE — 84100 ASSAY OF PHOSPHORUS: CPT | Performed by: INTERNAL MEDICINE

## 2022-03-16 PROCEDURE — 83735 ASSAY OF MAGNESIUM: CPT | Performed by: INTERNAL MEDICINE

## 2022-03-16 PROCEDURE — 84145 PROCALCITONIN (PCT): CPT | Performed by: INTERNAL MEDICINE

## 2022-03-16 PROCEDURE — 82140 ASSAY OF AMMONIA: CPT | Performed by: INTERNAL MEDICINE

## 2022-03-16 PROCEDURE — 99900035 HC TECH TIME PER 15 MIN (STAT)

## 2022-03-16 PROCEDURE — 85025 COMPLETE CBC W/AUTO DIFF WBC: CPT | Performed by: INTERNAL MEDICINE

## 2022-03-16 PROCEDURE — 63600175 PHARM REV CODE 636 W HCPCS: Performed by: NURSE PRACTITIONER

## 2022-03-16 PROCEDURE — 99291 PR CRITICAL CARE, E/M 30-74 MINUTES: ICD-10-PCS | Mod: ,,, | Performed by: INTERNAL MEDICINE

## 2022-03-16 PROCEDURE — 25000003 PHARM REV CODE 250: Performed by: INTERNAL MEDICINE

## 2022-03-16 PROCEDURE — 99497 ADVNCD CARE PLAN 30 MIN: CPT | Mod: ,,, | Performed by: NURSE PRACTITIONER

## 2022-03-16 PROCEDURE — 94003 VENT MGMT INPAT SUBQ DAY: CPT

## 2022-03-16 PROCEDURE — 99233 PR SUBSEQUENT HOSPITAL CARE,LEVL III: ICD-10-PCS | Mod: ,,, | Performed by: NURSE PRACTITIONER

## 2022-03-16 PROCEDURE — 82803 BLOOD GASES ANY COMBINATION: CPT

## 2022-03-16 PROCEDURE — 99900026 HC AIRWAY MAINTENANCE (STAT)

## 2022-03-16 PROCEDURE — C9399 UNCLASSIFIED DRUGS OR BIOLOG: HCPCS | Performed by: INTERNAL MEDICINE

## 2022-03-16 PROCEDURE — 37799 UNLISTED PX VASCULAR SURGERY: CPT

## 2022-03-16 PROCEDURE — 80048 BASIC METABOLIC PNL TOTAL CA: CPT | Performed by: INTERNAL MEDICINE

## 2022-03-16 PROCEDURE — 27000221 HC OXYGEN, UP TO 24 HOURS

## 2022-03-16 PROCEDURE — 99233 SBSQ HOSP IP/OBS HIGH 50: CPT | Mod: ,,, | Performed by: NURSE PRACTITIONER

## 2022-03-16 PROCEDURE — 84443 ASSAY THYROID STIM HORMONE: CPT | Performed by: INTERNAL MEDICINE

## 2022-03-16 PROCEDURE — 99497 PR ADVNCD CARE PLAN 30 MIN: ICD-10-PCS | Mod: ,,, | Performed by: NURSE PRACTITIONER

## 2022-03-16 PROCEDURE — 99291 CRITICAL CARE FIRST HOUR: CPT | Mod: ,,, | Performed by: INTERNAL MEDICINE

## 2022-03-16 PROCEDURE — A4216 STERILE WATER/SALINE, 10 ML: HCPCS | Performed by: INTERNAL MEDICINE

## 2022-03-16 PROCEDURE — 83880 ASSAY OF NATRIURETIC PEPTIDE: CPT | Performed by: INTERNAL MEDICINE

## 2022-03-16 PROCEDURE — 83520 IMMUNOASSAY QUANT NOS NONAB: CPT | Performed by: INTERNAL MEDICINE

## 2022-03-16 PROCEDURE — 20000000 HC ICU ROOM

## 2022-03-16 RX ORDER — DEXAMETHASONE SODIUM PHOSPHATE 4 MG/ML
4 INJECTION, SOLUTION INTRA-ARTICULAR; INTRALESIONAL; INTRAMUSCULAR; INTRAVENOUS; SOFT TISSUE EVERY 6 HOURS
Status: DISCONTINUED | OUTPATIENT
Start: 2022-03-16 | End: 2022-03-17

## 2022-03-16 RX ORDER — POTASSIUM CHLORIDE 29.8 MG/ML
40 INJECTION INTRAVENOUS ONCE
Status: COMPLETED | OUTPATIENT
Start: 2022-03-16 | End: 2022-03-16

## 2022-03-16 RX ORDER — DIPHENHYDRAMINE HYDROCHLORIDE 50 MG/ML
25 INJECTION INTRAMUSCULAR; INTRAVENOUS EVERY 6 HOURS
Status: DISCONTINUED | OUTPATIENT
Start: 2022-03-16 | End: 2022-03-18

## 2022-03-16 RX ORDER — FUROSEMIDE 10 MG/ML
20 INJECTION INTRAMUSCULAR; INTRAVENOUS ONCE
Status: COMPLETED | OUTPATIENT
Start: 2022-03-16 | End: 2022-03-16

## 2022-03-16 RX ADMIN — HYDRALAZINE HYDROCHLORIDE 10 MG: 20 INJECTION, SOLUTION INTRAMUSCULAR; INTRAVENOUS at 01:03

## 2022-03-16 RX ADMIN — ASPIRIN 81 MG CHEWABLE TABLET 81 MG: 81 TABLET CHEWABLE at 08:03

## 2022-03-16 RX ADMIN — CEFTRIAXONE 2 G: 2 INJECTION, SOLUTION INTRAVENOUS at 01:03

## 2022-03-16 RX ADMIN — FUROSEMIDE 20 MG: 10 INJECTION, SOLUTION INTRAVENOUS at 09:03

## 2022-03-16 RX ADMIN — HEPARIN SODIUM 5000 UNITS: 5000 INJECTION INTRAVENOUS; SUBCUTANEOUS at 05:03

## 2022-03-16 RX ADMIN — DIPHENHYDRAMINE HYDROCHLORIDE 25 MG: 50 INJECTION, SOLUTION INTRAMUSCULAR; INTRAVENOUS at 06:03

## 2022-03-16 RX ADMIN — Medication 10 ML: at 03:03

## 2022-03-16 RX ADMIN — HEPARIN SODIUM 5000 UNITS: 5000 INJECTION INTRAVENOUS; SUBCUTANEOUS at 09:03

## 2022-03-16 RX ADMIN — CHLORHEXIDINE GLUCONATE 0.12% ORAL RINSE 15 ML: 1.2 LIQUID ORAL at 09:03

## 2022-03-16 RX ADMIN — DOBUTAMINE IN DEXTROSE 2.5 MCG/KG/MIN: 400 INJECTION, SOLUTION INTRAVENOUS at 03:03

## 2022-03-16 RX ADMIN — DEXAMETHASONE SODIUM PHOSPHATE 4 MG: 4 INJECTION INTRA-ARTICULAR; INTRALESIONAL; INTRAMUSCULAR; INTRAVENOUS; SOFT TISSUE at 06:03

## 2022-03-16 RX ADMIN — DEXMEDETOMIDINE HYDROCHLORIDE 0.2 MCG/KG/HR: 4 INJECTION INTRAVENOUS at 09:03

## 2022-03-16 RX ADMIN — Medication 10 ML: at 05:03

## 2022-03-16 RX ADMIN — FAMOTIDINE 20 MG: 10 INJECTION INTRAVENOUS at 08:03

## 2022-03-16 RX ADMIN — CLOPIDOGREL 75 MG: 75 TABLET, FILM COATED ORAL at 08:03

## 2022-03-16 RX ADMIN — Medication 10 ML: at 09:03

## 2022-03-16 RX ADMIN — CHLORHEXIDINE GLUCONATE 0.12% ORAL RINSE 15 ML: 1.2 LIQUID ORAL at 08:03

## 2022-03-16 RX ADMIN — HEPARIN SODIUM 5000 UNITS: 5000 INJECTION INTRAVENOUS; SUBCUTANEOUS at 03:03

## 2022-03-16 RX ADMIN — POTASSIUM CHLORIDE 40 MEQ: 29.8 INJECTION, SOLUTION INTRAVENOUS at 08:03

## 2022-03-16 NOTE — SUBJECTIVE & OBJECTIVE
Interval History:   Will re consult Neurology to re assess prognosis.       Review of Systems  Objective:     Vital Signs (Most Recent):  Temp: 98.1 °F (36.7 °C) (03/16/22 0700)  Pulse: 76 (03/16/22 1447)  Resp: 15 (03/16/22 1447)  BP: 122/68 (03/16/22 1303)  SpO2: 100 % (03/16/22 1447) Vital Signs (24h Range):  Temp:  [98 °F (36.7 °C)-98.1 °F (36.7 °C)] 98.1 °F (36.7 °C)  Pulse:  [61-88] 76  Resp:  [12-29] 15  SpO2:  [96 %-100 %] 100 %  BP: ()/(53-85) 122/68  Arterial Line BP: ()/(41-81) 147/62     Weight: 50.7 kg (111 lb 12.4 oz)  Body mass index is 20.44 kg/m².    Intake/Output Summary (Last 24 hours) at 3/16/2022 1714  Last data filed at 3/16/2022 1216  Gross per 24 hour   Intake 333.61 ml   Output 1150 ml   Net -816.39 ml      Physical Exam    Significant Labs: All pertinent labs within the past 24 hours have been reviewed.  BMP:   Recent Labs   Lab 03/16/22  0334         K 3.3*      CO2 25   BUN 59*   CREATININE 3.7*   CALCIUM 9.0   MG 2.5     CBC:   Recent Labs   Lab 03/15/22  0400 03/16/22  0334   WBC 9.56 7.73   HGB 10.4* 9.5*   HCT 32.2* 29.5*    209     CMP:   Recent Labs   Lab 03/15/22  0400 03/16/22  0334    144   K 3.1* 3.3*    106   CO2 22* 25   * 108   BUN 56* 59*   CREATININE 3.8* 3.7*   CALCIUM 8.7 9.0   ANIONGAP 14 13   EGFRNONAA 10* 11*     Cardiac Markers:   Recent Labs   Lab 03/16/22  0334   BNP 1,493*       Significant Imaging:

## 2022-03-16 NOTE — PROGRESS NOTES
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 CPR initiated after call to EMS. ROSC obtained by EMS (downtime not reported but required one shock and arrived to ED ~ one hour after EMS called)  Intubated in ED  Eval revealed leukocytosis, hypokalemia 2.9, compensated anion gap acidosis, BNP > 4900, troponin 0.041, lactate 3.6, procalcitonin 2.08  Admitted to ICU on mechanical ventilation; cooling for targeted temperature management for coma post arrest initiated       Subjective:     Interval History:   3/15: chart reviewed. On vent. Off all sedation for 2 days. Open eyes, not f/u commands. On minimal vent setting. BP stable. No fever.  3/16:  Appears to be more alert.  Open eyes and tracking.  Not following commands.  Events noted yesterday with tachycardia, to tachypnea and increased P pressure when biting the tube. Precedex drip started for comfort. No Fever. BP stable.     Objective:     Vital Signs (Most Recent):  Temp: 98.1 °F (36.7 °C) (03/16/22 0700)  Pulse: 73 (03/16/22 0718)  Resp: 16 (03/16/22 0718)  BP: (!) 142/65 (03/16/22 0700)  SpO2: 99 % (03/16/22 0718) Vital Signs (24h Range):  Temp:  [98 °F (36.7 °C)-98.2 °F (36.8 °C)] 98.1 °F (36.7 °C)  Pulse:  [] 73  Resp:  [10-29] 16  SpO2:  [94 %-100 %] 99 %  BP: ()/(35-89) 142/65  Arterial Line BP: ()/(41-85) 145/58     Weight: 50.7 kg (111 lb 12.4 oz)  Body mass index is  20.44 kg/m².      Intake/Output Summary (Last 24 hours) at 3/16/2022 0823  Last data filed at 3/16/2022 0500  Gross per 24 hour   Intake 361.66 ml   Output 1315 ml   Net -953.34 ml       Physical Exam  Vitals and nursing note reviewed.   Constitutional:       General: She is in acute distress.      Appearance: She is ill-appearing.      Comments: On vent. Not sedated. Open eyes. Not f/u and commands.    HENT:      Head: Atraumatic.      Nose: Nose normal.      Mouth/Throat:      Mouth: Mucous membranes are moist.      Comments: Oral intubated  Eyes:      General: No scleral icterus.     Extraocular Movements: Extraocular movements intact.      Pupils: Pupils are equal, round, and reactive to light.   Cardiovascular:      Rate and Rhythm: Normal rate and regular rhythm.      Pulses: Normal pulses.      Heart sounds: No murmur heard.  Pulmonary:      Effort: Respiratory distress present.      Breath sounds: Rhonchi and rales present.      Comments: On vent.   Abdominal:      General: There is no distension.      Palpations: Abdomen is soft.   Musculoskeletal:         General: Swelling (trace on b/l legs) present. No deformity.      Cervical back: Neck supple. No rigidity.   Skin:     General: Skin is warm and dry.      Capillary Refill: Capillary refill takes less than 2 seconds.      Coloration: Skin is not jaundiced.   Neurological:      Comments: Opens eyes, tracking, not followup commands.  Responds to pain         Vents:  Vent Mode: A/C (03/16/22 0718)  Set Rate: 10 BPM (03/16/22 0718)  Vt Set: 350 mL (03/16/22 0718)  Pressure Support: 0 cmH20 (03/16/22 0718)  PEEP/CPAP: 5 cmH20 (03/16/22 0718)  Oxygen Concentration (%): 30 (03/16/22 0718)  Peak Airway Pressure: 15 cmH2O (03/16/22 0718)  Plateau Pressure: 10 cmH20 (03/16/22 0718)  Total Ve: 6.11 mL (03/16/22 0718)  F/VT Ratio<105 (RSBI): (!) 78.43 (03/16/22 0718)    Lines/Drains/Airways     Central Venous Catheter Line  Duration           Percutaneous Central  Line Insertion/Assessment - Triple Lumen  03/10/22 2215 right subclavian 5 days          Drain  Duration                NG/OG Tube 03/10/22 1957 16 Fr. Center mouth 5 days         Urethral Catheter 03/10/22 2032 Non-latex 16 Fr. 5 days         Fecal Incontinence  03/15/22 1604 <1 day          Airway  Duration                Airway 5 days         Airway - Non-Surgical 03/10/22 1945 Endotracheal Tube 5 days          Arterial Line  Duration           Arterial Line 03/11/22 0310 Right Radial 5 days          Peripheral Intravenous Line  Duration                Peripheral IV - Single Lumen 03/10/22 1945 18 G Right Forearm 5 days         Peripheral IV - Single Lumen 03/10/22 2200 18 G Anterior;Distal;Left Upper Arm 5 days                Significant Labs:    CBC/Anemia Profile:  Recent Labs   Lab 03/15/22  0400 03/16/22  0334   WBC 9.56 7.73   HGB 10.4* 9.5*   HCT 32.2* 29.5*    209   MCV 92 91   RDW 15.8* 15.8*        Chemistries:  Recent Labs   Lab 03/15/22  0400 03/16/22  0334    144   K 3.1* 3.3*    106   CO2 22* 25   BUN 56* 59*   CREATININE 3.8* 3.7*   CALCIUM 8.7 9.0   MG 2.5 2.5   PHOS 6.0* 4.9*     Recent Labs     03/16/22  0320   PH 7.478*   PCO2 35.9   PO2 147*   HCO3 26.6   POCSATURATED 99   BE 3     Blood cx: 3/10: STREPTOCOCCUS ANGINOSUS         3/15: NGTD    Significant Imaging:  CXR 3/15. Film reviewed: Tubes in good position.  Cardiomegaly.  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] 03/11/2022 Yes    Aspiration pneumonia [J69.0] 03/11/2022 Yes    Hypokalemia [E87.6] 03/11/2022 Yes    On mechanically assisted ventilation [Z99.11]  03/11/2022 Not Applicable    Anoxic encephalopathy [G93.1] 03/11/2022 Yes    Elevated troponin [R77.8] 04/09/2021 Yes    Acute on chronic combined systolic and diastolic CHF (congestive heart failure) [I50.43] 01/17/2021 Yes    Essential hypertension [I10] 07/24/2019 Yes     Chronic    History of CVA (cerebrovascular accident) [Z86.73] 07/24/2019 Not Applicable     Chronic    Parkinson disease [G20] 07/11/2017 Yes     Chronic    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 ABG reviewed  -VAP prophylaxis  -not ready for SBT due to poor mental status.  -will need a trach and a PEG if prolonged mechanical ventilation support required by family    Acute anoxic encephalopathy  -status post cardiac arrest with prolonged downtime  -s/p TTM  -[facility] admission unremarkable  -poor mental status, open eyes but not follow commands.  Positive gag and cough reflex.   -On precedex for comfort.    -Close neuro check    S/p out of hospital Cardiac arrest  -likely due to cardiac event  -status post TTM    Troponin elevation  -Peak trop 0.438.   -likely secondary to demand ischemia  -cardiology consulted  -cont ASA/plavix    Acute on chronic combined systolic and diastolic CHF (congestive heart failure)  -Decompensated, BNP chronically elevated  -On inotropic support with dobutamine drip  -Echo shows worsening EF of 10%, Bi-V failure  -cardiology follow-up    CODY   -Monitor I&O. Pharmacy to assist with renal dosing medications.  -Avoid hypotension and nephrotoxins.  -Monitor electrolytes and replete per protocol.  -Renal US ruled out hydronephrosis    Streptococcal bacteremia  -On IV Rocephin   -source of infection unclear.   -No evidence of endocarditis on  2D echo.  Will have to discuss with family goal of care /comfort measures prior to deciding on pursuing MACHO.    Mass of sphenoid sinus  Noted on CT head post cardiac arrest  ?relationship to recurrent laryngeal papillomas  ENT consulted  Anticipate work up delay until neuro recovery/prognosis post cardiac arrest resulting in comatose state    Laryngeal papillomatosis  Recurrent since childhood with MANY surgical interventions  Followed by Dr Harrison, last rigid laryngoscopy on 10/26/2021 per EMR  Unclear if choking/airway compromise played role in cardiac arrest at dinner      The patient is critical ill, requires bedside assistance and high complexity decision making for assessment and support as well as: hemodynamic assessment and monitoring, respiratory monitoring and treatment, neurologic monitoring and treatment and assessment and treatment of metabolic derangement.    Critical Care Time: total time spent on behalf of this patient was 35 minutes. Included discussion with RN, bedside evaluation of the patient, review of the electronic medical record/labs/x-rays and medical decision making. No procedures were performed during this time.    Plan of care discussed with Staff Nurse.    I personally viewed and interpreted the patient'slabs, ECG, X-Ray, new clinical lab test results and new imaging test results.    Patient is DNR.     Discussed with the patient's daughter yesterday at bedside regarding the pt's condition. I answered all her questions and concerns to her satisfaction. The treatment plan discussed with her. Very prognosis communicated to her given pt's advanced age, severe ischemic cardiomyopathy with EF of 10% and CODY.  Patient daughter clearly states that patient would not want to start hemodialysis.  Wants to wait few days before making a decision on trach/PEG.    Appreciate palliative Care input.      Georgia Cruz MD  Pulmonology  [facility] ([facility])

## 2022-03-16 NOTE — ASSESSMENT & PLAN NOTE
Out of Hospital cardiac arrest.  Last Echo -1/21-EF -25%, Repeat echo with EF 10%  GIII Diastolic dysfunction.  EKG -Sinus rhythm with occasional Premature ventricular complexes and Premature atrial complexes  Cardiology consulted   Prognosis poor

## 2022-03-16 NOTE — ASSESSMENT & PLAN NOTE
Source of infection is unclear   Cont rocephin x 2 weeks   Initial Blood culture from 3/10/22 positive forALPHA HEMOLYTIC STREP and EIKENELLA CORRODENS non viable for susceptibility   Repeat blood cultures from 3/15/22 - NGTD

## 2022-03-16 NOTE — PLAN OF CARE
Remains intubated with continuous dobutamine. VSS. Afebrile. Complete bed bath performed. Linens and gown changed. PRN hydralazine given per orders. Abreu in place with adequate UOP.

## 2022-03-16 NOTE — PROGRESS NOTES
Progress Note  Palliative Medicine      Consult Requested By: SONA Baldwin NP with ICU  Reason for Consult: Goals of care and Advance care planning  SUBJECTIVE:     History of Present Illness:  Tanya Madrid is a 82 y.o. 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  She presented to hospital after sustained witnessed cardiac arrest.  Admitted to ICU on mechanical ventilation; cooling for targeted temperature management for coma post arrest initiated.  We were consulted to assist with goals of care in elderly patient critically ill in ICU with unknown down time.      NARRATIVE     Follow up with patient.  Events over past two days noted/reviewed.  Patient remains comatose and poorly responsive.  Patient's siblings visited yesterday.  They report patient squeezing hand to command.  This was not repeated for staff.  Daughter is holding out hope that her mom is slow to recovery.  We reviewed her condition in detail and I explained her overall poor prognosis.  Despite family's perception, I reinforced her lack of neurological recovery will raises concern the patient will not be able to support herself nor maintain or airway for any length of time.  Since she has voiced on more than one occasion her mom would not want to be prolonged in such a state, I strongly recommended against trach/peg.  She was reminded extended time awaiting for neurological recovery does not change the fact she has end stage heart failure on dobutamine, sphenoid mass and CODY superimposed on CKD.  Even so, she feels strongly she needs to give her mom more time to declare herself.      I prepared her for the fact she made need to make difficult decisions sooner than later.  She is aware of the fact that decisions need to be made.  We discussed false hope and anticipatory grief in the setting of overall poor prognosis.    Again,  she is asking for more time before she commits to a decision.  I asked her to keep her mom's experience at the center of every decision.  Continue time-limited trial of current interventions.      ASSESSEMENT / PLAN     1.  Encounter for Palliative Care   -DNR   -Continue current management   -Family/Emotional Support    2.   ? Anoxic encephaloapthy   -s/p cardiac arrest   -poorly responsive; does not follow commands   -daughter asking for more time   -contemplating palliative ventilator withdrawal if no further improvement or declines    3.   Acute on chronic CHF   -cardiology consulted   -EF 10%   -dobutamine gtt   -overall poor prognosis    4.   Parkinson's disease/Frailty   -supportive care    5.   Sphenoid mass, h/o papillomas   -ENT eval    Thank you for allowing Palliative Medicine to be involved in the care of Tanya Madrid.    Past Medical History:   Diagnosis Date    Acute CHF (congestive heart failure) 1/17/2021    Hyperlipemia     Hypertension     Parkinson's disease     Stroke 2016    Throat mass      Past Surgical History:   Procedure Laterality Date    CLOSED REDUCTION Right 10/15/2020    Procedure: CLOSED REDUCTION;  Surgeon: Humberto Valdivia DO;  Location: [facility] OR;  Service: Orthopedics;  Laterality: Right;  ATTEMPTED    EVACUATION OF HEMATOMA Right 10/17/2020    Procedure: EVACUATION, HEMATOMA;  Surgeon: Humberto Valdivia DO;  Location: [facility] OR;  Service: Orthopedics;  Laterality: Right;    HEMIARTHROPLASTY OF HIP Left 7/12/2020    Procedure: HEMIARTHROPLASTY, HIP;  Surgeon: Humberto Valdivia DO;  Location: [facility] OR;  Service: Orthopedics;  Laterality: Left;    HEMIARTHROPLASTY OF HIP Right 10/2/2020    Procedure: HEMIARTHROPLASTY, HIP;  Surgeon: Loyd Kearney MD;  Location: [facility] OR;  Service: Orthopedics;  Laterality: Right;    HYSTERECTOMY      OPEN REDUCTION OF DISLOCATION OF HIP Right 10/17/2020    Procedure: OPEN REDUCTION, DISLOCATION, HIP;  Surgeon: Humberto Valdivia  DO;  Location: [facility];  Service: Orthopedics;  Laterality: Right;    THROAT SURGERY      TONSILLECTOMY       No family history on file.    Social History     Socioeconomic History    Marital status:    Tobacco Use    Smoking status: Never Smoker    Smokeless tobacco: Never Used   Substance and Sexual Activity    Alcohol use: No    Drug use: No    Sexual activity: Not Currently      Review of patient's allergies indicates:   Allergen Reactions    Xanax [alprazolam]        Medications:    Current Facility-Administered Medications:     acetaminophen oral solution 650 mg, 650 mg, Per OG tube, Q4H PRN, Sj Juan MD    aspirin chewable tablet 81 mg, 81 mg, Per OG tube, Daily, Sj Juan MD, 81 mg at 03/16/22 0830    cefTRIAXone (ROCEPHIN) 2 g/50 mL D5W IVPB, 2 g, Intravenous, Q24H, Abhijit Fowler MD, Stopped at 03/16/22 0207    chlorhexidine 0.12 % solution 15 mL, 15 mL, Mouth/Throat, BID, Donavan Rangel MD, 15 mL at 03/16/22 0830    clopidogreL tablet 75 mg, 75 mg, Per OG tube, Daily, Sj Juan MD, 75 mg at 03/16/22 0830    dexmedetomidine (PRECEDEX) 400mcg/100mL dextrose 5% infusion, 0-1.4 mcg/kg/hr, Intravenous, Continuous, Tim Reilly MD, Stopped at 03/16/22 1110    dextrose 10% bolus 125 mL, 12.5 g, Intravenous, PRN, Abhijit Fowler MD    dextrose 10% bolus 250 mL, 25 g, Intravenous, PRN, Abhijit Fowler MD    DOBUtamine 1000 mg in D5W 250 mL infusion, 2.5 mcg/kg/min, Intravenous, Continuous, JOSUE Brenner-BC, Last Rate: 2 mL/hr at 03/16/22 1216, 2.5 mcg/kg/min at 03/16/22 1216    famotidine (PF) injection 20 mg, 20 mg, Intravenous, Daily, Donavan Rangel MD, 20 mg at 03/16/22 0831    glucagon (human recombinant) injection 1 mg, 1 mg, Intramuscular, PRN, Abhijit Fowler MD    glucose chewable tablet 16 g, 16 g, Per OG tube, PRN, Sj Juan MD    glucose chewable tablet 24 g, 24 g, Per OG tube, PRN,  Sj Juan MD    heparin (porcine) injection 5,000 Units, 5,000 Units, Subcutaneous, Q8H, Donavan Rangel MD, 5,000 Units at 03/16/22 0530    hydrALAZINE injection 10 mg, 10 mg, Intravenous, Q6H PRN, Tiffany Baldwin, ACNP-BC, 10 mg at 03/16/22 0107    HYDROcodone-acetaminophen 5-325 mg per tablet 1 tablet, 1 tablet, Per OG tube, Q6H PRN, Sj Juan MD    melatonin oral solution 6 mg, 6 mg, Per OG tube, Nightly PRN, Sj Juan MD    naloxone 0.4 mg/mL injection 0.02 mg, 0.02 mg, Intravenous, PRN, Abhijit Fowler MD    pneumoc 13-lopez conj-dip cr(PF) (PREVNAR 13 (PF)) 0.5 mL, 0.5 mL, Intramuscular, vaccine x 1 dose, Sj Juan MD    polyethylene glycol packet 17 g, 17 g, Per NG tube, Daily PRN, Sj Juan MD    sodium chloride 0.9% flush 10 mL, 10 mL, Intravenous, Q8H, Abhijit Fowler MD, 10 mL at 03/16/22 0530    ROS:  Review of Systems   Unable to perform ROS: Intubated       OBJECTIVE:     Physical Exam:  Vitals: Temp: 98.1 °F (36.7 °C) (03/16/22 0700)  Pulse: 65 (03/16/22 1303)  Resp: 19 (03/16/22 1303)  BP: 122/68 (03/16/22 1303)  SpO2: 100 % (03/16/22 1303)    Physical Exam  Vitals reviewed.   Constitutional:       Appearance: She is well-groomed. She is ill-appearing.      Interventions: She is intubated.   HENT:      Head: Atraumatic.   Cardiovascular:      Rate and Rhythm: Normal rate. Rhythm irregular.   Pulmonary:      Effort: She is intubated.   Abdominal:      General: Abdomen is flat. There is no distension.   Genitourinary:     Comments: Indwelling catheter  Musculoskeletal:      Right lower leg: Edema present.      Left lower leg: Edema present.   Skin:     General: Skin is warm and dry.   Neurological:      Mental Status: She is unresponsive.   Psychiatric:      Comments: Unable to assess due to cognition         Review of Symptoms    Symptom Assessment (ESAS 0-10 Scale)  Pain:  0  Dyspnea:  0  Anxiety:  0  Nausea:   0  Depression:  0  Anorexia:  0  Fatigue:  0  Insomnia:  0  Restlessness:  0  Agitation:  0  Unable to complete assessment due to Intubated         Living Arrangements:  Lives with family and Lives in home    Psychosocial/Cultural: Lives with daughter and granddaughter, she had twin daughter's but one is  at 31 due to MI      Advance Directives:   LaPOST: No    Do Not Resuscitate Status: Yes    Medical Power of : No      Decision Making:  Family answered questions and Patient unable to communicate due to disease severity/cognitive impairment      Labs:  WBC   Date Value Ref Range Status   2022 7.73 3.90 - 12.70 K/uL Final     MCV   Date Value Ref Range Status   2022 89 82 - 98 fL Final           Hematocrit   Date Value Ref Range Status   2022 29.5 (L) 37.0 - 48.5 % Final    NA      K      CL      CO2      BUN 59 (H) 2022    CREATININE 3.7 (H) 2022    CALCIUM 9.0 2022    ANIONGAP 13 2022    ESTGFRAFRICA 12 (A) 2022    EGFRNONAA 11 (A) 2022       Lab Results   Component Value Date    AST 59 (H) 2022    ALKPHOS 92 2022    BILITOT 1.1 (H) 2022       Albumin   Date Value Ref Range Status   2022 3.0 (L) 3.5 - 5.2 g/dL Final       Radiology:  I have reviewed all pertinent results/findings within the past 24 hours:      Chest Xray 03/15/2022  Impression:   Echogenic kidneys with poor corticomedullary differentiation could reflect medical renal disease.       Ultrasound retroperitoneal 03/15/2022  Impression:   Echogenic kidneys with poor corticomedullary differentiation could reflect medical renal disease.        Discussed case and visit details with cardiology, ICU Dr. Cruz, bedside nurse       Medical decision making: HIGH based on high risk of death high risk medications poor prognosis management of more than one chronic illness in exacerbation or progression of disease    Plan required increased review of medication choice,  "interaction, dosing, frequency, and route due to patient complexity. Patient complexity increased by: advanced age, altered mentation, renal insufficiency and NPO    25 min ACP time spent discussing: code status, assessed patient specific goals and addressed the best way to achieve them, coordination of care and emotional support, formulating and communicating prognosis, exploring burden/ benefit of various approaches of treatment, inquired about existing or willingness to complete advance directive documents.        Essence Whitfield NP  Palliative Medicine    Portions of this note may have been created with voice recognition software. Occasional "wrong word" or "sound alike" substitutions may have occurred due to the inherent limitations of voice recognition software. Please, read the note carefully and recognize, using context, where substitutions have occurred.     "

## 2022-03-16 NOTE — ASSESSMENT & PLAN NOTE
Ct head showed- Dehiscence of the posterior wall of the sphenoid sinus such as on series 4, image [id number]. There is high density material possibly related to paranasal sinus disease or neoplasm which extends towards the pituitary and along the right towards the intracranial ICA.  Further evaluation with contrast enhanced MRI when clinically appropriate would be recommended.  Suggest including 3D/volumetric postcontrast imaging.  ENT consult as clinically warranted.

## 2022-03-16 NOTE — ASSESSMENT & PLAN NOTE
Patient is identified as having Combined Systolic and Diastolic heart failure that is Acute on chronic. CHF is currently uncontrolled due to Pulmonary edema/pleural effusion on CXR. Latest ECHO performed and demonstrates- Results for orders placed during the hospital encounter of 01/15/21    Echo Color Flow Doppler? Yes    Interpretation Summary  · The left ventricle is severely enlarged with eccentric hypertrophy and severely decreased systolic function. The estimated ejection fraction is 25%  · Grade II left ventricular diastolic dysfunction.  · Normal right ventricular size with moderately reduced right ventricular systolic function.  · Severe left atrial enlargement.  · There is pulmonary hypertension.  · There is mild aortic valve stenosis.  · Aortic valve area is 1.37 cm2; peak velocity is 1.33 m/s; mean gradient is 4 mmHg.  · Mild to moderate tricuspid regurgitation.  · Intermediate central venous pressure (8 mmHg).  · The estimated PA systolic pressure is 50 mmHg.  · Trivial pericardial effusion.  . Continue Nitrate/Vasodilator and monitor clinical status closely. Monitor on telemetry. Patient is on CHF pathway.  Monitor strict Is&Os and daily weights.  Place on fluid restriction of 1.5 L. Continue to stress to patient importance of self efficacy and  on diet for CHF. Last BNP reviewed- and noted below   Recent Labs   Lab 03/16/22  0334   BNP 1,493*     3/15-  Off Entresto , BB and diuretics given hypotension

## 2022-03-16 NOTE — PROGRESS NOTES
"[facility] - Intensive Care ([facility])  [facility] Medicine  Progress Note    Patient Name: Tanya Madrid  MRN: [id number]  Patient Class: IP- Inpatient   Admission Date: 3/10/2022  Length of Stay: 6 days  Attending Physician: Tim Reilly MD  Primary Care Provider: Maggie Sue NP        Subjective:     Principal Problem:Cardiac arrest        HPI:  History was taken from the electronic chart and from the daughter -  Adenike Lemus Daughter [phone]        82 y.o. female patient with a PMHx of HTN,  CHF-last EF-01/2021-25%, with diastolic dysfunction  , and HLD who presents to the Emergency Department for evaluation of cardiac arrest which onset suddenly 1 hour pta.  Daughter reports that over the last 3 days -she has been complaining of worsening dyspnoea  and was given extra lasix tablets. She usually takes 20mg lasix daily and was given 2 extra tablets with the episodes of  dyspnea.  This evening while having dinner, she "looked blank" and stopped breathing . Daughter called EMS and was advised to start CPR.. Pt achieved ROSC en route. After I shock . Prior Tx includes 50 mcg of fentanyl and 50 mg of ketamine from EMS.   Code status discussed.  She is Full code.  Labs and imaging test reviewed.  CT head -No hemorrhage or acute major vascular distribution infarction.   Dehiscence of the posterior wall of the sphenoid sinus such as on series 4, image 49-52. There is high density material possibly related to paranasal sinus disease or neoplasm which extends towards the pituitary and along the right towards the intracranial ICA.  Further evaluation with contrast enhanced MRI when clinically appropriate would be recommended.       Component      Latest Ref Rng & Units 3/10/2022 3/3/2022              Potassium      3.5 - 5.1 mmol/L 2.9 (L) 3.7   CBC showed wbc -19   Component      Latest Ref Rng & Units 3/10/2022 3/10/2022 4/20/2021          10:47 PM  8:18 PM    Lactate, Lex      0.5 - 2.2 mmol/L " 3.6 (HH) 5.7 (HH) 2.0     Component      Latest Ref Rng & Units 3/10/2022 4/20/2021   BNP      0 - 99 pg/mL >4,900 (H) 2,806 (H)   Code status discussed -she is full code  Her daughter is the SDM.  Adenike Lemus Daughter [phone]       Overview/Hospital Course:  81 y/o AAF admitted to ICU with a dx of cardiac arrest , CODY , acute hypoxic respiratory failure  and  Anoxic encephalopathy . Started on hypothermia protocol , IVAB , asa , plavix  And entresto . Cardiology and palliative care consulted. Tx reviewed . Cont current tx . Prognosis Poor      3/12  She remain critically ill . Now on rewarming phase . Started on propofol due to  only grimacing and posturing . Kidney  function is trending up . She  is on low dose of dobutamine . Case d/w he daughter at bedside . She changed code status to DNR . Prognosis poor .    3/13 Reamin critically ill  on mechanical ventilation . Temp is now > 36 . Last night  was on levophed and dobutamine to keep a map > 65  . Levophed wean off this am . She has been on propofol on and off for posturing  movement . Sedation stop for 2 hrs  .  She has positve  corneal , pupil and gag reflex .  No respond to verval or pain stimuli . She withdraw to pain . She is breathing overt the vent .  Neurology consulted . The UOP has sligly increased since yesterday . The kidney function  cont trending up . The Blood cx are (+) ALPHA HEMOLYTIC STREP    3/14 No significant neurologuical changes since yesterday . She opne her eyes on tactile simlus . She  does not follow commads . Ha been odff sedation for more than 24 hrs .  The Blood cx (+) for STREPTOCOCCUS ANGINOSUS  and EIKENELLA CORRODENS . The kidney function is sligly improving  . Prognsosis POOR .  POC d/w  her duaghter .    3/15- Remains intubated with FiO2 30%/PEEP 5 . Afebrile. Off sedation x 2 days. Remains comatose.  Intermittent spontaneous eye opening but does not seem purposeful. No verbal  response , does not follow  commands or track voice. Intermittent withdrawal to pain stimulus. Corneal, pupillary , cough and gag reflex present. Remains on Dobutamine gtt. EF 10%. Tube feed in progress. UOP improved 1L yesterday, however creatinine bumped to 3.8. Rocephin for Streptococcal  bacteremia continued. TTE negative for vegetation. Palliative Medicine is following. Prognosis appears poor.     3/16- Aferbile.Off sedation. Remains intubated with minimal vent support FiO2 30%/ PEEP 5. However pt is not awake enough to manage secretion and protect airways. Intermittent eye opening but does not seem purposeful. Brain stem reflexes are present. Withdraws to pain stimulus and facial grimace noted with sternal rub. Remains on dobutamine gtt.  ml yesterday . BNPCreatinine stable at 3.7. BNP down to 1493 from 4900 on admit. Will re consult Neurology to assess prognosis . Palliative Medicine is following.       Interval History:   Will re consult Neurology to re assess prognosis.       Review of Systems  Objective:     Vital Signs (Most Recent):  Temp: 98.1 °F (36.7 °C) (03/16/22 0700)  Pulse: 76 (03/16/22 1447)  Resp: 15 (03/16/22 1447)  BP: 122/68 (03/16/22 1303)  SpO2: 100 % (03/16/22 1447) Vital Signs (24h Range):  Temp:  [98 °F (36.7 °C)-98.1 °F (36.7 °C)] 98.1 °F (36.7 °C)  Pulse:  [61-88] 76  Resp:  [12-29] 15  SpO2:  [96 %-100 %] 100 %  BP: ()/(53-85) 122/68  Arterial Line BP: ()/(41-81) 147/62     Weight: 50.7 kg (111 lb 12.4 oz)  Body mass index is 20.44 kg/m².    Intake/Output Summary (Last 24 hours) at 3/16/2022 1714  Last data filed at 3/16/2022 1216  Gross per 24 hour   Intake 333.61 ml   Output 1150 ml   Net -816.39 ml      Physical Exam    Significant Labs: All pertinent labs within the past 24 hours have been reviewed.  BMP:   Recent Labs   Lab 03/16/22  0334         K 3.3*      CO2 25   BUN 59*   CREATININE 3.7*   CALCIUM 9.0   MG 2.5     CBC:   Recent Labs   Lab 03/15/22  1640  03/16/22  0334   WBC 9.56 7.73   HGB 10.4* 9.5*   HCT 32.2* 29.5*    209     CMP:   Recent Labs   Lab 03/15/22  0400 03/16/22  0334    144   K 3.1* 3.3*    106   CO2 22* 25   * 108   BUN 56* 59*   CREATININE 3.8* 3.7*   CALCIUM 8.7 9.0   ANIONGAP 14 13   EGFRNONAA 10* 11*     Cardiac Markers:   Recent Labs   Lab 03/16/22  0334   BNP 1,493*       Significant Imaging:       Assessment/Plan:      * Cardiac arrest  Out of Hospital cardiac arrest.  Last Echo -1/21-EF -25%, Repeat echo with EF 10%  GIII Diastolic dysfunction.  EKG -Sinus rhythm with occasional Premature ventricular complexes and Premature atrial complexes  Cardiology consulted   Prognosis poor     Acute on chronic combined systolic and diastolic CHF (congestive heart failure)  Patient is identified as having Combined Systolic and Diastolic heart failure that is Acute on chronic. CHF is currently uncontrolled due to Pulmonary edema/pleural effusion on CXR. Latest ECHO performed and demonstrates- Results for orders placed during the hospital encounter of 01/15/21    Echo Color Flow Doppler? Yes    Interpretation Summary  · The left ventricle is severely enlarged with eccentric hypertrophy and severely decreased systolic function. The estimated ejection fraction is 25%  · Grade II left ventricular diastolic dysfunction.  · Normal right ventricular size with moderately reduced right ventricular systolic function.  · Severe left atrial enlargement.  · There is pulmonary hypertension.  · There is mild aortic valve stenosis.  · Aortic valve area is 1.37 cm2; peak velocity is 1.33 m/s; mean gradient is 4 mmHg.  · Mild to moderate tricuspid regurgitation.  · Intermediate central venous pressure (8 mmHg).  · The estimated PA systolic pressure is 50 mmHg.  · Trivial pericardial effusion.  . Continue Nitrate/Vasodilator and monitor clinical status closely. Monitor on telemetry. Patient is on CHF pathway.  Monitor strict Is&Os and daily  weights.  Place on fluid restriction of 1.5 L. Continue to stress to patient importance of self efficacy and  on diet for CHF. Last BNP reviewed- and noted below   Recent Labs   Lab 03/16/22  0334   BNP 1,493*     3/15-  Off Entresto , BB and diuretics given hypotension     Elevated troponin  In the setting of Cardiac arrest  Cont asa/plavix  Cardiology on board       Anoxic encephalopathy  2/2 to cardiac arrest   Neurology consulted to assess prognosis       CODY (acute kidney injury)  2/2 to cardiac arrest   Not  a good candidate for CRT/RRT  Monitor UOP and renal indices         Gram-positive bacteremia  Source of infection is unclear   Cont rocephin x 2 weeks   Initial Blood culture from 3/10/22 positive forALPHA HEMOLYTIC STREP and EIKENELLA CORRODENS non viable for susceptibility   Repeat blood cultures from 3/15/22 - NGTD    Aspiration pneumonia  Initial CXR on admit suggestive of right-sided pulmonary opacities possibly infection, aspiration, or edema.  Antibiotic include Rocephin initiated   Blood cx from 3/10/22 (+) for ALPHA HEMOLYTIC STREP  Tracheal aspirate - neg   Repeat blood cultures from 3/15/22- NGTD    Palliative care encounter  - Palliative medicine consulted for [facility] discussion   -Current code status is DNR      On mechanically assisted ventilation        Hypokalemia  -Replete as indicated     Mass of sphenoid sinus  Ct head showed- Dehiscence of the posterior wall of the sphenoid sinus such as on series 4, image 49-52. There is high density material possibly related to paranasal sinus disease or neoplasm which extends towards the pituitary and along the right towards the intracranial ICA.  Further evaluation with contrast enhanced MRI when clinically appropriate would be recommended.  Suggest including 3D/volumetric postcontrast imaging.  ENT consult as clinically warranted.        History of CVA (cerebrovascular accident)  On aspirin/plavix. Continue via OG tube     Parkinson  disease  Continue supportive care   Resume meds once appropriate       Laryngeal papillomatosis  Known History   Cont supportive tx as needed       Essential hypertension  Hold  entresto due to current hypotension and CODY        VTE Risk Mitigation (From admission, onward)         Ordered     heparin (porcine) injection 5,000 Units  Every 8 hours         03/10/22 2351     IP VTE HIGH RISK PATIENT  Once         03/10/22 2345     Place sequential compression device  Until discontinued         03/10/22 2345                Discharge Planning   PARMINDER:      Code Status: DNR   Is the patient medically ready for discharge?:     Reason for patient still in hospital (select all that apply): Patient trending condition  Discharge Plan A: Other (TBD)            Critical care time spent on the evaluation and treatment of severe organ dysfunction, review of pertinent labs and imaging studies, discussions with consulting providers and discussions with patient/family: 30  minutes.      Tim Reilly MD  Department of Hospital Medicine   [facility] - Intensive Care ([facility])

## 2022-03-17 LAB
ALBUMIN SERPL BCP-MCNC: 2.9 G/DL (ref 3.5–5.2)
ALLENS TEST: ABNORMAL
ALP SERPL-CCNC: 109 U/L (ref 55–135)
ALT SERPL W/O P-5'-P-CCNC: 29 U/L (ref 10–44)
ANION GAP SERPL CALC-SCNC: 12 MMOL/L (ref 8–16)
AST SERPL-CCNC: 40 U/L (ref 10–40)
BASOPHILS # BLD AUTO: 0 K/UL (ref 0–0.2)
BASOPHILS NFR BLD: 0 % (ref 0–1.9)
BILIRUB DIRECT SERPL-MCNC: 0.2 MG/DL (ref 0.1–0.3)
BILIRUB SERPL-MCNC: 0.3 MG/DL (ref 0.1–1)
BUN SERPL-MCNC: 62 MG/DL (ref 8–23)
CALCIUM SERPL-MCNC: 9 MG/DL (ref 8.7–10.5)
CHLORIDE SERPL-SCNC: 109 MMOL/L (ref 95–110)
CO2 SERPL-SCNC: 25 MMOL/L (ref 23–29)
CREAT SERPL-MCNC: 3.7 MG/DL (ref 0.5–1.4)
DELSYS: ABNORMAL
DIFFERENTIAL METHOD: ABNORMAL
EOSINOPHIL # BLD AUTO: 0 K/UL (ref 0–0.5)
EOSINOPHIL NFR BLD: 0 % (ref 0–8)
ERYTHROCYTE [DISTWIDTH] IN BLOOD BY AUTOMATED COUNT: 15.9 % (ref 11.5–14.5)
ERYTHROCYTE [SEDIMENTATION RATE] IN BLOOD BY WESTERGREN METHOD: 10 MM/H
EST. GFR  (AFRICAN AMERICAN): 12 ML/MIN/1.73 M^2
EST. GFR  (NON AFRICAN AMERICAN): 11 ML/MIN/1.73 M^2
FIO2: 30
GLUCOSE SERPL-MCNC: 271 MG/DL (ref 70–110)
HCO3 UR-SCNC: 27.4 MMOL/L (ref 24–28)
HCT VFR BLD AUTO: 32.4 % (ref 37–48.5)
HGB BLD-MCNC: 10.3 G/DL (ref 12–16)
IMM GRANULOCYTES # BLD AUTO: 0.06 K/UL (ref 0–0.04)
IMM GRANULOCYTES NFR BLD AUTO: 0.9 % (ref 0–0.5)
LYMPHOCYTES # BLD AUTO: 0.5 K/UL (ref 1–4.8)
LYMPHOCYTES NFR BLD: 7 % (ref 18–48)
MAGNESIUM SERPL-MCNC: 2.5 MG/DL (ref 1.6–2.6)
MCH RBC QN AUTO: 29.5 PG (ref 27–31)
MCHC RBC AUTO-ENTMCNC: 31.8 G/DL (ref 32–36)
MCV RBC AUTO: 93 FL (ref 82–98)
MODE: ABNORMAL
MONOCYTES # BLD AUTO: 0.2 K/UL (ref 0.3–1)
MONOCYTES NFR BLD: 2.3 % (ref 4–15)
NEUTROPHILS # BLD AUTO: 5.9 K/UL (ref 1.8–7.7)
NEUTROPHILS NFR BLD: 89.8 % (ref 38–73)
NRBC BLD-RTO: 0 /100 WBC
NSE SERPL-MCNC: 34 NG/ML
PCO2 BLDA: 40.9 MMHG (ref 35–45)
PEEP: 5
PH SMN: 7.43 [PH] (ref 7.35–7.45)
PHOSPHATE SERPL-MCNC: 4.7 MG/DL (ref 2.7–4.5)
PLATELET # BLD AUTO: 260 K/UL (ref 150–450)
PMV BLD AUTO: 11.7 FL (ref 9.2–12.9)
PO2 BLDA: 133 MMHG (ref 80–100)
POC BE: 3 MMOL/L
POC SATURATED O2: 99 % (ref 95–100)
POCT GLUCOSE: 188 MG/DL (ref 70–110)
POCT GLUCOSE: 225 MG/DL (ref 70–110)
POCT GLUCOSE: 228 MG/DL (ref 70–110)
POTASSIUM SERPL-SCNC: 4.6 MMOL/L (ref 3.5–5.1)
PROT SERPL-MCNC: 6.1 G/DL (ref 6–8.4)
RBC # BLD AUTO: 3.49 M/UL (ref 4–5.4)
SAMPLE: ABNORMAL
SITE: ABNORMAL
SODIUM SERPL-SCNC: 146 MMOL/L (ref 136–145)
VT: 350
WBC # BLD AUTO: 6.54 K/UL (ref 3.9–12.7)

## 2022-03-17 PROCEDURE — 82803 BLOOD GASES ANY COMBINATION: CPT

## 2022-03-17 PROCEDURE — 99291 CRITICAL CARE FIRST HOUR: CPT | Mod: ,,, | Performed by: INTERNAL MEDICINE

## 2022-03-17 PROCEDURE — 94003 VENT MGMT INPAT SUBQ DAY: CPT

## 2022-03-17 PROCEDURE — 25000003 PHARM REV CODE 250: Performed by: INTERNAL MEDICINE

## 2022-03-17 PROCEDURE — 99900035 HC TECH TIME PER 15 MIN (STAT)

## 2022-03-17 PROCEDURE — 94668 MNPJ CHEST WALL SBSQ: CPT

## 2022-03-17 PROCEDURE — 99233 SBSQ HOSP IP/OBS HIGH 50: CPT | Mod: ,,, | Performed by: PHYSICIAN ASSISTANT

## 2022-03-17 PROCEDURE — A4216 STERILE WATER/SALINE, 10 ML: HCPCS | Performed by: INTERNAL MEDICINE

## 2022-03-17 PROCEDURE — 99900026 HC AIRWAY MAINTENANCE (STAT)

## 2022-03-17 PROCEDURE — 94667 MNPJ CHEST WALL 1ST: CPT

## 2022-03-17 PROCEDURE — 63600175 PHARM REV CODE 636 W HCPCS: Performed by: NURSE PRACTITIONER

## 2022-03-17 PROCEDURE — 80076 HEPATIC FUNCTION PANEL: CPT | Performed by: INTERNAL MEDICINE

## 2022-03-17 PROCEDURE — 80048 BASIC METABOLIC PNL TOTAL CA: CPT | Performed by: INTERNAL MEDICINE

## 2022-03-17 PROCEDURE — 43752 NASAL/OROGASTRIC W/TUBE PLMT: CPT

## 2022-03-17 PROCEDURE — 25000242 PHARM REV CODE 250 ALT 637 W/ HCPCS: Performed by: INTERNAL MEDICINE

## 2022-03-17 PROCEDURE — 27000221 HC OXYGEN, UP TO 24 HOURS

## 2022-03-17 PROCEDURE — 20000000 HC ICU ROOM

## 2022-03-17 PROCEDURE — 37799 UNLISTED PX VASCULAR SURGERY: CPT

## 2022-03-17 PROCEDURE — 63600175 PHARM REV CODE 636 W HCPCS: Performed by: INTERNAL MEDICINE

## 2022-03-17 PROCEDURE — 83735 ASSAY OF MAGNESIUM: CPT | Performed by: INTERNAL MEDICINE

## 2022-03-17 PROCEDURE — 99233 PR SUBSEQUENT HOSPITAL CARE,LEVL III: ICD-10-PCS | Mod: ,,, | Performed by: PHYSICIAN ASSISTANT

## 2022-03-17 PROCEDURE — 99291 PR CRITICAL CARE, E/M 30-74 MINUTES: ICD-10-PCS | Mod: ,,, | Performed by: INTERNAL MEDICINE

## 2022-03-17 PROCEDURE — 94640 AIRWAY INHALATION TREATMENT: CPT

## 2022-03-17 PROCEDURE — 27200966 HC CLOSED SUCTION SYSTEM

## 2022-03-17 PROCEDURE — 85025 COMPLETE CBC W/AUTO DIFF WBC: CPT | Performed by: INTERNAL MEDICINE

## 2022-03-17 PROCEDURE — 84100 ASSAY OF PHOSPHORUS: CPT | Performed by: INTERNAL MEDICINE

## 2022-03-17 RX ORDER — CLOPIDOGREL BISULFATE 75 MG/1
75 TABLET ORAL DAILY
Status: DISCONTINUED | OUTPATIENT
Start: 2022-03-18 | End: 2022-03-21

## 2022-03-17 RX ORDER — DEXTROSE MONOHYDRATE 100 MG/ML
INJECTION, SOLUTION INTRAVENOUS CONTINUOUS PRN
Status: DISCONTINUED | OUTPATIENT
Start: 2022-03-17 | End: 2022-03-29 | Stop reason: HOSPADM

## 2022-03-17 RX ORDER — AMLODIPINE BESYLATE 10 MG/1
10 TABLET ORAL DAILY
Status: DISCONTINUED | OUTPATIENT
Start: 2022-03-18 | End: 2022-03-25

## 2022-03-17 RX ORDER — AMLODIPINE BESYLATE 10 MG/1
10 TABLET ORAL DAILY
Status: DISCONTINUED | OUTPATIENT
Start: 2022-03-17 | End: 2022-03-17

## 2022-03-17 RX ORDER — HYDRALAZINE HYDROCHLORIDE 20 MG/ML
10 INJECTION INTRAMUSCULAR; INTRAVENOUS EVERY 4 HOURS PRN
Status: DISCONTINUED | OUTPATIENT
Start: 2022-03-17 | End: 2022-03-29 | Stop reason: HOSPADM

## 2022-03-17 RX ORDER — CARVEDILOL 6.25 MG/1
6.25 TABLET ORAL 2 TIMES DAILY
Status: DISCONTINUED | OUTPATIENT
Start: 2022-03-17 | End: 2022-03-18

## 2022-03-17 RX ORDER — DEXAMETHASONE SODIUM PHOSPHATE 4 MG/ML
4 INJECTION, SOLUTION INTRA-ARTICULAR; INTRALESIONAL; INTRAMUSCULAR; INTRAVENOUS; SOFT TISSUE EVERY 12 HOURS
Status: DISCONTINUED | OUTPATIENT
Start: 2022-03-17 | End: 2022-03-22

## 2022-03-17 RX ORDER — GLUCAGON 1 MG
1 KIT INJECTION
Status: DISCONTINUED | OUTPATIENT
Start: 2022-03-17 | End: 2022-03-29 | Stop reason: HOSPADM

## 2022-03-17 RX ORDER — IPRATROPIUM BROMIDE AND ALBUTEROL SULFATE 2.5; .5 MG/3ML; MG/3ML
3 SOLUTION RESPIRATORY (INHALATION)
Status: DISCONTINUED | OUTPATIENT
Start: 2022-03-17 | End: 2022-03-29 | Stop reason: HOSPADM

## 2022-03-17 RX ORDER — INSULIN ASPART 100 [IU]/ML
1-10 INJECTION, SOLUTION INTRAVENOUS; SUBCUTANEOUS EVERY 4 HOURS PRN
Status: DISCONTINUED | OUTPATIENT
Start: 2022-03-17 | End: 2022-03-29 | Stop reason: HOSPADM

## 2022-03-17 RX ADMIN — CEFTRIAXONE 2 G: 2 INJECTION, SOLUTION INTRAVENOUS at 01:03

## 2022-03-17 RX ADMIN — CHLORHEXIDINE GLUCONATE 0.12% ORAL RINSE 15 ML: 1.2 LIQUID ORAL at 08:03

## 2022-03-17 RX ADMIN — INSULIN ASPART 2 UNITS: 100 INJECTION, SOLUTION INTRAVENOUS; SUBCUTANEOUS at 05:03

## 2022-03-17 RX ADMIN — Medication 10 ML: at 01:03

## 2022-03-17 RX ADMIN — DEXAMETHASONE SODIUM PHOSPHATE 4 MG: 4 INJECTION INTRA-ARTICULAR; INTRALESIONAL; INTRAMUSCULAR; INTRAVENOUS; SOFT TISSUE at 09:03

## 2022-03-17 RX ADMIN — FAMOTIDINE 20 MG: 10 INJECTION INTRAVENOUS at 08:03

## 2022-03-17 RX ADMIN — INSULIN ASPART 4 UNITS: 100 INJECTION, SOLUTION INTRAVENOUS; SUBCUTANEOUS at 01:03

## 2022-03-17 RX ADMIN — DIPHENHYDRAMINE HYDROCHLORIDE 25 MG: 50 INJECTION, SOLUTION INTRAMUSCULAR; INTRAVENOUS at 06:03

## 2022-03-17 RX ADMIN — ASPIRIN 81 MG CHEWABLE TABLET 81 MG: 81 TABLET CHEWABLE at 08:03

## 2022-03-17 RX ADMIN — IPRATROPIUM BROMIDE AND ALBUTEROL SULFATE 3 ML: 2.5; .5 SOLUTION RESPIRATORY (INHALATION) at 08:03

## 2022-03-17 RX ADMIN — CARVEDILOL 6.25 MG: 6.25 TABLET, FILM COATED ORAL at 11:03

## 2022-03-17 RX ADMIN — IPRATROPIUM BROMIDE AND ALBUTEROL SULFATE 3 ML: 2.5; .5 SOLUTION RESPIRATORY (INHALATION) at 01:03

## 2022-03-17 RX ADMIN — DEXAMETHASONE SODIUM PHOSPHATE 4 MG: 4 INJECTION INTRA-ARTICULAR; INTRALESIONAL; INTRAMUSCULAR; INTRAVENOUS; SOFT TISSUE at 12:03

## 2022-03-17 RX ADMIN — DEXAMETHASONE SODIUM PHOSPHATE 4 MG: 4 INJECTION INTRA-ARTICULAR; INTRALESIONAL; INTRAMUSCULAR; INTRAVENOUS; SOFT TISSUE at 11:03

## 2022-03-17 RX ADMIN — DIPHENHYDRAMINE HYDROCHLORIDE 25 MG: 50 INJECTION, SOLUTION INTRAMUSCULAR; INTRAVENOUS at 12:03

## 2022-03-17 RX ADMIN — Medication 10 ML: at 09:03

## 2022-03-17 RX ADMIN — CLOPIDOGREL 75 MG: 75 TABLET, FILM COATED ORAL at 08:03

## 2022-03-17 RX ADMIN — AMLODIPINE BESYLATE 10 MG: 10 TABLET ORAL at 01:03

## 2022-03-17 RX ADMIN — HEPARIN SODIUM 5000 UNITS: 5000 INJECTION INTRAVENOUS; SUBCUTANEOUS at 01:03

## 2022-03-17 RX ADMIN — Medication 10 ML: at 06:03

## 2022-03-17 RX ADMIN — DEXAMETHASONE SODIUM PHOSPHATE 4 MG: 4 INJECTION INTRA-ARTICULAR; INTRALESIONAL; INTRAMUSCULAR; INTRAVENOUS; SOFT TISSUE at 06:03

## 2022-03-17 RX ADMIN — CHLORHEXIDINE GLUCONATE 0.12% ORAL RINSE 15 ML: 1.2 LIQUID ORAL at 09:03

## 2022-03-17 RX ADMIN — HEPARIN SODIUM 5000 UNITS: 5000 INJECTION INTRAVENOUS; SUBCUTANEOUS at 09:03

## 2022-03-17 RX ADMIN — HEPARIN SODIUM 5000 UNITS: 5000 INJECTION INTRAVENOUS; SUBCUTANEOUS at 06:03

## 2022-03-17 RX ADMIN — INSULIN ASPART 2 UNITS: 100 INJECTION, SOLUTION INTRAVENOUS; SUBCUTANEOUS at 10:03

## 2022-03-17 RX ADMIN — DIPHENHYDRAMINE HYDROCHLORIDE 25 MG: 50 INJECTION, SOLUTION INTRAMUSCULAR; INTRAVENOUS at 05:03

## 2022-03-17 RX ADMIN — DIPHENHYDRAMINE HYDROCHLORIDE 25 MG: 50 INJECTION, SOLUTION INTRAMUSCULAR; INTRAVENOUS at 11:03

## 2022-03-17 RX ADMIN — HYDRALAZINE HYDROCHLORIDE 10 MG: 20 INJECTION, SOLUTION INTRAMUSCULAR; INTRAVENOUS at 09:03

## 2022-03-17 RX ADMIN — CARVEDILOL 6.25 MG: 6.25 TABLET, FILM COATED ORAL at 09:03

## 2022-03-17 RX ADMIN — HYDRALAZINE HYDROCHLORIDE 10 MG: 20 INJECTION INTRAMUSCULAR; INTRAVENOUS at 10:03

## 2022-03-17 NOTE — ASSESSMENT & PLAN NOTE
Out of Hospital cardiac arrest.  Last Echo -1/21-EF -25%, Repeat echo with EF 10%  GIII Diastolic dysfunction.  EKG -Sinus rhythm with occasional Premature ventricular complexes and Premature atrial complexes  Cardiology consulted   Monitor clinical course for neuro recovery   Prognosis poor

## 2022-03-17 NOTE — PROGRESS NOTES
"[facility] - Intensive Care ([facility])  [facility] Medicine  Progress Note    Patient Name: Tanya Madrid  MRN: [id number]  Patient Class: IP- Inpatient   Admission Date: 3/10/2022  Length of Stay: 7 days  Attending Physician: Tim Reilly MD  Primary Care Provider: Maggie Sue NP        Subjective:     Principal Problem:Cardiac arrest        HPI:  History was taken from the electronic chart and from the daughter -  Adenike Lemus Daughter [phone]        82 y.o. female patient with a PMHx of HTN,  CHF-last EF-01/2021-25%, with diastolic dysfunction  , and HLD who presents to the Emergency Department for evaluation of cardiac arrest which onset suddenly 1 hour pta.  Daughter reports that over the last 3 days -she has been complaining of worsening dyspnoea  and was given extra lasix tablets. She usually takes 20mg lasix daily and was given 2 extra tablets with the episodes of  dyspnea.  This evening while having dinner, she "looked blank" and stopped breathing . Daughter called EMS and was advised to start CPR.. Pt achieved ROSC en route. After I shock . Prior Tx includes 50 mcg of fentanyl and 50 mg of ketamine from EMS.   Code status discussed.  She is Full code.  Labs and imaging test reviewed.  CT head -No hemorrhage or acute major vascular distribution infarction.   Dehiscence of the posterior wall of the sphenoid sinus such as on series 4, image 49-52. There is high density material possibly related to paranasal sinus disease or neoplasm which extends towards the pituitary and along the right towards the intracranial ICA.  Further evaluation with contrast enhanced MRI when clinically appropriate would be recommended.       Component      Latest Ref Rng & Units 3/10/2022 3/3/2022              Potassium      3.5 - 5.1 mmol/L 2.9 (L) 3.7   CBC showed wbc -19   Component      Latest Ref Rng & Units 3/10/2022 3/10/2022 4/20/2021          10:47 PM  8:18 PM    Lactate, Lex      0.5 - 2.2 mmol/L " 3.6 (HH) 5.7 (HH) 2.0     Component      Latest Ref Rng & Units 3/10/2022 4/20/2021   BNP      0 - 99 pg/mL >4,900 (H) 2,806 (H)   Code status discussed -she is full code  Her daughter is the SDM.  Adenike Lemus Daughter [phone]       Overview/Hospital Course:  83 y/o AAF admitted to ICU with a dx of cardiac arrest , CODY , acute hypoxic respiratory failure  and  Anoxic encephalopathy . Started on hypothermia protocol , IVAB , asa , plavix  And entresto . Cardiology and palliative care consulted. Tx reviewed . Cont current tx . Prognosis Poor      3/12  She remain critically ill . Now on rewarming phase . Started on propofol due to  only grimacing and posturing . Kidney  function is trending up . She  is on low dose of dobutamine . Case d/w he daughter at bedside . She changed code status to DNR . Prognosis poor .    3/13 Reamin critically ill  on mechanical ventilation . Temp is now > 36 . Last night  was on levophed and dobutamine to keep a map > 65  . Levophed wean off this am . She has been on propofol on and off for posturing  movement . Sedation stop for 2 hrs  .  She has positve  corneal , pupil and gag reflex .  No respond to verval or pain stimuli . She withdraw to pain . She is breathing overt the vent .  Neurology consulted . The UOP has sligly increased since yesterday . The kidney function  cont trending up . The Blood cx are (+) ALPHA HEMOLYTIC STREP    3/14 No significant neurologuical changes since yesterday . She opne her eyes on tactile simlus . She  does not follow commads . Ha been odff sedation for more than 24 hrs .  The Blood cx (+) for STREPTOCOCCUS ANGINOSUS  and EIKENELLA CORRODENS . The kidney function is sligly improving  . Prognsosis POOR .  POC d/w  her duaghter .    3/15- Remains intubated with FiO2 30%/PEEP 5 . Afebrile. Off sedation x 2 days. Remains comatose.  Intermittent spontaneous eye opening but does not seem purposeful. No verbal  response , does not follow  commands or track voice. Intermittent withdrawal to pain stimulus. Corneal, pupillary , cough and gag reflex present. Remains on Dobutamine gtt. EF 10%. Tube feed in progress. UOP improved 1L yesterday, however creatinine bumped to 3.8. Rocephin for Streptococcal  bacteremia continued. TTE negative for vegetation. Palliative Medicine is following. Prognosis appears poor.     3/16- Aferbile.Off sedation. Remains intubated with minimal vent support FiO2 30%/ PEEP 5. However pt is not awake enough to manage secretion and protect airways. Intermittent eye opening but does not seem purposeful. Brain stem reflexes are present. Withdraws to pain stimulus and facial grimace noted with sternal rub. Remains on dobutamine gtt.  ml yesterday . BNPCreatinine stable at 3.7. BNP down to 1493 from 4900 on admit. Will re consult Neurology to assess prognosis . Palliative Medicine is following.     3/17- Eyes are open but not purposeful, No verbal response , withdraws to pain stimulus. ETT cuff leak noted and needed to be exchanged. CC team extubated pt after SBT. Plan to assess clinical course from respiratory stand point with no plan for re intubation. NG tube placement will be attempted for feeding and medication administration. Dobutamine gtt continues.  High risk of sudden demise from cardiac stand point given EF 10%.       Interval History:     Extubated today per CC team.     Review of Systems   Unable to perform ROS: Mental status change   Objective:     Vital Signs (Most Recent):  Temp: 97.7 °F (36.5 °C) (03/17/22 1145)  Pulse: 81 (03/17/22 1333)  Resp: 18 (03/17/22 1333)  BP: (!) 181/97 (03/17/22 1300)  SpO2: 100 % (03/17/22 1333)   Vital Signs (24h Range):  Temp:  [97.7 °F (36.5 °C)-99.5 °F (37.5 °C)] 97.7 °F (36.5 °C)  Pulse:  [59-93] 81  Resp:  [12-28] 18  SpO2:  [100 %] 100 %  BP: (122-198)/(58-97) 181/97  Arterial Line BP: (114-205)/(48-88) 200/78     Weight: 54.3 kg (119 lb 11.4 oz)  Body mass index is 21.9  kg/m².    Intake/Output Summary (Last 24 hours) at 3/17/2022 1612  Last data filed at 3/17/2022 1400  Gross per 24 hour   Intake 1262.58 ml   Output 906 ml   Net 356.58 ml      Physical Exam  Constitutional:       Comments: Extubated . Off sedation, no verbal response , withdraws to pain stimulus    HENT:      Head: Normocephalic and atraumatic.   Eyes:      Pupils: Pupils are equal, round, and reactive to light.      Comments: (+) corneal reflex    Cardiovascular:      Rate and Rhythm: Normal rate.      Heart sounds: Normal heart sounds.   Pulmonary:      Comments: Breath sounds are bronchial  Abdominal:      General: Bowel sounds are normal.   Genitourinary:     Comments: Abreu catheter in place  Musculoskeletal:      Cervical back: Neck supple.      Right lower leg: No edema.      Left lower leg: No edema.   Neurological:      Comments: Off sedation x 2 days . Comatose        Significant Labs: All pertinent labs within the past 24 hours have been reviewed.  BMP:   Recent Labs   Lab 03/17/22  0451   *   *   K 4.6      CO2 25   BUN 62*   CREATININE 3.7*   CALCIUM 9.0   MG 2.5     CBC:   Recent Labs   Lab 03/16/22  0334 03/17/22  0451   WBC 7.73 6.54   HGB 9.5* 10.3*   HCT 29.5* 32.4*    260     CMP:   Recent Labs   Lab 03/16/22  0334 03/17/22  0451    146*   K 3.3* 4.6    109   CO2 25 25    271*   BUN 59* 62*   CREATININE 3.7* 3.7*   CALCIUM 9.0 9.0   PROT  --  6.1   ALBUMIN  --  2.9*   BILITOT  --  0.3   ALKPHOS  --  109   AST  --  40   ALT  --  29   ANIONGAP 13 12   EGFRNONAA 11* 11*       Significant Imaging:       Assessment/Plan:      * Cardiac arrest  Out of Hospital cardiac arrest.  Last Echo -1/21-EF -25%, Repeat echo with EF 10%  GIII Diastolic dysfunction.  EKG -Sinus rhythm with occasional Premature ventricular complexes and Premature atrial complexes  Cardiology consulted   Monitor clinical course for neuro recovery   Prognosis poor     Acute on chronic  combined systolic and diastolic CHF (congestive heart failure)  Patient is identified as having Combined Systolic and Diastolic heart failure that is Acute on chronic. CHF is currently uncontrolled due to Pulmonary edema/pleural effusion on CXR. Latest ECHO performed and demonstrates- Results for orders placed during the hospital encounter of 01/15/21    Echo Color Flow Doppler? Yes    Interpretation Summary  · The left ventricle is severely enlarged with eccentric hypertrophy and severely decreased systolic function. The estimated ejection fraction is 25%  · Grade II left ventricular diastolic dysfunction.  · Normal right ventricular size with moderately reduced right ventricular systolic function.  · Severe left atrial enlargement.  · There is pulmonary hypertension.  · There is mild aortic valve stenosis.  · Aortic valve area is 1.37 cm2; peak velocity is 1.33 m/s; mean gradient is 4 mmHg.  · Mild to moderate tricuspid regurgitation.  · Intermediate central venous pressure (8 mmHg).  · The estimated PA systolic pressure is 50 mmHg.  · Trivial pericardial effusion.  . Continue Nitrate/Vasodilator and monitor clinical status closely. Monitor on telemetry. Patient is on CHF pathway.  Monitor strict Is&Os and daily weights.  Place on fluid restriction of 1.5 L. Continue to stress to patient importance of self efficacy and  on diet for CHF. Last BNP reviewed- and noted below   Recent Labs   Lab 03/16/22  0334   BNP 1,493*     3/15-  Off Entresto , BB and diuretics given hypotension     Elevated troponin  In the setting of Cardiac arrest  Cont asa/plavix  Cardiology on board       Anoxic encephalopathy  2/2 to cardiac arrest   Neurology consulted to assess prognosis       CODY (acute kidney injury)  2/2 to cardiac arrest   Not  a good candidate for CRT/RRT  Monitor UOP and renal indices         Gram-positive bacteremia  Source of infection is unclear   Cont rocephin x 2 weeks   Initial Blood culture from 3/10/22  positive forALPHA HEMOLYTIC STREP and EIKENELLA CORRODENS non viable for susceptibility   Repeat blood cultures from 3/15/22 - NGTD    Aspiration pneumonia  Initial CXR on admit suggestive of right-sided pulmonary opacities possibly infection, aspiration, or edema.  Antibiotic include Rocephin initiated   Blood cx from 3/10/22 (+) for ALPHA HEMOLYTIC STREP  Tracheal aspirate - neg   Repeat blood cultures from 3/15/22- NGTD    Palliative care encounter  - Palliative medicine consulted for GOC discussion   -Current code status is DNR      On mechanically assisted ventilation        Hypokalemia  -Replete as indicated     Mass of sphenoid sinus  Ct head showed- Dehiscence of the posterior wall of the sphenoid sinus such as on series 4, image 49-52. There is high density material possibly related to paranasal sinus disease or neoplasm which extends towards the pituitary and along the right towards the intracranial ICA.  Further evaluation with contrast enhanced MRI when clinically appropriate would be recommended.  Suggest including 3D/volumetric postcontrast imaging.  ENT consult as clinically warranted.        History of CVA (cerebrovascular accident)  On aspirin/plavix. Continue via OG tube     Parkinson disease  Continue supportive care   Resume meds once appropriate       Laryngeal papillomatosis  Known History   Cont supportive tx as needed       Essential hypertension  Hold  entresto due to current hypotension and CODY  Resume antihypertensives once appropriate         VTE Risk Mitigation (From admission, onward)         Ordered     heparin (porcine) injection 5,000 Units  Every 8 hours         03/10/22 2351     IP VTE HIGH RISK PATIENT  Once         03/10/22 2345     Place sequential compression device  Until discontinued         03/10/22 2345                Discharge Planning   PARMINDER:      Code Status: DNR   Is the patient medically ready for discharge?:     Reason for patient still in hospital (select all that  apply): Patient trending condition  Discharge Plan A: Other (TBD)            Critical care time spent on the evaluation and treatment of severe organ dysfunction, review of pertinent labs and imaging studies, discussions with consulting providers and discussions with patient/family: 30  minutes.      Tim Reilly MD  Department of Hospital Medicine   [facility] - Intensive Care ([facility])

## 2022-03-17 NOTE — RESPIRATORY THERAPY
Dr. Cruz called to bedside due to ETT needing to be exchanged.  RT extubated pt per Dr. Cruz.  Pt placed on 4L NC, no respiratory distress noted, vital signs stable.

## 2022-03-17 NOTE — PLAN OF CARE
Plan of care reviewed with pt's daughter at bedside. Pt extubated this shift, see previous note. O2 sats stable on nasal cannula. Pt not following commands or visually tracking, eyes open spontaneously. Lungs remain very coarse with congested secretions. Pt with fair cough, able to suction some secretions orally. CPT initiated this shift by RT. BP elevated, additional medications added along with increased frequency of prn hydralazine. Art line to R radial in use. Dobutamine infusing cont to R IJ central line. NSR with frequent PVCs and PACs on monitor. NGT placed this shift, TF resumed at goal rate, tolerating well with minimal residuals. Rectal tube and henley catheter in use. Heels floated, turned/repositioned q2hr, BSWR in use.

## 2022-03-17 NOTE — PHYSICIAN QUERY
PT Name: Tanya Madrid  MR #: [id number]    DOCUMENTATION CLARIFICATION     CDS/: Tiffany Manning               Contact information:[email or web address]  This form is a permanent document in the medical record.    Query Date: March 17, 2022      By submitting this query, we are merely seeking further clarification of documentation.  Please utilize your independent clinical judgment when addressing the question(s) below.    The Medical Record reflects the following:    Clinical Information Location in Medical Record   Cardiogenic plus septic shock.  Treat gram-positive bacteremia  On target map above 65 mm Hg.  Add Levophed.  Received 2 L bolus  3/13 strict I&Os.  Oliguric.  Needed 2 L NS bolus yesterday for severe hypotension.  On Levophed drip    Gram-positive bacteremia  3/12 gram-positive bacteremia resembling strep x2.  On IV Rocephin.  Respiratory cultures normal marcial  3/13 of aspirin bacteremia.  On IV Rocephin 2 g daily    Aspiration pneumonia - continue Rocephin , follow blood cultures    Lactate=3.6    WBC=19.37-25.17-19.41-19.08-12.52     Pulmonology note 3-14                    H&P    Lab 3-10      Lab 3-10 to 3-14       Please clarify/confirm the Consultants diagnosis of __Sepsis and Septic Shock_____:     [ x ] Diagnosis ruled in   [  ] Diagnosis ruled out   [  ] Other diagnosis (please specify): _____________________________   [  ] Clinically undetermined     Present on admission (POA) status:   [ x  ] Yes (Y)                          [  ] Clinically Undetermined (W)  [   ] No (N)                            [   ] Documentation insufficient to determine if condition is POA (U)

## 2022-03-17 NOTE — PROGRESS NOTES
Advance Care Planning     Progress Note   Palliative Medicine      SUBJECTIVE:     History of Present Illness:  Patient seen and examined extubated on nasal cannula with daughter Adenike at bedside. She was extubated this morning due to air leak and is doing well thus far. Adenike was pleasantly surprised to hear she was extubated and is enjoying this time with her mom right now. She is able to articulate that she might decline and if so would not want her reintubated. I then asked about long term plans if she improves vs not improve from this point. She is not interested in NH placement and would likely not pursue G tube unless it was clear she would make a near full recovery. She is hesitant to talk about the future and wants to stay in the present however it does make current management a bit complicated not understanding our overall goal. For now she seems content with the plan to monitor her for decline post extubation then reassess her mental status each day. She knows she cannot care for her at home and does not want a NH so it seems her most optimistic goal is to return to her previous baseline and be able to care for herself without supportive devices. She is open to continued PM follow up and we will check in tomorrow. 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 Lack of enteral access will expedite the conversation.      Past Medical History:   Diagnosis Date    Acute CHF (congestive heart failure) 1/17/2021    Hyperlipemia     Hypertension     Parkinson's disease     Stroke 2016    Throat mass      Past Surgical History:   Procedure Laterality Date    CLOSED REDUCTION Right 10/15/2020    Procedure: CLOSED REDUCTION;  Surgeon: Humberto Valdivia DO;  Location: [facility] OR;  Service: Orthopedics;  Laterality: Right;  ATTEMPTED    EVACUATION OF HEMATOMA Right 10/17/2020    Procedure: EVACUATION, HEMATOMA;  Surgeon: Humberto Valdivia DO;  Location: [facility] OR;  Service: Orthopedics;  Laterality:  Right;    HEMIARTHROPLASTY OF HIP Left 7/12/2020    Procedure: HEMIARTHROPLASTY, HIP;  Surgeon: Humberto Valdivia DO;  Location: [facility] OR;  Service: Orthopedics;  Laterality: Left;    HEMIARTHROPLASTY OF HIP Right 10/2/2020    Procedure: HEMIARTHROPLASTY, HIP;  Surgeon: Loyd Kearney MD;  Location: [facility] OR;  Service: Orthopedics;  Laterality: Right;    HYSTERECTOMY      OPEN REDUCTION OF DISLOCATION OF HIP Right 10/17/2020    Procedure: OPEN REDUCTION, DISLOCATION, HIP;  Surgeon: Humberto Valdivia DO;  Location: [facility] OR;  Service: Orthopedics;  Laterality: Right;    THROAT SURGERY      TONSILLECTOMY       No family history on file.  Review of patient's allergies indicates:   Allergen Reactions    Xanax [alprazolam]        Medications:    Current Facility-Administered Medications:     acetaminophen oral solution 650 mg, 650 mg, Per OG tube, Q4H PRN, Sj Juan MD    albuterol-ipratropium 2.5 mg-0.5 mg/3 mL nebulizer solution 3 mL, 3 mL, Nebulization, Q6H WAKE, Georgia Cruz MD    aspirin chewable tablet 81 mg, 81 mg, Per OG tube, Daily, Sj Juan MD, 81 mg at 03/17/22 0830    carvediloL tablet 6.25 mg, 6.25 mg, Per NG tube, BID, Georgia Cruz MD, 6.25 mg at 03/17/22 1123    cefTRIAXone (ROCEPHIN) 2 g/50 mL D5W IVPB, 2 g, Intravenous, Q24H, Georgia Cruz MD, Stopped at 03/17/22 0232    chlorhexidine 0.12 % solution 15 mL, 15 mL, Mouth/Throat, BID, Donavan Rangel MD, 15 mL at 03/17/22 0830    clopidogreL tablet 75 mg, 75 mg, Per OG tube, Daily, Sj Juan MD, 75 mg at 03/17/22 0830    dexamethasone injection 4 mg, 4 mg, Intravenous, Q6H, Georgia Cruz MD, 4 mg at 03/17/22 1123    dextrose 10% bolus 125 mL, 12.5 g, Intravenous, PRN, Abhijit Fowler MD    dextrose 10% bolus 250 mL, 25 g, Intravenous, PRN, Abhijit Fowler MD    diphenhydrAMINE injection 25 mg, 25 mg, Intravenous, Q6H, Georgia Cruz MD, 25 mg at 03/17/22 1123    DOBUtamine  1000 mg in D5W 250 mL infusion, 2.5 mcg/kg/min, Intravenous, Continuous, JOSUE Brenner-BC, Last Rate: 2 mL/hr at 03/17/22 1100, 2.5 mcg/kg/min at 03/17/22 1100    famotidine (PF) injection 20 mg, 20 mg, Intravenous, Daily, Donavan Rangel MD, 20 mg at 03/17/22 0830    glucagon (human recombinant) injection 1 mg, 1 mg, Intramuscular, PRN, Abhijit Fowler MD    glucose chewable tablet 16 g, 16 g, Per OG tube, PRN, Sj Juan MD    glucose chewable tablet 24 g, 24 g, Per OG tube, PRN, Sj Juan MD    heparin (porcine) injection 5,000 Units, 5,000 Units, Subcutaneous, Q8H, Donavan Rangel MD, 5,000 Units at 03/17/22 0614    hydrALAZINE injection 10 mg, 10 mg, Intravenous, Q6H PRN, JOSUE Brenner-BC, 10 mg at 03/17/22 0932    HYDROcodone-acetaminophen 5-325 mg per tablet 1 tablet, 1 tablet, Per OG tube, Q6H PRN, Sj Juan MD    melatonin oral solution 6 mg, 6 mg, Per OG tube, Nightly PRN, Sj Juan MD    naloxone 0.4 mg/mL injection 0.02 mg, 0.02 mg, Intravenous, PRN, Abhijit Fowler MD    pneumoc 13-lopez conj-dip cr(PF) (PREVNAR 13 (PF)) 0.5 mL, 0.5 mL, Intramuscular, vaccine x 1 dose, Sj Juan MD    polyethylene glycol packet 17 g, 17 g, Per NG tube, Daily PRN, Sj Juan MD    sodium chloride 0.9% flush 10 mL, 10 mL, Intravenous, Q8H, Abhijit Fowler MD, 10 mL at 03/17/22 0613    Review of Symptoms    Symptom Assessment (ESAS 0-10 Scale)  Pain:  0  Dyspnea:  0  Anxiety:  0  Nausea:  0  Depression:  0  Anorexia:  0  Fatigue:  0  Insomnia:  0  Restlessness:  0  Agitation:  0  Unable to complete assessment         Karnofsky Performance Scale:  30%      Advance Care Planning   Advance Directives:   [place]POST: No    Do Not Resuscitate Status: Yes    Medical Power of : No      Decision Making:  Family answered questions and Patient unable to communicate due to disease severity/cognitive  impairment         ROS:  Review of Systems   Unable to perform ROS: Mental status change       OBJECTIVE:     Physical Exam:  Vitals: Temp: 98.4 °F (36.9 °C) (03/17/22 0715)  Pulse: 85 (03/17/22 1130)  Resp: 19 (03/17/22 1130)  BP: (!) 198/70 (03/17/22 1123)  SpO2: 100 % (03/17/22 1130)    Physical Exam  Vitals reviewed.   Constitutional:       General: She is awake.      Appearance: She is well-groomed. She is ill-appearing.      Interventions: Nasal cannula in place.   HENT:      Head: Atraumatic.   Cardiovascular:      Rate and Rhythm: Normal rate. Rhythm irregular.   Pulmonary:      Effort: Pulmonary effort is normal.      Breath sounds: Normal breath sounds.   Abdominal:      General: Abdomen is flat. There is no distension.   Genitourinary:     Comments: Indwelling catheter  Musculoskeletal:      Right lower leg: Edema present.      Left lower leg: Edema present.   Skin:     General: Skin is warm and dry.   Neurological:      Mental Status: She is unresponsive.      Comments: Does not follow commands, moving head spontaneously but without purpose, pulls against restraint with LUE   Psychiatric:         Behavior: Behavior is uncooperative.      Comments: Unable to assess due to cognition         Labs:  WBC   Date Value Ref Range Status   03/17/2022 6.54 3.90 - 12.70 K/uL Final     Hemoglobin   Date Value Ref Range Status   03/17/2022 10.3 (L) 12.0 - 16.0 g/dL Final     Hematocrit   Date Value Ref Range Status   03/17/2022 32.4 (L) 37.0 - 48.5 % Final     MCV   Date Value Ref Range Status   03/17/2022 93 82 - 98 fL Final     Platelets   Date Value Ref Range Status   03/17/2022 260 150 - 450 K/uL Final       BMP  Lab Results   Component Value Date     (H) 03/17/2022    K 4.6 03/17/2022     03/17/2022    CO2 25 03/17/2022    BUN 62 (H) 03/17/2022    CREATININE 3.7 (H) 03/17/2022    CALCIUM 9.0 03/17/2022    ANIONGAP 12 03/17/2022    ESTGFRAFRICA 12 (A) 03/17/2022    EGFRNONAA 11 (A) 03/17/2022        Lab Results   Component Value Date    AST 40 03/17/2022    ALKPHOS 109 03/17/2022    BILITOT 0.3 03/17/2022       Albumin   Date Value Ref Range Status   03/17/2022 2.9 (L) 3.5 - 5.2 g/dL Final       Radiology:I have reviewed all pertinent imaging results/findings within the past 24 hours.  Chest Xray 03/15/2022  Impression:   Echogenic kidneys with poor corticomedullary differentiation could reflect medical renal disease.        Ultrasound retroperitoneal 03/15/2022  Impression:   Echogenic kidneys with poor corticomedullary differentiation could reflect medical renal disease.    ASSESSMENT   Tanya Madrid is a 82 y.o. 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  She presented to hospital after sustained witnessed cardiac arrest.  Admitted to ICU on mechanical ventilation; cooling for targeted temperature management for coma post arrest initiated.  We were consulted to assist with goals of care in elderly patient critically ill in ICU with unknown down time.      PLAN   1.  Encounter for Palliative Care        -DNR/ I- confirmed today        -Continue current management and await neurologic recovery.        -Family/Emotional Support     2.   ? Anoxic encephaloapthy              -s/p cardiac arrest              -poorly responsive; does not follow commands              -daughter asking for more time                 3.   Acute on chronic CHF              -not a candidate for invasive workup               -EF 10%              -dobutamine gtt              -overall poor prognosis     4.   Parkinson's disease/Frailty              -supportive care     5.   Sphenoid mass, h/o papillomas              -ENT eval   -Unclear whether NG access is an option    Thank you for allowing Palliative Medicine to be involved in the care of Tanya Madrid.         Medical decision making: HIGH based on high  risk of death untreated symptoms poor prognosis management of more than one chronic illness in exacerbation or progression of disease    > 50% of 35 coordination of care and emotional support, formulating and communicating prognosis and goals of care, exploring burden/ benefit of various approaches of treatment.       Batool Copeland PA-C  Palliative Medicine

## 2022-03-17 NOTE — PLAN OF CARE
Pt remains intubated and more awake. She opens eyes spontaneously but does not track. Moves extremities x4 but nothing purposeful, just withdraws to pain. Cuff leak still very prominent with a lot of gurgling. Oral care and suctioning performed q4 and PRN with tan thick secretions. Pt was turned q2 hours. Flexiseal and Abreu remain patent. POC reviewed with family and all safety measures observed.

## 2022-03-17 NOTE — SUBJECTIVE & OBJECTIVE
Interval History:     Extubated today per CC team.     Review of Systems   Unable to perform ROS: Mental status change   Objective:     Vital Signs (Most Recent):  Temp: 97.7 °F (36.5 °C) (03/17/22 1145)  Pulse: 81 (03/17/22 1333)  Resp: 18 (03/17/22 1333)  BP: (!) 181/97 (03/17/22 1300)  SpO2: 100 % (03/17/22 1333)   Vital Signs (24h Range):  Temp:  [97.7 °F (36.5 °C)-99.5 °F (37.5 °C)] 97.7 °F (36.5 °C)  Pulse:  [59-93] 81  Resp:  [12-28] 18  SpO2:  [100 %] 100 %  BP: (122-198)/(58-97) 181/97  Arterial Line BP: (114-205)/(48-88) 200/78     Weight: 54.3 kg (119 lb 11.4 oz)  Body mass index is 21.9 kg/m².    Intake/Output Summary (Last 24 hours) at 3/17/2022 1612  Last data filed at 3/17/2022 1400  Gross per 24 hour   Intake 1262.58 ml   Output 906 ml   Net 356.58 ml      Physical Exam  Constitutional:       Comments: Extubated . Off sedation, no verbal response , withdraws to pain stimulus    HENT:      Head: Normocephalic and atraumatic.   Eyes:      Pupils: Pupils are equal, round, and reactive to light.      Comments: (+) corneal reflex    Cardiovascular:      Rate and Rhythm: Normal rate.      Heart sounds: Normal heart sounds.   Pulmonary:      Comments: Breath sounds are bronchial  Abdominal:      General: Bowel sounds are normal.   Genitourinary:     Comments: Abreu catheter in place  Musculoskeletal:      Cervical back: Neck supple.      Right lower leg: No edema.      Left lower leg: No edema.   Neurological:      Comments: Off sedation x 2 days . Comatose        Significant Labs: All pertinent labs within the past 24 hours have been reviewed.  BMP:   Recent Labs   Lab 03/17/22  0451   *   *   K 4.6      CO2 25   BUN 62*   CREATININE 3.7*   CALCIUM 9.0   MG 2.5     CBC:   Recent Labs   Lab 03/16/22  0334 03/17/22  0451   WBC 7.73 6.54   HGB 9.5* 10.3*   HCT 29.5* 32.4*    260     CMP:   Recent Labs   Lab 03/16/22  0334 03/17/22  0451    146*   K 3.3* 4.6    109   CO2  25 25    271*   BUN 59* 62*   CREATININE 3.7* 3.7*   CALCIUM 9.0 9.0   PROT  --  6.1   ALBUMIN  --  2.9*   BILITOT  --  0.3   ALKPHOS  --  109   AST  --  40   ALT  --  29   ANIONGAP 13 12   EGFRNONAA 11* 11*       Significant Imaging:

## 2022-03-17 NOTE — PROGRESS NOTES
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 CPR initiated after call to EMS. ROSC obtained by EMS (downtime not reported but required one shock and arrived to ED ~ one hour after EMS called)  Intubated in ED  Eval revealed leukocytosis, hypokalemia 2.9, compensated anion gap acidosis, BNP > 4900, troponin 0.041, lactate 3.6, procalcitonin 2.08  Admitted to ICU on mechanical ventilation; cooling for targeted temperature management for coma post arrest initiated       Subjective:     Interval History:   3/15: chart reviewed. On vent. Off all sedation for 2 days. Open eyes, not f/u commands. On minimal vent setting. BP stable. No fever.  3/16:  Appears to be more alert.  Open eyes and tracking.  Not following commands.  Events noted yesterday with tachycardia, to tachypnea and increased P pressure when biting the tube. Precedex drip started for comfort. No Fever. BP stable.   3/17:  Patient is minimal settings on the vent.  Tolerating SBT, but his poor mental status.  ETT with cuff leak. Plan to extubate.     Objective:     Vital Signs (Most Recent):  Temp: 98.4 °F (36.9 °C) (03/17/22 0715)  Pulse: 88 (03/17/22 0730)  Resp: (!) 26 (03/17/22 0730)  BP: (!) 155/85 (03/17/22 0700)  SpO2: 100 % (03/17/22 0730) Vital Signs (24h Range):  Temp:  [98.2 °F (36.8 °C)-99.5 °F (37.5 °C)] 98.4 °F (36.9 °C)  Pulse:  [59-93] 88  Resp:  [12-30] 26  SpO2:  [100  %] 100 %  BP: (113-155)/(58-85) 155/85  Arterial Line BP: (114-187)/(48-80) 149/63     Weight: 54.3 kg (119 lb 11.4 oz)  Body mass index is 21.9 kg/m².      Intake/Output Summary (Last 24 hours) at 3/17/2022 0816  Last data filed at 3/17/2022 0700  Gross per 24 hour   Intake 1232.54 ml   Output 935 ml   Net 297.54 ml       Physical Exam  Vitals and nursing note reviewed.   Constitutional:       General: She is in acute distress.      Appearance: She is ill-appearing.      Comments: On vent. Not sedated. Open eyes. Not f/u and commands.    HENT:      Head: Atraumatic.      Nose: Nose normal.      Mouth/Throat:      Mouth: Mucous membranes are moist.      Comments: Oral intubated  Eyes:      General: No scleral icterus.     Extraocular Movements: Extraocular movements intact.      Pupils: Pupils are equal, round, and reactive to light.   Cardiovascular:      Rate and Rhythm: Normal rate and regular rhythm.      Pulses: Normal pulses.      Heart sounds: No murmur heard.  Pulmonary:      Effort: Respiratory distress present.      Breath sounds: Rhonchi and rales present.      Comments: On vent.   Abdominal:      General: There is no distension.      Palpations: Abdomen is soft.   Musculoskeletal:         General: Swelling (trace on b/l legs) present. No deformity.      Cervical back: Neck supple. No rigidity.   Skin:     General: Skin is warm and dry.      Capillary Refill: Capillary refill takes less than 2 seconds.      Coloration: Skin is not jaundiced.   Neurological:      Comments: Opens eyes, tracking, not followup commands.  Responds to pain         Vents:  Vent Mode: A/C (03/17/22 0714)  Set Rate: 10 BPM (03/17/22 0714)  Vt Set: 350 mL (03/17/22 0714)  Pressure Support: 0 cmH20 (03/17/22 0714)  PEEP/CPAP: 5 cmH20 (03/17/22 0714)  Oxygen Concentration (%): 30 (03/17/22 0730)  Peak Airway Pressure: 15 cmH2O (03/17/22 0714)  Plateau Pressure: 10 cmH20 (03/17/22 0714)  Total Ve: 6.99 mL (03/17/22 0714)  F/VT  Ratio<105 (RSBI): (!) 42.55 (03/17/22 0714)    Lines/Drains/Airways     Central Venous Catheter Line  Duration           Percutaneous Central Line Insertion/Assessment - Triple Lumen  03/10/22 2215 right subclavian 6 days          Drain  Duration                NG/OG Tube 03/10/22 1957 16 Fr. Center mouth 6 days         Urethral Catheter 03/10/22 2032 Non-latex 16 Fr. 6 days         Fecal Incontinence  03/15/22 1604 1 day          Airway  Duration                Airway 6 days         Airway - Non-Surgical 03/10/22 1945 Endotracheal Tube 6 days          Arterial Line  Duration           Arterial Line 03/11/22 0310 Right Radial 6 days                Significant Labs:    CBC/Anemia Profile:  Recent Labs   Lab 03/16/22 0334 03/17/22  0451   WBC 7.73 6.54   HGB 9.5* 10.3*   HCT 29.5* 32.4*    260   MCV 91 93   RDW 15.8* 15.9*        Chemistries:  Recent Labs   Lab 03/16/22 0334 03/17/22  0451    146*   K 3.3* 4.6    109   CO2 25 25   BUN 59* 62*   CREATININE 3.7* 3.7*   CALCIUM 9.0 9.0   ALBUMIN  --  2.9*   PROT  --  6.1   BILITOT  --  0.3   ALKPHOS  --  109   ALT  --  29   AST  --  40   MG 2.5 2.5   PHOS 4.9* 4.7*     Recent Labs     03/17/22  0423   PH 7.434   PCO2 40.9   PO2 133*   HCO3 27.4   POCSATURATED 99   BE 3     Blood cx: 3/10: STREPTOCOCCUS ANGINOSUS         3/15: NGTD    Significant Imaging:  CXR 3/15. Film reviewed: Tubes in good position.  Cardiomegaly.  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] 03/11/2022 Yes    Aspiration pneumonia [J69.0] 03/11/2022 Yes    Hypokalemia [E87.6] 03/11/2022 Yes    On mechanically assisted  ventilation [Z99.11] 03/11/2022 Not Applicable    Anoxic encephalopathy [G93.1] 03/11/2022 Yes    Elevated troponin [R77.8] 04/09/2021 Yes    Acute on chronic combined systolic and diastolic CHF (congestive heart failure) [I50.43] 01/17/2021 Yes    Essential hypertension [I10] 07/24/2019 Yes     Chronic    History of CVA (cerebrovascular accident) [Z86.73] 07/24/2019 Not Applicable     Chronic    Parkinson disease [G20] 07/11/2017 Yes     Chronic    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 ABG reviewed  -on SBT  -Pt with cuff leak, will require tube exchange if mechanical ventilation to be continued.  However,  patient mental status has improving, on minimal ventilator settings.  Tolerated SBT.  We will extubate.  Add aggressive pulmonary toilet.      Acute anoxic encephalopathy  -status post cardiac arrest with prolonged downtime  -s/p TTM  -[facility] admission unremarkable  -mental status slowly improving  -d/c precedex   -Close neuro check    S/p out of hospital Cardiac arrest  -likely due to cardiac event  -status post TTM    Troponin elevation  -Peak trop 0.438.   -likely secondary to demand ischemia  -cardiology consulted  -cont ASA/plavix    Acute on chronic combined systolic and diastolic CHF (congestive heart failure)  -Decompensated, BNP chronically elevated  -On inotropic support with dobutamine drip  -Echo shows worsening EF of 10%, Bi-V failure  -cardiology follow-up    CODY   -Monitor I&O. Pharmacy to assist with renal dosing medications.  -Avoid hypotension and nephrotoxins.  -Monitor electrolytes and replete per protocol.  -Renal US ruled out hydronephrosis    Streptococcal bacteremia  -On IV Rocephin   -source of infection  unclear.   -No evidence of endocarditis on 2D echo.  Will have to discuss with family goal of care /comfort measures prior to deciding on pursuing MACHO.    Mass of sphenoid sinus  Noted on CT head post cardiac arrest  ?relationship to recurrent laryngeal papillomas  ENT consulted  Anticipate work up delay until neuro recovery/prognosis post cardiac arrest resulting in comatose state    Laryngeal papillomatosis  Recurrent since childhood with MANY surgical interventions  Followed by Dr Harrison, last rigid laryngoscopy on 10/26/2021 per EMR  Unclear if choking/airway compromise played role in cardiac arrest at dinner      The patient is critical ill, requires bedside assistance and high complexity decision making for assessment and support as well as: hemodynamic assessment and monitoring, respiratory monitoring and treatment, neurologic monitoring and treatment and assessment and treatment of metabolic derangement.    Critical Care Time: total time spent on behalf of this patient was 35 minutes. Included discussion with RN, bedside evaluation of the patient, review of the electronic medical record/labs/x-rays and medical decision making. No procedures were performed during this time.    Plan of care discussed with Staff Nurse.    I personally viewed and interpreted the patient'slabs, ECG, X-Ray, new clinical lab test results and new imaging test results.    Patient is DNR.  No re-intubation.  Appreciate palliative input     Georgia Cruz MD  Pulmonology  [facility] ([facility])

## 2022-03-17 NOTE — NURSING
Pt turned with incontinence care provided. During clean up, pt's ET tube apeeared to slide out and in freely. RT called to bedside, order for CXR obtained from critical care MD for placement verification. MD and RT at bedside. Declared that tube exchange was needed. Pt had been successfully on SBT for one hour this am. Per MD, RT to extubate pt to NC.   Pt extubated to nasal cannula, O2 sats stable. No acute distress noted.   Updated pt's daughter Adenike via phone of this am's events.

## 2022-03-18 LAB
ANION GAP SERPL CALC-SCNC: 10 MMOL/L (ref 8–16)
BASOPHILS # BLD AUTO: 0.01 K/UL (ref 0–0.2)
BASOPHILS NFR BLD: 0.1 % (ref 0–1.9)
BUN SERPL-MCNC: 72 MG/DL (ref 8–23)
CALCIUM SERPL-MCNC: 9 MG/DL (ref 8.7–10.5)
CHLORIDE SERPL-SCNC: 111 MMOL/L (ref 95–110)
CO2 SERPL-SCNC: 26 MMOL/L (ref 23–29)
CREAT SERPL-MCNC: 3.2 MG/DL (ref 0.5–1.4)
DIFFERENTIAL METHOD: ABNORMAL
EOSINOPHIL # BLD AUTO: 0 K/UL (ref 0–0.5)
EOSINOPHIL NFR BLD: 0 % (ref 0–8)
ERYTHROCYTE [DISTWIDTH] IN BLOOD BY AUTOMATED COUNT: 16 % (ref 11.5–14.5)
EST. GFR  (AFRICAN AMERICAN): 15 ML/MIN/1.73 M^2
EST. GFR  (NON AFRICAN AMERICAN): 13 ML/MIN/1.73 M^2
GLUCOSE SERPL-MCNC: 226 MG/DL (ref 70–110)
HCT VFR BLD AUTO: 33.6 % (ref 37–48.5)
HGB BLD-MCNC: 10.6 G/DL (ref 12–16)
IMM GRANULOCYTES # BLD AUTO: 0.21 K/UL (ref 0–0.04)
IMM GRANULOCYTES NFR BLD AUTO: 1.4 % (ref 0–0.5)
LYMPHOCYTES # BLD AUTO: 0.7 K/UL (ref 1–4.8)
LYMPHOCYTES NFR BLD: 4.7 % (ref 18–48)
MAGNESIUM SERPL-MCNC: 2.3 MG/DL (ref 1.6–2.6)
MCH RBC QN AUTO: 28.8 PG (ref 27–31)
MCHC RBC AUTO-ENTMCNC: 31.5 G/DL (ref 32–36)
MCV RBC AUTO: 91 FL (ref 82–98)
MONOCYTES # BLD AUTO: 0.9 K/UL (ref 0.3–1)
MONOCYTES NFR BLD: 5.9 % (ref 4–15)
NEUTROPHILS # BLD AUTO: 13.5 K/UL (ref 1.8–7.7)
NEUTROPHILS NFR BLD: 87.9 % (ref 38–73)
NRBC BLD-RTO: 0 /100 WBC
PHOSPHATE SERPL-MCNC: 3.4 MG/DL (ref 2.7–4.5)
PLATELET # BLD AUTO: 311 K/UL (ref 150–450)
PMV BLD AUTO: 11.5 FL (ref 9.2–12.9)
POCT GLUCOSE: 105 MG/DL (ref 70–110)
POCT GLUCOSE: 131 MG/DL (ref 70–110)
POCT GLUCOSE: 186 MG/DL (ref 70–110)
POCT GLUCOSE: 196 MG/DL (ref 70–110)
POCT GLUCOSE: 201 MG/DL (ref 70–110)
POCT GLUCOSE: 222 MG/DL (ref 70–110)
POTASSIUM SERPL-SCNC: 4.3 MMOL/L (ref 3.5–5.1)
PROCALCITONIN SERPL IA-MCNC: 1.43 NG/ML
RBC # BLD AUTO: 3.68 M/UL (ref 4–5.4)
SODIUM SERPL-SCNC: 147 MMOL/L (ref 136–145)
WBC # BLD AUTO: 15.29 K/UL (ref 3.9–12.7)

## 2022-03-18 PROCEDURE — 85025 COMPLETE CBC W/AUTO DIFF WBC: CPT | Performed by: INTERNAL MEDICINE

## 2022-03-18 PROCEDURE — 94761 N-INVAS EAR/PLS OXIMETRY MLT: CPT

## 2022-03-18 PROCEDURE — 80048 BASIC METABOLIC PNL TOTAL CA: CPT | Performed by: INTERNAL MEDICINE

## 2022-03-18 PROCEDURE — 25000003 PHARM REV CODE 250: Performed by: INTERNAL MEDICINE

## 2022-03-18 PROCEDURE — A4216 STERILE WATER/SALINE, 10 ML: HCPCS | Performed by: INTERNAL MEDICINE

## 2022-03-18 PROCEDURE — 63600175 PHARM REV CODE 636 W HCPCS: Performed by: INTERNAL MEDICINE

## 2022-03-18 PROCEDURE — 94668 MNPJ CHEST WALL SBSQ: CPT

## 2022-03-18 PROCEDURE — 20000000 HC ICU ROOM

## 2022-03-18 PROCEDURE — 99233 SBSQ HOSP IP/OBS HIGH 50: CPT | Mod: ,,, | Performed by: INTERNAL MEDICINE

## 2022-03-18 PROCEDURE — 84145 PROCALCITONIN (PCT): CPT | Performed by: INTERNAL MEDICINE

## 2022-03-18 PROCEDURE — 83735 ASSAY OF MAGNESIUM: CPT | Performed by: INTERNAL MEDICINE

## 2022-03-18 PROCEDURE — 99900026 HC AIRWAY MAINTENANCE (STAT)

## 2022-03-18 PROCEDURE — 25000242 PHARM REV CODE 250 ALT 637 W/ HCPCS: Performed by: INTERNAL MEDICINE

## 2022-03-18 PROCEDURE — 94640 AIRWAY INHALATION TREATMENT: CPT

## 2022-03-18 PROCEDURE — C9399 UNCLASSIFIED DRUGS OR BIOLOG: HCPCS | Performed by: INTERNAL MEDICINE

## 2022-03-18 PROCEDURE — 84100 ASSAY OF PHOSPHORUS: CPT | Performed by: INTERNAL MEDICINE

## 2022-03-18 PROCEDURE — 99233 PR SUBSEQUENT HOSPITAL CARE,LEVL III: ICD-10-PCS | Mod: ,,, | Performed by: INTERNAL MEDICINE

## 2022-03-18 PROCEDURE — 27000221 HC OXYGEN, UP TO 24 HOURS

## 2022-03-18 RX ORDER — ACETYLCYSTEINE 100 MG/ML
2 SOLUTION ORAL; RESPIRATORY (INHALATION) EVERY 12 HOURS
Status: DISCONTINUED | OUTPATIENT
Start: 2022-03-18 | End: 2022-03-29 | Stop reason: HOSPADM

## 2022-03-18 RX ORDER — DIPHENHYDRAMINE HYDROCHLORIDE 50 MG/ML
25 INJECTION INTRAMUSCULAR; INTRAVENOUS EVERY 12 HOURS
Status: DISCONTINUED | OUTPATIENT
Start: 2022-03-18 | End: 2022-03-21

## 2022-03-18 RX ORDER — CARVEDILOL 12.5 MG/1
12.5 TABLET ORAL 2 TIMES DAILY
Status: DISCONTINUED | OUTPATIENT
Start: 2022-03-18 | End: 2022-03-25

## 2022-03-18 RX ADMIN — AMLODIPINE BESYLATE 10 MG: 10 TABLET ORAL at 08:03

## 2022-03-18 RX ADMIN — INSULIN ASPART 4 UNITS: 100 INJECTION, SOLUTION INTRAVENOUS; SUBCUTANEOUS at 06:03

## 2022-03-18 RX ADMIN — INSULIN ASPART 2 UNITS: 100 INJECTION, SOLUTION INTRAVENOUS; SUBCUTANEOUS at 02:03

## 2022-03-18 RX ADMIN — DIPHENHYDRAMINE HYDROCHLORIDE 25 MG: 50 INJECTION INTRAMUSCULAR; INTRAVENOUS at 09:03

## 2022-03-18 RX ADMIN — FAMOTIDINE 20 MG: 10 INJECTION INTRAVENOUS at 08:03

## 2022-03-18 RX ADMIN — Medication 10 ML: at 09:03

## 2022-03-18 RX ADMIN — Medication 10 ML: at 06:03

## 2022-03-18 RX ADMIN — DEXAMETHASONE SODIUM PHOSPHATE 4 MG: 4 INJECTION INTRA-ARTICULAR; INTRALESIONAL; INTRAMUSCULAR; INTRAVENOUS; SOFT TISSUE at 08:03

## 2022-03-18 RX ADMIN — ASPIRIN 81 MG CHEWABLE TABLET 81 MG: 81 TABLET CHEWABLE at 08:03

## 2022-03-18 RX ADMIN — INSULIN DETEMIR 8 UNITS: 100 INJECTION, SOLUTION SUBCUTANEOUS at 09:03

## 2022-03-18 RX ADMIN — DIPHENHYDRAMINE HYDROCHLORIDE 25 MG: 50 INJECTION, SOLUTION INTRAMUSCULAR; INTRAVENOUS at 06:03

## 2022-03-18 RX ADMIN — DEXAMETHASONE SODIUM PHOSPHATE 4 MG: 4 INJECTION INTRA-ARTICULAR; INTRALESIONAL; INTRAMUSCULAR; INTRAVENOUS; SOFT TISSUE at 09:03

## 2022-03-18 RX ADMIN — CARVEDILOL 6.25 MG: 6.25 TABLET, FILM COATED ORAL at 08:03

## 2022-03-18 RX ADMIN — HEPARIN SODIUM 5000 UNITS: 5000 INJECTION INTRAVENOUS; SUBCUTANEOUS at 09:03

## 2022-03-18 RX ADMIN — IPRATROPIUM BROMIDE AND ALBUTEROL SULFATE 3 ML: 2.5; .5 SOLUTION RESPIRATORY (INHALATION) at 07:03

## 2022-03-18 RX ADMIN — INSULIN DETEMIR 10 UNITS: 100 INJECTION, SOLUTION SUBCUTANEOUS at 09:03

## 2022-03-18 RX ADMIN — INSULIN ASPART 2 UNITS: 100 INJECTION, SOLUTION INTRAVENOUS; SUBCUTANEOUS at 09:03

## 2022-03-18 RX ADMIN — Medication 10 ML: at 02:03

## 2022-03-18 RX ADMIN — IPRATROPIUM BROMIDE AND ALBUTEROL SULFATE 3 ML: 2.5; .5 SOLUTION RESPIRATORY (INHALATION) at 08:03

## 2022-03-18 RX ADMIN — IPRATROPIUM BROMIDE AND ALBUTEROL SULFATE 3 ML: 2.5; .5 SOLUTION RESPIRATORY (INHALATION) at 01:03

## 2022-03-18 RX ADMIN — HEPARIN SODIUM 5000 UNITS: 5000 INJECTION INTRAVENOUS; SUBCUTANEOUS at 06:03

## 2022-03-18 RX ADMIN — CARVEDILOL 12.5 MG: 12.5 TABLET, FILM COATED ORAL at 09:03

## 2022-03-18 RX ADMIN — CLOPIDOGREL 75 MG: 75 TABLET, FILM COATED ORAL at 08:03

## 2022-03-18 RX ADMIN — HEPARIN SODIUM 5000 UNITS: 5000 INJECTION INTRAVENOUS; SUBCUTANEOUS at 02:03

## 2022-03-18 RX ADMIN — ACETYLCYSTEINE 2 ML: 100 INHALANT RESPIRATORY (INHALATION) at 08:03

## 2022-03-18 RX ADMIN — CEFTRIAXONE 2 G: 2 INJECTION, SOLUTION INTRAVENOUS at 01:03

## 2022-03-18 RX ADMIN — DIPHENHYDRAMINE HYDROCHLORIDE 25 MG: 50 INJECTION, SOLUTION INTRAMUSCULAR; INTRAVENOUS at 12:03

## 2022-03-18 NOTE — ASSESSMENT & PLAN NOTE
Initial CXR on admit suggestive of right-sided pulmonary opacities possibly infection, aspiration, or edema.  Antibiotic include Rocephin initiated   Blood cx from 3/10/22 (+) for ALPHA HEMOLYTIC STREP  Tracheal aspirate - neg   Repeat blood cultures from 3/15/22- NGTD

## 2022-03-18 NOTE — SUBJECTIVE & OBJECTIVE
Interval History:  No verbal response, does not follow commands, eyes are open, intermittently seems pt tracks voice other time not. Withdraws to pain stimulus. Weak cough , requiring frequent suctioning to clear airway. Family declined Palliative Medicine follow up. Will get follow up CT head today.       Review of Systems   Unable to perform ROS: Mental status change   Objective:     Vital Signs (Most Recent):  Temp: 98.2 °F (36.8 °C) (03/18/22 1200)  Pulse: 83 (03/18/22 1200)  Resp: 20 (03/18/22 1200)  BP: 131/68 (03/18/22 1200)  SpO2: 100 % (03/18/22 1200) Vital Signs (24h Range):  Temp:  [97.6 °F (36.4 °C)-98.4 °F (36.9 °C)] 98.2 °F (36.8 °C)  Pulse:  [71-93] 83  Resp:  [11-23] 20  SpO2:  [99 %-100 %] 100 %  BP: (109-183)/(53-97) 131/68  Arterial Line BP: (113-201)/(51-83) 182/64     Weight: 51.2 kg (112 lb 14 oz)  Body mass index is 20.65 kg/m².    Intake/Output Summary (Last 24 hours) at 3/18/2022 1242  Last data filed at 3/18/2022 1200  Gross per 24 hour   Intake 1531.86 ml   Output 1205 ml   Net 326.86 ml      Physical Exam  Constitutional:       Comments: Extubated . Off sedation, no verbal response , does not follow commands, withdraws to pain stimulus    HENT:      Head: Normocephalic and atraumatic.   Eyes:      Pupils: Pupils are equal, round, and reactive to light.      Comments: (+) corneal reflex    Cardiovascular:      Rate and Rhythm: Normal rate.      Heart sounds: Normal heart sounds.   Pulmonary:      Breath sounds: Rales present.      Comments: Breath sounds are coarse and bronchial  Abdominal:      General: Bowel sounds are normal.   Genitourinary:     Comments: Abreu catheter in place  Musculoskeletal:      Cervical back: Neck supple.      Right lower leg: No edema.      Left lower leg: No edema.   Neurological:      Comments: Off sedation, Eyes are open, seems awake and occasionally tracks voice , other time not.        Significant Labs: All pertinent labs within the past 24 hours have been  reviewed.  BMP:   Recent Labs   Lab 03/18/22 0446   *   *   K 4.3   *   CO2 26   BUN 72*   CREATININE 3.2*   CALCIUM 9.0   MG 2.3     CBC:   Recent Labs   Lab 03/17/22 0451 03/18/22 0446   WBC 6.54 15.29*   HGB 10.3* 10.6*   HCT 32.4* 33.6*    311     CMP:   Recent Labs   Lab 03/17/22 0451 03/18/22 0446   * 147*   K 4.6 4.3    111*   CO2 25 26   * 226*   BUN 62* 72*   CREATININE 3.7* 3.2*   CALCIUM 9.0 9.0   PROT 6.1  --    ALBUMIN 2.9*  --    BILITOT 0.3  --    ALKPHOS 109  --    AST 40  --    ALT 29  --    ANIONGAP 12 10   EGFRNONAA 11* 13*       Significant Imaging:

## 2022-03-18 NOTE — PROGRESS NOTES
"[facility] ([facility])  [facility] Medicine  Progress Note    Patient Name: Tanya Madrid  MRN: [id number]  Patient Class: IP- Inpatient   Admission Date: 3/10/2022  Length of Stay: 8 days  Attending Physician: Tim Reilly MD  Primary Care Provider: Maggie Sue NP        Subjective:     Principal Problem:Cardiac arrest        HPI:  History was taken from the electronic chart and from the daughter -  Adenike Lemus Daughter [phone]        82 y.o. female patient with a PMHx of HTN,  CHF-last EF-01/2021-25%, with diastolic dysfunction  , and HLD who presents to the Emergency Department for evaluation of cardiac arrest which onset suddenly 1 hour pta.  Daughter reports that over the last 3 days -she has been complaining of worsening dyspnoea  and was given extra lasix tablets. She usually takes 20mg lasix daily and was given 2 extra tablets with the episodes of  dyspnea.  This evening while having dinner, she "looked blank" and stopped breathing . Daughter called EMS and was advised to start CPR.. Pt achieved ROSC en route. After I shock . Prior Tx includes 50 mcg of fentanyl and 50 mg of ketamine from EMS.   Code status discussed.  She is Full code.  Labs and imaging test reviewed.  CT head -No hemorrhage or acute major vascular distribution infarction.   Dehiscence of the posterior wall of the sphenoid sinus such as on series 4, image 49-52. There is high density material possibly related to paranasal sinus disease or neoplasm which extends towards the pituitary and along the right towards the intracranial ICA.  Further evaluation with contrast enhanced MRI when clinically appropriate would be recommended.       Component      Latest Ref Rng & Units 3/10/2022 3/3/2022              Potassium      3.5 - 5.1 mmol/L 2.9 (L) 3.7   CBC showed wbc -19   Component      Latest Ref Rng & Units 3/10/2022 3/10/2022 4/20/2021          10:47 PM  8:18 PM    Lactate, Lex      0.5 - 2.2 mmol/L " 3.6 (HH) 5.7 (HH) 2.0     Component      Latest Ref Rng & Units 3/10/2022 4/20/2021   BNP      0 - 99 pg/mL >4,900 (H) 2,806 (H)   Code status discussed -she is full code  Her daughter is the SDM.  Adenike Lemus Daughter [phone]       Overview/Hospital Course:  81 y/o AAF admitted to ICU with a dx of cardiac arrest , CODY , acute hypoxic respiratory failure  and  Anoxic encephalopathy . Started on hypothermia protocol , IVAB , asa , plavix  And entresto . Cardiology and palliative care consulted. Tx reviewed . Cont current tx . Prognosis Poor      3/12  She remain critically ill . Now on rewarming phase . Started on propofol due to  only grimacing and posturing . Kidney  function is trending up . She  is on low dose of dobutamine . Case d/w he daughter at bedside . She changed code status to DNR . Prognosis poor .    3/13 Reamin critically ill  on mechanical ventilation . Temp is now > 36 . Last night  was on levophed and dobutamine to keep a map > 65  . Levophed wean off this am . She has been on propofol on and off for posturing  movement . Sedation stop for 2 hrs  .  She has positve  corneal , pupil and gag reflex .  No respond to verval or pain stimuli . She withdraw to pain . She is breathing overt the vent .  Neurology consulted . The UOP has sligly increased since yesterday . The kidney function  cont trending up . The Blood cx are (+) ALPHA HEMOLYTIC STREP    3/14 No significant neurologuical changes since yesterday . She opne her eyes on tactile simlus . She  does not follow commads . Ha been odff sedation for more than 24 hrs .  The Blood cx (+) for STREPTOCOCCUS ANGINOSUS  and EIKENELLA CORRODENS . The kidney function is sligly improving  . Prognsosis POOR .  POC d/w  her duaghter .    3/15- Remains intubated with FiO2 30%/PEEP 5 . Afebrile. Off sedation x 2 days. Remains comatose.  Intermittent spontaneous eye opening but does not seem purposeful. No verbal  response , does not follow  commands or track voice. Intermittent withdrawal to pain stimulus. Corneal, pupillary , cough and gag reflex present. Remains on Dobutamine gtt. EF 10%. Tube feed in progress. UOP improved 1L yesterday, however creatinine bumped to 3.8. Rocephin for Streptococcal  bacteremia continued. TTE negative for vegetation. Palliative Medicine is following. Prognosis appears poor.     3/16- Aferbile.Off sedation. Remains intubated with minimal vent support FiO2 30%/ PEEP 5. However pt is not awake enough to manage secretion and protect airways. Intermittent eye opening but does not seem purposeful. Brain stem reflexes are present. Withdraws to pain stimulus and facial grimace noted with sternal rub. Remains on dobutamine gtt.  ml yesterday . BNPCreatinine stable at 3.7. BNP down to 1493 from 4900 on admit. Will re consult Neurology to assess prognosis . Palliative Medicine is following.     3/17- Eyes are open but not purposeful, No verbal response , withdraws to pain stimulus. ETT cuff leak noted and needed to be exchanged. CC team extubated pt after SBT. Plan to assess clinical course from respiratory stand point with no plan for re intubation. NG tube placement will be attempted for feeding and medication administration. Dobutamine gtt continues.  High risk of sudden demise from cardiac stand point given EF 10%.     3/18- No verbal response, does not follow commands, eyes are open, intermittently seems pt tracks voice other time not. Withdraws to pain stimulus. Weak cough , requiring frequent suctioning to clear airway . Pt bites down suction tube. SpO2 satisfactory on FiO2 2L/min. Afebrile . NG tube feeds in progress , so far tolerating . UOP 1 liter yesterday. Cr 3.2. WBC dhara to 15K. Antibiotic Rocephin continues . Pt remains on Dobutamine gtt. Awaiting Cardiology input. Family declined Palliative Medicine follow up. Will get follow up CT head today.       Interval History:  No verbal response, does not follow  commands, eyes are open, intermittently seems pt tracks voice other time not. Withdraws to pain stimulus. Weak cough , requiring frequent suctioning to clear airway. Family declined Palliative Medicine follow up. Will get follow up CT head today.       Review of Systems   Unable to perform ROS: Mental status change   Objective:     Vital Signs (Most Recent):  Temp: 98.2 °F (36.8 °C) (03/18/22 1200)  Pulse: 83 (03/18/22 1200)  Resp: 20 (03/18/22 1200)  BP: 131/68 (03/18/22 1200)  SpO2: 100 % (03/18/22 1200) Vital Signs (24h Range):  Temp:  [97.6 °F (36.4 °C)-98.4 °F (36.9 °C)] 98.2 °F (36.8 °C)  Pulse:  [71-93] 83  Resp:  [11-23] 20  SpO2:  [99 %-100 %] 100 %  BP: (109-183)/(53-97) 131/68  Arterial Line BP: (113-201)/(51-83) 182/64     Weight: 51.2 kg (112 lb 14 oz)  Body mass index is 20.65 kg/m².    Intake/Output Summary (Last 24 hours) at 3/18/2022 1242  Last data filed at 3/18/2022 1200  Gross per 24 hour   Intake 1531.86 ml   Output 1205 ml   Net 326.86 ml      Physical Exam  Constitutional:       Comments: Extubated . Off sedation, no verbal response , does not follow commands, withdraws to pain stimulus    HENT:      Head: Normocephalic and atraumatic.   Eyes:      Pupils: Pupils are equal, round, and reactive to light.      Comments: (+) corneal reflex    Cardiovascular:      Rate and Rhythm: Normal rate.      Heart sounds: Normal heart sounds.   Pulmonary:      Breath sounds: Rales present.      Comments: Breath sounds are coarse and bronchial  Abdominal:      General: Bowel sounds are normal.   Genitourinary:     Comments: Abreu catheter in place  Musculoskeletal:      Cervical back: Neck supple.      Right lower leg: No edema.      Left lower leg: No edema.   Neurological:      Comments: Off sedation, Eyes are open, seems awake and occasionally tracks voice , other time not.        Significant Labs: All pertinent labs within the past 24 hours have been reviewed.  BMP:   Recent Labs   Lab 03/18/22  1979    *   *   K 4.3   *   CO2 26   BUN 72*   CREATININE 3.2*   CALCIUM 9.0   MG 2.3     CBC:   Recent Labs   Lab 03/17/22  0451 03/18/22  0446   WBC 6.54 15.29*   HGB 10.3* 10.6*   HCT 32.4* 33.6*    311     CMP:   Recent Labs   Lab 03/17/22  0451 03/18/22  0446   * 147*   K 4.6 4.3    111*   CO2 25 26   * 226*   BUN 62* 72*   CREATININE 3.7* 3.2*   CALCIUM 9.0 9.0   PROT 6.1  --    ALBUMIN 2.9*  --    BILITOT 0.3  --    ALKPHOS 109  --    AST 40  --    ALT 29  --    ANIONGAP 12 10   EGFRNONAA 11* 13*       Significant Imaging:       Assessment/Plan:      * Cardiac arrest  Out of Hospital cardiac arrest.  Last Echo -1/21-EF -25%, Repeat echo with EF 10%  GIII Diastolic dysfunction.  EKG -Sinus rhythm with occasional Premature ventricular complexes and Premature atrial complexes  Cardiology consulted   Monitor clinical course for neuro recovery   Prognosis poor   3/18-  Extubated yesterday   Requiring frequent suction to keep airway clear . Weak cough   Remains on Dobutamine gtt.  Await Cardiology input      Acute on chronic combined systolic and diastolic CHF (congestive heart failure)  Patient is identified as having Combined Systolic and Diastolic heart failure that is Acute on chronic. CHF is currently uncontrolled due to Pulmonary edema/pleural effusion on CXR. Latest ECHO performed and demonstrates- Results for orders placed during the hospital encounter of 01/15/21    Echo Color Flow Doppler? Yes    Interpretation Summary  · The left ventricle is severely enlarged with eccentric hypertrophy and severely decreased systolic function. The estimated ejection fraction is 25%  · Grade II left ventricular diastolic dysfunction.  · Normal right ventricular size with moderately reduced right ventricular systolic function.  · Severe left atrial enlargement.  · There is pulmonary hypertension.  · There is mild aortic valve stenosis.  · Aortic valve area is 1.37 cm2; peak velocity  is 1.33 m/s; mean gradient is 4 mmHg.  · Mild to moderate tricuspid regurgitation.  · Intermediate central venous pressure (8 mmHg).  · The estimated PA systolic pressure is 50 mmHg.  · Trivial pericardial effusion.  . Continue Nitrate/Vasodilator and monitor clinical status closely. Monitor on telemetry. Patient is on CHF pathway.  Monitor strict Is&Os and daily weights.  Place on fluid restriction of 1.5 L. Continue to stress to patient importance of self efficacy and  on diet for CHF. Last BNP reviewed- and noted below   Recent Labs   Lab 03/16/22  0334   BNP 1,493*     3/15-  Off Entresto , BB and diuretics given hypotension   3/16-  BB resumed via NG tube     Elevated troponin  In the setting of Cardiac arrest  Cont asa/plavix, BB  Cardiology on board       Anoxic encephalopathy  2/2 to cardiac arrest   Neurology consulted to assess prognosis   3/18-  Repeat head CT today     CODY (acute kidney injury)  2/2 to cardiac arrest   Not  a good candidate for CRT/RRT  Monitor UOP and renal indices         Gram-positive bacteremia  Source of infection is unclear   Cont rocephin x 2 weeks   Initial Blood culture from 3/10/22 positive forALPHA HEMOLYTIC STREP and EIKENELLA CORRODENS non viable for susceptibility   Repeat blood cultures from 3/15/22 - NGTD    Aspiration pneumonia  Initial CXR on admit suggestive of right-sided pulmonary opacities possibly infection, aspiration, or edema.  Antibiotic include Rocephin initiated   Blood cx from 3/10/22 (+) for ALPHA HEMOLYTIC STREP  Tracheal aspirate - neg   Repeat blood cultures from 3/15/22- NGTD    Palliative care encounter  - Palliative medicine consulted for [facility] discussion   -Current code status is DNR  3/18-  Family declined further Palliative Medicine follow up     On mechanically assisted ventilation, s/p Extubation 3/17/22        Hypokalemia  -Replete as indicated     Mass of sphenoid sinus  Ct head showed- Dehiscence of the posterior wall of the sphenoid  sinus such as on series 4, image 49-52. There is high density material possibly related to paranasal sinus disease or neoplasm which extends towards the pituitary and along the right towards the intracranial ICA.  Further evaluation with contrast enhanced MRI when clinically appropriate would be recommended.  Suggest including 3D/volumetric postcontrast imaging.  ENT consult as clinically warranted.        History of CVA (cerebrovascular accident)  On aspirin/plavix. Continue via NG  tube     Parkinson disease  Continue supportive care   Resume meds once appropriate       Laryngeal papillomatosis  Known History   Cont supportive tx as needed       Essential hypertension  Hold  entresto due to current hypotension and CODY  Resume antihypertensives once appropriate via NG tube         VTE Risk Mitigation (From admission, onward)         Ordered     heparin (porcine) injection 5,000 Units  Every 8 hours         03/10/22 2351     IP VTE HIGH RISK PATIENT  Once         03/10/22 2345     Place sequential compression device  Until discontinued         03/10/22 2345                Discharge Planning   PARMINDER:      Code Status: DNR   Is the patient medically ready for discharge?:     Reason for patient still in hospital (select all that apply): Patient trending condition  Discharge Plan A: Other (TBD)            Critical care time spent on the evaluation and treatment of severe organ dysfunction, review of pertinent labs and imaging studies, discussions with consulting providers and discussions with patient/family: 30  minutes.      Tim Relily MD  Department of Hospital Medicine   [facility] ([facility])

## 2022-03-18 NOTE — PROGRESS NOTES
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 CPR initiated after call to EMS. ROSC obtained by EMS (downtime not reported but required one shock and arrived to ED ~ one hour after EMS called)  Intubated in ED  Eval revealed leukocytosis, hypokalemia 2.9, compensated anion gap acidosis, BNP > 4900, troponin 0.041, lactate 3.6, procalcitonin 2.08  Admitted to ICU on mechanical ventilation; cooling for targeted temperature management for coma post arrest initiated       Subjective:     Interval History:   3/15: chart reviewed. On vent. Off all sedation for 2 days. Open eyes, not f/u commands. On minimal vent setting. BP stable. No fever.  3/16:  Appears to be more alert.  Open eyes and tracking.  Not following commands.  Events noted yesterday with tachycardia, to tachypnea and increased P pressure when biting the tube. Precedex drip started for comfort. No Fever. BP stable.   3/17:  Patient is minimal settings on the vent.  Tolerating SBT, but his poor mental status.  ETT with cuff leak. Plan to extubate.   3/18: stable post extubation. On 2 L NC. No fever. Mild hypertensive.  Remains nonverbal, not follow commands.  Poor cough effort.     Objective:     Vital Signs (Most Recent):  Temp: 97.6 °F (36.4 °C) (03/18/22 0710)  Pulse: 88 (03/18/22 0800)  Resp: 15 (03/18/22 0800)  BP: (!) 168/53 (03/18/22 0808)  SpO2: 100 % (03/18/22 0800) Vital Signs  (24h Range):  Temp:  [97.6 °F (36.4 °C)-98.4 °F (36.9 °C)] 97.6 °F (36.4 °C)  Pulse:  [66-93] 88  Resp:  [11-23] 15  SpO2:  [99 %-100 %] 100 %  BP: (109-198)/(53-97) 168/53  Arterial Line BP: (113-205)/(51-88) 161/53     Weight: 51.2 kg (112 lb 14 oz)  Body mass index is 20.65 kg/m².      Intake/Output Summary (Last 24 hours) at 3/18/2022 0826  Last data filed at 3/18/2022 0800  Gross per 24 hour   Intake 1472.16 ml   Output 1135 ml   Net 337.16 ml       Physical Exam  Vitals and nursing note reviewed.   Constitutional:       General: She is in acute distress.      Appearance: She is ill-appearing.      Comments: On 2 L NC.  Open eyes.  Tracking.  Nonverbal. Not f/u and commands.    HENT:      Head: Atraumatic.      Nose: Nose normal.      Mouth/Throat:      Mouth: Mucous membranes are moist.   Eyes:      General: No scleral icterus.     Extraocular Movements: Extraocular movements intact.      Pupils: Pupils are equal, round, and reactive to light.   Cardiovascular:      Rate and Rhythm: Normal rate and regular rhythm.      Pulses: Normal pulses.      Heart sounds: No murmur heard.  Pulmonary:      Effort: Pulmonary effort is normal. No respiratory distress.      Breath sounds: Rhonchi and rales present.   Abdominal:      General: There is no distension.      Palpations: Abdomen is soft.   Musculoskeletal:         General: Swelling (trace on b/l legs) present. No deformity.      Cervical back: Neck supple. No rigidity.   Skin:     General: Skin is warm and dry.      Capillary Refill: Capillary refill takes less than 2 seconds.      Coloration: Skin is not jaundiced.   Neurological:      Comments: Opens eyes, tracking, not followup commands.  Responds to pain         Vents:  Vent Mode: A/C (03/17/22 0714)  Set Rate: 10 BPM (03/17/22 0714)  Vt Set: 350 mL (03/17/22 0714)  Pressure Support: 0 cmH20 (03/17/22 0714)  PEEP/CPAP: 5 cmH20 (03/17/22 0714)  Oxygen Concentration (%): 32 (03/17/22 2032)  Peak Airway Pressure:  15 cmH2O (03/17/22 0714)  Plateau Pressure: 10 cmH20 (03/17/22 0714)  Total Ve: 6.99 mL (03/17/22 0714)  F/VT Ratio<105 (RSBI): (!) 42.55 (03/17/22 0714)    Lines/Drains/Airways     Central Venous Catheter Line  Duration           Percutaneous Central Line Insertion/Assessment - Triple Lumen  03/10/22 2215 right subclavian 7 days          Drain  Duration                Urethral Catheter 03/10/22 2032 Non-latex 16 Fr. 7 days         NG/OG Tube 03/17/22 1044 Right nostril <1 day          Airway  Duration                Airway 7 days          Arterial Line  Duration           Arterial Line 03/11/22 0310 Right Radial 7 days                Significant Labs:    CBC/Anemia Profile:  Recent Labs   Lab 03/17/22 0451 03/18/22 0446   WBC 6.54 15.29*   HGB 10.3* 10.6*   HCT 32.4* 33.6*    311   MCV 93 91   RDW 15.9* 16.0*        Chemistries:  Recent Labs   Lab 03/17/22 0451 03/18/22  0446   * 147*   K 4.6 4.3    111*   CO2 25 26   BUN 62* 72*   CREATININE 3.7* 3.2*   CALCIUM 9.0 9.0   ALBUMIN 2.9*  --    PROT 6.1  --    BILITOT 0.3  --    ALKPHOS 109  --    ALT 29  --    AST 40  --    MG 2.5 2.3   PHOS 4.7* 3.4     Recent Labs     03/17/22 0423   PH 7.434   PCO2 40.9   PO2 133*   HCO3 27.4   POCSATURATED 99   BE 3     Blood cx: 3/10: STREPTOCOCCUS ANGINOSUS         3/15: NGTD    Significant Imaging:  CXR 3/15. Film reviewed: Tubes in good position.  Cardiomegaly.  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] 03/11/2022 Yes    Aspiration pneumonia [J69.0] 03/11/2022 Yes    Hypokalemia [E87.6] 03/11/2022 Yes    On mechanically assisted ventilation [Z99.11]  03/11/2022 Not Applicable    Anoxic encephalopathy [G93.1] 03/11/2022 Yes    Elevated troponin [R77.8] 04/09/2021 Yes    Acute on chronic combined systolic and diastolic CHF (congestive heart failure) [I50.43] 01/17/2021 Yes    Essential hypertension [I10] 07/24/2019 Yes     Chronic    History of CVA (cerebrovascular accident) [Z86.73] 07/24/2019 Not Applicable     Chronic    Parkinson disease [G20] 07/11/2017 Yes     Chronic    Laryngeal papillomatosis [D14.1] 06/14/2017 Yes      Problems Resolved During this Admission:     Acute respiratory failure with hypoxemia, POA  -intubated for airway protection post cardiac arrest  -stable post extubation 3/17  -patient with poor cough effort.  Continue aggressive pulmonary toilet.      Acute anoxic encephalopathy  -status post cardiac arrest with prolonged downtime  -s/p TTM  -CTH on admission unremarkable  -mental status slowly improving  -off all sedation  -Close neuro check    S/p out of hospital Cardiac arrest  -likely due to cardiac event  -status post TTM    Troponin elevation  -Peak trop 0.438.   -likely secondary to demand ischemia  -cardiology consulted  -cont ASA/plavix    Acute on chronic combined systolic and diastolic CHF (congestive heart failure)  -Decompensated, BNP chronically elevated  -On inotropic support with dobutamine drip  -Echo shows worsening EF of 10%, Bi-V failure  -called cardiology for follow-up. ? Duration of the dobutamine drip    CODY   -Monitor I&O. Pharmacy to assist with renal dosing medications.  -Avoid hypotension and nephrotoxins.  -Monitor electrolytes and replete per protocol.  -Renal US ruled out hydronephrosis    Streptococcal bacteremia  -On IV Rocephin   -source of infection unclear.   -No evidence of endocarditis on 2D echo.  Will have to discuss with family goal of care /comfort measures prior to deciding on pursuing MACHO.  -Repeated BLD CX: NGTD  -worsening leukocytosis. Check CXR.     Mass of sphenoid sinus  Noted on CT  head post cardiac arrest  ?relationship to recurrent laryngeal papillomas  ENT consulted  Anticipate work up delay until neuro recovery/prognosis post cardiac arrest resulting in comatose state    Laryngeal papillomatosis  Recurrent since childhood with MANY surgical interventions  Followed by Dr Harrison, last rigid laryngoscopy on 10/26/2021 per EMR  Unclear if choking/airway compromise played role in cardiac arrest at dinner    I personally viewed and interpreted the patient'slabs, ECG, X-Ray, new clinical lab test results and new imaging test results.    Patient is DNR.  No re-intubation.  Appreciate palliative input     Georgia Cruz MD  Pulmonology  [facility] ([facility])

## 2022-03-18 NOTE — ASSESSMENT & PLAN NOTE
Out of Hospital cardiac arrest.  Last Echo -1/21-EF -25%, Repeat echo with EF 10%  GIII Diastolic dysfunction.  EKG -Sinus rhythm with occasional Premature ventricular complexes and Premature atrial complexes  Cardiology consulted   Monitor clinical course for neuro recovery   Prognosis poor   3/18-  Extubated yesterday   Requiring frequent suction to keep airway clear . Weak cough   Remains on Dobutamine gtt.  Await Cardiology input

## 2022-03-18 NOTE — ASSESSMENT & PLAN NOTE
Patient is identified as having Combined Systolic and Diastolic heart failure that is Acute on chronic. CHF is currently uncontrolled due to Pulmonary edema/pleural effusion on CXR. Latest ECHO performed and demonstrates- Results for orders placed during the hospital encounter of 01/15/21    Echo Color Flow Doppler? Yes    Interpretation Summary  · The left ventricle is severely enlarged with eccentric hypertrophy and severely decreased systolic function. The estimated ejection fraction is 25%  · Grade II left ventricular diastolic dysfunction.  · Normal right ventricular size with moderately reduced right ventricular systolic function.  · Severe left atrial enlargement.  · There is pulmonary hypertension.  · There is mild aortic valve stenosis.  · Aortic valve area is 1.37 cm2; peak velocity is 1.33 m/s; mean gradient is 4 mmHg.  · Mild to moderate tricuspid regurgitation.  · Intermediate central venous pressure (8 mmHg).  · The estimated PA systolic pressure is 50 mmHg.  · Trivial pericardial effusion.  . Continue Nitrate/Vasodilator and monitor clinical status closely. Monitor on telemetry. Patient is on CHF pathway.  Monitor strict Is&Os and daily weights.  Place on fluid restriction of 1.5 L. Continue to stress to patient importance of self efficacy and  on diet for CHF. Last BNP reviewed- and noted below   Recent Labs   Lab 03/16/22  0334   BNP 1,493*     3/15-  Off Entresto , BB and diuretics given hypotension   3/16-  BB resumed via NG tube

## 2022-03-18 NOTE — ASSESSMENT & PLAN NOTE
Hold  entresto due to current hypotension and CODY  Resume antihypertensives once appropriate via NG tube

## 2022-03-18 NOTE — PLAN OF CARE
Plan of care reviewed with pt's daughter at bedside. O2 sats stable on nasal cannula. Pt not following commands or visually tracking, eyes open spontaneously. Lungs remain very coarse with congested secretions. Pt with fair cough, able to suction some secretions orally. CPT by RT. Art line to R radial removed. BP stable. PIV initiated and central line to R IJ removed. NSR with PVCs on monitor. Repeat head CT comleted this shift. NGT in place, TF at goal rate, tolerating well with minimal residuals. Abreu catheter in use. Heels floated, turned/repositioned q2hr, BSWR in use.

## 2022-03-18 NOTE — PLAN OF CARE
Pt extubated during previous shift and VSS on 3l NC. Intermittent hypertension treated with hydralazine. Concerned that pt cannot protect airway as evidenced by frequent gurgling and coughing. Flexiseal was removed as stool is becoming more formed and it malfunctioned 3x. Pt turned q2 hours and oral care performed q4 hours and suctioning PRN. Bed alarm set and all safety measures observed.

## 2022-03-18 NOTE — ASSESSMENT & PLAN NOTE
- Palliative medicine consulted for GOC discussion   -Current code status is DNR  3/18-  Family declined further Palliative Medicine follow up

## 2022-03-18 NOTE — NURSING
Pt transported to and from CT in bed on cont monitor and oxygen for repeat head CT. No acute events.

## 2022-03-19 PROBLEM — E87.0 HYPERNATREMIA: Status: ACTIVE | Noted: 2022-03-19

## 2022-03-19 PROBLEM — Z99.11 ON MECHANICALLY ASSISTED VENTILATION: Status: RESOLVED | Noted: 2022-03-11 | Resolved: 2022-03-19

## 2022-03-19 LAB
ANION GAP SERPL CALC-SCNC: 15 MMOL/L (ref 8–16)
ANISOCYTOSIS BLD QL SMEAR: SLIGHT
BASOPHILS # BLD AUTO: 0.03 K/UL (ref 0–0.2)
BASOPHILS NFR BLD: 0.2 % (ref 0–1.9)
BUN SERPL-MCNC: 71 MG/DL (ref 8–23)
CALCIUM SERPL-MCNC: 9.8 MG/DL (ref 8.7–10.5)
CHLORIDE SERPL-SCNC: 115 MMOL/L (ref 95–110)
CO2 SERPL-SCNC: 20 MMOL/L (ref 23–29)
CREAT SERPL-MCNC: 2.9 MG/DL (ref 0.5–1.4)
DACRYOCYTES BLD QL SMEAR: ABNORMAL
DIFFERENTIAL METHOD: ABNORMAL
EOSINOPHIL # BLD AUTO: 0 K/UL (ref 0–0.5)
EOSINOPHIL NFR BLD: 0.1 % (ref 0–8)
ERYTHROCYTE [DISTWIDTH] IN BLOOD BY AUTOMATED COUNT: 16.6 % (ref 11.5–14.5)
EST. GFR  (AFRICAN AMERICAN): 17 ML/MIN/1.73 M^2
EST. GFR  (NON AFRICAN AMERICAN): 14 ML/MIN/1.73 M^2
GLUCOSE SERPL-MCNC: 148 MG/DL (ref 70–110)
HCT VFR BLD AUTO: 38.3 % (ref 37–48.5)
HGB BLD-MCNC: 11.6 G/DL (ref 12–16)
IMM GRANULOCYTES # BLD AUTO: 0.46 K/UL (ref 0–0.04)
IMM GRANULOCYTES NFR BLD AUTO: 3.1 % (ref 0–0.5)
LYMPHOCYTES # BLD AUTO: 0.7 K/UL (ref 1–4.8)
LYMPHOCYTES NFR BLD: 5 % (ref 18–48)
MAGNESIUM SERPL-MCNC: 2.4 MG/DL (ref 1.6–2.6)
MCH RBC QN AUTO: 28.9 PG (ref 27–31)
MCHC RBC AUTO-ENTMCNC: 30.3 G/DL (ref 32–36)
MCV RBC AUTO: 95 FL (ref 82–98)
MONOCYTES # BLD AUTO: 0.9 K/UL (ref 0.3–1)
MONOCYTES NFR BLD: 6 % (ref 4–15)
NEUTROPHILS # BLD AUTO: 12.6 K/UL (ref 1.8–7.7)
NEUTROPHILS NFR BLD: 85.6 % (ref 38–73)
NRBC BLD-RTO: 0 /100 WBC
OVALOCYTES BLD QL SMEAR: ABNORMAL
PHOSPHATE SERPL-MCNC: 3.8 MG/DL (ref 2.7–4.5)
PLATELET # BLD AUTO: 213 K/UL (ref 150–450)
PLATELET BLD QL SMEAR: ABNORMAL
PMV BLD AUTO: 12.3 FL (ref 9.2–12.9)
POCT GLUCOSE: 155 MG/DL (ref 70–110)
POCT GLUCOSE: 162 MG/DL (ref 70–110)
POCT GLUCOSE: 190 MG/DL (ref 70–110)
POCT GLUCOSE: 199 MG/DL (ref 70–110)
POCT GLUCOSE: 203 MG/DL (ref 70–110)
POIKILOCYTOSIS BLD QL SMEAR: SLIGHT
POLYCHROMASIA BLD QL SMEAR: ABNORMAL
POTASSIUM SERPL-SCNC: 5.1 MMOL/L (ref 3.5–5.1)
RBC # BLD AUTO: 4.02 M/UL (ref 4–5.4)
SODIUM SERPL-SCNC: 150 MMOL/L (ref 136–145)
TARGETS BLD QL SMEAR: ABNORMAL
WBC # BLD AUTO: 14.74 K/UL (ref 3.9–12.7)

## 2022-03-19 PROCEDURE — 25000003 PHARM REV CODE 250: Performed by: INTERNAL MEDICINE

## 2022-03-19 PROCEDURE — 63600175 PHARM REV CODE 636 W HCPCS: Performed by: INTERNAL MEDICINE

## 2022-03-19 PROCEDURE — 25000242 PHARM REV CODE 250 ALT 637 W/ HCPCS: Performed by: INTERNAL MEDICINE

## 2022-03-19 PROCEDURE — 94668 MNPJ CHEST WALL SBSQ: CPT

## 2022-03-19 PROCEDURE — A4216 STERILE WATER/SALINE, 10 ML: HCPCS | Performed by: INTERNAL MEDICINE

## 2022-03-19 PROCEDURE — 83735 ASSAY OF MAGNESIUM: CPT | Performed by: INTERNAL MEDICINE

## 2022-03-19 PROCEDURE — 85025 COMPLETE CBC W/AUTO DIFF WBC: CPT | Performed by: INTERNAL MEDICINE

## 2022-03-19 PROCEDURE — 80048 BASIC METABOLIC PNL TOTAL CA: CPT | Performed by: INTERNAL MEDICINE

## 2022-03-19 PROCEDURE — 94761 N-INVAS EAR/PLS OXIMETRY MLT: CPT

## 2022-03-19 PROCEDURE — 84100 ASSAY OF PHOSPHORUS: CPT | Performed by: INTERNAL MEDICINE

## 2022-03-19 PROCEDURE — 99291 PR CRITICAL CARE, E/M 30-74 MINUTES: ICD-10-PCS | Mod: ,,, | Performed by: INTERNAL MEDICINE

## 2022-03-19 PROCEDURE — 20000000 HC ICU ROOM

## 2022-03-19 PROCEDURE — 36415 COLL VENOUS BLD VENIPUNCTURE: CPT | Performed by: INTERNAL MEDICINE

## 2022-03-19 PROCEDURE — 51702 INSERT TEMP BLADDER CATH: CPT

## 2022-03-19 PROCEDURE — 94640 AIRWAY INHALATION TREATMENT: CPT

## 2022-03-19 PROCEDURE — 99291 CRITICAL CARE FIRST HOUR: CPT | Mod: ,,, | Performed by: INTERNAL MEDICINE

## 2022-03-19 RX ORDER — DEXTROSE MONOHYDRATE 50 MG/ML
INJECTION, SOLUTION INTRAVENOUS CONTINUOUS
Status: ACTIVE | OUTPATIENT
Start: 2022-03-19 | End: 2022-03-20

## 2022-03-19 RX ORDER — CARBIDOPA AND LEVODOPA 25; 100 MG/1; MG/1
2 TABLET ORAL 3 TIMES DAILY
Status: DISCONTINUED | OUTPATIENT
Start: 2022-03-19 | End: 2022-03-26

## 2022-03-19 RX ADMIN — Medication 10 ML: at 05:03

## 2022-03-19 RX ADMIN — CARVEDILOL 12.5 MG: 12.5 TABLET, FILM COATED ORAL at 08:03

## 2022-03-19 RX ADMIN — FAMOTIDINE 20 MG: 10 INJECTION INTRAVENOUS at 08:03

## 2022-03-19 RX ADMIN — HEPARIN SODIUM 5000 UNITS: 5000 INJECTION INTRAVENOUS; SUBCUTANEOUS at 02:03

## 2022-03-19 RX ADMIN — IPRATROPIUM BROMIDE AND ALBUTEROL SULFATE 3 ML: 2.5; .5 SOLUTION RESPIRATORY (INHALATION) at 08:03

## 2022-03-19 RX ADMIN — ACETYLCYSTEINE 2 ML: 100 INHALANT RESPIRATORY (INHALATION) at 07:03

## 2022-03-19 RX ADMIN — ACETYLCYSTEINE 2 ML: 100 INHALANT RESPIRATORY (INHALATION) at 08:03

## 2022-03-19 RX ADMIN — IPRATROPIUM BROMIDE AND ALBUTEROL SULFATE 3 ML: 2.5; .5 SOLUTION RESPIRATORY (INHALATION) at 01:03

## 2022-03-19 RX ADMIN — INSULIN ASPART 2 UNITS: 100 INJECTION, SOLUTION INTRAVENOUS; SUBCUTANEOUS at 09:03

## 2022-03-19 RX ADMIN — DIPHENHYDRAMINE HYDROCHLORIDE 25 MG: 50 INJECTION INTRAMUSCULAR; INTRAVENOUS at 08:03

## 2022-03-19 RX ADMIN — CARBIDOPA AND LEVODOPA 2 TABLET: 25; 100 TABLET ORAL at 02:03

## 2022-03-19 RX ADMIN — INSULIN ASPART 1 UNITS: 100 INJECTION, SOLUTION INTRAVENOUS; SUBCUTANEOUS at 08:03

## 2022-03-19 RX ADMIN — CARBIDOPA AND LEVODOPA 2 TABLET: 25; 100 TABLET ORAL at 11:03

## 2022-03-19 RX ADMIN — INSULIN DETEMIR 10 UNITS: 100 INJECTION, SOLUTION SUBCUTANEOUS at 09:03

## 2022-03-19 RX ADMIN — CLOPIDOGREL 75 MG: 75 TABLET, FILM COATED ORAL at 08:03

## 2022-03-19 RX ADMIN — HEPARIN SODIUM 5000 UNITS: 5000 INJECTION INTRAVENOUS; SUBCUTANEOUS at 05:03

## 2022-03-19 RX ADMIN — INSULIN ASPART 1 UNITS: 100 INJECTION, SOLUTION INTRAVENOUS; SUBCUTANEOUS at 02:03

## 2022-03-19 RX ADMIN — DEXAMETHASONE SODIUM PHOSPHATE 4 MG: 4 INJECTION INTRA-ARTICULAR; INTRALESIONAL; INTRAMUSCULAR; INTRAVENOUS; SOFT TISSUE at 08:03

## 2022-03-19 RX ADMIN — CEFTRIAXONE 2 G: 2 INJECTION, SOLUTION INTRAVENOUS at 01:03

## 2022-03-19 RX ADMIN — INSULIN ASPART 2 UNITS: 100 INJECTION, SOLUTION INTRAVENOUS; SUBCUTANEOUS at 12:03

## 2022-03-19 RX ADMIN — CARBIDOPA AND LEVODOPA 2 TABLET: 25; 100 TABLET ORAL at 08:03

## 2022-03-19 RX ADMIN — HEPARIN SODIUM 5000 UNITS: 5000 INJECTION INTRAVENOUS; SUBCUTANEOUS at 09:03

## 2022-03-19 RX ADMIN — INSULIN ASPART 2 UNITS: 100 INJECTION, SOLUTION INTRAVENOUS; SUBCUTANEOUS at 11:03

## 2022-03-19 RX ADMIN — IPRATROPIUM BROMIDE AND ALBUTEROL SULFATE 3 ML: 2.5; .5 SOLUTION RESPIRATORY (INHALATION) at 07:03

## 2022-03-19 RX ADMIN — DEXTROSE: 5 SOLUTION INTRAVENOUS at 09:03

## 2022-03-19 RX ADMIN — ASPIRIN 81 MG CHEWABLE TABLET 81 MG: 81 TABLET CHEWABLE at 08:03

## 2022-03-19 RX ADMIN — INSULIN DETEMIR 10 UNITS: 100 INJECTION, SOLUTION SUBCUTANEOUS at 08:03

## 2022-03-19 RX ADMIN — AMLODIPINE BESYLATE 10 MG: 10 TABLET ORAL at 08:03

## 2022-03-19 RX ADMIN — INSULIN ASPART 4 UNITS: 100 INJECTION, SOLUTION INTRAVENOUS; SUBCUTANEOUS at 03:03

## 2022-03-19 NOTE — PROGRESS NOTES
"[facility] - Intensive Care ([facility])  [facility] Medicine  Progress Note    Patient Name: Tanya Madrid  MRN: [id number]  Patient Class: IP- Inpatient   Admission Date: 3/10/2022  Length of Stay: 9 days  Attending Physician: Tim Reilly MD  Primary Care Provider: Maggie Sue NP        Subjective:     Principal Problem:Cardiac arrest        HPI:  History was taken from the electronic chart and from the daughter -  Adenike Lemus Daughter [phone]        82 y.o. female patient with a PMHx of HTN,  CHF-last EF-01/2021-25%, with diastolic dysfunction  , and HLD who presents to the Emergency Department for evaluation of cardiac arrest which onset suddenly 1 hour pta.  Daughter reports that over the last 3 days -she has been complaining of worsening dyspnoea  and was given extra lasix tablets. She usually takes 20mg lasix daily and was given 2 extra tablets with the episodes of  dyspnea.  This evening while having dinner, she "looked blank" and stopped breathing . Daughter called EMS and was advised to start CPR.. Pt achieved ROSC en route. After I shock . Prior Tx includes 50 mcg of fentanyl and 50 mg of ketamine from EMS.   Code status discussed.  She is Full code.  Labs and imaging test reviewed.  CT head -No hemorrhage or acute major vascular distribution infarction.   Dehiscence of the posterior wall of the sphenoid sinus such as on series 4, image 49-52. There is high density material possibly related to paranasal sinus disease or neoplasm which extends towards the pituitary and along the right towards the intracranial ICA.  Further evaluation with contrast enhanced MRI when clinically appropriate would be recommended.       Component      Latest Ref Rng & Units 3/10/2022 3/3/2022              Potassium      3.5 - 5.1 mmol/L 2.9 (L) 3.7   CBC showed wbc -19   Component      Latest Ref Rng & Units 3/10/2022 3/10/2022 4/20/2021          10:47 PM  8:18 PM    Lactate, Lex      0.5 - 2.2 mmol/L " 3.6 (HH) 5.7 (HH) 2.0     Component      Latest Ref Rng & Units 3/10/2022 4/20/2021   BNP      0 - 99 pg/mL >4,900 (H) 2,806 (H)   Code status discussed -she is full code  Her daughter is the SDM.  Adenike Lemus Daughter [phone]       Overview/Hospital Course:  81 y/o AAF admitted to ICU with a dx of cardiac arrest , CODY , acute hypoxic respiratory failure  and  Anoxic encephalopathy . Started on hypothermia protocol , IVAB , asa , plavix  And entresto . Cardiology and palliative care consulted. Tx reviewed . Cont current tx . Prognosis Poor      3/12  She remain critically ill . Now on rewarming phase . Started on propofol due to  only grimacing and posturing . Kidney  function is trending up . She  is on low dose of dobutamine . Case d/w he daughter at bedside . She changed code status to DNR . Prognosis poor .    3/13 Reamin critically ill  on mechanical ventilation . Temp is now > 36 . Last night  was on levophed and dobutamine to keep a map > 65  . Levophed wean off this am . She has been on propofol on and off for posturing  movement . Sedation stop for 2 hrs  .  She has positve  corneal , pupil and gag reflex .  No respond to verval or pain stimuli . She withdraw to pain . She is breathing overt the vent .  Neurology consulted . The UOP has sligly increased since yesterday . The kidney function  cont trending up . The Blood cx are (+) ALPHA HEMOLYTIC STREP    3/14 No significant neurologuical changes since yesterday . She opne her eyes on tactile simlus . She  does not follow commads . Ha been odff sedation for more than 24 hrs .  The Blood cx (+) for STREPTOCOCCUS ANGINOSUS  and EIKENELLA CORRODENS . The kidney function is sligly improving  . Prognsosis POOR .  POC d/w  her duaghter .    3/15- Remains intubated with FiO2 30%/PEEP 5 . Afebrile. Off sedation x 2 days. Remains comatose.  Intermittent spontaneous eye opening but does not seem purposeful. No verbal  response , does not follow  commands or track voice. Intermittent withdrawal to pain stimulus. Corneal, pupillary , cough and gag reflex present. Remains on Dobutamine gtt. EF 10%. Tube feed in progress. UOP improved 1L yesterday, however creatinine bumped to 3.8. Rocephin for Streptococcal  bacteremia continued. TTE negative for vegetation. Palliative Medicine is following. Prognosis appears poor.     3/16- Aferbile.Off sedation. Remains intubated with minimal vent support FiO2 30%/ PEEP 5. However pt is not awake enough to manage secretion and protect airways. Intermittent eye opening but does not seem purposeful. Brain stem reflexes are present. Withdraws to pain stimulus and facial grimace noted with sternal rub. Remains on dobutamine gtt.  ml yesterday . BNPCreatinine stable at 3.7. BNP down to 1493 from 4900 on admit. Will re consult Neurology to assess prognosis . Palliative Medicine is following.     3/17- Eyes are open but not purposeful, No verbal response , withdraws to pain stimulus. ETT cuff leak noted and needed to be exchanged. CC team extubated pt after SBT. Plan to assess clinical course from respiratory stand point with no plan for re intubation. NG tube placement will be attempted for feeding and medication administration. Dobutamine gtt continues.  High risk of sudden demise from cardiac stand point given EF 10%.     3/18- No verbal response, does not follow commands, eyes are open, intermittently seems pt tracks voice other time not. Withdraws to pain stimulus. Weak cough , requiring frequent suctioning to clear airway . Pt bites down suction tube. SpO2 satisfactory on FiO2 2L/min. Afebrile . NG tube feeds in progress , so far tolerating . UOP 1 liter yesterday. Cr 3.2. WBC dhara to 15K. Antibiotic Rocephin continues . Pt remains on Dobutamine gtt. Awaiting Cardiology input. Family declined Palliative Medicine follow up. Will get follow up CT head today.     3/19- Afebrile . On RA. SpO2 100%. Repeat CT head with  preserved gray-white matter junction and no cerebral edema, hemorrhage or other acute process. GCS is difficult to assess. Eyes remains open. No verbal response , withdraws to pain. Pt remains with poor cough effort and lot of secretion requiring suction and breatrh sounds are coarse. CXR with no new new infiltrate or consolidation as of today. WBC 14K, Cr down to 2.9. . Gentle hydration initiated .  ml yesterday. Spoke to marlin at bedside . She is debating continuing current care and PEG tube placement vs Hospice referral. Pt had 10 beats NSVT yesterday .Dobutamine gtt discontinued.       Interval History:    Family is debating continuing current care and PEG tube placement vs Hospice referral.    Review of Systems   Unable to perform ROS: Mental status change   Objective:     Vital Signs (Most Recent):  Temp: 98.1 °F (36.7 °C) (03/19/22 1200)  Pulse: 61 (03/19/22 1430)  Resp: 17 (03/19/22 1430)  BP: 138/80 (03/19/22 1400)  SpO2: 100 % (03/19/22 1430) Vital Signs (24h Range):  Temp:  [97.3 °F (36.3 °C)-98.1 °F (36.7 °C)] 98.1 °F (36.7 °C)  Pulse:  [60-85] 61  Resp:  [11-28] 17  SpO2:  [90 %-100 %] 100 %  BP: (118-160)/(61-80) 138/80     Weight: 51.2 kg (112 lb 14 oz)  Body mass index is 20.65 kg/m².    Intake/Output Summary (Last 24 hours) at 3/19/2022 1511  Last data filed at 3/19/2022 1300  Gross per 24 hour   Intake 1922.12 ml   Output 845 ml   Net 1077.12 ml      Physical Exam  Constitutional:       Comments: Extubated . Off sedation, no verbal response , does not follow commands, withdraws to pain stimulus    HENT:      Head: Normocephalic and atraumatic.   Eyes:      Pupils: Pupils are equal, round, and reactive to light.      Comments: (+) corneal reflex    Cardiovascular:      Rate and Rhythm: Normal rate.      Heart sounds: Normal heart sounds.   Pulmonary:      Breath sounds: Rales present.      Comments: Breath sounds are coarse and bronchial  Abdominal:      General: Bowel sounds are  normal.   Genitourinary:     Comments: Abreu catheter in place  Musculoskeletal:      Cervical back: Neck supple.      Right lower leg: No edema.      Left lower leg: No edema.   Neurological:      Comments: Off sedation, Eyes are open, seems awake and occasionally tracks voice , other time not.        Significant Labs: All pertinent labs within the past 24 hours have been reviewed.  BMP:   Recent Labs   Lab 03/19/22  0352   *   *   K 5.1   *   CO2 20*   BUN 71*   CREATININE 2.9*   CALCIUM 9.8   MG 2.4     CBC:   Recent Labs   Lab 03/18/22 0446 03/19/22  0352   WBC 15.29* 14.74*   HGB 10.6* 11.6*   HCT 33.6* 38.3    213     CMP:   Recent Labs   Lab 03/18/22 0446 03/19/22  0352   * 150*   K 4.3 5.1   * 115*   CO2 26 20*   * 148*   BUN 72* 71*   CREATININE 3.2* 2.9*   CALCIUM 9.0 9.8   ANIONGAP 10 15   EGFRNONAA 13* 14*       Significant Imaging:       Assessment/Plan:      * Cardiac arrest  Out of Hospital cardiac arrest.  Last Echo -1/21-EF -25%, Repeat echo with EF 10%  GIII Diastolic dysfunction.  EKG -Sinus rhythm with occasional Premature ventricular complexes and Premature atrial complexes  Cardiology consulted   Monitor clinical course for neuro recovery   Prognosis poor   3/18-  Extubated yesterday   Requiring frequent suction to keep airway clear . Weak cough   Remains on Dobutamine gtt.  Await Cardiology input  3/19-  Repeat CT head with preserved gray-white matter junction and no cerebral edema, hemorrhage or other acute process. GCS is difficult to assess. Eyes remains open. No verbal response , withdraws to pain.    Pt had 10 beats NSVT yesterday .  Dobutamine gtt discontinued     Acute on chronic combined systolic and diastolic CHF (congestive heart failure)  Patient is identified as having Combined Systolic and Diastolic heart failure that is Acute on chronic. CHF is currently uncontrolled due to Pulmonary edema/pleural effusion on CXR. Latest ECHO  performed and demonstrates- Results for orders placed during the hospital encounter of 01/15/21    Echo Color Flow Doppler? Yes    Interpretation Summary  · The left ventricle is severely enlarged with eccentric hypertrophy and severely decreased systolic function. The estimated ejection fraction is 25%  · Grade II left ventricular diastolic dysfunction.  · Normal right ventricular size with moderately reduced right ventricular systolic function.  · Severe left atrial enlargement.  · There is pulmonary hypertension.  · There is mild aortic valve stenosis.  · Aortic valve area is 1.37 cm2; peak velocity is 1.33 m/s; mean gradient is 4 mmHg.  · Mild to moderate tricuspid regurgitation.  · Intermediate central venous pressure (8 mmHg).  · The estimated PA systolic pressure is 50 mmHg.  · Trivial pericardial effusion.  . Continue Nitrate/Vasodilator and monitor clinical status closely. Monitor on telemetry. Patient is on CHF pathway.  Monitor strict Is&Os and daily weights.  Place on fluid restriction of 1.5 L. Continue to stress to patient importance of self efficacy and  on diet for CHF. Last BNP reviewed- and noted below   Recent Labs   Lab 03/16/22  0334   BNP 1,493*     3/15-  Off Entresto , BB and diuretics given hypotension   3/16-  BB resumed via NG tube     Elevated troponin  In the setting of Cardiac arrest  Cont asa/plavix, BB  Cardiology on board       Anoxic encephalopathy  2/2 to cardiac arrest   Neurology consulted to assess prognosis   3/18-  Repeat head CT today   3/19-  Repeat CT head with preserved gray-white matter junction and no cerebral edema, hemorrhage or other acute process. GCS is difficult to assess. Eyes remains open. No verbal response , withdraws to pain    CODY (acute kidney injury)  2/2 to cardiac arrest   Not  a good candidate for CRT/RRT  Monitor UOP and renal indices         Gram-positive bacteremia  Source of infection is unclear   Cont rocephin x 2 weeks   Initial Blood  culture from 3/10/22 positive forALPHA HEMOLYTIC STREP and EIKENELLA CORRODENS non viable for susceptibility   Repeat blood cultures from 3/15/22 - NGTD    Aspiration pneumonia  Initial CXR on admit suggestive of right-sided pulmonary opacities possibly infection, aspiration, or edema.  Antibiotic include Rocephin initiated   Blood cx from 3/10/22 (+) for ALPHA HEMOLYTIC STREP  Tracheal aspirate - neg   Repeat blood cultures from 3/15/22- NGTD    Hypernatremia  3/19-  In the setting of free water deficit   Gentle hydration with D5 infusion   Free water flush via NG tube       Palliative care encounter  - Palliative medicine consulted for [facility] discussion   -Current code status is DNR  3/18-  Family declined further Palliative Medicine follow up     Hypokalemia  -Replete as indicated     Mass of sphenoid sinus  Ct head showed- Dehiscence of the posterior wall of the sphenoid sinus such as on series 4, image 49-52. There is high density material possibly related to paranasal sinus disease or neoplasm which extends towards the pituitary and along the right towards the intracranial ICA.  Further evaluation with contrast enhanced MRI when clinically appropriate would be recommended.  Suggest including 3D/volumetric postcontrast imaging.  ENT consult as clinically warranted.        History of CVA (cerebrovascular accident)  On aspirin/plavix. Continue via NG  tube     Parkinson disease  Continue supportive care   Resume meds once appropriate   3/19-  Meds resumed       Laryngeal papillomatosis  Known History   Cont supportive tx as needed       Essential hypertension  Hold  entresto due to current hypotension and CODY  Resume antihypertensives once appropriate via NG tube   3/19-  Pt started back on Amlodipine , Coreg via NG tube         VTE Risk Mitigation (From admission, onward)         Ordered     heparin (porcine) injection 5,000 Units  Every 8 hours         03/10/22 2351     IP VTE HIGH RISK PATIENT  Once          03/10/22 2345     Place sequential compression device  Until discontinued         03/10/22 2345                Discharge Planning   PARMINDER:      Code Status: DNR   Is the patient medically ready for discharge?:     Reason for patient still in hospital (select all that apply): Patient trending condition  Discharge Plan A: Other (TBD)            Critical care time spent on the evaluation and treatment of severe organ dysfunction, review of pertinent labs and imaging studies, discussions with consulting providers and discussions with patient/family: 30  minutes.      Tim Reilly MD  Department of Hospital Medicine   '[facility] - Intensive Care ([facility])

## 2022-03-19 NOTE — ASSESSMENT & PLAN NOTE
3/19-  In the setting of free water deficit   Gentle hydration with D5 infusion   Free water flush via NG tube

## 2022-03-19 NOTE — ASSESSMENT & PLAN NOTE
2/2 to cardiac arrest   Neurology consulted to assess prognosis   3/18-  Repeat head CT today   3/19-  Repeat CT head with preserved gray-white matter junction and no cerebral edema, hemorrhage or other acute process. GCS is difficult to assess. Eyes remains open. No verbal response , withdraws to pain

## 2022-03-19 NOTE — ASSESSMENT & PLAN NOTE
Post cardiac arrest  Current high quality data suggests limited if any benefit but cooling for TTM initiated overnight and reached goal temp prior to our consult/exam.   Completed TTM per protocol  Updated daughter at bedside on exam findings, plan of care, and expected clinical course  Palliative care team consulted for additional support with expected difficult conversations   Patient DNR.    Off sedation and extubated,   Weak cough: aggressive pulmonary toilet

## 2022-03-19 NOTE — ASSESSMENT & PLAN NOTE
Out of Hospital cardiac arrest.  Last Echo -1/21-EF -25%, Repeat echo with EF 10%  GIII Diastolic dysfunction.  EKG -Sinus rhythm with occasional Premature ventricular complexes and Premature atrial complexes  Cardiology consulted   Monitor clinical course for neuro recovery   Prognosis poor   3/18-  Extubated yesterday   Requiring frequent suction to keep airway clear . Weak cough   Remains on Dobutamine gtt.  Await Cardiology input  3/19-  Repeat CT head with preserved gray-white matter junction and no cerebral edema, hemorrhage or other acute process. GCS is difficult to assess. Eyes remains open. No verbal response , withdraws to pain.    Pt had 10 beats NSVT yesterday .  Dobutamine gtt discontinued

## 2022-03-19 NOTE — PROGRESS NOTES
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 CPR initiated after call to EMS. ROSC obtained by EMS (downtime not reported but required one shock and arrived to ED ~ one hour after EMS called)  Intubated in ED  Eval revealed leukocytosis, hypokalemia 2.9, compensated anion gap acidosis, BNP > 4900, troponin 0.041, lactate 3.6, procalcitonin 2.08  Admitted to ICU on mechanical ventilation; cooling for targeted temperature management for coma post arrest initiated       Hospital/ICU Course:  Seen and examined at start of shift  Sedated on propofol (for shivering), stopped for exam.  3/12 Patient seen and examined.Day 1post intubation.Paralysis None Sedation Propofol Vasopressors Dobutamine Urine output last 24 hrs 175 ml  Fluid balance since admit 2 L Tmax 100.3 T minimum 92, started rewarming Last BM 3/12 Chest X-ray and ABG reviewed .  3/13 Patient seen and examined.Day 2post intubation.Paralysis None Sedation None Vasopressors Dobutamine Urine output last 24 hrs 235 ml   creatinine increasing to 3.1Fluid balance since admit 4 L Tmax 98 Last BM 3/11 Chest X-ray and ABG reviewed .  Blood culture alpha strep     03/14: seen and examined: on Vent RR 10 breathing 15/min, flexes to stimuli, spont eye opening, T max: 98.6F, UO: 525mls, Spoke with Adenike today,scopolamine was added    3/15: chart reviewed. On vent. Off  all sedation for 2 days. Open eyes, not f/u commands. On minimal vent setting. BP stable. No fever.  3/16:  Appears to be more alert.  Open eyes and tracking.  Not following commands.  Events noted yesterday with tachycardia, to tachypnea and increased P pressure when biting the tube. Precedex drip started for comfort. No Fever. BP stable.   3/17:  Patient is minimal settings on the vent.  Tolerating SBT, but his poor mental status.  ETT with cuff leak. Plan to extubate.   3/18: stable post extubation. On 2 L NC. No fever. Mild hypertensive.  Remains nonverbal, not follow commands.  Poor cough effort    03/19: seen and examined on nasal canula, T max: 98.2F, UO  805 mls on tube feeds, weak cough. Na 150, Blood cultures from 03/15 NGTD.      Interval History:  03/19: seen and examined on nasal canula, T max: 98.2F, UO  805 mls on tube feeds, weak cough. Na 150, Blood cultures from 03/15 NGTD.     dextrose 10 % in water (D10W)      dextrose 5 %        Review of Systems   Unable to perform ROS: Critical illness      Oxygen Concentration (%):  [32] 32     Objective:     Vital Signs (Most Recent):  Temp: 97.3 °F (36.3 °C) (03/19/22 0800)  Pulse: 70 (03/19/22 0800)  Resp: 13 (03/19/22 0800)  BP: (!) 160/72 (03/19/22 0800)  SpO2: 100 % (03/19/22 0800)   Vital Signs (24h Range):  Temp:  [97.3 °F (36.3 °C)-98.2 °F (36.8 °C)] 97.3 °F (36.3 °C)  Pulse:  [65-85] 70  Resp:  [11-28] 13  SpO2:  [91 %-100 %] 100 %  BP: (129-160)/(63-82) 160/72     Weight: 51.2 kg (112 lb 14 oz)  Body mass index is 20.65 kg/m².      Intake/Output Summary (Last 24 hours) at 3/19/2022 0904  Last data filed at 3/19/2022 0800  Gross per 24 hour   Intake 1571.13 ml   Output 815 ml   Net 756.13 ml       Physical Exam  Vitals and nursing note reviewed.   Constitutional:       Appearance: She is ill-appearing.      Interventions: She is not intubated.Nasal cannula in place.       HENT:      Head: Normocephalic and atraumatic.      Nose: Nose normal.    Cardiovascular:      Rate and Rhythm: Normal rate.      Pulses: Normal pulses.   Pulmonary:      Effort: Pulmonary effort is normal. She is not intubated.      Breath sounds: Decreased air movement present.   Abdominal:      General: Bowel sounds are normal.      Palpations: Abdomen is soft.   Musculoskeletal:         General: Normal range of motion.   Skin:     General: Skin is warm.   Neurological:      Mental Status: She is lethargic.       Vents:  Vent Mode: A/C (03/17/22 0714)  Set Rate: 10 BPM (03/17/22 0714)  Vt Set: 350 mL (03/17/22 0714)  Pressure Support: 0 cmH20 (03/17/22 0714)  PEEP/CPAP: 5 cmH20 (03/17/22 0714)  Oxygen Concentration (%): 32 (03/18/22 2031)  Peak Airway Pressure: 15 cmH2O (03/17/22 0714)  Plateau Pressure: 10 cmH20 (03/17/22 0714)  Total Ve: 6.99 mL (03/17/22 0714)  F/VT Ratio<105 (RSBI): (!) 42.55 (03/17/22 0714)    Lines/Drains/Airways       Drain  Duration                  Urethral Catheter 03/10/22 2032 Non-latex 16 Fr. 8 days         NG/OG Tube 03/17/22 1044 Right nostril 1 day              Airway  Duration                  Airway 8 days              Peripheral Intravenous Line  Duration                  Peripheral IV - Single Lumen 03/18/22 1020 22 G Left Forearm <1 day                    Significant Labs:    CBC/Anemia Profile:  Recent Labs   Lab 03/18/22 0446 03/19/22  0352   WBC 15.29* 14.74*   HGB 10.6* 11.6*   HCT 33.6* 38.3    213   MCV 91 95   RDW 16.0* 16.6*        Chemistries:  Recent Labs   Lab 03/18/22  0446 03/19/22  0352   * 150*   K 4.3 5.1   * 115*   CO2 26 20*   BUN 72* 71*   CREATININE 3.2* 2.9*   CALCIUM 9.0 9.8   MG 2.3 2.4   PHOS 3.4 3.8       ABGs: No results for input(s): PH, PCO2, HCO3, POCSATURATED, BE in the last 48 hours.  POCT Glucose:   Recent Labs   Lab 03/18/22  1806 03/18/22  2140 03/19/22  0210   POCTGLUCOSE 105 201* 199*     All pertinent labs within the past 24 hours have been reviewed.    Significant Imaging:  I have reviewed  all pertinent imaging results/findings within the past 24 hours.    X-Ray Chest AP Portable  Narrative: EXAMINATION:  XR CHEST AP PORTABLE    CLINICAL HISTORY:  resp failure;    TECHNIQUE:  Single frontal view of the chest was performed.    COMPARISON:  Chest radiograph 03/17/2022 with priors    FINDINGS:  Cardiac leads project over the chest.  Nasogastric tube terminates appropriately in the upper abdomen.  Interval removal of the previously identified right-sided central venous catheter.  Cardiomediastinal silhouette is enlarged, but unchanged.  Unchanged small volume of pleural fluid with likely adjacent atelectasis.  No new infiltrate or consolidation.  No pneumothorax.  Left-sided rib fractures again noted.  Impression: Trace bilateral pleural effusions with adjacent atelectasis.    Electronically signed by: Elvin Mcnulty  Date:    03/19/2022  Time:    08:36       ABG  Recent Labs   Lab 03/17/22  0423   PH 7.434   PO2 133*   PCO2 40.9   HCO3 27.4   BE 3     Assessment/Plan:     Neuro  Anoxic encephalopathy  Post cardiac arrest  Current high quality data suggests limited if any benefit but cooling for TTM initiated overnight and reached goal temp prior to our consult/exam.   Completed TTM per protocol  Updated daughter at bedside on exam findings, plan of care, and expected clinical course  Palliative care team consulted for additional support with expected difficult conversations   Patient DNR.    Off sedation and extubated,   Weak cough: aggressive pulmonary toilet      History of CVA (cerebrovascular accident)  ASA    Parkinson disease  Restart SINEMET    ENT  Mass of sphenoid sinus  Noted on CT head post cardiac arrest  ?relationship to recurrent laryngeal papillomas  ENT consulted  Anticipate work up delay until neuro recovery/prognosis post cardiac arrest resulting in comatose state    Pulmonary  Aspiration pneumonia  Strep Bacteremia  On CEFTRIAXONE  Repeat BC NGTD    Cardiac/Vascular  * Cardiac  arrest  Etiology unclear but airway compromise, hypokalemia, and sudden cardiac death related to poor EF all possible primary or contributing factors  SP TTM  SP extubation  Wake, weak spont blinks. ? Tracking, No speech    Elevated troponin  Ischemia w/u when feasible    Acute on chronic combined systolic and diastolic CHF (congestive heart failure)  Last EF 10%  Off Dobutamine  Net +ve 3.8 lit  Cardiology following    Essential hypertension  Norvasc, Coreg  PRN hydralazine    Renal/  Hypernatremia  D5W  Water flushes    CODY (acute kidney injury)  3/12 oliguric strict I&Os.  Receiving NS boluses today.  3/13 strict I&Os.  Oliguric.  Needed 2 L NS bolus yesterday for severe hypotension.  On Levophed drip.  Continue dobutamine.   If she can tolerate hemodynamically will start Lasix drip.  Volume contraction with dialysis might not benefit the overall outcome  03/14:Monitor I&O, Lasix, estimated creatinine clearance is 10 mL/min (A) (based on SCr of 3.3 mg/dL (H)).     Hypokalemia  Monitor and replace    ID  Gram-positive bacteremia    ABX CEFTRIAXONE    Oncology  Laryngeal papillomatosis  Recurrent since childhood with MANY surgical interventions  Followed by Dr Harrison, last rigid laryngoscopy on 10/26/2021 per EMR  Unclear if choking/airway compromise played role in cardiac arrest at dinner     Critical Care Daily Checklist:    A: Awake: RASS Goal/Actual Goal: RASS Goal: 0-->alert and calm  Actual: Coffey Agitation Sedation Scale (RASS): Alert and calm   B: Spontaneous Breathing Trial Performed?     C: SAT & SBT Coordinated?  N/A                      D: Delirium: CAM-ICU Overall CAM-ICU: Positive   E: Early Mobility Performed? Yes   F: Feeding Goal:    Status:     Current Diet Order   Procedures    Diet NPO      AS: Analgesia/Sedation NONE   T: Thromboembolic Prophylaxis SC heparin   H: HOB > 300 Yes   U: Stress Ulcer Prophylaxis (if needed) PEPCID   G: Glucose Control SSI   B: Bowel Function Stool  Occurrence: 0   I: Indwelling Catheter (Lines & Abreu) Necessity PIV Abreu   D: De-escalation of Antimicrobials/Pharmacotherapies CEFTRIAXONE    Plan for the day/ETD Cont on going care    Code Status:  Family/Goals of Care: DNR  TBD     Critical Care Time: 30 minutes  Critical secondary to Patient has a condition that poses threat to life and bodily function: Cardiac arrest with ROSC, bacteremia, SP extubation, Hypernatremia        Critical care was time spent personally by me on the following activities: development of treatment plan with patient or surrogate and bedside caregivers, discussions with consultants, evaluation of patient's response to treatment, examination of patient, ordering and performing treatments and interventions, ordering and review of laboratory studies, ordering and review of radiographic studies, pulse oximetry, re-evaluation of patient's condition. This critical care time did not overlap with that of any other provider or involve time for any procedures.     Chencho Lechuga MD  Critical Care Medicine  [facility] - Intensive Care \[facility] [place] [facility]\"")

## 2022-03-19 NOTE — PLAN OF CARE
Problem: Infection  Goal: Absence of Infection Signs and Symptoms  Outcome: Ongoing, Progressing     Problem: Adult Inpatient Plan of Care  Goal: Plan of Care Review  Outcome: Ongoing, Progressing  Goal: Patient-Specific Goal (Individualized)  Outcome: Ongoing, Progressing  Goal: Absence of Hospital-Acquired Illness or Injury  Outcome: Ongoing, Progressing  Goal: Optimal Comfort and Wellbeing  Outcome: Ongoing, Progressing  Goal: Readiness for Transition of Care  Outcome: Ongoing, Progressing     Problem: Fall Injury Risk  Goal: Absence of Fall and Fall-Related Injury  Outcome: Ongoing, Progressing     Problem: Skin Injury Risk Increased  Goal: Skin Health and Integrity  Outcome: Ongoing, Progressing     Problem: Coping Ineffective  Goal: Effective Coping  Outcome: Ongoing, Progressing     Problem: Fluid and Electrolyte Imbalance (Acute Kidney Injury/Impairment)  Goal: Fluid and Electrolyte Balance  Outcome: Ongoing, Progressing     Problem: Oral Intake Inadequate (Acute Kidney Injury/Impairment)  Goal: Optimal Nutrition Intake  Outcome: Ongoing, Progressing     Problem: Renal Function Impairment (Acute Kidney Injury/Impairment)  Goal: Effective Renal Function  Outcome: Ongoing, Progressing     Problem: Restraint, Nonbehavioral (Nonviolent)  Goal: Absence of Harm or Injury  Outcome: Ongoing, Progressing

## 2022-03-19 NOTE — PLAN OF CARE
Patient VSS throughout shift, currently on room air satting 100%. D5 gtt continued; BG managed. H2O flushes via NGT provided and tolerated. TF at goal rate and tolerated. Patient not following commands or verbalizing at this time. Patient does withdraw and occasionally localize to pain. No visual tracking noted, but does spontaneously open eyes when spoken to or stimulated. Abreu with adequate UOP. Patient turned q2 with wedge, heels elevated, pressure points protected. Family at bedside, will monitor.     Problem: Adult Inpatient Plan of Care  Goal: Plan of Care Review  Outcome: Ongoing, Progressing  Goal: Patient-Specific Goal (Individualized)  Outcome: Ongoing, Progressing  Goal: Absence of Hospital-Acquired Illness or Injury  Outcome: Ongoing, Progressing  Goal: Optimal Comfort and Wellbeing  Outcome: Ongoing, Progressing  Goal: Readiness for Transition of Care  Outcome: Ongoing, Progressing

## 2022-03-19 NOTE — ASSESSMENT & PLAN NOTE
Hold  entresto due to current hypotension and CODY  Resume antihypertensives once appropriate via NG tube   3/19-  Pt started back on Amlodipine , Coreg via NG tube

## 2022-03-19 NOTE — ASSESSMENT & PLAN NOTE
Etiology unclear but airway compromise, hypokalemia, and sudden cardiac death related to poor EF all possible primary or contributing factors  SP TTM  SP extubation  Wake, weak spont blinks. ? Tracking, No speech

## 2022-03-19 NOTE — SUBJECTIVE & OBJECTIVE
Interval History:    Family is debating continuing current care and PEG tube placement vs Hospice referral.    Review of Systems   Unable to perform ROS: Mental status change   Objective:     Vital Signs (Most Recent):  Temp: 98.1 °F (36.7 °C) (03/19/22 1200)  Pulse: 61 (03/19/22 1430)  Resp: 17 (03/19/22 1430)  BP: 138/80 (03/19/22 1400)  SpO2: 100 % (03/19/22 1430) Vital Signs (24h Range):  Temp:  [97.3 °F (36.3 °C)-98.1 °F (36.7 °C)] 98.1 °F (36.7 °C)  Pulse:  [60-85] 61  Resp:  [11-28] 17  SpO2:  [90 %-100 %] 100 %  BP: (118-160)/(61-80) 138/80     Weight: 51.2 kg (112 lb 14 oz)  Body mass index is 20.65 kg/m².    Intake/Output Summary (Last 24 hours) at 3/19/2022 1511  Last data filed at 3/19/2022 1300  Gross per 24 hour   Intake 1922.12 ml   Output 845 ml   Net 1077.12 ml      Physical Exam  Constitutional:       Comments: Extubated . Off sedation, no verbal response , does not follow commands, withdraws to pain stimulus    HENT:      Head: Normocephalic and atraumatic.   Eyes:      Pupils: Pupils are equal, round, and reactive to light.      Comments: (+) corneal reflex    Cardiovascular:      Rate and Rhythm: Normal rate.      Heart sounds: Normal heart sounds.   Pulmonary:      Breath sounds: Rales present.      Comments: Breath sounds are coarse and bronchial  Abdominal:      General: Bowel sounds are normal.   Genitourinary:     Comments: Abreu catheter in place  Musculoskeletal:      Cervical back: Neck supple.      Right lower leg: No edema.      Left lower leg: No edema.   Neurological:      Comments: Off sedation, Eyes are open, seems awake and occasionally tracks voice , other time not.        Significant Labs: All pertinent labs within the past 24 hours have been reviewed.  BMP:   Recent Labs   Lab 03/19/22  0352   *   *   K 5.1   *   CO2 20*   BUN 71*   CREATININE 2.9*   CALCIUM 9.8   MG 2.4     CBC:   Recent Labs   Lab 03/18/22  0446 03/19/22  0352   WBC 15.29* 14.74*   HGB  10.6* 11.6*   HCT 33.6* 38.3    213     CMP:   Recent Labs   Lab 03/18/22  0446 03/19/22  0352   * 150*   K 4.3 5.1   * 115*   CO2 26 20*   * 148*   BUN 72* 71*   CREATININE 3.2* 2.9*   CALCIUM 9.0 9.8   ANIONGAP 10 15   EGFRNONAA 13* 14*       Significant Imaging:

## 2022-03-19 NOTE — SUBJECTIVE & OBJECTIVE
Interval History:  03/19: seen and examined on nasal canula, T max: 98.2F, UO  805 mls on tube feeds, weak cough. Na 150, Blood cultures from 03/15 NGTD.     dextrose 10 % in water (D10W)      dextrose 5 %        Review of Systems   Unable to perform ROS: Critical illness      Oxygen Concentration (%):  [32] 32     Objective:     Vital Signs (Most Recent):  Temp: 97.3 °F (36.3 °C) (03/19/22 0800)  Pulse: 70 (03/19/22 0800)  Resp: 13 (03/19/22 0800)  BP: (!) 160/72 (03/19/22 0800)  SpO2: 100 % (03/19/22 0800)   Vital Signs (24h Range):  Temp:  [97.3 °F (36.3 °C)-98.2 °F (36.8 °C)] 97.3 °F (36.3 °C)  Pulse:  [65-85] 70  Resp:  [11-28] 13  SpO2:  [91 %-100 %] 100 %  BP: (129-160)/(63-82) 160/72     Weight: 51.2 kg (112 lb 14 oz)  Body mass index is 20.65 kg/m².      Intake/Output Summary (Last 24 hours) at 3/19/2022 0904  Last data filed at 3/19/2022 0800  Gross per 24 hour   Intake 1571.13 ml   Output 815 ml   Net 756.13 ml       Physical Exam  Vitals and nursing note reviewed.   Constitutional:       Appearance: She is ill-appearing.      Interventions: She is not intubated.Nasal cannula in place.       HENT:      Head: Normocephalic and atraumatic.      Nose: Nose normal.   Cardiovascular:      Rate and Rhythm: Normal rate.      Pulses: Normal pulses.   Pulmonary:      Effort: Pulmonary effort is normal. She is not intubated.      Breath sounds: Decreased air movement present.   Abdominal:      General: Bowel sounds are normal.      Palpations: Abdomen is soft.   Musculoskeletal:         General: Normal range of motion.   Skin:     General: Skin is warm.   Neurological:      Mental Status: She is lethargic.       Vents:  Vent Mode: A/C (03/17/22 0714)  Set Rate: 10 BPM (03/17/22 0714)  Vt Set: 350 mL (03/17/22 0714)  Pressure Support: 0 cmH20 (03/17/22 0714)  PEEP/CPAP: 5 cmH20 (03/17/22 0714)  Oxygen Concentration (%): 32 (03/18/22 2031)  Peak Airway Pressure: 15 cmH2O (03/17/22 0714)  Plateau Pressure: 10 cmH20  (03/17/22 0714)  Total Ve: 6.99 mL (03/17/22 0714)  F/VT Ratio<105 (RSBI): (!) 42.55 (03/17/22 0714)    Lines/Drains/Airways       Drain  Duration                  Urethral Catheter 03/10/22 2032 Non-latex 16 Fr. 8 days         NG/OG Tube 03/17/22 1044 Right nostril 1 day              Airway  Duration                  Airway 8 days              Peripheral Intravenous Line  Duration                  Peripheral IV - Single Lumen 03/18/22 1020 22 G Left Forearm <1 day                    Significant Labs:    CBC/Anemia Profile:  Recent Labs   Lab 03/18/22  0446 03/19/22  0352   WBC 15.29* 14.74*   HGB 10.6* 11.6*   HCT 33.6* 38.3    213   MCV 91 95   RDW 16.0* 16.6*        Chemistries:  Recent Labs   Lab 03/18/22 0446 03/19/22  0352   * 150*   K 4.3 5.1   * 115*   CO2 26 20*   BUN 72* 71*   CREATININE 3.2* 2.9*   CALCIUM 9.0 9.8   MG 2.3 2.4   PHOS 3.4 3.8       ABGs: No results for input(s): PH, PCO2, HCO3, POCSATURATED, BE in the last 48 hours.  POCT Glucose:   Recent Labs   Lab 03/18/22  1806 03/18/22  2140 03/19/22  0210   POCTGLUCOSE 105 201* 199*     All pertinent labs within the past 24 hours have been reviewed.    Significant Imaging:  I have reviewed all pertinent imaging results/findings within the past 24 hours.    X-Ray Chest AP Portable  Narrative: EXAMINATION:  XR CHEST AP PORTABLE    CLINICAL HISTORY:  resp failure;    TECHNIQUE:  Single frontal view of the chest was performed.    COMPARISON:  Chest radiograph 03/17/2022 with priors    FINDINGS:  Cardiac leads project over the chest.  Nasogastric tube terminates appropriately in the upper abdomen.  Interval removal of the previously identified right-sided central venous catheter.  Cardiomediastinal silhouette is enlarged, but unchanged.  Unchanged small volume of pleural fluid with likely adjacent atelectasis.  No new infiltrate or consolidation.  No pneumothorax.  Left-sided rib fractures again noted.  Impression: Trace bilateral  pleural effusions with adjacent atelectasis.    Electronically signed by: Elvin Mcnulty  Date:    03/19/2022  Time:    08:36

## 2022-03-19 NOTE — NURSING
Pt resting comfortably. Pt opens eyes spontaneously, PERRLA, all reflexes intact, restrained for pt safety. Pt sats 100% on 2 L NC. Copious secretions with coarse breath sounds. Resp treatments done to improve expulsion of secretions. Hemodynamically stable. UO adequate, but dark. TF at goal and tolerating. No BM. Turned q2h, bed alarm on, all safety measures in place, all alarms set appropriately. Will continue to monitor.

## 2022-03-20 LAB
ANION GAP SERPL CALC-SCNC: 11 MMOL/L (ref 8–16)
ANISOCYTOSIS BLD QL SMEAR: SLIGHT
BASOPHILS # BLD AUTO: 0.02 K/UL (ref 0–0.2)
BASOPHILS NFR BLD: 0.1 % (ref 0–1.9)
BUN SERPL-MCNC: 68 MG/DL (ref 8–23)
CALCIUM SERPL-MCNC: 9.1 MG/DL (ref 8.7–10.5)
CHLORIDE SERPL-SCNC: 112 MMOL/L (ref 95–110)
CO2 SERPL-SCNC: 25 MMOL/L (ref 23–29)
CREAT SERPL-MCNC: 2.2 MG/DL (ref 0.5–1.4)
DACRYOCYTES BLD QL SMEAR: ABNORMAL
DIFFERENTIAL METHOD: ABNORMAL
EOSINOPHIL # BLD AUTO: 0 K/UL (ref 0–0.5)
EOSINOPHIL NFR BLD: 0 % (ref 0–8)
ERYTHROCYTE [DISTWIDTH] IN BLOOD BY AUTOMATED COUNT: 16.2 % (ref 11.5–14.5)
EST. GFR  (AFRICAN AMERICAN): 23 ML/MIN/1.73 M^2
EST. GFR  (NON AFRICAN AMERICAN): 20 ML/MIN/1.73 M^2
GLUCOSE SERPL-MCNC: 224 MG/DL (ref 70–110)
HCT VFR BLD AUTO: 33.6 % (ref 37–48.5)
HGB BLD-MCNC: 10.6 G/DL (ref 12–16)
HYPOCHROMIA BLD QL SMEAR: ABNORMAL
IMM GRANULOCYTES # BLD AUTO: 0.37 K/UL (ref 0–0.04)
IMM GRANULOCYTES NFR BLD AUTO: 2.5 % (ref 0–0.5)
LYMPHOCYTES # BLD AUTO: 0.7 K/UL (ref 1–4.8)
LYMPHOCYTES NFR BLD: 5 % (ref 18–48)
MAGNESIUM SERPL-MCNC: 1.9 MG/DL (ref 1.6–2.6)
MCH RBC QN AUTO: 29.3 PG (ref 27–31)
MCHC RBC AUTO-ENTMCNC: 31.5 G/DL (ref 32–36)
MCV RBC AUTO: 93 FL (ref 82–98)
MONOCYTES # BLD AUTO: 0.7 K/UL (ref 0.3–1)
MONOCYTES NFR BLD: 4.4 % (ref 4–15)
NEUTROPHILS # BLD AUTO: 13 K/UL (ref 1.8–7.7)
NEUTROPHILS NFR BLD: 88 % (ref 38–73)
NRBC BLD-RTO: 0 /100 WBC
OVALOCYTES BLD QL SMEAR: ABNORMAL
PHOSPHATE SERPL-MCNC: 3.4 MG/DL (ref 2.7–4.5)
PLATELET # BLD AUTO: 309 K/UL (ref 150–450)
PLATELET BLD QL SMEAR: ABNORMAL
PMV BLD AUTO: 11.8 FL (ref 9.2–12.9)
POCT GLUCOSE: 134 MG/DL (ref 70–110)
POCT GLUCOSE: 136 MG/DL (ref 70–110)
POCT GLUCOSE: 139 MG/DL (ref 70–110)
POCT GLUCOSE: 184 MG/DL (ref 70–110)
POCT GLUCOSE: 185 MG/DL (ref 70–110)
POCT GLUCOSE: 197 MG/DL (ref 70–110)
POCT GLUCOSE: 210 MG/DL (ref 70–110)
POIKILOCYTOSIS BLD QL SMEAR: SLIGHT
POLYCHROMASIA BLD QL SMEAR: ABNORMAL
POTASSIUM SERPL-SCNC: 4.9 MMOL/L (ref 3.5–5.1)
RBC # BLD AUTO: 3.62 M/UL (ref 4–5.4)
SODIUM SERPL-SCNC: 148 MMOL/L (ref 136–145)
TARGETS BLD QL SMEAR: ABNORMAL
WBC # BLD AUTO: 14.73 K/UL (ref 3.9–12.7)

## 2022-03-20 PROCEDURE — 20000000 HC ICU ROOM

## 2022-03-20 PROCEDURE — 97530 THERAPEUTIC ACTIVITIES: CPT

## 2022-03-20 PROCEDURE — 83735 ASSAY OF MAGNESIUM: CPT | Performed by: INTERNAL MEDICINE

## 2022-03-20 PROCEDURE — 97167 OT EVAL HIGH COMPLEX 60 MIN: CPT

## 2022-03-20 PROCEDURE — 84100 ASSAY OF PHOSPHORUS: CPT | Performed by: INTERNAL MEDICINE

## 2022-03-20 PROCEDURE — 99233 SBSQ HOSP IP/OBS HIGH 50: CPT | Mod: ,,, | Performed by: INTERNAL MEDICINE

## 2022-03-20 PROCEDURE — 97162 PT EVAL MOD COMPLEX 30 MIN: CPT

## 2022-03-20 PROCEDURE — 94640 AIRWAY INHALATION TREATMENT: CPT

## 2022-03-20 PROCEDURE — 99233 PR SUBSEQUENT HOSPITAL CARE,LEVL III: ICD-10-PCS | Mod: ,,, | Performed by: INTERNAL MEDICINE

## 2022-03-20 PROCEDURE — 25000003 PHARM REV CODE 250: Performed by: INTERNAL MEDICINE

## 2022-03-20 PROCEDURE — 63600175 PHARM REV CODE 636 W HCPCS: Performed by: INTERNAL MEDICINE

## 2022-03-20 PROCEDURE — 36415 COLL VENOUS BLD VENIPUNCTURE: CPT | Performed by: INTERNAL MEDICINE

## 2022-03-20 PROCEDURE — A4216 STERILE WATER/SALINE, 10 ML: HCPCS | Performed by: INTERNAL MEDICINE

## 2022-03-20 PROCEDURE — 80048 BASIC METABOLIC PNL TOTAL CA: CPT | Performed by: INTERNAL MEDICINE

## 2022-03-20 PROCEDURE — 85025 COMPLETE CBC W/AUTO DIFF WBC: CPT | Performed by: INTERNAL MEDICINE

## 2022-03-20 PROCEDURE — 25000242 PHARM REV CODE 250 ALT 637 W/ HCPCS: Performed by: INTERNAL MEDICINE

## 2022-03-20 PROCEDURE — 43752 NASAL/OROGASTRIC W/TUBE PLMT: CPT

## 2022-03-20 PROCEDURE — 94668 MNPJ CHEST WALL SBSQ: CPT

## 2022-03-20 PROCEDURE — 94761 N-INVAS EAR/PLS OXIMETRY MLT: CPT

## 2022-03-20 RX ORDER — METRONIDAZOLE 500 MG/1
500 TABLET ORAL EVERY 8 HOURS
Status: DISPENSED | OUTPATIENT
Start: 2022-03-20 | End: 2022-03-26

## 2022-03-20 RX ADMIN — CLOPIDOGREL 75 MG: 75 TABLET, FILM COATED ORAL at 08:03

## 2022-03-20 RX ADMIN — Medication 10 ML: at 02:03

## 2022-03-20 RX ADMIN — AMLODIPINE BESYLATE 10 MG: 10 TABLET ORAL at 08:03

## 2022-03-20 RX ADMIN — DIPHENHYDRAMINE HYDROCHLORIDE 25 MG: 50 INJECTION INTRAMUSCULAR; INTRAVENOUS at 08:03

## 2022-03-20 RX ADMIN — METRONIDAZOLE 500 MG: 500 TABLET ORAL at 09:03

## 2022-03-20 RX ADMIN — METRONIDAZOLE 500 MG: 500 TABLET ORAL at 01:03

## 2022-03-20 RX ADMIN — CARVEDILOL 12.5 MG: 12.5 TABLET, FILM COATED ORAL at 09:03

## 2022-03-20 RX ADMIN — CARVEDILOL 12.5 MG: 12.5 TABLET, FILM COATED ORAL at 08:03

## 2022-03-20 RX ADMIN — INSULIN ASPART 4 UNITS: 100 INJECTION, SOLUTION INTRAVENOUS; SUBCUTANEOUS at 04:03

## 2022-03-20 RX ADMIN — ASPIRIN 81 MG CHEWABLE TABLET 81 MG: 81 TABLET CHEWABLE at 08:03

## 2022-03-20 RX ADMIN — ACETYLCYSTEINE 2 ML: 100 INHALANT RESPIRATORY (INHALATION) at 08:03

## 2022-03-20 RX ADMIN — HEPARIN SODIUM 5000 UNITS: 5000 INJECTION INTRAVENOUS; SUBCUTANEOUS at 01:03

## 2022-03-20 RX ADMIN — HEPARIN SODIUM 5000 UNITS: 5000 INJECTION INTRAVENOUS; SUBCUTANEOUS at 09:03

## 2022-03-20 RX ADMIN — CARBIDOPA AND LEVODOPA 2 TABLET: 25; 100 TABLET ORAL at 02:03

## 2022-03-20 RX ADMIN — FAMOTIDINE 20 MG: 10 INJECTION INTRAVENOUS at 08:03

## 2022-03-20 RX ADMIN — DIPHENHYDRAMINE HYDROCHLORIDE 25 MG: 50 INJECTION INTRAMUSCULAR; INTRAVENOUS at 09:03

## 2022-03-20 RX ADMIN — CEFTRIAXONE 2 G: 2 INJECTION, SOLUTION INTRAVENOUS at 12:03

## 2022-03-20 RX ADMIN — CARBIDOPA AND LEVODOPA 2 TABLET: 25; 100 TABLET ORAL at 08:03

## 2022-03-20 RX ADMIN — INSULIN ASPART 2 UNITS: 100 INJECTION, SOLUTION INTRAVENOUS; SUBCUTANEOUS at 08:03

## 2022-03-20 RX ADMIN — HEPARIN SODIUM 5000 UNITS: 5000 INJECTION INTRAVENOUS; SUBCUTANEOUS at 06:03

## 2022-03-20 RX ADMIN — CARBIDOPA AND LEVODOPA 2 TABLET: 25; 100 TABLET ORAL at 09:03

## 2022-03-20 RX ADMIN — DEXAMETHASONE SODIUM PHOSPHATE 4 MG: 4 INJECTION INTRA-ARTICULAR; INTRALESIONAL; INTRAMUSCULAR; INTRAVENOUS; SOFT TISSUE at 09:03

## 2022-03-20 RX ADMIN — IPRATROPIUM BROMIDE AND ALBUTEROL SULFATE 3 ML: 2.5; .5 SOLUTION RESPIRATORY (INHALATION) at 08:03

## 2022-03-20 RX ADMIN — IPRATROPIUM BROMIDE AND ALBUTEROL SULFATE 3 ML: 2.5; .5 SOLUTION RESPIRATORY (INHALATION) at 02:03

## 2022-03-20 RX ADMIN — Medication 10 ML: at 09:03

## 2022-03-20 RX ADMIN — DEXAMETHASONE SODIUM PHOSPHATE 4 MG: 4 INJECTION INTRA-ARTICULAR; INTRALESIONAL; INTRAMUSCULAR; INTRAVENOUS; SOFT TISSUE at 08:03

## 2022-03-20 NOTE — EICU
EICU Physician Brief Note - Overnight Events    Called for restraint renewal on this patient who is trying to pull out NGT intermittently.   Plan: order renewed for b/l soft wrist restraints for another 24 hours.

## 2022-03-20 NOTE — PLAN OF CARE
POC reviewed. No significant events occurred overnight. PT continues to have minimal interaction and awareness. Will continue to monitor pt.

## 2022-03-20 NOTE — PLAN OF CARE
Pt nonverbal, not tracking, does not follow commands. Normal sinus on cardiac monitor. Blood pressure stable. O2 sats stable at 99% on room air. Afebrile. Poor secretion management. Suction remains at bedside. Abreu in place with satisfactory urine output. NGT in place with tube feeding at goal rate. Tolerating well. NGT was replaced today due to tube removal during physical therapy. SCDS on and active. Heels elevated using boots. Patient turned q2 using foam wedge. Emergency equipment remains at bedside. All alarms active and audible. Bilateral wrist restraints in place to prevent removal of medical equipment. Daughter updated at bedside. Will continue to monitor.   Problem: Restraint, Nonbehavioral (Nonviolent)  Goal: Absence of Harm or Injury  Outcome: Ongoing, Progressing     Problem: Renal Function Impairment (Acute Kidney Injury/Impairment)  Goal: Effective Renal Function  Outcome: Ongoing, Progressing     Problem: Skin Injury Risk Increased  Goal: Skin Health and Integrity  Outcome: Ongoing, Progressing     Problem: Adult Inpatient Plan of Care  Goal: Plan of Care Review  Outcome: Ongoing, Progressing

## 2022-03-20 NOTE — SUBJECTIVE & OBJECTIVE
Interval History:  03/20: seen and examined on nasal canula, T max: 99.0 F, UO  1520 mls on tube feeds, weak cough. Na 148, Blood cultures from 03/15 NGTD. ,      dextrose 10 % in water (D10W)        No data recorded    Respiratory ROS: positive for - cough  negative for - hemoptysis, pleuritic pain, sputum changes, or wheezing     Objective:     Vital Signs (Most Recent):  Temp: 98.3 °F (36.8 °C) (03/20/22 0800)  Pulse: 64 (03/20/22 0900)  Resp: 15 (03/20/22 0900)  BP: 138/71 (03/20/22 0900)  SpO2: 100 % (03/20/22 0900) Vital Signs (24h Range):  Temp:  [98.1 °F (36.7 °C)-99 °F (37.2 °C)] 98.3 °F (36.8 °C)  Pulse:  [52-81] 64  Resp:  [10-24] 15  SpO2:  [90 %-100 %] 100 %  BP: (118-164)/(61-93) 138/71     Weight: 51.2 kg (112 lb 14 oz)  Body mass index is 20.65 kg/m².      Intake/Output Summary (Last 24 hours) at 3/20/2022 0954  Last data filed at 3/20/2022 0900  Gross per 24 hour   Intake 3411.39 ml   Output 1560 ml   Net 1851.39 ml       Physical Exam  Vitals and nursing note reviewed.   Constitutional:       Appearance: She is ill-appearing.      Interventions: She is not intubated.Nasal cannula in place.       HENT:      Head: Normocephalic and atraumatic.      Nose: Nose normal.   Cardiovascular:      Rate and Rhythm: Normal rate.      Pulses: Normal pulses.   Pulmonary:      Effort: Pulmonary effort is normal. She is not intubated.      Breath sounds: Decreased air movement present.   Abdominal:      General: Bowel sounds are normal.      Palpations: Abdomen is soft.   Musculoskeletal:         General: Normal range of motion.   Skin:     General: Skin is warm.   Neurological:      Mental Status: She is lethargic.       Vents:  Vent Mode: A/C (03/17/22 0714)  Set Rate: 10 BPM (03/17/22 0714)  Vt Set: 350 mL (03/17/22 0714)  Pressure Support: 0 cmH20 (03/17/22 0714)  PEEP/CPAP: 5 cmH20 (03/17/22 0714)  Oxygen Concentration (%): 32 (03/18/22 2031)  Peak Airway Pressure: 15 cmH2O (03/17/22 0714)  Plateau  Pressure: 10 cmH20 (03/17/22 0714)  Total Ve: 6.99 mL (03/17/22 0714)  F/VT Ratio<105 (RSBI): (!) 42.55 (03/17/22 0714)    Lines/Drains/Airways       Drain  Duration                  Urethral Catheter 03/10/22 2032 Non-latex 16 Fr. 9 days         NG/OG Tube 03/17/22 1044 Right nostril 2 days              Airway  Duration                  Airway 9 days              Peripheral Intravenous Line  Duration                  Peripheral IV - Single Lumen 03/18/22 1020 22 G Left Forearm 1 day                    Significant Labs:    CBC/Anemia Profile:  Recent Labs   Lab 03/19/22  0352 03/20/22  0404   WBC 14.74* 14.73*   HGB 11.6* 10.6*   HCT 38.3 33.6*    309   MCV 95 93   RDW 16.6* 16.2*        Chemistries:  Recent Labs   Lab 03/19/22  0352 03/20/22  0404   * 148*   K 5.1 4.9   * 112*   CO2 20* 25   BUN 71* 68*   CREATININE 2.9* 2.2*   CALCIUM 9.8 9.1   MG 2.4 1.9   PHOS 3.8 3.4       ABGs: No results for input(s): PH, PCO2, HCO3, POCSATURATED, BE in the last 48 hours.  Blood Culture: No results for input(s): LABBLOO in the last 48 hours.  POCT Glucose:   Recent Labs   Lab 03/19/22  2347 03/20/22  0403 03/20/22  0759   POCTGLUCOSE 197* 210* 184*     All pertinent labs within the past 24 hours have been reviewed.    Significant Imaging:  I have reviewed all pertinent imaging results/findings within the past 24 hours.

## 2022-03-20 NOTE — ASSESSMENT & PLAN NOTE
Monitor I&O, Lasix, estimated creatinine clearance is 15.1 mL/min (A) (based on SCr of 2.2 mg/dL (H)).

## 2022-03-20 NOTE — SUBJECTIVE & OBJECTIVE
Interval History:    At times seems more awake and tracks voice. Moves head and ext intermittently. UOP increased to 1.5L yesterday . Creatinine down to 2.2. NG tube accidentally removed during PT. Daughter has not made her mind on PEG tube placement yet. WBC 14K. Flagyl added. Na 148.       Review of Systems   Unable to perform ROS: Mental status change   Objective:     Vital Signs (Most Recent):  Temp: 98.3 °F (36.8 °C) (03/20/22 0800)  Pulse: 62 (03/20/22 1215)  Resp: 18 (03/20/22 1215)  BP: (!) 142/79 (03/20/22 1215)  SpO2: 99 % (03/20/22 1215)   Vital Signs (24h Range):  Temp:  [98.3 °F (36.8 °C)-99 °F (37.2 °C)] 98.3 °F (36.8 °C)  Pulse:  [52-81] 62  Resp:  [10-24] 18  SpO2:  [90 %-100 %] 99 %  BP: (124-164)/(60-93) 142/79     Weight: 51.2 kg (112 lb 14 oz)  Body mass index is 20.65 kg/m².    Intake/Output Summary (Last 24 hours) at 3/20/2022 1345  Last data filed at 3/20/2022 1200  Gross per 24 hour   Intake 2839.27 ml   Output 1500 ml   Net 1339.27 ml      Physical Exam  Constitutional:       Comments: Extubated . Off sedation, no verbal response , does not follow commands, withdraws to pain stimulus    HENT:      Head: Normocephalic and atraumatic.   Eyes:      Pupils: Pupils are equal, round, and reactive to light.      Comments: (+) corneal reflex    Cardiovascular:      Rate and Rhythm: Normal rate.      Heart sounds: Normal heart sounds.   Pulmonary:      Breath sounds: Rales present.      Comments: Breath sounds are coarse and bronchial  Abdominal:      General: Bowel sounds are normal.   Genitourinary:     Comments: Abreu catheter in place  Musculoskeletal:      Cervical back: Neck supple.      Right lower leg: No edema.      Left lower leg: No edema.   Neurological:      Comments: Off sedation, Eyes are open, seems awake and occasionally tracks voice , other time not.        Significant Labs: All pertinent labs within the past 24 hours have been reviewed.  BMP:   Recent Labs   Lab 03/20/22  4979    *   *   K 4.9   *   CO2 25   BUN 68*   CREATININE 2.2*   CALCIUM 9.1   MG 1.9     CBC:   Recent Labs   Lab 03/19/22  0352 03/20/22  0404   WBC 14.74* 14.73*   HGB 11.6* 10.6*   HCT 38.3 33.6*    309     CMP:   Recent Labs   Lab 03/19/22  0352 03/20/22  0404   * 148*   K 5.1 4.9   * 112*   CO2 20* 25   * 224*   BUN 71* 68*   CREATININE 2.9* 2.2*   CALCIUM 9.8 9.1   ANIONGAP 15 11   EGFRNONAA 14* 20*       Significant Imaging:

## 2022-03-20 NOTE — PROGRESS NOTES
"[facility] - Intensive Care ([facility])  [facility] Medicine  Progress Note    Patient Name: Tanya Madrid  MRN: [id number]  Patient Class: IP- Inpatient   Admission Date: 3/10/2022  Length of Stay: 10 days  Attending Physician: Tim Reilly MD  Primary Care Provider: Maggie Sue NP        Subjective:     Principal Problem:Cardiac arrest        HPI:  History was taken from the electronic chart and from the daughter -  Adenike Lemus Daughter [phone]        82 y.o. female patient with a PMHx of HTN,  CHF-last EF-01/2021-25%, with diastolic dysfunction  , and HLD who presents to the Emergency Department for evaluation of cardiac arrest which onset suddenly 1 hour pta.  Daughter reports that over the last 3 days -she has been complaining of worsening dyspnoea  and was given extra lasix tablets. She usually takes 20mg lasix daily and was given 2 extra tablets with the episodes of  dyspnea.  This evening while having dinner, she "looked blank" and stopped breathing . Daughter called EMS and was advised to start CPR.. Pt achieved ROSC en route. After I shock . Prior Tx includes 50 mcg of fentanyl and 50 mg of ketamine from EMS.   Code status discussed.  She is Full code.  Labs and imaging test reviewed.  CT head -No hemorrhage or acute major vascular distribution infarction.   Dehiscence of the posterior wall of the sphenoid sinus such as on series 4, image 49-52. There is high density material possibly related to paranasal sinus disease or neoplasm which extends towards the pituitary and along the right towards the intracranial ICA.  Further evaluation with contrast enhanced MRI when clinically appropriate would be recommended.       Component      Latest Ref Rng & Units 3/10/2022 3/3/2022              Potassium      3.5 - 5.1 mmol/L 2.9 (L) 3.7   CBC showed wbc -19   Component      Latest Ref Rng & Units 3/10/2022 3/10/2022 4/20/2021          10:47 PM  8:18 PM    Lactate, Lex      0.5 - 2.2 mmol/L " 3.6 (HH) 5.7 (HH) 2.0     Component      Latest Ref Rng & Units 3/10/2022 4/20/2021   BNP      0 - 99 pg/mL >4,900 (H) 2,806 (H)   Code status discussed -she is full code  Her daughter is the SDM.  Adenike Lemus Daughter [phone]       Overview/Hospital Course:  81 y/o AAF admitted to ICU with a dx of cardiac arrest , CODY , acute hypoxic respiratory failure  and  Anoxic encephalopathy . Started on hypothermia protocol , IVAB , asa , plavix  And entresto . Cardiology and palliative care consulted. Tx reviewed . Cont current tx . Prognosis Poor      3/12  She remain critically ill . Now on rewarming phase . Started on propofol due to  only grimacing and posturing . Kidney  function is trending up . She  is on low dose of dobutamine . Case d/w he daughter at bedside . She changed code status to DNR . Prognosis poor .    3/13 Reamin critically ill  on mechanical ventilation . Temp is now > 36 . Last night  was on levophed and dobutamine to keep a map > 65  . Levophed wean off this am . She has been on propofol on and off for posturing  movement . Sedation stop for 2 hrs  .  She has positve  corneal , pupil and gag reflex .  No respond to verval or pain stimuli . She withdraw to pain . She is breathing overt the vent .  Neurology consulted . The UOP has sligly increased since yesterday . The kidney function  cont trending up . The Blood cx are (+) ALPHA HEMOLYTIC STREP    3/14 No significant neurologuical changes since yesterday . She opne her eyes on tactile simlus . She  does not follow commads . Ha been odff sedation for more than 24 hrs .  The Blood cx (+) for STREPTOCOCCUS ANGINOSUS  and EIKENELLA CORRODENS . The kidney function is sligly improving  . Prognsosis POOR .  POC d/w  her duaghter .    3/15- Remains intubated with FiO2 30%/PEEP 5 . Afebrile. Off sedation x 2 days. Remains comatose.  Intermittent spontaneous eye opening but does not seem purposeful. No verbal  response , does not follow  commands or track voice. Intermittent withdrawal to pain stimulus. Corneal, pupillary , cough and gag reflex present. Remains on Dobutamine gtt. EF 10%. Tube feed in progress. UOP improved 1L yesterday, however creatinine bumped to 3.8. Rocephin for Streptococcal  bacteremia continued. TTE negative for vegetation. Palliative Medicine is following. Prognosis appears poor.     3/16- Aferbile.Off sedation. Remains intubated with minimal vent support FiO2 30%/ PEEP 5. However pt is not awake enough to manage secretion and protect airways. Intermittent eye opening but does not seem purposeful. Brain stem reflexes are present. Withdraws to pain stimulus and facial grimace noted with sternal rub. Remains on dobutamine gtt.  ml yesterday . BNPCreatinine stable at 3.7. BNP down to 1493 from 4900 on admit. Will re consult Neurology to assess prognosis . Palliative Medicine is following.     3/17- Eyes are open but not purposeful, No verbal response , withdraws to pain stimulus. ETT cuff leak noted and needed to be exchanged. CC team extubated pt after SBT. Plan to assess clinical course from respiratory stand point with no plan for re intubation. NG tube placement will be attempted for feeding and medication administration. Dobutamine gtt continues.  High risk of sudden demise from cardiac stand point given EF 10%.     3/18- No verbal response, does not follow commands, eyes are open, intermittently seems pt tracks voice other time not. Withdraws to pain stimulus. Weak cough , requiring frequent suctioning to clear airway . Pt bites down suction tube. SpO2 satisfactory on FiO2 2L/min. Afebrile . NG tube feeds in progress , so far tolerating . UOP 1 liter yesterday. Cr 3.2. WBC dhara to 15K. Antibiotic Rocephin continues . Pt remains on Dobutamine gtt. Awaiting Cardiology input. Family declined Palliative Medicine follow up. Will get follow up CT head today.     3/19- Afebrile . On RA. SpO2 100%. Repeat CT head with  preserved gray-white matter junction and no cerebral edema, hemorrhage or other acute process. GCS is difficult to assess. Eyes remains open. No verbal response , withdraws to pain. Pt remains with poor cough effort and lot of secretion requiring suction and breatrh sounds are coarse. CXR with no new new infiltrate or consolidation as of today. WBC 14K, Cr down to 2.9. . Gentle hydration initiated .  ml yesterday. Spoke to marlin at bedside . She is debating continuing current care and PEG tube placement vs Hospice referral. Pt had 10 beats NSVT yesterday .Dobutamine gtt discontinued.     3/20- Afebrile. Remains on Ra. At times seems more awake and tracks voice. Moves head and ext intermittently. UOP increased to 1.5L yesterday . Creatinine down to 2.2. NG tube accidentally removed during PT. Daughter has not made her mind on PEG tube placement yet. WBC 14K. Flagyl added. Na 148.       Interval History:    At times seems more awake and tracks voice. Moves head and ext intermittently. UOP increased to 1.5L yesterday . Creatinine down to 2.2. NG tube accidentally removed during PT. Daughter has not made her mind on PEG tube placement yet. WBC 14K. Flagyl added. Na 148.       Review of Systems   Unable to perform ROS: Mental status change   Objective:     Vital Signs (Most Recent):  Temp: 98.3 °F (36.8 °C) (03/20/22 0800)  Pulse: 62 (03/20/22 1215)  Resp: 18 (03/20/22 1215)  BP: (!) 142/79 (03/20/22 1215)  SpO2: 99 % (03/20/22 1215)   Vital Signs (24h Range):  Temp:  [98.3 °F (36.8 °C)-99 °F (37.2 °C)] 98.3 °F (36.8 °C)  Pulse:  [52-81] 62  Resp:  [10-24] 18  SpO2:  [90 %-100 %] 99 %  BP: (124-164)/(60-93) 142/79     Weight: 51.2 kg (112 lb 14 oz)  Body mass index is 20.65 kg/m².    Intake/Output Summary (Last 24 hours) at 3/20/2022 1345  Last data filed at 3/20/2022 1200  Gross per 24 hour   Intake 2839.27 ml   Output 1500 ml   Net 1339.27 ml      Physical Exam  Constitutional:       Comments: Extubated  . Off sedation, no verbal response , does not follow commands, withdraws to pain stimulus    HENT:      Head: Normocephalic and atraumatic.   Eyes:      Pupils: Pupils are equal, round, and reactive to light.      Comments: (+) corneal reflex    Cardiovascular:      Rate and Rhythm: Normal rate.      Heart sounds: Normal heart sounds.   Pulmonary:      Breath sounds: Rales present.      Comments: Breath sounds are coarse and bronchial  Abdominal:      General: Bowel sounds are normal.   Genitourinary:     Comments: Abreu catheter in place  Musculoskeletal:      Cervical back: Neck supple.      Right lower leg: No edema.      Left lower leg: No edema.   Neurological:      Comments: Off sedation, Eyes are open, seems awake and occasionally tracks voice , other time not.        Significant Labs: All pertinent labs within the past 24 hours have been reviewed.  BMP:   Recent Labs   Lab 03/20/22  0404   *   *   K 4.9   *   CO2 25   BUN 68*   CREATININE 2.2*   CALCIUM 9.1   MG 1.9     CBC:   Recent Labs   Lab 03/19/22  0352 03/20/22  0404   WBC 14.74* 14.73*   HGB 11.6* 10.6*   HCT 38.3 33.6*    309     CMP:   Recent Labs   Lab 03/19/22  0352 03/20/22  0404   * 148*   K 5.1 4.9   * 112*   CO2 20* 25   * 224*   BUN 71* 68*   CREATININE 2.9* 2.2*   CALCIUM 9.8 9.1   ANIONGAP 15 11   EGFRNONAA 14* 20*       Significant Imaging:       Assessment/Plan:      * Cardiac arrest  Out of Hospital cardiac arrest.  Last Echo -1/21-EF -25%, Repeat echo with EF 10%  GIII Diastolic dysfunction.  EKG -Sinus rhythm with occasional Premature ventricular complexes and Premature atrial complexes  Cardiology consulted   Monitor clinical course for neuro recovery   Prognosis poor   3/18-  Extubated yesterday   Requiring frequent suction to keep airway clear . Weak cough   Remains on Dobutamine gtt.  Await Cardiology input  3/19-  Repeat CT head with preserved gray-white matter junction and no cerebral  edema, hemorrhage or other acute process. GCS is difficult to assess. Eyes remains open. No verbal response , withdraws to pain.    Pt had 10 beats NSVT yesterday .  Dobutamine gtt discontinued     Acute on chronic combined systolic and diastolic CHF (congestive heart failure)  Patient is identified as having Combined Systolic and Diastolic heart failure that is Acute on chronic. CHF is currently uncontrolled due to Pulmonary edema/pleural effusion on CXR. Latest ECHO performed and demonstrates- Results for orders placed during the hospital encounter of 01/15/21    Echo Color Flow Doppler? Yes    Interpretation Summary  · The left ventricle is severely enlarged with eccentric hypertrophy and severely decreased systolic function. The estimated ejection fraction is 25%  · Grade II left ventricular diastolic dysfunction.  · Normal right ventricular size with moderately reduced right ventricular systolic function.  · Severe left atrial enlargement.  · There is pulmonary hypertension.  · There is mild aortic valve stenosis.  · Aortic valve area is 1.37 cm2; peak velocity is 1.33 m/s; mean gradient is 4 mmHg.  · Mild to moderate tricuspid regurgitation.  · Intermediate central venous pressure (8 mmHg).  · The estimated PA systolic pressure is 50 mmHg.  · Trivial pericardial effusion.  . Continue Nitrate/Vasodilator and monitor clinical status closely. Monitor on telemetry. Patient is on CHF pathway.  Monitor strict Is&Os and daily weights.  Place on fluid restriction of 1.5 L. Continue to stress to patient importance of self efficacy and  on diet for CHF. Last BNP reviewed- and noted below   Recent Labs   Lab 03/16/22  0334   BNP 1,493*     3/15-  Off Entresto , BB and diuretics given hypotension   3/16-  BB resumed via NG tube   No ACEi/ARB or ARNI due to CODY  Does not appears volume overloaded   Diuretics on hold.     Elevated troponin  In the setting of Cardiac arrest  Cont asa/plavix, BB  Cardiology on board        Anoxic encephalopathy  2/2 to cardiac arrest   Neurology consulted to assess prognosis   3/18-  Repeat head CT today   3/19-  Repeat CT head with preserved gray-white matter junction and no cerebral edema, hemorrhage or other acute process. GCS is difficult to assess. Eyes remains open. No verbal response , withdraws to pain    CODY (acute kidney injury)  2/2 to cardiac arrest   Not  a good candidate for CRT/RRT  Monitor UOP and renal indices         Gram-positive bacteremia  Source of infection is unclear   Cont rocephin x 2 weeks   Initial Blood culture from 3/10/22 positive forALPHA HEMOLYTIC STREP and EIKENELLA CORRODENS non viable for susceptibility   Repeat blood cultures from 3/15/22 - NGTD    Aspiration pneumonia  Initial CXR on admit suggestive of right-sided pulmonary opacities possibly infection, aspiration, or edema.  Antibiotic include Rocephin initiated   Blood cx from 3/10/22 (+) for ALPHA HEMOLYTIC STREP  Tracheal aspirate - neg   Repeat blood cultures from 3/15/22- NGTD  3/20-  Flagyl added given leukocytosis     Hypernatremia  3/19-  In the setting of free water deficit   Gentle hydration with D5 infusion   Free water flush via NG tube       Palliative care encounter  - Palliative medicine consulted for [facility] discussion   -Current code status is DNR  3/18-  Family declined further Palliative Medicine follow up     Hypokalemia  -Replete as indicated     Mass of sphenoid sinus  Ct head showed- Dehiscence of the posterior wall of the sphenoid sinus such as on series 4, image 49-52. There is high density material possibly related to paranasal sinus disease or neoplasm which extends towards the pituitary and along the right towards the intracranial ICA.  Further evaluation with contrast enhanced MRI when clinically appropriate would be recommended.  Suggest including 3D/volumetric postcontrast imaging.  ENT consult as clinically warranted.        History of CVA (cerebrovascular accident)  On  aspirin/plavix. Continue via NG  tube     Parkinson disease  Continue supportive care   Resume meds once appropriate   3/19-  Meds resumed via NG tube       Laryngeal papillomatosis  Known History   Cont supportive tx as needed       Essential hypertension  Hold  entresto due to current hypotension and CODY  Resume antihypertensives once appropriate via NG tube   3/19-  Pt started back on Amlodipine , Coreg via NG tube         VTE Risk Mitigation (From admission, onward)         Ordered     heparin (porcine) injection 5,000 Units  Every 8 hours         03/10/22 2351     IP VTE HIGH RISK PATIENT  Once         03/10/22 2345     Place sequential compression device  Until discontinued         03/10/22 2345                Discharge Planning   PARMINDER:      Code Status: DNR   Is the patient medically ready for discharge?:     Reason for patient still in hospital (select all that apply): Patient trending condition  Discharge Plan A: Other (TBD)            Critical care time spent on the evaluation and treatment of severe organ dysfunction, review of pertinent labs and imaging studies, discussions with consulting providers and discussions with patient/family: 30  minutes.      Tim Reilly MD  Department of Hospital Medicine   '[place] - Intensive Care ([facility])

## 2022-03-20 NOTE — ASSESSMENT & PLAN NOTE
Post cardiac arrest  Current high quality data suggests limited if any benefit but cooling for TTM initiated overnight and reached goal temp prior to our consult/exam.   Completed TTM per protocol  Updated daughter at bedside on exam findings, plan of care, and expected clinical course  Palliative care team consulted for additional support with expected difficult conversations   Patient DNR.    Off sedation and extubated,   Weak cough: aggressive pulmonary toilet  Slow improvement  No speech, moved hand yesterday

## 2022-03-20 NOTE — PROGRESS NOTES
[facility] - Intensive Care ([facility])  Pulmonology  Progress Note    Patient Name: Tanya Madrid  MRN: [id number]  Admission Date: 3/10/2022  Hospital Length of Stay: 10 days  Code Status: DNR  Attending Provider: Tim Reilly MD  Primary Care Provider: Maggie Sue NP   Principal Problem: Cardiac arrest    Subjective:     Interval History:  03/20: seen and examined on nasal canula, T max: 99.0 F, UO  1520 mls on tube feeds, weak cough. Na 148, Blood cultures from 03/15 NGTD. ,      dextrose 10 % in water (D10W)        No data recorded    Respiratory ROS: positive for - cough  negative for - hemoptysis, pleuritic pain, sputum changes, or wheezing     Objective:     Vital Signs (Most Recent):  Temp: 98.3 °F (36.8 °C) (03/20/22 0800)  Pulse: 64 (03/20/22 0900)  Resp: 15 (03/20/22 0900)  BP: 138/71 (03/20/22 0900)  SpO2: 100 % (03/20/22 0900) Vital Signs (24h Range):  Temp:  [98.1 °F (36.7 °C)-99 °F (37.2 °C)] 98.3 °F (36.8 °C)  Pulse:  [52-81] 64  Resp:  [10-24] 15  SpO2:  [90 %-100 %] 100 %  BP: (118-164)/(61-93) 138/71     Weight: 51.2 kg (112 lb 14 oz)  Body mass index is 20.65 kg/m².      Intake/Output Summary (Last 24 hours) at 3/20/2022 0954  Last data filed at 3/20/2022 0900  Gross per 24 hour   Intake 3411.39 ml   Output 1560 ml   Net 1851.39 ml       Physical Exam  Vitals and nursing note reviewed.   Constitutional:       Appearance: She is ill-appearing.      Interventions: She is not intubated.Nasal cannula in place.       HENT:      Head: Normocephalic and atraumatic.      Nose: Nose normal.   Cardiovascular:      Rate and Rhythm: Normal rate.      Pulses: Normal pulses.   Pulmonary:      Effort: Pulmonary effort is normal. She is not intubated.      Breath sounds: Decreased air movement present.   Abdominal:      General: Bowel sounds are normal.      Palpations: Abdomen is soft.   Musculoskeletal:         General: Normal range of motion.   Skin:     General: Skin is warm.   Neurological:       Mental Status: She is lethargic.       Vents:  Vent Mode: A/C (03/17/22 0714)  Set Rate: 10 BPM (03/17/22 0714)  Vt Set: 350 mL (03/17/22 0714)  Pressure Support: 0 cmH20 (03/17/22 0714)  PEEP/CPAP: 5 cmH20 (03/17/22 0714)  Oxygen Concentration (%): 32 (03/18/22 2031)  Peak Airway Pressure: 15 cmH2O (03/17/22 0714)  Plateau Pressure: 10 cmH20 (03/17/22 0714)  Total Ve: 6.99 mL (03/17/22 0714)  F/VT Ratio<105 (RSBI): (!) 42.55 (03/17/22 0714)    Lines/Drains/Airways       Drain  Duration                  Urethral Catheter 03/10/22 2032 Non-latex 16 Fr. 9 days         NG/OG Tube 03/17/22 1044 Right nostril 2 days              Airway  Duration                  Airway 9 days              Peripheral Intravenous Line  Duration                  Peripheral IV - Single Lumen 03/18/22 1020 22 G Left Forearm 1 day                    Significant Labs:    CBC/Anemia Profile:  Recent Labs   Lab 03/19/22  0352 03/20/22  0404   WBC 14.74* 14.73*   HGB 11.6* 10.6*   HCT 38.3 33.6*    309   MCV 95 93   RDW 16.6* 16.2*        Chemistries:  Recent Labs   Lab 03/19/22  0352 03/20/22  0404   * 148*   K 5.1 4.9   * 112*   CO2 20* 25   BUN 71* 68*   CREATININE 2.9* 2.2*   CALCIUM 9.8 9.1   MG 2.4 1.9   PHOS 3.8 3.4       ABGs: No results for input(s): PH, PCO2, HCO3, POCSATURATED, BE in the last 48 hours.  Blood Culture: No results for input(s): LABBLOO in the last 48 hours.  POCT Glucose:   Recent Labs   Lab 03/19/22  2347 03/20/22  0403 03/20/22  0759   POCTGLUCOSE 197* 210* 184*     All pertinent labs within the past 24 hours have been reviewed.    Significant Imaging:  I have reviewed all pertinent imaging results/findings within the past 24 hours.      ABG  Recent Labs   Lab 03/17/22  0423   PH 7.434   PO2 133*   PCO2 40.9   HCO3 27.4   BE 3     Assessment/Plan:     * Cardiac arrest  Etiology unclear but airway compromise, hypokalemia, and sudden cardiac death related to poor EF all possible primary or  contributing factors  SP TTM  SP extubation  Wake, weak spont blinks. ? Tracking, No speech    Hypernatremia  Na 148  Water flushes    Gram-positive bacteremia    ABX CEFTRIAXONE    CODY (acute kidney injury)  Monitor I&O, Lasix, estimated creatinine clearance is 15.1 mL/min (A) (based on SCr of 2.2 mg/dL (H)).     Anoxic encephalopathy  Post cardiac arrest  Current high quality data suggests limited if any benefit but cooling for TTM initiated overnight and reached goal temp prior to our consult/exam.   Completed TTM per protocol  Updated daughter at bedside on exam findings, plan of care, and expected clinical course  Palliative care team consulted for additional support with expected difficult conversations   Patient DNR.    Off sedation and extubated,   Weak cough: aggressive pulmonary toilet  Slow improvement  No speech, moved hand yesterday      Hypokalemia  Monitor and replace    Aspiration pneumonia  Strep Bacteremia  On CEFTRIAXONE  Repeat BC NGTD    Mass of sphenoid sinus  Noted on CT head post cardiac arrest  ?relationship to recurrent laryngeal papillomas  ENT consulted      Elevated troponin  Ischemia w/u when feasible    Acute on chronic combined systolic and diastolic CHF (congestive heart failure)  Last EF 10%  Off Dobutamine  Net +ve  5.7  lit  Cardiology following    History of CVA (cerebrovascular accident)  ASA    Parkinson disease  Restarted SINEMET    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 I have also discussed labs with all the teams in the medical care of the patient and my plan is outlined below      May transfer to floor    Sign of on transfer      Chencho Lechuga MD  Pulmonology  [facility] - Intensive Care ([facility])

## 2022-03-20 NOTE — ASSESSMENT & PLAN NOTE
Initial CXR on admit suggestive of right-sided pulmonary opacities possibly infection, aspiration, or edema.  Antibiotic include Rocephin initiated   Blood cx from 3/10/22 (+) for ALPHA HEMOLYTIC STREP  Tracheal aspirate - neg   Repeat blood cultures from 3/15/22- NGTD  3/20-  Flagyl added given leukocytosis

## 2022-03-20 NOTE — PT/OT/SLP EVAL
Physical Therapy Evaluation    Patient Name:  Tanya Madrid   MRN:  [id number]    Recommendations:     Discharge Recommendations:  nursing facility, skilled   Discharge Equipment Recommendations: none   Barriers to discharge: None    Assessment:     Tanya Madrid is a 82 y.o. female admitted with a medical diagnosis of Cardiac arrest.  She presents with the following impairments/functional limitations:  weakness, impaired balance, decreased safety awareness, impaired endurance, impaired self care skills, impaired cognition, decreased coordination, decreased ROM, impaired functional mobilty, decreased upper extremity function, gait instability, decreased lower extremity function.    Rehab Prognosis: Fair; patient would benefit from acute skilled PT services to address these deficits and reach maximum level of function.    Recent Surgery: * No surgery found *      Plan:     During this hospitalization, patient to be seen 3 x/week to address the identified rehab impairments via gait training, therapeutic activities, therapeutic exercises and progress toward the following goals:    · Plan of Care Expires:  04/03/22    Subjective     Chief Complaint: No complaints. Patient is non-verbal.   Patient/Family Comments/goals: Daughter would like to see patient get back to PLOF as much as possible.  Pain/Comfort:  · Pain Rating 1: 0/10    Patients cultural, spiritual, Buddhism conflicts given the current situation: no    Living Environment:  Patient lives with daughter.   Prior to admission, patients level of function was Mod I with gait with a RW, supervision with ADLs.  Equipment used at home: bedside commode, grab bar, walker, rolling, shower chair.  DME owned (not currently used): none.  Upon discharge, patient will have assistance from daughter.    Objective:     Communicated with TARUN Ramírez prior to session.  Patient found supine with SCD, pressure relief boots, blood pressure cuff, NG tube, restraints,  telemetry, peripheral IV, pulse ox (continuous), henley catheter  upon PT entry to room.    General Precautions: Standard, fall   Orthopedic Precautions:N/A   Braces: N/A  Respiratory Status: Room air    Exams:  · Cognitive Exam:  Patient is oriented to non-verbal, unable to follow commands  · Postural Exam:  Patient presented with the following abnormalities:    · -       Rounded shoulders  · -       Forward head  · -       Slouched at EOB   · RLE ROM: Deficits: PROM WFL  · RLE Strength: -2/5  · LLE ROM: Deficits: PROM WFL  · LLE Strength: -2/5    Functional Mobility:  · Bed Mobility:     · Rolling Right: total assistance and of 2 persons  · Supine to Sit: total assistance and of 2 persons  · Sit to Supine: total assistance and of 2 persons    Therapeutic Activities and Exercises:     Patient found supine in bed with daughter at bedside upon PT entrance into room. PT provided education on role of PT and POC. Patient non-verbal and unable to follow commands during therapy session. Patient Tot A x2 with all bed mobility and Tot A x2 with sitting EOB x ~10 mins. Patient unable to hold head up with slouched posture while seated EOB. PT to follow but may be more appropriate for people movers program, based on progress.     AM-PAC 6 CLICK MOBILITY  Total Score:8     Patient left supine with all lines intact, call button in reach and daughter  present.    GOALS:   Multidisciplinary Problems     Physical Therapy Goals        Problem: Physical Therapy Goal    Goal Priority Disciplines Outcome Goal Variances Interventions   Physical Therapy Goal     PT, PT/OT                      History:     Past Medical History:   Diagnosis Date    Acute CHF (congestive heart failure) 1/17/2021    Hyperlipemia     Hypertension     Parkinson's disease     Stroke 2016    Throat mass        Past Surgical History:   Procedure Laterality Date    CLOSED REDUCTION Right 10/15/2020    Procedure: CLOSED REDUCTION;  Surgeon: Humberto Valdivia,  ;  Location: [facility];  Service: Orthopedics;  Laterality: Right;  ATTEMPTED    EVACUATION OF HEMATOMA Right 10/17/2020    Procedure: EVACUATION, HEMATOMA;  Surgeon: Humberto Valdivia DO;  Location: [facility];  Service: Orthopedics;  Laterality: Right;    HEMIARTHROPLASTY OF HIP Left 7/12/2020    Procedure: HEMIARTHROPLASTY, HIP;  Surgeon: Humberto Valdivia DO;  Location: [facility] OR;  Service: Orthopedics;  Laterality: Left;    HEMIARTHROPLASTY OF HIP Right 10/2/2020    Procedure: HEMIARTHROPLASTY, HIP;  Surgeon: Loyd Kearney MD;  Location: [facility];  Service: Orthopedics;  Laterality: Right;    HYSTERECTOMY      OPEN REDUCTION OF DISLOCATION OF HIP Right 10/17/2020    Procedure: OPEN REDUCTION, DISLOCATION, HIP;  Surgeon: Humberto Valdivia DO;  Location: [facility];  Service: Orthopedics;  Laterality: Right;    THROAT SURGERY      TONSILLECTOMY         Time Tracking:     PT Received On: 03/20/22  PT Start Time: 0930     PT Stop Time: 0953  PT Total Time (min): 23 min     Billable Minutes: Evaluation 11 and Therapeutic Activity 12      03/20/2022

## 2022-03-20 NOTE — ASSESSMENT & PLAN NOTE
Noted on CT head post cardiac arrest  ?relationship to recurrent laryngeal papillomas  ENT consulted

## 2022-03-20 NOTE — ASSESSMENT & PLAN NOTE
Patient is identified as having Combined Systolic and Diastolic heart failure that is Acute on chronic. CHF is currently uncontrolled due to Pulmonary edema/pleural effusion on CXR. Latest ECHO performed and demonstrates- Results for orders placed during the hospital encounter of 01/15/21    Echo Color Flow Doppler? Yes    Interpretation Summary  · The left ventricle is severely enlarged with eccentric hypertrophy and severely decreased systolic function. The estimated ejection fraction is 25%  · Grade II left ventricular diastolic dysfunction.  · Normal right ventricular size with moderately reduced right ventricular systolic function.  · Severe left atrial enlargement.  · There is pulmonary hypertension.  · There is mild aortic valve stenosis.  · Aortic valve area is 1.37 cm2; peak velocity is 1.33 m/s; mean gradient is 4 mmHg.  · Mild to moderate tricuspid regurgitation.  · Intermediate central venous pressure (8 mmHg).  · The estimated PA systolic pressure is 50 mmHg.  · Trivial pericardial effusion.  . Continue Nitrate/Vasodilator and monitor clinical status closely. Monitor on telemetry. Patient is on CHF pathway.  Monitor strict Is&Os and daily weights.  Place on fluid restriction of 1.5 L. Continue to stress to patient importance of self efficacy and  on diet for CHF. Last BNP reviewed- and noted below   Recent Labs   Lab 03/16/22  0334   BNP 1,493*     3/15-  Off Entresto , BB and diuretics given hypotension   3/16-  BB resumed via NG tube   No ACEi/ARB or ARNI due to CODY  Does not appears volume overloaded   Diuretics on hold.

## 2022-03-21 LAB
ANION GAP SERPL CALC-SCNC: 10 MMOL/L (ref 8–16)
BACTERIA BLD CULT: NORMAL
BACTERIA BLD CULT: NORMAL
BASOPHILS # BLD AUTO: 0.02 K/UL (ref 0–0.2)
BASOPHILS NFR BLD: 0.1 % (ref 0–1.9)
BUN SERPL-MCNC: 65 MG/DL (ref 8–23)
CALCIUM SERPL-MCNC: 9 MG/DL (ref 8.7–10.5)
CHLORIDE SERPL-SCNC: 110 MMOL/L (ref 95–110)
CO2 SERPL-SCNC: 24 MMOL/L (ref 23–29)
CREAT SERPL-MCNC: 1.8 MG/DL (ref 0.5–1.4)
DIFFERENTIAL METHOD: ABNORMAL
EOSINOPHIL # BLD AUTO: 0 K/UL (ref 0–0.5)
EOSINOPHIL NFR BLD: 0 % (ref 0–8)
ERYTHROCYTE [DISTWIDTH] IN BLOOD BY AUTOMATED COUNT: 16.1 % (ref 11.5–14.5)
EST. GFR  (AFRICAN AMERICAN): 29 ML/MIN/1.73 M^2
EST. GFR  (NON AFRICAN AMERICAN): 25 ML/MIN/1.73 M^2
GLUCOSE SERPL-MCNC: 212 MG/DL (ref 70–110)
HCT VFR BLD AUTO: 31.9 % (ref 37–48.5)
HGB BLD-MCNC: 10.1 G/DL (ref 12–16)
IMM GRANULOCYTES # BLD AUTO: 0.37 K/UL (ref 0–0.04)
IMM GRANULOCYTES NFR BLD AUTO: 2.1 % (ref 0–0.5)
LYMPHOCYTES # BLD AUTO: 0.7 K/UL (ref 1–4.8)
LYMPHOCYTES NFR BLD: 4.1 % (ref 18–48)
MAGNESIUM SERPL-MCNC: 1.8 MG/DL (ref 1.6–2.6)
MCH RBC QN AUTO: 29 PG (ref 27–31)
MCHC RBC AUTO-ENTMCNC: 31.7 G/DL (ref 32–36)
MCV RBC AUTO: 92 FL (ref 82–98)
MONOCYTES # BLD AUTO: 0.6 K/UL (ref 0.3–1)
MONOCYTES NFR BLD: 3.5 % (ref 4–15)
NEUTROPHILS # BLD AUTO: 15.7 K/UL (ref 1.8–7.7)
NEUTROPHILS NFR BLD: 90.2 % (ref 38–73)
NRBC BLD-RTO: 0 /100 WBC
PHOSPHATE SERPL-MCNC: 4.1 MG/DL (ref 2.7–4.5)
PLATELET # BLD AUTO: 350 K/UL (ref 150–450)
PMV BLD AUTO: 12 FL (ref 9.2–12.9)
POCT GLUCOSE: 189 MG/DL (ref 70–110)
POCT GLUCOSE: 200 MG/DL (ref 70–110)
POCT GLUCOSE: 217 MG/DL (ref 70–110)
POCT GLUCOSE: 249 MG/DL (ref 70–110)
POTASSIUM SERPL-SCNC: 5 MMOL/L (ref 3.5–5.1)
PROCALCITONIN SERPL IA-MCNC: 0.51 NG/ML
RBC # BLD AUTO: 3.48 M/UL (ref 4–5.4)
SODIUM SERPL-SCNC: 144 MMOL/L (ref 136–145)
WBC # BLD AUTO: 17.37 K/UL (ref 3.9–12.7)

## 2022-03-21 PROCEDURE — 63600175 PHARM REV CODE 636 W HCPCS: Performed by: INTERNAL MEDICINE

## 2022-03-21 PROCEDURE — 94760 N-INVAS EAR/PLS OXIMETRY 1: CPT

## 2022-03-21 PROCEDURE — 83735 ASSAY OF MAGNESIUM: CPT | Performed by: INTERNAL MEDICINE

## 2022-03-21 PROCEDURE — 25000242 PHARM REV CODE 250 ALT 637 W/ HCPCS: Performed by: INTERNAL MEDICINE

## 2022-03-21 PROCEDURE — 94668 MNPJ CHEST WALL SBSQ: CPT

## 2022-03-21 PROCEDURE — 84100 ASSAY OF PHOSPHORUS: CPT | Performed by: INTERNAL MEDICINE

## 2022-03-21 PROCEDURE — 80048 BASIC METABOLIC PNL TOTAL CA: CPT | Performed by: INTERNAL MEDICINE

## 2022-03-21 PROCEDURE — 25000003 PHARM REV CODE 250: Performed by: INTERNAL MEDICINE

## 2022-03-21 PROCEDURE — 97530 THERAPEUTIC ACTIVITIES: CPT

## 2022-03-21 PROCEDURE — 84145 PROCALCITONIN (PCT): CPT | Performed by: INTERNAL MEDICINE

## 2022-03-21 PROCEDURE — 21400001 HC TELEMETRY ROOM

## 2022-03-21 PROCEDURE — A4216 STERILE WATER/SALINE, 10 ML: HCPCS | Performed by: INTERNAL MEDICINE

## 2022-03-21 PROCEDURE — 85025 COMPLETE CBC W/AUTO DIFF WBC: CPT | Performed by: INTERNAL MEDICINE

## 2022-03-21 PROCEDURE — 94640 AIRWAY INHALATION TREATMENT: CPT

## 2022-03-21 PROCEDURE — 36415 COLL VENOUS BLD VENIPUNCTURE: CPT | Performed by: INTERNAL MEDICINE

## 2022-03-21 PROCEDURE — 94761 N-INVAS EAR/PLS OXIMETRY MLT: CPT

## 2022-03-21 PROCEDURE — 97167 OT EVAL HIGH COMPLEX 60 MIN: CPT

## 2022-03-21 PROCEDURE — 92523 SPEECH SOUND LANG COMPREHEN: CPT

## 2022-03-21 PROCEDURE — 92610 EVALUATE SWALLOWING FUNCTION: CPT

## 2022-03-21 RX ORDER — SCOLOPAMINE TRANSDERMAL SYSTEM 1 MG/1
1 PATCH, EXTENDED RELEASE TRANSDERMAL
Status: DISCONTINUED | OUTPATIENT
Start: 2022-03-21 | End: 2022-03-29 | Stop reason: HOSPADM

## 2022-03-21 RX ORDER — FAMOTIDINE 40 MG/5ML
20 POWDER, FOR SUSPENSION ORAL DAILY
Status: DISCONTINUED | OUTPATIENT
Start: 2022-03-21 | End: 2022-03-25

## 2022-03-21 RX ADMIN — SCOPOLAMINE 1 PATCH: 1.5 PATCH, EXTENDED RELEASE TRANSDERMAL at 05:03

## 2022-03-21 RX ADMIN — DIPHENHYDRAMINE HYDROCHLORIDE 25 MG: 50 INJECTION INTRAMUSCULAR; INTRAVENOUS at 08:03

## 2022-03-21 RX ADMIN — AMLODIPINE BESYLATE 10 MG: 10 TABLET ORAL at 08:03

## 2022-03-21 RX ADMIN — INSULIN ASPART 4 UNITS: 100 INJECTION, SOLUTION INTRAVENOUS; SUBCUTANEOUS at 04:03

## 2022-03-21 RX ADMIN — Medication 10 ML: at 10:03

## 2022-03-21 RX ADMIN — HEPARIN SODIUM 5000 UNITS: 5000 INJECTION INTRAVENOUS; SUBCUTANEOUS at 06:03

## 2022-03-21 RX ADMIN — METRONIDAZOLE 500 MG: 500 TABLET ORAL at 02:03

## 2022-03-21 RX ADMIN — CARBIDOPA AND LEVODOPA 2 TABLET: 25; 100 TABLET ORAL at 10:03

## 2022-03-21 RX ADMIN — ACETYLCYSTEINE 2 ML: 100 INHALANT RESPIRATORY (INHALATION) at 07:03

## 2022-03-21 RX ADMIN — FAMOTIDINE 20 MG: 40 POWDER, FOR SUSPENSION ORAL at 08:03

## 2022-03-21 RX ADMIN — INSULIN ASPART 1 UNITS: 100 INJECTION, SOLUTION INTRAVENOUS; SUBCUTANEOUS at 12:03

## 2022-03-21 RX ADMIN — HEPARIN SODIUM 5000 UNITS: 5000 INJECTION INTRAVENOUS; SUBCUTANEOUS at 02:03

## 2022-03-21 RX ADMIN — HEPARIN SODIUM 5000 UNITS: 5000 INJECTION INTRAVENOUS; SUBCUTANEOUS at 10:03

## 2022-03-21 RX ADMIN — CEFTRIAXONE 2 G: 2 INJECTION, SOLUTION INTRAVENOUS at 01:03

## 2022-03-21 RX ADMIN — ACETYLCYSTEINE 2 ML: 100 INHALANT RESPIRATORY (INHALATION) at 08:03

## 2022-03-21 RX ADMIN — INSULIN ASPART 2 UNITS: 100 INJECTION, SOLUTION INTRAVENOUS; SUBCUTANEOUS at 11:03

## 2022-03-21 RX ADMIN — Medication 10 ML: at 03:03

## 2022-03-21 RX ADMIN — ASPIRIN 81 MG CHEWABLE TABLET 81 MG: 81 TABLET CHEWABLE at 08:03

## 2022-03-21 RX ADMIN — CARBIDOPA AND LEVODOPA 2 TABLET: 25; 100 TABLET ORAL at 02:03

## 2022-03-21 RX ADMIN — METRONIDAZOLE 500 MG: 500 TABLET ORAL at 06:03

## 2022-03-21 RX ADMIN — INSULIN ASPART 1 UNITS: 100 INJECTION, SOLUTION INTRAVENOUS; SUBCUTANEOUS at 10:03

## 2022-03-21 RX ADMIN — CARBIDOPA AND LEVODOPA 2 TABLET: 25; 100 TABLET ORAL at 08:03

## 2022-03-21 RX ADMIN — IPRATROPIUM BROMIDE AND ALBUTEROL SULFATE 3 ML: 2.5; .5 SOLUTION RESPIRATORY (INHALATION) at 07:03

## 2022-03-21 RX ADMIN — CARVEDILOL 12.5 MG: 12.5 TABLET, FILM COATED ORAL at 08:03

## 2022-03-21 RX ADMIN — IPRATROPIUM BROMIDE AND ALBUTEROL SULFATE 3 ML: 2.5; .5 SOLUTION RESPIRATORY (INHALATION) at 01:03

## 2022-03-21 RX ADMIN — METRONIDAZOLE 500 MG: 500 TABLET ORAL at 10:03

## 2022-03-21 RX ADMIN — DEXAMETHASONE SODIUM PHOSPHATE 4 MG: 4 INJECTION INTRA-ARTICULAR; INTRALESIONAL; INTRAMUSCULAR; INTRAVENOUS; SOFT TISSUE at 08:03

## 2022-03-21 RX ADMIN — Medication 10 ML: at 06:03

## 2022-03-21 RX ADMIN — CARVEDILOL 12.5 MG: 12.5 TABLET, FILM COATED ORAL at 10:03

## 2022-03-21 RX ADMIN — DEXAMETHASONE SODIUM PHOSPHATE 4 MG: 4 INJECTION INTRA-ARTICULAR; INTRALESIONAL; INTRAMUSCULAR; INTRAVENOUS; SOFT TISSUE at 10:03

## 2022-03-21 RX ADMIN — INSULIN ASPART 4 UNITS: 100 INJECTION, SOLUTION INTRAVENOUS; SUBCUTANEOUS at 07:03

## 2022-03-21 RX ADMIN — IPRATROPIUM BROMIDE AND ALBUTEROL SULFATE 3 ML: 2.5; .5 SOLUTION RESPIRATORY (INHALATION) at 08:03

## 2022-03-21 NOTE — PT/OT/SLP EVAL
Occupational Therapy   Evaluation    Name: Tanya Madrid  MRN: [id number]  Admitting Diagnosis:  Cardiac arrest  Recent Surgery: * No surgery found *      Recommendations:     Discharge Recommendations: nursing facility, skilled  Discharge Equipment Recommendations:   (TBD)  Barriers to discharge:  None    Assessment:     Tanya Madrid is a 82 y.o. female with a medical diagnosis of Cardiac arrest.  She presents with SELF CARE DEBILITY. Performance deficits affecting function: weakness, impaired endurance, impaired self care skills, impaired functional mobilty, gait instability, impaired balance, impaired cognition, decreased coordination, decreased upper extremity function, decreased lower extremity function, decreased safety awareness, decreased ROM, impaired coordination.      Rehab Prognosis: Fair; patient would benefit from acute skilled OT services to address these deficits and reach maximum level of function.       Plan:     Patient to be seen 2 x/week to address the above listed problems via self-care/home management, therapeutic activities, therapeutic exercises  · Plan of Care Expires: 04/03/22  · Plan of Care Reviewed with: patient, daughter    Subjective     Chief Complaint: NONE STATED.  Patient/Family Comments/goals: DTR ABDULAZIZ HOPES PATIENT CAN RETURN TO OF.    Occupational Profile:  Living Environment: PATIENT LIVES WITH DTR.  Previous level of function: DTR STATED PATIENT WAS ABLE TO FEED SELF AND PERFORMED ADL'S WITH (A) AS NEEDED WITH ENCOURAGEMENT.  Roles and Routines: PATIENT AMBULATED WITH RW AT HOME AND WOULD SHOWER ON SHOWER CHAIR.  Equipment Used at Home:  walker, rolling, raised toilet, shower chair  Assistance upon Discharge: DTR    Pain/Comfort:  · Pain Rating 1: 0/10    Patients cultural, spiritual, Jew conflicts given the current situation:      Objective:     Communicated with: NURSE RUANO prior to session.  Patient found HOB elevated with blood pressure cuff,  peripheral IV, pulse ox (continuous), restraints, SCD, pressure relief boots, NG tube upon OT entry to room.    General Precautions: Standard, aspiration, fall, NPO   Orthopedic Precautions:    Braces:    Respiratory Status: Room air    Occupational Performance:    Bed Mobility:    · PATIENT (D) WITH ALL LEVELS OF BED MOBILITY.    Functional Mobility/Transfers:  · PATIENT SAT EOB WITH (A) OF 2 PERSONS.  PATIENT UNABLE TO ATTEMPT FUNC MOBILITY NOR T/F'S.    Activities of Daily Living:  · PATIENT NPO AND (D) WITH ALL LEVELS OF SELF CARE.    Cognitive/Visual Perceptual:  PATIENT GAVE EYE CONTACT IN SUPINE BUT NOT EOB DUE TO INABILITY TO HOLD HEAD UP WITHOUT (A).  PATIENT NON-VERBAL.    Physical Exam:  PATIENT DEMO'ED NO FUNC USE OF BUE'S.      AMPAC 6 Click ADL:  AMPAC Total Score: 6    Treatment & Education:  OT STACY COMPLETED.  DTR ABDULAZIZ RECEIVED EDUCATION RE: MANUAL EDEMA MANIPULATION FOR EDEMA CONTROL OF HANDS DUE TO EDEMA NOTED IN (R) HAND.  Education:    Patient left HOB elevated with all lines intact, NURSE notified and DTR present    GOALS:   Multidisciplinary Problems     Occupational Therapy Goals        Problem: Occupational Therapy Goal    Goal Priority Disciplines Outcome Interventions   Occupational Therapy Goal     OT, PT/OT     Description: STG'S TO BE MET BY 4/3/2022:    1)  PATIENT WILL FOLLOW 1 OUT OF 3 VC'S DURING SIMPLE ADL.  2)  DTR WILL BE (I) WITH MANUAL EDEMA MANIPULATION TO (B) HANDS/BUE'S FOR INCREASED EDEMA CONTROL.  3)  PATIENT WILL PERFORM SUPINE <> SIT WITH MOD (A).  4)  PATIENT WILL PERFORM UB DRESSING WITH MOD (A).                   History:     Past Medical History:   Diagnosis Date    Acute CHF (congestive heart failure) 1/17/2021    Hyperlipemia     Hypertension     Parkinson's disease     Stroke 2016    Throat mass        Past Surgical History:   Procedure Laterality Date    CLOSED REDUCTION Right 10/15/2020    Procedure: CLOSED REDUCTION;  Surgeon: Humberto Valdivia DO;   Location: [facility] OR;  Service: Orthopedics;  Laterality: Right;  ATTEMPTED    EVACUATION OF HEMATOMA Right 10/17/2020    Procedure: EVACUATION, HEMATOMA;  Surgeon: Humberto Valdivia DO;  Location: [facility] OR;  Service: Orthopedics;  Laterality: Right;    HEMIARTHROPLASTY OF HIP Left 7/12/2020    Procedure: HEMIARTHROPLASTY, HIP;  Surgeon: Humberto Valdivia DO;  Location: [facility] OR;  Service: Orthopedics;  Laterality: Left;    HEMIARTHROPLASTY OF HIP Right 10/2/2020    Procedure: HEMIARTHROPLASTY, HIP;  Surgeon: Loyd Kearney MD;  Location: [facility] OR;  Service: Orthopedics;  Laterality: Right;    HYSTERECTOMY      OPEN REDUCTION OF DISLOCATION OF HIP Right 10/17/2020    Procedure: OPEN REDUCTION, DISLOCATION, HIP;  Surgeon: Humberto Valdivia DO;  Location: [facility];  Service: Orthopedics;  Laterality: Right;    THROAT SURGERY      TONSILLECTOMY         Time Tracking:     OT Date of Treatment: 03/20/22  OT Start Time: 1025  OT Stop Time: 1051  OT Total Time (min): 26 min    Billable Minutes:  EVAL 10 MIN TA 16 MIN    3/21/2022

## 2022-03-21 NOTE — PROGRESS NOTES
"[facility] - Telemetry ([facility] Medicine  Progress Note    Patient Name: Tanya Madrid  MRN: [id number]  Patient Class: IP- Inpatient   Admission Date: 3/10/2022  Length of Stay: 11 days  Attending Physician: Tim Reilly MD  Primary Care Provider: Maggie Sue NP        Subjective:     Principal Problem:Cardiac arrest        HPI:  History was taken from the electronic chart and from the daughter -  Adenike Lemus Daughter [phone]        82 y.o. female patient with a PMHx of HTN,  CHF-last EF-01/2021-25%, with diastolic dysfunction  , and HLD who presents to the Emergency Department for evaluation of cardiac arrest which onset suddenly 1 hour pta.  Daughter reports that over the last 3 days -she has been complaining of worsening dyspnoea  and was given extra lasix tablets. She usually takes 20mg lasix daily and was given 2 extra tablets with the episodes of  dyspnea.  This evening while having dinner, she "looked blank" and stopped breathing . Daughter called EMS and was advised to start CPR.. Pt achieved ROSC en route. After I shock . Prior Tx includes 50 mcg of fentanyl and 50 mg of ketamine from EMS.   Code status discussed.  She is Full code.  Labs and imaging test reviewed.  CT head -No hemorrhage or acute major vascular distribution infarction.   Dehiscence of the posterior wall of the sphenoid sinus such as on series 4, image 49-52. There is high density material possibly related to paranasal sinus disease or neoplasm which extends towards the pituitary and along the right towards the intracranial ICA.  Further evaluation with contrast enhanced MRI when clinically appropriate would be recommended.       Component      Latest Ref Rng & Units 3/10/2022 3/3/2022              Potassium      3.5 - 5.1 mmol/L 2.9 (L) 3.7   CBC showed wbc -19   Component      Latest Ref Rng & Units 3/10/2022 3/10/2022 4/20/2021          10:47 PM  8:18 PM    Lactate, Lex      0.5 - 2.2 mmol/L 3.6 " (HH) 5.7 (HH) 2.0     Component      Latest Ref Rng & Units 3/10/2022 4/20/2021   BNP      0 - 99 pg/mL >4,900 (H) 2,806 (H)   Code status discussed -she is full code  Her daughter is the SDM.  Adenike Lemus Daughter [phone]       Overview/Hospital Course:  83 y/o AAF admitted to ICU with a dx of cardiac arrest , CODY , acute hypoxic respiratory failure  and  Anoxic encephalopathy . Started on hypothermia protocol , IVAB , asa , plavix  And entresto . Cardiology and palliative care consulted. Tx reviewed . Cont current tx . Prognosis Poor      3/12  She remain critically ill . Now on rewarming phase . Started on propofol due to  only grimacing and posturing . Kidney  function is trending up . She  is on low dose of dobutamine . Case d/w he daughter at bedside . She changed code status to DNR . Prognosis poor .    3/13 Reamin critically ill  on mechanical ventilation . Temp is now > 36 . Last night  was on levophed and dobutamine to keep a map > 65  . Levophed wean off this am . She has been on propofol on and off for posturing  movement . Sedation stop for 2 hrs  .  She has positve  corneal , pupil and gag reflex .  No respond to verval or pain stimuli . She withdraw to pain . She is breathing overt the vent .  Neurology consulted . The UOP has sligly increased since yesterday . The kidney function  cont trending up . The Blood cx are (+) ALPHA HEMOLYTIC STREP    3/14 No significant neurologuical changes since yesterday . She opne her eyes on tactile simlus . She  does not follow commads . Ha been odff sedation for more than 24 hrs .  The Blood cx (+) for STREPTOCOCCUS ANGINOSUS  and EIKENELLA CORRODENS . The kidney function is sligly improving  . Prognsosis POOR .  POC d/w  her duaghter .    3/15- Remains intubated with FiO2 30%/PEEP 5 . Afebrile. Off sedation x 2 days. Remains comatose.  Intermittent spontaneous eye opening but does not seem purposeful. No verbal  response , does not follow  commands or track voice. Intermittent withdrawal to pain stimulus. Corneal, pupillary , cough and gag reflex present. Remains on Dobutamine gtt. EF 10%. Tube feed in progress. UOP improved 1L yesterday, however creatinine bumped to 3.8. Rocephin for Streptococcal  bacteremia continued. TTE negative for vegetation. Palliative Medicine is following. Prognosis appears poor.     3/16- Aferbile.Off sedation. Remains intubated with minimal vent support FiO2 30%/ PEEP 5. However pt is not awake enough to manage secretion and protect airways. Intermittent eye opening but does not seem purposeful. Brain stem reflexes are present. Withdraws to pain stimulus and facial grimace noted with sternal rub. Remains on dobutamine gtt.  ml yesterday . BNPCreatinine stable at 3.7. BNP down to 1493 from 4900 on admit. Will re consult Neurology to assess prognosis . Palliative Medicine is following.     3/17- Eyes are open but not purposeful, No verbal response , withdraws to pain stimulus. ETT cuff leak noted and needed to be exchanged. CC team extubated pt after SBT. Plan to assess clinical course from respiratory stand point with no plan for re intubation. NG tube placement will be attempted for feeding and medication administration. Dobutamine gtt continues.  High risk of sudden demise from cardiac stand point given EF 10%.     3/18- No verbal response, does not follow commands, eyes are open, intermittently seems pt tracks voice other time not. Withdraws to pain stimulus. Weak cough , requiring frequent suctioning to clear airway . Pt bites down suction tube. SpO2 satisfactory on FiO2 2L/min. Afebrile . NG tube feeds in progress , so far tolerating . UOP 1 liter yesterday. Cr 3.2. WBC dhara to 15K. Antibiotic Rocephin continues . Pt remains on Dobutamine gtt. Awaiting Cardiology input. Family declined Palliative Medicine follow up. Will get follow up CT head today.     3/19- Afebrile . On RA. SpO2 100%. Repeat CT head with  preserved gray-white matter junction and no cerebral edema, hemorrhage or other acute process. GCS is difficult to assess. Eyes remains open. No verbal response , withdraws to pain. Pt remains with poor cough effort and lot of secretion requiring suction and breatrh sounds are coarse. CXR with no new new infiltrate or consolidation as of today. WBC 14K, Cr down to 2.9. . Gentle hydration initiated .  ml yesterday. Spoke to marlin at bedside . She is debating continuing current care and PEG tube placement vs Hospice referral. Pt had 10 beats NSVT yesterday .Dobutamine gtt discontinued.     3/20- Afebrile. Remains on RA. At times seems more awake and tracks voice. Moves head and ext intermittently. UOP increased to 1.5L yesterday . Creatinine down to 2.2. NG tube accidentally removed during PT. Daughter has not made her mind on PEG tube placement yet. WBC 14K. Flagyl added. Na 148.     3/21- On RA. Pt with poor secretion management . Daughter elected PEG tube placement . Plavix and ASA held from today. IR consulted for PEG tube. Notify IR once pt remains off antiplatelet x 5 days. WBC 17K. PCT down to 0.51. Falgyl added with current Rocephin. Cr further improved to 1.8      Interval History:     Daughter elected PEG tube placement . Plavix and ASA held from today. IR consulted for PEG tube    Review of Systems   Unable to perform ROS: Mental status change   Objective:     Vital Signs (Most Recent):  Temp: 98.8 °F (37.1 °C) (03/21/22 1200)  Pulse: 79 (03/21/22 1356)  Resp: 18 (03/21/22 1356)  BP: 120/71 (03/21/22 1200)  SpO2: 100 % (03/21/22 1356) Vital Signs (24h Range):  Temp:  [97.4 °F (36.3 °C)-98.9 °F (37.2 °C)] 98.8 °F (37.1 °C)  Pulse:  [56-82] 79  Resp:  [14-34] 18  SpO2:  [94 %-100 %] 100 %  BP: ()/(54-92) 120/71     Weight: 51.2 kg (112 lb 14 oz)  Body mass index is 20.65 kg/m².    Intake/Output Summary (Last 24 hours) at 3/21/2022 1641  Last data filed at 3/21/2022 1515  Gross per 24  hour   Intake 2235.31 ml   Output 1055 ml   Net 1180.31 ml      Physical Exam  Constitutional:       Comments: Extubated . Off sedation, no verbal response , does not follow commands, withdraws to pain stimulus    HENT:      Head: Normocephalic and atraumatic.   Eyes:      Pupils: Pupils are equal, round, and reactive to light.      Comments: (+) corneal reflex    Cardiovascular:      Rate and Rhythm: Normal rate.      Heart sounds: Normal heart sounds.   Pulmonary:      Breath sounds: Rales present.      Comments: Breath sounds are coarse and bronchial  Abdominal:      General: Bowel sounds are normal.   Genitourinary:     Comments: Abreu catheter in place  Musculoskeletal:      Cervical back: Neck supple.      Right lower leg: No edema.      Left lower leg: No edema.   Neurological:      Comments: Off sedation, Eyes are open, seems awake and occasionally tracks voice , other time not.        Significant Labs: All pertinent labs within the past 24 hours have been reviewed.  BMP:   Recent Labs   Lab 03/21/22  0336   *      K 5.0      CO2 24   BUN 65*   CREATININE 1.8*   CALCIUM 9.0   MG 1.8     CBC:   Recent Labs   Lab 03/20/22  0404 03/21/22  0336   WBC 14.73* 17.37*   HGB 10.6* 10.1*   HCT 33.6* 31.9*    350     CMP:   Recent Labs   Lab 03/20/22  0404 03/21/22  0336   * 144   K 4.9 5.0   * 110   CO2 25 24   * 212*   BUN 68* 65*   CREATININE 2.2* 1.8*   CALCIUM 9.1 9.0   ANIONGAP 11 10   EGFRNONAA 20* 25*       Significant Imaging:       Assessment/Plan:      * Cardiac arrest  Out of Hospital cardiac arrest.  Last Echo -1/21-EF -25%, Repeat echo with EF 10%  GIII Diastolic dysfunction.  EKG -Sinus rhythm with occasional Premature ventricular complexes and Premature atrial complexes  Cardiology consulted   Monitor clinical course for neuro recovery   Prognosis poor   3/18-  Extubated yesterday   Requiring frequent suction to keep airway clear . Weak cough   Remains on  Dobutamine gtt.  Await Cardiology input  3/19-  Repeat CT head with preserved gray-white matter junction and no cerebral edema, hemorrhage or other acute process. GCS is difficult to assess. Eyes remains open. No verbal response , withdraws to pain.    Pt had 10 beats NSVT yesterday .  Dobutamine gtt discontinued   3/21-  Currently fairly stable from Cardiac standpoint . Downgrade to telemetry     Acute on chronic combined systolic and diastolic CHF (congestive heart failure)  Patient is identified as having Combined Systolic and Diastolic heart failure that is Acute on chronic. CHF is currently uncontrolled due to Pulmonary edema/pleural effusion on CXR. Latest ECHO performed and demonstrates- Results for orders placed during the hospital encounter of 01/15/21    Echo Color Flow Doppler? Yes    Interpretation Summary  · The left ventricle is severely enlarged with eccentric hypertrophy and severely decreased systolic function. The estimated ejection fraction is 25%  · Grade II left ventricular diastolic dysfunction.  · Normal right ventricular size with moderately reduced right ventricular systolic function.  · Severe left atrial enlargement.  · There is pulmonary hypertension.  · There is mild aortic valve stenosis.  · Aortic valve area is 1.37 cm2; peak velocity is 1.33 m/s; mean gradient is 4 mmHg.  · Mild to moderate tricuspid regurgitation.  · Intermediate central venous pressure (8 mmHg).  · The estimated PA systolic pressure is 50 mmHg.  · Trivial pericardial effusion.  . Continue Nitrate/Vasodilator and monitor clinical status closely. Monitor on telemetry. Patient is on CHF pathway.  Monitor strict Is&Os and daily weights.  Place on fluid restriction of 1.5 L. Continue to stress to patient importance of self efficacy and  on diet for CHF. Last BNP reviewed- and noted below   Recent Labs   Lab 03/16/22  0334   BNP 1,493*     3/15-  Off Entresto , BB and diuretics given hypotension   3/16, 3/21   BB  resumed via NG tube   No ACEi/ARB or ARNI due to CODY  Does not appears volume overloaded   Diuretics on hold.     Elevated troponin  In the setting of Cardiac arrest  Cont asa/plavix, BB  Cardiology on board   3/21-  ASA/Plavix held from today for upcoming PEG placement     Anoxic encephalopathy  2/2 to cardiac arrest   Neurology consulted to assess prognosis   3/18-  Repeat head CT today   3/19-  Repeat CT head with preserved gray-white matter junction and no cerebral edema, hemorrhage or other acute process. GCS is difficult to assess. Eyes remains open. No verbal response , withdraws to pain    CODY (acute kidney injury)  2/2 to cardiac arrest   Not  a good candidate for CRT/RRT  Monitor UOP and renal indices   3/21-  Slowly improving       Gram-positive bacteremia  Source of infection is unclear   Cont rocephin x 2 weeks   Initial Blood culture from 3/10/22 positive forALPHA HEMOLYTIC STREP and EIKENELLA CORRODENS non viable for susceptibility   Repeat blood cultures from 3/15/22 - NGTD    Aspiration pneumonia  Initial CXR on admit suggestive of right-sided pulmonary opacities possibly infection, aspiration, or edema.  Antibiotic include Rocephin initiated   Blood cx from 3/10/22 (+) for ALPHA HEMOLYTIC STREP  Tracheal aspirate - neg   Repeat blood cultures from 3/15/22- NGTD  3/20-  Flagyl added given leukocytosis     Hypernatremia  3/19-  In the setting of free water deficit   Gentle hydration with D5 infusion   Free water flush via NG tube   3/21-  Improved     Palliative care encounter  - Palliative medicine consulted for [facility] discussion   -Current code status is DNR  3/18-  Family declined further Palliative Medicine follow up     Hypokalemia  -Replete as indicated     Mass of sphenoid sinus  Ct head showed- Dehiscence of the posterior wall of the sphenoid sinus such as on series 4, image 49-52. There is high density material possibly related to paranasal sinus disease or neoplasm which extends towards the  pituitary and along the right towards the intracranial ICA.  Further evaluation with contrast enhanced MRI when clinically appropriate would be recommended.  Suggest including 3D/volumetric postcontrast imaging.  ENT consult as clinically warranted.        History of CVA (cerebrovascular accident)  On aspirin/plavix. Continue via NG  tube   3/21-  ASA and Plavix held from today for PEG placement     Parkinson disease  Continue supportive care   Resume meds once appropriate   3/19-  Meds resumed via NG tube       Laryngeal papillomatosis  Known History   Cont supportive tx as needed       Essential hypertension  Hold  entresto due to current hypotension and CODY  Resume antihypertensives once appropriate via NG tube   3/19-  Pt started back on Amlodipine , Coreg via NG tube         VTE Risk Mitigation (From admission, onward)         Ordered     heparin (porcine) injection 5,000 Units  Every 8 hours         03/10/22 2351     IP VTE HIGH RISK PATIENT  Once         03/10/22 2345     Place sequential compression device  Until discontinued         03/10/22 2345                Discharge Planning   PARMINDER:      Code Status: DNR   Is the patient medically ready for discharge?:     Reason for patient still in hospital (select all that apply): Patient trending condition  Discharge Plan A: Other (TBD)                  Tim Reilly MD  Department of Hospital Medicine   [facility] - Telemetry ([facility])

## 2022-03-21 NOTE — PLAN OF CARE
PATIENT EVAL'ED 3/20/22.  DTR IN ROOM AND STATED PATIENT WAS AMBULATORY AND PERFORMING ADL'S WITH (A) AS NEEDED PTA.  DTR WOULD LIKE PATIENT TO ATTEMPT TO RETURN TO PLOF.  SNF PLACEMENT RECOMMENDED.

## 2022-03-21 NOTE — EICU
Bilateral soft wrist restrains order renewed for patient's safety to prevent from pulling on lines/tubes. Pt was examined on video prior to the order placement.

## 2022-03-21 NOTE — ASSESSMENT & PLAN NOTE
Out of Hospital cardiac arrest.  Last Echo -1/21-EF -25%, Repeat echo with EF 10%  GIII Diastolic dysfunction.  EKG -Sinus rhythm with occasional Premature ventricular complexes and Premature atrial complexes  Cardiology consulted   Monitor clinical course for neuro recovery   Prognosis poor   3/18-  Extubated yesterday   Requiring frequent suction to keep airway clear . Weak cough   Remains on Dobutamine gtt.  Await Cardiology input  3/19-  Repeat CT head with preserved gray-white matter junction and no cerebral edema, hemorrhage or other acute process. GCS is difficult to assess. Eyes remains open. No verbal response , withdraws to pain.    Pt had 10 beats NSVT yesterday .  Dobutamine gtt discontinued   3/21-  Currently fairly stable from Cardiac standpoint . Downgrade to telemetry

## 2022-03-21 NOTE — PT/OT/SLP EVAL
Speech Language Pathology Evaluation  Cognitive/Bedside Swallow    Patient Name:  Tanya Madrid   MRN:  [id number]  Admitting Diagnosis: Cardiac arrest    Recommendations:                  General Recommendations:  Dysphagia therapy, Speech/language therapy and Cognitive-linguistic therapy  Diet recommendations:  NPO, NPO   Aspiration Precautions: Strict aspiration precautions   General Precautions: Standard, NPO, aspiration  Communication strategies:  yes/no questions only, provide increased time to answer and ongoing assessment as pt nonverbal with severe cognitive-linguistic deficits.    History:     Past Medical History:   Diagnosis Date    Acute CHF (congestive heart failure) 1/17/2021    Hyperlipemia     Hypertension     Parkinson's disease     Stroke 2016    Throat mass        Past Surgical History:   Procedure Laterality Date    CLOSED REDUCTION Right 10/15/2020    Procedure: CLOSED REDUCTION;  Surgeon: Humberto Valdivia DO;  Location: [facility] OR;  Service: Orthopedics;  Laterality: Right;  ATTEMPTED    EVACUATION OF HEMATOMA Right 10/17/2020    Procedure: EVACUATION, HEMATOMA;  Surgeon: Humberto Valdivia DO;  Location: [facility] OR;  Service: Orthopedics;  Laterality: Right;    HEMIARTHROPLASTY OF HIP Left 7/12/2020    Procedure: HEMIARTHROPLASTY, HIP;  Surgeon: Humberto Valdivia DO;  Location: [facility] OR;  Service: Orthopedics;  Laterality: Left;    HEMIARTHROPLASTY OF HIP Right 10/2/2020    Procedure: HEMIARTHROPLASTY, HIP;  Surgeon: Loyd Kearney MD;  Location: [facility] OR;  Service: Orthopedics;  Laterality: Right;    HYSTERECTOMY      OPEN REDUCTION OF DISLOCATION OF HIP Right 10/17/2020    Procedure: OPEN REDUCTION, DISLOCATION, HIP;  Surgeon: Humberto Valdivia DO;  Location: [facility] OR;  Service: Orthopedics;  Laterality: Right;    THROAT SURGERY      TONSILLECTOMY         Social History: Patient lives with at home with her daughter and receives assist with ADL's.    Prior Intubation HX: Pt  intubated 3/13-3/17/22.    Prior diet: Pt currently NPO with NG tube present d/t medical status; however, pt consumes regular consistency diet at home.  Daughter denied dysphagia hx.    Subjective     Pt seen bedside for ST evaluation.  Pt awake with eyes open, tracking voice R-L with ST and family (daughter) at bedside. Open mouth posture/breathing noted at rest. NG tube present--family asking about PEG tube.  Nonverbal with NR to tasks (commands, basic y/n questions).  Patient goals: To improve verbal communication and swallowing.     Pain/Comfort:  · Pain Rating 1: 0/10  · Pain Rating Post-Intervention 1: 0/10  · Pain Rating 2: 0/10  · Pain Rating Post-Intervention 2: 0/10    Respiratory Status:  Nasal Cannula    Objective:     Cognitive Status:    Pt alert with eyes open, visually tracking voice.  No verbal or nonverbal response to tasks. No initiation.  Unable to assess      Receptive Language:   Comprehension:   Pt not responding to BASIC 1-unit commands or Y/N questions.    Pragmatics:    Flat affect. Impaired attention.    Expressive Language:  Verbal:    Unable to assess.  Nonverbal.  Nonverbal:   No form of nonverbal communication attempted by pt.      Motor Speech:  Unable to assess. Nonverbal.    Voice:   Unable to assess.  Nonverbal & aphonic.    Visual-Spatial:  R-L visual tracking present at times.  Unable to assess otherwise.      Oral Musculature Evaluation  · Oral Musculature: general weakness, unable to assess due to poor participation/comprehension (NR to tasks. Not following basic commands. Initially resistive to SLP tactile visualization of oral mech musculature.  Open mouth breathing noted.  Intermittent cough present with suction/yanker use.)  · Dentition: present and adequate  · Secretion Management: coughing on secretions, oral holding, problems swallowing secretions (open mouth breathing/posture with mostly dry oral cavity. No visualization of spontaneous swallow response during session of  saliva.  Intermittent cough present with wet respirations--nonproductive cough.)  · Mucosal Quality: good  · Mandibular Strength and Mobility: impaired  · Oral Labial Strength and Mobility: impaired retraction, impaired seal, impaired pursing  · Lingual Strength and Mobility: impaired strength, impaired right lateral movement, impaired left lateral movement, impaired anterior elevation, impaired protrusion, impaired depression  · Velar Elevation:  (unable to assess.  nonverbal)  · Buccal Strength and Mobility: impaired  · Volitional Cough: present-nonproductive  · Volitional Swallow: Absent  · Voice Prior to PO Intake: nonverbal  · Oral Musculature Comments: Incomplete OM assessment d/t severe cognitive-linguistic impairment and absent manipulation/transit/deglutition of PO trials.    Bedside Swallow Eval:   Consistencies Assessed:  · SLP assessed with ice chip and tsp jello trials.    Oral Phase:   · Oral residue  · Poor oral acceptance   · Absent bolus manipulation and A-P transit  · SLP removed bolus from oral cavity with yanker/suction    Pharyngeal Phase:   · Absent deglutition    Compensatory Strategies  · None    Treatment: Pt recommended for continued SLP services to address suspected oropharyngeal dysphagia & cognitive-linguistic impairment.    Assessment:     Tanya Madrid is a 82 y.o. female with an SLP diagnosis of Dysphagia and Cognitive-Linguistic Impairment.  She presents with severe cognitive-linguistic impairment characterized by nonverbal communication and inability to follow basic commands and Y/N responses.  Pt also exhibiting s/s of suspected dysphagia d/t reduced OM skills, absent oral manipulation, A-P transit and deglutition during CSE.  Pt remains at high risk of aspiration; therefore, recommended to remain NPO with alternate means of nutrition.  Continued SLP services warranted to address communication and swallowing impairment s/p cardiac arrest.    Goals:   Multidisciplinary Problems      SLP Goals        Problem: SLP Goal    Goal Priority Disciplines Outcome   SLP Goal     SLP Ongoing, Progressing   Description: 1. Pt will consume least restrictive PO diet without s/s of dysphagia.  2. Pt will improve cognitive-linguistic ability to communicate basic needs.                   Plan:     · Patient to be seen:  2 x/week   · Plan of Care expires:  03/28/22  · Plan of Care reviewed with:  patient, daughter   · SLP Follow-Up:  Yes       Discharge recommendations:  Discharge Facility/Level of Care Needs:  (ongoing assessment for placement, pending acute progress.)   Barriers to Discharge:  None    Time Tracking:     SLP Treatment Date:   03/21/22  Speech Start Time:  1100  Speech Stop Time:  1130     Speech Total Time (min):  30 min    Billable Minutes: Eval 15 minutes  and Eval Swallow and Oral Function 15 minutes    03/21/2022

## 2022-03-21 NOTE — ASSESSMENT & PLAN NOTE
2/2 to cardiac arrest   Not  a good candidate for CRT/RRT  Monitor UOP and renal indices   3/21-  Slowly improving

## 2022-03-21 NOTE — CONSULTS
Chart reviewed by Dr. Meier.       ASSESSMENT/PLAN:    Dysphagia    The radiologist recommends patient off plavix and aspirin for 5 days.  If this is possible then order IR gastrostomy and schedule as in patient or an outpatient.   If inpatient then would like 1 bottle of thin barium given through the patient's NG tube the night prior to the scheduled procedure.  Please contact [facility] or [facility] radiology if any questions.  I will place order for CT abdomen without contrast for anatomy evaluation.        Thank you for the consult.

## 2022-03-21 NOTE — ASSESSMENT & PLAN NOTE
In the setting of Cardiac arrest  Cont asa/plavix, YASMIN  Cardiology on board   3/21-  ASA/Plavix held from today for upcoming PEG placement

## 2022-03-21 NOTE — ASSESSMENT & PLAN NOTE
3/19-  In the setting of free water deficit   Gentle hydration with D5 infusion   Free water flush via NG tube   3/21-  Improved

## 2022-03-21 NOTE — PLAN OF CARE
POC reviewed. Overall uneventful shift. Pt minimally responsive- she occasionally seems to try to track sound but it isn't consistent. No purposeful movements but sometimes withdraws from pain. Pt was turned q2 hours and oral care was performed as needed. Pt tolerating TF well. Bed alarm is set.

## 2022-03-21 NOTE — NURSING
Radiologist called, states NG tube needs to be advanced 1 inch. Removed commercial securing device, cleansed bridge of nose with soap/water, and mastisol applied. Allowed to air dry and new commercial securing device applied. Tolerated well.

## 2022-03-21 NOTE — PLAN OF CARE
Remains nonverbal. Responds/withdraws to noxious stimuli. No verbal tracking observed. POC reviewed with daughter. All questions encouraged/answered to satisfaction. To CT today per MD orders. TF continues Isosource 1.5 @ 45mL/hr. Tolerating well. Repositioned every 2 hours. VSS.

## 2022-03-21 NOTE — PROGRESS NOTES
[facility] - Intensive Care (Hospital)  Adult Nutrition  Consult Note    SUMMARY     Recommendations     1. Recommend to continue: Isosource 1.5, continuous    - Goal: 45 mL/hr     - 100mL H2O flush q 4-6 hours or per MD/NP.     - Provides 1620 calories (100%EEN), 73g protein (100%EPN), 190g CHO, and 825ml H20 + FWF.     - Monitor Na, Mg, K, Phos, and glucose; correct as indicated.      2. If/when pt able to tolerate PO diet, consider SLP consult for swallow study and texture recommendation   - As medically able, advance diet as tolerated, goal: cardiac (texture per SLP) (fluid per MD/NP)    - Consider feeding assistance with all meals    3. RD to follow up to monitor intake, tolerance, and labs.   *Please send consult if acute nutrition needs arise.    Goals:    1. Pt will tolerate >75% estimated energy needs by RD follow up.      Nutrition Goal Status: goal met, continues     Communication of RD recs: POC, sticky note, secure chat and second sign    Assessment and Plan  Nutrition Problem    Inadequate oral intake  Related to (etiology):      Decreased ability to consume sufficient energy  Signs and Symptoms (as evidenced by):     NPO, intubated  Interventions:    Enteral nutrition    Collaboration with other care providers  Nutrition Diagnosis Status:   Continues     Reason for Assessment  Reason for assessment: RD follow-up (enteral feeds)  Diagnosis: Cardiac arrest   Relevant medical history: CHF, HTN, HLD, GERD, CVA, dysphagia, anoxic encephalopathy    General information comments:     03/11/2022: RD consulted for TF recs. Pt admitted today following cardiac arrest, now intubated in ICU. Propofol d/c'd today. Altered renal labs noted. Will continue to monitor.     3/14: Patient seen for follow up. Patient is NPO and intubated. Patient is not receiving propofol. Patient has no edema noted. Patients weight has been stable since admit. Patient is tolerating TF @ 35mL/hr and increasing to meet goal rate. Patients  "last BM 3/11. Will continue to monitor.     3/21: Patient seen for follow up. Patient is NPO. Patient was extubated 3/17. Patient has NG tube and is tolerating TF @ goal rate. Patients last BM 3/18. Will continue to monitor.     Nutrition Discharge Planning - pending medical course    Nutrition Risk Screen        Nutrition Risk Screen: tube feeding or parenteral nutrition    Nutrition/Diet History  Patient Reported Diet/Restrictions/Preferences: [place]   Food Allergies: NKFA  Factors Affecting Nutritional Intake: impaired cognitive status/motor control, difficulty/impaired swallowing and NPO   Spiritual, Cultural Beliefs, Lutheran Practices, Values that Affect Care: no Spiritual, Cultural Beliefs, Lutheran Practices, Values that Affect Care: no    Anthropometrics  Temp: 98.9 °F (37.2 °C)  Height: 5' 2" (157.5 cm)  Height (inches): 62 in  Weight Method: Bed Scale  Weight: 51.2 kg (112 lb 14 oz)  Weight (lb): 112.88 lb  Ideal Body Weight (IBW), Female: 110 lb  % Ideal Body Weight, Female (lb): 105.45 %  BMI (Calculated): 20.6       Labs  Pertinent Labs: reviewed  Lab Results   Component Value Date    HGB 10.1 (L) 03/21/2022    HCT 31.9 (L) 03/21/2022     03/21/2022    CALCIUM 9.0 03/21/2022    K 5.0 03/21/2022    PHOS 4.1 03/21/2022    BUN 65 (H) 03/21/2022    CREATININE 1.8 (H) 03/21/2022    ESTGFRAFRICA 29 (A) 03/21/2022    EGFRNONAA 25 (A) 03/21/2022    ALBUMIN 2.9 (L) 03/17/2022     Lab Results   Component Value Date    ALT 29 03/17/2022    AST 40 03/17/2022    ALKPHOS 109 03/17/2022    BILITOT 0.3 03/17/2022     Meds  Pertinent Medications: reviewed    acetylcysteine 100 mg/ml (10%)  2 mL Nebulization Q12H    albuterol-ipratropium  3 mL Nebulization Q6H WAKE    amLODIPine  10 mg Per NG tube Daily    aspirin  81 mg Per OG tube Daily    carbidopa-levodopa  mg  2 tablet Oral TID    carvediloL  12.5 mg Per NG tube BID    cefTRIAXone (ROCEPHIN) IVPB  2 g Intravenous Q24H    dexamethasone  4 mg " Intravenous Q12H    diphenhydrAMINE  25 mg Intravenous Q12H    famotidine  20 mg Per NG tube Daily    heparin (porcine)  5,000 Units Subcutaneous Q8H    insulin detemir U-100  16 Units Subcutaneous BID    metroNIDAZOLE  500 mg Per NG tube Q8H    sodium chloride 0.9%  10 mL Intravenous Q8H     Continuous Infusions:   dextrose 10 % in water (D10W)       PRN Meds:acetaminophen, dextrose 10 % in water (D10W), dextrose 10%, glucagon (human recombinant), glucose, glucose, hydrALAZINE, HYDROcodone-acetaminophen, insulin aspart U-100, melatonin, naloxone, pneumoc 13-lopez conj-dip cr(PF), polyethylene glycol     Physical Findings/Assessment  Nausea/Vomiting Signs/Symptoms: other (see comments) (no s/s)  Wounds: not indicated   Edema:   not indicated   Last Bowel Movement: 03/18/22 Stool Consistency: creamy GI Signs/Symptoms: fecal incontinence  Carlos Score: 13  Mouth/Teeth WDL: WDL except, teeth Teeth Symptoms: tooth/teeth missing  O2 Device (Oxygen Therapy): room air  NFPE unable to be performed due to patients status    Malnutrition Assessment  Patient does not meet at least 2 ASPEN criteria for malnutrition at this time.   Will continue to monitor.       Estimated/Assessed Needs  Weight Used For Calorie Calculations: 51.2 kg (112 lb 14 oz)  Energy Calorie Requirements (kcal): 1143 (mifflin x 1.25AF)  Energy Need Method: Doniphan-St Jeor  Weight Used For Protein Calculations: 51.2 kg (112 lb 14 oz)  Protein Requirements: 40-51 (0.8-1.0g/kg, maintenance)  RDA Method (mL): 1143ml fluid or per MD/NP  CHO Requirement: 142g (50% CHO)    Nutrition Prescription Ordered  NPO     Evaluation of Received Nutrients  Enteral Calories: 1620  Enteral Protein: 73  Energy Calories Required: meeting needs  Protein Required: meeting needs  Tolerance: tolerating  % Intake of Estimated Energy Needs: 75 - 100 %  % Meal Intake: NPO    Monitor and Evaluation  Food and Nutrient Intake: energy intake  Food and Nutrient Administration: diet  order and enteral nutrition administration  Anthropometric Measurements: weight, weight change, body mass index  Biochemical Data, Medical Tests and Procedures: electrolyte and renal panel, lipid profile, gastrointestinal profile, glucose/endocrine profile, Inflammatory profile  Nutrition-Focused Physical Findings: overall appearance     Nutrition Follow-Up  Level of nutrition risk: moderate  Frequency of follow-up: Once weekly   Magalis Hernández RD,LDN

## 2022-03-21 NOTE — ASSESSMENT & PLAN NOTE
On aspirin/plavix. Continue via NG  tube   3/21-  ASA and Plavix held from today for PEG placement

## 2022-03-21 NOTE — ASSESSMENT & PLAN NOTE
Patient is identified as having Combined Systolic and Diastolic heart failure that is Acute on chronic. CHF is currently uncontrolled due to Pulmonary edema/pleural effusion on CXR. Latest ECHO performed and demonstrates- Results for orders placed during the hospital encounter of 01/15/21    Echo Color Flow Doppler? Yes    Interpretation Summary  · The left ventricle is severely enlarged with eccentric hypertrophy and severely decreased systolic function. The estimated ejection fraction is 25%  · Grade II left ventricular diastolic dysfunction.  · Normal right ventricular size with moderately reduced right ventricular systolic function.  · Severe left atrial enlargement.  · There is pulmonary hypertension.  · There is mild aortic valve stenosis.  · Aortic valve area is 1.37 cm2; peak velocity is 1.33 m/s; mean gradient is 4 mmHg.  · Mild to moderate tricuspid regurgitation.  · Intermediate central venous pressure (8 mmHg).  · The estimated PA systolic pressure is 50 mmHg.  · Trivial pericardial effusion.  . Continue Nitrate/Vasodilator and monitor clinical status closely. Monitor on telemetry. Patient is on CHF pathway.  Monitor strict Is&Os and daily weights.  Place on fluid restriction of 1.5 L. Continue to stress to patient importance of self efficacy and  on diet for CHF. Last BNP reviewed- and noted below   Recent Labs   Lab 03/16/22  0334   BNP 1,493*     3/15-  Off Entresto , BB and diuretics given hypotension   3/16, 3/21   BB resumed via NG tube   No ACEi/ARB or ARNI due to CODY  Does not appears volume overloaded   Diuretics on hold.

## 2022-03-21 NOTE — SUBJECTIVE & OBJECTIVE
Interval History:     Daughter elected PEG tube placement . Plavix and ASA held from today. IR consulted for PEG tube    Review of Systems   Unable to perform ROS: Mental status change   Objective:     Vital Signs (Most Recent):  Temp: 98.8 °F (37.1 °C) (03/21/22 1200)  Pulse: 79 (03/21/22 1356)  Resp: 18 (03/21/22 1356)  BP: 120/71 (03/21/22 1200)  SpO2: 100 % (03/21/22 1356) Vital Signs (24h Range):  Temp:  [97.4 °F (36.3 °C)-98.9 °F (37.2 °C)] 98.8 °F (37.1 °C)  Pulse:  [56-82] 79  Resp:  [14-34] 18  SpO2:  [94 %-100 %] 100 %  BP: ()/(54-92) 120/71     Weight: 51.2 kg (112 lb 14 oz)  Body mass index is 20.65 kg/m².    Intake/Output Summary (Last 24 hours) at 3/21/2022 1641  Last data filed at 3/21/2022 1515  Gross per 24 hour   Intake 2235.31 ml   Output 1055 ml   Net 1180.31 ml      Physical Exam  Constitutional:       Comments: Extubated . Off sedation, no verbal response , does not follow commands, withdraws to pain stimulus    HENT:      Head: Normocephalic and atraumatic.   Eyes:      Pupils: Pupils are equal, round, and reactive to light.      Comments: (+) corneal reflex    Cardiovascular:      Rate and Rhythm: Normal rate.      Heart sounds: Normal heart sounds.   Pulmonary:      Breath sounds: Rales present.      Comments: Breath sounds are coarse and bronchial  Abdominal:      General: Bowel sounds are normal.   Genitourinary:     Comments: Abreu catheter in place  Musculoskeletal:      Cervical back: Neck supple.      Right lower leg: No edema.      Left lower leg: No edema.   Neurological:      Comments: Off sedation, Eyes are open, seems awake and occasionally tracks voice , other time not.        Significant Labs: All pertinent labs within the past 24 hours have been reviewed.  BMP:   Recent Labs   Lab 03/21/22  0336   *      K 5.0      CO2 24   BUN 65*   CREATININE 1.8*   CALCIUM 9.0   MG 1.8     CBC:   Recent Labs   Lab 03/20/22  0404 03/21/22  0336   WBC 14.73* 17.37*    HGB 10.6* 10.1*   HCT 33.6* 31.9*    350     CMP:   Recent Labs   Lab 03/20/22  0404 03/21/22  0336   * 144   K 4.9 5.0   * 110   CO2 25 24   * 212*   BUN 68* 65*   CREATININE 2.2* 1.8*   CALCIUM 9.1 9.0   ANIONGAP 11 10   EGFRNONAA 20* 25*       Significant Imaging:

## 2022-03-22 LAB
ANION GAP SERPL CALC-SCNC: 13 MMOL/L (ref 8–16)
BASOPHILS # BLD AUTO: 0.03 K/UL (ref 0–0.2)
BASOPHILS NFR BLD: 0.2 % (ref 0–1.9)
BUN SERPL-MCNC: 68 MG/DL (ref 8–23)
CALCIUM SERPL-MCNC: 9 MG/DL (ref 8.7–10.5)
CHLORIDE SERPL-SCNC: 111 MMOL/L (ref 95–110)
CO2 SERPL-SCNC: 22 MMOL/L (ref 23–29)
CREAT SERPL-MCNC: 1.8 MG/DL (ref 0.5–1.4)
DIFFERENTIAL METHOD: ABNORMAL
EOSINOPHIL # BLD AUTO: 0 K/UL (ref 0–0.5)
EOSINOPHIL NFR BLD: 0 % (ref 0–8)
ERYTHROCYTE [DISTWIDTH] IN BLOOD BY AUTOMATED COUNT: 15.9 % (ref 11.5–14.5)
EST. GFR  (AFRICAN AMERICAN): 29 ML/MIN/1.73 M^2
EST. GFR  (NON AFRICAN AMERICAN): 25 ML/MIN/1.73 M^2
GLUCOSE SERPL-MCNC: 228 MG/DL (ref 70–110)
HCT VFR BLD AUTO: 31.4 % (ref 37–48.5)
HGB BLD-MCNC: 10 G/DL (ref 12–16)
IMM GRANULOCYTES # BLD AUTO: 0.4 K/UL (ref 0–0.04)
IMM GRANULOCYTES NFR BLD AUTO: 2.3 % (ref 0–0.5)
LYMPHOCYTES # BLD AUTO: 0.7 K/UL (ref 1–4.8)
LYMPHOCYTES NFR BLD: 3.9 % (ref 18–48)
MAGNESIUM SERPL-MCNC: 1.7 MG/DL (ref 1.6–2.6)
MCH RBC QN AUTO: 29.4 PG (ref 27–31)
MCHC RBC AUTO-ENTMCNC: 31.8 G/DL (ref 32–36)
MCV RBC AUTO: 92 FL (ref 82–98)
MONOCYTES # BLD AUTO: 0.5 K/UL (ref 0.3–1)
MONOCYTES NFR BLD: 2.9 % (ref 4–15)
NEUTROPHILS # BLD AUTO: 15.7 K/UL (ref 1.8–7.7)
NEUTROPHILS NFR BLD: 90.7 % (ref 38–73)
NRBC BLD-RTO: 0 /100 WBC
PHOSPHATE SERPL-MCNC: 4.4 MG/DL (ref 2.7–4.5)
PLATELET # BLD AUTO: 340 K/UL (ref 150–450)
PMV BLD AUTO: 12.6 FL (ref 9.2–12.9)
POCT GLUCOSE: 174 MG/DL (ref 70–110)
POCT GLUCOSE: 186 MG/DL (ref 70–110)
POCT GLUCOSE: 204 MG/DL (ref 70–110)
POCT GLUCOSE: 212 MG/DL (ref 70–110)
POCT GLUCOSE: 240 MG/DL (ref 70–110)
POCT GLUCOSE: 250 MG/DL (ref 70–110)
POTASSIUM SERPL-SCNC: 4.9 MMOL/L (ref 3.5–5.1)
RBC # BLD AUTO: 3.4 M/UL (ref 4–5.4)
SODIUM SERPL-SCNC: 146 MMOL/L (ref 136–145)
WBC # BLD AUTO: 17.35 K/UL (ref 3.9–12.7)

## 2022-03-22 PROCEDURE — 63600175 PHARM REV CODE 636 W HCPCS: Performed by: INTERNAL MEDICINE

## 2022-03-22 PROCEDURE — 84100 ASSAY OF PHOSPHORUS: CPT | Performed by: INTERNAL MEDICINE

## 2022-03-22 PROCEDURE — 97530 THERAPEUTIC ACTIVITIES: CPT | Mod: CQ

## 2022-03-22 PROCEDURE — 25000003 PHARM REV CODE 250: Performed by: INTERNAL MEDICINE

## 2022-03-22 PROCEDURE — A4216 STERILE WATER/SALINE, 10 ML: HCPCS | Performed by: INTERNAL MEDICINE

## 2022-03-22 PROCEDURE — 36415 COLL VENOUS BLD VENIPUNCTURE: CPT | Performed by: INTERNAL MEDICINE

## 2022-03-22 PROCEDURE — 94668 MNPJ CHEST WALL SBSQ: CPT

## 2022-03-22 PROCEDURE — 31720 CLEARANCE OF AIRWAYS: CPT

## 2022-03-22 PROCEDURE — 85025 COMPLETE CBC W/AUTO DIFF WBC: CPT | Performed by: INTERNAL MEDICINE

## 2022-03-22 PROCEDURE — 25000242 PHARM REV CODE 250 ALT 637 W/ HCPCS: Performed by: INTERNAL MEDICINE

## 2022-03-22 PROCEDURE — 21400001 HC TELEMETRY ROOM

## 2022-03-22 PROCEDURE — 80048 BASIC METABOLIC PNL TOTAL CA: CPT | Performed by: INTERNAL MEDICINE

## 2022-03-22 PROCEDURE — 94761 N-INVAS EAR/PLS OXIMETRY MLT: CPT

## 2022-03-22 PROCEDURE — 97110 THERAPEUTIC EXERCISES: CPT

## 2022-03-22 PROCEDURE — 83735 ASSAY OF MAGNESIUM: CPT | Performed by: INTERNAL MEDICINE

## 2022-03-22 PROCEDURE — 94640 AIRWAY INHALATION TREATMENT: CPT

## 2022-03-22 RX ORDER — DEXAMETHASONE SODIUM PHOSPHATE 4 MG/ML
4 INJECTION, SOLUTION INTRA-ARTICULAR; INTRALESIONAL; INTRAMUSCULAR; INTRAVENOUS; SOFT TISSUE EVERY 24 HOURS
Status: DISCONTINUED | OUTPATIENT
Start: 2022-03-23 | End: 2022-03-24

## 2022-03-22 RX ORDER — SODIUM CHLORIDE 9 MG/ML
INJECTION, SOLUTION INTRAVENOUS
Status: DISCONTINUED | OUTPATIENT
Start: 2022-03-22 | End: 2022-03-29 | Stop reason: HOSPADM

## 2022-03-22 RX ADMIN — CARBIDOPA AND LEVODOPA 2 TABLET: 25; 100 TABLET ORAL at 02:03

## 2022-03-22 RX ADMIN — FAMOTIDINE 20 MG: 40 POWDER, FOR SUSPENSION ORAL at 09:03

## 2022-03-22 RX ADMIN — AMLODIPINE BESYLATE 10 MG: 10 TABLET ORAL at 09:03

## 2022-03-22 RX ADMIN — IPRATROPIUM BROMIDE AND ALBUTEROL SULFATE 3 ML: 2.5; .5 SOLUTION RESPIRATORY (INHALATION) at 07:03

## 2022-03-22 RX ADMIN — HEPARIN SODIUM 5000 UNITS: 5000 INJECTION INTRAVENOUS; SUBCUTANEOUS at 09:03

## 2022-03-22 RX ADMIN — INSULIN ASPART 4 UNITS: 100 INJECTION, SOLUTION INTRAVENOUS; SUBCUTANEOUS at 06:03

## 2022-03-22 RX ADMIN — CARVEDILOL 12.5 MG: 12.5 TABLET, FILM COATED ORAL at 09:03

## 2022-03-22 RX ADMIN — HEPARIN SODIUM 5000 UNITS: 5000 INJECTION INTRAVENOUS; SUBCUTANEOUS at 01:03

## 2022-03-22 RX ADMIN — CARBIDOPA AND LEVODOPA 2 TABLET: 25; 100 TABLET ORAL at 09:03

## 2022-03-22 RX ADMIN — HEPARIN SODIUM 5000 UNITS: 5000 INJECTION INTRAVENOUS; SUBCUTANEOUS at 05:03

## 2022-03-22 RX ADMIN — IPRATROPIUM BROMIDE AND ALBUTEROL SULFATE 3 ML: 2.5; .5 SOLUTION RESPIRATORY (INHALATION) at 01:03

## 2022-03-22 RX ADMIN — INSULIN ASPART 1 UNITS: 100 INJECTION, SOLUTION INTRAVENOUS; SUBCUTANEOUS at 09:03

## 2022-03-22 RX ADMIN — CEFTRIAXONE 2 G: 2 INJECTION, SOLUTION INTRAVENOUS at 01:03

## 2022-03-22 RX ADMIN — ACETYLCYSTEINE 2 ML: 100 INHALANT RESPIRATORY (INHALATION) at 07:03

## 2022-03-22 RX ADMIN — Medication 10 ML: at 02:03

## 2022-03-22 RX ADMIN — DEXAMETHASONE SODIUM PHOSPHATE 4 MG: 4 INJECTION INTRA-ARTICULAR; INTRALESIONAL; INTRAMUSCULAR; INTRAVENOUS; SOFT TISSUE at 09:03

## 2022-03-22 RX ADMIN — METRONIDAZOLE 500 MG: 500 TABLET ORAL at 09:03

## 2022-03-22 RX ADMIN — Medication 10 ML: at 05:03

## 2022-03-22 RX ADMIN — SODIUM CHLORIDE: 0.9 INJECTION, SOLUTION INTRAVENOUS at 01:03

## 2022-03-22 RX ADMIN — METRONIDAZOLE 500 MG: 500 TABLET ORAL at 01:03

## 2022-03-22 RX ADMIN — METRONIDAZOLE 500 MG: 500 TABLET ORAL at 05:03

## 2022-03-22 RX ADMIN — INSULIN ASPART 4 UNITS: 100 INJECTION, SOLUTION INTRAVENOUS; SUBCUTANEOUS at 09:03

## 2022-03-22 RX ADMIN — Medication 10 ML: at 10:03

## 2022-03-22 NOTE — ASSESSMENT & PLAN NOTE
Out of Hospital cardiac arrest.  Last Echo -1/21-EF -25%, Repeat echo with EF 10%  GIII Diastolic dysfunction.  EKG -Sinus rhythm with occasional Premature ventricular complexes and Premature atrial complexes  Cardiology consulted   Monitor clinical course for neuro recovery   Prognosis poor   3/18-  Extubated yesterday   Requiring frequent suction to keep airway clear . Weak cough   Remains on Dobutamine gtt.  Await Cardiology input  3/19-  Repeat CT head with preserved gray-white matter junction and no cerebral edema, hemorrhage or other acute process. GCS is difficult to assess. Eyes remains open. No verbal response , withdraws to pain.    Pt had 10 beats NSVT yesterday .  Dobutamine gtt discontinued   3/21-  Currently fairly stable from Cardiac standpoint . Downgrade to telemetry     Stable . Cont current tx

## 2022-03-22 NOTE — ASSESSMENT & PLAN NOTE
Hold  entresto due to current hypotension and CODY  Resume antihypertensives once appropriate via NG tube   3/19-  Pt started back on Amlodipine , Coreg via NG tube     Stable . Cont current  Medication

## 2022-03-22 NOTE — PLAN OF CARE
No acute events throughout shift. TF Isosource 1.5 at 45mL/hr per pump. Tolerating well. 300mL water bolus per MD orders. Incontinent of stool. Cleansed. Sacral foam remains intact. Responds minimally to voice. No visual tracking noted. Daughter not present throughout shift. Reviewed plan of care with patient, no evidence of learning noted.

## 2022-03-22 NOTE — ASSESSMENT & PLAN NOTE
On aspirin/plavix. Continue via NG  tube   3/21-  ASA and Plavix held from 3/21 for PEG placement

## 2022-03-22 NOTE — PLAN OF CARE
Responds to pain, sometimes voice. Unable to reorient. Nonverbal; unable to verbalize needs & follow commands. Calm throughout shift.   POC reviewed with pt & daughter. Interventions implemented as appropriate.    VSS; SR on tele-monitor.  On room air.    LFA PIV patent. Integrity maintained.  Receiving IV abx. No adverse reactions noted.  NG tube in place w/ continuous feeding at goal rate of 45 mL/hr. Tolerating well.  Wound to sacrum w/ blanchable redness noted. Four eyes on skin.  B-wrist restraints in place. Order dc's at 0548.  No evidence of pain.  Abreu catheter in place & patent. All infection prevention measures in place. Incontinent of bowel. Brief & bed pad changed as needed.    POCT glucose q4h. SSI required at hs.  Heparin sq for VTE.  Bed bound. Total assist. Activity ad tacho. Turn q2h.  NADN. Resting quietly in bed.   Free of falls. Hourly rounding complete.   All safety measures remain in place. SR up x2; bed low & locked. Bed alarm on & audible. Call light w/in reach.   Will continue to monitor throughout shift.

## 2022-03-22 NOTE — SUBJECTIVE & OBJECTIVE
Interval History:     Review of Systems   Unable to perform ROS: Mental status change   Objective:     Vital Signs (Most Recent):  Temp: 98.9 °F (37.2 °C) (03/22/22 0724)  Pulse: 80 (03/22/22 1300)  Resp: 18 (03/22/22 1300)  BP: 131/75 (03/22/22 0821)  SpO2: 95 % (03/22/22 1300) Vital Signs (24h Range):  Temp:  [98.1 °F (36.7 °C)-98.9 °F (37.2 °C)] 98.9 °F (37.2 °C)  Pulse:  [64-82] 80  Resp:  [16-20] 18  SpO2:  [91 %-100 %] 95 %  BP: (129-154)/(61-95) 131/75     Weight: 57.8 kg (127 lb 6.8 oz)  Body mass index is 23.31 kg/m².    Intake/Output Summary (Last 24 hours) at 3/22/2022 1333  Last data filed at 3/22/2022 0500  Gross per 24 hour   Intake 978.77 ml   Output 950 ml   Net 28.77 ml      Physical Exam  Constitutional:       Comments: no verbal response , does not follow commands, withdraws to pain stimulus    HENT:      Head: Normocephalic and atraumatic.   Eyes:      Pupils: Pupils are equal, round, and reactive to light.      Comments: (+) corneal reflex    Cardiovascular:      Rate and Rhythm: Normal rate.      Heart sounds: Normal heart sounds.   Pulmonary:      Breath sounds: Rales present.      Comments: Breath sounds are coarse and bronchial  Abdominal:      General: Bowel sounds are normal.   Genitourinary:     Comments: Abreu catheter in place  Musculoskeletal:      Cervical back: Neck supple.      Right lower leg: No edema.      Left lower leg: No edema.   Neurological:      Comments:  Eyes are open, seems awake and occasionally tracks voice , other time not.   Intermittent spontaneous upper extremities movements  does not seem purposeful.        Significant Labs: All pertinent labs within the past 24 hours have been reviewed.      Significant Imaging: I have reviewed all pertinent imaging results/findings within the past 24 hours.

## 2022-03-22 NOTE — PROGRESS NOTES
"[facility] - Telemetry ([facility] Medicine  Progress Note    Patient Name: Tanya Madrid  MRN: [id number]  Patient Class: IP- Inpatient   Admission Date: 3/10/2022  Length of Stay: 12 days  Attending Physician: Sj Juan, *  Primary Care Provider: Maggie Sue NP        Subjective:     Principal Problem:Cardiac arrest        HPI:  History was taken from the electronic chart and from the daughter -  Adenike Lemus Daughter [phone]        82 y.o. female patient with a PMHx of HTN,  CHF-last EF-01/2021-25%, with diastolic dysfunction  , and HLD who presents to the Emergency Department for evaluation of cardiac arrest which onset suddenly 1 hour pta.  Daughter reports that over the last 3 days -she has been complaining of worsening dyspnoea  and was given extra lasix tablets. She usually takes 20mg lasix daily and was given 2 extra tablets with the episodes of  dyspnea.  This evening while having dinner, she "looked blank" and stopped breathing . Daughter called EMS and was advised to start CPR.. Pt achieved ROSC en route. After I shock . Prior Tx includes 50 mcg of fentanyl and 50 mg of ketamine from EMS.   Code status discussed.  She is Full code.  Labs and imaging test reviewed.  CT head -No hemorrhage or acute major vascular distribution infarction.   Dehiscence of the posterior wall of the sphenoid sinus such as on series 4, image 49-52. There is high density material possibly related to paranasal sinus disease or neoplasm which extends towards the pituitary and along the right towards the intracranial ICA.  Further evaluation with contrast enhanced MRI when clinically appropriate would be recommended.       Component      Latest Ref Rng & Units 3/10/2022 3/3/2022              Potassium      3.5 - 5.1 mmol/L 2.9 (L) 3.7   CBC showed wbc -19   Component      Latest Ref Rng & Units 3/10/2022 3/10/2022 4/20/2021          10:47 PM  8:18 PM    Lactate, Lex      0.5 - 2.2 " mmol/L 3.6 (HH) 5.7 (HH) 2.0     Component      Latest Ref Rng & Units 3/10/2022 4/20/2021   BNP      0 - 99 pg/mL >4,900 (H) 2,806 (H)   Code status discussed -she is full code  Her daughter is the SDM.  Adenike Lemus Daughter [phone]       Overview/Hospital Course:  83 y/o AAF admitted to ICU with a dx of cardiac arrest , CODY , acute hypoxic respiratory failure  and  Anoxic encephalopathy . Started on hypothermia protocol , IVAB , asa , plavix  And entresto . Cardiology and palliative care consulted. Tx reviewed . Cont current tx . Prognosis Poor      3/12  She remain critically ill . Now on rewarming phase . Started on propofol due to  only grimacing and posturing . Kidney  function is trending up . She  is on low dose of dobutamine . Case d/w he daughter at bedside . She changed code status to DNR . Prognosis poor .    3/13 Reamin critically ill  on mechanical ventilation . Temp is now > 36 . Last night  was on levophed and dobutamine to keep a map > 65  . Levophed wean off this am . She has been on propofol on and off for posturing  movement . Sedation stop for 2 hrs  .  She has positve  corneal , pupil and gag reflex .  No respond to verval or pain stimuli . She withdraw to pain . She is breathing overt the vent .  Neurology consulted . The UOP has sligly increased since yesterday . The kidney function  cont trending up . The Blood cx are (+) ALPHA HEMOLYTIC STREP    3/14 No significant neurologuical changes since yesterday . She opne her eyes on tactile simlus . She  does not follow commads . Ha been odff sedation for more than 24 hrs .  The Blood cx (+) for STREPTOCOCCUS ANGINOSUS  and EIKENELLA CORRODENS . The kidney function is sligly improving  . Prognsosis POOR .  POC d/w  her duaghter .    3/15- Remains intubated with FiO2 30%/PEEP 5 . Afebrile. Off sedation x 2 days. Remains comatose.  Intermittent spontaneous eye opening but does not seem purposeful. No verbal  response , does not  follow commands or track voice. Intermittent withdrawal to pain stimulus. Corneal, pupillary , cough and gag reflex present. Remains on Dobutamine gtt. EF 10%. Tube feed in progress. UOP improved 1L yesterday, however creatinine bumped to 3.8. Rocephin for Streptococcal  bacteremia continued. TTE negative for vegetation. Palliative Medicine is following. Prognosis appears poor.     3/16- Aferbile.Off sedation. Remains intubated with minimal vent support FiO2 30%/ PEEP 5. However pt is not awake enough to manage secretion and protect airways. Intermittent eye opening but does not seem purposeful. Brain stem reflexes are present. Withdraws to pain stimulus and facial grimace noted with sternal rub. Remains on dobutamine gtt.  ml yesterday . BNPCreatinine stable at 3.7. BNP down to 1493 from 4900 on admit. Will re consult Neurology to assess prognosis . Palliative Medicine is following.     3/17- Eyes are open but not purposeful, No verbal response , withdraws to pain stimulus. ETT cuff leak noted and needed to be exchanged. CC team extubated pt after SBT. Plan to assess clinical course from respiratory stand point with no plan for re intubation. NG tube placement will be attempted for feeding and medication administration. Dobutamine gtt continues.  High risk of sudden demise from cardiac stand point given EF 10%.     3/18- No verbal response, does not follow commands, eyes are open, intermittently seems pt tracks voice other time not. Withdraws to pain stimulus. Weak cough , requiring frequent suctioning to clear airway . Pt bites down suction tube. SpO2 satisfactory on FiO2 2L/min. Afebrile . NG tube feeds in progress , so far tolerating . UOP 1 liter yesterday. Cr 3.2. WBC dhara to 15K. Antibiotic Rocephin continues . Pt remains on Dobutamine gtt. Awaiting Cardiology input. Family declined Palliative Medicine follow up. Will get follow up CT head today.     3/19- Afebrile . On RA. SpO2 100%. Repeat CT head  with preserved gray-white matter junction and no cerebral edema, hemorrhage or other acute process. GCS is difficult to assess. Eyes remains open. No verbal response , withdraws to pain. Pt remains with poor cough effort and lot of secretion requiring suction and breatrh sounds are coarse. CXR with no new new infiltrate or consolidation as of today. WBC 14K, Cr down to 2.9. . Gentle hydration initiated .  ml yesterday. Spoke to marlin at bedside . She is debating continuing current care and PEG tube placement vs Hospice referral. Pt had 10 beats NSVT yesterday .Dobutamine gtt discontinued.     3/20- Afebrile. Remains on RA. At times seems more awake and tracks voice. Moves head and ext intermittently. UOP increased to 1.5L yesterday . Creatinine down to 2.2. NG tube accidentally removed during PT. Daughter has not made her mind on PEG tube placement yet. WBC 14K. Flagyl added. Na 148.     3/21- On RA. Pt with poor secretion management . Daughter elected PEG tube placement . Plavix and ASA held from today. IR consulted for PEG tube. Notify IR once pt remains off antiplatelet x 5 days. WBC 17K. PCT down to 0.51. Falgyl added with current Rocephin. Cr further improved to 1.8    3/22 She is aao x 0  in nad .Intermittent spontaneous  upper extremities movement but does not seem purposeful. No verbal  response , does not follow commands but track voice. Plavix  on hold yesterday  . NAEON . No family at bedside .       Interval History:     Review of Systems   Unable to perform ROS: Mental status change   Objective:     Vital Signs (Most Recent):  Temp: 98.9 °F (37.2 °C) (03/22/22 0724)  Pulse: 80 (03/22/22 1300)  Resp: 18 (03/22/22 1300)  BP: 131/75 (03/22/22 0821)  SpO2: 95 % (03/22/22 1300) Vital Signs (24h Range):  Temp:  [98.1 °F (36.7 °C)-98.9 °F (37.2 °C)] 98.9 °F (37.2 °C)  Pulse:  [64-82] 80  Resp:  [16-20] 18  SpO2:  [91 %-100 %] 95 %  BP: (129-154)/(61-95) 131/75     Weight: 57.8 kg (127 lb 6.8  oz)  Body mass index is 23.31 kg/m².    Intake/Output Summary (Last 24 hours) at 3/22/2022 1333  Last data filed at 3/22/2022 0500  Gross per 24 hour   Intake 978.77 ml   Output 950 ml   Net 28.77 ml      Physical Exam  Constitutional:       Comments: no verbal response , does not follow commands, withdraws to pain stimulus    HENT:      Head: Normocephalic and atraumatic.   Eyes:      Pupils: Pupils are equal, round, and reactive to light.      Comments: (+) corneal reflex    Cardiovascular:      Rate and Rhythm: Normal rate.      Heart sounds: Normal heart sounds.   Pulmonary:      Breath sounds: Rales present.      Comments: Breath sounds are coarse and bronchial  Abdominal:      General: Bowel sounds are normal.   Genitourinary:     Comments: Abreu catheter in place  Musculoskeletal:      Cervical back: Neck supple.      Right lower leg: No edema.      Left lower leg: No edema.   Neurological:      Comments:  Eyes are open, seems awake and occasionally tracks voice , other time not.   Intermittent spontaneous upper extremities movements  does not seem purposeful.        Significant Labs: All pertinent labs within the past 24 hours have been reviewed.      Significant Imaging: I have reviewed all pertinent imaging results/findings within the past 24 hours.        Assessment/Plan:      * Cardiac arrest  Out of Hospital cardiac arrest.  Last Echo -1/21-EF -25%, Repeat echo with EF 10%  GIII Diastolic dysfunction.  EKG -Sinus rhythm with occasional Premature ventricular complexes and Premature atrial complexes  Cardiology consulted   Monitor clinical course for neuro recovery   Prognosis poor   3/18-  Extubated yesterday   Requiring frequent suction to keep airway clear . Weak cough   Remains on Dobutamine gtt.  Await Cardiology input  3/19-  Repeat CT head with preserved gray-white matter junction and no cerebral edema, hemorrhage or other acute process. GCS is difficult to assess. Eyes remains open. No verbal  response , withdraws to pain.    Pt had 10 beats NSVT yesterday .  Dobutamine gtt discontinued   3/21-  Currently fairly stable from Cardiac standpoint . Downgrade to telemetry     Stable . Cont current tx     Hypernatremia  3/19-  In the setting of free water deficit   Gentle hydration with D5 infusion   Free water flush via NG tube   3/21-  Improved     Palliative care encounter  - Palliative medicine consulted for [facility] discussion   -Current code status is DNR  3/18-  Family declined further Palliative Medicine follow up     Gram-positive bacteremia  Source of infection is unclear   Cont rocephin x 2 weeks   Initial Blood culture from 3/10/22 positive forALPHA HEMOLYTIC STREP and EIKENELLA CORRODENS non viable for susceptibility   Repeat blood cultures from 3/15/22 - NGTD    CODY (acute kidney injury)  2/2 to cardiac arrest   Not  a good candidate for CRT/RRT  Monitor UOP and renal indices   3/21-  Slowly improving       Anoxic encephalopathy  2/2 to cardiac arrest   Neurology consulted to assess prognosis   3/18-  Repeat head CT today   3/19-  Repeat CT head with preserved gray-white matter junction and no cerebral edema, hemorrhage or other acute process. GCS is difficult to assess. Eyes remains open. No verbal response , withdraws to pain    Hypokalemia  -Replete as indicated     Aspiration pneumonia  Initial CXR on admit suggestive of right-sided pulmonary opacities possibly infection, aspiration, or edema.  Antibiotic include Rocephin initiated   Blood cx from 3/10/22 (+) for ALPHA HEMOLYTIC STREP  Tracheal aspirate - neg   Repeat blood cultures from 3/15/22- NGTD  3/20-  Flagyl added given leukocytosis     Mass of sphenoid sinus  Ct head showed- Dehiscence of the posterior wall of the sphenoid sinus such as on series 4, image 49-52. There is high density material possibly related to paranasal sinus disease or neoplasm which extends towards the pituitary and along the right towards the intracranial ICA.   Further evaluation with contrast enhanced MRI when clinically appropriate would be recommended.  Suggest including 3D/volumetric postcontrast imaging.  ENT consult as clinically warranted.        Elevated troponin  In the setting of Cardiac arrest  Cont asa/plavix, BB  Cardiology on board   3/21-  ASA/Plavix held from 3/21for upcoming PEG placement     Acute on chronic combined systolic and diastolic CHF (congestive heart failure)  Patient is identified as having Combined Systolic and Diastolic heart failure that is Acute on chronic. CHF is currently uncontrolled due to Pulmonary edema/pleural effusion on CXR. Latest ECHO performed and demonstrates- Results for orders placed during the hospital encounter of 01/15/21    Echo Color Flow Doppler? Yes    Interpretation Summary  · The left ventricle is severely enlarged with eccentric hypertrophy and severely decreased systolic function. The estimated ejection fraction is 25%  · Grade II left ventricular diastolic dysfunction.  · Normal right ventricular size with moderately reduced right ventricular systolic function.  · Severe left atrial enlargement.  · There is pulmonary hypertension.  · There is mild aortic valve stenosis.  · Aortic valve area is 1.37 cm2; peak velocity is 1.33 m/s; mean gradient is 4 mmHg.  · Mild to moderate tricuspid regurgitation.  · Intermediate central venous pressure (8 mmHg).  · The estimated PA systolic pressure is 50 mmHg.  · Trivial pericardial effusion.  . Continue Nitrate/Vasodilator and monitor clinical status closely. Monitor on telemetry. Patient is on CHF pathway.  Monitor strict Is&Os and daily weights.  Place on fluid restriction of 1.5 L. Continue to stress to patient importance of self efficacy and  on diet for CHF. Last BNP reviewed- and noted below   Recent Labs   Lab 03/16/22  0334   BNP 1,493*     3/15-  Off Entresto , BB and diuretics given hypotension   3/16, 3/21   BB resumed via NG tube   No ACEi/ARB or ARNI due  to CODY  Does not appears volume overloaded   Diuretics on hold.     History of CVA (cerebrovascular accident)  On aspirin/plavix. Continue via NG  tube   3/21-  ASA and Plavix held from 3/21 for PEG placement     Parkinson disease  Continue supportive care   Resume meds once appropriate   3/19-  Meds resumed via NG tube       Laryngeal papillomatosis  Known History   Cont supportive tx as needed       Essential hypertension  Hold  entresto due to current hypotension and CODY  Resume antihypertensives once appropriate via NG tube   3/19-  Pt started back on Amlodipine , Coreg via NG tube     Stable . Cont current  Medication       VTE Risk Mitigation (From admission, onward)         Ordered     heparin (porcine) injection 5,000 Units  Every 8 hours         03/10/22 2351     IP VTE HIGH RISK PATIENT  Once         03/10/22 2345     Place sequential compression device  Until discontinued         03/10/22 2345                Discharge Planning   PARMINDER:      Code Status: DNR   Is the patient medically ready for discharge?:     Reason for patient still in hospital (select all that apply): Treatment  Discharge Plan A: Home Health                  Sj Juan MD  Department of Hospital Medicine   [facility] - Telemetry ([facility])

## 2022-03-22 NOTE — PT/OT/SLP PROGRESS
Occupational Therapy   Treatment    Name: Tanya Madrid  MRN: [id number]  Admitting Diagnosis:  Cardiac arrest     Recommendations:     Discharge Recommendations: nursing facility, skilled  Discharge Equipment Recommendations:   (TBD at next level of care)  Barriers to discharge:  None    Assessment:     Tanya Madrid is a 82 y.o. female with a medical diagnosis of Cardiac arrest.  She presents with the following performance deficits affecting function are weakness, impaired endurance, impaired sensation, impaired self care skills, impaired functional mobilty, impaired balance, impaired cognition, decreased coordination, decreased upper extremity function, decreased lower extremity function, decreased ROM, impaired cardiopulmonary response to activity.     Rehab Prognosis:  Fair; patient would benefit from acute skilled OT services to address these deficits and reach maximum level of function.       Plan:     Patient to be seen 2 x/week to address the above listed problems via self-care/home management, therapeutic activities, therapeutic exercises  · Plan of Care Expires: 04/03/22  · Plan of Care Reviewed with: patient    Subjective     Pain/Comfort:  · Pain Rating 1: 0/10 (no nonverbal indicators of pain throughout)    Objective:     Communicated with: NurseMiky, prior to session.  Patient found left sidelying with peripheral IV, telemetry, NG tube, henley catheter upon OT entry to room.    General Precautions: Standard, NPO, fall   Orthopedic Precautions:N/A   Braces: N/A  Respiratory Status: Room air    AMPAC 6 Click ADL: 6    Treatment & Education:  Patient with significant fatigue following PT session earlier this date. Daughter present and in agreement to supine B UE AA/PROM to tolerance. Patient without active engagement in completion of R UE PROM, but intermittent attempts to engage with L UE. Completed in all available supine planes of motion in all joints x10 reps ea. Patient with poor overall  command following. Appropriate eye contact/tracking with therapist movement. Attempted to verbalize, but non purposeful.    Patient left left sidelying with all lines intact, call button in reach, bed alarm on and daughter presentEducation:      GOALS:   Multidisciplinary Problems     Occupational Therapy Goals        Problem: Occupational Therapy Goal    Goal Priority Disciplines Outcome Interventions   Occupational Therapy Goal     OT, PT/OT Ongoing, Progressing    Description: STG'S TO BE MET BY 4/3/2022:    1)  PATIENT WILL FOLLOW 1 OUT OF 3 VC'S DURING SIMPLE ADL.  2)  DTR WILL BE (I) WITH MANUAL EDEMA MANIPULATION TO (B) HANDS/BUE'S FOR INCREASED EDEMA CONTROL.  3)  PATIENT WILL PERFORM SUPINE <> SIT WITH MOD (A).  4)  PATIENT WILL PERFORM UB DRESSING WITH MOD (A).                   Time Tracking:     OT Date of Treatment: 03/22/22  OT Start Time: 1130  OT Stop Time: 1145  OT Total Time (min): 15 min    Billable Minutes:Therapeutic Exercise 15    3/22/2022

## 2022-03-22 NOTE — PLAN OF CARE
[facility] - Telemetry (Hospital)  Discharge Reassessment    Primary Care Provider: Maggie Sue NP    Expected Discharge Date:     Reassessment (most recent)     Discharge Reassessment - 03/21/22 0841        Discharge Reassessment    Did the patient's condition or plan change since previous assessment? Yes     Discharge Plan discussed with: Adult children     Communicated PARMINDER with patient/caregiver Date not available/Unable to determine     Discharge Plan A Home Health     Discharge Plan B Hospice/home     DME Needed Upon Discharge  feeding device;hospital bed;nutrition supplies     Discharge Barriers Identified None     Why the patient remains in the hospital Requires continued medical care        Post-Acute Status    Post-Acute Authorization Home Health     Home Health Status Referrals Sent     Hospital Resources/Appts/Education Provided Post-Acute resouces added to AVS               Awaiting decision on PEG placement. Dc dispo pending hospital course.

## 2022-03-22 NOTE — PT/OT/SLP PROGRESS
Physical Therapy  Treatment    Tanya Madrid   MRN: [id number]   Admitting Diagnosis: Cardiac arrest    PT Received On: 03/22/22  PT Start Time: 1000     PT Stop Time: 1030    PT Total Time (min): 30 min       Billable Minutes:  Therapeutic Activity 30    Treatment Type: Treatment  PT/PTA: PTA     PTA Visit Number: 1       General Precautions: Standard, aspiration, fall, NPO  Orthopedic Precautions: N/A   Braces: N/A  Respiratory Status: Room air    Spiritual, Cultural Beliefs, Evangelical Practices, Values that Affect Care: no    Subjective:  Communicated with nurseJeanine, and completed Epic chart review prior to session.  Patient attempted x2 to verbally communicate with therapist and therapy tech but speech was unclear and difficult to understand.    Pain/Comfort  Pain Rating 1: 0/10  Pain Rating Post-Intervention 1: 0/10    Objective:   Patient found with: NG tube, telemetry, SCD, pressure relief boots    Functional Mobility:  Supine > sit EOB: Total A of 2    Seated EOB x15 min total focusing on increased tolerance to upright position, core stability, trunk control and CV endurance.   Performed intermittent passive manual stretching to cervical and thoracic spine into extension. Also performed passive manual stretching to cervical spine into L rotation as patient appeared to have difficulty actively rotating head towards L side. (Patient was unable to hold her head in extension once placed in neutral position. Weight of forward flexed head led to forward kyphotic posture and full trunk flexion when uncorrected. Noted patient initiated some movement with R arm but very sporadic.)   Max-Total A of 2 to maintain sitting balance throughout.    Sit > supine:Total A of 2    Supine scoot towards HOB: Total A of 2    Patient positioned into L SL with wedge and pillows placed to maintain skin integrity. Also positioned head to encourage passive cervical stretch into L rotation.       AM-PAC 6 CLICK MOBILITY  How much  "help from another person does this patient currently need?   1 = Unable, Total/Dependent Assistance  2 = A lot, Maximum/Moderate Assistance  3 = A little, Minimum/Contact Guard/Supervision  4 = None, Modified [place]/Independent    Turning over in bed (including adjusting bedclothes, sheets and blankets)?: 2  Sitting down on and standing up from a chair with arms (e.g., wheelchair, bedside commode, etc.): 1  Moving from lying on back to sitting on the side of the bed?: 2  Moving to and from a bed to a chair (including a wheelchair)?: 1  Need to walk in hospital room?: 1  Climbing 3-5 steps with a railing?: 1  Basic Mobility Total Score: 8    AM-PAC Raw Score CMS G-Code Modifier Level of Impairment Assistance   6 % Total / Unable   7 - 9 CM 80 - 100% Maximal Assist   10 - 14 CL 60 - 80% Moderate Assist   15 - 19 CK 40 - 60% Moderate Assist   20 - 22 CJ 20 - 40% Minimal Assist   23 CI 1-20% SBA / CGA   24 CH 0% Independent/ Mod I     Patient left with bed in chair position with all lines intact, call button in reach and bed alarm on. Nurse gave "ok" to leave patient unrestrained at end of session.     Assessment:  Tanya Madrid is a 82 y.o. female with a medical diagnosis of Cardiac arrest and presents with overall decline in functional mobility. Patient would continue to benefit from skilled PT to address functional limitations listed below in order to return to PLOF/decrease caregiver burden.     Rehab identified problem list/impairments: Rehab identified problem list/impairments: weakness, impaired endurance, impaired self care skills, impaired functional mobilty, impaired balance, impaired cognition, decreased coordination, decreased upper extremity function, decreased lower extremity function, decreased ROM, impaired coordination, impaired fine motor, impaired cardiopulmonary response to activity    Rehab potential is fair.    Activity tolerance: Poor    Discharge recommendations: Discharge " Facility/Level of Care Needs: other (see comments) (TBD)     Barriers to discharge:      Equipment recommendations: Equipment Needed After Discharge: other (see comments) (TBD)     GOALS:   Multidisciplinary Problems     Physical Therapy Goals        Problem: Physical Therapy Goal    Goal Priority Disciplines Outcome Goal Variances Interventions   Physical Therapy Goal     PT, PT/OT                      PLAN:    Patient to be seen 3 x/week  to address the above listed problems via gait training, therapeutic activities, therapeutic exercises  Plan of Care expires: 04/03/22  Plan of Care reviewed with: patient         03/22/2022

## 2022-03-23 LAB
POCT GLUCOSE: 110 MG/DL (ref 70–110)
POCT GLUCOSE: 129 MG/DL (ref 70–110)
POCT GLUCOSE: 154 MG/DL (ref 70–110)
POCT GLUCOSE: 70 MG/DL (ref 70–110)

## 2022-03-23 PROCEDURE — 92507 TX SP LANG VOICE COMM INDIV: CPT

## 2022-03-23 PROCEDURE — 31720 CLEARANCE OF AIRWAYS: CPT

## 2022-03-23 PROCEDURE — 25000242 PHARM REV CODE 250 ALT 637 W/ HCPCS: Performed by: INTERNAL MEDICINE

## 2022-03-23 PROCEDURE — A4216 STERILE WATER/SALINE, 10 ML: HCPCS | Performed by: INTERNAL MEDICINE

## 2022-03-23 PROCEDURE — 97530 THERAPEUTIC ACTIVITIES: CPT

## 2022-03-23 PROCEDURE — 63600175 PHARM REV CODE 636 W HCPCS: Performed by: INTERNAL MEDICINE

## 2022-03-23 PROCEDURE — 21400001 HC TELEMETRY ROOM

## 2022-03-23 PROCEDURE — 94640 AIRWAY INHALATION TREATMENT: CPT

## 2022-03-23 PROCEDURE — 25000003 PHARM REV CODE 250: Performed by: INTERNAL MEDICINE

## 2022-03-23 PROCEDURE — 97530 THERAPEUTIC ACTIVITIES: CPT | Mod: CQ

## 2022-03-23 PROCEDURE — 94668 MNPJ CHEST WALL SBSQ: CPT

## 2022-03-23 PROCEDURE — 94761 N-INVAS EAR/PLS OXIMETRY MLT: CPT

## 2022-03-23 RX ADMIN — ACETYLCYSTEINE 2 ML: 100 INHALANT RESPIRATORY (INHALATION) at 07:03

## 2022-03-23 RX ADMIN — CARBIDOPA AND LEVODOPA 2 TABLET: 25; 100 TABLET ORAL at 08:03

## 2022-03-23 RX ADMIN — IPRATROPIUM BROMIDE AND ALBUTEROL SULFATE 3 ML: 2.5; .5 SOLUTION RESPIRATORY (INHALATION) at 07:03

## 2022-03-23 RX ADMIN — HEPARIN SODIUM 5000 UNITS: 5000 INJECTION INTRAVENOUS; SUBCUTANEOUS at 11:03

## 2022-03-23 RX ADMIN — Medication 10 ML: at 06:03

## 2022-03-23 RX ADMIN — ACETYLCYSTEINE 2 ML: 100 INHALANT RESPIRATORY (INHALATION) at 08:03

## 2022-03-23 RX ADMIN — HEPARIN SODIUM 5000 UNITS: 5000 INJECTION INTRAVENOUS; SUBCUTANEOUS at 05:03

## 2022-03-23 RX ADMIN — CARVEDILOL 12.5 MG: 12.5 TABLET, FILM COATED ORAL at 08:03

## 2022-03-23 RX ADMIN — FAMOTIDINE 20 MG: 40 POWDER, FOR SUSPENSION ORAL at 08:03

## 2022-03-23 RX ADMIN — Medication 10 ML: at 11:03

## 2022-03-23 RX ADMIN — CEFTRIAXONE 2 G: 2 INJECTION, SOLUTION INTRAVENOUS at 01:03

## 2022-03-23 RX ADMIN — AMLODIPINE BESYLATE 10 MG: 10 TABLET ORAL at 08:03

## 2022-03-23 RX ADMIN — CARBIDOPA AND LEVODOPA 2 TABLET: 25; 100 TABLET ORAL at 03:03

## 2022-03-23 RX ADMIN — IPRATROPIUM BROMIDE AND ALBUTEROL SULFATE 3 ML: 2.5; .5 SOLUTION RESPIRATORY (INHALATION) at 01:03

## 2022-03-23 RX ADMIN — METRONIDAZOLE 500 MG: 500 TABLET ORAL at 05:03

## 2022-03-23 RX ADMIN — DEXAMETHASONE SODIUM PHOSPHATE 4 MG: 4 INJECTION INTRA-ARTICULAR; INTRALESIONAL; INTRAMUSCULAR; INTRAVENOUS; SOFT TISSUE at 08:03

## 2022-03-23 RX ADMIN — HEPARIN SODIUM 5000 UNITS: 5000 INJECTION INTRAVENOUS; SUBCUTANEOUS at 03:03

## 2022-03-23 RX ADMIN — IPRATROPIUM BROMIDE AND ALBUTEROL SULFATE 3 ML: 2.5; .5 SOLUTION RESPIRATORY (INHALATION) at 08:03

## 2022-03-23 RX ADMIN — METRONIDAZOLE 500 MG: 500 TABLET ORAL at 03:03

## 2022-03-23 NOTE — PLAN OF CARE
Ongoing (interventions implemented as appropriate)  Pt withdraws to pain.  VSS  Pt is non verbal  Pt remained afebrile throughout this shift.   Pt is bedbound  Pt remained free of falls this shift.   Pt denies pain this shift.  Plan of care reviewed. Patient verbalizes understanding.   Pt moving/turing dependently. Frequent weight shifting encouraged.  Patient sinus rhythm on monitor.   Bed low, side rails up x 2, wheels locked, call light in reach.   Hourly rounding completed.   Will continue to monitor.

## 2022-03-23 NOTE — ASSESSMENT & PLAN NOTE
Patient is identified as having Combined Systolic and Diastolic heart failure that is Acute on chronic. CHF is currently uncontrolled due to Pulmonary edema/pleural effusion on CXR. Latest ECHO performed and demonstrates- Results for orders placed during the hospital encounter of 01/15/21    Echo Color Flow Doppler? Yes    Interpretation Summary  · The left ventricle is severely enlarged with eccentric hypertrophy and severely decreased systolic function. The estimated ejection fraction is 25%  · Grade II left ventricular diastolic dysfunction.  · Normal right ventricular size with moderately reduced right ventricular systolic function.  · Severe left atrial enlargement.  · There is pulmonary hypertension.  · There is mild aortic valve stenosis.  · Aortic valve area is 1.37 cm2; peak velocity is 1.33 m/s; mean gradient is 4 mmHg.  · Mild to moderate tricuspid regurgitation.  · Intermediate central venous pressure (8 mmHg).  · The estimated PA systolic pressure is 50 mmHg.  · Trivial pericardial effusion.  . Continue Nitrate/Vasodilator and monitor clinical status closely. Monitor on telemetry. Patient is on CHF pathway.  Monitor strict Is&Os and daily weights.  Place on fluid restriction of 1.5 L. Continue to stress to patient importance of self efficacy and  on diet for CHF. Last BNP reviewed- and noted below   No results for input(s): BNP, BNPTRIAGEBLO in the last 168 hours.  3/15-  Off Entresto , BB and diuretics given hypotension   3/16, 3/21   BB resumed via NG tube   No ACEi/ARB or ARNI due to CODY  Does not appears volume overloaded   Diuretics on hold.

## 2022-03-23 NOTE — PROGRESS NOTES
"[facility] - Telemetry ([facility] Medicine  Progress Note    Patient Name: Tanya Madrid  MRN: [id number]  Patient Class: IP- Inpatient   Admission Date: 3/10/2022  Length of Stay: 13 days  Attending Physician: Sj Juan, *  Primary Care Provider: Maggie Sue NP        Subjective:     Principal Problem:Cardiac arrest        HPI:  History was taken from the electronic chart and from the daughter -  Adenike Lemus Daughter [phone]        82 y.o. female patient with a PMHx of HTN,  CHF-last EF-01/2021-25%, with diastolic dysfunction  , and HLD who presents to the Emergency Department for evaluation of cardiac arrest which onset suddenly 1 hour pta.  Daughter reports that over the last 3 days -she has been complaining of worsening dyspnoea  and was given extra lasix tablets. She usually takes 20mg lasix daily and was given 2 extra tablets with the episodes of  dyspnea.  This evening while having dinner, she "looked blank" and stopped breathing . Daughter called EMS and was advised to start CPR.. Pt achieved ROSC en route. After I shock . Prior Tx includes 50 mcg of fentanyl and 50 mg of ketamine from EMS.   Code status discussed.  She is Full code.  Labs and imaging test reviewed.  CT head -No hemorrhage or acute major vascular distribution infarction.   Dehiscence of the posterior wall of the sphenoid sinus such as on series 4, image 49-52. There is high density material possibly related to paranasal sinus disease or neoplasm which extends towards the pituitary and along the right towards the intracranial ICA.  Further evaluation with contrast enhanced MRI when clinically appropriate would be recommended.       Component      Latest Ref Rng & Units 3/10/2022 3/3/2022              Potassium      3.5 - 5.1 mmol/L 2.9 (L) 3.7   CBC showed wbc -19   Component      Latest Ref Rng & Units 3/10/2022 3/10/2022 4/20/2021          10:47 PM  8:18 PM    Lactate, Lex      0.5 - 2.2 " mmol/L 3.6 (HH) 5.7 (HH) 2.0     Component      Latest Ref Rng & Units 3/10/2022 4/20/2021   BNP      0 - 99 pg/mL >4,900 (H) 2,806 (H)   Code status discussed -she is full code  Her daughter is the SDM.  Adenike Lemus Daughter [phone]       Overview/Hospital Course:  83 y/o AAF admitted to ICU with a dx of cardiac arrest , CODY , acute hypoxic respiratory failure  and  Anoxic encephalopathy . Started on hypothermia protocol , IVAB , asa , plavix  And entresto . Cardiology and palliative care consulted. Tx reviewed . Cont current tx . Prognosis Poor      3/12  She remain critically ill . Now on rewarming phase . Started on propofol due to  only grimacing and posturing . Kidney  function is trending up . She  is on low dose of dobutamine . Case d/w he daughter at bedside . She changed code status to DNR . Prognosis poor .    3/13 Reamin critically ill  on mechanical ventilation . Temp is now > 36 . Last night  was on levophed and dobutamine to keep a map > 65  . Levophed wean off this am . She has been on propofol on and off for posturing  movement . Sedation stop for 2 hrs  .  She has positve  corneal , pupil and gag reflex .  No respond to verval or pain stimuli . She withdraw to pain . She is breathing overt the vent .  Neurology consulted . The UOP has sligly increased since yesterday . The kidney function  cont trending up . The Blood cx are (+) ALPHA HEMOLYTIC STREP    3/14 No significant neurologuical changes since yesterday . She opne her eyes on tactile simlus . She  does not follow commads . Ha been odff sedation for more than 24 hrs .  The Blood cx (+) for STREPTOCOCCUS ANGINOSUS  and EIKENELLA CORRODENS . The kidney function is sligly improving  . Prognsosis POOR .  POC d/w  her duaghter .    3/15- Remains intubated with FiO2 30%/PEEP 5 . Afebrile. Off sedation x 2 days. Remains comatose.  Intermittent spontaneous eye opening but does not seem purposeful. No verbal  response , does not  follow commands or track voice. Intermittent withdrawal to pain stimulus. Corneal, pupillary , cough and gag reflex present. Remains on Dobutamine gtt. EF 10%. Tube feed in progress. UOP improved 1L yesterday, however creatinine bumped to 3.8. Rocephin for Streptococcal  bacteremia continued. TTE negative for vegetation. Palliative Medicine is following. Prognosis appears poor.     3/16- Aferbile.Off sedation. Remains intubated with minimal vent support FiO2 30%/ PEEP 5. However pt is not awake enough to manage secretion and protect airways. Intermittent eye opening but does not seem purposeful. Brain stem reflexes are present. Withdraws to pain stimulus and facial grimace noted with sternal rub. Remains on dobutamine gtt.  ml yesterday . BNPCreatinine stable at 3.7. BNP down to 1493 from 4900 on admit. Will re consult Neurology to assess prognosis . Palliative Medicine is following.     3/17- Eyes are open but not purposeful, No verbal response , withdraws to pain stimulus. ETT cuff leak noted and needed to be exchanged. CC team extubated pt after SBT. Plan to assess clinical course from respiratory stand point with no plan for re intubation. NG tube placement will be attempted for feeding and medication administration. Dobutamine gtt continues.  High risk of sudden demise from cardiac stand point given EF 10%.     3/18- No verbal response, does not follow commands, eyes are open, intermittently seems pt tracks voice other time not. Withdraws to pain stimulus. Weak cough , requiring frequent suctioning to clear airway . Pt bites down suction tube. SpO2 satisfactory on FiO2 2L/min. Afebrile . NG tube feeds in progress , so far tolerating . UOP 1 liter yesterday. Cr 3.2. WBC dhara to 15K. Antibiotic Rocephin continues . Pt remains on Dobutamine gtt. Awaiting Cardiology input. Family declined Palliative Medicine follow up. Will get follow up CT head today.     3/19- Afebrile . On RA. SpO2 100%. Repeat CT head  with preserved gray-white matter junction and no cerebral edema, hemorrhage or other acute process. GCS is difficult to assess. Eyes remains open. No verbal response , withdraws to pain. Pt remains with poor cough effort and lot of secretion requiring suction and breatrh sounds are coarse. CXR with no new new infiltrate or consolidation as of today. WBC 14K, Cr down to 2.9. . Gentle hydration initiated .  ml yesterday. Spoke to marlin at bedside . She is debating continuing current care and PEG tube placement vs Hospice referral. Pt had 10 beats NSVT yesterday .Dobutamine gtt discontinued.     3/20- Afebrile. Remains on RA. At times seems more awake and tracks voice. Moves head and ext intermittently. UOP increased to 1.5L yesterday . Creatinine down to 2.2. NG tube accidentally removed during PT. Daughter has not made her mind on PEG tube placement yet. WBC 14K. Flagyl added. Na 148.     3/21- On RA. Pt with poor secretion management . Daughter elected PEG tube placement . Plavix and ASA held from today. IR consulted for PEG tube. Notify IR once pt remains off antiplatelet x 5 days. WBC 17K. PCT down to 0.51. Falgyl added with current Rocephin. Cr further improved to 1.8    3/22 She is aao x 0  in nad .Intermittent spontaneous  upper extremities movement but does not seem purposeful. No verbal  response , does not follow commands but track voice. Plavix  on hold yesterday  . NAEON . No family at bedside .     3/23 No neurological changes from yesterday .  Plan for PEG tube placement . Decadron  IV wean down . NAEON .       Interval History:     Review of Systems   Unable to perform ROS: Mental status change   Objective:     Vital Signs (Most Recent):  Temp: 97.7 °F (36.5 °C) (03/23/22 1118)  Pulse: 71 (03/23/22 1309)  Resp: 16 (03/23/22 1309)  BP: 127/65 (03/23/22 1118)  SpO2: 97 % (03/23/22 1309) Vital Signs (24h Range):  Temp:  [97.7 °F (36.5 °C)-98.1 °F (36.7 °C)] 97.7 °F (36.5 °C)  Pulse:   [68-81] 71  Resp:  [16-20] 16  SpO2:  [91 %-100 %] 97 %  BP: (120-159)/(59-84) 127/65     Weight: 61.2 kg (134 lb 14.7 oz)  Body mass index is 24.68 kg/m².    Intake/Output Summary (Last 24 hours) at 3/23/2022 1330  Last data filed at 3/23/2022 0552  Gross per 24 hour   Intake 2329.13 ml   Output 1500 ml   Net 829.13 ml      Physical Exam  Constitutional:       Comments: no verbal response , does not follow commands, withdraws to pain stimulus    HENT:      Head: Normocephalic and atraumatic.   Eyes:      Pupils: Pupils are equal, round, and reactive to light.      Comments: (+) corneal reflex    Cardiovascular:      Rate and Rhythm: Normal rate.      Heart sounds: Normal heart sounds.   Pulmonary:      Breath sounds: Rales present.      Comments: Breath sounds are coarse and bronchial  Abdominal:      General: Bowel sounds are normal.   Genitourinary:     Comments: Abreu catheter in place  Musculoskeletal:      Cervical back: Neck supple.      Right lower leg: No edema.      Left lower leg: No edema.   Neurological:      Comments:  Eyes are open, seems awake and occasionally tracks voice , other time not.   Intermittent spontaneous upper extremities movements  does not seem purposeful.        Significant Labs: All pertinent labs within the past 24 hours have been reviewed.      Significant Imaging: I have reviewed all pertinent imaging results/findings within the past 24 hours.        Assessment/Plan:      * Cardiac arrest  Out of Hospital cardiac arrest.  Last Echo -1/21-EF -25%, Repeat echo with EF 10%  GIII Diastolic dysfunction.  EKG -Sinus rhythm with occasional Premature ventricular complexes and Premature atrial complexes  Cardiology consulted   Monitor clinical course for neuro recovery   Prognosis poor   3/18-  Extubated yesterday   Requiring frequent suction to keep airway clear . Weak cough   Remains on Dobutamine gtt.  Await Cardiology input  3/19-  Repeat CT head with preserved gray-white matter  junction and no cerebral edema, hemorrhage or other acute process. GCS is difficult to assess. Eyes remains open. No verbal response , withdraws to pain.    Pt had 10 beats NSVT yesterday .  Dobutamine gtt discontinued   3/21-  Currently fairly stable from Cardiac standpoint . Downgrade to telemetry     Stable . Cont current tx     Hypernatremia  3/19-  In the setting of free water deficit   Gentle hydration with D5 infusion   Free water flush via NG tube   3/21-  Improved     Palliative care encounter  - Palliative medicine consulted for [facility] discussion   -Current code status is DNR  3/18-  Family declined further Palliative Medicine follow up     Gram-positive bacteremia  Source of infection is unclear   Cont rocephin x 2 weeks   Initial Blood culture from 3/10/22 positive forALPHA HEMOLYTIC STREP and EIKENELLA CORRODENS non viable for susceptibility   Repeat blood cultures from 3/15/22 - NGTD    CODY (acute kidney injury)  2/2 to cardiac arrest   Not  a good candidate for CRT/RRT  Monitor UOP and renal indices   3/21-  Slowly improving       Anoxic encephalopathy  2/2 to cardiac arrest   Neurology consulted to assess prognosis   3/18-  Repeat head CT today   3/19-  Repeat CT head with preserved gray-white matter junction and no cerebral edema, hemorrhage or other acute process. GCS is difficult to assess. Eyes remains open. No verbal response , withdraws to pain    Hypokalemia  -Replete as indicated     Aspiration pneumonia  Initial CXR on admit suggestive of right-sided pulmonary opacities possibly infection, aspiration, or edema.  Antibiotic include Rocephin initiated   Blood cx from 3/10/22 (+) for ALPHA HEMOLYTIC STREP  Tracheal aspirate - neg   Repeat blood cultures from 3/15/22- NGTD  3/20-  Flagyl added given leukocytosis     Mass of sphenoid sinus  Ct head showed- Dehiscence of the posterior wall of the sphenoid sinus such as on series 4, image 49-52. There is high density material possibly related to  paranasal sinus disease or neoplasm which extends towards the pituitary and along the right towards the intracranial ICA.  Further evaluation with contrast enhanced MRI when clinically appropriate would be recommended.  Suggest including 3D/volumetric postcontrast imaging.  ENT consult as clinically warranted.        Elevated troponin  In the setting of Cardiac arrest  Cont asa/plavix, BB  Cardiology on board   3/21-  ASA/Plavix held from 3/21for upcoming PEG placement     Acute on chronic combined systolic and diastolic CHF (congestive heart failure)  Patient is identified as having Combined Systolic and Diastolic heart failure that is Acute on chronic. CHF is currently uncontrolled due to Pulmonary edema/pleural effusion on CXR. Latest ECHO performed and demonstrates- Results for orders placed during the hospital encounter of 01/15/21    Echo Color Flow Doppler? Yes    Interpretation Summary  · The left ventricle is severely enlarged with eccentric hypertrophy and severely decreased systolic function. The estimated ejection fraction is 25%  · Grade II left ventricular diastolic dysfunction.  · Normal right ventricular size with moderately reduced right ventricular systolic function.  · Severe left atrial enlargement.  · There is pulmonary hypertension.  · There is mild aortic valve stenosis.  · Aortic valve area is 1.37 cm2; peak velocity is 1.33 m/s; mean gradient is 4 mmHg.  · Mild to moderate tricuspid regurgitation.  · Intermediate central venous pressure (8 mmHg).  · The estimated PA systolic pressure is 50 mmHg.  · Trivial pericardial effusion.  . Continue Nitrate/Vasodilator and monitor clinical status closely. Monitor on telemetry. Patient is on CHF pathway.  Monitor strict Is&Os and daily weights.  Place on fluid restriction of 1.5 L. Continue to stress to patient importance of self efficacy and  on diet for CHF. Last BNP reviewed- and noted below   No results for input(s): BNP, BNPTRIAGEBLO in the  last 168 hours.  3/15-  Off Entresto , BB and diuretics given hypotension   3/16, 3/21   BB resumed via NG tube   No ACEi/ARB or ARNI due to CODY  Does not appears volume overloaded   Diuretics on hold.     History of CVA (cerebrovascular accident)  On aspirin/plavix. Continue via NG  tube   3/21-  ASA and Plavix held from 3/21 for PEG placement     Parkinson disease  Continue supportive care   Resume meds once appropriate   3/19-  Meds resumed via NG tube       Laryngeal papillomatosis  Known History   Cont supportive tx as needed       Essential hypertension  Hold  entresto due to current hypotension and CODY  Resume antihypertensives once appropriate via NG tube   3/19-  Pt started back on Amlodipine , Coreg via NG tube     Stable . Cont current  Medication       VTE Risk Mitigation (From admission, onward)         Ordered     heparin (porcine) injection 5,000 Units  Every 8 hours         03/10/22 2351     IP VTE HIGH RISK PATIENT  Once         03/10/22 2345     Place sequential compression device  Until discontinued         03/10/22 2345                Discharge Planning   PARMINDER:      Code Status: DNR   Is the patient medically ready for discharge?:     Reason for patient still in hospital (select all that apply): Treatment  Discharge Plan A: Skilled Nursing Facility, Home Health, Home with family                  Sj Hailey Juan MD  Department of Hospital Medicine   [facility] - Telemetry ([facility])

## 2022-03-23 NOTE — PLAN OF CARE
Sup>sit total A of 2, static sit with total A, sit>sup total A. Dependent with all ADLs. Recommending SNF at d/c.

## 2022-03-23 NOTE — PLAN OF CARE
Responds to pain, sometimes voice. Unable to reorient. Nonverbal; unable to verbalize needs & follow commands. Calm throughout shift.   POC reviewed with pt & daughter. Interventions implemented as appropriate.    VSS; SR on tele-monitor.  On room air.    LFA PIV patent. Integrity maintained.  Receiving IV abx. No adverse reactions noted.  NG tube in place w/ continuous feeding at goal rate of 45 mL/hr. Tolerating well.  Wound to sacrum w/ blanchable redness noted. Sacral foam dressing changed.  No evidence of pain.  Abreu catheter in place & patent. All infection prevention measures in place. Incontinent of bowel. Brief & bed pad changed as needed.    POCT glucose q4h. SSI required at hs.  Heparin sq for VTE.  Bed bound. Total assist. Activity ad tacho. Turn q2h.  NADN. Resting quietly in bed.   Free of falls. Hourly rounding complete.   All safety measures remain in place. SR up x2; bed low & locked. Bed alarm on & audible. Call light w/in reach.   Will continue to monitor throughout shift.  Daughter at bedside actively participating in care.

## 2022-03-23 NOTE — PT/OT/SLP PROGRESS
Physical Therapy  Treatment    Tanya Madrid   MRN: [id number]   Admitting Diagnosis: Cardiac arrest    PT Received On: 03/23/22  PT Start Time: 0825     PT Stop Time: 0855    PT Total Time (min): 30 min       Billable Minutes:  Therapeutic Activity 30    Treatment Type: Treatment  PT/PTA: PTA     PTA Visit Number: 2       General Precautions: Standard, aspiration, fall, NPO  Orthopedic Precautions: N/A   Braces: N/A  Respiratory Status: Room air    Spiritual, Cultural Beliefs, [employer] Practices, Values that Affect Care: no    Subjective:  Communicated with Miky dorado, and completed Epic chart review prior to session.  Patient non verbal but does not appear to resist movement.     Pain/Comfort  Pain Rating 1: 0/10 (NO OVERT SIGNS OF DISTRESS OR PAIN)  Pain Rating Post-Intervention 1: 0/10    Objective:   Patient found with: bed alarm, henley catheter, NG tube, pressure relief boots, peripheral IV, SCD, telemetry    Functional Mobility:  Supine > sit EOB: Total A of 2    Seated EOB x15 min total focusing on increased tolerance to upright position, core stability, trunk control and CV endurance.  Performed PROM manual stretching to upper cervical and thoracic muscles into extension in attempt to improve posture  Multiple trials of attempting to have patient sustain cervical and thoracic extension but patient continues to be unable to do so  Total A of 1 to maintain sitting balance while EOB    PROM TE to BLE while EOB x10 reps each: LAQ, Hip Flex, AP    Sit > supine: Total A of 2    Supine scoot towards HOB: Total A of 2    Rolling towards L side to position into L SL: Total A   Positioned into L SL with wedge and pillows placed as needed for skin integrity      AM-PAC 6 CLICK MOBILITY  How much help from another person does this patient currently need?   1 = Unable, Total/Dependent Assistance  2 = A lot, Maximum/Moderate Assistance  3 = A little, Minimum/Contact Guard/Supervision  4 = None, Modified  [place]/Independent    Turning over in bed (including adjusting bedclothes, sheets and blankets)?: 2  Sitting down on and standing up from a chair with arms (e.g., wheelchair, bedside commode, etc.): 1  Moving from lying on back to sitting on the side of the bed?: 2  Moving to and from a bed to a chair (including a wheelchair)?: 1  Need to walk in hospital room?: 1  Climbing 3-5 steps with a railing?: 1  Basic Mobility Total Score: 8    AM-PAC Raw Score CMS G-Code Modifier Level of Impairment Assistance   6 % Total / Unable   7 - 9 CM 80 - 100% Maximal Assist   10 - 14 CL 60 - 80% Moderate Assist   15 - 19 CK 40 - 60% Moderate Assist   20 - 22 CJ 20 - 40% Minimal Assist   23 CI 1-20% SBA / CGA   24 CH 0% Independent/ Mod I     Patient left HOB elevated in supported L SL with all lines intact, call button in reach, bed alarm on and daughter present.    Assessment:  Tanya Madrid is a 82 y.o. female with a medical diagnosis of Cardiac arrest and presents with overall decline in functional mobility. Patient would continue to benefit from skilled PT to address functional limitations listed below in order to return to PLOF/decrease caregiver burden.     Rehab identified problem list/impairments: Rehab identified problem list/impairments: weakness, impaired endurance, impaired sensation, impaired self care skills, impaired functional mobilty, impaired balance, impaired cognition, decreased coordination, decreased upper extremity function, decreased lower extremity function, decreased ROM, impaired coordination, impaired skin, impaired cardiopulmonary response to activity    Rehab potential is fair.    Activity tolerance: Poor    Discharge recommendations: Discharge Facility/Level of Care Needs: nursing facility, skilled     Barriers to discharge:      Equipment recommendations: Equipment Needed After Discharge: other (see comments) (TBD BY NEXT LEVEL OF CARE)     GOALS:   Multidisciplinary Problems      Physical Therapy Goals        Problem: Physical Therapy Goal    Goal Priority Disciplines Outcome Goal Variances Interventions   Physical Therapy Goal     PT, PT/OT                      PLAN:    Patient to be seen 3 x/week  to address the above listed problems via gait training, therapeutic activities, therapeutic exercises  Plan of Care expires: 04/03/22  Plan of Care reviewed with: patient, daughter         03/23/2022

## 2022-03-23 NOTE — PT/OT/SLP PROGRESS
Occupational Therapy   Treatment    Name: Tanya Madrid  MRN: [id number]  Admitting Diagnosis:  Cardiac arrest       Recommendations:     Discharge Recommendations: nursing facility, skilled  Discharge Equipment Recommendations:   (TBD at next level of care)  Barriers to discharge:  None    Assessment:     Tanya Madrid is a 82 y.o. female with a medical diagnosis of Cardiac arrest.  She presents with the following performance deficits affecting function are weakness, impaired endurance, impaired self care skills, impaired functional mobilty, impaired balance, impaired cognition, decreased upper extremity function, decreased lower extremity function, abnormal tone, edema, impaired cardiopulmonary response to activity.     Rehab Prognosis:  Fair and Poor; patient would benefit from acute skilled OT services to address these deficits and reach maximum level of function.       Plan:     Patient to be seen 2 x/week to address the above listed problems via self-care/home management, therapeutic activities, therapeutic exercises  · Plan of Care Expires: 04/03/22  · Plan of Care Reviewed with: daughter, patient    Subjective     Pain/Comfort:  · Pain Rating 1: 0/10 (no nonverbal indicators of pain throughout)    Objective:     Communicated with:  NurseMiky, prior to session.  Patient found right sidelying with bed alarm, henley catheter, NG tube, pressure relief boots, peripheral IV, SCD, telemetry upon OT entry to room.    General Precautions: Standard, fall, NPO   Orthopedic Precautions:N/A   Braces: N/A  Respiratory Status: Room air    Bed Mobility:    · Patient completed Supine to Sit with total assistance and 2 persons  · Patient completed Sit to Supine with total assistance and 2 persons     Functional Mobility/Transfers:  · Not appropriate for functional transfers at this time    Activities of Daily Living:  · Grooming: total assistance .      [facility] 6 Click ADL: 6    Treatment & Education:  Patient  transitioned to sitting EOB x15 mins with total of 2. Requires total A to maintain static sitting balance. Patient with kyphotic posturing requiring total A lift head and retract B scapula. Patient with decreased command following this date. Noted to track to visually to daughter's voice x1 attempt throughout treatment. PROM to B UE. Remains hypotonic with decreased attempts at AAROM.     Patient left left sidelying with all lines intact, call button in reach, bed alarm on and daughter presentEducation:      GOALS:   Multidisciplinary Problems     Occupational Therapy Goals        Problem: Occupational Therapy Goal    Goal Priority Disciplines Outcome Interventions   Occupational Therapy Goal     OT, PT/OT Ongoing, Progressing    Description: STG'S TO BE MET BY 4/3/2022:    1)  PATIENT WILL FOLLOW 1 OUT OF 3 VC'S DURING SIMPLE ADL.  2)  DTR WILL BE (I) WITH MANUAL EDEMA MANIPULATION TO (B) HANDS/BUE'S FOR INCREASED EDEMA CONTROL.  3)  PATIENT WILL PERFORM SUPINE <> SIT WITH MOD (A).  4)  PATIENT WILL PERFORM UB DRESSING WITH MOD (A).                   Time Tracking:     OT Date of Treatment: 03/23/22  OT Start Time: 0840  OT Stop Time: 0905  OT Total Time (min): 25 min    Billable Minutes:Therapeutic Activity 25    3/23/2022

## 2022-03-23 NOTE — PLAN OF CARE
[facility] - Telemetry (Hospital)  Discharge Reassessment    Primary Care Provider: Maggie Sue NP    Expected Discharge Date:     Reassessment (most recent)     Discharge Reassessment - 03/23/22 1302        Discharge Reassessment    Assessment Type Discharge Planning Reassessment     Did the patient's condition or plan change since previous assessment? No     Communicated PARMINDER with patient/caregiver Date not available/Unable to determine     Discharge Plan A Skilled Nursing Facility;Home Health;Home with family     Discharge Plan B Hospice/home     DME Needed Upon Discharge  nutrition supplies;feeding device;hospital bed     Why the patient remains in the hospital Requires continued medical care               Anticipated DC dispo: SNF vs HHC vs Hospice  Progress towards plan: pending PEG tube placement

## 2022-03-23 NOTE — PT/OT/SLP PROGRESS
Speech Language Pathology Treatment    Patient Name:  Tanya Madrid   MRN:  [id number]  Admitting Diagnosis: Cardiac arrest    Recommendations:                 General Recommendations:  Dysphagia therapy, Speech/language therapy and Cognitive-linguistic therapy  Diet recommendations:  NPO, Liquid Diet Level: NPO   Aspiration Precautions: Frequent oral care   General Precautions: Standard, aspiration, fall, NPO  Communication strategies:  none    Subjective     Pt lying in bed asleep with her daughter present.  Pt tolerating NG tube and is lethargic.    Patient goals: none reported      Pain/Comfort:  ·  Pt did not appear to be in pain and did not wake to report any pain      Respiratory Status: Room air    Objective:     Has the patient been evaluated by SLP for swallowing?   Yes  Keep patient NPO? Yes     ST provided pt with maximum tactile and verbal cueing without pt being able to fully wake.  Her daughter attempted to wake as well without success.  Pt would attempt to open her eyes briefly bu quickly fell back asleep.  ST elevated the bed and repositioned pt's pillow that triggered pt to open her mouth and produce a weak, wet cough.  ST utilized yanker and wall suction to remove secretions for oral cavity, however no swallow was initiated and pt fell back asleep.  ST provided education to pts daughter on PEG tube, feedings, aspiration, cognitive stimulation, and oral care.  Pt's daughter verbalized understanding.      Assessment:     Tanya Madrid is a 82 y.o. female with an SLP diagnosis of Dysphagia and Cognitive-Linguistic Impairment.  She presents with poor communication and swallowing abilities.  ST recommends to remain NPO and continue ST per POC.     Goals:   Multidisciplinary Problems     SLP Goals        Problem: SLP Goal    Goal Priority Disciplines Outcome   SLP Goal     SLP Ongoing, Progressing   Description: 1. Pt will consume least restrictive PO diet without s/s of dysphagia.  2. Pt will  improve cognitive-linguistic ability to communicate basic needs.                   Plan:     · Patient to be seen:  2 x/week   · Plan of Care expires:  03/28/22  · Plan of Care reviewed with:  daughter   · SLP Follow-Up:  Yes       Discharge recommendations:   (ongoing assessment for placement, pending acute progress.)   Barriers to Discharge:  None    Time Tracking:     SLP Treatment Date:   03/23/22  Speech Start Time:  0915  Speech Stop Time:  0930     Speech Total Time (min):  15 min    Billable Minutes: Speech Therapy Individual 15    03/23/2022

## 2022-03-23 NOTE — ASSESSMENT & PLAN NOTE
In the setting of Cardiac arrest  Cont asa/plavix, YASMIN  Cardiology on board   3/21-  ASA/Plavix held from 3/21for upcoming PEG placement

## 2022-03-24 LAB
ANION GAP SERPL CALC-SCNC: 10 MMOL/L (ref 8–16)
ANION GAP SERPL CALC-SCNC: 13 MMOL/L (ref 8–16)
ANISOCYTOSIS BLD QL SMEAR: SLIGHT
BASOPHILS # BLD AUTO: 0.04 K/UL (ref 0–0.2)
BASOPHILS NFR BLD: 0.2 % (ref 0–1.9)
BUN SERPL-MCNC: 55 MG/DL (ref 8–23)
BUN SERPL-MCNC: 55 MG/DL (ref 8–23)
CALCIUM SERPL-MCNC: 9 MG/DL (ref 8.7–10.5)
CALCIUM SERPL-MCNC: 9 MG/DL (ref 8.7–10.5)
CHLORIDE SERPL-SCNC: 116 MMOL/L (ref 95–110)
CHLORIDE SERPL-SCNC: 118 MMOL/L (ref 95–110)
CO2 SERPL-SCNC: 23 MMOL/L (ref 23–29)
CO2 SERPL-SCNC: 24 MMOL/L (ref 23–29)
CREAT SERPL-MCNC: 1.2 MG/DL (ref 0.5–1.4)
CREAT SERPL-MCNC: 1.3 MG/DL (ref 0.5–1.4)
DIFFERENTIAL METHOD: ABNORMAL
EOSINOPHIL # BLD AUTO: 0.2 K/UL (ref 0–0.5)
EOSINOPHIL NFR BLD: 0.9 % (ref 0–8)
ERYTHROCYTE [DISTWIDTH] IN BLOOD BY AUTOMATED COUNT: 16.2 % (ref 11.5–14.5)
EST. GFR  (AFRICAN AMERICAN): 44 ML/MIN/1.73 M^2
EST. GFR  (AFRICAN AMERICAN): 48 ML/MIN/1.73 M^2
EST. GFR  (NON AFRICAN AMERICAN): 38 ML/MIN/1.73 M^2
EST. GFR  (NON AFRICAN AMERICAN): 42 ML/MIN/1.73 M^2
GLUCOSE SERPL-MCNC: 157 MG/DL (ref 70–110)
GLUCOSE SERPL-MCNC: 35 MG/DL (ref 70–110)
HCT VFR BLD AUTO: 35.8 % (ref 37–48.5)
HGB BLD-MCNC: 10.7 G/DL (ref 12–16)
IMM GRANULOCYTES # BLD AUTO: 0.64 K/UL (ref 0–0.04)
IMM GRANULOCYTES NFR BLD AUTO: 2.6 % (ref 0–0.5)
LYMPHOCYTES # BLD AUTO: 1.1 K/UL (ref 1–4.8)
LYMPHOCYTES NFR BLD: 4.5 % (ref 18–48)
MAGNESIUM SERPL-MCNC: 1.7 MG/DL (ref 1.6–2.6)
MCH RBC QN AUTO: 28.6 PG (ref 27–31)
MCHC RBC AUTO-ENTMCNC: 29.9 G/DL (ref 32–36)
MCV RBC AUTO: 96 FL (ref 82–98)
MONOCYTES # BLD AUTO: 1.6 K/UL (ref 0.3–1)
MONOCYTES NFR BLD: 6.4 % (ref 4–15)
NEUTROPHILS # BLD AUTO: 20.8 K/UL (ref 1.8–7.7)
NEUTROPHILS NFR BLD: 85.4 % (ref 38–73)
NRBC BLD-RTO: 0 /100 WBC
PHOSPHATE SERPL-MCNC: 4.6 MG/DL (ref 2.7–4.5)
PLATELET # BLD AUTO: 392 K/UL (ref 150–450)
PLATELET BLD QL SMEAR: ABNORMAL
PMV BLD AUTO: 12.9 FL (ref 9.2–12.9)
POCT GLUCOSE: 122 MG/DL (ref 70–110)
POCT GLUCOSE: 131 MG/DL (ref 70–110)
POCT GLUCOSE: 185 MG/DL (ref 70–110)
POCT GLUCOSE: 35 MG/DL (ref 70–110)
POCT GLUCOSE: 36 MG/DL (ref 70–110)
POCT GLUCOSE: 38 MG/DL (ref 70–110)
POCT GLUCOSE: 66 MG/DL (ref 70–110)
POCT GLUCOSE: 97 MG/DL (ref 70–110)
POCT GLUCOSE: 99 MG/DL (ref 70–110)
POIKILOCYTOSIS BLD QL SMEAR: SLIGHT
POLYCHROMASIA BLD QL SMEAR: ABNORMAL
POTASSIUM SERPL-SCNC: 4.4 MMOL/L (ref 3.5–5.1)
POTASSIUM SERPL-SCNC: 4.4 MMOL/L (ref 3.5–5.1)
RBC # BLD AUTO: 3.74 M/UL (ref 4–5.4)
SODIUM SERPL-SCNC: 150 MMOL/L (ref 136–145)
SODIUM SERPL-SCNC: 154 MMOL/L (ref 136–145)
TARGETS BLD QL SMEAR: ABNORMAL
WBC # BLD AUTO: 24.33 K/UL (ref 3.9–12.7)

## 2022-03-24 PROCEDURE — 25000003 PHARM REV CODE 250: Performed by: NURSE PRACTITIONER

## 2022-03-24 PROCEDURE — 92507 TX SP LANG VOICE COMM INDIV: CPT

## 2022-03-24 PROCEDURE — A4216 STERILE WATER/SALINE, 10 ML: HCPCS | Performed by: INTERNAL MEDICINE

## 2022-03-24 PROCEDURE — 80048 BASIC METABOLIC PNL TOTAL CA: CPT | Performed by: INTERNAL MEDICINE

## 2022-03-24 PROCEDURE — 36415 COLL VENOUS BLD VENIPUNCTURE: CPT | Performed by: INTERNAL MEDICINE

## 2022-03-24 PROCEDURE — 83735 ASSAY OF MAGNESIUM: CPT | Performed by: INTERNAL MEDICINE

## 2022-03-24 PROCEDURE — 80048 BASIC METABOLIC PNL TOTAL CA: CPT | Mod: 91 | Performed by: INTERNAL MEDICINE

## 2022-03-24 PROCEDURE — 85025 COMPLETE CBC W/AUTO DIFF WBC: CPT | Performed by: INTERNAL MEDICINE

## 2022-03-24 PROCEDURE — 63600175 PHARM REV CODE 636 W HCPCS: Mod: JG | Performed by: INTERNAL MEDICINE

## 2022-03-24 PROCEDURE — 21400001 HC TELEMETRY ROOM

## 2022-03-24 PROCEDURE — 63600175 PHARM REV CODE 636 W HCPCS: Performed by: INTERNAL MEDICINE

## 2022-03-24 PROCEDURE — 31720 CLEARANCE OF AIRWAYS: CPT

## 2022-03-24 PROCEDURE — 94761 N-INVAS EAR/PLS OXIMETRY MLT: CPT

## 2022-03-24 PROCEDURE — 97110 THERAPEUTIC EXERCISES: CPT

## 2022-03-24 PROCEDURE — 25000003 PHARM REV CODE 250: Performed by: INTERNAL MEDICINE

## 2022-03-24 PROCEDURE — 84100 ASSAY OF PHOSPHORUS: CPT | Performed by: INTERNAL MEDICINE

## 2022-03-24 PROCEDURE — 97530 THERAPEUTIC ACTIVITIES: CPT

## 2022-03-24 PROCEDURE — 25500020 PHARM REV CODE 255: Performed by: PHYSICIAN ASSISTANT

## 2022-03-24 PROCEDURE — 94760 N-INVAS EAR/PLS OXIMETRY 1: CPT

## 2022-03-24 PROCEDURE — 94640 AIRWAY INHALATION TREATMENT: CPT

## 2022-03-24 PROCEDURE — 94668 MNPJ CHEST WALL SBSQ: CPT

## 2022-03-24 PROCEDURE — 99900026 HC AIRWAY MAINTENANCE (STAT)

## 2022-03-24 PROCEDURE — A9698 NON-RAD CONTRAST MATERIALNOC: HCPCS | Performed by: PHYSICIAN ASSISTANT

## 2022-03-24 PROCEDURE — 25000242 PHARM REV CODE 250 ALT 637 W/ HCPCS: Performed by: INTERNAL MEDICINE

## 2022-03-24 RX ORDER — DEXTROSE MONOHYDRATE 50 MG/ML
INJECTION, SOLUTION INTRAVENOUS CONTINUOUS
Status: DISCONTINUED | OUTPATIENT
Start: 2022-03-24 | End: 2022-03-29

## 2022-03-24 RX ADMIN — DEXTROSE 125 ML: 10 SOLUTION INTRAVENOUS at 05:03

## 2022-03-24 RX ADMIN — Medication 10 ML: at 10:03

## 2022-03-24 RX ADMIN — GLUCAGON HYDROCHLORIDE 1 MG: KIT at 12:03

## 2022-03-24 RX ADMIN — CEFTRIAXONE 2 G: 2 INJECTION, SOLUTION INTRAVENOUS at 02:03

## 2022-03-24 RX ADMIN — DEXTROSE 125 ML: 10 SOLUTION INTRAVENOUS at 07:03

## 2022-03-24 RX ADMIN — DEXAMETHASONE SODIUM PHOSPHATE 4 MG: 4 INJECTION INTRA-ARTICULAR; INTRALESIONAL; INTRAMUSCULAR; INTRAVENOUS; SOFT TISSUE at 12:03

## 2022-03-24 RX ADMIN — BARIUM SULFATE 176 G: 960 POWDER, FOR SUSPENSION ORAL at 09:03

## 2022-03-24 RX ADMIN — CARBIDOPA AND LEVODOPA 2 TABLET: 25; 100 TABLET ORAL at 10:03

## 2022-03-24 RX ADMIN — SCOPOLAMINE 1 PATCH: 1.5 PATCH, EXTENDED RELEASE TRANSDERMAL at 05:03

## 2022-03-24 RX ADMIN — ACETYLCYSTEINE 2 ML: 100 INHALANT RESPIRATORY (INHALATION) at 07:03

## 2022-03-24 RX ADMIN — METRONIDAZOLE 500 MG: 500 TABLET ORAL at 10:03

## 2022-03-24 RX ADMIN — IPRATROPIUM BROMIDE AND ALBUTEROL SULFATE 3 ML: 2.5; .5 SOLUTION RESPIRATORY (INHALATION) at 07:03

## 2022-03-24 RX ADMIN — Medication 10 ML: at 05:03

## 2022-03-24 RX ADMIN — IPRATROPIUM BROMIDE AND ALBUTEROL SULFATE 3 ML: 2.5; .5 SOLUTION RESPIRATORY (INHALATION) at 08:03

## 2022-03-24 RX ADMIN — Medication 10 ML: at 02:03

## 2022-03-24 RX ADMIN — HEPARIN SODIUM 5000 UNITS: 5000 INJECTION INTRAVENOUS; SUBCUTANEOUS at 05:03

## 2022-03-24 RX ADMIN — HEPARIN SODIUM 5000 UNITS: 5000 INJECTION INTRAVENOUS; SUBCUTANEOUS at 10:03

## 2022-03-24 RX ADMIN — IPRATROPIUM BROMIDE AND ALBUTEROL SULFATE 3 ML: 2.5; .5 SOLUTION RESPIRATORY (INHALATION) at 01:03

## 2022-03-24 RX ADMIN — DEXTROSE: 5 SOLUTION INTRAVENOUS at 02:03

## 2022-03-24 RX ADMIN — CARVEDILOL 12.5 MG: 12.5 TABLET, FILM COATED ORAL at 10:03

## 2022-03-24 RX ADMIN — METRONIDAZOLE 500 MG: 500 TABLET ORAL at 05:03

## 2022-03-24 RX ADMIN — DEXTROSE 125 ML: 10 SOLUTION INTRAVENOUS at 12:03

## 2022-03-24 RX ADMIN — CARBIDOPA AND LEVODOPA 2 TABLET: 25; 100 TABLET ORAL at 05:03

## 2022-03-24 RX ADMIN — ACETYLCYSTEINE 2 ML: 100 INHALANT RESPIRATORY (INHALATION) at 08:03

## 2022-03-24 NOTE — PLAN OF CARE
PT CONT TO REQUIRE TOTAL A X 2 AND NOT RESPONDING TO VERBAL AND TACTILE CUES FOR ACTIVIE PARTICIPATION AND PROGRESS IN SKILLED P.T.

## 2022-03-24 NOTE — OP NOTE
Pre Op Diagnosis: gastric access     Post Op Diagnosis: same     Procedure:  NG tube placement     Procedure performed by: Hardeep BRYSON, Darryl STEINBERG     Written Informed Consent Obtained: Yes     Specimen Removed:  no     Estimated Blood Loss:  minimal     Findings: Local anesthesia and moderate sedation were used.     The patient tolerated the procedure well and there were no complications.      NG tube placed into left nostril into body of stomach.  Pt tolerated the procedure well without immediate complications.  Please see radiologist report for details. F/u with PCP and/or ordering physician.

## 2022-03-24 NOTE — PT/OT/SLP PROGRESS
Speech Language Pathology Treatment    Patient Name:  Tanya Madrid   MRN:  [id number]  Admitting Diagnosis: Cardiac arrest    Recommendations:                 General Recommendations:  ST to continue POC on a trial basis pending participation with therapy  Diet recommendations:  NPO, Liquid Diet Level: NPO   Aspiration Precautions: Frequent oral care   General Precautions: Standard, aspiration, NPO  Communication strategies:  none    Subjective     Pt was seen lying on her left side somewhat curled up and sleeping very soundly.  Her daughter present at bedside reported pt experiencing difficulty maintaining her blood sugar and has remained lethargic.    Patient goals: none stated      Pain/Comfort:  · Pain Rating 1: 0/10    Respiratory Status: Room air    Objective:     Has the patient been evaluated by SLP for swallowing?   Yes  Keep patient NPO? Yes     ST was unable to wake pt despite verbal and tactile cueing.  ST educated pt's daughter on bedside ex's that she can be encouraging pt to do when awake.  She asked many questions regarding dysphagia, aspiration, PEG tube, and ST POC.  All questions answered.  Nursing at bedside to remove and replace NG tube.      Assessment:     Tanya Madrid is a 82 y.o. female with an SLP diagnosis of Dysphagia and Cognitive-Linguistic Impairment.  She presents with poor swallow safety and cognitive abilities.  ST to continue POC on a trial basis due to pt not awake enough to participate.      Goals:   Multidisciplinary Problems     SLP Goals        Problem: SLP Goal    Goal Priority Disciplines Outcome   SLP Goal     SLP Ongoing, Progressing   Description: 1. Pt will consume least restrictive PO diet without s/s of dysphagia.  2. Pt will improve cognitive-linguistic ability to communicate basic needs.                   Plan:     · Patient to be seen:  2 x/week   · Plan of Care expires:  03/28/22  · Plan of Care reviewed with:  daughter   · SLP Follow-Up:  Yes       Time  Tracking:     SLP Treatment Date:   03/24/22  Speech Start Time:  1025  Speech Stop Time:  1036     Speech Total Time (min):  11 min    Billable Minutes: Speech Therapy Individual 11    03/24/2022

## 2022-03-24 NOTE — SUBJECTIVE & OBJECTIVE
Interval History:     Review of Systems   Unable to perform ROS: Mental status change   Objective:     Vital Signs (Most Recent):  Temp: 99.2 °F (37.3 °C) (03/24/22 1154)  Pulse: 82 (03/24/22 1313)  Resp: 16 (03/24/22 1313)  BP: 127/79 (03/24/22 1154)  SpO2: 98 % (03/24/22 1313) Vital Signs (24h Range):  Temp:  [97.4 °F (36.3 °C)-99.2 °F (37.3 °C)] 99.2 °F (37.3 °C)  Pulse:  [66-94] 82  Resp:  [16-20] 16  SpO2:  [92 %-100 %] 98 %  BP: (127-179)/(70-99) 127/79     Weight: 57.8 kg (127 lb 6.8 oz)  Body mass index is 23.31 kg/m².    Intake/Output Summary (Last 24 hours) at 3/24/2022 1334  Last data filed at 3/24/2022 0513  Gross per 24 hour   Intake --   Output 1600 ml   Net -1600 ml      Physical Exam  Constitutional:       Comments: no verbal response , does not follow commands, withdraws to pain stimulus    HENT:      Head: Normocephalic and atraumatic.   Eyes:      Pupils: Pupils are equal, round, and reactive to light.      Comments: (+) corneal reflex    Cardiovascular:      Rate and Rhythm: Normal rate.      Heart sounds: Normal heart sounds.   Pulmonary:      Breath sounds: Rales present.      Comments: Breath sounds are coarse and bronchial  Abdominal:      General: Bowel sounds are normal.   Genitourinary:     Comments: Abreu catheter in place  Musculoskeletal:      Cervical back: Neck supple.      Right lower leg: No edema.      Left lower leg: No edema.   Neurological:      Comments:  Eyes are open, seems awake and occasionally tracks voice , other time not.   Intermittent spontaneous upper extremities movements  does not seem purposeful.        Significant Labs: All pertinent labs within the past 24 hours have been reviewed.      Significant Imaging: I have reviewed all pertinent imaging results/findings within the past 24 hours.

## 2022-03-24 NOTE — PROGRESS NOTES
"[facility] - Telemetry ([facility] Medicine  Progress Note    Patient Name: Tanya Madrid  MRN: [id number]  Patient Class: IP- Inpatient   Admission Date: 3/10/2022  Length of Stay: 14 days  Attending Physician: Sj Juan, *  Primary Care Provider: Maggie Sue NP        Subjective:     Principal Problem:Cardiac arrest        HPI:  History was taken from the electronic chart and from the daughter -  Adenike Lemus Daughter [phone]        82 y.o. female patient with a PMHx of HTN,  CHF-last EF-01/2021-25%, with diastolic dysfunction  , and HLD who presents to the Emergency Department for evaluation of cardiac arrest which onset suddenly 1 hour pta.  Daughter reports that over the last 3 days -she has been complaining of worsening dyspnoea  and was given extra lasix tablets. She usually takes 20mg lasix daily and was given 2 extra tablets with the episodes of  dyspnea.  This evening while having dinner, she "looked blank" and stopped breathing . Daughter called EMS and was advised to start CPR.. Pt achieved ROSC en route. After I shock . Prior Tx includes 50 mcg of fentanyl and 50 mg of ketamine from EMS.   Code status discussed.  She is Full code.  Labs and imaging test reviewed.  CT head -No hemorrhage or acute major vascular distribution infarction.   Dehiscence of the posterior wall of the sphenoid sinus such as on series 4, image 49-52. There is high density material possibly related to paranasal sinus disease or neoplasm which extends towards the pituitary and along the right towards the intracranial ICA.  Further evaluation with contrast enhanced MRI when clinically appropriate would be recommended.       Component      Latest Ref Rng & Units 3/10/2022 3/3/2022              Potassium      3.5 - 5.1 mmol/L 2.9 (L) 3.7   CBC showed wbc -19   Component      Latest Ref Rng & Units 3/10/2022 3/10/2022 4/20/2021          10:47 PM  8:18 PM    Lactate, Lex      0.5 - 2.2 " mmol/L 3.6 (HH) 5.7 (HH) 2.0     Component      Latest Ref Rng & Units 3/10/2022 4/20/2021   BNP      0 - 99 pg/mL >4,900 (H) 2,806 (H)   Code status discussed -she is full code  Her daughter is the SDM.  Adenike Lemus Daughter [phone]       Overview/Hospital Course:  81 y/o AAF admitted to ICU with a dx of cardiac arrest , CODY , acute hypoxic respiratory failure  and  Anoxic encephalopathy . Started on hypothermia protocol , IVAB , asa , plavix  And entresto . Cardiology and palliative care consulted. Tx reviewed . Cont current tx . Prognosis Poor      3/12  She remain critically ill . Now on rewarming phase . Started on propofol due to  only grimacing and posturing . Kidney  function is trending up . She  is on low dose of dobutamine . Case d/w he daughter at bedside . She changed code status to DNR . Prognosis poor .    3/13 Reamin critically ill  on mechanical ventilation . Temp is now > 36 . Last night  was on levophed and dobutamine to keep a map > 65  . Levophed wean off this am . She has been on propofol on and off for posturing  movement . Sedation stop for 2 hrs  .  She has positve  corneal , pupil and gag reflex .  No respond to verval or pain stimuli . She withdraw to pain . She is breathing overt the vent .  Neurology consulted . The UOP has sligly increased since yesterday . The kidney function  cont trending up . The Blood cx are (+) ALPHA HEMOLYTIC STREP    3/14 No significant neurologuical changes since yesterday . She opne her eyes on tactile simlus . She  does not follow commads . Ha been odff sedation for more than 24 hrs .  The Blood cx (+) for STREPTOCOCCUS ANGINOSUS  and EIKENELLA CORRODENS . The kidney function is sligly improving  . Prognsosis POOR .  POC d/w  her duaghter .    3/15- Remains intubated with FiO2 30%/PEEP 5 . Afebrile. Off sedation x 2 days. Remains comatose.  Intermittent spontaneous eye opening but does not seem purposeful. No verbal  response , does not  follow commands or track voice. Intermittent withdrawal to pain stimulus. Corneal, pupillary , cough and gag reflex present. Remains on Dobutamine gtt. EF 10%. Tube feed in progress. UOP improved 1L yesterday, however creatinine bumped to 3.8. Rocephin for Streptococcal  bacteremia continued. TTE negative for vegetation. Palliative Medicine is following. Prognosis appears poor.     3/16- Aferbile.Off sedation. Remains intubated with minimal vent support FiO2 30%/ PEEP 5. However pt is not awake enough to manage secretion and protect airways. Intermittent eye opening but does not seem purposeful. Brain stem reflexes are present. Withdraws to pain stimulus and facial grimace noted with sternal rub. Remains on dobutamine gtt.  ml yesterday . BNPCreatinine stable at 3.7. BNP down to 1493 from 4900 on admit. Will re consult Neurology to assess prognosis . Palliative Medicine is following.     3/17- Eyes are open but not purposeful, No verbal response , withdraws to pain stimulus. ETT cuff leak noted and needed to be exchanged. CC team extubated pt after SBT. Plan to assess clinical course from respiratory stand point with no plan for re intubation. NG tube placement will be attempted for feeding and medication administration. Dobutamine gtt continues.  High risk of sudden demise from cardiac stand point given EF 10%.     3/18- No verbal response, does not follow commands, eyes are open, intermittently seems pt tracks voice other time not. Withdraws to pain stimulus. Weak cough , requiring frequent suctioning to clear airway . Pt bites down suction tube. SpO2 satisfactory on FiO2 2L/min. Afebrile . NG tube feeds in progress , so far tolerating . UOP 1 liter yesterday. Cr 3.2. WBC dhara to 15K. Antibiotic Rocephin continues . Pt remains on Dobutamine gtt. Awaiting Cardiology input. Family declined Palliative Medicine follow up. Will get follow up CT head today.     3/19- Afebrile . On RA. SpO2 100%. Repeat CT head  with preserved gray-white matter junction and no cerebral edema, hemorrhage or other acute process. GCS is difficult to assess. Eyes remains open. No verbal response , withdraws to pain. Pt remains with poor cough effort and lot of secretion requiring suction and breatrh sounds are coarse. CXR with no new new infiltrate or consolidation as of today. WBC 14K, Cr down to 2.9. . Gentle hydration initiated .  ml yesterday. Spoke to marlin at bedside . She is debating continuing current care and PEG tube placement vs Hospice referral. Pt had 10 beats NSVT yesterday .Dobutamine gtt discontinued.     3/20- Afebrile. Remains on RA. At times seems more awake and tracks voice. Moves head and ext intermittently. UOP increased to 1.5L yesterday . Creatinine down to 2.2. NG tube accidentally removed during PT. Daughter has not made her mind on PEG tube placement yet. WBC 14K. Flagyl added. Na 148.     3/21- On RA. Pt with poor secretion management . Daughter elected PEG tube placement . Plavix and ASA held from today. IR consulted for PEG tube. Notify IR once pt remains off antiplatelet x 5 days. WBC 17K. PCT down to 0.51. Falgyl added with current Rocephin. Cr further improved to 1.8    3/22 She is aao x 0  in nad .Intermittent spontaneous  upper extremities movement but does not seem purposeful. No verbal  response , does not follow commands but track voice. Plavix  on hold yesterday  . NAEON . No family at bedside .     3/23 No neurological changes from yesterday .  Plan for PEG tube placement . Decadron  IV wean down . NAEON .     3/24 Last night  the NGT displaced and d/c . RN has not be able to placed a new one despite multiple attempts .  She has   multiple episode of hypoglycemia which resolved with D5 . The Na level is trending up  . Pt started on d5 at 50 cc/hr due to hypoglycemia and  hypernatremia / IR contacted for PEG tube placement tomorrow . The  WBC is trending up , Pt has  been on decadron iv  which was d/c  today .       Interval History:     Review of Systems   Unable to perform ROS: Mental status change   Objective:     Vital Signs (Most Recent):  Temp: 99.2 °F (37.3 °C) (03/24/22 1154)  Pulse: 82 (03/24/22 1313)  Resp: 16 (03/24/22 1313)  BP: 127/79 (03/24/22 1154)  SpO2: 98 % (03/24/22 1313) Vital Signs (24h Range):  Temp:  [97.4 °F (36.3 °C)-99.2 °F (37.3 °C)] 99.2 °F (37.3 °C)  Pulse:  [66-94] 82  Resp:  [16-20] 16  SpO2:  [92 %-100 %] 98 %  BP: (127-179)/(70-99) 127/79     Weight: 57.8 kg (127 lb 6.8 oz)  Body mass index is 23.31 kg/m².    Intake/Output Summary (Last 24 hours) at 3/24/2022 1334  Last data filed at 3/24/2022 0513  Gross per 24 hour   Intake --   Output 1600 ml   Net -1600 ml      Physical Exam  Constitutional:       Comments: no verbal response , does not follow commands, withdraws to pain stimulus    HENT:      Head: Normocephalic and atraumatic.   Eyes:      Pupils: Pupils are equal, round, and reactive to light.      Comments: (+) corneal reflex    Cardiovascular:      Rate and Rhythm: Normal rate.      Heart sounds: Normal heart sounds.   Pulmonary:      Breath sounds: Rales present.      Comments: Breath sounds are coarse and bronchial  Abdominal:      General: Bowel sounds are normal.   Genitourinary:     Comments: Abreu catheter in place  Musculoskeletal:      Cervical back: Neck supple.      Right lower leg: No edema.      Left lower leg: No edema.   Neurological:      Comments:  Eyes are open, seems awake and occasionally tracks voice , other time not.   Intermittent spontaneous upper extremities movements  does not seem purposeful.        Significant Labs: All pertinent labs within the past 24 hours have been reviewed.      Significant Imaging: I have reviewed all pertinent imaging results/findings within the past 24 hours.        Assessment/Plan:      * Cardiac arrest  Out of Hospital cardiac arrest.  Last Echo -1/21-EF -25%, Repeat echo with EF 10%  GIII Diastolic  dysfunction.  EKG -Sinus rhythm with occasional Premature ventricular complexes and Premature atrial complexes  Cardiology consulted   Monitor clinical course for neuro recovery   Prognosis poor   3/18-  Extubated yesterday   Requiring frequent suction to keep airway clear . Weak cough   Remains on Dobutamine gtt.  Await Cardiology input  3/19-  Repeat CT head with preserved gray-white matter junction and no cerebral edema, hemorrhage or other acute process. GCS is difficult to assess. Eyes remains open. No verbal response , withdraws to pain.    Pt had 10 beats NSVT yesterday .  Dobutamine gtt discontinued   3/21-  Currently fairly stable from Cardiac standpoint . Downgrade to telemetry     Stable . Cont current tx     Hypernatremia  3/19-  In the setting of free water deficit   Gentle hydration with D5 infusion   Free water flush via NG tube   3/21-  Improved     Na now  Trending up = start D5 IVF      Palliative care encounter  - Palliative medicine consulted for [facility] discussion   -Current code status is DNR  3/18-  Family declined further Palliative Medicine follow up     Gram-positive bacteremia  Source of infection is unclear   Cont rocephin x 2 weeks   Initial Blood culture from 3/10/22 positive forALPHA HEMOLYTIC STREP and EIKENELLA CORRODENS non viable for susceptibility   Repeat blood cultures from 3/15/22 - NGTD    CODY (acute kidney injury)  2/2 to cardiac arrest   Not  a good candidate for CRT/RRT  Monitor UOP and renal indices   3/21-  Slowly improving       Anoxic encephalopathy  2/2 to cardiac arrest   Neurology consulted to assess prognosis   3/18-  Repeat head CT today   3/19-  Repeat CT head with preserved gray-white matter junction and no cerebral edema, hemorrhage or other acute process. GCS is difficult to assess. Eyes remains open. No verbal response , withdraws to pain    Hypokalemia  -Replete as indicated     Aspiration pneumonia  Initial CXR on admit suggestive of right-sided pulmonary  opacities possibly infection, aspiration, or edema.  Antibiotic include Rocephin initiated   Blood cx from 3/10/22 (+) for ALPHA HEMOLYTIC STREP  Tracheal aspirate - neg   Repeat blood cultures from 3/15/22- NGTD  3/20-  Flagyl added given leukocytosis     WBC  cont trending up . She was on decadron 4 mg iv bid and then wean off to 4 mg qd . Steroid d/c today . Possible reactive , however could be due to aspiration. Cont IVAB     Mass of sphenoid sinus  Ct head showed- Dehiscence of the posterior wall of the sphenoid sinus such as on series 4, image 49-52. There is high density material possibly related to paranasal sinus disease or neoplasm which extends towards the pituitary and along the right towards the intracranial ICA.  Further evaluation with contrast enhanced MRI when clinically appropriate would be recommended.  Suggest including 3D/volumetric postcontrast imaging.  ENT consult as clinically warranted.        Elevated troponin  In the setting of Cardiac arrest  Cont asa/plavix, BB  Cardiology on board   3/21-  ASA/Plavix held from 3/21for upcoming PEG placement     Acute on chronic combined systolic and diastolic CHF (congestive heart failure)  Patient is identified as having Combined Systolic and Diastolic heart failure that is Acute on chronic. CHF is currently uncontrolled due to Pulmonary edema/pleural effusion on CXR. Latest ECHO performed and demonstrates- Results for orders placed during the hospital encounter of 01/15/21    Echo Color Flow Doppler? Yes    Interpretation Summary  · The left ventricle is severely enlarged with eccentric hypertrophy and severely decreased systolic function. The estimated ejection fraction is 25%  · Grade II left ventricular diastolic dysfunction.  · Normal right ventricular size with moderately reduced right ventricular systolic function.  · Severe left atrial enlargement.  · There is pulmonary hypertension.  · There is mild aortic valve stenosis.  · Aortic valve area  is 1.37 cm2; peak velocity is 1.33 m/s; mean gradient is 4 mmHg.  · Mild to moderate tricuspid regurgitation.  · Intermediate central venous pressure (8 mmHg).  · The estimated PA systolic pressure is 50 mmHg.  · Trivial pericardial effusion.  . Continue Nitrate/Vasodilator and monitor clinical status closely. Monitor on telemetry. Patient is on CHF pathway.  Monitor strict Is&Os and daily weights.  Place on fluid restriction of 1.5 L. Continue to stress to patient importance of self efficacy and  on diet for CHF. Last BNP reviewed- and noted below   No results for input(s): BNP, BNPTRIAGEBLO in the last 168 hours.  3/15-  Off Entresto , BB and diuretics given hypotension   3/16, 3/21   BB resumed via NG tube   No ACEi/ARB or ARNI due to CODY  Does not appears volume overloaded   Diuretics on hold.     History of CVA (cerebrovascular accident)  On aspirin/plavix. Continue via NG  tube   3/21-  ASA and Plavix held from 3/21 for PEG placement     Parkinson disease  Continue supportive care   Resume meds once appropriate   3/19-  Meds resumed via NG tube       Laryngeal papillomatosis  Known History   Cont supportive tx as needed       Essential hypertension  Hold  entresto due to current hypotension and CODY  Resume antihypertensives once appropriate via NG tube   3/19-  Pt started back on Amlodipine , Coreg via NG tube     Stable . Cont current  Medication       VTE Risk Mitigation (From admission, onward)         Ordered     heparin (porcine) injection 5,000 Units  Every 8 hours         03/10/22 2351     IP VTE HIGH RISK PATIENT  Once         03/10/22 2345     Place sequential compression device  Until discontinued         03/10/22 2345                Discharge Planning   PARMINDER:      Code Status: DNR   Is the patient medically ready for discharge?:     Reason for patient still in hospital (select all that apply): Treatment  Discharge Plan A: Skilled Nursing Facility, Home Health, Home with family                   Sj Juan MD  Department of Hospital Medicine   [facility] - Telemetry ([facility])

## 2022-03-24 NOTE — ASSESSMENT & PLAN NOTE
3/19-  In the setting of free water deficit   Gentle hydration with D5 infusion   Free water flush via NG tube   3/21-  Improved     Na now  Trending up = start D5 IVF

## 2022-03-24 NOTE — PT/OT/SLP PROGRESS
Physical Therapy  Treatment    Tanya Madrid   MRN: [id number]   Admitting Diagnosis: Cardiac arrest    PT Received On: 03/24/22  PT Start Time: 0830     PT Stop Time: 0840    PT Total Time (min): 10 min       Billable Minutes:  Therapeutic Exercise 10    Treatment Type: Treatment  PT/PTA: PT     PTA Visit Number: 0       General Precautions: Standard, aspiration, fall, NPO  Orthopedic Precautions: N/A   Braces: N/A  Respiratory Status: PT RECIEVING BREATHING TX AT TIME OF TX    Spiritual, Cultural Beliefs, [employer]ism Practices, Values that Affect Care: no    Subjective:  Communicated with NURSE SHANE AND Epic CHART REVIEW  prior to session.   PT MET IN RM SUP IN BED     Pain/Comfort  Pain Rating 1: 0/10  Pain Rating Post-Intervention 1: 0/10    Objective:        Functional Mobility:  PT WITH EYES OPEN HOWEVER NOT RESPONDING TO P.T. PT UNABLE TO PARTICIPATE IN BED MOBILITY AND TE. P.T. COMPLETED PROM AP, HS, HIP ADD/ABD AND HIP IR/ER X 10 REPS B LE . PT SCOOTED TO  HOB WITH TOTAL A. PT LEFT WITH PRESSURE RELIEF BOOTS ON AND ALL NEEDS MET.     AM-PAC 6 CLICK MOBILITY  How much help from another person does this patient currently need?   1 = Unable, Total/Dependent Assistance  2 = A lot, Maximum/Moderate Assistance  3 = A little, Minimum/Contact Guard/Supervision  4 = None, Modified Prentiss/Independent    Turning over in bed (including adjusting bedclothes, sheets and blankets)?: 1  Sitting down on and standing up from a chair with arms (e.g., wheelchair, bedside commode, etc.): 1  Moving from lying on back to sitting on the side of the bed?: 1  Moving to and from a bed to a chair (including a wheelchair)?: 1  Need to walk in hospital room?: 1  Climbing 3-5 steps with a railing?: 1  Basic Mobility Total Score: 6    AM-PAC Raw Score CMS G-Code Modifier Level of Impairment Assistance   6 % Total / Unable   7 - 9 CM 80 - 100% Maximal Assist   10 - 14 CL 60 - 80% Moderate Assist   15 - 19 CK 40 - 60%  Moderate Assist   20 - 22 CJ 20 - 40% Minimal Assist   23 CI 1-20% SBA / CGA   24 CH 0% Independent/ Mod I     Patient left HOB elevated with all lines intact.    Assessment:  PT UNABLE TO ACTIVELY PARTICIPATE IN SKILLED P.T. SERVICES. PT WILL BE D/C AT THIS TIME TO A MAINTENANCE PROGRAM FOR PROM.     Rehab identified problem list/impairments: Rehab identified problem list/impairments: weakness, abnormal tone, decreased ROM, impaired cognition, impaired endurance, impaired self care skills, impaired functional mobilty, gait instability, impaired balance, decreased safety awareness, decreased lower extremity function, decreased upper extremity function, impaired fine motor, impaired cardiopulmonary response to activity        Discharge recommendations: Discharge Facility/Level of Care Needs: nursing facility, basic (24 HOUR [facility])     Barriers to discharge:      Equipment recommendations:       PLAN:    D/C TO [facility] FOR MAINTENANCE.   Plan of Care reviewed with: other (see comments) (PT UNABLE TO UNDERSTAND)         03/24/2022

## 2022-03-24 NOTE — PT/OT/SLP PROGRESS
Occupational Therapy   Treatment and Discharge    Name: Tanya Madrid  MRN: [id number]  Admitting Diagnosis:  Cardiac arrest       Recommendations:     Discharge Recommendations: nursing facility, basic, home (home with 24/7 SPV and physical A vs basic nursing home placement)  Discharge Equipment Recommendations:  hospital bed  Barriers to discharge:  None    Assessment:     Tanya Madrid is a 82 y.o. female with a medical diagnosis of Cardiac arrest.     Plan:     · Transition from OT caseload to  for continued PROM to B UE and to monitor for readiness for skilled intervention.    Subjective     Pain/Comfort:  · Pain Rating 1:  (no nonverbal indicators of pain)    Objective:     Communicated with: NurseTien, prior to session.  Patient found supine with bed alarm, henley catheter, NG tube, pressure relief boots, peripheral IV, SCD, telemetry upon OT entry to room.    General Precautions: Standard, aspiration, NPO   Orthopedic Precautions:N/A   Braces: N/A  Respiratory Status: Nasal cannula, flow 2 L/min    Bed Mobility:    · Patient completed Scooting/Bridging with total assistance and 2 persons     Activities of Daily Living:  · Grooming: total assistance .    [facility] 6 Click ADL: 6    Treatment & Education:  Patient remains unable to follow commands or actively participate in skilled intervention. Provided with B UE PROM with no active movement elicited. Daughter educated on appropriate completion of PROM and positioning with pillows. Focus on improving positioning in bed to mimic chair with scapular retraction stretching and neck to neutral.     Patient left HOB elevated with all lines intact, call button in reach, bed alarm on and nurse and daughter presentEducation:      GOALS:   Multidisciplinary Problems     Occupational Therapy Goals        Problem: Occupational Therapy Goal    Goal Priority Disciplines Outcome Interventions   Occupational Therapy Goal     OT, PT/OT Ongoing, Not Progressing     Description: STG'S TO BE MET BY 4/3/2022:    1)  PATIENT WILL FOLLOW 1 OUT OF 3 VC'S DURING SIMPLE ADL.  2)  DTR WILL BE (I) WITH MANUAL EDEMA MANIPULATION TO (B) HANDS/BUE'S FOR INCREASED EDEMA CONTROL.  3)  PATIENT WILL PERFORM SUPINE <> SIT WITH MOD (A).  4)  PATIENT WILL PERFORM UB DRESSING WITH MOD (A).                   Time Tracking:     OT Date of Treatment: 03/24/22  OT Start Time: 0835  OT Stop Time: 0900  OT Total Time (min): 25 min    Billable Minutes:Therapeutic Activity 10  Therapeutic Exercise 15    3/24/2022

## 2022-03-24 NOTE — PLAN OF CARE
Patient unable to engage in skilled intervention at this time. Will transition to  for B UE PROM and monitor for readiness in future for transition to OT caseload. Recommending home vs basic nursing home at d/c.

## 2022-03-24 NOTE — PLAN OF CARE
Unable to assess orientation d/t aphagia. No signs of distress. No signs of pain. On RA. New PIV placed to left hand patent. Pt incontinent of b/b. Abreu cather in place draining clear, yellow urine. Requires standby assist when ambulating d/t pt fell SOB. Pt remains free of falls. Meds given as prescribed.  POC reviewed with pt and pt verbalized understanding. Pt able to turn self. Encouraged pt  to turn frequently.  Normal sinus on Tele monitor.  Bed low. Side rails up x2, wheels locked, call light within reach.

## 2022-03-24 NOTE — ASSESSMENT & PLAN NOTE
Initial CXR on admit suggestive of right-sided pulmonary opacities possibly infection, aspiration, or edema.  Antibiotic include Rocephin initiated   Blood cx from 3/10/22 (+) for ALPHA HEMOLYTIC STREP  Tracheal aspirate - neg   Repeat blood cultures from 3/15/22- NGTD  3/20-  Flagyl added given leukocytosis     WBC  cont trending up . She was on decadron 4 mg iv bid and then wean off to 4 mg qd . Steroid d/c today . Possible reactive , however could be due to aspiration. Cont IVAB

## 2022-03-24 NOTE — PLAN OF CARE
Unable to assess orientation. No signs of distress or pain. Piv patent. Pt incontinent of b/b. Abreu in place draining clear yellow urine. Bed bound patient. Pt remains free of falls. Meds given as prescribed.  POC reviewed with pt and pt verbalized understanding. Pt turnt q2h. Normal sinus on Tele monitor.  Bed low. Side rails up x2, wheels locked, call light within reach. Two episode of low BG during shift MD notified and new order followed

## 2022-03-25 LAB
ANION GAP SERPL CALC-SCNC: 12 MMOL/L (ref 8–16)
BASOPHILS # BLD AUTO: 0.01 K/UL (ref 0–0.2)
BASOPHILS NFR BLD: 0.1 % (ref 0–1.9)
BUN SERPL-MCNC: 54 MG/DL (ref 8–23)
CALCIUM SERPL-MCNC: 8.3 MG/DL (ref 8.7–10.5)
CHLORIDE SERPL-SCNC: 113 MMOL/L (ref 95–110)
CO2 SERPL-SCNC: 23 MMOL/L (ref 23–29)
CREAT SERPL-MCNC: 1.3 MG/DL (ref 0.5–1.4)
DIFFERENTIAL METHOD: ABNORMAL
EOSINOPHIL # BLD AUTO: 0 K/UL (ref 0–0.5)
EOSINOPHIL NFR BLD: 0.2 % (ref 0–8)
ERYTHROCYTE [DISTWIDTH] IN BLOOD BY AUTOMATED COUNT: 16.6 % (ref 11.5–14.5)
EST. GFR  (AFRICAN AMERICAN): 44 ML/MIN/1.73 M^2
EST. GFR  (NON AFRICAN AMERICAN): 38 ML/MIN/1.73 M^2
GLUCOSE SERPL-MCNC: 174 MG/DL (ref 70–110)
HCT VFR BLD AUTO: 27.6 % (ref 37–48.5)
HGB BLD-MCNC: 8.5 G/DL (ref 12–16)
IMM GRANULOCYTES # BLD AUTO: 0.49 K/UL (ref 0–0.04)
IMM GRANULOCYTES NFR BLD AUTO: 4.1 % (ref 0–0.5)
LYMPHOCYTES # BLD AUTO: 1.3 K/UL (ref 1–4.8)
LYMPHOCYTES NFR BLD: 10.5 % (ref 18–48)
MCH RBC QN AUTO: 28.4 PG (ref 27–31)
MCHC RBC AUTO-ENTMCNC: 30.8 G/DL (ref 32–36)
MCV RBC AUTO: 92 FL (ref 82–98)
MONOCYTES # BLD AUTO: 1.5 K/UL (ref 0.3–1)
MONOCYTES NFR BLD: 12.2 % (ref 4–15)
NEUTROPHILS # BLD AUTO: 8.7 K/UL (ref 1.8–7.7)
NEUTROPHILS NFR BLD: 72.9 % (ref 38–73)
NRBC BLD-RTO: 0 /100 WBC
PLATELET # BLD AUTO: 332 K/UL (ref 150–450)
PMV BLD AUTO: 13.1 FL (ref 9.2–12.9)
POCT GLUCOSE: 137 MG/DL (ref 70–110)
POCT GLUCOSE: 150 MG/DL (ref 70–110)
POCT GLUCOSE: 184 MG/DL (ref 70–110)
POCT GLUCOSE: 205 MG/DL (ref 70–110)
POCT GLUCOSE: 214 MG/DL (ref 70–110)
POTASSIUM SERPL-SCNC: 4.2 MMOL/L (ref 3.5–5.1)
RBC # BLD AUTO: 2.99 M/UL (ref 4–5.4)
SODIUM SERPL-SCNC: 148 MMOL/L (ref 136–145)
WBC # BLD AUTO: 11.96 K/UL (ref 3.9–12.7)

## 2022-03-25 PROCEDURE — A4216 STERILE WATER/SALINE, 10 ML: HCPCS | Performed by: INTERNAL MEDICINE

## 2022-03-25 PROCEDURE — 94640 AIRWAY INHALATION TREATMENT: CPT

## 2022-03-25 PROCEDURE — 94761 N-INVAS EAR/PLS OXIMETRY MLT: CPT

## 2022-03-25 PROCEDURE — 80048 BASIC METABOLIC PNL TOTAL CA: CPT | Performed by: INTERNAL MEDICINE

## 2022-03-25 PROCEDURE — 25000242 PHARM REV CODE 250 ALT 637 W/ HCPCS: Performed by: INTERNAL MEDICINE

## 2022-03-25 PROCEDURE — 25000003 PHARM REV CODE 250: Performed by: INTERNAL MEDICINE

## 2022-03-25 PROCEDURE — 21400001 HC TELEMETRY ROOM

## 2022-03-25 PROCEDURE — 36415 COLL VENOUS BLD VENIPUNCTURE: CPT | Performed by: INTERNAL MEDICINE

## 2022-03-25 PROCEDURE — 63600175 PHARM REV CODE 636 W HCPCS: Performed by: INTERNAL MEDICINE

## 2022-03-25 PROCEDURE — 25500020 PHARM REV CODE 255: Performed by: INTERNAL MEDICINE

## 2022-03-25 PROCEDURE — S0028 INJECTION, FAMOTIDINE, 20 MG: HCPCS | Performed by: INTERNAL MEDICINE

## 2022-03-25 PROCEDURE — 94668 MNPJ CHEST WALL SBSQ: CPT

## 2022-03-25 PROCEDURE — 85025 COMPLETE CBC W/AUTO DIFF WBC: CPT | Performed by: INTERNAL MEDICINE

## 2022-03-25 RX ORDER — FAMOTIDINE 20 MG/50ML
20 INJECTION, SOLUTION INTRAVENOUS DAILY
Status: DISCONTINUED | OUTPATIENT
Start: 2022-03-25 | End: 2022-03-26

## 2022-03-25 RX ORDER — METOPROLOL TARTRATE 1 MG/ML
5 INJECTION, SOLUTION INTRAVENOUS EVERY 8 HOURS
Status: DISCONTINUED | OUTPATIENT
Start: 2022-03-25 | End: 2022-03-26

## 2022-03-25 RX ADMIN — ACETYLCYSTEINE 2 ML: 100 INHALANT RESPIRATORY (INHALATION) at 08:03

## 2022-03-25 RX ADMIN — HEPARIN SODIUM 5000 UNITS: 5000 INJECTION INTRAVENOUS; SUBCUTANEOUS at 11:03

## 2022-03-25 RX ADMIN — IPRATROPIUM BROMIDE AND ALBUTEROL SULFATE 3 ML: 2.5; .5 SOLUTION RESPIRATORY (INHALATION) at 08:03

## 2022-03-25 RX ADMIN — Medication 10 ML: at 04:03

## 2022-03-25 RX ADMIN — DEXTROSE: 5 SOLUTION INTRAVENOUS at 04:03

## 2022-03-25 RX ADMIN — FAMOTIDINE 20 MG: 20 INJECTION, SOLUTION INTRAVENOUS at 04:03

## 2022-03-25 RX ADMIN — METRONIDAZOLE 500 MG: 500 TABLET ORAL at 06:03

## 2022-03-25 RX ADMIN — HEPARIN SODIUM 5000 UNITS: 5000 INJECTION INTRAVENOUS; SUBCUTANEOUS at 04:03

## 2022-03-25 RX ADMIN — Medication 10 ML: at 06:03

## 2022-03-25 RX ADMIN — IOHEXOL 20 ML: 300 INJECTION, SOLUTION INTRAVENOUS at 02:03

## 2022-03-25 RX ADMIN — HEPARIN SODIUM 5000 UNITS: 5000 INJECTION INTRAVENOUS; SUBCUTANEOUS at 06:03

## 2022-03-25 RX ADMIN — IPRATROPIUM BROMIDE AND ALBUTEROL SULFATE 3 ML: 2.5; .5 SOLUTION RESPIRATORY (INHALATION) at 01:03

## 2022-03-25 NOTE — ASSESSMENT & PLAN NOTE
3/19-  In the setting of free water deficit   Gentle hydration with D5 infusion   Free water flush via NG tube   3/21-  Improved  3/23-   Na now  Trending up = start D5 IVF

## 2022-03-25 NOTE — SUBJECTIVE & OBJECTIVE
Interval History:   Possible PEG tube placement by  IR today. Disposition- PT suggested basic NH placement , however family wishes to take pt home.       Review of Systems   Unable to perform ROS: Patient nonverbal   Objective:     Vital Signs (Most Recent):  Temp: 97.9 °F (36.6 °C) (03/25/22 1049)  Pulse: 74 (03/25/22 1049)  Resp: 18 (03/25/22 1049)  BP: 136/74 (03/25/22 1049)  SpO2: 99 % (03/25/22 1049) Vital Signs (24h Range):  Temp:  [97.3 °F (36.3 °C)-99.6 °F (37.6 °C)] 97.9 °F (36.6 °C)  Pulse:  [63-84] 74  Resp:  [16-20] 18  SpO2:  [97 %-99 %] 99 %  BP: (106-139)/(64-83) 136/74     Weight: 56.7 kg (125 lb)  Body mass index is 22.86 kg/m².    Intake/Output Summary (Last 24 hours) at 3/25/2022 1209  Last data filed at 3/25/2022 0433  Gross per 24 hour   Intake --   Output 700 ml   Net -700 ml      Physical Exam  Constitutional:       Comments: Pt is awake , non verbal , Does not follow commands    HENT:      Head: Normocephalic and atraumatic.      Mouth/Throat:      Mouth: Mucous membranes are moist.   Eyes:      Pupils: Pupils are equal, round, and reactive to light.      Comments: (+) corneal reflex    Cardiovascular:      Rate and Rhythm: Normal rate.      Heart sounds: Normal heart sounds.   Pulmonary:      Breath sounds: No rales.      Comments: Breath sounds are coarse and bronchial  Abdominal:      General: Bowel sounds are normal. There is no distension.   Genitourinary:     Comments: Abreu catheter in place  Musculoskeletal:      Cervical back: Neck supple.      Right lower leg: No edema.      Left lower leg: No edema.      Comments: SCD and heel boots are in place   Neurological:      Motor: Weakness present.      Comments: Awake . Non verbal, does not follow commands        Significant Labs: All pertinent labs within the past 24 hours have been reviewed.  BMP:   Recent Labs   Lab 03/24/22  0429 03/24/22  1730 03/25/22  0536   GLU 35*   < > 174*   *   < > 148*   K 4.4   < > 4.2   *   < >  113*   CO2 23   < > 23   BUN 55*   < > 54*   CREATININE 1.2   < > 1.3   CALCIUM 9.0   < > 8.3*   MG 1.7  --   --     < > = values in this interval not displayed.     CBC:   Recent Labs   Lab 03/24/22  0429 03/25/22  0536   WBC 24.33* 11.96   HGB 10.7* 8.5*   HCT 35.8* 27.6*    332     CMP:   Recent Labs   Lab 03/24/22  0429 03/24/22  1730 03/25/22  0536   * 150* 148*   K 4.4 4.4 4.2   * 116* 113*   CO2 23 24 23   GLU 35* 157* 174*   BUN 55* 55* 54*   CREATININE 1.2 1.3 1.3   CALCIUM 9.0 9.0 8.3*   ANIONGAP 13 10 12   EGFRNONAA 42* 38* 38*       Significant Imaging:

## 2022-03-25 NOTE — ASSESSMENT & PLAN NOTE
Patient is identified as having Combined Systolic and Diastolic heart failure that is Acute on chronic. CHF is currently uncontrolled due to Pulmonary edema/pleural effusion on CXR. Latest ECHO performed and demonstrates- Results for orders placed during the hospital encounter of 01/15/21    Echo Color Flow Doppler? Yes    Interpretation Summary  · The left ventricle is severely enlarged with eccentric hypertrophy and severely decreased systolic function. The estimated ejection fraction is 25%  · Grade II left ventricular diastolic dysfunction.  · Normal right ventricular size with moderately reduced right ventricular systolic function.  · Severe left atrial enlargement.  · There is pulmonary hypertension.  · There is mild aortic valve stenosis.  · Aortic valve area is 1.37 cm2; peak velocity is 1.33 m/s; mean gradient is 4 mmHg.  · Mild to moderate tricuspid regurgitation.  · Intermediate central venous pressure (8 mmHg).  · The estimated PA systolic pressure is 50 mmHg.  · Trivial pericardial effusion.  . Continue Nitrate/Vasodilator and monitor clinical status closely. Monitor on telemetry. Patient is on CHF pathway.  Monitor strict Is&Os and daily weights.  Place on fluid restriction of 1.5 L. Continue to stress to patient importance of self efficacy and  on diet for CHF. Last BNP reviewed- and noted below   No results for input(s): BNP, BNPTRIAGEBLO in the last 168 hours.  3/15-  Off Entresto , BB and diuretics given hypotension   3/16, 3/21   BB resumed via NG tube   No ACEi/ARB or ARNI due to CODY  Does not appears volume overloaded   Diuretics on hold.   3/25-   Will start Entresto.   Stop amlodipine

## 2022-03-25 NOTE — SEDATION DOCUMENTATION
Patient on CT table. Does not answer questions. Consent was signed by daughter. She is calm and cooperative.

## 2022-03-25 NOTE — OP NOTE
Pre Op Diagnosis: Cardia arrest     Post Op Diagnosis: same     Procedure:  G tube placement     Procedure performed by: Hardeep BRYSON, Darryl STEINBERG     Written Informed Consent Obtained: Yes     Specimen Removed:  no     Estimated Blood Loss:  minimal     Findings: Local anesthesia and moderate sedation were used.     The patient tolerated the procedure well and there were no complications.      Sterile technique was performed in the upper abdomen, lidocaine was used as a local anesthetic.  G tube placed.  G tube check ordered for tomorrow.  Pt tolerated the procedure well without immediate complications.  Please see radiologist report for details. F/u with PCP and/or ordering physician.

## 2022-03-25 NOTE — SEDATION DOCUMENTATION
Attempted to call report x2 with no answer. Secure chat report sent to TARUN Urbina and patient sent back to room 212.

## 2022-03-25 NOTE — INTERVAL H&P NOTE
The patient has been examined and the H&P has been reviewed:    I concur with the findings and no changes have occurred since H&P was written.        Active Hospital Problems    Diagnosis  POA    *Cardiac arrest [I46.9]  Yes    Hypernatremia [E87.0]  No    Palliative care encounter [Z51.5]  Not Applicable    CODY (acute kidney injury) [N17.9]  Yes    Gram-positive bacteremia [R78.81]  Yes    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]  Yes    Aspiration pneumonia [J69.0]  Yes    Hypokalemia [E87.6]  Yes    Anoxic encephalopathy [G93.1]  Yes    Elevated troponin [R77.8]  Yes    Acute on chronic combined systolic and diastolic CHF (congestive heart failure) [I50.43]  Yes    Essential hypertension [I10]  Yes     Chronic    History of CVA (cerebrovascular accident) [Z86.73]  Not Applicable     Chronic    Parkinson disease [G20]  Yes     Chronic     Last Assessment & Plan:   Parkinson's likely contributing to symptoms as well she will be continued on Sinemet on discharge there is no interruption in therapy        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

## 2022-03-25 NOTE — SEDATION DOCUMENTATION
18fr G-tube to LUQ. Patient tolerated well. Split gauze with 4x4 and tegaderm applied. Patient tolerated well.

## 2022-03-25 NOTE — PROGRESS NOTES
"[facility] - Telemetry ([facility] Medicine  Progress Note    Patient Name: Tanya Madrid  MRN: [id number]  Patient Class: IP- Inpatient   Admission Date: 3/10/2022  Length of Stay: 15 days  Attending Physician: Tim Reilly MD  Primary Care Provider: Maggie Sue NP        Subjective:     Principal Problem:Cardiac arrest        HPI:  History was taken from the electronic chart and from the daughter -  Adenike Lemus Daughter [phone]        82 y.o. female patient with a PMHx of HTN,  CHF-last EF-01/2021-25%, with diastolic dysfunction  , and HLD who presents to the Emergency Department for evaluation of cardiac arrest which onset suddenly 1 hour pta.  Daughter reports that over the last 3 days -she has been complaining of worsening dyspnoea  and was given extra lasix tablets. She usually takes 20mg lasix daily and was given 2 extra tablets with the episodes of  dyspnea.  This evening while having dinner, she "looked blank" and stopped breathing . Daughter called EMS and was advised to start CPR.. Pt achieved ROSC en route. After I shock . Prior Tx includes 50 mcg of fentanyl and 50 mg of ketamine from EMS.   Code status discussed.  She is Full code.  Labs and imaging test reviewed.  CT head -No hemorrhage or acute major vascular distribution infarction.   Dehiscence of the posterior wall of the sphenoid sinus such as on series 4, image 49-52. There is high density material possibly related to paranasal sinus disease or neoplasm which extends towards the pituitary and along the right towards the intracranial ICA.  Further evaluation with contrast enhanced MRI when clinically appropriate would be recommended.       Component      Latest Ref Rng & Units 3/10/2022 3/3/2022              Potassium      3.5 - 5.1 mmol/L 2.9 (L) 3.7   CBC showed wbc -19   Component      Latest Ref Rng & Units 3/10/2022 3/10/2022 4/20/2021          10:47 PM  8:18 PM    Lactate, Lex      0.5 - 2.2 mmol/L 3.6 " (HH) 5.7 (HH) 2.0     Component      Latest Ref Rng & Units 3/10/2022 4/20/2021   BNP      0 - 99 pg/mL >4,900 (H) 2,806 (H)   Code status discussed -she is full code  Her daughter is the SDM.  Adenike Lemus Daughter [phone]       Overview/Hospital Course:  83 y/o AAF admitted to ICU with a dx of cardiac arrest , CODY , acute hypoxic respiratory failure  and  Anoxic encephalopathy . Started on hypothermia protocol , IVAB , asa , plavix  And entresto . Cardiology and palliative care consulted. Tx reviewed . Cont current tx . Prognosis Poor      3/12  She remain critically ill . Now on rewarming phase . Started on propofol due to  only grimacing and posturing . Kidney  function is trending up . She  is on low dose of dobutamine . Case d/w he daughter at bedside . She changed code status to DNR . Prognosis poor .    3/13 Reamin critically ill  on mechanical ventilation . Temp is now > 36 . Last night  was on levophed and dobutamine to keep a map > 65  . Levophed wean off this am . She has been on propofol on and off for posturing  movement . Sedation stop for 2 hrs  .  She has positve  corneal , pupil and gag reflex .  No respond to verval or pain stimuli . She withdraw to pain . She is breathing overt the vent .  Neurology consulted . The UOP has sligly increased since yesterday . The kidney function  cont trending up . The Blood cx are (+) ALPHA HEMOLYTIC STREP    3/14 No significant neurologuical changes since yesterday . She opne her eyes on tactile simlus . She  does not follow commads . Ha been odff sedation for more than 24 hrs .  The Blood cx (+) for STREPTOCOCCUS ANGINOSUS  and EIKENELLA CORRODENS . The kidney function is sligly improving  . Prognsosis POOR .  POC d/w  her duaghter .    3/15- Remains intubated with FiO2 30%/PEEP 5 . Afebrile. Off sedation x 2 days. Remains comatose.  Intermittent spontaneous eye opening but does not seem purposeful. No verbal  response , does not follow  commands or track voice. Intermittent withdrawal to pain stimulus. Corneal, pupillary , cough and gag reflex present. Remains on Dobutamine gtt. EF 10%. Tube feed in progress. UOP improved 1L yesterday, however creatinine bumped to 3.8. Rocephin for Streptococcal  bacteremia continued. TTE negative for vegetation. Palliative Medicine is following. Prognosis appears poor.     3/16- Aferbile.Off sedation. Remains intubated with minimal vent support FiO2 30%/ PEEP 5. However pt is not awake enough to manage secretion and protect airways. Intermittent eye opening but does not seem purposeful. Brain stem reflexes are present. Withdraws to pain stimulus and facial grimace noted with sternal rub. Remains on dobutamine gtt.  ml yesterday . BNPCreatinine stable at 3.7. BNP down to 1493 from 4900 on admit. Will re consult Neurology to assess prognosis . Palliative Medicine is following.     3/17- Eyes are open but not purposeful, No verbal response , withdraws to pain stimulus. ETT cuff leak noted and needed to be exchanged. CC team extubated pt after SBT. Plan to assess clinical course from respiratory stand point with no plan for re intubation. NG tube placement will be attempted for feeding and medication administration. Dobutamine gtt continues.  High risk of sudden demise from cardiac stand point given EF 10%.     3/18- No verbal response, does not follow commands, eyes are open, intermittently seems pt tracks voice other time not. Withdraws to pain stimulus. Weak cough , requiring frequent suctioning to clear airway . Pt bites down suction tube. SpO2 satisfactory on FiO2 2L/min. Afebrile . NG tube feeds in progress , so far tolerating . UOP 1 liter yesterday. Cr 3.2. WBC dhara to 15K. Antibiotic Rocephin continues . Pt remains on Dobutamine gtt. Awaiting Cardiology input. Family declined Palliative Medicine follow up. Will get follow up CT head today.     3/19- Afebrile . On RA. SpO2 100%. Repeat CT head with  preserved gray-white matter junction and no cerebral edema, hemorrhage or other acute process. GCS is difficult to assess. Eyes remains open. No verbal response , withdraws to pain. Pt remains with poor cough effort and lot of secretion requiring suction and breatrh sounds are coarse. CXR with no new new infiltrate or consolidation as of today. WBC 14K, Cr down to 2.9. . Gentle hydration initiated .  ml yesterday. Spoke to marlin at bedside . She is debating continuing current care and PEG tube placement vs Hospice referral. Pt had 10 beats NSVT yesterday .Dobutamine gtt discontinued.     3/20- Afebrile. Remains on RA. At times seems more awake and tracks voice. Moves head and ext intermittently. UOP increased to 1.5L yesterday . Creatinine down to 2.2. NG tube accidentally removed during PT. Daughter has not made her mind on PEG tube placement yet. WBC 14K. Flagyl added. Na 148.     3/21- On RA. Pt with poor secretion management . Daughter elected PEG tube placement . Plavix and ASA held from today. IR consulted for PEG tube. Notify IR once pt remains off antiplatelet x 5 days. WBC 17K. PCT down to 0.51. Falgyl added with current Rocephin. Cr further improved to 1.8    3/22 She is aao x 0  in nad .Intermittent spontaneous  upper extremities movement but does not seem purposeful. No verbal  response , does not follow commands but track voice. Plavix  on hold yesterday  . NAEON . No family at bedside .     3/23 No neurological changes from yesterday .  Plan for PEG tube placement . Decadron  IV wean down . NAEON .     3/24 Last night  the NGT displaced and d/c . RN has not be able to placed a new one despite multiple attempts .  She has   multiple episode of hypoglycemia which resolved with D5 . The Na level is trending up  . Pt started on d5 at 50 cc/hr due to hypoglycemia and  hypernatremia / IR contacted for PEG tube placement tomorrow . The  WBC is trending up , Pt has  been on decadron iv which  "was d/c  today .     3/25- Afebrile . On RA. Pt is awake and non verbal . Spontaneous head and left arm movement noted. Does not follow commands , however daughter at bedside reports she would occasionally says " what" and squeeze her hands. WBC normalized . Noted Hgb 8.5 today. No signs of active bleeding reported. Na 148. Creatinine 1.3. Possible PEG tube placement by  IR today. Disposition- PT suggested basic NH placement , however family wishes to take pt home.       Interval History:   Possible PEG tube placement by  IR today. Disposition- PT suggested basic NH placement , however family wishes to take pt home.       Review of Systems   Unable to perform ROS: Patient nonverbal   Objective:     Vital Signs (Most Recent):  Temp: 97.9 °F (36.6 °C) (03/25/22 1049)  Pulse: 74 (03/25/22 1049)  Resp: 18 (03/25/22 1049)  BP: 136/74 (03/25/22 1049)  SpO2: 99 % (03/25/22 1049) Vital Signs (24h Range):  Temp:  [97.3 °F (36.3 °C)-99.6 °F (37.6 °C)] 97.9 °F (36.6 °C)  Pulse:  [63-84] 74  Resp:  [16-20] 18  SpO2:  [97 %-99 %] 99 %  BP: (106-139)/(64-83) 136/74     Weight: 56.7 kg (125 lb)  Body mass index is 22.86 kg/m².    Intake/Output Summary (Last 24 hours) at 3/25/2022 1209  Last data filed at 3/25/2022 0433  Gross per 24 hour   Intake --   Output 700 ml   Net -700 ml      Physical Exam  Constitutional:       Comments: Pt is awake , non verbal , Does not follow commands    HENT:      Head: Normocephalic and atraumatic.      Mouth/Throat:      Mouth: Mucous membranes are moist.   Eyes:      Pupils: Pupils are equal, round, and reactive to light.      Comments: (+) corneal reflex    Cardiovascular:      Rate and Rhythm: Normal rate.      Heart sounds: Normal heart sounds.   Pulmonary:      Breath sounds: No rales.      Comments: Breath sounds are coarse and bronchial  Abdominal:      General: Bowel sounds are normal. There is no distension.   Genitourinary:     Comments: Abreu catheter in place  Musculoskeletal:      " Cervical back: Neck supple.      Right lower leg: No edema.      Left lower leg: No edema.      Comments: SCD and heel boots are in place   Neurological:      Motor: Weakness present.      Comments: Awake . Non verbal, does not follow commands        Significant Labs: All pertinent labs within the past 24 hours have been reviewed.  BMP:   Recent Labs   Lab 03/24/22 0429 03/24/22  1730 03/25/22  0536   GLU 35*   < > 174*   *   < > 148*   K 4.4   < > 4.2   *   < > 113*   CO2 23   < > 23   BUN 55*   < > 54*   CREATININE 1.2   < > 1.3   CALCIUM 9.0   < > 8.3*   MG 1.7  --   --     < > = values in this interval not displayed.     CBC:   Recent Labs   Lab 03/24/22 0429 03/25/22  0536   WBC 24.33* 11.96   HGB 10.7* 8.5*   HCT 35.8* 27.6*    332     CMP:   Recent Labs   Lab 03/24/22 0429 03/24/22 1730 03/25/22  0536   * 150* 148*   K 4.4 4.4 4.2   * 116* 113*   CO2 23 24 23   GLU 35* 157* 174*   BUN 55* 55* 54*   CREATININE 1.2 1.3 1.3   CALCIUM 9.0 9.0 8.3*   ANIONGAP 13 10 12   EGFRNONAA 42* 38* 38*       Significant Imaging:       Assessment/Plan:      * Cardiac arrest  Out of Hospital cardiac arrest.  Last Echo -1/21-EF -25%, Repeat echo with EF 10%  GIII Diastolic dysfunction.  EKG -Sinus rhythm with occasional Premature ventricular complexes and Premature atrial complexes  Cardiology consulted   Monitor clinical course for neuro recovery   Prognosis poor   3/18-  Extubated yesterday   Requiring frequent suction to keep airway clear . Weak cough   Remains on Dobutamine gtt.  Await Cardiology input  3/19-  Repeat CT head with preserved gray-white matter junction and no cerebral edema, hemorrhage or other acute process. GCS is difficult to assess. Eyes remains open. No verbal response , withdraws to pain.    Pt had 10 beats NSVT yesterday .  Dobutamine gtt discontinued   3/21-  Currently fairly stable from Cardiac standpoint . Downgrade to telemetry     Stable . Cont current tx      Acute on chronic combined systolic and diastolic CHF (congestive heart failure)  Patient is identified as having Combined Systolic and Diastolic heart failure that is Acute on chronic. CHF is currently uncontrolled due to Pulmonary edema/pleural effusion on CXR. Latest ECHO performed and demonstrates- Results for orders placed during the hospital encounter of 01/15/21    Echo Color Flow Doppler? Yes    Interpretation Summary  · The left ventricle is severely enlarged with eccentric hypertrophy and severely decreased systolic function. The estimated ejection fraction is 25%  · Grade II left ventricular diastolic dysfunction.  · Normal right ventricular size with moderately reduced right ventricular systolic function.  · Severe left atrial enlargement.  · There is pulmonary hypertension.  · There is mild aortic valve stenosis.  · Aortic valve area is 1.37 cm2; peak velocity is 1.33 m/s; mean gradient is 4 mmHg.  · Mild to moderate tricuspid regurgitation.  · Intermediate central venous pressure (8 mmHg).  · The estimated PA systolic pressure is 50 mmHg.  · Trivial pericardial effusion.  . Continue Nitrate/Vasodilator and monitor clinical status closely. Monitor on telemetry. Patient is on CHF pathway.  Monitor strict Is&Os and daily weights.  Place on fluid restriction of 1.5 L. Continue to stress to patient importance of self efficacy and  on diet for CHF. Last BNP reviewed- and noted below   No results for input(s): BNP, BNPTRIAGEBLO in the last 168 hours.  3/15-  Off Entresto , BB and diuretics given hypotension   3/16, 3/21   BB resumed via NG tube   No ACEi/ARB or ARNI due to CODY  Does not appears volume overloaded   Diuretics on hold.   3/25-   Will start Entresto.   Stop amlodipine     Elevated troponin  In the setting of Cardiac arrest  Cont asa/plavix, BB  Cardiology on board   3/21-  ASA/Plavix held from 3/21for upcoming PEG placement     Anoxic encephalopathy  2/2 to cardiac arrest   Neurology  consulted to assess prognosis   3/18-  Repeat head CT today   3/19-  Repeat CT head with preserved gray-white matter junction and no cerebral edema, hemorrhage or other acute process. GCS is difficult to assess. Eyes remains open. No verbal response , withdraws to pain    CODY (acute kidney injury)  2/2 to cardiac arrest   Not  a good candidate for CRT/RRT  Monitor UOP and renal indices   3/21-  Slowly improving       Gram-positive bacteremia  Source of infection is unclear   Cont rocephin x 2 weeks   Initial Blood culture from 3/10/22 positive forALPHA HEMOLYTIC STREP and EIKENELLA CORRODENS non viable for susceptibility   Repeat blood cultures from 3/15/22 - NGTD    Aspiration pneumonia  Initial CXR on admit suggestive of right-sided pulmonary opacities possibly infection, aspiration, or edema.  Antibiotic include Rocephin initiated   Blood cx from 3/10/22 (+) for ALPHA HEMOLYTIC STREP  Tracheal aspirate - neg   Repeat blood cultures from 3/15/22- NGTD  3/20-  Flagyl added given leukocytosis     WBC  cont trending up . She was on decadron 4 mg iv bid and then wean off to 4 mg qd . Steroid d/c today . Possible reactive , however could be due to aspiration. Cont IVAB     Hypernatremia  3/19-  In the setting of free water deficit   Gentle hydration with D5 infusion   Free water flush via NG tube   3/21-  Improved  3/23-   Na now  Trending up = start D5 IVF      Palliative care encounter  - Palliative medicine consulted for [facility] discussion   -Current code status is DNR  3/18-  Family declined further Palliative Medicine follow up     Hypokalemia  -Replete as indicated     Mass of sphenoid sinus  Ct head showed- Dehiscence of the posterior wall of the sphenoid sinus such as on series 4, image 49-52. There is high density material possibly related to paranasal sinus disease or neoplasm which extends towards the pituitary and along the right towards the intracranial ICA.  Further evaluation with contrast enhanced MRI when  clinically appropriate would be recommended.  Suggest including 3D/volumetric postcontrast imaging.  ENT consult as clinically warranted.        History of CVA (cerebrovascular accident)  On aspirin/plavix. Continue via NG  tube   3/21-  ASA and Plavix held from 3/21 for PEG placement     Parkinson disease  Continue supportive care   Resume meds once appropriate   3/19-  Meds resumed via NG tube       Laryngeal papillomatosis  Known History   Cont supportive tx as needed       Essential hypertension  Hold  entresto due to current hypotension and CODY  Resume antihypertensives once appropriate via NG tube   3/19-  Pt started back on Amlodipine , Coreg via NG tube     Stable . Cont current  Medication       VTE Risk Mitigation (From admission, onward)         Ordered     heparin (porcine) injection 5,000 Units  Every 8 hours         03/10/22 2351     IP VTE HIGH RISK PATIENT  Once         03/10/22 2345     Place sequential compression device  Until discontinued         03/10/22 2345                Discharge Planning   PARMINDER:      Code Status: DNR   Is the patient medically ready for discharge?:     Reason for patient still in hospital (select all that apply): Patient trending condition  Discharge Plan A: Skilled Nursing Facility, Home Health, Home with family                  Tim Reilly MD  Department of Hospital Medicine   [facility] - Telemetry ([facility])

## 2022-03-26 LAB
ANION GAP SERPL CALC-SCNC: 10 MMOL/L (ref 8–16)
BASOPHILS # BLD AUTO: 0.01 K/UL (ref 0–0.2)
BASOPHILS NFR BLD: 0.1 % (ref 0–1.9)
BNP SERPL-MCNC: 2382 PG/ML (ref 0–99)
BUN SERPL-MCNC: 46 MG/DL (ref 8–23)
CALCIUM SERPL-MCNC: 8.6 MG/DL (ref 8.7–10.5)
CHLORIDE SERPL-SCNC: 114 MMOL/L (ref 95–110)
CO2 SERPL-SCNC: 23 MMOL/L (ref 23–29)
CREAT SERPL-MCNC: 1.3 MG/DL (ref 0.5–1.4)
DIFFERENTIAL METHOD: ABNORMAL
EOSINOPHIL # BLD AUTO: 0.2 K/UL (ref 0–0.5)
EOSINOPHIL NFR BLD: 2.4 % (ref 0–8)
ERYTHROCYTE [DISTWIDTH] IN BLOOD BY AUTOMATED COUNT: 16.3 % (ref 11.5–14.5)
EST. GFR  (AFRICAN AMERICAN): 44 ML/MIN/1.73 M^2
EST. GFR  (NON AFRICAN AMERICAN): 38 ML/MIN/1.73 M^2
FOLATE SERPL-MCNC: 10.9 NG/ML (ref 4–24)
GLUCOSE SERPL-MCNC: 111 MG/DL (ref 70–110)
HCT VFR BLD AUTO: 28.2 % (ref 37–48.5)
HGB BLD-MCNC: 8.6 G/DL (ref 12–16)
IMM GRANULOCYTES # BLD AUTO: 0.29 K/UL (ref 0–0.04)
IMM GRANULOCYTES NFR BLD AUTO: 3.7 % (ref 0–0.5)
IRON SERPL-MCNC: 99 UG/DL (ref 30–160)
LYMPHOCYTES # BLD AUTO: 1.2 K/UL (ref 1–4.8)
LYMPHOCYTES NFR BLD: 15.2 % (ref 18–48)
MAGNESIUM SERPL-MCNC: 1.7 MG/DL (ref 1.6–2.6)
MCH RBC QN AUTO: 29 PG (ref 27–31)
MCHC RBC AUTO-ENTMCNC: 30.5 G/DL (ref 32–36)
MCV RBC AUTO: 95 FL (ref 82–98)
MONOCYTES # BLD AUTO: 1.2 K/UL (ref 0.3–1)
MONOCYTES NFR BLD: 15.7 % (ref 4–15)
NEUTROPHILS # BLD AUTO: 4.9 K/UL (ref 1.8–7.7)
NEUTROPHILS NFR BLD: 62.9 % (ref 38–73)
NRBC BLD-RTO: 0 /100 WBC
PHOSPHATE SERPL-MCNC: 3.6 MG/DL (ref 2.7–4.5)
PLATELET # BLD AUTO: 328 K/UL (ref 150–450)
PMV BLD AUTO: 13.3 FL (ref 9.2–12.9)
POCT GLUCOSE: 109 MG/DL (ref 70–110)
POCT GLUCOSE: 123 MG/DL (ref 70–110)
POCT GLUCOSE: 125 MG/DL (ref 70–110)
POCT GLUCOSE: 130 MG/DL (ref 70–110)
POTASSIUM SERPL-SCNC: 3.6 MMOL/L (ref 3.5–5.1)
RBC # BLD AUTO: 2.97 M/UL (ref 4–5.4)
SATURATED IRON: 34 % (ref 20–50)
SODIUM SERPL-SCNC: 147 MMOL/L (ref 136–145)
TOTAL IRON BINDING CAPACITY: 293 UG/DL (ref 250–450)
TRANSFERRIN SERPL-MCNC: 198 MG/DL (ref 200–375)
VIT B12 SERPL-MCNC: 1012 PG/ML (ref 210–950)
WBC # BLD AUTO: 7.83 K/UL (ref 3.9–12.7)

## 2022-03-26 PROCEDURE — 85025 COMPLETE CBC W/AUTO DIFF WBC: CPT | Performed by: INTERNAL MEDICINE

## 2022-03-26 PROCEDURE — 21400001 HC TELEMETRY ROOM

## 2022-03-26 PROCEDURE — 94640 AIRWAY INHALATION TREATMENT: CPT

## 2022-03-26 PROCEDURE — 82746 ASSAY OF FOLIC ACID SERUM: CPT | Performed by: INTERNAL MEDICINE

## 2022-03-26 PROCEDURE — 25000003 PHARM REV CODE 250: Performed by: INTERNAL MEDICINE

## 2022-03-26 PROCEDURE — 80048 BASIC METABOLIC PNL TOTAL CA: CPT | Performed by: INTERNAL MEDICINE

## 2022-03-26 PROCEDURE — 94761 N-INVAS EAR/PLS OXIMETRY MLT: CPT

## 2022-03-26 PROCEDURE — 94668 MNPJ CHEST WALL SBSQ: CPT

## 2022-03-26 PROCEDURE — 36415 COLL VENOUS BLD VENIPUNCTURE: CPT | Performed by: INTERNAL MEDICINE

## 2022-03-26 PROCEDURE — S0028 INJECTION, FAMOTIDINE, 20 MG: HCPCS | Performed by: INTERNAL MEDICINE

## 2022-03-26 PROCEDURE — 82607 VITAMIN B-12: CPT | Performed by: INTERNAL MEDICINE

## 2022-03-26 PROCEDURE — 83735 ASSAY OF MAGNESIUM: CPT | Performed by: INTERNAL MEDICINE

## 2022-03-26 PROCEDURE — 84100 ASSAY OF PHOSPHORUS: CPT | Performed by: INTERNAL MEDICINE

## 2022-03-26 PROCEDURE — 25000242 PHARM REV CODE 250 ALT 637 W/ HCPCS: Performed by: INTERNAL MEDICINE

## 2022-03-26 PROCEDURE — 83880 ASSAY OF NATRIURETIC PEPTIDE: CPT | Performed by: INTERNAL MEDICINE

## 2022-03-26 PROCEDURE — 63600175 PHARM REV CODE 636 W HCPCS: Performed by: INTERNAL MEDICINE

## 2022-03-26 PROCEDURE — A4216 STERILE WATER/SALINE, 10 ML: HCPCS | Performed by: INTERNAL MEDICINE

## 2022-03-26 PROCEDURE — 84466 ASSAY OF TRANSFERRIN: CPT | Performed by: INTERNAL MEDICINE

## 2022-03-26 RX ORDER — CARVEDILOL 12.5 MG/1
12.5 TABLET ORAL 2 TIMES DAILY
Status: DISCONTINUED | OUTPATIENT
Start: 2022-03-26 | End: 2022-03-27

## 2022-03-26 RX ORDER — FAMOTIDINE 40 MG/5ML
20 POWDER, FOR SUSPENSION ORAL DAILY
Status: DISCONTINUED | OUTPATIENT
Start: 2022-03-27 | End: 2022-03-29 | Stop reason: HOSPADM

## 2022-03-26 RX ORDER — NAPROXEN SODIUM 220 MG/1
81 TABLET, FILM COATED ORAL DAILY
Status: DISCONTINUED | OUTPATIENT
Start: 2022-03-26 | End: 2022-03-29 | Stop reason: HOSPADM

## 2022-03-26 RX ORDER — CLOPIDOGREL BISULFATE 75 MG/1
75 TABLET ORAL DAILY
Status: DISCONTINUED | OUTPATIENT
Start: 2022-03-26 | End: 2022-03-29 | Stop reason: HOSPADM

## 2022-03-26 RX ORDER — CARBIDOPA AND LEVODOPA 25; 100 MG/1; MG/1
2 TABLET ORAL 3 TIMES DAILY
Status: DISCONTINUED | OUTPATIENT
Start: 2022-03-26 | End: 2022-03-29 | Stop reason: HOSPADM

## 2022-03-26 RX ADMIN — CARBIDOPA AND LEVODOPA 2 TABLET: 25; 100 TABLET ORAL at 02:03

## 2022-03-26 RX ADMIN — IPRATROPIUM BROMIDE AND ALBUTEROL SULFATE 3 ML: 2.5; .5 SOLUTION RESPIRATORY (INHALATION) at 08:03

## 2022-03-26 RX ADMIN — ACETYLCYSTEINE 2 ML: 100 INHALANT RESPIRATORY (INHALATION) at 08:03

## 2022-03-26 RX ADMIN — HEPARIN SODIUM 5000 UNITS: 5000 INJECTION INTRAVENOUS; SUBCUTANEOUS at 10:03

## 2022-03-26 RX ADMIN — HEPARIN SODIUM 5000 UNITS: 5000 INJECTION INTRAVENOUS; SUBCUTANEOUS at 06:03

## 2022-03-26 RX ADMIN — FAMOTIDINE 20 MG: 20 INJECTION, SOLUTION INTRAVENOUS at 08:03

## 2022-03-26 RX ADMIN — Medication 10 ML: at 02:03

## 2022-03-26 RX ADMIN — HEPARIN SODIUM 5000 UNITS: 5000 INJECTION INTRAVENOUS; SUBCUTANEOUS at 02:03

## 2022-03-26 RX ADMIN — IPRATROPIUM BROMIDE AND ALBUTEROL SULFATE 3 ML: 2.5; .5 SOLUTION RESPIRATORY (INHALATION) at 01:03

## 2022-03-26 RX ADMIN — CARBIDOPA AND LEVODOPA 2 TABLET: 25; 100 TABLET ORAL at 10:03

## 2022-03-26 RX ADMIN — CARVEDILOL 12.5 MG: 12.5 TABLET, FILM COATED ORAL at 10:03

## 2022-03-26 RX ADMIN — ACETYLCYSTEINE 2 ML: 100 INHALANT RESPIRATORY (INHALATION) at 07:03

## 2022-03-26 RX ADMIN — Medication 10 ML: at 06:03

## 2022-03-26 RX ADMIN — IPRATROPIUM BROMIDE AND ALBUTEROL SULFATE 3 ML: 2.5; .5 SOLUTION RESPIRATORY (INHALATION) at 07:03

## 2022-03-26 RX ADMIN — CLOPIDOGREL 75 MG: 75 TABLET, FILM COATED ORAL at 04:03

## 2022-03-26 RX ADMIN — ASPIRIN 81 MG CHEWABLE TABLET 81 MG: 81 TABLET CHEWABLE at 04:03

## 2022-03-26 NOTE — PLAN OF CARE
NAEON. No events of hypoglycemia, continued D5 infusion. Metoprolol IV held per family request due to normal SBP of 120s. HR WNL. Discussed need for medication if HR or BP increase. One event of v-tach, 15 beats noted. No other events. Per family no significant change in mentation. Patient tracking visually and speaking very briefly. Unable to follow commands. PEG tube site CDI. No issues noted. NG tube remains in place. Dressing changed. Time spent talking with family regarding current plan of care and discharge plan. Continue current plan of care.     Problem: Infection  Goal: Absence of Infection Signs and Symptoms  Outcome: Ongoing, Progressing     Problem: Adult Inpatient Plan of Care  Goal: Plan of Care Review  Outcome: Ongoing, Progressing  Goal: Patient-Specific Goal (Individualized)  Outcome: Ongoing, Progressing  Goal: Absence of Hospital-Acquired Illness or Injury  Outcome: Ongoing, Progressing  Goal: Optimal Comfort and Wellbeing  Outcome: Ongoing, Progressing  Goal: Readiness for Transition of Care  Outcome: Ongoing, Progressing     Problem: Fall Injury Risk  Goal: Absence of Fall and Fall-Related Injury  Outcome: Ongoing, Progressing     Problem: Skin Injury Risk Increased  Goal: Skin Health and Integrity  Outcome: Ongoing, Progressing     Problem: Coping Ineffective  Goal: Effective Coping  Outcome: Ongoing, Progressing     Problem: Fluid and Electrolyte Imbalance (Acute Kidney Injury/Impairment)  Goal: Fluid and Electrolyte Balance  Outcome: Ongoing, Progressing     Problem: Oral Intake Inadequate (Acute Kidney Injury/Impairment)  Goal: Optimal Nutrition Intake  Outcome: Ongoing, Progressing     Problem: Renal Function Impairment (Acute Kidney Injury/Impairment)  Goal: Effective Renal Function  Outcome: Ongoing, Progressing

## 2022-03-26 NOTE — PLAN OF CARE
POC reviewed with pt and daughter; daughter verbalized understanding. Pt is nonverbal, unable to follow commands, but does open eyes spontaneously. PEG tube placement verified this shift. NG tube removed w/o complication. Tube feeding restarted at 15 ml/hr; pt tolerating well. Turn q2h with wedge, heels floated in green boots. No signs of acute distress noted. Pt resting comfortably in bed. D5 infusing per orders. Will continue with poc. Daughter remains at bedside.

## 2022-03-26 NOTE — SUBJECTIVE & OBJECTIVE
Interval History:     S/P PEG tube placement yesterday by IR. No immediate post procedure complications reported.     Review of Systems   Unable to perform ROS: Patient nonverbal   Objective:     Vital Signs (Most Recent):  Temp: 97.8 °F (36.6 °C) (03/26/22 1159)  Pulse: 89 (03/26/22 1159)  Resp: 18 (03/26/22 1159)  BP: (!) 138/95 (03/26/22 1159)  SpO2: 100 % (03/26/22 1159)   Vital Signs (24h Range):  Temp:  [97.8 °F (36.6 °C)-99.2 °F (37.3 °C)] 97.8 °F (36.6 °C)  Pulse:  [61-98] 89  Resp:  [16-19] 18  SpO2:  [94 %-100 %] 100 %  BP: (125-138)/(67-95) 138/95     Weight: 58.4 kg (128 lb 12 oz)  Body mass index is 23.55 kg/m².    Intake/Output Summary (Last 24 hours) at 3/26/2022 1225  Last data filed at 3/26/2022 1000  Gross per 24 hour   Intake 0 ml   Output 1350 ml   Net -1350 ml      Physical Exam  Constitutional:       Comments: Pt is awake , non verbal , Does not follow commands    HENT:      Head: Normocephalic and atraumatic.      Mouth/Throat:      Mouth: Mucous membranes are moist.   Eyes:      Pupils: Pupils are equal, round, and reactive to light.      Comments: (+) corneal reflex    Cardiovascular:      Rate and Rhythm: Normal rate.      Heart sounds: Normal heart sounds.   Pulmonary:      Breath sounds: No rales.      Comments: Breath sounds are coarse and bronchial  Abdominal:      General: Bowel sounds are normal. There is no distension.   Genitourinary:     Comments: Abreu catheter in place  Musculoskeletal:      Cervical back: Neck supple.      Right lower leg: No edema.      Left lower leg: No edema.      Comments: SCD and heel boots are in place   Neurological:      Motor: Weakness present.      Comments: Awake . Non verbal, does not follow commands        Significant Labs: All pertinent labs within the past 24 hours have been reviewed.  BMP:   Recent Labs   Lab 03/26/22  0436   *   *   K 3.6   *   CO2 23   BUN 46*   CREATININE 1.3   CALCIUM 8.6*   MG 1.7     CBC:   Recent Labs    Lab 03/25/22  0536 03/26/22  0436   WBC 11.96 7.83   HGB 8.5* 8.6*   HCT 27.6* 28.2*    328     CMP:   Recent Labs   Lab 03/24/22  1730 03/25/22  0536 03/26/22  0436   * 148* 147*   K 4.4 4.2 3.6   * 113* 114*   CO2 24 23 23   * 174* 111*   BUN 55* 54* 46*   CREATININE 1.3 1.3 1.3   CALCIUM 9.0 8.3* 8.6*   ANIONGAP 10 12 10   EGFRNONAA 38* 38* 38*     Cardiac Markers:   Recent Labs   Lab 03/26/22  0436   BNP 2,382*       Significant Imaging:

## 2022-03-26 NOTE — PROGRESS NOTES
"[facility] - Telemetry ([facility] Medicine  Progress Note    Patient Name: Tanya Madrid  MRN: [id number]  Patient Class: IP- Inpatient   Admission Date: 3/10/2022  Length of Stay: 16 days  Attending Physician: Tim Reilly MD  Primary Care Provider: Maggie Sue NP        Subjective:     Principal Problem:Cardiac arrest        HPI:  History was taken from the electronic chart and from the daughter -  Adenike Lemus Daughter [phone]        82 y.o. female patient with a PMHx of HTN,  CHF-last EF-01/2021-25%, with diastolic dysfunction  , and HLD who presents to the Emergency Department for evaluation of cardiac arrest which onset suddenly 1 hour pta.  Daughter reports that over the last 3 days -she has been complaining of worsening dyspnoea  and was given extra lasix tablets. She usually takes 20mg lasix daily and was given 2 extra tablets with the episodes of  dyspnea.  This evening while having dinner, she "looked blank" and stopped breathing . Daughter called EMS and was advised to start CPR.. Pt achieved ROSC en route. After I shock . Prior Tx includes 50 mcg of fentanyl and 50 mg of ketamine from EMS.   Code status discussed.  She is Full code.  Labs and imaging test reviewed.  CT head -No hemorrhage or acute major vascular distribution infarction.   Dehiscence of the posterior wall of the sphenoid sinus such as on series 4, image 49-52. There is high density material possibly related to paranasal sinus disease or neoplasm which extends towards the pituitary and along the right towards the intracranial ICA.  Further evaluation with contrast enhanced MRI when clinically appropriate would be recommended.       Component      Latest Ref Rng & Units 3/10/2022 3/3/2022              Potassium      3.5 - 5.1 mmol/L 2.9 (L) 3.7   CBC showed wbc -19   Component      Latest Ref Rng & Units 3/10/2022 3/10/2022 4/20/2021          10:47 PM  8:18 PM    Lactate, Lex      0.5 - 2.2 mmol/L 3.6 " (HH) 5.7 (HH) 2.0     Component      Latest Ref Rng & Units 3/10/2022 4/20/2021   BNP      0 - 99 pg/mL >4,900 (H) 2,806 (H)   Code status discussed -she is full code  Her daughter is the SDM.  Adenike Lemus Daughter [phone]       Overview/Hospital Course:  81 y/o AAF admitted to ICU with a dx of cardiac arrest , CODY , acute hypoxic respiratory failure  and  Anoxic encephalopathy . Started on hypothermia protocol , IVAB , asa , plavix  And entresto . Cardiology and palliative care consulted. Tx reviewed . Cont current tx . Prognosis Poor      3/12  She remain critically ill . Now on rewarming phase . Started on propofol due to  only grimacing and posturing . Kidney  function is trending up . She  is on low dose of dobutamine . Case d/w he daughter at bedside . She changed code status to DNR . Prognosis poor .    3/13 Reamin critically ill  on mechanical ventilation . Temp is now > 36 . Last night  was on levophed and dobutamine to keep a map > 65  . Levophed wean off this am . She has been on propofol on and off for posturing  movement . Sedation stop for 2 hrs  .  She has positve  corneal , pupil and gag reflex .  No respond to verval or pain stimuli . She withdraw to pain . She is breathing overt the vent .  Neurology consulted . The UOP has sligly increased since yesterday . The kidney function  cont trending up . The Blood cx are (+) ALPHA HEMOLYTIC STREP    3/14 No significant neurologuical changes since yesterday . She opne her eyes on tactile simlus . She  does not follow commads . Ha been odff sedation for more than 24 hrs .  The Blood cx (+) for STREPTOCOCCUS ANGINOSUS  and EIKENELLA CORRODENS . The kidney function is sligly improving  . Prognsosis POOR .  POC d/w  her duaghter .    3/15- Remains intubated with FiO2 30%/PEEP 5 . Afebrile. Off sedation x 2 days. Remains comatose.  Intermittent spontaneous eye opening but does not seem purposeful. No verbal  response , does not follow  commands or track voice. Intermittent withdrawal to pain stimulus. Corneal, pupillary , cough and gag reflex present. Remains on Dobutamine gtt. EF 10%. Tube feed in progress. UOP improved 1L yesterday, however creatinine bumped to 3.8. Rocephin for Streptococcal  bacteremia continued. TTE negative for vegetation. Palliative Medicine is following. Prognosis appears poor.     3/16- Aferbile.Off sedation. Remains intubated with minimal vent support FiO2 30%/ PEEP 5. However pt is not awake enough to manage secretion and protect airways. Intermittent eye opening but does not seem purposeful. Brain stem reflexes are present. Withdraws to pain stimulus and facial grimace noted with sternal rub. Remains on dobutamine gtt.  ml yesterday . BNPCreatinine stable at 3.7. BNP down to 1493 from 4900 on admit. Will re consult Neurology to assess prognosis . Palliative Medicine is following.     3/17- Eyes are open but not purposeful, No verbal response , withdraws to pain stimulus. ETT cuff leak noted and needed to be exchanged. CC team extubated pt after SBT. Plan to assess clinical course from respiratory stand point with no plan for re intubation. NG tube placement will be attempted for feeding and medication administration. Dobutamine gtt continues.  High risk of sudden demise from cardiac stand point given EF 10%.     3/18- No verbal response, does not follow commands, eyes are open, intermittently seems pt tracks voice other time not. Withdraws to pain stimulus. Weak cough , requiring frequent suctioning to clear airway . Pt bites down suction tube. SpO2 satisfactory on FiO2 2L/min. Afebrile . NG tube feeds in progress , so far tolerating . UOP 1 liter yesterday. Cr 3.2. WBC dhara to 15K. Antibiotic Rocephin continues . Pt remains on Dobutamine gtt. Awaiting Cardiology input. Family declined Palliative Medicine follow up. Will get follow up CT head today.     3/19- Afebrile . On RA. SpO2 100%. Repeat CT head with  preserved gray-white matter junction and no cerebral edema, hemorrhage or other acute process. GCS is difficult to assess. Eyes remains open. No verbal response , withdraws to pain. Pt remains with poor cough effort and lot of secretion requiring suction and breatrh sounds are coarse. CXR with no new new infiltrate or consolidation as of today. WBC 14K, Cr down to 2.9. . Gentle hydration initiated .  ml yesterday. Spoke to marlin at bedside . She is debating continuing current care and PEG tube placement vs Hospice referral. Pt had 10 beats NSVT yesterday .Dobutamine gtt discontinued.     3/20- Afebrile. Remains on RA. At times seems more awake and tracks voice. Moves head and ext intermittently. UOP increased to 1.5L yesterday . Creatinine down to 2.2. NG tube accidentally removed during PT. Daughter has not made her mind on PEG tube placement yet. WBC 14K. Flagyl added. Na 148.     3/21- On RA. Pt with poor secretion management . Daughter elected PEG tube placement . Plavix and ASA held from today. IR consulted for PEG tube. Notify IR once pt remains off antiplatelet x 5 days. WBC 17K. PCT down to 0.51. Falgyl added with current Rocephin. Cr further improved to 1.8    3/22 She is aao x 0  in nad .Intermittent spontaneous  upper extremities movement but does not seem purposeful. No verbal  response , does not follow commands but track voice. Plavix  on hold yesterday  . NAEON . No family at bedside .     3/23 No neurological changes from yesterday .  Plan for PEG tube placement . Decadron  IV wean down . NAEON .     3/24 Last night  the NGT displaced and d/c . RN has not be able to placed a new one despite multiple attempts .  She has   multiple episode of hypoglycemia which resolved with D5 . The Na level is trending up  . Pt started on d5 at 50 cc/hr due to hypoglycemia and  hypernatremia / IR contacted for PEG tube placement tomorrow . The  WBC is trending up , Pt has  been on decadron iv which  "was d/c  today .     3/25- Afebrile . On RA. Pt is awake and non verbal . Spontaneous head and left arm movement noted. Does not follow commands, however daughter at bedside reports she would occasionally says " what" and squeeze her hands. WBC normalized . Noted Hgb 8.5 today. No signs of active bleeding reported. Na 148. Creatinine 1.3. Possible PEG tube placement by  IR today. Disposition- PT suggested basic NH placement , however family wishes to take pt home.     3/26- S/P PEG tube placement yesterday by IR. No immediate post procedure complications reported . Awaiting IR clearance to use PEG tube. No new concern at this time. Hgb stable at 8.6, Na 147, Cr 1.3. Clinically does not seem volume overloaded , however BNP is elevated at 2382.       Interval History:     S/P PEG tube placement yesterday by IR. No immediate post procedure complications reported.     Review of Systems   Unable to perform ROS: Patient nonverbal   Objective:     Vital Signs (Most Recent):  Temp: 97.8 °F (36.6 °C) (03/26/22 1159)  Pulse: 89 (03/26/22 1159)  Resp: 18 (03/26/22 1159)  BP: (!) 138/95 (03/26/22 1159)  SpO2: 100 % (03/26/22 1159)   Vital Signs (24h Range):  Temp:  [97.8 °F (36.6 °C)-99.2 °F (37.3 °C)] 97.8 °F (36.6 °C)  Pulse:  [61-98] 89  Resp:  [16-19] 18  SpO2:  [94 %-100 %] 100 %  BP: (125-138)/(67-95) 138/95     Weight: 58.4 kg (128 lb 12 oz)  Body mass index is 23.55 kg/m².    Intake/Output Summary (Last 24 hours) at 3/26/2022 1225  Last data filed at 3/26/2022 1000  Gross per 24 hour   Intake 0 ml   Output 1350 ml   Net -1350 ml      Physical Exam  Constitutional:       Comments: Pt is awake , non verbal , Does not follow commands    HENT:      Head: Normocephalic and atraumatic.      Mouth/Throat:      Mouth: Mucous membranes are moist.   Eyes:      Pupils: Pupils are equal, round, and reactive to light.      Comments: (+) corneal reflex    Cardiovascular:      Rate and Rhythm: Normal rate.      Heart sounds: Normal " heart sounds.   Pulmonary:      Breath sounds: No rales.      Comments: Breath sounds are coarse and bronchial  Abdominal:      General: Bowel sounds are normal. There is no distension.   Genitourinary:     Comments: Abreu catheter in place  Musculoskeletal:      Cervical back: Neck supple.      Right lower leg: No edema.      Left lower leg: No edema.      Comments: SCD and heel boots are in place   Neurological:      Motor: Weakness present.      Comments: Awake . Non verbal, does not follow commands        Significant Labs: All pertinent labs within the past 24 hours have been reviewed.  BMP:   Recent Labs   Lab 03/26/22  0436   *   *   K 3.6   *   CO2 23   BUN 46*   CREATININE 1.3   CALCIUM 8.6*   MG 1.7     CBC:   Recent Labs   Lab 03/25/22  0536 03/26/22  0436   WBC 11.96 7.83   HGB 8.5* 8.6*   HCT 27.6* 28.2*    328     CMP:   Recent Labs   Lab 03/24/22  1730 03/25/22  0536 03/26/22  0436   * 148* 147*   K 4.4 4.2 3.6   * 113* 114*   CO2 24 23 23   * 174* 111*   BUN 55* 54* 46*   CREATININE 1.3 1.3 1.3   CALCIUM 9.0 8.3* 8.6*   ANIONGAP 10 12 10   EGFRNONAA 38* 38* 38*     Cardiac Markers:   Recent Labs   Lab 03/26/22  0436   BNP 2,382*       Significant Imaging:       Assessment/Plan:      * Cardiac arrest  Out of Hospital cardiac arrest.  Last Echo -1/21-EF -25%, Repeat echo with EF 10%  GIII Diastolic dysfunction.  EKG -Sinus rhythm with occasional Premature ventricular complexes and Premature atrial complexes  Cardiology consulted   Monitor clinical course for neuro recovery   Prognosis poor   3/18-  Extubated yesterday   Requiring frequent suction to keep airway clear . Weak cough   Remains on Dobutamine gtt.  Await Cardiology input  3/19-  Repeat CT head with preserved gray-white matter junction and no cerebral edema, hemorrhage or other acute process. GCS is difficult to assess. Eyes remains open. No verbal response , withdraws to pain.    Pt had 10 beats  NSVT yesterday .  Dobutamine gtt discontinued   3/21-  Currently fairly stable from Cardiac standpoint . Downgrade to telemetry     Stable . Cont current tx     Acute on chronic combined systolic and diastolic CHF (congestive heart failure)  Patient is identified as having Combined Systolic and Diastolic heart failure that is Acute on chronic. CHF is currently uncontrolled due to Pulmonary edema/pleural effusion on CXR. Latest ECHO performed and demonstrates- Results for orders placed during the hospital encounter of 01/15/21    Echo Color Flow Doppler? Yes    Interpretation Summary  · The left ventricle is severely enlarged with eccentric hypertrophy and severely decreased systolic function. The estimated ejection fraction is 25%  · Grade II left ventricular diastolic dysfunction.  · Normal right ventricular size with moderately reduced right ventricular systolic function.  · Severe left atrial enlargement.  · There is pulmonary hypertension.  · There is mild aortic valve stenosis.  · Aortic valve area is 1.37 cm2; peak velocity is 1.33 m/s; mean gradient is 4 mmHg.  · Mild to moderate tricuspid regurgitation.  · Intermediate central venous pressure (8 mmHg).  · The estimated PA systolic pressure is 50 mmHg.  · Trivial pericardial effusion.  . Continue Nitrate/Vasodilator and monitor clinical status closely. Monitor on telemetry. Patient is on CHF pathway.  Monitor strict Is&Os and daily weights.  Place on fluid restriction of 1.5 L. Continue to stress to patient importance of self efficacy and  on diet for CHF. Last BNP reviewed- and noted below   No results for input(s): BNP, BNPTRIAGEBLO in the last 168 hours.  3/15-  Off Entresto , BB and diuretics given hypotension   3/16, 3/21   BB resumed via NG tube   No ACEi/ARB or ARNI due to CODY  Does not appears volume overloaded   Diuretics on hold.   3/25-   Will start Entresto.   Stop amlodipine     Elevated troponin  In the setting of Cardiac arrest  Cont  asa/plavix, BB  Cardiology on board   3/21-  ASA/Plavix held from 3/21for upcoming PEG placement     Anoxic encephalopathy  2/2 to cardiac arrest   Neurology consulted to assess prognosis   3/18-  Repeat head CT today   3/19-  Repeat CT head with preserved gray-white matter junction and no cerebral edema, hemorrhage or other acute process. GCS is difficult to assess. Eyes remains open. No verbal response , withdraws to pain    CODY (acute kidney injury)  2/2 to cardiac arrest   Not  a good candidate for CRT/RRT  Monitor UOP and renal indices   3/21-  Slowly improving       Gram-positive bacteremia  Source of infection is unclear   Cont rocephin x 2 weeks   Initial Blood culture from 3/10/22 positive forALPHA HEMOLYTIC STREP and EIKENELLA CORRODENS non viable for susceptibility   Repeat blood cultures from 3/15/22 - NGTD    Aspiration pneumonia  Initial CXR on admit suggestive of right-sided pulmonary opacities possibly infection, aspiration, or edema.  Antibiotic include Rocephin initiated   Blood cx from 3/10/22 (+) for ALPHA HEMOLYTIC STREP  Tracheal aspirate - neg   Repeat blood cultures from 3/15/22- NGTD  3/20-  Flagyl added given leukocytosis     WBC  cont trending up . She was on decadron 4 mg iv bid and then wean off to 4 mg qd . Steroid d/c today . Possible reactive , however could be due to aspiration. Cont IVAB     Hypernatremia  3/19-  In the setting of free water deficit   Gentle hydration with D5 infusion   Free water flush via NG tube   3/21-  Improved  3/23-   Na now  Trending up = start D5 IVF      Palliative care encounter  - Palliative medicine consulted for [facility] discussion   -Current code status is DNR  3/18-  Family declined further Palliative Medicine follow up     Hypokalemia  -Replete as indicated     Mass of sphenoid sinus  Ct head showed- Dehiscence of the posterior wall of the sphenoid sinus such as on series 4, image 49-52. There is high density material possibly related to paranasal sinus  disease or neoplasm which extends towards the pituitary and along the right towards the intracranial ICA.  Further evaluation with contrast enhanced MRI when clinically appropriate would be recommended.  Suggest including 3D/volumetric postcontrast imaging.  ENT consult as clinically warranted.        History of CVA (cerebrovascular accident)  On aspirin/plavix. Continue via NG  tube   3/21-  ASA and Plavix held from 3/21 for PEG placement     Parkinson disease  Continue supportive care   Resume meds once appropriate   3/19-  Meds resumed via NG tube       Laryngeal papillomatosis  Known History   Cont supportive tx as needed       Essential hypertension  Hold  entresto due to current hypotension and CODY  Resume antihypertensives once appropriate via NG tube   3/19-  Pt started back on Amlodipine , Coreg via NG tube     Stable . Cont current  Medication       VTE Risk Mitigation (From admission, onward)         Ordered     heparin (porcine) injection 5,000 Units  Every 8 hours         03/10/22 2351     IP VTE HIGH RISK PATIENT  Once         03/10/22 2345     Place sequential compression device  Until discontinued         03/10/22 2345                Discharge Planning   PARMINDER:      Code Status: DNR   Is the patient medically ready for discharge?:     Reason for patient still in hospital (select all that apply): Patient trending condition  Discharge Plan A: Skilled Nursing Facility, Home Health, Home with family                  Tim Reilly MD  Department of Hospital Medicine   [facility] - Telemetry ([facility])

## 2022-03-27 LAB
ANION GAP SERPL CALC-SCNC: 9 MMOL/L (ref 8–16)
BUN SERPL-MCNC: 40 MG/DL (ref 8–23)
CALCIUM SERPL-MCNC: 8.3 MG/DL (ref 8.7–10.5)
CHLORIDE SERPL-SCNC: 118 MMOL/L (ref 95–110)
CO2 SERPL-SCNC: 22 MMOL/L (ref 23–29)
CREAT SERPL-MCNC: 1.3 MG/DL (ref 0.5–1.4)
EST. GFR  (AFRICAN AMERICAN): 44 ML/MIN/1.73 M^2
EST. GFR  (NON AFRICAN AMERICAN): 38 ML/MIN/1.73 M^2
GLUCOSE SERPL-MCNC: 219 MG/DL (ref 70–110)
POCT GLUCOSE: 152 MG/DL (ref 70–110)
POCT GLUCOSE: 179 MG/DL (ref 70–110)
POCT GLUCOSE: 198 MG/DL (ref 70–110)
POCT GLUCOSE: 208 MG/DL (ref 70–110)
POCT GLUCOSE: 225 MG/DL (ref 70–110)
POTASSIUM SERPL-SCNC: 3.5 MMOL/L (ref 3.5–5.1)
SODIUM SERPL-SCNC: 149 MMOL/L (ref 136–145)

## 2022-03-27 PROCEDURE — 21400001 HC TELEMETRY ROOM

## 2022-03-27 PROCEDURE — 94668 MNPJ CHEST WALL SBSQ: CPT

## 2022-03-27 PROCEDURE — 80048 BASIC METABOLIC PNL TOTAL CA: CPT | Performed by: INTERNAL MEDICINE

## 2022-03-27 PROCEDURE — 63600175 PHARM REV CODE 636 W HCPCS: Performed by: INTERNAL MEDICINE

## 2022-03-27 PROCEDURE — 36415 COLL VENOUS BLD VENIPUNCTURE: CPT | Performed by: INTERNAL MEDICINE

## 2022-03-27 PROCEDURE — 94761 N-INVAS EAR/PLS OXIMETRY MLT: CPT

## 2022-03-27 PROCEDURE — 25000003 PHARM REV CODE 250: Performed by: INTERNAL MEDICINE

## 2022-03-27 PROCEDURE — 94640 AIRWAY INHALATION TREATMENT: CPT

## 2022-03-27 PROCEDURE — 25000242 PHARM REV CODE 250 ALT 637 W/ HCPCS: Performed by: INTERNAL MEDICINE

## 2022-03-27 PROCEDURE — A4216 STERILE WATER/SALINE, 10 ML: HCPCS | Performed by: INTERNAL MEDICINE

## 2022-03-27 RX ORDER — CARVEDILOL 6.25 MG/1
6.25 TABLET ORAL 2 TIMES DAILY
Status: DISCONTINUED | OUTPATIENT
Start: 2022-03-27 | End: 2022-03-29 | Stop reason: HOSPADM

## 2022-03-27 RX ADMIN — CLOPIDOGREL 75 MG: 75 TABLET, FILM COATED ORAL at 08:03

## 2022-03-27 RX ADMIN — CARBIDOPA AND LEVODOPA 2 TABLET: 25; 100 TABLET ORAL at 10:03

## 2022-03-27 RX ADMIN — Medication 10 ML: at 10:03

## 2022-03-27 RX ADMIN — HEPARIN SODIUM 5000 UNITS: 5000 INJECTION INTRAVENOUS; SUBCUTANEOUS at 10:03

## 2022-03-27 RX ADMIN — CARVEDILOL 6.25 MG: 6.25 TABLET, FILM COATED ORAL at 08:03

## 2022-03-27 RX ADMIN — CARVEDILOL 6.25 MG: 6.25 TABLET, FILM COATED ORAL at 10:03

## 2022-03-27 RX ADMIN — DEXTROSE: 5 SOLUTION INTRAVENOUS at 08:03

## 2022-03-27 RX ADMIN — IPRATROPIUM BROMIDE AND ALBUTEROL SULFATE 3 ML: 2.5; .5 SOLUTION RESPIRATORY (INHALATION) at 08:03

## 2022-03-27 RX ADMIN — IPRATROPIUM BROMIDE AND ALBUTEROL SULFATE 3 ML: 2.5; .5 SOLUTION RESPIRATORY (INHALATION) at 01:03

## 2022-03-27 RX ADMIN — ACETYLCYSTEINE 2 ML: 100 INHALANT RESPIRATORY (INHALATION) at 08:03

## 2022-03-27 RX ADMIN — HEPARIN SODIUM 5000 UNITS: 5000 INJECTION INTRAVENOUS; SUBCUTANEOUS at 06:03

## 2022-03-27 RX ADMIN — FAMOTIDINE 20 MG: 40 POWDER, FOR SUSPENSION ORAL at 09:03

## 2022-03-27 RX ADMIN — CARBIDOPA AND LEVODOPA 2 TABLET: 25; 100 TABLET ORAL at 08:03

## 2022-03-27 RX ADMIN — ASPIRIN 81 MG CHEWABLE TABLET 81 MG: 81 TABLET CHEWABLE at 08:03

## 2022-03-27 RX ADMIN — HEPARIN SODIUM 5000 UNITS: 5000 INJECTION INTRAVENOUS; SUBCUTANEOUS at 04:03

## 2022-03-27 RX ADMIN — SCOPOLAMINE 1 PATCH: 1.5 PATCH, EXTENDED RELEASE TRANSDERMAL at 05:03

## 2022-03-27 RX ADMIN — CARBIDOPA AND LEVODOPA 2 TABLET: 25; 100 TABLET ORAL at 04:03

## 2022-03-27 NOTE — ASSESSMENT & PLAN NOTE
On aspirin/plavix. Continue via NG  tube   3/21-  ASA and Plavix held from 3/21 for PEG placement   3/27-  ASA, Plavix resumed

## 2022-03-27 NOTE — SUBJECTIVE & OBJECTIVE
Interval History:  PEG feeding started yesterday and tolerating . BP is labile. Coreg dose adjusted and Entresto held. RD consulted for bolus PEF feeding . Disposition - Possible DC home to family care in the next 24h       Review of Systems   Unable to perform ROS: Patient nonverbal   Objective:     Vital Signs (Most Recent):  Temp: 98.6 °F (37 °C) (03/27/22 0728)  Pulse: 69 (03/27/22 0810)  Resp: 18 (03/27/22 0810)  BP: 137/81 (03/27/22 0728)  SpO2: 100 % (03/27/22 0810) Vital Signs (24h Range):  Temp:  [97.8 °F (36.6 °C)-98.6 °F (37 °C)] 98.6 °F (37 °C)  Pulse:  [57-89] 69  Resp:  [16-19] 18  SpO2:  [94 %-100 %] 100 %  BP: ()/(52-95) 137/81     Weight: 58.8 kg (129 lb 10.1 oz)  Body mass index is 23.71 kg/m².    Intake/Output Summary (Last 24 hours) at 3/27/2022 1114  Last data filed at 3/27/2022 0648  Gross per 24 hour   Intake 4657.48 ml   Output 1050 ml   Net 3607.48 ml      Physical Exam  Constitutional:       Comments: Pt is awake , non verbal ,Opening eyes and moving head to commands and tracks voices    HENT:      Head: Normocephalic and atraumatic.      Mouth/Throat:      Mouth: Mucous membranes are moist.   Eyes:      Pupils: Pupils are equal, round, and reactive to light.   Cardiovascular:      Rate and Rhythm: Normal rate.      Heart sounds: Normal heart sounds.   Pulmonary:      Breath sounds: No rales.      Comments: Breath sounds are coarse and bronchial  Abdominal:      General: Bowel sounds are normal. There is no distension.   Genitourinary:     Comments: Abreu catheter in place  Musculoskeletal:      Cervical back: Neck supple.      Right lower leg: No edema.      Left lower leg: No edema.      Comments: SCD and heel boots are in place   Neurological:      Motor: Weakness present.      Comments: Awake . Opens eyes and moves head to commands        Significant Labs: All pertinent labs within the past 24 hours have been reviewed.  CBC:   Recent Labs   Lab 03/26/22  0436   WBC 7.83   HGB 8.6*    HCT 28.2*        CMP:   Recent Labs   Lab 03/26/22  0436 03/27/22  0809   * 149*   K 3.6 3.5   * 118*   CO2 23 22*   * 219*   BUN 46* 40*   CREATININE 1.3 1.3   CALCIUM 8.6* 8.3*   ANIONGAP 10 9   EGFRNONAA 38* 38*       Significant Imaging:

## 2022-03-27 NOTE — ASSESSMENT & PLAN NOTE
Hold  entresto due to current hypotension and CODY  Resume antihypertensives once appropriate via NG tube   3/19-  Pt started back on Amlodipine , Coreg via NG tube     Stable . Cont current  Medication   3/27-  BP is labile . Coreg dose adjusted   Entresto held

## 2022-03-27 NOTE — ASSESSMENT & PLAN NOTE
3/19-  In the setting of free water deficit   Gentle hydration with D5 infusion   Free water flush via NG tube   3/21-  Improved  3/23-   Na now  Trending up = start D5 IVF    3/27-  Improving

## 2022-03-27 NOTE — PROGRESS NOTES
"[facility] - Telemetry ([facility] Medicine  Progress Note    Patient Name: Tanya Madrid  MRN: [id number]  Patient Class: IP- Inpatient   Admission Date: 3/10/2022  Length of Stay: 17 days  Attending Physician: Tim Reilly MD  Primary Care Provider: Maggie Sue NP        Subjective:     Principal Problem:Cardiac arrest        HPI:  History was taken from the electronic chart and from the daughter -  Adenike Lemus Daughter [phone]        82 y.o. female patient with a PMHx of HTN,  CHF-last EF-01/2021-25%, with diastolic dysfunction  , and HLD who presents to the Emergency Department for evaluation of cardiac arrest which onset suddenly 1 hour pta.  Daughter reports that over the last 3 days -she has been complaining of worsening dyspnoea  and was given extra lasix tablets. She usually takes 20mg lasix daily and was given 2 extra tablets with the episodes of  dyspnea.  This evening while having dinner, she "looked blank" and stopped breathing . Daughter called EMS and was advised to start CPR.. Pt achieved ROSC en route. After I shock . Prior Tx includes 50 mcg of fentanyl and 50 mg of ketamine from EMS.   Code status discussed.  She is Full code.  Labs and imaging test reviewed.  CT head -No hemorrhage or acute major vascular distribution infarction.   Dehiscence of the posterior wall of the sphenoid sinus such as on series 4, image 49-52. There is high density material possibly related to paranasal sinus disease or neoplasm which extends towards the pituitary and along the right towards the intracranial ICA.  Further evaluation with contrast enhanced MRI when clinically appropriate would be recommended.       Component      Latest Ref Rng & Units 3/10/2022 3/3/2022              Potassium      3.5 - 5.1 mmol/L 2.9 (L) 3.7   CBC showed wbc -19   Component      Latest Ref Rng & Units 3/10/2022 3/10/2022 4/20/2021          10:47 PM  8:18 PM    Lactate, Lex      0.5 - 2.2 mmol/L 3.6 " (HH) 5.7 (HH) 2.0     Component      Latest Ref Rng & Units 3/10/2022 4/20/2021   BNP      0 - 99 pg/mL >4,900 (H) 2,806 (H)   Code status discussed -she is full code  Her daughter is the SDM.  Adenike Lemus Daughter [phone]       Overview/Hospital Course:  81 y/o AAF admitted to ICU with a dx of cardiac arrest , CODY , acute hypoxic respiratory failure  and  Anoxic encephalopathy . Started on hypothermia protocol , IVAB , asa , plavix  And entresto . Cardiology and palliative care consulted. Tx reviewed . Cont current tx . Prognosis Poor      3/12  She remain critically ill . Now on rewarming phase . Started on propofol due to  only grimacing and posturing . Kidney  function is trending up . She  is on low dose of dobutamine . Case d/w he daughter at bedside . She changed code status to DNR . Prognosis poor .    3/13 Reamin critically ill  on mechanical ventilation . Temp is now > 36 . Last night  was on levophed and dobutamine to keep a map > 65  . Levophed wean off this am . She has been on propofol on and off for posturing  movement . Sedation stop for 2 hrs  .  She has positve  corneal , pupil and gag reflex .  No respond to verval or pain stimuli . She withdraw to pain . She is breathing overt the vent .  Neurology consulted . The UOP has sligly increased since yesterday . The kidney function  cont trending up . The Blood cx are (+) ALPHA HEMOLYTIC STREP    3/14 No significant neurologuical changes since yesterday . She opne her eyes on tactile simlus . She  does not follow commads . Ha been odff sedation for more than 24 hrs .  The Blood cx (+) for STREPTOCOCCUS ANGINOSUS  and EIKENELLA CORRODENS . The kidney function is sligly improving  . Prognsosis POOR .  POC d/w  her duaghter .    3/15- Remains intubated with FiO2 30%/PEEP 5 . Afebrile. Off sedation x 2 days. Remains comatose.  Intermittent spontaneous eye opening but does not seem purposeful. No verbal  response , does not follow  commands or track voice. Intermittent withdrawal to pain stimulus. Corneal, pupillary , cough and gag reflex present. Remains on Dobutamine gtt. EF 10%. Tube feed in progress. UOP improved 1L yesterday, however creatinine bumped to 3.8. Rocephin for Streptococcal  bacteremia continued. TTE negative for vegetation. Palliative Medicine is following. Prognosis appears poor.     3/16- Aferbile.Off sedation. Remains intubated with minimal vent support FiO2 30%/ PEEP 5. However pt is not awake enough to manage secretion and protect airways. Intermittent eye opening but does not seem purposeful. Brain stem reflexes are present. Withdraws to pain stimulus and facial grimace noted with sternal rub. Remains on dobutamine gtt.  ml yesterday . BNPCreatinine stable at 3.7. BNP down to 1493 from 4900 on admit. Will re consult Neurology to assess prognosis . Palliative Medicine is following.     3/17- Eyes are open but not purposeful, No verbal response , withdraws to pain stimulus. ETT cuff leak noted and needed to be exchanged. CC team extubated pt after SBT. Plan to assess clinical course from respiratory stand point with no plan for re intubation. NG tube placement will be attempted for feeding and medication administration. Dobutamine gtt continues.  High risk of sudden demise from cardiac stand point given EF 10%.     3/18- No verbal response, does not follow commands, eyes are open, intermittently seems pt tracks voice other time not. Withdraws to pain stimulus. Weak cough , requiring frequent suctioning to clear airway . Pt bites down suction tube. SpO2 satisfactory on FiO2 2L/min. Afebrile . NG tube feeds in progress , so far tolerating . UOP 1 liter yesterday. Cr 3.2. WBC dhara to 15K. Antibiotic Rocephin continues . Pt remains on Dobutamine gtt. Awaiting Cardiology input. Family declined Palliative Medicine follow up. Will get follow up CT head today.     3/19- Afebrile . On RA. SpO2 100%. Repeat CT head with  preserved gray-white matter junction and no cerebral edema, hemorrhage or other acute process. GCS is difficult to assess. Eyes remains open. No verbal response , withdraws to pain. Pt remains with poor cough effort and lot of secretion requiring suction and breatrh sounds are coarse. CXR with no new new infiltrate or consolidation as of today. WBC 14K, Cr down to 2.9. . Gentle hydration initiated .  ml yesterday. Spoke to marlin at bedside . She is debating continuing current care and PEG tube placement vs Hospice referral. Pt had 10 beats NSVT yesterday .Dobutamine gtt discontinued.     3/20- Afebrile. Remains on RA. At times seems more awake and tracks voice. Moves head and ext intermittently. UOP increased to 1.5L yesterday . Creatinine down to 2.2. NG tube accidentally removed during PT. Daughter has not made her mind on PEG tube placement yet. WBC 14K. Flagyl added. Na 148.     3/21- On RA. Pt with poor secretion management . Daughter elected PEG tube placement . Plavix and ASA held from today. IR consulted for PEG tube. Notify IR once pt remains off antiplatelet x 5 days. WBC 17K. PCT down to 0.51. Falgyl added with current Rocephin. Cr further improved to 1.8    3/22 She is aao x 0  in nad .Intermittent spontaneous  upper extremities movement but does not seem purposeful. No verbal  response , does not follow commands but track voice. Plavix  on hold yesterday  . NAEON . No family at bedside .     3/23 No neurological changes from yesterday .  Plan for PEG tube placement . Decadron  IV wean down . NAEON .     3/24 Last night  the NGT displaced and d/c . RN has not be able to placed a new one despite multiple attempts .  She has   multiple episode of hypoglycemia which resolved with D5 . The Na level is trending up  . Pt started on d5 at 50 cc/hr due to hypoglycemia and  hypernatremia / IR contacted for PEG tube placement tomorrow . The  WBC is trending up , Pt has  been on decadron iv which  "was d/c  today .     3/25- Afebrile . On RA. Pt is awake and non verbal . Spontaneous head and left arm movement noted. Does not follow commands, however daughter at bedside reports she would occasionally says " what" and squeeze her hands. WBC normalized . Noted Hgb 8.5 today. No signs of active bleeding reported. Na 148. Creatinine 1.3. Possible PEG tube placement by  IR today. Disposition- PT suggested basic NH placement , however family wishes to take pt home.     3/26- S/P PEG tube placement yesterday by IR. No immediate post procedure complications reported . Awaiting IR clearance to use PEG tube. No new concern at this time. Hgb stable at 8.6, Na 147, Cr 1.3. Clinically does not seem volume overloaded , however BNP is elevated at 2382.     3/28- Pt seems more awake today. Opens eyes and move head to commands , tracks voice and occasionally squeeze hands to commands. PEG feeding started yesterday and tolerating . BP is labile. Coreg dose adjusted and Entresto held. RD consulted for bolus PEF feeding . Disposition - Possible DC home to family care in the next 24h       Interval History:  PEG feeding started yesterday and tolerating . BP is labile. Coreg dose adjusted and Entresto held. RD consulted for bolus PEF feeding . Disposition - Possible DC home to family care in the next 24h       Review of Systems   Unable to perform ROS: Patient nonverbal   Objective:     Vital Signs (Most Recent):  Temp: 98.6 °F (37 °C) (03/27/22 0728)  Pulse: 69 (03/27/22 0810)  Resp: 18 (03/27/22 0810)  BP: 137/81 (03/27/22 0728)  SpO2: 100 % (03/27/22 0810) Vital Signs (24h Range):  Temp:  [97.8 °F (36.6 °C)-98.6 °F (37 °C)] 98.6 °F (37 °C)  Pulse:  [57-89] 69  Resp:  [16-19] 18  SpO2:  [94 %-100 %] 100 %  BP: ()/(52-95) 137/81     Weight: 58.8 kg (129 lb 10.1 oz)  Body mass index is 23.71 kg/m².    Intake/Output Summary (Last 24 hours) at 3/27/2022 1114  Last data filed at 3/27/2022 0648  Gross per 24 hour   Intake 4657.48 " ml   Output 1050 ml   Net 3607.48 ml      Physical Exam  Constitutional:       Comments: Pt is awake , non verbal ,Opening eyes and moving head to commands and tracks voices    HENT:      Head: Normocephalic and atraumatic.      Mouth/Throat:      Mouth: Mucous membranes are moist.   Eyes:      Pupils: Pupils are equal, round, and reactive to light.   Cardiovascular:      Rate and Rhythm: Normal rate.      Heart sounds: Normal heart sounds.   Pulmonary:      Breath sounds: No rales.      Comments: Breath sounds are coarse and bronchial  Abdominal:      General: Bowel sounds are normal. There is no distension.   Genitourinary:     Comments: Abreu catheter in place  Musculoskeletal:      Cervical back: Neck supple.      Right lower leg: No edema.      Left lower leg: No edema.      Comments: SCD and heel boots are in place   Neurological:      Motor: Weakness present.      Comments: Awake . Opens eyes and moves head to commands        Significant Labs: All pertinent labs within the past 24 hours have been reviewed.  CBC:   Recent Labs   Lab 03/26/22  0436   WBC 7.83   HGB 8.6*   HCT 28.2*        CMP:   Recent Labs   Lab 03/26/22  0436 03/27/22  0809   * 149*   K 3.6 3.5   * 118*   CO2 23 22*   * 219*   BUN 46* 40*   CREATININE 1.3 1.3   CALCIUM 8.6* 8.3*   ANIONGAP 10 9   EGFRNONAA 38* 38*       Significant Imaging:       Assessment/Plan:      * Cardiac arrest  Out of Hospital cardiac arrest.  Last Echo -1/21-EF -25%, Repeat echo with EF 10%  GIII Diastolic dysfunction.  EKG -Sinus rhythm with occasional Premature ventricular complexes and Premature atrial complexes  Cardiology consulted   Monitor clinical course for neuro recovery   Prognosis poor   3/18-  Extubated yesterday   Requiring frequent suction to keep airway clear . Weak cough   Remains on Dobutamine gtt.  Await Cardiology input  3/19-  Repeat CT head with preserved gray-white matter junction and no cerebral edema, hemorrhage or  other acute process. GCS is difficult to assess. Eyes remains open. No verbal response , withdraws to pain.    Pt had 10 beats NSVT yesterday .  Dobutamine gtt discontinued   3/21-  Currently fairly stable from Cardiac standpoint . Downgrade to telemetry     Stable . Cont current tx     Acute on chronic combined systolic and diastolic CHF (congestive heart failure)  Patient is identified as having Combined Systolic and Diastolic heart failure that is Acute on chronic. CHF is currently uncontrolled due to Pulmonary edema/pleural effusion on CXR. Latest ECHO performed and demonstrates- Results for orders placed during the hospital encounter of 01/15/21    Echo Color Flow Doppler? Yes    Interpretation Summary  · The left ventricle is severely enlarged with eccentric hypertrophy and severely decreased systolic function. The estimated ejection fraction is 25%  · Grade II left ventricular diastolic dysfunction.  · Normal right ventricular size with moderately reduced right ventricular systolic function.  · Severe left atrial enlargement.  · There is pulmonary hypertension.  · There is mild aortic valve stenosis.  · Aortic valve area is 1.37 cm2; peak velocity is 1.33 m/s; mean gradient is 4 mmHg.  · Mild to moderate tricuspid regurgitation.  · Intermediate central venous pressure (8 mmHg).  · The estimated PA systolic pressure is 50 mmHg.  · Trivial pericardial effusion.  . Continue Nitrate/Vasodilator and monitor clinical status closely. Monitor on telemetry. Patient is on CHF pathway.  Monitor strict Is&Os and daily weights.  Place on fluid restriction of 1.5 L. Continue to stress to patient importance of self efficacy and  on diet for CHF. Last BNP reviewed- and noted below   Recent Labs   Lab 03/26/22  0436   BNP 2,382*     3/15-  Off Entresto , BB and diuretics given hypotension   3/16, 3/21   BB resumed via NG tube   No ACEi/ARB or ARNI due to CODY  Does not appears volume overloaded   Diuretics on hold.    3/25-   Will start Entresto.   Stop amlodipine   3/26-  BP is labile   Coreg dose adjusted   Entresto held     Elevated troponin  In the setting of Cardiac arrest  Cont asa/plavix, BB  Cardiology on board   3/21-  ASA/Plavix held from 3/21for upcoming PEG placement     Anoxic encephalopathy  2/2 to cardiac arrest   Neurology consulted to assess prognosis   3/18-  Repeat head CT today   3/19-  Repeat CT head with preserved gray-white matter junction and no cerebral edema, hemorrhage or other acute process. GCS is difficult to assess. Eyes remains open. No verbal response , withdraws to pain  3/27-  Slow progress  Now opens eyes and moves head to commands     CODY (acute kidney injury)  2/2 to cardiac arrest   Not  a good candidate for CRT/RRT  Monitor UOP and renal indices   3/21-  Slowly improving       Gram-positive bacteremia- completed Rocephin x 2 weeks   Source of infection is unclear   Cont rocephin x 2 weeks   Initial Blood culture from 3/10/22 positive forALPHA HEMOLYTIC STREP and EIKENELLA CORRODENS non viable for susceptibility   Repeat blood cultures from 3/15/22 - NGTD    Aspiration pneumonia- completed treatment   Initial CXR on admit suggestive of right-sided pulmonary opacities possibly infection, aspiration, or edema.  Antibiotic include Rocephin initiated   Blood cx from 3/10/22 (+) for ALPHA HEMOLYTIC STREP  Tracheal aspirate - neg   Repeat blood cultures from 3/15/22- NGTD  3/20-  Flagyl added given leukocytosis     WBC  cont trending up . She was on decadron 4 mg iv bid and then wean off to 4 mg qd . Steroid d/c today . Possible reactive , however could be due to aspiration. Cont IVAB     Hypernatremia  3/19-  In the setting of free water deficit   Gentle hydration with D5 infusion   Free water flush via NG tube   3/21-  Improved  3/23-   Na now  Trending up = start D5 IVF    3/27-  Improving     Palliative care encounter  - Palliative medicine consulted for [facility] discussion   -Current code status  is DNR  3/18-  Family declined further Palliative Medicine follow up     Hypokalemia  -Replete as indicated     Mass of sphenoid sinus  Ct head showed- Dehiscence of the posterior wall of the sphenoid sinus such as on series 4, image 49-52. There is high density material possibly related to paranasal sinus disease or neoplasm which extends towards the pituitary and along the right towards the intracranial ICA.  Further evaluation with contrast enhanced MRI when clinically appropriate would be recommended.  Suggest including 3D/volumetric postcontrast imaging.  ENT consult as clinically warranted.        History of CVA (cerebrovascular accident)  On aspirin/plavix. Continue via NG  tube   3/21-  ASA and Plavix held from 3/21 for PEG placement   3/27-  ASA, Plavix resumed     Parkinson disease  Continue supportive care   Resume meds once appropriate   3/27  Meds resumed via PEG  tube       Laryngeal papillomatosis  Known History   Cont supportive tx as needed       Essential hypertension  Hold  entresto due to current hypotension and CODY  Resume antihypertensives once appropriate via NG tube   3/19-  Pt started back on Amlodipine , Coreg via NG tube     Stable . Cont current  Medication   3/27-  BP is labile . Coreg dose adjusted   Entresto held       VTE Risk Mitigation (From admission, onward)         Ordered     heparin (porcine) injection 5,000 Units  Every 8 hours         03/10/22 2351     IP VTE HIGH RISK PATIENT  Once         03/10/22 2345     Place sequential compression device  Until discontinued         03/10/22 2345                Discharge Planning   PARMINDER:      Code Status: DNR   Is the patient medically ready for discharge?:     Reason for patient still in hospital (select all that apply): Patient trending condition  Discharge Plan A: Skilled Nursing Facility, Home Health, Home with family                  Tim Reilly MD  Department of Hospital Medicine   [facility] - Telemetry ([facility])

## 2022-03-27 NOTE — ASSESSMENT & PLAN NOTE
Problem: Adult Inpatient Plan of Care  Goal: Plan of Care Review  Outcome: Ongoing, Progressing      Out of Hospital cardiac arrest.  Last Echo -1/21-EF -25%, Repeat echo with EF 10%  GIII Diastolic dysfunction.  EKG -Sinus rhythm with occasional Premature ventricular complexes and Premature atrial complexes  Cardiology consulted   Monitor clinical course for neuro recovery   Prognosis poor   3/18-  Extubated yesterday   Requiring frequent suction to keep airway clear . Weak cough   Remains on Dobutamine gtt.  Await Cardiology input  3/19-  Repeat CT head with preserved gray-white matter junction and no cerebral edema, hemorrhage or other acute process. GCS is difficult to assess. Eyes remains open. No verbal response , withdraws to pain.    Pt had 10 beats NSVT yesterday .  Dobutamine gtt discontinued   3/21-  Currently fairly stable from Cardiac standpoint . Downgrade to telemetry     Stable . Cont current tx

## 2022-03-27 NOTE — PLAN OF CARE
RODEON. Patient started on tube feeds and able to meet goal. Tolerating well. Fluid bolus given at end of shift. Tolerated well. BP dropped overnight following coreg dose, monitored closely. Patient more responsive to verbal commands. Patient speaking very rarely, but nodding appropriately. Two bowel movements overnight. PEG tube site CDI. Waffle Mattress applied to bed. Patient turned every two hours with wedge and pillows.   Continue current plan of care.   Problem: Infection  Goal: Absence of Infection Signs and Symptoms  Outcome: Ongoing, Progressing     Problem: Adult Inpatient Plan of Care  Goal: Plan of Care Review  Outcome: Ongoing, Progressing  Goal: Patient-Specific Goal (Individualized)  Outcome: Ongoing, Progressing  Goal: Absence of Hospital-Acquired Illness or Injury  Outcome: Ongoing, Progressing  Goal: Optimal Comfort and Wellbeing  Outcome: Ongoing, Progressing  Goal: Readiness for Transition of Care  Outcome: Ongoing, Progressing     Problem: Fall Injury Risk  Goal: Absence of Fall and Fall-Related Injury  Outcome: Ongoing, Progressing     Problem: Skin Injury Risk Increased  Goal: Skin Health and Integrity  Outcome: Ongoing, Progressing     Problem: Coping Ineffective  Goal: Effective Coping  Outcome: Ongoing, Progressing     Problem: Fluid and Electrolyte Imbalance (Acute Kidney Injury/Impairment)  Goal: Fluid and Electrolyte Balance  Outcome: Ongoing, Progressing     Problem: Oral Intake Inadequate (Acute Kidney Injury/Impairment)  Goal: Optimal Nutrition Intake  Outcome: Ongoing, Progressing     Problem: Renal Function Impairment (Acute Kidney Injury/Impairment)  Goal: Effective Renal Function  Outcome: Ongoing, Progressing

## 2022-03-27 NOTE — ASSESSMENT & PLAN NOTE
Patient is identified as having Combined Systolic and Diastolic heart failure that is Acute on chronic. CHF is currently uncontrolled due to Pulmonary edema/pleural effusion on CXR. Latest ECHO performed and demonstrates- Results for orders placed during the hospital encounter of 01/15/21    Echo Color Flow Doppler? Yes    Interpretation Summary  · The left ventricle is severely enlarged with eccentric hypertrophy and severely decreased systolic function. The estimated ejection fraction is 25%  · Grade II left ventricular diastolic dysfunction.  · Normal right ventricular size with moderately reduced right ventricular systolic function.  · Severe left atrial enlargement.  · There is pulmonary hypertension.  · There is mild aortic valve stenosis.  · Aortic valve area is 1.37 cm2; peak velocity is 1.33 m/s; mean gradient is 4 mmHg.  · Mild to moderate tricuspid regurgitation.  · Intermediate central venous pressure (8 mmHg).  · The estimated PA systolic pressure is 50 mmHg.  · Trivial pericardial effusion.  . Continue Nitrate/Vasodilator and monitor clinical status closely. Monitor on telemetry. Patient is on CHF pathway.  Monitor strict Is&Os and daily weights.  Place on fluid restriction of 1.5 L. Continue to stress to patient importance of self efficacy and  on diet for CHF. Last BNP reviewed- and noted below   Recent Labs   Lab 03/26/22  0436   BNP 2,382*     3/15-  Off Entresto , BB and diuretics given hypotension   3/16, 3/21   BB resumed via NG tube   No ACEi/ARB or ARNI due to CODY  Does not appears volume overloaded   Diuretics on hold.   3/25-   Will start Entresto.   Stop amlodipine   3/26-  BP is labile   Coreg dose adjusted   Entresto held

## 2022-03-27 NOTE — PLAN OF CARE
Ongoing (interventions implemented as appropriate)  Pt AAO x4.  VSS  Pt non verbal  Pt remained afebrile throughout this shift.   Pt is bedbound  Pt remained free of falls this shift.   Plan of care reviewed. Family verbalizes understanding.   Pt moving/turing by staff. Frequent weight shifting done.  Patient sinus rhythm on monitor.   Bed low, side rails up x 2, wheels locked, call light in reach.   Hourly rounding completed.   Will continue to monitor.

## 2022-03-27 NOTE — PROGRESS NOTES
Patient's BP noted to be at 91/52 with map of 69. Compared to previous trend, notable drop in BP. Notified HM. Patient had received scheduled 12.5mg coreg via PEG tube at PM med pass. Per HM recommendation will continue to monitor.

## 2022-03-27 NOTE — ASSESSMENT & PLAN NOTE
2/2 to cardiac arrest   Neurology consulted to assess prognosis   3/18-  Repeat head CT today   3/19-  Repeat CT head with preserved gray-white matter junction and no cerebral edema, hemorrhage or other acute process. GCS is difficult to assess. Eyes remains open. No verbal response , withdraws to pain  3/27-  Slow progress  Now opens eyes and moves head to commands

## 2022-03-28 LAB
POCT GLUCOSE: 145 MG/DL (ref 70–110)
POCT GLUCOSE: 157 MG/DL (ref 70–110)
POCT GLUCOSE: 171 MG/DL (ref 70–110)
POCT GLUCOSE: 186 MG/DL (ref 70–110)

## 2022-03-28 PROCEDURE — 25000003 PHARM REV CODE 250: Performed by: INTERNAL MEDICINE

## 2022-03-28 PROCEDURE — 94640 AIRWAY INHALATION TREATMENT: CPT

## 2022-03-28 PROCEDURE — 94668 MNPJ CHEST WALL SBSQ: CPT

## 2022-03-28 PROCEDURE — 63600175 PHARM REV CODE 636 W HCPCS: Performed by: INTERNAL MEDICINE

## 2022-03-28 PROCEDURE — 94761 N-INVAS EAR/PLS OXIMETRY MLT: CPT

## 2022-03-28 PROCEDURE — A4216 STERILE WATER/SALINE, 10 ML: HCPCS | Performed by: INTERNAL MEDICINE

## 2022-03-28 PROCEDURE — 21400001 HC TELEMETRY ROOM

## 2022-03-28 PROCEDURE — 25000242 PHARM REV CODE 250 ALT 637 W/ HCPCS: Performed by: INTERNAL MEDICINE

## 2022-03-28 RX ADMIN — CLOPIDOGREL 75 MG: 75 TABLET, FILM COATED ORAL at 08:03

## 2022-03-28 RX ADMIN — Medication 10 ML: at 11:03

## 2022-03-28 RX ADMIN — ASPIRIN 81 MG CHEWABLE TABLET 81 MG: 81 TABLET CHEWABLE at 08:03

## 2022-03-28 RX ADMIN — HEPARIN SODIUM 5000 UNITS: 5000 INJECTION INTRAVENOUS; SUBCUTANEOUS at 04:03

## 2022-03-28 RX ADMIN — CARBIDOPA AND LEVODOPA 2 TABLET: 25; 100 TABLET ORAL at 08:03

## 2022-03-28 RX ADMIN — CARVEDILOL 6.25 MG: 6.25 TABLET, FILM COATED ORAL at 09:03

## 2022-03-28 RX ADMIN — HEPARIN SODIUM 5000 UNITS: 5000 INJECTION INTRAVENOUS; SUBCUTANEOUS at 11:03

## 2022-03-28 RX ADMIN — IPRATROPIUM BROMIDE AND ALBUTEROL SULFATE 3 ML: 2.5; .5 SOLUTION RESPIRATORY (INHALATION) at 01:03

## 2022-03-28 RX ADMIN — ACETYLCYSTEINE 2 ML: 100 INHALANT RESPIRATORY (INHALATION) at 07:03

## 2022-03-28 RX ADMIN — CARBIDOPA AND LEVODOPA 2 TABLET: 25; 100 TABLET ORAL at 09:03

## 2022-03-28 RX ADMIN — IPRATROPIUM BROMIDE AND ALBUTEROL SULFATE 3 ML: 2.5; .5 SOLUTION RESPIRATORY (INHALATION) at 07:03

## 2022-03-28 RX ADMIN — Medication 10 ML: at 06:03

## 2022-03-28 RX ADMIN — CARBIDOPA AND LEVODOPA 2 TABLET: 25; 100 TABLET ORAL at 04:03

## 2022-03-28 RX ADMIN — FAMOTIDINE 20 MG: 40 POWDER, FOR SUSPENSION ORAL at 09:03

## 2022-03-28 RX ADMIN — CARVEDILOL 6.25 MG: 6.25 TABLET, FILM COATED ORAL at 08:03

## 2022-03-28 RX ADMIN — HEPARIN SODIUM 5000 UNITS: 5000 INJECTION INTRAVENOUS; SUBCUTANEOUS at 06:03

## 2022-03-28 RX ADMIN — DEXTROSE: 5 SOLUTION INTRAVENOUS at 11:03

## 2022-03-28 NOTE — CONSULTS
[facility] - Intensive Care (Hospital)  Adult Nutrition  Consult Note    SUMMARY     Recommendations     1. Recommend to continue: Isosource 1.5, Bolus Feeds  - 5 cartons  -Provides 1875 calories, 85g protein, and 955mL H2O.   -100mL H2O flush q bolus or per MD/NP    2. Weekly weights per RD follow up.     Goals:    1. Pt will tolerate >75% estimated energy needs by RD follow up.      Nutrition Goal Status: goal met, continues     Communication of RD recs: POC and sticky note    Assessment and Plan  Nutrition Problem    Inadequate oral intake  Related to (etiology):      Decreased ability to consume sufficient energy  Signs and Symptoms (as evidenced by):     NPO  Interventions:    Enteral nutrition    Collaboration with other care providers  Nutrition Diagnosis Status:   Continues     Reason for Assessment  Reason for assessment: consult (enteral feeds)  Diagnosis: Cardiac arrest   Relevant medical history: CHF, HTN, HLD, GERD, CVA, dysphagia, anoxic encephalopathy    General information comments:     03/11/2022: RD consulted for TF recs. Pt admitted today following cardiac arrest, now intubated in ICU. Propofol d/c'd today. Altered renal labs noted. Will continue to monitor.     3/14: Patient seen for follow up. Patient is NPO and intubated. Patient is not receiving propofol. Patient has no edema noted. Patients weight has been stable since admit. Patient is tolerating TF @ 35mL/hr and increasing to meet goal rate. Patients last BM 3/11. Will continue to monitor.     3/21: Patient seen for follow up. Patient is NPO. Patient was extubated 3/17. Patient has NG tube and is tolerating TF @ goal rate. Patients last BM 3/18. Will continue to monitor.     3/28: Patient seen for follow up. Patient is NPO. Patient is tolerating TF @ goal rate. Consulted for bolus feeds. Patients last BM 3/25. Will continue to monitor.     Nutrition Discharge Planning - pending medical course    Nutrition Risk Screen  Have you recently lost  "weight without trying?: Unsure  Have you been eating poorly because of a decreased appetite?: No  Nutrition Risk Screen: tube feeding or parenteral nutrition, dysphagia or difficulty swallowing    Nutrition/Diet History  Patient Reported Diet/Restrictions/Preferences: [place]   Food Allergies: NKFA  Factors Affecting Nutritional Intake: impaired cognitive status/motor control, difficulty/impaired swallowing and NPO   Spiritual, Cultural Beliefs, Adventist Practices, Values that Affect Care: no Spiritual, Cultural Beliefs, Adventist Practices, Values that Affect Care: no    Anthropometrics  Temp: 97.8 °F (36.6 °C)  Height: 5' 2" (157.5 cm)  Height (inches): 62 in  Weight Method: Bed Scale  Weight: 59.4 kg (130 lb 15.3 oz)  Weight (lb): 130.95 lb  Ideal Body Weight (IBW), Female: 110 lb  % Ideal Body Weight, Female (lb): 105.45 %  BMI (Calculated): 23.9       Labs  Pertinent Labs: reviewed  Lab Results   Component Value Date    HGB 8.6 (L) 03/26/2022    HCT 28.2 (L) 03/26/2022     (H) 03/27/2022    CALCIUM 8.3 (L) 03/27/2022    K 3.5 03/27/2022    PHOS 3.6 03/26/2022    BUN 40 (H) 03/27/2022    CREATININE 1.3 03/27/2022    ESTGFRAFRICA 44 (A) 03/27/2022    EGFRNONAA 38 (A) 03/27/2022    ALBUMIN 2.9 (L) 03/17/2022     Lab Results   Component Value Date    ALT 29 03/17/2022    AST 40 03/17/2022    ALKPHOS 109 03/17/2022    BILITOT 0.3 03/17/2022     Meds  Pertinent Medications: reviewed    acetylcysteine 100 mg/ml (10%)  2 mL Nebulization Q12H    albuterol-ipratropium  3 mL Nebulization Q6H WAKE    aspirin  81 mg Per G Tube Daily    carbidopa-levodopa  mg  2 tablet Per G Tube TID    carvediloL  6.25 mg Per G Tube BID    clopidogreL  75 mg Per G Tube Daily    famotidine  20 mg Per G Tube Daily    heparin (porcine)  5,000 Units Subcutaneous Q8H    scopolamine  1 patch Transdermal Q3 Days    sodium chloride 0.9%  10 mL Intravenous Q8H     Continuous Infusions:   dextrose 10 % in water (D10W)      " dextrose 5 % 50 mL/hr at 03/27/22 2034     PRN Meds:sodium chloride 0.9%, acetaminophen, dextrose 10 % in water (D10W), dextrose 10%, glucagon (human recombinant), glucose, glucose, hydrALAZINE, HYDROcodone-acetaminophen, insulin aspart U-100, melatonin, naloxone, pneumoc 13-lopez conj-dip cr(PF), polyethylene glycol     Physical Findings/Assessment  Nausea/Vomiting Signs/Symptoms: other (see comments) (no s/s)  Wounds: not indicated   Edema:   not indicated   Last Bowel Movement: 03/25/22 Stool Consistency: soft GI Signs/Symptoms: fecal incontinence  Carlos Score: 12  Mouth/Teeth WDL: WDL except, teeth Teeth Symptoms: tooth/teeth missing  O2 Device (Oxygen Therapy): room air  NFPE unable to be performed due to patients status    Malnutrition Assessment  Patient does not meet at least 2 ASPEN criteria for malnutrition at this time.   Will continue to monitor.       Estimated/Assessed Needs  Weight Used For Calorie Calculations: 51.2 kg (112 lb 14 oz)  Energy Calorie Requirements (kcal): 1143 (mifflin x 1.25AF)  Energy Need Method: [place]-St Jeor  Weight Used For Protein Calculations: 51.2 kg (112 lb 14 oz)  Protein Requirements: 40-51 (0.8-1.0g/kg, maintenance)  RDA Method (mL): 1143ml fluid or per MD/NP  CHO Requirement: 142g (50% CHO)    Nutrition Prescription Ordered  NPO     Evaluation of Received Nutrients  Enteral Calories: 1620  Enteral Protein: 73  Energy Calories Required: meeting needs  Protein Required: meeting needs  Tolerance: tolerating  % Intake of Estimated Energy Needs: 75 - 100 %  % Meal Intake: NPO    Monitor and Evaluation  Food and Nutrient Intake: energy intake  Food and Nutrient Administration: diet order and enteral nutrition administration  Anthropometric Measurements: weight, weight change, body mass index  Biochemical Data, Medical Tests and Procedures: electrolyte and renal panel, lipid profile, gastrointestinal profile, glucose/endocrine profile, Inflammatory profile  Nutrition-Focused  Physical Findings: overall appearance     Nutrition Follow-Up  Level of nutrition risk: moderate  Frequency of follow-up: Once weekly   Magalis Hernández RD,LDN

## 2022-03-28 NOTE — SUBJECTIVE & OBJECTIVE
Interval History:   PEG tube feeds transitioned to bolus . CM consulted to setup home Health and Home enteral feeding formula . Possible DC  home with Home Health to family care once home enteral feeding setup completed       Review of Systems   Unable to perform ROS: Patient nonverbal   Objective:     Vital Signs (Most Recent):  Temp: 97.7 °F (36.5 °C) (03/28/22 1107)  Pulse: 79 (03/28/22 1107)  Resp: 18 (03/28/22 1107)  BP: 125/76 (03/28/22 1107)  SpO2: 95 % (03/28/22 1107) Vital Signs (24h Range):  Temp:  [97.7 °F (36.5 °C)-98.6 °F (37 °C)] 97.7 °F (36.5 °C)  Pulse:  [73-94] 79  Resp:  [16-18] 18  SpO2:  [95 %-99 %] 95 %  BP: (114-144)/(58-91) 125/76     Weight: 59.4 kg (130 lb 15.3 oz)  Body mass index is 23.95 kg/m².    Intake/Output Summary (Last 24 hours) at 3/28/2022 1207  Last data filed at 3/27/2022 1600  Gross per 24 hour   Intake --   Output 401 ml   Net -401 ml      Physical Exam  Constitutional:       Comments: Pt is awake , non verbal ,Opening eyes and moving head to commands and tracks voices    HENT:      Head: Normocephalic and atraumatic.      Mouth/Throat:      Mouth: Mucous membranes are moist.   Eyes:      Pupils: Pupils are equal, round, and reactive to light.   Cardiovascular:      Rate and Rhythm: Normal rate.      Heart sounds: Normal heart sounds.   Pulmonary:      Breath sounds: No rales.      Comments: Breath sounds are coarse and bronchial  Abdominal:      General: Bowel sounds are normal. There is no distension.   Genitourinary:     Comments: Abreu catheter in place  Musculoskeletal:      Cervical back: Neck supple.      Right lower leg: No edema.      Left lower leg: No edema.      Comments: SCD and heel boots are in place   Neurological:      Motor: Weakness present.      Comments: Awake . Opens eyes and moves head to commands        Significant Labs:     Significant Imaging:

## 2022-03-28 NOTE — PLAN OF CARE
Plan of care reviewed with patient/family.  Daughter taught how to administer meds through peg tube. All questions answered til this point. No new questions as of now. Will continue care per unit policy and patient needs.

## 2022-03-28 NOTE — PLAN OF CARE
03/28/22 0911   Post-Acute Status   Post-Acute Authorization Home Health;IV Infusion   Home Health Status Referrals Sent   IV Infusion Status Referral(s) sent   Discharge Delays None known at this time   Discharge Plan   Discharge Plan A Home with family;Home Health     Jennyfer spoke with patient's daughter, Adenike, regarding home health and PEG tube feeding.   Adenike confirmed that patient is to return home with family and home health. Adenike states that they've always used [facility] and have preference to resume services with this agency. Home health referral to be sent via [facility].     PEG tube feedings to be arranged with [facility]. Jennyfer spoke with Magalis with [facility] regarding referral. Jennyfer indicated patient will discharge pending feeding set up. Magalis states that she needs to confirm if Medicare A+B requires authorization. Magalis to f/u with jennyfer.

## 2022-03-28 NOTE — PROGRESS NOTES
"[facility] - Tele[facility] ([facility] Medicine  Progress Note    Patient Name: Tanya Madrid  MRN: [id number]  Patient Class: IP- Inpatient   Admission Date: 3/10/2022  Length of Stay: 18 days  Attending Physician: Tim Reilly MD  Primary Care Provider: Maggie Sue NP        Subjective:     Principal Problem:Cardiac arrest        HPI:  History was taken from the electronic chart and from the daughter -  Adenike Lemus Daughter [phone]        82 y.o. female patient with a PMHx of HTN,  CHF-last EF-01/2021-25%, with diastolic dysfunction  , and HLD who presents to the Emergency Department for evaluation of cardiac arrest which onset suddenly 1 hour pta.  Daughter reports that over the last 3 days -she has been complaining of worsening dyspnoea  and was given extra lasix tablets. She usually takes 20mg lasix daily and was given 2 extra tablets with the episodes of  dyspnea.  This evening while having dinner, she "looked blank" and stopped breathing . Daughter called EMS and was advised to start CPR.. Pt achieved ROSC en route. After I shock . Prior Tx includes 50 mcg of fentanyl and 50 mg of ketamine from EMS.   Code status discussed.  She is Full code.  Labs and imaging test reviewed.  CT head -No hemorrhage or acute major vascular distribution infarction.   Dehiscence of the posterior wall of the sphenoid sinus such as on series 4, image 49-52. There is high density material possibly related to paranasal sinus disease or neoplasm which extends towards the pituitary and along the right towards the intracranial ICA.  Further evaluation with contrast enhanced MRI when clinically appropriate would be recommended.       Component      Latest Ref Rng & Units 3/10/2022 3/3/2022              Potassium      3.5 - 5.1 mmol/L 2.9 (L) 3.7   CBC showed wbc -19   Component      Latest Ref Rng & Units 3/10/2022 3/10/2022 4/20/2021          10:47 PM  8:18 PM    Lactate, Lex      0.5 - 2.2 mmol/L 3.6 " (HH) 5.7 (HH) 2.0     Component      Latest Ref Rng & Units 3/10/2022 4/20/2021   BNP      0 - 99 pg/mL >4,900 (H) 2,806 (H)   Code status discussed -she is full code  Her daughter is the SDM.  Adenike Lemus Daughter [phone]       Overview/Hospital Course:  81 y/o AAF admitted to ICU with a dx of cardiac arrest , CODY , acute hypoxic respiratory failure  and  Anoxic encephalopathy . Started on hypothermia protocol , IVAB , asa , plavix  And entresto . Cardiology and palliative care consulted. Tx reviewed . Cont current tx . Prognosis Poor      3/12  She remain critically ill . Now on rewarming phase . Started on propofol due to  only grimacing and posturing . Kidney  function is trending up . She  is on low dose of dobutamine . Case d/w he daughter at bedside . She changed code status to DNR . Prognosis poor .    3/13 Reamin critically ill  on mechanical ventilation . Temp is now > 36 . Last night  was on levophed and dobutamine to keep a map > 65  . Levophed wean off this am . She has been on propofol on and off for posturing  movement . Sedation stop for 2 hrs  .  She has positve  corneal , pupil and gag reflex .  No respond to verval or pain stimuli . She withdraw to pain . She is breathing overt the vent .  Neurology consulted . The UOP has sligly increased since yesterday . The kidney function  cont trending up . The Blood cx are (+) ALPHA HEMOLYTIC STREP    3/14 No significant neurologuical changes since yesterday . She opne her eyes on tactile simlus . She  does not follow commads . Ha been odff sedation for more than 24 hrs .  The Blood cx (+) for STREPTOCOCCUS ANGINOSUS  and EIKENELLA CORRODENS . The kidney function is sligly improving  . Prognsosis POOR .  POC d/w  her duaghter .    3/15- Remains intubated with FiO2 30%/PEEP 5 . Afebrile. Off sedation x 2 days. Remains comatose.  Intermittent spontaneous eye opening but does not seem purposeful. No verbal  response , does not follow  commands or track voice. Intermittent withdrawal to pain stimulus. Corneal, pupillary , cough and gag reflex present. Remains on Dobutamine gtt. EF 10%. Tube feed in progress. UOP improved 1L yesterday, however creatinine bumped to 3.8. Rocephin for Streptococcal  bacteremia continued. TTE negative for vegetation. Palliative Medicine is following. Prognosis appears poor.     3/16- Aferbile.Off sedation. Remains intubated with minimal vent support FiO2 30%/ PEEP 5. However pt is not awake enough to manage secretion and protect airways. Intermittent eye opening but does not seem purposeful. Brain stem reflexes are present. Withdraws to pain stimulus and facial grimace noted with sternal rub. Remains on dobutamine gtt.  ml yesterday . BNPCreatinine stable at 3.7. BNP down to 1493 from 4900 on admit. Will re consult Neurology to assess prognosis . Palliative Medicine is following.     3/17- Eyes are open but not purposeful, No verbal response , withdraws to pain stimulus. ETT cuff leak noted and needed to be exchanged. CC team extubated pt after SBT. Plan to assess clinical course from respiratory stand point with no plan for re intubation. NG tube placement will be attempted for feeding and medication administration. Dobutamine gtt continues.  High risk of sudden demise from cardiac stand point given EF 10%.     3/18- No verbal response, does not follow commands, eyes are open, intermittently seems pt tracks voice other time not. Withdraws to pain stimulus. Weak cough , requiring frequent suctioning to clear airway . Pt bites down suction tube. SpO2 satisfactory on FiO2 2L/min. Afebrile . NG tube feeds in progress , so far tolerating . UOP 1 liter yesterday. Cr 3.2. WBC dhara to 15K. Antibiotic Rocephin continues . Pt remains on Dobutamine gtt. Awaiting Cardiology input. Family declined Palliative Medicine follow up. Will get follow up CT head today.     3/19- Afebrile . On RA. SpO2 100%. Repeat CT head with  preserved gray-white matter junction and no cerebral edema, hemorrhage or other acute process. GCS is difficult to assess. Eyes remains open. No verbal response , withdraws to pain. Pt remains with poor cough effort and lot of secretion requiring suction and breatrh sounds are coarse. CXR with no new new infiltrate or consolidation as of today. WBC 14K, Cr down to 2.9. . Gentle hydration initiated .  ml yesterday. Spoke to marlin at bedside . She is debating continuing current care and PEG tube placement vs Hospice referral. Pt had 10 beats NSVT yesterday .Dobutamine gtt discontinued.     3/20- Afebrile. Remains on RA. At times seems more awake and tracks voice. Moves head and ext intermittently. UOP increased to 1.5L yesterday . Creatinine down to 2.2. NG tube accidentally removed during PT. Daughter has not made her mind on PEG tube placement yet. WBC 14K. Flagyl added. Na 148.     3/21- On RA. Pt with poor secretion management . Daughter elected PEG tube placement . Plavix and ASA held from today. IR consulted for PEG tube. Notify IR once pt remains off antiplatelet x 5 days. WBC 17K. PCT down to 0.51. Falgyl added with current Rocephin. Cr further improved to 1.8    3/22 She is aao x 0  in nad .Intermittent spontaneous  upper extremities movement but does not seem purposeful. No verbal  response , does not follow commands but track voice. Plavix  on hold yesterday  . NAEON . No family at bedside .     3/23 No neurological changes from yesterday .  Plan for PEG tube placement . Decadron  IV wean down . NAEON .     3/24 Last night  the NGT displaced and d/c . RN has not be able to placed a new one despite multiple attempts .  She has   multiple episode of hypoglycemia which resolved with D5 . The Na level is trending up  . Pt started on d5 at 50 cc/hr due to hypoglycemia and  hypernatremia / IR contacted for PEG tube placement tomorrow . The  WBC is trending up , Pt has  been on decadron iv which  "was d/c  today .     3/25- Afebrile . On RA. Pt is awake and non verbal . Spontaneous head and left arm movement noted. Does not follow commands, however daughter at bedside reports she would occasionally says " what" and squeeze her hands. WBC normalized . Noted Hgb 8.5 today. No signs of active bleeding reported. Na 148. Creatinine 1.3. Possible PEG tube placement by  IR today. Disposition- PT suggested basic NH placement , however family wishes to take pt home.     3/26- S/P PEG tube placement yesterday by IR. No immediate post procedure complications reported . Awaiting IR clearance to use PEG tube. No new concern at this time. Hgb stable at 8.6, Na 147, Cr 1.3. Clinically does not seem volume overloaded , however BNP is elevated at 2382.     3/27- Pt seems more awake today. Opens eyes and move head to commands , tracks voice and occasionally squeeze hands to commands. PEG feeding started yesterday and tolerating . BP is labile. Coreg dose adjusted and Entresto held. RD consulted for bolus PEF feeding . Disposition - Possible DC home to family care in the next 24h     3/28- PEG tube feeds transitioned to bolus . CM consulted to setup home Health and Home enteral feeding formula . Possible DC  home with Home Health to family care once home enteral feeding setup completed       Interval History:   PEG tube feeds transitioned to bolus . CM consulted to setup home Health and Home enteral feeding formula . Possible DC  home with Home Health to family care once home enteral feeding setup completed       Review of Systems   Unable to perform ROS: Patient nonverbal   Objective:     Vital Signs (Most Recent):  Temp: 97.7 °F (36.5 °C) (03/28/22 1107)  Pulse: 79 (03/28/22 1107)  Resp: 18 (03/28/22 1107)  BP: 125/76 (03/28/22 1107)  SpO2: 95 % (03/28/22 1107) Vital Signs (24h Range):  Temp:  [97.7 °F (36.5 °C)-98.6 °F (37 °C)] 97.7 °F (36.5 °C)  Pulse:  [73-94] 79  Resp:  [16-18] 18  SpO2:  [95 %-99 %] 95 %  BP: " (114-144)/(58-91) 125/76     Weight: 59.4 kg (130 lb 15.3 oz)  Body mass index is 23.95 kg/m².    Intake/Output Summary (Last 24 hours) at 3/28/2022 1207  Last data filed at 3/27/2022 1600  Gross per 24 hour   Intake --   Output 401 ml   Net -401 ml      Physical Exam  Constitutional:       Comments: Pt is awake , non verbal ,Opening eyes and moving head to commands and tracks voices    HENT:      Head: Normocephalic and atraumatic.      Mouth/Throat:      Mouth: Mucous membranes are moist.   Eyes:      Pupils: Pupils are equal, round, and reactive to light.   Cardiovascular:      Rate and Rhythm: Normal rate.      Heart sounds: Normal heart sounds.   Pulmonary:      Breath sounds: No rales.      Comments: Breath sounds are coarse and bronchial  Abdominal:      General: Bowel sounds are normal. There is no distension.   Genitourinary:     Comments: Abreu catheter in place  Musculoskeletal:      Cervical back: Neck supple.      Right lower leg: No edema.      Left lower leg: No edema.      Comments: SCD and heel boots are in place   Neurological:      Motor: Weakness present.      Comments: Awake . Opens eyes and moves head to commands        Significant Labs:     Significant Imaging:       Assessment/Plan:      * Cardiac arrest  Out of Hospital cardiac arrest.  Last Echo -1/21-EF -25%, Repeat echo with EF 10%  GIII Diastolic dysfunction.  EKG -Sinus rhythm with occasional Premature ventricular complexes and Premature atrial complexes  Cardiology consulted   Monitor clinical course for neuro recovery   Prognosis poor   3/18-  Extubated yesterday   Requiring frequent suction to keep airway clear . Weak cough   Remains on Dobutamine gtt.  Await Cardiology input  3/19-  Repeat CT head with preserved gray-white matter junction and no cerebral edema, hemorrhage or other acute process. GCS is difficult to assess. Eyes remains open. No verbal response , withdraws to pain.    Pt had 10 beats NSVT yesterday .  Dobutamine gtt  discontinued   3/21-  Currently fairly stable from Cardiac standpoint . Downgrade to telemetry     Stable . Cont current tx     Acute on chronic combined systolic and diastolic CHF (congestive heart failure)  Patient is identified as having Combined Systolic and Diastolic heart failure that is Acute on chronic. CHF is currently uncontrolled due to Pulmonary edema/pleural effusion on CXR. Latest ECHO performed and demonstrates- Results for orders placed during the hospital encounter of 01/15/21    Echo Color Flow Doppler? Yes    Interpretation Summary  · The left ventricle is severely enlarged with eccentric hypertrophy and severely decreased systolic function. The estimated ejection fraction is 25%  · Grade II left ventricular diastolic dysfunction.  · Normal right ventricular size with moderately reduced right ventricular systolic function.  · Severe left atrial enlargement.  · There is pulmonary hypertension.  · There is mild aortic valve stenosis.  · Aortic valve area is 1.37 cm2; peak velocity is 1.33 m/s; mean gradient is 4 mmHg.  · Mild to moderate tricuspid regurgitation.  · Intermediate central venous pressure (8 mmHg).  · The estimated PA systolic pressure is 50 mmHg.  · Trivial pericardial effusion.  . Continue Nitrate/Vasodilator and monitor clinical status closely. Monitor on telemetry. Patient is on CHF pathway.  Monitor strict Is&Os and daily weights.  Place on fluid restriction of 1.5 L. Continue to stress to patient importance of self efficacy and  on diet for CHF. Last BNP reviewed- and noted below   Recent Labs   Lab 03/26/22  0436   BNP 2,382*     3/15-  Off Entresto , BB and diuretics given hypotension   3/16, 3/21   BB resumed via NG tube   No ACEi/ARB or ARNI due to CODY  Does not appears volume overloaded   Diuretics on hold.   3/25-   Will start Entresto.   Stop amlodipine   3/26-  BP is labile   Coreg dose adjusted   Entresto held     Elevated troponin  In the setting of Cardiac  arrest  Cont asa/plavix, BB  Cardiology on board   3/21-  ASA/Plavix held from 3/21for upcoming PEG placement     Anoxic encephalopathy  2/2 to cardiac arrest   Neurology consulted to assess prognosis   3/18-  Repeat head CT today   3/19-  Repeat CT head with preserved gray-white matter junction and no cerebral edema, hemorrhage or other acute process. GCS is difficult to assess. Eyes remains open. No verbal response , withdraws to pain  3/27-  Slow progress  Now opens eyes and moves head to commands     CODY (acute kidney injury)  2/2 to cardiac arrest   Not  a good candidate for CRT/RRT  Monitor UOP and renal indices   3/21-  Slowly improving       Gram-positive bacteremia- completed Rocephin x 2 weeks   Source of infection is unclear   Cont rocephin x 2 weeks   Initial Blood culture from 3/10/22 positive forALPHA HEMOLYTIC STREP and EIKENELLA CORRODENS non viable for susceptibility   Repeat blood cultures from 3/15/22 - NGTD    Aspiration pneumonia- completed treatment   Initial CXR on admit suggestive of right-sided pulmonary opacities possibly infection, aspiration, or edema.  Antibiotic include Rocephin initiated   Blood cx from 3/10/22 (+) for ALPHA HEMOLYTIC STREP  Tracheal aspirate - neg   Repeat blood cultures from 3/15/22- NGTD  3/20-  Flagyl added given leukocytosis     WBC  cont trending up . She was on decadron 4 mg iv bid and then wean off to 4 mg qd . Steroid d/c today . Possible reactive , however could be due to aspiration. Cont IVAB     Hypernatremia  3/19-  In the setting of free water deficit   Gentle hydration with D5 infusion   Free water flush via NG tube   3/21-  Improved  3/23-   Na now  Trending up = start D5 IVF    3/27-  Improving     Palliative care encounter  - Palliative medicine consulted for [facility] discussion   -Current code status is DNR  3/18-  Family declined further Palliative Medicine follow up     Hypokalemia  -Replete as indicated     Mass of sphenoid sinus  Ct head showed-  Dehiscence of the posterior wall of the sphenoid sinus such as on series 4, image 49-52. There is high density material possibly related to paranasal sinus disease or neoplasm which extends towards the pituitary and along the right towards the intracranial ICA.  Further evaluation with contrast enhanced MRI when clinically appropriate would be recommended.  Suggest including 3D/volumetric postcontrast imaging.  ENT consult as clinically warranted.        History of CVA (cerebrovascular accident)  On aspirin/plavix. Continue via NG  tube   3/21-  ASA and Plavix held from 3/21 for PEG placement   3/27-  ASA, Plavix resumed     Parkinson disease  Continue supportive care   Resume meds once appropriate   3/27  Meds resumed via PEG  tube       Laryngeal papillomatosis  Known History   Cont supportive tx as needed       Essential hypertension  Hold  entresto due to current hypotension and CODY  Resume antihypertensives once appropriate via NG tube   3/19-  Pt started back on Amlodipine , Coreg via NG tube     Stable . Cont current  Medication   3/27-  BP is labile . Coreg dose adjusted   Entresto held       VTE Risk Mitigation (From admission, onward)         Ordered     heparin (porcine) injection 5,000 Units  Every 8 hours         03/10/22 2351     IP VTE HIGH RISK PATIENT  Once         03/10/22 2345     Place sequential compression device  Until discontinued         03/10/22 2345                Discharge Planning   PARMINDER:      Code Status: DNR   Is the patient medically ready for discharge?:     Reason for patient still in hospital (select all that apply): Patient trending condition  Discharge Plan A: Home with family, Home Health   Discharge Delays: None known at this time              Tim Reilly MD  Department of Hospital Medicine   [facility] - Telemetry ([facility])

## 2022-03-28 NOTE — PLAN OF CARE
Recommendation: 3/28    1. Recommend to continue: [employer] 1.5 Bolus Feeds  - 5 cartons  -Provides 1875 calories, 85g protein, and 955mL H2O.   -100mL H2O flush q bolus or per MD/NP     2. Weekly weights per RD follow up.     Magalis Hernández RD,LDN

## 2022-03-28 NOTE — PLAN OF CARE
Ongoing (interventions implemented as appropriate)  Pt AAO x4.  VSS  Pt remained afebrile throughout this shift.   Pt is bedbound  Pt remained free of falls this shift  Plan of care reviewed. Family verbalizes understanding.   Pt moving/turing by staff. Frequent weight shifting encouraged.  Patient sinus rhythm on monitor.   Bed low, side rails up x 2, wheels locked, call light in reach.   Hourly rounding completed.   Will continue to monitor.

## 2022-03-28 NOTE — PLAN OF CARE
Patient's daughter, Adenike, called swer regarding equipment for pt's hospital bed. Adenike inquired about something to assist with prevention of pressure wounds. Swer confirmed with charge nurse that family can take waffle mattress overlay that patient is currently using.     [facility] that MD can order gel and foam overlay for hospital bed. DME ordered and [facility]. Overlay to be delivered to patient's home tomorrow.     Pt's daughter, Adenike, notified.      [facility] accepting.  [facility] still awaiting authorization for pt's tube feeds.     Swer to f/u in AM.

## 2022-03-28 NOTE — PLAN OF CARE
03/28/22 1550   Post-Acute Status   Post-Acute Authorization Home Health;IV Infusion;HME   HME Status Set-up Complete/Auth obtained   Home Health Status Set-up Complete/Auth obtained   IV Infusion Status Set-up Complete/Auth obtained   Discharge Delays None known at this time   Discharge Plan   Discharge Plan A Home with family;Home Health     Swer informed by [employer] that authorization was received for tube feeds. Patient covered at 100%. F/u on tube feed home delivery.   [facility] accepting. F/u once patient discharged.  [employer] [facility] to arrange home delivery of overlays for hospital bed.

## 2022-03-29 VITALS
BODY MASS INDEX: 24.18 KG/M2 | RESPIRATION RATE: 16 BRPM | OXYGEN SATURATION: 98 % | WEIGHT: 131.38 LBS | TEMPERATURE: 97 F | SYSTOLIC BLOOD PRESSURE: 152 MMHG | DIASTOLIC BLOOD PRESSURE: 96 MMHG | HEIGHT: 62 IN | HEART RATE: 81 BPM

## 2022-03-29 LAB — POCT GLUCOSE: 103 MG/DL (ref 70–110)

## 2022-03-29 PROCEDURE — 94640 AIRWAY INHALATION TREATMENT: CPT

## 2022-03-29 PROCEDURE — 63600175 PHARM REV CODE 636 W HCPCS: Performed by: INTERNAL MEDICINE

## 2022-03-29 PROCEDURE — 25000003 PHARM REV CODE 250: Performed by: INTERNAL MEDICINE

## 2022-03-29 PROCEDURE — 25000242 PHARM REV CODE 250 ALT 637 W/ HCPCS: Performed by: INTERNAL MEDICINE

## 2022-03-29 PROCEDURE — 94668 MNPJ CHEST WALL SBSQ: CPT

## 2022-03-29 PROCEDURE — A4216 STERILE WATER/SALINE, 10 ML: HCPCS | Performed by: INTERNAL MEDICINE

## 2022-03-29 RX ORDER — SCOLOPAMINE TRANSDERMAL SYSTEM 1 MG/1
1 PATCH, EXTENDED RELEASE TRANSDERMAL
Qty: 10 PATCH | Refills: 0 | Status: SHIPPED | OUTPATIENT
Start: 2022-03-30 | End: 2022-04-29

## 2022-03-29 RX ORDER — NAPROXEN SODIUM 220 MG/1
81 TABLET, FILM COATED ORAL DAILY
Refills: 0 | COMMUNITY
Start: 2022-03-30 | End: 2022-04-04

## 2022-03-29 RX ORDER — CARBIDOPA AND LEVODOPA 25; 100 MG/1; MG/1
2 TABLET ORAL 3 TIMES DAILY
Qty: 180 TABLET | Refills: 0 | Status: SHIPPED | OUTPATIENT
Start: 2022-03-29 | End: 2022-07-27 | Stop reason: SINTOL

## 2022-03-29 RX ORDER — POLYETHYLENE GLYCOL 3350 17 G/17G
17 POWDER, FOR SOLUTION ORAL DAILY
Qty: 510 G | Refills: 0 | Status: SHIPPED | OUTPATIENT
Start: 2022-03-29 | End: 2022-04-28

## 2022-03-29 RX ORDER — SACUBITRIL AND VALSARTAN 49; 51 MG/1; MG/1
1 TABLET, FILM COATED ORAL 2 TIMES DAILY
Qty: 60 TABLET | Refills: 3
Start: 2022-03-29 | End: 2022-05-02 | Stop reason: SDUPTHER

## 2022-03-29 RX ORDER — FUROSEMIDE 20 MG/1
TABLET ORAL
Qty: 40 TABLET | Refills: 11
Start: 2022-03-29 | End: 2022-05-02 | Stop reason: SDUPTHER

## 2022-03-29 RX ORDER — CARVEDILOL 6.25 MG/1
6.25 TABLET ORAL 2 TIMES DAILY
Qty: 60 TABLET | Refills: 0 | Status: SHIPPED | OUTPATIENT
Start: 2022-03-29 | End: 2022-06-16

## 2022-03-29 RX ORDER — IPRATROPIUM BROMIDE 0.5 MG/2.5ML
500 SOLUTION RESPIRATORY (INHALATION) 2 TIMES DAILY
Qty: 150 ML | Refills: 0 | Status: SHIPPED | OUTPATIENT
Start: 2022-03-29 | End: 2022-06-16

## 2022-03-29 RX ORDER — FAMOTIDINE 20 MG/1
20 TABLET, FILM COATED ORAL DAILY
Qty: 30 TABLET | Refills: 0 | Status: SHIPPED | OUTPATIENT
Start: 2022-03-29 | End: 2022-04-04

## 2022-03-29 RX ORDER — CLOPIDOGREL BISULFATE 75 MG/1
75 TABLET ORAL DAILY
Qty: 30 TABLET | Refills: 0 | Status: ON HOLD | OUTPATIENT
Start: 2022-03-30 | End: 2022-07-30

## 2022-03-29 RX ADMIN — CLOPIDOGREL 75 MG: 75 TABLET, FILM COATED ORAL at 08:03

## 2022-03-29 RX ADMIN — ACETYLCYSTEINE 2 ML: 100 INHALANT RESPIRATORY (INHALATION) at 07:03

## 2022-03-29 RX ADMIN — HEPARIN SODIUM 5000 UNITS: 5000 INJECTION INTRAVENOUS; SUBCUTANEOUS at 05:03

## 2022-03-29 RX ADMIN — IPRATROPIUM BROMIDE AND ALBUTEROL SULFATE 3 ML: 2.5; .5 SOLUTION RESPIRATORY (INHALATION) at 07:03

## 2022-03-29 RX ADMIN — CARBIDOPA AND LEVODOPA 2 TABLET: 25; 100 TABLET ORAL at 06:03

## 2022-03-29 RX ADMIN — CARBIDOPA AND LEVODOPA 2 TABLET: 25; 100 TABLET ORAL at 08:03

## 2022-03-29 RX ADMIN — CARVEDILOL 6.25 MG: 6.25 TABLET, FILM COATED ORAL at 08:03

## 2022-03-29 RX ADMIN — SCOPOLAMINE 1 PATCH: 1.5 PATCH, EXTENDED RELEASE TRANSDERMAL at 01:03

## 2022-03-29 RX ADMIN — IPRATROPIUM BROMIDE AND ALBUTEROL SULFATE 3 ML: 2.5; .5 SOLUTION RESPIRATORY (INHALATION) at 02:03

## 2022-03-29 RX ADMIN — Medication 10 ML: at 05:03

## 2022-03-29 RX ADMIN — CEFTRIAXONE 1 G: 1 INJECTION, SOLUTION INTRAVENOUS at 09:03

## 2022-03-29 RX ADMIN — FAMOTIDINE 20 MG: 40 POWDER, FOR SUSPENSION ORAL at 12:03

## 2022-03-29 RX ADMIN — ASPIRIN 81 MG CHEWABLE TABLET 81 MG: 81 TABLET CHEWABLE at 08:03

## 2022-03-29 NOTE — PLAN OF CARE
[facility] - Telemetry (Hospital)  Discharge Final Note    Primary Care Provider: Maggie Sue NP    Expected Discharge Date: 3/29/2022    Final Discharge Note (most recent)     Final Note - 03/29/22 1026        Final Note    Assessment Type Final Discharge Note     [place] [facility]ospital Resources/Appts/Education Provided Post-Acute resouces added to AVS;Appointments scheduled and added to AVS        Post-Acute Status    Post-Acute Authorization Home Health;IV Infusion;HME     HME Status Set-up Complete/Auth obtained     Home Health Status Set-up Complete/Auth obtained     IV Infusion Status Set-up Complete/Auth obtained                 Important Message from Medicare  Important Message from Medicare regarding Discharge Appeal Rights: Given to patient/caregiver, Explained to patient/caregiver, Signed/date by patient/caregiver     Date IMM was signed: 03/23/22  Time IMM was signed: 0942     Follow-up providers     Maggie Sue NP   Specialty: Family Medicine   Relationship: PCP - General    [address] [address]   Phone: [phone]       Next Steps: Schedule an appointment as soon as possible for a visit in 3 day(s)    Instructions: Discharge follow up    Yeimy Gamez PA-C   Specialty: Cardiology    [address] [place] [address] [address] [address]   Phone: [phone]       Next Steps: Schedule an appointment as soon as possible for a visit in 1 week(s)    Instructions: Cardiology follow up    [employer]    Phone:[phone]       Next Steps: Follow up    Instructions: enteral feeding supplier          After-discharge care            [facility]     [facility]   Service: [facility] Health Services    [address] [facility]  [address] [address]   Phone: [phone]                       DC DISPO: home with [facility] and [employer] for tube feeding supplies  PCP f/u: [facility] and will  need to schedule f/u  Cards f/u scheduled for March 31st  [facility] has scheduled delivery for mattress overlay for home hospital bed.    Magalis with [employer] and Rose Mary with [facility] aware of d/c today.

## 2022-03-29 NOTE — DISCHARGE SUMMARY
"[facility] - Tele[facility] ([facility])  [facility] Medicine  Discharge Summary      Patient Name: Tanya Madrid  MRN: [id number]  Patient Class: IP- Inpatient  Admission Date: 3/10/2022  Hospital Length of Stay: 19 days  Discharge Date and Time: No discharge date for patient encounter.  Attending Physician: Tim Reilly MD   Discharging Provider: Tim Reilly MD  Primary Care Provider: Maggie Sue NP      HPI:   History was taken from the electronic chart and from the daughter -  Adenike Lemus Daughter [phone]        82 y.o. female patient with a PMHx of HTN,  CHF-last EF-01/2021-25%, with diastolic dysfunction  , and HLD who presents to the Emergency Department for evaluation of cardiac arrest which onset suddenly 1 hour pta.  Daughter reports that over the last 3 days -she has been complaining of worsening dyspnoea  and was given extra lasix tablets. She usually takes 20mg lasix daily and was given 2 extra tablets with the episodes of  dyspnea.  This evening while having dinner, she "looked blank" and stopped breathing . Daughter called EMS and was advised to start CPR.. Pt achieved ROSC en route. After I shock . Prior Tx includes 50 mcg of fentanyl and 50 mg of ketamine from EMS.   Code status discussed.  She is Full code.  Labs and imaging test reviewed.  CT head -No hemorrhage or acute major vascular distribution infarction.   Dehiscence of the posterior wall of the sphenoid sinus such as on series 4, image 49-52. There is high density material possibly related to paranasal sinus disease or neoplasm which extends towards the pituitary and along the right towards the intracranial ICA.  Further evaluation with contrast enhanced MRI when clinically appropriate would be recommended.       Component      Latest Ref Rng & Units 3/10/2022 3/3/2022              Potassium      3.5 - 5.1 mmol/L 2.9 (L) 3.7   CBC showed wbc -19   Component      Latest Ref Rng & Units 3/10/2022 3/10/2022 4/20/2021 "          10:47 PM  8:18 PM    Lactate, Lex      0.5 - 2.2 mmol/L 3.6 (HH) 5.7 (HH) 2.0     Component      Latest Ref Rng & Units 3/10/2022 4/20/2021   BNP      0 - 99 pg/mL >4,900 (H) 2,806 (H)   Code status discussed -she is full code  Her daughter is the SDM.  Adenike Lemus Daughter [phone]       * No surgery found *      Hospital Course:   81 y/o AAF admitted to ICU with a dx of cardiac arrest , CODY , acute hypoxic respiratory failure  and  Anoxic encephalopathy . Started on hypothermia protocol , IVAB , asa , plavix  And entresto . Cardiology and palliative care consulted. Tx reviewed . Cont current tx . Prognosis Poor      3/12  She remain critically ill . Now on rewarming phase . Started on propofol due to  only grimacing and posturing . Kidney  function is trending up . She  is on low dose of dobutamine . Case d/w he daughter at bedside . She changed code status to DNR . Prognosis poor .    3/13 Reamin critically ill  on mechanical ventilation . Temp is now > 36 . Last night  was on levophed and dobutamine to keep a map > 65  . Levophed wean off this am . She has been on propofol on and off for posturing  movement . Sedation stop for 2 hrs  .  She has positve  corneal , pupil and gag reflex .  No respond to verval or pain stimuli . She withdraw to pain . She is breathing overt the vent .  Neurology consulted . The UOP has sligly increased since yesterday . The kidney function  cont trending up . The Blood cx are (+) ALPHA HEMOLYTIC STREP    3/14 No significant neurologuical changes since yesterday . She opne her eyes on tactile simlus . She  does not follow commads . Ha been odff sedation for more than 24 hrs .  The Blood cx (+) for STREPTOCOCCUS ANGINOSUS  and EIKENELLA CORRODENS . The kidney function is sligly improving  . Prognsosis POOR .  POC d/w  her duaghter .    3/15- Remains intubated with FiO2 30%/PEEP 5 . Afebrile. Off sedation x 2 days. Remains comatose.  Intermittent  spontaneous eye opening but does not seem purposeful. No verbal  response , does not follow commands or track voice. Intermittent withdrawal to pain stimulus. Corneal, pupillary , cough and gag reflex present. Remains on Dobutamine gtt. EF 10%. Tube feed in progress. UOP improved 1L yesterday, however creatinine bumped to 3.8. Rocephin for Streptococcal  bacteremia continued. TTE negative for vegetation. Palliative Medicine is following. Prognosis appears poor.     3/16- Aferbile.Off sedation. Remains intubated with minimal vent support FiO2 30%/ PEEP 5. However pt is not awake enough to manage secretion and protect airways. Intermittent eye opening but does not seem purposeful. Brain stem reflexes are present. Withdraws to pain stimulus and facial grimace noted with sternal rub. Remains on dobutamine gtt.  ml yesterday . BNPCreatinine stable at 3.7. BNP down to 1493 from 4900 on admit. Will re consult Neurology to assess prognosis . Palliative Medicine is following.     3/17- Eyes are open but not purposeful, No verbal response , withdraws to pain stimulus. ETT cuff leak noted and needed to be exchanged. CC team extubated pt after SBT. Plan to assess clinical course from respiratory stand point with no plan for re intubation. NG tube placement will be attempted for feeding and medication administration. Dobutamine gtt continues.  High risk of sudden demise from cardiac stand point given EF 10%.     3/18- No verbal response, does not follow commands, eyes are open, intermittently seems pt tracks voice other time not. Withdraws to pain stimulus. Weak cough , requiring frequent suctioning to clear airway . Pt bites down suction tube. SpO2 satisfactory on FiO2 2L/min. Afebrile . NG tube feeds in progress , so far tolerating . UOP 1 liter yesterday. Cr 3.2. WBC dhara to 15K. Antibiotic Rocephin continues . Pt remains on Dobutamine gtt. Awaiting Cardiology input. Family declined Palliative Medicine follow up. Will  get follow up CT head today.     3/19- Afebrile . On RA. SpO2 100%. Repeat CT head with preserved gray-white matter junction and no cerebral edema, hemorrhage or other acute process. GCS is difficult to assess. Eyes remains open. No verbal response , withdraws to pain. Pt remains with poor cough effort and lot of secretion requiring suction and breatrh sounds are coarse. CXR with no new new infiltrate or consolidation as of today. WBC 14K, Cr down to 2.9. . Gentle hydration initiated .  ml yesterday. Spoke to marlin at bedside . She is debating continuing current care and PEG tube placement vs Hospice referral. Pt had 10 beats NSVT yesterday .Dobutamine gtt discontinued.     3/20- Afebrile. Remains on RA. At times seems more awake and tracks voice. Moves head and ext intermittently. UOP increased to 1.5L yesterday . Creatinine down to 2.2. NG tube accidentally removed during PT. Daughter has not made her mind on PEG tube placement yet. WBC 14K. Flagyl added. Na 148.     3/21- On RA. Pt with poor secretion management . Daughter elected PEG tube placement . Plavix and ASA held from today. IR consulted for PEG tube. Notify IR once pt remains off antiplatelet x 5 days. WBC 17K. PCT down to 0.51. Falgyl added with current Rocephin. Cr further improved to 1.8    3/22 She is aao x 0  in nad .Intermittent spontaneous  upper extremities movement but does not seem purposeful. No verbal  response , does not follow commands but track voice. Plavix  on hold yesterday  . NAEON . No family at bedside .     3/23 No neurological changes from yesterday .  Plan for PEG tube placement . Decadron  IV wean down . NAEON .     3/24 Last night  the NGT displaced and d/c . RN has not be able to placed a new one despite multiple attempts .  She has   multiple episode of hypoglycemia which resolved with D5 . The Na level is trending up  . Pt started on d5 at 50 cc/hr due to hypoglycemia and  hypernatremia / IR contacted for PEG  "tube placement tomorrow . The  WBC is trending up , Pt has  been on decadron iv which was d/c  today .     3/25- Afebrile . On RA. Pt is awake and non verbal . Spontaneous head and left arm movement noted. Does not follow commands, however daughter at bedside reports she would occasionally says " what" and squeeze her hands. WBC normalized . Noted Hgb 8.5 today. No signs of active bleeding reported. Na 148. Creatinine 1.3. Possible PEG tube placement by  IR today. Disposition- PT suggested basic NH placement , however family wishes to take pt home.     3/26- S/P PEG tube placement yesterday by IR. No immediate post procedure complications reported . Awaiting IR clearance to use PEG tube. No new concern at this time. Hgb stable at 8.6, Na 147, Cr 1.3. Clinically does not seem volume overloaded , however BNP is elevated at 2382.     3/27- Pt seems more awake today. Opens eyes and move head to commands , tracks voice and occasionally squeeze hands to commands. PEG feeding started yesterday and tolerating . BP is labile. Coreg dose adjusted and Entresto held. RD consulted for bolus PEF feeding . Disposition - Possible DC home to family care in the next 24h     3/28- PEG tube feeds transitioned to bolus . CM consulted to setup home Health and Home enteral feeding formula . Possible DC  home with Home Health to family care once home enteral feeding setup completed     3/29- Pt is awake , mostly non verbal , not necessarily follows commands but tracks voices and moves eyes and facial expression seems she is oriented. Breathing is spontaneous on RA, non labored  and no distress noted. No peripheral edema noted on exam. Abreu catheter exchanged today. Pt is examined and deemed stable and suitable for discharge. Family desires to take pt home with Home Health service PT/OT, SN, speech therapy , Aide service and Social service , home NP visit. Virtual visit with PCP and Cardiology post discharge.        Goals of Care Treatment " Preferences:  Code Status: DNR          What is most important right now is to focus on improvement in condition but with limits to invasive therapies.  Accordingly, we have decided that the best plan to meet the patient's goals includes continuing with treatment.      Consults:   Consults (From admission, onward)        Status Ordering Provider     Inpatient consult to Social Work  Once        Provider:  (Not yet assigned)    Completed CAREY, SHAMEEM     Inpatient consult to Registered Dietitian/Nutritionist  Once        Provider:  (Not yet assigned)    Completed CAREYPAWAN PUGHEEMONET     Inpatient consult to Interventional Radiology  Once        Provider:  Cornell Meier MD    Completed CAREY, SHAMEEM     Inpatient consult to Social Work  Once        Provider:  (Not yet assigned)    Completed PUMAROL WOLF, KAUSHAL     Inpatient consult to Palliative Care  Once        Provider:  Essence Whitfield NP    Completed PUMAROL WOLF, KAUSHAL     Inpatient consult to Neurology  Once        Provider:  Hai Bentley MD    Completed PUMAROL WOLF, KAUSHAL     Inpatient consult to Palliative Care  Once        Provider:  Batool Copeland PA-C    Completed TIFFANIE FERNANDES     Inpatient consult to Pulmonology  Once        Provider:  (Not yet assigned)    Completed PUMAROL WOLF, KAUSHAL     Inpatient consult to Cardiology  Once        Provider:  (Not yet assigned)    Completed PUMAROL WOLF, KAUSHAL     Inpatient consult to ENT  Once        Provider:  Charleen Sexton MD    Acknowledged CHARLEEN BROWN     Inpatient consult to Registered Dietitian/Nutritionist  Once        Provider:  (Not yet assigned)    Completed NOMAN BRASHER          No new Assessment & Plan notes have been filed under this hospital service since the last note was generated.  Service: Hospital Medicine    Final Active Diagnoses:    Diagnosis Date Noted POA    PRINCIPAL PROBLEM:  Cardiac arrest [I46.9] 03/11/2022 Yes    Acute on chronic  combined systolic and diastolic CHF (congestive heart failure) [I50.43] 01/17/2021 Yes    Elevated troponin [R77.8] 04/09/2021 Yes    Anoxic encephalopathy [G93.1] 03/11/2022 Yes    CODY (acute kidney injury) [N17.9] 03/12/2022 Yes    Gram-positive bacteremia- completed Rocephin x 2 weeks  [R78.81] 03/12/2022 Yes    Aspiration pneumonia- completed treatment  [J69.0] 03/11/2022 Yes    Hypernatremia [E87.0] 03/19/2022 No    Palliative care encounter [Z51.5] 03/15/2022 Not Applicable    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] 03/11/2022 Yes    Hypokalemia [E87.6] 03/11/2022 Yes    Essential hypertension [I10] 07/24/2019 Yes     Chronic    History of CVA (cerebrovascular accident) [Z86.73] 07/24/2019 Not Applicable     Chronic    Parkinson disease [G20] 07/11/2017 Yes     Chronic    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 Schedule an appointment as soon as possible for a visit in 3 day(s).    Specialty: Family Medicine  Why: Discharge follow up  Contact information:  [address] [place] [address] [phone]             Yeimy Gamez PA-C. Schedule an appointment as soon as possible for a visit in 1 week(s).    Specialty: Cardiology  Why: Cardiology follow up  Contact information:  [address] [place] [address] [phone]             [facility] Follow up.    Why: enteral feeding supplier  Contact information:  Phone:[phone]                 Contact information for after-discharge care     Home Medical Care     [facility] .    Service: Home Health Services  Contact information:  [address]  "[facility] [address] [facility] [address]  [phone]                           Patient Instructions:      ENTERAL NUTRITION FOR HOME USE     Order Specific Question Answer Comments   Height: 5' 2" (1.575 m)    Weight: 59.4 kg (130 lb 15.3 oz)    Does the patient have permanent non-function or disease of the structures that normally permit food to reach or be absorbed from the small bowel? Yes    Does the patient require tube feedings to provide sufficient nutrients to maintain weight and strength commensurate with the patient's overall health status? Yes    Select Formula: Isosource 1.5    Method of administration: Syringe    Does the patient have a documented allergy or intolerance to semi-synthetic nutrients? No    Rate/frequency of administration and/or number of calories per 24 hr period: Isosource 1.5 or equivalent ( jevity 1.5) 5 cartons per day bolus via peg for greater than 90 days    Length of need (1-99 months)? 99      HOSPITAL BED FOR HOME USE     Order Specific Question Answer Comments   Type: Semi-electric Pt only needs gel overlat mattress , not hospital bed   Length of need (1-99 months): 99    Does patient have medical equipment at home? walker, rolling    Does patient have medical equipment at home? raised toilet    Does patient have medical equipment at home? shower chair    Height: 5' 2" (1.575 m)    Weight: 59.4 kg (130 lb 15.3 oz)    Accessories: Gel overlay mattress    Please check all that apply: Patient requires frequent changes in body position and/or has an immediate need for a change in body position.    Please check all that apply: Patient requires positioning of the body in ways not feasible in an ordinary bed due to a medical condition which is expected to last at least one month.    Vendor: [employer]    Expected Date of Delivery: 3/28/2022      NEBULIZER FOR HOME USE     Order Specific Question Answer Comments   Height: 5' 2" (1.575 m)    Weight: 59.6 kg (131 lb 6.3 oz)  " "  Does patient have medical equipment at home? walker, rolling    Does patient have medical equipment at home? raised toilet    Does patient have medical equipment at home? shower chair    Length of need (1-99 months): 99      NEBULIZER KIT (SUPPLIES) FOR HOME USE     Order Specific Question Answer Comments   Height: 5' 2" (1.575 m)    Weight: 59.6 kg (131 lb 6.3 oz)    Does patient have medical equipment at home? walker, rolling    Does patient have medical equipment at home? raised toilet    Does patient have medical equipment at home? shower chair    Length of need (1-99 months): 99    Mask or Mouthpiece? Mask      Ambulatory referral/consult to [facility]   Standing Status: Future   Referral Priority: Routine Referral Type: Consultation   Referral Reason: Specialty Services Required   Number of Visits Requested: 1     Ambulatory referral/consult to Home Health   Standing Status: Future   Referral Priority: Routine Referral Type: Home Health   Referral Reason: Specialty Services Required   Requested Specialty: Home Health Services   Number of Visits Requested: 1     Tube Feedings/Formulas   Order Comments: 5 cartons /day . Flush 100 ml water after each bolus feed and additional 10 ml in between feeds . Total 800 ml to 1000 ml free water /day     Order Specific Question Answer Comments   Select Adult Formula: Isosource 1.5 efra    Route: Gastrostomy      Activity as tolerated       Significant Diagnostic Studies: Labs: BMP: No results for input(s): GLU, NA, K, CL, CO2, BUN, CREATININE, CALCIUM, MG in the last 48 hours., CMP No results for input(s): NA, K, CL, CO2, GLU, BUN, CREATININE, CALCIUM, PROT, ALBUMIN, BILITOT, ALKPHOS, AST, ALT, ANIONGAP, ESTGFRAFRICA, EGFRNONAA in the last 48 hours. and CBC No results for input(s): WBC, HGB, HCT, PLT in the last 48 hours.    Pending Diagnostic Studies:     Procedure Component Value Units Date/Time    APTT [[id number]] Collected: 03/12/22 0352    Order " Status: Sent Lab Status: In process Updated: 03/12/22 0352    Specimen: Blood     IR Gastrostomy Tube [[id number]] Resulted: 03/25/22 1544    Order Status: Sent Lab Status: In process Updated: 03/25/22 1602    Lactic acid, plasma [[id number]] Collected: 03/11/22 0130    Order Status: Sent Lab Status: In process Updated: 03/11/22 0130    Specimen: Blood     Protime-INR [[id number]] Collected: 03/12/22 0352    Order Status: Sent Lab Status: In process Updated: 03/12/22 0352    Specimen: Blood          Medications:  Reconciled Home Medications:      Medication List      START taking these medications    aspirin 81 MG Chew  1 tablet (81 mg total) by Per G Tube route once daily.  Start taking on: March 30, 2022  Replaces: aspirin 81 MG EC tablet     carvediloL 6.25 MG tablet  Commonly known as: COREG  1 tablet (6.25 mg total) by Per G Tube route 2 (two) times daily.     famotidine 20 MG tablet  Commonly known as: PEPCID AC  1 tablet (20 mg total) by Per G Tube route once daily.     ipratropium 0.02 % nebulizer solution  Commonly known as: ATROVENT  Take 2.5 mLs (500 mcg total) by nebulization 2 (two) times daily. Rescue     polyethylene glycol 17 gram/dose powder  Commonly known as: GLYCOLAX  17 g by Per NG tube route once daily. FOR CONSTIPATION     scopolamine 1.3-1.5 mg (1 mg over 3 days)  Commonly known as: TRANSDERM-SCOP  Place 1 patch onto the skin Every 3 (three) days.  Start taking on: March 30, 2022        CHANGE how you take these medications    carbidopa-levodopa  mg  mg per tablet  Commonly known as: SINEMET  2 tablets by Per G Tube route 3 (three) times daily.  What changed: how to take this     clopidogreL 75 mg tablet  Commonly known as: PLAVIX  1 tablet (75 mg total) by Per G Tube route once daily.  Start taking on: March 30, 2022  What changed: how to take this     ENTRESTO 49-51 mg per tablet  Generic drug: sacubitriL-valsartan  Take 1 tablet by mouth 2 (two) times daily. HOLD FOR NOW DUE TO  LOW BLOOD PRESSURE  What changed: additional instructions     furosemide 20 MG tablet  Commonly known as: LASIX  USE  IT ONLY AS NEEDED FOR FLUID RETENTION  What changed: additional instructions        CONTINUE taking these medications    clonazePAM 0.5 MG tablet  Commonly known as: KlonoPIN  Take 0.5 mg by mouth 2 (two) times daily as needed for Anxiety.        STOP taking these medications    aspirin 81 MG EC tablet  Commonly known as: ECOTRIN  Replaced by: aspirin 81 MG Chew     losartan 50 MG tablet  Commonly known as: COZAAR     metoprolol succinate 50 MG 24 hr tablet  Commonly known as: TOPROL-XL     multivitamin capsule            Indwelling Lines/Drains at time of discharge:   Lines/Drains/Airways     Drain  Duration                Urethral Catheter 03/10/22 2032 Non-latex 16 Fr. 18 days         Gastrostomy/Enterostomy 03/25/22 1320 Gastrostomy tube w/ balloon LUQ feeding 4 days          Airway  Duration                Airway 18 days                Time spent on the discharge of patient: 30  minutes         Tim Reilly MD  Department of Hospital Medicine  [facility] - Telemetry ([facility])

## 2022-03-29 NOTE — PLAN OF CARE
Pts daughter received discharge instructions and verbalized understanding. Pt was stable. IV and monitor was removed.Pt is ready for discharge.

## 2022-03-29 NOTE — CONSULTS
Patient private paid for home Nebulizer through [facility] to deliver to nurses station.

## 2022-03-30 NOTE — NURSING
Peg flushed. Abreu intact. Ambulance arrived to get patient. Daughter at bedside. All questions answered.

## 2022-03-31 ENCOUNTER — HOSPITAL ENCOUNTER (INPATIENT)
Facility: HOSPITAL | Age: 82
LOS: 4 days | Discharge: HOME HEALTH | DRG: 377 | End: 2022-04-04
Attending: EMERGENCY MEDICINE | Admitting: EMERGENCY MEDICINE
Payer: MEDICARE

## 2022-03-31 ENCOUNTER — TELEPHONE (OUTPATIENT)
Dept: PHARMACY | Facility: CLINIC | Age: 82
End: 2022-03-31
Payer: MEDICARE

## 2022-03-31 DIAGNOSIS — R53.1 WEAKNESS: ICD-10-CM

## 2022-03-31 DIAGNOSIS — D64.9 ANEMIA, UNSPECIFIED TYPE: ICD-10-CM

## 2022-03-31 DIAGNOSIS — R41.82 ALTERED MENTAL STATUS, UNSPECIFIED ALTERED MENTAL STATUS TYPE: Primary | ICD-10-CM

## 2022-03-31 PROBLEM — I50.42 CHRONIC COMBINED SYSTOLIC AND DIASTOLIC HEART FAILURE: Status: ACTIVE | Noted: 2022-03-31

## 2022-03-31 PROBLEM — K92.2 GI BLEED: Status: ACTIVE | Noted: 2022-03-31

## 2022-03-31 PROBLEM — A41.9 SEPSIS: Status: ACTIVE | Noted: 2022-03-31

## 2022-03-31 LAB
ABO + RH BLD: NORMAL
ACANTHOCYTES BLD QL SMEAR: PRESENT
ALBUMIN SERPL BCP-MCNC: 2.5 G/DL (ref 3.5–5.2)
ALLENS TEST: ABNORMAL
ALP SERPL-CCNC: 131 U/L (ref 55–135)
ALT SERPL W/O P-5'-P-CCNC: 25 U/L (ref 10–44)
ANION GAP SERPL CALC-SCNC: 11 MMOL/L (ref 8–16)
ANION GAP SERPL CALC-SCNC: 14 MMOL/L (ref 8–16)
ANISOCYTOSIS BLD QL SMEAR: SLIGHT
AST SERPL-CCNC: 72 U/L (ref 10–40)
BASOPHILS # BLD AUTO: 0.02 K/UL (ref 0–0.2)
BASOPHILS # BLD AUTO: 0.07 K/UL (ref 0–0.2)
BASOPHILS NFR BLD: 0.2 % (ref 0–1.9)
BASOPHILS NFR BLD: 0.5 % (ref 0–1.9)
BILIRUB SERPL-MCNC: 0.2 MG/DL (ref 0.1–1)
BILIRUB UR QL STRIP: NEGATIVE
BLD GP AB SCN CELLS X3 SERPL QL: NORMAL
BLD PROD TYP BPU: NORMAL
BLD PROD TYP BPU: NORMAL
BLOOD UNIT EXPIRATION DATE: NORMAL
BLOOD UNIT EXPIRATION DATE: NORMAL
BLOOD UNIT TYPE CODE: 7300
BLOOD UNIT TYPE CODE: 7300
BLOOD UNIT TYPE: NORMAL
BLOOD UNIT TYPE: NORMAL
BUN SERPL-MCNC: 61 MG/DL (ref 8–23)
BUN SERPL-MCNC: 76 MG/DL (ref 8–23)
BURR CELLS BLD QL SMEAR: ABNORMAL
CALCIUM SERPL-MCNC: 7.9 MG/DL (ref 8.7–10.5)
CALCIUM SERPL-MCNC: 8 MG/DL (ref 8.7–10.5)
CHLORIDE SERPL-SCNC: 120 MMOL/L (ref 95–110)
CHLORIDE SERPL-SCNC: 122 MMOL/L (ref 95–110)
CLARITY UR: CLEAR
CO2 SERPL-SCNC: 16 MMOL/L (ref 23–29)
CO2 SERPL-SCNC: 19 MMOL/L (ref 23–29)
CODING SYSTEM: NORMAL
CODING SYSTEM: NORMAL
COLOR UR: YELLOW
CREAT SERPL-MCNC: 1.1 MG/DL (ref 0.5–1.4)
CREAT SERPL-MCNC: 1.2 MG/DL (ref 0.5–1.4)
CTP QC/QA: YES
DELSYS: ABNORMAL
DIFFERENTIAL METHOD: ABNORMAL
DIFFERENTIAL METHOD: ABNORMAL
DISPENSE STATUS: NORMAL
DISPENSE STATUS: NORMAL
EOSINOPHIL # BLD AUTO: 0.1 K/UL (ref 0–0.5)
EOSINOPHIL # BLD AUTO: 0.1 K/UL (ref 0–0.5)
EOSINOPHIL NFR BLD: 0.5 % (ref 0–8)
EOSINOPHIL NFR BLD: 0.6 % (ref 0–8)
ERYTHROCYTE [DISTWIDTH] IN BLOOD BY AUTOMATED COUNT: 17.3 % (ref 11.5–14.5)
ERYTHROCYTE [DISTWIDTH] IN BLOOD BY AUTOMATED COUNT: 17.5 % (ref 11.5–14.5)
EST. GFR  (AFRICAN AMERICAN): 48 ML/MIN/1.73 M^2
EST. GFR  (AFRICAN AMERICAN): 53 ML/MIN/1.73 M^2
EST. GFR  (NON AFRICAN AMERICAN): 42 ML/MIN/1.73 M^2
EST. GFR  (NON AFRICAN AMERICAN): 46 ML/MIN/1.73 M^2
GLUCOSE SERPL-MCNC: 112 MG/DL (ref 70–110)
GLUCOSE SERPL-MCNC: 168 MG/DL (ref 70–110)
GLUCOSE UR QL STRIP: NEGATIVE
HCO3 UR-SCNC: 20.6 MMOL/L (ref 24–28)
HCT VFR BLD AUTO: 18.3 % (ref 37–48.5)
HCT VFR BLD AUTO: 31 % (ref 37–48.5)
HGB BLD-MCNC: 5.5 G/DL (ref 12–16)
HGB BLD-MCNC: 8.9 G/DL (ref 12–16)
HGB UR QL STRIP: NEGATIVE
HYPOCHROMIA BLD QL SMEAR: ABNORMAL
IMM GRANULOCYTES # BLD AUTO: 0.29 K/UL (ref 0–0.04)
IMM GRANULOCYTES # BLD AUTO: 0.6 K/UL (ref 0–0.04)
IMM GRANULOCYTES NFR BLD AUTO: 1.9 % (ref 0–0.5)
IMM GRANULOCYTES NFR BLD AUTO: 4.7 % (ref 0–0.5)
KETONES UR QL STRIP: NEGATIVE
LACTATE SERPL-SCNC: 2.5 MMOL/L (ref 0.5–2.2)
LACTATE SERPL-SCNC: 2.9 MMOL/L (ref 0.5–2.2)
LEUKOCYTE ESTERASE UR QL STRIP: ABNORMAL
LYMPHOCYTES # BLD AUTO: 1.9 K/UL (ref 1–4.8)
LYMPHOCYTES # BLD AUTO: 2.1 K/UL (ref 1–4.8)
LYMPHOCYTES NFR BLD: 12.3 % (ref 18–48)
LYMPHOCYTES NFR BLD: 16.9 % (ref 18–48)
MCH RBC QN AUTO: 28.8 PG (ref 27–31)
MCH RBC QN AUTO: 29 PG (ref 27–31)
MCHC RBC AUTO-ENTMCNC: 28.7 G/DL (ref 32–36)
MCHC RBC AUTO-ENTMCNC: 30.1 G/DL (ref 32–36)
MCV RBC AUTO: 101 FL (ref 82–98)
MCV RBC AUTO: 96 FL (ref 82–98)
MICROSCOPIC COMMENT: NORMAL
MONOCYTES # BLD AUTO: 1.3 K/UL (ref 0.3–1)
MONOCYTES # BLD AUTO: 3.9 K/UL (ref 0.3–1)
MONOCYTES NFR BLD: 10.3 % (ref 4–15)
MONOCYTES NFR BLD: 26.2 % (ref 4–15)
NEUTROPHILS # BLD AUTO: 8.5 K/UL (ref 1.8–7.7)
NEUTROPHILS # BLD AUTO: 8.8 K/UL (ref 1.8–7.7)
NEUTROPHILS NFR BLD: 58.5 % (ref 38–73)
NEUTROPHILS NFR BLD: 67.4 % (ref 38–73)
NITRITE UR QL STRIP: NEGATIVE
NRBC BLD-RTO: 0 /100 WBC
NRBC BLD-RTO: 0 /100 WBC
NUM UNITS TRANS PACKED RBC: NORMAL
NUM UNITS TRANS PACKED RBC: NORMAL
OVALOCYTES BLD QL SMEAR: ABNORMAL
PCO2 BLDA: 22.5 MMHG (ref 35–45)
PH SMN: 7.57 [PH] (ref 7.35–7.45)
PH UR STRIP: 6 [PH] (ref 5–8)
PLATELET # BLD AUTO: 201 K/UL (ref 150–450)
PLATELET # BLD AUTO: 341 K/UL (ref 150–450)
PLATELET BLD QL SMEAR: ABNORMAL
PLATELET BLD QL SMEAR: ABNORMAL
PMV BLD AUTO: 12.9 FL (ref 9.2–12.9)
PMV BLD AUTO: 13.2 FL (ref 9.2–12.9)
PO2 BLDA: 203 MMHG (ref 80–100)
POC BE: -1 MMOL/L
POC SATURATED O2: 100 % (ref 95–100)
POCT GLUCOSE: 117 MG/DL (ref 70–110)
POCT GLUCOSE: 123 MG/DL (ref 70–110)
POCT GLUCOSE: 84 MG/DL (ref 70–110)
POIKILOCYTOSIS BLD QL SMEAR: SLIGHT
POLYCHROMASIA BLD QL SMEAR: ABNORMAL
POTASSIUM SERPL-SCNC: 4.2 MMOL/L (ref 3.5–5.1)
POTASSIUM SERPL-SCNC: 4.2 MMOL/L (ref 3.5–5.1)
PROCALCITONIN SERPL IA-MCNC: 0.91 NG/ML
PROT SERPL-MCNC: 5.3 G/DL (ref 6–8.4)
PROT UR QL STRIP: NEGATIVE
RBC # BLD AUTO: 1.91 M/UL (ref 4–5.4)
RBC # BLD AUTO: 3.07 M/UL (ref 4–5.4)
SAMPLE: ABNORMAL
SARS-COV-2 RDRP RESP QL NAA+PROBE: NEGATIVE
SITE: ABNORMAL
SODIUM SERPL-SCNC: 150 MMOL/L (ref 136–145)
SODIUM SERPL-SCNC: 152 MMOL/L (ref 136–145)
SP GR UR STRIP: 1.02 (ref 1–1.03)
TROPONIN I SERPL DL<=0.01 NG/ML-MCNC: 0.08 NG/ML (ref 0–0.03)
URN SPEC COLLECT METH UR: ABNORMAL
UROBILINOGEN UR STRIP-ACNC: NEGATIVE EU/DL
WBC # BLD AUTO: 12.65 K/UL (ref 3.9–12.7)
WBC # BLD AUTO: 15.01 K/UL (ref 3.9–12.7)
WBC #/AREA URNS HPF: 2 /HPF (ref 0–5)

## 2022-03-31 PROCEDURE — 87040 BLOOD CULTURE FOR BACTERIA: CPT | Performed by: EMERGENCY MEDICINE

## 2022-03-31 PROCEDURE — 85025 COMPLETE CBC W/AUTO DIFF WBC: CPT | Mod: 91 | Performed by: INTERNAL MEDICINE

## 2022-03-31 PROCEDURE — 82803 BLOOD GASES ANY COMBINATION: CPT

## 2022-03-31 PROCEDURE — 36600 WITHDRAWAL OF ARTERIAL BLOOD: CPT

## 2022-03-31 PROCEDURE — 11000001 HC ACUTE MED/SURG PRIVATE ROOM

## 2022-03-31 PROCEDURE — 86920 COMPATIBILITY TEST SPIN: CPT | Performed by: NURSE PRACTITIONER

## 2022-03-31 PROCEDURE — 83605 ASSAY OF LACTIC ACID: CPT | Mod: 91 | Performed by: INTERNAL MEDICINE

## 2022-03-31 PROCEDURE — 93010 EKG 12-LEAD: ICD-10-PCS | Mod: ,,, | Performed by: INTERNAL MEDICINE

## 2022-03-31 PROCEDURE — 84145 PROCALCITONIN (PCT): CPT | Performed by: EMERGENCY MEDICINE

## 2022-03-31 PROCEDURE — 96361 HYDRATE IV INFUSION ADD-ON: CPT

## 2022-03-31 PROCEDURE — 25000003 PHARM REV CODE 250: Performed by: INTERNAL MEDICINE

## 2022-03-31 PROCEDURE — 36430 TRANSFUSION BLD/BLD COMPNT: CPT

## 2022-03-31 PROCEDURE — 96365 THER/PROPH/DIAG IV INF INIT: CPT

## 2022-03-31 PROCEDURE — 86920 COMPATIBILITY TEST SPIN: CPT | Performed by: INTERNAL MEDICINE

## 2022-03-31 PROCEDURE — 94640 AIRWAY INHALATION TREATMENT: CPT

## 2022-03-31 PROCEDURE — 80048 BASIC METABOLIC PNL TOTAL CA: CPT | Mod: XB | Performed by: INTERNAL MEDICINE

## 2022-03-31 PROCEDURE — 27000221 HC OXYGEN, UP TO 24 HOURS

## 2022-03-31 PROCEDURE — 80053 COMPREHEN METABOLIC PANEL: CPT | Performed by: EMERGENCY MEDICINE

## 2022-03-31 PROCEDURE — 63600175 PHARM REV CODE 636 W HCPCS: Performed by: EMERGENCY MEDICINE

## 2022-03-31 PROCEDURE — 21400001 HC TELEMETRY ROOM

## 2022-03-31 PROCEDURE — 99900035 HC TECH TIME PER 15 MIN (STAT)

## 2022-03-31 PROCEDURE — 93005 ELECTROCARDIOGRAM TRACING: CPT

## 2022-03-31 PROCEDURE — C9113 INJ PANTOPRAZOLE SODIUM, VIA: HCPCS | Performed by: INTERNAL MEDICINE

## 2022-03-31 PROCEDURE — 83605 ASSAY OF LACTIC ACID: CPT | Performed by: EMERGENCY MEDICINE

## 2022-03-31 PROCEDURE — 25000242 PHARM REV CODE 250 ALT 637 W/ HCPCS: Performed by: INTERNAL MEDICINE

## 2022-03-31 PROCEDURE — P9016 RBC LEUKOCYTES REDUCED: HCPCS | Performed by: EMERGENCY MEDICINE

## 2022-03-31 PROCEDURE — 93010 ELECTROCARDIOGRAM REPORT: CPT | Mod: ,,, | Performed by: INTERNAL MEDICINE

## 2022-03-31 PROCEDURE — 85025 COMPLETE CBC W/AUTO DIFF WBC: CPT | Performed by: EMERGENCY MEDICINE

## 2022-03-31 PROCEDURE — U0002 COVID-19 LAB TEST NON-CDC: HCPCS | Performed by: EMERGENCY MEDICINE

## 2022-03-31 PROCEDURE — 86901 BLOOD TYPING SEROLOGIC RH(D): CPT | Performed by: EMERGENCY MEDICINE

## 2022-03-31 PROCEDURE — 94761 N-INVAS EAR/PLS OXIMETRY MLT: CPT

## 2022-03-31 PROCEDURE — 81000 URINALYSIS NONAUTO W/SCOPE: CPT | Performed by: EMERGENCY MEDICINE

## 2022-03-31 PROCEDURE — 86920 COMPATIBILITY TEST SPIN: CPT | Performed by: EMERGENCY MEDICINE

## 2022-03-31 PROCEDURE — 51701 INSERT BLADDER CATHETER: CPT

## 2022-03-31 PROCEDURE — 84484 ASSAY OF TROPONIN QUANT: CPT | Performed by: EMERGENCY MEDICINE

## 2022-03-31 PROCEDURE — 63600175 PHARM REV CODE 636 W HCPCS: Performed by: INTERNAL MEDICINE

## 2022-03-31 PROCEDURE — 96366 THER/PROPH/DIAG IV INF ADDON: CPT

## 2022-03-31 PROCEDURE — 99291 CRITICAL CARE FIRST HOUR: CPT | Mod: 25

## 2022-03-31 RX ORDER — IPRATROPIUM BROMIDE 0.5 MG/2.5ML
500 SOLUTION RESPIRATORY (INHALATION) EVERY 6 HOURS
Status: DISCONTINUED | OUTPATIENT
Start: 2022-03-31 | End: 2022-04-04 | Stop reason: HOSPADM

## 2022-03-31 RX ORDER — INSULIN ASPART 100 [IU]/ML
0-5 INJECTION, SOLUTION INTRAVENOUS; SUBCUTANEOUS EVERY 6 HOURS PRN
Status: DISCONTINUED | OUTPATIENT
Start: 2022-03-31 | End: 2022-04-04 | Stop reason: HOSPADM

## 2022-03-31 RX ORDER — AMOXICILLIN 250 MG
1 CAPSULE ORAL 2 TIMES DAILY PRN
Status: DISCONTINUED | OUTPATIENT
Start: 2022-03-31 | End: 2022-04-04 | Stop reason: HOSPADM

## 2022-03-31 RX ORDER — PANTOPRAZOLE SODIUM 40 MG/10ML
40 INJECTION, POWDER, LYOPHILIZED, FOR SOLUTION INTRAVENOUS 2 TIMES DAILY
Status: DISCONTINUED | OUTPATIENT
Start: 2022-03-31 | End: 2022-03-31

## 2022-03-31 RX ORDER — METRONIDAZOLE 500 MG/1
500 TABLET ORAL EVERY 8 HOURS
Status: DISCONTINUED | OUTPATIENT
Start: 2022-03-31 | End: 2022-04-04 | Stop reason: HOSPADM

## 2022-03-31 RX ORDER — CLONAZEPAM 0.5 MG/1
0.5 TABLET ORAL 2 TIMES DAILY PRN
Status: DISCONTINUED | OUTPATIENT
Start: 2022-03-31 | End: 2022-04-04 | Stop reason: HOSPADM

## 2022-03-31 RX ORDER — ONDANSETRON 2 MG/ML
4 INJECTION INTRAMUSCULAR; INTRAVENOUS EVERY 8 HOURS PRN
Status: DISCONTINUED | OUTPATIENT
Start: 2022-03-31 | End: 2022-04-04 | Stop reason: HOSPADM

## 2022-03-31 RX ORDER — HYDROCODONE BITARTRATE AND ACETAMINOPHEN 500; 5 MG/1; MG/1
TABLET ORAL
Status: DISCONTINUED | OUTPATIENT
Start: 2022-03-31 | End: 2022-04-04 | Stop reason: HOSPADM

## 2022-03-31 RX ORDER — CARVEDILOL 3.12 MG/1
3.12 TABLET ORAL 2 TIMES DAILY
Status: DISCONTINUED | OUTPATIENT
Start: 2022-03-31 | End: 2022-04-04 | Stop reason: HOSPADM

## 2022-03-31 RX ORDER — ACETAMINOPHEN 325 MG/1
650 TABLET ORAL EVERY 4 HOURS PRN
Status: DISCONTINUED | OUTPATIENT
Start: 2022-03-31 | End: 2022-04-04 | Stop reason: HOSPADM

## 2022-03-31 RX ORDER — GLUCAGON 1 MG
1 KIT INJECTION
Status: DISCONTINUED | OUTPATIENT
Start: 2022-03-31 | End: 2022-04-04 | Stop reason: HOSPADM

## 2022-03-31 RX ORDER — SCOLOPAMINE TRANSDERMAL SYSTEM 1 MG/1
1 PATCH, EXTENDED RELEASE TRANSDERMAL
Status: DISCONTINUED | OUTPATIENT
Start: 2022-03-31 | End: 2022-04-04 | Stop reason: HOSPADM

## 2022-03-31 RX ORDER — SODIUM CHLORIDE 0.9 % (FLUSH) 0.9 %
10 SYRINGE (ML) INJECTION
Status: DISCONTINUED | OUTPATIENT
Start: 2022-03-31 | End: 2022-04-04 | Stop reason: HOSPADM

## 2022-03-31 RX ORDER — CARBIDOPA AND LEVODOPA 25; 100 MG/1; MG/1
2 TABLET ORAL 3 TIMES DAILY
Status: DISCONTINUED | OUTPATIENT
Start: 2022-03-31 | End: 2022-04-04 | Stop reason: HOSPADM

## 2022-03-31 RX ORDER — FAMOTIDINE 10 MG/ML
20 INJECTION INTRAVENOUS DAILY
Status: DISCONTINUED | OUTPATIENT
Start: 2022-03-31 | End: 2022-03-31

## 2022-03-31 RX ORDER — MUPIROCIN 20 MG/G
OINTMENT TOPICAL 2 TIMES DAILY
Status: DISCONTINUED | OUTPATIENT
Start: 2022-03-31 | End: 2022-04-04 | Stop reason: HOSPADM

## 2022-03-31 RX ORDER — DEXTROSE MONOHYDRATE 50 MG/ML
INJECTION, SOLUTION INTRAVENOUS CONTINUOUS
Status: ACTIVE | OUTPATIENT
Start: 2022-03-31 | End: 2022-04-01

## 2022-03-31 RX ORDER — PANTOPRAZOLE SODIUM 40 MG/10ML
40 INJECTION, POWDER, LYOPHILIZED, FOR SOLUTION INTRAVENOUS 2 TIMES DAILY
Status: DISCONTINUED | OUTPATIENT
Start: 2022-03-31 | End: 2022-04-03

## 2022-03-31 RX ADMIN — SODIUM CHLORIDE, SODIUM LACTATE, POTASSIUM CHLORIDE, AND CALCIUM CHLORIDE 1536 ML: .6; .31; .03; .02 INJECTION, SOLUTION INTRAVENOUS at 07:03

## 2022-03-31 RX ADMIN — METRONIDAZOLE 500 MG: 500 TABLET ORAL at 01:03

## 2022-03-31 RX ADMIN — PANTOPRAZOLE SODIUM 40 MG: 40 INJECTION, POWDER, FOR SOLUTION INTRAVENOUS at 04:03

## 2022-03-31 RX ADMIN — METRONIDAZOLE 500 MG: 500 TABLET ORAL at 09:03

## 2022-03-31 RX ADMIN — MUPIROCIN: 20 OINTMENT TOPICAL at 10:03

## 2022-03-31 RX ADMIN — PANTOPRAZOLE SODIUM 40 MG: 40 INJECTION, POWDER, FOR SOLUTION INTRAVENOUS at 08:03

## 2022-03-31 RX ADMIN — SCOPOLAMINE 1 PATCH: 1.5 PATCH, EXTENDED RELEASE TRANSDERMAL at 10:03

## 2022-03-31 RX ADMIN — SODIUM CHLORIDE, SODIUM LACTATE, POTASSIUM CHLORIDE, AND CALCIUM CHLORIDE 1000 ML: .6; .31; .03; .02 INJECTION, SOLUTION INTRAVENOUS at 06:03

## 2022-03-31 RX ADMIN — CARBIDOPA AND LEVODOPA 2 TABLET: 25; 100 TABLET ORAL at 08:03

## 2022-03-31 RX ADMIN — MUPIROCIN: 20 OINTMENT TOPICAL at 09:03

## 2022-03-31 RX ADMIN — CARVEDILOL 3.12 MG: 3.12 TABLET, FILM COATED ORAL at 08:03

## 2022-03-31 RX ADMIN — IPRATROPIUM BROMIDE 500 MCG: 0.5 SOLUTION RESPIRATORY (INHALATION) at 08:03

## 2022-03-31 RX ADMIN — DEXTROSE: 5 SOLUTION INTRAVENOUS at 05:03

## 2022-03-31 RX ADMIN — CEFTRIAXONE 1 G: 1 INJECTION, SOLUTION INTRAVENOUS at 07:03

## 2022-03-31 RX ADMIN — DEXTROSE: 5 SOLUTION INTRAVENOUS at 10:03

## 2022-03-31 RX ADMIN — FAMOTIDINE 20 MG: 10 INJECTION, SOLUTION INTRAVENOUS at 10:03

## 2022-03-31 NOTE — ASSESSMENT & PLAN NOTE
- GI bleed pathway initiated   -Hold ASA and Plavix   -Protonix 40 mg IV q12h  -NPO   -P-RBC transfusion for Hgb 7 or under   -Consult GI for further eval

## 2022-03-31 NOTE — H&P
[facility] Medicine  History & Physical    Patient Name: Tanya Madrid  MRN: [id number]  Patient Class: IP- Inpatient  Admission Date: 3/31/2022  Attending Physician: Tim Reilly MD  Primary Care Provider: Maggie Sue NP         Patient information was obtained from relative(s), past medical records and ER records.     Subjective:     Principal Problem:GI bleed    Chief Complaint:   Chief Complaint   Patient presents with    Altered Mental Status        HPI: The pt is 83 yo female with recent h/o out of hospital Cardiac arrest and PMHx of combined systolic and diastolic HF(EF 10 to 25%) , HTN, Parkinson's disease , Prior CVA who was just discharged on 3/29/22 after being in the hospital for 19 days following cardiac arrest , brought back to the hospital by ambulance after pt was found this morning by her daughter  mouth covered with black emesis as well as black stool and poorly responsive. Pt is non verbal since cardiac arrest and history gathered from daughter who reports stool was brown yesterday . Pt is on ASA and Plavix treatment for h/o CVA.     In the ED vitals - 96/55, 119, 24,  97.4,100% on 3L/min NC  Hgb 5.5 was 8.6 on 3/25/22, Pl 341, Na 150, CO2 16, BUN 76, Cr 1.2, Troponin 0.082, LA 2.9, Procalcitonin 0.91, UA unremarkable   CXR- mild hazy infiltrate at the right lung base  CT head- no acute changes     Pt will be admitted under Hospital Medicine service for further evaluation and management of acute blood anemia likely due to GI bleed , Sepsis due to aspiration pneumonitis or pneumonia. Code status - DNR, SDM- daughter Adenike Lemus [phone]      Past Medical History:   Diagnosis Date    Acute CHF (congestive heart failure) 1/17/2021    Hyperlipemia     Hypertension     Parkinson's disease     Stroke 2016    Throat mass        Past Surgical History:   Procedure Laterality Date    CLOSED REDUCTION Right 10/15/2020    Procedure: CLOSED REDUCTION;  Surgeon:  Humberto Valdivia DO;  Location: [facility] OR;  Service: Orthopedics;  Laterality: Right;  ATTEMPTED    EVACUATION OF HEMATOMA Right 10/17/2020    Procedure: EVACUATION, HEMATOMA;  Surgeon: Humberto Valdivia DO;  Location: [facility] OR;  Service: Orthopedics;  Laterality: Right;    HEMIARTHROPLASTY OF HIP Left 7/12/2020    Procedure: HEMIARTHROPLASTY, HIP;  Surgeon: Humberto Valdivia DO;  Location: [facility] OR;  Service: Orthopedics;  Laterality: Left;    HEMIARTHROPLASTY OF HIP Right 10/2/2020    Procedure: HEMIARTHROPLASTY, HIP;  Surgeon: Loyd Kearney MD;  Location: [facility] OR;  Service: Orthopedics;  Laterality: Right;    HYSTERECTOMY      OPEN REDUCTION OF DISLOCATION OF HIP Right 10/17/2020    Procedure: OPEN REDUCTION, DISLOCATION, HIP;  Surgeon: Humberto Valdivia DO;  Location: [facility] OR;  Service: Orthopedics;  Laterality: Right;    THROAT SURGERY      TONSILLECTOMY         Review of patient's allergies indicates:   Allergen Reactions    Xanax [alprazolam]        No current facility-administered medications on file prior to encounter.     Current Outpatient Medications on File Prior to Encounter   Medication Sig    aspirin 81 MG Chew 1 tablet (81 mg total) by Per G Tube route once daily.    carbidopa-levodopa  mg (SINEMET)  mg per tablet 2 tablets by Per G Tube route 3 (three) times daily.    carvediloL (COREG) 6.25 MG tablet 1 tablet (6.25 mg total) by Per G Tube route 2 (two) times daily.    clonazePAM (KLONOPIN) 0.5 MG tablet Take 0.5 mg by mouth 2 (two) times daily as needed for Anxiety.    clopidogreL (PLAVIX) 75 mg tablet 1 tablet (75 mg total) by Per G Tube route once daily.    famotidine (PEPCID AC) 20 MG tablet 1 tablet (20 mg total) by Per G Tube route once daily.    furosemide (LASIX) 20 MG tablet USE  IT ONLY AS NEEDED FOR FLUID RETENTION    ipratropium (ATROVENT) 0.02 % nebulizer solution Take 2.5 mLs (500 mcg total) by nebulization 2 (two) times daily. Rescue    polyethylene  glycol (GLYCOLAX) 17 gram/dose powder 17 g by Per NG tube route once daily. FOR CONSTIPATION    sacubitriL-valsartan (ENTRESTO) 49-51 mg per tablet Take 1 tablet by mouth 2 (two) times daily. HOLD FOR NOW DUE TO LOW BLOOD PRESSURE    scopolamine (TRANSDERM-SCOP) 1.3-1.5 mg (1 mg over 3 days) Place 1 patch onto the skin Every 3 (three) days.    [DISCONTINUED] losartan (COZAAR) 50 MG tablet Take 1 tablet (50 mg total) by mouth 2 (two) times a day.    [DISCONTINUED] metoprolol succinate (TOPROL-XL) 50 MG 24 hr tablet Take 1 tablet (50 mg total) by mouth 2 (two) times daily.     Family History    None       Tobacco Use    Smoking status: Never Smoker    Smokeless tobacco: Never Used   Substance and Sexual Activity    Alcohol use: No    Drug use: No    Sexual activity: Not Currently     Review of Systems   Unable to perform ROS: Patient nonverbal   Objective:     Vital Signs (Most Recent):  Temp: 97 °F (36.1 °C) (03/31/22 1516)  Pulse: 77 (03/31/22 1516)  Resp: 16 (03/31/22 1516)  BP: 123/64 (03/31/22 1516)  SpO2: 99 % (03/31/22 1516) Vital Signs (24h Range):  Temp:  [97 °F (36.1 °C)-98.4 °F (36.9 °C)] 97 °F (36.1 °C)  Pulse:  [] 77  Resp:  [14-24] 16  SpO2:  [98 %-100 %] 99 %  BP: ()/(52-88) 123/64     Weight: 49.9 kg (110 lb)  Body mass index is 20.12 kg/m².    Physical Exam  Constitutional:       General: She is not in acute distress.     Appearance: She is well-developed. She is ill-appearing. She is not diaphoretic.      Comments: Awake , non verbal , does not follow commands    HENT:      Head: Normocephalic and atraumatic.      Mouth/Throat:      Mouth: Mucous membranes are dry.      Pharynx: No oropharyngeal exudate.   Eyes:      Extraocular Movements: EOM normal.      Conjunctiva/sclera: Conjunctivae normal.      Pupils: Pupils are equal, round, and reactive to light.   Neck:      Thyroid: No thyromegaly.      Vascular: No JVD.   Cardiovascular:      Rate and Rhythm: Normal rate and regular  rhythm.      Heart sounds: Normal heart sounds. No murmur heard.  Pulmonary:      Effort: Pulmonary effort is normal. No respiratory distress.      Breath sounds: No wheezing or rales.      Comments: Decreased breaths sounds ignacio due to poor inspiratory effort   Chest:      Chest wall: No tenderness.   Abdominal:      General: Bowel sounds are normal. There is no distension.      Palpations: Abdomen is soft.      Tenderness: There is no abdominal tenderness. There is no guarding or rebound.      Comments: PEG tube in palce   Musculoskeletal:         General: Normal range of motion.      Cervical back: Normal range of motion and neck supple.      Right lower leg: No edema.      Left lower leg: No edema.   Lymphadenopathy:      Cervical: No cervical adenopathy.   Skin:     General: Skin is warm and dry.      Findings: No rash.   Neurological:      Cranial Nerves: No cranial nerve deficit.      Sensory: No sensory deficit.      Motor: Weakness present.      Deep Tendon Reflexes: Reflexes normal.      Comments: Awake , non verbal, does not follow commands          CRANIAL NERVES     CN III, IV, VI   Pupils are equal, round, and reactive to light.  Extraocular motions are normal.      Significant Labs:   Results for orders placed or performed during the hospital encounter of 03/31/22   CBC auto differential   Result Value Ref Range    WBC 12.65 3.90 - 12.70 K/uL    RBC 1.91 (L) 4.00 - 5.40 M/uL    Hemoglobin 5.5 (LL) 12.0 - 16.0 g/dL    Hematocrit 18.3 (LL) 37.0 - 48.5 %    MCV 96 82 - 98 fL    MCH 28.8 27.0 - 31.0 pg    MCHC 30.1 (L) 32.0 - 36.0 g/dL    RDW 17.3 (H) 11.5 - 14.5 %    Platelets 341 150 - 450 K/uL    MPV 12.9 9.2 - 12.9 fL    Immature Granulocytes 4.7 (H) 0.0 - 0.5 %    Gran # (ANC) 8.5 (H) 1.8 - 7.7 K/uL    Immature Grans (Abs) 0.60 (H) 0.00 - 0.04 K/uL    Lymph # 2.1 1.0 - 4.8 K/uL    Mono # 1.3 (H) 0.3 - 1.0 K/uL    Eos # 0.1 0.0 - 0.5 K/uL    Baso # 0.02 0.00 - 0.20 K/uL    nRBC 0 0 /100 WBC    Gran %  67.4 38.0 - 73.0 %    Lymph % 16.9 (L) 18.0 - 48.0 %    Mono % 10.3 4.0 - 15.0 %    Eosinophil % 0.5 0.0 - 8.0 %    Basophil % 0.2 0.0 - 1.9 %    Platelet Estimate Appears normal     Aniso Slight     Poik Slight     Poly Occasional     Hypo Moderate     Ovalocytes Occasional     Nicki Cells Moderate     Acanthocytes Present     Differential Method Automated    Comprehensive metabolic panel   Result Value Ref Range    Sodium 150 (H) 136 - 145 mmol/L    Potassium 4.2 3.5 - 5.1 mmol/L    Chloride 120 (H) 95 - 110 mmol/L    CO2 16 (L) 23 - 29 mmol/L    Glucose 168 (H) 70 - 110 mg/dL    BUN 76 (H) 8 - 23 mg/dL    Creatinine 1.2 0.5 - 1.4 mg/dL    Calcium 8.0 (L) 8.7 - 10.5 mg/dL    Total Protein 5.3 (L) 6.0 - 8.4 g/dL    Albumin 2.5 (L) 3.5 - 5.2 g/dL    Total Bilirubin 0.2 0.1 - 1.0 mg/dL    Alkaline Phosphatase 131 55 - 135 U/L    AST 72 (H) 10 - 40 U/L    ALT 25 10 - 44 U/L    Anion Gap 14 8 - 16 mmol/L    eGFR if African American 48 (A) >60 mL/min/1.73 m^2    eGFR if non African American 42 (A) >60 mL/min/1.73 m^2   Lactic acid, plasma #1   Result Value Ref Range    Lactate (Lactic Acid) 2.9 (H) 0.5 - 2.2 mmol/L   Urinalysis, Reflex to Urine Culture Urine, Clean Catch    Specimen: Urine   Result Value Ref Range    Specimen UA Urine, Catheterized     Color, UA Yellow Yellow, Straw, Kassi    Appearance, UA Clear Clear    pH, UA 6.0 5.0 - 8.0    Specific Gravity, UA 1.020 1.005 - 1.030    Protein, UA Negative Negative    Glucose, UA Negative Negative    Ketones, UA Negative Negative    Bilirubin (UA) Negative Negative    Occult Blood UA Negative Negative    Nitrite, UA Negative Negative    Urobilinogen, UA Negative <2.0 EU/dL    Leukocytes, UA 1+ (A) Negative   Troponin I   Result Value Ref Range    Troponin I 0.082 (H) 0.000 - 0.026 ng/mL   Procalcitonin   Result Value Ref Range    Procalcitonin 0.91 (H) <0.25 ng/mL   Urinalysis Microscopic   Result Value Ref Range    WBC, UA 2 0 - 5 /hpf    Microscopic Comment SEE  COMMENT    POCT COVID-19 Rapid Screening   Result Value Ref Range    POC Rapid COVID Negative Negative     Acceptable Yes    Type & Screen   Result Value Ref Range    Group & Rh B POS     Indirect [place] NEG    ISTAT PROCEDURE   Result Value Ref Range    POC PH 7.569 (HH) 7.35 - 7.45    POC PCO2 22.5 (LL) 35 - 45 mmHg    POC PO2 203 (H) 80 - 100 mmHg    POC HCO3 20.6 (L) 24 - 28 mmol/L    POC BE -1 -2 to 2 mmol/L    POC SATURATED O2 100 95 - 100 %    Sample ARTERIAL     Site RB     Allens Test N/A     DelSys Nasal Can    POCT glucose   Result Value Ref Range    POCT Glucose 117 (H) 70 - 110 mg/dL   Prepare RBC 2 Units; severe anemia   Result Value Ref Range    UNIT NUMBER [id number]     Product Code Q3936D31     DISPENSE STATUS ISSUED     CODING SYSTEM [id number]     Unit Blood Type Code 7300     Unit Blood Type B POS     Unit Expiration [id number]     UNIT NUMBER [id number]     Product Code F7357O62     DISPENSE STATUS ISSUED     CODING SYSTEM [id number]     Unit Blood Type Code 7300     Unit Blood Type B POS     Unit Expiration [id number]          Significant Imaging:   Imaging Results              CT Head Without Contrast (Final result)  Result time 03/31/22 08:47:31      Final result by Miky Healy MD (03/31/22 08:47:31)                   Impression:      1. No acute intracranial findings.  2. Atrophy and chronic ischemic change.  3. Chronic mucosal thickening with complete opacification right sphenoid sinus.  Severe bony thickening with dehiscence of the posterior wall.  Cell otherwise normal.  All CT scans at this facility are performed  using dose modulation techniques as appropriate to performed exam including the following:  automated exposure control; adjustment of mA and/or kV according to the patients size (this includes techniques or standardized protocols for targeted exams where dose is matched to indication/reason for exam: i.e. extremities or head);  iterative  reconstruction technique.      Electronically signed by: Miky Healy  Date:    03/31/2022  Time:    08:47               Narrative:    EXAMINATION:  CT HEAD WITHOUT CONTRAST    CLINICAL HISTORY:  Mental status change, unknown cause;.    TECHNIQUE:  Low dose axial images were obtained through the head.  Coronal and sagittal reformations were also performed. Contrast was not administered.    COMPARISON:  03/18/2022    FINDINGS:  Midline structures are intact. Atrophy with diffuse prominence of ventricular system and cortical sulci.  Low-density change in periventricular deep white matter compatible chronic ischemic microvascular disease.    No intracranial hemorrhage,acute infarct, or suspicious intracranial lesion is identified.    Skull base and calvarium are normal.  Chronic mucosal thickening inferior maxillary sinuses.  Complete opacification right sphenoid sinus with dehiscence of the posterior wall, unchanged since the previous examination.  Sella is normal in appearance.                                       X-Ray Chest AP Portable (Final result)  Result time 03/31/22 08:54:57      Final result by Miky Healy MD (03/31/22 08:54:57)                   Impression:      1. Mild hazy infiltrate at the right lung base.  No significant change since prior exam.  2. Malpositioned nasogastric tube has been removed.      Electronically signed by: Miky Healy  Date:    03/31/2022  Time:    08:54               Narrative:    EXAMINATION:  XR CHEST AP PORTABLE    CLINICAL HISTORY:  Sepsis;    COMPARISON:  03/04/2022    FINDINGS:  Limited rotated exam.  Patient's head obscures the right lung apex.  Hazy infiltrate at the right lung base is unchanged.Nasogastric tube previously within the right mainstem bronchus/right lower chest has been removed.  Cardiac silhouette at the upper limits of normal for size.  Mild vascular calcification of the aorta.  No significant bony findings.                                         Assessment/Plan:     * Acute GI bleed  - GI bleed pathway initiated   -Hold ASA and Plavix   -Protonix 40 mg IV q12h  -NPO   -P-RBC transfusion for Hgb 7 or under   -Consult GI for further eval       Acute blood loss anemia  - Likely secondary to upper GI bleed   -P-RBC transfusion x 2 unit to keep Hgb >7  -Monitor vitals   -Telemetry monitoring       Sepsis  Pt meets 2 SIRS criteria HR>90, RR>20  Lactic acid 2.9  Source -lower resp tract   This patient does have evidence of infective focus  My overall impression is sepsis. Vital signs were reviewed and noted in progress note.  Antibiotics given-   Antibiotics (From admission, onward)            Start     Stop Route Frequency Ordered    04/01/22 0900  cefTRIAXone (ROCEPHIN) 1 g/50 mL D5W IVPB         04/05 0859 IV Every 24 hours (non-standard times) 03/31/22 0928 03/31/22 1400  metroNIDAZOLE tablet 500 mg         04/05 1359 PER G TUBE Every 8 hours 03/31/22 0928 03/31/22 1045  mupirocin 2 % ointment         04/05 0859 Nasl 2 times daily 03/31/22 0934        Cultures were taken-   Microbiology Results (last 7 days)     Procedure Component Value Units Date/Time    Blood culture x two cultures. Draw prior to antibiotics. [[id number]] Collected: 03/31/22 0742    Order Status: Sent Specimen: Blood from Peripheral, Forearm, Left Updated: 03/31/22 1335    Blood culture x two cultures. Draw prior to antibiotics. [[id number]] Collected: 03/31/22 0728    Order Status: Sent Specimen: Blood from Peripheral, Antecubital, Right Updated: 03/31/22 1335        Latest lactate reviewed, they are-  Recent Labs   Lab 03/31/22  0634   LACTATE 2.9*       Organ dysfunction indicated by - AMS  Source- Lower respiratory tract     Source control Achieved by- Antibiotic treatment               Recent H/O Out of hospital Cardiac arrest  -Supportive treatment     GERD (gastroesophageal reflux disease)  - Continue PPI      Chronic combined systolic and diastolic heart  failure  - No signs of decompensation at this time   -Diuretics and Entresto have been on hold since last admission for Cardiac arrest due to CODY, marginal to normal BP  -Hold BB due to current hypotension and resume once appropriate       Parkinson disease  - Resume Home medicine via PEG tube       H/O Essential hypertension  - Pt was hypotensive on presentation   -Monitor BP trend   -Hold antihypertensive for now and resume once appropriate       Hypernatremia  - Free water deficit in the setting PEG tube dependency   -D5 infusion   -Monitor electrolytes         VTE Risk Mitigation (From admission, onward)         Ordered     Place FARSHAD hose  Until discontinued         03/31/22 0926     IP VTE HIGH RISK PATIENT  Once         03/31/22 0926     Place sequential compression device  Until discontinued         03/31/22 0926                   Tim Reilly MD  Department of Hospital Medicine   [facility] - Med Surg

## 2022-03-31 NOTE — ED NOTES
CLEANED AND CHANGED PTS LINENS. JERI NIBP CM AND PULSE OX MONITOR IN PLACE CALL BELL IN REACH SRX2 REPORT GIVEN TO TARUN ELLIS

## 2022-03-31 NOTE — NURSING
Received pt to unit, full skin assessment done revealed stage 2 to left buttock, applied skin barrier cream, mepilex left at bs for use later, pt's persistent BMs will not allow for proper placement.  LDA updated with wound, WOC and RD consult placed, SOS done.  MADHURI placed on bed,

## 2022-03-31 NOTE — ASSESSMENT & PLAN NOTE
- Pt was hypotensive on presentation   -Monitor BP trend   -Hold antihypertensive for now and resume once appropriate

## 2022-03-31 NOTE — ASSESSMENT & PLAN NOTE
Pt meets 2 SIRS criteria HR>90, RR>20  Lactic acid 2.9  Source -lower resp tract   This patient does have evidence of infective focus  My overall impression is sepsis. Vital signs were reviewed and noted in progress note.  Antibiotics given-   Antibiotics (From admission, onward)            Start     Stop Route Frequency Ordered    04/01/22 0900  cefTRIAXone (ROCEPHIN) 1 g/50 mL D5W IVPB         04/05 0859 IV Every 24 hours (non-standard times) 03/31/22 0928 03/31/22 1400  metroNIDAZOLE tablet 500 mg         04/05 1359 PER G TUBE Every 8 hours 03/31/22 0928 03/31/22 1045  mupirocin 2 % ointment         04/05 0859 Nasl 2 times daily 03/31/22 0934        Cultures were taken-   Microbiology Results (last 7 days)     Procedure Component Value Units Date/Time    Blood culture x two cultures. Draw prior to antibiotics. [[id number]] Collected: 03/31/22 0742    Order Status: Sent Specimen: Blood from Peripheral, Forearm, Left Updated: 03/31/22 1335    Blood culture x two cultures. Draw prior to antibiotics. [[id number]] Collected: 03/31/22 0728    Order Status: Sent Specimen: Blood from Peripheral, Antecubital, Right Updated: 03/31/22 1335        Latest lactate reviewed, they are-  Recent Labs   Lab 03/31/22  0634   LACTATE 2.9*       Organ dysfunction indicated by - AMS  Source- Lower respiratory tract     Source control Achieved by- Antibiotic treatment

## 2022-03-31 NOTE — ED NOTES
PT TO ED WITH GCS 7 AND NONVERBAL. PTS DAUGHTER STATES THIS IS PTS BASELINE SINCE RETURNING HOME FROM HOSPITAL Tuesday AFTER ADMITTED FROM POST CARDIAC ARREST. PTS DAUGHTER STATES SHE WENT TO BED AND WAS AT BASELINE AND WHEN SHE CHECKED ON HER THIS AM SHE WAS COVERED IN HER FECES, AND VOMITING BROWN/BLACK AND IT WAS ALSO COMING OUT OF NOSE. SUCTIONED NOSE AND MOUTH BROWN/BLACK THICK SECRETIONS NOTED. UNABLE TO GET PULSE OX. SKIN TEAR NOTED TO SACRAL AREA.  MD AT BEDSIDE TO EVAL PT. AWAITING NEW ORDERS. PT ARRIVED WITH [facility]ATSON CATH AND G TUBE PIV PLACED, EKG DONE AND REVIEWED BY MD. AWAITING RESP TO DRAW ABG. PT ON NIBP CM AND PUSLE OX MONITOR IN PLACE CALL BELL IN REACH SRX2 DAUGHTER AT PTS BEDSIDE.

## 2022-03-31 NOTE — ASSESSMENT & PLAN NOTE
- Likely secondary to upper GI bleed   -P-RBC transfusion x 2 unit to keep Hgb >7  -Monitor vitals   -Telemetry monitoring

## 2022-03-31 NOTE — HPI
The pt is 81 yo female with recent h/o out of hospital Cardiac arrest and PMHx of combined systolic and diastolic HF(EF 10 to 25%) , HTN, Parkinson's disease , Prior CVA who was just discharged on 3/29/22 after being in the hospital for 19 days following cardiac arrest , brought back to the hospital by ambulance after pt was found this morning by her daughter  mouth covered with black emesis as well as black stool and poorly responsive. Pt is non verbal since cardiac arrest and history gathered from daughter who reports stool was brown yesterday . Pt is on ASA and Plavix treatment for h/o CVA.     In the ED vitals - 96/55, 119, 24,  97.4,100% on 3L/min NC  Hgb 5.5 was 8.6 on 3/25/22, Pl 341, Na 150, CO2 16, BUN 76, Cr 1.2, Troponin 0.082, LA 2.9, Procalcitonin 0.91, UA unremarkable   CXR- mild hazy infiltrate at the right lung base  CT head- no acute changes     Pt will be admitted under Hospital Medicine service for further evaluation and management of acute blood anemia likely due to GI bleed , Sepsis due to aspiration pneumonitis or pneumonia. Code status - DNR, SDM- daughter Adenike Lemus [phone]

## 2022-03-31 NOTE — SUBJECTIVE & OBJECTIVE
Past Medical History:   Diagnosis Date    Acute CHF (congestive heart failure) 1/17/2021    Hyperlipemia     Hypertension     Parkinson's disease     Stroke 2016    Throat mass        Past Surgical History:   Procedure Laterality Date    CLOSED REDUCTION Right 10/15/2020    Procedure: CLOSED REDUCTION;  Surgeon: Humberto Valdivia DO;  Location: [facility] OR;  Service: Orthopedics;  Laterality: Right;  ATTEMPTED    EVACUATION OF HEMATOMA Right 10/17/2020    Procedure: EVACUATION, HEMATOMA;  Surgeon: Humberto Valdiiva DO;  Location: [facility] OR;  Service: Orthopedics;  Laterality: Right;    HEMIARTHROPLASTY OF HIP Left 7/12/2020    Procedure: HEMIARTHROPLASTY, HIP;  Surgeon: Humberto Valdivia DO;  Location: [facility] OR;  Service: Orthopedics;  Laterality: Left;    HEMIARTHROPLASTY OF HIP Right 10/2/2020    Procedure: HEMIARTHROPLASTY, HIP;  Surgeon: Loyd Kearney MD;  Location: [facility] OR;  Service: Orthopedics;  Laterality: Right;    HYSTERECTOMY      OPEN REDUCTION OF DISLOCATION OF HIP Right 10/17/2020    Procedure: OPEN REDUCTION, DISLOCATION, HIP;  Surgeon: Humberto Valdivia DO;  Location: [facility];  Service: Orthopedics;  Laterality: Right;    THROAT SURGERY      TONSILLECTOMY         Review of patient's allergies indicates:   Allergen Reactions    Xanax [alprazolam]        No current facility-administered medications on file prior to encounter.     Current Outpatient Medications on File Prior to Encounter   Medication Sig    aspirin 81 MG Chew 1 tablet (81 mg total) by Per G Tube route once daily.    carbidopa-levodopa  mg (SINEMET)  mg per tablet 2 tablets by Per G Tube route 3 (three) times daily.    carvediloL (COREG) 6.25 MG tablet 1 tablet (6.25 mg total) by Per G Tube route 2 (two) times daily.    clonazePAM (KLONOPIN) 0.5 MG tablet Take 0.5 mg by mouth 2 (two) times daily as needed for Anxiety.    clopidogreL (PLAVIX) 75 mg tablet 1 tablet (75 mg total) by Per G Tube route once daily.    famotidine  (PEPCID AC) 20 MG tablet 1 tablet (20 mg total) by Per G Tube route once daily.    furosemide (LASIX) 20 MG tablet USE  IT ONLY AS NEEDED FOR FLUID RETENTION    ipratropium (ATROVENT) 0.02 % nebulizer solution Take 2.5 mLs (500 mcg total) by nebulization 2 (two) times daily. Rescue    polyethylene glycol (GLYCOLAX) 17 gram/dose powder 17 g by Per NG tube route once daily. FOR CONSTIPATION    sacubitriL-valsartan (ENTRESTO) 49-51 mg per tablet Take 1 tablet by mouth 2 (two) times daily. HOLD FOR NOW DUE TO LOW BLOOD PRESSURE    scopolamine (TRANSDERM-SCOP) 1.3-1.5 mg (1 mg over 3 days) Place 1 patch onto the skin Every 3 (three) days.    [DISCONTINUED] losartan (COZAAR) 50 MG tablet Take 1 tablet (50 mg total) by mouth 2 (two) times a day.    [DISCONTINUED] metoprolol succinate (TOPROL-XL) 50 MG 24 hr tablet Take 1 tablet (50 mg total) by mouth 2 (two) times daily.     Family History    None       Tobacco Use    Smoking status: Never Smoker    Smokeless tobacco: Never Used   Substance and Sexual Activity    Alcohol use: No    Drug use: No    Sexual activity: Not Currently     Review of Systems   Unable to perform ROS: Patient nonverbal   Objective:     Vital Signs (Most Recent):  Temp: 97 °F (36.1 °C) (03/31/22 1516)  Pulse: 77 (03/31/22 1516)  Resp: 16 (03/31/22 1516)  BP: 123/64 (03/31/22 1516)  SpO2: 99 % (03/31/22 1516) Vital Signs (24h Range):  Temp:  [97 °F (36.1 °C)-98.4 °F (36.9 °C)] 97 °F (36.1 °C)  Pulse:  [] 77  Resp:  [14-24] 16  SpO2:  [98 %-100 %] 99 %  BP: ()/(52-88) 123/64     Weight: 49.9 kg (110 lb)  Body mass index is 20.12 kg/m².    Physical Exam  Constitutional:       General: She is not in acute distress.     Appearance: She is well-developed. She is ill-appearing. She is not diaphoretic.      Comments: Awake , non verbal , does not follow commands    HENT:      Head: Normocephalic and atraumatic.      Mouth/Throat:      Mouth: Mucous membranes are dry.      Pharynx: No oropharyngeal  exudate.   Eyes:      Extraocular Movements: EOM normal.      Conjunctiva/sclera: Conjunctivae normal.      Pupils: Pupils are equal, round, and reactive to light.   Neck:      Thyroid: No thyromegaly.      Vascular: No JVD.   Cardiovascular:      Rate and Rhythm: Normal rate and regular rhythm.      Heart sounds: Normal heart sounds. No murmur heard.  Pulmonary:      Effort: Pulmonary effort is normal. No respiratory distress.      Breath sounds: No wheezing or rales.      Comments: Decreased breaths sounds ignacio due to poor inspiratory effort   Chest:      Chest wall: No tenderness.   Abdominal:      General: Bowel sounds are normal. There is no distension.      Palpations: Abdomen is soft.      Tenderness: There is no abdominal tenderness. There is no guarding or rebound.      Comments: PEG tube in palce   Musculoskeletal:         General: Normal range of motion.      Cervical back: Normal range of motion and neck supple.      Right lower leg: No edema.      Left lower leg: No edema.   Lymphadenopathy:      Cervical: No cervical adenopathy.   Skin:     General: Skin is warm and dry.      Findings: No rash.   Neurological:      Cranial Nerves: No cranial nerve deficit.      Sensory: No sensory deficit.      Motor: Weakness present.      Deep Tendon Reflexes: Reflexes normal.      Comments: Awake , non verbal, does not follow commands          CRANIAL NERVES     CN III, IV, VI   Pupils are equal, round, and reactive to light.  Extraocular motions are normal.      Significant Labs:   Results for orders placed or performed during the hospital encounter of 03/31/22   CBC auto differential   Result Value Ref Range    WBC 12.65 3.90 - 12.70 K/uL    RBC 1.91 (L) 4.00 - 5.40 M/uL    Hemoglobin 5.5 (LL) 12.0 - 16.0 g/dL    Hematocrit 18.3 (LL) 37.0 - 48.5 %    MCV 96 82 - 98 fL    MCH 28.8 27.0 - 31.0 pg    MCHC 30.1 (L) 32.0 - 36.0 g/dL    RDW 17.3 (H) 11.5 - 14.5 %    Platelets 341 150 - 450 K/uL    MPV 12.9 9.2 - 12.9 fL     Immature Granulocytes 4.7 (H) 0.0 - 0.5 %    Gran # (ANC) 8.5 (H) 1.8 - 7.7 K/uL    Immature Grans (Abs) 0.60 (H) 0.00 - 0.04 K/uL    Lymph # 2.1 1.0 - 4.8 K/uL    Mono # 1.3 (H) 0.3 - 1.0 K/uL    Eos # 0.1 0.0 - 0.5 K/uL    Baso # 0.02 0.00 - 0.20 K/uL    nRBC 0 0 /100 WBC    Gran % 67.4 38.0 - 73.0 %    Lymph % 16.9 (L) 18.0 - 48.0 %    Mono % 10.3 4.0 - 15.0 %    Eosinophil % 0.5 0.0 - 8.0 %    Basophil % 0.2 0.0 - 1.9 %    Platelet Estimate Appears normal     Aniso Slight     Poik Slight     Poly Occasional     Hypo Moderate     Ovalocytes Occasional     Matlock Cells Moderate     Acanthocytes Present     Differential Method Automated    Comprehensive metabolic panel   Result Value Ref Range    Sodium 150 (H) 136 - 145 mmol/L    Potassium 4.2 3.5 - 5.1 mmol/L    Chloride 120 (H) 95 - 110 mmol/L    CO2 16 (L) 23 - 29 mmol/L    Glucose 168 (H) 70 - 110 mg/dL    BUN 76 (H) 8 - 23 mg/dL    Creatinine 1.2 0.5 - 1.4 mg/dL    Calcium 8.0 (L) 8.7 - 10.5 mg/dL    Total Protein 5.3 (L) 6.0 - 8.4 g/dL    Albumin 2.5 (L) 3.5 - 5.2 g/dL    Total Bilirubin 0.2 0.1 - 1.0 mg/dL    Alkaline Phosphatase 131 55 - 135 U/L    AST 72 (H) 10 - 40 U/L    ALT 25 10 - 44 U/L    Anion Gap 14 8 - 16 mmol/L    eGFR if African American 48 (A) >60 mL/min/1.73 m^2    eGFR if non African American 42 (A) >60 mL/min/1.73 m^2   Lactic acid, plasma #1   Result Value Ref Range    Lactate (Lactic Acid) 2.9 (H) 0.5 - 2.2 mmol/L   Urinalysis, Reflex to Urine Culture Urine, Clean Catch    Specimen: Urine   Result Value Ref Range    Specimen UA Urine, Catheterized     Color, UA Yellow Yellow, Straw, Kassi    Appearance, UA Clear Clear    pH, UA 6.0 5.0 - 8.0    Specific Gravity, UA 1.020 1.005 - 1.030    Protein, UA Negative Negative    Glucose, UA Negative Negative    Ketones, UA Negative Negative    Bilirubin (UA) Negative Negative    Occult Blood UA Negative Negative    Nitrite, UA Negative Negative    Urobilinogen, UA Negative <2.0 EU/dL     Leukocytes, UA 1+ (A) Negative   Troponin I   Result Value Ref Range    Troponin I 0.082 (H) 0.000 - 0.026 ng/mL   Procalcitonin   Result Value Ref Range    Procalcitonin 0.91 (H) <0.25 ng/mL   Urinalysis Microscopic   Result Value Ref Range    WBC, UA 2 0 - 5 /hpf    Microscopic Comment SEE COMMENT    POCT COVID-19 Rapid Screening   Result Value Ref Range    POC Rapid COVID Negative Negative     Acceptable Yes    Type & Screen   Result Value Ref Range    Group & Rh B POS     Indirect [place] NEG    ISTAT PROCEDURE   Result Value Ref Range    POC PH 7.569 (HH) 7.35 - 7.45    POC PCO2 22.5 (LL) 35 - 45 mmHg    POC PO2 203 (H) 80 - 100 mmHg    POC HCO3 20.6 (L) 24 - 28 mmol/L    POC BE -1 -2 to 2 mmol/L    POC SATURATED O2 100 95 - 100 %    Sample ARTERIAL     Site RB     Allens Test N/A     DelSys Nasal Can    POCT glucose   Result Value Ref Range    POCT Glucose 117 (H) 70 - 110 mg/dL   Prepare RBC 2 Units; severe anemia   Result Value Ref Range    UNIT NUMBER [id number]     Product Code F7567X38     DISPENSE STATUS ISSUED     CODING SYSTEM [id number]     Unit Blood Type Code 7300     Unit Blood Type B POS     Unit Expiration [id number]     UNIT NUMBER [id number]     Product Code [id number]     DISPENSE STATUS ISSUED     CODING SYSTEM [id number]     Unit Blood Type Code 7300     Unit Blood Type B POS     Unit Expiration [id number]          Significant Imaging:   Imaging Results              CT Head Without Contrast (Final result)  Result time 03/31/22 08:47:31      Final result by Miky Healy MD (03/31/22 08:47:31)                   Impression:      1. No acute intracranial findings.  2. Atrophy and chronic ischemic change.  3. Chronic mucosal thickening with complete opacification right sphenoid sinus.  Severe bony thickening with dehiscence of the posterior wall.  Cell otherwise normal.  All CT scans at this facility are performed  using dose modulation techniques as appropriate to  performed exam including the following:  automated exposure control; adjustment of mA and/or kV according to the patients size (this includes techniques or standardized protocols for targeted exams where dose is matched to indication/reason for exam: i.e. extremities or head);  iterative reconstruction technique.      Electronically signed by: Miky Healy  Date:    03/31/2022  Time:    08:47               Narrative:    EXAMINATION:  CT HEAD WITHOUT CONTRAST    CLINICAL HISTORY:  Mental status change, unknown cause;.    TECHNIQUE:  Low dose axial images were obtained through the head.  Coronal and sagittal reformations were also performed. Contrast was not administered.    COMPARISON:  03/18/2022    FINDINGS:  Midline structures are intact. Atrophy with diffuse prominence of ventricular system and cortical sulci.  Low-density change in periventricular deep white matter compatible chronic ischemic microvascular disease.    No intracranial hemorrhage,acute infarct, or suspicious intracranial lesion is identified.    Skull base and calvarium are normal.  Chronic mucosal thickening inferior maxillary sinuses.  Complete opacification right sphenoid sinus with dehiscence of the posterior wall, unchanged since the previous examination.  Sella is normal in appearance.                                       X-Ray Chest AP Portable (Final result)  Result time 03/31/22 08:54:57      Final result by Miky Healy MD (03/31/22 08:54:57)                   Impression:      1. Mild hazy infiltrate at the right lung base.  No significant change since prior exam.  2. Malpositioned nasogastric tube has been removed.      Electronically signed by: Miky Healy  Date:    03/31/2022  Time:    08:54               Narrative:    EXAMINATION:  XR CHEST AP PORTABLE    CLINICAL HISTORY:  Sepsis;    COMPARISON:  03/04/2022    FINDINGS:  Limited rotated exam.  Patient's head obscures the right lung apex.  Hazy infiltrate at  the right lung base is unchanged.Nasogastric tube previously within the right mainstem bronchus/right lower chest has been removed.  Cardiac silhouette at the upper limits of normal for size.  Mild vascular calcification of the aorta.  No significant bony findings.

## 2022-03-31 NOTE — CONSULTS
[facility] Surg  Wound Care    Patient Name:  Tanya Madrid   MRN:  [id number]  Date: 3/31/2022  Diagnosis: <principal problem not specified>    History:     Past Medical History:   Diagnosis Date    Acute CHF (congestive heart failure) 1/17/2021    Hyperlipemia     Hypertension     Parkinson's disease     Stroke 2016    Throat mass        Social History     Socioeconomic History    Marital status:    Tobacco Use    Smoking status: Never Smoker    Smokeless tobacco: Never Used   Substance and Sexual Activity    Alcohol use: No    Drug use: No    Sexual activity: Not Currently       Precautions:     Allergies as of 03/31/2022 - Reviewed 03/31/2022   Allergen Reaction Noted    Xanax [alprazolam]  10/14/2020       WO Assessment Details/Treatment     Consulted to this 82 year old female patient for present on admission wound. PMHx of CHF, HLD, HTN, Parkinson's disease, and stroke. She is sleeping, family at the bedside. Left buttock with quarter sized healing stage 3 pressure injury with partially moist red and yellow adipose wound bed. Moisture barrier in use. Bilateral heels intact with no redness. MADHURI pump in use with heel boots also in place. Will follow.         03/31/2022

## 2022-03-31 NOTE — ASSESSMENT & PLAN NOTE
- No signs of decompensation at this time   -Diuretics and Entresto have been on hold since last admission for Cardiac arrest due to CODY, marginal to normal BP  -Hold BB due to current hypotension and resume once appropriate

## 2022-03-31 NOTE — ED PROVIDER NOTES
SCRIBE #1 NOTE: I, Fabricio Lassiter, am scribing for, and in the presence of, Sorin Beatty MD. I have scribed the entire note.      History     Chief Complaint   Patient presents with    Altered Mental Status     Review of patient's allergies indicates:   Allergen Reactions    Xanax [alprazolam]          History of Present Illness     HPI    3/31/2022, 6:09 AM  History obtained from the patient's family and EMS. History limited by pt mental status.      History of Present Illness: Tanya Madrid is a 82 y.o. female patient with a PMHx of CHF, HLD, HTN, Parkinson's disease, and stroke, who presents to the Emergency Department for evaluation of AMS which onset suddenly this morning. Pt went into cardiac arrest 3 weeks ago. She was released from the hospital on Tuesday. Her daughter found her this morning covered vomiting and in an altered mental state. Pt was unable to be aroused by daughter. History limited by pt mental status.      Arrival mode: Ambulance Service    PCP: Maggie Sue NP        Past Medical History:  Past Medical History:   Diagnosis Date    Acute CHF (congestive heart failure) 1/17/2021    Hyperlipemia     Hypertension     Parkinson's disease     Stroke 2016    Throat mass        Past Surgical History:  Past Surgical History:   Procedure Laterality Date    CLOSED REDUCTION Right 10/15/2020    Procedure: CLOSED REDUCTION;  Surgeon: Humberto Valdivia DO;  Location: [facility] OR;  Service: Orthopedics;  Laterality: Right;  ATTEMPTED    EVACUATION OF HEMATOMA Right 10/17/2020    Procedure: EVACUATION, HEMATOMA;  Surgeon: Humberto Valdivia DO;  Location: [facility] OR;  Service: Orthopedics;  Laterality: Right;    HEMIARTHROPLASTY OF HIP Left 7/12/2020    Procedure: HEMIARTHROPLASTY, HIP;  Surgeon: Humberto Valdivia DO;  Location: [facility] OR;  Service: Orthopedics;  Laterality: Left;    HEMIARTHROPLASTY OF HIP Right 10/2/2020    Procedure: HEMIARTHROPLASTY, HIP;  Surgeon: Loyd Kearney MD;   Location: [facility] OR;  Service: Orthopedics;  Laterality: Right;    HYSTERECTOMY      OPEN REDUCTION OF DISLOCATION OF HIP Right 10/17/2020    Procedure: OPEN REDUCTION, DISLOCATION, HIP;  Surgeon: Humberto Valdivia DO;  Location: [facility] OR;  Service: Orthopedics;  Laterality: Right;    THROAT SURGERY      TONSILLECTOMY           Family History:  No family history on file.    Social History:  Social History     Tobacco Use    Smoking status: Never Smoker    Smokeless tobacco: Never Used   Substance and Sexual Activity    Alcohol use: No    Drug use: No    Sexual activity: Not Currently        Review of Systems     Review of Systems   Unable to perform ROS: Mental status change      Physical Exam     Initial Vitals   BP Pulse Resp Temp SpO2   03/31/22 0602 03/31/22 0602 03/31/22 0602 03/31/22 0720 03/31/22 0720   (!) 96/55 (!) 119 (!) 24 98 °F (36.7 °C) 100 %      MAP       --                 Physical Exam  Nursing Notes and Vital Signs Reviewed. Exam limited by pt condition.  Constitutional: Patient is in no acute distress.  Head: Atraumatic. Normocephalic.  Eyes: PERRL. EOM intact. Conjunctivae are not pale. No scleral icterus.  ENT: Mucous membranes are moist. Oropharynx is clear and symmetric.    Neck: Supple. Full ROM. No lymphadenopathy.  Cardiovascular: Regular rate. Regular rhythm. No murmurs, rubs, or gallops. Distal pulses are 2+ and symmetric.  Pulmonary/Chest: No respiratory distress. Clear to auscultation bilaterally. No wheezing or rales.  Abdominal: Soft and non-distended.  There is no tenderness.  No rebound, guarding, or rigidity. Good bowel sounds.  Genitourinary: No CVA tenderness  Musculoskeletal: Moves all extremities. No obvious deformities. No edema. No calf tenderness.  Skin: Warm and dry.  Neurological: No acute focal neurological deficits are appreciated.     ED Course   Critical Care    Date/Time: 3/31/2022 9:12 AM  Performed by: Sorin Beatty MD  Authorized by: Sorin Beatty,  MD   Direct patient critical care time: 64 minutes  Additional history critical care time: 8 minutes  Ordering / reviewing critical care time: 8 minutes  Documentation critical care time: 5 minutes  Consulting other physicians critical care time: 5 minutes  Total critical care time (exclusive of procedural time) : 90 minutes  Critical care time was exclusive of separately billable procedures and treating other patients and teaching time.  Critical care was necessary to treat or prevent imminent or life-threatening deterioration of the following conditions: AMS, anemia.  Critical care was time spent personally by me on the following activities: blood draw for specimens, development of treatment plan with patient or surrogate, discussions with consultants, interpretation of cardiac output measurements, evaluation of patient's response to treatment, examination of patient, obtaining history from patient or surrogate, ordering and performing treatments and interventions, ordering and review of laboratory studies, ordering and review of radiographic studies, pulse oximetry, re-evaluation of patient's condition and review of old charts.        ED Vital Signs:  Vitals:    03/31/22 0602 03/31/22 0611 03/31/22 0641 03/31/22 0700   BP: (!) 96/55  (!) 85/52 (!) 146/88   Pulse: (!) 119 (!) 121 (!) 111 104   Resp: (!) 24  (!) 24 18   Temp:       TempSrc:       SpO2:       Weight:   51.2 kg (112 lb 14 oz)     03/31/22 0720 03/31/22 0800 03/31/22 0900 03/31/22 0930   BP: 131/80 130/66 136/77 128/79   Pulse: 104 99 96 91   Resp: 14 18 (!) 23 19   Temp: 98 °F (36.7 °C)      TempSrc: Rectal      SpO2: 100% 100% 100% 100%   Weight:         Abnormal Lab Results:  Labs Reviewed   CBC W/ AUTO DIFFERENTIAL - Abnormal; Notable for the following components:       Result Value    RBC 1.91 (*)     Hemoglobin 5.5 (*)     Hematocrit 18.3 (*)     MCHC 30.1 (*)     RDW 17.3 (*)     Immature Granulocytes 4.7 (*)     Gran # (ANC) 8.5 (*)      Immature Grans (Abs) 0.60 (*)     Mono # 1.3 (*)     Lymph % 16.9 (*)     All other components within normal limits    Narrative:      HGB/HCT  critical result(s) called and verbal readback obtained from   RAMIRO BECKMAN RN  by LETI 03/31/2022 07:04   COMPREHENSIVE METABOLIC PANEL - Abnormal; Notable for the following components:    Sodium 150 (*)     Chloride 120 (*)     CO2 16 (*)     Glucose 168 (*)     BUN 76 (*)     Calcium 8.0 (*)     Total Protein 5.3 (*)     Albumin 2.5 (*)     AST 72 (*)     eGFR if  48 (*)     eGFR if non  42 (*)     All other components within normal limits   LACTIC ACID, PLASMA - Abnormal; Notable for the following components:    Lactate (Lactic Acid) 2.9 (*)     All other components within normal limits   URINALYSIS, REFLEX TO URINE CULTURE - Abnormal; Notable for the following components:    Leukocytes, UA 1+ (*)     All other components within normal limits    Narrative:     Specimen Source->Urine   TROPONIN I - Abnormal; Notable for the following components:    Troponin I 0.082 (*)     All other components within normal limits   PROCALCITONIN - Abnormal; Notable for the following components:    Procalcitonin 0.91 (*)     All other components within normal limits   ISTAT PROCEDURE - Abnormal; Notable for the following components:    POC PH 7.569 (*)     POC PCO2 22.5 (*)     POC PO2 203 (*)     POC HCO3 20.6 (*)     All other components within normal limits   CULTURE, BLOOD   CULTURE, BLOOD   URINALYSIS MICROSCOPIC    Narrative:     Specimen Source->Urine   B-TYPE NATRIURETIC PEPTIDE   LACTIC ACID, PLASMA   URINALYSIS, REFLEX TO URINE CULTURE   SARS-COV-2 RDRP GENE    Narrative:     This test utilizes isothermal nucleic acid amplification   technology to detect the SARS-CoV-2 RdRp nucleic acid segment.   The analytical sensitivity (limit of detection) is 125 genome   equivalents/mL.   A POSITIVE result implies infection with the SARS-CoV-2 virus;    the patient is presumed to be contagious.     A NEGATIVE result means that SARS-CoV-2 nucleic acids are not   present above the limit of detection. A NEGATIVE result should be   treated as presumptive. It does not rule out the possibility of   COVID-19 and should not be the sole basis for treatment decisions.   If COVID-19 is strongly suspected based on clinical and exposure   history, re-testing using an alternate molecular assay should be   considered.   This test is only for use under the Food and Drug   Administration s Emergency Use Authorization (EUA).   Commercial kits are provided by [employer].   Performance characteristics of the EUA have been independently   verified by [facility] Department of   Pathology and Laboratory Medicine.   _________________________________________________________________   The authorized Fact Sheet for Healthcare Providers and the authorized Fact   Sheet for Patients of the ID NOW COVID-19 are available on the FDA   website:     https://www.fda.gov/media/208135/download  https://www.fda.gov/media/916114/download           TYPE & SCREEN   POCT GLUCOSE MONITORING CONTINUOUS   PREPARE RBC SOFT      All Lab Results:  Results for orders placed or performed during the hospital encounter of 03/31/22   CBC auto differential   Result Value Ref Range    WBC 12.65 3.90 - 12.70 K/uL    RBC 1.91 (L) 4.00 - 5.40 M/uL    Hemoglobin 5.5 (LL) 12.0 - 16.0 g/dL    Hematocrit 18.3 (LL) 37.0 - 48.5 %    MCV 96 82 - 98 fL    MCH 28.8 27.0 - 31.0 pg    MCHC 30.1 (L) 32.0 - 36.0 g/dL    RDW 17.3 (H) 11.5 - 14.5 %    Platelets 341 150 - 450 K/uL    MPV 12.9 9.2 - 12.9 fL    Immature Granulocytes 4.7 (H) 0.0 - 0.5 %    Gran # (ANC) 8.5 (H) 1.8 - 7.7 K/uL    Immature Grans (Abs) 0.60 (H) 0.00 - 0.04 K/uL    Lymph # 2.1 1.0 - 4.8 K/uL    Mono # 1.3 (H) 0.3 - 1.0 K/uL    Eos # 0.1 0.0 - 0.5 K/uL    Baso # 0.02 0.00 - 0.20 K/uL    nRBC 0 0 /100 WBC    Gran % 67.4 38.0 - 73.0 %    Lymph  % 16.9 (L) 18.0 - 48.0 %    Mono % 10.3 4.0 - 15.0 %    Eosinophil % 0.5 0.0 - 8.0 %    Basophil % 0.2 0.0 - 1.9 %    Platelet Estimate Appears normal     Aniso Slight     Poik Slight     Poly Occasional     Hypo Moderate     Ovalocytes Occasional     [place] Cells Moderate     Acanthocytes Present     Differential Method Automated    Comprehensive metabolic panel   Result Value Ref Range    Sodium 150 (H) 136 - 145 mmol/L    Potassium 4.2 3.5 - 5.1 mmol/L    Chloride 120 (H) 95 - 110 mmol/L    CO2 16 (L) 23 - 29 mmol/L    Glucose 168 (H) 70 - 110 mg/dL    BUN 76 (H) 8 - 23 mg/dL    Creatinine 1.2 0.5 - 1.4 mg/dL    Calcium 8.0 (L) 8.7 - 10.5 mg/dL    Total Protein 5.3 (L) 6.0 - 8.4 g/dL    Albumin 2.5 (L) 3.5 - 5.2 g/dL    Total Bilirubin 0.2 0.1 - 1.0 mg/dL    Alkaline Phosphatase 131 55 - 135 U/L    AST 72 (H) 10 - 40 U/L    ALT 25 10 - 44 U/L    Anion Gap 14 8 - 16 mmol/L    eGFR if African American 48 (A) >60 mL/min/1.73 m^2    eGFR if non African American 42 (A) >60 mL/min/1.73 m^2   Lactic acid, plasma #1   Result Value Ref Range    Lactate (Lactic Acid) 2.9 (H) 0.5 - 2.2 mmol/L   Urinalysis, Reflex to Urine Culture Urine, Clean Catch    Specimen: Urine   Result Value Ref Range    Specimen UA Urine, Catheterized     Color, UA Yellow Yellow, Straw, Kassi    Appearance, UA Clear Clear    pH, UA 6.0 5.0 - 8.0    Specific Gravity, UA 1.020 1.005 - 1.030    Protein, UA Negative Negative    Glucose, UA Negative Negative    Ketones, UA Negative Negative    Bilirubin (UA) Negative Negative    Occult Blood UA Negative Negative    Nitrite, UA Negative Negative    Urobilinogen, UA Negative <2.0 EU/dL    Leukocytes, UA 1+ (A) Negative   Troponin I   Result Value Ref Range    Troponin I 0.082 (H) 0.000 - 0.026 ng/mL   Procalcitonin   Result Value Ref Range    Procalcitonin 0.91 (H) <0.25 ng/mL   Urinalysis Microscopic   Result Value Ref Range    WBC, UA 2 0 - 5 /hpf    Microscopic Comment SEE COMMENT    POCT COVID-19 Rapid  Screening   Result Value Ref Range    POC Rapid COVID Negative Negative     Acceptable Yes    ISTAT PROCEDURE   Result Value Ref Range    POC PH 7.569 (HH) 7.35 - 7.45    POC PCO2 22.5 (LL) 35 - 45 mmHg    POC PO2 203 (H) 80 - 100 mmHg    POC HCO3 20.6 (L) 24 - 28 mmol/L    POC BE -1 -2 to 2 mmol/L    POC SATURATED O2 100 95 - 100 %    Sample ARTERIAL     Site RB     Allens Test N/A     DelSys Nasal Can      Imaging Results:  Imaging Results          CT Head Without Contrast (Final result)  Result time 03/31/22 08:47:31    Final result by Miky Healy MD (03/31/22 08:47:31)                 Impression:      1. No acute intracranial findings.  2. Atrophy and chronic ischemic change.  3. Chronic mucosal thickening with complete opacification right sphenoid sinus.  Severe bony thickening with dehiscence of the posterior wall.  Cell otherwise normal.  All CT scans at this facility are performed  using dose modulation techniques as appropriate to performed exam including the following:  automated exposure control; adjustment of mA and/or kV according to the patients size (this includes techniques or standardized protocols for targeted exams where dose is matched to indication/reason for exam: i.e. extremities or head);  iterative reconstruction technique.      Electronically signed by: Miky Healy  Date:    03/31/2022  Time:    08:47             Narrative:    EXAMINATION:  CT HEAD WITHOUT CONTRAST    CLINICAL HISTORY:  Mental status change, unknown cause;.    TECHNIQUE:  Low dose axial images were obtained through the head.  Coronal and sagittal reformations were also performed. Contrast was not administered.    COMPARISON:  03/18/2022    FINDINGS:  Midline structures are intact. Atrophy with diffuse prominence of ventricular system and cortical sulci.  Low-density change in periventricular deep white matter compatible chronic ischemic microvascular disease.    No intracranial  hemorrhage,acute infarct, or suspicious intracranial lesion is identified.    Skull base and calvarium are normal.  Chronic mucosal thickening inferior maxillary sinuses.  Complete opacification right sphenoid sinus with dehiscence of the posterior wall, unchanged since the previous examination.  Sella is normal in appearance.                               X-Ray Chest AP Portable (Final result)  Result time 03/31/22 08:54:57    Final result by Miky Healy MD (03/31/22 08:54:57)                 Impression:      1. Mild hazy infiltrate at the right lung base.  No significant change since prior exam.  2. Malpositioned nasogastric tube has been removed.      Electronically signed by: Miky Healy  Date:    03/31/2022  Time:    08:54             Narrative:    EXAMINATION:  XR CHEST AP PORTABLE    CLINICAL HISTORY:  Sepsis;    COMPARISON:  03/04/2022    FINDINGS:  Limited rotated exam.  Patient's head obscures the right lung apex.  Hazy infiltrate at the right lung base is unchanged.Nasogastric tube previously within the right mainstem bronchus/right lower chest has been removed.  Cardiac silhouette at the upper limits of normal for size.  Mild vascular calcification of the aorta.  No significant bony findings.                               The EKG was ordered, reviewed, and independently interpreted by the ED provider.  Interpretation time: 06:45  Rate: 113 BPM  Rhythm: Sinus tachycardia with premature atrial complexes with aberrant conduction.  Interpretation: Left axis deviation. Nonspecific T wave abnormality. No STEMI.           The Emergency Provider reviewed the vital signs and test results, which are outlined above.     ED Discussion     6:16 AM: Pt is DNR per discussion with pt's daughter.    7:05 AM: Discussed need for blood transfusion with patient's family. Family supports transfusion.    9:06 AM: Discussed case with Jeffrey Kramer NP (Hospital Medicine). Dr. Reilly agrees with current care  and management of pt and accepts admission.   Admitting Service: Hospital Medicine  Admitting Physician: Dr. Reilly  Admit to: Inpatient Med Surg    9:06 AM: Re-evaluated pt. I have discussed test results, shared treatment plan, and the need for admission with patient and family at bedside. Pt and family express understanding at this time and agree with all information. All questions answered. Pt and family have no further questions or concerns at this time. Pt is ready for admit.       Medical Decision Making:   Clinical Tests:   Lab Tests: Ordered and Reviewed  Radiological Study: Ordered and Reviewed  Medical Tests: Ordered and Reviewed           ED Medication(s):  Medications   0.9%  NaCl infusion (for blood administration) (has no administration in time range)   ipratropium 0.02 % nebulizer solution 500 mcg (has no administration in time range)   scopolamine 1.3-1.5 mg (1 mg over 3 days) 1 patch (has no administration in time range)   sodium chloride 0.9% flush 10 mL (has no administration in time range)   acetaminophen tablet 650 mg (has no administration in time range)   senna-docusate 8.6-50 mg per tablet 1 tablet (has no administration in time range)   famotidine (PF) injection 20 mg (has no administration in time range)   ondansetron injection 4 mg (has no administration in time range)   dextrose 5 % infusion (has no administration in time range)   cefTRIAXone (ROCEPHIN) 1 g/50 mL D5W IVPB (has no administration in time range)   metroNIDAZOLE tablet 500 mg (has no administration in time range)   glucagon (human recombinant) injection 1 mg (has no administration in time range)   insulin aspart U-100 pen 0-5 Units (has no administration in time range)   mupirocin 2 % ointment (has no administration in time range)   dextrose 10% bolus 125 mL (has no administration in time range)   lactated ringers bolus 1,536 mL (0 mL/kg × 51.2 kg Intravenous Stopped 3/31/22 0844)   cefTRIAXone (ROCEPHIN) 1 g/50 mL D5W IVPB  (0 g Intravenous Stopped 3/31/22 0902)       New Prescriptions    No medications on file               Scribe Attestation:   Scribe #1: I performed the above scribed service and the documentation accurately describes the services I performed. I attest to the accuracy of the note.     Attending:   Physician Attestation Statement for Scribe #1: I, Sorin Beatty MD, personally performed the services described in this documentation, as scribed by Fabricio Lassiter, in my presence, and it is both accurate and complete.          Clinical Impression       ICD-10-CM ICD-9-CM   1. Altered mental status, unspecified altered mental status type  R41.82 780.97   2. Weakness  R53.1 780.79   3. Anemia, unspecified type  D64.9 285.9       Disposition:   Disposition: Admitted  Condition: Fair         Sorin Beatty MD  03/31/22 0936

## 2022-03-31 NOTE — PHARMACY MED REC
"Admission Medication History     The home medication history was taken by Mo Urbina.    You may go to "Admission" then "Reconcile Home Medications" tabs to review and/or act upon these items.      The home medication list has been updated by the Pharmacy department.    Please read ALL comments highlighted in yellow.    Please address this information as you see fit.     Feel free to contact us if you have any questions or require assistance.      Medications listed below were obtained from: [facility]   Medication Sig    aspirin 81 MG Chew 1 tablet (81 mg total) by Per G Tube route once daily.    carbidopa-levodopa  mg (SINEMET)  mg per tablet 2 tablets by Per G Tube route 3 (three) times daily.    carvediloL (COREG) 6.25 MG tablet 1 tablet (6.25 mg total) by Per G Tube route 2 (two) times daily.    clonazePAM (KLONOPIN) 0.5 MG tablet Take 0.5 mg by mouth 2 (two) times daily as needed for Anxiety.    clopidogreL (PLAVIX) 75 mg tablet 1 tablet (75 mg total) by Per G Tube route once daily.    famotidine (PEPCID AC) 20 MG tablet 1 tablet (20 mg total) by Per G Tube route once daily.    furosemide (LASIX) 20 MG tablet USE  IT ONLY AS NEEDED FOR FLUID RETENTION    ipratropium (ATROVENT) 0.02 % nebulizer solution Take 2.5 mLs (500 mcg total) by nebulization 2 (two) times daily. Rescue    polyethylene glycol (GLYCOLAX) 17 gram/dose powder 17 g by Per NG tube route once daily. FOR CONSTIPATION    sacubitriL-valsartan (ENTRESTO) 49-51 mg per tablet Take 1 tablet by mouth 2 (two) times daily. HOLD FOR NOW DUE TO LOW BLOOD PRESSURE    scopolamine (TRANSDERM-SCOP) 1.3-1.5 mg (1 mg over 3 days) Place 1 patch onto the skin Every 3 (three) days.         Mo Urbina  [id number]                  .          "

## 2022-03-31 NOTE — PLAN OF CARE
Discussed poc with pt, pt verbalized understanding    Purposeful rounding every 2hours    VS wnl  Cardiac monitoring in use, pt is NSR, tele monitor # [id number]  Blood glucose monitoring   Fall precautions in place, remains injury free    IVFs  Accurate I&Os  Abx given as prescribed  Bed locked at lowest position  Call light within reach    Chart check complete  Will cont with POC

## 2022-04-01 LAB
ALBUMIN SERPL BCP-MCNC: 2.5 G/DL (ref 3.5–5.2)
ALP SERPL-CCNC: 100 U/L (ref 55–135)
ALT SERPL W/O P-5'-P-CCNC: 14 U/L (ref 10–44)
ANION GAP SERPL CALC-SCNC: 12 MMOL/L (ref 8–16)
AST SERPL-CCNC: 42 U/L (ref 10–40)
BASOPHILS # BLD AUTO: 0.02 K/UL (ref 0–0.2)
BASOPHILS # BLD AUTO: 0.02 K/UL (ref 0–0.2)
BASOPHILS # BLD AUTO: 0.04 K/UL (ref 0–0.2)
BASOPHILS NFR BLD: 0.2 % (ref 0–1.9)
BASOPHILS NFR BLD: 0.2 % (ref 0–1.9)
BASOPHILS NFR BLD: 0.4 % (ref 0–1.9)
BILIRUB SERPL-MCNC: 0.3 MG/DL (ref 0.1–1)
BLD PROD TYP BPU: NORMAL
BLD PROD TYP BPU: NORMAL
BLOOD UNIT EXPIRATION DATE: NORMAL
BLOOD UNIT EXPIRATION DATE: NORMAL
BLOOD UNIT TYPE CODE: 1700
BLOOD UNIT TYPE CODE: 7300
BLOOD UNIT TYPE: NORMAL
BLOOD UNIT TYPE: NORMAL
BUN SERPL-MCNC: 51 MG/DL (ref 8–23)
CALCIUM SERPL-MCNC: 8.1 MG/DL (ref 8.7–10.5)
CHLORIDE SERPL-SCNC: 120 MMOL/L (ref 95–110)
CO2 SERPL-SCNC: 19 MMOL/L (ref 23–29)
CODING SYSTEM: NORMAL
CODING SYSTEM: NORMAL
CREAT SERPL-MCNC: 1.1 MG/DL (ref 0.5–1.4)
DIFFERENTIAL METHOD: ABNORMAL
DISPENSE STATUS: NORMAL
DISPENSE STATUS: NORMAL
EOSINOPHIL # BLD AUTO: 0.1 K/UL (ref 0–0.5)
EOSINOPHIL # BLD AUTO: 0.1 K/UL (ref 0–0.5)
EOSINOPHIL # BLD AUTO: 0.2 K/UL (ref 0–0.5)
EOSINOPHIL NFR BLD: 1.3 % (ref 0–8)
EOSINOPHIL NFR BLD: 1.4 % (ref 0–8)
EOSINOPHIL NFR BLD: 1.7 % (ref 0–8)
ERYTHROCYTE [DISTWIDTH] IN BLOOD BY AUTOMATED COUNT: 18.1 % (ref 11.5–14.5)
ERYTHROCYTE [DISTWIDTH] IN BLOOD BY AUTOMATED COUNT: 18.2 % (ref 11.5–14.5)
ERYTHROCYTE [DISTWIDTH] IN BLOOD BY AUTOMATED COUNT: 18.3 % (ref 11.5–14.5)
EST. GFR  (AFRICAN AMERICAN): 53 ML/MIN/1.73 M^2
EST. GFR  (NON AFRICAN AMERICAN): 46 ML/MIN/1.73 M^2
GLUCOSE SERPL-MCNC: 107 MG/DL (ref 70–110)
HCT VFR BLD AUTO: 22.3 % (ref 37–48.5)
HCT VFR BLD AUTO: 24.7 % (ref 37–48.5)
HCT VFR BLD AUTO: 27.6 % (ref 37–48.5)
HGB BLD-MCNC: 6.8 G/DL (ref 12–16)
HGB BLD-MCNC: 7.5 G/DL (ref 12–16)
HGB BLD-MCNC: 8.3 G/DL (ref 12–16)
IMM GRANULOCYTES # BLD AUTO: 0.1 K/UL (ref 0–0.04)
IMM GRANULOCYTES # BLD AUTO: 0.14 K/UL (ref 0–0.04)
IMM GRANULOCYTES # BLD AUTO: 0.16 K/UL (ref 0–0.04)
IMM GRANULOCYTES NFR BLD AUTO: 1.2 % (ref 0–0.5)
IMM GRANULOCYTES NFR BLD AUTO: 1.3 % (ref 0–0.5)
IMM GRANULOCYTES NFR BLD AUTO: 1.5 % (ref 0–0.5)
LACTATE SERPL-SCNC: 2 MMOL/L (ref 0.5–2.2)
LYMPHOCYTES # BLD AUTO: 1.2 K/UL (ref 1–4.8)
LYMPHOCYTES # BLD AUTO: 1.3 K/UL (ref 1–4.8)
LYMPHOCYTES # BLD AUTO: 1.4 K/UL (ref 1–4.8)
LYMPHOCYTES NFR BLD: 11.9 % (ref 18–48)
LYMPHOCYTES NFR BLD: 12.8 % (ref 18–48)
LYMPHOCYTES NFR BLD: 14.8 % (ref 18–48)
MAGNESIUM SERPL-MCNC: 1.7 MG/DL (ref 1.6–2.6)
MCH RBC QN AUTO: 28.2 PG (ref 27–31)
MCH RBC QN AUTO: 28.3 PG (ref 27–31)
MCH RBC QN AUTO: 28.3 PG (ref 27–31)
MCHC RBC AUTO-ENTMCNC: 30.1 G/DL (ref 32–36)
MCHC RBC AUTO-ENTMCNC: 30.4 G/DL (ref 32–36)
MCHC RBC AUTO-ENTMCNC: 30.5 G/DL (ref 32–36)
MCV RBC AUTO: 93 FL (ref 82–98)
MCV RBC AUTO: 93 FL (ref 82–98)
MCV RBC AUTO: 94 FL (ref 82–98)
MONOCYTES # BLD AUTO: 1.1 K/UL (ref 0.3–1)
MONOCYTES # BLD AUTO: 1.5 K/UL (ref 0.3–1)
MONOCYTES # BLD AUTO: 1.5 K/UL (ref 0.3–1)
MONOCYTES NFR BLD: 13.2 % (ref 4–15)
MONOCYTES NFR BLD: 13.7 % (ref 4–15)
MONOCYTES NFR BLD: 14 % (ref 4–15)
NEUTROPHILS # BLD AUTO: 5.6 K/UL (ref 1.8–7.7)
NEUTROPHILS # BLD AUTO: 7.6 K/UL (ref 1.8–7.7)
NEUTROPHILS # BLD AUTO: 7.7 K/UL (ref 1.8–7.7)
NEUTROPHILS NFR BLD: 68.9 % (ref 38–73)
NEUTROPHILS NFR BLD: 70.2 % (ref 38–73)
NEUTROPHILS NFR BLD: 71.3 % (ref 38–73)
NRBC BLD-RTO: 0 /100 WBC
NUM UNITS TRANS PACKED RBC: NORMAL
NUM UNITS TRANS PACKED RBC: NORMAL
PHOSPHATE SERPL-MCNC: 3.4 MG/DL (ref 2.7–4.5)
PLATELET # BLD AUTO: 172 K/UL (ref 150–450)
PLATELET # BLD AUTO: 186 K/UL (ref 150–450)
PLATELET # BLD AUTO: 187 K/UL (ref 150–450)
PMV BLD AUTO: 12.8 FL (ref 9.2–12.9)
PMV BLD AUTO: 12.8 FL (ref 9.2–12.9)
PMV BLD AUTO: 13 FL (ref 9.2–12.9)
POCT GLUCOSE: 131 MG/DL (ref 70–110)
POCT GLUCOSE: 76 MG/DL (ref 70–110)
POCT GLUCOSE: 90 MG/DL (ref 70–110)
POTASSIUM SERPL-SCNC: 3.9 MMOL/L (ref 3.5–5.1)
PROT SERPL-MCNC: 5.2 G/DL (ref 6–8.4)
RBC # BLD AUTO: 2.4 M/UL (ref 4–5.4)
RBC # BLD AUTO: 2.66 M/UL (ref 4–5.4)
RBC # BLD AUTO: 2.93 M/UL (ref 4–5.4)
SODIUM SERPL-SCNC: 151 MMOL/L (ref 136–145)
WBC # BLD AUTO: 10.59 K/UL (ref 3.9–12.7)
WBC # BLD AUTO: 10.97 K/UL (ref 3.9–12.7)
WBC # BLD AUTO: 8.18 K/UL (ref 3.9–12.7)

## 2022-04-01 PROCEDURE — P9016 RBC LEUKOCYTES REDUCED: HCPCS | Performed by: NURSE PRACTITIONER

## 2022-04-01 PROCEDURE — 99222 1ST HOSP IP/OBS MODERATE 55: CPT | Mod: ,,, | Performed by: INTERNAL MEDICINE

## 2022-04-01 PROCEDURE — 21400001 HC TELEMETRY ROOM

## 2022-04-01 PROCEDURE — 85025 COMPLETE CBC W/AUTO DIFF WBC: CPT | Mod: 91 | Performed by: INTERNAL MEDICINE

## 2022-04-01 PROCEDURE — 99222 PR INITIAL HOSPITAL CARE,LEVL II: ICD-10-PCS | Mod: ,,, | Performed by: INTERNAL MEDICINE

## 2022-04-01 PROCEDURE — 25000003 PHARM REV CODE 250: Performed by: INTERNAL MEDICINE

## 2022-04-01 PROCEDURE — C9113 INJ PANTOPRAZOLE SODIUM, VIA: HCPCS | Performed by: INTERNAL MEDICINE

## 2022-04-01 PROCEDURE — 94761 N-INVAS EAR/PLS OXIMETRY MLT: CPT

## 2022-04-01 PROCEDURE — 99900035 HC TECH TIME PER 15 MIN (STAT)

## 2022-04-01 PROCEDURE — 27000221 HC OXYGEN, UP TO 24 HOURS

## 2022-04-01 PROCEDURE — 94640 AIRWAY INHALATION TREATMENT: CPT

## 2022-04-01 PROCEDURE — 83735 ASSAY OF MAGNESIUM: CPT | Performed by: INTERNAL MEDICINE

## 2022-04-01 PROCEDURE — 36415 COLL VENOUS BLD VENIPUNCTURE: CPT | Performed by: INTERNAL MEDICINE

## 2022-04-01 PROCEDURE — 84100 ASSAY OF PHOSPHORUS: CPT | Performed by: INTERNAL MEDICINE

## 2022-04-01 PROCEDURE — C1751 CATH, INF, PER/CENT/MIDLINE: HCPCS

## 2022-04-01 PROCEDURE — 63600175 PHARM REV CODE 636 W HCPCS: Performed by: INTERNAL MEDICINE

## 2022-04-01 PROCEDURE — 36410 VNPNXR 3YR/> PHY/QHP DX/THER: CPT

## 2022-04-01 PROCEDURE — 25000242 PHARM REV CODE 250 ALT 637 W/ HCPCS: Performed by: INTERNAL MEDICINE

## 2022-04-01 PROCEDURE — 83605 ASSAY OF LACTIC ACID: CPT | Performed by: INTERNAL MEDICINE

## 2022-04-01 PROCEDURE — P9016 RBC LEUKOCYTES REDUCED: HCPCS | Performed by: INTERNAL MEDICINE

## 2022-04-01 PROCEDURE — 36430 TRANSFUSION BLD/BLD COMPNT: CPT

## 2022-04-01 PROCEDURE — 80053 COMPREHEN METABOLIC PANEL: CPT | Performed by: INTERNAL MEDICINE

## 2022-04-01 RX ORDER — DEXTROSE MONOHYDRATE 50 MG/ML
INJECTION, SOLUTION INTRAVENOUS CONTINUOUS
Status: ACTIVE | OUTPATIENT
Start: 2022-04-01 | End: 2022-04-02

## 2022-04-01 RX ORDER — HYDROCODONE BITARTRATE AND ACETAMINOPHEN 500; 5 MG/1; MG/1
TABLET ORAL
Status: DISCONTINUED | OUTPATIENT
Start: 2022-04-01 | End: 2022-04-04 | Stop reason: HOSPADM

## 2022-04-01 RX ADMIN — MUPIROCIN: 20 OINTMENT TOPICAL at 10:04

## 2022-04-01 RX ADMIN — CEFTRIAXONE 1 G: 1 INJECTION, SOLUTION INTRAVENOUS at 09:04

## 2022-04-01 RX ADMIN — IPRATROPIUM BROMIDE 500 MCG: 0.5 SOLUTION RESPIRATORY (INHALATION) at 06:04

## 2022-04-01 RX ADMIN — CARBIDOPA AND LEVODOPA 2 TABLET: 25; 100 TABLET ORAL at 02:04

## 2022-04-01 RX ADMIN — DEXTROSE: 5 SOLUTION INTRAVENOUS at 04:04

## 2022-04-01 RX ADMIN — METRONIDAZOLE 500 MG: 500 TABLET ORAL at 10:04

## 2022-04-01 RX ADMIN — PANTOPRAZOLE SODIUM 40 MG: 40 INJECTION, POWDER, FOR SOLUTION INTRAVENOUS at 10:04

## 2022-04-01 RX ADMIN — CARBIDOPA AND LEVODOPA 2 TABLET: 25; 100 TABLET ORAL at 10:04

## 2022-04-01 RX ADMIN — METRONIDAZOLE 500 MG: 500 TABLET ORAL at 02:04

## 2022-04-01 RX ADMIN — CARBIDOPA AND LEVODOPA 2 TABLET: 25; 100 TABLET ORAL at 09:04

## 2022-04-01 RX ADMIN — IPRATROPIUM BROMIDE 500 MCG: 0.5 SOLUTION RESPIRATORY (INHALATION) at 01:04

## 2022-04-01 RX ADMIN — CARVEDILOL 3.12 MG: 3.12 TABLET, FILM COATED ORAL at 09:04

## 2022-04-01 RX ADMIN — METRONIDAZOLE 500 MG: 500 TABLET ORAL at 05:04

## 2022-04-01 RX ADMIN — IPRATROPIUM BROMIDE 500 MCG: 0.5 SOLUTION RESPIRATORY (INHALATION) at 08:04

## 2022-04-01 RX ADMIN — CARVEDILOL 3.12 MG: 3.12 TABLET, FILM COATED ORAL at 10:04

## 2022-04-01 RX ADMIN — PANTOPRAZOLE SODIUM 40 MG: 40 INJECTION, POWDER, FOR SOLUTION INTRAVENOUS at 09:04

## 2022-04-01 NOTE — HOSPITAL COURSE
"83 y/o AAF with a PMHx of ICM with a EF of 10 % , S/P cardiac arrest , bed bound  with a PEG tube  admitted with a dx of acute GIB . S/P 2 PRBC   and GI consulted .  Family  declined  EGD at this time .  Started on PPI BID and Blood thinner on hold . Plan to resume  TF and free H2o . She is non verbal , however the RN report she said " good morning"  today. POC discussed with the POA at bedside .  H/H remain stable after 2 PRBC .   4/2 She is more alert today . She report to verbal and tactile stimuli . She mumble some words . H/H trend down yesterday  and 1 PRBC ordered . She lost IV acces while transfusion and extra unit of blood ordered . She had a  melonitc stool yesterday and TF hold . The Na level trending down .   4/3 She is mumbling some words . The H/H has been  stable for more than 24 hrs . There is no sign of active bleeding .  Family request to start one blood thinner before  d/c home . She is a high risk of bleeding .Family refused EGD.   4/4 Pt was seen and examined at bedside . She was determined to be suitable for d/c   She was admitted with a Dx of UGIB  of unknown etiology . She is s/p 4 PRBC . GI consulted .  Daughter POA declined  EGD .  Pt is a high risk of bleeding . Risk and benefit explained to Pt daughter . She request to start one antiplatelet therapy  due to previous h/o CAD and CVA.  Pt VS and HH remain stable for more than 48 hrs . There is no sign of acute bleeding . She will be d/c home per family request with HH . Prognosis POOR . High risk for re admission  . Family Declined comfort care at this time . CODE Status : DNR. She will be d/c on PPI  BID x 8 weeks and then after qd .  "

## 2022-04-01 NOTE — PROGRESS NOTES
"[facility] Medicine  Progress Note    Patient Name: Tanya Madrid  MRN: [id number]  Patient Class: IP- Inpatient   Admission Date: 3/31/2022  Length of Stay: 1 days  Attending Physician: Sj Juan, *  Primary Care Provider: Maggie Sue NP        Subjective:     Principal Problem:Upper GI bleed        HPI:  The pt is 81 yo female with recent h/o out of hospital Cardiac arrest and PMHx of combined systolic and diastolic HF(EF 10 to 25%) , HTN, Parkinson's disease , Prior CVA who was just discharged on 3/29/22 after being in the hospital for 19 days following cardiac arrest , brought back to the hospital by ambulance after pt was found this morning by her daughter  mouth covered with black emesis as well as black stool and poorly responsive. Pt is non verbal since cardiac arrest and history gathered from daughter who reports stool was brown yesterday . Pt is on ASA and Plavix treatment for h/o CVA.     In the ED vitals - 96/55, 119, 24,  97.4,100% on 3L/min NC  Hgb 5.5 was 8.6 on 3/25/22, Pl 341, Na 150, CO2 16, BUN 76, Cr 1.2, Troponin 0.082, LA 2.9, Procalcitonin 0.91, UA unremarkable   CXR- mild hazy infiltrate at the right lung base  CT head- no acute changes     Pt will be admitted under Hospital Medicine service for further evaluation and management of acute blood anemia likely due to GI bleed , Sepsis due to aspiration pneumonitis or pneumonia. Code status - DNR, SDM- daughter Adenike Lemus [phone]      Overview/Hospital Course:  81 y/o AAF with a PMHx of ICM with a EF of 10 % , S/P cardiac arrest , bed bound  with a PEG tube  admitted with a dx of acute GIB . S/P 2 PRBC   and GI consulted .  Family  declined  EGD at this time .  Started on PPI BID and Blood thinner on hold . Plan to resume  TF and free H2o . She is non verbal , however the RN report she said " good morning"  today. POC discussed with the POA at bedside .  H/H remain stable after 2 PRBC . "       Interval History:     Review of Systems   Unable to perform ROS: Patient nonverbal   Objective:     Vital Signs (Most Recent):  Temp: 96.4 °F (35.8 °C) (04/01/22 1206)  Pulse: 70 (04/01/22 1315)  Resp: 18 (04/01/22 1315)  BP: 120/69 (04/01/22 1206)  SpO2: 97 % (04/01/22 1315) Vital Signs (24h Range):  Temp:  [96.4 °F (35.8 °C)-98.4 °F (36.9 °C)] 96.4 °F (35.8 °C)  Pulse:  [63-90] 70  Resp:  [15-20] 18  SpO2:  [94 %-100 %] 97 %  BP: (120-143)/(57-86) 120/69     Weight: 62.2 kg (137 lb 2 oz)  Body mass index is 25.08 kg/m².    Intake/Output Summary (Last 24 hours) at 4/1/2022 1422  Last data filed at 4/1/2022 1200  Gross per 24 hour   Intake 1916.52 ml   Output 650 ml   Net 1266.52 ml      Physical Exam  Constitutional:       General: She is not in acute distress.     Appearance: She is well-developed. She is ill-appearing. She is not diaphoretic.      Comments: Awake , non verbal , does not follow commands    HENT:      Head: Normocephalic and atraumatic.      Mouth/Throat:      Mouth: Mucous membranes are dry.      Pharynx: No oropharyngeal exudate.   Eyes:      Conjunctiva/sclera: Conjunctivae normal.      Pupils: Pupils are equal, round, and reactive to light.   Neck:      Thyroid: No thyromegaly.      Vascular: No JVD.   Cardiovascular:      Rate and Rhythm: Normal rate and regular rhythm.      Heart sounds: Normal heart sounds. No murmur heard.  Pulmonary:      Effort: Pulmonary effort is normal. No respiratory distress.      Breath sounds: No wheezing or rales.      Comments: Decreased breaths sounds ignacoi due to poor inspiratory effort   Chest:      Chest wall: No tenderness.   Abdominal:      General: Bowel sounds are normal. There is no distension.      Palpations: Abdomen is soft.      Tenderness: There is no abdominal tenderness. There is no guarding or rebound.      Comments: PEG tube in palce   Musculoskeletal:         General: Normal range of motion.      Cervical back: Normal range of motion and  neck supple.      Right lower leg: No edema.      Left lower leg: No edema.   Lymphadenopathy:      Cervical: No cervical adenopathy.   Skin:     General: Skin is warm and dry.      Findings: No rash.   Neurological:      Cranial Nerves: No cranial nerve deficit.      Sensory: No sensory deficit.      Motor: Weakness present.      Deep Tendon Reflexes: Reflexes normal.      Comments: Awake , non verbal, does not follow commands        Significant Labs: All pertinent labs within the past 24 hours have been reviewed.      Significant Imaging: I have reviewed all pertinent imaging results/findings within the past 24 hours.        Assessment/Plan:      * Upper GI bleed  - GI bleed pathway initiated   -Hold ASA and Plavix   -Protonix 40 mg IV q12h  -P-RBC transfusion for Hgb 7 or under   -GI consulted  -Family declined EGD at this time   -Cont medical tx       Chronic combined systolic and diastolic heart failure  - No signs of decompensation at this time   -Diuretics and Entresto have been on hold since last admission for Cardiac arrest due to CODY, marginal to normal BP  -Hold BB due to current hypotension and resume once appropriate       Sepsis  Pt meets 2 SIRS criteria HR>90, RR>20  Lactic acid 2.9  Source -lower resp tract   This patient does have evidence of infective focus  My overall impression is sepsis. Vital signs were reviewed and noted in progress note.  Antibiotics given-   Antibiotics (From admission, onward)            Start     Stop Route Frequency Ordered    04/01/22 0900  cefTRIAXone (ROCEPHIN) 1 g/50 mL D5W IVPB         04/05 0859 IV Every 24 hours (non-standard times) 03/31/22 0928    03/31/22 1400  metroNIDAZOLE tablet 500 mg         04/05 1359 PER G TUBE Every 8 hours 03/31/22 0928    03/31/22 1045  mupirocin 2 % ointment         04/05 0859 Nasl 2 times daily 03/31/22 0934        Cultures were taken-   Microbiology Results (last 7 days)     Procedure Component Value Units Date/Time    Blood culture  x two cultures. Draw prior to antibiotics. [[id number]] Collected: 03/31/22 0728    Order Status: Completed Specimen: Blood from Peripheral, Antecubital, Right Updated: 04/01/22 0115     Blood Culture, Routine No Growth to date    Narrative:      Aerobic and anaerobic    Blood culture x two cultures. Draw prior to antibiotics. [[id number]] Collected: 03/31/22 0742    Order Status: Completed Specimen: Blood from Peripheral, Forearm, Left Updated: 04/01/22 0115     Blood Culture, Routine No Growth to date    Narrative:      Aerobic and anaerobic        Latest lactate reviewed, they are-  Recent Labs   Lab 03/31/22 0634 03/31/22 2004 04/01/22 0623   LACTATE 2.9* 2.5* 2.0       Organ dysfunction indicated by - AMS  Source- Lower respiratory tract     Source control Achieved by- Antibiotic treatment               Hypernatremia  - Free water deficit in the setting PEG tube dependency   -S/P D5 infusion   -Monitor electrolytes       Recent H/O Out of hospital Cardiac arrest  -Supportive treatment     Acute blood loss anemia  - Likely secondary to upper GI bleed   - S/P PRBC  2 unit to keep Hgb >7  -Monitor vitals   -Telemetry monitoring       Parkinson disease  - Resume Home medicine via PEG tube       GERD (gastroesophageal reflux disease)  - Continue PPI BID       H/O Essential hypertension  - Pt was hypotensive on presentation   -Monitor BP trend   -Hold antihypertensive for now and resume once appropriate   -BP has improved         VTE Risk Mitigation (From admission, onward)         Ordered     IP VTE HIGH RISK PATIENT  Once         03/31/22 1623     Place sequential compression device  Until discontinued         03/31/22 1623     Place FARSHAD hose  Until discontinued         03/31/22 0926     Place sequential compression device  Until discontinued         03/31/22 0926                Discharge Planning   PARMINDER:      Code Status: DNR   Is the patient medically ready for discharge?:     Reason for patient still in hospital  (select all that apply): Treatment  Discharge Plan A: Home with family, Home Health                  Sj Juan MD  Department of Hospital Medicine   [facility]

## 2022-04-01 NOTE — SUBJECTIVE & OBJECTIVE
Past Medical History:   Diagnosis Date    Acute CHF (congestive heart failure) 1/17/2021    Hyperlipemia     Hypertension     Parkinson's disease     Stroke 2016    Throat mass        Past Surgical History:   Procedure Laterality Date    CLOSED REDUCTION Right 10/15/2020    Procedure: CLOSED REDUCTION;  Surgeon: Humberto Valdivia DO;  Location: [facility] OR;  Service: Orthopedics;  Laterality: Right;  ATTEMPTED    EVACUATION OF HEMATOMA Right 10/17/2020    Procedure: EVACUATION, HEMATOMA;  Surgeon: Humberto Valdivia DO;  Location: [facility] OR;  Service: Orthopedics;  Laterality: Right;    HEMIARTHROPLASTY OF HIP Left 7/12/2020    Procedure: HEMIARTHROPLASTY, HIP;  Surgeon: Humberto Valdivia DO;  Location: [facility] OR;  Service: Orthopedics;  Laterality: Left;    HEMIARTHROPLASTY OF HIP Right 10/2/2020    Procedure: HEMIARTHROPLASTY, HIP;  Surgeon: Loyd Kearney MD;  Location: [facility] OR;  Service: Orthopedics;  Laterality: Right;    HYSTERECTOMY      OPEN REDUCTION OF DISLOCATION OF HIP Right 10/17/2020    Procedure: OPEN REDUCTION, DISLOCATION, HIP;  Surgeon: Humberto Valdivia DO;  Location: [facility];  Service: Orthopedics;  Laterality: Right;    THROAT SURGERY      TONSILLECTOMY         Review of patient's allergies indicates:   Allergen Reactions    Xanax [alprazolam]      Family History    None       Tobacco Use    Smoking status: Never Smoker    Smokeless tobacco: Never Used   Substance and Sexual Activity    Alcohol use: No    Drug use: No    Sexual activity: Not Currently     Review of Systems   Unable to perform ROS: Mental status change   Objective:     Vital Signs (Most Recent):  Temp: 97.4 °F (36.3 °C) (04/01/22 0746)  Pulse: 63 (04/01/22 0746)  Resp: 17 (04/01/22 0746)  BP: (!) 143/80 (04/01/22 0746)  SpO2: 100 % (04/01/22 0746)   Vital Signs (24h Range):  Temp:  [97 °F (36.1 °C)-98.4 °F (36.9 °C)] 97.4 °F (36.3 °C)  Pulse:  [63-98] 63  Resp:  [14-23] 17  SpO2:  [94 %-100 %] 100 %  BP: (114-143)/(55-86) 143/80      Weight: 62.2 kg (137 lb 2 oz) (04/01/22 0425)  Body mass index is 25.08 kg/m².      Intake/Output Summary (Last 24 hours) at 4/1/2022 0815  Last data filed at 4/1/2022 0506  Gross per 24 hour   Intake 4842.77 ml   Output 1750 ml   Net 3092.77 ml       Lines/Drains/Airways       Drain  Duration                  Urethral Catheter 03/10/22 2032 Non-latex 16 Fr. 21 days         Gastrostomy/Enterostomy 03/25/22 1320 Gastrostomy tube w/ balloon LUQ feeding 6 days              Peripheral Intravenous Line  Duration                  Peripheral IV - Single Lumen 03/31/22 0640 20 G Left Forearm 1 day         Peripheral IV - Single Lumen 03/31/22 0740 18 G Right Antecubital 1 day                    Physical Exam  Constitutional:       General: She is awake.      Appearance: She is ill-appearing.   HENT:      Head: Normocephalic and atraumatic.   Eyes:      Extraocular Movements: Extraocular movements intact.   Cardiovascular:      Rate and Rhythm: Normal rate and regular rhythm.      Heart sounds: No murmur heard.  Pulmonary:      Effort: Pulmonary effort is normal. No respiratory distress.      Breath sounds: Rhonchi present. No wheezing.   Abdominal:      General: Bowel sounds are normal. There is no distension.      Palpations: Abdomen is soft. There is no mass.      Tenderness: There is no abdominal tenderness.      Comments: PEG site clean without drainage.    Musculoskeletal:      Right lower leg: No edema.      Left lower leg: No edema.   Skin:     General: Skin is warm and dry.      Findings: No rash.   Neurological:      Mental Status: She is unresponsive.       Significant Labs:  CBC:   Recent Labs   Lab 03/31/22 0634 03/31/22 2004 04/01/22 0623   WBC 12.65 15.01* 10.59   HGB 5.5* 8.9* 7.5*   HCT 18.3* 31.0* 24.7*    201 186     CMP:   Recent Labs   Lab 04/01/22 0623      CALCIUM 8.1*   ALBUMIN 2.5*   PROT 5.2*   *   K 3.9   CO2 19*   *   BUN 51*   CREATININE 1.1   ALKPHOS 100   ALT  14   AST 42*   BILITOT 0.3     Coagulation: No results for input(s): PT, INR, APTT in the last 48 hours.    Significant Imaging:  Imaging results within the past 24 hours have been reviewed.

## 2022-04-01 NOTE — ASSESSMENT & PLAN NOTE
- Pt was hypotensive on presentation   -Monitor BP trend   -Hold antihypertensive for now and resume once appropriate   -BP has improved

## 2022-04-01 NOTE — PLAN OF CARE
Problem: Adult Inpatient Plan of Care  Goal: Plan of Care Review  Outcome: Ongoing, Progressing  Goal: Patient-Specific Goal (Individualized)  Outcome: Ongoing, Progressing  Goal: Absence of Hospital-Acquired Illness or Injury  Outcome: Ongoing, Progressing  Goal: Optimal Comfort and Wellbeing  Outcome: Ongoing, Progressing  Goal: Readiness for Transition of Care  Outcome: Ongoing, Progressing     Problem: Fluid and Electrolyte Imbalance (Acute Kidney Injury/Impairment)  Goal: Fluid and Electrolyte Balance  Outcome: Ongoing, Progressing     Problem: Oral Intake Inadequate (Acute Kidney Injury/Impairment)  Goal: Optimal Nutrition Intake  Outcome: Ongoing, Progressing     Problem: Renal Function Impairment (Acute Kidney Injury/Impairment)  Goal: Effective Renal Function  Outcome: Ongoing, Progressing     Problem: Impaired Wound Healing  Goal: Optimal Wound Healing  Outcome: Ongoing, Progressing     Problem: Skin Injury Risk Increased  Goal: Skin Health and Integrity  Outcome: Ongoing, Progressing     Problem: Fall Injury Risk  Goal: Absence of Fall and Fall-Related Injury  Outcome: Ongoing, Progressing     Problem: Adjustment to Illness (Gastrointestinal Bleeding)  Goal: Optimal Coping with Acute Illness  Outcome: Ongoing, Progressing     Problem: Bleeding (Gastrointestinal Bleeding)  Goal: Hemostasis  Outcome: Ongoing, Progressing     Problem: Adjustment to Illness (Sepsis/Septic Shock)  Goal: Optimal Coping  Outcome: Ongoing, Progressing     Problem: Bleeding (Sepsis/Septic Shock)  Goal: Absence of Bleeding  Outcome: Ongoing, Progressing     Problem: Glycemic Control Impaired (Sepsis/Septic Shock)  Goal: Blood Glucose Level Within Desired Range  Outcome: Ongoing, Progressing     Problem: Infection Progression (Sepsis/Septic Shock)  Goal: Absence of Infection Signs and Symptoms  Outcome: Ongoing, Progressing     Problem: Nutrition Impaired (Sepsis/Septic Shock)  Goal: Optimal Nutrition Intake  Outcome: Ongoing,  Progressing     Problem: Infection  Goal: Absence of Infection Signs and Symptoms  Outcome: Ongoing, Progressing

## 2022-04-01 NOTE — ASSESSMENT & PLAN NOTE
Patient presentation consistent with upper GI bleed in the setting of Plavix and ASA use. However, she has an EF of 10% and MI in the last few weeks. Risk of sedated endoscopy high. Daughter agrees to hold off on endoscopy at this time.   Recommend continuation of Protonix bid.   May benefit from one more unit of blood. Continue to monitor H/H.   Plavix and ASA being held. Discussed risks of this with daughter.   Suggest goals of care discussion by HM team.

## 2022-04-01 NOTE — ASSESSMENT & PLAN NOTE
- Likely secondary to upper GI bleed   - S/P PRBC  2 unit to keep Hgb >7  -Monitor vitals   -Telemetry monitoring

## 2022-04-01 NOTE — ASSESSMENT & PLAN NOTE
- GI bleed pathway initiated   -Hold ASA and Plavix   -Protonix 40 mg IV q12h  -P-RBC transfusion for Hgb 7 or under   -GI consulted  -Family declined EGD at this time   -Cont medical tx

## 2022-04-01 NOTE — SUBJECTIVE & OBJECTIVE
Interval History:     Review of Systems   Unable to perform ROS: Patient nonverbal   Objective:     Vital Signs (Most Recent):  Temp: 96.4 °F (35.8 °C) (04/01/22 1206)  Pulse: 70 (04/01/22 1315)  Resp: 18 (04/01/22 1315)  BP: 120/69 (04/01/22 1206)  SpO2: 97 % (04/01/22 1315) Vital Signs (24h Range):  Temp:  [96.4 °F (35.8 °C)-98.4 °F (36.9 °C)] 96.4 °F (35.8 °C)  Pulse:  [63-90] 70  Resp:  [15-20] 18  SpO2:  [94 %-100 %] 97 %  BP: (120-143)/(57-86) 120/69     Weight: 62.2 kg (137 lb 2 oz)  Body mass index is 25.08 kg/m².    Intake/Output Summary (Last 24 hours) at 4/1/2022 1422  Last data filed at 4/1/2022 1200  Gross per 24 hour   Intake 1916.52 ml   Output 650 ml   Net 1266.52 ml      Physical Exam  Constitutional:       General: She is not in acute distress.     Appearance: She is well-developed. She is ill-appearing. She is not diaphoretic.      Comments: Awake , non verbal , does not follow commands    HENT:      Head: Normocephalic and atraumatic.      Mouth/Throat:      Mouth: Mucous membranes are dry.      Pharynx: No oropharyngeal exudate.   Eyes:      Conjunctiva/sclera: Conjunctivae normal.      Pupils: Pupils are equal, round, and reactive to light.   Neck:      Thyroid: No thyromegaly.      Vascular: No JVD.   Cardiovascular:      Rate and Rhythm: Normal rate and regular rhythm.      Heart sounds: Normal heart sounds. No murmur heard.  Pulmonary:      Effort: Pulmonary effort is normal. No respiratory distress.      Breath sounds: No wheezing or rales.      Comments: Decreased breaths sounds ignacio due to poor inspiratory effort   Chest:      Chest wall: No tenderness.   Abdominal:      General: Bowel sounds are normal. There is no distension.      Palpations: Abdomen is soft.      Tenderness: There is no abdominal tenderness. There is no guarding or rebound.      Comments: PEG tube in palce   Musculoskeletal:         General: Normal range of motion.      Cervical back: Normal range of motion and neck  supple.      Right lower leg: No edema.      Left lower leg: No edema.   Lymphadenopathy:      Cervical: No cervical adenopathy.   Skin:     General: Skin is warm and dry.      Findings: No rash.   Neurological:      Cranial Nerves: No cranial nerve deficit.      Sensory: No sensory deficit.      Motor: Weakness present.      Deep Tendon Reflexes: Reflexes normal.      Comments: Awake , non verbal, does not follow commands        Significant Labs: All pertinent labs within the past 24 hours have been reviewed.      Significant Imaging: I have reviewed all pertinent imaging results/findings within the past 24 hours.

## 2022-04-01 NOTE — CONSULTS
[facility]  Adult Nutrition  Consult Note    SUMMARY     Recommendations     1. As medically able, initiate enteral nutrition: Isosource 1.5, continuous via PEG    - Advance as tolerated to goal: 45 mL/hr     - 100mL H2O flush q 4-6 hours or per MD/NP.     - Provides 1620 calories (100%EEN), 73g protein (100%EPN), 190g CHO, and 825ml H20 + FWF.     - Monitor Na, Mg, K, Phos, and glucose; correct as indicated.      Or if bolus is preferred,     - 5 cartons Isosource 1.5 daily via PEG    - 100mL H2O flush q bolus or per MD/NP.     - Provides 1875 calories, 85g protein, 220g CHO, and 955ml H20 + FWF.     2. RD to follow up to monitor intake, tolerance, and labs.   *Please send consult if acute nutrition needs arise.    Goals:    1. Initiate enteral nutrition within 48 hours.     2. Pt will tolerate >75% estimated energy and protein needs by RD follow up.    Nutrition Goal Status: new   Communication of RD recs: POC, sticky note, secure chat and second sign    Assessment and Plan  Nutrition Problem    Inadequate energy and protein intake  Related to (etiology):      Inability to consume adequate energy    Increased energy and protein needs    Medical condition    Altered GI function    Decreased appetite  Signs and Symptoms (as evidenced by):     NPO with no source of alternate nutrition at this time   Interventions:    Enteral nutrition    Collaboration with other care providers  Nutrition Diagnosis Status:   New     Reason for Assessment  Reason for assessment: consult   Diagnosis: Upper GI bleed   Relevant medical history: acute CHF, HLD, HTN, parkinson's disease, AMS, dysphagia, GERD, hypernatremia, CVA (2019), anemia    General information comments:   04/01/2022: RD consulted for TF recs, pt seen by this service at previous admit, PEG place earlier this month, pt tolerating Isosource 1.5 continuous then bolus, then Jevity after discharge on Tuesday of this past week. Pt's daughter reports she gave 1 carton  "Tuesday night, then 3 cartons on Wed before there was reason to return for admit. GI following, pt's family to make decision on whether or not to proceed with EGD for further surgical intervention. SLP rec at previous admit included NPO d/t dysphagia and risk of aspiration, pt not always awake and alert for safe PO intake. Pt's daughter communicates understanding that PEG will be sole source of nutrition and fluids, and medication, from this time on.  Will continue to monitor.     Nutrition Discharge Planning - pending medical course, EN via PEG    Nutrition Risk Screen  Have you recently lost weight without trying?: Unsure  Have you been eating poorly because of a decreased appetite?: No  Nutrition risk screen: dysphagia or difficulty swallowing, tube feeding or parenteral nutrition and large or nonhealing wound, burn, or pressure injury     Nutrition/Diet History  Patient Reported Diet/Restrictions/Preferences: 4-5 cartons Jevity 1.5 bolus via PEG daily + ~1000ml FW (per pt's daughter) NPO   Food Allergies: NKFA  Factors Affecting Nutritional Intake: abdominal pain, altered GI function, impaired cognitive status/motor control, chewing difficulties, difficulty/impaired swallowing, inability to feed self, NPO and pain   Spiritual, Cultural Beliefs, Faith Practices, Values that Affect Care: no      Anthropometrics  Temp: 96.4 °F (35.8 °C)  Height: 5' 2" (157.5 cm)  Height (inches): 62 in  Weight Method: Bed Scale  Weight: 62.2 kg (137 lb 2 oz)  Weight (lb): 137.13 lb  Ideal Body Weight (IBW), Female: 110 lb  % Ideal Body Weight, Female (lb): 100 %  BMI (Calculated): 25.1       Labs  Pertinent Labs: reviewed  Lab Results   Component Value Date    HGB 8.3 (L) 04/01/2022    HCT 27.6 (L) 04/01/2022     (H) 04/01/2022    CALCIUM 8.1 (L) 04/01/2022    K 3.9 04/01/2022    PHOS 3.4 04/01/2022    BUN 51 (H) 04/01/2022    CREATININE 1.1 04/01/2022    ESTGFRAFRICA 53 (A) 04/01/2022    EGFRNONAA 46 (A) 04/01/2022    " ALBUMIN 2.5 (L) 04/01/2022     Lab Results   Component Value Date    ALT 14 04/01/2022    AST 42 (H) 04/01/2022    ALKPHOS 100 04/01/2022    BILITOT 0.3 04/01/2022     Meds  Pertinent Medications: reviewed    carbidopa-levodopa  mg  2 tablet Per G Tube TID    carvediloL  3.125 mg Per G Tube BID    cefTRIAXone (ROCEPHIN) IVPB  1 g Intravenous Q24H    ipratropium  500 mcg Nebulization Q6H    metroNIDAZOLE  500 mg Per G Tube Q8H    mupirocin   Nasal BID    pantoprazole  40 mg Intravenous BID    scopolamine  1 patch Transdermal Q3 Days     Continuous Infusions:  PRN Meds:sodium chloride, acetaminophen, clonazePAM, dextrose 10%, glucagon (human recombinant), insulin aspart U-100, ondansetron, senna-docusate 8.6-50 mg, sodium chloride 0.9%     Physical Findings/Assessment  Nausea/Vomiting Signs/Symptoms: brown  Wounds: noted, wound care following  Edema: hand, left, hand, right: 2+ (Mild)  Last Bowel Movement: 04/01/22 Stool Consistency: loose GI Signs/Symptoms: diarrhea, fecal incontinence  Carlos Score: 9       O2 Device (Oxygen Therapy): nasal cannula  NFPE performed indicating no evident muscle and fat wasting    Malnutrition Assessment  Patient does not meet at least 2 ASPEN criteria for malnutrition at this time.   Will continue to monitor.                                       Hand , Left: absent       Estimated/Assessed Needs  Weight Used For Calorie Calculations: 62.2 kg (137 lb 2 oz)  Energy Calorie Requirements (kcal): 6194-6626 (25-30)  Energy Need Method: Kcal/kg  Weight Used For Protein Calculations: 62.2 kg (137 lb 2 oz)  Protein Requirements: 62-93 (1-1.5, age, wounds)  RDA Method (mL): 1555ml fluid or per MD/NP   195-233g (50% CHO)    Nutrition Prescription Ordered  NPO     Evaluation of Received Nutrients  Energy Calories Required: not meeting needs  Protein Required: not meeting needs  Tolerance: n/a, pt is NPO  % Intake of Estimated Energy Needs: 0 - 25 %  % Meal Intake:  NPO    Monitor and Evaluation  Food and Nutrient Intake: enteral nutrition intake  Food and Nutrient Administration: enteral nutrition administration  Anthropometric Measurements: weight, weight change, body mass index  Biochemical Data, Medical Tests and Procedures: electrolyte and renal panel, lipid profile, gastrointestinal profile, glucose/endocrine profile, Inflammatory profile  Nutrition-Focused Physical Findings: overall appearance     Nutrition Follow-Up  Level of nutrition risk: low-moderate  Frequency of follow-up: Twice weekly   Tentative Next Date to be Seen by RD: 04/05/22  Jes Carroll, MS, RD, LDN

## 2022-04-01 NOTE — PLAN OF CARE
RD Recommendations     1. As medically able, initiate enteral nutrition: Isosource 1.5, continuous via PEG    - Advance as tolerated to goal: 45 mL/hr     - 100mL H2O flush q 4-6 hours or per MD/NP.     - Provides 1620 calories (100%EEN), 73g protein (100%EPN), 190g CHO, and 825ml H20 + FWF.     - Monitor Na, Mg, K, Phos, and glucose; correct as indicated.      Or if bolus is preferred,     - 5 cartons Isosource 1.5 daily via PEG    - 100mL H2O flush q bolus or per MD/NP.     - Provides 1875 calories, 85g protein, 220g CHO, and 955ml H20 + FWF.     2. RD to follow up to monitor intake, tolerance, and labs.   *Please send consult if acute nutrition needs arise.    Goals:    1. Initiate enteral nutrition within 48 hours.     2. Pt will tolerate >75% estimated energy and protein needs by RD follow up.      Jes Carroll, MS, RD, LDN

## 2022-04-01 NOTE — PLAN OF CARE
VSS. Fall precautions maintained.   On O2 NC.  IV fluids maintained.  Tolerating IV antibiotics.  Peg Feeding started, with free water flushes  Blood sugar monitored.   NSR on cardiac monitor.  Repositioned q2hrs and as needed  Abreu intact and draining.  Family at bedside.   All needs met. Will continue to monitor.

## 2022-04-01 NOTE — HPI
The patient presented to the ER for AMS. The patient had a cardiac arrest 3 weeks ago. She was released from the hospital two days prior to this presentation. She is unable to provide a history. It has been obtained from her daughter, medical record and medical staff. The daughter found her covered in dark emesis the day of presentation. In the ER, she was noted to have a Hgb of 5.5. It was 8.6 five days prior. She has received two units of blood with Hgb of 7.5 this morning. BUN 76 and creatinine 1.2. The nurse yesterday reported black stool and dark PEG tube output with flushing. Protonix IV bid started yesterday. Plavix and ASA being held.

## 2022-04-01 NOTE — ASSESSMENT & PLAN NOTE
- Free water deficit in the setting PEG tube dependency   -S/P D5 infusion   -Monitor electrolytes

## 2022-04-01 NOTE — PLAN OF CARE
[facility] - Med Surg  Initial Discharge Assessment       Primary Care Provider: Maggie Sue NP    Admission Diagnosis: Weakness [R53.1]  Altered mental status, unspecified altered mental status type [R41.82]  Anemia, unspecified type [D64.9]    Admission Date: 3/31/2022  Expected Discharge Date:     Discharge Barriers Identified: None    Payor: MEDICARE / Plan: MEDICARE PART A & B / Product Type: Government /     Extended Emergency Contact Information  Primary Emergency Contact: Adenike Lemus  Address: [address] [place] [address] [facility]  Home Phone: [phone]  Mobile Phone: [phone]  Relation: Daughter    Discharge Plan A: Home with family, [facility]         [id number] - [place] - [address] AT [facility]  [address]  [place] [address]  Phone: [phone] Fax: [phone]      Initial Assessment (most recent)     Adult Discharge Assessment - 04/01/22 0937        Discharge Assessment    Assessment Type Discharge Planning Assessment     Confirmed/corrected address, phone number and insurance Yes     Confirmed Demographics Correct on Facesheet     Source of Information family     Does patient/caregiver understand observation status --   n/a    Communicated PARMINDER with patient/caregiver Date not available/Unable to determine     Reason For Admission Upper GI Bleed     Lives With child(anthony), adult     Facility Arrived From: home     Do you expect to return to your current living situation? Yes     Do you have help at home or someone to help you manage your care at home? Yes     Who are your caregiver(s) and their phone number(s)? Josee Lemus (daughter) [phone]     Prior to hospitilization cognitive status: Unable to Assess     Current cognitive status: Unable to Assess     Walking or Climbing Stairs Difficulty ambulation difficulty, dependent     Dressing/Bathing Difficulty bathing difficulty, dependent      Home Layout Able to live on 1st floor     Equipment Currently Used at Home walker, rolling;grab bar;shower chair;hospital bed;nebulizer     Readmission within 30 days? Yes     Patient currently being followed by outpatient case management? No     Do you currently have service(s) that help you manage your care at home? Yes     Name and Contact number of agency [facility]     Is the pt/caregiver preference to resume services with current agency Yes     Do you take prescription medications? Yes     Do you have prescription coverage? Yes     Do you have any problems affording any of your prescribed medications? No     Is the patient taking medications as prescribed? yes     Who is going to help you get home at discharge? amubulance transport     How do you get to doctors appointments? family or friend will provide     Are you on dialysis? No     Do you take coumadin? No     Discharge Plan A Home with family;Home Health     DME Needed Upon Discharge  none     Discharge Plan discussed with: Adult children     Discharge Barriers Identified None        Relationship/Environment    Name(s) of Who Lives With Patient Josee Lemus (daughter) [phone]                 Readmission Assessment (most recent)     Readmission Assessment - 04/01/22 0935        Readmission    Why were you hospitalized in the last 30 days? cardiac arrest     Why were you readmitted? New medical problem     When you left the hospital how did you feel? as best as she could     When you left the hospital where did you go? Home with Home Health     Did patient/caregiver refused recommended DC plan? No     Tell me about what happened between when you left the hospital and the day you returned. daughter found pt in the morning of Thursday 3-30 with bloody emesis and stool     Did you try to manage your symptoms your self? No     Did you call anyone? Yes     Who did you call? Emergency Room     Did you try to see or did see a doctor or nurse  before you came? No     Why? called ambulance     Did you have  a follow-up appointment on discharge? Yes     Did you go? No     Why? --   returned to hospital prior to    Was this a planned readmission? No                CM met with pt and daughter Adenike at the bedside to introduce self and complete discharge screen. Pt is current with [facility] and [facility] for tube feed formula. Per daughter she plans to return home and resume home health and caring for her. CM will continue to follow for any additional discharge needs or change in plans.

## 2022-04-01 NOTE — CONSULTS
[facility]  Gastroenterology  Consult Note    Patient Name: Tanya Madrid  MRN: [id number]  Admission Date: 3/31/2022  Hospital Length of Stay: 1 days  Code Status: DNR   Attending Provider: Sj Juan, *   Consulting Provider: Glynn Campoverde PA-C  Primary Care Physician: Maggie Sue NP  Principal Problem:Upper GI bleed    Inpatient consult to Gastroenterology  Consult performed by: Glynn Campoverde PA-C  Consult ordered by: Tim Reilly MD  Reason for consult: Anemia        Subjective:     HPI:  The patient presented to the ER for AMS. The patient had a cardiac arrest 3 weeks ago. She was released from the hospital two days prior to this presentation. She is unable to provide a history. It has been obtained from her daughter, medical record and medical staff. The daughter found her covered in dark emesis the day of presentation. In the ER, she was noted to have a Hgb of 5.5. It was 8.6 five days prior. She has received two units of blood with Hgb of 7.5 this morning. BUN 76 and creatinine 1.2. The nurse yesterday reported black stool and dark PEG tube output with flushing. Protonix IV bid started yesterday. Plavix and ASA being held.       Past Medical History:   Diagnosis Date    Acute CHF (congestive heart failure) 1/17/2021    Hyperlipemia     Hypertension     Parkinson's disease     Stroke 2016    Throat mass        Past Surgical History:   Procedure Laterality Date    CLOSED REDUCTION Right 10/15/2020    Procedure: CLOSED REDUCTION;  Surgeon: Humberto Valdivia DO;  Location: [facility] OR;  Service: Orthopedics;  Laterality: Right;  ATTEMPTED    EVACUATION OF HEMATOMA Right 10/17/2020    Procedure: EVACUATION, HEMATOMA;  Surgeon: Humberto Valdivia DO;  Location: [facility] OR;  Service: Orthopedics;  Laterality: Right;    HEMIARTHROPLASTY OF HIP Left 7/12/2020    Procedure: HEMIARTHROPLASTY, HIP;  Surgeon: Humberto Valdivia DO;  Location: [facility] OR;  Service: Orthopedics;   Laterality: Left;    HEMIARTHROPLASTY OF HIP Right 10/2/2020    Procedure: HEMIARTHROPLASTY, HIP;  Surgeon: Loyd Kearney MD;  Location: [facility] OR;  Service: Orthopedics;  Laterality: Right;    HYSTERECTOMY      OPEN REDUCTION OF DISLOCATION OF HIP Right 10/17/2020    Procedure: OPEN REDUCTION, DISLOCATION, HIP;  Surgeon: Humberto Valdivia DO;  Location: [facility] OR;  Service: Orthopedics;  Laterality: Right;    THROAT SURGERY      TONSILLECTOMY         Review of patient's allergies indicates:   Allergen Reactions    Xanax [alprazolam]      Family History    None       Tobacco Use    Smoking status: Never Smoker    Smokeless tobacco: Never Used   Substance and Sexual Activity    Alcohol use: No    Drug use: No    Sexual activity: Not Currently     Review of Systems   Unable to perform ROS: Mental status change   Objective:     Vital Signs (Most Recent):  Temp: 97.4 °F (36.3 °C) (04/01/22 0746)  Pulse: 63 (04/01/22 0746)  Resp: 17 (04/01/22 0746)  BP: (!) 143/80 (04/01/22 0746)  SpO2: 100 % (04/01/22 0746)   Vital Signs (24h Range):  Temp:  [97 °F (36.1 °C)-98.4 °F (36.9 °C)] 97.4 °F (36.3 °C)  Pulse:  [63-98] 63  Resp:  [14-23] 17  SpO2:  [94 %-100 %] 100 %  BP: (114-143)/(55-86) 143/80     Weight: 62.2 kg (137 lb 2 oz) (04/01/22 0425)  Body mass index is 25.08 kg/m².      Intake/Output Summary (Last 24 hours) at 4/1/2022 0815  Last data filed at 4/1/2022 0506  Gross per 24 hour   Intake 4842.77 ml   Output 1750 ml   Net 3092.77 ml       Lines/Drains/Airways       Drain  Duration                  Urethral Catheter 03/10/22 2032 Non-latex 16 Fr. 21 days         Gastrostomy/Enterostomy 03/25/22 1320 Gastrostomy tube w/ balloon LUQ feeding 6 days              Peripheral Intravenous Line  Duration                  Peripheral IV - Single Lumen 03/31/22 0640 20 G Left Forearm 1 day         Peripheral IV - Single Lumen 03/31/22 0740 18 G Right Antecubital 1 day                    Physical Exam  Constitutional:        General: She is awake.      Appearance: She is ill-appearing.   HENT:      Head: Normocephalic and atraumatic.   Eyes:      Extraocular Movements: Extraocular movements intact.   Cardiovascular:      Rate and Rhythm: Normal rate and regular rhythm.      Heart sounds: No murmur heard.  Pulmonary:      Effort: Pulmonary effort is normal. No respiratory distress.      Breath sounds: Rhonchi present. No wheezing.   Abdominal:      General: Bowel sounds are normal. There is no distension.      Palpations: Abdomen is soft. There is no mass.      Tenderness: There is no abdominal tenderness.      Comments: PEG site clean without drainage.    Musculoskeletal:      Right lower leg: No edema.      Left lower leg: No edema.   Skin:     General: Skin is warm and dry.      Findings: No rash.   Neurological:      Mental Status: She is unresponsive.       Significant Labs:  CBC:   Recent Labs   Lab 03/31/22 0634 03/31/22 2004 04/01/22  0623   WBC 12.65 15.01* 10.59   HGB 5.5* 8.9* 7.5*   HCT 18.3* 31.0* 24.7*    201 186     CMP:   Recent Labs   Lab 04/01/22  0623      CALCIUM 8.1*   ALBUMIN 2.5*   PROT 5.2*   *   K 3.9   CO2 19*   *   BUN 51*   CREATININE 1.1   ALKPHOS 100   ALT 14   AST 42*   BILITOT 0.3     Coagulation: No results for input(s): PT, INR, APTT in the last 48 hours.    Significant Imaging:  Imaging results within the past 24 hours have been reviewed.    Assessment/Plan:     * Upper GI bleed  Patient presentation consistent with upper GI bleed in the setting of Plavix and ASA use. However, she has an EF of 10% and MI in the last few weeks. Risk of sedated endoscopy high. Daughter agrees to hold off on endoscopy at this time.   Recommend continuation of Protonix bid.   May benefit from one more unit of blood. Continue to monitor H/H.   Plavix and ASA being held. Discussed risks of this with daughter.   Suggest goals of care discussion by HM team.       Acute blood loss anemia  See plan  above.     Chronic combined systolic and diastolic heart failure  Management per HM team.           Thank you for your consult. I will sign off. Please contact us if you have any additional questions.    Glynn Campoverde PA-C  Gastroenterology  [facility] - Med Surg

## 2022-04-02 LAB
ALBUMIN SERPL BCP-MCNC: 2.3 G/DL (ref 3.5–5.2)
ALP SERPL-CCNC: 91 U/L (ref 55–135)
ALT SERPL W/O P-5'-P-CCNC: 5 U/L (ref 10–44)
ANION GAP SERPL CALC-SCNC: 8 MMOL/L (ref 8–16)
AST SERPL-CCNC: 32 U/L (ref 10–40)
BASOPHILS # BLD AUTO: 0.02 K/UL (ref 0–0.2)
BASOPHILS NFR BLD: 0.2 % (ref 0–1.9)
BILIRUB SERPL-MCNC: 0.5 MG/DL (ref 0.1–1)
BUN SERPL-MCNC: 38 MG/DL (ref 8–23)
CALCIUM SERPL-MCNC: 7.6 MG/DL (ref 8.7–10.5)
CHLORIDE SERPL-SCNC: 118 MMOL/L (ref 95–110)
CO2 SERPL-SCNC: 23 MMOL/L (ref 23–29)
CREAT SERPL-MCNC: 0.9 MG/DL (ref 0.5–1.4)
DIFFERENTIAL METHOD: ABNORMAL
EOSINOPHIL # BLD AUTO: 0.1 K/UL (ref 0–0.5)
EOSINOPHIL NFR BLD: 1.6 % (ref 0–8)
ERYTHROCYTE [DISTWIDTH] IN BLOOD BY AUTOMATED COUNT: 16.5 % (ref 11.5–14.5)
EST. GFR  (AFRICAN AMERICAN): >60 ML/MIN/1.73 M^2
EST. GFR  (NON AFRICAN AMERICAN): 59 ML/MIN/1.73 M^2
GLUCOSE SERPL-MCNC: 122 MG/DL (ref 70–110)
HCT VFR BLD AUTO: 30.6 % (ref 37–48.5)
HGB BLD-MCNC: 9.7 G/DL (ref 12–16)
IMM GRANULOCYTES # BLD AUTO: 0.13 K/UL (ref 0–0.04)
IMM GRANULOCYTES NFR BLD AUTO: 1.6 % (ref 0–0.5)
LYMPHOCYTES # BLD AUTO: 0.9 K/UL (ref 1–4.8)
LYMPHOCYTES NFR BLD: 11.2 % (ref 18–48)
MCH RBC QN AUTO: 29.7 PG (ref 27–31)
MCHC RBC AUTO-ENTMCNC: 31.7 G/DL (ref 32–36)
MCV RBC AUTO: 94 FL (ref 82–98)
MONOCYTES # BLD AUTO: 1 K/UL (ref 0.3–1)
MONOCYTES NFR BLD: 11.4 % (ref 4–15)
NEUTROPHILS # BLD AUTO: 6.2 K/UL (ref 1.8–7.7)
NEUTROPHILS NFR BLD: 74 % (ref 38–73)
NRBC BLD-RTO: 0 /100 WBC
PLATELET # BLD AUTO: 187 K/UL (ref 150–450)
PMV BLD AUTO: 13.2 FL (ref 9.2–12.9)
POCT GLUCOSE: 123 MG/DL (ref 70–110)
POCT GLUCOSE: 124 MG/DL (ref 70–110)
POCT GLUCOSE: 26 MG/DL (ref 70–110)
POCT GLUCOSE: 33 MG/DL (ref 70–110)
POCT GLUCOSE: 51 MG/DL (ref 70–110)
POCT GLUCOSE: 79 MG/DL (ref 70–110)
POCT GLUCOSE: 91 MG/DL (ref 70–110)
POTASSIUM SERPL-SCNC: 3.6 MMOL/L (ref 3.5–5.1)
PROT SERPL-MCNC: 4.4 G/DL (ref 6–8.4)
RBC # BLD AUTO: 3.27 M/UL (ref 4–5.4)
SODIUM SERPL-SCNC: 149 MMOL/L (ref 136–145)
WBC # BLD AUTO: 8.34 K/UL (ref 3.9–12.7)

## 2022-04-02 PROCEDURE — C9113 INJ PANTOPRAZOLE SODIUM, VIA: HCPCS | Performed by: INTERNAL MEDICINE

## 2022-04-02 PROCEDURE — 94761 N-INVAS EAR/PLS OXIMETRY MLT: CPT

## 2022-04-02 PROCEDURE — 99900035 HC TECH TIME PER 15 MIN (STAT)

## 2022-04-02 PROCEDURE — 63600175 PHARM REV CODE 636 W HCPCS: Performed by: INTERNAL MEDICINE

## 2022-04-02 PROCEDURE — 25000003 PHARM REV CODE 250: Performed by: INTERNAL MEDICINE

## 2022-04-02 PROCEDURE — 25000242 PHARM REV CODE 250 ALT 637 W/ HCPCS: Performed by: INTERNAL MEDICINE

## 2022-04-02 PROCEDURE — 80053 COMPREHEN METABOLIC PANEL: CPT | Performed by: INTERNAL MEDICINE

## 2022-04-02 PROCEDURE — 94640 AIRWAY INHALATION TREATMENT: CPT

## 2022-04-02 PROCEDURE — 85025 COMPLETE CBC W/AUTO DIFF WBC: CPT | Performed by: INTERNAL MEDICINE

## 2022-04-02 PROCEDURE — 21400001 HC TELEMETRY ROOM

## 2022-04-02 PROCEDURE — 27000221 HC OXYGEN, UP TO 24 HOURS

## 2022-04-02 RX ADMIN — PANTOPRAZOLE SODIUM 40 MG: 40 INJECTION, POWDER, FOR SOLUTION INTRAVENOUS at 09:04

## 2022-04-02 RX ADMIN — METRONIDAZOLE 500 MG: 500 TABLET ORAL at 02:04

## 2022-04-02 RX ADMIN — CARBIDOPA AND LEVODOPA 2 TABLET: 25; 100 TABLET ORAL at 09:04

## 2022-04-02 RX ADMIN — CARVEDILOL 3.12 MG: 3.12 TABLET, FILM COATED ORAL at 09:04

## 2022-04-02 RX ADMIN — METRONIDAZOLE 500 MG: 500 TABLET ORAL at 09:04

## 2022-04-02 RX ADMIN — CEFTRIAXONE 1 G: 1 INJECTION, SOLUTION INTRAVENOUS at 09:04

## 2022-04-02 RX ADMIN — DEXTROSE 125 ML: 10 SOLUTION INTRAVENOUS at 01:04

## 2022-04-02 RX ADMIN — CARBIDOPA AND LEVODOPA 2 TABLET: 25; 100 TABLET ORAL at 02:04

## 2022-04-02 RX ADMIN — MUPIROCIN: 20 OINTMENT TOPICAL at 09:04

## 2022-04-02 RX ADMIN — IPRATROPIUM BROMIDE 500 MCG: 0.5 SOLUTION RESPIRATORY (INHALATION) at 12:04

## 2022-04-02 RX ADMIN — IPRATROPIUM BROMIDE 500 MCG: 0.5 SOLUTION RESPIRATORY (INHALATION) at 07:04

## 2022-04-02 RX ADMIN — DEXTROSE 125 ML: 10 SOLUTION INTRAVENOUS at 02:04

## 2022-04-02 RX ADMIN — METRONIDAZOLE 500 MG: 500 TABLET ORAL at 05:04

## 2022-04-02 NOTE — ASSESSMENT & PLAN NOTE
- Likely secondary to upper GI bleed   - S/P PRBC  3 unit to keep Hgb >7  -Monitor vitals   -Telemetry monitoring

## 2022-04-02 NOTE — NURSING
Pt lost IV access during blood transfusion. Sharon Brown notified. Additional unit of blood was ordered. Will administer when IV access is regained.

## 2022-04-02 NOTE — PLAN OF CARE
Problem: Nutrition Impaired (Sepsis/Septic Shock)  Goal: Optimal Nutrition Intake  Outcome: Ongoing, Progressing     Problem: Infection  Goal: Absence of Infection Signs and Symptoms  Outcome: Ongoing, Progressing     Problem: Infection Progression (Sepsis/Septic Shock)  Goal: Absence of Infection Signs and Symptoms  Outcome: Ongoing, Progressing     Problem: Glycemic Control Impaired (Sepsis/Septic Shock)  Goal: Blood Glucose Level Within Desired Range  Outcome: Ongoing, Progressing     Problem: Bleeding (Sepsis/Septic Shock)  Goal: Absence of Bleeding  Outcome: Ongoing, Progressing     Problem: Adjustment to Illness (Sepsis/Septic Shock)  Goal: Optimal Coping  Outcome: Ongoing, Progressing     Problem: Bleeding (Gastrointestinal Bleeding)  Goal: Hemostasis  Outcome: Ongoing, Progressing     Problem: Adjustment to Illness (Gastrointestinal Bleeding)  Goal: Optimal Coping with Acute Illness  Outcome: Ongoing, Progressing     Problem: Fall Injury Risk  Goal: Absence of Fall and Fall-Related Injury  Outcome: Ongoing, Progressing     Problem: Skin Injury Risk Increased  Goal: Skin Health and Integrity  Outcome: Ongoing, Progressing     Problem: Impaired Wound Healing  Goal: Optimal Wound Healing  Outcome: Ongoing, Progressing     Problem: Renal Function Impairment (Acute Kidney Injury/Impairment)  Goal: Effective Renal Function  Outcome: Ongoing, Progressing     Problem: Oral Intake Inadequate (Acute Kidney Injury/Impairment)  Goal: Optimal Nutrition Intake  Outcome: Ongoing, Progressing     Problem: Fluid and Electrolyte Imbalance (Acute Kidney Injury/Impairment)  Goal: Fluid and Electrolyte Balance  Outcome: Ongoing, Progressing     Problem: Adult Inpatient Plan of Care  Goal: Plan of Care Review  Outcome: Ongoing, Progressing  Goal: Patient-Specific Goal (Individualized)  Outcome: Ongoing, Progressing  Goal: Absence of Hospital-Acquired Illness or Injury  Outcome: Ongoing, Progressing  Goal: Optimal Comfort and  Wellbeing  Outcome: Ongoing, Progressing  Goal: Readiness for Transition of Care  Outcome: Ongoing, Progressing

## 2022-04-02 NOTE — PLAN OF CARE
VSS. Fall precautions maintained.   On O2 NC.  IV fluids maintained.  Tolerating IV antibiotic  Peg Feeding started, with free water flushes  Blood sugar monitored.   NSR on cardiac monitor.  Repositioned q2hrs and as needed  Abreu intact and draining.  Family at bedside.   All needs met. Will continue to monitor.

## 2022-04-02 NOTE — PROGRESS NOTES
"[facility] Medicine  Progress Note    Patient Name: Tanya Madrid  MRN: [id number]  Patient Class: IP- Inpatient   Admission Date: 3/31/2022  Length of Stay: 2 days  Attending Physician: Sj Juan, *  Primary Care Provider: Maggie Sue NP        Subjective:     Principal Problem:Upper GI bleed        HPI:  The pt is 83 yo female with recent h/o out of hospital Cardiac arrest and PMHx of combined systolic and diastolic HF(EF 10 to 25%) , HTN, Parkinson's disease , Prior CVA who was just discharged on 3/29/22 after being in the hospital for 19 days following cardiac arrest , brought back to the hospital by ambulance after pt was found this morning by her daughter  mouth covered with black emesis as well as black stool and poorly responsive. Pt is non verbal since cardiac arrest and history gathered from daughter who reports stool was brown yesterday . Pt is on ASA and Plavix treatment for h/o CVA.     In the ED vitals - 96/55, 119, 24,  97.4,100% on 3L/min NC  Hgb 5.5 was 8.6 on 3/25/22, Pl 341, Na 150, CO2 16, BUN 76, Cr 1.2, Troponin 0.082, LA 2.9, Procalcitonin 0.91, UA unremarkable   CXR- mild hazy infiltrate at the right lung base  CT head- no acute changes     Pt will be admitted under Hospital Medicine service for further evaluation and management of acute blood anemia likely due to GI bleed , Sepsis due to aspiration pneumonitis or pneumonia. Code status - DNR, SDM- daughter Adenike Lemus [phone]      Overview/Hospital Course:  83 y/o AAF with a PMHx of ICM with a EF of 10 % , S/P cardiac arrest , bed bound  with a PEG tube  admitted with a dx of acute GIB . S/P 2 PRBC   and GI consulted .  Family  declined  EGD at this time .  Started on PPI BID and Blood thinner on hold . Plan to resume  TF and free H2o . She is non verbal , however the RN report she said " good morning"  today. POC discussed with the POA at bedside .  H/H remain stable after 2 PRBC .   4/2 She is " more alert today . She report to verbal and tactile stimuli . She mumble some words . H/H trend down yesterday  and 1 PRBC ordered . She lost IV acces while transfusion and extra unit of blood ordered . She had a  melonitc stool yesterday and TF hold . The Na level trending down .       Interval History:     Review of Systems   Unable to perform ROS: Patient nonverbal   Objective:     Vital Signs (Most Recent):  Temp: 97.3 °F (36.3 °C) (04/02/22 0704)  Pulse: 83 (04/02/22 0708)  Resp: 18 (04/02/22 0708)  BP: (!) 157/102 (04/02/22 0704)  SpO2: 95 % (04/02/22 0708)   Vital Signs (24h Range):  Temp:  [96.4 °F (35.8 °C)-98.3 °F (36.8 °C)] 97.3 °F (36.3 °C)  Pulse:  [62-85] 83  Resp:  [15-18] 18  SpO2:  [94 %-100 %] 95 %  BP: (115-182)/() 157/102     Weight: 62.3 kg (137 lb 5.6 oz)  Body mass index is 25.12 kg/m².    Intake/Output Summary (Last 24 hours) at 4/2/2022 0959  Last data filed at 4/2/2022 0735  Gross per 24 hour   Intake 367.23 ml   Output 1775 ml   Net -1407.77 ml      Physical Exam  Constitutional:       General: She is not in acute distress.     Appearance: She is well-developed. She is ill-appearing. She is not diaphoretic.      Comments: Awake , non verbal , does not follow commands    HENT:      Head: Normocephalic and atraumatic.      Mouth/Throat:      Mouth: Mucous membranes are dry.      Pharynx: No oropharyngeal exudate.   Eyes:      Conjunctiva/sclera: Conjunctivae normal.      Pupils: Pupils are equal, round, and reactive to light.   Neck:      Thyroid: No thyromegaly.      Vascular: No JVD.   Cardiovascular:      Rate and Rhythm: Normal rate and regular rhythm.      Heart sounds: Normal heart sounds. No murmur heard.  Pulmonary:      Effort: Pulmonary effort is normal. No respiratory distress.      Breath sounds: No wheezing or rales.      Comments: Decreased breaths sounds ignacio due to poor inspiratory effort   Chest:      Chest wall: No tenderness.   Abdominal:      General: Bowel sounds  are normal. There is no distension.      Palpations: Abdomen is soft.      Tenderness: There is no abdominal tenderness. There is no guarding or rebound.      Comments: PEG tube in palce   Musculoskeletal:         General: Normal range of motion.      Cervical back: Normal range of motion and neck supple.      Right lower leg: No edema.      Left lower leg: No edema.   Lymphadenopathy:      Cervical: No cervical adenopathy.   Skin:     General: Skin is warm and dry.      Findings: No rash.   Neurological:      Cranial Nerves: No cranial nerve deficit.      Sensory: No sensory deficit.      Motor: Weakness present.      Deep Tendon Reflexes: Reflexes normal.      Comments: Awake , non verbal, does not follow commands        Significant Labs: All pertinent labs within the past 24 hours have been reviewed.      Significant Imaging: I have reviewed all pertinent imaging results/findings within the past 24 hours.        Assessment/Plan:      * Upper GI bleed  - GI bleed pathway initiated   -Hold ASA and Plavix   -Protonix 40 mg IV q12h  -P-RBC transfusion for Hgb 7 or under   -GI consulted  -Family declined EGD at this time   -Cont medical tx       Chronic combined systolic and diastolic heart failure  - No signs of decompensation at this time   -Diuretics and Entresto have been on hold since last admission for Cardiac arrest due to CODY, marginal to normal BP  -Hold BB due to current hypotension and resume once appropriate       Sepsis  Pt meets 2 SIRS criteria HR>90, RR>20  Lactic acid 2.9  Source -lower resp tract   This patient does have evidence of infective focus  My overall impression is sepsis. Vital signs were reviewed and noted in progress note.  Antibiotics given-   Antibiotics (From admission, onward)            Start     Stop Route Frequency Ordered    04/01/22 0900  cefTRIAXone (ROCEPHIN) 1 g/50 mL D5W IVPB         04/05 0859 IV Every 24 hours (non-standard times) 03/31/22 0928    03/31/22 1400   metroNIDAZOLE tablet 500 mg         04/05 1359 PER G TUBE Every 8 hours 03/31/22 0928    03/31/22 1045  mupirocin 2 % ointment         04/05 0859 Nasl 2 times daily 03/31/22 0934        Cultures were taken-   Microbiology Results (last 7 days)     Procedure Component Value Units Date/Time    Blood culture x two cultures. Draw prior to antibiotics. [[id number]] Collected: 03/31/22 0728    Order Status: Completed Specimen: Blood from Peripheral, Antecubital, Right Updated: 04/01/22 1812     Blood Culture, Routine No Growth to date      No Growth to date    Narrative:      Aerobic and anaerobic    Blood culture x two cultures. Draw prior to antibiotics. [[id number]] Collected: 03/31/22 0742    Order Status: Completed Specimen: Blood from Peripheral, Forearm, Left Updated: 04/01/22 1812     Blood Culture, Routine No Growth to date      No Growth to date    Narrative:      Aerobic and anaerobic        Latest lactate reviewed, they are-  Recent Labs   Lab 03/31/22  0634 03/31/22 2004 04/01/22  0623   LACTATE 2.9* 2.5* 2.0       Organ dysfunction indicated by - AMS  Source- Lower respiratory tract     Source control Achieved by- Antibiotic treatment               Hypernatremia  - Free water deficit in the setting PEG tube dependency   -cont  D5 infusion   -Monitor electrolytes   Resume TF       Recent H/O Out of hospital Cardiac arrest  -Supportive treatment     Acute blood loss anemia  - Likely secondary to upper GI bleed   - S/P PRBC  3 unit to keep Hgb >7  -Monitor vitals   -Telemetry monitoring       Parkinson disease  - Resume Home medicine via PEG tube       GERD (gastroesophageal reflux disease)  - Continue PPI BID       H/O Essential hypertension  - Pt was hypotensive on presentation   -Monitor BP trend   -Hold antihypertensive for now and resume once appropriate   -BP has improved         VTE Risk Mitigation (From admission, onward)         Ordered     IP VTE HIGH RISK PATIENT  Once         03/31/22 1623     Place  sequential compression device  Until discontinued         03/31/22 1623     Place FARSHAD hose  Until discontinued         03/31/22 0926     Place sequential compression device  Until discontinued         03/31/22 0926                Discharge Planning   PARMINDER:      Code Status: DNR   Is the patient medically ready for discharge?:     Reason for patient still in hospital (select all that apply): Treatment  Discharge Plan A: Home with family, Home Health                  Sj Juan MD  Department of Hospital Medicine   [facility] - [facility]

## 2022-04-02 NOTE — ASSESSMENT & PLAN NOTE
Pt meets 2 SIRS criteria HR>90, RR>20  Lactic acid 2.9  Source -lower resp tract   This patient does have evidence of infective focus  My overall impression is sepsis. Vital signs were reviewed and noted in progress note.  Antibiotics given-   Antibiotics (From admission, onward)            Start     Stop Route Frequency Ordered    04/01/22 0900  cefTRIAXone (ROCEPHIN) 1 g/50 mL D5W IVPB         04/05 0859 IV Every 24 hours (non-standard times) 03/31/22 0928 03/31/22 1400  metroNIDAZOLE tablet 500 mg         04/05 1359 PER G TUBE Every 8 hours 03/31/22 0928 03/31/22 1045  mupirocin 2 % ointment         04/05 0859 Nasl 2 times daily 03/31/22 0934        Cultures were taken-   Microbiology Results (last 7 days)     Procedure Component Value Units Date/Time    Blood culture x two cultures. Draw prior to antibiotics. [[id number]] Collected: 03/31/22 0728    Order Status: Completed Specimen: Blood from Peripheral, Antecubital, Right Updated: 04/01/22 1812     Blood Culture, Routine No Growth to date      No Growth to date    Narrative:      Aerobic and anaerobic    Blood culture x two cultures. Draw prior to antibiotics. [[id number]] Collected: 03/31/22 0742    Order Status: Completed Specimen: Blood from Peripheral, Forearm, Left Updated: 04/01/22 1812     Blood Culture, Routine No Growth to date      No Growth to date    Narrative:      Aerobic and anaerobic        Latest lactate reviewed, they are-  Recent Labs   Lab 03/31/22  0634 03/31/22 2004 04/01/22  0623   LACTATE 2.9* 2.5* 2.0       Organ dysfunction indicated by - AMS  Source- Lower respiratory tract     Source control Achieved by- Antibiotic treatment

## 2022-04-02 NOTE — NURSING
Additional PRN bolus of dextrose 10% given per VANITA Brown permission for glucose of 51. Will reassess pt glucose.   BABY BACK TO ROOM, PLACED SKIN TO SKIN WITH MOTHER

## 2022-04-02 NOTE — NURSING
PRN bolus of dextrose 10% given to pt for glucose of 26. STAT glucose ordered. Will continue to monitor and reassess.

## 2022-04-02 NOTE — ASSESSMENT & PLAN NOTE
- Free water deficit in the setting PEG tube dependency   -cont  D5 infusion   -Monitor electrolytes   Resume TF

## 2022-04-02 NOTE — SUBJECTIVE & OBJECTIVE
Interval History:     Review of Systems   Unable to perform ROS: Patient nonverbal   Objective:     Vital Signs (Most Recent):  Temp: 97.3 °F (36.3 °C) (04/02/22 0704)  Pulse: 83 (04/02/22 0708)  Resp: 18 (04/02/22 0708)  BP: (!) 157/102 (04/02/22 0704)  SpO2: 95 % (04/02/22 0708)   Vital Signs (24h Range):  Temp:  [96.4 °F (35.8 °C)-98.3 °F (36.8 °C)] 97.3 °F (36.3 °C)  Pulse:  [62-85] 83  Resp:  [15-18] 18  SpO2:  [94 %-100 %] 95 %  BP: (115-182)/() 157/102     Weight: 62.3 kg (137 lb 5.6 oz)  Body mass index is 25.12 kg/m².    Intake/Output Summary (Last 24 hours) at 4/2/2022 0959  Last data filed at 4/2/2022 0735  Gross per 24 hour   Intake 367.23 ml   Output 1775 ml   Net -1407.77 ml      Physical Exam  Constitutional:       General: She is not in acute distress.     Appearance: She is well-developed. She is ill-appearing. She is not diaphoretic.      Comments: Awake , non verbal , does not follow commands    HENT:      Head: Normocephalic and atraumatic.      Mouth/Throat:      Mouth: Mucous membranes are dry.      Pharynx: No oropharyngeal exudate.   Eyes:      Conjunctiva/sclera: Conjunctivae normal.      Pupils: Pupils are equal, round, and reactive to light.   Neck:      Thyroid: No thyromegaly.      Vascular: No JVD.   Cardiovascular:      Rate and Rhythm: Normal rate and regular rhythm.      Heart sounds: Normal heart sounds. No murmur heard.  Pulmonary:      Effort: Pulmonary effort is normal. No respiratory distress.      Breath sounds: No wheezing or rales.      Comments: Decreased breaths sounds ignacio due to poor inspiratory effort   Chest:      Chest wall: No tenderness.   Abdominal:      General: Bowel sounds are normal. There is no distension.      Palpations: Abdomen is soft.      Tenderness: There is no abdominal tenderness. There is no guarding or rebound.      Comments: PEG tube in palce   Musculoskeletal:         General: Normal range of motion.      Cervical back: Normal range of motion  and neck supple.      Right lower leg: No edema.      Left lower leg: No edema.   Lymphadenopathy:      Cervical: No cervical adenopathy.   Skin:     General: Skin is warm and dry.      Findings: No rash.   Neurological:      Cranial Nerves: No cranial nerve deficit.      Sensory: No sensory deficit.      Motor: Weakness present.      Deep Tendon Reflexes: Reflexes normal.      Comments: Awake , non verbal, does not follow commands        Significant Labs: All pertinent labs within the past 24 hours have been reviewed.      Significant Imaging: I have reviewed all pertinent imaging results/findings within the past 24 hours.

## 2022-04-03 ENCOUNTER — EXTERNAL HOME HEALTH (OUTPATIENT)
Dept: HOME HEALTH SERVICES | Facility: HOSPITAL | Age: 82
End: 2022-04-03
Payer: MEDICARE

## 2022-04-03 LAB
BASOPHILS # BLD AUTO: 0.02 K/UL (ref 0–0.2)
BASOPHILS NFR BLD: 0.2 % (ref 0–1.9)
DIFFERENTIAL METHOD: ABNORMAL
EOSINOPHIL # BLD AUTO: 0.1 K/UL (ref 0–0.5)
EOSINOPHIL NFR BLD: 1.5 % (ref 0–8)
ERYTHROCYTE [DISTWIDTH] IN BLOOD BY AUTOMATED COUNT: 16.7 % (ref 11.5–14.5)
HCT VFR BLD AUTO: 34 % (ref 37–48.5)
HGB BLD-MCNC: 10.9 G/DL (ref 12–16)
IMM GRANULOCYTES # BLD AUTO: 0.13 K/UL (ref 0–0.04)
IMM GRANULOCYTES NFR BLD AUTO: 1.4 % (ref 0–0.5)
LYMPHOCYTES # BLD AUTO: 1 K/UL (ref 1–4.8)
LYMPHOCYTES NFR BLD: 10.9 % (ref 18–48)
MCH RBC QN AUTO: 29.3 PG (ref 27–31)
MCHC RBC AUTO-ENTMCNC: 32.1 G/DL (ref 32–36)
MCV RBC AUTO: 91 FL (ref 82–98)
MONOCYTES # BLD AUTO: 1.1 K/UL (ref 0.3–1)
MONOCYTES NFR BLD: 11.9 % (ref 4–15)
NEUTROPHILS # BLD AUTO: 7.1 K/UL (ref 1.8–7.7)
NEUTROPHILS NFR BLD: 74.1 % (ref 38–73)
NRBC BLD-RTO: 0 /100 WBC
PLATELET # BLD AUTO: 205 K/UL (ref 150–450)
PMV BLD AUTO: 13 FL (ref 9.2–12.9)
POCT GLUCOSE: 112 MG/DL (ref 70–110)
POCT GLUCOSE: 130 MG/DL (ref 70–110)
POCT GLUCOSE: 146 MG/DL (ref 70–110)
POCT GLUCOSE: 81 MG/DL (ref 70–110)
RBC # BLD AUTO: 3.72 M/UL (ref 4–5.4)
WBC # BLD AUTO: 9.53 K/UL (ref 3.9–12.7)

## 2022-04-03 PROCEDURE — 25000003 PHARM REV CODE 250: Performed by: INTERNAL MEDICINE

## 2022-04-03 PROCEDURE — 94640 AIRWAY INHALATION TREATMENT: CPT

## 2022-04-03 PROCEDURE — 36415 COLL VENOUS BLD VENIPUNCTURE: CPT | Performed by: INTERNAL MEDICINE

## 2022-04-03 PROCEDURE — 63600175 PHARM REV CODE 636 W HCPCS: Performed by: INTERNAL MEDICINE

## 2022-04-03 PROCEDURE — C9113 INJ PANTOPRAZOLE SODIUM, VIA: HCPCS | Performed by: INTERNAL MEDICINE

## 2022-04-03 PROCEDURE — 94761 N-INVAS EAR/PLS OXIMETRY MLT: CPT

## 2022-04-03 PROCEDURE — 85025 COMPLETE CBC W/AUTO DIFF WBC: CPT | Performed by: INTERNAL MEDICINE

## 2022-04-03 PROCEDURE — 25000242 PHARM REV CODE 250 ALT 637 W/ HCPCS: Performed by: INTERNAL MEDICINE

## 2022-04-03 PROCEDURE — 21400001 HC TELEMETRY ROOM

## 2022-04-03 PROCEDURE — 27000221 HC OXYGEN, UP TO 24 HOURS

## 2022-04-03 RX ORDER — CLOPIDOGREL BISULFATE 75 MG/1
75 TABLET ORAL DAILY
Status: DISCONTINUED | OUTPATIENT
Start: 2022-04-03 | End: 2022-04-04 | Stop reason: HOSPADM

## 2022-04-03 RX ORDER — PANTOPRAZOLE SODIUM 40 MG/1
40 FOR SUSPENSION ORAL 2 TIMES DAILY
Status: DISCONTINUED | OUTPATIENT
Start: 2022-04-03 | End: 2022-04-04 | Stop reason: HOSPADM

## 2022-04-03 RX ADMIN — CLOPIDOGREL 75 MG: 75 TABLET, FILM COATED ORAL at 04:04

## 2022-04-03 RX ADMIN — SACUBITRIL AND VALSARTAN 1 TABLET: 49; 51 TABLET, FILM COATED ORAL at 09:04

## 2022-04-03 RX ADMIN — CARVEDILOL 3.12 MG: 3.12 TABLET, FILM COATED ORAL at 09:04

## 2022-04-03 RX ADMIN — IPRATROPIUM BROMIDE 500 MCG: 0.5 SOLUTION RESPIRATORY (INHALATION) at 01:04

## 2022-04-03 RX ADMIN — SCOPOLAMINE 1 PATCH: 1.5 PATCH, EXTENDED RELEASE TRANSDERMAL at 10:04

## 2022-04-03 RX ADMIN — PANTOPRAZOLE SODIUM 40 MG: 40 GRANULE, DELAYED RELEASE ORAL at 09:04

## 2022-04-03 RX ADMIN — METRONIDAZOLE 500 MG: 500 TABLET ORAL at 02:04

## 2022-04-03 RX ADMIN — METRONIDAZOLE 500 MG: 500 TABLET ORAL at 09:04

## 2022-04-03 RX ADMIN — MUPIROCIN: 20 OINTMENT TOPICAL at 09:04

## 2022-04-03 RX ADMIN — CARBIDOPA AND LEVODOPA 2 TABLET: 25; 100 TABLET ORAL at 02:04

## 2022-04-03 RX ADMIN — METRONIDAZOLE 500 MG: 500 TABLET ORAL at 06:04

## 2022-04-03 RX ADMIN — IPRATROPIUM BROMIDE 500 MCG: 0.5 SOLUTION RESPIRATORY (INHALATION) at 12:04

## 2022-04-03 RX ADMIN — IPRATROPIUM BROMIDE 500 MCG: 0.5 SOLUTION RESPIRATORY (INHALATION) at 07:04

## 2022-04-03 RX ADMIN — IPRATROPIUM BROMIDE 500 MCG: 0.5 SOLUTION RESPIRATORY (INHALATION) at 08:04

## 2022-04-03 RX ADMIN — CEFTRIAXONE 1 G: 1 INJECTION, SOLUTION INTRAVENOUS at 09:04

## 2022-04-03 RX ADMIN — CARBIDOPA AND LEVODOPA 2 TABLET: 25; 100 TABLET ORAL at 09:04

## 2022-04-03 RX ADMIN — PANTOPRAZOLE SODIUM 40 MG: 40 INJECTION, POWDER, FOR SOLUTION INTRAVENOUS at 09:04

## 2022-04-03 NOTE — PLAN OF CARE
Problem: Infection  Goal: Absence of Infection Signs and Symptoms  Outcome: Ongoing, Progressing     Problem: Nutrition Impaired (Sepsis/Septic Shock)  Goal: Optimal Nutrition Intake  Outcome: Ongoing, Progressing     Problem: Infection Progression (Sepsis/Septic Shock)  Goal: Absence of Infection Signs and Symptoms  Outcome: Ongoing, Progressing     Problem: Glycemic Control Impaired (Sepsis/Septic Shock)  Goal: Blood Glucose Level Within Desired Range  Outcome: Ongoing, Progressing     Problem: Bleeding (Sepsis/Septic Shock)  Goal: Absence of Bleeding  Outcome: Ongoing, Progressing     Problem: Adjustment to Illness (Sepsis/Septic Shock)  Goal: Optimal Coping  Outcome: Ongoing, Progressing     Problem: Bleeding (Gastrointestinal Bleeding)  Goal: Hemostasis  Outcome: Ongoing, Progressing     Problem: Adjustment to Illness (Gastrointestinal Bleeding)  Goal: Optimal Coping with Acute Illness  Outcome: Ongoing, Progressing     Problem: Fall Injury Risk  Goal: Absence of Fall and Fall-Related Injury  Outcome: Ongoing, Progressing     Problem: Skin Injury Risk Increased  Goal: Skin Health and Integrity  Outcome: Ongoing, Progressing     Problem: Impaired Wound Healing  Goal: Optimal Wound Healing  Outcome: Ongoing, Progressing     Problem: Renal Function Impairment (Acute Kidney Injury/Impairment)  Goal: Effective Renal Function  Outcome: Ongoing, Progressing     Problem: Oral Intake Inadequate (Acute Kidney Injury/Impairment)  Goal: Optimal Nutrition Intake  Outcome: Ongoing, Progressing     Problem: Fluid and Electrolyte Imbalance (Acute Kidney Injury/Impairment)  Goal: Fluid and Electrolyte Balance  Outcome: Ongoing, Progressing     Problem: Adult Inpatient Plan of Care  Goal: Plan of Care Review  Outcome: Ongoing, Progressing  Goal: Patient-Specific Goal (Individualized)  Outcome: Ongoing, Progressing  Goal: Absence of Hospital-Acquired Illness or Injury  Outcome: Ongoing, Progressing  Goal: Optimal Comfort and  Wellbeing  Outcome: Ongoing, Progressing  Goal: Readiness for Transition of Care  Outcome: Ongoing, Progressing

## 2022-04-03 NOTE — SUBJECTIVE & OBJECTIVE
Interval History:     Review of Systems   Unable to perform ROS: Patient nonverbal   Objective:     Vital Signs (Most Recent):  Temp: 98.8 °F (37.1 °C) (04/03/22 1142)  Pulse: 76 (04/03/22 1356)  Resp: 16 (04/03/22 1356)  BP: 135/73 (04/03/22 1142)  SpO2: 98 % (04/03/22 1356) Vital Signs (24h Range):  Temp:  [97.3 °F (36.3 °C)-98.9 °F (37.2 °C)] 98.8 °F (37.1 °C)  Pulse:  [64-89] 76  Resp:  [16-18] 16  SpO2:  [95 %-100 %] 98 %  BP: (114-141)/(73-86) 135/73     Weight: 64.4 kg (141 lb 15.6 oz)  Body mass index is 25.97 kg/m².    Intake/Output Summary (Last 24 hours) at 4/3/2022 1506  Last data filed at 4/3/2022 1400  Gross per 24 hour   Intake 1420 ml   Output 1900 ml   Net -480 ml      Physical Exam  Constitutional:       General: She is not in acute distress.     Appearance: She is well-developed. She is ill-appearing. She is not diaphoretic.      Comments: Awake , non verbal , does not follow commands    HENT:      Head: Normocephalic and atraumatic.      Mouth/Throat:      Mouth: Mucous membranes are dry.      Pharynx: No oropharyngeal exudate.   Eyes:      Conjunctiva/sclera: Conjunctivae normal.      Pupils: Pupils are equal, round, and reactive to light.   Neck:      Thyroid: No thyromegaly.      Vascular: No JVD.   Cardiovascular:      Rate and Rhythm: Normal rate and regular rhythm.      Heart sounds: Normal heart sounds. No murmur heard.  Pulmonary:      Effort: Pulmonary effort is normal. No respiratory distress.      Breath sounds: No wheezing or rales.      Comments: Decreased breaths sounds ignacio due to poor inspiratory effort   Chest:      Chest wall: No tenderness.   Abdominal:      General: Bowel sounds are normal. There is no distension.      Palpations: Abdomen is soft.      Tenderness: There is no abdominal tenderness. There is no guarding or rebound.      Comments: PEG tube in palce   Musculoskeletal:         General: Normal range of motion.      Cervical back: Normal range of motion and neck  supple.      Right lower leg: No edema.      Left lower leg: No edema.   Lymphadenopathy:      Cervical: No cervical adenopathy.   Skin:     General: Skin is warm and dry.      Findings: No rash.   Neurological:      Cranial Nerves: No cranial nerve deficit.      Sensory: No sensory deficit.      Motor: Weakness present.      Deep Tendon Reflexes: Reflexes normal.      Comments: Awake , non verbal, does not follow commands        Significant Labs: All pertinent labs within the past 24 hours have been reviewed.      Significant Imaging: I have reviewed all pertinent imaging results/findings within the past 24 hours.

## 2022-04-03 NOTE — ASSESSMENT & PLAN NOTE
- Pt was hypotensive on presentation   -Monitor BP trend   -Hold antihypertensive for now and resume once appropriate   -BP has improved   Resume BP medication

## 2022-04-03 NOTE — PLAN OF CARE
PT resting in bed, remains free of falls and injuries this shift. VSS. No obvious s/sx of distress noted. Bolus feedings given as ordered, no signs of distress tolerating well. Turning Q2 as ordered. Abreu in place as ordered, draining well. POC reviewed with patient and family, verbalized understanding. No further needs at this time, call light within reach. Will continue POC as ordered.

## 2022-04-03 NOTE — PLAN OF CARE
[facility]  Discharge Final Note    Primary Care Provider: Maggie Sue NP    Expected Discharge Date: 4/3/2022    Final Discharge Note (most recent)       Final Note - 04/03/22 1500          Final Note    Assessment Type Final Discharge Note     Anticipated Discharge Disposition Home or Self Care        Post-Acute Status    Discharge Delays None known at this time                     Important Message from Medicare             Contact Info       [facility]   Specialty: Home Health Services    [address]  [place] [address]   Phone: [phone]       Next Steps: Follow up    Instructions: home health    [facility]   Specialty: Pharmacist, DME Provider, IV Infusion    [address]  [address]  [place] [facility] [address]   Phone: [phone]       Next Steps: Follow up    Instructions: IV infusion            Sajnana Guzmán LMSW 4/3/2022 3:03 PM

## 2022-04-03 NOTE — CONSULTS
Discharge planning completed. Previous CM sent referral to resume care with [facility] on 4/1 and pt has been accepted. KM,  MSW

## 2022-04-03 NOTE — ASSESSMENT & PLAN NOTE
Pt meets 2 SIRS criteria HR>90, RR>20  Lactic acid 2.9  Source -lower resp tract   This patient does have evidence of infective focus  My overall impression is sepsis. Vital signs were reviewed and noted in progress note.  Antibiotics given-   Antibiotics (From admission, onward)            Start     Stop Route Frequency Ordered    04/01/22 0900  cefTRIAXone (ROCEPHIN) 1 g/50 mL D5W IVPB         04/05 0859 IV Every 24 hours (non-standard times) 03/31/22 0928 03/31/22 1400  metroNIDAZOLE tablet 500 mg         04/05 1359 PER G TUBE Every 8 hours 03/31/22 0928 03/31/22 1045  mupirocin 2 % ointment         04/05 0859 Nasl 2 times daily 03/31/22 0934        Cultures were taken-   Microbiology Results (last 7 days)     Procedure Component Value Units Date/Time    Blood culture x two cultures. Draw prior to antibiotics. [[id number]] Collected: 03/31/22 0728    Order Status: Completed Specimen: Blood from Peripheral, Antecubital, Right Updated: 04/02/22 1812     Blood Culture, Routine No Growth to date      No Growth to date      No Growth to date    Narrative:      Aerobic and anaerobic    Blood culture x two cultures. Draw prior to antibiotics. [[id number]] Collected: 03/31/22 0742    Order Status: Completed Specimen: Blood from Peripheral, Forearm, Left Updated: 04/02/22 1812     Blood Culture, Routine No Growth to date      No Growth to date      No Growth to date    Narrative:      Aerobic and anaerobic        Latest lactate reviewed, they are-  Recent Labs   Lab 03/31/22 2004 04/01/22  0623   LACTATE 2.5* 2.0       Organ dysfunction indicated by - AMS  Source- Lower respiratory tract     Source control Achieved by- Antibiotic treatment

## 2022-04-03 NOTE — ASSESSMENT & PLAN NOTE
- GI bleed pathway initiated   -P-RBC transfusion for Hgb 7 or under   -GI consulted  -Family declined EGD at this time   -Cont medical tx   -Cont PPI  -Start  plavix   -High risk for re bleeding .  Family aware . Declined EGD .

## 2022-04-03 NOTE — PROGRESS NOTES
"[facility] Medicine  Progress Note    Patient Name: Tanya Madrid  MRN: [id number]  Patient Class: IP- Inpatient   Admission Date: 3/31/2022  Length of Stay: 3 days  Attending Physician: Sj Juan, *  Primary Care Provider: Maggie Sue NP        Subjective:     Principal Problem:Upper GI bleed        HPI:  The pt is 83 yo female with recent h/o out of hospital Cardiac arrest and PMHx of combined systolic and diastolic HF(EF 10 to 25%) , HTN, Parkinson's disease , Prior CVA who was just discharged on 3/29/22 after being in the hospital for 19 days following cardiac arrest , brought back to the hospital by ambulance after pt was found this morning by her daughter  mouth covered with black emesis as well as black stool and poorly responsive. Pt is non verbal since cardiac arrest and history gathered from daughter who reports stool was brown yesterday . Pt is on ASA and Plavix treatment for h/o CVA.     In the ED vitals - 96/55, 119, 24,  97.4,100% on 3L/min NC  Hgb 5.5 was 8.6 on 3/25/22, Pl 341, Na 150, CO2 16, BUN 76, Cr 1.2, Troponin 0.082, LA 2.9, Procalcitonin 0.91, UA unremarkable   CXR- mild hazy infiltrate at the right lung base  CT head- no acute changes     Pt will be admitted under Hospital Medicine service for further evaluation and management of acute blood anemia likely due to GI bleed , Sepsis due to aspiration pneumonitis or pneumonia. Code status - DNR, SDM- daughter Adenike Lemus [phone]      Overview/Hospital Course:  83 y/o AAF with a PMHx of ICM with a EF of 10 % , S/P cardiac arrest , bed bound  with a PEG tube  admitted with a dx of acute GIB . S/P 2 PRBC   and GI consulted .  Family  declined  EGD at this time .  Started on PPI BID and Blood thinner on hold . Plan to resume  TF and free H2o . She is non verbal , however the RN report she said " good morning"  today. POC discussed with the POA at bedside .  H/H remain stable after 2 PRBC .   4/2 She is " more alert today . She report to verbal and tactile stimuli . She mumble some words . H/H trend down yesterday  and 1 PRBC ordered . She lost IV acces while transfusion and extra unit of blood ordered . She had a  melonitc stool yesterday and TF hold . The Na level trending down .   4/3 She is mumbling some words . The H/H has been  stable for more than 24 hrs . There is no sign of active bleeding .  Family request to start one blood thinner before  d/c home . She is a high risk of bleeding .Family refused EGD.       Interval History:     Review of Systems   Unable to perform ROS: Patient nonverbal   Objective:     Vital Signs (Most Recent):  Temp: 98.8 °F (37.1 °C) (04/03/22 1142)  Pulse: 76 (04/03/22 1356)  Resp: 16 (04/03/22 1356)  BP: 135/73 (04/03/22 1142)  SpO2: 98 % (04/03/22 1356) Vital Signs (24h Range):  Temp:  [97.3 °F (36.3 °C)-98.9 °F (37.2 °C)] 98.8 °F (37.1 °C)  Pulse:  [64-89] 76  Resp:  [16-18] 16  SpO2:  [95 %-100 %] 98 %  BP: (114-141)/(73-86) 135/73     Weight: 64.4 kg (141 lb 15.6 oz)  Body mass index is 25.97 kg/m².    Intake/Output Summary (Last 24 hours) at 4/3/2022 1506  Last data filed at 4/3/2022 1400  Gross per 24 hour   Intake 1420 ml   Output 1900 ml   Net -480 ml      Physical Exam  Constitutional:       General: She is not in acute distress.     Appearance: She is well-developed. She is ill-appearing. She is not diaphoretic.      Comments: Awake , non verbal , does not follow commands    HENT:      Head: Normocephalic and atraumatic.      Mouth/Throat:      Mouth: Mucous membranes are dry.      Pharynx: No oropharyngeal exudate.   Eyes:      Conjunctiva/sclera: Conjunctivae normal.      Pupils: Pupils are equal, round, and reactive to light.   Neck:      Thyroid: No thyromegaly.      Vascular: No JVD.   Cardiovascular:      Rate and Rhythm: Normal rate and regular rhythm.      Heart sounds: Normal heart sounds. No murmur heard.  Pulmonary:      Effort: Pulmonary effort is normal. No  respiratory distress.      Breath sounds: No wheezing or rales.      Comments: Decreased breaths sounds ignacio due to poor inspiratory effort   Chest:      Chest wall: No tenderness.   Abdominal:      General: Bowel sounds are normal. There is no distension.      Palpations: Abdomen is soft.      Tenderness: There is no abdominal tenderness. There is no guarding or rebound.      Comments: PEG tube in palce   Musculoskeletal:         General: Normal range of motion.      Cervical back: Normal range of motion and neck supple.      Right lower leg: No edema.      Left lower leg: No edema.   Lymphadenopathy:      Cervical: No cervical adenopathy.   Skin:     General: Skin is warm and dry.      Findings: No rash.   Neurological:      Cranial Nerves: No cranial nerve deficit.      Sensory: No sensory deficit.      Motor: Weakness present.      Deep Tendon Reflexes: Reflexes normal.      Comments: Awake , non verbal, does not follow commands        Significant Labs: All pertinent labs within the past 24 hours have been reviewed.      Significant Imaging: I have reviewed all pertinent imaging results/findings within the past 24 hours.        Assessment/Plan:      * Upper GI bleed  - GI bleed pathway initiated   -P-RBC transfusion for Hgb 7 or under   -GI consulted  -Family declined EGD at this time   -Cont medical tx   -Cont PPI  -Start  plavix   -High risk for re bleeding .  Family aware . Declined EGD .       Chronic combined systolic and diastolic heart failure  - No signs of decompensation at this time   -Diuretics and Entresto have been on hold since last admission for Cardiac arrest due to CODY, marginal to normal BP  -Hold BB due to current hypotension and resume once appropriate       Sepsis  Pt meets 2 SIRS criteria HR>90, RR>20  Lactic acid 2.9  Source -lower resp tract   This patient does have evidence of infective focus  My overall impression is sepsis. Vital signs were reviewed and noted in progress  note.  Antibiotics given-   Antibiotics (From admission, onward)            Start     Stop Route Frequency Ordered    04/01/22 0900  cefTRIAXone (ROCEPHIN) 1 g/50 mL D5W IVPB         04/05 0859 IV Every 24 hours (non-standard times) 03/31/22 0928    03/31/22 1400  metroNIDAZOLE tablet 500 mg         04/05 1359 PER G TUBE Every 8 hours 03/31/22 0928    03/31/22 1045  mupirocin 2 % ointment         04/05 0859 Nasl 2 times daily 03/31/22 0934        Cultures were taken-   Microbiology Results (last 7 days)     Procedure Component Value Units Date/Time    Blood culture x two cultures. Draw prior to antibiotics. [[id number]] Collected: 03/31/22 0728    Order Status: Completed Specimen: Blood from Peripheral, Antecubital, Right Updated: 04/02/22 1812     Blood Culture, Routine No Growth to date      No Growth to date      No Growth to date    Narrative:      Aerobic and anaerobic    Blood culture x two cultures. Draw prior to antibiotics. [[id number]] Collected: 03/31/22 0742    Order Status: Completed Specimen: Blood from Peripheral, Forearm, Left Updated: 04/02/22 1812     Blood Culture, Routine No Growth to date      No Growth to date      No Growth to date    Narrative:      Aerobic and anaerobic        Latest lactate reviewed, they are-  Recent Labs   Lab 03/31/22 2004 04/01/22 0623   LACTATE 2.5* 2.0       Organ dysfunction indicated by - AMS  Source- Lower respiratory tract     Source control Achieved by- Antibiotic treatment               Hypernatremia  - Free water deficit in the setting PEG tube dependency   -cont  D5 infusion   -Monitor electrolytes   Resume TF       Recent H/O Out of hospital Cardiac arrest  -Supportive treatment     Acute blood loss anemia  - Likely secondary to upper GI bleed   - S/P PRBC  3 unit to keep Hgb >7  -Monitor vitals   -Telemetry monitoring       Parkinson disease  - Resume Home medicine via PEG tube       GERD (gastroesophageal reflux disease)  - Continue PPI BID       H/O  Essential hypertension  - Pt was hypotensive on presentation   -Monitor BP trend   -Hold antihypertensive for now and resume once appropriate   -BP has improved   Resume BP medication         VTE Risk Mitigation (From admission, onward)         Ordered     IP VTE HIGH RISK PATIENT  Once         03/31/22 1623     Place sequential compression device  Until discontinued         03/31/22 1623     Place FARSHAD hose  Until discontinued         03/31/22 0926     Place sequential compression device  Until discontinued         03/31/22 0926                Discharge Planning   PARMINDER: 4/3/2022     Code Status: DNR   Is the patient medically ready for discharge?:     Reason for patient still in hospital (select all that apply): Treatment  Discharge Plan A: Home with family, Home Health   Discharge Delays: None known at this time              Sj Juan MD  Department of Hospital Medicine   [facility]

## 2022-04-04 VITALS
BODY MASS INDEX: 25.52 KG/M2 | SYSTOLIC BLOOD PRESSURE: 111 MMHG | HEART RATE: 75 BPM | WEIGHT: 138.69 LBS | DIASTOLIC BLOOD PRESSURE: 63 MMHG | TEMPERATURE: 98 F | RESPIRATION RATE: 18 BRPM | HEIGHT: 62 IN | OXYGEN SATURATION: 100 %

## 2022-04-04 LAB
ALBUMIN SERPL BCP-MCNC: 2.7 G/DL (ref 3.5–5.2)
ALP SERPL-CCNC: 135 U/L (ref 55–135)
ALT SERPL W/O P-5'-P-CCNC: 8 U/L (ref 10–44)
ANION GAP SERPL CALC-SCNC: 11 MMOL/L (ref 8–16)
AST SERPL-CCNC: 30 U/L (ref 10–40)
BASOPHILS # BLD AUTO: 0.03 K/UL (ref 0–0.2)
BASOPHILS NFR BLD: 0.3 % (ref 0–1.9)
BILIRUB SERPL-MCNC: 0.5 MG/DL (ref 0.1–1)
BUN SERPL-MCNC: 23 MG/DL (ref 8–23)
CALCIUM SERPL-MCNC: 8.1 MG/DL (ref 8.7–10.5)
CHLORIDE SERPL-SCNC: 108 MMOL/L (ref 95–110)
CO2 SERPL-SCNC: 21 MMOL/L (ref 23–29)
CREAT SERPL-MCNC: 0.8 MG/DL (ref 0.5–1.4)
DIFFERENTIAL METHOD: ABNORMAL
EOSINOPHIL # BLD AUTO: 0.1 K/UL (ref 0–0.5)
EOSINOPHIL NFR BLD: 1.3 % (ref 0–8)
ERYTHROCYTE [DISTWIDTH] IN BLOOD BY AUTOMATED COUNT: 17.2 % (ref 11.5–14.5)
EST. GFR  (AFRICAN AMERICAN): >60 ML/MIN/1.73 M^2
EST. GFR  (NON AFRICAN AMERICAN): >60 ML/MIN/1.73 M^2
GLUCOSE SERPL-MCNC: 113 MG/DL (ref 70–110)
HCT VFR BLD AUTO: 40.7 % (ref 37–48.5)
HGB BLD-MCNC: 12.8 G/DL (ref 12–16)
IMM GRANULOCYTES # BLD AUTO: 0.17 K/UL (ref 0–0.04)
IMM GRANULOCYTES NFR BLD AUTO: 1.7 % (ref 0–0.5)
LYMPHOCYTES # BLD AUTO: 1.8 K/UL (ref 1–4.8)
LYMPHOCYTES NFR BLD: 18 % (ref 18–48)
MCH RBC QN AUTO: 28.9 PG (ref 27–31)
MCHC RBC AUTO-ENTMCNC: 31.4 G/DL (ref 32–36)
MCV RBC AUTO: 92 FL (ref 82–98)
MONOCYTES # BLD AUTO: 1.1 K/UL (ref 0.3–1)
MONOCYTES NFR BLD: 11.6 % (ref 4–15)
NEUTROPHILS # BLD AUTO: 6.5 K/UL (ref 1.8–7.7)
NEUTROPHILS NFR BLD: 67.1 % (ref 38–73)
NRBC BLD-RTO: 0 /100 WBC
PLATELET # BLD AUTO: 251 K/UL (ref 150–450)
PMV BLD AUTO: 12.9 FL (ref 9.2–12.9)
POCT GLUCOSE: 113 MG/DL (ref 70–110)
POCT GLUCOSE: 131 MG/DL (ref 70–110)
POCT GLUCOSE: 166 MG/DL (ref 70–110)
POTASSIUM SERPL-SCNC: 3.9 MMOL/L (ref 3.5–5.1)
PROT SERPL-MCNC: 6.4 G/DL (ref 6–8.4)
RBC # BLD AUTO: 4.43 M/UL (ref 4–5.4)
SODIUM SERPL-SCNC: 140 MMOL/L (ref 136–145)
WBC # BLD AUTO: 9.73 K/UL (ref 3.9–12.7)

## 2022-04-04 PROCEDURE — 25000003 PHARM REV CODE 250: Performed by: INTERNAL MEDICINE

## 2022-04-04 PROCEDURE — 99900035 HC TECH TIME PER 15 MIN (STAT)

## 2022-04-04 PROCEDURE — 80053 COMPREHEN METABOLIC PANEL: CPT | Performed by: INTERNAL MEDICINE

## 2022-04-04 PROCEDURE — C1751 CATH, INF, PER/CENT/MIDLINE: HCPCS

## 2022-04-04 PROCEDURE — 94761 N-INVAS EAR/PLS OXIMETRY MLT: CPT

## 2022-04-04 PROCEDURE — 25000242 PHARM REV CODE 250 ALT 637 W/ HCPCS: Performed by: INTERNAL MEDICINE

## 2022-04-04 PROCEDURE — 94640 AIRWAY INHALATION TREATMENT: CPT

## 2022-04-04 PROCEDURE — 63600175 PHARM REV CODE 636 W HCPCS: Performed by: INTERNAL MEDICINE

## 2022-04-04 PROCEDURE — 85025 COMPLETE CBC W/AUTO DIFF WBC: CPT | Performed by: INTERNAL MEDICINE

## 2022-04-04 RX ORDER — ESOMEPRAZOLE MAGNESIUM 40 MG/1
CAPSULE, DELAYED RELEASE ORAL
Qty: 60 CAPSULE | Refills: 3 | Status: SHIPPED | OUTPATIENT
Start: 2022-04-04 | End: 2022-08-09

## 2022-04-04 RX ORDER — PANTOPRAZOLE SODIUM 40 MG/1
40 FOR SUSPENSION ORAL 2 TIMES DAILY
Qty: 60 PACKET | Refills: 3 | Status: SHIPPED | OUTPATIENT
Start: 2022-04-04 | End: 2022-04-04 | Stop reason: HOSPADM

## 2022-04-04 RX ADMIN — CARVEDILOL 3.12 MG: 3.12 TABLET, FILM COATED ORAL at 09:04

## 2022-04-04 RX ADMIN — CLOPIDOGREL 75 MG: 75 TABLET, FILM COATED ORAL at 09:04

## 2022-04-04 RX ADMIN — PANTOPRAZOLE SODIUM 40 MG: 40 GRANULE, DELAYED RELEASE ORAL at 09:04

## 2022-04-04 RX ADMIN — CEFTRIAXONE 1 G: 1 INJECTION, SOLUTION INTRAVENOUS at 09:04

## 2022-04-04 RX ADMIN — IPRATROPIUM BROMIDE 500 MCG: 0.5 SOLUTION RESPIRATORY (INHALATION) at 07:04

## 2022-04-04 RX ADMIN — IPRATROPIUM BROMIDE 500 MCG: 0.5 SOLUTION RESPIRATORY (INHALATION) at 12:04

## 2022-04-04 RX ADMIN — METRONIDAZOLE 500 MG: 500 TABLET ORAL at 05:04

## 2022-04-04 RX ADMIN — SACUBITRIL AND VALSARTAN 1 TABLET: 49; 51 TABLET, FILM COATED ORAL at 09:04

## 2022-04-04 RX ADMIN — MUPIROCIN: 20 OINTMENT TOPICAL at 09:04

## 2022-04-04 RX ADMIN — CARBIDOPA AND LEVODOPA 2 TABLET: 25; 100 TABLET ORAL at 09:04

## 2022-04-04 NOTE — PLAN OF CARE
Problem: Adult Inpatient Plan of Care  Goal: Plan of Care Review  Outcome: Met  Goal: Patient-Specific Goal (Individualized)  Outcome: Met  Goal: Absence of Hospital-Acquired Illness or Injury  Outcome: Met  Goal: Optimal Comfort and Wellbeing  Outcome: Met  Goal: Readiness for Transition of Care  Outcome: Met     Problem: Fluid and Electrolyte Imbalance (Acute Kidney Injury/Impairment)  Goal: Fluid and Electrolyte Balance  Outcome: Met     Problem: Oral Intake Inadequate (Acute Kidney Injury/Impairment)  Goal: Optimal Nutrition Intake  Outcome: Met     Problem: Renal Function Impairment (Acute Kidney Injury/Impairment)  Goal: Effective Renal Function  Outcome: Met     Problem: Impaired Wound Healing  Goal: Optimal Wound Healing  Outcome: Met     Problem: Fall Injury Risk  Goal: Absence of Fall and Fall-Related Injury  Outcome: Met     Problem: Adjustment to Illness (Gastrointestinal Bleeding)  Goal: Optimal Coping with Acute Illness  Outcome: Met     Problem: Bleeding (Gastrointestinal Bleeding)  Goal: Hemostasis  Outcome: Met     Problem: Adjustment to Illness (Sepsis/Septic Shock)  Goal: Optimal Coping  Outcome: Met     Problem: Bleeding (Sepsis/Septic Shock)  Goal: Absence of Bleeding  Outcome: Met     Problem: Glycemic Control Impaired (Sepsis/Septic Shock)  Goal: Blood Glucose Level Within Desired Range  Outcome: Met     Problem: Infection Progression (Sepsis/Septic Shock)  Goal: Absence of Infection Signs and Symptoms  Outcome: Met     Problem: Nutrition Impaired (Sepsis/Septic Shock)  Goal: Optimal Nutrition Intake  Outcome: Met     Problem: Infection  Goal: Absence of Infection Signs and Symptoms  Outcome: Met     Problem: Coping Ineffective  Goal: Effective Coping  Outcome: Met

## 2022-04-04 NOTE — PLAN OF CARE
[facility]  Discharge Final Note    Primary Care Provider: Maggie Sue NP    Expected Discharge Date: 4/4/2022    Final Discharge Note (most recent)     Final Note - 04/04/22 1010        Final Note    Assessment Type Final Discharge Note     Anticipated Discharge Disposition Home-Health Care [facility]     Hospital Resources/Appts/Education Provided Provided patient/caregiver with written discharge plan information;Appointments scheduled and added to AVS        Post-Acute Status    Post-Acute Authorization Home Health     Home Health Status Set-up Complete/Auth obtained                 Important Message from Medicare             Contact Info     [facility]   Specialty: Home Health Services    [address]  [place] [address]   Phone: [phone]       Next Steps: Follow up    Instructions: home health    [facility]   Specialty: Pharmacist, DME Provider, IV Infusion    [address]  [address]  [place] [address]   Phone: [phone]       Next Steps: Follow up    Instructions: IV infusion        CM spoke with PCP's nurse regarding setting up a virtual follow up appointment as the only way pt is able to transport is by ambulance. She reports she will be in contact with pts daughter to arrange. CM updated daughter Josee.

## 2022-04-04 NOTE — PLAN OF CARE
Problem: Adult Inpatient Plan of Care  Goal: Plan of Care Review  Outcome: Ongoing, Progressing  Goal: Patient-Specific Goal (Individualized)  Outcome: Ongoing, Progressing  Goal: Absence of Hospital-Acquired Illness or Injury  Outcome: Ongoing, Progressing  Goal: Optimal Comfort and Wellbeing  Outcome: Ongoing, Progressing  Goal: Readiness for Transition of Care  Outcome: Ongoing, Progressing     Problem: Fluid and Electrolyte Imbalance (Acute Kidney Injury/Impairment)  Goal: Fluid and Electrolyte Balance  Outcome: Ongoing, Progressing     Problem: Oral Intake Inadequate (Acute Kidney Injury/Impairment)  Goal: Optimal Nutrition Intake  Outcome: Ongoing, Progressing     Problem: Renal Function Impairment (Acute Kidney Injury/Impairment)  Goal: Effective Renal Function  Outcome: Ongoing, Progressing     Problem: Impaired Wound Healing  Goal: Optimal Wound Healing  Outcome: Ongoing, Progressing     Problem: Skin Injury Risk Increased  Goal: Skin Health and Integrity  Outcome: Ongoing, Progressing     Problem: Fall Injury Risk  Goal: Absence of Fall and Fall-Related Injury  Outcome: Ongoing, Progressing     Problem: Adjustment to Illness (Gastrointestinal Bleeding)  Goal: Optimal Coping with Acute Illness  Outcome: Ongoing, Progressing     Problem: Bleeding (Gastrointestinal Bleeding)  Goal: Hemostasis  Outcome: Ongoing, Progressing     Problem: Adjustment to Illness (Sepsis/Septic Shock)  Goal: Optimal Coping  Outcome: Ongoing, Progressing     Problem: Bleeding (Sepsis/Septic Shock)  Goal: Absence of Bleeding  Outcome: Ongoing, Progressing     Problem: Glycemic Control Impaired (Sepsis/Septic Shock)  Goal: Blood Glucose Level Within Desired Range  Outcome: Ongoing, Progressing     Problem: Infection Progression (Sepsis/Septic Shock)  Goal: Absence of Infection Signs and Symptoms  Outcome: Ongoing, Progressing     Problem: Nutrition Impaired (Sepsis/Septic Shock)  Goal: Optimal Nutrition Intake  Outcome: Ongoing,  Progressing     Problem: Infection  Goal: Absence of Infection Signs and Symptoms  Outcome: Ongoing, Progressing     Problem: Coping Ineffective  Goal: Effective Coping  Outcome: Ongoing, Progressing

## 2022-04-04 NOTE — DISCHARGE SUMMARY
"[facility] Medicine  Discharge Summary      Patient Name: Tanya Madrid  MRN: [id number]  Patient Class: IP- Inpatient  Admission Date: 3/31/2022  Hospital Length of Stay: 4 days  Discharge Date and Time:  04/04/2022 12:15 PM  Attending Physician: Sj Juan, *   Discharging Provider: Sj Juan MD  Primary Care Provider: Maggie Sue NP      HPI:   The pt is 81 yo female with recent h/o out of hospital Cardiac arrest and PMHx of combined systolic and diastolic HF(EF 10 to 25%) , HTN, Parkinson's disease , Prior CVA who was just discharged on 3/29/22 after being in the hospital for 19 days following cardiac arrest , brought back to the hospital by ambulance after pt was found this morning by her daughter  mouth covered with black emesis as well as black stool and poorly responsive. Pt is non verbal since cardiac arrest and history gathered from daughter who reports stool was brown yesterday . Pt is on ASA and Plavix treatment for h/o CVA.     In the ED vitals - 96/55, 119, 24,  97.4,100% on 3L/min NC  Hgb 5.5 was 8.6 on 3/25/22, Pl 341, Na 150, CO2 16, BUN 76, Cr 1.2, Troponin 0.082, LA 2.9, Procalcitonin 0.91, UA unremarkable   CXR- mild hazy infiltrate at the right lung base  CT head- no acute changes     Pt will be admitted under Hospital Medicine service for further evaluation and management of acute blood anemia likely due to GI bleed , Sepsis due to aspiration pneumonitis or pneumonia. Code status - DNR, SDM- daughter Adenike Lemus [phone]      * No surgery found *      Hospital Course:   83 y/o AAF with a PMHx of ICM with a EF of 10 % , S/P cardiac arrest , bed bound  with a PEG tube  admitted with a dx of acute GIB . S/P 2 PRBC   and GI consulted .  Family  declined  EGD at this time .  Started on PPI BID and Blood thinner on hold . Plan to resume  TF and free H2o . She is non verbal , however the RN report she said " good morning"  today. POC discussed " with the POA at bedside .  H/H remain stable after 2 PRBC .   4/2 She is more alert today . She report to verbal and tactile stimuli . She mumble some words . H/H trend down yesterday  and 1 PRBC ordered . She lost IV acces while transfusion and extra unit of blood ordered . She had a  melonitc stool yesterday and TF hold . The Na level trending down .   4/3 She is mumbling some words . The H/H has been  stable for more than 24 hrs . There is no sign of active bleeding .  Family request to start one blood thinner before  d/c home . She is a high risk of bleeding .Family refused EGD.   4/4 Pt was seen and examined at bedside . She was determined to be suitable for d/c   She was admitted with a Dx of UGIB  of unknown etiology . She is s/p 4 PRBC . GI consulted .  Daughter POA declined  EGD .  Pt is a high risk of bleeding . Risk and benefit explained to Pt daughter . She request to start one antiplatelet therapy  due to previous h/o CAD and CVA.  Pt VS and HH remain stable for more than 48 hrs . There is no sign of acute bleeding . She will be d/c home per family request with HH . Prognosis POOR . High risk for re admission  . Family Declined comfort care at this time . CODE Status : DNR. She will be d/c on PPI  BID x 8 weeks and then after qd .       Goals of Care Treatment Preferences:  Code Status: DNR          What is most important right now is to focus on improvement in condition but with limits to invasive therapies.  Accordingly, we have decided that the best plan to meet the patient's goals includes continuing with treatment.      Consults:   Consults (From admission, onward)        Status Ordering Provider     Inpatient consult to Social Work  Once        Provider:  (Not yet assigned)    Completed KAUSHAL MANJARREZ     Inpatient consult to Gastroenterology  Once        Provider:  (Not yet assigned)    Completed CLEO PATINO     IP consult to case management  Once        Provider:  (Not yet assigned)     Completed JOSE G NUÑEZ     Inpatient consult to Registered Dietitian/Nutritionist  Once        Provider:  (Not yet assigned)    Completed JOSE G NUÑEZ          No new Assessment & Plan notes have been filed under this hospital service since the last note was generated.  Service: Hospital Medicine    Final Active Diagnoses:    Diagnosis Date Noted POA    PRINCIPAL PROBLEM:  Upper GI bleed [K92.2] 03/31/2022 Yes    Sepsis [A41.9] 03/31/2022 Yes    Chronic combined systolic and diastolic heart failure [I50.42] 03/31/2022 Yes    Hypernatremia [E87.0] 03/19/2022 Yes    Recent H/O Out of hospital Cardiac arrest [I46.9] 03/11/2022 Yes    Acute blood loss anemia [D62] 07/13/2020 Yes    H/O Essential hypertension [I10] 07/24/2019 Yes     Chronic    GERD (gastroesophageal reflux disease) [K21.9] 07/11/2017 Yes     Chronic    Parkinson disease [G20] 07/11/2017 Yes     Chronic      Problems Resolved During this Admission:       Discharged Condition: poor    Disposition: Home-Health Care [facility]    Follow Up:   Follow-up Information     [facility] Follow up.    Specialty: Home Health Services  Why: home health  Contact information:  [address]  [place] [address]  [phone]             [facility] Follow up.    Specialties: Pharmacist, DME Provider, IV Infusion  Why: IV infusion  Contact information:  [address]  [address]  [facility] [address] [phone]             Maggie Sue NP Follow up in 1 week(s).    Specialty: Family Medicine  Contact information:  [address]  [facility] [address] [phone]                       Patient Instructions:      Ambulatory referral/consult to [facility]   Standing Status: Future   Referral Priority: Routine Referral Type: Consultation   Referral Reason: Specialty Services Required   Number of Visits Requested: 1     Ambulatory referral/consult to [facility]    Standing Status: Future   Referral Priority: Routine Referral Type: Home Health   Referral Reason: Specialty Services Required   Requested Specialty: Home Health Services   Number of Visits Requested: 1     Tube Feedings/Formulas   Order Comments: - 5 cartons Isosource 1.5 daily via PEG     - 100mL H2O flush q bolus, Plus  100 cc free H2O in between bolus  for a total  1000 cc  a day .     - Provides 1875 calories, 85g protein, 220g CHO, and 955ml H20 + FWF.     Order Specific Question Answer Comments   Select Adult Formula: Isosource 1.5 efra    Route: Gastrostomy      Activity as tolerated       Significant Diagnostic Studies: Labs:   BMP:   Recent Labs   Lab 04/04/22  1020   *      K 3.9      CO2 21*   BUN 23   CREATININE 0.8   CALCIUM 8.1*   , CMP   Recent Labs   Lab 04/04/22  1020      K 3.9      CO2 21*   *   BUN 23   CREATININE 0.8   CALCIUM 8.1*   PROT 6.4   ALBUMIN 2.7*   BILITOT 0.5   ALKPHOS 135   AST 30   ALT 8*   ANIONGAP 11   ESTGFRAFRICA >60   EGFRNONAA >60    and CBC   Recent Labs   Lab 04/03/22  1057 04/04/22  1020   WBC 9.53 9.73   HGB 10.9* 12.8   HCT 34.0* 40.7    251     Microbiology:   Blood Culture   Lab Results   Component Value Date    LABBLOO No Growth to date 03/31/2022    LABBLOO No Growth to date 03/31/2022    LABBLOO No Growth to date 03/31/2022    LABBLOO No Growth to date 03/31/2022       Pending Diagnostic Studies:     Procedure Component Value Units Date/Time    Brain natriuretic peptide [[id number]] Collected: 03/31/22 0634    Order Status: Sent Lab Status: In process Updated: 03/31/22 0636    Specimen: Blood     Comprehensive metabolic panel [[id number]] Collected: 04/03/22 1057    Order Status: Sent Lab Status: In process Updated: 04/03/22 1057    Specimen: Blood     Narrative:      Collection has been rescheduled by DNP1 at 04/03/2022 10:15 Reason:   Unable to collect  Collection has been rescheduled by CEW at 04/03/2022 10:45 Reason:  PT   in surgery TARUN Mcmullen will call for labs to be drawn    Urinalysis, Reflex to Urine Culture Urine, Clean Catch [[id number]] Collected: 03/31/22 1000    Order Status: Sent Lab Status: In process Updated: 03/31/22 1101    Specimen: Urine          Medications:  Reconciled Home Medications:      Medication List      START taking these medications    esomeprazole 40 MG capsule  Commonly known as: NEXIUM  Take  40 mg(1 cap)  twice a day  , then after 40 mg(1 cap) daily   Through PEG tube        CONTINUE taking these medications    carbidopa-levodopa  mg  mg per tablet  Commonly known as: SINEMET  2 tablets by Per G Tube route 3 (three) times daily.     carvediloL 6.25 MG tablet  Commonly known as: COREG  1 tablet (6.25 mg total) by Per G Tube route 2 (two) times daily.     clonazePAM 0.5 MG tablet  Commonly known as: KlonoPIN  Take 0.5 mg by mouth 2 (two) times daily as needed for Anxiety.     clopidogreL 75 mg tablet  Commonly known as: PLAVIX  1 tablet (75 mg total) by Per G Tube route once daily.     ENTRESTO 49-51 mg per tablet  Generic drug: sacubitriL-valsartan  Take 1 tablet by mouth 2 (two) times daily. HOLD FOR NOW DUE TO LOW BLOOD PRESSURE     furosemide 20 MG tablet  Commonly known as: LASIX  USE  IT ONLY AS NEEDED FOR FLUID RETENTION     ipratropium 0.02 % nebulizer solution  Commonly known as: ATROVENT  Take 2.5 mLs (500 mcg total) by nebulization 2 (two) times daily. Rescue     polyethylene glycol 17 gram/dose powder  Commonly known as: GLYCOLAX  17 g by Per NG tube route once daily. FOR CONSTIPATION     scopolamine 1.3-1.5 mg (1 mg over 3 days)  Commonly known as: TRANSDERM-SCOP  Place 1 patch onto the skin Every 3 (three) days.        STOP taking these medications    aspirin 81 MG Chew     famotidine 20 MG tablet  Commonly known as: PEPCID AC     losartan 50 MG tablet  Commonly known as: COZAAR     metoprolol succinate 50 MG 24 hr tablet  Commonly known as: TOPROL-XL            Indwelling  Lines/Drains at time of discharge:   Lines/Drains/Airways     Drain  Duration                Urethral Catheter 03/10/22 2032 Non-latex 16 Fr. 24 days         Gastrostomy/Enterostomy 03/25/22 1320 Gastrostomy tube w/ balloon LUQ feeding 9 days                Time spent on the discharge of patient: 30 minutes         Sj Juan MD  Department of Hospital Medicine  [facility]

## 2022-04-05 ENCOUNTER — PATIENT OUTREACH (OUTPATIENT)
Dept: ADMINISTRATIVE | Facility: CLINIC | Age: 82
End: 2022-04-05
Payer: MEDICARE

## 2022-04-05 LAB
BACTERIA BLD CULT: NORMAL
BACTERIA BLD CULT: NORMAL

## 2022-04-06 ENCOUNTER — TELEPHONE (OUTPATIENT)
Dept: TRANSPLANT | Facility: CLINIC | Age: 82
End: 2022-04-06
Payer: MEDICARE

## 2022-04-06 DIAGNOSIS — I50.43 ACUTE ON CHRONIC COMBINED SYSTOLIC AND DIASTOLIC CHF (CONGESTIVE HEART FAILURE): Primary | ICD-10-CM

## 2022-04-06 NOTE — TELEPHONE ENCOUNTER
Received call from [facility] nurse regarding entresto/ coreg continuance and BP readings. Katie had visit with pt yesterday: LG=038/66, HR=84  Per discharge AVS entresto to be held for low BP. Informed Katie would notify Tiffany Sanchez NP of above information, f/u with pt and return call for further recommendations.      48-72 hr phone follow up from hospital discharge.     I spoke with Adenike, daughter.     Current blood pressure and heart rate:BP=91/56,105/63            HR=72                                                       Above readings after am medications and coreg/entresto were not given today.  Adenike informed has not been giving pt entresto and only gave one dose of coreg yesterday due to  low BP.    []  FORD/SOB -   []  Orthopnea -  []  PND -  []  Nocturnal cough -  []  fatigue -  [x]  edema -LLE scant, chronic per daughter  [] chest pain -    Reconciled Home Medications:    Pt has not needed prn lasix since discharge.       Medication List          Accurate as of April 6, 2022 11:10 AM. If you have any questions, ask your nurse or doctor.            CONTINUE taking these medications    carbidopa-levodopa  mg  mg per tablet  Commonly known as: SINEMET  2 tablets by Per G Tube route 3 (three) times daily.     carvediloL 6.25 MG tablet  Commonly known as: COREG  1 tablet (6.25 mg total) by Per G Tube route 2 (two) times daily.     clonazePAM 0.5 MG tablet  Commonly known as: KlonoPIN  Take 0.5 mg by mouth 2 (two) times daily as needed for Anxiety.     clopidogreL 75 mg tablet  Commonly known as: PLAVIX  1 tablet (75 mg total) by Per G Tube route once daily.     ENTRESTO 49-51 mg per tablet  Generic drug: sacubitriL-valsartan  Take 1 tablet by mouth 2 (two) times daily. HOLD FOR NOW DUE TO LOW BLOOD PRESSURE     esomeprazole 40 MG capsule  Commonly known as: NEXIUM  Take  40 mg(1 cap)  twice a day  , then after 40 mg(1 cap) daily   Through PEG tube     furosemide 20 MG tablet  Commonly known as:  LASIX  USE  IT ONLY AS NEEDED FOR FLUID RETENTION     ipratropium 0.02 % nebulizer solution  Commonly known as: ATROVENT  Take 2.5 mLs (500 mcg total) by nebulization 2 (two) times daily. Rescue     polyethylene glycol 17 gram/dose powder  Commonly known as: GLYCOLAX  17 g by Per NG tube route once daily. FOR CONSTIPATION     scopolamine 1.3-1.5 mg (1 mg over 3 days)  Commonly known as: TRANSDERM-SCOP  Place 1 patch onto the skin Every 3 (three) days.            CHF education reviewed with pt in detail  Recommend 2 gram sodium restriction and 1500cc fluid restriction.  Recommended to keep accurate record of peg feedings and water flushes for above fluid restriction.  Encourage physical activity with graded exercise program.  Physical therapy working with pt, at this time pt only sits up on side of bed, mostly bedbound.  Pt communicating and talking very limited at this time also.   Requested patient to weigh themselves daily, and to notify us if their weight increases by more than 2 lbs in 1 day or 5 lbs in 1 week.     Watch for these Signs and Symptoms  If any of these occur, contact your doctor immediately:   Increase in shortness of breath with movement   Increase in swelling in your legs and ankles   Weight gain of more than 2 pounds in a night or 5 pounds in a week   Difficulty breathing when you are lying down   Worsening fatigue or tiredness   Stomach bloating, a full feeling or a loss of appetite   Increased coughing--especially when you are lying down    Call 911 if any of the followin: Worsening chest tightness or chest pain  2: Cough with pink, frothy sputum    Reminded of follow up appt on 2022 with Tiffany Sanchez NP.  Adenike informed pt unable to come in for visit due to immobility at this time.  Informed Adenike request pt come in for visit due to initial hospital f/u visit.  Pt does have consult for [facility] home.      office number provided for direct contact for any  questions or concerns.       Notified Tiffany Sanchez NP of above information and new orders received:  1: Stop coreg  2: Resume entresto at hs, hold if SBP<110  3: Keep daily BP/HR log  4: Palliative care consult    Adenike, daughter and Katie, [facility] nurse has been notified of this information and all questions answered.

## 2022-04-07 ENCOUNTER — HOSPITAL ENCOUNTER (EMERGENCY)
Facility: HOSPITAL | Age: 82
Discharge: HOME | End: 2022-04-07
Attending: EMERGENCY MEDICINE
Payer: MEDICARE

## 2022-04-07 ENCOUNTER — PES CALL (OUTPATIENT)
Dept: ADMINISTRATIVE | Facility: CLINIC | Age: 82
End: 2022-04-07
Payer: MEDICARE

## 2022-04-07 VITALS
WEIGHT: 139 LBS | HEART RATE: 87 BPM | TEMPERATURE: 99 F | DIASTOLIC BLOOD PRESSURE: 81 MMHG | RESPIRATION RATE: 16 BRPM | HEIGHT: 62 IN | SYSTOLIC BLOOD PRESSURE: 131 MMHG | BODY MASS INDEX: 25.58 KG/M2 | OXYGEN SATURATION: 100 %

## 2022-04-07 DIAGNOSIS — K92.1 MELENA: ICD-10-CM

## 2022-04-07 DIAGNOSIS — K92.2 UPPER GI BLEED: Primary | ICD-10-CM

## 2022-04-07 LAB
ALBUMIN SERPL BCP-MCNC: 2.5 G/DL (ref 3.5–5.2)
ALP SERPL-CCNC: 168 U/L (ref 55–135)
ALT SERPL W/O P-5'-P-CCNC: 19 U/L (ref 10–44)
ANION GAP SERPL CALC-SCNC: 10 MMOL/L (ref 8–16)
APTT BLDCRRT: 23.6 SEC (ref 21–32)
AST SERPL-CCNC: 50 U/L (ref 10–40)
BASOPHILS # BLD AUTO: 0.03 K/UL (ref 0–0.2)
BASOPHILS NFR BLD: 0.3 % (ref 0–1.9)
BILIRUB SERPL-MCNC: 0.3 MG/DL (ref 0.1–1)
BUN SERPL-MCNC: 19 MG/DL (ref 8–23)
CALCIUM SERPL-MCNC: 7.9 MG/DL (ref 8.7–10.5)
CHLORIDE SERPL-SCNC: 108 MMOL/L (ref 95–110)
CO2 SERPL-SCNC: 22 MMOL/L (ref 23–29)
CREAT SERPL-MCNC: 0.7 MG/DL (ref 0.5–1.4)
DIFFERENTIAL METHOD: ABNORMAL
EOSINOPHIL # BLD AUTO: 0.1 K/UL (ref 0–0.5)
EOSINOPHIL NFR BLD: 1.3 % (ref 0–8)
ERYTHROCYTE [DISTWIDTH] IN BLOOD BY AUTOMATED COUNT: 16.6 % (ref 11.5–14.5)
EST. GFR  (AFRICAN AMERICAN): >60 ML/MIN/1.73 M^2
EST. GFR  (NON AFRICAN AMERICAN): >60 ML/MIN/1.73 M^2
GLUCOSE SERPL-MCNC: 135 MG/DL (ref 70–110)
HCT VFR BLD AUTO: 38 % (ref 37–48.5)
HGB BLD-MCNC: 12 G/DL (ref 12–16)
IMM GRANULOCYTES # BLD AUTO: 0.08 K/UL (ref 0–0.04)
IMM GRANULOCYTES NFR BLD AUTO: 0.8 % (ref 0–0.5)
INR PPP: 1 (ref 0.8–1.2)
LYMPHOCYTES # BLD AUTO: 1.7 K/UL (ref 1–4.8)
LYMPHOCYTES NFR BLD: 17.4 % (ref 18–48)
MCH RBC QN AUTO: 29.2 PG (ref 27–31)
MCHC RBC AUTO-ENTMCNC: 31.6 G/DL (ref 32–36)
MCV RBC AUTO: 93 FL (ref 82–98)
MONOCYTES # BLD AUTO: 1.2 K/UL (ref 0.3–1)
MONOCYTES NFR BLD: 11.7 % (ref 4–15)
NEUTROPHILS # BLD AUTO: 6.8 K/UL (ref 1.8–7.7)
NEUTROPHILS NFR BLD: 68.5 % (ref 38–73)
NRBC BLD-RTO: 0 /100 WBC
PLATELET # BLD AUTO: 248 K/UL (ref 150–450)
PMV BLD AUTO: 12.4 FL (ref 9.2–12.9)
POTASSIUM SERPL-SCNC: 4.5 MMOL/L (ref 3.5–5.1)
PROT SERPL-MCNC: 5.6 G/DL (ref 6–8.4)
PROTHROMBIN TIME: 11 SEC (ref 9–12.5)
RBC # BLD AUTO: 4.11 M/UL (ref 4–5.4)
SODIUM SERPL-SCNC: 140 MMOL/L (ref 136–145)
WBC # BLD AUTO: 9.85 K/UL (ref 3.9–12.7)

## 2022-04-07 PROCEDURE — 85610 PROTHROMBIN TIME: CPT | Performed by: EMERGENCY MEDICINE

## 2022-04-07 PROCEDURE — 63600175 PHARM REV CODE 636 W HCPCS: Performed by: EMERGENCY MEDICINE

## 2022-04-07 PROCEDURE — C9113 INJ PANTOPRAZOLE SODIUM, VIA: HCPCS | Performed by: EMERGENCY MEDICINE

## 2022-04-07 PROCEDURE — 99284 EMERGENCY DEPT VISIT MOD MDM: CPT | Mod: 25

## 2022-04-07 PROCEDURE — 80053 COMPREHEN METABOLIC PANEL: CPT | Performed by: EMERGENCY MEDICINE

## 2022-04-07 PROCEDURE — 85025 COMPLETE CBC W/AUTO DIFF WBC: CPT | Performed by: EMERGENCY MEDICINE

## 2022-04-07 PROCEDURE — 85730 THROMBOPLASTIN TIME PARTIAL: CPT | Performed by: EMERGENCY MEDICINE

## 2022-04-07 PROCEDURE — 96374 THER/PROPH/DIAG INJ IV PUSH: CPT

## 2022-04-07 RX ORDER — PANTOPRAZOLE SODIUM 40 MG/10ML
80 INJECTION, POWDER, LYOPHILIZED, FOR SOLUTION INTRAVENOUS
Status: COMPLETED | OUTPATIENT
Start: 2022-04-07 | End: 2022-04-07

## 2022-04-07 RX ADMIN — PANTOPRAZOLE SODIUM 80 MG: 40 INJECTION, POWDER, FOR SOLUTION INTRAVENOUS at 07:04

## 2022-04-08 NOTE — ED PROVIDER NOTES
SCRIBE #1 NOTE: I, Natan Coughlin, am scribing for, and in the presence of, Garry Felix MD. I have scribed the entire note.      History      Chief Complaint   Patient presents with    Melena     Pt presents to ed via ems. Pt has dark tarry stool. Pt was seen here for the same thing recently       Review of patient's allergies indicates:   Allergen Reactions    Xanax [alprazolam]         HPI   HPI    4/7/2022, 7:06 PM   History obtained from the patient's daughter  HPI and ROS limited, as pt is nonverbal at baseline      History of Present Illness: Tanya Madrid is a 82 y.o. female patient with a PMHx of HTN, stroke, CHF, cardiac arrest who presents to the Emergency Department for blood in stool, onset this morning. Daughter reports noticing black, tarry stool this morning. Symptoms are episodic and moderate in severity. No mitigating or exacerbating factors reported. No associated sxs reported. Pt is currently on Plavix. No further complaints or concerns at this time.     Arrival mode: EMS    PCP: Maggie Sue NP       Past Medical History:  Past Medical History:   Diagnosis Date    Acute CHF (congestive heart failure) 1/17/2021    Hyperlipemia     Hypertension     Parkinson's disease     Stroke 2016    Throat mass        Past Surgical History:  Past Surgical History:   Procedure Laterality Date    CLOSED REDUCTION Right 10/15/2020    Procedure: CLOSED REDUCTION;  Surgeon: Humberto Valdivia DO;  Location: [facility] OR;  Service: Orthopedics;  Laterality: Right;  ATTEMPTED    EVACUATION OF HEMATOMA Right 10/17/2020    Procedure: EVACUATION, HEMATOMA;  Surgeon: Humberto Valdivia DO;  Location: [facility] OR;  Service: Orthopedics;  Laterality: Right;    HEMIARTHROPLASTY OF HIP Left 7/12/2020    Procedure: HEMIARTHROPLASTY, HIP;  Surgeon: Humberto Valdivia DO;  Location: [facility] OR;  Service: Orthopedics;  Laterality: Left;    HEMIARTHROPLASTY OF HIP Right 10/2/2020    Procedure: HEMIARTHROPLASTY, HIP;   Surgeon: Loyd Kearney MD;  Location: [facility] OR;  Service: Orthopedics;  Laterality: Right;    HYSTERECTOMY      OPEN REDUCTION OF DISLOCATION OF HIP Right 10/17/2020    Procedure: OPEN REDUCTION, DISLOCATION, HIP;  Surgeon: Humberto Valdivia DO;  Location: [facility] OR;  Service: Orthopedics;  Laterality: Right;    THROAT SURGERY      TONSILLECTOMY           Family History:  No family history on file.    Social History:  Social History     Tobacco Use    Smoking status: Never Smoker    Smokeless tobacco: Never Used   Substance and Sexual Activity    Alcohol use: No    Drug use: No    Sexual activity: Not Currently       ROS   Review of Systems   Unable to perform ROS: Patient nonverbal   Gastrointestinal: Positive for blood in stool.     Physical Exam      Initial Vitals [04/07/22 1548]   BP Pulse Resp Temp SpO2   116/80 90 14 99.3 °F (37.4 °C) 95 %      MAP       --          Physical Exam  Nursing Notes and Vital Signs Reviewed.  Constitutional: Patient is in no acute distress. Thin, cachectic.   Head: Atraumatic. Normocephalic.  Eyes: PERRL. EOM intact. Conjunctivae are not pale. No scleral icterus.  ENT: Mucous membranes are moist. Oropharynx is clear and symmetric.    Neck: Supple. Full ROM. No lymphadenopathy.  Cardiovascular: Regular rate. Regular rhythm. No murmurs, rubs, or gallops. Distal pulses are 2+ and symmetric.  Pulmonary/Chest: No respiratory distress. Clear to auscultation bilaterally. No wheezing or rales.  Abdominal: Soft and non-distended. PEG tube in place. There is no tenderness.  No rebound, guarding, or rigidity.   Rectal: Female chaperone present for the duration of the rectal exam.There is no tenderness. No masses or hemorrhoids. Normal sphincter tone. The stool color is medium to dark brown, with no gritty or black component noted.  Musculoskeletal: Moves all extremities. No obvious deformities. No edema.  Skin: Warm and dry.  Neurological:  Awake. Nonverbal. No acute focal  "neurological deficits are appreciated.    ED Course    Procedures  ED Vital Signs:  Vitals:    04/07/22 1548 04/07/22 1749 04/07/22 1802 04/07/22 1900   BP: 116/80 116/72 119/66 102/72   Pulse: 90 89 85 84   Resp: 14 (!) 24 (!) 24 16   Temp: 99.3 °F (37.4 °C)      TempSrc: Oral      SpO2: 95% 100% 100% 100%   Weight: 63 kg (139 lb)      Height: 5' 2" (1.575 m)       04/07/22 1930 04/07/22 2000 04/07/22 2100   BP: 134/75 134/79 131/81   Pulse: 89 88 87   Resp: 18 16 16   Temp:      TempSrc:      SpO2: 100% 100% 100%   Weight:      Height:          Abnormal Lab Results:  Labs Reviewed   CBC W/ AUTO DIFFERENTIAL - Abnormal; Notable for the following components:       Result Value    MCHC 31.6 (*)     RDW 16.6 (*)     Immature Granulocytes 0.8 (*)     Immature Grans (Abs) 0.08 (*)     Mono # 1.2 (*)     Lymph % 17.4 (*)     All other components within normal limits   COMPREHENSIVE METABOLIC PANEL - Abnormal; Notable for the following components:    CO2 22 (*)     Glucose 135 (*)     Calcium 7.9 (*)     Total Protein 5.6 (*)     Albumin 2.5 (*)     Alkaline Phosphatase 168 (*)     AST 50 (*)     All other components within normal limits   PROTIME-INR   APTT        All Lab Results:  Results for orders placed or performed during the hospital encounter of 04/07/22   CBC auto differential   Result Value Ref Range    WBC 9.85 3.90 - 12.70 K/uL    RBC 4.11 4.00 - 5.40 M/uL    Hemoglobin 12.0 12.0 - 16.0 g/dL    Hematocrit 38.0 37.0 - 48.5 %    MCV 93 82 - 98 fL    MCH 29.2 27.0 - 31.0 pg    MCHC 31.6 (L) 32.0 - 36.0 g/dL    RDW 16.6 (H) 11.5 - 14.5 %    Platelets 248 150 - 450 K/uL    MPV 12.4 9.2 - 12.9 fL    Immature Granulocytes 0.8 (H) 0.0 - 0.5 %    Gran # (ANC) 6.8 1.8 - 7.7 K/uL    Immature Grans (Abs) 0.08 (H) 0.00 - 0.04 K/uL    Lymph # 1.7 1.0 - 4.8 K/uL    Mono # 1.2 (H) 0.3 - 1.0 K/uL    Eos # 0.1 0.0 - 0.5 K/uL    Baso # 0.03 0.00 - 0.20 K/uL    nRBC 0 0 /100 WBC    Gran % 68.5 38.0 - 73.0 %    Lymph % 17.4 (L) " 18.0 - 48.0 %    Mono % 11.7 4.0 - 15.0 %    Eosinophil % 1.3 0.0 - 8.0 %    Basophil % 0.3 0.0 - 1.9 %    Differential Method Automated    Comprehensive metabolic panel   Result Value Ref Range    Sodium 140 136 - 145 mmol/L    Potassium 4.5 3.5 - 5.1 mmol/L    Chloride 108 95 - 110 mmol/L    CO2 22 (L) 23 - 29 mmol/L    Glucose 135 (H) 70 - 110 mg/dL    BUN 19 8 - 23 mg/dL    Creatinine 0.7 0.5 - 1.4 mg/dL    Calcium 7.9 (L) 8.7 - 10.5 mg/dL    Total Protein 5.6 (L) 6.0 - 8.4 g/dL    Albumin 2.5 (L) 3.5 - 5.2 g/dL    Total Bilirubin 0.3 0.1 - 1.0 mg/dL    Alkaline Phosphatase 168 (H) 55 - 135 U/L    AST 50 (H) 10 - 40 U/L    ALT 19 10 - 44 U/L    Anion Gap 10 8 - 16 mmol/L    eGFR if African American >60 >60 mL/min/1.73 m^2    eGFR if non African American >60 >60 mL/min/1.73 m^2   Protime-INR   Result Value Ref Range    Prothrombin Time 11.0 9.0 - 12.5 sec    INR 1.0 0.8 - 1.2   APTT   Result Value Ref Range    aPTT 23.6 21.0 - 32.0 sec     Imaging Results:  Imaging Results    None                 The Emergency Provider reviewed the vital signs and test results, which are outlined above.    ED Discussion     7:10 PM: Reassessed pt at this time. Discussed with pt's homerougher all pertinent ED information and results. Discussed risks and benefits of admission and EGD vs. discharge with close follow up; daughter has opted to have the pt discharged with close follow up. Pt very high sedation risk. Discussed w/ pt's daughter risks and benefits of continuing Plavix; daughter has opted to have the pt continue Plavix. Discussed pt dx and plan of tx. Gave daughter all f/u and return to the ED instructions. All questions and concerns were addressed at this time. Daughter expresses understanding of information and instructions, and is comfortable with plan to discharge. Pt is stable for discharge.    I discussed with patient and/or family/caretaker that evaluation in the ED does not suggest any emergent or life threatening  medical conditions requiring immediate intervention beyond what was provided in the ED, and I believe patient is safe for discharge.  Regardless, an unremarkable evaluation in the ED does not preclude the development or presence of a serious of life threatening condition. As such, patient was instructed to return immediately for any worsening or change in current symptoms.         ED Medication(s):  Medications   pantoprazole injection 80 mg (80 mg Intravenous Given 4/7/22 1926)        Follow-up Information     [facility]. Schedule an appointment as soon as possible for a visit in 4 days.    Why: for repeat blood count / hospital follow up  Contact information:  give us a call at [phone] to schedule an appointment           Maggie Sue NP.    Specialty: Family Medicine  Why: As needed  Contact information:  [address] [facility] [address] [phone]             [facility] - Emergency Dept..    Specialty: Emergency Medicine  Why: As needed, If symptoms worsen  Contact information:  [address] [facility] [address] [phone]                      Discharge Medication List as of 4/7/2022  7:13 PM      START taking these medications    Details   omeprazole magnesium 10 mg SuDR Take 40 mg by mouth once daily., Starting Thu 4/7/2022, Until Sat 5/7/2022, Print               Medical Decision Making    Medical Decision Making:   Clinical Tests:   Lab Tests: Ordered and Reviewed           Scribe Attestation:   Scribe #1: I performed the above scribed service and the documentation accurately describes the services I performed. I attest to the accuracy of the note.    Attending:   Physician Attestation Statement for Scribe #1: I, Garry Felix MD, personally performed the services described in this documentation, as scribed by Natan Coughlin, in my presence, and it is both accurate and complete.          Clinical Impression       ICD-10-CM ICD-9-CM   1. Upper  GI bleed  K92.2 578.9   2. Melena  K92.1 578.1       Disposition:   Disposition: Discharged  Condition: Stable         Garry Felix MD  04/10/22 1868

## 2022-04-08 NOTE — PHYSICIAN QUERY
PT Name: Tanya Madrid  MR #: [id number]     DOCUMENTATION CLARIFICATION     CDS: Fransisca Gaitan RN, CCDS  Contact information: [email or web address]  This form is a permanent document in the medical record.     Query Date: April 8, 2022    By submitting this query, we are merely seeking further clarification of documentation.  Please utilize your independent clinical judgment when addressing the question(s) below.    The Medical Record contains the following:   Indicators   Supporting Clinical Findings Location in Medical Record    Non-blanchable erythema/redness      Ulcer/Injury/Skin Breakdown      Deep Tissue Injury     x Wound care consult Left buttock with quarter sized healing stage 3 pressure injury with partially moist red and yellow adipose wound bed. Moisture barrier in use. Wound Care consult 3/31/22    Acute/Chronic Illness     x Medication/Treatment Electronically signed by Sj Juan MD 04/04/22 1200  Order comments: Pressure injury to left buttock: 1. Cleanse with saline or bath wipes 2. Pat dry 3. Apply thin layer of critic aide paste 4. 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 H&P 3/31     The clinical guidelines noted are only a system guideline. It does not replace the providers clinical judgment.    Per the National Pressure Injury Advisory Panel:   A pressure injury is localized damage to the skin and underlying soft tissue usually over a bony prominence or related to a medical or other device. The injury can present as intact skin or an open ulcer and may be painful. The injury occurs as a result of intense and/or prolonged pressure or pressure in combination with shear. The tolerance of  soft tissue for pressure and shear may also be affected by microclimate, nutrition, perfusion, co-morbidities and condition of the soft tissue.       Stage 1 Pressure Injury:  Intact skin with a localized area of non-blanchable erythema, which may appear differently in darkly pigmented skin. Color changes do not include purple or maroon discoloration; these may indicate deep tissue pressure injury.    Stage 2 Pressure Injury:  Partial-thickness loss of skin with exposed dermis. The wound bed is viable, pink or red, moist, and may also present as an intact or ruptured serum-filled blister.    Stage 3 Pressure Injury:  Full-thickness loss of skin, in which adipose (fat) is visible in the ulcer and granulation tissue and epibole (rolled wound edges) are often present. Slough and/or eschar may be visible. Undermining and tunneling may occur.    Stage 4 Pressure Injury:  Full-thickness skin and tissue loss with exposed or directly palpable fascia, muscle, tendon, ligament, cartilage or bone in the ulcer. Slough and/or eschar may be visible. Epibole (rolled edges), undermining and/or tunneling often occur.    Unstageable Pressure Injury:  Full-thickness skin and tissue loss in which the extent of tissue damage within the ulcer cannot be confirmed because it is obscured by slough or eschar. If slough or eschar is removed, a Stage 3 or Stage 4 pressure injury will be revealed.    Deep Tissue Pressure Injury:  Intact or non-intact skin with localized area of persistent non-blanchable deep red, maroon, purple discoloration or epidermal separation revealing a dark wound bed or blood filled blister. This injury results from intense and/or prolonged pressure and shear forces at the bone-muscle interface. The wound may evolve rapidly to reveal the actual extent of tissue injury, or may resolve without tissue loss. If necrotic tissue, subcutaneous tissue, granulation tissue, fascia, muscle or other underlying structures are  visible, this indicates a full thickness pressure injury (Unstageable, Stage 3 or Stage 4). Do not use DTPI to describe vascular, traumatic, neuropathic, or dermatologic conditions.       Provider, please document integumentary diagnosis related to the documentation of left buttock:     [  x ] Pressure Injury/Decubitus Ulcer, Stage 3   [   ] Other Integumentary Diagnosis (please specify):______________   [  ] Clinically Undetermined     Please document in your progress notes daily for the duration of treatment until resolved and include in your discharge summary.    Reference:    FAISAL Forman., MARCIA Logan., Goldberg, M., HUNTER Long., FAISAL Redmond, & MONET Love. (2016). Revised National Pressure Ulcer Advisory Panel Pressure Injury Staging System: Revised Pressure Injury Staging System. J Wound Ostomy Continence Nurs, 43(6), 585-597. doi:10.1097/won.0260680122703521    Form No.[id number]

## 2022-04-15 ENCOUNTER — HOSPITAL ENCOUNTER (EMERGENCY)
Facility: HOSPITAL | Age: 82
Discharge: HOME | End: 2022-04-15
Attending: EMERGENCY MEDICINE
Payer: MEDICARE

## 2022-04-15 VITALS
HEART RATE: 84 BPM | RESPIRATION RATE: 16 BRPM | OXYGEN SATURATION: 100 % | DIASTOLIC BLOOD PRESSURE: 80 MMHG | SYSTOLIC BLOOD PRESSURE: 146 MMHG | TEMPERATURE: 99 F

## 2022-04-15 DIAGNOSIS — Z46.59 ENCOUNTER FOR FEEDING TUBE PLACEMENT: Primary | ICD-10-CM

## 2022-04-15 PROCEDURE — 43762 RPLC GTUBE NO REVJ TRC: CPT

## 2022-04-15 PROCEDURE — 25500020 PHARM REV CODE 255: Performed by: EMERGENCY MEDICINE

## 2022-04-15 PROCEDURE — 99283 EMERGENCY DEPT VISIT LOW MDM: CPT | Mod: 25

## 2022-04-15 RX ADMIN — IOHEXOL 10 ML: 350 INJECTION, SOLUTION INTRAVENOUS at 03:04

## 2022-04-15 NOTE — OP NOTE
Pre Op Diagnosis: g tube pulled out     Post Op Diagnosis: same     Procedure:  g tube replacement     Procedure performed by: Kristine Meier MD, Darryl STEINBERG     Written Informed Consent Obtained: Yes     Specimen Removed:  no     Estimated Blood Loss:  minimal     The patient tolerated the procedure well and there were no complications.      Sterile technique was performed in the LUQ, lidocaine was used as a local anesthetic.  G tube replaced over a guidewire and verified with contrast.  Pt tolerated the procedure well without immediate complications.  Please see radiologist report for details. F/u with PCP and/or ordering physician.

## 2022-04-15 NOTE — ED PROVIDER NOTES
SCRIBE #1 NOTE: I, Ramesh Rojas, am scribing for, and in the presence of, Cornell Salas MD. I have scribed the entire note.      History      Chief Complaint   Patient presents with    peg tube     Peg tube displaced        Review of patient's allergies indicates:   Allergen Reactions    Xanax [alprazolam]         HPI   HPI    4/15/2022, 11:00 AM   History obtained from the patient      History of Present Illness: Tanya Madrid is a 82 y.o. female patient with a PMHx of HLD, HTN, parkinson's disease, stroke, who presents to the Emergency Department for PEG tube displaced which occurred at unknown time. Patient is nonverbal. Unable to obtain further history from patient. No family at bedside.    Arrival mode: Ambulance Service     PCP: Maggie Sue NP       Past Medical History:  Past Medical History:   Diagnosis Date    Acute CHF (congestive heart failure) 1/17/2021    Hyperlipemia     Hypertension     Parkinson's disease     Stroke 2016    Throat mass        Past Surgical History:  Past Surgical History:   Procedure Laterality Date    CLOSED REDUCTION Right 10/15/2020    Procedure: CLOSED REDUCTION;  Surgeon: Humberto Valdivia DO;  Location: [facility] OR;  Service: Orthopedics;  Laterality: Right;  ATTEMPTED    EVACUATION OF HEMATOMA Right 10/17/2020    Procedure: EVACUATION, HEMATOMA;  Surgeon: Humberto Valdivia DO;  Location: [facility];  Service: Orthopedics;  Laterality: Right;    HEMIARTHROPLASTY OF HIP Left 7/12/2020    Procedure: HEMIARTHROPLASTY, HIP;  Surgeon: Humberto Valdivia DO;  Location: [facility] OR;  Service: Orthopedics;  Laterality: Left;    HEMIARTHROPLASTY OF HIP Right 10/2/2020    Procedure: HEMIARTHROPLASTY, HIP;  Surgeon: Loyd Kearney MD;  Location: [facility] OR;  Service: Orthopedics;  Laterality: Right;    HYSTERECTOMY      OPEN REDUCTION OF DISLOCATION OF HIP Right 10/17/2020    Procedure: OPEN REDUCTION, DISLOCATION, HIP;  Surgeon: Humberto Valdivia DO;  Location: [facility]  OR;  Service: Orthopedics;  Laterality: Right;    THROAT SURGERY      TONSILLECTOMY           Family History:  History reviewed. No pertinent family history.     Social History:  Social History     Tobacco Use    Smoking status: Never Smoker    Smokeless tobacco: Never Used   Substance and Sexual Activity    Alcohol use: No    Drug use: No    Sexual activity: Not Currently       ROS   Review of Systems   Unable to perform ROS: Patient nonverbal   Genitourinary:        Peg tube displaced     Physical Exam      Initial Vitals   BP Pulse Resp Temp SpO2   04/15/22 1045 04/15/22 1045 04/15/22 1045 04/15/22 1652 04/15/22 1045   (!) 144/80 90 18 98.5 °F (36.9 °C) 100 %      MAP       --                 Physical Exam  Nursing Notes and Vital Signs Reviewed.  Constitutional: Patient is in no acute distress. Well-developed and well-nourished.  Head: Atraumatic. Normocephalic.  Eyes: PERRL. EOM intact. Conjunctivae are not pale. No scleral icterus.  ENT: no defomities.    Neck: Supple. Full ROM.  Cardiovascular: Regular rate. Regular rhythm. No murmurs, rubs, or gallops. Distal pulses are 2+ and symmetric.  Pulmonary/Chest: No respiratory distress. Clear to auscultation bilaterally. No wheezing or rales.  Abdominal: Soft and non-distended.  There is no tenderness.  No rebound, guarding, or rigidity. Good bowel sounds. PEG tube displaced. Pt has two button-sized anchors sutured to abdomen and in place. Stoma nearly completely closed.  Genitourinary: No CVA tenderness  Musculoskeletal: no edema  Skin: Warm and dry.   Neurological:   Pt is non-verbal.  Psychiatric: Nonverbal    ED Course    Procedures  ED Vital Signs:  Vitals:    04/15/22 1045 04/15/22 1652 04/15/22 1817   BP: (!) 144/80 (!) 145/82 (!) 146/80   Pulse: 90 88 84   Resp: 18 16 16   Temp:  98.5 °F (36.9 °C) 98.5 °F (36.9 °C)   TempSrc:  Oral Oral   SpO2: 100% 97% 100%       Imaging Results:  Imaging Results          FL Gastric Tube Replacement With Imaging (In  process)                         The Emergency Provider reviewed the vital signs and test results, which are outlined above.    ED Discussion     12:19 PM: Through chart review, the pt had a G-tube placed by IR on March 25, 2022.     12:35 PM: Daughter is now in the room. Daughter states she last saw the pt's PEG tube in place at 10 PM last night. Last time it was known it was in place.    Consulted IR for placement    3:45 PM: IR is in the ED; pt's PEG tube is back in place. Case has placed it and confirmed placement with Radiologist    3:48 PM: Reassessed pt at this time.  Discussed with pt all pertinent ED information and results. Discussed pt dx and plan of tx. Gave pt all f/u and return to the ED instructions. All questions and concerns were addressed at this time. Pt expresses understanding of information and instructions, and is comfortable with plan to discharge. Pt is stable for discharge.    I discussed with patient and/or family/caretaker that evaluation in the ED does not suggest any emergent or life threatening medical conditions requiring immediate intervention beyond what was provided in the ED, and I believe patient is safe for discharge.  Regardless, an unremarkable evaluation in the ED does not preclude the development or presence of a serious of life threatening condition. As such, patient was instructed to return immediately for any worsening or change in current symptoms.-    ED Course as of 04/16/22 0635   Fri Apr 15, 2022   1430 Case with IR at bedside and will attempt replacement [DP]      ED Course User Index  [DP] Cornell Salas MD       ED Medication(s):  Medications   iohexoL (OMNIPAQUE 350) injection 100 mL (10 mLs Per G Tube Given 4/15/22 1554)        Follow-up Information     Call  Maggie Sue NP.    Specialty: Family Medicine  Contact information:  [address] [place] [address]  [phone]             [facility] - Emergency Dept..    Specialty: Emergency Medicine  Why:  As needed, If symptoms worsen  Contact information:  [address] [facility] [address] [phone]                         Medical Decision Making    Medical Decision Making:   Clinical Tests:   Radiological Study: Ordered and Reviewed           Scribe Attestation:   Scribe #1: I performed the above scribed service and the documentation accurately describes the services I performed. I attest to the accuracy of the note.    Attending:   Physician Attestation Statement for Scribe #1: I, Cornell Salas MD, personally performed the services described in this documentation, as scribed by Ramesh Rojas, in my presence, and it is both accurate and complete.          Clinical Impression       ICD-10-CM ICD-9-CM   1. Encounter for feeding tube placement  Z46.59 V53.59       Disposition:   Disposition: Discharged  Condition: Stable         Cornell Salas MD  04/16/22 0636

## 2022-04-15 NOTE — CONSULTS
Chart reviewed by Dr. Meier.       ASSESSMENT/PLAN:    G tube fell out    The order for a G tube replacement has been placed and the procedure will be performed asap.          Thank you for the consult.

## 2022-04-18 ENCOUNTER — LAB VISIT (OUTPATIENT)
Dept: LAB | Facility: HOSPITAL | Age: 82
End: 2022-04-18
Attending: NURSE PRACTITIONER
Payer: MEDICARE

## 2022-04-18 ENCOUNTER — TELEPHONE (OUTPATIENT)
Dept: CARDIOLOGY | Facility: CLINIC | Age: 82
End: 2022-04-18
Payer: MEDICARE

## 2022-04-18 ENCOUNTER — TELEPHONE (OUTPATIENT)
Dept: ADMINISTRATIVE | Facility: CLINIC | Age: 82
End: 2022-04-18
Payer: MEDICARE

## 2022-04-18 DIAGNOSIS — N39.0 URINARY TRACT INFECTION, SITE NOT SPECIFIED: Primary | ICD-10-CM

## 2022-04-18 DIAGNOSIS — T83.511A URINARY TRACT INFECTION ASSOCIATED WITH INDWELLING URETHRAL CATHETER, INITIAL ENCOUNTER: Primary | ICD-10-CM

## 2022-04-18 DIAGNOSIS — N39.0 URINARY TRACT INFECTION ASSOCIATED WITH INDWELLING URETHRAL CATHETER, INITIAL ENCOUNTER: Primary | ICD-10-CM

## 2022-04-18 LAB
BACTERIA #/AREA URNS HPF: ABNORMAL /HPF
BILIRUB UR QL STRIP: NEGATIVE
CLARITY UR: CLEAR
COLOR UR: YELLOW
GLUCOSE UR QL STRIP: NEGATIVE
HGB UR QL STRIP: ABNORMAL
KETONES UR QL STRIP: NEGATIVE
LEUKOCYTE ESTERASE UR QL STRIP: ABNORMAL
MICROSCOPIC COMMENT: ABNORMAL
NITRITE UR QL STRIP: POSITIVE
PH UR STRIP: 8 [PH] (ref 5–8)
PROT UR QL STRIP: NEGATIVE
RBC #/AREA URNS HPF: 45 /HPF (ref 0–4)
SP GR UR STRIP: 1.01 (ref 1–1.03)
URN SPEC COLLECT METH UR: ABNORMAL
UROBILINOGEN UR STRIP-ACNC: NEGATIVE EU/DL
WBC #/AREA URNS HPF: >100 /HPF (ref 0–5)

## 2022-04-18 PROCEDURE — 81000 URINALYSIS NONAUTO W/SCOPE: CPT | Performed by: NURSE PRACTITIONER

## 2022-04-18 PROCEDURE — 87086 URINE CULTURE/COLONY COUNT: CPT | Performed by: NURSE PRACTITIONER

## 2022-04-18 NOTE — TELEPHONE ENCOUNTER
HH nurse Katie called because pt was having S/S of UTI, strong odor, cloudy urine, she states pt family said pt had a little confusion a few days ago, ordered UA

## 2022-04-18 NOTE — TELEPHONE ENCOUNTER
Received call and spoke with Adenike, daughter.  Informed Adenike that Tiffany Sanchez NP would like to verify what medications pt taking.   Adenike informed pt takin:entresto 49-51 mg BID starting yesterday, was previously taking only daily with BP parameters to hold if SBP<110. Adenike informed increased medication back to BID d/t BP higher.     2: Adenike informed pt not taking coreg, that it was stopped previously(still listed on MAR as active).    3: lasix 20 mg daily prn, pt took yesterday and today due to edema BLE.     4: plavix 75 mg daily    Informed Adenike would notify provider of above information.

## 2022-04-18 NOTE — TELEPHONE ENCOUNTER
----- Message from Maris Belle sent at 4/18/2022  3:24 PM CDT -----  Contact: Katie/[facility] Nurse  [facility] Nurse, Katie is calling to speak with the nurse regarding swelling and elevated blood pressure levels. Please give Nurse Katie a high priority call back at [phone] today as requested.  Thanks,  RP

## 2022-04-18 NOTE — TELEPHONE ENCOUNTER
I spoke to the  nurse Katie.     She went to see her today for a possible UTI.   She was given lasix yesterday and today and the  nurse says she has good output. The patient's daughter is concerned that her legs are more swollen, but it is more in her left leg/feet. Also, the carvedilol was stopped previously, but her diastolic blood pressure seems to be increasing.     AVG:  Systolic:130  Diastolic:     Entresto was restarted by the patient's daughter Adenike. I attempted to contact Adenike to confirm exactly what the patient is taking and left a voicemail for her to call back.

## 2022-04-19 NOTE — TELEPHONE ENCOUNTER
Called and spoke with Adenike, daughter to set up f/u appt for pt. Adenike did not want to set up an appt at this time but informed would call back once she was able to set up help with transportation to appt.   Provided heart failure nurse contact number to call when able to set up f/u appt.

## 2022-04-20 ENCOUNTER — OFFICE VISIT (OUTPATIENT)
Dept: HOME HEALTH SERVICES | Facility: CLINIC | Age: 82
End: 2022-04-20
Payer: MEDICARE

## 2022-04-20 VITALS
SYSTOLIC BLOOD PRESSURE: 138 MMHG | DIASTOLIC BLOOD PRESSURE: 90 MMHG | RESPIRATION RATE: 20 BRPM | TEMPERATURE: 97 F | HEART RATE: 80 BPM

## 2022-04-20 DIAGNOSIS — N39.0 URINARY TRACT INFECTION ASSOCIATED WITH INDWELLING URETHRAL CATHETER, INITIAL ENCOUNTER: ICD-10-CM

## 2022-04-20 DIAGNOSIS — T83.511A URINARY TRACT INFECTION ASSOCIATED WITH INDWELLING URETHRAL CATHETER, INITIAL ENCOUNTER: ICD-10-CM

## 2022-04-20 DIAGNOSIS — G47.00 INSOMNIA, UNSPECIFIED TYPE: Primary | ICD-10-CM

## 2022-04-20 DIAGNOSIS — R41.82 ALTERED MENTAL STATUS, UNSPECIFIED ALTERED MENTAL STATUS TYPE: ICD-10-CM

## 2022-04-20 DIAGNOSIS — Z09 FOLLOW UP: ICD-10-CM

## 2022-04-20 LAB
BACTERIA UR CULT: NORMAL
BACTERIA UR CULT: NORMAL

## 2022-04-20 PROCEDURE — 99350 HOME/RES VST EST HIGH MDM 60: CPT | Mod: ,,, | Performed by: NURSE PRACTITIONER

## 2022-04-20 PROCEDURE — 99350 PR HOME VISIT,ESTAB PATIENT,LEVEL IV: ICD-10-PCS | Mod: ,,, | Performed by: NURSE PRACTITIONER

## 2022-04-20 RX ORDER — TRAZODONE HYDROCHLORIDE 50 MG/1
25 TABLET ORAL NIGHTLY
Qty: 15 TABLET | Refills: 3 | Status: ON HOLD | OUTPATIENT
Start: 2022-04-20 | End: 2022-09-17

## 2022-04-21 ENCOUNTER — LAB VISIT (OUTPATIENT)
Dept: LAB | Facility: HOSPITAL | Age: 82
End: 2022-04-21
Attending: NURSE PRACTITIONER
Payer: MEDICARE

## 2022-04-21 ENCOUNTER — OFFICE VISIT (OUTPATIENT)
Dept: CARDIOLOGY | Facility: CLINIC | Age: 82
End: 2022-04-21
Payer: MEDICARE

## 2022-04-21 VITALS
DIASTOLIC BLOOD PRESSURE: 90 MMHG | HEART RATE: 88 BPM | SYSTOLIC BLOOD PRESSURE: 148 MMHG | BODY MASS INDEX: 25.42 KG/M2 | HEIGHT: 62 IN

## 2022-04-21 DIAGNOSIS — I46.9 CARDIAC ARREST: ICD-10-CM

## 2022-04-21 DIAGNOSIS — Z86.73 HISTORY OF CVA (CEREBROVASCULAR ACCIDENT): Chronic | ICD-10-CM

## 2022-04-21 DIAGNOSIS — I50.42 CHRONIC COMBINED SYSTOLIC AND DIASTOLIC HEART FAILURE: ICD-10-CM

## 2022-04-21 DIAGNOSIS — R53.81 DEBILITY: ICD-10-CM

## 2022-04-21 DIAGNOSIS — I50.43 ACUTE ON CHRONIC COMBINED SYSTOLIC AND DIASTOLIC CHF (CONGESTIVE HEART FAILURE): Primary | ICD-10-CM

## 2022-04-21 DIAGNOSIS — G93.1 ANOXIC ENCEPHALOPATHY: ICD-10-CM

## 2022-04-21 DIAGNOSIS — I50.43 ACUTE ON CHRONIC COMBINED SYSTOLIC AND DIASTOLIC CHF (CONGESTIVE HEART FAILURE): ICD-10-CM

## 2022-04-21 DIAGNOSIS — E78.2 MIXED HYPERLIPIDEMIA: Chronic | ICD-10-CM

## 2022-04-21 LAB
ALBUMIN SERPL BCP-MCNC: 3.1 G/DL (ref 3.5–5.2)
ALP SERPL-CCNC: 201 U/L (ref 55–135)
ALT SERPL W/O P-5'-P-CCNC: 45 U/L (ref 10–44)
ANION GAP SERPL CALC-SCNC: 14 MMOL/L (ref 8–16)
AST SERPL-CCNC: 57 U/L (ref 10–40)
BILIRUB SERPL-MCNC: 0.3 MG/DL (ref 0.1–1)
BNP SERPL-MCNC: >4900 PG/ML (ref 0–99)
BUN SERPL-MCNC: 28 MG/DL (ref 8–23)
CALCIUM SERPL-MCNC: 9.2 MG/DL (ref 8.7–10.5)
CHLORIDE SERPL-SCNC: 100 MMOL/L (ref 95–110)
CO2 SERPL-SCNC: 25 MMOL/L (ref 23–29)
CREAT SERPL-MCNC: 0.8 MG/DL (ref 0.5–1.4)
EST. GFR  (AFRICAN AMERICAN): >60 ML/MIN/1.73 M^2
EST. GFR  (NON AFRICAN AMERICAN): >60 ML/MIN/1.73 M^2
GLUCOSE SERPL-MCNC: 132 MG/DL (ref 70–110)
POTASSIUM SERPL-SCNC: 4.9 MMOL/L (ref 3.5–5.1)
PROT SERPL-MCNC: 7.3 G/DL (ref 6–8.4)
SODIUM SERPL-SCNC: 139 MMOL/L (ref 136–145)

## 2022-04-21 PROCEDURE — 80053 COMPREHEN METABOLIC PANEL: CPT | Performed by: NURSE PRACTITIONER

## 2022-04-21 PROCEDURE — 99999 PR PBB SHADOW E&M-EST. PATIENT-LVL IV: CPT | Mod: PBBFAC,,, | Performed by: NURSE PRACTITIONER

## 2022-04-21 PROCEDURE — 99214 OFFICE O/P EST MOD 30 MIN: CPT | Mod: PBBFAC | Performed by: NURSE PRACTITIONER

## 2022-04-21 PROCEDURE — 99214 PR OFFICE/OUTPT VISIT, EST, LEVL IV, 30-39 MIN: ICD-10-PCS | Mod: S$PBB,,, | Performed by: NURSE PRACTITIONER

## 2022-04-21 PROCEDURE — 36415 COLL VENOUS BLD VENIPUNCTURE: CPT | Performed by: NURSE PRACTITIONER

## 2022-04-21 PROCEDURE — 83880 ASSAY OF NATRIURETIC PEPTIDE: CPT | Performed by: NURSE PRACTITIONER

## 2022-04-21 PROCEDURE — 99999 PR PBB SHADOW E&M-EST. PATIENT-LVL IV: ICD-10-PCS | Mod: PBBFAC,,, | Performed by: NURSE PRACTITIONER

## 2022-04-21 PROCEDURE — 99214 OFFICE O/P EST MOD 30 MIN: CPT | Mod: S$PBB,,, | Performed by: NURSE PRACTITIONER

## 2022-04-21 NOTE — PROGRESS NOTES
"  [facility]  Transition of Care Home Visit    Visit Date: 4/20/2022  Encounter Provider: Elva Ansari   PCP:  Maggie Sue NP    PRESENTING HISTORY      Patient ID: Tanya Madrid is a 82 y.o. female.    Consult Requested By:  Dr. Sj Walker*  Reason for Consult:  Hospital Follow Up.    Tanya is being seen at home due to being seen at home due to physical debility that presents a taxing effort to leave the home, to mitigate high risk of hospital readmission and/or due to the limited availability of reliable or safe options for transportation to the point of access to the provider. Prior to treatment on this visit the chart was reviewed and patient verbal consent was obtained.      Chief Complaint: Transitional Care    Patient was admitted to hospital on 03/10 and discharged to home on 03/29    History of Present Illness: Ms. Tanya Madrid is a 82 y.o. female who was recently admitted to the hospital with medical diagnoses of HTN, acute CHF, and HLD who presents to the Emergency Department for evaluation of cardiac arrest which onset suddenly 1 hour pta. Pt went into cardiac arrest while eating dinner. Pt achieved ROSC en route. Pt Symptoms are constant and severe in severity. Prior Tx includes 50 mcg of fentanyl and 50 mg of ketamine from EMS. No further complaints or concerns at this time.       ___________________________________________________________________    Today:    HPI:  Patient is being seen today for a hospital follow up. See hospital course for details. Upon arrival patient was laying in her hospital bed alert, confused and slightly agitated. Daughter reports that the patient normally does have periods of "talking out of her head" but she has been doing it more lately. Urinalysis done on 04/18 and positive for nitrites and leukocytes. There was a culture but no sensitivity. Will treat with PO antibiotics. Patient was recently seen in the ER for PEG tube displacement " and sent to interventional radiologist to insert a new one. Daughter reports that other than the agitation and and confusion the patient seems to be doing better. VSS (unable to get O2 sat because patients hands were cold and this is a normal issue with the patient). Daughter reports compliance with all medications. Daughter does report that the patient has not been sleeping at night.          Review of Systems   Constitutional: Positive for fatigue. Negative for activity change, appetite change, chills and fever.   HENT: Negative.    Eyes: Negative.    Respiratory: Negative for shortness of breath.    Cardiovascular: Negative for leg swelling.   Gastrointestinal: Negative for blood in stool, constipation, diarrhea, nausea and vomiting.        Patient has a PEG tube    Endocrine: Negative.    Genitourinary: Negative for decreased urine volume.        Patient has an indwelling urinary catheter   Musculoskeletal: Negative.    Skin: Negative.    Allergic/Immunologic: Negative.    Neurological: Positive for weakness. Negative for seizures and syncope.   Hematological: Negative.    Psychiatric/Behavioral: Positive for agitation and confusion.       Assessments:  · Environmental: Patient lives in a single story home with her daughter and her family. There are no steps at the entrances. Lighting is bright and temperature is comfortable   · Functional Status: Patient is bed/chair bound at present, needs assist with all ADLS, has a PEG tube and an indwelling henley catheter  · Safety: Fall prescutions  · Nutritional: Adequate food in the home and patient has tube feedings in the home  · Home Health/DME/Supplies: [facility]. DMEs: hospital bed, wheelchair, walker    PAST HISTORY:     Past Medical History:   Diagnosis Date    Acute CHF (congestive heart failure) 1/17/2021    Hyperlipemia     Hypertension     Parkinson's disease     Stroke 2016    Throat mass        Past Surgical History:   Procedure Laterality  Date    CLOSED REDUCTION Right 10/15/2020    Procedure: CLOSED REDUCTION;  Surgeon: Humberto Valdivia DO;  Location: [facility] OR;  Service: Orthopedics;  Laterality: Right;  ATTEMPTED    EVACUATION OF HEMATOMA Right 10/17/2020    Procedure: EVACUATION, HEMATOMA;  Surgeon: Humberto Valdivia DO;  Location: [facility] OR;  Service: Orthopedics;  Laterality: Right;    HEMIARTHROPLASTY OF HIP Left 7/12/2020    Procedure: HEMIARTHROPLASTY, HIP;  Surgeon: Humberto Valdivia DO;  Location: [facility] OR;  Service: Orthopedics;  Laterality: Left;    HEMIARTHROPLASTY OF HIP Right 10/2/2020    Procedure: HEMIARTHROPLASTY, HIP;  Surgeon: Loyd Kearney MD;  Location: [facility] OR;  Service: Orthopedics;  Laterality: Right;    HYSTERECTOMY      OPEN REDUCTION OF DISLOCATION OF HIP Right 10/17/2020    Procedure: OPEN REDUCTION, DISLOCATION, HIP;  Surgeon: Humberto Valdivia DO;  Location: [facility] OR;  Service: Orthopedics;  Laterality: Right;    THROAT SURGERY      TONSILLECTOMY         History reviewed. No pertinent family history.    Social History     Socioeconomic History    Marital status:    Tobacco Use    Smoking status: Never Smoker    Smokeless tobacco: Never Used   Substance and Sexual Activity    Alcohol use: No    Drug use: No    Sexual activity: Not Currently       MEDICATIONS & ALLERGIES:     Current Outpatient Medications on File Prior to Visit   Medication Sig Dispense Refill    carbidopa-levodopa  mg (SINEMET)  mg per tablet 2 tablets by Per G Tube route 3 (three) times daily. 180 tablet 0    carvediloL (COREG) 6.25 MG tablet 1 tablet (6.25 mg total) by Per G Tube route 2 (two) times daily. 60 tablet 0    clonazePAM (KLONOPIN) 0.5 MG tablet Take 0.5 mg by mouth 2 (two) times daily as needed for Anxiety.      clopidogreL (PLAVIX) 75 mg tablet 1 tablet (75 mg total) by Per G Tube route once daily. 30 tablet 0    esomeprazole (NEXIUM) 40 MG capsule Take  40 mg(1 cap)  twice a day  , then after 40  mg(1 cap) daily   Through PEG tube 60 capsule 3    furosemide (LASIX) 20 MG tablet USE  IT ONLY AS NEEDED FOR FLUID RETENTION 40 tablet 11    ipratropium (ATROVENT) 0.02 % nebulizer solution Take 2.5 mLs (500 mcg total) by nebulization 2 (two) times daily. Rescue 150 mL 0    omeprazole magnesium 10 mg SuDR Take 40 mg by mouth once daily. 120 each 0    polyethylene glycol (GLYCOLAX) 17 gram/dose powder 17 g by Per NG tube route once daily. FOR CONSTIPATION 510 g 0    sacubitriL-valsartan (ENTRESTO) 49-51 mg per tablet Take 1 tablet by mouth 2 (two) times daily. HOLD FOR NOW DUE TO LOW BLOOD PRESSURE 60 tablet 3    scopolamine (TRANSDERM-SCOP) 1.3-1.5 mg (1 mg over 3 days) Place 1 patch onto the skin Every 3 (three) days. 10 patch 0    [DISCONTINUED] aspirin 81 MG Chew 1 tablet (81 mg total) by Per G Tube route once daily.  0    [DISCONTINUED] famotidine (PEPCID AC) 20 MG tablet 1 tablet (20 mg total) by Per G Tube route once daily. 30 tablet 0    [DISCONTINUED] losartan (COZAAR) 50 MG tablet Take 1 tablet (50 mg total) by mouth 2 (two) times a day. 60 tablet 6    [DISCONTINUED] metoprolol succinate (TOPROL-XL) 50 MG 24 hr tablet Take 1 tablet (50 mg total) by mouth 2 (two) times daily. 60 tablet 6    [DISCONTINUED] pantoprazole (PROTONIX) 40 mg suspension Take 1 packet (40 mg total) by mouth 2 (two) times daily. 60 packet 3     No current facility-administered medications on file prior to visit.        Review of patient's allergies indicates:   Allergen Reactions    Xanax [alprazolam]        OBJECTIVE:     Vital Signs:  Vitals:    04/20/22 1300   BP: (!) 138/90   Pulse: 80   Resp: 20   Temp: 97.2 °F (36.2 °C)     There is no height or weight on file to calculate BMI.     Physical Exam:  Physical Exam  Vitals reviewed.   Constitutional:       General: She is not in acute distress.     Appearance: She is ill-appearing.   HENT:      Head: Normocephalic.      Nose: Nose normal.      Mouth/Throat:      Mouth:  Mucous membranes are moist.   Eyes:      Pupils: Pupils are equal, round, and reactive to light.   Cardiovascular:      Rate and Rhythm: Normal rate. Rhythm irregular.      Pulses: Normal pulses.      Heart sounds: No murmur heard.    No friction rub. No gallop.   Pulmonary:      Effort: Pulmonary effort is normal.      Breath sounds: Normal breath sounds.   Abdominal:      General: Bowel sounds are normal. There is no distension.      Palpations: Abdomen is soft.      Tenderness: There is no abdominal tenderness. There is no guarding.      Comments: PEG tube in place.   Musculoskeletal:      Cervical back: Normal range of motion and neck supple.      Right lower leg: No edema.      Left lower leg: No edema.   Skin:     General: Skin is warm and dry.      Capillary Refill: Capillary refill takes 2 to 3 seconds.      Comments: Hands were cool and unable to get O2 sat reading. BLE were warm    Neurological:      Mental Status: She is alert. She is disoriented.      Motor: Weakness present.         Laboratory  Lab Results   Component Value Date    WBC 9.85 04/07/2022    HGB 12.0 04/07/2022    HCT 38.0 04/07/2022    MCV 93 04/07/2022     04/07/2022     Lab Results   Component Value Date    INR 1.0 04/07/2022    INR 1.0 03/15/2022    INR 1.0 03/14/2022     Lab Results   Component Value Date    HGBA1C 5.9 (H) 07/24/2019     No results for input(s): POCTGLUCOSE in the last 72 hours.    Diagnostic Results:  Results of U/A- UTI present + for leukocytes and nitrites    TRANSITION OF CARE:     [facility] Contact Note:     Family and/or Caretaker present at visit?  Yes.  Diagnostic tests reviewed/disposition: I have reviewed all completed as well as pending diagnostic tests at the time of discharge.  Disease/illness education: cardiac arrest, UTI  Home health/community services discussion/referrals: Patient has home health established at [facility].   Establishment or re-establishment of referral orders for  community resources: No other necessary community resources.   Discussion with other health care providers: No discussion with other health care providers necessary.     Transition of Care Visit:  I have reviewed and updated the history and problem list.  I have reconciled the medication list.  I have discussed the hospitalization and current medical issues, prognosis and plans with the patient/family.  I  spent more than 50% of time discussing the care with the patient/family.  Total Face-to-Face Encounter: 60 minutes.    Medications Reconciliation:   I have reconciled the patient's home medications and discharge medications with the patient/family. I have updated all changes.  Refer to After-Visit Medication List.    ASSESSMENT & PLAN:     HIGH RISK CONDITION(S):  Cardiac Arrest, CHF, HTN, Parkinsons Disease    Problem List Items Addressed This Visit        Other    Insomnia - Primary    Relevant Medications    traZODone (DESYREL) 50 MG tablet      Other Visit Diagnoses     Altered mental status, unspecified altered mental status type        Urinary tract infection associated with indwelling urethral catheter, initial encounter        Called in cipro 500mg PO BID x 7 days    Follow up            Encounter for Medical Follow-Up and Medication Review  - [facility] at NP to schedule follow-up visit with patient in 1 week or PRN.     Patient Instructions Given:  - Continue all medications, treatments and therapies as ordered.   - Follow all instructions, recommendations as discussed.  - Maintain Safety Precautions at all times.  - Attend all medical appointments as scheduled.  - For worsening symptoms: call Primary Care Physician or Nurse Practitioner.  - For emergencies, call 911 or immediately report to the nearest emergency room     Were controlled substances prescribed?  No    Instructions for the patient:    Scheduled Follow-up :  Future Appointments   Date Time Provider Department [place]   4/21/2022 10:30  AM [facility]   4/29/2022  8:00 AM El[facility]unga [id number]       After Visit Medication List :     Medication List          Accurate as of April 20, 2022 10:15 PM. If you have any questions, ask your nurse or doctor.            START taking these medications    traZODone 50 MG tablet  Commonly known as: DESYREL  Take 0.5 tablets (25 mg total) by mouth every evening.  Started by: Elva Colunga NP        CONTINUE taking these medications    carbidopa-levodopa  mg  mg per tablet  Commonly known as: SINEMET  2 tablets by Per G Tube route 3 (three) times daily.     carvediloL 6.25 MG tablet  Commonly known as: COREG  1 tablet (6.25 mg total) by Per G Tube route 2 (two) times daily.     clonazePAM 0.5 MG tablet  Commonly known as: KlonoPIN     clopidogreL 75 mg tablet  Commonly known as: PLAVIX  1 tablet (75 mg total) by Per G Tube route once daily.     ENTRESTO 49-51 mg per tablet  Generic drug: sacubitriL-valsartan  Take 1 tablet by mouth 2 (two) times daily. HOLD FOR NOW DUE TO LOW BLOOD PRESSURE     esomeprazole 40 MG capsule  Commonly known as: NEXIUM  Take  40 mg(1 cap)  twice a day  , then after 40 mg(1 cap) daily   Through PEG tube     furosemide 20 MG tablet  Commonly known as: LASIX  USE  IT ONLY AS NEEDED FOR FLUID RETENTION     ipratropium 0.02 % nebulizer solution  Commonly known as: ATROVENT  Take 2.5 mLs (500 mcg total) by nebulization 2 (two) times daily. Rescue     omeprazole magnesium 10 mg Sudr  Take 40 mg by mouth once daily.     polyethylene glycol 17 gram/dose powder  Commonly known as: GLYCOLAX  17 g by Per NG tube route once daily. FOR CONSTIPATION     scopolamine 1.3-1.5 mg (1 mg over 3 days)  Commonly known as: TRANSDERM-SCOP  Place 1 patch onto the skin Every 3 (three) days.           Where to Get Your Medications      These medications [id number] [place] [facility] [address]  [facility]  [address] [address]    Phone: [phone]   · traZODone 50 MG tablet         Signature: Elva Ansari NP

## 2022-04-21 NOTE — PROGRESS NOTES
Subjective:   Patient ID:  Tanya Madrid is a 82 y.o. female who presents for evaluation of Congestive Heart Failure      HPI     Tanya Madrid is a 82 year old female who presents to CHF clinic for hospital follow up. She was admitted to [facility] due to cardiac arrest and had prolonged hospitalization and was eventually discharged home. She had g tube placed during admission.     Her current medical conditions include CHF, HFrEF of 10%, HTN, HLP, CVA, wheelchair bound. She is very debilitated at this visit. She is wheelchair bound today and is mainly nonverbal. She receives all her medications via g tube. She does have  services at this time and is starting to stand with therapy.     Patient's daughter is here today with her for the visit.     Patient has dependent edema today on exam. Patient answers appropriately to simple questions today. Mainly bedbound but does sit in the chair every day. Recently started treatment for UTI.   Denies chest pain today on exam. Has labored breathing noted which started a few days ago per patient's daughter report today.     Past Medical History:   Diagnosis Date    Acute CHF (congestive heart failure) 1/17/2021    Hyperlipemia     Hypertension     Parkinson's disease     Stroke 2016    Throat mass        Past Surgical History:   Procedure Laterality Date    CLOSED REDUCTION Right 10/15/2020    Procedure: CLOSED REDUCTION;  Surgeon: Humberto Valdivia DO;  Location: [facility] OR;  Service: Orthopedics;  Laterality: Right;  ATTEMPTED    EVACUATION OF HEMATOMA Right 10/17/2020    Procedure: EVACUATION, HEMATOMA;  Surgeon: Humberto Valdivia DO;  Location: [facility] OR;  Service: Orthopedics;  Laterality: Right;    HEMIARTHROPLASTY OF HIP Left 7/12/2020    Procedure: HEMIARTHROPLASTY, HIP;  Surgeon: Humberto Valdivia DO;  Location: [facility] OR;  Service: Orthopedics;  Laterality: Left;    HEMIARTHROPLASTY OF HIP Right 10/2/2020    Procedure: HEMIARTHROPLASTY, HIP;  Surgeon:  Loyd Kearney MD;  Location: [facility] OR;  Service: Orthopedics;  Laterality: Right;    HYSTERECTOMY      OPEN REDUCTION OF DISLOCATION OF HIP Right 10/17/2020    Procedure: OPEN REDUCTION, DISLOCATION, HIP;  Surgeon: Humberto Valdivia DO;  Location: [facility] OR;  Service: Orthopedics;  Laterality: Right;    THROAT SURGERY      TONSILLECTOMY         Social History     Tobacco Use    Smoking status: Never Smoker    Smokeless tobacco: Never Used   Substance Use Topics    Alcohol use: No    Drug use: No       History reviewed. No pertinent family history.  Wt Readings from Last 3 Encounters:   04/07/22 63 kg (139 lb)   04/04/22 62.9 kg (138 lb 10.7 oz)   03/29/22 59.6 kg (131 lb 6.3 oz)     Temp Readings from Last 3 Encounters:   04/20/22 97.2 °F (36.2 °C)   04/15/22 98.5 °F (36.9 °C) (Oral)   04/07/22 99.3 °F (37.4 °C) (Oral)     BP Readings from Last 3 Encounters:   04/21/22 (!) 148/90   04/20/22 (!) 138/90   04/15/22 (!) 146/80     Pulse Readings from Last 3 Encounters:   04/21/22 88   04/20/22 80   04/15/22 84     Current Outpatient Medications on File Prior to Visit   Medication Sig Dispense Refill    carbidopa-levodopa  mg (SINEMET)  mg per tablet 2 tablets by Per G Tube route 3 (three) times daily. 180 tablet 0    clopidogreL (PLAVIX) 75 mg tablet 1 tablet (75 mg total) by Per G Tube route once daily. 30 tablet 0    esomeprazole (NEXIUM) 40 MG capsule Take  40 mg(1 cap)  twice a day  , then after 40 mg(1 cap) daily   Through PEG tube 60 capsule 3    furosemide (LASIX) 20 MG tablet USE  IT ONLY AS NEEDED FOR FLUID RETENTION 40 tablet 11    sacubitriL-valsartan (ENTRESTO) 49-51 mg per tablet Take 1 tablet by mouth 2 (two) times daily. HOLD FOR NOW DUE TO LOW BLOOD PRESSURE 60 tablet 3    scopolamine (TRANSDERM-SCOP) 1.3-1.5 mg (1 mg over 3 days) Place 1 patch onto the skin Every 3 (three) days. 10 patch 0    traZODone (DESYREL) 50 MG tablet Take 0.5 tablets (25 mg total) by mouth every  evening. 15 tablet 3    carvediloL (COREG) 6.25 MG tablet 1 tablet (6.25 mg total) by Per G Tube route 2 (two) times daily. (Patient not taking: Reported on 4/21/2022) 60 tablet 0    clonazePAM (KLONOPIN) 0.5 MG tablet Take 0.5 mg by mouth 2 (two) times daily as needed for Anxiety.      ipratropium (ATROVENT) 0.02 % nebulizer solution Take 2.5 mLs (500 mcg total) by nebulization 2 (two) times daily. Rescue 150 mL 0    omeprazole magnesium 10 mg SuDR Take 40 mg by mouth once daily. (Patient not taking: Reported on 4/21/2022) 120 each 0    polyethylene glycol (GLYCOLAX) 17 gram/dose powder 17 g by Per NG tube route once daily. FOR CONSTIPATION 510 g 0    [DISCONTINUED] aspirin 81 MG Chew 1 tablet (81 mg total) by Per G Tube route once daily.  0    [DISCONTINUED] famotidine (PEPCID AC) 20 MG tablet 1 tablet (20 mg total) by Per G Tube route once daily. 30 tablet 0    [DISCONTINUED] losartan (COZAAR) 50 MG tablet Take 1 tablet (50 mg total) by mouth 2 (two) times a day. 60 tablet 6    [DISCONTINUED] metoprolol succinate (TOPROL-XL) 50 MG 24 hr tablet Take 1 tablet (50 mg total) by mouth 2 (two) times daily. 60 tablet 6    [DISCONTINUED] pantoprazole (PROTONIX) 40 mg suspension Take 1 packet (40 mg total) by mouth 2 (two) times daily. 60 packet 3     No current facility-administered medications on file prior to visit.       Review of Systems   Constitutional: Positive for malaise/fatigue.   HENT: Negative for hearing loss and hoarse voice.    Eyes: Negative for blurred vision and visual disturbance.   Cardiovascular: Positive for dyspnea on exertion and leg swelling. Negative for chest pain, claudication, irregular heartbeat, near-syncope, orthopnea, palpitations, paroxysmal nocturnal dyspnea and syncope.   Respiratory: Positive for shortness of breath. Negative for cough, hemoptysis, sleep disturbances due to breathing, snoring and wheezing.    Endocrine: Negative for cold intolerance and heat intolerance.  "  Hematologic/Lymphatic: Does not bruise/bleed easily.   Skin: Negative for color change, dry skin and nail changes.   Musculoskeletal: Positive for arthritis. Negative for back pain, joint pain and myalgias.   Gastrointestinal: Negative for bloating, abdominal pain, constipation, nausea and vomiting.        +G tube   Genitourinary: Negative for dysuria, flank pain, hematuria and hesitancy.        +henley cath   Neurological: Positive for loss of balance. Negative for headaches, light-headedness, numbness, paresthesias and weakness.        Wheelchair bound   Psychiatric/Behavioral: Negative for altered mental status.   Allergic/Immunologic: Negative for environmental allergies.     BP (!) 148/90 (BP Location: Left arm, Patient Position: Sitting)   Pulse 88   Ht 5' 2" (1.575 m)   BMI 25.42 kg/m²     Objective:   Physical Exam  Vitals and nursing note reviewed.   Constitutional:       General: She is not in acute distress.     Appearance: She is well-developed. She is cachectic. She is ill-appearing.   HENT:      Head: Normocephalic and atraumatic.      Nose: Nose normal.      Mouth/Throat:      Mouth: Mucous membranes are moist.   Eyes:      Pupils: Pupils are equal, round, and reactive to light.   Neck:      Thyroid: No thyromegaly.      Vascular: No JVD.      Trachea: No tracheal deviation.   Cardiovascular:      Rate and Rhythm: Normal rate and regular rhythm.      Chest Wall: PMI is not displaced.      Pulses: Intact distal pulses.           Radial pulses are 2+ on the right side and 2+ on the left side.        Dorsalis pedis pulses are 2+ on the right side and 2+ on the left side.      Heart sounds: S1 normal and S2 normal. Heart sounds not distant. No murmur heard.     Comments: Chest pain free  Pulmonary:      Effort: Pulmonary effort is normal. No respiratory distress.      Breath sounds: Examination of the right-lower field reveals decreased breath sounds. Examination of the left-lower field reveals " decreased breath sounds. Decreased breath sounds present. No wheezing.   Abdominal:      General: Bowel sounds are normal.      Palpations: Abdomen is soft.      Comments: + g tube   Genitourinary:     Comments: +indwelling henley cath  Musculoskeletal:         General: No swelling. Normal range of motion.      Cervical back: Full passive range of motion without pain, normal range of motion and neck supple.      Right lower le+ Edema present.      Left lower le+ Edema present.      Right ankle: Swelling present.      Left ankle: Swelling present.   Skin:     General: Skin is warm and dry.      Capillary Refill: Capillary refill takes less than 2 seconds.      Nails: There is no clubbing.   Neurological:      General: No focal deficit present.      Mental Status: She is alert and oriented to person, place, and time.   Psychiatric:         Speech: Speech normal.         Behavior: Behavior normal. Behavior is cooperative.         Thought Content: Thought content normal.         Judgment: Judgment normal.         Lab Results   Component Value Date    CHOL 197 2019    CHOL 190 2009     Lab Results   Component Value Date    HDL 63 2019    HDL 61 2009     Lab Results   Component Value Date    LDLCALC 120.6 2019    LDLCALC 120.2 2009     Lab Results   Component Value Date    TRIG 67 2019    TRIG 44 2009     Lab Results   Component Value Date    CHOLHDL 32.0 2019    CHOLHDL 32.1 2009       Chemistry        Component Value Date/Time     2022 1744    K 4.5 2022 1744     2022 1744    CO2 22 (L) 2022 1744    BUN 19 2022 1744    CREATININE 0.7 2022 1744     (H) 2022 1744        Component Value Date/Time    CALCIUM 7.9 (L) 2022 1744    ALKPHOS 168 (H) 2022 1744    AST 50 (H) 2022 1744    ALT 19 2022 1744    BILITOT 0.3 2022 1744    ESTGFRAFRICA >60 2022 1744     EGFRNONAA >60 04/07/2022 1744          Lab Results   Component Value Date    TSH 1.367 03/16/2022     Lab Results   Component Value Date    INR 1.0 04/07/2022    INR 1.0 03/15/2022    INR 1.0 03/14/2022     Lab Results   Component Value Date    WBC 9.85 04/07/2022    HGB 12.0 04/07/2022    HCT 38.0 04/07/2022    MCV 93 04/07/2022     04/07/2022   1.TTE  Results for orders placed during the hospital encounter of 03/10/22    Echo    Interpretation Summary  · The left ventricle is mildly enlarged with eccentric hypertrophy and severely decreased systolic function.  · Grade III left ventricular diastolic dysfunction.  · Moderate right ventricular enlargement with moderately reduced right ventricular systolic function.  · Severe left atrial enlargement.  · The estimated ejection fraction is 10%.  · There is severe left ventricular global hypokinesis.  · Severe right atrial enlargement.  · Moderate-to-severe mitral regurgitation.  · Moderate to severe tricuspid regurgitation.  · Moderate pulmonic regurgitation.  · Mechanically ventilated; cannot use inferior caval vein diameter to estimate central venous pressure.  · There is a large left pleural effusion.       Assessment:      1. Acute on chronic combined systolic and diastolic CHF (congestive heart failure)    2. Recent H/O Out of hospital Cardiac arrest    3. Chronic combined systolic and diastolic heart failure    4. Mixed hyperlipidemia    5. History of CVA (cerebrovascular accident)    6. Anoxic encephalopathy    7. Debility        Plan:   Patient daughter would like to continue medical treatment at this time     Start Coreg 6.25 mg nightly  Continue Entresto 49/51 BID  Decide on diuretic once labs resulted today  Encourage daily walking or at least sitting in chair every day  CMP, BNP today  RTC in 2 weeks    Nicole May, FNP-C [facility]

## 2022-04-22 ENCOUNTER — HOSPITAL ENCOUNTER (EMERGENCY)
Facility: HOSPITAL | Age: 82
Discharge: HOME | End: 2022-04-23
Attending: EMERGENCY MEDICINE
Payer: MEDICARE

## 2022-04-22 DIAGNOSIS — R06.02 SOB (SHORTNESS OF BREATH): ICD-10-CM

## 2022-04-22 DIAGNOSIS — T83.9XXA FOLEY CATHETER PROBLEM, INITIAL ENCOUNTER: ICD-10-CM

## 2022-04-22 DIAGNOSIS — N39.0 URINARY TRACT INFECTION WITHOUT HEMATURIA, SITE UNSPECIFIED: ICD-10-CM

## 2022-04-22 DIAGNOSIS — T85.598A FEEDING TUBE DYSFUNCTION, INITIAL ENCOUNTER: Primary | ICD-10-CM

## 2022-04-22 LAB
ALBUMIN SERPL BCP-MCNC: 3.2 G/DL (ref 3.5–5.2)
ALLENS TEST: ABNORMAL
ALP SERPL-CCNC: 197 U/L (ref 55–135)
ALT SERPL W/O P-5'-P-CCNC: 49 U/L (ref 10–44)
ANION GAP SERPL CALC-SCNC: 17 MMOL/L (ref 8–16)
AST SERPL-CCNC: 45 U/L (ref 10–40)
BACTERIA #/AREA URNS HPF: ABNORMAL /HPF
BASOPHILS # BLD AUTO: 0.03 K/UL (ref 0–0.2)
BASOPHILS NFR BLD: 0.4 % (ref 0–1.9)
BILIRUB SERPL-MCNC: 0.5 MG/DL (ref 0.1–1)
BILIRUB UR QL STRIP: NEGATIVE
BNP SERPL-MCNC: >4900 PG/ML (ref 0–99)
BUN SERPL-MCNC: 34 MG/DL (ref 8–23)
CALCIUM SERPL-MCNC: 9.3 MG/DL (ref 8.7–10.5)
CHLORIDE SERPL-SCNC: 102 MMOL/L (ref 95–110)
CLARITY UR: CLEAR
CO2 SERPL-SCNC: 23 MMOL/L (ref 23–29)
COLOR UR: YELLOW
CREAT SERPL-MCNC: 0.9 MG/DL (ref 0.5–1.4)
DELSYS: ABNORMAL
DIFFERENTIAL METHOD: ABNORMAL
EOSINOPHIL # BLD AUTO: 0.1 K/UL (ref 0–0.5)
EOSINOPHIL NFR BLD: 1.2 % (ref 0–8)
ERYTHROCYTE [DISTWIDTH] IN BLOOD BY AUTOMATED COUNT: 16.9 % (ref 11.5–14.5)
EST. GFR  (AFRICAN AMERICAN): >60 ML/MIN/1.73 M^2
EST. GFR  (NON AFRICAN AMERICAN): 59 ML/MIN/1.73 M^2
GLUCOSE SERPL-MCNC: 80 MG/DL (ref 70–110)
GLUCOSE UR QL STRIP: NEGATIVE
HCO3 UR-SCNC: 25.6 MMOL/L (ref 24–28)
HCT VFR BLD AUTO: 31.8 % (ref 37–48.5)
HGB BLD-MCNC: 9.8 G/DL (ref 12–16)
HGB UR QL STRIP: ABNORMAL
HYALINE CASTS #/AREA URNS LPF: 0 /LPF
IMM GRANULOCYTES # BLD AUTO: 0.06 K/UL (ref 0–0.04)
IMM GRANULOCYTES NFR BLD AUTO: 0.8 % (ref 0–0.5)
KETONES UR QL STRIP: NEGATIVE
LACTATE SERPL-SCNC: 2.9 MMOL/L (ref 0.5–2.2)
LEUKOCYTE ESTERASE UR QL STRIP: ABNORMAL
LYMPHOCYTES # BLD AUTO: 1.4 K/UL (ref 1–4.8)
LYMPHOCYTES NFR BLD: 19.2 % (ref 18–48)
MCH RBC QN AUTO: 29.6 PG (ref 27–31)
MCHC RBC AUTO-ENTMCNC: 30.8 G/DL (ref 32–36)
MCV RBC AUTO: 96 FL (ref 82–98)
MICROSCOPIC COMMENT: ABNORMAL
MONOCYTES # BLD AUTO: 1 K/UL (ref 0.3–1)
MONOCYTES NFR BLD: 13.7 % (ref 4–15)
NEUTROPHILS # BLD AUTO: 4.8 K/UL (ref 1.8–7.7)
NEUTROPHILS NFR BLD: 64.7 % (ref 38–73)
NITRITE UR QL STRIP: NEGATIVE
NRBC BLD-RTO: 0 /100 WBC
PCO2 BLDA: 30.8 MMHG (ref 35–45)
PH SMN: 7.53 [PH] (ref 7.35–7.45)
PH UR STRIP: 6 [PH] (ref 5–8)
PLATELET # BLD AUTO: 226 K/UL (ref 150–450)
PMV BLD AUTO: 11.2 FL (ref 9.2–12.9)
PO2 BLDA: 68 MMHG (ref 80–100)
POC BE: 3 MMOL/L
POC SATURATED O2: 95 % (ref 95–100)
POTASSIUM SERPL-SCNC: 5.1 MMOL/L (ref 3.5–5.1)
PROT SERPL-MCNC: 7.1 G/DL (ref 6–8.4)
PROT UR QL STRIP: ABNORMAL
RBC # BLD AUTO: 3.31 M/UL (ref 4–5.4)
RBC #/AREA URNS HPF: 2 /HPF (ref 0–4)
SAMPLE: ABNORMAL
SITE: ABNORMAL
SODIUM SERPL-SCNC: 142 MMOL/L (ref 136–145)
SP GR UR STRIP: 1.02 (ref 1–1.03)
TROPONIN I SERPL DL<=0.01 NG/ML-MCNC: 0.04 NG/ML (ref 0–0.03)
URN SPEC COLLECT METH UR: ABNORMAL
UROBILINOGEN UR STRIP-ACNC: NEGATIVE EU/DL
WBC # BLD AUTO: 7.35 K/UL (ref 3.9–12.7)
WBC #/AREA URNS HPF: 50 /HPF (ref 0–5)

## 2022-04-22 PROCEDURE — 36600 WITHDRAWAL OF ARTERIAL BLOOD: CPT

## 2022-04-22 PROCEDURE — 25500020 PHARM REV CODE 255: Performed by: EMERGENCY MEDICINE

## 2022-04-22 PROCEDURE — 81000 URINALYSIS NONAUTO W/SCOPE: CPT | Performed by: EMERGENCY MEDICINE

## 2022-04-22 PROCEDURE — 85025 COMPLETE CBC W/AUTO DIFF WBC: CPT | Performed by: EMERGENCY MEDICINE

## 2022-04-22 PROCEDURE — 43762 RPLC GTUBE NO REVJ TRC: CPT

## 2022-04-22 PROCEDURE — 84484 ASSAY OF TROPONIN QUANT: CPT | Performed by: EMERGENCY MEDICINE

## 2022-04-22 PROCEDURE — 99900035 HC TECH TIME PER 15 MIN (STAT)

## 2022-04-22 PROCEDURE — 80053 COMPREHEN METABOLIC PANEL: CPT | Performed by: EMERGENCY MEDICINE

## 2022-04-22 PROCEDURE — 82803 BLOOD GASES ANY COMBINATION: CPT

## 2022-04-22 PROCEDURE — 25000003 PHARM REV CODE 250: Performed by: EMERGENCY MEDICINE

## 2022-04-22 PROCEDURE — 83605 ASSAY OF LACTIC ACID: CPT | Performed by: EMERGENCY MEDICINE

## 2022-04-22 PROCEDURE — 51702 INSERT TEMP BLADDER CATH: CPT

## 2022-04-22 PROCEDURE — 99284 EMERGENCY DEPT VISIT MOD MDM: CPT | Mod: 25

## 2022-04-22 PROCEDURE — 83880 ASSAY OF NATRIURETIC PEPTIDE: CPT | Performed by: EMERGENCY MEDICINE

## 2022-04-22 RX ORDER — CIPROFLOXACIN 500 MG/1
500 TABLET ORAL
Status: COMPLETED | OUTPATIENT
Start: 2022-04-22 | End: 2022-04-22

## 2022-04-22 RX ORDER — CIPROFLOXACIN 500 MG/1
500 TABLET ORAL
Status: DISCONTINUED | OUTPATIENT
Start: 2022-04-22 | End: 2022-04-22

## 2022-04-22 RX ADMIN — DIATRIZOATE MEGLUMINE AND DIATRIZOATE SODIUM 30 ML: 660; 100 LIQUID ORAL; RECTAL at 10:04

## 2022-04-22 RX ADMIN — CIPROFLOXACIN 500 MG: 500 TABLET, FILM COATED ORAL at 11:04

## 2022-04-22 NOTE — ED PROVIDER NOTES
SCRIBE #1 NOTE: I, Talia Roldan, am scribing for, and in the presence of, Tesfaye Robert MD. I have scribed the HPI, ROS, and PEx.     SCRIBE #2 NOTE: I, Natan Coughlin, am scribing for, and in the presence of,  Harlan Woodward Jr., MD. I have scribed the remaining portions of the note not scribed by Scribe #1.      History     Chief Complaint   Patient presents with    Henley and PEG tube replacement     Pt brought in to the ER for c/o pulling out henley catheter and PEG tube. Pt coming from home. Denies any other symptoms at this time.     Review of patient's allergies indicates:   Allergen Reactions    Xanax [alprazolam]          History of Present Illness     HPI    4/22/2022, 5:50 PM  History obtained from the patient daughter      History of Present Illness: Tanya Madrid is a 82 y.o. female patient with a PMHx of acute CHF, HLD, HTN, parkinson's disease, stroke, throat mass who presents to the Emergency Department for a henley and PEG tube replacement. The pt daughter states the pt pulled out her peg tube buttons that hold it I place as well as her henley. The daughter states she had the catheter put in after her cardiac arrest and now has it changed once a month. The daughter states the pt has been irritable and antsy the past couple of days. She states the pt did the same thing  7 days ago.  Symptoms are constant and moderate in severity. No mitigating or exacerbating factors reported. Associated sxs include none. Patient denies any fever, N/V/D, constipation, chills, and all other sxs at this time. No prior Tx reported. Pt is a DNR. No further complaints or concerns at this time.       Arrival mode: Personal vehicle      PCP: Maggie Sue NP        Past Medical History:  Past Medical History:   Diagnosis Date    Acute CHF (congestive heart failure) 1/17/2021    Hyperlipemia     Hypertension     Parkinson's disease     Stroke 2016    Throat mass        Past Surgical History:  Past Surgical History:    Procedure Laterality Date    CLOSED REDUCTION Right 10/15/2020    Procedure: CLOSED REDUCTION;  Surgeon: Humberto Valdivia DO;  Location: [facility] OR;  Service: Orthopedics;  Laterality: Right;  ATTEMPTED    EVACUATION OF HEMATOMA Right 10/17/2020    Procedure: EVACUATION, HEMATOMA;  Surgeon: Humberto Valdivia DO;  Location: [facility] OR;  Service: Orthopedics;  Laterality: Right;    HEMIARTHROPLASTY OF HIP Left 7/12/2020    Procedure: HEMIARTHROPLASTY, HIP;  Surgeon: Humberto Valdivia DO;  Location: [facility] OR;  Service: Orthopedics;  Laterality: Left;    HEMIARTHROPLASTY OF HIP Right 10/2/2020    Procedure: HEMIARTHROPLASTY, HIP;  Surgeon: Loyd Kearney MD;  Location: [facility] OR;  Service: Orthopedics;  Laterality: Right;    HYSTERECTOMY      INSERTION OF PERCUTANEOUS ENDOSCOPIC GASTROSTOMY (PEG) FEEDING TUBE      OPEN REDUCTION OF DISLOCATION OF HIP Right 10/17/2020    Procedure: OPEN REDUCTION, DISLOCATION, HIP;  Surgeon: Humberto Valdivia DO;  Location: [facility] OR;  Service: Orthopedics;  Laterality: Right;    THROAT SURGERY      TONSILLECTOMY           Family History:  Family History   Family history unknown: Yes       Social History:  Social History     Tobacco Use    Smoking status: Never Smoker    Smokeless tobacco: Never Used   Substance and Sexual Activity    Alcohol use: No    Drug use: No    Sexual activity: Not Currently        Review of Systems     Review of Systems   Constitutional: Negative for chills and fever.   HENT: Negative for sore throat.    Respiratory: Negative for shortness of breath.    Cardiovascular: Negative for chest pain.   Gastrointestinal: Negative for constipation, diarrhea, nausea and vomiting.   Genitourinary: Negative for dysuria.   Musculoskeletal: Negative for back pain.   Skin: Negative for rash.   Neurological: Negative for weakness.   Hematological: Does not bruise/bleed easily.   All other systems reviewed and are negative.     Physical Exam     Initial Vitals   BP  "Pulse Resp Temp SpO2   04/22/22 1451 04/22/22 1451 04/22/22 1451 04/22/22 1451 04/22/22 2223   126/76 87 20 97.7 °F (36.5 °C) (!) 93 %      MAP       --                 Physical Exam  Nursing Notes and Vital Signs Reviewed.  Constitutional: Patient is in no acute distress.   Head: Atraumatic. Normocephalic.  Eyes: PERRL. EOM intact. Conjunctivae are not pale. No scleral icterus.  ENT: Mucous membranes are moist. Oropharynx is clear and symmetric.    Neck: Supple. Full ROM. No lymphadenopathy.  Cardiovascular: Regular rate. Regular rhythm. No murmurs, rubs, or gallops. Distal pulses are 2+ and symmetric.  Pulmonary/Chest: No respiratory distress. Clear to auscultation bilaterally. No wheezing or rales.  Abdominal: Soft and non-distended.  There is no tenderness.  No rebound, guarding, or rigidity. Good bowel sounds.  Genitourinary: No CVA tenderness  Musculoskeletal: Moves all extremities. No obvious deformities. No edema. No calf tenderness.  Skin: Warm and dry. PEG tube in place.   Neurological:  Alert, awake, and appropriate.  Normal speech.  No acute focal neurological deficits are appreciated.  Psychiatric: Normal affect. Good eye contact. Appropriate in content.     ED Course   Procedures  ED Vital Signs:  Vitals:    04/22/22 1451 04/22/22 1930 04/22/22 2000 04/22/22 2030   BP: 126/76 (!) 137/90 (!) 139/100 (!) 141/94   Pulse: 87 84 82 82   Resp: 20 17 (!) 23 (!) 25   Temp: 97.7 °F (36.5 °C)      TempSrc: Axillary      SpO2:       Weight: 63 kg (139 lb)      Height: 5' 2" (1.575 m)       04/22/22 2223 04/22/22 2230 04/22/22 2300 04/22/22 2330   BP:  (!) 143/92 (!) 145/104 (!) 140/84   Pulse: 87 90 90 89   Resp: (!) 110 (!) 23 (!) 21 (!) 22   Temp:       TempSrc:       SpO2: (!) 93% 95%  96%   Weight:       Height:        04/23/22 0000 04/23/22 0030 04/23/22 0100   BP: (!) 145/92 (!) 138/106 (!) 143/94   Pulse: 85 83 79   Resp: (!) 24 (!) 22 20   Temp:      TempSrc:      SpO2:      Weight:      Height:    "       Abnormal Lab Results:  Labs Reviewed   CBC W/ AUTO DIFFERENTIAL - Abnormal; Notable for the following components:       Result Value    RBC 3.31 (*)     Hemoglobin 9.8 (*)     Hematocrit 31.8 (*)     MCHC 30.8 (*)     RDW 16.9 (*)     Immature Granulocytes 0.8 (*)     Immature Grans (Abs) 0.06 (*)     All other components within normal limits   COMPREHENSIVE METABOLIC PANEL - Abnormal; Notable for the following components:    BUN 34 (*)     Albumin 3.2 (*)     Alkaline Phosphatase 197 (*)     AST 45 (*)     ALT 49 (*)     Anion Gap 17 (*)     eGFR if non  59 (*)     All other components within normal limits   URINALYSIS - Abnormal; Notable for the following components:    Protein, UA 1+ (*)     Occult Blood UA 2+ (*)     Leukocytes, UA 3+ (*)     All other components within normal limits   LACTIC ACID, PLASMA - Abnormal; Notable for the following components:    Lactate (Lactic Acid) 2.9 (*)     All other components within normal limits   TROPONIN I - Abnormal; Notable for the following components:    Troponin I 0.037 (*)     All other components within normal limits   B-TYPE NATRIURETIC PEPTIDE - Abnormal; Notable for the following components:    BNP >4,900 (*)     All other components within normal limits   URINALYSIS MICROSCOPIC - Abnormal; Notable for the following components:    WBC, UA 50 (*)     All other components within normal limits   ISTAT PROCEDURE - Abnormal; Notable for the following components:    POC PH 7.528 (*)     POC PCO2 30.8 (*)     POC PO2 68 (*)     All other components within normal limits        All Lab Results:  Results for orders placed or performed during the hospital encounter of 04/22/22   CBC auto differential   Result Value Ref Range    WBC 7.35 3.90 - 12.70 K/uL    RBC 3.31 (L) 4.00 - 5.40 M/uL    Hemoglobin 9.8 (L) 12.0 - 16.0 g/dL    Hematocrit 31.8 (L) 37.0 - 48.5 %    MCV 96 82 - 98 fL    MCH 29.6 27.0 - 31.0 pg    MCHC 30.8 (L) 32.0 - 36.0 g/dL    RDW 16.9  (H) 11.5 - 14.5 %    Platelets 226 150 - 450 K/uL    MPV 11.2 9.2 - 12.9 fL    Immature Granulocytes 0.8 (H) 0.0 - 0.5 %    Gran # (ANC) 4.8 1.8 - 7.7 K/uL    Immature Grans (Abs) 0.06 (H) 0.00 - 0.04 K/uL    Lymph # 1.4 1.0 - 4.8 K/uL    Mono # 1.0 0.3 - 1.0 K/uL    Eos # 0.1 0.0 - 0.5 K/uL    Baso # 0.03 0.00 - 0.20 K/uL    nRBC 0 0 /100 WBC    Gran % 64.7 38.0 - 73.0 %    Lymph % 19.2 18.0 - 48.0 %    Mono % 13.7 4.0 - 15.0 %    Eosinophil % 1.2 0.0 - 8.0 %    Basophil % 0.4 0.0 - 1.9 %    Differential Method Automated    Comprehensive metabolic panel   Result Value Ref Range    Sodium 142 136 - 145 mmol/L    Potassium 5.1 3.5 - 5.1 mmol/L    Chloride 102 95 - 110 mmol/L    CO2 23 23 - 29 mmol/L    Glucose 80 70 - 110 mg/dL    BUN 34 (H) 8 - 23 mg/dL    Creatinine 0.9 0.5 - 1.4 mg/dL    Calcium 9.3 8.7 - 10.5 mg/dL    Total Protein 7.1 6.0 - 8.4 g/dL    Albumin 3.2 (L) 3.5 - 5.2 g/dL    Total Bilirubin 0.5 0.1 - 1.0 mg/dL    Alkaline Phosphatase 197 (H) 55 - 135 U/L    AST 45 (H) 10 - 40 U/L    ALT 49 (H) 10 - 44 U/L    Anion Gap 17 (H) 8 - 16 mmol/L    eGFR if African American >60 >60 mL/min/1.73 m^2    eGFR if non African American 59 (A) >60 mL/min/1.73 m^2   Urinalysis - Cath.   Result Value Ref Range    Specimen UA Urine, Catheterized     Color, UA Yellow Yellow, Straw, Kassi    Appearance, UA Clear Clear    pH, UA 6.0 5.0 - 8.0    Specific Gravity, UA 1.020 1.005 - 1.030    Protein, UA 1+ (A) Negative    Glucose, UA Negative Negative    Ketones, UA Negative Negative    Bilirubin (UA) Negative Negative    Occult Blood UA 2+ (A) Negative    Nitrite, UA Negative Negative    Urobilinogen, UA Negative <2.0 EU/dL    Leukocytes, UA 3+ (A) Negative   Lactic acid, plasma   Result Value Ref Range    Lactate (Lactic Acid) 2.9 (H) 0.5 - 2.2 mmol/L   Troponin I   Result Value Ref Range    Troponin I 0.037 (H) 0.000 - 0.026 ng/mL   B-Type natriuretic peptide (BNP)   Result Value Ref Range    BNP >4,900 (H) 0 - 99 pg/mL    Urinalysis Microscopic   Result Value Ref Range    RBC, UA 2 0 - 4 /hpf    WBC, UA 50 (H) 0 - 5 /hpf    Bacteria Rare None-Occ /hpf    Hyaline Casts, UA 0 0-1/lpf /lpf    Microscopic Comment SEE COMMENT    ISTAT PROCEDURE   Result Value Ref Range    POC PH 7.528 (H) 7.35 - 7.45    POC PCO2 30.8 (L) 35 - 45 mmHg    POC PO2 68 (L) 80 - 100 mmHg    POC HCO3 25.6 24 - 28 mmol/L    POC BE 3 -2 to 2 mmol/L    POC SATURATED O2 95 95 - 100 %    Sample ARTERIAL     Site RR     Allens Test Pass     DelSys Room Air      Imaging Results:  Imaging Results          X-Ray Chest AP Portable (Final result)  Result time 04/22/22 22:31:45    Final result by Levi Booker MD (04/22/22 22:31:45)                 Impression:      Mild to moderate right pleural effusion and associated airspace disease favoring atelectasis.      Electronically signed by: Levi Booker  Date:    04/22/2022  Time:    22:31             Narrative:    EXAMINATION:  XR CHEST AP PORTABLE    CLINICAL HISTORY:  Shortness of breath    TECHNIQUE:  Single frontal view of the chest was performed.    COMPARISON:  Multiple priors.    FINDINGS:  Mild to moderate right pleural effusion and associated airspace disease favoring atelectasis.  Correlation to exclude pneumonia.  Aspiration less favored.  Left lung is better preserved.    The cardiac silhouette is enlarged.  Aortic atherosclerosis.  The hilar and mediastinal contours are unremarkable.    Bones are intact.                               XR Non-Rad Performed NG/Gastric Tube Check (Final result)  Result time 04/22/22 21:26:29    Final result by Levi Booker MD (04/22/22 21:26:29)                 Impression:      Gastrostomy tube in good position as above.      Electronically signed by: Levi Booker  Date:    04/22/2022  Time:    21:26             Narrative:    EXAMINATION:  XR NON-RADIOLOGIST PERFORMED NG/GASTRIC TUBE CHECK    CLINICAL HISTORY:  PEG tube placement;    TECHNIQUE:  Two views before and  after the administration of Gastrografin contrast.    COMPARISON:  None    FINDINGS:  Gastrostomy tube appears in good position.  No extravasated contrast is seen.  Contrast outlines the stomach and duodenum.                                X-Ray Abdomen AP 1 View (KUB) (Final result)  Result time 04/22/22 19:06:14    Final result by Levi Booker MD (04/22/22 19:06:14)                 Impression:      As above.    This report was flagged in Epic as abnormal.      Electronically signed by: Levi Booker  Date:    04/22/2022  Time:    19:06             Narrative:    EXAMINATION:  XR ABDOMEN AP 1 VIEW    CLINICAL HISTORY:  Other mechanical complication of other gastrointestinal prosthetic devices, implants and grafts, initial encounter    TECHNIQUE:  AP View(s) of the abdomen was performed.    COMPARISON:  None    FINDINGS:  Only a single image is submitted.  No contrast injection.  Gastrostomy tube placement cannot be confirmed, although the tube tip does overlie the stomach probable.                                            The Emergency Provider reviewed the vital signs and test results, which are outlined above.     ED Discussion     9:57 PM we confirmed the patient's PEG tube in the correct location and fully catheter was replaced and the patient was ready for discharge.  Upon discharge the patient was noted to have significant breath-holding spells with snoring respirations intermittently.  She was easily aroused but is not alert and active as her usual.  The daughter confirms that she has been DNR however wants further workup try to understand what is going on prior to leaving the emergency department.    Labs are pending and care the patient will now be transferred to Dr. Woodward and he will follow up on labs/x-ray    10:08 PM: Dr. Robert transfers care of patient to Dr. Woodward pending lab and imaging results.    10:26 PM: Re-evaluated pt. I agree with Dr. Robert's assessment of the pt. Pt is resting  comfortably and is in no acute distress. D/w pt all pertinent results. D/w pt any concerns expressed at this time. Answered all questions. Pt expresses understanding at this time.    12:17 AM: Reassessed pt at this time. Pt's UTI and mental status seem to be improving at this time. Discussed with pt all pertinent ED information and results. Discussed pt dx and plan of tx. Discussed with pt's family to continue PEG tube feeding and Cipro regimen as prescribed. Gave pt all f/u and return to the ED instructions. All questions and concerns were addressed at this time. Pt expresses understanding of information and instructions, and is comfortable with plan to discharge. Pt is stable for discharge.    I discussed with patient and/or family/caretaker that evaluation in the ED does not suggest any emergent or life threatening medical conditions requiring immediate intervention beyond what was provided in the ED, and I believe patient is safe for discharge.  Regardless, an unremarkable evaluation in the ED does not preclude the development or presence of a serious of life threatening condition. As such, patient was instructed to return immediately for any worsening or change in current symptoms.     Medical Decision Making:   Clinical Tests:   Lab Tests: Ordered and Reviewed  Radiological Study: Ordered and Reviewed           ED Medication(s):  Medications   diatrizoate meglumineand-diatrizoate sodium (GASTROVIEW) solution 30 mL (30 mLs Per G Tube Given 4/22/22 4694)   ciprofloxacin HCl tablet 500 mg (500 mg Per G Tube Given 4/22/22 2563)       Discharge Medication List as of 4/23/2022 12:18 AM           Follow-up Information     Maggie Sue NP. Schedule an appointment as soon as possible for a visit in 3 days.    Specialty: Family Medicine  Contact information:  [address] [place] [address] [phone]             [facility] - Emergency Dept..    Specialty: Emergency Medicine  Why: As needed, If symptoms  worsen  Contact information:  [address] [facility] [address] [phone]                           Scribe Attestation:   Scribe #1: I performed the above scribed service and the documentation accurately describes the services I performed. I attest to the accuracy of the note.     Attending:   Physician Attestation Statement for Scribe #1: I, Tesfaye Robert MD, personally performed the services described in this documentation, as scribed by Talia Roldan, in my presence, and it is both accurate and complete.       Scribe Attestation:   Scribe #2: I performed the above scribed service and the documentation accurately describes the services I performed. I attest to the accuracy of the note.    Attending Attestation:           Physician Attestation for Scribe:    Physician Attestation Statement for Scribe #2: I, Harlan Woodward Jr., MD, reviewed documentation, as scribed by Natan Coughlin in my presence, and it is both accurate and complete. I also acknowledge and confirm the content of the note done by Scribe #1.           Clinical Impression       ICD-10-CM ICD-9-CM   1. Feeding tube dysfunction, initial encounter  T85.598A 996.59   2. Abreu catheter problem, initial encounter  T83.9XXA 996.76   3. SOB (shortness of breath)  R06.02 786.05   4. Urinary tract infection without hematuria, site unspecified  N39.0 599.0       Disposition:   Disposition: Discharged  Condition: Stable         Harlan Woodward Jr., MD  04/26/22 0631

## 2022-04-22 NOTE — FIRST PROVIDER EVALUATION
Medical screening exam completed.  I have conducted a focused provider triage encounter, findings are as follows:    Brief history of present illness:  Pt presents to the Er for henley and peg tube replacement     There were no vitals filed for this visit.    Pertinent physical exam:  vss    Brief workup plan:  Henley and peg replacement     Preliminary workup initiated; this workup will be continued and followed by the physician or advanced practice provider that is assigned to the patient when roomed.

## 2022-04-23 VITALS
SYSTOLIC BLOOD PRESSURE: 143 MMHG | HEART RATE: 79 BPM | WEIGHT: 139 LBS | BODY MASS INDEX: 25.58 KG/M2 | TEMPERATURE: 98 F | HEIGHT: 62 IN | RESPIRATION RATE: 20 BRPM | DIASTOLIC BLOOD PRESSURE: 94 MMHG | OXYGEN SATURATION: 96 %

## 2022-04-23 PROBLEM — T85.598A FEEDING TUBE DYSFUNCTION: Status: ACTIVE | Noted: 2022-04-23

## 2022-04-23 PROBLEM — N39.0 URINARY TRACT INFECTION WITHOUT HEMATURIA: Status: ACTIVE | Noted: 2022-04-23

## 2022-04-23 PROBLEM — T83.9XXA FOLEY CATHETER PROBLEM, INITIAL ENCOUNTER: Status: ACTIVE | Noted: 2022-04-23

## 2022-04-23 PROBLEM — R06.02 SOB (SHORTNESS OF BREATH): Status: ACTIVE | Noted: 2022-04-23

## 2022-04-25 ENCOUNTER — TELEPHONE (OUTPATIENT)
Dept: TRANSPLANT | Facility: CLINIC | Age: 82
End: 2022-04-25
Payer: MEDICARE

## 2022-04-25 NOTE — TELEPHONE ENCOUNTER
Tiffany Sanchez, JIE  You 20 minutes ago (1:50 PM)         Continue same meds until next appt     Tiffany Loja with [facility] notified and will notify pts daughter.

## 2022-04-25 NOTE — TELEPHONE ENCOUNTER
[facility]  nurse Purvi called today to report pt has had decrease in UOP since last visit. Reports she did have UTI last week.   bp 110/72 P-84 lungs clear  LE edema is improving.   She states she just had to report the decrease in UOP.

## 2022-04-27 ENCOUNTER — TELEPHONE (OUTPATIENT)
Dept: ADMINISTRATIVE | Facility: CLINIC | Age: 82
End: 2022-04-27
Payer: MEDICARE

## 2022-04-27 NOTE — TELEPHONE ENCOUNTER
Returned call to [facility] with . Pt has a stage 1 sacral pressure ulcer. Verbal orders given for wound care.

## 2022-04-28 ENCOUNTER — TELEPHONE (OUTPATIENT)
Dept: CARDIOLOGY | Facility: CLINIC | Age: 82
End: 2022-04-28
Payer: MEDICARE

## 2022-04-28 NOTE — TELEPHONE ENCOUNTER
----- Message from Dulce Maria Canada MA sent at 4/21/2022  3:18 PM CDT -----  [facility] Staff  -please call patient's daughter   -BNP elevated, will have her take lasix 20 mg on M/W/F for now   -Continue Entresto twice a day   -Resume coreg nightly as we discussed   -Phone review next week to check on her progress     Tiffany

## 2022-04-28 NOTE — TELEPHONE ENCOUNTER
"I called Mrs. Jeong to go over the patient's symptoms.     Still breathing strangely, but "may be anxiety."   Notes a little swelling, but will be applying FARSHAD hose soon.     No other cardiac related symptoms noted.     Vital average:  128/94 45  121/79 71  130/79 56      "

## 2022-04-29 ENCOUNTER — CARE AT HOME (OUTPATIENT)
Dept: HOME HEALTH SERVICES | Facility: CLINIC | Age: 82
End: 2022-04-29
Payer: MEDICARE

## 2022-04-29 DIAGNOSIS — G47.00 INSOMNIA, UNSPECIFIED TYPE: ICD-10-CM

## 2022-04-29 DIAGNOSIS — Z09 FOLLOW UP: ICD-10-CM

## 2022-04-29 DIAGNOSIS — I46.9 CARDIAC ARREST: Primary | ICD-10-CM

## 2022-04-29 PROCEDURE — 99349 PR HOME VISIT,ESTAB PATIENT,LEVEL III: ICD-10-PCS | Mod: S$GLB,,, | Performed by: NURSE PRACTITIONER

## 2022-04-29 PROCEDURE — 99349 HOME/RES VST EST MOD MDM 40: CPT | Mod: S$GLB,,, | Performed by: NURSE PRACTITIONER

## 2022-04-29 NOTE — PROGRESS NOTES
[facility]  Medical Home Visit    Visit Date: 4/29/2022  Encounter Provider: Elva Ansari  PCP:  Maggie Sue NP    Subjective:      Patient ID: Tanya Madrid is a 82 y.o. female.    Consult Requested By:  No ref. provider found  Reason for Consult:  Follow Up    Tanya is being seen at home due to being seen at home due to physical debility that presents a taxing effort to leave the home, to mitigate high risk of hospital readmission and/or due to the limited availability of reliable or safe options for transportation to the point of access to the provider. Prior to treatment on this visit the chart was reviewed and patient verbal consent was obtained.    Chief Complaint: No chief complaint on file.      Patient being seen today after a hospital follow up. Patient had another trip to the ER on 04/22 after she pulled out her urinary catheter and pulled on her PEG Tube. Urinary catheter replaced in the ER and PEG Tube placement check and it was in place. She was sent back home after being seen in the ER. Daughter reports that patient has had no other issues and is doing fairly well since then. VSS and patient is compliant with all medications. Will order CBC since her hemoglobin dropped from 12.0 on 4/7 to 9.8 on 4/22. Home Health nurse to draw on next SN visit.          Review of Systems   Constitutional: Negative for activity change, appetite change (Fed through PEG Tube), diaphoresis and fever.   HENT: Negative.    Eyes: Negative.    Respiratory: Negative for cough and wheezing.    Cardiovascular: Negative for palpitations and leg swelling.   Gastrointestinal: Negative for abdominal pain, constipation, diarrhea and vomiting.        PEG tube in place   Endocrine: Negative.    Genitourinary:        Indwelling catheter draining clear yellow urine   Musculoskeletal: Negative.    Skin: Negative.    Psychiatric/Behavioral: Negative.        Assessments:  · Environmental: Patient lives in a single story  home with her daughter and family. There are no steps at the entrance. Lighting is bright and the temperature is comfortable.  · Functional Status: Patient requires assistance with all ADLs  · Safety: Fall Precautions  · Nutritional: Adequate food in the home  · Home Health/DME/Supplies: [facility], DMEs: Hospital bed, indwelling urinary catheter    Objective:   Physical Exam  Constitutional:       General: She is not in acute distress.  Cardiovascular:      Rate and Rhythm: Normal rate and regular rhythm.   Pulmonary:      Effort: Pulmonary effort is normal.   Abdominal:      General: There is no distension.      Palpations: Abdomen is soft.      Comments: PEG Tube placement checked, in place    Musculoskeletal:      Right lower leg: No edema.      Left lower leg: No edema.   Skin:     General: Skin is warm and dry.   Neurological:      Mental Status: She is alert. Mental status is at baseline.         There were no vitals filed for this visit.  There is no height or weight on file to calculate BMI.    Assessment:     1. Recent H/O Out of hospital Cardiac arrest    2. Follow up    3. Insomnia, unspecified type        Plan:     Ethical / Legal: Advance Care Planning   · Surrogate decision maker:  Samira Lemus, Relationship: Daughter  · Code Status:  DNR  · [place]:  None  · Other advance directive:  None, Capacity to make medical decisions:  No, Conflict None       Problem List Items Addressed This Visit        Cardiac/Vascular    Recent H/O Out of hospital Cardiac arrest - Primary       Other    Insomnia    Current Assessment & Plan     Patient responded well to trazodone 25mg PEG QHS, continue with medication             Other Visit Diagnoses     Follow up          Encounter for Medical Follow-Up and Medication Review  - [facility] at NP to schedule follow-up visit with patient in 4 weeks or PRN.     Patient Instructions Given:  - Continue all medications, treatments and therapies as ordered.    - Follow all instructions, recommendations as discussed.  - Maintain Safety Precautions at all times.  - Attend all medical appointments as scheduled.  - For worsening symptoms: call Primary Care Physician or Nurse Practitioner.  - For emergencies, call 911 or immediately report to the nearest emergency room         Were controlled substances prescribed?  No    Follow Up Appointments:   Future Appointments   Date Time Provider Department Center   5/12/2022  1:00 PM [facility]       Signature: Elva Ansari NP

## 2022-05-02 DIAGNOSIS — I50.42 CHRONIC COMBINED SYSTOLIC AND DIASTOLIC HEART FAILURE: ICD-10-CM

## 2022-05-02 DIAGNOSIS — I50.42 CHRONIC COMBINED SYSTOLIC AND DIASTOLIC HEART FAILURE: Primary | ICD-10-CM

## 2022-05-02 DIAGNOSIS — I50.43 ACUTE ON CHRONIC COMBINED SYSTOLIC AND DIASTOLIC CHF (CONGESTIVE HEART FAILURE): ICD-10-CM

## 2022-05-02 RX ORDER — SACUBITRIL AND VALSARTAN 49; 51 MG/1; MG/1
1 TABLET, FILM COATED ORAL 2 TIMES DAILY
Qty: 60 TABLET | Refills: 3 | Status: SHIPPED | OUTPATIENT
Start: 2022-05-02 | End: 2022-05-13

## 2022-05-02 RX ORDER — FUROSEMIDE 20 MG/1
TABLET ORAL
Qty: 30 TABLET | Refills: 11 | Status: ON HOLD | OUTPATIENT
Start: 2022-05-02 | End: 2022-05-07 | Stop reason: SDUPTHER

## 2022-05-03 ENCOUNTER — HOSPITAL ENCOUNTER (INPATIENT)
Facility: HOSPITAL | Age: 82
LOS: 2 days | Discharge: HOME HEALTH | DRG: 291 | End: 2022-05-07
Attending: EMERGENCY MEDICINE | Admitting: STUDENT IN AN ORGANIZED HEALTH CARE EDUCATION/TRAINING PROGRAM
Payer: MEDICARE

## 2022-05-03 DIAGNOSIS — R07.9 CHEST PAIN: ICD-10-CM

## 2022-05-03 DIAGNOSIS — I69.30 HISTORY OF CVA WITH RESIDUAL DEFICIT: ICD-10-CM

## 2022-05-03 DIAGNOSIS — G20.A1 PARKINSON DISEASE: Chronic | ICD-10-CM

## 2022-05-03 DIAGNOSIS — J90 RECURRENT RIGHT PLEURAL EFFUSION: ICD-10-CM

## 2022-05-03 DIAGNOSIS — I50.42 CHRONIC COMBINED SYSTOLIC AND DIASTOLIC HEART FAILURE: ICD-10-CM

## 2022-05-03 DIAGNOSIS — R06.02 SHORTNESS OF BREATH: ICD-10-CM

## 2022-05-03 DIAGNOSIS — J90 PLEURAL EFFUSION ON RIGHT: Primary | ICD-10-CM

## 2022-05-03 DIAGNOSIS — I50.9 ACUTE ON CHRONIC HEART FAILURE, UNSPECIFIED HEART FAILURE TYPE: ICD-10-CM

## 2022-05-03 LAB
ALBUMIN SERPL BCP-MCNC: 3.1 G/DL (ref 3.5–5.2)
ALLENS TEST: ABNORMAL
ALP SERPL-CCNC: 174 U/L (ref 55–135)
ALT SERPL W/O P-5'-P-CCNC: 15 U/L (ref 10–44)
ANION GAP SERPL CALC-SCNC: 14 MMOL/L (ref 8–16)
ANION GAP SERPL CALC-SCNC: 14 MMOL/L (ref 8–16)
APPEARANCE FLD: NORMAL
AST SERPL-CCNC: 60 U/L (ref 10–40)
BASOPHILS # BLD AUTO: 0.02 K/UL (ref 0–0.2)
BASOPHILS NFR BLD: 0.2 % (ref 0–1.9)
BILIRUB SERPL-MCNC: 0.7 MG/DL (ref 0.1–1)
BNP SERPL-MCNC: >4900 PG/ML (ref 0–99)
BODY FLD TYPE: NORMAL
BUN SERPL-MCNC: 41 MG/DL (ref 8–23)
BUN SERPL-MCNC: 41 MG/DL (ref 8–23)
CALCIUM SERPL-MCNC: 8.8 MG/DL (ref 8.7–10.5)
CALCIUM SERPL-MCNC: 9.1 MG/DL (ref 8.7–10.5)
CHLORIDE SERPL-SCNC: 93 MMOL/L (ref 95–110)
CHLORIDE SERPL-SCNC: 95 MMOL/L (ref 95–110)
CO2 SERPL-SCNC: 23 MMOL/L (ref 23–29)
CO2 SERPL-SCNC: 24 MMOL/L (ref 23–29)
COLOR FLD: YELLOW
CREAT SERPL-MCNC: 0.9 MG/DL (ref 0.5–1.4)
CREAT SERPL-MCNC: 1 MG/DL (ref 0.5–1.4)
DELSYS: ABNORMAL
DIFFERENTIAL METHOD: ABNORMAL
EOSINOPHIL # BLD AUTO: 0.1 K/UL (ref 0–0.5)
EOSINOPHIL NFR BLD: 0.4 % (ref 0–8)
ERYTHROCYTE [DISTWIDTH] IN BLOOD BY AUTOMATED COUNT: 16.3 % (ref 11.5–14.5)
EST. GFR  (AFRICAN AMERICAN): 60 ML/MIN/1.73 M^2
EST. GFR  (AFRICAN AMERICAN): >60 ML/MIN/1.73 M^2
EST. GFR  (NON AFRICAN AMERICAN): 52 ML/MIN/1.73 M^2
EST. GFR  (NON AFRICAN AMERICAN): 59 ML/MIN/1.73 M^2
FIO2: 21
GLUCOSE SERPL-MCNC: 99 MG/DL (ref 70–110)
GLUCOSE SERPL-MCNC: 99 MG/DL (ref 70–110)
HCO3 UR-SCNC: 25.3 MMOL/L (ref 24–28)
HCT VFR BLD AUTO: 32 % (ref 37–48.5)
HGB BLD-MCNC: 9.9 G/DL (ref 12–16)
IMM GRANULOCYTES # BLD AUTO: 0.09 K/UL (ref 0–0.04)
IMM GRANULOCYTES NFR BLD AUTO: 0.8 % (ref 0–0.5)
LDH SERPL L TO P-CCNC: 315 U/L (ref 110–260)
LYMPHOCYTES # BLD AUTO: 1.3 K/UL (ref 1–4.8)
LYMPHOCYTES NFR BLD: 10.9 % (ref 18–48)
LYMPHOCYTES NFR FLD MANUAL: 20 %
MCH RBC QN AUTO: 28.8 PG (ref 27–31)
MCHC RBC AUTO-ENTMCNC: 30.9 G/DL (ref 32–36)
MCV RBC AUTO: 93 FL (ref 82–98)
MODE: ABNORMAL
MONOCYTES # BLD AUTO: 1.2 K/UL (ref 0.3–1)
MONOCYTES NFR BLD: 10.1 % (ref 4–15)
MONOS+MACROS NFR FLD MANUAL: 44 %
NEUTROPHILS # BLD AUTO: 9.1 K/UL (ref 1.8–7.7)
NEUTROPHILS NFR BLD: 77.6 % (ref 38–73)
NEUTROPHILS NFR FLD MANUAL: 36 %
NRBC BLD-RTO: 0 /100 WBC
PCO2 BLDA: 33.5 MMHG (ref 35–45)
PH SMN: 7.49 [PH] (ref 7.35–7.45)
PLATELET # BLD AUTO: 317 K/UL (ref 150–450)
PMV BLD AUTO: 11 FL (ref 9.2–12.9)
PO2 BLDA: 61 MMHG (ref 80–100)
POC BE: 2 MMOL/L
POC SATURATED O2: 93 % (ref 95–100)
POTASSIUM SERPL-SCNC: 4.9 MMOL/L (ref 3.5–5.1)
POTASSIUM SERPL-SCNC: 6.1 MMOL/L (ref 3.5–5.1)
PROT SERPL-MCNC: 6.9 G/DL (ref 6–8.4)
PROT SERPL-MCNC: 7.6 G/DL (ref 6–8.4)
RBC # BLD AUTO: 3.44 M/UL (ref 4–5.4)
SAMPLE: ABNORMAL
SARS-COV-2 RDRP RESP QL NAA+PROBE: NEGATIVE
SITE: ABNORMAL
SODIUM SERPL-SCNC: 131 MMOL/L (ref 136–145)
SODIUM SERPL-SCNC: 132 MMOL/L (ref 136–145)
TROPONIN I SERPL DL<=0.01 NG/ML-MCNC: 0.02 NG/ML (ref 0–0.03)
WBC # BLD AUTO: 11.74 K/UL (ref 3.9–12.7)
WBC # FLD: 547 /CU MM

## 2022-05-03 PROCEDURE — 82150 ASSAY OF AMYLASE: CPT | Performed by: INTERNAL MEDICINE

## 2022-05-03 PROCEDURE — 96374 THER/PROPH/DIAG INJ IV PUSH: CPT

## 2022-05-03 PROCEDURE — 88305 TISSUE EXAM BY PATHOLOGIST: ICD-10-PCS | Mod: 26,,, | Performed by: PATHOLOGY

## 2022-05-03 PROCEDURE — 84484 ASSAY OF TROPONIN QUANT: CPT | Performed by: EMERGENCY MEDICINE

## 2022-05-03 PROCEDURE — 82042 OTHER SOURCE ALBUMIN QUAN EA: CPT | Performed by: INTERNAL MEDICINE

## 2022-05-03 PROCEDURE — G0378 HOSPITAL OBSERVATION PER HR: HCPCS

## 2022-05-03 PROCEDURE — 89051 BODY FLUID CELL COUNT: CPT | Performed by: INTERNAL MEDICINE

## 2022-05-03 PROCEDURE — 80053 COMPREHEN METABOLIC PANEL: CPT | Performed by: EMERGENCY MEDICINE

## 2022-05-03 PROCEDURE — 83615 LACTATE (LD) (LDH) ENZYME: CPT | Performed by: INTERNAL MEDICINE

## 2022-05-03 PROCEDURE — 25000003 PHARM REV CODE 250: Performed by: STUDENT IN AN ORGANIZED HEALTH CARE EDUCATION/TRAINING PROGRAM

## 2022-05-03 PROCEDURE — 83880 ASSAY OF NATRIURETIC PEPTIDE: CPT | Performed by: EMERGENCY MEDICINE

## 2022-05-03 PROCEDURE — 88112 CYTOPATH CELL ENHANCE TECH: CPT | Performed by: PATHOLOGY

## 2022-05-03 PROCEDURE — 99214 OFFICE O/P EST MOD 30 MIN: CPT | Mod: 25,,, | Performed by: INTERNAL MEDICINE

## 2022-05-03 PROCEDURE — 99291 CRITICAL CARE FIRST HOUR: CPT | Mod: 25

## 2022-05-03 PROCEDURE — 88305 TISSUE EXAM BY PATHOLOGIST: CPT | Performed by: PATHOLOGY

## 2022-05-03 PROCEDURE — 32555 PR THORACEN W/IMAG GUIDANCE: ICD-10-PCS | Mod: RT,,, | Performed by: INTERNAL MEDICINE

## 2022-05-03 PROCEDURE — 93010 EKG 12-LEAD: ICD-10-PCS | Mod: ,,, | Performed by: STUDENT IN AN ORGANIZED HEALTH CARE EDUCATION/TRAINING PROGRAM

## 2022-05-03 PROCEDURE — 88305 TISSUE EXAM BY PATHOLOGIST: CPT | Mod: 26,,, | Performed by: PATHOLOGY

## 2022-05-03 PROCEDURE — 82945 GLUCOSE OTHER FLUID: CPT | Performed by: INTERNAL MEDICINE

## 2022-05-03 PROCEDURE — 88112 PR  CYTOPATH, CELL ENHANCE TECH: ICD-10-PCS | Mod: 26,,, | Performed by: PATHOLOGY

## 2022-05-03 PROCEDURE — U0002 COVID-19 LAB TEST NON-CDC: HCPCS | Performed by: EMERGENCY MEDICINE

## 2022-05-03 PROCEDURE — 84155 ASSAY OF PROTEIN SERUM: CPT | Performed by: INTERNAL MEDICINE

## 2022-05-03 PROCEDURE — 84311 SPECTROPHOTOMETRY: CPT | Performed by: INTERNAL MEDICINE

## 2022-05-03 PROCEDURE — 93005 ELECTROCARDIOGRAM TRACING: CPT

## 2022-05-03 PROCEDURE — 63600175 PHARM REV CODE 636 W HCPCS: Performed by: EMERGENCY MEDICINE

## 2022-05-03 PROCEDURE — 80048 BASIC METABOLIC PNL TOTAL CA: CPT | Mod: XB | Performed by: EMERGENCY MEDICINE

## 2022-05-03 PROCEDURE — 85025 COMPLETE CBC W/AUTO DIFF WBC: CPT | Performed by: EMERGENCY MEDICINE

## 2022-05-03 PROCEDURE — 88112 CYTOPATH CELL ENHANCE TECH: CPT | Mod: 26,,, | Performed by: PATHOLOGY

## 2022-05-03 PROCEDURE — 63600175 PHARM REV CODE 636 W HCPCS: Performed by: STUDENT IN AN ORGANIZED HEALTH CARE EDUCATION/TRAINING PROGRAM

## 2022-05-03 PROCEDURE — 36415 COLL VENOUS BLD VENIPUNCTURE: CPT | Performed by: INTERNAL MEDICINE

## 2022-05-03 PROCEDURE — 99214 PR OFFICE/OUTPT VISIT, EST, LEVL IV, 30-39 MIN: ICD-10-PCS | Mod: 25,,, | Performed by: INTERNAL MEDICINE

## 2022-05-03 PROCEDURE — 84157 ASSAY OF PROTEIN OTHER: CPT | Performed by: INTERNAL MEDICINE

## 2022-05-03 PROCEDURE — 83615 LACTATE (LD) (LDH) ENZYME: CPT | Mod: 91 | Performed by: INTERNAL MEDICINE

## 2022-05-03 PROCEDURE — 32555 ASPIRATE PLEURA W/ IMAGING: CPT | Mod: RT,,, | Performed by: INTERNAL MEDICINE

## 2022-05-03 PROCEDURE — 93010 ELECTROCARDIOGRAM REPORT: CPT | Mod: ,,, | Performed by: STUDENT IN AN ORGANIZED HEALTH CARE EDUCATION/TRAINING PROGRAM

## 2022-05-03 RX ORDER — NALOXONE HCL 0.4 MG/ML
0.02 VIAL (ML) INJECTION
Status: DISCONTINUED | OUTPATIENT
Start: 2022-05-03 | End: 2022-05-07 | Stop reason: HOSPADM

## 2022-05-03 RX ORDER — IBUPROFEN 200 MG
16 TABLET ORAL
Status: DISCONTINUED | OUTPATIENT
Start: 2022-05-03 | End: 2022-05-06

## 2022-05-03 RX ORDER — SODIUM CHLORIDE 0.9 % (FLUSH) 0.9 %
10 SYRINGE (ML) INJECTION EVERY 12 HOURS PRN
Status: DISCONTINUED | OUTPATIENT
Start: 2022-05-03 | End: 2022-05-07 | Stop reason: HOSPADM

## 2022-05-03 RX ORDER — GLUCAGON 1 MG
1 KIT INJECTION
Status: DISCONTINUED | OUTPATIENT
Start: 2022-05-03 | End: 2022-05-07 | Stop reason: HOSPADM

## 2022-05-03 RX ORDER — PANTOPRAZOLE SODIUM 40 MG/1
40 TABLET, DELAYED RELEASE ORAL DAILY
Refills: 3 | Status: DISCONTINUED | OUTPATIENT
Start: 2022-05-03 | End: 2022-05-06

## 2022-05-03 RX ORDER — IBUPROFEN 200 MG
24 TABLET ORAL
Status: DISCONTINUED | OUTPATIENT
Start: 2022-05-03 | End: 2022-05-06

## 2022-05-03 RX ORDER — CARVEDILOL 6.25 MG/1
6.25 TABLET ORAL 2 TIMES DAILY
Status: DISCONTINUED | OUTPATIENT
Start: 2022-05-03 | End: 2022-05-06

## 2022-05-03 RX ORDER — FUROSEMIDE 10 MG/ML
40 INJECTION INTRAMUSCULAR; INTRAVENOUS
Status: DISCONTINUED | OUTPATIENT
Start: 2022-05-03 | End: 2022-05-04

## 2022-05-03 RX ORDER — CARBIDOPA AND LEVODOPA 25; 100 MG/1; MG/1
2 TABLET ORAL 3 TIMES DAILY
Status: DISCONTINUED | OUTPATIENT
Start: 2022-05-03 | End: 2022-05-07 | Stop reason: HOSPADM

## 2022-05-03 RX ORDER — FUROSEMIDE 10 MG/ML
40 INJECTION INTRAMUSCULAR; INTRAVENOUS
Status: COMPLETED | OUTPATIENT
Start: 2022-05-03 | End: 2022-05-03

## 2022-05-03 RX ORDER — ONDANSETRON 2 MG/ML
4 INJECTION INTRAMUSCULAR; INTRAVENOUS EVERY 8 HOURS PRN
Status: DISCONTINUED | OUTPATIENT
Start: 2022-05-03 | End: 2022-05-07 | Stop reason: HOSPADM

## 2022-05-03 RX ORDER — CLOPIDOGREL BISULFATE 75 MG/1
75 TABLET ORAL DAILY
Status: DISCONTINUED | OUTPATIENT
Start: 2022-05-03 | End: 2022-05-06

## 2022-05-03 RX ORDER — CLOPIDOGREL BISULFATE 75 MG/1
75 TABLET ORAL DAILY
Status: DISCONTINUED | OUTPATIENT
Start: 2022-05-03 | End: 2022-05-03

## 2022-05-03 RX ORDER — SODIUM CHLORIDE 0.9 % (FLUSH) 0.9 %
10 SYRINGE (ML) INJECTION
Status: DISCONTINUED | OUTPATIENT
Start: 2022-05-03 | End: 2022-05-07 | Stop reason: HOSPADM

## 2022-05-03 RX ORDER — ACETAMINOPHEN 325 MG/1
650 TABLET ORAL EVERY 8 HOURS PRN
Status: DISCONTINUED | OUTPATIENT
Start: 2022-05-03 | End: 2022-05-06

## 2022-05-03 RX ADMIN — PANTOPRAZOLE SODIUM 40 MG: 40 TABLET, DELAYED RELEASE ORAL at 11:05

## 2022-05-03 RX ADMIN — TRAZODONE HYDROCHLORIDE 25 MG: 50 TABLET ORAL at 09:05

## 2022-05-03 RX ADMIN — FUROSEMIDE 40 MG: 40 INJECTION, SOLUTION INTRAMUSCULAR; INTRAVENOUS at 09:05

## 2022-05-03 RX ADMIN — SACUBITRIL AND VALSARTAN 1 TABLET: 49; 51 TABLET, FILM COATED ORAL at 09:05

## 2022-05-03 RX ADMIN — CARBIDOPA AND LEVODOPA 2 TABLET: 25; 100 TABLET ORAL at 09:05

## 2022-05-03 RX ADMIN — CLOPIDOGREL 75 MG: 75 TABLET, FILM COATED ORAL at 11:05

## 2022-05-03 RX ADMIN — CARVEDILOL 6.25 MG: 6.25 TABLET, FILM COATED ORAL at 09:05

## 2022-05-03 RX ADMIN — FUROSEMIDE 40 MG: 10 INJECTION, SOLUTION INTRAMUSCULAR; INTRAVENOUS at 06:05

## 2022-05-03 RX ADMIN — CARBIDOPA AND LEVODOPA 2 TABLET: 25; 100 TABLET ORAL at 04:05

## 2022-05-03 NOTE — ED PROVIDER NOTES
SCRIBE #1 NOTE: I, Natan Coughlin, am scribing for, and in the presence of, Demetrice Dominguez MD. I have scribed the entire note.      History      Chief Complaint   Patient presents with    Shortness of Breath     Family states began yesterday         Review of patient's allergies indicates:   Allergen Reactions    Xanax [alprazolam]         HPI   HPI    5/3/2022, 6:21 AM   History obtained from the patient's daughter  HPI and ROS limited, as pt is nonverbal      History of Present Illness: Tanya Madrid is a 82 y.o. female patient with a PMHx of CHF, HTN, stroke, cardiac arrest on 3/10/22 who presents to the Emergency Department for SOB, onset last night. Pt is not on home O2. Symptoms are constant and moderate in severity. No mitigating or exacerbating factors reported. Associated sxs include productive cough and intermittent apnea. Daughter states that the pt does not walk, and that her speech has not returned to normal since her cardiac arrest in March. Pt had a breathing treatment at home last night. Per daughter, pt is on Lasix 20 mg PRN. No further complaints or concerns at this time.     Arrival mode: Personal vehicle    PCP: Maggie Sue NP       Past Medical History:  Past Medical History:   Diagnosis Date    Acute CHF (congestive heart failure) 1/17/2021    Hyperlipemia     Hypertension     Parkinson's disease     Stroke 2016    Throat mass        Past Surgical History:  Past Surgical History:   Procedure Laterality Date    CLOSED REDUCTION Right 10/15/2020    Procedure: CLOSED REDUCTION;  Surgeon: Humberto Valdivia DO;  Location: [facility] OR;  Service: Orthopedics;  Laterality: Right;  ATTEMPTED    EVACUATION OF HEMATOMA Right 10/17/2020    Procedure: EVACUATION, HEMATOMA;  Surgeon: Humberto Valdivia DO;  Location: [facility];  Service: Orthopedics;  Laterality: Right;    HEMIARTHROPLASTY OF HIP Left 7/12/2020    Procedure: HEMIARTHROPLASTY, HIP;  Surgeon: Humberto Valdivia DO;  Location:  [facility] OR;  Service: Orthopedics;  Laterality: Left;    HEMIARTHROPLASTY OF HIP Right 10/2/2020    Procedure: HEMIARTHROPLASTY, HIP;  Surgeon: Loyd Kearney MD;  Location: [facility] OR;  Service: Orthopedics;  Laterality: Right;    HYSTERECTOMY      INSERTION OF PERCUTANEOUS ENDOSCOPIC GASTROSTOMY (PEG) FEEDING TUBE      OPEN REDUCTION OF DISLOCATION OF HIP Right 10/17/2020    Procedure: OPEN REDUCTION, DISLOCATION, HIP;  Surgeon: Humberto Valdivia DO;  Location: [facility] OR;  Service: Orthopedics;  Laterality: Right;    THROAT SURGERY      TONSILLECTOMY           Family History:  Family History   Family history unknown: Yes       Social History:  Social History     Tobacco Use    Smoking status: Never Smoker    Smokeless tobacco: Never Used   Substance and Sexual Activity    Alcohol use: No    Drug use: No    Sexual activity: Not Currently       ROS   Review of Systems   Unable to perform ROS: Patient nonverbal   Respiratory: Positive for apnea (intermittent), cough (productive) and shortness of breath.      Physical Exam      Initial Vitals   BP Pulse Resp Temp SpO2   05/03/22 0524 05/03/22 0524 05/03/22 0524 05/03/22 0529 05/03/22 0524   (!) 144/92 68 (!) 26 97.7 °F (36.5 °C) 97 %      MAP       --                 Physical Exam  Nursing Notes and Vital Signs Reviewed.  Constitutional: Patient is in no acute distress. Chronically debilitated, elderly.  Head: Atraumatic. Normocephalic.  Eyes: PERRL. EOM intact. Conjunctivae are not pale. No scleral icterus.  ENT: Mucous membranes are moist. Oropharynx is clear and symmetric.    Neck: Supple. Full ROM. No lymphadenopathy.  Cardiovascular: Regular rate. Regular rhythm. No murmurs, rubs, or gallops. Distal pulses are 2+ and symmetric.  Pulmonary/Chest: Tachypneic. Coarse breath sounds bilaterally.  Abdominal: Soft and non-distended. PEG tube in place. There is no tenderness.  No rebound, guarding, or rigidity.  : Abreu catheter in place.  Musculoskeletal: Moves  "all extremities. No obvious deformities. No edema.  Skin: Warm and dry.  Neurological: Intermittently somnolent, but awakens to stimuli. Nonverbal.    ED Course    Critical Care    Date/Time: 5/3/2022 7:48 PM  Performed by: Demetrice Dominguez MD  Authorized by: Tim Reilly MD   Direct patient critical care time: 25 minutes  Additional history critical care time: 5 minutes  Ordering / reviewing critical care time: 5 minutes  Documentation critical care time: 5 minutes  Consulting other physicians critical care time: 5 minutes  Total critical care time (exclusive of procedural time) : 45 minutes  Critical care was necessary to treat or prevent imminent or life-threatening deterioration of the following conditions: cardiac failure.  Critical care was time spent personally by me on the following activities: blood draw for specimens, discussions with primary provider, evaluation of patient's response to treatment, ordering and performing treatments and interventions, pulse oximetry, re-evaluation of patient's condition, ordering and review of laboratory studies, examination of patient, development of treatment plan with patient or surrogate, discussions with consultants, interpretation of cardiac output measurements, obtaining history from patient or surrogate, ordering and review of radiographic studies and review of old charts.        ED Vital Signs:  Vitals:    05/03/22 0524 05/03/22 0529 05/03/22 0636 05/03/22 0733   BP: (!) 144/92   (!) 140/93   Pulse: 68  79 80   Resp: (!) 26      Temp:  97.7 °F (36.5 °C)     SpO2: 97%   98%   Weight:  61.6 kg (135 lb 12.8 oz)     Height: 5' 2" (1.575 m)       05/03/22 0902 05/03/22 0945 05/03/22 1003 05/03/22 1048   BP: (!) 142/87 (!) 152/92 (!) 143/97 (!) 131/99   Pulse: 77 (!) 58 79 76   Resp: 17 10     Temp:       SpO2:       Weight:       Height:           Abnormal Lab Results:  Labs Reviewed   CBC W/ AUTO DIFFERENTIAL - Abnormal; Notable for the following components:       " Result Value    RBC 3.44 (*)     Hemoglobin 9.9 (*)     Hematocrit 32.0 (*)     MCHC 30.9 (*)     RDW 16.3 (*)     Immature Granulocytes 0.8 (*)     Gran # (ANC) 9.1 (*)     Immature Grans (Abs) 0.09 (*)     Mono # 1.2 (*)     Gran % 77.6 (*)     Lymph % 10.9 (*)     All other components within normal limits   COMPREHENSIVE METABOLIC PANEL - Abnormal; Notable for the following components:    Sodium 131 (*)     Potassium 6.1 (*)     Chloride 93 (*)     BUN 41 (*)     Albumin 3.1 (*)     Alkaline Phosphatase 174 (*)     AST 60 (*)     eGFR if non  52 (*)     All other components within normal limits   B-TYPE NATRIURETIC PEPTIDE - Abnormal; Notable for the following components:    BNP >4,900 (*)     All other components within normal limits   BASIC METABOLIC PANEL - Abnormal; Notable for the following components:    Sodium 132 (*)     BUN 41 (*)     eGFR if non  59 (*)     All other components within normal limits   ISTAT PROCEDURE - Abnormal; Notable for the following components:    POC PH 7.485 (*)     POC PCO2 33.5 (*)     POC PO2 61 (*)     POC SATURATED O2 93 (*)     All other components within normal limits   TROPONIN I   SARS-COV-2 RNA AMPLIFICATION, QUAL        All Lab Results:  Results for orders placed or performed during the hospital encounter of 05/03/22   CBC auto differential   Result Value Ref Range    WBC 11.74 3.90 - 12.70 K/uL    RBC 3.44 (L) 4.00 - 5.40 M/uL    Hemoglobin 9.9 (L) 12.0 - 16.0 g/dL    Hematocrit 32.0 (L) 37.0 - 48.5 %    MCV 93 82 - 98 fL    MCH 28.8 27.0 - 31.0 pg    MCHC 30.9 (L) 32.0 - 36.0 g/dL    RDW 16.3 (H) 11.5 - 14.5 %    Platelets 317 150 - 450 K/uL    MPV 11.0 9.2 - 12.9 fL    Immature Granulocytes 0.8 (H) 0.0 - 0.5 %    Gran # (ANC) 9.1 (H) 1.8 - 7.7 K/uL    Immature Grans (Abs) 0.09 (H) 0.00 - 0.04 K/uL    Lymph # 1.3 1.0 - 4.8 K/uL    Mono # 1.2 (H) 0.3 - 1.0 K/uL    Eos # 0.1 0.0 - 0.5 K/uL    Baso # 0.02 0.00 - 0.20 K/uL    nRBC 0 0 /100  WBC    Gran % 77.6 (H) 38.0 - 73.0 %    Lymph % 10.9 (L) 18.0 - 48.0 %    Mono % 10.1 4.0 - 15.0 %    Eosinophil % 0.4 0.0 - 8.0 %    Basophil % 0.2 0.0 - 1.9 %    Differential Method Automated    Comprehensive metabolic panel   Result Value Ref Range    Sodium 131 (L) 136 - 145 mmol/L    Potassium 6.1 (H) 3.5 - 5.1 mmol/L    Chloride 93 (L) 95 - 110 mmol/L    CO2 24 23 - 29 mmol/L    Glucose 99 70 - 110 mg/dL    BUN 41 (H) 8 - 23 mg/dL    Creatinine 1.0 0.5 - 1.4 mg/dL    Calcium 9.1 8.7 - 10.5 mg/dL    Total Protein 7.6 6.0 - 8.4 g/dL    Albumin 3.1 (L) 3.5 - 5.2 g/dL    Total Bilirubin 0.7 0.1 - 1.0 mg/dL    Alkaline Phosphatase 174 (H) 55 - 135 U/L    AST 60 (H) 10 - 40 U/L    ALT 15 10 - 44 U/L    Anion Gap 14 8 - 16 mmol/L    eGFR if African American 60 >60 mL/min/1.73 m^2    eGFR if non African American 52 (A) >60 mL/min/1.73 m^2   Troponin I #1   Result Value Ref Range    Troponin I 0.024 0.000 - 0.026 ng/mL   BNP   Result Value Ref Range    BNP >4,900 (H) 0 - 99 pg/mL   COVID-19 Rapid Screening   Result Value Ref Range    SARS-CoV-2 RNA, Amplification, Qual Negative Negative   Basic metabolic panel   Result Value Ref Range    Sodium 132 (L) 136 - 145 mmol/L    Potassium 4.9 3.5 - 5.1 mmol/L    Chloride 95 95 - 110 mmol/L    CO2 23 23 - 29 mmol/L    Glucose 99 70 - 110 mg/dL    BUN 41 (H) 8 - 23 mg/dL    Creatinine 0.9 0.5 - 1.4 mg/dL    Calcium 8.8 8.7 - 10.5 mg/dL    Anion Gap 14 8 - 16 mmol/L    eGFR if African American >60 >60 mL/min/1.73 m^2    eGFR if non African American 59 (A) >60 mL/min/1.73 m^2   ISTAT PROCEDURE   Result Value Ref Range    POC PH 7.485 (H) 7.35 - 7.45    POC PCO2 33.5 (L) 35 - 45 mmHg    POC PO2 61 (L) 80 - 100 mmHg    POC HCO3 25.3 24 - 28 mmol/L    POC BE 2 -2 to 2 mmol/L    POC SATURATED O2 93 (L) 95 - 100 %    Sample ARTERIAL     Site RB     Allens Test N/A     DelSys Room Air     Mode SPONT     FiO2 21      Imaging Results:  Imaging Results          X-Ray Chest AP Portable  (Final result)  Result time 05/03/22 07:14:30    Final result by Davie Ramachandran MD (05/03/22 07:14:30)                 Impression:      Compared to 04/22/2022, interval enlargement of the right pleural effusion, now large volume. Likely small left pleural effusion present. No pneumothorax. Pulmonary vascular congestion with mild interstitial edema. Unchanged cardiomegaly. Lung volumes are slightly low.      Electronically signed by: Davie Ramachandran  Date:    05/03/2022  Time:    07:14             Narrative:    EXAMINATION:  XR CHEST AP PORTABLE    CLINICAL HISTORY:  shortness of breath;.    TECHNIQUE:  Single frontal portable view of the chest was performed.    COMPARISON:  04/22/2022    FINDINGS:  Support devices: None    Compared to 04/22/2022, interval enlargement of the right pleural effusion, now large volume.  Likely small left pleural effusion present.  No pneumothorax.  Pulmonary vascular congestion with mild interstitial edema.  Unchanged cardiomegaly.  Lung volumes are slightly low.                               The EKG was ordered, reviewed, and independently interpreted by the ED provider.  Interpretation time: 05:14  Rate: 88 BPM  Rhythm: normal sinus rhythm  Interpretation: Left anterior fascicular block. Septal infarct, age undetermined. Lateral infarct, age undetermined. No STEMI.           The Emergency Provider reviewed the vital signs and test results, which are outlined above.    ED Discussion     8:24 AM: Discussed case with Jose Henderson NP (Hospital Medicine). Dr. Kramer agrees with current care and management of pt and accepts admission.   Admitting Service: Hospital Medicine  Admitting Physician: Dr. Kramer  Admit to: Obs Tele    8:25 AM: Re-evaluated pt. I have discussed test results, shared treatment plan, and the need for admission with family at bedside. Family expresses understanding at this time and agrees with all information. All questions answered. Family has no further questions or  concerns at this time. Pt is ready for admit.           ED Medication(s):  Medications   furosemide injection 40 mg (40 mg Intravenous Given 5/3/22 0945)   carbidopa-levodopa  mg per tablet 2 tablet (has no administration in time range)   pantoprazole EC tablet 40 mg (40 mg Oral Given 5/3/22 1113)   sacubitriL-valsartan 49-51 mg per tablet 1 tablet (has no administration in time range)   trazodone split tablet 25 mg (has no administration in time range)   sodium chloride 0.9% flush 10 mL (has no administration in time range)   naloxone 0.4 mg/mL injection 0.02 mg (has no administration in time range)   glucose chewable tablet 16 g (has no administration in time range)   glucose chewable tablet 24 g (has no administration in time range)   glucagon (human recombinant) injection 1 mg (has no administration in time range)   ondansetron injection 4 mg (has no administration in time range)   acetaminophen tablet 650 mg (has no administration in time range)   clopidogreL tablet 75 mg (75 mg Oral Given 5/3/22 1113)   furosemide injection 40 mg (40 mg Intravenous Given 5/3/22 0632)          New Prescriptions    No medications on file         Medical Decision Making    Medical Decision Making:   Clinical Tests:   Lab Tests: Ordered and Reviewed  Radiological Study: Ordered and Reviewed  Medical Tests: Ordered and Reviewed           Scribe Attestation:   Scribe #1: I performed the above scribed service and the documentation accurately describes the services I performed. I attest to the accuracy of the note.    Attending:   Physician Attestation Statement for Scribe #1: I, Demetrice Dominguez MD, personally performed the services described in this documentation, as scribed by Natan Coughlin, in my presence, and it is both accurate and complete.          Clinical Impression       ICD-10-CM ICD-9-CM   1. Pleural effusion on right  J90 511.9   2. Shortness of breath  R06.02 786.05   3. History of CVA with residual deficit  I69.30  438.9   4. Acute on chronic heart failure, unspecified heart failure type  I50.9 428.0   5. Chest pain  R07.9 786.50       Disposition:   Disposition: Placed in Observation  Condition: Fair         Demetrice Dominguez MD  05/03/22 1140       Demetrice Dominguez MD  05/11/22 1948

## 2022-05-03 NOTE — PHARMACY MED REC
"Admission Medication History     The home medication history was taken by Mo Urbina.    You may go to "Admission" then "Reconcile Home Medications" tabs to review and/or act upon these items.      The home medication list has been updated by the Pharmacy department.    Please read ALL comments highlighted in yellow.    Please address this information as you see fit.     Feel free to contact us if you have any questions or require assistance.      Medications listed below were obtained from: Patient/family: DAUGHTER  (Not in a hospital admission)      Mo Urbina  [id number]    Current Outpatient Medications on File Prior to Encounter   Medication Sig Dispense Refill Last Dose    carbidopa-levodopa  mg (SINEMET)  mg per tablet 2 tablets by Per G Tube route 3 (three) times daily. 180 tablet 0 5/2/2022 at Unknown time    carvediloL (COREG) 6.25 MG tablet 1 tablet (6.25 mg total) by Per G Tube route 2 (two) times daily. 60 tablet 0 5/2/2022 at Unknown time    clopidogreL (PLAVIX) 75 mg tablet 1 tablet (75 mg total) by Per G Tube route once daily. 30 tablet 0 5/2/2022 at Unknown time    esomeprazole (NEXIUM) 40 MG capsule Take  40 mg(1 cap)  twice a day  , then after 40 mg(1 cap) daily   Through PEG tube 60 capsule 3 5/2/2022 at Unknown time    furosemide (LASIX) 20 MG tablet USE  IT ONLY AS NEEDED FOR FLUID RETENTION 30 tablet 11 5/2/2022 at Unknown time    ipratropium (ATROVENT) 0.02 % nebulizer solution Take 2.5 mLs (500 mcg total) by nebulization 2 (two) times daily. Rescue 150 mL 0 5/2/2022 at Unknown time    sacubitriL-valsartan (ENTRESTO) 49-51 mg per tablet Take 1 tablet by mouth 2 (two) times daily. HOLD FOR NOW DUE TO LOW BLOOD PRESSURE 60 tablet 3 5/2/2022 at Unknown time    traZODone (DESYREL) 50 MG tablet Take 0.5 tablets (25 mg total) by mouth every evening. 15 tablet 3 5/2/2022 at Unknown time                             .          "

## 2022-05-03 NOTE — PROCEDURES
"Tanya Madrid is a 82 y.o. female patient.    Temp: 98.2 °F (36.8 °C) (22)  Pulse: 77 (22 124)  Resp: 20 (22)  BP: 130/87 (22 124)  SpO2: (S)  (Unable to get reading. Multiple probes and sites assessed. No nasal probes per RT) (22 0945)  Weight: 61.6 kg (135 lb 12.8 oz) (22)  Height: 5' 2" (157.5 cm) (22)       Thoracentesis    Date/Time: 5/3/2022 2:21 PM  Location procedure was performed: [facility] PULMONARY MEDICINE  Performed by: Chencho Lechuga MD  Authorized by: Chencho Lechuga MD   Pre-operative diagnosis: right pleural effusion  Post-operative diagnosis: SAME  Consent Done: Yes  Consent: Written consent obtained.  Risks and benefits: risks, benefits and alternatives were discussed  Consent given by: power of   Patient understanding: patient states understanding of the procedure being performed  Patient consent: the patient's understanding of the procedure matches consent given  Procedure consent: procedure consent matches procedure scheduled  Relevant documents: relevant documents present and verified  Test results: test results available and properly labeled  Site marked: the operative site was marked  Imaging studies: imaging studies available  Required items: required blood products, implants, devices, and special equipment available  Patient identity confirmed: , MRN, name, provided demographic data and verbally with patient  Time out: Immediately prior to procedure a "time out" was called to verify the correct patient, procedure, equipment, support staff and site/side marked as required.  Procedure purpose: diagnostic and therapeutic  Indications: pleural effusion  Preparation: Patient was prepped and draped in the usual sterile fashion.  Local anesthesia used: yes  Anesthesia: local infiltration    Anesthesia:  Local anesthesia used: yes  Local Anesthetic: lidocaine 1% without epinephrine  Anesthetic total: 5 " mL    Patient sedated: no  Preparation: skin prepped with ChloraPrep  Patient position: left lateral decubitus  Ultrasound guidance: yes  Location: right posterior  Intercostal space: 7th  Puncture method: over-the-needle catheter  Needle size: 18  Catheter size: 8 [place]  Number of attempts: 1  Drainage amount: 1200 ml  Drainage characteristics: serous  Patient tolerance: Patient tolerated the procedure well with no immediate complications  Chest x-ray performed: yes  Chest x-ray interpreted by me.  Chest x-ray findings: normal findings  Complications: No  Estimated blood loss (mL): 0  Specimens: Yes  Implants: No  Comments: Tube #1: Gram stain, culture, KOH, AFB, Tube #2: Chemistry: Alb, Adenike, Chol, LDH, Prot, Tube #3: Cytology, Lavender for wbcc, Urine specimen cup for cytology    Drainage Tube: removed        5/3/2022

## 2022-05-03 NOTE — HPI
Tanya Madrid is 82 y.o.   Asked to see for Right pleural effusion  Daughter Josee bedside  Images reviewed: plan for right thora at bedside  Known EF 10%, Lasix Mon, Wed, Fri  BNP was > 4900

## 2022-05-03 NOTE — CONSULTS
[facility])  Pulmonology  Consult Note    Patient Name: Tanya Madrid  MRN: [id number]  Admission Date: 5/3/2022  Hospital Length of Stay: 0 days  Code Status: DNR  Attending Physician: Doreen Kramer MD  Primary Care Provider: Maggie Sue NP   Principal Problem: Recurrent right pleural effusion    [unfilled]  Subjective:     HPI:  Tanya Madrid is 82 y.o.   Asked to see for Right pleural effusion  Daughter Josee bedside  Images reviewed: plan for right thora at bedside  Known EF 10%, Lasix Mon, Wed, Fri  BNP was > 4900        Past Medical History:   Diagnosis Date    Acute CHF (congestive heart failure) 1/17/2021    Hyperlipemia     Hypertension     Parkinson's disease     Stroke 2016    Throat mass        Past Surgical History:   Procedure Laterality Date    CLOSED REDUCTION Right 10/15/2020    Procedure: CLOSED REDUCTION;  Surgeon: Humberto Valdivia DO;  Location: [facility];  Service: Orthopedics;  Laterality: Right;  ATTEMPTED    EVACUATION OF HEMATOMA Right 10/17/2020    Procedure: EVACUATION, HEMATOMA;  Surgeon: Humberto Valdivia DO;  Location: [facility] OR;  Service: Orthopedics;  Laterality: Right;    HEMIARTHROPLASTY OF HIP Left 7/12/2020    Procedure: HEMIARTHROPLASTY, HIP;  Surgeon: Humberto Valdivia DO;  Location: [facility] OR;  Service: Orthopedics;  Laterality: Left;    HEMIARTHROPLASTY OF HIP Right 10/2/2020    Procedure: HEMIARTHROPLASTY, HIP;  Surgeon: Loyd Kearney MD;  Location: [facility] OR;  Service: Orthopedics;  Laterality: Right;    HYSTERECTOMY      INSERTION OF PERCUTANEOUS ENDOSCOPIC GASTROSTOMY (PEG) FEEDING TUBE      OPEN REDUCTION OF DISLOCATION OF HIP Right 10/17/2020    Procedure: OPEN REDUCTION, DISLOCATION, HIP;  Surgeon: Humberto Valdivia DO;  Location: [facility] OR;  Service: Orthopedics;  Laterality: Right;    THROAT SURGERY      TONSILLECTOMY         Review of patient's allergies indicates:   Allergen Reactions    Xanax [alprazolam]         Family History       Family history is unknown by patient.          Tobacco Use    Smoking status: Never Smoker    Smokeless tobacco: Never Used   Substance and Sexual Activity    Alcohol use: No    Drug use: No    Sexual activity: Not Currently         Review of Systems   Unable to perform ROS: Patient unresponsive   Objective:     Vital Signs (Most Recent):  Temp: 98.2 °F (36.8 °C) (05/03/22 1249)  Pulse: 77 (05/03/22 1249)  Resp: 20 (05/03/22 1249)  BP: 130/87 (05/03/22 1249)  SpO2: (S)  (Unable to get reading. Multiple probes and sites assessed. No nasal probes per RT) (05/03/22 0945) Vital Signs (24h Range):  Temp:  [97.7 °F (36.5 °C)-98.2 °F (36.8 °C)] 98.2 °F (36.8 °C)  Pulse:  [58-80] 77  Resp:  [10-26] 20  SpO2:  [97 %-98 %] 98 %  BP: (130-152)/(87-99) 130/87     Weight: 61.6 kg (135 lb 12.8 oz)  Body mass index is 24.84 kg/m².      Intake/Output Summary (Last 24 hours) at 5/3/2022 1303  Last data filed at 5/3/2022 0944  Gross per 24 hour   Intake --   Output 300 ml   Net -300 ml       Physical Exam  Vitals and nursing note reviewed.   Constitutional:        HENT:      Head: Normocephalic.      Nose: Nose normal.   Eyes:      Pupils: Pupils are equal, round, and reactive to light.   Cardiovascular:      Rate and Rhythm: Normal rate.   Pulmonary:      Effort: Pulmonary effort is normal.      Breath sounds: Normal breath sounds.   Abdominal:      General: Bowel sounds are normal.   Musculoskeletal:         General: Normal range of motion.      Cervical back: Normal range of motion.   Skin:     General: Skin is warm.   Neurological:      General: No focal deficit present.       Vents:       Lines/Drains/Airways       Drain  Duration                  Gastrostomy/Enterostomy 03/25/22 1320 Gastrostomy tube w/ balloon LUQ feeding 38 days         Urethral Catheter 04/22/22 1804 10 days              Peripheral Intravenous Line  Duration                  Peripheral IV - Single Lumen 05/03/22 0530 20 G  Right Forearm <1 day                    Significant Labs:    CBC/Anemia Profile:  Recent Labs   Lab 05/03/22 0628   WBC 11.74   HGB 9.9*   HCT 32.0*      MCV 93   RDW 16.3*        Chemistries:  Recent Labs   Lab 05/03/22  0628 05/03/22  0738   * 132*   K 6.1* 4.9   CL 93* 95   CO2 24 23   BUN 41* 41*   CREATININE 1.0 0.9   CALCIUM 9.1 8.8   ALBUMIN 3.1*  --    PROT 7.6  --    BILITOT 0.7  --    ALKPHOS 174*  --    ALT 15  --    AST 60*  --        ABGs:   Recent Labs   Lab 05/03/22 0630   PH 7.485*   PCO2 33.5*   HCO3 25.3   POCSATURATED 93*   BE 2     POCT Glucose: No results for input(s): POCTGLUCOSE in the last 48 hours.  All pertinent labs within the past 24 hours have been reviewed.    Significant Imaging:   I have reviewed all pertinent imaging results/findings within the past 24 hours.    X-Ray Chest AP Portable  Narrative: EXAMINATION:  XR CHEST AP PORTABLE    CLINICAL HISTORY:  shortness of breath;.    TECHNIQUE:  Single frontal portable view of the chest was performed.    COMPARISON:  04/22/2022    FINDINGS:  Support devices: None    Compared to 04/22/2022, interval enlargement of the right pleural effusion, now large volume.  Likely small left pleural effusion present.  No pneumothorax.  Pulmonary vascular congestion with mild interstitial edema.  Unchanged cardiomegaly.  Lung volumes are slightly low.  Impression: Compared to 04/22/2022, interval enlargement of the right pleural effusion, now large volume. Likely small left pleural effusion present. No pneumothorax. Pulmonary vascular congestion with mild interstitial edema. Unchanged cardiomegaly. Lung volumes are slightly low.    Electronically signed by: Davie Ramachandran  Date:    05/03/2022  Time:    07:14       ABG  Recent Labs   Lab 05/03/22 0630   PH 7.485*   PO2 61*   PCO2 33.5*   HCO3 25.3   BE 2     Assessment/Plan:     * Recurrent right pleural effusion  Plan for right thoracentesis    Chronic combined systolic and diastolic heart  failure  EF 10%  Continue Lasix ENTRESTO    Complications of the procedure discussed in detail with patient. Complications including but not limited to infection that may require hospital admission, bleeding that may require blood transfusion and or hospital admission, perforation of the lung which may require surgery. Patient expressed and verbalized understanding. Alternate treatments and material risks associated with such alternatives were discussed with pateint. These include radiologic surveillance with minimal risk and sugery with an indeterminate risk. The material risks of refusing the procedure was discussed in detail. This includes no diagnosis or confirmation of diagnisis and rendering of appropriate treatment the risk of which depends on the nature of the diagnosed illness. Patient expressed and verbalized understanding. Pleural fluid will be sent for chenistry, microbiology and cytology.       Thank you for your consult. I will follow-up with patient. Please contact us if you have any additional questions.     Chencho Lechuga MD  Pulmonology  [facility] ([facility])

## 2022-05-03 NOTE — SUBJECTIVE & OBJECTIVE
Past Medical History:   Diagnosis Date    Acute CHF (congestive heart failure) 1/17/2021    Hyperlipemia     Hypertension     Parkinson's disease     Stroke 2016    Throat mass        Past Surgical History:   Procedure Laterality Date    CLOSED REDUCTION Right 10/15/2020    Procedure: CLOSED REDUCTION;  Surgeon: Humberto Valdivia DO;  Location: [facility] OR;  Service: Orthopedics;  Laterality: Right;  ATTEMPTED    EVACUATION OF HEMATOMA Right 10/17/2020    Procedure: EVACUATION, HEMATOMA;  Surgeon: Humberto Valdivia DO;  Location: [facility] OR;  Service: Orthopedics;  Laterality: Right;    HEMIARTHROPLASTY OF HIP Left 7/12/2020    Procedure: HEMIARTHROPLASTY, HIP;  Surgeon: Humberto Valdivia DO;  Location: [facility] OR;  Service: Orthopedics;  Laterality: Left;    HEMIARTHROPLASTY OF HIP Right 10/2/2020    Procedure: HEMIARTHROPLASTY, HIP;  Surgeon: Loyd Kearney MD;  Location: [facility] OR;  Service: Orthopedics;  Laterality: Right;    HYSTERECTOMY      INSERTION OF PERCUTANEOUS ENDOSCOPIC GASTROSTOMY (PEG) FEEDING TUBE      OPEN REDUCTION OF DISLOCATION OF HIP Right 10/17/2020    Procedure: OPEN REDUCTION, DISLOCATION, HIP;  Surgeon: Humberto Valdivia DO;  Location: [facility] OR;  Service: Orthopedics;  Laterality: Right;    THROAT SURGERY      TONSILLECTOMY         Review of patient's allergies indicates:   Allergen Reactions    Xanax [alprazolam]        Family History       Family history is unknown by patient.          Tobacco Use    Smoking status: Never Smoker    Smokeless tobacco: Never Used   Substance and Sexual Activity    Alcohol use: No    Drug use: No    Sexual activity: Not Currently         Review of Systems   Unable to perform ROS: Patient unresponsive   Objective:     Vital Signs (Most Recent):  Temp: 98.2 °F (36.8 °C) (05/03/22 1249)  Pulse: 77 (05/03/22 1249)  Resp: 20 (05/03/22 1249)  BP: 130/87 (05/03/22 1249)  SpO2: (S)  (Unable to get reading. Multiple probes and sites assessed. No nasal probes per RT)  (05/03/22 0945) Vital Signs (24h Range):  Temp:  [97.7 °F (36.5 °C)-98.2 °F (36.8 °C)] 98.2 °F (36.8 °C)  Pulse:  [58-80] 77  Resp:  [10-26] 20  SpO2:  [97 %-98 %] 98 %  BP: (130-152)/(87-99) 130/87     Weight: 61.6 kg (135 lb 12.8 oz)  Body mass index is 24.84 kg/m².      Intake/Output Summary (Last 24 hours) at 5/3/2022 1303  Last data filed at 5/3/2022 0944  Gross per 24 hour   Intake --   Output 300 ml   Net -300 ml       Physical Exam  Vitals and nursing note reviewed.   Constitutional:        HENT:      Head: Normocephalic.      Nose: Nose normal.   Eyes:      Pupils: Pupils are equal, round, and reactive to light.   Cardiovascular:      Rate and Rhythm: Normal rate.   Pulmonary:      Effort: Pulmonary effort is normal.      Breath sounds: Normal breath sounds.   Abdominal:      General: Bowel sounds are normal.   Musculoskeletal:         General: Normal range of motion.      Cervical back: Normal range of motion.   Skin:     General: Skin is warm.   Neurological:      General: No focal deficit present.       Vents:       Lines/Drains/Airways       Drain  Duration                  Gastrostomy/Enterostomy 03/25/22 1320 Gastrostomy tube w/ balloon LUQ feeding 38 days         Urethral Catheter 04/22/22 1804 10 days              Peripheral Intravenous Line  Duration                  Peripheral IV - Single Lumen 05/03/22 0530 20 G Right Forearm <1 day                    Significant Labs:    CBC/Anemia Profile:  Recent Labs   Lab 05/03/22  0628   WBC 11.74   HGB 9.9*   HCT 32.0*      MCV 93   RDW 16.3*        Chemistries:  Recent Labs   Lab 05/03/22  0628 05/03/22  0738   * 132*   K 6.1* 4.9   CL 93* 95   CO2 24 23   BUN 41* 41*   CREATININE 1.0 0.9   CALCIUM 9.1 8.8   ALBUMIN 3.1*  --    PROT 7.6  --    BILITOT 0.7  --    ALKPHOS 174*  --    ALT 15  --    AST 60*  --        ABGs:   Recent Labs   Lab 05/03/22  0630   PH 7.485*   PCO2 33.5*   HCO3 25.3   POCSATURATED 93*   BE 2     POCT Glucose: No  results for input(s): POCTGLUCOSE in the last 48 hours.  All pertinent labs within the past 24 hours have been reviewed.    Significant Imaging:   I have reviewed all pertinent imaging results/findings within the past 24 hours.    X-Ray Chest AP Portable  Narrative: EXAMINATION:  XR CHEST AP PORTABLE    CLINICAL HISTORY:  shortness of breath;.    TECHNIQUE:  Single frontal portable view of the chest was performed.    COMPARISON:  04/22/2022    FINDINGS:  Support devices: None    Compared to 04/22/2022, interval enlargement of the right pleural effusion, now large volume.  Likely small left pleural effusion present.  No pneumothorax.  Pulmonary vascular congestion with mild interstitial edema.  Unchanged cardiomegaly.  Lung volumes are slightly low.  Impression: Compared to 04/22/2022, interval enlargement of the right pleural effusion, now large volume. Likely small left pleural effusion present. No pneumothorax. Pulmonary vascular congestion with mild interstitial edema. Unchanged cardiomegaly. Lung volumes are slightly low.    Electronically signed by: Davie Ramachandran  Date:    05/03/2022  Time:    07:14

## 2022-05-03 NOTE — H&P
[facility] Medicine  History & Physical    Patient Name: Tanya Madrid  MRN: [id number]  Patient Class: OP- Observation  Admission Date: 5/3/2022  Attending Physician: Doreen Kramer MD  Primary Care Provider: Maggie Sue NP         Patient information was obtained from patient, relative(s), past medical records and ER records.     Subjective:     Principal Problem:Recurrent right pleural effusion    Chief Complaint:   Chief Complaint   Patient presents with    Shortness of Breath     Family states began yesterday          HPI: 82 y.o. female patient with a PMHx of CHF, HTN, stroke, cardiac arrest on 3/10/22 who presents to the Emergency Department for SOB, onset last night. Pt is not on home O2. Symptoms are constant and moderate in severity. No mitigating or exacerbating factors reported. Associated sxs include productive cough and intermittent apnea. Daughter states that the pt does not walk, and that her speech has not returned to normal since her cardiac arrest in March. Pt had a breathing treatment at home last night. Per daughter, pt is on Lasix 20 mg PRN      Past Medical History:   Diagnosis Date    Acute CHF (congestive heart failure) 1/17/2021    Hyperlipemia     Hypertension     Parkinson's disease     Stroke 2016    Throat mass        Past Surgical History:   Procedure Laterality Date    CLOSED REDUCTION Right 10/15/2020    Procedure: CLOSED REDUCTION;  Surgeon: Humberto Valdivia DO;  Location: [facility] OR;  Service: Orthopedics;  Laterality: Right;  ATTEMPTED    EVACUATION OF HEMATOMA Right 10/17/2020    Procedure: EVACUATION, HEMATOMA;  Surgeon: Humberto Valdivia DO;  Location: [facility] OR;  Service: Orthopedics;  Laterality: Right;    HEMIARTHROPLASTY OF HIP Left 7/12/2020    Procedure: HEMIARTHROPLASTY, HIP;  Surgeon: Humberto Valdivia DO;  Location: [facility] OR;  Service: Orthopedics;  Laterality: Left;    HEMIARTHROPLASTY OF HIP Right 10/2/2020     Procedure: HEMIARTHROPLASTY, HIP;  Surgeon: Loyd Kearney MD;  Location: [facility] OR;  Service: Orthopedics;  Laterality: Right;    HYSTERECTOMY      INSERTION OF PERCUTANEOUS ENDOSCOPIC GASTROSTOMY (PEG) FEEDING TUBE      OPEN REDUCTION OF DISLOCATION OF HIP Right 10/17/2020    Procedure: OPEN REDUCTION, DISLOCATION, HIP;  Surgeon: Humberto Valdivia DO;  Location: [facility] OR;  Service: Orthopedics;  Laterality: Right;    THROAT SURGERY      TONSILLECTOMY         Review of patient's allergies indicates:   Allergen Reactions    Xanax [alprazolam]        No current facility-administered medications on file prior to encounter.     Current Outpatient Medications on File Prior to Encounter   Medication Sig    carbidopa-levodopa  mg (SINEMET)  mg per tablet 2 tablets by Per G Tube route 3 (three) times daily.    carvediloL (COREG) 6.25 MG tablet 1 tablet (6.25 mg total) by Per G Tube route 2 (two) times daily.    clopidogreL (PLAVIX) 75 mg tablet 1 tablet (75 mg total) by Per G Tube route once daily.    esomeprazole (NEXIUM) 40 MG capsule Take  40 mg(1 cap)  twice a day  , then after 40 mg(1 cap) daily   Through PEG tube    furosemide (LASIX) 20 MG tablet USE  IT ONLY AS NEEDED FOR FLUID RETENTION    ipratropium (ATROVENT) 0.02 % nebulizer solution Take 2.5 mLs (500 mcg total) by nebulization 2 (two) times daily. Rescue    sacubitriL-valsartan (ENTRESTO) 49-51 mg per tablet Take 1 tablet by mouth 2 (two) times daily. HOLD FOR NOW DUE TO LOW BLOOD PRESSURE    traZODone (DESYREL) 50 MG tablet Take 0.5 tablets (25 mg total) by mouth every evening.    [DISCONTINUED] aspirin 81 MG Chew 1 tablet (81 mg total) by Per G Tube route once daily.    [DISCONTINUED] clonazePAM (KLONOPIN) 0.5 MG tablet Take 0.5 mg by mouth 2 (two) times daily as needed for Anxiety.    [DISCONTINUED] famotidine (PEPCID AC) 20 MG tablet 1 tablet (20 mg total) by Per G Tube route once daily.    [DISCONTINUED] losartan (COZAAR) 50  MG tablet Take 1 tablet (50 mg total) by mouth 2 (two) times a day.    [DISCONTINUED] metoprolol succinate (TOPROL-XL) 50 MG 24 hr tablet Take 1 tablet (50 mg total) by mouth 2 (two) times daily.    [DISCONTINUED] omeprazole magnesium 10 mg SuDR Take 40 mg by mouth once daily. (Patient not taking: Reported on 4/21/2022)    [DISCONTINUED] pantoprazole (PROTONIX) 40 mg suspension Take 1 packet (40 mg total) by mouth 2 (two) times daily.     Family History       Family history is unknown by patient.          Tobacco Use    Smoking status: Never Smoker    Smokeless tobacco: Never Used   Substance and Sexual Activity    Alcohol use: No    Drug use: No    Sexual activity: Not Currently     Review of Systems   Unable to perform ROS: Patient nonverbal   Objective:     Vital Signs (Most Recent):  Temp: 97.7 °F (36.5 °C) (05/03/22 0529)  Pulse: 76 (05/03/22 1048)  Resp: 10 (05/03/22 0945)  BP: (!) 131/99 (05/03/22 1048)  SpO2: (S)  (Unable to get reading. Multiple probes and sites assessed. No nasal probes per RT) (05/03/22 0945)   Vital Signs (24h Range):  Temp:  [97.7 °F (36.5 °C)] 97.7 °F (36.5 °C)  Pulse:  [58-80] 76  Resp:  [10-26] 10  SpO2:  [97 %-98 %] 98 %  BP: (131-152)/(87-99) 131/99     Weight: 61.6 kg (135 lb 12.8 oz)  Body mass index is 24.84 kg/m².    Physical Exam  Constitutional:       General: She is not in acute distress.     Appearance: She is ill-appearing.      Comments: Chronically debilitated, right sided neck contracture, hunched over with significant kyphosis   HENT:      Head: Normocephalic and atraumatic.   Cardiovascular:      Rate and Rhythm: Normal rate and regular rhythm.   Pulmonary:      Effort: Respiratory distress present.      Breath sounds: Wheezing present.      Comments: Decreased BS R>L  Abdominal:      General: Abdomen is flat. There is no distension.      Palpations: Abdomen is soft.      Tenderness: There is no abdominal tenderness. There is no guarding.      Comments: PEG  tube in place   Genitourinary:     Comments: Abreu catheter in place  Musculoskeletal:         General: Normal range of motion.      Right lower leg: No edema.      Left lower leg: No edema.   Skin:     General: Skin is warm and dry.   Neurological:      General: No focal deficit present.      Mental Status: She is alert.      Comments: Minimally verbal, aphasic baseline slightly worse than normal according to family at bedside. Somnolent, but responsive to verbal and tactile stimuli           Significant Labs: All pertinent labs within the past 24 hours have been reviewed.    Significant Imaging: I have reviewed all pertinent imaging results/findings within the past 24 hours.    Assessment/Plan:     * Recurrent right pleural effusion  IV lasix, supplemental O2  Consult pulm for thoracentesis        Chronic combined systolic and diastolic heart failure  Continue home med Entresto, Coreg  IV lasix BID for pleural effusion    3/11/22: EF 10%      Urinary tract infection without hematuria  Recently completed Abx course for UTI  Currently stable, not concerned for active infection  Chronic Abreu catheter draining clear yellow urine      History of CVA (cerebrovascular accident)  Continue Plavix      Parkinson disease  Continue home meds  PT/OT to eval and treat      Admit for observation    VTE Risk Mitigation (From admission, onward)         Ordered     IP VTE HIGH RISK PATIENT  Once         05/03/22 0940     Place sequential compression device  Until discontinued         05/03/22 0940                   Doreen Kramer MD  Department of Hospital Medicine   [facility] ([facility])

## 2022-05-03 NOTE — ASSESSMENT & PLAN NOTE
Recently completed Abx course for UTI  Currently stable, not concerned for active infection  Chronic Abreu catheter draining clear yellow urine

## 2022-05-03 NOTE — ASSESSMENT & PLAN NOTE
IV lasix, supplemental O2  Consult pulm for thoracentesis    5/4   S/p thoracentesis   F/u cytology outpatient   No resp distress on exam   Wean supplemental O2 as tolerated

## 2022-05-03 NOTE — SUBJECTIVE & OBJECTIVE
Past Medical History:   Diagnosis Date    Acute CHF (congestive heart failure) 1/17/2021    Hyperlipemia     Hypertension     Parkinson's disease     Stroke 2016    Throat mass        Past Surgical History:   Procedure Laterality Date    CLOSED REDUCTION Right 10/15/2020    Procedure: CLOSED REDUCTION;  Surgeon: Humberto Valdivia DO;  Location: [facility] OR;  Service: Orthopedics;  Laterality: Right;  ATTEMPTED    EVACUATION OF HEMATOMA Right 10/17/2020    Procedure: EVACUATION, HEMATOMA;  Surgeon: Humberto Valdivia DO;  Location: [facility] OR;  Service: Orthopedics;  Laterality: Right;    HEMIARTHROPLASTY OF HIP Left 7/12/2020    Procedure: HEMIARTHROPLASTY, HIP;  Surgeon: Humberto Valdivia DO;  Location: [facility] OR;  Service: Orthopedics;  Laterality: Left;    HEMIARTHROPLASTY OF HIP Right 10/2/2020    Procedure: HEMIARTHROPLASTY, HIP;  Surgeon: Loyd Kearney MD;  Location: [facility] OR;  Service: Orthopedics;  Laterality: Right;    HYSTERECTOMY      INSERTION OF PERCUTANEOUS ENDOSCOPIC GASTROSTOMY (PEG) FEEDING TUBE      OPEN REDUCTION OF DISLOCATION OF HIP Right 10/17/2020    Procedure: OPEN REDUCTION, DISLOCATION, HIP;  Surgeon: Humberto Valdivia DO;  Location: [facility] OR;  Service: Orthopedics;  Laterality: Right;    THROAT SURGERY      TONSILLECTOMY         Review of patient's allergies indicates:   Allergen Reactions    Xanax [alprazolam]        No current facility-administered medications on file prior to encounter.     Current Outpatient Medications on File Prior to Encounter   Medication Sig    carbidopa-levodopa  mg (SINEMET)  mg per tablet 2 tablets by Per G Tube route 3 (three) times daily.    carvediloL (COREG) 6.25 MG tablet 1 tablet (6.25 mg total) by Per G Tube route 2 (two) times daily.    clopidogreL (PLAVIX) 75 mg tablet 1 tablet (75 mg total) by Per G Tube route once daily.    esomeprazole (NEXIUM) 40 MG capsule Take  40 mg(1 cap)  twice a day  , then after 40 mg(1 cap) daily   Through PEG tube     furosemide (LASIX) 20 MG tablet USE  IT ONLY AS NEEDED FOR FLUID RETENTION    ipratropium (ATROVENT) 0.02 % nebulizer solution Take 2.5 mLs (500 mcg total) by nebulization 2 (two) times daily. Rescue    sacubitriL-valsartan (ENTRESTO) 49-51 mg per tablet Take 1 tablet by mouth 2 (two) times daily. HOLD FOR NOW DUE TO LOW BLOOD PRESSURE    traZODone (DESYREL) 50 MG tablet Take 0.5 tablets (25 mg total) by mouth every evening.    [DISCONTINUED] aspirin 81 MG Chew 1 tablet (81 mg total) by Per G Tube route once daily.    [DISCONTINUED] clonazePAM (KLONOPIN) 0.5 MG tablet Take 0.5 mg by mouth 2 (two) times daily as needed for Anxiety.    [DISCONTINUED] famotidine (PEPCID AC) 20 MG tablet 1 tablet (20 mg total) by Per G Tube route once daily.    [DISCONTINUED] losartan (COZAAR) 50 MG tablet Take 1 tablet (50 mg total) by mouth 2 (two) times a day.    [DISCONTINUED] metoprolol succinate (TOPROL-XL) 50 MG 24 hr tablet Take 1 tablet (50 mg total) by mouth 2 (two) times daily.    [DISCONTINUED] omeprazole magnesium 10 mg SuDR Take 40 mg by mouth once daily. (Patient not taking: Reported on 4/21/2022)    [DISCONTINUED] pantoprazole (PROTONIX) 40 mg suspension Take 1 packet (40 mg total) by mouth 2 (two) times daily.     Family History       Family history is unknown by patient.          Tobacco Use    Smoking status: Never Smoker    Smokeless tobacco: Never Used   Substance and Sexual Activity    Alcohol use: No    Drug use: No    Sexual activity: Not Currently     Review of Systems   Unable to perform ROS: Patient nonverbal   Objective:     Vital Signs (Most Recent):  Temp: 97.7 °F (36.5 °C) (05/03/22 0529)  Pulse: 76 (05/03/22 1048)  Resp: 10 (05/03/22 0945)  BP: (!) 131/99 (05/03/22 1048)  SpO2: (S)  (Unable to get reading. Multiple probes and sites assessed. No nasal probes per RT) (05/03/22 0945)   Vital Signs (24h Range):  Temp:  [97.7 °F (36.5 °C)] 97.7 °F (36.5 °C)  Pulse:  [58-80] 76  Resp:  [10-26] 10  SpO2:  [97  %-98 %] 98 %  BP: (131-152)/(87-99) 131/99     Weight: 61.6 kg (135 lb 12.8 oz)  Body mass index is 24.84 kg/m².    Physical Exam  Constitutional:       General: She is not in acute distress.     Appearance: She is ill-appearing.      Comments: Chronically debilitated, right sided neck contracture, hunched over with significant kyphosis   HENT:      Head: Normocephalic and atraumatic.   Cardiovascular:      Rate and Rhythm: Normal rate and regular rhythm.   Pulmonary:      Effort: Respiratory distress present.      Breath sounds: Wheezing present.      Comments: Decreased BS R>L  Abdominal:      General: Abdomen is flat. There is no distension.      Palpations: Abdomen is soft.      Tenderness: There is no abdominal tenderness. There is no guarding.      Comments: PEG tube in place   Genitourinary:     Comments: Abreu catheter in place  Musculoskeletal:         General: Normal range of motion.      Right lower leg: No edema.      Left lower leg: No edema.   Skin:     General: Skin is warm and dry.   Neurological:      General: No focal deficit present.      Mental Status: She is alert.      Comments: Minimally verbal, aphasic baseline slightly worse than normal according to family at bedside. Somnolent, but responsive to verbal and tactile stimuli           Significant Labs: All pertinent labs within the past 24 hours have been reviewed.    Significant Imaging: I have reviewed all pertinent imaging results/findings within the past 24 hours.

## 2022-05-04 DIAGNOSIS — G20.A1 PARKINSON DISEASE: Primary | Chronic | ICD-10-CM

## 2022-05-04 LAB
ALBUMIN FLD-MCNC: 1.6 G/DL
AMYLASE, BODY FLUID: 28 U/L
ANION GAP SERPL CALC-SCNC: 15 MMOL/L (ref 8–16)
ANISOCYTOSIS BLD QL SMEAR: SLIGHT
BASOPHILS # BLD AUTO: 0.04 K/UL (ref 0–0.2)
BASOPHILS NFR BLD: 0.4 % (ref 0–1.9)
BODY FLUID SOURCE AMYLASE: NORMAL
BODY FLUID SOURCE, LDH: NORMAL
BUN SERPL-MCNC: 39 MG/DL (ref 8–23)
CALCIUM SERPL-MCNC: 8.6 MG/DL (ref 8.7–10.5)
CHLORIDE SERPL-SCNC: 96 MMOL/L (ref 95–110)
CO2 SERPL-SCNC: 26 MMOL/L (ref 23–29)
CREAT SERPL-MCNC: 0.9 MG/DL (ref 0.5–1.4)
DIFFERENTIAL METHOD: ABNORMAL
EOSINOPHIL # BLD AUTO: 0.1 K/UL (ref 0–0.5)
EOSINOPHIL NFR BLD: 0.6 % (ref 0–8)
ERYTHROCYTE [DISTWIDTH] IN BLOOD BY AUTOMATED COUNT: 15.9 % (ref 11.5–14.5)
EST. GFR  (AFRICAN AMERICAN): >60 ML/MIN/1.73 M^2
EST. GFR  (NON AFRICAN AMERICAN): 59 ML/MIN/1.73 M^2
GIANT PLATELETS BLD QL SMEAR: PRESENT
GLUCOSE FLD-MCNC: 90 MG/DL
GLUCOSE SERPL-MCNC: 58 MG/DL (ref 70–110)
HCT VFR BLD AUTO: 33.8 % (ref 37–48.5)
HGB BLD-MCNC: 10.7 G/DL (ref 12–16)
HYPOCHROMIA BLD QL SMEAR: ABNORMAL
IMM GRANULOCYTES # BLD AUTO: 0.08 K/UL (ref 0–0.04)
IMM GRANULOCYTES NFR BLD AUTO: 0.8 % (ref 0–0.5)
LDH FLD L TO P-CCNC: 173 U/L
LYMPHOCYTES # BLD AUTO: 0.9 K/UL (ref 1–4.8)
LYMPHOCYTES NFR BLD: 9.6 % (ref 18–48)
MCH RBC QN AUTO: 29.4 PG (ref 27–31)
MCHC RBC AUTO-ENTMCNC: 31.7 G/DL (ref 32–36)
MCV RBC AUTO: 93 FL (ref 82–98)
MONOCYTES # BLD AUTO: 1.2 K/UL (ref 0.3–1)
MONOCYTES NFR BLD: 11.7 % (ref 4–15)
NEUTROPHILS # BLD AUTO: 7.6 K/UL (ref 1.8–7.7)
NEUTROPHILS NFR BLD: 76.9 % (ref 38–73)
NRBC BLD-RTO: 0 /100 WBC
OVALOCYTES BLD QL SMEAR: ABNORMAL
PATH INTERP FLD-IMP: NORMAL
PLATELET # BLD AUTO: 272 K/UL (ref 150–450)
PLATELET BLD QL SMEAR: ABNORMAL
PMV BLD AUTO: 10.6 FL (ref 9.2–12.9)
POIKILOCYTOSIS BLD QL SMEAR: SLIGHT
POLYCHROMASIA BLD QL SMEAR: ABNORMAL
POTASSIUM SERPL-SCNC: 4.7 MMOL/L (ref 3.5–5.1)
PROT FLD-MCNC: 2.6 G/DL
RBC # BLD AUTO: 3.64 M/UL (ref 4–5.4)
SODIUM SERPL-SCNC: 137 MMOL/L (ref 136–145)
SPECIMEN SOURCE: NORMAL
WBC # BLD AUTO: 9.84 K/UL (ref 3.9–12.7)

## 2022-05-04 PROCEDURE — 97166 OT EVAL MOD COMPLEX 45 MIN: CPT

## 2022-05-04 PROCEDURE — 25000003 PHARM REV CODE 250: Performed by: STUDENT IN AN ORGANIZED HEALTH CARE EDUCATION/TRAINING PROGRAM

## 2022-05-04 PROCEDURE — 99214 OFFICE O/P EST MOD 30 MIN: CPT | Mod: ,,, | Performed by: INTERNAL MEDICINE

## 2022-05-04 PROCEDURE — 36415 COLL VENOUS BLD VENIPUNCTURE: CPT | Performed by: STUDENT IN AN ORGANIZED HEALTH CARE EDUCATION/TRAINING PROGRAM

## 2022-05-04 PROCEDURE — 99214 PR OFFICE/OUTPT VISIT, EST, LEVL IV, 30-39 MIN: ICD-10-PCS | Mod: ,,, | Performed by: INTERNAL MEDICINE

## 2022-05-04 PROCEDURE — 85025 COMPLETE CBC W/AUTO DIFF WBC: CPT | Performed by: STUDENT IN AN ORGANIZED HEALTH CARE EDUCATION/TRAINING PROGRAM

## 2022-05-04 PROCEDURE — G0378 HOSPITAL OBSERVATION PER HR: HCPCS

## 2022-05-04 PROCEDURE — 97162 PT EVAL MOD COMPLEX 30 MIN: CPT

## 2022-05-04 PROCEDURE — 63600175 PHARM REV CODE 636 W HCPCS: Performed by: STUDENT IN AN ORGANIZED HEALTH CARE EDUCATION/TRAINING PROGRAM

## 2022-05-04 PROCEDURE — 80048 BASIC METABOLIC PNL TOTAL CA: CPT | Performed by: STUDENT IN AN ORGANIZED HEALTH CARE EDUCATION/TRAINING PROGRAM

## 2022-05-04 RX ORDER — SCOLOPAMINE TRANSDERMAL SYSTEM 1 MG/1
1 PATCH, EXTENDED RELEASE TRANSDERMAL
Qty: 10 PATCH | Refills: 3 | Status: ON HOLD | OUTPATIENT
Start: 2022-05-04 | End: 2022-09-17 | Stop reason: SDUPTHER

## 2022-05-04 RX ORDER — SCOLOPAMINE TRANSDERMAL SYSTEM 1 MG/1
1 PATCH, EXTENDED RELEASE TRANSDERMAL
Status: ON HOLD | COMMUNITY
Start: 2022-03-29 | End: 2022-05-04 | Stop reason: SDUPTHER

## 2022-05-04 RX ORDER — FUROSEMIDE 40 MG/1
40 TABLET ORAL DAILY
Status: DISCONTINUED | OUTPATIENT
Start: 2022-05-05 | End: 2022-05-05

## 2022-05-04 RX ORDER — FUROSEMIDE 40 MG/1
40 TABLET ORAL 2 TIMES DAILY
Status: DISCONTINUED | OUTPATIENT
Start: 2022-05-04 | End: 2022-05-04

## 2022-05-04 RX ADMIN — SACUBITRIL AND VALSARTAN 1 TABLET: 49; 51 TABLET, FILM COATED ORAL at 10:05

## 2022-05-04 RX ADMIN — SACUBITRIL AND VALSARTAN 1 TABLET: 49; 51 TABLET, FILM COATED ORAL at 11:05

## 2022-05-04 RX ADMIN — PANTOPRAZOLE SODIUM 40 MG: 40 TABLET, DELAYED RELEASE ORAL at 10:05

## 2022-05-04 RX ADMIN — FUROSEMIDE 40 MG: 40 INJECTION, SOLUTION INTRAMUSCULAR; INTRAVENOUS at 10:05

## 2022-05-04 RX ADMIN — CLOPIDOGREL 75 MG: 75 TABLET, FILM COATED ORAL at 10:05

## 2022-05-04 RX ADMIN — CARBIDOPA AND LEVODOPA 2 TABLET: 25; 100 TABLET ORAL at 10:05

## 2022-05-04 RX ADMIN — CARBIDOPA AND LEVODOPA 2 TABLET: 25; 100 TABLET ORAL at 04:05

## 2022-05-04 RX ADMIN — CARBIDOPA AND LEVODOPA 2 TABLET: 25; 100 TABLET ORAL at 11:05

## 2022-05-04 RX ADMIN — TRAZODONE HYDROCHLORIDE 25 MG: 50 TABLET ORAL at 11:05

## 2022-05-04 RX ADMIN — CARVEDILOL 6.25 MG: 6.25 TABLET, FILM COATED ORAL at 10:05

## 2022-05-04 RX ADMIN — CARVEDILOL 6.25 MG: 6.25 TABLET, FILM COATED ORAL at 11:05

## 2022-05-04 NOTE — ASSESSMENT & PLAN NOTE
Continue home med Entresto, Coreg  IV lasix BID for pleural effusion    3/11/22: EF 10%    5/4   Transition to po lasix   Consider increasing home dose of furosemide at discharge

## 2022-05-04 NOTE — SUBJECTIVE & OBJECTIVE
Interval History:   05/04: seen and examined: Post right thora: CXR no pneumo    Review of Systems   Unable to perform ROS: Dementia      Objective:     Vital Signs (Most Recent):  Temp: 97.7 °F (36.5 °C) (05/04/22 0732)  Pulse: 73 (05/04/22 1042)  Resp: 17 (05/04/22 0732)  BP: 136/75 (05/04/22 1042)  SpO2: (S)  (Unable to get reading. Multiple probes and sites assessed. No nasal probes per RT) (05/03/22 0945) Vital Signs (24h Range):  Temp:  [97.4 °F (36.3 °C)-98.4 °F (36.9 °C)] 97.7 °F (36.5 °C)  Pulse:  [59-77] 73  Resp:  [16-20] 17  BP: (110-180)/() 136/75     Weight: 58.2 kg (128 lb 4.9 oz)  Body mass index is 23.47 kg/m².      Intake/Output Summary (Last 24 hours) at 5/4/2022 1152  Last data filed at 5/4/2022 0800  Gross per 24 hour   Intake 0 ml   Output 1700 ml   Net -1700 ml       Physical Exam  Vitals and nursing note reviewed.   Constitutional:        HENT:      Head: Normocephalic.      Nose: Nose normal.   Eyes:      Pupils: Pupils are equal, round, and reactive to light.   Cardiovascular:      Rate and Rhythm: Normal rate.   Pulmonary:      Effort: Pulmonary effort is normal.      Breath sounds: Normal breath sounds.   Abdominal:      General: Bowel sounds are normal.   Musculoskeletal:         General: Normal range of motion.      Cervical back: Normal range of motion.   Skin:     General: Skin is warm.   Neurological:      General: No focal deficit present.       Vents:       Lines/Drains/Airways       Drain  Duration                  Gastrostomy/Enterostomy 03/25/22 1320 Gastrostomy tube w/ balloon LUQ feeding 39 days         Urethral Catheter 04/22/22 1804 11 days              Peripheral Intravenous Line  Duration                  Peripheral IV - Single Lumen 05/03/22 0530 20 G Right Forearm 1 day                    Significant Labs:    CBC/Anemia Profile:  Recent Labs   Lab 05/03/22  0628 05/04/22  0644   WBC 11.74 9.84   HGB 9.9* 10.7*   HCT 32.0* 33.8*    272   MCV 93 93   RDW 16.3*  15.9*        Component      Latest Ref Rng & Units 5/3/2022   Body Fluid Type       Pleural Fluid, Right   Fluid Appearance       Hazy   Fluid Color       Yellow   WBC, Body Fluid      /cu mm 547   Segs, Fluid      % 36   Lymphs, Fluid      % 20   Monocytes/Macrophages, Fluid      % 44   Body Fluid Source, Total Protein       Pleural Fluid, Right   Body Fluid, Protein      Not established g/dL 2.6   Body Fluid Source, Albumin       Pleural Fluid, Right   Body Fluid, Albumin      See text g/dL 1.6   Body Fluid Source, LDH       Pleural Fluid, Right   LD, Fluid      Not established U/L 173   Body Fluid Source, Glucose       Pleural Fluid, Right   Glucose, Fluid      Not established mg/dL 90   Body Fluid Source Amylase       Pleural Fluid, Right   Amylase, Fluid      Not established U/L 28   LD      110 - 260 U/L 315 (H)   PROTEIN TOTAL      6.0 - 8.4 g/dL 6.9   Pathologist Review, Body Fluid       REVIEWED       Chemistries:  Recent Labs   Lab 05/03/22  0628 05/03/22  0738 05/03/22  1327 05/04/22  0644   * 132*  --  137   K 6.1* 4.9  --  4.7   CL 93* 95  --  96   CO2 24 23  --  26   BUN 41* 41*  --  39*   CREATININE 1.0 0.9  --  0.9   CALCIUM 9.1 8.8  --  8.6*   ALBUMIN 3.1*  --   --   --    PROT 7.6  --  6.9  --    BILITOT 0.7  --   --   --    ALKPHOS 174*  --   --   --    ALT 15  --   --   --    AST 60*  --   --   --        All pertinent labs within the past 24 hours have been reviewed.    Significant Imaging:  I have reviewed all pertinent imaging results/findings within the past 24 hours.    X-Ray Chest 1 View  Narrative: EXAMINATION:  XR CHEST 1 VIEW    CLINICAL HISTORY:  post thora;    FINDINGS:  Comparison is made with the most recent prior chest x-ray.  The patient's chin is obscuring the right lung apex.  No evidence of pneumothorax in the visualized right lung..  Interval decrease in size of the right pleural effusion with improvement of the adjacent compressive atelectasis..  Impression: No evidence  of pneumothorax status post right thoracentesis..    Electronically signed by: Rupesh Downing MD  Date:    05/03/2022  Time:    15:26  US Chest Mediastinum  Narrative: EXAMINATION:  US CHEST MEDIASTINUM    CLINICAL HISTORY:  pleural effusion, preop thoracentesis;    FINDINGS:  There is a moderate volume right pleural effusion.  The skin overlying the largest pocket of fluid was marked for subsequent thoracentesis by the pulmonologist.  Impression: As above.    Electronically signed by: Rupesh Downing MD  Date:    05/03/2022  Time:    14:15  X-Ray Chest AP Portable  Narrative: EXAMINATION:  XR CHEST AP PORTABLE    CLINICAL HISTORY:  shortness of breath;.    TECHNIQUE:  Single frontal portable view of the chest was performed.    COMPARISON:  04/22/2022    FINDINGS:  Support devices: None    Compared to 04/22/2022, interval enlargement of the right pleural effusion, now large volume.  Likely small left pleural effusion present.  No pneumothorax.  Pulmonary vascular congestion with mild interstitial edema.  Unchanged cardiomegaly.  Lung volumes are slightly low.  Impression: Compared to 04/22/2022, interval enlargement of the right pleural effusion, now large volume. Likely small left pleural effusion present. No pneumothorax. Pulmonary vascular congestion with mild interstitial edema. Unchanged cardiomegaly. Lung volumes are slightly low.    Electronically signed by: Davie Ramachandran  Date:    05/03/2022  Time:    07:14

## 2022-05-04 NOTE — CONSULTS
[facility] - Telemetry ([facility])  Wound Care    Patient Name:  Tanya Madrid   MRN:  [id number]  Date: 5/4/2022  Diagnosis: Recurrent right pleural effusion    History:     Past Medical History:   Diagnosis Date    Acute CHF (congestive heart failure) 1/17/2021    Hyperlipemia     Hypertension     Parkinson's disease     Stroke 2016    Throat mass        Social History     Socioeconomic History    Marital status:    Tobacco Use    Smoking status: Never Smoker    Smokeless tobacco: Never Used   Substance and Sexual Activity    Alcohol use: No    Drug use: No    Sexual activity: Not Currently       Precautions:     Allergies as of 05/03/2022 - Reviewed 05/03/2022   Allergen Reaction Noted    Xanax [alprazolam]  10/14/2020       WO Assessment Details/Treatment     High Risk Wound Care screening performed on Ms. Madrid due to low gal and previously documented pressure injury. She was admitted overnight with pleural effusion, SOB, and is s/p recent cardiac arrest. She lives at home with her daughter.   Turned to right side in bed currently with foam wedge support.   Diaper in place, noted to be soiled with small amount brown stool. Incontinence care provided with bath wipes and diaper removed. Sacral foam dressing removed. 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 Open area measures 2.5x2x0.1cm, moist red granulating wound bed, epithelialization and epithelial bridge noted, scar tissue surrounding. Cleansed with saline and patted dry. aquacel ag advantage applied to open wound and secured with Duoderm. Recommend changing dressing every 5 days and as needed for excess drainage, edges roll. Will order specialty MADHURI bed for patient. Repositioned with foam wedge, will need heels floated, turn q2 hours. Will follow.        05/04/22 0915        Altered Skin Integrity 03/31/22 1257 Left  Buttocks Full thickness tissue loss. Subcutaneous fat may be visible but bone, tendon or muscle are not exposed   Date First Assessed/Time First Assessed: 03/31/22 1257   Altered Skin Integrity Present on Admission: yes  Side: Left  Location: Buttocks  Description of Altered Skin Integrity: Full thickness tissue loss. Subcutaneous fat may be visible but bone, ten...   Wound Image    Description of Altered Skin Integrity Full thickness tissue loss. Subcutaneous fat may be visible but bone, tendon or muscle are not exposed   Dressing Appearance Intact;Moist drainage   Drainage Amount Small   Drainage Characteristics/Odor Serosanguineous   Appearance Red;Pink;Moist;Granulating;Epithelialization   Tissue loss description Full thickness   Red (%), Wound Tissue Color 100 %   Periwound Area Scar tissue;Redness  (deep purple discoloration)   Wound Edges Irregular;Open   Wound Length (cm) 2.5 cm   Wound Width (cm) 2 cm   Wound Depth (cm) 0.1 cm   Wound Volume (cm^3) 0.5 cm^3   Wound Surface Area (cm^2) 5 cm^2   Care Cleansed with:;Sterile normal saline;Applied:;Skin Barrier   Dressing Hydrofiber;Silver;Hydrocolloid   Dressing Change Due 05/09/22 05/04/2022

## 2022-05-04 NOTE — PROGRESS NOTES
[facility] - Telemetry ([facility])  Pulmonology  Progress Note    Patient Name: Tanya Madrid  MRN: [id number]  Admission Date: 5/3/2022  Hospital Length of Stay: 0 days  Code Status: DNR  Attending Provider: Doreen Kramer MD  Primary Care Provider: Maggie Sue NP   Principal Problem: Recurrent right pleural effusion    Subjective:     Interval History:   05/04: seen and examined: Post right thora: CXR no pneumo    Review of Systems   Unable to perform ROS: Dementia      Objective:     Vital Signs (Most Recent):  Temp: 97.7 °F (36.5 °C) (05/04/22 0732)  Pulse: 73 (05/04/22 1042)  Resp: 17 (05/04/22 0732)  BP: 136/75 (05/04/22 1042)  SpO2: (S)  (Unable to get reading. Multiple probes and sites assessed. No nasal probes per RT) (05/03/22 0945) Vital Signs (24h Range):  Temp:  [97.4 °F (36.3 °C)-98.4 °F (36.9 °C)] 97.7 °F (36.5 °C)  Pulse:  [59-77] 73  Resp:  [16-20] 17  BP: (110-180)/() 136/75     Weight: 58.2 kg (128 lb 4.9 oz)  Body mass index is 23.47 kg/m².      Intake/Output Summary (Last 24 hours) at 5/4/2022 1152  Last data filed at 5/4/2022 0800  Gross per 24 hour   Intake 0 ml   Output 1700 ml   Net -1700 ml       Physical Exam  Vitals and nursing note reviewed.   Constitutional:        HENT:      Head: Normocephalic.      Nose: Nose normal.   Eyes:      Pupils: Pupils are equal, round, and reactive to light.   Cardiovascular:      Rate and Rhythm: Normal rate.   Pulmonary:      Effort: Pulmonary effort is normal.      Breath sounds: Normal breath sounds.   Abdominal:      General: Bowel sounds are normal.   Musculoskeletal:         General: Normal range of motion.      Cervical back: Normal range of motion.   Skin:     General: Skin is warm.   Neurological:      General: No focal deficit present.       Vents:       Lines/Drains/Airways       Drain  Duration                  Gastrostomy/Enterostomy 03/25/22 1320 Gastrostomy tube w/ balloon LUQ feeding 39 days         Urethral Catheter 04/22/22  1804 11 days              Peripheral Intravenous Line  Duration                  Peripheral IV - Single Lumen 05/03/22 0530 20 G Right Forearm 1 day                    Significant Labs:    CBC/Anemia Profile:  Recent Labs   Lab 05/03/22  0628 05/04/22  0644   WBC 11.74 9.84   HGB 9.9* 10.7*   HCT 32.0* 33.8*    272   MCV 93 93   RDW 16.3* 15.9*        Component      Latest Ref Rng & Units 5/3/2022   Body Fluid Type       Pleural Fluid, Right   Fluid Appearance       Hazy   Fluid Color       Yellow   WBC, Body Fluid      /cu mm 547   Segs, Fluid      % 36   Lymphs, Fluid      % 20   Monocytes/Macrophages, Fluid      % 44   Body Fluid Source, Total Protein       Pleural Fluid, Right   Body Fluid, Protein      Not established g/dL 2.6   Body Fluid Source, Albumin       Pleural Fluid, Right   Body Fluid, Albumin      See text g/dL 1.6   Body Fluid Source, LDH       Pleural Fluid, Right   LD, Fluid      Not established U/L 173   Body Fluid Source, Glucose       Pleural Fluid, Right   Glucose, Fluid      Not established mg/dL 90   Body Fluid Source Amylase       Pleural Fluid, Right   Amylase, Fluid      Not established U/L 28   LD      110 - 260 U/L 315 (H)   PROTEIN TOTAL      6.0 - 8.4 g/dL 6.9   Pathologist Review, Body Fluid       REVIEWED       Chemistries:  Recent Labs   Lab 05/03/22  0628 05/03/22  0738 05/03/22  1327 05/04/22  0644   * 132*  --  137   K 6.1* 4.9  --  4.7   CL 93* 95  --  96   CO2 24 23  --  26   BUN 41* 41*  --  39*   CREATININE 1.0 0.9  --  0.9   CALCIUM 9.1 8.8  --  8.6*   ALBUMIN 3.1*  --   --   --    PROT 7.6  --  6.9  --    BILITOT 0.7  --   --   --    ALKPHOS 174*  --   --   --    ALT 15  --   --   --    AST 60*  --   --   --        All pertinent labs within the past 24 hours have been reviewed.    Significant Imaging:  I have reviewed all pertinent imaging results/findings within the past 24 hours.    X-Ray Chest 1 View  Narrative: EXAMINATION:  XR CHEST 1 VIEW    CLINICAL  HISTORY:  post thora;    FINDINGS:  Comparison is made with the most recent prior chest x-ray.  The patient's chin is obscuring the right lung apex.  No evidence of pneumothorax in the visualized right lung..  Interval decrease in size of the right pleural effusion with improvement of the adjacent compressive atelectasis..  Impression: No evidence of pneumothorax status post right thoracentesis..    Electronically signed by: Rupesh Downing MD  Date:    05/03/2022  Time:    15:26  US Chest Mediastinum  Narrative: EXAMINATION:  US CHEST MEDIASTINUM    CLINICAL HISTORY:  pleural effusion, preop thoracentesis;    FINDINGS:  There is a moderate volume right pleural effusion.  The skin overlying the largest pocket of fluid was marked for subsequent thoracentesis by the pulmonologist.  Impression: As above.    Electronically signed by: Rupesh Downing MD  Date:    05/03/2022  Time:    14:15  X-Ray Chest AP Portable  Narrative: EXAMINATION:  XR CHEST AP PORTABLE    CLINICAL HISTORY:  shortness of breath;.    TECHNIQUE:  Single frontal portable view of the chest was performed.    COMPARISON:  04/22/2022    FINDINGS:  Support devices: None    Compared to 04/22/2022, interval enlargement of the right pleural effusion, now large volume.  Likely small left pleural effusion present.  No pneumothorax.  Pulmonary vascular congestion with mild interstitial edema.  Unchanged cardiomegaly.  Lung volumes are slightly low.  Impression: Compared to 04/22/2022, interval enlargement of the right pleural effusion, now large volume. Likely small left pleural effusion present. No pneumothorax. Pulmonary vascular congestion with mild interstitial edema. Unchanged cardiomegaly. Lung volumes are slightly low.    Electronically signed by: Davie Ramachandran  Date:    05/03/2022  Time:    07:14           ABG  Recent Labs   Lab 05/03/22  0630   PH 7.485*   PO2 61*   PCO2 33.5*   HCO3 25.3   BE 2     Assessment/Plan:     * Recurrent right pleural  effusion  Plan for right thoracentesis  1200mls  At least one of Lights Criteria has been met; according to one study, this was 98% sensitive for exudative effusion    Chronic combined systolic and diastolic heart failure  EF 10%  Continue Lasix ENTRESTO    will follow cytology   Sign off  Consider palliative care       Chencho Lechuga MD  Pulmonology  [facility] - Telemetry ([facility])

## 2022-05-04 NOTE — PT/OT/SLP EVAL
Occupational Therapy   Evaluation and Discharge Note    Name: Tanya Madrid  MRN: [id number]  Admitting Diagnosis:  Recurrent right pleural effusion   Recent Surgery: * No surgery found *      Recommendations:     Discharge Recommendations: home (with previous level of A)  Discharge Equipment Recommendations:  lift device (may benefit)  Barriers to discharge:  None    Assessment:     Tanya Madrid is a 82 y.o. female with a medical diagnosis of Recurrent right pleural effusion. At this time, patient is functioning at their prior level of function and does not require further acute OT services.     Plan:     During this hospitalization, patient does not require further acute OT services.  Please re-consult if situation changes.    · Plan of Care Reviewed with: family    Subjective     Chief Complaint: no purposeful verbalizations throughout  Patient/Family Comments/goals: none reported    Occupational Profile:  Living Environment: Patient resides with her daughter in a 1 one story home with no steps to enter.  Previous level of function: Patient was dependent with ADLs and t/f to wheelchair at baseline.   Roles and Routines: n/a  Equipment Used at home:  hospital bed, wheelchair  Assistance upon Discharge: family    Family member present in room at al providing PLOF, as patient unable to provide.    Pain/Comfort:  · Pain Rating 1:  (no nonverbal indicators of pain)    Objective:     Communicated with: NurseAnita, prior to session.  Patient found left sidelying with telemetry, peripheral IV, oxygen, PEG Tube, henley catheter, bed alarm, pulse ox (continuous) upon OT entry to room.    General Precautions: Standard, fall   Orthopedic Precautions:N/A   Braces: N/A  Respiratory Status: Nasal cannula, flow 2 L/min     Bed Mobility:    · Patient completed Rolling/Turning to Left with  total assistance and 2 persons  · Patient completed Rolling/Turning to Right with total assistance and 2 persons  · Patient  completed Supine to Sit with total assistance and 2 persons  · Patient completed Sit to Supine with total assistance and 2 persons    Functional Mobility/Transfers:  · Not appropriate for functional transfer at this time    Activities of Daily Living:  · Grooming: total assistance .  · Upper Body Dressing: total assistance .    Cognitive/Visual Perceptual:  Cognitive/Psychosocial Skills:     -       Oriented to: 0   -       Follows Commands/attention:unable    Physical Exam:  Balance:    -       sitting: absent  Upper Extremity Range of Motion:     -       Right Upper Extremity: Deficits: grossly 50% PROM  -       Left Upper Extremity: Deficits: grossly 50% PROM   Mixed tone B UE.     AMPAC 6 Click ADL:  AMPAC Total Score: 6    Treatment & Education:  Patient sat EOB x3 mins with total A of 2. Unable to improve with cueing and positioning. Patient fearful while seated EOB. Poor tolerance for PROM with resistance and guarding throughout.  Education:    Patient left right sidelying with all lines intact, call button in reach, bed alarm on and family member present    History:     Past Medical History:   Diagnosis Date    Acute CHF (congestive heart failure) 1/17/2021    Hyperlipemia     Hypertension     Parkinson's disease     Stroke 2016    Throat mass        Past Surgical History:   Procedure Laterality Date    CLOSED REDUCTION Right 10/15/2020    Procedure: CLOSED REDUCTION;  Surgeon: Humberto Valdivia DO;  Location: [facility] OR;  Service: Orthopedics;  Laterality: Right;  ATTEMPTED    EVACUATION OF HEMATOMA Right 10/17/2020    Procedure: EVACUATION, HEMATOMA;  Surgeon: Humberto Valdivia DO;  Location: [facility] OR;  Service: Orthopedics;  Laterality: Right;    HEMIARTHROPLASTY OF HIP Left 7/12/2020    Procedure: HEMIARTHROPLASTY, HIP;  Surgeon: Humberto Valdivia DO;  Location: [facility] OR;  Service: Orthopedics;  Laterality: Left;    HEMIARTHROPLASTY OF HIP Right 10/2/2020    Procedure: HEMIARTHROPLASTY, HIP;   Surgeon: Loyd Kearney MD;  Location: [facility];  Service: Orthopedics;  Laterality: Right;    HYSTERECTOMY      INSERTION OF PERCUTANEOUS ENDOSCOPIC GASTROSTOMY (PEG) FEEDING TUBE      OPEN REDUCTION OF DISLOCATION OF HIP Right 10/17/2020    Procedure: OPEN REDUCTION, DISLOCATION, HIP;  Surgeon: Humberto Valdivia DO;  Location: [facility];  Service: Orthopedics;  Laterality: Right;    THROAT SURGERY      TONSILLECTOMY         Time Tracking:     OT Date of Treatment: 05/04/22  OT Start Time: 0800  OT Stop Time: 0825  OT Total Time (min): 25 min    Billable Minutes:Evaluation 25 5/4/2022

## 2022-05-04 NOTE — PT/OT/SLP EVAL
Physical Therapy Evaluation and Discharge Note    Patient Name:  Tanya Madrid   MRN:  [id number]    Recommendations:     Discharge Recommendations:  home (WITH CONT. 24 HOUR CARE FROM FAMILY)   Discharge Equipment Recommendations: lift device   Barriers to discharge: None    Assessment:     Tanya Madrid is a 82 y.o. female admitted with a medical diagnosis of Recurrent right pleural effusion. .  At this time, patient is functioning at their prior level of function and does not require further acute PT services.     Recent Surgery: * No surgery found *     Plan:     During this hospitalization, patient does not require further acute PT services.  Please re-consult if situation changes.  D/C PT FROM P.T. SERVICES TO PEOPLE 'S PROGRAM FOR PROM TO BLE TO TOLERANCE    Subjective     Chief Complaint:   Patient/Family Comments/goals:   Pain/Comfort:  · Pain Rating 1: 0/10    Patients cultural, spiritual, Jew conflicts given the current situation:      Living Environment:  PT IS POOR HISTORIAN, PT'S SISTER PRESENT TO OFFER PT'S PLOF/HISTORY:  PT LIVES WITH DAUGHTER, SHE IS TOTAL CARE AT HOME FOR BED MOBILITY AND TF'S, TOTAL CARE FOR ADL'S  Prior to admission, patients level of function was TOTAL CARE.  Equipment used at home: hospital bed.  DME owned (not currently used): NONE.  Upon discharge, patient will have assistance from DAUGHTER.    Objective:     Communicated with NURSE ALONSO prior to session.  Patient found L SIDELYING IN FETAL POSITION with telemetry, henley catheter, pulse ox (continuous), bed alarm, oxygen, PEG Tube, peripheral IV upon PT entry to room.    General Precautions: Standard, fall   Orthopedic Precautions:N/A   Braces: N/A   Respiratory Status: Nasal cannula, flow 2 L/min    Exams:  · Cognitive Exam:  Patient is oriented to PT CONFUSED/DISORIENTED, UNABLE TO ANSWER ANY QUESTIONS OF FOLLOW ANY COMMANDS  · Postural Exam:  Patient presented with the following abnormalities:    · -        Rounded shoulders  · -       Forward head  · Sensation: UNABLE TO ASSESS  · RLE ROM: LIMITED, PT KEEPS LEG IN FLEXED POSITION AT HIP AND KNEE  · LLE ROM: LIMITED, PT KEEPS LEG IN FLEXED POSTION AT HIP AND KNEE    Functional Mobility:  · Bed Mobility:     · Rolling Left:  total assistance  · Rolling Right: total assistance  · Scooting: total assistance  · Supine to Sit: total assistance  · Sit to Supine: total assistance  · Balance: POOR- STATIC SITTING BALANCE    AM-PAC 6 CLICK MOBILITY  Total Score:6     Therapeutic Activities and Exercises:   PT AND SISTER EDUCATED IN ROLE OF P.T., PT ASSISTED TO EOB WITH TOTAL ASSIST X 2, PT RESISTS ALL MOVEMENT, POOR- SITTING BALANCE, TOTAL ASSIST TO MAINTAIN SITTING DUE TO PT WITH STRONG RESISTANCE LATERALLY TOWARD R SIDE TOWARD PILLOW TO LIE BAY DOWN, PT ASSISTED BACK TO SUPINE WITH TOTAL ASSIST X 2, TOTAL ASSIST X 2 FOR LATERAL ROLLING AND POSITIONING INTO R SIDELYING, ATTEMPTED PROM TO BLE'S BUT PT RESISTANT INTO EXT.    AM-PAC 6 CLICK MOBILITY  Total Score:6     Patient left right sidelying with all lines intact, call button in reach, bed alarm on, NURSE notified and SISTER present.    GOALS:   Multidisciplinary Problems     Physical Therapy Goals     Not on file                History:     Past Medical History:   Diagnosis Date    Acute CHF (congestive heart failure) 1/17/2021    Hyperlipemia     Hypertension     Parkinson's disease     Stroke 2016    Throat mass        Past Surgical History:   Procedure Laterality Date    CLOSED REDUCTION Right 10/15/2020    Procedure: CLOSED REDUCTION;  Surgeon: Humberto Valdivia DO;  Location: [facility] OR;  Service: Orthopedics;  Laterality: Right;  ATTEMPTED    EVACUATION OF HEMATOMA Right 10/17/2020    Procedure: EVACUATION, HEMATOMA;  Surgeon: Humberto Valdivia DO;  Location: [facility] OR;  Service: Orthopedics;  Laterality: Right;    HEMIARTHROPLASTY OF HIP Left 7/12/2020    Procedure: HEMIARTHROPLASTY, HIP;  Surgeon: Humberto  MARCIA Valdivia DO;  Location: [facility];  Service: Orthopedics;  Laterality: Left;    HEMIARTHROPLASTY OF HIP Right 10/2/2020    Procedure: HEMIARTHROPLASTY, HIP;  Surgeon: Loyd Kearney MD;  Location: [facility];  Service: Orthopedics;  Laterality: Right;    HYSTERECTOMY      INSERTION OF PERCUTANEOUS ENDOSCOPIC GASTROSTOMY (PEG) FEEDING TUBE      OPEN REDUCTION OF DISLOCATION OF HIP Right 10/17/2020    Procedure: OPEN REDUCTION, DISLOCATION, HIP;  Surgeon: Humberto Valdivia DO;  Location: [facility];  Service: Orthopedics;  Laterality: Right;    THROAT SURGERY      TONSILLECTOMY         Time Tracking:     PT Received On: 05/04/22  PT Start Time: 0800     PT Stop Time: 0815  PT Total Time (min): 15 min     Billable Minutes: Evaluation 15    05/04/2022

## 2022-05-04 NOTE — PLAN OF CARE
OT niko completed. Patient dependent of 2 for sup>sit and sit>sup. Dependent for static sitting balance. Dependent with ADLs. Continued skilled OT intervention in acute or post acute not warranted. D/C OT.

## 2022-05-04 NOTE — PROGRESS NOTES
[facility] Medicine  Progress Note    Patient Name: Tanya Madrid  MRN: [id number]  Patient Class: OP- Observation   Admission Date: 5/3/2022  Length of Stay: 0 days  Attending Physician: Doreen Kramer MD  Primary Care Provider: Elva Ansari NP        Subjective:     Principal Problem:Recurrent right pleural effusion        HPI:  82 y.o. female patient with a PMHx of CHF, HTN, stroke, cardiac arrest on 3/10/22 who presents to the Emergency Department for SOB, onset last night. Pt is not on home O2. Symptoms are constant and moderate in severity. No mitigating or exacerbating factors reported. Associated sxs include productive cough and intermittent apnea. Daughter states that the pt does not walk, and that her speech has not returned to normal since her cardiac arrest in March. Pt had a breathing treatment at home last night. Per daughter, pt is on Lasix 20 mg PRN      Overview/Hospital Course:  83 yo female with chronic systolic and diastolic HF LVEF 10% admitted with acute hypoxic resp failure 2/2 large right pleural effusion. Pulmonology consulted. Pt s/p thoracentesis 5/3- 1200 cc removed. Cytology pending. Patient more alert without respiratory distress on exam, currently on 2 liters O2 (family denies home O2). Transition IV furosemide to 40 mg daily (patient taking 20 mg furosemide three times a week PTA), wean oxygen as tolerated. Anticipate discharge in am.      Interval History: patient seen and examined. No distress. Family at bedside, POC discussed.     Review of Systems   Unable to perform ROS: Other (daughter states speech has not returned to baseline since cardiac arrest 3/2022)   Objective:     Vital Signs (Most Recent):  Temp: 97.8 °F (36.6 °C) (05/04/22 1531)  Pulse: 75 (05/04/22 1532)  Resp: 18 (05/04/22 1531)  BP: (!) 140/96 (05/04/22 1532)  SpO2: 100 % (05/04/22 1200)   Vital Signs (24h Range):  Temp:  [97.4 °F (36.3 °C)-98.4 °F (36.9 °C)] 97.8 °F (36.6  °C)  Pulse:  [54-75] 75  Resp:  [16-18] 18  SpO2:  [100 %] 100 %  BP: (110-180)/(55-96) 140/96     Weight: 58.2 kg (128 lb 4.9 oz)  Body mass index is 23.47 kg/m².    Intake/Output Summary (Last 24 hours) at 5/4/2022 1659  Last data filed at 5/4/2022 1600  Gross per 24 hour   Intake 0 ml   Output 2200 ml   Net -2200 ml      Physical Exam  Constitutional:       Appearance: She is well-developed. She is ill-appearing.      Comments: Frail    HENT:      Head: Normocephalic and atraumatic.   Eyes:      Conjunctiva/sclera: Conjunctivae normal.      Pupils: Pupils are equal, round, and reactive to light.   Neck:      Vascular: No JVD.   Cardiovascular:      Rate and Rhythm: Normal rate and regular rhythm.      Heart sounds: Normal heart sounds.   Pulmonary:      Effort: Pulmonary effort is normal. No respiratory distress.      Breath sounds: No wheezing.      Comments: No resp distress  Abdominal:      General: Bowel sounds are normal. There is no distension.      Palpations: Abdomen is soft.      Tenderness: There is no abdominal tenderness. There is no guarding.   Musculoskeletal:         General: No tenderness. Normal range of motion.      Cervical back: Normal range of motion.      Right lower leg: Edema present.      Left lower leg: Edema present.   Skin:     General: Skin is warm and dry.      Capillary Refill: Capillary refill takes less than 2 seconds.      Comments: Sacral wound    Neurological:      Mental Status: She is alert.      Motor: Weakness (generalized) present.      Comments: Awake and alert, minimal response    Psychiatric:         Behavior: Behavior normal.       Significant Labs: All pertinent labs within the past 24 hours have been reviewed.  CBC:   Recent Labs   Lab 05/03/22  0628 05/04/22  0644   WBC 11.74 9.84   HGB 9.9* 10.7*   HCT 32.0* 33.8*    272     CMP:   Recent Labs   Lab 05/03/22  0628 05/03/22  0738 05/03/22  1327 05/04/22  0644   * 132*  --  137   K 6.1* 4.9  --  4.7   CL  93* 95  --  96   CO2 24 23  --  26   GLU 99 99  --  58*   BUN 41* 41*  --  39*   CREATININE 1.0 0.9  --  0.9   CALCIUM 9.1 8.8  --  8.6*   PROT 7.6  --  6.9  --    ALBUMIN 3.1*  --   --   --    BILITOT 0.7  --   --   --    ALKPHOS 174*  --   --   --    AST 60*  --   --   --    ALT 15  --   --   --    ANIONGAP 14 14  --  15   EGFRNONAA 52* 59*  --  59*     Troponin:   Recent Labs   Lab 05/03/22  0628   TROPONINI 0.024       Significant Imaging: I have reviewed all pertinent imaging results/findings within the past 24 hours.      Assessment/Plan:      * Recurrent right pleural effusion  IV lasix, supplemental O2  Consult pulm for thoracentesis    5/4   S/p thoracentesis   F/u cytology outpatient   No resp distress on exam   Wean supplemental O2 as tolerated      Urinary tract infection without hematuria  Recently completed Abx course for UTI  Currently stable, not concerned for active infection  Chronic Abreu catheter draining clear yellow urine      Chronic combined systolic and diastolic heart failure  Continue home med Entresto, Coreg  IV lasix BID for pleural effusion    3/11/22: EF 10%    5/4   Transition to po lasix   Consider increasing home dose of furosemide at discharge         History of CVA (cerebrovascular accident)  Continue Plavix      Parkinson disease  Continue home meds  PT/OT to eval and treat        VTE Risk Mitigation (From admission, onward)         Ordered     IP VTE HIGH RISK PATIENT  Once         05/03/22 0940     Place sequential compression device  Until discontinued         05/03/22 0940                Discharge Planning   PARMINDER:  5/5/22    Code Status: DNR   Is the patient medically ready for discharge?:     Reason for patient still in hospital (select all that apply): Patient trending condition  Discharge Plan A: Home Health                  Ave Sánchez NP  Department of Hospital Medicine   [facility] - Telemetry ([facility])

## 2022-05-04 NOTE — SUBJECTIVE & OBJECTIVE
Interval History: patient seen and examined. No distress. Family at bedside, POC discussed.     Review of Systems   Unable to perform ROS: Other (daughter states speech has not returned to baseline since cardiac arrest 3/2022)   Objective:     Vital Signs (Most Recent):  Temp: 97.8 °F (36.6 °C) (05/04/22 1531)  Pulse: 75 (05/04/22 1532)  Resp: 18 (05/04/22 1531)  BP: (!) 140/96 (05/04/22 1532)  SpO2: 100 % (05/04/22 1200)   Vital Signs (24h Range):  Temp:  [97.4 °F (36.3 °C)-98.4 °F (36.9 °C)] 97.8 °F (36.6 °C)  Pulse:  [54-75] 75  Resp:  [16-18] 18  SpO2:  [100 %] 100 %  BP: (110-180)/(55-96) 140/96     Weight: 58.2 kg (128 lb 4.9 oz)  Body mass index is 23.47 kg/m².    Intake/Output Summary (Last 24 hours) at 5/4/2022 1659  Last data filed at 5/4/2022 1600  Gross per 24 hour   Intake 0 ml   Output 2200 ml   Net -2200 ml      Physical Exam  Constitutional:       Appearance: She is well-developed. She is ill-appearing.      Comments: Frail    HENT:      Head: Normocephalic and atraumatic.   Eyes:      Conjunctiva/sclera: Conjunctivae normal.      Pupils: Pupils are equal, round, and reactive to light.   Neck:      Vascular: No JVD.   Cardiovascular:      Rate and Rhythm: Normal rate and regular rhythm.      Heart sounds: Normal heart sounds.   Pulmonary:      Effort: Pulmonary effort is normal. No respiratory distress.      Breath sounds: No wheezing.      Comments: No resp distress  Abdominal:      General: Bowel sounds are normal. There is no distension.      Palpations: Abdomen is soft.      Tenderness: There is no abdominal tenderness. There is no guarding.   Musculoskeletal:         General: No tenderness. Normal range of motion.      Cervical back: Normal range of motion.      Right lower leg: Edema present.      Left lower leg: Edema present.   Skin:     General: Skin is warm and dry.      Capillary Refill: Capillary refill takes less than 2 seconds.      Comments: Sacral wound    Neurological:      Mental  Status: She is alert.      Motor: Weakness (generalized) present.      Comments: Awake and alert, minimal response    Psychiatric:         Behavior: Behavior normal.       Significant Labs: All pertinent labs within the past 24 hours have been reviewed.  CBC:   Recent Labs   Lab 05/03/22 0628 05/04/22  0644   WBC 11.74 9.84   HGB 9.9* 10.7*   HCT 32.0* 33.8*    272     CMP:   Recent Labs   Lab 05/03/22  0628 05/03/22  0738 05/03/22  1327 05/04/22  0644   * 132*  --  137   K 6.1* 4.9  --  4.7   CL 93* 95  --  96   CO2 24 23  --  26   GLU 99 99  --  58*   BUN 41* 41*  --  39*   CREATININE 1.0 0.9  --  0.9   CALCIUM 9.1 8.8  --  8.6*   PROT 7.6  --  6.9  --    ALBUMIN 3.1*  --   --   --    BILITOT 0.7  --   --   --    ALKPHOS 174*  --   --   --    AST 60*  --   --   --    ALT 15  --   --   --    ANIONGAP 14 14  --  15   EGFRNONAA 52* 59*  --  59*     Troponin:   Recent Labs   Lab 05/03/22 0628   TROPONINI 0.024       Significant Imaging: I have reviewed all pertinent imaging results/findings within the past 24 hours.

## 2022-05-04 NOTE — PLAN OF CARE
"AAO to self only. NAD VSS    Much more alert and aware today. Pt has spoken verbally with requests today. Often requests water. Still currently NPO, Isosource 1.5x5 cartons via PEG ordered this evening. Request sent to Dietary.    Pt has no IV access, requested PICC. Dr. Kramer wants to wait on PICC for now, no IV meds ordered at this time.    No complaints, requests, or concerns at this time.     BP (!) 140/96   Pulse 75   Temp 97.8 °F (36.6 °C)   Resp 18   Ht 5' 2" (1.575 m)   Wt 58.2 kg (128 lb 4.9 oz)   SpO2 100%   Breastfeeding No   BMI 23.47 kg/m²     "

## 2022-05-04 NOTE — ASSESSMENT & PLAN NOTE
Plan for right thoracentesis  1200mls  At least one of Lights Criteria has been met; according to one study, this was 98% sensitive for exudative effusion

## 2022-05-04 NOTE — PLAN OF CARE
Patient current with [facility].  Referral sent in [facility].  Will need orders at discharge if  converts to in-patient status.       05/04/22 5980   Post-Acute Status   Post-Acute Authorization Home [facility]   Home Health Status Referrals Sent   Discharge Plan   Discharge Plan A [facility]

## 2022-05-04 NOTE — PLAN OF CARE
[facility] - Telemetry (Hospital)  Initial Discharge Assessment       Primary Care Provider: Elva Ansari NP    Admission Diagnosis: Shortness of breath [R06.02]  Pleural effusion on right [J90]  Chest pain [R07.9]  History of CVA with residual deficit [I69.30]  Acute on chronic heart failure, unspecified heart failure type [I50.9]    Admission Date: 5/3/2022  Expected Discharge Date:     Discharge Barriers Identified: None    Payor: MEDICARE / Plan: MEDICARE PART A & B / Product Type: Government /     Extended Emergency Contact Information  Primary Emergency Contact: Adenike Lemus  Address: [address] [place] [address] [facility]  Home Phone: [phone]  Mobile Phone: [phone]  Relation: Daughter    Discharge Plan A: [id number] - [place] - [address] AT [facility]  [address]  [place] [address]  Phone: [phone] Fax: [phone]      Initial Assessment (most recent)     Adult Discharge Assessment - 05/04/22 1413        Discharge Assessment    Assessment Type Discharge Planning Assessment     Confirmed/corrected address, phone number and insurance Yes     Confirmed Demographics Correct on Facesheet     Source of Information family     When was your last doctors appointment? 04/29/22     Communicated PARMINDER with patient/caregiver Date not available/Unable to determine     Reason For Admission SOB, recurrent pleural effusion     Lives With child(anthony), adult     Facility Arrived From: home     Do you expect to return to your current living situation? Yes     Do you have help at home or someone to help you manage your care at home? Yes     Who are your caregiver(s) and their phone number(s)? Adenike Lemus, daughter     Prior to hospitilization cognitive status: Unable to Assess     Current cognitive status: Unable to Assess     Walking or Climbing Stairs Difficulty ambulation difficulty,  dependent;transferring difficulty, dependent     Mobility Management bedbound, dependent w/c transfers     Dressing/Bathing Difficulty bathing difficulty, dependent     Dressing/Bathing Management bed bath given by daughter     Home Layout Able to live on 1st floor     Equipment Currently Used at Home wheelchair;walker, rolling;grab bar;shower chair;hospital bed     Readmission within 30 days? Yes     Patient currently being followed by outpatient case management? No   CM referred today    Do you currently have service(s) that help you manage your care at home? Yes     Name and Contact number of agency [facility]     Is the pt/caregiver preference to resume services with current agency Yes     Do you take prescription medications? Yes     Do you have prescription coverage? Yes     Coverage medicaid     Do you have any problems affording any of your prescribed medications? No     Is the patient taking medications as prescribed? yes     Who is going to help you get home at discharge? Son-in-law or ambulance     How do you get to doctors appointments? family or friend will provide     Are you on dialysis? No     Do you take coumadin? No     Discharge Plan A Home Health     DME Needed Upon Discharge  none     Discharge Plan discussed with: Patient;Adult children     Discharge Barriers Identified None        Relationship/Environment    Name(s) of Who Lives With Patient Adenike Lemus and son-in-law                  Met with patient and daughter.  Patient lives with daughter and is dependent with ADL's.  Daughter is her help at home. She is current with [facility] and wishes to resume services at discharge.  Patient has [facility].  Family manages to get her to her specialist visits.  Patient is a DNR.    Updated white board with 's name and number. Transitional Care Folder, Discharge Planning Begins on Admission pamphlet, [facility] Bedside Delivery pamphlet, Advance Directive  information given to patient along with the contact information given.Instructed patient or family to call with any questions or concerns.

## 2022-05-04 NOTE — HOSPITAL COURSE
81 yo female with chronic systolic and diastolic HF LVEF 10% admitted with acute hypoxic resp failure 2/2 large right pleural effusion. Pulmonology consulted. Pt s/p thoracentesis 5/3- 1200 cc removed. Cytology pending. Patient more alert without respiratory distress on exam, currently on 2 liters O2 (family denies home O2). Transition IV furosemide to 40 mg daily (patient taking 20 mg furosemide three times a week PTA), wean oxygen as tolerated. Anticipate discharge in am.     5/5/2022, respiratory status stable, however she has increase bilateral lower extremely swelling. Will insert IV and give Lasix IV, possible discharge on tomorrow.      5/6- Lower ext edema again noted. Will continue IV lasix an additional day. O2 wean down to RA. Pt does not qualify for home O2. Pleural fluid is exudative by Light's criteria . Fluid gram stain, culture and cytology is pending.     5/7- Pt is more awake and alert today. Verbalize one or two words intermittently . Daughter at bedside feels her mother has improved since admission . Lower ext swelling appears improved. Remains on RA. Pleural fluid study still pending during this dictation. Pt is examined and deemed suitable for discharge to home with Home Health service and follow up with PCP and Cardiology .

## 2022-05-05 ENCOUNTER — DOCUMENT SCAN (OUTPATIENT)
Dept: HOME HEALTH SERVICES | Facility: HOSPITAL | Age: 82
End: 2022-05-05
Payer: MEDICARE

## 2022-05-05 ENCOUNTER — PATIENT MESSAGE (OUTPATIENT)
Dept: RESEARCH | Facility: HOSPITAL | Age: 82
End: 2022-05-05
Payer: MEDICARE

## 2022-05-05 LAB
ANION GAP SERPL CALC-SCNC: 13 MMOL/L (ref 8–16)
BASOPHILS # BLD AUTO: 0.04 K/UL (ref 0–0.2)
BASOPHILS NFR BLD: 0.4 % (ref 0–1.9)
BUN SERPL-MCNC: 34 MG/DL (ref 8–23)
CALCIUM SERPL-MCNC: 8.4 MG/DL (ref 8.7–10.5)
CHLORIDE SERPL-SCNC: 100 MMOL/L (ref 95–110)
CHOLEST FLD-MCNC: 51 MG/DL
CO2 SERPL-SCNC: 25 MMOL/L (ref 23–29)
CREAT SERPL-MCNC: 0.8 MG/DL (ref 0.5–1.4)
DIFFERENTIAL METHOD: ABNORMAL
EOSINOPHIL # BLD AUTO: 0.2 K/UL (ref 0–0.5)
EOSINOPHIL NFR BLD: 1.7 % (ref 0–8)
ERYTHROCYTE [DISTWIDTH] IN BLOOD BY AUTOMATED COUNT: 15.9 % (ref 11.5–14.5)
EST. GFR  (AFRICAN AMERICAN): >60 ML/MIN/1.73 M^2
EST. GFR  (NON AFRICAN AMERICAN): >60 ML/MIN/1.73 M^2
GLUCOSE SERPL-MCNC: 79 MG/DL (ref 70–110)
HCT VFR BLD AUTO: 39.2 % (ref 37–48.5)
HGB BLD-MCNC: 12 G/DL (ref 12–16)
IMM GRANULOCYTES # BLD AUTO: 0.05 K/UL (ref 0–0.04)
IMM GRANULOCYTES NFR BLD AUTO: 0.5 % (ref 0–0.5)
LYMPHOCYTES # BLD AUTO: 1 K/UL (ref 1–4.8)
LYMPHOCYTES NFR BLD: 11.1 % (ref 18–48)
MCH RBC QN AUTO: 29.3 PG (ref 27–31)
MCHC RBC AUTO-ENTMCNC: 30.6 G/DL (ref 32–36)
MCV RBC AUTO: 96 FL (ref 82–98)
MONOCYTES # BLD AUTO: 1.3 K/UL (ref 0.3–1)
MONOCYTES NFR BLD: 13.7 % (ref 4–15)
NEUTROPHILS # BLD AUTO: 6.8 K/UL (ref 1.8–7.7)
NEUTROPHILS NFR BLD: 72.6 % (ref 38–73)
NRBC BLD-RTO: 0 /100 WBC
PLATELET # BLD AUTO: 304 K/UL (ref 150–450)
PMV BLD AUTO: 10.4 FL (ref 9.2–12.9)
POCT GLUCOSE: 125 MG/DL (ref 70–110)
POTASSIUM SERPL-SCNC: 4 MMOL/L (ref 3.5–5.1)
RBC # BLD AUTO: 4.1 M/UL (ref 4–5.4)
SODIUM SERPL-SCNC: 138 MMOL/L (ref 136–145)
WBC # BLD AUTO: 9.33 K/UL (ref 3.9–12.7)

## 2022-05-05 PROCEDURE — 63600175 PHARM REV CODE 636 W HCPCS: Performed by: NURSE PRACTITIONER

## 2022-05-05 PROCEDURE — 25000003 PHARM REV CODE 250: Performed by: STUDENT IN AN ORGANIZED HEALTH CARE EDUCATION/TRAINING PROGRAM

## 2022-05-05 PROCEDURE — 92523 SPEECH SOUND LANG COMPREHEN: CPT

## 2022-05-05 PROCEDURE — 25000003 PHARM REV CODE 250: Performed by: NURSE PRACTITIONER

## 2022-05-05 PROCEDURE — 80048 BASIC METABOLIC PNL TOTAL CA: CPT | Performed by: STUDENT IN AN ORGANIZED HEALTH CARE EDUCATION/TRAINING PROGRAM

## 2022-05-05 PROCEDURE — 36415 COLL VENOUS BLD VENIPUNCTURE: CPT | Performed by: STUDENT IN AN ORGANIZED HEALTH CARE EDUCATION/TRAINING PROGRAM

## 2022-05-05 PROCEDURE — 85025 COMPLETE CBC W/AUTO DIFF WBC: CPT | Performed by: STUDENT IN AN ORGANIZED HEALTH CARE EDUCATION/TRAINING PROGRAM

## 2022-05-05 PROCEDURE — 21400001 HC TELEMETRY ROOM

## 2022-05-05 PROCEDURE — 92610 EVALUATE SWALLOWING FUNCTION: CPT

## 2022-05-05 RX ORDER — FUROSEMIDE 10 MG/ML
40 INJECTION INTRAMUSCULAR; INTRAVENOUS
Status: DISCONTINUED | OUTPATIENT
Start: 2022-05-05 | End: 2022-05-05

## 2022-05-05 RX ORDER — FUROSEMIDE 10 MG/ML
40 INJECTION INTRAMUSCULAR; INTRAVENOUS
Status: DISCONTINUED | OUTPATIENT
Start: 2022-05-05 | End: 2022-05-07 | Stop reason: HOSPADM

## 2022-05-05 RX ADMIN — FUROSEMIDE 40 MG: 40 INJECTION, SOLUTION INTRAMUSCULAR; INTRAVENOUS at 03:05

## 2022-05-05 RX ADMIN — FUROSEMIDE 40 MG: 40 TABLET ORAL at 09:05

## 2022-05-05 RX ADMIN — CARBIDOPA AND LEVODOPA 2 TABLET: 25; 100 TABLET ORAL at 03:05

## 2022-05-05 RX ADMIN — SACUBITRIL AND VALSARTAN 1 TABLET: 49; 51 TABLET, FILM COATED ORAL at 09:05

## 2022-05-05 RX ADMIN — CLOPIDOGREL 75 MG: 75 TABLET, FILM COATED ORAL at 09:05

## 2022-05-05 RX ADMIN — CARVEDILOL 6.25 MG: 6.25 TABLET, FILM COATED ORAL at 09:05

## 2022-05-05 RX ADMIN — CARBIDOPA AND LEVODOPA 2 TABLET: 25; 100 TABLET ORAL at 09:05

## 2022-05-05 NOTE — PLAN OF CARE
Problem: Infection  Goal: Absence of Infection Signs and Symptoms  Outcome: Ongoing, Progressing     Problem: Adult Inpatient Plan of Care  Goal: Plan of Care Review  Outcome: Ongoing, Progressing  Goal: Patient-Specific Goal (Individualized)  Outcome: Ongoing, Progressing  Goal: Absence of Hospital-Acquired Illness or Injury  Outcome: Ongoing, Progressing  Goal: Optimal Comfort and Wellbeing  Outcome: Ongoing, Progressing  Goal: Readiness for Transition of Care  Outcome: Ongoing, Progressing     Problem: Adjustment to Illness (Sepsis/Septic Shock)  Goal: Optimal Coping  Outcome: Ongoing, Progressing     Problem: Bleeding (Sepsis/Septic Shock)  Goal: Absence of Bleeding  Outcome: Ongoing, Progressing     Problem: Glycemic Control Impaired (Sepsis/Septic Shock)  Goal: Blood Glucose Level Within Desired Range  Outcome: Ongoing, Progressing     Problem: Infection Progression (Sepsis/Septic Shock)  Goal: Absence of Infection Signs and Symptoms  Outcome: Ongoing, Progressing     Problem: Nutrition Impaired (Sepsis/Septic Shock)  Goal: Optimal Nutrition Intake  Outcome: Ongoing, Progressing     Problem: Fluid and Electrolyte Imbalance (Acute Kidney Injury/Impairment)  Goal: Fluid and Electrolyte Balance  Outcome: Ongoing, Progressing     Problem: Oral Intake Inadequate (Acute Kidney Injury/Impairment)  Goal: Optimal Nutrition Intake  Outcome: Ongoing, Progressing     Problem: Renal Function Impairment (Acute Kidney Injury/Impairment)  Goal: Effective Renal Function  Outcome: Ongoing, Progressing     Problem: Impaired Wound Healing  Goal: Optimal Wound Healing  Outcome: Ongoing, Progressing     Problem: Skin Injury Risk Increased  Goal: Skin Health and Integrity  Outcome: Ongoing, Progressing     Problem: Fall Injury Risk  Goal: Absence of Fall and Fall-Related Injury  Outcome: Ongoing, Progressing

## 2022-05-05 NOTE — PROGRESS NOTES
[facility] Medicine  Progress Note    Patient Name: Tanya Madrid  MRN: [id number]  Patient Class: OP- Observation   Admission Date: 5/3/2022  Length of Stay: 0 days  Attending Physician: Dorene Kramer MD  Primary Care Provider: Elva Ansari NP        Subjective:     Principal Problem:Recurrent right pleural effusion        HPI:  82 y.o. female patient with a PMHx of CHF, HTN, stroke, cardiac arrest on 3/10/22 who presents to the Emergency Department for SOB, onset last night. Pt is not on home O2. Symptoms are constant and moderate in severity. No mitigating or exacerbating factors reported. Associated sxs include productive cough and intermittent apnea. Daughter states that the pt does not walk, and that her speech has not returned to normal since her cardiac arrest in March. Pt had a breathing treatment at home last night. Per daughter, pt is on Lasix 20 mg PRN      Overview/Hospital Course:  83 yo female with chronic systolic and diastolic HF LVEF 10% admitted with acute hypoxic resp failure 2/2 large right pleural effusion. Pulmonology consulted. Pt s/p thoracentesis 5/3- 1200 cc removed. Cytology pending. Patient more alert without respiratory distress on exam, currently on 2 liters O2 (family denies home O2). Transition IV furosemide to 40 mg daily (patient taking 20 mg furosemide three times a week PTA), wean oxygen as tolerated. Anticipate discharge in am. As of 5/5/2022, respiratory status stable, however she has increase bilateral lower extremely swelling. Will insert IV and give Lasix IV, possible discharge on tomorrow.       Interval History: Pt with increase bilateral leg swelling. Will give Lasix IV BID and assess response.     Review of Systems   Unable to perform ROS: Other (difficult speaking)   Objective:     Vital Signs (Most Recent):  Temp: 97.1 °F (36.2 °C) (05/05/22 1112)  Pulse: 71 (05/05/22 1112)  Resp: 18 (05/05/22 1112)  BP: 110/63 (05/05/22  1112)  SpO2: 100 % (22 1200) Vital Signs (24h Range):  Temp:  [97.1 °F (36.2 °C)-97.8 °F (36.6 °C)] 97.1 °F (36.2 °C)  Pulse:  [51-75] 71  Resp:  [16-18] 18  BP: (100-170)/(55-96) 110/63     Weight: 56.4 kg (124 lb 5.4 oz)  Body mass index is 22.74 kg/m².    Intake/Output Summary (Last 24 hours) at 2022 1408  Last data filed at 2022 0500  Gross per 24 hour   Intake --   Output 1200 ml   Net -1200 ml      Physical Exam  Constitutional:       Appearance: She is ill-appearing.   HENT:      Head: Normocephalic and atraumatic.   Eyes:      General:         Right eye: No discharge.         Left eye: No discharge.   Pulmonary:      Effort: No respiratory distress.      Breath sounds: No stridor. Examination of the right-lower field reveals decreased breath sounds. Examination of the left-lower field reveals decreased breath sounds. Decreased breath sounds present. No wheezing, rhonchi or rales.   Chest:      Chest wall: No tenderness.   Abdominal:      General: There is no distension.      Palpations: There is no mass.      Tenderness: There is no abdominal tenderness. There is no right CVA tenderness, left CVA tenderness, guarding or rebound.      Hernia: No hernia is present.   Musculoskeletal:         General: No swelling, tenderness, deformity or signs of injury.      Right lower le+ Edema present.      Left lower le+ Edema present.      Comments: Decrease movement left upper extremely    Skin:     Coloration: Skin is not jaundiced or pale.      Findings: No bruising, erythema, lesion or rash.   Neurological:      Cranial Nerves: No cranial nerve deficit.      Sensory: No sensory deficit.      Motor: No weakness.      Coordination: Coordination normal.      Gait: Gait normal.      Deep Tendon Reflexes: Reflexes normal.       Significant Labs: All pertinent labs within the past 24 hours have been reviewed.  BMP:   Recent Labs   Lab 22  0639   GLU 79      K 4.0      CO2 25   BUN  34*   CREATININE 0.8   CALCIUM 8.4*     CBC:   Recent Labs   Lab 05/04/22  0644 05/05/22  0639   WBC 9.84 9.33   HGB 10.7* 12.0   HCT 33.8* 39.2    304       Significant Imaging:     Imaging Results              X-Ray Chest AP Portable (Final result)  Result time 05/03/22 07:14:30      Final result by Davie Ramachandran MD (05/03/22 07:14:30)                   Impression:      Compared to 04/22/2022, interval enlargement of the right pleural effusion, now large volume. Likely small left pleural effusion present. No pneumothorax. Pulmonary vascular congestion with mild interstitial edema. Unchanged cardiomegaly. Lung volumes are slightly low.      Electronically signed by: Davie Ramachandran  Date:    05/03/2022  Time:    07:14               Narrative:    EXAMINATION:  XR CHEST AP PORTABLE    CLINICAL HISTORY:  shortness of breath;.    TECHNIQUE:  Single frontal portable view of the chest was performed.    COMPARISON:  04/22/2022    FINDINGS:  Support devices: None    Compared to 04/22/2022, interval enlargement of the right pleural effusion, now large volume.  Likely small left pleural effusion present.  No pneumothorax.  Pulmonary vascular congestion with mild interstitial edema.  Unchanged cardiomegaly.  Lung volumes are slightly low.                                         Assessment/Plan:      * Recurrent right pleural effusion  IV lasix, supplemental O2  Consult pulm for thoracentesis    5/4   S/p thoracentesis   F/u cytology outpatient   No resp distress on exam   Wean supplemental O2 as tolerated    5/5/2022  S/P thoracentesis   Supplemental O 2 as needed   Lasix IV BID         Urinary tract infection without hematuria  Recently completed Abx course for UTI  Currently stable, not concerned for active infection  Chronic Abreu catheter draining clear yellow urine      Chronic combined systolic and diastolic heart failure  Continue home med Entresto, Coreg  IV lasix BID for pleural effusion    3/11/22: EF  10%    5/4   Transition to po lasix   Consider increasing home dose of furosemide at discharge     5/5  Pt has increase bilateral lower extremely swelling   Start Lasix IV an assises response   Strict I & O   Daily weights   Fluid restriction         History of CVA (cerebrovascular accident)  Continue Plavix      Parkinson disease  Continue home meds  PT/OT to eval and treat        VTE Risk Mitigation (From admission, onward)         Ordered     IP VTE HIGH RISK PATIENT  Once         05/03/22 0940     Place sequential compression device  Until discontinued         05/03/22 0940                Discharge Planning   PARMINDER: 5/5/2022     Code Status: DNR   Is the patient medically ready for discharge?:     Reason for patient still in hospital (select all that apply): Patient trending condition and Treatment  Discharge Plan A: Home Health                  Esteban Coronado NP  Department of Hospital Medicine   [facility] - Telemetry ([facility])

## 2022-05-05 NOTE — NURSING
Patient resting in bed comfortably. Family at bedside. VSS. NAD. Telemetry monitor in place. 750 ml of tube feeding boluses given throughout shift. Flushes given as ordered. Patient tolerated well. Bed in low, locked position with bed alarm set.

## 2022-05-05 NOTE — ASSESSMENT & PLAN NOTE
Continue home med Entresto, Coreg  IV lasix BID for pleural effusion    3/11/22: EF 10%    5/4   Transition to po lasix   Consider increasing home dose of furosemide at discharge     5/5  Pt has increase bilateral lower extremely swelling   Start Lasix IV an assises response   Strict I & O   Daily weights   Fluid restriction

## 2022-05-05 NOTE — PT/OT/SLP EVAL
Speech Language Pathology Evaluation  Cognitive/Bedside Swallow    Patient Name:  Tanya Madrid   MRN:  [id number]  Admitting Diagnosis: Recurrent right pleural effusion    Recommendations:                  General Recommendations:  Dysphagia therapy  Diet recommendations:  NPO, NPO (ice chips sparingly with adequate oral care)   Aspiration Precautions: Frequent oral care, Ice chips sparingly and Strict aspiration precautions   General Precautions: Standard, NPO  Communication strategies:  yes/no questions only and provide increased time to answer    History:     Past Medical History:   Diagnosis Date    Acute CHF (congestive heart failure) 1/17/2021    Hyperlipemia     Hypertension     Parkinson's disease     Stroke 2016    Throat mass        Past Surgical History:   Procedure Laterality Date    CLOSED REDUCTION Right 10/15/2020    Procedure: CLOSED REDUCTION;  Surgeon: Humberto Valdivia DO;  Location: [facility];  Service: Orthopedics;  Laterality: Right;  ATTEMPTED    EVACUATION OF HEMATOMA Right 10/17/2020    Procedure: EVACUATION, HEMATOMA;  Surgeon: Humberto Valdivia DO;  Location: [facility];  Service: Orthopedics;  Laterality: Right;    HEMIARTHROPLASTY OF HIP Left 7/12/2020    Procedure: HEMIARTHROPLASTY, HIP;  Surgeon: Humberto Valdivia DO;  Location: [facility];  Service: Orthopedics;  Laterality: Left;    HEMIARTHROPLASTY OF HIP Right 10/2/2020    Procedure: HEMIARTHROPLASTY, HIP;  Surgeon: Loyd Kearney MD;  Location: [facility];  Service: Orthopedics;  Laterality: Right;    HYSTERECTOMY      INSERTION OF PERCUTANEOUS ENDOSCOPIC GASTROSTOMY (PEG) FEEDING TUBE      OPEN REDUCTION OF DISLOCATION OF HIP Right 10/17/2020    Procedure: OPEN REDUCTION, DISLOCATION, HIP;  Surgeon: Humberto Valdivia DO;  Location: [facility];  Service: Orthopedics;  Laterality: Right;    THROAT SURGERY      TONSILLECTOMY         Social History: Patient lives at home with daughter--requires assist/dependent with ADL's  "and receives  ST, OT, PT & Nursing following recent hospital d/c.    Prior diet: Pt Npo with PEG tube.    Subjective     Pt seen bedside for ST evaluation.  No c/o pain.  Flat affect with limited initiation of communication , although a few rote greetings/spontaneous utterances produced ("hi, bye, thank you very much") during session.  Pt daughter present.   Patient goals: To eat/drink, improve swallow.    Pain/Comfort:  · Pain Rating 1: 0/10  · Pain Rating Post-Intervention 1: 0/10  · Pain Rating 2: 0/10  · Pain Rating Post-Intervention 2: 0/10    Respiratory Status: nasal cannula    Objective:     Cognitive Status:    Pt awake/alert. Limited assessment due to NR to tasks at times.      Receptive Language:   Comprehension:   Functional with BASIC/SIMPLE commands during OM assessment.  Inconsistent with simple/personal/basic y/n responses.  NR to tasks at times, requiring repetition.    Pragmatics:    flat affect    Expressive Language:  Verbal:    produced spontaneous rote phrases, such as greetings.      Motor Speech:  Intelligible; however, few utterances verbalized.    Voice:   hoarse/breathy vocal quality with reduced vocal intensity.  whispered speech.    Oral Musculature Evaluation  · Oral Musculature: general weakness  · Dentition: present and adequate  · Secretion Management: oral holding (min. oral holding of saliva.  no anterior spillage or drooling present.)  · Mucosal Quality: good  · Mandibular Strength and Mobility: impaired  · Oral Labial Strength and Mobility: impaired retraction, impaired pursing, impaired seal, impaired coordination (apraxia noted)  · Lingual Strength and Mobility: impaired strength, impaired protrusion, impaired left lateral movement, impaired right lateral movement, impaired depression  · Velar Elevation: WFL  · Buccal Strength and Mobility: WFL  · Volitional Cough: not present  · Volitional Swallow: present  · Voice Prior to PO Intake: breathy/hoarse quality with low vocal " "intensity.  "Whispered" speech during intermittent production of spontaneous utterances/rote phrases.    Bedside Swallow Eval:   Consistencies Assessed:  · Oral stimulation completed with iced toothette to elicit swallow responses, as well as ice chip/tsp trials x2.    Oral Phase:   · Decreased closure around utensil  · Slow oral transit time   · Min oral holding of melted ice chip/water post-deglutition    Pharyngeal Phase:   · decreased hyolaryngeal excursion to palpation  · delayed swallow initation   · Inconsistent swallow responses    Compensatory Strategies  · None    Treatment: Pt recommended to remain NPO with PEG tube placement and ongoing dysphagia tx with goal to consume least restrictive PO diet/pleasure feeds if able.  Potential for MBSS IP vs. OP, if clinically indicated.    Assessment:     Tanya Madrid is a 82 y.o. female with an SLP diagnosis of Dysphagia and Cognitive-Linguistic Impairment.  She presents with high aspiration risk/potential, requiring NPO status/peg tube placement following recent/prolonged hospitalization and is recommended for continued SLP intervention to address dysphagia.      Goals:   Multidisciplinary Problems     SLP Goals        Problem: SLP    Goal Priority Disciplines Outcome   SLP Goal     SLP    Description: 1.  Pt will consume least restrictive PO diet/pleasure feeds without s/s of dysphagia.  2.  Oral stimulation/ice chip trials (following oral care) with 70% laryngeal excursions and improved timeliness of swallow.  3.  Dysphagia, OM/strengthening exercises x10 with mod. assist                   Plan:     · Patient to be seen:  2 x/week   · Plan of Care expires:  05/12/22  · Plan of Care reviewed with:  patient, daughter   · SLP Follow-Up:  Yes       Discharge recommendations:  Discharge Facility/Level of Care Needs: home health speech therapy   Barriers to Discharge:  None    Time Tracking:     SLP Treatment Date:   05/05/22  Speech Start Time:  1145  Speech " Stop Time:  1215     Speech Total Time (min):  30 min    Billable Minutes: Eval 15 minutes  and Eval Swallow and Oral Function 15 minutes    05/05/2022

## 2022-05-05 NOTE — PLAN OF CARE
Patient AA to self VSS. Patient remained afebrile throughout shift. Patient remained free of falls this shift Patient denies pain this shift. Plan of care reviewed with family at bedside and verbalized understanding. Patient moving/turning with maximum assist. Frequent weight shifting encouraged. Patient SB on monitor Bed low, side rails up x2, wheels locked, call light in reach. Bed alarm maintained for safety. Patient instructed to call for assistance. Hourly rounding completed. Will continue to monitor.

## 2022-05-05 NOTE — CONSULTS
[facility] - Telemetry ([facility])  Adult Nutrition  Consult Note    SUMMARY     Recommendations     1. Continue enteral nutrition as prescribed, as tolerated  Isosource 1.5, 4-5 cartons daily, bolus via PEG    - 800-1000mL H2O flush daily per MD/NP    - Provides 0234-2711 calories, 68-85g protein, 176-220g CHO, and 764-955ml H20 + FWF    Or, if diarrhea persists, consider a peptide based formula such as Peptamen 1.5 with Prebio, 4-5 cartons daily, bolus via PEG    - 800-1000mL H2O flush daily per MD/NP    - Provides 1006-9899 calories, 68-85g protein, 192-240g CHO, and 772-965ml H20 + FWF     2. Consider Nutrition Supplements with free water flushes for optimal calorie and protein intake for optimal wound healing    -   Theodore twice daily to provide an additional 190 kcal, 5g pro(collagen), & 17g CHO (with L-glutamine, L-arganine, Zn, Vit C, E, & B12)   Or   - Arginaid twice daily to provide an additional 50kcal and 9g L-arginine daily     3. RD to follow up to monitor intake, tolerance, and labs.   *Please send consult if acute nutrition needs arise.    Goals:    1. Pt will tolerate >75% estimated energy and protein needs by RD follow up.    Nutrition Goal Status: new   Communication of RD recs: POC, sticky note and secure chat    Assessment and Plan  Nutrition Problem    Inadequate energy and protein intake  Related to (etiology):      Inability to consume adequate energy    Medical condition  Signs and Symptoms (as evidenced by):     NPO with no alternate source of nutrition at this time   Interventions:    Enteral nutrition    Collaboration with other care providers  Nutrition Diagnosis Status:   New     Reason for Assessment  Reason For Assessment: consult and MST  Diagnosis: Recurrent right pleural effusion   Relevant Medical History: parkinsons, CVA (2019), CHF, GERD, HTN, HLD, dysphagia, dysphasia, CKD, debility, AMS    General information comments:   05/05/2022: Pt seen by this service at previous admit, will  "resume EN via PEG with formula similar to home regimen. Weights fluctuate x 3 months, 109-140, most likely d/t fluid, today's weight is 124 lbs. Diagnosed with malnutrition at previous admit, pt appearance (muscle and fat) now much improved. Daughter reports bowel movements are often soft and creamy. Will continue to monitor.     Nutrition Discharge Planning: EN via PEG, Isosource 1.5, 4-5 cartons daily  pending medical course    Nutrition Risk Screen  Have you recently lost weight without trying?: Unsure  Have you been eating poorly because of a decreased appetite?: Yes  Nutrition Risk Screen: dysphagia or difficulty swallowing, tube feeding or parenteral nutrition    Physical Findings/Malnutrition Assessment  Patient does not meet at least 2 ASPEN criteria for malnutrition at this time.   Will continue to monitor.    N/V:   not indicated   Wounds: noted; wound care following  Edema:   not indicated   Last Bowel Movement: 05/04/22 Stool Consistency: creamy GI Signs/Symptoms: (S)  (PEG tube )  Mouth/Teeth WDL: WDL except, teeth Teeth Symptoms: tooth/teeth missing  O2 Device (Oxygen Therapy): room air                                           Carlos Score: 11  NFPE not performed, pt appears well nourished    Nutrition/Diet History     Typical Food/Fluid Intake: 800-1000 ml via FWF daily     Food Allergies: NKFA  Factors Affecting Nutritional Intake: impaired cognitive status/motor control, chewing difficulties/inability to chew food, diarrhea, difficulty/impaired swallowing, inability to feed self, NPO     Spiritual, Cultural Beliefs, Latter-day Practices, Values that Affect Care: no    Anthropometrics  Temp: 97.1 °F (36.2 °C)  Height Method: Stated  Height: 5' 2" (157.5 cm)  Height (inches): 62 in  Weight Method: Bed Scale  Weight: 56.4 kg (124 lb 5.4 oz)  Weight (lb): 124.34 lb  Ideal Body Weight (IBW), Female: 110 lb  BMI (Calculated): 22.7        Labs  Pertinent Labs: reviewed  Lab Results   Component Value Date "    HGB 12.0 05/05/2022    HCT 39.2 05/05/2022     05/05/2022    CALCIUM 8.4 (L) 05/05/2022    K 4.0 05/05/2022    PHOS 3.4 04/01/2022    BUN 34 (H) 05/05/2022    CREATININE 0.8 05/05/2022    ESTGFRAFRICA >60 05/05/2022    EGFRNONAA >60 05/05/2022    ALBUMIN 3.1 (L) 05/03/2022     Lab Results   Component Value Date    ALT 15 05/03/2022    AST 60 (H) 05/03/2022    ALKPHOS 174 (H) 05/03/2022    BILITOT 0.7 05/03/2022     Meds  Pertinent Medications: reviewed    carbidopa-levodopa  mg  2 tablet Per G Tube TID    carvediloL  6.25 mg Oral BID    clopidogreL  75 mg Oral Daily    furosemide (LASIX) injection  40 mg Intravenous Q12H    pantoprazole  40 mg Oral Daily    sacubitriL-valsartan  1 tablet Oral BID    traZODone  25 mg Oral QHS     Continuous Infusions:  PRN Meds:acetaminophen, glucagon (human recombinant), glucose, glucose, naloxone, ondansetron, sodium chloride 0.9%, sodium chloride 0.9%     Estimated/Assessed Needs  Weight Used For Calorie Calculations: 56.4 kg (124 lb 5.4 oz)  Energy Calorie Requirements (kcal): 5088-6694 (25-30)  Energy Need Method: Kcal/kg  Weight Used For Protein Calculations: 58.2 kg (128 lb 4.9 oz)  Protein Requirements: 58-70 (1-1.2)  RDA Method (mL): 1455ml fluid or per MD/NP  CHO Requirement: 181-218g (50% CHO)    Nutrition Prescription Ordered  Current Diet Order: NPO                   Evaluation of Received Nutrients  Energy Calories Required: not meeting needs  Protein Required: not meeting needs  Tolerance: n/a, pt is NPO  % Intake of Estimated Energy Needs: NPO  % Meal Intake: NPO    Monitor and Evaluation  Food and Nutrient Intake: enteral nutrition intake  Food and Nutrient Administration: enteral nutrition administration  Anthropometric Measurements: weight, weight change, body mass index  Biochemical Data, Medical Tests and Procedures: electrolyte and renal panel, lipid profile, gastrointestinal profile, glucose/endocrine profile, Inflammatory  profile  Nutrition-Focused Physical Findings: overall appearance     Nutrition Follow-Up  Level of Risk/Frequency of Follow-up: low - moderate  Frequency of follow-up: Twice weekly   Jes Carroll MS, RD, LDN

## 2022-05-05 NOTE — PLAN OF CARE
RD Recommendations     1. Continue enteral nutrition as prescribed, as tolerated  Isosource 1.5, 4-5 cartons daily, bolus via PEG    - 800-1000mL H2O flush daily per MD/NP    - Provides 4005-2960 calories, 68-85g protein, 176-220g CHO, and 764-955ml H20 + FWF    Or, if diarrhea persists, consider a peptide based formula such as Peptamen 1.5 with Prebio, 4-5 cartons daily, bolus via PEG    - 800-1000mL H2O flush daily per MD/NP    - Provides 4528-6117 calories, 68-85g protein, 192-240g CHO, and 772-965ml H20 + FWF     2. Consider Nutrition Supplements with free water flushes for optimal calorie and protein intake for optimal wound healing    -   Theodore twice daily to provide an additional 190 kcal, 5g pro(collagen), & 17g CHO (with L-glutamine, L-arganine, Zn, Vit C, E, & B12)   Or   - Arginaid twice daily to provide an additional 50kcal and 9g L-arginine daily     3. RD to follow up to monitor intake, tolerance, and labs.   *Please send consult if acute nutrition needs arise.      Jes Carroll, MS, RD, LDN

## 2022-05-05 NOTE — SUBJECTIVE & OBJECTIVE
Interval History: Pt with increase bilateral leg swelling. Will give Lasix IV BID and assess response.     Review of Systems   Unable to perform ROS: Other (difficult speaking)   Objective:     Vital Signs (Most Recent):  Temp: 97.1 °F (36.2 °C) (22 1112)  Pulse: 71 (22 1112)  Resp: 18 (22 1112)  BP: 110/63 (22 1112)  SpO2: 100 % (22 1200) Vital Signs (24h Range):  Temp:  [97.1 °F (36.2 °C)-97.8 °F (36.6 °C)] 97.1 °F (36.2 °C)  Pulse:  [51-75] 71  Resp:  [16-18] 18  BP: (100-170)/(55-96) 110/63     Weight: 56.4 kg (124 lb 5.4 oz)  Body mass index is 22.74 kg/m².    Intake/Output Summary (Last 24 hours) at 2022 1408  Last data filed at 2022 0500  Gross per 24 hour   Intake --   Output 1200 ml   Net -1200 ml      Physical Exam  Constitutional:       Appearance: She is ill-appearing.   HENT:      Head: Normocephalic and atraumatic.   Eyes:      General:         Right eye: No discharge.         Left eye: No discharge.   Pulmonary:      Effort: No respiratory distress.      Breath sounds: No stridor. Examination of the right-lower field reveals decreased breath sounds. Examination of the left-lower field reveals decreased breath sounds. Decreased breath sounds present. No wheezing, rhonchi or rales.   Chest:      Chest wall: No tenderness.   Abdominal:      General: There is no distension.      Palpations: There is no mass.      Tenderness: There is no abdominal tenderness. There is no right CVA tenderness, left CVA tenderness, guarding or rebound.      Hernia: No hernia is present.   Musculoskeletal:         General: No swelling, tenderness, deformity or signs of injury.      Right lower le+ Edema present.      Left lower le+ Edema present.      Comments: Decrease movement left upper extremely    Skin:     Coloration: Skin is not jaundiced or pale.      Findings: No bruising, erythema, lesion or rash.   Neurological:      Cranial Nerves: No cranial nerve deficit.       Sensory: No sensory deficit.      Motor: No weakness.      Coordination: Coordination normal.      Gait: Gait normal.      Deep Tendon Reflexes: Reflexes normal.       Significant Labs: All pertinent labs within the past 24 hours have been reviewed.  BMP:   Recent Labs   Lab 05/05/22  0639   GLU 79      K 4.0      CO2 25   BUN 34*   CREATININE 0.8   CALCIUM 8.4*     CBC:   Recent Labs   Lab 05/04/22  0644 05/05/22  0639   WBC 9.84 9.33   HGB 10.7* 12.0   HCT 33.8* 39.2    304       Significant Imaging:     Imaging Results              X-Ray Chest AP Portable (Final result)  Result time 05/03/22 07:14:30      Final result by Davie Ramachandran MD (05/03/22 07:14:30)                   Impression:      Compared to 04/22/2022, interval enlargement of the right pleural effusion, now large volume. Likely small left pleural effusion present. No pneumothorax. Pulmonary vascular congestion with mild interstitial edema. Unchanged cardiomegaly. Lung volumes are slightly low.      Electronically signed by: Davie Ramachandran  Date:    05/03/2022  Time:    07:14               Narrative:    EXAMINATION:  XR CHEST AP PORTABLE    CLINICAL HISTORY:  shortness of breath;.    TECHNIQUE:  Single frontal portable view of the chest was performed.    COMPARISON:  04/22/2022    FINDINGS:  Support devices: None    Compared to 04/22/2022, interval enlargement of the right pleural effusion, now large volume.  Likely small left pleural effusion present.  No pneumothorax.  Pulmonary vascular congestion with mild interstitial edema.  Unchanged cardiomegaly.  Lung volumes are slightly low.

## 2022-05-05 NOTE — ASSESSMENT & PLAN NOTE
IV lasix, supplemental O2  Consult pulm for thoracentesis    5/4   S/p thoracentesis   F/u cytology outpatient   No resp distress on exam   Wean supplemental O2 as tolerated    5/5/2022  S/P thoracentesis   Supplemental O 2 as needed   Lasix IV BID

## 2022-05-06 PROBLEM — I50.43 ACUTE ON CHRONIC COMBINED SYSTOLIC AND DIASTOLIC HEART FAILURE: Status: ACTIVE | Noted: 2022-03-31

## 2022-05-06 LAB
ANION GAP SERPL CALC-SCNC: 11 MMOL/L (ref 8–16)
BASOPHILS # BLD AUTO: 0.02 K/UL (ref 0–0.2)
BASOPHILS NFR BLD: 0.2 % (ref 0–1.9)
BUN SERPL-MCNC: 32 MG/DL (ref 8–23)
CALCIUM SERPL-MCNC: 8.3 MG/DL (ref 8.7–10.5)
CHLORIDE SERPL-SCNC: 98 MMOL/L (ref 95–110)
CO2 SERPL-SCNC: 32 MMOL/L (ref 23–29)
CREAT SERPL-MCNC: 0.7 MG/DL (ref 0.5–1.4)
DIFFERENTIAL METHOD: ABNORMAL
EOSINOPHIL # BLD AUTO: 0.1 K/UL (ref 0–0.5)
EOSINOPHIL NFR BLD: 1.4 % (ref 0–8)
ERYTHROCYTE [DISTWIDTH] IN BLOOD BY AUTOMATED COUNT: 15.6 % (ref 11.5–14.5)
EST. GFR  (AFRICAN AMERICAN): >60 ML/MIN/1.73 M^2
EST. GFR  (NON AFRICAN AMERICAN): >60 ML/MIN/1.73 M^2
GLUCOSE SERPL-MCNC: 105 MG/DL (ref 70–110)
HCT VFR BLD AUTO: 32.9 % (ref 37–48.5)
HGB BLD-MCNC: 9.9 G/DL (ref 12–16)
IMM GRANULOCYTES # BLD AUTO: 0.05 K/UL (ref 0–0.04)
IMM GRANULOCYTES NFR BLD AUTO: 0.5 % (ref 0–0.5)
LYMPHOCYTES # BLD AUTO: 0.8 K/UL (ref 1–4.8)
LYMPHOCYTES NFR BLD: 8.1 % (ref 18–48)
MCH RBC QN AUTO: 28.4 PG (ref 27–31)
MCHC RBC AUTO-ENTMCNC: 30.1 G/DL (ref 32–36)
MCV RBC AUTO: 94 FL (ref 82–98)
MONOCYTES # BLD AUTO: 1 K/UL (ref 0.3–1)
MONOCYTES NFR BLD: 9.6 % (ref 4–15)
NEUTROPHILS # BLD AUTO: 8 K/UL (ref 1.8–7.7)
NEUTROPHILS NFR BLD: 80.2 % (ref 38–73)
NRBC BLD-RTO: 0 /100 WBC
PLATELET # BLD AUTO: 343 K/UL (ref 150–450)
PMV BLD AUTO: 10.8 FL (ref 9.2–12.9)
POTASSIUM SERPL-SCNC: 3.9 MMOL/L (ref 3.5–5.1)
RBC # BLD AUTO: 3.49 M/UL (ref 4–5.4)
SODIUM SERPL-SCNC: 141 MMOL/L (ref 136–145)
WBC # BLD AUTO: 9.97 K/UL (ref 3.9–12.7)

## 2022-05-06 PROCEDURE — 25000003 PHARM REV CODE 250: Performed by: STUDENT IN AN ORGANIZED HEALTH CARE EDUCATION/TRAINING PROGRAM

## 2022-05-06 PROCEDURE — 63600175 PHARM REV CODE 636 W HCPCS: Performed by: NURSE PRACTITIONER

## 2022-05-06 PROCEDURE — 21400001 HC TELEMETRY ROOM

## 2022-05-06 PROCEDURE — 85025 COMPLETE CBC W/AUTO DIFF WBC: CPT | Performed by: STUDENT IN AN ORGANIZED HEALTH CARE EDUCATION/TRAINING PROGRAM

## 2022-05-06 PROCEDURE — 27000221 HC OXYGEN, UP TO 24 HOURS

## 2022-05-06 PROCEDURE — 80048 BASIC METABOLIC PNL TOTAL CA: CPT | Performed by: STUDENT IN AN ORGANIZED HEALTH CARE EDUCATION/TRAINING PROGRAM

## 2022-05-06 PROCEDURE — 99900035 HC TECH TIME PER 15 MIN (STAT)

## 2022-05-06 PROCEDURE — 25000003 PHARM REV CODE 250: Performed by: INTERNAL MEDICINE

## 2022-05-06 RX ORDER — ACETAMINOPHEN 650 MG/20.3ML
650 LIQUID ORAL EVERY 8 HOURS PRN
Status: DISCONTINUED | OUTPATIENT
Start: 2022-05-06 | End: 2022-05-07 | Stop reason: HOSPADM

## 2022-05-06 RX ORDER — CARVEDILOL 6.25 MG/1
6.25 TABLET ORAL 2 TIMES DAILY
Status: DISCONTINUED | OUTPATIENT
Start: 2022-05-06 | End: 2022-05-07 | Stop reason: HOSPADM

## 2022-05-06 RX ORDER — PANTOPRAZOLE SODIUM 40 MG/1
40 FOR SUSPENSION ORAL DAILY
Status: DISCONTINUED | OUTPATIENT
Start: 2022-05-06 | End: 2022-05-07 | Stop reason: HOSPADM

## 2022-05-06 RX ORDER — CLOPIDOGREL BISULFATE 75 MG/1
75 TABLET ORAL DAILY
Status: DISCONTINUED | OUTPATIENT
Start: 2022-05-07 | End: 2022-05-07 | Stop reason: HOSPADM

## 2022-05-06 RX ORDER — IBUPROFEN 200 MG
16 TABLET ORAL
Status: DISCONTINUED | OUTPATIENT
Start: 2022-05-06 | End: 2022-05-07 | Stop reason: HOSPADM

## 2022-05-06 RX ORDER — SCOLOPAMINE TRANSDERMAL SYSTEM 1 MG/1
1 PATCH, EXTENDED RELEASE TRANSDERMAL
Status: DISCONTINUED | OUTPATIENT
Start: 2022-05-06 | End: 2022-05-07 | Stop reason: HOSPADM

## 2022-05-06 RX ORDER — MELATONIN 1 MG/ML
6 LIQUID (ML) ORAL NIGHTLY
Status: DISCONTINUED | OUTPATIENT
Start: 2022-05-06 | End: 2022-05-07 | Stop reason: HOSPADM

## 2022-05-06 RX ORDER — IBUPROFEN 200 MG
24 TABLET ORAL
Status: DISCONTINUED | OUTPATIENT
Start: 2022-05-06 | End: 2022-05-07 | Stop reason: HOSPADM

## 2022-05-06 RX ORDER — MUPIROCIN 20 MG/G
OINTMENT TOPICAL 2 TIMES DAILY
Status: DISCONTINUED | OUTPATIENT
Start: 2022-05-06 | End: 2022-05-07 | Stop reason: HOSPADM

## 2022-05-06 RX ADMIN — Medication 6 MG: at 09:05

## 2022-05-06 RX ADMIN — CLOPIDOGREL 75 MG: 75 TABLET, FILM COATED ORAL at 09:05

## 2022-05-06 RX ADMIN — SCOPOLAMINE 1 PATCH: 1.5 PATCH, EXTENDED RELEASE TRANSDERMAL at 01:05

## 2022-05-06 RX ADMIN — CARVEDILOL 6.25 MG: 6.25 TABLET, FILM COATED ORAL at 09:05

## 2022-05-06 RX ADMIN — FUROSEMIDE 40 MG: 40 INJECTION, SOLUTION INTRAMUSCULAR; INTRAVENOUS at 02:05

## 2022-05-06 RX ADMIN — SACUBITRIL AND VALSARTAN 1 TABLET: 49; 51 TABLET, FILM COATED ORAL at 09:05

## 2022-05-06 RX ADMIN — MUPIROCIN: 20 OINTMENT TOPICAL at 09:05

## 2022-05-06 RX ADMIN — PANTOPRAZOLE SODIUM 40 MG: 40 GRANULE, DELAYED RELEASE ORAL at 01:05

## 2022-05-06 RX ADMIN — MUPIROCIN: 20 OINTMENT TOPICAL at 01:05

## 2022-05-06 RX ADMIN — FUROSEMIDE 40 MG: 40 INJECTION, SOLUTION INTRAMUSCULAR; INTRAVENOUS at 03:05

## 2022-05-06 NOTE — PROGRESS NOTES
Home Oxygen Evaluation    Date Performed: 5/6/2022    1) Patient's Home O2 Sat on room air, while at rest: 100        If O2 sats on room air at rest are 88% or below, patient qualifies. No additional testing needed. Document N/A in steps 2 and 3. If 89% or above, complete steps 2.      2) Patient's O2 Sat on room air while exercising:         If O2 sats on room air while exercising remain 89% or above patient does not qualify, no further testing needed Document N/A in step 3. If O2 sats on room air while exercising are 88% or below, continue to step 3.      3) Patient's O2 Sat while exercising on O2:  at  LPM         (Must show improvement from #2 for patients to qualify)    If O2 sats improve on oxygen, patient qualifies for portable oxygen. If not, the patient does not qualify.

## 2022-05-06 NOTE — PLAN OF CARE
[facility] - Telemetry (Hospital)  Discharge Reassessment    Primary Care Provider: Elva Ansari NP    Expected Discharge Date: 5/5/2022    Reassessment (most recent)     Discharge Reassessment - 05/06/22 0903        Discharge Reassessment    Assessment Type Discharge Planning Reassessment     Did the patient's condition or plan change since previous assessment? Yes     Discharge Plan discussed with: Adult children     Communicated PARMINDER with patient/caregiver Yes     Discharge Plan A Home Health;Home with family     DME Needed Upon Discharge  none     Discharge Barriers Identified None     Why the patient remains in the hospital Requires continued medical care        Post-Acute Status    Post-Acute Authorization Home Health     Home Health Status Set-up Complete/Auth obtained   [facility] - will need orders at discharge.    Discharge Delays None known at this time               Patient is current with [facility].  Will need new orders at discharge.

## 2022-05-06 NOTE — NURSING
pts daughter refused sinemet dose this AM. She stated that she thinks the medication could be the reason for her strange behavior last night and that episodes like that have happened at home when sinemet is given. MD notified.

## 2022-05-06 NOTE — PLAN OF CARE
Patient remained free from falls throughout shift, call bell within reach. STAT head CT done in beginning of shift due to family reporting patient not at baseline. Able to follow some commands, incoherent speech, eyes keep rolling from side to side, unable to follow an object. Bolus feeds through PEG continued with water flushes. IV lasix BID. Chronic henley in place. Turn q2h. Wound care done to L buttocks. Vitals stable, will continue to monitor.

## 2022-05-06 NOTE — PROGRESS NOTES
[facility]  Progress Note    Patient Name: Tanya Madrid  MRN: [id number]  Patient Class: IP- Inpatient   Admission Date: 5/3/2022  Length of Stay: 1 days  Attending Physician: Tim Reilly MD  Primary Care Provider: Elva Ansari NP        Subjective:     Principal Problem:Recurrent right pleural effusion        HPI:  82 y.o. female patient with a PMHx of CHF, HTN, stroke, cardiac arrest on 3/10/22 who presents to the Emergency Department for SOB, onset last night. Pt is not on home O2. Symptoms are constant and moderate in severity. No mitigating or exacerbating factors reported. Associated sxs include productive cough and intermittent apnea. Daughter states that the pt does not walk, and that her speech has not returned to normal since her cardiac arrest in March. Pt had a breathing treatment at home last night. Per daughter, pt is on Lasix 20 mg PRN      Overview/Hospital Course:  81 yo female with chronic systolic and diastolic HF LVEF 10% admitted with acute hypoxic resp failure 2/2 large right pleural effusion. Pulmonology consulted. Pt s/p thoracentesis 5/3- 1200 cc removed. Cytology pending. Patient more alert without respiratory distress on exam, currently on 2 liters O2 (family denies home O2). Transition IV furosemide to 40 mg daily (patient taking 20 mg furosemide three times a week PTA), wean oxygen as tolerated. Anticipate discharge in am.     5/5/2022, respiratory status stable, however she has increase bilateral lower extremely swelling. Will insert IV and give Lasix IV, possible discharge on tomorrow.      5/6- Lower ext edema again noted. Will continue IV lasix an additional day. O2 wean down to RA. Pt does not qualify for home O2. Pleural fluid is exudative by Light's criteria . Fluid gram stain, culture and cytology is pending.       Interval History:   Continue IV lasix an additional day. O2 wean down to RA. Pt does not qualify for home O2.        Review of Systems   Unable to perform ROS: Patient nonverbal   Objective:     Vital Signs (Most Recent):  Temp: 98.3 °F (36.8 °C) (22 1130)  Pulse: 81 (22 1301)  Resp: 17 (22 1130)  BP: 120/66 (22 1130)  SpO2: 100 % (22 0826) Vital Signs (24h Range):  Temp:  [97 °F (36.1 °C)-98.4 °F (36.9 °C)] 98.3 °F (36.8 °C)  Pulse:  [66-82] 81  Resp:  [17-20] 17  SpO2:  [97 %-100 %] 100 %  BP: (114-169)/(66-89) 120/66     Weight: 53.2 kg (117 lb 4.6 oz)  Body mass index is 21.45 kg/m².    Intake/Output Summary (Last 24 hours) at 2022 1635  Last data filed at 2022 1400  Gross per 24 hour   Intake 1450 ml   Output 3925 ml   Net -2475 ml      Physical Exam  Constitutional:       Appearance: She is ill-appearing.   HENT:      Head: Normocephalic and atraumatic.   Eyes:      General:         Right eye: No discharge.         Left eye: No discharge.   Pulmonary:      Effort: No respiratory distress.      Breath sounds: No stridor. Examination of the right-lower field reveals decreased breath sounds. Examination of the left-lower field reveals decreased breath sounds. Decreased breath sounds present. No wheezing, rhonchi or rales.   Chest:      Chest wall: No tenderness.   Abdominal:      General: There is no distension.      Palpations: There is no mass.      Tenderness: There is no abdominal tenderness. There is no right CVA tenderness, left CVA tenderness, guarding or rebound.      Hernia: No hernia is present.   Musculoskeletal:         General: No swelling, tenderness, deformity or signs of injury.      Right lower le+ Edema (2+) present.      Left lower le+ Edema (2+) present.      Comments: Decrease movement left upper extremely    Skin:     Coloration: Skin is not jaundiced or pale.      Findings: No bruising, erythema, lesion or rash.   Neurological:      Mental Status: She is alert.      Motor: Weakness present.      Comments: Pt is awake, alert, non verbal , track voice        Significant Labs: All pertinent labs within the past 24 hours have been reviewed.  BMP:   Recent Labs   Lab 05/06/22  0549         K 3.9   CL 98   CO2 32*   BUN 32*   CREATININE 0.7   CALCIUM 8.3*     CBC:   Recent Labs   Lab 05/05/22  0639 05/06/22  0549   WBC 9.33 9.97   HGB 12.0 9.9*   HCT 39.2 32.9*    343     CMP:   Recent Labs   Lab 05/05/22  0639 05/06/22  0549    141   K 4.0 3.9    98   CO2 25 32*   GLU 79 105   BUN 34* 32*   CREATININE 0.8 0.7   CALCIUM 8.4* 8.3*   ANIONGAP 13 11   EGFRNONAA >60 >60       Significant Imaging:       Assessment/Plan:      * Recurrent right pleural effusion  IV lasix, supplemental O2  Consult pulm for thoracentesis    5/4   S/p thoracentesis   F/u cytology outpatient   No resp distress on exam   Wean supplemental O2 as tolerated    5/5/2022  S/P thoracentesis   Supplemental O 2 as needed   Lasix IV BID   5/6-  Pleural fluid is exudative in nature by Light's criteria   Fluid gram stain, culture and cytology are pending   O2 wean down to RA. Pt does not qualify for home O2        History of CVA (cerebrovascular accident)  Continue Plavix      Recent H/O Out of hospital Cardiac arrest  - Supportive treatment   -Family declined palliative consult during previous admission       Acute on chronic combined systolic and diastolic heart failure  Continue home med Entresto, Coreg  IV lasix BID for pleural effusion    3/11/22: EF 10%    5/4   Transition to po lasix   Consider increasing home dose of furosemide at discharge     5/5  Pt has increase bilateral lower extremely swelling   Start Lasix IV an assises response   Strict I & O   Daily weights   Fluid restriction   5/6-  Continue lasix IV  one additional day     Parkinson disease  Continue home meds  PT/OT to eval and treat      Urinary tract infection without hematuria  Recently completed Abx course for UTI  Currently stable, not concerned for active infection  Chronic Abreu catheter draining  clear yellow urine  Abreu catheter exchanged 2 weeks ago         VTE Risk Mitigation (From admission, onward)         Ordered     IP VTE HIGH RISK PATIENT  Once         05/03/22 0940     Place sequential compression device  Until discontinued         05/03/22 0940                Discharge Planning   PARMINDER: 5/5/2022     Code Status: DNR   Is the patient medically ready for discharge?:     Reason for patient still in hospital (select all that apply): Patient trending condition  Discharge Plan A: Home Health, Home with family   Discharge Delays: None known at this time              Tim Reilly MD  Department of Hospital Medicine   [facility] - Telemetry ([facility])

## 2022-05-06 NOTE — ASSESSMENT & PLAN NOTE
Continue home med Entresto, Coreg  IV lasix BID for pleural effusion    3/11/22: EF 10%    5/4   Transition to po lasix   Consider increasing home dose of furosemide at discharge     5/5  Pt has increase bilateral lower extremely swelling   Start Lasix IV an assises response   Strict I & O   Daily weights   Fluid restriction   5/6-  Continue lasix IV  one additional day

## 2022-05-06 NOTE — SUBJECTIVE & OBJECTIVE
Interval History:   Continue IV lasix an additional day. O2 wean down to RA. Pt does not qualify for home O2.       Review of Systems   Unable to perform ROS: Patient nonverbal   Objective:     Vital Signs (Most Recent):  Temp: 98.3 °F (36.8 °C) (22 1130)  Pulse: 81 (22 1301)  Resp: 17 (22 1130)  BP: 120/66 (22 1130)  SpO2: 100 % (22 0826) Vital Signs (24h Range):  Temp:  [97 °F (36.1 °C)-98.4 °F (36.9 °C)] 98.3 °F (36.8 °C)  Pulse:  [66-82] 81  Resp:  [17-20] 17  SpO2:  [97 %-100 %] 100 %  BP: (114-169)/(66-89) 120/66     Weight: 53.2 kg (117 lb 4.6 oz)  Body mass index is 21.45 kg/m².    Intake/Output Summary (Last 24 hours) at 2022 1635  Last data filed at 2022 1400  Gross per 24 hour   Intake 1450 ml   Output 3925 ml   Net -2475 ml      Physical Exam  Constitutional:       Appearance: She is ill-appearing.   HENT:      Head: Normocephalic and atraumatic.   Eyes:      General:         Right eye: No discharge.         Left eye: No discharge.   Pulmonary:      Effort: No respiratory distress.      Breath sounds: No stridor. Examination of the right-lower field reveals decreased breath sounds. Examination of the left-lower field reveals decreased breath sounds. Decreased breath sounds present. No wheezing, rhonchi or rales.   Chest:      Chest wall: No tenderness.   Abdominal:      General: There is no distension.      Palpations: There is no mass.      Tenderness: There is no abdominal tenderness. There is no right CVA tenderness, left CVA tenderness, guarding or rebound.      Hernia: No hernia is present.   Musculoskeletal:         General: No swelling, tenderness, deformity or signs of injury.      Right lower le+ Edema (2+) present.      Left lower le+ Edema (2+) present.      Comments: Decrease movement left upper extremely    Skin:     Coloration: Skin is not jaundiced or pale.      Findings: No bruising, erythema, lesion or rash.   Neurological:      Mental  Status: She is alert.      Motor: Weakness present.      Comments: Pt is awake, alert, non verbal , track voice       Significant Labs: All pertinent labs within the past 24 hours have been reviewed.  BMP:   Recent Labs   Lab 05/06/22  0549         K 3.9   CL 98   CO2 32*   BUN 32*   CREATININE 0.7   CALCIUM 8.3*     CBC:   Recent Labs   Lab 05/05/22  0639 05/06/22  0549   WBC 9.33 9.97   HGB 12.0 9.9*   HCT 39.2 32.9*    343     CMP:   Recent Labs   Lab 05/05/22  0639 05/06/22  0549    141   K 4.0 3.9    98   CO2 25 32*   GLU 79 105   BUN 34* 32*   CREATININE 0.8 0.7   CALCIUM 8.4* 8.3*   ANIONGAP 13 11   EGFRNONAA >60 >60       Significant Imaging:

## 2022-05-06 NOTE — ASSESSMENT & PLAN NOTE
IV lasix, supplemental O2  Consult pulm for thoracentesis    5/4   S/p thoracentesis   F/u cytology outpatient   No resp distress on exam   Wean supplemental O2 as tolerated    5/5/2022  S/P thoracentesis   Supplemental O 2 as needed   Lasix IV BID   5/6-  Pleural fluid is exudative in nature by Light's criteria   Fluid gram stain, culture and cytology are pending   O2 wean down to RA. Pt does not qualify for home O2

## 2022-05-06 NOTE — PLAN OF CARE
Pt awake and able to follow commands this shift. NAD noted. PEG tube in place with bolus feeds to equal 5 cans per day. Family remains at bedside. Abreu in place.

## 2022-05-06 NOTE — ASSESSMENT & PLAN NOTE
Recently completed Abx course for UTI  Currently stable, not concerned for active infection  Chronic Abreu catheter draining clear yellow urine  Abreu catheter exchanged 2 weeks ago

## 2022-05-07 VITALS
SYSTOLIC BLOOD PRESSURE: 159 MMHG | TEMPERATURE: 98 F | BODY MASS INDEX: 21.14 KG/M2 | DIASTOLIC BLOOD PRESSURE: 70 MMHG | HEIGHT: 62 IN | WEIGHT: 114.88 LBS | HEART RATE: 78 BPM | OXYGEN SATURATION: 93 % | RESPIRATION RATE: 17 BRPM

## 2022-05-07 PROCEDURE — 25000003 PHARM REV CODE 250: Performed by: INTERNAL MEDICINE

## 2022-05-07 PROCEDURE — 92526 ORAL FUNCTION THERAPY: CPT

## 2022-05-07 RX ORDER — FUROSEMIDE 20 MG/1
TABLET ORAL
Qty: 30 TABLET | Refills: 11
Start: 2022-05-07 | End: 2022-05-13 | Stop reason: SDUPTHER

## 2022-05-07 RX ORDER — FAMOTIDINE 20 MG/1
20 TABLET, FILM COATED ORAL DAILY
Qty: 30 TABLET | Refills: 0 | Status: SHIPPED | OUTPATIENT
Start: 2022-05-07 | End: 2022-05-12

## 2022-05-07 RX ADMIN — CLOPIDOGREL 75 MG: 75 TABLET, FILM COATED ORAL at 08:05

## 2022-05-07 RX ADMIN — MUPIROCIN: 20 OINTMENT TOPICAL at 08:05

## 2022-05-07 RX ADMIN — PANTOPRAZOLE SODIUM 40 MG: 40 GRANULE, DELAYED RELEASE ORAL at 08:05

## 2022-05-07 RX ADMIN — CARVEDILOL 6.25 MG: 6.25 TABLET, FILM COATED ORAL at 08:05

## 2022-05-07 RX ADMIN — SACUBITRIL AND VALSARTAN 1 TABLET: 49; 51 TABLET, FILM COATED ORAL at 08:05

## 2022-05-07 NOTE — NURSING
PIV removed, catheter intact. Tele monitor removed and returned to monitor room. Daughter called by this nurse to update on pts discharge status. Husam states that she will be at the hospital shortly to pick the pt up for discharge. Will review AVS with daughter when she arrives.

## 2022-05-07 NOTE — PLAN OF CARE
Pt nonverbal.  VSS.  Pt remained afebrile throughout this shift.   IV not administered per order.   Pt remained free of falls this shift.   Pt  denies of pain this shift.  Plan of care reviewed. Patient verbalizes understanding.   Pt moving/turing q2h. Frequent weight shifting encouraged.  Patient NS on monitor.   Bed low, side rails up x 2, wheels locked, call light in reach.   Bed alarm maintained for safety.   Patient instructed to call for assistance.   Hourly rounding completed.   24 hour chart check completed.  Will continue to monitor.

## 2022-05-07 NOTE — PLAN OF CARE
[facility] - Telemetry (Hospital)  Discharge Final Note    Primary Care Provider: Elva Ansari NP    Expected Discharge Date: 5/7/2022    Final Discharge Note (most recent)       Final Note - 05/07/22 1024          Final Note    Assessment Type Final Discharge Note (P)      Anticipated Discharge Disposition Home-Health Care Svc (P)         Post-Acute Status    Post-Acute Authorization Home Health (P)      Home Health Status Set-up Complete/Auth obtained (P)    [facility]     Discharge Delays None known at this time (P)                      Important Message from Medicare             Contact Info       Elva Ansari NP   Specialty: Wound Care, Family Medicine   Relationship: PCP - General    [address] [place] [address]   Phone: [phone]       Next Steps: Schedule an appointment as soon as possible for a visit in 1 week(s)    Instructions: Hospital follow up for right pleural effusion    PAMELA OttPKarla[facility]   Specialty: Cardiology    [address] [place] [address] [address] [place] [address]   Phone: [phone]       Next Steps: Schedule an appointment as soon as possible for a visit in 1 week(s)    Instructions: Discharge follow up          Sanjana Guzmán LMSW 5/7/2022 10:25 AM

## 2022-05-07 NOTE — PT/OT/SLP PROGRESS
Speech Language Pathology Treatment    Patient Name:  Tanya Madrid   MRN:  [id number]  Admitting Diagnosis: Recurrent right pleural effusion    Recommendations:                 General Recommendations:  Dysphagia therapy  Diet recommendations:  NPO, Liquid Diet Level: NPO   Aspiration Precautions: Alternate means of nutrition/hydration   General Precautions: Standard, aspiration    Subjective     Patient seen at bedside with varying levels of alertness. Daughter present for dysphagia treatment.      Pain/Comfort:  · Pain Rating 1: 0/10  · Pain Rating Post-Intervention 1: 0/10    Respiratory Status: Room air    Objective:     Has the patient been evaluated by SLP for swallowing?   Yes  Keep patient NPO? Yes   Current Respiratory Status:        Oral care completed for hydration, sensation, and generation of spontaneous swallow reflex. Patient with varying levels of alertness and initially resistive to oral care. She presented with coarse breathing sounds. Observed X1 spontaneous swallow with tactile and gustatory stimulation. Recommend continue alternate means of nutrition.    Assessment:     Tanya Madrid is a 82 y.o. female with an SLP diagnosis of Dysphagia.  She presents with oropharyngeal dysphagia.    Goals:   Multidisciplinary Problems     SLP Goals        Problem: SLP    Goal Priority Disciplines Outcome   SLP Goal     SLP Ongoing, Not Progressing   Description: 1.  Pt will consume least restrictive PO diet/pleasure feeds without s/s of dysphagia.  2.  Oral stimulation/ice chip trials (following oral care) with 70% laryngeal excursions and improved timeliness of swallow.  3.  Dysphagia, OM/strengthening exercises x10 with mod. assist                   Plan:     · Patient to be seen:  2 x/week   · Plan of Care expires:  05/12/22  · Plan of Care reviewed with:  patient, daughter   · SLP Follow-Up:  Yes       Discharge recommendations:  home health speech therapy   Barriers to Discharge:  Level of Skilled  Assistance Needed max    Time Tracking:     SLP Treatment Date:   05/07/22  Speech Start Time:  1033  Speech Stop Time:  1049     Speech Total Time (min):  16 min    Billable Minutes: Treatment Swallowing Dysfunction 15    05/07/2022

## 2022-05-09 ENCOUNTER — PATIENT OUTREACH (OUTPATIENT)
Dept: ADMINISTRATIVE | Facility: CLINIC | Age: 82
End: 2022-05-09
Payer: MEDICARE

## 2022-05-09 LAB
FINAL PATHOLOGIC DIAGNOSIS: NORMAL
Lab: NORMAL

## 2022-05-09 NOTE — PROGRESS NOTES
C3 nurse spoke with Tanya Madrid  for a TCC post hospital discharge follow up call. The patient has a scheduled HOSFU appointment with CASSANDRA Colunga on 05/13/2022 @ [facility]

## 2022-05-09 NOTE — DISCHARGE SUMMARY
[facility] - Telemetry ([facility] Medicine  Discharge Summary      Patient Name: Tanya Madrid  MRN: [id number]  Patient Class: IP- Inpatient  Admission Date: 5/3/2022  Hospital Length of Stay: 2 days  Discharge Date and Time: 5/7/2022  3:44 PM  Attending Physician: No att. providers found   Discharging Provider: Tim Reilly MD  Primary Care Provider: Elva Ansari NP      HPI:   82 y.o. female patient with a PMHx of CHF, HTN, stroke, cardiac arrest on 3/10/22 who presents to the Emergency Department for SOB, onset last night. Pt is not on home O2. Symptoms are constant and moderate in severity. No mitigating or exacerbating factors reported. Associated sxs include productive cough and intermittent apnea. Daughter states that the pt does not walk, and that her speech has not returned to normal since her cardiac arrest in March. Pt had a breathing treatment at home last night. Per daughter, pt is on Lasix 20 mg PRN      * No surgery found *      Hospital Course:   81 yo female with chronic systolic and diastolic HF LVEF 10% admitted with acute hypoxic resp failure 2/2 large right pleural effusion. Pulmonology consulted. Pt s/p thoracentesis 5/3- 1200 cc removed. Cytology pending. Patient more alert without respiratory distress on exam, currently on 2 liters O2 (family denies home O2). Transition IV furosemide to 40 mg daily (patient taking 20 mg furosemide three times a week PTA), wean oxygen as tolerated. Anticipate discharge in am.     5/5/2022, respiratory status stable, however she has increase bilateral lower extremely swelling. Will insert IV and give Lasix IV, possible discharge on tomorrow.      5/6- Lower ext edema again noted. Will continue IV lasix an additional day. O2 wean down to RA. Pt does not qualify for home O2. Pleural fluid is exudative by Light's criteria . Fluid gram stain, culture and cytology is pending.     5/7- Pt is more awake and alert today. Verbalize one or two words  intermittently . Daughter at bedside feels her mother has improved since admission . Lower ext swelling appears improved. Remains on RA. Pleural fluid study still pending during this dictation. Pt is examined and deemed suitable for discharge to home with Home Health service and follow up with PCP and Cardiology .        Goals of Care Treatment Preferences:  Code Status: DNR          What is most important right now is to focus on improvement in condition but with limits to invasive therapies.  Accordingly, we have decided that the best plan to meet the patient's goals includes continuing with treatment.      Consults:   Consults (From admission, onward)        Status Ordering Provider     Inpatient consult to Registered Dietitian/Nutritionist  Once        Provider:  (Not yet assigned)    Completed [facility]     Inpatient consult to Pulmonology  Once        Provider:  (Not yet assigned)    Completed [facility]U          No new Assessment & Plan notes have been filed under this hospital service since the last note was generated.  Service: Hospital Medicine    Final Active Diagnoses:    Diagnosis Date Noted POA    PRINCIPAL PROBLEM:  Recurrent right pleural effusion [J90] 05/03/2022 Yes    History of CVA (cerebrovascular accident) [Z86.73] 07/24/2019 Not Applicable     Chronic    Recent H/O Out of hospital Cardiac arrest [I46.9] 03/11/2022 Yes    Acute on chronic combined systolic and diastolic heart failure [I50.43] 03/31/2022 Yes    Parkinson disease [G20] 07/11/2017 Yes     Chronic    Urinary tract infection without hematuria [N39.0] 04/23/2022 Yes      Problems Resolved During this Admission:       Discharged Condition: stable    Disposition: Home-Health Care [facility]    Follow Up:   Follow-up Information     Elva Ansari NP. Schedule an appointment as soon as possible for a visit in 1 week(s).    Specialties: Wound Care, Family Medicine  Why: Hospital follow up for right pleural  effusion  Contact information:  [address]  [place] [address]  [phone]             JIE Ott. Schedule an appointment as soon as possible for a visit in 1 week(s).    Specialty: Cardiology  Why: Discharge follow up  Contact information:  [address] [place] [address]  [phone]                       Patient Instructions:      Ambulatory referral/consult to Outpatient Case Management   Referral Priority: Routine Referral Type: Consultation   Referral Reason: Specialty Services Required   Number of Visits Requested: 1     Ambulatory referral/consult to Home Health   Standing Status: Future   Referral Priority: Routine Referral Type: Home Health   Referral Reason: Specialty Services Required   Requested Specialty: Home Health Services   Number of Visits Requested: 1     Notify your health care provider if you experience any of the following:  difficulty breathing or increased cough     Tube Feedings/Formulas   Scheduling Instructions: Continue home tube feedings     Order Specific Question Answer Comments   Select Adult Formula: Other    Route: Gastrostomy      Tube Feedings/Formulas   Order Comments: Continue 5 can /day as previous     Order Specific Question Answer Comments   Select Adult Formula: Isosource 1.5 efra    Route: Gastrostomy      Activity as tolerated       Significant Diagnostic Studies: Labs: BMP: No results for input(s): GLU, NA, K, CL, CO2, BUN, CREATININE, CALCIUM, MG in the last 48 hours., CMP No results for input(s): NA, K, CL, CO2, GLU, BUN, CREATININE, CALCIUM, PROT, ALBUMIN, BILITOT, ALKPHOS, AST, ALT, ANIONGAP, ESTGFRAFRICA, EGFRNONAA in the last 48 hours. and CBC No results for input(s): WBC, HGB, HCT, PLT in the last 48 hours.    Pending Diagnostic Studies:     Procedure Component Value Units Date/Time    Cytology, Pulmonary [[id number]] Collected: 05/03/22 1423    Order Status: Sent Lab Status: In process Updated: 05/03/22 3684          Medications:  Reconciled Home Medications:      Medication List      CHANGE how you take these medications    furosemide 20 MG tablet  Commonly known as: LASIX  Take per G tube twice daily as directed  What changed: additional instructions        CONTINUE taking these medications    carbidopa-levodopa  mg  mg per tablet  Commonly known as: SINEMET  2 tablets by Per G Tube route 3 (three) times daily.     carvediloL 6.25 MG tablet  Commonly known as: COREG  1 tablet (6.25 mg total) by Per G Tube route 2 (two) times daily.     clopidogreL 75 mg tablet  Commonly known as: PLAVIX  1 tablet (75 mg total) by Per G Tube route once daily.     ENTRESTO 49-51 mg per tablet  Generic drug: sacubitriL-valsartan  Take 1 tablet by mouth 2 (two) times daily. HOLD FOR NOW DUE TO LOW BLOOD PRESSURE     esomeprazole 40 MG capsule  Commonly known as: NEXIUM  Take  40 mg(1 cap)  twice a day  , then after 40 mg(1 cap) daily   Through PEG tube     famotidine 20 MG tablet  Commonly known as: PEPCID AC  1 tablet (20 mg total) by Per G Tube route once daily.     ipratropium 0.02 % nebulizer solution  Commonly known as: ATROVENT  Take 2.5 mLs (500 mcg total) by nebulization 2 (two) times daily. Rescue     scopolamine 1.3-1.5 mg (1 mg over 3 days)  Commonly known as: TRANSDERM-SCOP  Place 1 patch onto the skin Every 3 (three) days.     traZODone 50 MG tablet  Commonly known as: DESYREL  Take 0.5 tablets (25 mg total) by mouth every evening.        STOP taking these medications    aspirin 81 MG Chew     losartan 50 MG tablet  Commonly known as: COZAAR     metoprolol succinate 50 MG 24 hr tablet  Commonly known as: TOPROL-XL     omeprazole magnesium 10 mg Sudr     pantoprazole 40 mg suspension  Commonly known as: PROTONIX            Indwelling Lines/Drains at time of discharge:   Lines/Drains/Airways     Drain  Duration                Gastrostomy/Enterostomy 03/25/22 1320 Gastrostomy tube w/ balloon LUQ feeding 44 days          Urethral Catheter 04/22/22 1807 16 days                Time spent on the discharge of patient: 30  minutes         Tim Reilly MD  Department of Hospital Medicine  [facility] - Telemetry ([facility])

## 2022-05-10 ENCOUNTER — DOCUMENT SCAN (OUTPATIENT)
Dept: HOME HEALTH SERVICES | Facility: HOSPITAL | Age: 82
End: 2022-05-10
Payer: MEDICARE

## 2022-05-10 NOTE — PHYSICIAN QUERY
PT Name: Tanya Madrid  MR #: [id number]     DOCUMENTATION CLARIFICATION     CDS: Nik Messer RN CCDS               Contact information: [email or web address]   This form is a permanent document in the medical record.     Query Date: May 10, 2022    By submitting this query, we are merely seeking further clarification of documentation.  Please utilize your independent clinical judgment when addressing the question(s) below.    The Medical Record contains the following:   Indicators   Supporting Clinical Findings Location in Medical Record    Non-blanchable erythema/redness      Ulcer/Injury/Skin Breakdown      Deep Tissue Injury       x Wound care consult Stage 3 pressure injury noted to left buttock surrounded with deep purple discoloration suspicious for DTPI that has evolved into stage 3 pressure injury. Open area measures 2.5x2x0.1cm, moist red granulating wound bed, epithelialization and epithelial bridge noted, scar tissue surrounding.       05/04/22 0915        Altered Skin Integrity 03/31/22 1257 Left Buttocks Full thickness tissue loss. Subcutaneous fat may be visible but bone, tendon or muscle are not exposed   Date First Assessed/Time First Assessed: 03/31/22 1257   Altered Skin Integrity Present on Admission: yes  Side: Left  Location: Buttocks  Description of Altered Skin Integrity: Full thickness tissue loss. Subcutaneous fat may be visible but bone, ten...   Description of Altered Skin Integrity Full thickness tissue loss. Subcutaneous fat may be visible but bone, tendon or muscle are not exposed   Dressing Appearance Intact;Moist drainage   Drainage Amount Small   Drainage Characteristics/Odor Serosanguineous   Appearance Red;Pink;Moist;Granulating;Epithelialization   Tissue loss description Full thickness   Red (%), Wound Tissue Color 100 %   Periwound Area Scar tissue;Redness  (deep purple discoloration)   Wound Edges Irregular;Open   Wound Length (cm) 2.5 cm   Wound Width (cm) 2 cm   Wound  Depth (cm) 0.1 cm   Wound Volume (cm^3) 0.5 cm^3   Wound Surface Area (cm^2) 5 cm^2    5/4/2022 Wound care consult     x Acute/Chronic Illness Parkinson's disease  History of CVA  acute hypoxic resp failure  Acute on chronic combined systolic and diastolic heart failure 5/3/2022 H&P    5/4/2022 Hospital Medicine progress notes  5/5/2022 Hospital Medicine progress notes     x Medication/Treatment Cleansed with saline and patted dry. aquacel ag advantage applied to open wound and secured with Duoderm. Recommend changing dressing every 5 days and as needed for excess drainage, edges roll. Will order specialty MADHURI bed for patient. Repositioned with foam wedge, will need heels floated, turn q2 hours. Will follow.  5/4/2022 Wound care consult     x Other Daughter states that the pt does not walk. Chronically debilitated, right sided neck contracture, hunched over with significant kyphosis    Previous level of function: Patient was dependent with ADLs and t/f to wheelchair at baseline.  Bed Mobility: total assistance and 2 persons    Carlos risk score 8 5/3/2022 H&P      5/4/2022 OT evaluation        5/3/2022 Nursing assessment     The clinical guidelines noted are only a system guideline. It does not replace the providers clinical judgment.    Per the National Pressure Injury Advisory Panel:   A pressure injury is localized damage to the skin and underlying soft tissue usually over a bony prominence or related to a medical or other device. The injury can present as intact skin or an open ulcer and may be painful. The injury occurs as a result of intense and/or prolonged pressure or pressure in combination with shear. The tolerance of soft tissue for pressure and shear may also be affected by microclimate, nutrition, perfusion, co-morbidities and condition of the soft tissue.       Stage 1 Pressure Injury:  Intact skin with a localized area of non-blanchable erythema, which may appear differently in darkly pigmented  skin. Color changes do not include purple or maroon discoloration; these may indicate deep tissue pressure injury.    Stage 2 Pressure Injury:  Partial-thickness loss of skin with exposed dermis. The wound bed is viable, pink or red, moist, and may also present as an intact or ruptured serum-filled blister.    Stage 3 Pressure Injury:  Full-thickness loss of skin, in which adipose (fat) is visible in the ulcer and granulation tissue and epibole (rolled wound edges) are often present. Slough and/or eschar may be visible. Undermining and tunneling may occur.    Stage 4 Pressure Injury:  Full-thickness skin and tissue loss with exposed or directly palpable fascia, muscle, tendon, ligament, cartilage or bone in the ulcer. Slough and/or eschar may be visible. Epibole (rolled edges), undermining and/or tunneling often occur.    Unstageable Pressure Injury:  Full-thickness skin and tissue loss in which the extent of tissue damage within the ulcer cannot be confirmed because it is obscured by slough or eschar. If slough or eschar is removed, a Stage 3 or Stage 4 pressure injury will be revealed.    Deep Tissue Pressure Injury:  Intact or non-intact skin with localized area of persistent non-blanchable deep red, maroon, purple discoloration or epidermal separation revealing a dark wound bed or blood filled blister. This injury results from intense and/or prolonged pressure and shear forces at the bone-muscle interface. The wound may evolve rapidly to reveal the actual extent of tissue injury, or may resolve without tissue loss. If necrotic tissue, subcutaneous tissue, granulation tissue, fascia, muscle or other underlying structures are visible, this indicates a full thickness pressure injury (Unstageable, Stage 3 or Stage 4). Do not use DTPI to describe vascular, traumatic, neuropathic, or dermatologic conditions.       Provider, please provide the integumentary diagnosis related to the documentation of Left Buttock:     [x    ] Pressure Injury/Decubitus Ulcer, Stage 3   [   ] Pressure Injury/Decubitus Ulcer, Other Stage (please specify): _____   [   ] Other Integumentary Diagnosis (please specify):______________   [  ] Clinically Undetermined         Please document in your progress notes daily for the duration of treatment until resolved and include in your discharge summary.    Reference:    FAISAL Forman., MARCIA Logan., Goldberg, M., FAISAL Long, FAISAL Redmond, & MONET Love. (2016). Revised National Pressure Ulcer Advisory Panel Pressure Injury Staging System: Revised Pressure Injury Staging System. J Wound Ostomy Continence Nurs, 43(6), 585-597. doi:10.1097/won.[id number]    Form No.20286

## 2022-05-12 ENCOUNTER — OFFICE VISIT (OUTPATIENT)
Dept: CARDIOLOGY | Facility: CLINIC | Age: 82
End: 2022-05-12
Payer: MEDICARE

## 2022-05-12 ENCOUNTER — LAB VISIT (OUTPATIENT)
Dept: LAB | Facility: HOSPITAL | Age: 82
End: 2022-05-12
Attending: NURSE PRACTITIONER
Payer: MEDICARE

## 2022-05-12 ENCOUNTER — DOCUMENT SCAN (OUTPATIENT)
Dept: HOME HEALTH SERVICES | Facility: HOSPITAL | Age: 82
End: 2022-05-12
Payer: MEDICARE

## 2022-05-12 VITALS — SYSTOLIC BLOOD PRESSURE: 140 MMHG | DIASTOLIC BLOOD PRESSURE: 100 MMHG | OXYGEN SATURATION: 99 % | HEART RATE: 89 BPM

## 2022-05-12 DIAGNOSIS — Z51.5 PALLIATIVE CARE ENCOUNTER: ICD-10-CM

## 2022-05-12 DIAGNOSIS — I50.43 ACUTE ON CHRONIC COMBINED SYSTOLIC AND DIASTOLIC CHF (CONGESTIVE HEART FAILURE): Primary | ICD-10-CM

## 2022-05-12 DIAGNOSIS — E78.2 MIXED HYPERLIPIDEMIA: Chronic | ICD-10-CM

## 2022-05-12 DIAGNOSIS — I10 ESSENTIAL HYPERTENSION: Chronic | ICD-10-CM

## 2022-05-12 DIAGNOSIS — I46.9 CARDIAC ARREST: ICD-10-CM

## 2022-05-12 DIAGNOSIS — I50.43 ACUTE ON CHRONIC COMBINED SYSTOLIC AND DIASTOLIC CHF (CONGESTIVE HEART FAILURE): ICD-10-CM

## 2022-05-12 DIAGNOSIS — M25.669 DECREASED RANGE OF MOTION OF LOWER EXTREMITY, UNSPECIFIED LATERALITY: ICD-10-CM

## 2022-05-12 LAB
ANION GAP SERPL CALC-SCNC: 16 MMOL/L (ref 8–16)
BNP SERPL-MCNC: >4900 PG/ML (ref 0–99)
BUN SERPL-MCNC: 27 MG/DL (ref 8–23)
CALCIUM SERPL-MCNC: 9 MG/DL (ref 8.7–10.5)
CHLORIDE SERPL-SCNC: 91 MMOL/L (ref 95–110)
CO2 SERPL-SCNC: 26 MMOL/L (ref 23–29)
CREAT SERPL-MCNC: 0.7 MG/DL (ref 0.5–1.4)
EST. GFR  (AFRICAN AMERICAN): >60 ML/MIN/1.73 M^2
EST. GFR  (NON AFRICAN AMERICAN): >60 ML/MIN/1.73 M^2
GLUCOSE SERPL-MCNC: 101 MG/DL (ref 70–110)
POTASSIUM SERPL-SCNC: 4 MMOL/L (ref 3.5–5.1)
SODIUM SERPL-SCNC: 133 MMOL/L (ref 136–145)

## 2022-05-12 PROCEDURE — 99214 OFFICE O/P EST MOD 30 MIN: CPT | Mod: S$PBB,,, | Performed by: NURSE PRACTITIONER

## 2022-05-12 PROCEDURE — 99213 OFFICE O/P EST LOW 20 MIN: CPT | Mod: PBBFAC | Performed by: NURSE PRACTITIONER

## 2022-05-12 PROCEDURE — 99214 PR OFFICE/OUTPT VISIT, EST, LEVL IV, 30-39 MIN: ICD-10-PCS | Mod: S$PBB,,, | Performed by: NURSE PRACTITIONER

## 2022-05-12 PROCEDURE — 36415 COLL VENOUS BLD VENIPUNCTURE: CPT | Performed by: NURSE PRACTITIONER

## 2022-05-12 PROCEDURE — 99999 PR PBB SHADOW E&M-EST. PATIENT-LVL III: ICD-10-PCS | Mod: PBBFAC,,, | Performed by: NURSE PRACTITIONER

## 2022-05-12 PROCEDURE — 99999 PR PBB SHADOW E&M-EST. PATIENT-LVL III: CPT | Mod: PBBFAC,,, | Performed by: NURSE PRACTITIONER

## 2022-05-12 PROCEDURE — 83880 ASSAY OF NATRIURETIC PEPTIDE: CPT | Performed by: NURSE PRACTITIONER

## 2022-05-12 PROCEDURE — 80048 BASIC METABOLIC PNL TOTAL CA: CPT | Performed by: NURSE PRACTITIONER

## 2022-05-12 NOTE — PROGRESS NOTES
"Subjective:   Patient ID:  Tanya Madrid is a 82 y.o. female who presents for evaluation of Congestive Heart Failure      Congestive Heart Failure  Associated symptoms include shortness of breath. Pertinent negatives include no abdominal pain, chest pain, claudication, near-syncope or palpitations.        Tanya Madrid is a 82 year old female who presents to CHF clinic for hospital follow up. She was admitted to [facility] due to cardiac arrest and had prolonged hospitalization and was eventually discharged home. She had g tube placed during admission.     Her current medical conditions include CHF, HFrEF of 10%, HTN, HLP, CVA, wheelchair bound. She is very debilitated at this visit. She is wheelchair bound today and is mainly nonverbal. She receives all her medications via g tube. She does have  services at this time and is starting to stand with therapy.     Patient's daughter is here today with her for the visit.     Patient has dependent edema today on exam. Patient answers appropriately to simple questions today. Mainly bedbound but does sit in the chair every day. Recently started treatment for UTI.   Denies chest pain today on exam. Has labored breathing noted which started a few days ago per patient's daughter report today.     5/12/2022 update    Tanya Madrid returns to clinic today for CHF follow up.     BP has been running high recently.   Patient is wheelchair bound today. Her daughter and son in law have to carry her to move her positions. She is max assist for position changes and to take "2-3 steps" per patient's daughter.     Has episodic increased work of breathing during visit today. Has been taking lasix BID since discharge. Has trace pedal edema today in office. BP log reviewed with elevated readings over the past week or so. Has been getting all of her meals via g tube.     Patient moans and reaches for provider during the visit today.   Patient has henley cath in place. Is unable " to maintain her own position in the wheelchair today. Denies any signs of bleeding.       Past Medical History:   Diagnosis Date    Acute CHF (congestive heart failure) 1/17/2021    Hyperlipemia     Hypertension     Parkinson's disease     Stroke 2016    Throat mass        Past Surgical History:   Procedure Laterality Date    CLOSED REDUCTION Right 10/15/2020    Procedure: CLOSED REDUCTION;  Surgeon: Humberto Valdivia DO;  Location: [facility] OR;  Service: Orthopedics;  Laterality: Right;  ATTEMPTED    EVACUATION OF HEMATOMA Right 10/17/2020    Procedure: EVACUATION, HEMATOMA;  Surgeon: Humberto Valdivia DO;  Location: [facility] OR;  Service: Orthopedics;  Laterality: Right;    HEMIARTHROPLASTY OF HIP Left 7/12/2020    Procedure: HEMIARTHROPLASTY, HIP;  Surgeon: Humberto Valdivia DO;  Location: [facility] OR;  Service: Orthopedics;  Laterality: Left;    HEMIARTHROPLASTY OF HIP Right 10/2/2020    Procedure: HEMIARTHROPLASTY, HIP;  Surgeon: Loyd Kearney MD;  Location: [facility] OR;  Service: Orthopedics;  Laterality: Right;    HYSTERECTOMY      INSERTION OF PERCUTANEOUS ENDOSCOPIC GASTROSTOMY (PEG) FEEDING TUBE      OPEN REDUCTION OF DISLOCATION OF HIP Right 10/17/2020    Procedure: OPEN REDUCTION, DISLOCATION, HIP;  Surgeon: Humberto Valdivia DO;  Location: [facility] OR;  Service: Orthopedics;  Laterality: Right;    THROAT SURGERY      TONSILLECTOMY         Social History     Tobacco Use    Smoking status: Never Smoker    Smokeless tobacco: Never Used   Substance Use Topics    Alcohol use: No    Drug use: No       Family History   Family history unknown: Yes     Wt Readings from Last 3 Encounters:   05/07/22 52.1 kg (114 lb 13.8 oz)   04/22/22 63 kg (139 lb)   04/07/22 63 kg (139 lb)     Temp Readings from Last 3 Encounters:   05/07/22 98 °F (36.7 °C) (Oral)   04/22/22 97.7 °F (36.5 °C) (Axillary)   04/20/22 97.2 °F (36.2 °C)     BP Readings from Last 3 Encounters:   05/12/22 (!) 140/100   05/07/22 (!) 159/70    04/23/22 (!) 143/94     Pulse Readings from Last 3 Encounters:   05/12/22 89   05/07/22 78   04/23/22 79     Current Outpatient Medications on File Prior to Visit   Medication Sig Dispense Refill    carbidopa-levodopa  mg (SINEMET)  mg per tablet 2 tablets by Per G Tube route 3 (three) times daily. 180 tablet 0    carvediloL (COREG) 6.25 MG tablet 1 tablet (6.25 mg total) by Per G Tube route 2 (two) times daily. 60 tablet 0    clopidogreL (PLAVIX) 75 mg tablet 1 tablet (75 mg total) by Per G Tube route once daily. 30 tablet 0    esomeprazole (NEXIUM) 40 MG capsule Take  40 mg(1 cap)  twice a day  , then after 40 mg(1 cap) daily   Through PEG tube 60 capsule 3    furosemide (LASIX) 20 MG tablet Take per G tube twice daily as directed 30 tablet 11    ipratropium (ATROVENT) 0.02 % nebulizer solution Take 2.5 mLs (500 mcg total) by nebulization 2 (two) times daily. Rescue 150 mL 0    sacubitriL-valsartan (ENTRESTO) 49-51 mg per tablet Take 1 tablet by mouth 2 (two) times daily. HOLD FOR NOW DUE TO LOW BLOOD PRESSURE 60 tablet 3    scopolamine (TRANSDERM-SCOP) 1.3-1.5 mg (1 mg over 3 days) Place 1 patch onto the skin Every 3 (three) days. 10 patch 3    traZODone (DESYREL) 50 MG tablet Take 0.5 tablets (25 mg total) by mouth every evening. 15 tablet 3    [DISCONTINUED] famotidine (PEPCID AC) 20 MG tablet 1 tablet (20 mg total) by Per G Tube route once daily. 30 tablet 0    [DISCONTINUED] aspirin 81 MG Chew 1 tablet (81 mg total) by Per G Tube route once daily.  0    [DISCONTINUED] losartan (COZAAR) 50 MG tablet Take 1 tablet (50 mg total) by mouth 2 (two) times a day. 60 tablet 6    [DISCONTINUED] metoprolol succinate (TOPROL-XL) 50 MG 24 hr tablet Take 1 tablet (50 mg total) by mouth 2 (two) times daily. 60 tablet 6    [DISCONTINUED] omeprazole magnesium 10 mg SuDR Take 40 mg by mouth once daily. (Patient not taking: Reported on 4/21/2022) 120 each 0    [DISCONTINUED] pantoprazole (PROTONIX)  40 mg suspension Take 1 packet (40 mg total) by mouth 2 (two) times daily. 60 packet 3     No current facility-administered medications on file prior to visit.       Review of Systems   Constitutional: Positive for malaise/fatigue.   HENT: Negative for hearing loss and hoarse voice.    Eyes: Negative for blurred vision and visual disturbance.   Cardiovascular: Positive for dyspnea on exertion and leg swelling. Negative for chest pain, claudication, irregular heartbeat, near-syncope, orthopnea, palpitations, paroxysmal nocturnal dyspnea and syncope.   Respiratory: Positive for shortness of breath. Negative for cough, hemoptysis, sleep disturbances due to breathing, snoring and wheezing.    Endocrine: Negative for cold intolerance and heat intolerance.   Hematologic/Lymphatic: Does not bruise/bleed easily.   Skin: Negative for color change, dry skin and nail changes.   Musculoskeletal: Positive for arthritis. Negative for back pain, joint pain and myalgias.   Gastrointestinal: Negative for bloating, abdominal pain, constipation, nausea and vomiting.        +G tube   Genitourinary: Negative for dysuria, flank pain, hematuria and hesitancy.        +henley cath   Neurological: Positive for loss of balance. Negative for headaches, light-headedness, numbness, paresthesias and weakness.        Wheelchair bound   Psychiatric/Behavioral: Negative for altered mental status.   Allergic/Immunologic: Negative for environmental allergies.     BP (!) 140/100   Pulse 89   SpO2 99%     Objective:   Physical Exam  Vitals and nursing note reviewed.   Constitutional:       General: She is not in acute distress.     Appearance: She is well-developed. She is cachectic. She is ill-appearing.   HENT:      Head: Normocephalic and atraumatic.      Nose: Nose normal.      Mouth/Throat:      Mouth: Mucous membranes are moist.   Eyes:      Pupils: Pupils are equal, round, and reactive to light.   Neck:      Thyroid: No thyromegaly.       Vascular: No JVD.      Trachea: No tracheal deviation.   Cardiovascular:      Rate and Rhythm: Normal rate and regular rhythm.      Chest Wall: PMI is not displaced.      Pulses: Intact distal pulses.           Radial pulses are 2+ on the right side and 2+ on the left side.        Dorsalis pedis pulses are 2+ on the right side and 2+ on the left side.      Heart sounds: S1 normal and S2 normal. Heart sounds not distant. No murmur heard.     Comments: Chest pain free  Pulmonary:      Effort: Pulmonary effort is normal. No respiratory distress.      Breath sounds: Examination of the right-lower field reveals decreased breath sounds. Examination of the left-lower field reveals decreased breath sounds. Decreased breath sounds present. No wheezing.   Abdominal:      General: Bowel sounds are normal.      Palpations: Abdomen is soft.      Comments: + g tube   Genitourinary:     Comments: +indwelling henley cath  Musculoskeletal:         General: No swelling. Normal range of motion.      Cervical back: Full passive range of motion without pain, normal range of motion and neck supple.      Right lower le+ Edema present.      Left lower le+ Edema present.      Right ankle: Swelling present.      Left ankle: Swelling present.   Skin:     General: Skin is warm and dry.      Capillary Refill: Capillary refill takes less than 2 seconds.      Nails: There is no clubbing.   Neurological:      General: No focal deficit present.      Mental Status: She is alert and oriented to person, place, and time.   Psychiatric:         Speech: Speech normal.         Behavior: Behavior normal. Behavior is cooperative.         Thought Content: Thought content normal.         Judgment: Judgment normal.         Lab Results   Component Value Date    CHOL 197 2019    CHOL 190 2009     Lab Results   Component Value Date    HDL 63 2019    HDL 61 2009     Lab Results   Component Value Date    LDLCALC 120.6 2019     LDLCALC 120.2 08/09/2009     Lab Results   Component Value Date    TRIG 67 07/24/2019    TRIG 44 08/09/2009     Lab Results   Component Value Date    CHOLHDL 32.0 07/24/2019    CHOLHDL 32.1 08/09/2009       Chemistry        Component Value Date/Time     05/06/2022 0549    K 3.9 05/06/2022 0549    CL 98 05/06/2022 0549    CO2 32 (H) 05/06/2022 0549    BUN 32 (H) 05/06/2022 0549    CREATININE 0.7 05/06/2022 0549     05/06/2022 0549        Component Value Date/Time    CALCIUM 8.3 (L) 05/06/2022 0549    ALKPHOS 174 (H) 05/03/2022 0628    AST 60 (H) 05/03/2022 0628    ALT 15 05/03/2022 0628    BILITOT 0.7 05/03/2022 0628    ESTGFRAFRICA >60 05/06/2022 0549    EGFRNONAA >60 05/06/2022 0549          Lab Results   Component Value Date    TSH 1.367 03/16/2022     Lab Results   Component Value Date    INR 1.0 04/07/2022    INR 1.0 03/15/2022    INR 1.0 03/14/2022     Lab Results   Component Value Date    WBC 9.97 05/06/2022    HGB 9.9 (L) 05/06/2022    HCT 32.9 (L) 05/06/2022    MCV 94 05/06/2022     05/06/2022   1.TTE  Results for orders placed during the hospital encounter of 03/10/22    Echo    Interpretation Summary  · The left ventricle is mildly enlarged with eccentric hypertrophy and severely decreased systolic function.  · Grade III left ventricular diastolic dysfunction.  · Moderate right ventricular enlargement with moderately reduced right ventricular systolic function.  · Severe left atrial enlargement.  · The estimated ejection fraction is 10%.  · There is severe left ventricular global hypokinesis.  · Severe right atrial enlargement.  · Moderate-to-severe mitral regurgitation.  · Moderate to severe tricuspid regurgitation.  · Moderate pulmonic regurgitation.  · Mechanically ventilated; cannot use inferior caval vein diameter to estimate central venous pressure.  · There is a large left pleural effusion.       Assessment:      1. Acute on chronic combined systolic and diastolic CHF (congestive  heart failure)    2. Recent H/O Out of hospital Cardiac arrest    3. H/O Essential hypertension    4. Mixed hyperlipidemia    5. Decreased range of motion of lower extremity, unspecified laterality    6. Palliative care encounter        Plan:   Patient daughter would like to continue medical treatment at this time     Will discuss GOC at each follow up visit  BMP, BNP today prior to further decisions  Continue same meds for now  OOB 3 times per day  Elevate legs as much as possible  Continue adequate nutrition via g tube.       Nicole May, FNP-C [facility]

## 2022-05-13 ENCOUNTER — TELEPHONE (OUTPATIENT)
Dept: TRANSPLANT | Facility: CLINIC | Age: 82
End: 2022-05-13
Payer: MEDICARE

## 2022-05-13 ENCOUNTER — TELEPHONE (OUTPATIENT)
Dept: CARDIOLOGY | Facility: CLINIC | Age: 82
End: 2022-05-13
Payer: MEDICARE

## 2022-05-13 DIAGNOSIS — I50.43 ACUTE ON CHRONIC COMBINED SYSTOLIC AND DIASTOLIC CHF (CONGESTIVE HEART FAILURE): Primary | ICD-10-CM

## 2022-05-13 DIAGNOSIS — I50.42 CHRONIC COMBINED SYSTOLIC AND DIASTOLIC HEART FAILURE: ICD-10-CM

## 2022-05-13 RX ORDER — FUROSEMIDE 40 MG/1
40 TABLET ORAL 2 TIMES DAILY
Start: 2022-05-13 | End: 2022-05-17 | Stop reason: SDUPTHER

## 2022-05-13 NOTE — TELEPHONE ENCOUNTER
----- Message from JIE Jimenez sent at 5/13/2022  8:04 AM CDT -----  Please notify patient's daughter  Potassium is WNL  Renal function stable  BNP remains very elevated but given her feedings it is likely going to remain elevated    Increase Entresto to 97/103 BID  Continue Lasix 40 mg BID  Repeat BMP, Pro BNP prior to next visit     Tiffany

## 2022-05-13 NOTE — TELEPHONE ENCOUNTER
Adenike,daughter has been notified of this information and all questions answered.   Labs scheduled on 6/9/2022.  F/u appt with Tiffany Sanchez NP on 6/16/2022.

## 2022-05-16 ENCOUNTER — OFFICE VISIT (OUTPATIENT)
Dept: HOME HEALTH SERVICES | Facility: CLINIC | Age: 82
End: 2022-05-16
Payer: MEDICARE

## 2022-05-16 VITALS — RESPIRATION RATE: 20 BRPM | OXYGEN SATURATION: 98 % | HEART RATE: 78 BPM | TEMPERATURE: 98 F

## 2022-05-16 DIAGNOSIS — F06.8 PSYCHOSIS DUE TO PARKINSON'S DISEASE: Primary | ICD-10-CM

## 2022-05-16 DIAGNOSIS — Z09 FOLLOW UP: ICD-10-CM

## 2022-05-16 DIAGNOSIS — G20.A1 PARKINSON DISEASE: Chronic | ICD-10-CM

## 2022-05-16 DIAGNOSIS — G20.A1 PSYCHOSIS DUE TO PARKINSON'S DISEASE: Primary | ICD-10-CM

## 2022-05-16 PROCEDURE — 99349 PR HOME VISIT,ESTAB PATIENT,LEVEL III: ICD-10-PCS | Mod: S$GLB,,, | Performed by: NURSE PRACTITIONER

## 2022-05-16 PROCEDURE — 99349 HOME/RES VST EST MOD MDM 40: CPT | Mod: S$GLB,,, | Performed by: NURSE PRACTITIONER

## 2022-05-17 ENCOUNTER — DOCUMENT SCAN (OUTPATIENT)
Dept: HOME HEALTH SERVICES | Facility: HOSPITAL | Age: 82
End: 2022-05-17
Payer: MEDICARE

## 2022-05-17 DIAGNOSIS — I50.42 CHRONIC COMBINED SYSTOLIC AND DIASTOLIC HEART FAILURE: ICD-10-CM

## 2022-05-17 RX ORDER — QUETIAPINE FUMARATE 25 MG/1
25 TABLET, FILM COATED ORAL DAILY
Qty: 30 TABLET | Refills: 11 | Status: ON HOLD | OUTPATIENT
Start: 2022-05-17 | End: 2022-07-30 | Stop reason: SDUPTHER

## 2022-05-17 RX ORDER — FUROSEMIDE 40 MG/1
40 TABLET ORAL 2 TIMES DAILY
Qty: 180 TABLET | Refills: 3 | Status: ON HOLD | OUTPATIENT
Start: 2022-05-17 | End: 2022-09-17 | Stop reason: HOSPADM

## 2022-05-17 NOTE — PROGRESS NOTES
"[facility]  Medical Home Visit    Visit Date: 5/17/2022  Encounter Provider: Elva Ansari  PCP:  Elva Ansari NP    Subjective:      Patient ID: Tanya Madrid is a 82 y.o. female.    Consult Requested By:  No ref. provider found  Reason for Consult:  Follow up    Tanya is being seen at home due to being seen at home due to physical debility that presents a taxing effort to leave the home, to mitigate high risk of hospital readmission and/or due to the limited availability of reliable or safe options for transportation to the point of access to the provider. Prior to treatment on this visit the chart was reviewed and patient verbal consent was obtained.    Chief Complaint: Follow-up      Patient is being seen today for a follow up to established care. Daughter and patient's sister were present for the visit. Daughter reports that the patient has been doing "all right" but has episodes of agitation and anxiety. Upon arrival patient was sitting up in bed asleep and awakens easily with light tactile stimuli. Upon awakening patient was agitated but calmed down after a few minutes. Daughter reports that she does that frequently and the episodes are getting more frequent. VSS and daughter reports that patient is compliant with all medications          Review of Systems   Constitutional: Negative for activity change and appetite change.   Neurological: Positive for tremors and speech difficulty.   Psychiatric/Behavioral: Positive for agitation (intermittent), hallucinations and sleep disturbance. The patient is nervous/anxious.        Assessments:  · Environmental: Lives in a single story home with her daughter and family. There are no steps at the entrance. Lighting is bright and temperature is comfortable.  · Functional Status: Bed/chair bound and needs assistance with all of her ADLs  · Safety: Fall Precautions  · Nutritional: Adequate nutrition in the home  · Home Health/DME/Supplies: [facility], DMEs: wheelchair    Objective:   Physical Exam  Vitals reviewed.   Constitutional:       General: She is not in acute distress.  Pulmonary:      Effort: Pulmonary effort is normal.      Breath sounds: Normal breath sounds.   Abdominal:      General: Bowel sounds are normal. There is no distension.       Genitourinary:     Comments: Patient has an indwelling catheter draining clear yellow urine  Skin:     General: Skin is warm and dry.      Capillary Refill: Capillary refill takes less than 2 seconds.   Neurological:      Mental Status: She is alert. Mental status is at baseline.      Motor: No weakness.      Coordination: Coordination abnormal.   Psychiatric:         Attention and Perception: She perceives visual hallucinations.         Mood and Affect: Mood is anxious.         Speech: She is noncommunicative.         Behavior: Behavior is agitated and slowed.         Thought Content: Thought content does not include homicidal or suicidal plan.         Cognition and Memory: Cognition is impaired.         Vitals:    05/16/22 1500   Pulse: 78   Resp: 20   Temp: 97.8 °F (36.6 °C)   SpO2: 98%   PainSc: 0-No pain     There is no height or weight on file to calculate BMI.    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 Parkinson disease    3. Follow up        Plan:     Ethical / Legal: Advance Care Planning   · Surrogate decision maker:  Name Adenike Lemus, Relationship: Daughter  · Code Status:  DNR  · [place]:  [place]  · Other advance directive:  None, Capacity to make medical decisions:  No, Conflict None       Problem List Items Addressed This Visit        Neuro    Parkinson disease (Chronic)    Overview     Last Assessment & Plan:   Parkinson's likely contributing to symptoms as well she will be continued on Sinemet on discharge there is no interruption in therapy             Other Visit Diagnoses     Psychosis due to Parkinson's disease    -  Primary    Relevant Medications    QUEtiapine  (SEROQUEL) 25 MG Tab    Follow up           Encounter for Medical Follow-Up and Medication Review  - [facility] to schedule follow-up visit with patient in 4 weeks or PRN.     Patient Instructions Given:  - Continue all medications, treatments and therapies as ordered.   - Follow all instructions, recommendations as discussed.  - Maintain Safety Precautions at all times.  - Attend all medical appointments as scheduled.  - For worsening symptoms: call Primary Care Physician or Nurse Practitioner.  - For emergencies, call 911 or immediately report to the nearest emergency room    Were controlled substances prescribed?  No    Follow Up Appointments:   Future Appointments   Date Time Provider Department Center   5/27/2022  8:00 AM [id number]   6/9/2022 11:20 AM LABORATORY, [facility] EDU[facility]   6/16/2022 11:00 [facility]       Signature: Elva Colunga NP

## 2022-05-19 ENCOUNTER — DOCUMENT SCAN (OUTPATIENT)
Dept: HOME HEALTH SERVICES | Facility: HOSPITAL | Age: 82
End: 2022-05-19
Payer: MEDICARE

## 2022-05-25 ENCOUNTER — PATIENT MESSAGE (OUTPATIENT)
Dept: HOME HEALTH SERVICES | Facility: CLINIC | Age: 82
End: 2022-05-25
Payer: MEDICARE

## 2022-05-25 DIAGNOSIS — N39.0 URINARY TRACT INFECTION ASSOCIATED WITH INDWELLING URETHRAL CATHETER, INITIAL ENCOUNTER: Primary | ICD-10-CM

## 2022-05-25 DIAGNOSIS — T83.511A URINARY TRACT INFECTION ASSOCIATED WITH INDWELLING URETHRAL CATHETER, INITIAL ENCOUNTER: Primary | ICD-10-CM

## 2022-05-25 RX ORDER — SULFAMETHOXAZOLE AND TRIMETHOPRIM 800; 160 MG/1; MG/1
1 TABLET ORAL 2 TIMES DAILY
Qty: 14 TABLET | Refills: 0 | Status: ON HOLD | OUTPATIENT
Start: 2022-05-25 | End: 2022-06-01 | Stop reason: HOSPADM

## 2022-05-27 ENCOUNTER — HOSPITAL ENCOUNTER (INPATIENT)
Facility: HOSPITAL | Age: 82
LOS: 5 days | Discharge: HOME HEALTH | DRG: 698 | End: 2022-06-01
Attending: EMERGENCY MEDICINE | Admitting: INTERNAL MEDICINE
Payer: MEDICARE

## 2022-05-27 ENCOUNTER — DOCUMENT SCAN (OUTPATIENT)
Dept: HOME HEALTH SERVICES | Facility: HOSPITAL | Age: 82
End: 2022-05-27
Payer: MEDICARE

## 2022-05-27 ENCOUNTER — CARE AT HOME (OUTPATIENT)
Dept: HOME HEALTH SERVICES | Facility: CLINIC | Age: 82
End: 2022-05-27
Payer: MEDICARE

## 2022-05-27 DIAGNOSIS — I50.43 ACUTE ON CHRONIC COMBINED SYSTOLIC AND DIASTOLIC CHF (CONGESTIVE HEART FAILURE): ICD-10-CM

## 2022-05-27 DIAGNOSIS — N39.0 URINARY TRACT INFECTION ASSOCIATED WITH INDWELLING URETHRAL CATHETER, INITIAL ENCOUNTER: Primary | ICD-10-CM

## 2022-05-27 DIAGNOSIS — A41.9 SEPSIS, DUE TO UNSPECIFIED ORGANISM, UNSPECIFIED WHETHER ACUTE ORGAN DYSFUNCTION PRESENT: ICD-10-CM

## 2022-05-27 DIAGNOSIS — I38 ENDOCARDITIS: ICD-10-CM

## 2022-05-27 DIAGNOSIS — T83.511A URINARY TRACT INFECTION ASSOCIATED WITH INDWELLING URETHRAL CATHETER, INITIAL ENCOUNTER: Primary | ICD-10-CM

## 2022-05-27 DIAGNOSIS — E87.1 HYPONATREMIA: ICD-10-CM

## 2022-05-27 DIAGNOSIS — N39.0 ACUTE UTI (URINARY TRACT INFECTION): Primary | ICD-10-CM

## 2022-05-27 PROBLEM — R65.20 SEVERE SEPSIS: Status: ACTIVE | Noted: 2022-03-31

## 2022-05-27 PROBLEM — I50.40 COMBINED SYSTOLIC AND DIASTOLIC CONGESTIVE HEART FAILURE: Status: ACTIVE | Noted: 2022-05-27

## 2022-05-27 LAB
ALBUMIN SERPL BCP-MCNC: 2.5 G/DL (ref 3.5–5.2)
ALP SERPL-CCNC: 132 U/L (ref 55–135)
ALT SERPL W/O P-5'-P-CCNC: 18 U/L (ref 10–44)
ANION GAP SERPL CALC-SCNC: 12 MMOL/L (ref 8–16)
AST SERPL-CCNC: 25 U/L (ref 10–40)
BACTERIA #/AREA URNS HPF: ABNORMAL /HPF
BASOPHILS # BLD AUTO: 0.03 K/UL (ref 0–0.2)
BASOPHILS NFR BLD: 0.2 % (ref 0–1.9)
BILIRUB SERPL-MCNC: 0.5 MG/DL (ref 0.1–1)
BILIRUB UR QL STRIP: ABNORMAL
BUN SERPL-MCNC: 28 MG/DL (ref 8–23)
CALCIUM SERPL-MCNC: 8.3 MG/DL (ref 8.7–10.5)
CHLORIDE SERPL-SCNC: 84 MMOL/L (ref 95–110)
CLARITY UR: CLEAR
CO2 SERPL-SCNC: 27 MMOL/L (ref 23–29)
COLOR UR: ABNORMAL
CREAT SERPL-MCNC: 0.8 MG/DL (ref 0.5–1.4)
DIFFERENTIAL METHOD: ABNORMAL
EOSINOPHIL # BLD AUTO: 0.1 K/UL (ref 0–0.5)
EOSINOPHIL NFR BLD: 0.3 % (ref 0–8)
ERYTHROCYTE [DISTWIDTH] IN BLOOD BY AUTOMATED COUNT: 15.5 % (ref 11.5–14.5)
EST. GFR  (AFRICAN AMERICAN): >60 ML/MIN/1.73 M^2
EST. GFR  (NON AFRICAN AMERICAN): >60 ML/MIN/1.73 M^2
GLUCOSE SERPL-MCNC: 88 MG/DL (ref 70–110)
GLUCOSE UR QL STRIP: NEGATIVE
HCT VFR BLD AUTO: 31.4 % (ref 37–48.5)
HGB BLD-MCNC: 10.4 G/DL (ref 12–16)
HGB UR QL STRIP: ABNORMAL
HYALINE CASTS #/AREA URNS LPF: 2 /LPF
IMM GRANULOCYTES # BLD AUTO: 0.19 K/UL (ref 0–0.04)
IMM GRANULOCYTES NFR BLD AUTO: 1 % (ref 0–0.5)
KETONES UR QL STRIP: NEGATIVE
LACTATE SERPL-SCNC: 2.6 MMOL/L (ref 0.5–2.2)
LEUKOCYTE ESTERASE UR QL STRIP: ABNORMAL
LYMPHOCYTES # BLD AUTO: 1 K/UL (ref 1–4.8)
LYMPHOCYTES NFR BLD: 5.2 % (ref 18–48)
MCH RBC QN AUTO: 28.1 PG (ref 27–31)
MCHC RBC AUTO-ENTMCNC: 33.1 G/DL (ref 32–36)
MCV RBC AUTO: 85 FL (ref 82–98)
MICROSCOPIC COMMENT: ABNORMAL
MONOCYTES # BLD AUTO: 1.3 K/UL (ref 0.3–1)
MONOCYTES NFR BLD: 7.1 % (ref 4–15)
NEUTROPHILS # BLD AUTO: 16.1 K/UL (ref 1.8–7.7)
NEUTROPHILS NFR BLD: 86.2 % (ref 38–73)
NITRITE UR QL STRIP: POSITIVE
NRBC BLD-RTO: 0 /100 WBC
PH UR STRIP: 7 [PH] (ref 5–8)
PLATELET # BLD AUTO: 361 K/UL (ref 150–450)
PMV BLD AUTO: 9.5 FL (ref 9.2–12.9)
POTASSIUM SERPL-SCNC: 5.2 MMOL/L (ref 3.5–5.1)
PROT SERPL-MCNC: 6.4 G/DL (ref 6–8.4)
PROT UR QL STRIP: ABNORMAL
RBC # BLD AUTO: 3.7 M/UL (ref 4–5.4)
RBC #/AREA URNS HPF: >100 /HPF (ref 0–4)
SARS-COV-2 RDRP RESP QL NAA+PROBE: NEGATIVE
SODIUM SERPL-SCNC: 123 MMOL/L (ref 136–145)
SP GR UR STRIP: 1.01 (ref 1–1.03)
URN SPEC COLLECT METH UR: ABNORMAL
UROBILINOGEN UR STRIP-ACNC: 1 EU/DL
WBC # BLD AUTO: 18.69 K/UL (ref 3.9–12.7)
WBC #/AREA URNS HPF: 75 /HPF (ref 0–5)
WBC CLUMPS URNS QL MICRO: ABNORMAL

## 2022-05-27 PROCEDURE — 87186 SC STD MICRODIL/AGAR DIL: CPT | Performed by: EMERGENCY MEDICINE

## 2022-05-27 PROCEDURE — 99348 PR HOME VISIT,ESTAB PATIENT,LEVEL II: ICD-10-PCS | Mod: ,,, | Performed by: NURSE PRACTITIONER

## 2022-05-27 PROCEDURE — 99291 CRITICAL CARE FIRST HOUR: CPT | Mod: 25

## 2022-05-27 PROCEDURE — 87086 URINE CULTURE/COLONY COUNT: CPT | Performed by: EMERGENCY MEDICINE

## 2022-05-27 PROCEDURE — 87077 CULTURE AEROBIC IDENTIFY: CPT | Performed by: EMERGENCY MEDICINE

## 2022-05-27 PROCEDURE — 63600175 PHARM REV CODE 636 W HCPCS: Performed by: EMERGENCY MEDICINE

## 2022-05-27 PROCEDURE — 80053 COMPREHEN METABOLIC PANEL: CPT | Performed by: NURSE PRACTITIONER

## 2022-05-27 PROCEDURE — 25000003 PHARM REV CODE 250: Performed by: EMERGENCY MEDICINE

## 2022-05-27 PROCEDURE — 85025 COMPLETE CBC W/AUTO DIFF WBC: CPT | Performed by: NURSE PRACTITIONER

## 2022-05-27 PROCEDURE — 11000001 HC ACUTE MED/SURG PRIVATE ROOM

## 2022-05-27 PROCEDURE — 25000003 PHARM REV CODE 250: Performed by: INTERNAL MEDICINE

## 2022-05-27 PROCEDURE — 96374 THER/PROPH/DIAG INJ IV PUSH: CPT | Mod: 59

## 2022-05-27 PROCEDURE — 83605 ASSAY OF LACTIC ACID: CPT | Performed by: NURSE PRACTITIONER

## 2022-05-27 PROCEDURE — 87088 URINE BACTERIA CULTURE: CPT | Performed by: EMERGENCY MEDICINE

## 2022-05-27 PROCEDURE — U0002 COVID-19 LAB TEST NON-CDC: HCPCS | Performed by: EMERGENCY MEDICINE

## 2022-05-27 PROCEDURE — 99348 HOME/RES VST EST LOW MDM 30: CPT | Mod: ,,, | Performed by: NURSE PRACTITIONER

## 2022-05-27 PROCEDURE — 81000 URINALYSIS NONAUTO W/SCOPE: CPT | Performed by: EMERGENCY MEDICINE

## 2022-05-27 PROCEDURE — 51702 INSERT TEMP BLADDER CATH: CPT

## 2022-05-27 RX ORDER — IPRATROPIUM BROMIDE AND ALBUTEROL SULFATE 2.5; .5 MG/3ML; MG/3ML
3 SOLUTION RESPIRATORY (INHALATION) EVERY 4 HOURS PRN
Status: DISCONTINUED | OUTPATIENT
Start: 2022-05-27 | End: 2022-06-01 | Stop reason: HOSPADM

## 2022-05-27 RX ORDER — CARVEDILOL 6.25 MG/1
6.25 TABLET ORAL 2 TIMES DAILY
Status: DISCONTINUED | OUTPATIENT
Start: 2022-05-27 | End: 2022-06-01 | Stop reason: HOSPADM

## 2022-05-27 RX ORDER — QUETIAPINE FUMARATE 25 MG/1
25 TABLET, FILM COATED ORAL DAILY
Status: DISCONTINUED | OUTPATIENT
Start: 2022-05-28 | End: 2022-05-27

## 2022-05-27 RX ORDER — FUROSEMIDE 40 MG/1
40 TABLET ORAL 2 TIMES DAILY
Status: DISCONTINUED | OUTPATIENT
Start: 2022-05-27 | End: 2022-05-28

## 2022-05-27 RX ORDER — FAMOTIDINE 20 MG/1
20 TABLET, FILM COATED ORAL DAILY
COMMUNITY
Start: 2022-05-16 | End: 2022-07-27

## 2022-05-27 RX ORDER — ONDANSETRON 2 MG/ML
4 INJECTION INTRAMUSCULAR; INTRAVENOUS EVERY 8 HOURS PRN
Status: DISCONTINUED | OUTPATIENT
Start: 2022-05-27 | End: 2022-06-01 | Stop reason: HOSPADM

## 2022-05-27 RX ORDER — VANCOMYCIN HCL IN 5 % DEXTROSE 1G/250ML
1000 PLASTIC BAG, INJECTION (ML) INTRAVENOUS
Status: DISCONTINUED | OUTPATIENT
Start: 2022-05-28 | End: 2022-06-01

## 2022-05-27 RX ORDER — CARBIDOPA AND LEVODOPA 25; 100 MG/1; MG/1
2 TABLET ORAL 3 TIMES DAILY
Status: DISCONTINUED | OUTPATIENT
Start: 2022-05-27 | End: 2022-06-01 | Stop reason: HOSPADM

## 2022-05-27 RX ORDER — ACETAMINOPHEN 325 MG/1
650 TABLET ORAL EVERY 6 HOURS PRN
Status: DISCONTINUED | OUTPATIENT
Start: 2022-05-27 | End: 2022-06-01 | Stop reason: HOSPADM

## 2022-05-27 RX ADMIN — SODIUM CHLORIDE, SODIUM LACTATE, POTASSIUM CHLORIDE, AND CALCIUM CHLORIDE 1000 ML: .6; .31; .03; .02 INJECTION, SOLUTION INTRAVENOUS at 07:05

## 2022-05-27 RX ADMIN — FUROSEMIDE 40 MG: 40 TABLET ORAL at 10:05

## 2022-05-27 RX ADMIN — CARVEDILOL 6.25 MG: 6.25 TABLET, FILM COATED ORAL at 10:05

## 2022-05-27 RX ADMIN — CEFTRIAXONE 1 G: 1 INJECTION, SOLUTION INTRAVENOUS at 07:05

## 2022-05-27 RX ADMIN — VANCOMYCIN HYDROCHLORIDE 1250 MG: 1.25 INJECTION, POWDER, LYOPHILIZED, FOR SOLUTION INTRAVENOUS at 10:05

## 2022-05-27 RX ADMIN — SODIUM CHLORIDE, SODIUM LACTATE, POTASSIUM CHLORIDE, AND CALCIUM CHLORIDE 1000 ML: .6; .31; .03; .02 INJECTION, SOLUTION INTRAVENOUS at 10:05

## 2022-05-27 RX ADMIN — CARBIDOPA AND LEVODOPA 2 TABLET: 25; 100 TABLET ORAL at 10:05

## 2022-05-27 NOTE — FIRST PROVIDER EVALUATION
Medical screening exam completed.  I have conducted a focused provider triage encounter, findings are as follows:    Brief history of present illness:  Patient is a by home health patient is wheelchair-bound with a indwelling Abreu with gross hematuria    Vitals:    05/27/22 1607   BP: 123/82   BP Location: Right arm   Pulse: 77   Resp: (!) 22   Temp: 97 °F (36.1 °C)   TempSrc: Axillary   SpO2: (!) 94%       Pertinent physical exam:  Wheelchair bound nonverbal    Brief workup plan:  Lab work    Preliminary workup initiated; this workup will be continued and followed by the physician or advanced practice provider that is assigned to the patient when roomed.

## 2022-05-28 LAB
ALBUMIN SERPL BCP-MCNC: 2.3 G/DL (ref 3.5–5.2)
ALP SERPL-CCNC: 111 U/L (ref 55–135)
ALT SERPL W/O P-5'-P-CCNC: 6 U/L (ref 10–44)
ANION GAP SERPL CALC-SCNC: 14 MMOL/L (ref 8–16)
AST SERPL-CCNC: 25 U/L (ref 10–40)
BASOPHILS # BLD AUTO: 0.03 K/UL (ref 0–0.2)
BASOPHILS NFR BLD: 0.2 % (ref 0–1.9)
BILIRUB SERPL-MCNC: 0.5 MG/DL (ref 0.1–1)
BUN SERPL-MCNC: 25 MG/DL (ref 8–23)
CALCIUM SERPL-MCNC: 8.1 MG/DL (ref 8.7–10.5)
CHLORIDE SERPL-SCNC: 86 MMOL/L (ref 95–110)
CO2 SERPL-SCNC: 22 MMOL/L (ref 23–29)
CREAT SERPL-MCNC: 0.7 MG/DL (ref 0.5–1.4)
DIFFERENTIAL METHOD: ABNORMAL
EOSINOPHIL # BLD AUTO: 0.2 K/UL (ref 0–0.5)
EOSINOPHIL NFR BLD: 1.1 % (ref 0–8)
ERYTHROCYTE [DISTWIDTH] IN BLOOD BY AUTOMATED COUNT: 15.9 % (ref 11.5–14.5)
EST. GFR  (AFRICAN AMERICAN): >60 ML/MIN/1.73 M^2
EST. GFR  (NON AFRICAN AMERICAN): >60 ML/MIN/1.73 M^2
GLUCOSE SERPL-MCNC: 70 MG/DL (ref 70–110)
HCT VFR BLD AUTO: 33.5 % (ref 37–48.5)
HGB BLD-MCNC: 10.1 G/DL (ref 12–16)
IMM GRANULOCYTES # BLD AUTO: 0.17 K/UL (ref 0–0.04)
IMM GRANULOCYTES NFR BLD AUTO: 1.2 % (ref 0–0.5)
LYMPHOCYTES # BLD AUTO: 0.9 K/UL (ref 1–4.8)
LYMPHOCYTES NFR BLD: 6.4 % (ref 18–48)
MAGNESIUM SERPL-MCNC: 1.9 MG/DL (ref 1.6–2.6)
MCH RBC QN AUTO: 28.1 PG (ref 27–31)
MCHC RBC AUTO-ENTMCNC: 30.1 G/DL (ref 32–36)
MCV RBC AUTO: 93 FL (ref 82–98)
MONOCYTES # BLD AUTO: 1.2 K/UL (ref 0.3–1)
MONOCYTES NFR BLD: 8.3 % (ref 4–15)
NEUTROPHILS # BLD AUTO: 11.8 K/UL (ref 1.8–7.7)
NEUTROPHILS NFR BLD: 82.8 % (ref 38–73)
NRBC BLD-RTO: 0 /100 WBC
PLATELET # BLD AUTO: 265 K/UL (ref 150–450)
PMV BLD AUTO: 9.8 FL (ref 9.2–12.9)
POTASSIUM SERPL-SCNC: 5 MMOL/L (ref 3.5–5.1)
PROT SERPL-MCNC: 5.7 G/DL (ref 6–8.4)
RBC # BLD AUTO: 3.59 M/UL (ref 4–5.4)
SODIUM SERPL-SCNC: 122 MMOL/L (ref 136–145)
SODIUM UR-SCNC: 75 MMOL/L (ref 20–250)
URATE SERPL-MCNC: 6 MG/DL (ref 2.4–5.7)
WBC # BLD AUTO: 14.25 K/UL (ref 3.9–12.7)

## 2022-05-28 PROCEDURE — 83735 ASSAY OF MAGNESIUM: CPT | Performed by: INTERNAL MEDICINE

## 2022-05-28 PROCEDURE — 11000001 HC ACUTE MED/SURG PRIVATE ROOM

## 2022-05-28 PROCEDURE — 25000003 PHARM REV CODE 250: Performed by: STUDENT IN AN ORGANIZED HEALTH CARE EDUCATION/TRAINING PROGRAM

## 2022-05-28 PROCEDURE — 84550 ASSAY OF BLOOD/URIC ACID: CPT | Performed by: INTERNAL MEDICINE

## 2022-05-28 PROCEDURE — 85025 COMPLETE CBC W/AUTO DIFF WBC: CPT | Performed by: INTERNAL MEDICINE

## 2022-05-28 PROCEDURE — 94761 N-INVAS EAR/PLS OXIMETRY MLT: CPT

## 2022-05-28 PROCEDURE — 36415 COLL VENOUS BLD VENIPUNCTURE: CPT | Performed by: INTERNAL MEDICINE

## 2022-05-28 PROCEDURE — 25000242 PHARM REV CODE 250 ALT 637 W/ HCPCS: Performed by: INTERNAL MEDICINE

## 2022-05-28 PROCEDURE — 83935 ASSAY OF URINE OSMOLALITY: CPT | Performed by: INTERNAL MEDICINE

## 2022-05-28 PROCEDURE — 94640 AIRWAY INHALATION TREATMENT: CPT

## 2022-05-28 PROCEDURE — 99223 PR INITIAL HOSPITAL CARE,LEVL III: ICD-10-PCS | Mod: ,,, | Performed by: INTERNAL MEDICINE

## 2022-05-28 PROCEDURE — 27000221 HC OXYGEN, UP TO 24 HOURS

## 2022-05-28 PROCEDURE — 63600175 PHARM REV CODE 636 W HCPCS: Performed by: STUDENT IN AN ORGANIZED HEALTH CARE EDUCATION/TRAINING PROGRAM

## 2022-05-28 PROCEDURE — 99900035 HC TECH TIME PER 15 MIN (STAT)

## 2022-05-28 PROCEDURE — 99223 1ST HOSP IP/OBS HIGH 75: CPT | Mod: ,,, | Performed by: INTERNAL MEDICINE

## 2022-05-28 PROCEDURE — 80053 COMPREHEN METABOLIC PANEL: CPT | Performed by: INTERNAL MEDICINE

## 2022-05-28 PROCEDURE — 84300 ASSAY OF URINE SODIUM: CPT | Performed by: INTERNAL MEDICINE

## 2022-05-28 RX ORDER — HYDRALAZINE HYDROCHLORIDE 20 MG/ML
10 INJECTION INTRAMUSCULAR; INTRAVENOUS EVERY 6 HOURS PRN
Status: DISCONTINUED | OUTPATIENT
Start: 2022-05-28 | End: 2022-06-01 | Stop reason: HOSPADM

## 2022-05-28 RX ORDER — FUROSEMIDE 10 MG/ML
80 INJECTION INTRAMUSCULAR; INTRAVENOUS
Status: DISCONTINUED | OUTPATIENT
Start: 2022-05-28 | End: 2022-05-30

## 2022-05-28 RX ORDER — MUPIROCIN 20 MG/G
OINTMENT TOPICAL 2 TIMES DAILY
Status: DISCONTINUED | OUTPATIENT
Start: 2022-05-28 | End: 2022-06-01 | Stop reason: HOSPADM

## 2022-05-28 RX ADMIN — FUROSEMIDE 80 MG: 10 INJECTION, SOLUTION INTRAMUSCULAR; INTRAVENOUS at 11:05

## 2022-05-28 RX ADMIN — MUPIROCIN: 20 OINTMENT TOPICAL at 09:05

## 2022-05-28 RX ADMIN — VANCOMYCIN HYDROCHLORIDE 1000 MG: 1 INJECTION, POWDER, LYOPHILIZED, FOR SOLUTION INTRAVENOUS at 10:05

## 2022-05-28 RX ADMIN — IPRATROPIUM BROMIDE AND ALBUTEROL SULFATE 3 ML: 2.5; .5 SOLUTION RESPIRATORY (INHALATION) at 05:05

## 2022-05-28 NOTE — H&P
[facility] - Emergency Dept.  [facility] Medicine  History & Physical    Patient Name: Tanya Madrid  MRN: [id number]  Patient Class: IP- Inpatient  Admission Date: 5/27/2022  Attending Physician: Garry Felix MD   Primary Care Provider: Elva Ansari NP         Patient information was obtained from relative(s), past medical records and ER records.     Subjective:     Principal Problem:Urinary tract infection associated with indwelling urethral catheter    Chief Complaint:   Chief Complaint   Patient presents with    Bleeding/Bruising     Home health nurse sent her to the ED for bleeding-uncertain of source whether vaginal, rectal, or urethral.  Pt has henley in place since 5/24 with blood and clots to henley bag.        HPI: Ms. Madrid is an elderly 82-year-old  female, nonverbal, bed-bound since cardiac arrest in March 2022, PMH significant for combined CHF, EF 10%, CAD, CVA, chronic indwelling Henley catheter, was brought to the ED by her daughter complaining of dark colored urine for the past few days.  Daughter reports no evidence of fever, chills, dysuria.  Unable to obtain any information from patient, due to her nonverbal state.  Patient had urine cultures obtained on 5/25 per home health nurse, and was empirically prescribed Bactrim.  Today urine cultures resulted growing Staphylococcus aureus, unknown sensitivities.  Afebrile in the ED.  /91.  WBC 78428, 0% bands, lactic acid 2.6.  Sodium 123, potassium 5.2, chloride 84, creatinine 0.8.  UA reveals dark-colored urine, positive for nitrites, 3+ leukocytes, > 100 rbc's, 75 WBCs.  Received IV Rocephin, IV vancomycin in the ED.  CT of the abdomen renal stone study, negative for hydronephrosis or obstruction.  But reveals moderate anasarca, and moderately large right and mild left-sided pleural effusion, similar to prior imaging studies.  Patient denies shortness of breath.  Currently on room air, oxygen saturations in the high  90s.    Admitting diagnosis:  Staphylococcus aureus UTI, due to chronic indwelling Abreu catheter.  Severe sepsis.      Past Medical History:   Diagnosis Date    Acute CHF (congestive heart failure) 1/17/2021    Hyperlipemia     Hypertension     Parkinson's disease     Stroke 2016    Throat mass        Past Surgical History:   Procedure Laterality Date    CLOSED REDUCTION Right 10/15/2020    Procedure: CLOSED REDUCTION;  Surgeon: Humberto Valdivia DO;  Location: [facility] OR;  Service: Orthopedics;  Laterality: Right;  ATTEMPTED    EVACUATION OF HEMATOMA Right 10/17/2020    Procedure: EVACUATION, HEMATOMA;  Surgeon: Humberto Valdivia DO;  Location: [facility] OR;  Service: Orthopedics;  Laterality: Right;    HEMIARTHROPLASTY OF HIP Left 7/12/2020    Procedure: HEMIARTHROPLASTY, HIP;  Surgeon: Humberto Valdivia DO;  Location: [facility] OR;  Service: Orthopedics;  Laterality: Left;    HEMIARTHROPLASTY OF HIP Right 10/2/2020    Procedure: HEMIARTHROPLASTY, HIP;  Surgeon: Loyd Kearney MD;  Location: [facility] OR;  Service: Orthopedics;  Laterality: Right;    HYSTERECTOMY      INSERTION OF PERCUTANEOUS ENDOSCOPIC GASTROSTOMY (PEG) FEEDING TUBE      OPEN REDUCTION OF DISLOCATION OF HIP Right 10/17/2020    Procedure: OPEN REDUCTION, DISLOCATION, HIP;  Surgeon: Humberto Valdivia DO;  Location: [facility] OR;  Service: Orthopedics;  Laterality: Right;    THROAT SURGERY      TONSILLECTOMY         Review of patient's allergies indicates:   Allergen Reactions    Xanax [alprazolam]        No current facility-administered medications on file prior to encounter.     Current Outpatient Medications on File Prior to Encounter   Medication Sig    carbidopa-levodopa  mg (SINEMET)  mg per tablet 2 tablets by Per G Tube route 3 (three) times daily.    carvediloL (COREG) 6.25 MG tablet 1 tablet (6.25 mg total) by Per G Tube route 2 (two) times daily.    clopidogreL (PLAVIX) 75 mg tablet 1 tablet (75 mg total) by Per G Tube  route once daily.    esomeprazole (NEXIUM) 40 MG capsule Take  40 mg(1 cap)  twice a day  , then after 40 mg(1 cap) daily   Through PEG tube    famotidine (PEPCID) 20 MG tablet Take 20 mg by mouth once daily at 6am.    furosemide (LASIX) 40 MG tablet 1 tablet (40 mg total) by Per G Tube route 2 (two) times a day. Take per G tube twice daily as directed    ipratropium (ATROVENT) 0.02 % nebulizer solution Take 2.5 mLs (500 mcg total) by nebulization 2 (two) times daily. Rescue    QUEtiapine (SEROQUEL) 25 MG Tab Take 1 tablet (25 mg total) by mouth once daily. At bedtime    sacubitriL-valsartan (ENTRESTO)  mg per tablet 1 tablet by Per G Tube route 2 (two) times daily.    scopolamine (TRANSDERM-SCOP) 1.3-1.5 mg (1 mg over 3 days) Place 1 patch onto the skin Every 3 (three) days.    sulfamethoxazole-trimethoprim 800-160mg (BACTRIM DS) 800-160 mg Tab Take 1 tablet by mouth 2 (two) times daily. for 7 days    traZODone (DESYREL) 50 MG tablet Take 0.5 tablets (25 mg total) by mouth every evening.    [DISCONTINUED] aspirin 81 MG Chew 1 tablet (81 mg total) by Per G Tube route once daily.    [DISCONTINUED] losartan (COZAAR) 50 MG tablet Take 1 tablet (50 mg total) by mouth 2 (two) times a day.    [DISCONTINUED] metoprolol succinate (TOPROL-XL) 50 MG 24 hr tablet Take 1 tablet (50 mg total) by mouth 2 (two) times daily.    [DISCONTINUED] omeprazole magnesium 10 mg SuDR Take 40 mg by mouth once daily. (Patient not taking: Reported on 4/21/2022)    [DISCONTINUED] pantoprazole (PROTONIX) 40 mg suspension Take 1 packet (40 mg total) by mouth 2 (two) times daily.     Family History       Family history is unknown by patient.          Tobacco Use    Smoking status: Never Smoker    Smokeless tobacco: Never Used   Substance and Sexual Activity    Alcohol use: No    Drug use: No    Sexual activity: Not Currently     Review of Systems   Unable to perform ROS: Patient nonverbal   Objective:     Vital Signs (Most  Recent):  Temp: 97 °F (36.1 °C) (05/27/22 1607)  Pulse: 87 (05/27/22 1944)  Resp: (!) 21 (05/27/22 1944)  BP: (!) 117/91 (05/27/22 1944)  SpO2: 97 % (05/27/22 1944)   Vital Signs (24h Range):  Temp:  [97 °F (36.1 °C)] 97 °F (36.1 °C)  Pulse:  [77-88] 87  Resp:  [21-23] 21  SpO2:  [94 %-99 %] 97 %  BP: (117-142)/(82-91) 117/91     Weight: 63.2 kg (139 lb 4.8 oz)  Body mass index is 25.48 kg/m².    Physical Exam  Vitals and nursing note reviewed.   Constitutional:       General: She is awake. She is not in acute distress.     Appearance: She is ill-appearing (Chronically, nonverbal, bedbound).   HENT:      Head: Normocephalic and atraumatic.      Mouth/Throat:      Mouth: Mucous membranes are dry.   Eyes:      General: No scleral icterus.     Conjunctiva/sclera: Conjunctivae normal.   Cardiovascular:      Rate and Rhythm: Normal rate and regular rhythm.      Heart sounds: Murmur heard.   Pulmonary:      Effort: Pulmonary effort is normal.      Breath sounds: Examination of the right-lower field reveals decreased breath sounds. Examination of the left-lower field reveals decreased breath sounds. Decreased breath sounds present. No wheezing.   Abdominal:      Palpations: Abdomen is soft.      Tenderness: There is no abdominal tenderness.      Comments: Peg tube in place.   Genitourinary:     Comments: Chronic indwelling Abreu catheter in place, dark-colored urine noted in bag  Musculoskeletal:         General: Swelling (1+ bilateral lower extremity pretibial pitting edema) present.      Cervical back: No rigidity.   Skin:     General: Skin is warm.      Coloration: Skin is not jaundiced.   Neurological:      Mental Status: Mental status is at baseline.      Comments: Nonverbal at baseline per daughter.  No new neuro deficits.   Psychiatric:      Comments: Patient non verbal           Significant Labs: All pertinent labs within the past 24 hours have been reviewed.  Bilirubin:   Recent Labs   Lab 05/03/22  0628  05/27/22  1732   BILITOT 0.7 0.5     Blood Culture: No results for input(s): LABBLOO in the last 48 hours.  BMP:   Recent Labs   Lab 05/27/22 1732   GLU 88   *   K 5.2*   CL 84*   CO2 27   BUN 28*   CREATININE 0.8   CALCIUM 8.3*     CBC:   Recent Labs   Lab 05/27/22 1732   WBC 18.69*   HGB 10.4*   HCT 31.4*        CMP:   Recent Labs   Lab 05/27/22 1732   *   K 5.2*   CL 84*   CO2 27   GLU 88   BUN 28*   CREATININE 0.8   CALCIUM 8.3*   PROT 6.4   ALBUMIN 2.5*   BILITOT 0.5   ALKPHOS 132   AST 25   ALT 18   ANIONGAP 12   EGFRNONAA >60     Cardiac Markers: No results for input(s): CKMB, MYOGLOBIN, BNP, TROPISTAT in the last 48 hours.  Lactic Acid:   Recent Labs   Lab 05/27/22 1732   LACTATE 2.6*     Magnesium: No results for input(s): MG in the last 48 hours.  Troponin: No results for input(s): TROPONINI in the last 48 hours.  Urine Culture: No results for input(s): LABURIN in the last 48 hours.  Urine Studies:   Recent Labs   Lab 05/27/22 1952   COLORU Red*   APPEARANCEUA Clear   PHUR 7.0   SPECGRAV 1.010   PROTEINUA 2+*   GLUCUA Negative   KETONESU Negative   BILIRUBINUA 1+*   OCCULTUA 2+*   NITRITE Positive*   UROBILINOGEN 1.0   LEUKOCYTESUR 3+*   RBCUA >100*   WBCUA 75*   BACTERIA Moderate*   HYALINECASTS 2*       Significant Imaging: I have reviewed all pertinent imaging results/findings within the past 24 hours.  I have reviewed and interpreted all pertinent imaging results/findings within the past 24 hours.  CT: I have reviewed all pertinent results/findings within the past 24 hours and my personal findings are:       Imaging Results              CT Renal Stone Study ABD Pelvis WO (Final result)  Result time 05/27/22 20:35:06      Final result by Elvira Vásquez MD (05/27/22 20:35:06)                   Impression:      Moderate anasarca    Prominent left left superior renal cyst and small right hepatic cyst    Moderately large right and mild left-sided pleural effusion with associated  atelectasis/consolidation    Cardiomegaly    No definite hydronephrosis.    Gallstones    A gastrostomy tube adjacent to the gastric outlet.  Proper position cannot determined.  Consider G tube tube check as clinically indicated    All CT scans   are performed using dose optimization techniques including the following: automated exposure control; adjustment of the mA and/or kV; use of iterative reconstruction technique.  Dose modulation was employed for ALARA by means of: Automated exposure control; adjustment of the mA and/or kV according to patient size (this includes techniques or standardized protocols for targeted exams where dose is matched to indication/reason for exam; i.e. extremities or head); and/or use of iterative reconstructive technique.      Electronically signed by: Fahad Felix  Date:    05/27/2022  Time:    20:35               Narrative:    EXAMINATION:  CT RENAL STONE STUDY ABD PELVIS WO    CLINICAL HISTORY:  complicated UTI;    TECHNIQUE:  Low dose axial images, sagittal and coronal reformations were obtained from the lung bases to the pubic symphysis.  Oral contrast is identified    COMPARISON:  None    FINDINGS:  Gallstones.  Hepatic cyst.  Mild anasarca.  Moderate right mild left-sided pleural effusion with associated atelectasis/consolidation.  Hepatomegaly.  Atherosclerotic changes.  Mild cardiomegaly.  Bilateral hip replacement with associated artifacts.  No definite bowel obstruction.  Prominent left renal cyst measuring 3.9 cm in the superior pole.  No definite hydronephrosis.  There is a gastrostomy tube identified.  The bulb of the a G-tube appears to abut the gastric outlet.  Proper positioning cannot be determined.  Recommend clinical correlation and follow-up tube check for further evaluation.  Small ill-defined hypodense lesions in the inferior pole of the left kidney may relate to complex cyst    No definite bowel obstruction.                                      I have  "independently reviewed and interpreted the EKG.     I have independently reviewed all pertinent labs within the past 24 hours.    I have independently reviewed, visualized and interpreted all pertinent imaging results within the past 24 hours and discussed the findings with the ED physician, Dr. Felix          Assessment/Plan:     * Urinary tract infection associated with indwelling urethral catheter  -UA reviewed  -urine cultures 5/25/22 growing Staphylococcus aureus, unknown sensitivities  -IV Rocephin, IV vancomycin initiated in the ED  -avoid further ID and sensitivities  -ID consult      Severe sepsis  This patient does have evidence of infective focus  My overall impression is sepsis. Vital signs were reviewed and noted in progress note.  Antibiotics given-   Antibiotics (From admission, onward)            Start     Stop Route Frequency Ordered    05/27/22 2100  vancomycin 1.25 g in dextrose 5% 250 mL IVPB (ready to mix)         -- IV Once 05/27/22 1955 05/27/22 2032  vancomycin - pharmacy to dose  (vancomycin IVPB)        "And" Linked Group Details    -- IV pharmacy to manage frequency 05/27/22 1933        Cultures were taken-   Microbiology Results (last 7 days)     Procedure Component Value Units Date/Time    Urine culture [[id number]] Collected: 05/27/22 1952    Order Status: No result Specimen: Urine Updated: 05/27/22 2012        Latest lactate reviewed, they are-  Recent Labs   Lab 05/27/22  1732   LACTATE 2.6*       Organ dysfunction indicated by Encephalopathy   Source- Urine    Source control Achieved by- IV Rocephin, IV vancomycin      Recurrent right pleural effusion  -consider pulmonary consult in a.m. for possible thoracentesis      Bilateral pleural effusion  -large right side pleural effusion, small left-sided,  -had thoracentesis earlier this month, 1200 cc drained  -currently oxygenating well, not requiring supplemental oxygen, denies SOB  Consider pulmonary consult for thoracentesis in " a.m.      Anoxic encephalopathy  -history of cardiac arrest in March 2022, currently bed-bound, nonverbal.        Hyponatremia  -serum sodium 123, chloride 84, suggesting volume depletion.  -will be cautious with IV fluids due to abdominal anasarca, bilateral pleural effusions.  -hold diuretics, antidepressants  -repeat labs in a.m.      H/O Essential hypertension         Parkinson disease  -continue home dose Sinemet    Combined systolic and diastolic congestive heart failure  Patient is identified as having Combined Systolic and Diastolic heart failure that is Chronic. CHF is currently controlled. Latest ECHO performed and demonstrates- Results for orders placed during the hospital encounter of 03/10/22    Echo    Interpretation Summary  · The left ventricle is mildly enlarged with eccentric hypertrophy and severely decreased systolic function.  · Grade III left ventricular diastolic dysfunction.  · Moderate right ventricular enlargement with moderately reduced right ventricular systolic function.  · Severe left atrial enlargement.  · The estimated ejection fraction is 10%.  · There is severe left ventricular global hypokinesis.  · Severe right atrial enlargement.  · Moderate-to-severe mitral regurgitation.  · Moderate to severe tricuspid regurgitation.  · Moderate pulmonic regurgitation.  · Mechanically ventilated; cannot use inferior caval vein diameter to estimate central venous pressure.  · There is a large left pleural effusion.  . Continue Furosemide and monitor clinical status closely. Monitor on telemetry. Patient is off CHF pathway.  Monitor strict Is&Os and daily weights.  Place on fluid restriction of 1.5 L. Continue to stress to patient importance of self efficacy and  on diet for CHF. Last BNP reviewed- and noted below No results for input(s): BNP, BNPTRIAGEBLO in the last 168 hours..        Hyperkalemia  -mild hyperkalemia, potassium 5.2  -hold home dose Entresto        VTE Risk Mitigation  (From admission, onward)         Ordered     Place sequential compression device  Until discontinued         05/27/22 2052               Patient is DNR/DNI      Bijan Tyler MD  Department of Hospital Medicine   '[facility] - Emergency Dept.

## 2022-05-28 NOTE — ASSESSMENT & PLAN NOTE
On admission, serum sodium 123, chloride 84, suggesting volume depletion.    -will be cautious with IV fluids due to abdominal anasarca, bilateral pleural effusions.  -hold diuretics, antidepressants  -repeat labs in a.m.  -Consult Nephrology- recommend 1.5L fluid restriction  -consult RD for recs for peg feeding/water bolus

## 2022-05-28 NOTE — HOSPITAL COURSE
5/28: Na: 122, nephrology consulted, recommend 1.5L fluid restriction. Enteral feeding order adjusted to reduce free water boluses. WBC trending down. Discussion with daughter on removing henley catheter, she is aware of increased diaper changes to prevent skin breakdown,  due to infection risk dtr prefers it to be removed. Will have a voiding trial prior to d/c.    5/29: patient decrease mental response, no verbal. MRSA UTI, continue Vancomycin. Severe CHF, LVEF 10 %. Pulmonary following,  will not benefit from repeat thoracentesis.  Severe CHF, history of cardiac arrest nonverbal status post trach and PEG.  Grave prognosis  Palliative care consult/comfort care.    5/30: Palliative medicine consult d/c due to family preference. Family is not in agreement with recommendation for comfort care and do not want to discuss care plan with Palliative. Nephrology signed off. ID evaluated, ordered Blood culture and ECHO, for further evaluation, r/o bacteremia. CBC stable, labs stable. Henley catheter removed, ordered voiding trial.     5/31: Blood cultures with no growth to date. Echo estimated EF 15/20%, no evidence of intracardiac vegetations. Awaiting ID recommendations for antibiotic regimen. Repeat CBC and CMP pending. Pulmonology recommends continuing Lasix diuresis. Repeat CXR ordered.     6/1: Patient given suppository this AM, positive for BM. Will plan for discharge home with HH and NP at home. Discharge on Bactrim per sensitivity report. Plan discussed with daughter at bedside, dtr in agreement with plan.

## 2022-05-28 NOTE — CONSULTS
"  [facility] - Med Surg  Adult Nutrition  Consult Note    SUMMARY     Recommendations  Recommendation/Intervention:   1.) Consider Nutren 2.0 @ goal rate 35 mls/hr continuous providing 1540 kcals/day, 65 g protein/day, 166 g CHO/day, and 533 mls water/day + Theodore BID mixed in 240 mls water BID for a total of 1013 mls fluid/day.     Goals: 1.) pt will tolerate enteral feeds at goal rate  Nutrition Goal Status: new  Communication of RD Recs: other (comment) (sticky note)    1. Acute UTI (urinary tract infection)    2. Sepsis, due to unspecified organism, unspecified whether acute organ dysfunction present    3. Hyponatremia      Past Medical History:   Diagnosis Date    Acute CHF (congestive heart failure) 1/17/2021    Hyperlipemia     Hypertension     Parkinson's disease     Stroke 2016    Throat mass          Assessment and Plan  Nutrition Problem  No nutritional dx at this time    Related to (etiology):   Adequate energy intake    Signs and Symptoms (as evidenced by):   Tolerating EN at goal rate      Interventions/Recommendations (treatment strategy):  Consider switching to 2.0 kcal + Theodore     Nutrition Diagnosis Status:   New           Malnutrition Assessment  Body mass index is 30.16 kg/m².         Reason for Assessment  Reason For Assessment: consult  General Information Comments: admits with UTI, bleeding and bruising. Pt nonverbal, peg present. RD consulted for tube feeds with 1.5 mls fluid restriction and healing sacrum wound. Per chart review, pt gaining weight since March of this year.    Nutrition Risk Screen  Nutrition Risk Screen: tube feeding or parenteral nutrition    Nutrition/Diet History  Factors Affecting Nutritional Intake: None identified at this time    Anthropometrics  Temp: 97.2 °F (36.2 °C)  Height Method: Stated  Height: 5' 2"  Height (inches): 62 in  Weight Method: Bed Scale  Weight: 74.8 kg (164 lb 14.5 oz)  Weight (lb): 164.91 lb  Ideal Body Weight (IBW), Female: 110 lb  % Ideal Body " Weight, Female (lb): 149.92 %  BMI (Calculated): 30.2  Usual Body Weight (UBW), k.09 kg (2022)  % Usual Body Weight: 116.95  % Weight Change From Usual Weight: 16.71 %       Lab/Procedures/Meds  Pertinent Labs Reviewed: reviewed  BMP  Lab Results   Component Value Date     (L) 2022    K 5.0 2022    CL 86 (L) 2022    CO2 22 (L) 2022    BUN 25 (H) 2022    CREATININE 0.7 2022    CALCIUM 8.1 (L) 2022    ANIONGAP 14 2022    ESTGFRAFRICA >60 2022    EGFRNONAA >60 2022     Lab Results   Component Value Date    ALBUMIN 2.3 (L) 2022     Lab Results   Component Value Date    CALCIUM 8.1 (L) 2022    PHOS 3.4 2022     No results for input(s): POCTGLUCOSE in the last 24 hours.    Pertinent Medications Reviewed: reviewed  Scheduled Meds:   carbidopa-levodopa  mg  2 tablet Per G Tube TID    carvediloL  6.25 mg Per G Tube BID    furosemide (LASIX) injection  80 mg Intravenous Q12H    vancomycin (VANCOCIN) IVPB  1,000 mg Intravenous Q24H         Estimated/Assessed Needs  Weight Used For Calorie Calculations: 74.8 kg (164 lb 14.5 oz)  Energy Calorie Requirements (kcal): 1400 kcals/day  Energy Need Method: [place] (x1.2af)  Protein Requirements: 74-90g  Weight Used For Protein Calculations: 74.8 kg (164 lb 14.5 oz)  Estimated Fluid Requirement Method: RDA Method  RDA Method (mL): 1400      Nutrition Prescription Ordered  Current Diet Order: NPO  Current Nutrition Support Formula Ordered: Isosource 1.5  Current Nutrition Support Rate Ordered: 40 (ml)  Current Nutrition Support Frequency Ordered: continuous    Evaluation of Received Nutrient/Fluid Intake  Enteral Calories (kcal): 1440  Enteral Protein (gm): 65  Enteral (Free Water) Fluid (mL): 733  Tolerance: tolerating  % Intake of Estimated Energy Needs: 75 - 100 %  % Meal Intake: NPO    Nutrition Risk  Level of Risk/Frequency of Follow-up:  (x2 weekly)       Monitor and  Evaluation  Food and Nutrient Adminstration: diet order  Knowledge/Beliefs/Attitudes: food and nutrition knowledge/skill  Anthropometric Measurements: weight, weight change, body mass index  Biochemical Data, Medical Tests and Procedures: electrolyte and renal panel, glucose/endocrine profile, lipid profile  Nutrition-Focused Physical Findings: overall appearance       Nutrition Follow-Up  RD Follow-up?: Yes

## 2022-05-28 NOTE — SUBJECTIVE & OBJECTIVE
Past Medical History:   Diagnosis Date    Acute CHF (congestive heart failure) 1/17/2021    Hyperlipemia     Hypertension     Parkinson's disease     Stroke 2016    Throat mass        Past Surgical History:   Procedure Laterality Date    CLOSED REDUCTION Right 10/15/2020    Procedure: CLOSED REDUCTION;  Surgeon: Humberto Valdivia DO;  Location: [facility] OR;  Service: Orthopedics;  Laterality: Right;  ATTEMPTED    EVACUATION OF HEMATOMA Right 10/17/2020    Procedure: EVACUATION, HEMATOMA;  Surgeon: Humberto Valdivia DO;  Location: [facility] OR;  Service: Orthopedics;  Laterality: Right;    HEMIARTHROPLASTY OF HIP Left 7/12/2020    Procedure: HEMIARTHROPLASTY, HIP;  Surgeon: Humberto Valdivia DO;  Location: [facility] OR;  Service: Orthopedics;  Laterality: Left;    HEMIARTHROPLASTY OF HIP Right 10/2/2020    Procedure: HEMIARTHROPLASTY, HIP;  Surgeon: Loyd Kearney MD;  Location: [facility] OR;  Service: Orthopedics;  Laterality: Right;    HYSTERECTOMY      INSERTION OF PERCUTANEOUS ENDOSCOPIC GASTROSTOMY (PEG) FEEDING TUBE      OPEN REDUCTION OF DISLOCATION OF HIP Right 10/17/2020    Procedure: OPEN REDUCTION, DISLOCATION, HIP;  Surgeon: Humberto Valdivia DO;  Location: [facility] OR;  Service: Orthopedics;  Laterality: Right;    THROAT SURGERY      TONSILLECTOMY         Review of patient's allergies indicates:   Allergen Reactions    Xanax [alprazolam]        No current facility-administered medications on file prior to encounter.     Current Outpatient Medications on File Prior to Encounter   Medication Sig    carbidopa-levodopa  mg (SINEMET)  mg per tablet 2 tablets by Per G Tube route 3 (three) times daily.    carvediloL (COREG) 6.25 MG tablet 1 tablet (6.25 mg total) by Per G Tube route 2 (two) times daily.    clopidogreL (PLAVIX) 75 mg tablet 1 tablet (75 mg total) by Per G Tube route once daily.    esomeprazole (NEXIUM) 40 MG capsule Take  40 mg(1 cap)  twice a day  , then after 40 mg(1 cap) daily   Through PEG tube     famotidine (PEPCID) 20 MG tablet Take 20 mg by mouth once daily at 6am.    furosemide (LASIX) 40 MG tablet 1 tablet (40 mg total) by Per G Tube route 2 (two) times a day. Take per G tube twice daily as directed    ipratropium (ATROVENT) 0.02 % nebulizer solution Take 2.5 mLs (500 mcg total) by nebulization 2 (two) times daily. Rescue    QUEtiapine (SEROQUEL) 25 MG Tab Take 1 tablet (25 mg total) by mouth once daily. At bedtime    sacubitriL-valsartan (ENTRESTO)  mg per tablet 1 tablet by Per G Tube route 2 (two) times daily.    scopolamine (TRANSDERM-SCOP) 1.3-1.5 mg (1 mg over 3 days) Place 1 patch onto the skin Every 3 (three) days.    sulfamethoxazole-trimethoprim 800-160mg (BACTRIM DS) 800-160 mg Tab Take 1 tablet by mouth 2 (two) times daily. for 7 days    traZODone (DESYREL) 50 MG tablet Take 0.5 tablets (25 mg total) by mouth every evening.    [DISCONTINUED] aspirin 81 MG Chew 1 tablet (81 mg total) by Per G Tube route once daily.    [DISCONTINUED] losartan (COZAAR) 50 MG tablet Take 1 tablet (50 mg total) by mouth 2 (two) times a day.    [DISCONTINUED] metoprolol succinate (TOPROL-XL) 50 MG 24 hr tablet Take 1 tablet (50 mg total) by mouth 2 (two) times daily.    [DISCONTINUED] omeprazole magnesium 10 mg SuDR Take 40 mg by mouth once daily. (Patient not taking: Reported on 4/21/2022)    [DISCONTINUED] pantoprazole (PROTONIX) 40 mg suspension Take 1 packet (40 mg total) by mouth 2 (two) times daily.     Family History       Family history is unknown by patient.          Tobacco Use    Smoking status: Never Smoker    Smokeless tobacco: Never Used   Substance and Sexual Activity    Alcohol use: No    Drug use: No    Sexual activity: Not Currently     Review of Systems   Unable to perform ROS: Patient nonverbal   Objective:     Vital Signs (Most Recent):  Temp: 97 °F (36.1 °C) (05/27/22 1607)  Pulse: 87 (05/27/22 1944)  Resp: (!) 21 (05/27/22 1944)  BP: (!) 117/91 (05/27/22 1944)  SpO2: 97 % (05/27/22  1944)   Vital Signs (24h Range):  Temp:  [97 °F (36.1 °C)] 97 °F (36.1 °C)  Pulse:  [77-88] 87  Resp:  [21-23] 21  SpO2:  [94 %-99 %] 97 %  BP: (117-142)/(82-91) 117/91     Weight: 63.2 kg (139 lb 4.8 oz)  Body mass index is 25.48 kg/m².    Physical Exam  Vitals and nursing note reviewed.   Constitutional:       General: She is awake. She is not in acute distress.     Appearance: She is ill-appearing (Chronically, nonverbal, bedbound).   HENT:      Head: Normocephalic and atraumatic.      Mouth/Throat:      Mouth: Mucous membranes are dry.   Eyes:      General: No scleral icterus.     Conjunctiva/sclera: Conjunctivae normal.   Cardiovascular:      Rate and Rhythm: Normal rate and regular rhythm.      Heart sounds: Murmur heard.   Pulmonary:      Effort: Pulmonary effort is normal.      Breath sounds: Examination of the right-lower field reveals decreased breath sounds. Examination of the left-lower field reveals decreased breath sounds. Decreased breath sounds present. No wheezing.   Abdominal:      Palpations: Abdomen is soft.      Tenderness: There is no abdominal tenderness.      Comments: Peg tube in place.   Genitourinary:     Comments: Chronic indwelling Abreu catheter in place, dark-colored urine noted in bag  Musculoskeletal:         General: Swelling (1+ bilateral lower extremity pretibial pitting edema) present.      Cervical back: No rigidity.   Skin:     General: Skin is warm.      Coloration: Skin is not jaundiced.   Neurological:      Mental Status: Mental status is at baseline.      Comments: Nonverbal at baseline per daughter.  No new neuro deficits.   Psychiatric:      Comments: Patient non verbal           Significant Labs: All pertinent labs within the past 24 hours have been reviewed.  Bilirubin:   Recent Labs   Lab 05/03/22  0628 05/27/22  1732   BILITOT 0.7 0.5     Blood Culture: No results for input(s): LABBLOO in the last 48 hours.  BMP:   Recent Labs   Lab 05/27/22  1732   GLU 88   *    K 5.2*   CL 84*   CO2 27   BUN 28*   CREATININE 0.8   CALCIUM 8.3*     CBC:   Recent Labs   Lab 05/27/22  1732   WBC 18.69*   HGB 10.4*   HCT 31.4*        CMP:   Recent Labs   Lab 05/27/22  1732   *   K 5.2*   CL 84*   CO2 27   GLU 88   BUN 28*   CREATININE 0.8   CALCIUM 8.3*   PROT 6.4   ALBUMIN 2.5*   BILITOT 0.5   ALKPHOS 132   AST 25   ALT 18   ANIONGAP 12   EGFRNONAA >60     Cardiac Markers: No results for input(s): CKMB, MYOGLOBIN, BNP, TROPISTAT in the last 48 hours.  Lactic Acid:   Recent Labs   Lab 05/27/22  1732   LACTATE 2.6*     Magnesium: No results for input(s): MG in the last 48 hours.  Troponin: No results for input(s): TROPONINI in the last 48 hours.  Urine Culture: No results for input(s): LABURIN in the last 48 hours.  Urine Studies:   Recent Labs   Lab 05/27/22 1952   COLORU Red*   APPEARANCEUA Clear   PHUR 7.0   SPECGRAV 1.010   PROTEINUA 2+*   GLUCUA Negative   KETONESU Negative   BILIRUBINUA 1+*   OCCULTUA 2+*   NITRITE Positive*   UROBILINOGEN 1.0   LEUKOCYTESUR 3+*   RBCUA >100*   WBCUA 75*   BACTERIA Moderate*   HYALINECASTS 2*       Significant Imaging: I have reviewed all pertinent imaging results/findings within the past 24 hours.  I have reviewed and interpreted all pertinent imaging results/findings within the past 24 hours.  CT: I have reviewed all pertinent results/findings within the past 24 hours and my personal findings are:       Imaging Results              CT Renal Stone Study ABD Pelvis WO (Final result)  Result time 05/27/22 20:35:06      Final result by Elvira Vásquez MD (05/27/22 20:35:06)                   Impression:      Moderate anasarca    Prominent left left superior renal cyst and small right hepatic cyst    Moderately large right and mild left-sided pleural effusion with associated atelectasis/consolidation    Cardiomegaly    No definite hydronephrosis.    Gallstones    A gastrostomy tube adjacent to the gastric outlet.  Proper position cannot  determined.  Consider G tube tube check as clinically indicated    All CT scans   are performed using dose optimization techniques including the following: automated exposure control; adjustment of the mA and/or kV; use of iterative reconstruction technique.  Dose modulation was employed for ALARA by means of: Automated exposure control; adjustment of the mA and/or kV according to patient size (this includes techniques or standardized protocols for targeted exams where dose is matched to indication/reason for exam; i.e. extremities or head); and/or use of iterative reconstructive technique.      Electronically signed by: Fahad Felix  Date:    05/27/2022  Time:    20:35               Narrative:    EXAMINATION:  CT RENAL STONE STUDY ABD PELVIS WO    CLINICAL HISTORY:  complicated UTI;    TECHNIQUE:  Low dose axial images, sagittal and coronal reformations were obtained from the lung bases to the pubic symphysis.  Oral contrast is identified    COMPARISON:  None    FINDINGS:  Gallstones.  Hepatic cyst.  Mild anasarca.  Moderate right mild left-sided pleural effusion with associated atelectasis/consolidation.  Hepatomegaly.  Atherosclerotic changes.  Mild cardiomegaly.  Bilateral hip replacement with associated artifacts.  No definite bowel obstruction.  Prominent left renal cyst measuring 3.9 cm in the superior pole.  No definite hydronephrosis.  There is a gastrostomy tube identified.  The bulb of the a G-tube appears to abut the gastric outlet.  Proper positioning cannot be determined.  Recommend clinical correlation and follow-up tube check for further evaluation.  Small ill-defined hypodense lesions in the inferior pole of the left kidney may relate to complex cyst    No definite bowel obstruction.                                      I have independently reviewed and interpreted the EKG.     I have independently reviewed all pertinent labs within the past 24 hours.    I have independently reviewed, visualized  and interpreted all pertinent imaging results within the past 24 hours and discussed the findings with the ED physician, Dr. Felix

## 2022-05-28 NOTE — PROGRESS NOTES
Pharmacokinetic Initial Assessment: IV Vancomycin    Assessment/Plan:    Initiate intravenous vancomycin with loading dose of 1250 mg once followed by a maintenance dose of vancomycin 1000 mg IV every 24 hours  Desired empiric serum trough concentration is 10 to 15 mcg/mL  Draw vancomycin trough level 60 min prior to third dose on 05/29 at approximately 2100  Pharmacy will continue to follow and monitor vancomycin.      Please contact pharmacy at extension [phone] with any questions regarding this assessment.     Thank you for the consult,   Estefany Burtonang       Patient brief summary:  Tanya Madrid is a 82 y.o. female initiated on antimicrobial therapy with IV Vancomycin for treatment of suspected urinary tract infection    Drug Allergies:   Review of patient's allergies indicates:   Allergen Reactions    Xanax [alprazolam]        Actual Body Weight:   68.5 kg    Renal Function:   Estimated Creatinine Clearance: 47.5 mL/min (based on SCr of 0.8 mg/dL).,     Dialysis Method (if applicable):  N/A    CBC (last 72 hours):  Recent Labs   Lab Result Units 05/27/22  1732   WBC K/uL 18.69*   Hemoglobin g/dL 10.4*   Hematocrit % 31.4*   Platelets K/uL 361   Gran % % 86.2*   Lymph % % 5.2*   Mono % % 7.1   Eosinophil % % 0.3   Basophil % % 0.2   Differential Method  Automated       Metabolic Panel (last 72 hours):  Recent Labs   Lab Result Units 05/25/22  1230 05/27/22  1732 05/27/22  1952   Sodium mmol/L  --  123*  --    Potassium mmol/L  --  5.2*  --    Chloride mmol/L  --  84*  --    CO2 mmol/L  --  27  --    Glucose mg/dL  --  88  --    Glucose, UA  Negative  --  Negative   BUN mg/dL  --  28*  --    Creatinine mg/dL  --  0.8  --    Albumin g/dL  --  2.5*  --    Total Bilirubin mg/dL  --  0.5  --    Alkaline Phosphatase U/L  --  132  --    AST U/L  --  25  --    ALT U/L  --  18  --        Drug levels (last 3 results):  No results for input(s): VANCOMYCINRA, VANCORANDOM, VANCOMYCINPE, VANCOPEAK, VANCOMYCINTR, VANCOTROUGH  in the last 72 hours.    Microbiologic Results:  Microbiology Results (last 7 days)       Procedure Component Value Units Date/Time    Urine culture [[id number]] Collected: 05/27/22 1952    Order Status: No result Specimen: Urine Updated: 05/27/22 2012

## 2022-05-28 NOTE — ASSESSMENT & PLAN NOTE
"This patient does have evidence of infective focus  My overall impression is sepsis. Vital signs were reviewed and noted in progress note.  Antibiotics given-   Antibiotics (From admission, onward)            Start     Stop Route Frequency Ordered    05/28/22 2200  vancomycin in dextrose 5 % 1 gram/250 mL IVPB 1,000 mg         -- IV Every 24 hours (non-standard times) 05/27/22 2250    05/28/22 2100  mupirocin 2 % ointment         06/02 2059 Nasl 2 times daily 05/28/22 1512 05/27/22 2032  vancomycin - pharmacy to dose  (vancomycin IVPB)        "And" Linked Group Details    -- IV pharmacy to manage frequency 05/27/22 1933        Cultures were taken-   Microbiology Results (last 7 days)     Procedure Component Value Units Date/Time    Urine culture [[id number]] Collected: 05/27/22 1952    Order Status: No result Specimen: Urine Updated: 05/27/22 2012        Latest lactate reviewed, they are-  Recent Labs   Lab 05/27/22  1732   LACTATE 2.6*       Organ dysfunction indicated by Encephalopathy   Source- Urine    Source control Achieved by- IV Rocephin, IV vancomycin  --Pending culture    "

## 2022-05-28 NOTE — PLAN OF CARE
DNR. VSS. Fall precautions maintained.   No pain complaints per facial scale.  On O2 NC. T  ube feeds restarted at 40ml/hr isosource 1.5 as ordered.  NPO. 1.5L fluid restriction.  Repositioned q2hrs and as needed.  SCDs on.  Abreu intact and draining. Keep Abreu in until Na level WNL per provider.  Pending stool sample.  Family at bedside.   All needs met. Will continue to monitor.

## 2022-05-28 NOTE — ED PROVIDER NOTES
SCRIBE #1 NOTE: I, Jairo Wolfe, am scribing for, and in the presence of, Garry Felix MD. I have scribed the entire note.       History     Chief Complaint   Patient presents with    Bleeding/Bruising     Home health nurse sent her to the ED for bleeding-uncertain of source whether vaginal, rectal, or urethral.  Pt has henley in place since 5/24 with blood and clots to henley bag.     Review of patient's allergies indicates:   Allergen Reactions    Xanax [alprazolam]          History of Present Illness     HPI    5/27/2022, 7:32 PM  History obtained from the family  HPI/ROS limited as pt is nonverbal      History of Present Illness: Tanya Madrid is a 82 y.o. female patient with a PMHx of acute CHF, HLD, HTN, and Parkinson's who presents to the Emergency Department for evaluation of hematuria which onset x 3 days PTA. Pt currently has henley catheter in place. Symptoms are episodic and moderate in severity. Pt was recently treated for a UTI with Bactrim. No mitigating or exacerbating factors reported. No associated sxs reported. No further complaints or concerns at this time.       Arrival mode: Personal Transportation    PCP: Elva Ansari NP        Past Medical History:  Past Medical History:   Diagnosis Date    Acute CHF (congestive heart failure) 1/17/2021    Hyperlipemia     Hypertension     Parkinson's disease     Stroke 2016    Throat mass        Past Surgical History:  Past Surgical History:   Procedure Laterality Date    CLOSED REDUCTION Right 10/15/2020    Procedure: CLOSED REDUCTION;  Surgeon: Humberto Valdivia DO;  Location: [facility] OR;  Service: Orthopedics;  Laterality: Right;  ATTEMPTED    EVACUATION OF HEMATOMA Right 10/17/2020    Procedure: EVACUATION, HEMATOMA;  Surgeon: Humberto Valdivia DO;  Location: [facility] OR;  Service: Orthopedics;  Laterality: Right;    HEMIARTHROPLASTY OF HIP Left 7/12/2020    Procedure: HEMIARTHROPLASTY, HIP;  Surgeon: Humberto Valdivia DO;  Location:  [facility] OR;  Service: Orthopedics;  Laterality: Left;    HEMIARTHROPLASTY OF HIP Right 10/2/2020    Procedure: HEMIARTHROPLASTY, HIP;  Surgeon: Loyd Kearney MD;  Location: [facility] OR;  Service: Orthopedics;  Laterality: Right;    HYSTERECTOMY      INSERTION OF PERCUTANEOUS ENDOSCOPIC GASTROSTOMY (PEG) FEEDING TUBE      OPEN REDUCTION OF DISLOCATION OF HIP Right 10/17/2020    Procedure: OPEN REDUCTION, DISLOCATION, HIP;  Surgeon: Humberto Valdivia DO;  Location: [facility] OR;  Service: Orthopedics;  Laterality: Right;    THROAT SURGERY      TONSILLECTOMY           Family History:  Family History   Family history unknown: Yes       Social History:  Social History     Tobacco Use    Smoking status: Never Smoker    Smokeless tobacco: Never Used   Substance and Sexual Activity    Alcohol use: No    Drug use: No    Sexual activity: Not Currently        Review of Systems     Review of Systems   Unable to perform ROS: Patient nonverbal   Genitourinary: Positive for hematuria.      Physical Exam     Initial Vitals [05/27/22 1607]   BP Pulse Resp Temp SpO2   123/82 77 (!) 22 97 °F (36.1 °C) (!) 94 %      MAP       --          Physical Exam  Nursing Notes and Vital Signs Reviewed.  Constitutional: Patient is in no acute distress. Well-developed and well-nourished.  Head: Atraumatic. Normocephalic.  Eyes: PERRL. EOM intact. Conjunctivae are not pale. No scleral icterus.  ENT: Mucous membranes are moist. Oropharynx is clear and symmetric.    Neck: Supple. Full ROM. No lymphadenopathy.  Cardiovascular: Regular rate. Regular rhythm. No murmurs, rubs, or gallops. Distal pulses are 2+ and symmetric.  Pulmonary/Chest: No respiratory distress. Clear to auscultation bilaterally. No wheezing or rales.  Abdominal: Soft and non-distended.  There is no tenderness.  No rebound, guarding, or rigidity. Good bowel sounds.  Genitourinary: No CVA tenderness. No gross rectal bleeding. No vaginal bleeding. Some blood noted around the urinary  "catheter. Urine appears thick in henley catheter tubing, with sediment-like blood in urine.  Musculoskeletal: Moves all extremities. No obvious deformities. No edema. No calf tenderness.  Skin: Warm and dry.  Neurological:  Pt is somnolent, oriented x 0 by baseline, eyes open but non conversational.     ED Course   Critical Care    Date/Time: 5/27/2022 7:49 PM  Performed by: Garry Felix MD  Authorized by: Garry Felix MD   Direct patient critical care time: 15 minutes  Additional history critical care time: 5 minutes  Ordering / reviewing critical care time: 5 minutes  Documentation critical care time: 5 minutes  Consulting other physicians critical care time: 5 minutes  Total critical care time (exclusive of procedural time) : 35 minutes  Critical care time was exclusive of separately billable procedures and treating other patients and teaching time.  Critical care was necessary to treat or prevent imminent or life-threatening deterioration of the following conditions: sepsis.  Critical care was time spent personally by me on the following activities: blood draw for specimens, development of treatment plan with patient or surrogate, discussions with consultants, interpretation of cardiac output measurements, evaluation of patient's response to treatment, examination of patient, obtaining history from patient or surrogate, ordering and performing treatments and interventions, ordering and review of laboratory studies, pulse oximetry, re-evaluation of patient's condition and review of old charts.        ED Vital Signs:  Vitals:    05/27/22 1607 05/27/22 1830 05/27/22 1944 05/27/22 2150   BP: 123/82 (!) 142/88 (!) 117/91    Pulse: 77 88 87    Resp: (!) 22 (!) 23 (!) 21    Temp: 97 °F (36.1 °C)      TempSrc: Axillary      SpO2: (!) 94% 99% 97%    Weight:   63.2 kg (139 lb 4.8 oz) 68.5 kg (151 lb 0.2 oz)   Height:    5' 2" (1.575 m)    05/27/22 2151 05/28/22 0030 05/28/22 0126 05/28/22 0447   BP: (!) " "148/88 (!) 91/51 (!) 99/59 116/79   Pulse: 87 63 68 75   Resp: 17 17 17 17   Temp: 98.2 °F (36.8 °C) 97.9 °F (36.6 °C)  97.4 °F (36.3 °C)   TempSrc: Oral Axillary  Axillary   SpO2: 98% (!) 89% 99% (!) 83%   Weight:  74.8 kg (164 lb 14.5 oz)     Height:        05/28/22 0504 05/28/22 0732 05/28/22 0740 05/28/22 1142   BP:   105/69 119/73   Pulse: 103 72 72 76   Resp: 18 18 18 18   Temp:   97.3 °F (36.3 °C) 97.2 °F (36.2 °C)   TempSrc:   Oral Axillary   SpO2: (!) 88% 100% 100% 99%   Weight:       Height:        05/28/22 1344 05/28/22 1644   BP:  111/62   Pulse:  77   Resp:  16   Temp:  98 °F (36.7 °C)   TempSrc:  Axillary   SpO2:  99%   Weight: 74.8 kg (164 lb 14.5 oz)    Height: 5' 2" (1.575 m)        Abnormal Lab Results:  Labs Reviewed   CBC W/ AUTO DIFFERENTIAL - Abnormal; Notable for the following components:       Result Value    WBC 18.69 (*)     RBC 3.70 (*)     Hemoglobin 10.4 (*)     Hematocrit 31.4 (*)     RDW 15.5 (*)     Immature Granulocytes 1.0 (*)     Gran # (ANC) 16.1 (*)     Immature Grans (Abs) 0.19 (*)     Mono # 1.3 (*)     Gran % 86.2 (*)     Lymph % 5.2 (*)     All other components within normal limits   COMPREHENSIVE METABOLIC PANEL - Abnormal; Notable for the following components:    Sodium 123 (*)     Potassium 5.2 (*)     Chloride 84 (*)     BUN 28 (*)     Calcium 8.3 (*)     Albumin 2.5 (*)     All other components within normal limits   LACTIC ACID, PLASMA - Abnormal; Notable for the following components:    Lactate (Lactic Acid) 2.6 (*)     All other components within normal limits   URINALYSIS, REFLEX TO URINE CULTURE - Abnormal; Notable for the following components:    Color, UA Red (*)     Protein, UA 2+ (*)     Bilirubin (UA) 1+ (*)     Occult Blood UA 2+ (*)     Nitrite, UA Positive (*)     Leukocytes, UA 3+ (*)     All other components within normal limits    Narrative:     Specimen Source->Urine   URINALYSIS MICROSCOPIC - Abnormal; Notable for the following components:    RBC, UA " >100 (*)     WBC, UA 75 (*)     WBC Clumps, UA Occasional (*)     Bacteria Moderate (*)     Hyaline Casts, UA 2 (*)     All other components within normal limits    Narrative:     Specimen Source->Urine   CULTURE, URINE   SARS-COV-2 RNA AMPLIFICATION, QUAL   OCCULT BLOOD X 1, STOOL        All Lab Results:  Results for orders placed or performed during the hospital encounter of 05/27/22   CBC auto differential   Result Value Ref Range    WBC 18.69 (H) 3.90 - 12.70 K/uL    RBC 3.70 (L) 4.00 - 5.40 M/uL    Hemoglobin 10.4 (L) 12.0 - 16.0 g/dL    Hematocrit 31.4 (L) 37.0 - 48.5 %    MCV 85 82 - 98 fL    MCH 28.1 27.0 - 31.0 pg    MCHC 33.1 32.0 - 36.0 g/dL    RDW 15.5 (H) 11.5 - 14.5 %    Platelets 361 150 - 450 K/uL    MPV 9.5 9.2 - 12.9 fL    Immature Granulocytes 1.0 (H) 0.0 - 0.5 %    Gran # (ANC) 16.1 (H) 1.8 - 7.7 K/uL    Immature Grans (Abs) 0.19 (H) 0.00 - 0.04 K/uL    Lymph # 1.0 1.0 - 4.8 K/uL    Mono # 1.3 (H) 0.3 - 1.0 K/uL    Eos # 0.1 0.0 - 0.5 K/uL    Baso # 0.03 0.00 - 0.20 K/uL    nRBC 0 0 /100 WBC    Gran % 86.2 (H) 38.0 - 73.0 %    Lymph % 5.2 (L) 18.0 - 48.0 %    Mono % 7.1 4.0 - 15.0 %    Eosinophil % 0.3 0.0 - 8.0 %    Basophil % 0.2 0.0 - 1.9 %    Differential Method Automated    Comprehensive metabolic panel   Result Value Ref Range    Sodium 123 (L) 136 - 145 mmol/L    Potassium 5.2 (H) 3.5 - 5.1 mmol/L    Chloride 84 (L) 95 - 110 mmol/L    CO2 27 23 - 29 mmol/L    Glucose 88 70 - 110 mg/dL    BUN 28 (H) 8 - 23 mg/dL    Creatinine 0.8 0.5 - 1.4 mg/dL    Calcium 8.3 (L) 8.7 - 10.5 mg/dL    Total Protein 6.4 6.0 - 8.4 g/dL    Albumin 2.5 (L) 3.5 - 5.2 g/dL    Total Bilirubin 0.5 0.1 - 1.0 mg/dL    Alkaline Phosphatase 132 55 - 135 U/L    AST 25 10 - 40 U/L    ALT 18 10 - 44 U/L    Anion Gap 12 8 - 16 mmol/L    eGFR if African American >60 >60 mL/min/1.73 m^2    eGFR if non African American >60 >60 mL/min/1.73 m^2   Lactic acid, plasma   Result Value Ref Range    Lactate (Lactic Acid) 2.6 (H) 0.5 -  2.2 mmol/L   Urinalysis, Reflex to Urine Culture Urine, Catheterized    Specimen: Urine   Result Value Ref Range    Specimen UA Urine, Clean Catch     Color, UA Red (A) Yellow, Straw, Kassi    Appearance, UA Clear Clear    pH, UA 7.0 5.0 - 8.0    Specific Gravity, UA 1.010 1.005 - 1.030    Protein, UA 2+ (A) Negative    Glucose, UA Negative Negative    Ketones, UA Negative Negative    Bilirubin (UA) 1+ (A) Negative    Occult Blood UA 2+ (A) Negative    Nitrite, UA Positive (A) Negative    Urobilinogen, UA 1.0 <2.0 EU/dL    Leukocytes, UA 3+ (A) Negative   COVID-19 Rapid Screening   Result Value Ref Range    SARS-CoV-2 RNA, Amplification, Qual Negative Negative   Urinalysis Microscopic   Result Value Ref Range    RBC, UA >100 (H) 0 - 4 /hpf    WBC, UA 75 (H) 0 - 5 /hpf    WBC Clumps, UA Occasional (A) None-Rare    Bacteria Moderate (A) None-Occ /hpf    Hyaline Casts, UA 2 (A) 0-1/lpf /lpf    Microscopic Comment SEE COMMENT    CBC Auto Differential   Result Value Ref Range    WBC 14.25 (H) 3.90 - 12.70 K/uL    RBC 3.59 (L) 4.00 - 5.40 M/uL    Hemoglobin 10.1 (L) 12.0 - 16.0 g/dL    Hematocrit 33.5 (L) 37.0 - 48.5 %    MCV 93 82 - 98 fL    MCH 28.1 27.0 - 31.0 pg    MCHC 30.1 (L) 32.0 - 36.0 g/dL    RDW 15.9 (H) 11.5 - 14.5 %    Platelets 265 150 - 450 K/uL    MPV 9.8 9.2 - 12.9 fL    Immature Granulocytes 1.2 (H) 0.0 - 0.5 %    Gran # (ANC) 11.8 (H) 1.8 - 7.7 K/uL    Immature Grans (Abs) 0.17 (H) 0.00 - 0.04 K/uL    Lymph # 0.9 (L) 1.0 - 4.8 K/uL    Mono # 1.2 (H) 0.3 - 1.0 K/uL    Eos # 0.2 0.0 - 0.5 K/uL    Baso # 0.03 0.00 - 0.20 K/uL    nRBC 0 0 /100 WBC    Gran % 82.8 (H) 38.0 - 73.0 %    Lymph % 6.4 (L) 18.0 - 48.0 %    Mono % 8.3 4.0 - 15.0 %    Eosinophil % 1.1 0.0 - 8.0 %    Basophil % 0.2 0.0 - 1.9 %    Differential Method Automated    Magnesium   Result Value Ref Range    Magnesium 1.9 1.6 - 2.6 mg/dL   Comprehensive Metabolic Panel   Result Value Ref Range    Sodium 122 (L) 136 - 145 mmol/L    Potassium  5.0 3.5 - 5.1 mmol/L    Chloride 86 (L) 95 - 110 mmol/L    CO2 22 (L) 23 - 29 mmol/L    Glucose 70 70 - 110 mg/dL    BUN 25 (H) 8 - 23 mg/dL    Creatinine 0.7 0.5 - 1.4 mg/dL    Calcium 8.1 (L) 8.7 - 10.5 mg/dL    Total Protein 5.7 (L) 6.0 - 8.4 g/dL    Albumin 2.3 (L) 3.5 - 5.2 g/dL    Total Bilirubin 0.5 0.1 - 1.0 mg/dL    Alkaline Phosphatase 111 55 - 135 U/L    AST 25 10 - 40 U/L    ALT 6 (L) 10 - 44 U/L    Anion Gap 14 8 - 16 mmol/L    eGFR if African American >60 >60 mL/min/1.73 m^2    eGFR if non African American >60 >60 mL/min/1.73 m^2   Sodium, urine, random   Result Value Ref Range    Sodium, Urine 75 20 - 250 mmol/L   Uric acid   Result Value Ref Range    Uric Acid 6.0 (H) 2.4 - 5.7 mg/dL         Imaging Results:  Imaging Results          CT Renal Stone Study ABD Pelvis WO (Final result)  Result time 05/27/22 20:35:06    Final result by Elvira Vásquez MD (05/27/22 20:35:06)                 Impression:      Moderate anasarca    Prominent left left superior renal cyst and small right hepatic cyst    Moderately large right and mild left-sided pleural effusion with associated atelectasis/consolidation    Cardiomegaly    No definite hydronephrosis.    Gallstones    A gastrostomy tube adjacent to the gastric outlet.  Proper position cannot determined.  Consider G tube tube check as clinically indicated    All CT scans   are performed using dose optimization techniques including the following: automated exposure control; adjustment of the mA and/or kV; use of iterative reconstruction technique.  Dose modulation was employed for ALARA by means of: Automated exposure control; adjustment of the mA and/or kV according to patient size (this includes techniques or standardized protocols for targeted exams where dose is matched to indication/reason for exam; i.e. extremities or head); and/or use of iterative reconstructive technique.      Electronically signed by: Fahad Felix  Date:    05/27/2022  Time:    20:35              Narrative:    EXAMINATION:  CT RENAL STONE STUDY ABD PELVIS WO    CLINICAL HISTORY:  complicated UTI;    TECHNIQUE:  Low dose axial images, sagittal and coronal reformations were obtained from the lung bases to the pubic symphysis.  Oral contrast is identified    COMPARISON:  None    FINDINGS:  Gallstones.  Hepatic cyst.  Mild anasarca.  Moderate right mild left-sided pleural effusion with associated atelectasis/consolidation.  Hepatomegaly.  Atherosclerotic changes.  Mild cardiomegaly.  Bilateral hip replacement with associated artifacts.  No definite bowel obstruction.  Prominent left renal cyst measuring 3.9 cm in the superior pole.  No definite hydronephrosis.  There is a gastrostomy tube identified.  The bulb of the a G-tube appears to abut the gastric outlet.  Proper positioning cannot be determined.  Recommend clinical correlation and follow-up tube check for further evaluation.  Small ill-defined hypodense lesions in the inferior pole of the left kidney may relate to complex cyst    No definite bowel obstruction.                                      The Emergency Provider reviewed the vital signs and test results, which are outlined above.     ED Discussion       7:56 PM: Discussed case with Ave Sánchez NP ([facility]). Dr. Kramer agrees with current care and management of pt and accepts admission. Dr. Tyler ([facility]) recommend CT renal stone study.  Admitting Service: [facility]  Admitting Physician: Dr. Kramer  Admit to: Inpatient med surg    7:57 PM: Re-evaluated pt. I have discussed test results, shared treatment plan, and the need for admission with patient and family at bedside. Pt and family express understanding at this time and agree with all information. All questions answered. Pt and family have no further questions or concerns at this time. Pt is ready for admit.             Medical Decision Making:   Clinical Tests:   Lab Tests: Ordered and  Reviewed  Radiological Study: Ordered and Reviewed           ED Medication(s):  Medications   vancomycin - pharmacy to dose (has no administration in time range)   acetaminophen tablet 650 mg (has no administration in time range)   ondansetron injection 4 mg (has no administration in time range)   albuterol-ipratropium 2.5 mg-0.5 mg/3 mL nebulizer solution 3 mL (3 mLs Nebulization Given 5/28/22 0504)   carbidopa-levodopa  mg per tablet 2 tablet (2 tablets Per G Tube Not Given 5/28/22 1500)   carvediloL tablet 6.25 mg (6.25 mg Per G Tube Not Given 5/28/22 0900)   vancomycin in dextrose 5 % 1 gram/250 mL IVPB 1,000 mg (1,000 mg Intravenous Trough Due As Scheduled Before Dose 5/29/22 2100)   furosemide injection 80 mg (80 mg Intravenous Given 5/28/22 1158)   hydrALAZINE injection 10 mg (has no administration in time range)   mupirocin 2 % ointment (has no administration in time range)   lactated ringers bolus 1,000 mL (0 mLs Intravenous Stopped 5/27/22 2050)   cefTRIAXone (ROCEPHIN) 1 g/50 mL D5W IVPB (0 g Intravenous Stopped 5/27/22 2120)   lactated ringers bolus 1,000 mL (0 mLs Intravenous Stopped 5/27/22 2316)   vancomycin 1.25 g in dextrose 5% 250 mL IVPB (ready to mix) (0 mg Intravenous Stopped 5/27/22 2347)       Current Discharge Medication List                  Scribe Attestation:   Scribe #1: I performed the above scribed service and the documentation accurately describes the services I performed. I attest to the accuracy of the note.     Attending:   Physician Attestation Statement for Scribe #1: I, Garry Felix MD, personally performed the services described in this documentation, as scribed by Jairo Wolfe, in my presence, and it is both accurate and complete.           Clinical Impression       ICD-10-CM ICD-9-CM   1. Acute UTI (urinary tract infection)  N39.0 599.0   2. Sepsis, due to unspecified organism, unspecified whether acute organ dysfunction present  A41.9 038.9     995.91   3.  Hyponatremia  E87.1 276.1       Disposition:   Disposition: Admitted  Condition: Joon Felix MD  05/28/22 1730

## 2022-05-28 NOTE — SUBJECTIVE & OBJECTIVE
Interval History: Placed order to d/c henley catheter, will have voiding trial.     Review of Systems   Unable to perform ROS: Patient nonverbal   Objective:     Vital Signs (Most Recent):  Temp: 97.2 °F (36.2 °C) (05/28/22 1142)  Pulse: 76 (05/28/22 1142)  Resp: 18 (05/28/22 1142)  BP: 119/73 (05/28/22 1142)  SpO2: 99 % (05/28/22 1142) Vital Signs (24h Range):  Temp:  [97 °F (36.1 °C)-98.2 °F (36.8 °C)] 97.2 °F (36.2 °C)  Pulse:  [] 76  Resp:  [17-23] 18  SpO2:  [83 %-100 %] 99 %  BP: ()/(51-91) 119/73     Weight: 74.8 kg (164 lb 14.5 oz)  Body mass index is 30.16 kg/m².    Intake/Output Summary (Last 24 hours) at 5/28/2022 1355  Last data filed at 5/28/2022 0640  Gross per 24 hour   Intake 537.9 ml   Output 375 ml   Net 162.9 ml      Physical Exam  Constitutional:       General: She is not in acute distress.     Appearance: She is well-developed.   HENT:      Head: Normocephalic and atraumatic.      Right Ear: Hearing and external ear normal.      Left Ear: Hearing and external ear normal.      Nose: No rhinorrhea.      Right Sinus: No maxillary sinus tenderness or frontal sinus tenderness.      Left Sinus: No maxillary sinus tenderness or frontal sinus tenderness.      Mouth/Throat:      Mouth: No oral lesions.      Pharynx: Uvula midline.   Eyes:      General:         Right eye: No discharge.         Left eye: No discharge.      Conjunctiva/sclera: Conjunctivae normal.      Pupils: Pupils are equal, round, and reactive to light.   Neck:      Thyroid: No thyromegaly.      Vascular: No carotid bruit.      Trachea: No tracheal deviation.   Cardiovascular:      Rate and Rhythm: Normal rate and regular rhythm.      Pulses:           Dorsalis pedis pulses are 2+ on the right side and 2+ on the left side.      Heart sounds: Normal heart sounds, S1 normal and S2 normal. No murmur heard.  Pulmonary:      Effort: Pulmonary effort is normal. No respiratory distress.      Breath sounds: Normal breath sounds.    Chest:   Breasts:      Right: No supraclavicular adenopathy.      Left: No supraclavicular adenopathy.     Abdominal:      General: Bowel sounds are normal. There is no distension.      Palpations: Abdomen is soft. There is no mass.      Tenderness: There is no abdominal tenderness.   Musculoskeletal:         General: Normal range of motion.      Cervical back: Normal range of motion.   Lymphadenopathy:      Cervical: No cervical adenopathy.      Upper Body:      Right upper body: No supraclavicular adenopathy.      Left upper body: No supraclavicular adenopathy.   Skin:     General: Skin is warm and dry.      Capillary Refill: Capillary refill takes less than 2 seconds.      Findings: No rash.   Neurological:      Mental Status: She is alert and oriented to person, place, and time.      Sensory: No sensory deficit.      Coordination: Coordination normal.      Gait: Gait normal.   Psychiatric:         Mood and Affect: Mood is not anxious or depressed.         Speech: Speech normal.         Behavior: Behavior normal.         Thought Content: Thought content normal.         Judgment: Judgment normal.       Significant Labs: All pertinent labs within the past 24 hours have been reviewed.  CBC:   Recent Labs   Lab 05/27/22  1732 05/28/22  0454   WBC 18.69* 14.25*   HGB 10.4* 10.1*   HCT 31.4* 33.5*    265     CMP:   Recent Labs   Lab 05/27/22  1732 05/28/22  0454   * 122*   K 5.2* 5.0   CL 84* 86*   CO2 27 22*   GLU 88 70   BUN 28* 25*   CREATININE 0.8 0.7   CALCIUM 8.3* 8.1*   PROT 6.4 5.7*   ALBUMIN 2.5* 2.3*   BILITOT 0.5 0.5   ALKPHOS 132 111   AST 25 25   ALT 18 6*   ANIONGAP 12 14   EGFRNONAA >60 >60       Significant Imaging: I have reviewed all pertinent imaging results/findings within the past 24 hours.

## 2022-05-28 NOTE — PHARMACY MED REC
"Admission Medication History     The home medication history was taken by Mo Urbina.    You may go to "Admission" then "Reconcile Home Medications" tabs to review and/or act upon these items.      The home medication list has been updated by the Pharmacy department.    Please read ALL comments highlighted in yellow.    Please address this information as you see fit.     Feel free to contact us if you have any questions or require assistance.      Medications listed below were obtained from: Analytic software- [employer] and Medical records  (Not in a hospital admission)      Mo Urbina  [id number]    Current Outpatient Medications on File Prior to Encounter   Medication Sig Dispense Refill Last Dose    carbidopa-levodopa  mg (SINEMET)  mg per tablet 2 tablets by Per G Tube route 3 (three) times daily. 180 tablet 0     carvediloL (COREG) 6.25 MG tablet 1 tablet (6.25 mg total) by Per G Tube route 2 (two) times daily. 60 tablet 0     clopidogreL (PLAVIX) 75 mg tablet 1 tablet (75 mg total) by Per G Tube route once daily. 30 tablet 0     esomeprazole (NEXIUM) 40 MG capsule Take  40 mg(1 cap)  twice a day  , then after 40 mg(1 cap) daily   Through PEG tube 60 capsule 3     famotidine (PEPCID) 20 MG tablet Take 20 mg by mouth once daily at 6am.       furosemide (LASIX) 40 MG tablet 1 tablet (40 mg total) by Per G Tube route 2 (two) times a day. Take per G tube twice daily as directed 180 tablet 3     ipratropium (ATROVENT) 0.02 % nebulizer solution Take 2.5 mLs (500 mcg total) by nebulization 2 (two) times daily. Rescue 150 mL 0     QUEtiapine (SEROQUEL) 25 MG Tab Take 1 tablet (25 mg total) by mouth once daily. At bedtime 30 tablet 11     sacubitriL-valsartan (ENTRESTO)  mg per tablet 1 tablet by Per G Tube route 2 (two) times daily. 60 tablet 6     scopolamine (TRANSDERM-SCOP) 1.3-1.5 mg (1 mg over 3 days) Place 1 patch onto the skin Every 3 (three) days. 10 patch 3     " sulfamethoxazole-trimethoprim 800-160mg (BACTRIM DS) 800-160 mg Tab Take 1 tablet by mouth 2 (two) times daily. for 7 days 14 tablet 0     traZODone (DESYREL) 50 MG tablet Take 0.5 tablets (25 mg total) by mouth every evening. 15 tablet 3                          .

## 2022-05-28 NOTE — PLAN OF CARE
Recommendations  Recommendation/Intervention:   1.) Consider Nutren 2.0 @ goal rate 35 mls/hr continuous providing 1540 kcals/day, 65 g protein/day, 166 g CHO/day, and 533 mls water/day + Theodore BID mixed in 240 mls water BID for a total of 1013 mls fluid/day.      Goals: 1.) pt will tolerate enteral feeds at goal rate  Nutrition Goal Status: new  Communication of RD Recs: other (comment) (sticky note)

## 2022-05-28 NOTE — ASSESSMENT & PLAN NOTE
-serum sodium 123, chloride 84, suggesting volume depletion.  -will be cautious with IV fluids due to abdominal anasarca, bilateral pleural effusions.  -hold diuretics, antidepressants  -repeat labs in a.m.

## 2022-05-28 NOTE — PROGRESS NOTES
[facility] Medicine  Progress Note    Patient Name: Tanya Madrid  MRN: [id number]  Patient Class: IP- Inpatient   Admission Date: 5/27/2022  Length of Stay: 1 days  Attending Physician: Doreen Kramer MD  Primary Care Provider: Elva Ansari NP        Subjective:     Principal Problem:Urinary tract infection associated with indwelling urethral catheter        HPI:  Ms. Madrid is an elderly 82-year-old  female, nonverbal, bed-bound since cardiac arrest in March 2022, PMH significant for combined CHF, EF 10%, CAD, CVA, chronic indwelling Henley catheter, was brought to the ED by her daughter complaining of dark colored urine for the past few days.  Daughter reports no evidence of fever, chills, dysuria.  Unable to obtain any information from patient, due to her nonverbal state.  Patient had urine cultures obtained on 5/25 per home health nurse, and was empirically prescribed Bactrim.  Today urine cultures resulted growing Staphylococcus aureus, unknown sensitivities.  Afebrile in the ED.  /91.  WBC 97467, 0% bands, lactic acid 2.6.  Sodium 123, potassium 5.2, chloride 84, creatinine 0.8.  UA reveals dark-colored urine, positive for nitrites, 3+ leukocytes, > 100 rbc's, 75 WBCs.  Received IV Rocephin, IV vancomycin in the ED.  CT of the abdomen renal stone study, negative for hydronephrosis or obstruction.  But reveals moderate anasarca, and moderately large right and mild left-sided pleural effusion, similar to prior imaging studies.  Patient denies shortness of breath.  Currently on room air, oxygen saturations in the high 90s.    Admitting diagnosis:  Staphylococcus aureus UTI, due to chronic indwelling Henley catheter.  Severe sepsis.      Overview/Hospital Course:  5/28: Na: 122, nephrology consulted, recommend 1.5L fluid restriction. Enteral feeding order adjusted to reduce free water boluses. WBC trending down. Discussion with daughter on removing henley catheter,  she is aware of increased diaper changes to prevent skin breakdown,  due to infection risk dtr prefers it to be removed. Will have a voiding trial prior to d/c.       Interval History: Placed order to d/c henley catheter, will have voiding trial.     Review of Systems   Unable to perform ROS: Patient nonverbal   Objective:     Vital Signs (Most Recent):  Temp: 97.2 °F (36.2 °C) (05/28/22 1142)  Pulse: 76 (05/28/22 1142)  Resp: 18 (05/28/22 1142)  BP: 119/73 (05/28/22 1142)  SpO2: 99 % (05/28/22 1142) Vital Signs (24h Range):  Temp:  [97 °F (36.1 °C)-98.2 °F (36.8 °C)] 97.2 °F (36.2 °C)  Pulse:  [] 76  Resp:  [17-23] 18  SpO2:  [83 %-100 %] 99 %  BP: ()/(51-91) 119/73     Weight: 74.8 kg (164 lb 14.5 oz)  Body mass index is 30.16 kg/m².    Intake/Output Summary (Last 24 hours) at 5/28/2022 1355  Last data filed at 5/28/2022 0640  Gross per 24 hour   Intake 537.9 ml   Output 375 ml   Net 162.9 ml      Physical Exam  Constitutional:       General: She is not in acute distress.     Appearance: She is well-developed.   HENT:      Head: Normocephalic and atraumatic.      Right Ear: Hearing and external ear normal.      Left Ear: Hearing and external ear normal.      Nose: No rhinorrhea.      Right Sinus: No maxillary sinus tenderness or frontal sinus tenderness.      Left Sinus: No maxillary sinus tenderness or frontal sinus tenderness.      Mouth/Throat:      Mouth: No oral lesions.      Pharynx: Uvula midline.   Eyes:      General:         Right eye: No discharge.         Left eye: No discharge.      Conjunctiva/sclera: Conjunctivae normal.      Pupils: Pupils are equal, round, and reactive to light.   Neck:      Thyroid: No thyromegaly.      Vascular: No carotid bruit.      Trachea: No tracheal deviation.   Cardiovascular:      Rate and Rhythm: Normal rate and regular rhythm.      Pulses:           Dorsalis pedis pulses are 2+ on the right side and 2+ on the left side.      Heart sounds: Normal heart sounds,  S1 normal and S2 normal. No murmur heard.  Pulmonary:      Effort: Pulmonary effort is normal. No respiratory distress.      Breath sounds: Normal breath sounds.   Chest:   Breasts:      Right: No supraclavicular adenopathy.      Left: No supraclavicular adenopathy.     Abdominal:      General: Bowel sounds are normal. There is no distension.      Palpations: Abdomen is soft. There is no mass.      Tenderness: There is no abdominal tenderness.   Musculoskeletal:         General: Normal range of motion.      Cervical back: Normal range of motion.   Lymphadenopathy:      Cervical: No cervical adenopathy.      Upper Body:      Right upper body: No supraclavicular adenopathy.      Left upper body: No supraclavicular adenopathy.   Skin:     General: Skin is warm and dry.      Capillary Refill: Capillary refill takes less than 2 seconds.      Findings: No rash.   Neurological:      Mental Status: She is alert and oriented to person, place, and time.      Sensory: No sensory deficit.      Coordination: Coordination normal.      Gait: Gait normal.   Psychiatric:         Mood and Affect: Mood is not anxious or depressed.         Speech: Speech normal.         Behavior: Behavior normal.         Thought Content: Thought content normal.         Judgment: Judgment normal.       Significant Labs: All pertinent labs within the past 24 hours have been reviewed.  CBC:   Recent Labs   Lab 05/27/22  1732 05/28/22  0454   WBC 18.69* 14.25*   HGB 10.4* 10.1*   HCT 31.4* 33.5*    265     CMP:   Recent Labs   Lab 05/27/22  1732 05/28/22  0454   * 122*   K 5.2* 5.0   CL 84* 86*   CO2 27 22*   GLU 88 70   BUN 28* 25*   CREATININE 0.8 0.7   CALCIUM 8.3* 8.1*   PROT 6.4 5.7*   ALBUMIN 2.5* 2.3*   BILITOT 0.5 0.5   ALKPHOS 132 111   AST 25 25   ALT 18 6*   ANIONGAP 12 14   EGFRNONAA >60 >60       Significant Imaging: I have reviewed all pertinent imaging results/findings within the past 24 hours.      Assessment/Plan:      *  Urinary tract infection associated with indwelling urethral catheter  -UA reviewed  -urine cultures 5/25/22 growing Staphylococcus aureus, unknown sensitivities  -IV Rocephin, IV vancomycin initiated in the ED  -avoid further ID and sensitivities  -ID consult  -consider d/c henley prior to d/c with voiding trial      Combined systolic and diastolic congestive heart failure  Patient is identified as having Combined Systolic and Diastolic heart failure that is Chronic. CHF is currently controlled. Latest ECHO performed and demonstrates- Results for orders placed during the hospital encounter of 03/10/22    Echo    Interpretation Summary  · The left ventricle is mildly enlarged with eccentric hypertrophy and severely decreased systolic function.  · Grade III left ventricular diastolic dysfunction.  · Moderate right ventricular enlargement with moderately reduced right ventricular systolic function.  · Severe left atrial enlargement.  · The estimated ejection fraction is 10%.  · There is severe left ventricular global hypokinesis.  · Severe right atrial enlargement.  · Moderate-to-severe mitral regurgitation.  · Moderate to severe tricuspid regurgitation.  · Moderate pulmonic regurgitation.  · Mechanically ventilated; cannot use inferior caval vein diameter to estimate central venous pressure.  · There is a large left pleural effusion.  . Continue Furosemide and monitor clinical status closely. Monitor on telemetry. Patient is off CHF pathway.  Monitor strict Is&Os and daily weights.  Place on fluid restriction of 1.5 L. Continue to stress to patient importance of self efficacy and  on diet for CHF. Last BNP reviewed- and noted below No results for input(s): BNP, BNPTRIAGEBLO in the last 168 hours..        Hyponatremia  On admission, serum sodium 123, chloride 84, suggesting volume depletion.    -will be cautious with IV fluids due to abdominal anasarca, bilateral pleural effusions.  -hold diuretics,  "antidepressants  -repeat labs in a.m.  -Consult Nephrology- recommend 1.5L fluid restriction  -consult RD for recs for peg feeding/water bolus      Recurrent right pleural effusion  -consult pulmonary  for possible thoracentesis      Severe sepsis  This patient does have evidence of infective focus  My overall impression is sepsis. Vital signs were reviewed and noted in progress note.  Antibiotics given-   Antibiotics (From admission, onward)            Start     Stop Route Frequency Ordered    05/28/22 2200  vancomycin in dextrose 5 % 1 gram/250 mL IVPB 1,000 mg         -- IV Every 24 hours (non-standard times) 05/27/22 2250    05/28/22 2100  mupirocin 2 % ointment         06/02 2059 Nasl 2 times daily 05/28/22 1512 05/27/22 2032  vancomycin - pharmacy to dose  (vancomycin IVPB)        "And" Linked Group Details    -- IV pharmacy to manage frequency 05/27/22 1933        Cultures were taken-   Microbiology Results (last 7 days)     Procedure Component Value Units Date/Time    Urine culture [[id number]] Collected: 05/27/22 1952    Order Status: No result Specimen: Urine Updated: 05/27/22 2012        Latest lactate reviewed, they are-  Recent Labs   Lab 05/27/22  1732   LACTATE 2.6*       Organ dysfunction indicated by Encephalopathy   Source- Urine    Source control Achieved by- IV Rocephin, IV vancomycin  --Pending culture      Anoxic encephalopathy  -history of cardiac arrest in March 2022, currently bed-bound, nonverbal.        Bilateral pleural effusion  On admission, large right side pleural effusion, small left-sided, had thoracentesis earlier this month, 1200 cc drained, currently oxygenating well, not requiring supplemental oxygen, denies SOB    -Consult pulmonary for thoracentesis in a.m.      Hyperkalemia  mild hyperkalemia, potassium 5.2 on admission    -hold home dose Entresto  -improved    History of CVA (cerebrovascular accident)        Parkinson disease  Dtr reports she has not been giving patient " the medication regularly at home. Concerns about side effects.     -continue home dose Sinemet  -monitor for side effects    H/O Essential hypertension   Controlled, prescribed Lasix, Entresto, Coreg as outpatient, hold Entresto due to hypotension    --Continue Coreg and Lasix  --Vitals Q4H  --Hydralazine PRN            VTE Risk Mitigation (From admission, onward)         Ordered     Place sequential compression device  Until discontinued         05/27/22 2052                Discharge Planning   PARMINDER:      Code Status: DNR   Is the patient medically ready for discharge?:     Reason for patient still in hospital (select all that apply): Patient trending condition                     Cindy Manzano NP  Department of Hospital Medicine   [facility] - Med Surg

## 2022-05-28 NOTE — ASSESSMENT & PLAN NOTE
Dtr reports she has not been giving patient the medication regularly at home. Concerns about side effects.     -continue home dose Sinemet  -monitor for side effects

## 2022-05-28 NOTE — ASSESSMENT & PLAN NOTE
-UA reviewed  -urine cultures 5/25/22 growing Staphylococcus aureus, unknown sensitivities  -IV Rocephin, IV vancomycin initiated in the ED  -avoid further ID and sensitivities  -ID consult  -consider d/c henley prior to d/c with voiding trial

## 2022-05-28 NOTE — CONSULTS
..  Tanya Madrid is a 82 y.o. female for whom nephrology consult has been requested to evaluate and give opinion.     HPI: 82 year old AA female with hx CHF with apparent EF 10% and diuretic use at home admitted with UTI and apparent c/o hematuria seen in her indwelling henley; dtr at bedside; patient audibly wheezing and on oxygen and with LE edema; however, dtr states this is actually chronic; I have been asked to see patient for hyponatremia; she has elevated BNP almost in 5000 range. Her serum sodium was normal earlier this month on May 6 2022; Patient has Parkinsons and is non verbal; has remote hx of cardiac arrest in March 2022; EHR reviewed in detail.  Nephrology consultation requested for the above issues with low sodium and specifically Na 123-122 range.     PAST MEDICAL HISTORY:  She  has a past medical history of Acute CHF (congestive heart failure) (1/17/2021), Hyperlipemia, Hypertension, Parkinson's disease, Stroke (2016), and Throat mass.    PAST SURGICAL HISTORY:  She  has a past surgical history that includes Throat surgery; Hysterectomy; Tonsillectomy; Hemiarthroplasty of hip (Left, 7/12/2020); Hemiarthroplasty of hip (Right, 10/2/2020); Closed reduction (Right, 10/15/2020); Open reduction of dislocation of hip (Right, 10/17/2020); Evacuation of hematoma (Right, 10/17/2020); and Insertion of percutaneous endoscopic gastrostomy (PEG) feeding tube.    SOCIAL HISTORY:  She  reports that she has never smoked. She has never used smokeless tobacco. She reports that she does not drink alcohol and does not use drugs.      FAMILY MEDICAL HISTORY:  Her Family history is unknown by patient.    Review of patient's allergies indicates:   Allergen Reactions    Xanax [alprazolam]         carbidopa-levodopa  mg  2 tablet Per G Tube TID    carvediloL  6.25 mg Per G Tube BID    furosemide (LASIX) injection  80 mg Intravenous Q12H    vancomycin (VANCOCIN) IVPB  1,000 mg Intravenous Q24H       Prior to  Admission medications    Medication Sig Start Date End Date Taking? Authorizing Provider   carbidopa-levodopa  mg (SINEMET)  mg per tablet 2 tablets by Per G Tube route 3 (three) times daily. 3/29/22 5/12/22  Tim Reilly MD   carvediloL (COREG) 6.25 MG tablet 1 tablet (6.25 mg total) by Per G Tube route 2 (two) times daily. 3/29/22 5/12/22  Tim Reilly MD   clopidogreL (PLAVIX) 75 mg tablet 1 tablet (75 mg total) by Per G Tube route once daily. 3/30/22 5/12/22  Tim Reilly MD   esomeprazole (NEXIUM) 40 MG capsule Take  40 mg(1 cap)  twice a day  , then after 40 mg(1 cap) daily   Through PEG tube 4/4/22   Sj Juan MD   famotidine (PEPCID) 20 MG tablet Take 20 mg by mouth once daily at 6am. 5/16/22   Historical Provider   furosemide (LASIX) 40 MG tablet 1 tablet (40 mg total) by Per G Tube route 2 (two) times a day. Take per G tube twice daily as directed 5/17/22   JIE Jimenez   ipratropium (ATROVENT) 0.02 % nebulizer solution Take 2.5 mLs (500 mcg total) by nebulization 2 (two) times daily. Rescue 3/29/22 5/12/22  iTm Reilly MD   QUEtiapine (SEROQUEL) 25 MG Tab Take 1 tablet (25 mg total) by mouth once daily. At bedtime 5/17/22 5/17/23  Elva Colunga NP   sacubitriL-valsartan (ENTRESTO)  mg per tablet 1 tablet by Per G Tube route 2 (two) times daily. 5/13/22   JIE Jimenez   scopolamine (TRANSDERM-SCOP) 1.3-1.5 mg (1 mg over 3 days) Place 1 patch onto the skin Every 3 (three) days. 5/4/22   Elva Colunga NP   sulfamethoxazole-trimethoprim 800-160mg (BACTRIM DS) 800-160 mg Tab Take 1 tablet by mouth 2 (two) times daily. for 7 days 5/25/22 6/1/22  Elva Colunga NP   traZODone (DESYREL) 50 MG tablet Take 0.5 tablets (25 mg total) by mouth every evening. 4/20/22 5/20/22  Elva Colunga NP   aspirin 81 MG Chew 1 tablet (81 mg total) by Per G Tube route once daily. 3/30/22 4/4/22  Tim Reilly MD   losartan (COZAAR) 50 MG tablet Take 1  "tablet (50 mg total) by mouth 2 (two) times a day. 3/11/21 4/20/21  MARK Nelson   metoprolol succinate (TOPROL-XL) 50 MG 24 hr tablet Take 1 tablet (50 mg total) by mouth 2 (two) times daily. 4/1/21 3/29/22  MARK Nelson   omeprazole magnesium 10 mg SuDR Take 40 mg by mouth once daily.  Patient not taking: Reported on 4/21/2022 4/7/22 5/3/22  Garry Felix MD   pantoprazole (PROTONIX) 40 mg suspension Take 1 packet (40 mg total) by mouth 2 (two) times daily. 4/4/22 4/4/22  Sj Juan MD        REVIEW OF SYSTEMS:    Unobtainable due to non verbal status:      Patient has no fever, fatigue, visual changes, chest pain, edema, cough, dyspnea, nausea, vomiting, constipation, diarrhea, arthralgias, pruritis, dizziness, weakness, depression, confusion.        PHYSICAL EXAM:   height is 5' 2" (1.575 m) and weight is 74.8 kg (164 lb 14.5 oz). Her axillary temperature is 97.2 °F (36.2 °C). Her blood pressure is 119/73 and her pulse is 76. Her respiration is 18 and oxygen saturation is 99%.   Gen: WDWN female in no apparent distress; occasional auditory wheezes  Psych: Normal mood and affect  Skin: No rashes or ulcers  Eyes: Normal conjunctiva and lids, PERRLA  ENT: Normal hearing with no oropharyngeal lesions  Neck: No JVD  Chest: Clear with no rales, rhonchi, + wheezing with normal effort  CV: Regular with no murmurs, gallops or rubs  Abd: Soft, nontender, no distension, positive bowel sounds  Ext: No cyanosis, clubbing; 2+ BL LE edema          IMPRESSION AND RECOMMENDATIONS:    Hypervolemic hyponatremia with pleural effusions, elevated BNP and known hx of CHF with poor EF (10%)    Plan: IV diuretics needed with strict I and O tally; low fluid diet; low salt diet; consider Cards input for opinion in house; d/w dtr; this hyponatremia is to be considered chronic since likely greater than 48 hrs in duration between last labs and presentation; aim for high 120's by tomorrow; no role for " 3% saline or Tolvaptans; IV loops favored; GFR preserved; K should correct with diuresis and it is mildly elevated at present.  Following with you; d/w IM colleague in detail.          Ramesh Silva MD

## 2022-05-28 NOTE — ASSESSMENT & PLAN NOTE
On admission, large right side pleural effusion, small left-sided, had thoracentesis earlier this month, 1200 cc drained, currently oxygenating well, not requiring supplemental oxygen, denies SOB    -Consult pulmonary for thoracentesis in a.m.

## 2022-05-28 NOTE — ASSESSMENT & PLAN NOTE
Controlled, prescribed Lasix, Entresto, Coreg as outpatient, hold Entresto due to hypotension    --Continue Coreg and Lasix  --Vitals Q4H  --Hydralazine PRN

## 2022-05-28 NOTE — PLAN OF CARE
[facility] Surg  Initial Discharge Assessment       Primary Care Provider: Elva Ansari NP    Admission Diagnosis: Hyponatremia [E87.1]  Acute UTI (urinary tract infection) [N39.0]  Sepsis, due to unspecified organism, unspecified whether acute organ dysfunction present [A41.9]    Admission Date: 5/27/2022  Expected Discharge Date:     Discharge Barriers Identified: None    Payor: MEDICARE / Plan: MEDICARE PART A & B / Product Type: Government /     Extended Emergency Contact Information  Primary Emergency Contact: LemusAdenike brink  Address: [address] [place] [address] [facility]  Home Phone: [phone]  Mobile Phone: [phone]  Relation: Daughter    Discharge Plan A: Home Health  Discharge Plan B: Home with family      [id number] - [place] - [address] AT [facility]  [address]  [place] [address]  Phone: [phone] Fax: [phone]      Initial Assessment (most recent)     Adult Discharge Assessment - 05/28/22 1512        Discharge Assessment    Assessment Type Discharge Planning Assessment     Confirmed/corrected address, phone number and insurance Yes     Confirmed Demographics Correct on Facesheet     Source of Information family;other (see comments)   Pt is nonverbal.    If unable to respond/provide information was family/caregiver contacted? Yes     Contact Name/Number Adenike Lemus (daughter) [phone]     Does patient/caregiver understand observation status Yes     Communicated PARMINDER with patient/caregiver Yes     Lives With child(anthony), adult     Do you expect to return to your current living situation? Yes     Do you have help at home or someone to help you manage your care at home? Yes     Who are your caregiver(s) and their phone number(s)? Adenike Lemus (daughter) [phone]     Prior to hospitilization cognitive status: --   Pt is nonverbal, unable to assess cognitive status.    Current  cognitive status: --   Pt is nonverbal, unable to assess cognitive status.    Walking or Climbing Stairs Difficulty other (see comments)   Pt is bedbound.    Dressing/Bathing Difficulty bathing difficulty, dependent;dressing difficulty, dependent     Dressing/Bathing Management Pt's ADLs/IADLs managed by pt's dtr, Adenike.     Home Accessibility wheelchair accessible     Home Layout Able to live on 1st floor     Equipment Currently Used at Home hospital bed;feeding device;bedside commode;wheelchair;walker, rolling     Readmission within 30 days? No     Patient currently being followed by outpatient case management? No     Do you currently have service(s) that help you manage your care at home? Yes   NP makes home care visits at home through [facility].    Name and Contact number of agency [facility].     Is the pt/caregiver preference to resume services with current agency Yes     Do you take prescription medications? Yes     Do you have prescription coverage? Yes     Do you have any problems affording any of your prescribed medications? No     Is the patient taking medications as prescribed? yes     Who is going to help you get home at discharge? Adenike Lemus (daughter) [phone]     How do you get to doctors appointments? other (see comments)   Pt seen by NP at home through [facility].    Are you on dialysis? No     Do you take coumadin? No     Discharge Plan A Home Health     Discharge Plan B Home with family     DME Needed Upon Discharge  none     Discharge Plan discussed with: Adult children     Discharge Barriers Identified None        Relationship/Environment    Name(s) of Who Lives With Patient Adenike Lemus (daughter) [phone]                met with pt and dtr, Adenike, at bedside to complete discharge assessment. Pt lives at home with her dtr, who is her PCG and provides total care for the pt. Pt is bed bound, nonverbal and total dependent on caregiver for ADLs/IADLS and  peg-tub feedings. Pt is current with [facility] and rec NP visits at home through [facility]. Dtr advised that prior to pt going into cardiac arrest on March 10th, she was able to ambulate and was mostly independent with her ADLs/IADLs. Dtr reports that she cares for pt independently at this time. Dtr denies any post hospital needs/services at this time. CAROL updated white board with 's name and number. Transitional Care Folder, Discharge Planning Begins on Admission pamphlet, [facility] Bedside Delivery pamphlet, Advance Directive information given to patient along with the contact information given. CAROL instructed family to call with any questions or concerns. LAWRENCE, COY.

## 2022-05-28 NOTE — ASSESSMENT & PLAN NOTE
-large right side pleural effusion, small left-sided,  -had thoracentesis earlier this month, 1200 cc drained  -currently oxygenating well, not requiring supplemental oxygen, denies SOB  Consider pulmonary consult for thoracentesis in a.m.

## 2022-05-28 NOTE — ASSESSMENT & PLAN NOTE
-UA reviewed  -urine cultures 5/25/22 growing Staphylococcus aureus, unknown sensitivities  -IV Rocephin, IV vancomycin initiated in the ED  -avoid further ID and sensitivities  -ID consult

## 2022-05-28 NOTE — ASSESSMENT & PLAN NOTE
Patient is identified as having Combined Systolic and Diastolic heart failure that is Chronic. CHF is currently controlled. Latest ECHO performed and demonstrates- Results for orders placed during the hospital encounter of 03/10/22    Echo    Interpretation Summary  · The left ventricle is mildly enlarged with eccentric hypertrophy and severely decreased systolic function.  · Grade III left ventricular diastolic dysfunction.  · Moderate right ventricular enlargement with moderately reduced right ventricular systolic function.  · Severe left atrial enlargement.  · The estimated ejection fraction is 10%.  · There is severe left ventricular global hypokinesis.  · Severe right atrial enlargement.  · Moderate-to-severe mitral regurgitation.  · Moderate to severe tricuspid regurgitation.  · Moderate pulmonic regurgitation.  · Mechanically ventilated; cannot use inferior caval vein diameter to estimate central venous pressure.  · There is a large left pleural effusion.  . Continue Furosemide and monitor clinical status closely. Monitor on telemetry. Patient is off CHF pathway.  Monitor strict Is&Os and daily weights.  Place on fluid restriction of 1.5 L. Continue to stress to patient importance of self efficacy and  on diet for CHF. Last BNP reviewed- and noted below No results for input(s): BNP, BNPTRIAGEBLO in the last 168 hours..

## 2022-05-28 NOTE — HPI
Ms. Madrid is an elderly 82-year-old  female, nonverbal, bed-bound since cardiac arrest in March 2022, PMH significant for combined CHF, EF 10%, CAD, CVA, chronic indwelling Abreu catheter, was brought to the ED by her daughter complaining of dark colored urine for the past few days.  Daughter reports no evidence of fever, chills, dysuria.  Unable to obtain any information from patient, due to her nonverbal state.  Patient had urine cultures obtained on 5/25 per home health nurse, and was empirically prescribed Bactrim.  Today urine cultures resulted growing Staphylococcus aureus, unknown sensitivities.  Afebrile in the ED.  /91.  WBC 44338, 0% bands, lactic acid 2.6.  Sodium 123, potassium 5.2, chloride 84, creatinine 0.8.  UA reveals dark-colored urine, positive for nitrites, 3+ leukocytes, > 100 rbc's, 75 WBCs.  Received IV Rocephin, IV vancomycin in the ED.  CT of the abdomen renal stone study, negative for hydronephrosis or obstruction.  But reveals moderate anasarca, and moderately large right and mild left-sided pleural effusion, similar to prior imaging studies.  Patient denies shortness of breath.  Currently on room air, oxygen saturations in the high 90s.    Admitting diagnosis:  Staphylococcus aureus UTI, due to chronic indwelling Abreu catheter.  Severe sepsis.

## 2022-05-28 NOTE — ASSESSMENT & PLAN NOTE
"This patient does have evidence of infective focus  My overall impression is sepsis. Vital signs were reviewed and noted in progress note.  Antibiotics given-   Antibiotics (From admission, onward)            Start     Stop Route Frequency Ordered    05/27/22 2100  vancomycin 1.25 g in dextrose 5% 250 mL IVPB (ready to mix)         -- IV Once 05/27/22 1955 05/27/22 2032  vancomycin - pharmacy to dose  (vancomycin IVPB)        "And" Linked Group Details    -- IV pharmacy to manage frequency 05/27/22 1933        Cultures were taken-   Microbiology Results (last 7 days)     Procedure Component Value Units Date/Time    Urine culture [[id number]] Collected: 05/27/22 1952    Order Status: No result Specimen: Urine Updated: 05/27/22 2012        Latest lactate reviewed, they are-  Recent Labs   Lab 05/27/22  1732   LACTATE 2.6*       Organ dysfunction indicated by Encephalopathy   Source- Urine    Source control Achieved by- IV Rocephin, IV vancomycin    "

## 2022-05-29 VITALS — OXYGEN SATURATION: 100 % | HEART RATE: 87 BPM | TEMPERATURE: 99 F | RESPIRATION RATE: 18 BRPM

## 2022-05-29 LAB
ANION GAP SERPL CALC-SCNC: 10 MMOL/L (ref 8–16)
BUN SERPL-MCNC: 22 MG/DL (ref 8–23)
CALCIUM SERPL-MCNC: 8.2 MG/DL (ref 8.7–10.5)
CHLORIDE SERPL-SCNC: 91 MMOL/L (ref 95–110)
CO2 SERPL-SCNC: 30 MMOL/L (ref 23–29)
CREAT SERPL-MCNC: 0.7 MG/DL (ref 0.5–1.4)
EST. GFR  (AFRICAN AMERICAN): >60 ML/MIN/1.73 M^2
EST. GFR  (NON AFRICAN AMERICAN): >60 ML/MIN/1.73 M^2
GLUCOSE SERPL-MCNC: 112 MG/DL (ref 70–110)
OSMOLALITY UR: 190 MOSM/KG (ref 50–1200)
POTASSIUM SERPL-SCNC: 3.8 MMOL/L (ref 3.5–5.1)
SODIUM SERPL-SCNC: 131 MMOL/L (ref 136–145)
VANCOMYCIN TROUGH SERPL-MCNC: 11.8 UG/ML (ref 10–22)

## 2022-05-29 PROCEDURE — 80048 BASIC METABOLIC PNL TOTAL CA: CPT | Performed by: INTERNAL MEDICINE

## 2022-05-29 PROCEDURE — 63600175 PHARM REV CODE 636 W HCPCS: Performed by: STUDENT IN AN ORGANIZED HEALTH CARE EDUCATION/TRAINING PROGRAM

## 2022-05-29 PROCEDURE — 99233 SBSQ HOSP IP/OBS HIGH 50: CPT | Mod: ,,, | Performed by: INTERNAL MEDICINE

## 2022-05-29 PROCEDURE — 80202 ASSAY OF VANCOMYCIN: CPT | Performed by: STUDENT IN AN ORGANIZED HEALTH CARE EDUCATION/TRAINING PROGRAM

## 2022-05-29 PROCEDURE — 99900035 HC TECH TIME PER 15 MIN (STAT)

## 2022-05-29 PROCEDURE — 25000003 PHARM REV CODE 250: Performed by: NURSE PRACTITIONER

## 2022-05-29 PROCEDURE — 36415 COLL VENOUS BLD VENIPUNCTURE: CPT | Performed by: INTERNAL MEDICINE

## 2022-05-29 PROCEDURE — 99223 1ST HOSP IP/OBS HIGH 75: CPT | Mod: ,,, | Performed by: INTERNAL MEDICINE

## 2022-05-29 PROCEDURE — 27000221 HC OXYGEN, UP TO 24 HOURS

## 2022-05-29 PROCEDURE — 11000001 HC ACUTE MED/SURG PRIVATE ROOM

## 2022-05-29 PROCEDURE — 99223 PR INITIAL HOSPITAL CARE,LEVL III: ICD-10-PCS | Mod: ,,, | Performed by: INTERNAL MEDICINE

## 2022-05-29 PROCEDURE — 25000003 PHARM REV CODE 250: Performed by: INTERNAL MEDICINE

## 2022-05-29 PROCEDURE — 99233 PR SUBSEQUENT HOSPITAL CARE,LEVL III: ICD-10-PCS | Mod: ,,, | Performed by: INTERNAL MEDICINE

## 2022-05-29 PROCEDURE — 94761 N-INVAS EAR/PLS OXIMETRY MLT: CPT

## 2022-05-29 PROCEDURE — 27000207 HC ISOLATION

## 2022-05-29 PROCEDURE — 25000003 PHARM REV CODE 250: Performed by: STUDENT IN AN ORGANIZED HEALTH CARE EDUCATION/TRAINING PROGRAM

## 2022-05-29 RX ORDER — POLYETHYLENE GLYCOL 3350 17 G/17G
17 POWDER, FOR SOLUTION ORAL DAILY
Status: DISCONTINUED | OUTPATIENT
Start: 2022-05-29 | End: 2022-06-01 | Stop reason: HOSPADM

## 2022-05-29 RX ADMIN — VANCOMYCIN HYDROCHLORIDE 1000 MG: 1 INJECTION, POWDER, LYOPHILIZED, FOR SOLUTION INTRAVENOUS at 10:05

## 2022-05-29 RX ADMIN — FUROSEMIDE 80 MG: 10 INJECTION, SOLUTION INTRAMUSCULAR; INTRAVENOUS at 11:05

## 2022-05-29 RX ADMIN — CARVEDILOL 6.25 MG: 6.25 TABLET, FILM COATED ORAL at 10:05

## 2022-05-29 RX ADMIN — MUPIROCIN: 20 OINTMENT TOPICAL at 09:05

## 2022-05-29 RX ADMIN — POLYETHYLENE GLYCOL 3350 17 G: 17 POWDER, FOR SOLUTION ORAL at 06:05

## 2022-05-29 NOTE — ASSESSMENT & PLAN NOTE
Status post cardiac arrest March 2022. Status post trach and PEG.  Consider palliative care consult/comfort measures

## 2022-05-29 NOTE — ASSESSMENT & PLAN NOTE
5/29.  Status post right-sided thoracentesis 5/3 volume of 1200 mL removed.  Transudative , CHF related.  Will not benefit from repeat thoracentesis.  Severe CHF, history of cardiac arrest nonverbal status post trach and PEG.  Grave prognosis  Palliative care consult/comfort care.

## 2022-05-29 NOTE — HOSPITAL COURSE
O2 sat 99% on 3 L oxygen.  Afebrile.  WBC 18 K on admission.  Chest x-ray UA reviewed.  On IV vancomycin and IV Lasix I was consulted for thoracentesis of pleural effusion on kidney CT  5/30 seen and examined.  No major events.  Afebrile.  WBC 6 K. on IV vancomycin for MRSA UTI.  Daughter at bedside.  Afebrile

## 2022-05-29 NOTE — PLAN OF CARE
DNR. VSS. Fall precautions maintained.   No pain complaints. On contact isolation for mrsa in the urine   On O2 NC.   Tube feeds continues at 40ml/hr isosource 1.5 as ordered.   Water flushes 135ml q 6hr  NPO. 1.5L fluid restriction.  Repositioned q2hrs and as needed.  SCDs on.  Abreu intact and draining.   Pending stool sample.  Family at bedside.   All needs met. Will continue to monitor.

## 2022-05-29 NOTE — ASSESSMENT & PLAN NOTE
mild hyperkalemia, potassium 5.2 on admission    -hold home dose Entresto  -improved    5/29  Potassium stable   Nephrology following

## 2022-05-29 NOTE — ASSESSMENT & PLAN NOTE
On admission, large right side pleural effusion, small left-sided, had thoracentesis earlier this month, 1200 cc drained, currently oxygenating well, not requiring supplemental oxygen, denies SOB    -Consult pulmonary for thoracentesis in a.m.    5/29/2022  Pulmonary following, not a candidate for Thoracentesis, recommendations noted.

## 2022-05-29 NOTE — ASSESSMENT & PLAN NOTE
On admission, serum sodium 123, chloride 84, suggesting volume depletion.    -will be cautious with IV fluids due to abdominal anasarca, bilateral pleural effusions.  -hold diuretics, antidepressants  -repeat labs in a.m.  -Consult Nephrology- recommend 1.5L fluid restriction  -consult RD for recs for peg feeding/water bolus    5/29/2022  Sodium level improved 131 today.

## 2022-05-29 NOTE — PROGRESS NOTES
[facility]  Medical Home Visit    Visit Date: 05/27/2022  Encounter Provider: Elva Ansari  PCP:  Elva Ansari NP    Subjective:      Patient ID: Tanya Madrid is a 82 y.o. female.    Consult Requested By:  No ref. provider found  Reason for Consult:  Established patient/ blood in urine    Tanya is being seen at home due to being seen at home due to physical debility that presents a taxing effort to leave the home, to mitigate high risk of hospital readmission and/or due to the limited availability of reliable or safe options for transportation to the point of access to the provider. Prior to treatment on this visit the chart was reviewed and patient verbal consent was obtained.    Chief Complaint: Urinary Tract Infection      Patient is being seen today as an emergent visit. Daughter called and said that the patient had copious amounts of blood coming out from around her henley catheter. Patient was started on bactrim the day before and daughter reports that the blood had stopped but this afternoon it started again. Upon arrival patient was lying hospital bed. Henley catheter in place with small amount of blood in catheter. Bright red blood coming out around the catheter. Recommend patient be sent to the ER for evaluation.          Review of Systems   Constitutional: Negative for fever.   Gastrointestinal: Negative for abdominal pain, constipation and vomiting.   Genitourinary: Positive for hematuria.       Assessments:  · Environmental: Patient lives in a single story home with her family. There are no steps at the entrance. Lighting is bright and temperature is comfortable.  · Functional Status: Patient is bed bound and needs assistance with all of her ADLs  · Safety: Fall Precautions  · Nutritional: Adequate food in the home  · Home Health/DME/Supplies: [facility], DMEs: hospital bed, wheelchair, walker     Objective:   Physical Exam  Vitals reviewed.   HENT:      Head: Normocephalic.    Pulmonary:      Effort: Pulmonary effort is normal.   Abdominal:      Palpations: Abdomen is soft.   Genitourinary:     Comments: Patient with urinary indwelling catheter with blood in the bag and bright red blood coming out around the catherter  Skin:     General: Skin is warm and dry.   Neurological:      Mental Status: She is alert.         Vitals:    05/27/22 1500   Pulse: 87   Resp: 18   Temp: 98.6 °F (37 °C)   SpO2: 100%   PainSc: 0-No pain     There is no height or weight on file to calculate BMI.    Assessment:     1. Urinary tract infection associated with indwelling urethral catheter, initial encounter        Plan:     Ethical / Legal: Advance Care Planning   · Surrogate decision maker:  Name Adenike Lemus, Relationship: Daughter  · Code Status:  DNR  · [place]:  None  · Other advance directive:  None, Capacity to make medical decisions:  No, Conflict None       Problem List Items Addressed This Visit        Renal/    Urinary tract infection associated with indwelling urethral catheter - Primary    Current Assessment & Plan     Patient sent to ER for evaluation                  Were controlled substances prescribed?  No    Follow Up Appointments:   Future Appointments   Date Time Provider Department [place]   6/9/2022 11:20 AM LABORATORY, [facility]   6/16/2022 11:00 AM JIE Jimenez [facility]       Signature: Elva Ansari NP

## 2022-05-29 NOTE — PROGRESS NOTES
[facility] Medicine  Progress Note    Patient Name: Tanya Madrid  MRN: [id number]  Patient Class: IP- Inpatient   Admission Date: 5/27/2022  Length of Stay: 2 days  Attending Physician: Doreen Kramer MD  Primary Care Provider: Elva Ansari NP        Subjective:     Principal Problem:Urinary tract infection associated with indwelling urethral catheter        HPI:  Ms. Madrid is an elderly 82-year-old  female, nonverbal, bed-bound since cardiac arrest in March 2022, PMH significant for combined CHF, EF 10%, CAD, CVA, chronic indwelling Henley catheter, was brought to the ED by her daughter complaining of dark colored urine for the past few days.  Daughter reports no evidence of fever, chills, dysuria.  Unable to obtain any information from patient, due to her nonverbal state.  Patient had urine cultures obtained on 5/25 per home health nurse, and was empirically prescribed Bactrim.  Today urine cultures resulted growing Staphylococcus aureus, unknown sensitivities.  Afebrile in the ED.  /91.  WBC 49419, 0% bands, lactic acid 2.6.  Sodium 123, potassium 5.2, chloride 84, creatinine 0.8.  UA reveals dark-colored urine, positive for nitrites, 3+ leukocytes, > 100 rbc's, 75 WBCs.  Received IV Rocephin, IV vancomycin in the ED.  CT of the abdomen renal stone study, negative for hydronephrosis or obstruction.  But reveals moderate anasarca, and moderately large right and mild left-sided pleural effusion, similar to prior imaging studies.  Patient denies shortness of breath.  Currently on room air, oxygen saturations in the high 90s.    Admitting diagnosis:  Staphylococcus aureus UTI, due to chronic indwelling Henley catheter.  Severe sepsis.      Overview/Hospital Course:  5/28: Na: 122, nephrology consulted, recommend 1.5L fluid restriction. Enteral feeding order adjusted to reduce free water boluses. WBC trending down. Discussion with daughter on removing henely catheter,  she is aware of increased diaper changes to prevent skin breakdown,  due to infection risk dtr prefers it to be removed. Will have a voiding trial prior to d/c. As of 5/29/2022, patient decrease mental response, no verbal. MRSA UTI, continue Vancomycin. Severe CHF, LVEF 10 %. Pulmonary following,  will not benefit from repeat thoracentesis.  Severe CHF, history of cardiac arrest nonverbal status post trach and PEG.  Grave prognosis  Palliative care consult/comfort care.      Interval History: Palliative care consulted.     Review of Systems   Unable to perform ROS: Patient nonverbal   Objective:     Vital Signs (Most Recent):  Temp: 98.6 °F (37 °C) (05/29/22 1138)  Pulse: 74 (05/29/22 1138)  Resp: 18 (05/29/22 1138)  BP: 108/62 (05/29/22 1138)  SpO2: 99 % (05/29/22 1138) Vital Signs (24h Range):  Temp:  [97.9 °F (36.6 °C)-99 °F (37.2 °C)] 98.6 °F (37 °C)  Pulse:  [71-77] 74  Resp:  [16-18] 18  SpO2:  [99 %-100 %] 99 %  BP: ()/(57-77) 108/62     Weight: 73.6 kg (162 lb 4.1 oz)  Body mass index is 29.68 kg/m².    Intake/Output Summary (Last 24 hours) at 5/29/2022 1624  Last data filed at 5/29/2022 1502  Gross per 24 hour   Intake 590 ml   Output 3075 ml   Net -2485 ml      Physical Exam  Constitutional:       General: She is sleeping. She is not in acute distress.     Appearance: She is well-developed.   HENT:      Head: Normocephalic and atraumatic.      Right Ear: Hearing and external ear normal.      Left Ear: Hearing and external ear normal.      Nose: No rhinorrhea.      Right Sinus: No maxillary sinus tenderness or frontal sinus tenderness.      Left Sinus: No maxillary sinus tenderness or frontal sinus tenderness.      Mouth/Throat:      Mouth: No oral lesions.      Pharynx: Uvula midline.   Eyes:      General:         Right eye: No discharge.         Left eye: No discharge.      Conjunctiva/sclera: Conjunctivae normal.      Pupils: Pupils are equal, round, and reactive to light.   Neck:      Thyroid: No  thyromegaly.      Vascular: No carotid bruit.      Trachea: No tracheal deviation.   Cardiovascular:      Rate and Rhythm: Normal rate and regular rhythm.      Pulses:           Dorsalis pedis pulses are 2+ on the right side and 2+ on the left side.      Heart sounds: Normal heart sounds, S1 normal and S2 normal. No murmur heard.  Pulmonary:      Effort: Pulmonary effort is normal. No respiratory distress.      Breath sounds: Normal breath sounds.   Chest:   Breasts:      Right: No supraclavicular adenopathy.      Left: No supraclavicular adenopathy.     Abdominal:      General: Bowel sounds are normal. There is no distension.      Palpations: Abdomen is soft. There is no mass.      Tenderness: There is no abdominal tenderness.   Genitourinary:     Comments: Abreu Cath present   Musculoskeletal:         General: Normal range of motion.      Cervical back: Normal range of motion.   Lymphadenopathy:      Cervical: No cervical adenopathy.      Upper Body:      Right upper body: No supraclavicular adenopathy.      Left upper body: No supraclavicular adenopathy.   Skin:     General: Skin is warm and dry.      Capillary Refill: Capillary refill takes less than 2 seconds.      Findings: No rash.   Neurological:      Sensory: No sensory deficit.      Coordination: Coordination normal.      Gait: Gait normal.      Comments: Encephalopathy, related to severe sepsis and severe CHF     Psychiatric:         Mood and Affect: Mood is not anxious or depressed.         Speech: Speech normal.         Thought Content: Thought content normal.         Judgment: Judgment normal.       Significant Labs: All pertinent labs within the past 24 hours have been reviewed.  BMP:   Recent Labs   Lab 05/28/22  0454 05/29/22  0947   GLU 70 112*   * 131*   K 5.0 3.8   CL 86* 91*   CO2 22* 30*   BUN 25* 22   CREATININE 0.7 0.7   CALCIUM 8.1* 8.2*   MG 1.9  --      CBC:   Recent Labs   Lab 05/27/22  1732 05/28/22  0454   WBC 18.69* 14.25*   HGB  10.4* 10.1*   HCT 31.4* 33.5*    265     Urine Culture:   Recent Labs   Lab 05/27/22 1952   LABURIN STAPHYLOCOCCUS AUREUS  >100,000cfu/ml  Susceptibility pending  *     Urine Studies:   Recent Labs   Lab 05/27/22 1952   COLORU Red*   APPEARANCEUA Clear   PHUR 7.0   SPECGRAV 1.010   PROTEINUA 2+*   GLUCUA Negative   KETONESU Negative   BILIRUBINUA 1+*   OCCULTUA 2+*   NITRITE Positive*   UROBILINOGEN 1.0   LEUKOCYTESUR 3+*   RBCUA >100*   WBCUA 75*   BACTERIA Moderate*   HYALINECASTS 2*       Significant Imaging:     Imaging Results              CT Renal Stone Study ABD Pelvis WO (Final result)  Result time 05/27/22 20:35:06      Final result by Elvira Vásquez MD (05/27/22 20:35:06)                   Impression:      Moderate anasarca    Prominent left left superior renal cyst and small right hepatic cyst    Moderately large right and mild left-sided pleural effusion with associated atelectasis/consolidation    Cardiomegaly    No definite hydronephrosis.    Gallstones    A gastrostomy tube adjacent to the gastric outlet.  Proper position cannot determined.  Consider G tube tube check as clinically indicated    All CT scans   are performed using dose optimization techniques including the following: automated exposure control; adjustment of the mA and/or kV; use of iterative reconstruction technique.  Dose modulation was employed for ALARA by means of: Automated exposure control; adjustment of the mA and/or kV according to patient size (this includes techniques or standardized protocols for targeted exams where dose is matched to indication/reason for exam; i.e. extremities or head); and/or use of iterative reconstructive technique.      Electronically signed by: Fahad Felix  Date:    05/27/2022  Time:    20:35               Narrative:    EXAMINATION:  CT RENAL STONE STUDY ABD PELVIS WO    CLINICAL HISTORY:  complicated UTI;    TECHNIQUE:  Low dose axial images, sagittal and coronal reformations were  obtained from the lung bases to the pubic symphysis.  Oral contrast is identified    COMPARISON:  None    FINDINGS:  Gallstones.  Hepatic cyst.  Mild anasarca.  Moderate right mild left-sided pleural effusion with associated atelectasis/consolidation.  Hepatomegaly.  Atherosclerotic changes.  Mild cardiomegaly.  Bilateral hip replacement with associated artifacts.  No definite bowel obstruction.  Prominent left renal cyst measuring 3.9 cm in the superior pole.  No definite hydronephrosis.  There is a gastrostomy tube identified.  The bulb of the a G-tube appears to abut the gastric outlet.  Proper positioning cannot be determined.  Recommend clinical correlation and follow-up tube check for further evaluation.  Small ill-defined hypodense lesions in the inferior pole of the left kidney may relate to complex cyst    No definite bowel obstruction.                                         Assessment/Plan:      * Urinary tract infection associated with indwelling urethral catheter  -UA reviewed  -urine cultures 5/25/22 growing Staphylococcus aureus, unknown sensitivities  -IV Rocephin, IV vancomycin initiated in the ED  -avoid further ID and sensitivities  -ID consult  -consider d/c henley prior to d/c with voiding trial      Combined systolic and diastolic congestive heart failure  Patient is identified as having Combined Systolic and Diastolic heart failure that is Chronic. CHF is currently controlled. Latest ECHO performed and demonstrates- Results for orders placed during the hospital encounter of 03/10/22    Echo    Interpretation Summary  · The left ventricle is mildly enlarged with eccentric hypertrophy and severely decreased systolic function.  · Grade III left ventricular diastolic dysfunction.  · Moderate right ventricular enlargement with moderately reduced right ventricular systolic function.  · Severe left atrial enlargement.  · The estimated ejection fraction is 10%.  · There is severe left ventricular  "global hypokinesis.  · Severe right atrial enlargement.  · Moderate-to-severe mitral regurgitation.  · Moderate to severe tricuspid regurgitation.  · Moderate pulmonic regurgitation.  · Mechanically ventilated; cannot use inferior caval vein diameter to estimate central venous pressure.  · There is a large left pleural effusion.  . Continue Furosemide and monitor clinical status closely. Monitor on telemetry. Patient is off CHF pathway.  Monitor strict Is&Os and daily weights.  Place on fluid restriction of 1.5 L. Continue to stress to patient importance of self efficacy and  on diet for CHF. Last BNP reviewed- and noted below No results for input(s): BNP, BNPTRIAGEBLO in the last 168 hours..        Hyponatremia  On admission, serum sodium 123, chloride 84, suggesting volume depletion.    -will be cautious with IV fluids due to abdominal anasarca, bilateral pleural effusions.  -hold diuretics, antidepressants  -repeat labs in a.m.  -Consult Nephrology- recommend 1.5L fluid restriction  -consult RD for recs for peg feeding/water bolus    5/29/2022  Sodium level improved 131 today.      Recurrent right pleural effusion  -consult pulmonary  for possible thoracentesis    5/29/2022  Pulmonary following, not a candidate for Thoracentesis, recommendations noted.       Severe sepsis  This patient does have evidence of infective focus  My overall impression is sepsis. Vital signs were reviewed and noted in progress note.  Antibiotics given-   Antibiotics (From admission, onward)            Start     Stop Route Frequency Ordered    05/28/22 2200  vancomycin in dextrose 5 % 1 gram/250 mL IVPB 1,000 mg         -- IV Every 24 hours (non-standard times) 05/27/22 2250    05/28/22 2100  mupirocin 2 % ointment         06/02 2059 Nasl 2 times daily 05/28/22 1512    05/27/22 2032  vancomycin - pharmacy to dose  (vancomycin IVPB)        "And" Linked Group Details    -- IV pharmacy to manage frequency 05/27/22 1933    "     Cultures were taken-   Microbiology Results (last 7 days)     Procedure Component Value Units Date/Time    Urine culture [[id number]]  (Abnormal) Collected: 05/27/22 1952    Order Status: Completed Specimen: Urine Updated: 05/29/22 1205     Urine Culture, Routine STAPHYLOCOCCUS AUREUS  >100,000cfu/ml  Susceptibility pending      Narrative:      Specimen Source->Urine        Latest lactate reviewed, they are-  Recent Labs   Lab 05/27/22  1732   LACTATE 2.6*       Organ dysfunction indicated by Encephalopathy   Source- Urine    Source control Achieved by- IV Rocephin, IV vancomycin  --Pending culture      Anoxic encephalopathy  -history of cardiac arrest in March 2022, currently bed-bound, nonverbal        Bilateral pleural effusion  On admission, large right side pleural effusion, small left-sided, had thoracentesis earlier this month, 1200 cc drained, currently oxygenating well, not requiring supplemental oxygen, denies SOB    -Consult pulmonary for thoracentesis in a.m.    5/29/2022  Pulmonary following, not a candidate for Thoracentesis, recommendations noted.        Hyperkalemia  mild hyperkalemia, potassium 5.2 on admission    -hold home dose Entresto  -improved    5/29  Potassium stable   Nephrology following     History of CVA (cerebrovascular accident)        Parkinson disease  Dtr reports she has not been giving patient the medication regularly at home. Concerns about side effects.     -continue home dose Sinemet  -monitor for side effects    H/O Essential hypertension   Controlled, prescribed Lasix, Entresto, Coreg as outpatient, hold Entresto due to hypotension    --Continue Coreg and Lasix  --Vitals Q4H  --Hydralazine PRN            VTE Risk Mitigation (From admission, onward)         Ordered     Place sequential compression device  Until discontinued         05/27/22 2052                Discharge Planning   PARMINDER:      Code Status: DNR   Is the patient medically ready for discharge?:     Reason for  patient still in hospital (select all that apply): Patient trending condition and Treatment  Discharge Plan A: Home Health                  Esteban Coronado NP  Department of Hospital Medicine   [facility]

## 2022-05-29 NOTE — HPI
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    Home health nurse has send cultures as started Bactrim, urine culture showed 100,000 K MRSA sensitive to Bactrim

## 2022-05-29 NOTE — SUBJECTIVE & OBJECTIVE
Interval History: Palliative care consulted.     Review of Systems   Unable to perform ROS: Patient nonverbal   Objective:     Vital Signs (Most Recent):  Temp: 98.6 °F (37 °C) (05/29/22 1138)  Pulse: 74 (05/29/22 1138)  Resp: 18 (05/29/22 1138)  BP: 108/62 (05/29/22 1138)  SpO2: 99 % (05/29/22 1138) Vital Signs (24h Range):  Temp:  [97.9 °F (36.6 °C)-99 °F (37.2 °C)] 98.6 °F (37 °C)  Pulse:  [71-77] 74  Resp:  [16-18] 18  SpO2:  [99 %-100 %] 99 %  BP: ()/(57-77) 108/62     Weight: 73.6 kg (162 lb 4.1 oz)  Body mass index is 29.68 kg/m².    Intake/Output Summary (Last 24 hours) at 5/29/2022 1624  Last data filed at 5/29/2022 1502  Gross per 24 hour   Intake 590 ml   Output 3075 ml   Net -2485 ml      Physical Exam  Constitutional:       General: She is sleeping. She is not in acute distress.     Appearance: She is well-developed.   HENT:      Head: Normocephalic and atraumatic.      Right Ear: Hearing and external ear normal.      Left Ear: Hearing and external ear normal.      Nose: No rhinorrhea.      Right Sinus: No maxillary sinus tenderness or frontal sinus tenderness.      Left Sinus: No maxillary sinus tenderness or frontal sinus tenderness.      Mouth/Throat:      Mouth: No oral lesions.      Pharynx: Uvula midline.   Eyes:      General:         Right eye: No discharge.         Left eye: No discharge.      Conjunctiva/sclera: Conjunctivae normal.      Pupils: Pupils are equal, round, and reactive to light.   Neck:      Thyroid: No thyromegaly.      Vascular: No carotid bruit.      Trachea: No tracheal deviation.   Cardiovascular:      Rate and Rhythm: Normal rate and regular rhythm.      Pulses:           Dorsalis pedis pulses are 2+ on the right side and 2+ on the left side.      Heart sounds: Normal heart sounds, S1 normal and S2 normal. No murmur heard.  Pulmonary:      Effort: Pulmonary effort is normal. No respiratory distress.      Breath sounds: Normal breath sounds.   Chest:   Breasts:       Right: No supraclavicular adenopathy.      Left: No supraclavicular adenopathy.     Abdominal:      General: Bowel sounds are normal. There is no distension.      Palpations: Abdomen is soft. There is no mass.      Tenderness: There is no abdominal tenderness.   Genitourinary:     Comments: Abreu Cath present   Musculoskeletal:         General: Normal range of motion.      Cervical back: Normal range of motion.   Lymphadenopathy:      Cervical: No cervical adenopathy.      Upper Body:      Right upper body: No supraclavicular adenopathy.      Left upper body: No supraclavicular adenopathy.   Skin:     General: Skin is warm and dry.      Capillary Refill: Capillary refill takes less than 2 seconds.      Findings: No rash.   Neurological:      Sensory: No sensory deficit.      Coordination: Coordination normal.      Gait: Gait normal.      Comments: Encephalopathy, related to severe sepsis and severe CHF     Psychiatric:         Mood and Affect: Mood is not anxious or depressed.         Speech: Speech normal.         Thought Content: Thought content normal.         Judgment: Judgment normal.       Significant Labs: All pertinent labs within the past 24 hours have been reviewed.  BMP:   Recent Labs   Lab 05/28/22  0454 05/29/22  0947   GLU 70 112*   * 131*   K 5.0 3.8   CL 86* 91*   CO2 22* 30*   BUN 25* 22   CREATININE 0.7 0.7   CALCIUM 8.1* 8.2*   MG 1.9  --      CBC:   Recent Labs   Lab 05/27/22  1732 05/28/22  0454   WBC 18.69* 14.25*   HGB 10.4* 10.1*   HCT 31.4* 33.5*    265     Urine Culture:   Recent Labs   Lab 05/27/22 1952   LABURIN STAPHYLOCOCCUS AUREUS  >100,000cfu/ml  Susceptibility pending  *     Urine Studies:   Recent Labs   Lab 05/27/22 1952   COLORU Red*   APPEARANCEUA Clear   PHUR 7.0   SPECGRAV 1.010   PROTEINUA 2+*   GLUCUA Negative   KETONESU Negative   BILIRUBINUA 1+*   OCCULTUA 2+*   NITRITE Positive*   UROBILINOGEN 1.0   LEUKOCYTESUR 3+*   RBCUA >100*   WBCUA 75*   BACTERIA  Moderate*   HYALINECASTS 2*       Significant Imaging:     Imaging Results              CT Renal Stone Study ABD Pelvis WO (Final result)  Result time 05/27/22 20:35:06      Final result by Elvira Vásquez MD (05/27/22 20:35:06)                   Impression:      Moderate anasarca    Prominent left left superior renal cyst and small right hepatic cyst    Moderately large right and mild left-sided pleural effusion with associated atelectasis/consolidation    Cardiomegaly    No definite hydronephrosis.    Gallstones    A gastrostomy tube adjacent to the gastric outlet.  Proper position cannot determined.  Consider G tube tube check as clinically indicated    All CT scans   are performed using dose optimization techniques including the following: automated exposure control; adjustment of the mA and/or kV; use of iterative reconstruction technique.  Dose modulation was employed for ALARA by means of: Automated exposure control; adjustment of the mA and/or kV according to patient size (this includes techniques or standardized protocols for targeted exams where dose is matched to indication/reason for exam; i.e. extremities or head); and/or use of iterative reconstructive technique.      Electronically signed by: Fahad Felix  Date:    05/27/2022  Time:    20:35               Narrative:    EXAMINATION:  CT RENAL STONE STUDY ABD PELVIS WO    CLINICAL HISTORY:  complicated UTI;    TECHNIQUE:  Low dose axial images, sagittal and coronal reformations were obtained from the lung bases to the pubic symphysis.  Oral contrast is identified    COMPARISON:  None    FINDINGS:  Gallstones.  Hepatic cyst.  Mild anasarca.  Moderate right mild left-sided pleural effusion with associated atelectasis/consolidation.  Hepatomegaly.  Atherosclerotic changes.  Mild cardiomegaly.  Bilateral hip replacement with associated artifacts.  No definite bowel obstruction.  Prominent left renal cyst measuring 3.9 cm in the superior pole.  No definite  hydronephrosis.  There is a gastrostomy tube identified.  The bulb of the a G-tube appears to abut the gastric outlet.  Proper positioning cannot be determined.  Recommend clinical correlation and follow-up tube check for further evaluation.  Small ill-defined hypodense lesions in the inferior pole of the left kidney may relate to complex cyst    No definite bowel obstruction.

## 2022-05-29 NOTE — ASSESSMENT & PLAN NOTE
Patient is identified as having Combined Systolic and Diastolic heart failure that is Acute on chronic. CHF is currently uncontrolled due to Rales/crackles on pulmonary exam and Pulmonary edema/pleural effusion on CXR. Latest ECHO performed and demonstrates- Results for orders placed during the hospital encounter of 03/10/22    Echo    Interpretation Summary  · The left ventricle is mildly enlarged with eccentric hypertrophy and severely decreased systolic function.  · Grade III left ventricular diastolic dysfunction.  · Moderate right ventricular enlargement with moderately reduced right ventricular systolic function.  · Severe left atrial enlargement.  · The estimated ejection fraction is 10%.  · There is severe left ventricular global hypokinesis.  · Severe right atrial enlargement.  · Moderate-to-severe mitral regurgitation.  · Moderate to severe tricuspid regurgitation.  · Moderate pulmonic regurgitation.  · Mechanically ventilated; cannot use inferior caval vein diameter to estimate central venous pressure.  · There is a large left pleural effusion.  . Continue Furosemide and monitor clinical status closely. Monitor on telemetry. Patient is off CHF pathway.  Monitor strict Is&Os and daily weights.  Place on fluid restriction of 1.5 L. Continue to stress to patient importance of self efficacy and  on diet for CHF. Last BNP reviewed- and noted below No results for input(s): BNP, BNPTRIAGEBLO in the last 168 hours..

## 2022-05-29 NOTE — SUBJECTIVE & OBJECTIVE
Past Medical History:   Diagnosis Date    Acute CHF (congestive heart failure) 1/17/2021    Hyperlipemia     Hypertension     Parkinson's disease     Stroke 2016    Throat mass        Past Surgical History:   Procedure Laterality Date    CLOSED REDUCTION Right 10/15/2020    Procedure: CLOSED REDUCTION;  Surgeon: Humberto Valdivia DO;  Location: [facility] OR;  Service: Orthopedics;  Laterality: Right;  ATTEMPTED    EVACUATION OF HEMATOMA Right 10/17/2020    Procedure: EVACUATION, HEMATOMA;  Surgeon: Humberto Valdivia DO;  Location: [facility] OR;  Service: Orthopedics;  Laterality: Right;    HEMIARTHROPLASTY OF HIP Left 7/12/2020    Procedure: HEMIARTHROPLASTY, HIP;  Surgeon: Humberto Valdivia DO;  Location: [facility] OR;  Service: Orthopedics;  Laterality: Left;    HEMIARTHROPLASTY OF HIP Right 10/2/2020    Procedure: HEMIARTHROPLASTY, HIP;  Surgeon: Loyd Kearney MD;  Location: [facility] OR;  Service: Orthopedics;  Laterality: Right;    HYSTERECTOMY      INSERTION OF PERCUTANEOUS ENDOSCOPIC GASTROSTOMY (PEG) FEEDING TUBE      OPEN REDUCTION OF DISLOCATION OF HIP Right 10/17/2020    Procedure: OPEN REDUCTION, DISLOCATION, HIP;  Surgeon: Humberto Valdivia DO;  Location: [facility] OR;  Service: Orthopedics;  Laterality: Right;    THROAT SURGERY      TONSILLECTOMY         Review of patient's allergies indicates:   Allergen Reactions    Xanax [alprazolam]        Family History       Family history is unknown by patient.          Tobacco Use    Smoking status: Never Smoker    Smokeless tobacco: Never Used   Substance and Sexual Activity    Alcohol use: No    Drug use: No    Sexual activity: Not Currently         Review of Systems   Unable to perform ROS: Other (Nonverbal status post cardiac arrest)   Objective:     Vital Signs (Most Recent):  Temp: 98.6 °F (37 °C) (05/29/22 1138)  Pulse: 74 (05/29/22 1138)  Resp: 18 (05/29/22 1138)  BP: 108/62 (05/29/22 1138)  SpO2: 99 % (05/29/22 1138) Vital Signs (24h Range):  Temp:  [97.9 °F (36.6 °C)-99  °F (37.2 °C)] 98.6 °F (37 °C)  Pulse:  [71-77] 74  Resp:  [16-18] 18  SpO2:  [99 %-100 %] 99 %  BP: ()/(57-77) 108/62     Weight: 73.6 kg (162 lb 4.1 oz)  Body mass index is 29.68 kg/m².      Intake/Output Summary (Last 24 hours) at 5/29/2022 1255  Last data filed at 5/29/2022 0746  Gross per 24 hour   Intake 265 ml   Output 3025 ml   Net -2760 ml       Physical Exam  Vitals and nursing note reviewed.   Constitutional:       General: She is not in acute distress.     Appearance: She is well-developed. She is ill-appearing and toxic-appearing.   HENT:      Head: Normocephalic and atraumatic.      Nose: Nose normal.   Eyes:      Extraocular Movements: Extraocular movements intact.   Neck:      Comments: Trach in place  Cardiovascular:      Rate and Rhythm: Normal rate and regular rhythm.   Pulmonary:      Effort: Pulmonary effort is normal. No respiratory distress.      Comments: Breath sounds decreased bilaterally  Abdominal:      General: There is no distension.      Comments: PEG tube   Genitourinary:     Comments: Urine catheter  Musculoskeletal:         General: Deformity present. No signs of injury.      Cervical back: Normal range of motion and neck supple.   Skin:     General: Skin is dry.   Neurological:      Mental Status: She is alert. Mental status is at baseline.       Vents:  Oxygen Concentration (%): 32 (05/29/22 0724)    Lines/Drains/Airways       Drain  Duration                  Gastrostomy/Enterostomy 03/25/22 1320 Gastrostomy tube w/ balloon LUQ feeding 64 days         Urethral Catheter 05/27/22 2210 16 Fr. 1 day              Peripheral Intravenous Line  Duration                  Peripheral IV - Single Lumen 05/27/22 1734 22 G Left Antecubital 1 day         Peripheral IV - Single Lumen 05/27/22 2128 22 G Left Wrist 1 day                    Significant Labs:    CBC/Anemia Profile:  Recent Labs   Lab 05/27/22 1732 05/28/22  0454   WBC 18.69* 14.25*   HGB 10.4* 10.1*   HCT 31.4* 33.5*     265   MCV 85 93   RDW 15.5* 15.9*        Chemistries:  Recent Labs   Lab 05/27/22  1732 05/28/22  0454 05/29/22  0947   * 122* 131*   K 5.2* 5.0 3.8   CL 84* 86* 91*   CO2 27 22* 30*   BUN 28* 25* 22   CREATININE 0.8 0.7 0.7   CALCIUM 8.3* 8.1* 8.2*   ALBUMIN 2.5* 2.3*  --    PROT 6.4 5.7*  --    BILITOT 0.5 0.5  --    ALKPHOS 132 111  --    ALT 18 6*  --    AST 25 25  --    MG  --  1.9  --       Latest Reference Range & Units 05/03/22 14:23   Fluid Color  Yellow   Fluid Appearance  Hazy   WBC, Body Fluid /cu mm 547   Body Fluid Type  Pleural Fluid, Right   Segs, Fluid % 36   Lymphs, Fluid % 20   Monocytes/Macrophages, Fluid % 44   Amylase, Fluid Not established U/L 28   Glucose, Fluid Not established mg/dL 90   LD, Fluid Not established U/L 173   Body Fluid, Albumin See text g/dL 1.6   Body Fluid Source Amylase  Pleural Fluid, Right   Body Fluid Source, Albumin  Pleural Fluid, Right   Body Fluid Source, Glucose  Pleural Fluid, Right   Body Fluid Source, LDH  Pleural Fluid, Right   Body Fluid Source, Total Protein  Pleural Fluid, Right   Body Fluid, Protein Not established g/dL 2.6   Cholesterol, Body Fluid See Comment mg/dL 51      Latest Reference Range & Units 05/03/22 13:27 05/12/22 14:03   BNP 0 - 99 pg/mL  >4,900 (H)    - 260 U/L 315 (H)    (H): Data is abnormally high   Latest Reference Range & Units 04/22/22 22:18 05/03/22 06:30   POC PH 7.35 - 7.45  7.528 (H) 7.485 (H)   POC PCO2 35 - 45 mmHg 30.8 (L) 33.5 (L)   POC PO2 80 - 100 mmHg 68 (L) 61 (L)   POC BE -2 to 2 mmol/L 3 2   POC HCO3 24 - 28 mmol/L 25.6 25.3   POC SATURATED O2 95 - 100 % 95 93 (L)   FiO2   21   Sample  ARTERIAL ARTERIAL   DelSys  Room Air Room Air   Allens Test  Pass N/A   Site  RR RB   Mode   SPONT     EKG 05/03/2022    Vent. Rate : 088 BPM     Atrial Rate : 088 BPM      P-R Int : 156 ms          QRS Dur : 098 ms       QT Int : 426 ms       P-R-T Axes : 052 -56 087 degrees      QTc Int : 515 ms     Normal sinus rhythm   Left  anterior fascicular block   Cannot rule out Septal infarct ,age undetermined   Cannot rule out Lateral infarct ,age undetermined   Abnormal ECG   When compared with ECG of 31-MAR-2022 06:45,   Aberrant conduction is no longer Present   QRS voltage has decreased     Echo March 2022    The left ventricle is mildly enlarged with eccentric hypertrophy and severely decreased systolic function.  Grade III left ventricular diastolic dysfunction.  Moderate right ventricular enlargement with moderately reduced right ventricular systolic function.  Severe left atrial enlargement.  The estimated ejection fraction is 10%.  There is severe left ventricular global hypokinesis.  Severe right atrial enlargement.  Moderate-to-severe mitral regurgitation.  Moderate to severe tricuspid regurgitation.  Moderate pulmonic regurgitation.  Mechanically ventilated; cannot use inferior caval vein diameter to estimate central venous pressure.  There is a large left pleural effusion      Significant Imaging:   CT renal stone study 05/27/2022     COMPARISON:  None     FINDINGS:  Gallstones.  Hepatic cyst.  Mild anasarca.  Moderate right mild left-sided pleural effusion with associated atelectasis/consolidation.  Hepatomegaly.  Atherosclerotic changes.  Mild cardiomegaly.  Bilateral hip replacement with associated artifacts.  No definite bowel obstruction.  Prominent left renal cyst measuring 3.9 cm in the superior pole.  No definite hydronephrosis.  There is a gastrostomy tube identified.  The bulb of the a G-tube appears to abut the gastric outlet.  Proper positioning cannot be determined.  Recommend clinical correlation and follow-up tube check for further evaluation.  Small ill-defined hypodense lesions in the inferior pole of the left kidney may relate to complex cyst     No definite bowel obstruction.     Impression:     Moderate anasarca     Prominent left left superior renal cyst and small right hepatic cyst     Moderately large right  and mild left-sided pleural effusion with associated atelectasis/consolidation     Cardiomegaly     No definite hydronephrosis.     Gallstones     A gastrostomy tube adjacent to the gastric outlet.  Proper position cannot determined.  Consider G tube tube check as clinically indicated            Chest x-ray 05/03/2022    Narrative & Impression  EXAMINATION:  XR CHEST 1 VIEW     CLINICAL HISTORY:  post thora;     FINDINGS:  Comparison is made with the most recent prior chest x-ray.  The patient's chin is obscuring the right lung apex.  No evidence of pneumothorax in the visualized right lung..  Interval decrease in size of the right pleural effusion with improvement of the adjacent compressive atelectasis..     Impression:     No evidence of pneumothorax status post right thoracentesis..

## 2022-05-29 NOTE — ASSESSMENT & PLAN NOTE
-consult pulmonary  for possible thoracentesis    5/29/2022  Pulmonary following, not a candidate for Thoracentesis, recommendations noted.

## 2022-05-29 NOTE — NURSING
Received lying in bed on left side with eyes closed, responds to touch, assessment per flowsheet, no evidence of pain noted, henley and PEG intact. Bed low, call light within reach. Will continue to monitor.

## 2022-05-29 NOTE — ASSESSMENT & PLAN NOTE
"This patient does have evidence of infective focus  My overall impression is sepsis. Vital signs were reviewed and noted in progress note.  Antibiotics given-   Antibiotics (From admission, onward)            Start     Stop Route Frequency Ordered    05/28/22 2200  vancomycin in dextrose 5 % 1 gram/250 mL IVPB 1,000 mg         -- IV Every 24 hours (non-standard times) 05/27/22 2250    05/28/22 2100  mupirocin 2 % ointment         06/02 2059 Nasl 2 times daily 05/28/22 1512 05/27/22 2032  vancomycin - pharmacy to dose  (vancomycin IVPB)        "And" Linked Group Details    -- IV pharmacy to manage frequency 05/27/22 1933        Cultures were taken-   Microbiology Results (last 7 days)     Procedure Component Value Units Date/Time    Urine culture [[id number]]  (Abnormal) Collected: 05/27/22 1952    Order Status: Completed Specimen: Urine Updated: 05/29/22 1205     Urine Culture, Routine STAPHYLOCOCCUS AUREUS  >100,000cfu/ml  Susceptibility pending      Narrative:      Specimen Source->Urine        Latest lactate reviewed, they are-  Recent Labs   Lab 05/27/22  1732   LACTATE 2.6*       Organ dysfunction indicated by Acute respiratory failure  Source- MRSA UTI    Source control Achieved by- IV vanc    "

## 2022-05-29 NOTE — CONSULTS
[facility]  Pulmonology  Consult Note    Patient Name: Tanya Madrid  MRN: [id number]  Admission Date: 5/27/2022  Hospital Length of Stay: 2 days  Code Status: DNR  Attending Physician: Doreen Kramer MD  Primary Care Provider: Elva Ansari NP   Principal Problem: Urinary tract infection associated with indwelling urethral catheter  Consulted by hospital medicine nurse practitioner April Jose Juan   [unfilled]  Subjective:     HPI:  82-year-old nonverbal patient after sustaining a cardiac arrest March 2022, home bound, with history of heart failure and recurrent laryngeal papillomatosis status multiple interventions, brought to the emergency room by daughter for dark urine.    Home health nurse has send cultures as started Bactrim, urine culture showed 100,000 K MRSA sensitive to Bactrim      Past Medical History:   Diagnosis Date    Acute CHF (congestive heart failure) 1/17/2021    Hyperlipemia     Hypertension     Parkinson's disease     Stroke 2016    Throat mass        Past Surgical History:   Procedure Laterality Date    CLOSED REDUCTION Right 10/15/2020    Procedure: CLOSED REDUCTION;  Surgeon: Humberto Valdivia DO;  Location: [facility] OR;  Service: Orthopedics;  Laterality: Right;  ATTEMPTED    EVACUATION OF HEMATOMA Right 10/17/2020    Procedure: EVACUATION, HEMATOMA;  Surgeon: Humberto Valdivia DO;  Location: [facility] OR;  Service: Orthopedics;  Laterality: Right;    HEMIARTHROPLASTY OF HIP Left 7/12/2020    Procedure: HEMIARTHROPLASTY, HIP;  Surgeon: Humberto Valdivia DO;  Location: [facility] OR;  Service: Orthopedics;  Laterality: Left;    HEMIARTHROPLASTY OF HIP Right 10/2/2020    Procedure: HEMIARTHROPLASTY, HIP;  Surgeon: Loyd Kearney MD;  Location: [facility] OR;  Service: Orthopedics;  Laterality: Right;    HYSTERECTOMY      INSERTION OF PERCUTANEOUS ENDOSCOPIC GASTROSTOMY (PEG) FEEDING TUBE      OPEN REDUCTION OF DISLOCATION OF HIP Right 10/17/2020    Procedure: OPEN REDUCTION,  DISLOCATION, HIP;  Surgeon: Humberto Valdivia DO;  Location: [facility];  Service: Orthopedics;  Laterality: Right;    THROAT SURGERY      TONSILLECTOMY         Review of patient's allergies indicates:   Allergen Reactions    Xanax [alprazolam]        Family History       Family history is unknown by patient.          Tobacco Use    Smoking status: Never Smoker    Smokeless tobacco: Never Used   Substance and Sexual Activity    Alcohol use: No    Drug use: No    Sexual activity: Not Currently         Review of Systems   Unable to perform ROS: Other (Nonverbal status post cardiac arrest)   Objective:     Vital Signs (Most Recent):  Temp: 98.6 °F (37 °C) (05/29/22 1138)  Pulse: 74 (05/29/22 1138)  Resp: 18 (05/29/22 1138)  BP: 108/62 (05/29/22 1138)  SpO2: 99 % (05/29/22 1138) Vital Signs (24h Range):  Temp:  [97.9 °F (36.6 °C)-99 °F (37.2 °C)] 98.6 °F (37 °C)  Pulse:  [71-77] 74  Resp:  [16-18] 18  SpO2:  [99 %-100 %] 99 %  BP: ()/(57-77) 108/62     Weight: 73.6 kg (162 lb 4.1 oz)  Body mass index is 29.68 kg/m².      Intake/Output Summary (Last 24 hours) at 5/29/2022 1255  Last data filed at 5/29/2022 0746  Gross per 24 hour   Intake 265 ml   Output 3025 ml   Net -2760 ml       Physical Exam  Vitals and nursing note reviewed.   Constitutional:       General: She is not in acute distress.     Appearance: She is well-developed. She is ill-appearing and toxic-appearing.   HENT:      Head: Normocephalic and atraumatic.      Nose: Nose normal.   Eyes:      Extraocular Movements: Extraocular movements intact.   Neck:      Comments  Cardiovascular:      Rate and Rhythm: Normal rate and regular rhythm.   Pulmonary:      Effort: Pulmonary effort is normal. No respiratory distress.      Comments: Breath sounds decreased bilaterally  Abdominal:      General: There is no distension.      Comments: PEG tube   Genitourinary:     Comments: Urine catheter  Musculoskeletal:         General: Deformity present. No signs of  injury.      Cervical back: Normal range of motion and neck supple.   Skin:     General: Skin is dry.   Neurological:      Mental Status: She is alert. Mental status is at baseline.       Vents:  Oxygen Concentration (%): 32 (05/29/22 0724)    Lines/Drains/Airways       Drain  Duration                  Gastrostomy/Enterostomy 03/25/22 1320 Gastrostomy tube w/ balloon LUQ feeding 64 days         Urethral Catheter 05/27/22 2210 16 Fr. 1 day              Peripheral Intravenous Line  Duration                  Peripheral IV - Single Lumen 05/27/22 1734 22 G Left Antecubital 1 day         Peripheral IV - Single Lumen 05/27/22 2128 22 G Left Wrist 1 day                    Significant Labs:    CBC/Anemia Profile:  Recent Labs   Lab 05/27/22 1732 05/28/22  0454   WBC 18.69* 14.25*   HGB 10.4* 10.1*   HCT 31.4* 33.5*    265   MCV 85 93   RDW 15.5* 15.9*        Chemistries:  Recent Labs   Lab 05/27/22 1732 05/28/22  0454 05/29/22  0947   * 122* 131*   K 5.2* 5.0 3.8   CL 84* 86* 91*   CO2 27 22* 30*   BUN 28* 25* 22   CREATININE 0.8 0.7 0.7   CALCIUM 8.3* 8.1* 8.2*   ALBUMIN 2.5* 2.3*  --    PROT 6.4 5.7*  --    BILITOT 0.5 0.5  --    ALKPHOS 132 111  --    ALT 18 6*  --    AST 25 25  --    MG  --  1.9  --       Latest Reference Range & Units 05/03/22 14:23   Fluid Color  Yellow   Fluid Appearance  Hazy   WBC, Body Fluid /cu mm 547   Body Fluid Type  Pleural Fluid, Right   Segs, Fluid % 36   Lymphs, Fluid % 20   Monocytes/Macrophages, Fluid % 44   Amylase, Fluid Not established U/L 28   Glucose, Fluid Not established mg/dL 90   LD, Fluid Not established U/L 173   Body Fluid, Albumin See text g/dL 1.6   Body Fluid Source Amylase  Pleural Fluid, Right   Body Fluid Source, Albumin  Pleural Fluid, Right   Body Fluid Source, Glucose  Pleural Fluid, Right   Body Fluid Source, LDH  Pleural Fluid, Right   Body Fluid Source, Total Protein  Pleural Fluid, Right   Body Fluid, Protein Not established g/dL 2.6   Cholesterol,  Body Fluid See Comment mg/dL 51      Latest Reference Range & Units 05/03/22 13:27 05/12/22 14:03   BNP 0 - 99 pg/mL  >4,900 (H)    - 260 U/L 315 (H)    (H): Data is abnormally high   Latest Reference Range & Units 04/22/22 22:18 05/03/22 06:30   POC PH 7.35 - 7.45  7.528 (H) 7.485 (H)   POC PCO2 35 - 45 mmHg 30.8 (L) 33.5 (L)   POC PO2 80 - 100 mmHg 68 (L) 61 (L)   POC BE -2 to 2 mmol/L 3 2   POC HCO3 24 - 28 mmol/L 25.6 25.3   POC SATURATED O2 95 - 100 % 95 93 (L)   FiO2   21   Sample  ARTERIAL ARTERIAL   DelSys  Room Air Room Air   Allens Test  Pass N/A   Site  RR RB   Mode   SPONT     EKG 05/03/2022    Vent. Rate : 088 BPM     Atrial Rate : 088 BPM      P-R Int : 156 ms          QRS Dur : 098 ms       QT Int : 426 ms       P-R-T Axes : 052 -56 087 degrees      QTc Int : 515 ms     Normal sinus rhythm   Left anterior fascicular block   Cannot rule out Septal infarct ,age undetermined   Cannot rule out Lateral infarct ,age undetermined   Abnormal ECG   When compared with ECG of 31-MAR-2022 06:45,   Aberrant conduction is no longer Present   QRS voltage has decreased     Echo March 2022    · The left ventricle is mildly enlarged with eccentric hypertrophy and severely decreased systolic function.  · Grade III left ventricular diastolic dysfunction.  · Moderate right ventricular enlargement with moderately reduced right ventricular systolic function.  · Severe left atrial enlargement.  · The estimated ejection fraction is 10%.  · There is severe left ventricular global hypokinesis.  · Severe right atrial enlargement.  · Moderate-to-severe mitral regurgitation.  · Moderate to severe tricuspid regurgitation.  · Moderate pulmonic regurgitation.  · Mechanically ventilated; cannot use inferior caval vein diameter to estimate central venous pressure.  · There is a large left pleural effusion      Significant Imaging:   CT renal stone study 05/27/2022     COMPARISON:  None     FINDINGS:  Gallstones.  Hepatic cyst.   Mild anasarca.  Moderate right mild left-sided pleural effusion with associated atelectasis/consolidation.  Hepatomegaly.  Atherosclerotic changes.  Mild cardiomegaly.  Bilateral hip replacement with associated artifacts.  No definite bowel obstruction.  Prominent left renal cyst measuring 3.9 cm in the superior pole.  No definite hydronephrosis.  There is a gastrostomy tube identified.  The bulb of the a G-tube appears to abut the gastric outlet.  Proper positioning cannot be determined.  Recommend clinical correlation and follow-up tube check for further evaluation.  Small ill-defined hypodense lesions in the inferior pole of the left kidney may relate to complex cyst     No definite bowel obstruction.     Impression:     Moderate anasarca     Prominent left left superior renal cyst and small right hepatic cyst     Moderately large right and mild left-sided pleural effusion with associated atelectasis/consolidation     Cardiomegaly     No definite hydronephrosis.     Gallstones     A gastrostomy tube adjacent to the gastric outlet.  Proper position cannot determined.  Consider G tube tube check as clinically indicated            Chest x-ray 05/03/2022    Narrative & Impression  EXAMINATION:  XR CHEST 1 VIEW     CLINICAL HISTORY:  post thora;     FINDINGS:  Comparison is made with the most recent prior chest x-ray.  The patient's chin is obscuring the right lung apex.  No evidence of pneumothorax in the visualized right lung..  Interval decrease in size of the right pleural effusion with improvement of the adjacent compressive atelectasis..     Impression:     No evidence of pneumothorax status post right thoracentesis..          ABG  No results for input(s): PH, PO2, PCO2, HCO3, BE in the last 168 hours.  Assessment/Plan:     * Urinary tract infection associated with indwelling urethral catheter  5/29 MRSA UTI.  On IV vancomycin.  Contact precautions.    Combined systolic and diastolic congestive heart  failure  Patient is identified as having Combined Systolic and Diastolic heart failure that is Acute on chronic. CHF is currently uncontrolled due to Rales/crackles on pulmonary exam and Pulmonary edema/pleural effusion on CXR. Latest ECHO performed and demonstrates- Results for orders placed during the hospital encounter of 03/10/22    Echo    Interpretation Summary  · The left ventricle is mildly enlarged with eccentric hypertrophy and severely decreased systolic function.  · Grade III left ventricular diastolic dysfunction.  · Moderate right ventricular enlargement with moderately reduced right ventricular systolic function.  · Severe left atrial enlargement.  · The estimated ejection fraction is 10%.  · There is severe left ventricular global hypokinesis.  · Severe right atrial enlargement.  · Moderate-to-severe mitral regurgitation.  · Moderate to severe tricuspid regurgitation.  · Moderate pulmonic regurgitation.  · Mechanically ventilated; cannot use inferior caval vein diameter to estimate central venous pressure.  · There is a large left pleural effusion.  . Continue Furosemide and monitor clinical status closely. Monitor on telemetry. Patient is off CHF pathway.  Monitor strict Is&Os and daily weights.  Place on fluid restriction of 1.5 L. Continue to stress to patient importance of self efficacy and  on diet for CHF. Last BNP reviewed- and noted below No results for input(s): BNP, BNPTRIAGEBLO in the last 168 hours..      Recurrent right pleural effusion  5/29.  Status post right-sided thoracentesis 5/3 volume of 1200 mL removed.  Transudative , CHF related.  Will not benefit from repeat thoracentesis.  Severe CHF, history of cardiac arrest nonverbal status post trach and PEG.  Grave prognosis  Palliative care consult/comfort care.    Severe sepsis  This patient does have evidence of infective focus  My overall impression is sepsis. Vital signs were reviewed and noted in progress note.  Antibiotics  "given-   Antibiotics (From admission, onward)            Start     Stop Route Frequency Ordered    05/28/22 2200  vancomycin in dextrose 5 % 1 gram/250 mL IVPB 1,000 mg         -- IV Every 24 hours (non-standard times) 05/27/22 2250    05/28/22 2100  mupirocin 2 % ointment         06/02 2059 Nasl 2 times daily 05/28/22 1512    05/27/22 2032  vancomycin - pharmacy to dose  (vancomycin IVPB)        "And" Linked Group Details    -- IV pharmacy to manage frequency 05/27/22 1933        Cultures were taken-   Microbiology Results (last 7 days)     Procedure Component Value Units Date/Time    Urine culture [[id number]]  (Abnormal) Collected: 05/27/22 1952    Order Status: Completed Specimen: Urine Updated: 05/29/22 1205     Urine Culture, Routine STAPHYLOCOCCUS AUREUS  >100,000cfu/ml  Susceptibility pending      Narrative:      Specimen Source->Urine        Latest lactate reviewed, they are-  Recent Labs   Lab 05/27/22  1732   LACTATE 2.6*       Organ dysfunction indicated by Acute respiratory failure  Source- MRSA UTI    Source control Achieved by- IV vanc      Anoxic encephalopathy  Status post cardiac arrest March 2022. Status post trach and PEG.  Consider palliative care consult/comfort measures   Discussed with hospital medicine and patient's sister at bedside      Thank you for your consult. I will follow-up with patient. Please contact us if you have any additional questions.     Charli Vargas MD  Pulmonology  [facility]      "

## 2022-05-29 NOTE — ASSESSMENT & PLAN NOTE
"This patient does have evidence of infective focus  My overall impression is sepsis. Vital signs were reviewed and noted in progress note.  Antibiotics given-   Antibiotics (From admission, onward)            Start     Stop Route Frequency Ordered    05/28/22 2200  vancomycin in dextrose 5 % 1 gram/250 mL IVPB 1,000 mg         -- IV Every 24 hours (non-standard times) 05/27/22 2250    05/28/22 2100  mupirocin 2 % ointment         06/02 2059 Nasl 2 times daily 05/28/22 1512 05/27/22 2032  vancomycin - pharmacy to dose  (vancomycin IVPB)        "And" Linked Group Details    -- IV pharmacy to manage frequency 05/27/22 1933        Cultures were taken-   Microbiology Results (last 7 days)     Procedure Component Value Units Date/Time    Urine culture [[id number]]  (Abnormal) Collected: 05/27/22 1952    Order Status: Completed Specimen: Urine Updated: 05/29/22 1205     Urine Culture, Routine STAPHYLOCOCCUS AUREUS  >100,000cfu/ml  Susceptibility pending      Narrative:      Specimen Source->Urine        Latest lactate reviewed, they are-  Recent Labs   Lab 05/27/22  1732   LACTATE 2.6*       Organ dysfunction indicated by Encephalopathy   Source- Urine    Source control Achieved by- IV Rocephin, IV vancomycin  --Pending culture    "

## 2022-05-29 NOTE — PROGRESS NOTES
..  Tanya Madrid is a 82 y.o. female for whom nephrology consult has been requested to evaluate and give opinion.     HPI: 82 year old AA female with hx CHF with apparent EF 10% and diuretic use at home admitted with UTI and apparent c/o hematuria seen in her indwelling henley; dtr at bedside; patient audibly wheezing and on oxygen and with LE edema; however, dtr states this is actually chronic; I have been asked to see patient for hyponatremia; she has elevated BNP almost in 5000 range. Her serum sodium was normal earlier this month on May 6 2022; Patient has Parkinsons and is non verbal; has remote hx of cardiac arrest in March 2022; EHR reviewed in detail.  Nephrology consultation requested for the above issues with low sodium and specifically Na 123-122 range.     *For 5/29/22: Upper airway congestion audible; otherwise stable overnight.   PAST MEDICAL HISTORY:  She  has a past medical history of Acute CHF (congestive heart failure) (1/17/2021), Hyperlipemia, Hypertension, Parkinson's disease, Stroke (2016), and Throat mass.    PAST SURGICAL HISTORY:  She  has a past surgical history that includes Throat surgery; Hysterectomy; Tonsillectomy; Hemiarthroplasty of hip (Left, 7/12/2020); Hemiarthroplasty of hip (Right, 10/2/2020); Closed reduction (Right, 10/15/2020); Open reduction of dislocation of hip (Right, 10/17/2020); Evacuation of hematoma (Right, 10/17/2020); and Insertion of percutaneous endoscopic gastrostomy (PEG) feeding tube.    SOCIAL HISTORY:  She  reports that she has never smoked. She has never used smokeless tobacco. She reports that she does not drink alcohol and does not use drugs.      FAMILY MEDICAL HISTORY:  Her Family history is unknown by patient.    Review of patient's allergies indicates:   Allergen Reactions    Xanax [alprazolam]         carbidopa-levodopa  mg  2 tablet Per G Tube TID    carvediloL  6.25 mg Per G Tube BID    furosemide (LASIX) injection  80 mg Intravenous  Q12H    mupirocin   Nasal BID    vancomycin (VANCOCIN) IVPB  1,000 mg Intravenous Q24H       Prior to Admission medications    Medication Sig Start Date End Date Taking? Authorizing Provider   carbidopa-levodopa  mg (SINEMET)  mg per tablet 2 tablets by Per G Tube route 3 (three) times daily. 3/29/22 5/12/22  Tim Reilly MD   carvediloL (COREG) 6.25 MG tablet 1 tablet (6.25 mg total) by Per G Tube route 2 (two) times daily. 3/29/22 5/12/22  Tim Reilly MD   clopidogreL (PLAVIX) 75 mg tablet 1 tablet (75 mg total) by Per G Tube route once daily. 3/30/22 5/12/22  Tim Reilly MD   esomeprazole (NEXIUM) 40 MG capsule Take  40 mg(1 cap)  twice a day  , then after 40 mg(1 cap) daily   Through PEG tube 4/4/22   Sj Juan MD   famotidine (PEPCID) 20 MG tablet Take 20 mg by mouth once daily at 6am. 5/16/22   Historical Provider   furosemide (LASIX) 40 MG tablet 1 tablet (40 mg total) by Per G Tube route 2 (two) times a day. Take per G tube twice daily as directed 5/17/22   JIE Jimenez   ipratropium (ATROVENT) 0.02 % nebulizer solution Take 2.5 mLs (500 mcg total) by nebulization 2 (two) times daily. Rescue 3/29/22 5/12/22  Tim Reilly MD   QUEtiapine (SEROQUEL) 25 MG Tab Take 1 tablet (25 mg total) by mouth once daily. At bedtime 5/17/22 5/17/23  Elva Colunga NP   sacubitriL-valsartan (ENTRESTO)  mg per tablet 1 tablet by Per G Tube route 2 (two) times daily. 5/13/22   JIE Jimenez   scopolamine (TRANSDERM-SCOP) 1.3-1.5 mg (1 mg over 3 days) Place 1 patch onto the skin Every 3 (three) days. 5/4/22   Elva Colunga NP   sulfamethoxazole-trimethoprim 800-160mg (BACTRIM DS) 800-160 mg Tab Take 1 tablet by mouth 2 (two) times daily. for 7 days 5/25/22 6/1/22  Elva Colunga NP   traZODone (DESYREL) 50 MG tablet Take 0.5 tablets (25 mg total) by mouth every evening. 4/20/22 5/20/22  Elva Colunga NP   aspirin 81 MG Chew 1 tablet (81 mg total) by Per  "G Tube route once daily. 3/30/22 4/4/22  Tim Reilly MD   losartan (COZAAR) 50 MG tablet Take 1 tablet (50 mg total) by mouth 2 (two) times a day. 3/11/21 4/20/21  MARK Nelson   metoprolol succinate (TOPROL-XL) 50 MG 24 hr tablet Take 1 tablet (50 mg total) by mouth 2 (two) times daily. 4/1/21 3/29/22  MARK Nelson   omeprazole magnesium 10 mg SuDR Take 40 mg by mouth once daily.  Patient not taking: Reported on 4/21/2022 4/7/22 5/3/22  Garry Felix MD   pantoprazole (PROTONIX) 40 mg suspension Take 1 packet (40 mg total) by mouth 2 (two) times daily. 4/4/22 4/4/22  Sj Juan MD        REVIEW OF SYSTEMS:    Unobtainable due to non verbal status:      Patient has no fever, fatigue, visual changes, chest pain, edema, cough, dyspnea, nausea, vomiting, constipation, diarrhea, arthralgias, pruritis, dizziness, weakness, depression, confusion.        PHYSICAL EXAM:   height is 5' 2" (1.575 m) and weight is 73.6 kg (162 lb 4.1 oz). Her axillary temperature is 98.3 °F (36.8 °C). Her blood pressure is 92/60 and her pulse is 72. Her respiration is 17 and oxygen saturation is 100%.   Gen: WDWN female in no apparent distress; occasional auditory wheezes  Psych: Normal mood and affect  Skin: No rashes or ulcers  Eyes: Normal conjunctiva and lids, PERRLA  ENT: Normal hearing with no oropharyngeal lesions  Neck: No JVD  Chest: Clear with no rales, rhonchi, + wheezing with normal effort  CV: Regular with no murmurs, gallops or rubs  Abd: Soft, nontender, no distension, positive bowel sounds  Ext: No cyanosis, clubbing; 2+ BL LE edema          IMPRESSION AND RECOMMENDATIONS:    Hypervolemic hyponatremia with pleural effusions, elevated BNP and known hx of CHF with poor EF (10%)    Plan: IV diuretics needed with strict I and O tally; low fluid diet; low salt diet; consider Cards input for opinion in house; d/w dtr; this hyponatremia is to be considered chronic since likely greater than " 48 hrs in duration between last labs and presentation; aim for high 120's by tomorrow; no role for 3% saline or Tolvaptans; IV loops favored; GFR preserved; K should correct with diuresis and it is mildly elevated at present.  Following with you; d/w IM colleague in detail.        *For 5/29/22: Continue fluid restriction; continue IV lasix/loop dosing; Na level will be followed up this am; ordered stat; d/w dtr at bedside; prog very guarded due to advanced age. Hypervolemic hyponatremia is working diagnosis. Coreg on hold.       Ramesh Silva MD

## 2022-05-30 PROBLEM — Z71.89 ADVANCED CARE PLANNING/COUNSELING DISCUSSION: Status: ACTIVE | Noted: 2022-05-30

## 2022-05-30 LAB
ANION GAP SERPL CALC-SCNC: 10 MMOL/L (ref 8–16)
AORTIC ROOT ANNULUS: 3.12 CM
ASCENDING AORTA: 2.75 CM
AV INDEX (PROSTH): 0.64
AV MEAN GRADIENT: 4 MMHG
AV PEAK GRADIENT: 8 MMHG
AV VALVE AREA: 2.12 CM2
AV VELOCITY RATIO: 0.6
BACTERIA UR CULT: ABNORMAL
BASOPHILS # BLD AUTO: 0.04 K/UL (ref 0–0.2)
BASOPHILS NFR BLD: 0.6 % (ref 0–1.9)
BSA FOR ECHO PROCEDURE: 1.76 M2
BUN SERPL-MCNC: 22 MG/DL (ref 8–23)
CALCIUM SERPL-MCNC: 8.3 MG/DL (ref 8.7–10.5)
CHLORIDE SERPL-SCNC: 95 MMOL/L (ref 95–110)
CO2 SERPL-SCNC: 32 MMOL/L (ref 23–29)
CREAT SERPL-MCNC: 0.7 MG/DL (ref 0.5–1.4)
CV ECHO LV RWT: 0.63 CM
DIFFERENTIAL METHOD: ABNORMAL
DOP CALC AO PEAK VEL: 1.44 M/S
DOP CALC AO VTI: 25.6 CM
DOP CALC LVOT AREA: 3.3 CM2
DOP CALC LVOT DIAMETER: 2.06 CM
DOP CALC LVOT PEAK VEL: 0.87 M/S
DOP CALC LVOT STROKE VOLUME: 54.3 CM3
DOP CALC RVOT PEAK VEL: 0.37 M/S
DOP CALC RVOT VTI: 7.6 CM
DOP CALCLVOT PEAK VEL VTI: 16.3 CM
E WAVE DECELERATION TIME: 114.06 MSEC
E/A RATIO: 1.51
E/E' RATIO: 14 M/S
ECHO LV POSTERIOR WALL: 1.32 CM (ref 0.6–1.1)
EJECTION FRACTION: 20 %
EOSINOPHIL # BLD AUTO: 0.1 K/UL (ref 0–0.5)
EOSINOPHIL NFR BLD: 1.7 % (ref 0–8)
ERYTHROCYTE [DISTWIDTH] IN BLOOD BY AUTOMATED COUNT: 15.9 % (ref 11.5–14.5)
EST. GFR  (AFRICAN AMERICAN): >60 ML/MIN/1.73 M^2
EST. GFR  (NON AFRICAN AMERICAN): >60 ML/MIN/1.73 M^2
FRACTIONAL SHORTENING: 9 % (ref 28–44)
GLUCOSE SERPL-MCNC: 107 MG/DL (ref 70–110)
HCT VFR BLD AUTO: 31.8 % (ref 37–48.5)
HGB BLD-MCNC: 9.9 G/DL (ref 12–16)
IMM GRANULOCYTES # BLD AUTO: 0.09 K/UL (ref 0–0.04)
IMM GRANULOCYTES NFR BLD AUTO: 1.4 % (ref 0–0.5)
INTERVENTRICULAR SEPTUM: 1.47 CM (ref 0.6–1.1)
IVC DIAMETER: 2.38 CM
IVRT: 99.9 MSEC
LA MAJOR: 4.3 CM
LA MINOR: 3.44 CM
LEFT ATRIUM SIZE: 3.83 CM
LEFT INTERNAL DIMENSION IN SYSTOLE: 3.83 CM (ref 2.1–4)
LEFT VENTRICLE DIASTOLIC VOLUME INDEX: 45.53 ML/M2
LEFT VENTRICLE DIASTOLIC VOLUME: 78.31 ML
LEFT VENTRICLE MASS INDEX: 129 G/M2
LEFT VENTRICLE SYSTOLIC VOLUME INDEX: 36.8 ML/M2
LEFT VENTRICLE SYSTOLIC VOLUME: 63.31 ML
LEFT VENTRICULAR INTERNAL DIMENSION IN DIASTOLE: 4.19 CM (ref 3.5–6)
LEFT VENTRICULAR MASS: 222.33 G
LV LATERAL E/E' RATIO: 12.83 M/S
LV SEPTAL E/E' RATIO: 15.4 M/S
LVOT MG: 1.32 MMHG
LVOT MV: 0.51 CM/S
LYMPHOCYTES # BLD AUTO: 0.6 K/UL (ref 1–4.8)
LYMPHOCYTES NFR BLD: 9.3 % (ref 18–48)
MCH RBC QN AUTO: 28 PG (ref 27–31)
MCHC RBC AUTO-ENTMCNC: 31.1 G/DL (ref 32–36)
MCV RBC AUTO: 90 FL (ref 82–98)
MONOCYTES # BLD AUTO: 0.8 K/UL (ref 0.3–1)
MONOCYTES NFR BLD: 13.1 % (ref 4–15)
MV PEAK A VEL: 0.51 M/S
MV PEAK E VEL: 0.77 M/S
MV STENOSIS PRESSURE HALF TIME: 33.08 MS
MV VALVE AREA P 1/2 METHOD: 6.65 CM2
NEUTROPHILS # BLD AUTO: 4.7 K/UL (ref 1.8–7.7)
NEUTROPHILS NFR BLD: 73.9 % (ref 38–73)
NRBC BLD-RTO: 0 /100 WBC
PISA TR MAX VEL: 3.53 M/S
PLATELET # BLD AUTO: 344 K/UL (ref 150–450)
PMV BLD AUTO: 9.1 FL (ref 9.2–12.9)
POTASSIUM SERPL-SCNC: 4.2 MMOL/L (ref 3.5–5.1)
PV MEAN GRADIENT: 0.44 MMHG
RA MAJOR: 4.96 CM
RA PRESSURE: 8 MMHG
RA WIDTH: 4.18 CM
RBC # BLD AUTO: 3.53 M/UL (ref 4–5.4)
SINUS: 2.8 CM
SODIUM SERPL-SCNC: 137 MMOL/L (ref 136–145)
STJ: 2.62 CM
TDI LATERAL: 0.06 M/S
TDI SEPTAL: 0.05 M/S
TDI: 0.06 M/S
TR MAX PG: 50 MMHG
TRICUSPID ANNULAR PLANE SYSTOLIC EXCURSION: 1.49 CM
TV REST PULMONARY ARTERY PRESSURE: 58 MMHG
WBC # BLD AUTO: 6.35 K/UL (ref 3.9–12.7)

## 2022-05-30 PROCEDURE — 27000221 HC OXYGEN, UP TO 24 HOURS

## 2022-05-30 PROCEDURE — 99223 PR INITIAL HOSPITAL CARE,LEVL III: ICD-10-PCS | Mod: NSCH,,, | Performed by: INTERNAL MEDICINE

## 2022-05-30 PROCEDURE — 99233 SBSQ HOSP IP/OBS HIGH 50: CPT | Mod: ,,, | Performed by: INTERNAL MEDICINE

## 2022-05-30 PROCEDURE — 87040 BLOOD CULTURE FOR BACTERIA: CPT | Performed by: INTERNAL MEDICINE

## 2022-05-30 PROCEDURE — 25000003 PHARM REV CODE 250: Performed by: STUDENT IN AN ORGANIZED HEALTH CARE EDUCATION/TRAINING PROGRAM

## 2022-05-30 PROCEDURE — 25000003 PHARM REV CODE 250: Performed by: INTERNAL MEDICINE

## 2022-05-30 PROCEDURE — 94761 N-INVAS EAR/PLS OXIMETRY MLT: CPT

## 2022-05-30 PROCEDURE — 36415 COLL VENOUS BLD VENIPUNCTURE: CPT | Performed by: INTERNAL MEDICINE

## 2022-05-30 PROCEDURE — 63600175 PHARM REV CODE 636 W HCPCS: Performed by: INTERNAL MEDICINE

## 2022-05-30 PROCEDURE — 99233 PR SUBSEQUENT HOSPITAL CARE,LEVL III: ICD-10-PCS | Mod: ,,, | Performed by: INTERNAL MEDICINE

## 2022-05-30 PROCEDURE — 27000207 HC ISOLATION

## 2022-05-30 PROCEDURE — 25000003 PHARM REV CODE 250: Performed by: NURSE PRACTITIONER

## 2022-05-30 PROCEDURE — 85025 COMPLETE CBC W/AUTO DIFF WBC: CPT | Performed by: NURSE PRACTITIONER

## 2022-05-30 PROCEDURE — 63600175 PHARM REV CODE 636 W HCPCS: Performed by: STUDENT IN AN ORGANIZED HEALTH CARE EDUCATION/TRAINING PROGRAM

## 2022-05-30 PROCEDURE — 11000001 HC ACUTE MED/SURG PRIVATE ROOM

## 2022-05-30 PROCEDURE — 99223 1ST HOSP IP/OBS HIGH 75: CPT | Mod: NSCH,,, | Performed by: INTERNAL MEDICINE

## 2022-05-30 PROCEDURE — 80048 BASIC METABOLIC PNL TOTAL CA: CPT | Performed by: NURSE PRACTITIONER

## 2022-05-30 RX ORDER — FUROSEMIDE 10 MG/ML
40 INJECTION INTRAMUSCULAR; INTRAVENOUS
Status: DISCONTINUED | OUTPATIENT
Start: 2022-05-30 | End: 2022-06-01 | Stop reason: HOSPADM

## 2022-05-30 RX ADMIN — POLYETHYLENE GLYCOL 3350 17 G: 17 POWDER, FOR SOLUTION ORAL at 09:05

## 2022-05-30 RX ADMIN — CARVEDILOL 6.25 MG: 6.25 TABLET, FILM COATED ORAL at 09:05

## 2022-05-30 RX ADMIN — VANCOMYCIN HYDROCHLORIDE 1000 MG: 1 INJECTION, POWDER, LYOPHILIZED, FOR SOLUTION INTRAVENOUS at 11:05

## 2022-05-30 RX ADMIN — CARVEDILOL 6.25 MG: 6.25 TABLET, FILM COATED ORAL at 10:05

## 2022-05-30 RX ADMIN — FUROSEMIDE 40 MG: 40 INJECTION, SOLUTION INTRAMUSCULAR; INTRAVENOUS at 11:05

## 2022-05-30 RX ADMIN — MUPIROCIN: 20 OINTMENT TOPICAL at 10:05

## 2022-05-30 RX ADMIN — MUPIROCIN: 20 OINTMENT TOPICAL at 09:05

## 2022-05-30 NOTE — PROGRESS NOTES
Pharmacokinetic Assessment Follow Up: IV Vancomycin    Vancomycin serum concentration assessment(s):    The trough level was drawn correctly and can be used to guide therapy at this time. The measurement is within the desired definitive target range of 10 to 15 mcg/mL.    Vancomycin Regimen Plan:    Continue regimen to Vancomycin 1000 mg IV every 24 hours with next serum trough concentration measured at 2100 prior to 5th total dose on 05/31    Drug levels (last 3 results):  Recent Labs   Lab Result Units 05/29/22  2121   Vancomycin-Trough ug/mL 11.8       Pharmacy will continue to follow and monitor vancomycin.    Please contact pharmacy at extension [phone] for questions regarding this assessment.    Thank you for the consult,   Estefany Orantes       Patient brief summary:  Tanya Madrid is a 82 y.o. female initiated on antimicrobial therapy with IV Vancomycin for treatment of urinary tract infection    The patient's current regimen is vancomycin 1000 mg IV every 24 hours.     Drug Allergies:   Review of patient's allergies indicates:   Allergen Reactions    Xanax [alprazolam]        Actual Body Weight:   73.6 kg    Renal Function:   Estimated Creatinine Clearance: 56.2 mL/min (based on SCr of 0.7 mg/dL).,     Dialysis Method (if applicable):  N/A    CBC (last 72 hours):  Recent Labs   Lab Result Units 05/27/22  1732 05/28/22  0454   WBC K/uL 18.69* 14.25*   Hemoglobin g/dL 10.4* 10.1*   Hematocrit % 31.4* 33.5*   Platelets K/uL 361 265   Gran % % 86.2* 82.8*   Lymph % % 5.2* 6.4*   Mono % % 7.1 8.3   Eosinophil % % 0.3 1.1   Basophil % % 0.2 0.2   Differential Method  Automated Automated       Metabolic Panel (last 72 hours):  Recent Labs   Lab Result Units 05/27/22  1732 05/27/22  1952 05/28/22  0454 05/28/22  1245 05/29/22  0947   Sodium mmol/L 123*  --  122*  --  131*   Sodium, Urine mmol/L  --   --   --  75  --    Potassium mmol/L 5.2*  --  5.0  --  3.8   Chloride mmol/L 84*  --  86*  --  91*   CO2 mmol/L 27   --  22*  --  30*   Glucose mg/dL 88  --  70  --  112*   Glucose, UA   --  Negative  --   --   --    BUN mg/dL 28*  --  25*  --  22   Creatinine mg/dL 0.8  --  0.7  --  0.7   Albumin g/dL 2.5*  --  2.3*  --   --    Total Bilirubin mg/dL 0.5  --  0.5  --   --    Alkaline Phosphatase U/L 132  --  111  --   --    AST U/L 25  --  25  --   --    ALT U/L 18  --  6*  --   --    Magnesium mg/dL  --   --  1.9  --   --        Vancomycin Administrations:  vancomycin given in the last 96 hours                     vancomycin in dextrose 5 % 1 gram/250 mL IVPB 1,000 mg (mg) 1,000 mg New Bag 05/28/22 2237    vancomycin 1.25 g in dextrose 5% 250 mL IVPB (ready to mix) (mg) 1,250 mg New Bag 05/27/22 2217                    Microbiologic Results:  Microbiology Results (last 7 days)       Procedure Component Value Units Date/Time    Urine culture [[id number]]  (Abnormal) Collected: 05/27/22 1952    Order Status: Completed Specimen: Urine Updated: 05/29/22 1205     Urine Culture, Routine STAPHYLOCOCCUS AUREUS  >100,000cfu/ml  Susceptibility pending      Narrative:      Specimen Source->Urine

## 2022-05-30 NOTE — PLAN OF CARE
05/30/22 1549   Readmission   Why were you hospitalized in the last 30 days? Shortness of breath   Why were you readmitted? New medical problem   When you left the hospital how did you feel? ok   When you left the hospital where did you go? Home with Home Health   Did patient/caregiver refused recommended DC plan? No   Tell me about what happened between when you left the hospital and the day you returned. urine was darker than normal   When did you start not feeling well? pt non verbal   Did you try to manage your symptoms your self? Yes   What did you do?  nurse took urine sample and started antibiotics   Did you try to see or did see a doctor or nurse before you came? Yes   Were you seen? Yes   Did you have  a follow-up appointment on discharge? Yes   Did you go? Yes   Was this a planned readmission? No

## 2022-05-30 NOTE — ASSESSMENT & PLAN NOTE
Recurrent, thoracentesis 5/2022- 1200mL removed    --Pulmonary following, not a candidate for Thoracentesis, recommendations noted.

## 2022-05-30 NOTE — HPI
82-year-old  female, nonverbal, bed-bound since cardiac arrest in March 2022, PMH significant for combined CHF, EF 10%, CAD, CVA, chronic indwelling Abreu catheter was was brought in by the   daughter  due to  dark urine for the past few days. She was recently started on bactrim for recent UTI -05/25..  CT of the abdomen renal stone study, negative for hydronephrosis or obstruction, moderately large right and mild left-sided pleural effusion.  Blood culture - 05/27- negative  Urine culture -  05/25 and 05/27- MRSA  Previous blood cultures -03/2022- strep anginosus/E corrodens    Component      Latest Ref Rng & Units 5/30/2022 5/28/2022 5/27/2022               WBC      3.90 - 12.70 K/uL 6.35 14.25 (H) 18.69 (H)   She is non verbal

## 2022-05-30 NOTE — PROGRESS NOTES
[facility]  Pulmonology  Progress Note    Patient Name: Tanya Madrid  MRN: [id number]  Admission Date: 5/27/2022  Hospital Length of Stay: 3 days  Code Status: DNR  Attending Provider: Doreen Kramer MD  Primary Care Provider: Elva Ansari NP   Principal Problem: Urinary tract infection associated with indwelling urethral catheter    Subjective:     5/30 seen and examined.  No major events.  Afebrile.  WBC 6 K. on IV vancomycin for MRSA UTI.  Daughter at bedside.  Afebrile  Review of Systems   Unable to perform ROS: Other (Nonverbal status post cardiac arrest)   Objective:     Vital Signs (Most Recent):  Temp: 97.6 °F (36.4 °C) (05/30/22 1112)  Pulse: 72 (05/30/22 1112)  Resp: 20 (05/30/22 1112)  BP: 111/69 (05/30/22 1112)  SpO2: 96 % (05/30/22 1112) Vital Signs (24h Range):  Temp:  [97.6 °F (36.4 °C)-99.5 °F (37.5 °C)] 97.6 °F (36.4 °C)  Pulse:  [72-88] 72  Resp:  [17-20] 20  SpO2:  [92 %-100 %] 96 %  BP: (111-172)/(64-93) 111/69     Weight: 70.8 kg (156 lb)  Body mass index is 28.53 kg/m².      Intake/Output Summary (Last 24 hours) at 5/30/2022 1529  Last data filed at 5/30/2022 1500  Gross per 24 hour   Intake 135 ml   Output 2150 ml   Net -2015 ml         Physical Exam  Vitals and nursing note reviewed.   Constitutional:       General: She is not in acute distress.     Appearance: She is well-developed. She is ill-appearing and toxic-appearing.   HENT:      Head: Normocephalic and atraumatic.      Nose: Nose normal.   Eyes:      Extraocular Movements: Extraocular movements intact.   Cardiovascular:      Rate and Rhythm: Normal rate and regular rhythm.   Pulmonary:      Effort: Pulmonary effort is normal. No respiratory distress.      Comments: Breath sounds decreased bilaterally  Abdominal:      General: There is no distension.      Comments: PEG tube   Genitourinary:     Comments: Urine catheter  Musculoskeletal:         General: Deformity present. No signs of injury.      Cervical back: Normal  range of motion and neck supple.   Skin:     General: Skin is dry.   Neurological:      Mental Status: She is alert. Mental status is at baseline.       Vents:  Oxygen Concentration (%): 32 (05/30/22 0721)    Lines/Drains/Airways       Drain  Duration                  Gastrostomy/Enterostomy 03/25/22 1320 Gastrostomy tube w/ balloon LUQ feeding 66 days              Peripheral Intravenous Line  Duration                  Peripheral IV - Single Lumen 05/27/22 1734 22 G Left Antecubital 2 days         Peripheral IV - Single Lumen 05/27/22 2128 22 G Left Wrist 2 days                    Significant Labs:    CBC/Anemia Profile:  Recent Labs   Lab 05/30/22  1040   WBC 6.35   HGB 9.9*   HCT 31.8*      MCV 90   RDW 15.9*          Chemistries:  Recent Labs   Lab 05/29/22  0947 05/30/22  1040   * 137   K 3.8 4.2   CL 91* 95   CO2 30* 32*   BUN 22 22   CREATININE 0.7 0.7   CALCIUM 8.2* 8.3*       Urine culture [[id number]] (Abnormal)  Collected: 05/27/22 1952   Order Status: Completed Specimen: Urine Updated: 05/30/22 1045    Urine Culture, Routine METHICILLIN RESISTANT STAPHYLOCOCCUS AUREUS   >100,000cfu/ml    Abnormal    Narrative:     Specimen Source->Urine     Susceptibility     Methicillin resistant Staphylococcus aureus     CULTURE, URINE     Oxacillin >2 mcg/mL Resistant     Penicillin >8 mcg/mL Resistant     Trimeth/Sulfa <=0.5/9.5 m... Sensitive     Vancomycin 1 mcg/mL Sensitive                   Latest Reference Range & Units 05/03/22 14:23   Fluid Color  Yellow   Fluid Appearance  Hazy   WBC, Body Fluid /cu mm 547   Body Fluid Type  Pleural Fluid, Right   Segs, Fluid % 36   Lymphs, Fluid % 20   Monocytes/Macrophages, Fluid % 44   Amylase, Fluid Not established U/L 28   Glucose, Fluid Not established mg/dL 90   LD, Fluid Not established U/L 173   Body Fluid, Albumin See text g/dL 1.6   Body Fluid Source Amylase  Pleural Fluid, Right   Body Fluid Source, Albumin  Pleural Fluid, Right   Body Fluid Source,  Glucose  Pleural Fluid, Right   Body Fluid Source, LDH  Pleural Fluid, Right   Body Fluid Source, Total Protein  Pleural Fluid, Right   Body Fluid, Protein Not established g/dL 2.6   Cholesterol, Body Fluid See Comment mg/dL 51      Latest Reference Range & Units 05/03/22 13:27 05/12/22 14:03   BNP 0 - 99 pg/mL  >4,900 (H)    - 260 U/L 315 (H)    (H): Data is abnormally high   Latest Reference Range & Units 04/22/22 22:18 05/03/22 06:30   POC PH 7.35 - 7.45  7.528 (H) 7.485 (H)   POC PCO2 35 - 45 mmHg 30.8 (L) 33.5 (L)   POC PO2 80 - 100 mmHg 68 (L) 61 (L)   POC BE -2 to 2 mmol/L 3 2   POC HCO3 24 - 28 mmol/L 25.6 25.3   POC SATURATED O2 95 - 100 % 95 93 (L)   FiO2   21   Sample  ARTERIAL ARTERIAL   DelSys  Room Air Room Air   Allens Test  Pass N/A   Site  RR RB   Mode   SPONT     EKG 05/03/2022    Vent. Rate : 088 BPM     Atrial Rate : 088 BPM      P-R Int : 156 ms          QRS Dur : 098 ms       QT Int : 426 ms       P-R-T Axes : 052 -56 087 degrees      QTc Int : 515 ms     Normal sinus rhythm   Left anterior fascicular block   Cannot rule out Septal infarct ,age undetermined   Cannot rule out Lateral infarct ,age undetermined   Abnormal ECG   When compared with ECG of 31-MAR-2022 06:45,   Aberrant conduction is no longer Present   QRS voltage has decreased     Echo March 2022    · The left ventricle is mildly enlarged with eccentric hypertrophy and severely decreased systolic function.  · Grade III left ventricular diastolic dysfunction.  · Moderate right ventricular enlargement with moderately reduced right ventricular systolic function.  · Severe left atrial enlargement.  · The estimated ejection fraction is 10%.  · There is severe left ventricular global hypokinesis.  · Severe right atrial enlargement.  · Moderate-to-severe mitral regurgitation.  · Moderate to severe tricuspid regurgitation.  · Moderate pulmonic regurgitation.  · Mechanically ventilated; cannot use inferior caval vein diameter to  estimate central venous pressure.  · There is a large left pleural effusion      Significant Imaging:   CT renal stone study 05/27/2022     COMPARISON:  None     FINDINGS:  Gallstones.  Hepatic cyst.  Mild anasarca.  Moderate right mild left-sided pleural effusion with associated atelectasis/consolidation.  Hepatomegaly.  Atherosclerotic changes.  Mild cardiomegaly.  Bilateral hip replacement with associated artifacts.  No definite bowel obstruction.  Prominent left renal cyst measuring 3.9 cm in the superior pole.  No definite hydronephrosis.  There is a gastrostomy tube identified.  The bulb of the a G-tube appears to abut the gastric outlet.  Proper positioning cannot be determined.  Recommend clinical correlation and follow-up tube check for further evaluation.  Small ill-defined hypodense lesions in the inferior pole of the left kidney may relate to complex cyst     No definite bowel obstruction.     Impression:     Moderate anasarca     Prominent left left superior renal cyst and small right hepatic cyst     Moderately large right and mild left-sided pleural effusion with associated atelectasis/consolidation     Cardiomegaly     No definite hydronephrosis.     Gallstones     A gastrostomy tube adjacent to the gastric outlet.  Proper position cannot determined.  Consider G tube tube check as clinically indicated            Chest x-ray 05/03/2022    Narrative & Impression  EXAMINATION:  XR CHEST 1 VIEW     CLINICAL HISTORY:  post thora;     FINDINGS:  Comparison is made with the most recent prior chest x-ray.  The patient's chin is obscuring the right lung apex.  No evidence of pneumothorax in the visualized right lung..  Interval decrease in size of the right pleural effusion with improvement of the adjacent compressive atelectasis..     Impression:     No evidence of pneumothorax status post right thoracentesis..          ABG  No results for input(s): PH, PO2, PCO2, HCO3, BE in the last 168  hours.  Assessment/Plan:     Advanced care planning/counseling discussion  5/30 DNR.    Combined systolic and diastolic congestive heart failure  Patient is identified as having Combined Systolic and Diastolic heart failure that is Acute on chronic. CHF is currently uncontrolled due to Rales/crackles on pulmonary exam and Pulmonary edema/pleural effusion on CXR. Latest ECHO performed and demonstrates- Results for orders placed during the hospital encounter of 03/10/22    Echo    Interpretation Summary  · The left ventricle is mildly enlarged with eccentric hypertrophy and severely decreased systolic function.  · Grade III left ventricular diastolic dysfunction.  · Moderate right ventricular enlargement with moderately reduced right ventricular systolic function.  · Severe left atrial enlargement.  · The estimated ejection fraction is 10%.  · There is severe left ventricular global hypokinesis.  · Severe right atrial enlargement.  · Moderate-to-severe mitral regurgitation.  · Moderate to severe tricuspid regurgitation.  · Moderate pulmonic regurgitation.  · Mechanically ventilated; cannot use inferior caval vein diameter to estimate central venous pressure.  · There is a large left pleural effusion.  . Continue Furosemide and monitor clinical status closely. Monitor on telemetry. Patient is off CHF pathway.  Monitor strict Is&Os and daily weights.  Place on fluid restriction of 1.5 L. Continue to stress to patient importance of self efficacy and  on diet for CHF. Last BNP reviewed- and noted below No results for input(s): BNP, BNPTRIAGEBLO in the last 168 hours..  5/30 diuresis      Recurrent right pleural effusion  5/29.  Status post right-sided thoracentesis 5/3 volume of 1200 mL removed.  Transudative , CHF related.  Will not benefit from repeat thoracentesis.  Severe CHF, history of cardiac arrest nonverbal status post trach and PEG.  Grave prognosis  Palliative care consult/comfort care.  5/30 will not  "benefit from repeat thoracentesis.  Can resume Plavix.  Treat with diuresis.    Severe sepsis  This patient does have evidence of infective focus  My overall impression is sepsis. Vital signs were reviewed and noted in progress note.  Antibiotics given-   Antibiotics (From admission, onward)            Start     Stop Route Frequency Ordered    05/28/22 2200  vancomycin in dextrose 5 % 1 gram/250 mL IVPB 1,000 mg         -- IV Every 24 hours (non-standard times) 05/27/22 2250    05/28/22 2100  mupirocin 2 % ointment         06/02 2059 Nasl 2 times daily 05/28/22 1512 05/27/22 2032  vancomycin - pharmacy to dose  (vancomycin IVPB)        "And" Linked Group Details    -- IV pharmacy to manage frequency 05/27/22 1933        Cultures were taken-   Microbiology Results (last 7 days)     Procedure Component Value Units Date/Time    Blood culture [[id number]] Collected: 05/30/22 1308    Order Status: Sent Specimen: Blood from Peripheral, Left Updated: 05/30/22 1308    Blood culture [[id number]] Collected: 05/30/22 1307    Order Status: Sent Specimen: Blood from Peripheral, Left Arm Updated: 05/30/22 1308    Urine culture [[id number]]  (Abnormal)  (Susceptibility) Collected: 05/27/22 1952    Order Status: Completed Specimen: Urine Updated: 05/30/22 1045     Urine Culture, Routine METHICILLIN RESISTANT STAPHYLOCOCCUS AUREUS  >100,000cfu/ml      Narrative:      Specimen Source->Urine        Latest lactate reviewed, they are-  Recent Labs   Lab 05/27/22  1732   LACTATE 2.6*       Organ dysfunction indicated by Acute respiratory failure  Source- MRSA UTI    Source control Achieved by- IV vanc   5/30 contact precaution.  Treat MRSA UTI with vancomycin.      Discussed with hospital medicine attending       Charli Vargas MD  Pulmonology  [facility]    "

## 2022-05-30 NOTE — PROGRESS NOTES
..  Tanya Madrid is a 82 y.o. female for whom nephrology consult has been requested to evaluate and give opinion.     HPI: 82 year old AA female with hx CHF with apparent EF 10% and diuretic use at home admitted with UTI and apparent c/o hematuria seen in her indwelling henley; dtr at bedside; patient audibly wheezing and on oxygen and with LE edema; however, dtr states this is actually chronic; I have been asked to see patient for hyponatremia; she has elevated BNP almost in 5000 range. Her serum sodium was normal earlier this month on May 6 2022; Patient has Parkinsons and is non verbal; has remote hx of cardiac arrest in March 2022; EHR reviewed in detail.  Nephrology consultation requested for the above issues with low sodium and specifically Na 123-122 range.     *For 5/29/22: Upper airway congestion audible; otherwise stable overnight.     *For 5/30/22:  Seen and examined. Chart reviewed; no acute overnight issues; UOP excellent.    PAST MEDICAL HISTORY:  She  has a past medical history of Acute CHF (congestive heart failure) (1/17/2021), Hyperlipemia, Hypertension, Parkinson's disease, Stroke (2016), and Throat mass.    PAST SURGICAL HISTORY:  She  has a past surgical history that includes Throat surgery; Hysterectomy; Tonsillectomy; Hemiarthroplasty of hip (Left, 7/12/2020); Hemiarthroplasty of hip (Right, 10/2/2020); Closed reduction (Right, 10/15/2020); Open reduction of dislocation of hip (Right, 10/17/2020); Evacuation of hematoma (Right, 10/17/2020); and Insertion of percutaneous endoscopic gastrostomy (PEG) feeding tube.    SOCIAL HISTORY:  She  reports that she has never smoked. She has never used smokeless tobacco. She reports that she does not drink alcohol and does not use drugs.      FAMILY MEDICAL HISTORY:  Her Family history is unknown by patient.    Review of patient's allergies indicates:   Allergen Reactions    Xanax [alprazolam]         carbidopa-levodopa  mg  2 tablet Per G  Tube TID    carvediloL  6.25 mg Per G Tube BID    furosemide (LASIX) injection  80 mg Intravenous Q12H    mupirocin   Nasal BID    polyethylene glycol  17 g Oral Daily    vancomycin (VANCOCIN) IVPB  1,000 mg Intravenous Q24H       Prior to Admission medications    Medication Sig Start Date End Date Taking? Authorizing Provider   carbidopa-levodopa  mg (SINEMET)  mg per tablet 2 tablets by Per G Tube route 3 (three) times daily. 3/29/22 5/12/22  Tim Reilly MD   carvediloL (COREG) 6.25 MG tablet 1 tablet (6.25 mg total) by Per G Tube route 2 (two) times daily. 3/29/22 5/12/22  Tim Reilly MD   clopidogreL (PLAVIX) 75 mg tablet 1 tablet (75 mg total) by Per G Tube route once daily. 3/30/22 5/12/22  Tim Reilly MD   esomeprazole (NEXIUM) 40 MG capsule Take  40 mg(1 cap)  twice a day  , then after 40 mg(1 cap) daily   Through PEG tube 4/4/22   Sj Juan MD   famotidine (PEPCID) 20 MG tablet Take 20 mg by mouth once daily at 6am. 5/16/22   Historical Provider   furosemide (LASIX) 40 MG tablet 1 tablet (40 mg total) by Per G Tube route 2 (two) times a day. Take per G tube twice daily as directed 5/17/22   JIE Jimenez   ipratropium (ATROVENT) 0.02 % nebulizer solution Take 2.5 mLs (500 mcg total) by nebulization 2 (two) times daily. Rescue 3/29/22 5/12/22  Tim Reilly MD   QUEtiapine (SEROQUEL) 25 MG Tab Take 1 tablet (25 mg total) by mouth once daily. At bedtime 5/17/22 5/17/23  Elva Colunga NP   sacubitriL-valsartan (ENTRESTO)  mg per tablet 1 tablet by Per G Tube route 2 (two) times daily. 5/13/22   JIE Jimenez   scopolamine (TRANSDERM-SCOP) 1.3-1.5 mg (1 mg over 3 days) Place 1 patch onto the skin Every 3 (three) days. 5/4/22   Elva Colunga NP   sulfamethoxazole-trimethoprim 800-160mg (BACTRIM DS) 800-160 mg Tab Take 1 tablet by mouth 2 (two) times daily. for 7 days 5/25/22 6/1/22  Elva Colunga NP   traZODone (DESYREL) 50 MG tablet  "Take 0.5 tablets (25 mg total) by mouth every evening. 4/20/22 5/20/22  Elva Colunga NP   aspirin 81 MG Chew 1 tablet (81 mg total) by Per G Tube route once daily. 3/30/22 4/4/22  Tim Reilly MD   losartan (COZAAR) 50 MG tablet Take 1 tablet (50 mg total) by mouth 2 (two) times a day. 3/11/21 4/20/21  MARK Nelson   metoprolol succinate (TOPROL-XL) 50 MG 24 hr tablet Take 1 tablet (50 mg total) by mouth 2 (two) times daily. 4/1/21 3/29/22  MARK Nelson   omeprazole magnesium 10 mg SuDR Take 40 mg by mouth once daily.  Patient not taking: Reported on 4/21/2022 4/7/22 5/3/22  Garry Felix MD   pantoprazole (PROTONIX) 40 mg suspension Take 1 packet (40 mg total) by mouth 2 (two) times daily. 4/4/22 4/4/22  Sj Juan MD        REVIEW OF SYSTEMS:    Unobtainable due to non verbal status:      Patient has no fever, fatigue, visual changes, chest pain, edema, cough, dyspnea, nausea, vomiting, constipation, diarrhea, arthralgias, pruritis, dizziness, weakness, depression, confusion.        PHYSICAL EXAM:   height is 5' 2" (1.575 m) and weight is 71.1 kg (156 lb 12 oz). Her axillary temperature is 97.7 °F (36.5 °C). Her blood pressure is 123/84 and her pulse is 82. Her respiration is 17 and oxygen saturation is 99%.   Gen: WDWN female in no apparent distress; occasional auditory wheezes  Psych: Normal mood and affect  Skin: No rashes or ulcers  Eyes: Normal conjunctiva and lids, PERRLA  ENT: Normal hearing with no oropharyngeal lesions  Neck: No JVD  Chest: Clear with no rales, rhonchi, + wheezing with normal effort  CV: Regular with no murmurs, gallops or rubs  Abd: Soft, nontender, no distension, positive bowel sounds  Ext: No cyanosis, clubbing; 2+ BL LE edema          IMPRESSION AND RECOMMENDATIONS:    Hypervolemic hyponatremia with pleural effusions, elevated BNP and known hx of CHF with poor EF (10%)    Plan: IV diuretics needed with strict I and O tally; low fluid " diet; low salt diet; consider Cards input for opinion in house; d/w dtr; this hyponatremia is to be considered chronic since likely greater than 48 hrs in duration between last labs and presentation; aim for high 120's by tomorrow; no role for 3% saline or Tolvaptans; IV loops favored; GFR preserved; K should correct with diuresis and it is mildly elevated at present.  Following with you; d/w IM colleague in detail.        *For 5/29/22: Continue fluid restriction; continue IV lasix/loop dosing; Na level will be followed up this am; ordered stat; d/w dtr at bedside; prog very guarded due to advanced age. Hypervolemic hyponatremia is working diagnosis. Coreg on hold.     *For 5/30/22: Await BMP for this am; on diuretics with excellent UOP; resolution of edema. Empirically will scale down on lasix dosing to 40 mg IV Q 12; f/u Na level. Otherwise, no new recommendations.  NB: labs noted; will sign off. Call with interim concerns.       Ramesh Silva MD

## 2022-05-30 NOTE — ASSESSMENT & PLAN NOTE
On admission, serum sodium 123, chloride 84, suggesting volume depletion.    -will be cautious with IV fluids due to abdominal anasarca, bilateral pleural effusions.  -hold diuretics, antidepressants  -repeat labs in a.m.  -Consult Nephrology- recommend 1.5L fluid restriction  -consult RD for recs for peg feeding/water bolus  --Sodium level improved 137 today. (5/30)

## 2022-05-30 NOTE — ASSESSMENT & PLAN NOTE
Advance Care Planning     Date: 05/30/2022  Per recommendation from specialist, discussed comfort care with daughter at bedside. Daughter stated she did not want to change her mothers current care, reports she feels capable of continuing with needed care of her mother at home and does not desire to change to a comfort focused care plan. Stated she wants to continue with aggressive treatment including hospitalization, IV antibiotics, IV fluids and IV medications for treatment when needed and beneficial. Discussed her current condition, recurrent pleural effusions, severe CHF, dtr stated she understands these conditions could worsen but at this time feels her mother would benefit from continued aggressive treatment with exacerbations. Patient is a DNR, dtr stated she understands if her heart stops she would not receive CPR or be placed on a ventilator.

## 2022-05-30 NOTE — PROGRESS NOTES
[facility] [place] Medicine  Progress Note    Patient Name: Tanya Madrid  MRN: [id number]  Patient Class: IP- Inpatient   Admission Date: 5/27/2022  Length of Stay: 3 days  Attending Physician: Doreen Kramer MD  Primary Care Provider: Elva Ansari NP        Subjective:     Principal Problem:Urinary tract infection associated with indwelling urethral catheter        HPI:  Ms. Madrid is an elderly 82-year-old  female, nonverbal, bed-bound since cardiac arrest in March 2022, PMH significant for combined CHF, EF 10%, CAD, CVA, chronic indwelling Henley catheter, was brought to the ED by her daughter complaining of dark colored urine for the past few days.  Daughter reports no evidence of fever, chills, dysuria.  Unable to obtain any information from patient, due to her nonverbal state.  Patient had urine cultures obtained on 5/25 per home health nurse, and was empirically prescribed Bactrim.  Today urine cultures resulted growing Staphylococcus aureus, unknown sensitivities.  Afebrile in the ED.  /91.  WBC 15823, 0% bands, lactic acid 2.6.  Sodium 123, potassium 5.2, chloride 84, creatinine 0.8.  UA reveals dark-colored urine, positive for nitrites, 3+ leukocytes, > 100 rbc's, 75 WBCs.  Received IV Rocephin, IV vancomycin in the ED.  CT of the abdomen renal stone study, negative for hydronephrosis or obstruction.  But reveals moderate anasarca, and moderately large right and mild left-sided pleural effusion, similar to prior imaging studies.  Patient denies shortness of breath.  Currently on room air, oxygen saturations in the high 90s.    Admitting diagnosis:  Staphylococcus aureus UTI, due to chronic indwelling Henley catheter.  Severe sepsis.      Overview/Hospital Course:  5/28: Na: 122, nephrology consulted, recommend 1.5L fluid restriction. Enteral feeding order adjusted to reduce free water boluses. WBC trending down. Discussion with daughter on removing henley catheter,  she is aware of increased diaper changes to prevent skin breakdown,  due to infection risk dtr prefers it to be removed. Will have a voiding trial prior to d/c.    5/29: patient decrease mental response, no verbal. MRSA UTI, continue Vancomycin. Severe CHF, LVEF 10 %. Pulmonary following,  will not benefit from repeat thoracentesis.  Severe CHF, history of cardiac arrest nonverbal status post trach and PEG.  Grave prognosis  Palliative care consult/comfort care.    5/30: Palliative medicine consult d/c due to family preference. Family is not in agreement with recommendation for comfort care and do not want to discuss care plan with Palliative. Nephrology signed off. ID evaluated, ordered Blood culture and ECHO, for further evaluation, r/o bacteremia. CBC stable, labs stable. Abreu catheter removed, ordered voiding trial.           Interval History: Abreu Catheter removed.     Review of Systems   Unable to perform ROS: Patient nonverbal   Objective:     Vital Signs (Most Recent):  Temp: 97.6 °F (36.4 °C) (05/30/22 1112)  Pulse: 72 (05/30/22 1112)  Resp: 20 (05/30/22 1112)  BP: 111/69 (05/30/22 1112)  SpO2: 96 % (05/30/22 1112) Vital Signs (24h Range):  Temp:  [97.6 °F (36.4 °C)-99.5 °F (37.5 °C)] 97.6 °F (36.4 °C)  Pulse:  [72-88] 72  Resp:  [17-20] 20  SpO2:  [92 %-100 %] 96 %  BP: (111-172)/(64-93) 111/69     Weight: 70.8 kg (156 lb)  Body mass index is 28.53 kg/m².    Intake/Output Summary (Last 24 hours) at 5/30/2022 1418  Last data filed at 5/30/2022 0516  Gross per 24 hour   Intake 590 ml   Output 1250 ml   Net -660 ml      Physical Exam  Constitutional:       General: She is not in acute distress.     Appearance: She is cachectic.   HENT:      Head: Normocephalic and atraumatic.      Right Ear: Hearing and external ear normal.      Left Ear: Hearing and external ear normal.      Nose: No rhinorrhea.      Right Sinus: No maxillary sinus tenderness or frontal sinus tenderness.      Left Sinus: No maxillary sinus  tenderness or frontal sinus tenderness.      Mouth/Throat:      Mouth: No oral lesions.      Pharynx: Uvula midline.   Eyes:      General:         Right eye: No discharge.         Left eye: No discharge.      Conjunctiva/sclera: Conjunctivae normal.      Pupils: Pupils are equal, round, and reactive to light.   Neck:      Thyroid: No thyromegaly.      Vascular: No carotid bruit.      Trachea: No tracheal deviation.   Cardiovascular:      Rate and Rhythm: Normal rate and regular rhythm.      Pulses:           Dorsalis pedis pulses are 2+ on the right side and 2+ on the left side.      Heart sounds: Normal heart sounds, S1 normal and S2 normal. No murmur heard.  Pulmonary:      Effort: Pulmonary effort is normal. No respiratory distress.      Breath sounds: Normal breath sounds.   Chest:   Breasts:      Right: No supraclavicular adenopathy.      Left: No supraclavicular adenopathy.     Abdominal:      General: Bowel sounds are normal. There is no distension.      Palpations: Abdomen is soft. There is no mass.      Tenderness: There is no abdominal tenderness.       Musculoskeletal:         General: Normal range of motion.      Cervical back: Normal range of motion.   Lymphadenopathy:      Cervical: No cervical adenopathy.      Upper Body:      Right upper body: No supraclavicular adenopathy.      Left upper body: No supraclavicular adenopathy.   Skin:     General: Skin is warm and dry.      Capillary Refill: Capillary refill takes less than 2 seconds.      Findings: No rash.   Neurological:      Sensory: Sensory deficit present.      Motor: Weakness and atrophy present.      Gait: Gait normal.      Comments: Non-verbal   Psychiatric:      Comments: Unable to assess: nonverbal       Significant Labs: All pertinent labs within the past 24 hours have been reviewed.  CBC:   Recent Labs   Lab 05/30/22  1040   WBC 6.35   HGB 9.9*   HCT 31.8*        CMP:   Recent Labs   Lab 05/29/22  0947 05/30/22  1040   * 137    K 3.8 4.2   CL 91* 95   CO2 30* 32*   * 107   BUN 22 22   CREATININE 0.7 0.7   CALCIUM 8.2* 8.3*   ANIONGAP 10 10   EGFRNONAA >60 >60       Significant Imaging: I have reviewed all pertinent imaging results/findings within the past 24 hours.      Assessment/Plan:      * Urinary tract infection associated with indwelling urethral catheter  -UA reviewed  -urine cultures 5/25/22 growing Staphylococcus aureus, unknown sensitivities  -IV Rocephin, IV vancomycin initiated in the ED  -ID consult- reviewed, ordered ECHO and Blood Culture  -d/c henley today, voiding trial in progress    Advanced care planning/counseling discussion  Advance Care Planning     Date: 05/30/2022  Per recommendation from specialist, discussed comfort care with daughter at bedside. Daughter stated she did not want to change her mothers current care, reports she feels capable of continuing with needed care of her mother at home and does not desire to change to a comfort focused care plan. Stated she wants to continue with aggressive treatment including hospitalization, IV antibiotics, IV fluids and IV medications for treatment when needed and beneficial. Discussed her current condition, recurrent pleural effusions, severe CHF, dtr stated she understands these conditions could worsen but at this time feels her mother would benefit from continued aggressive treatment with exacerbations. Patient is a DNR, dtr stated she understands if her heart stops she would not receive CPR or be placed on a ventilator.     Advanced care planning time: >45 minutes    Combined systolic and diastolic congestive heart failure  Patient is identified as having Combined Systolic and Diastolic heart failure that is Chronic. CHF is currently controlled. Latest ECHO performed and demonstrates- Results for orders placed during the hospital encounter of 03/10/22    Echo    Interpretation Summary  · The left ventricle is mildly enlarged with eccentric hypertrophy and  severely decreased systolic function.  · Grade III left ventricular diastolic dysfunction.  · Moderate right ventricular enlargement with moderately reduced right ventricular systolic function.  · Severe left atrial enlargement.  · The estimated ejection fraction is 10%.  · There is severe left ventricular global hypokinesis.  · Severe right atrial enlargement.  · Moderate-to-severe mitral regurgitation.  · Moderate to severe tricuspid regurgitation.  · Moderate pulmonic regurgitation.  · Mechanically ventilated; cannot use inferior caval vein diameter to estimate central venous pressure.  · There is a large left pleural effusion.  . Continue Furosemide and monitor clinical status closely. Monitor on telemetry. Patient is off CHF pathway.  Monitor strict Is&Os and daily weights.  Place on fluid restriction of 1.5 L. Continue to stress to patient importance of self efficacy and  on diet for CHF. Last BNP reviewed- and noted below No results for input(s): BNP, BNPTRIAGEBLO in the last 168 hours..        Hyponatremia  On admission, serum sodium 123, chloride 84, suggesting volume depletion.    -will be cautious with IV fluids due to abdominal anasarca, bilateral pleural effusions.  -hold diuretics, antidepressants  -repeat labs in a.m.  -Consult Nephrology- recommend 1.5L fluid restriction  -consult RD for recs for peg feeding/water bolus  --Sodium level improved 137 today. (5/30)      Recurrent right pleural effusion  Recurrent, thoracentesis 5/2022- 1200mL removed    --Pulmonary following, not a candidate for Thoracentesis, recommendations noted.       Severe sepsis  This patient does have evidence of infective focus  My overall impression is sepsis. Vital signs were reviewed and noted in progress note.  Antibiotics given-   Antibiotics (From admission, onward)            Start     Stop Route Frequency Ordered    05/28/22 2200  vancomycin in dextrose 5 % 1 gram/250 mL IVPB 1,000 mg         -- IV Every 24 hours  "(non-standard times) 05/27/22 2250    05/28/22 2100  mupirocin 2 % ointment         06/02 2059 Nasl 2 times daily 05/28/22 1512    05/27/22 2032  vancomycin - pharmacy to dose  (vancomycin IVPB)        "And" Linked Group Details    -- IV pharmacy to manage frequency 05/27/22 1933        Cultures were taken-   Microbiology Results (last 7 days)     Procedure Component Value Units Date/Time    Blood culture [[id number]] Collected: 05/30/22 1308    Order Status: Sent Specimen: Blood from Peripheral, Left Updated: 05/30/22 1308    Blood culture [[id number]] Collected: 05/30/22 1307    Order Status: Sent Specimen: Blood from Peripheral, Left Arm Updated: 05/30/22 1308    Urine culture [[id number]]  (Abnormal)  (Susceptibility) Collected: 05/27/22 1952    Order Status: Completed Specimen: Urine Updated: 05/30/22 1045     Urine Culture, Routine METHICILLIN RESISTANT STAPHYLOCOCCUS AUREUS  >100,000cfu/ml      Narrative:      Specimen Source->Urine        Latest lactate reviewed, they are-  Recent Labs   Lab 05/27/22  1732   LACTATE 2.6*       Organ dysfunction indicated by Encephalopathy   Source- Urine    Source control Achieved by- IV Rocephin, IV vancomycin  --Pending blood culture  --Pending ECHO      Anoxic encephalopathy  -history of cardiac arrest in March 2022, currently bed-bound, nonverbal        Bilateral pleural effusion  On admission, large right side pleural effusion, small left-sided, had thoracentesis earlier this month, 1200 cc drained, currently oxygenating well, not requiring supplemental oxygen, denies SOB      --Pulmonary following, not a candidate for Thoracentesis, recommendations noted.        Hyperkalemia  mild hyperkalemia, potassium 5.2 on admission    -hold home dose Entresto  -improved, 4.2        History of CVA (cerebrovascular accident)        Parkinson disease  Dtr reports she has not been giving patient the medication regularly at home. Concerns about side effects.     -continue home dose " Sinemet  -monitor for side effects    H/O Essential hypertension   Controlled, prescribed Lasix, Entresto, Coreg as outpatient, hold Entresto due to hypotension    --Continue Coreg and Lasix  --Vitals Q4H  --Hydralazine PRN            VTE Risk Mitigation (From admission, onward)         Ordered     Place sequential compression device  Until discontinued         05/27/22 2052                Discharge Planning   PARMINDER:      Code Status: DNR   Is the patient medically ready for discharge?:     Reason for patient still in hospital (select all that apply): Patient trending condition  Discharge Plan A: Home Health                  Cindy Manzano NP  Department of Hospital Medicine   [facility] - Med Surg

## 2022-05-30 NOTE — ASSESSMENT & PLAN NOTE
"This patient does have evidence of infective focus  My overall impression is sepsis. Vital signs were reviewed and noted in progress note.  Antibiotics given-   Antibiotics (From admission, onward)            Start     Stop Route Frequency Ordered    05/28/22 2200  vancomycin in dextrose 5 % 1 gram/250 mL IVPB 1,000 mg         -- IV Every 24 hours (non-standard times) 05/27/22 2250    05/28/22 2100  mupirocin 2 % ointment         06/02 2059 Nasl 2 times daily 05/28/22 1512 05/27/22 2032  vancomycin - pharmacy to dose  (vancomycin IVPB)        "And" Linked Group Details    -- IV pharmacy to manage frequency 05/27/22 1933        Cultures were taken-   Microbiology Results (last 7 days)     Procedure Component Value Units Date/Time    Blood culture [[id number]] Collected: 05/30/22 1308    Order Status: Sent Specimen: Blood from Peripheral, Left Updated: 05/30/22 1308    Blood culture [[id number]] Collected: 05/30/22 1307    Order Status: Sent Specimen: Blood from Peripheral, Left Arm Updated: 05/30/22 1308    Urine culture [[id number]]  (Abnormal)  (Susceptibility) Collected: 05/27/22 1952    Order Status: Completed Specimen: Urine Updated: 05/30/22 1045     Urine Culture, Routine METHICILLIN RESISTANT STAPHYLOCOCCUS AUREUS  >100,000cfu/ml      Narrative:      Specimen Source->Urine        Latest lactate reviewed, they are-  Recent Labs   Lab 05/27/22  1732   LACTATE 2.6*       Organ dysfunction indicated by Acute respiratory failure  Source- MRSA UTI    Source control Achieved by- IV vanc   5/30 contact precaution.  Treat MRSA UTI with vancomycin.    "

## 2022-05-30 NOTE — ASSESSMENT & PLAN NOTE
"This patient does have evidence of infective focus  My overall impression is sepsis. Vital signs were reviewed and noted in progress note.  Antibiotics given-   Antibiotics (From admission, onward)            Start     Stop Route Frequency Ordered    05/28/22 2200  vancomycin in dextrose 5 % 1 gram/250 mL IVPB 1,000 mg         -- IV Every 24 hours (non-standard times) 05/27/22 2250    05/28/22 2100  mupirocin 2 % ointment         06/02 2059 Nasl 2 times daily 05/28/22 1512 05/27/22 2032  vancomycin - pharmacy to dose  (vancomycin IVPB)        "And" Linked Group Details    -- IV pharmacy to manage frequency 05/27/22 1933        Cultures were taken-   Microbiology Results (last 7 days)     Procedure Component Value Units Date/Time    Blood culture [[id number]] Collected: 05/30/22 1308    Order Status: Sent Specimen: Blood from Peripheral, Left Updated: 05/30/22 1308    Blood culture [[id number]] Collected: 05/30/22 1307    Order Status: Sent Specimen: Blood from Peripheral, Left Arm Updated: 05/30/22 1308    Urine culture [[id number]]  (Abnormal)  (Susceptibility) Collected: 05/27/22 1952    Order Status: Completed Specimen: Urine Updated: 05/30/22 1045     Urine Culture, Routine METHICILLIN RESISTANT STAPHYLOCOCCUS AUREUS  >100,000cfu/ml      Narrative:      Specimen Source->Urine        Latest lactate reviewed, they are-  Recent Labs   Lab 05/27/22  1732   LACTATE 2.6*       Organ dysfunction indicated by Encephalopathy   Source- Urine    Source control Achieved by- IV Rocephin, IV vancomycin  --Pending blood culture  --Pending ECHO    "

## 2022-05-30 NOTE — ASSESSMENT & PLAN NOTE
On admission, large right side pleural effusion, small left-sided, had thoracentesis earlier this month, 1200 cc drained, currently oxygenating well, not requiring supplemental oxygen, denies SOB      --Pulmonary following, not a candidate for Thoracentesis, recommendations noted.

## 2022-05-30 NOTE — ASSESSMENT & PLAN NOTE
Is this a primary UTI or is the MRSA secondary to the bacteremia .    Previous cultures -done 03/2022  Showed strep anginosus / Eikenella corrodens -these are typical organisms that cause endocarditis..  Will do blood cultures .  Continue Vancomycin ,.  Will do ECHO

## 2022-05-30 NOTE — PLAN OF CARE
DNR. VSS. Fall precautions maintained.   No pain complaints. On contact isolation for mrsa in the urine   On O2 NC.   Tube feeds continues at 40ml/hr isosource 1.5 as ordered.   Water flushes 135ml q 6hr  NPO. 1.5L fluid restriction.  Repositioned q2hrs and as needed.  SCDs on.  Abreu discontinued today. Voids spontaneously but incontinent.  Purwick in place.  Family at bedside.   All needs met. Will continue to monitor.

## 2022-05-30 NOTE — SUBJECTIVE & OBJECTIVE
5/30 seen and examined.  No major events.  Afebrile.  WBC 6 K. on IV vancomycin for MRSA UTI.  Daughter at bedside.  Afebrile  Review of Systems   Unable to perform ROS: Other (Nonverbal status post cardiac arrest)   Objective:     Vital Signs (Most Recent):  Temp: 97.6 °F (36.4 °C) (05/30/22 1112)  Pulse: 72 (05/30/22 1112)  Resp: 20 (05/30/22 1112)  BP: 111/69 (05/30/22 1112)  SpO2: 96 % (05/30/22 1112) Vital Signs (24h Range):  Temp:  [97.6 °F (36.4 °C)-99.5 °F (37.5 °C)] 97.6 °F (36.4 °C)  Pulse:  [72-88] 72  Resp:  [17-20] 20  SpO2:  [92 %-100 %] 96 %  BP: (111-172)/(64-93) 111/69     Weight: 70.8 kg (156 lb)  Body mass index is 28.53 kg/m².      Intake/Output Summary (Last 24 hours) at 5/30/2022 1529  Last data filed at 5/30/2022 1500  Gross per 24 hour   Intake 135 ml   Output 2150 ml   Net -2015 ml         Physical Exam  Vitals and nursing note reviewed.   Constitutional:       General: She is not in acute distress.     Appearance: She is well-developed. She is ill-appearing and toxic-appearing.   HENT:      Head: Normocephalic and atraumatic.      Nose: Nose normal.   Eyes:      Extraocular Movements: Extraocular movements intact.   Cardiovascular:      Rate and Rhythm: Normal rate and regular rhythm.   Pulmonary:      Effort: Pulmonary effort is normal. No respiratory distress.      Comments: Breath sounds decreased bilaterally  Abdominal:      General: There is no distension.      Comments: PEG tube   Genitourinary:     Comments: Urine catheter  Musculoskeletal:         General: Deformity present. No signs of injury.      Cervical back: Normal range of motion and neck supple.   Skin:     General: Skin is dry.   Neurological:      Mental Status: She is alert. Mental status is at baseline.       Vents:  Oxygen Concentration (%): 32 (05/30/22 0721)    Lines/Drains/Airways       Drain  Duration                  Gastrostomy/Enterostomy 03/25/22 1320 Gastrostomy tube w/ balloon LUQ feeding 66 days               Peripheral Intravenous Line  Duration                  Peripheral IV - Single Lumen 05/27/22 1734 22 G Left Antecubital 2 days         Peripheral IV - Single Lumen 05/27/22 2128 22 G Left Wrist 2 days                    Significant Labs:    CBC/Anemia Profile:  Recent Labs   Lab 05/30/22  1040   WBC 6.35   HGB 9.9*   HCT 31.8*      MCV 90   RDW 15.9*          Chemistries:  Recent Labs   Lab 05/29/22  0947 05/30/22  1040   * 137   K 3.8 4.2   CL 91* 95   CO2 30* 32*   BUN 22 22   CREATININE 0.7 0.7   CALCIUM 8.2* 8.3*       Urine culture [[id number]] (Abnormal)  Collected: 05/27/22 1952   Order Status: Completed Specimen: Urine Updated: 05/30/22 1045    Urine Culture, Routine METHICILLIN RESISTANT STAPHYLOCOCCUS AUREUS   >100,000cfu/ml    Abnormal    Narrative:     Specimen Source->Urine     Susceptibility     Methicillin resistant Staphylococcus aureus     CULTURE, URINE     Oxacillin >2 mcg/mL Resistant     Penicillin >8 mcg/mL Resistant     Trimeth/Sulfa <=0.5/9.5 m... Sensitive     Vancomycin 1 mcg/mL Sensitive                   Latest Reference Range & Units 05/03/22 14:23   Fluid Color  Yellow   Fluid Appearance  Hazy   WBC, Body Fluid /cu mm 547   Body Fluid Type  Pleural Fluid, Right   Segs, Fluid % 36   Lymphs, Fluid % 20   Monocytes/Macrophages, Fluid % 44   Amylase, Fluid Not established U/L 28   Glucose, Fluid Not established mg/dL 90   LD, Fluid Not established U/L 173   Body Fluid, Albumin See text g/dL 1.6   Body Fluid Source Amylase  Pleural Fluid, Right   Body Fluid Source, Albumin  Pleural Fluid, Right   Body Fluid Source, Glucose  Pleural Fluid, Right   Body Fluid Source, LDH  Pleural Fluid, Right   Body Fluid Source, Total Protein  Pleural Fluid, Right   Body Fluid, Protein Not established g/dL 2.6   Cholesterol, Body Fluid See Comment mg/dL 51      Latest Reference Range & Units 05/03/22 13:27 05/12/22 14:03   BNP 0 - 99 pg/mL  >4,900 (H)    - 260 U/L 315 (H)    (H): Data is  abnormally high   Latest Reference Range & Units 04/22/22 22:18 05/03/22 06:30   POC PH 7.35 - 7.45  7.528 (H) 7.485 (H)   POC PCO2 35 - 45 mmHg 30.8 (L) 33.5 (L)   POC PO2 80 - 100 mmHg 68 (L) 61 (L)   POC BE -2 to 2 mmol/L 3 2   POC HCO3 24 - 28 mmol/L 25.6 25.3   POC SATURATED O2 95 - 100 % 95 93 (L)   FiO2   21   Sample  ARTERIAL ARTERIAL   DelSys  Room Air Room Air   Allens Test  Pass N/A   Site  RR RB   Mode   SPONT     EKG 05/03/2022    Vent. Rate : 088 BPM     Atrial Rate : 088 BPM      P-R Int : 156 ms          QRS Dur : 098 ms       QT Int : 426 ms       P-R-T Axes : 052 -56 087 degrees      QTc Int : 515 ms     Normal sinus rhythm   Left anterior fascicular block   Cannot rule out Septal infarct ,age undetermined   Cannot rule out Lateral infarct ,age undetermined   Abnormal ECG   When compared with ECG of 31-MAR-2022 06:45,   Aberrant conduction is no longer Present   QRS voltage has decreased     Echo March 2022    The left ventricle is mildly enlarged with eccentric hypertrophy and severely decreased systolic function.  Grade III left ventricular diastolic dysfunction.  Moderate right ventricular enlargement with moderately reduced right ventricular systolic function.  Severe left atrial enlargement.  The estimated ejection fraction is 10%.  There is severe left ventricular global hypokinesis.  Severe right atrial enlargement.  Moderate-to-severe mitral regurgitation.  Moderate to severe tricuspid regurgitation.  Moderate pulmonic regurgitation.  Mechanically ventilated; cannot use inferior caval vein diameter to estimate central venous pressure.  There is a large left pleural effusion      Significant Imaging:   CT renal stone study 05/27/2022     COMPARISON:  None     FINDINGS:  Gallstones.  Hepatic cyst.  Mild anasarca.  Moderate right mild left-sided pleural effusion with associated atelectasis/consolidation.  Hepatomegaly.  Atherosclerotic changes.  Mild cardiomegaly.  Bilateral hip replacement  with associated artifacts.  No definite bowel obstruction.  Prominent left renal cyst measuring 3.9 cm in the superior pole.  No definite hydronephrosis.  There is a gastrostomy tube identified.  The bulb of the a G-tube appears to abut the gastric outlet.  Proper positioning cannot be determined.  Recommend clinical correlation and follow-up tube check for further evaluation.  Small ill-defined hypodense lesions in the inferior pole of the left kidney may relate to complex cyst     No definite bowel obstruction.     Impression:     Moderate anasarca     Prominent left left superior renal cyst and small right hepatic cyst     Moderately large right and mild left-sided pleural effusion with associated atelectasis/consolidation     Cardiomegaly     No definite hydronephrosis.     Gallstones     A gastrostomy tube adjacent to the gastric outlet.  Proper position cannot determined.  Consider G tube tube check as clinically indicated            Chest x-ray 05/03/2022    Narrative & Impression  EXAMINATION:  XR CHEST 1 VIEW     CLINICAL HISTORY:  post thora;     FINDINGS:  Comparison is made with the most recent prior chest x-ray.  The patient's chin is obscuring the right lung apex.  No evidence of pneumothorax in the visualized right lung..  Interval decrease in size of the right pleural effusion with improvement of the adjacent compressive atelectasis..     Impression:     No evidence of pneumothorax status post right thoracentesis..

## 2022-05-30 NOTE — SUBJECTIVE & OBJECTIVE
Past Medical History:   Diagnosis Date    Acute CHF (congestive heart failure) 1/17/2021    Hyperlipemia     Hypertension     Parkinson's disease     Stroke 2016    Throat mass        Past Surgical History:   Procedure Laterality Date    CLOSED REDUCTION Right 10/15/2020    Procedure: CLOSED REDUCTION;  Surgeon: Humberto Valdivia DO;  Location: [facility] OR;  Service: Orthopedics;  Laterality: Right;  ATTEMPTED    EVACUATION OF HEMATOMA Right 10/17/2020    Procedure: EVACUATION, HEMATOMA;  Surgeon: Humberto Valdivia DO;  Location: [facility] OR;  Service: Orthopedics;  Laterality: Right;    HEMIARTHROPLASTY OF HIP Left 7/12/2020    Procedure: HEMIARTHROPLASTY, HIP;  Surgeon: Humberto Valdivia DO;  Location: [facility] OR;  Service: Orthopedics;  Laterality: Left;    HEMIARTHROPLASTY OF HIP Right 10/2/2020    Procedure: HEMIARTHROPLASTY, HIP;  Surgeon: Loyd Kearney MD;  Location: [facility] OR;  Service: Orthopedics;  Laterality: Right;    HYSTERECTOMY      INSERTION OF PERCUTANEOUS ENDOSCOPIC GASTROSTOMY (PEG) FEEDING TUBE      OPEN REDUCTION OF DISLOCATION OF HIP Right 10/17/2020    Procedure: OPEN REDUCTION, DISLOCATION, HIP;  Surgeon: Humberto Valdivia DO;  Location: [facility] OR;  Service: Orthopedics;  Laterality: Right;    THROAT SURGERY      TONSILLECTOMY         Review of patient's allergies indicates:   Allergen Reactions    Xanax [alprazolam]        Medications:  Medications Prior to Admission   Medication Sig    carbidopa-levodopa  mg (SINEMET)  mg per tablet 2 tablets by Per G Tube route 3 (three) times daily.    carvediloL (COREG) 6.25 MG tablet 1 tablet (6.25 mg total) by Per G Tube route 2 (two) times daily.    clopidogreL (PLAVIX) 75 mg tablet 1 tablet (75 mg total) by Per G Tube route once daily.    esomeprazole (NEXIUM) 40 MG capsule Take  40 mg(1 cap)  twice a day  , then after 40 mg(1 cap) daily   Through PEG tube    famotidine (PEPCID) 20 MG tablet Take 20 mg by mouth once daily at 6am.    furosemide  "(LASIX) 40 MG tablet 1 tablet (40 mg total) by Per G Tube route 2 (two) times a day. Take per G tube twice daily as directed    ipratropium (ATROVENT) 0.02 % nebulizer solution Take 2.5 mLs (500 mcg total) by nebulization 2 (two) times daily. Rescue    QUEtiapine (SEROQUEL) 25 MG Tab Take 1 tablet (25 mg total) by mouth once daily. At bedtime    sacubitriL-valsartan (ENTRESTO)  mg per tablet 1 tablet by Per G Tube route 2 (two) times daily.    scopolamine (TRANSDERM-SCOP) 1.3-1.5 mg (1 mg over 3 days) Place 1 patch onto the skin Every 3 (three) days.    sulfamethoxazole-trimethoprim 800-160mg (BACTRIM DS) 800-160 mg Tab Take 1 tablet by mouth 2 (two) times daily. for 7 days    traZODone (DESYREL) 50 MG tablet Take 0.5 tablets (25 mg total) by mouth every evening.     Antibiotics (From admission, onward)                Start     Stop Route Frequency Ordered    05/28/22 2200  vancomycin in dextrose 5 % 1 gram/250 mL IVPB 1,000 mg         -- IV Every 24 hours (non-standard times) 05/27/22 2250    05/28/22 2100  mupirocin 2 % ointment         06/02 2059 Nasl 2 times daily 05/28/22 1512    05/27/22 2032  vancomycin - pharmacy to dose  (vancomycin IVPB)        "And" Linked Group Details    -- IV pharmacy to manage frequency 05/27/22 1933          Antifungals (From admission, onward)                None          Antivirals (From admission, onward)      None             Immunization History   Administered Date(s) Administered    COVID-19, MRNA, LN-S, PF (Pfizer) (Purple Cap) 08/06/2021, 08/27/2021       Family History       Family history is unknown by patient.          Social History     Socioeconomic History    Marital status:    Tobacco Use    Smoking status: Never Smoker    Smokeless tobacco: Never Used   Substance and Sexual Activity    Alcohol use: No    Drug use: No    Sexual activity: Not Currently     Review of Systems   Reason unable to perform ROS: non verbal.   Objective:     Vital Signs (Most " Recent):  Temp: 97.6 °F (36.4 °C) (05/30/22 1112)  Pulse: 72 (05/30/22 1112)  Resp: 20 (05/30/22 1112)  BP: 111/69 (05/30/22 1112)  SpO2: 96 % (05/30/22 1112) Vital Signs (24h Range):  Temp:  [97.6 °F (36.4 °C)-99.5 °F (37.5 °C)] 97.6 °F (36.4 °C)  Pulse:  [72-88] 72  Resp:  [17-20] 20  SpO2:  [92 %-100 %] 96 %  BP: (111-172)/(64-93) 111/69     Weight: 71.1 kg (156 lb 12 oz)  Body mass index is 28.67 kg/m².    Estimated Creatinine Clearance: 55.3 mL/min (based on SCr of 0.7 mg/dL).    Physical Exam  Vitals and nursing note reviewed.   Constitutional:       General: She is sleeping. She is not in acute distress.     Appearance: She is well-developed.   HENT:      Head: Normocephalic and atraumatic.      Right Ear: Hearing and external ear normal.      Left Ear: Hearing and external ear normal.      Nose: No rhinorrhea.      Right Sinus: No maxillary sinus tenderness or frontal sinus tenderness.      Left Sinus: No maxillary sinus tenderness or frontal sinus tenderness.      Mouth/Throat:      Mouth: No oral lesions.      Pharynx: Uvula midline.   Eyes:      General:         Right eye: No discharge.         Left eye: No discharge.      Conjunctiva/sclera: Conjunctivae normal.      Pupils: Pupils are equal, round, and reactive to light.   Neck:      Thyroid: No thyromegaly.      Vascular: No carotid bruit.      Trachea: No tracheal deviation.   Cardiovascular:      Rate and Rhythm: Normal rate and regular rhythm.      Pulses:           Dorsalis pedis pulses are 2+ on the right side and 2+ on the left side.      Heart sounds: Normal heart sounds, S1 normal and S2 normal. No murmur heard.  Pulmonary:      Effort: Pulmonary effort is normal. No respiratory distress.      Breath sounds: Normal breath sounds.   Chest:   Breasts:      Right: No supraclavicular adenopathy.      Left: No supraclavicular adenopathy.     Abdominal:      General: Bowel sounds are normal. There is no distension.      Palpations: Abdomen is soft.  There is no mass.      Tenderness: There is no abdominal tenderness.      Comments: PEG tube   Genitourinary:     Comments: Abreu Cath present   Musculoskeletal:         General: Normal range of motion.      Cervical back: Normal range of motion.   Lymphadenopathy:      Cervical: No cervical adenopathy.      Upper Body:      Right upper body: No supraclavicular adenopathy.      Left upper body: No supraclavicular adenopathy.   Skin:     General: Skin is warm and dry.      Capillary Refill: Capillary refill takes less than 2 seconds.      Findings: No rash.   Neurological:      Mental Status: Mental status is at baseline.      Sensory: No sensory deficit.      Coordination: Coordination normal.      Gait: Gait normal.      Comments: Non verbal   Psychiatric:         Mood and Affect: Mood is not anxious or depressed.         Speech: Speech normal.       Significant Labs: Blood Culture:   Recent Labs   Lab 03/10/22  2129 03/15/22  0855 03/15/22  0900 03/31/22  0728 03/31/22  0742   LABBLOO Gram stain aer bottle: Gram positive cocci in chains resembling Strep   Results called to and read back by:Nalini Mora 03/12/2022  05:03  Gram stain laurie bottle: Gram positive cocci in chains resembling Strep   Positive results previously called 03/12/2022  11:07  Results called to and read back by:Manisha Granger 03/14/2022  13:49  STREPTOCOCCUS ANGINOSUS  For susceptibility see order #[id number]  *  EIKENELLA CORRODENS* No growth after 5 days. No growth after 5 days. No growth after 5 days. No growth after 5 days.     BMP:   Recent Labs   Lab 05/30/22  1040         K 4.2   CL 95   CO2 32*   BUN 22   CREATININE 0.7   CALCIUM 8.3*     CBC:   Recent Labs   Lab 05/30/22  1040   WBC 6.35   HGB 9.9*   HCT 31.8*        CMP:   Recent Labs   Lab 05/29/22  0947 05/30/22  1040   * 137   K 3.8 4.2   CL 91* 95   CO2 30* 32*   * 107   BUN 22 22   CREATININE 0.7 0.7   CALCIUM 8.2* 8.3*   ANIONGAP 10 10    EGFRNONAA >60 >60       Significant Imaging: I have reviewed all pertinent imaging results/findings within the past 24 hours.

## 2022-05-30 NOTE — ASSESSMENT & PLAN NOTE
5/29.  Status post right-sided thoracentesis 5/3 volume of 1200 mL removed.  Transudative , CHF related.  Will not benefit from repeat thoracentesis.  Severe CHF, history of cardiac arrest nonverbal status post trach and PEG.  Grave prognosis  Palliative care consult/comfort care.  5/30 will not benefit from repeat thoracentesis.  Can resume Plavix.  Treat with diuresis.

## 2022-05-30 NOTE — NURSING
Received lying in bed on left side with daughter at bedside. Assessment per flowsheet, bed low, call light within reach. Will continue to monitor.

## 2022-05-30 NOTE — SUBJECTIVE & OBJECTIVE
Interval History: Abreu Catheter removed.     Review of Systems   Unable to perform ROS: Patient nonverbal   Objective:     Vital Signs (Most Recent):  Temp: 97.6 °F (36.4 °C) (05/30/22 1112)  Pulse: 72 (05/30/22 1112)  Resp: 20 (05/30/22 1112)  BP: 111/69 (05/30/22 1112)  SpO2: 96 % (05/30/22 1112) Vital Signs (24h Range):  Temp:  [97.6 °F (36.4 °C)-99.5 °F (37.5 °C)] 97.6 °F (36.4 °C)  Pulse:  [72-88] 72  Resp:  [17-20] 20  SpO2:  [92 %-100 %] 96 %  BP: (111-172)/(64-93) 111/69     Weight: 70.8 kg (156 lb)  Body mass index is 28.53 kg/m².    Intake/Output Summary (Last 24 hours) at 5/30/2022 1418  Last data filed at 5/30/2022 0516  Gross per 24 hour   Intake 590 ml   Output 1250 ml   Net -660 ml      Physical Exam  Constitutional:       General: She is not in acute distress.     Appearance: She is cachectic.   HENT:      Head: Normocephalic and atraumatic.      Right Ear: Hearing and external ear normal.      Left Ear: Hearing and external ear normal.      Nose: No rhinorrhea.      Right Sinus: No maxillary sinus tenderness or frontal sinus tenderness.      Left Sinus: No maxillary sinus tenderness or frontal sinus tenderness.      Mouth/Throat:      Mouth: No oral lesions.      Pharynx: Uvula midline.   Eyes:      General:         Right eye: No discharge.         Left eye: No discharge.      Conjunctiva/sclera: Conjunctivae normal.      Pupils: Pupils are equal, round, and reactive to light.   Neck:      Thyroid: No thyromegaly.      Vascular: No carotid bruit.      Trachea: No tracheal deviation.   Cardiovascular:      Rate and Rhythm: Normal rate and regular rhythm.      Pulses:           Dorsalis pedis pulses are 2+ on the right side and 2+ on the left side.      Heart sounds: Normal heart sounds, S1 normal and S2 normal. No murmur heard.  Pulmonary:      Effort: Pulmonary effort is normal. No respiratory distress.      Breath sounds: Normal breath sounds.   Chest:   Breasts:      Right: No supraclavicular  adenopathy.      Left: No supraclavicular adenopathy.     Abdominal:      General: Bowel sounds are normal. There is no distension.      Palpations: Abdomen is soft. There is no mass.      Tenderness: There is no abdominal tenderness.       Musculoskeletal:         General: Normal range of motion.      Cervical back: Normal range of motion.   Lymphadenopathy:      Cervical: No cervical adenopathy.      Upper Body:      Right upper body: No supraclavicular adenopathy.      Left upper body: No supraclavicular adenopathy.   Skin:     General: Skin is warm and dry.      Capillary Refill: Capillary refill takes less than 2 seconds.      Findings: No rash.   Neurological:      Sensory: Sensory deficit present.      Motor: Weakness and atrophy present.      Gait: Gait normal.      Comments: Non-verbal   Psychiatric:      Comments: Unable to assess: nonverbal       Significant Labs: All pertinent labs within the past 24 hours have been reviewed.  CBC:   Recent Labs   Lab 05/30/22  1040   WBC 6.35   HGB 9.9*   HCT 31.8*        CMP:   Recent Labs   Lab 05/29/22  0947 05/30/22  1040   * 137   K 3.8 4.2   CL 91* 95   CO2 30* 32*   * 107   BUN 22 22   CREATININE 0.7 0.7   CALCIUM 8.2* 8.3*   ANIONGAP 10 10   EGFRNONAA >60 >60       Significant Imaging: I have reviewed all pertinent imaging results/findings within the past 24 hours.

## 2022-05-30 NOTE — ASSESSMENT & PLAN NOTE
-UA reviewed  -urine cultures 5/25/22 growing Staphylococcus aureus, unknown sensitivities  -IV Rocephin, IV vancomycin initiated in the ED  -ID consult- reviewed, ordered ECHO and Blood Culture  -d/c henley today, voiding trial in progress

## 2022-05-30 NOTE — ASSESSMENT & PLAN NOTE
Patient is identified as having Combined Systolic and Diastolic heart failure that is Acute on chronic. CHF is currently controlled. Latest ECHO performed and demonstrates- Results for orders placed during the hospital encounter of 03/10/22    Echo    Interpretation Summary  · The left ventricle is mildly enlarged with eccentric hypertrophy and severely decreased systolic function.  · Grade III left ventricular diastolic dysfunction.  · Moderate right ventricular enlargement with moderately reduced right ventricular systolic function.  · Severe left atrial enlargement.  · The estimated ejection fraction is 10%.  · There is severe left ventricular global hypokinesis.  · Severe right atrial enlargement.  · Moderate-to-severe mitral regurgitation.  · Moderate to severe tricuspid regurgitation.  · Moderate pulmonic regurgitation.  · Mechanically ventilated; cannot use inferior caval vein diameter to estimate central venous pressure.  · There is a large left pleural effusion.  . Continue Beta Blocker, ACE/ARB and Furosemide and monitor clinical status closely. Monitor on telemetry. Patient is on CHF pathway.  Monitor strict Is&Os and daily weights.  Place on fluid restriction of 1.5 L. Continue to stress to patient importance of self efficacy and  on diet for CHF.   Follow primary team and cardiology

## 2022-05-30 NOTE — ASSESSMENT & PLAN NOTE
Patient is identified as having Combined Systolic and Diastolic heart failure that is Acute on chronic. CHF is currently uncontrolled due to Rales/crackles on pulmonary exam and Pulmonary edema/pleural effusion on CXR. Latest ECHO performed and demonstrates- Results for orders placed during the hospital encounter of 03/10/22    Echo    Interpretation Summary  · The left ventricle is mildly enlarged with eccentric hypertrophy and severely decreased systolic function.  · Grade III left ventricular diastolic dysfunction.  · Moderate right ventricular enlargement with moderately reduced right ventricular systolic function.  · Severe left atrial enlargement.  · The estimated ejection fraction is 10%.  · There is severe left ventricular global hypokinesis.  · Severe right atrial enlargement.  · Moderate-to-severe mitral regurgitation.  · Moderate to severe tricuspid regurgitation.  · Moderate pulmonic regurgitation.  · Mechanically ventilated; cannot use inferior caval vein diameter to estimate central venous pressure.  · There is a large left pleural effusion.  . Continue Furosemide and monitor clinical status closely. Monitor on telemetry. Patient is off CHF pathway.  Monitor strict Is&Os and daily weights.  Place on fluid restriction of 1.5 L. Continue to stress to patient importance of self efficacy and  on diet for CHF. Last BNP reviewed- and noted below No results for input(s): BNP, BNPTRIAGEBLO in the last 168 hours..  5/30 diuresis

## 2022-05-30 NOTE — CONSULTS
[facility] [place] [facility]  Infectious Disease  Consult Note    Patient Name: Tanya Madrid  MRN: [id number]  Admission Date: 5/27/2022  Hospital Length of Stay: 3 days  Attending Physician: Doreen Kramer MD  Primary Care Provider: Elva Ansari NP     Isolation Status: Contact    Patient information was obtained from past medical records and ER records.      Consults  Assessment/Plan:     * Urinary tract infection associated with indwelling urethral catheter  Is this a primary UTI or is the MRSA secondary to the bacteremia .    Previous cultures -done 03/2022  Showed strep anginosus / Eikenella corrodens -these are typical organisms that cause endocarditis..  Will do blood cultures .  Continue Vancomycin ,.  Will do ECHO      Combined systolic and diastolic congestive heart failure  Patient is identified as having Combined Systolic and Diastolic heart failure that is Acute on chronic. CHF is currently controlled. Latest ECHO performed and demonstrates- Results for orders placed during the hospital encounter of 03/10/22    Echo    Interpretation Summary  · The left ventricle is mildly enlarged with eccentric hypertrophy and severely decreased systolic function.  · Grade III left ventricular diastolic dysfunction.  · Moderate right ventricular enlargement with moderately reduced right ventricular systolic function.  · Severe left atrial enlargement.  · The estimated ejection fraction is 10%.  · There is severe left ventricular global hypokinesis.  · Severe right atrial enlargement.  · Moderate-to-severe mitral regurgitation.  · Moderate to severe tricuspid regurgitation.  · Moderate pulmonic regurgitation.  · Mechanically ventilated; cannot use inferior caval vein diameter to estimate central venous pressure.  · There is a large left pleural effusion.  . Continue Beta Blocker, ACE/ARB and Furosemide and monitor clinical status closely. Monitor on telemetry. Patient is on CHF pathway.  Monitor strict Is&Os and daily  weights.  Place on fluid restriction of 1.5 L. Continue to stress to patient importance of self efficacy and  on diet for CHF.   Follow primary team and cardiology     Bilateral pleural effusion  Follow pulmonology , related to  CHF    History of CVA (cerebrovascular accident)  Continue supportive care     Parkinson disease  Follow primary team. Continue supportive care     H/O Essential hypertension  BP control asper primary team         Thank you for your consult. I will follow-up with patient. Please contact us if you have any additional questions.    Abhijit Fowler MD  Infectious Disease  [facility]    Subjective:     Principal Problem: Urinary tract infection associated with indwelling urethral catheter    HPI:    82-year-old  female, nonverbal, bed-bound since cardiac arrest in March 2022, PMH significant for combined CHF, EF 10%, CAD, CVA, chronic indwelling Abreu catheter was was brought in by the   daughter  due to  dark urine for the past few days. She was recently started on bactrim for recent UTI -05/25..  CT of the abdomen renal stone study, negative for hydronephrosis or obstruction, moderately large right and mild left-sided pleural effusion.  Blood culture - 05/27- negative  Urine culture -  05/25 and 05/27- MRSA  Previous blood cultures -03/2022- strep anginosus/E corrodens    Component      Latest Ref Rng & Units 5/30/2022 5/28/2022 5/27/2022               WBC      3.90 - 12.70 K/uL 6.35 14.25 (H) 18.69 (H)   She is non verbal            Past Medical History:   Diagnosis Date    Acute CHF (congestive heart failure) 1/17/2021    Hyperlipemia     Hypertension     Parkinson's disease     Stroke 2016    Throat mass        Past Surgical History:   Procedure Laterality Date    CLOSED REDUCTION Right 10/15/2020    Procedure: CLOSED REDUCTION;  Surgeon: Humberto Valdivia DO;  Location: [facility];  Service: Orthopedics;  Laterality: Right;  ATTEMPTED    EVACUATION OF  HEMATOMA Right 10/17/2020    Procedure: EVACUATION, HEMATOMA;  Surgeon: Humberto Valdivia DO;  Location: [facility] OR;  Service: Orthopedics;  Laterality: Right;    HEMIARTHROPLASTY OF HIP Left 7/12/2020    Procedure: HEMIARTHROPLASTY, HIP;  Surgeon: Humberto Valdivia DO;  Location: [facility] OR;  Service: Orthopedics;  Laterality: Left;    HEMIARTHROPLASTY OF HIP Right 10/2/2020    Procedure: HEMIARTHROPLASTY, HIP;  Surgeon: Loyd Kearney MD;  Location: [facility] OR;  Service: Orthopedics;  Laterality: Right;    HYSTERECTOMY      INSERTION OF PERCUTANEOUS ENDOSCOPIC GASTROSTOMY (PEG) FEEDING TUBE      OPEN REDUCTION OF DISLOCATION OF HIP Right 10/17/2020    Procedure: OPEN REDUCTION, DISLOCATION, HIP;  Surgeon: Humberto Valdivia DO;  Location: [facility] OR;  Service: Orthopedics;  Laterality: Right;    THROAT SURGERY      TONSILLECTOMY         Review of patient's allergies indicates:   Allergen Reactions    Xanax [alprazolam]        Medications:  Medications Prior to Admission   Medication Sig    carbidopa-levodopa  mg (SINEMET)  mg per tablet 2 tablets by Per G Tube route 3 (three) times daily.    carvediloL (COREG) 6.25 MG tablet 1 tablet (6.25 mg total) by Per G Tube route 2 (two) times daily.    clopidogreL (PLAVIX) 75 mg tablet 1 tablet (75 mg total) by Per G Tube route once daily.    esomeprazole (NEXIUM) 40 MG capsule Take  40 mg(1 cap)  twice a day  , then after 40 mg(1 cap) daily   Through PEG tube    famotidine (PEPCID) 20 MG tablet Take 20 mg by mouth once daily at 6am.    furosemide (LASIX) 40 MG tablet 1 tablet (40 mg total) by Per G Tube route 2 (two) times a day. Take per G tube twice daily as directed    ipratropium (ATROVENT) 0.02 % nebulizer solution Take 2.5 mLs (500 mcg total) by nebulization 2 (two) times daily. Rescue    QUEtiapine (SEROQUEL) 25 MG Tab Take 1 tablet (25 mg total) by mouth once daily. At bedtime    sacubitriL-valsartan (ENTRESTO)  mg per tablet 1 tablet by  "Per G Tube route 2 (two) times daily.    scopolamine (TRANSDERM-SCOP) 1.3-1.5 mg (1 mg over 3 days) Place 1 patch onto the skin Every 3 (three) days.    sulfamethoxazole-trimethoprim 800-160mg (BACTRIM DS) 800-160 mg Tab Take 1 tablet by mouth 2 (two) times daily. for 7 days    traZODone (DESYREL) 50 MG tablet Take 0.5 tablets (25 mg total) by mouth every evening.     Antibiotics (From admission, onward)                Start     Stop Route Frequency Ordered    05/28/22 2200  vancomycin in dextrose 5 % 1 gram/250 mL IVPB 1,000 mg         -- IV Every 24 hours (non-standard times) 05/27/22 2250 05/28/22 2100  mupirocin 2 % ointment         06/02 2059 Nasl 2 times daily 05/28/22 1512 05/27/22 2032  vancomycin - pharmacy to dose  (vancomycin IVPB)        "And" Linked Group Details    -- IV pharmacy to manage frequency 05/27/22 1933          Antifungals (From admission, onward)                None          Antivirals (From admission, onward)      None             Immunization History   Administered Date(s) Administered    COVID-19, MRNA, LN-S, PF (Pfizer) (Purple Cap) 08/06/2021, 08/27/2021       Family History       Family history is unknown by patient.          Social History     Socioeconomic History    Marital status:    Tobacco Use    Smoking status: Never Smoker    Smokeless tobacco: Never Used   Substance and Sexual Activity    Alcohol use: No    Drug use: No    Sexual activity: Not Currently     Review of Systems   Reason unable to perform ROS: non verbal.   Objective:     Vital Signs (Most Recent):  Temp: 97.6 °F (36.4 °C) (05/30/22 1112)  Pulse: 72 (05/30/22 1112)  Resp: 20 (05/30/22 1112)  BP: 111/69 (05/30/22 1112)  SpO2: 96 % (05/30/22 1112) Vital Signs (24h Range):  Temp:  [97.6 °F (36.4 °C)-99.5 °F (37.5 °C)] 97.6 °F (36.4 °C)  Pulse:  [72-88] 72  Resp:  [17-20] 20  SpO2:  [92 %-100 %] 96 %  BP: (111-172)/(64-93) 111/69     Weight: 71.1 kg (156 lb 12 oz)  Body mass index is 28.67 " kg/m².    Estimated Creatinine Clearance: 55.3 mL/min (based on SCr of 0.7 mg/dL).    Physical Exam  Vitals and nursing note reviewed.   Constitutional:       General: She is sleeping. She is not in acute distress.     Appearance: She is well-developed.   HENT:      Head: Normocephalic and atraumatic.      Right Ear: Hearing and external ear normal.      Left Ear: Hearing and external ear normal.      Nose: No rhinorrhea.      Right Sinus: No maxillary sinus tenderness or frontal sinus tenderness.      Left Sinus: No maxillary sinus tenderness or frontal sinus tenderness.      Mouth/Throat:      Mouth: No oral lesions.      Pharynx: Uvula midline.   Eyes:      General:         Right eye: No discharge.         Left eye: No discharge.      Conjunctiva/sclera: Conjunctivae normal.      Pupils: Pupils are equal, round, and reactive to light.   Neck:      Thyroid: No thyromegaly.      Vascular: No carotid bruit.      Trachea: No tracheal deviation.   Cardiovascular:      Rate and Rhythm: Normal rate and regular rhythm.      Pulses:           Dorsalis pedis pulses are 2+ on the right side and 2+ on the left side.      Heart sounds: Normal heart sounds, S1 normal and S2 normal. No murmur heard.  Pulmonary:      Effort: Pulmonary effort is normal. No respiratory distress.      Breath sounds: Normal breath sounds.   Chest:   Breasts:      Right: No supraclavicular adenopathy.      Left: No supraclavicular adenopathy.     Abdominal:      General: Bowel sounds are normal. There is no distension.      Palpations: Abdomen is soft. There is no mass.      Tenderness: There is no abdominal tenderness.      Comments: PEG tube   Genitourinary:     Comments: Abreu Cath present   Musculoskeletal:         General: Normal range of motion.      Cervical back: Normal range of motion.   Lymphadenopathy:      Cervical: No cervical adenopathy.      Upper Body:      Right upper body: No supraclavicular adenopathy.      Left upper body: No  supraclavicular adenopathy.   Skin:     General: Skin is warm and dry.      Capillary Refill: Capillary refill takes less than 2 seconds.      Findings: No rash.   Neurological:      Mental Status: Mental status is at baseline.      Sensory: No sensory deficit.      Coordination: Coordination normal.      Gait: Gait normal.      Comments: Non verbal   Psychiatric:         Mood and Affect: Mood is not anxious or depressed.         Speech: Speech normal.       Significant Labs: Blood Culture:   Recent Labs   Lab 03/10/22  2129 03/15/22  0855 03/15/22  0900 03/31/22  0728 03/31/22  0742   LABBLOO Gram stain aer bottle: Gram positive cocci in chains resembling Strep   Results called to and read back by:Nalini Mora 03/12/2022  05:03  Gram stain laurie bottle: Gram positive cocci in chains resembling Strep   Positive results previously called 03/12/2022  11:07  Results called to and read back by:Manisha Granger 03/14/2022  13:49  STREPTOCOCCUS ANGINOSUS  For susceptibility see order #[id number]  *  EIKENELLA CORRODENS* No growth after 5 days. No growth after 5 days. No growth after 5 days. No growth after 5 days.     BMP:   Recent Labs   Lab 05/30/22  1040         K 4.2   CL 95   CO2 32*   BUN 22   CREATININE 0.7   CALCIUM 8.3*     CBC:   Recent Labs   Lab 05/30/22  1040   WBC 6.35   HGB 9.9*   HCT 31.8*        CMP:   Recent Labs   Lab 05/29/22  0947 05/30/22  1040   * 137   K 3.8 4.2   CL 91* 95   CO2 30* 32*   * 107   BUN 22 22   CREATININE 0.7 0.7   CALCIUM 8.2* 8.3*   ANIONGAP 10 10   EGFRNONAA >60 >60       Significant Imaging: I have reviewed all pertinent imaging results/findings within the past 24 hours.

## 2022-05-31 LAB
ACANTHOCYTES BLD QL SMEAR: PRESENT
ALBUMIN SERPL BCP-MCNC: 2.2 G/DL (ref 3.5–5.2)
ALP SERPL-CCNC: 115 U/L (ref 55–135)
ALT SERPL W/O P-5'-P-CCNC: 31 U/L (ref 10–44)
ANION GAP SERPL CALC-SCNC: 14 MMOL/L (ref 8–16)
AST SERPL-CCNC: 51 U/L (ref 10–40)
BASOPHILS # BLD AUTO: 0.04 K/UL (ref 0–0.2)
BASOPHILS NFR BLD: 0.6 % (ref 0–1.9)
BILIRUB SERPL-MCNC: 0.3 MG/DL (ref 0.1–1)
BUN SERPL-MCNC: 23 MG/DL (ref 8–23)
BURR CELLS BLD QL SMEAR: ABNORMAL
CALCIUM SERPL-MCNC: 8.1 MG/DL (ref 8.7–10.5)
CHLORIDE SERPL-SCNC: 97 MMOL/L (ref 95–110)
CO2 SERPL-SCNC: 26 MMOL/L (ref 23–29)
CREAT SERPL-MCNC: 0.6 MG/DL (ref 0.5–1.4)
DIFFERENTIAL METHOD: ABNORMAL
EOSINOPHIL # BLD AUTO: 0.1 K/UL (ref 0–0.5)
EOSINOPHIL NFR BLD: 2.2 % (ref 0–8)
ERYTHROCYTE [DISTWIDTH] IN BLOOD BY AUTOMATED COUNT: 15.9 % (ref 11.5–14.5)
EST. GFR  (AFRICAN AMERICAN): >60 ML/MIN/1.73 M^2
EST. GFR  (NON AFRICAN AMERICAN): >60 ML/MIN/1.73 M^2
GLUCOSE SERPL-MCNC: 111 MG/DL (ref 70–110)
HCT VFR BLD AUTO: 34.2 % (ref 37–48.5)
HGB BLD-MCNC: 10.1 G/DL (ref 12–16)
IMM GRANULOCYTES # BLD AUTO: 0.07 K/UL (ref 0–0.04)
IMM GRANULOCYTES NFR BLD AUTO: 1.1 % (ref 0–0.5)
LYMPHOCYTES # BLD AUTO: 0.9 K/UL (ref 1–4.8)
LYMPHOCYTES NFR BLD: 14.4 % (ref 18–48)
MCH RBC QN AUTO: 27.7 PG (ref 27–31)
MCHC RBC AUTO-ENTMCNC: 29.5 G/DL (ref 32–36)
MCV RBC AUTO: 94 FL (ref 82–98)
MONOCYTES # BLD AUTO: 0.9 K/UL (ref 0.3–1)
MONOCYTES NFR BLD: 14.4 % (ref 4–15)
NEUTROPHILS # BLD AUTO: 4.3 K/UL (ref 1.8–7.7)
NEUTROPHILS NFR BLD: 67.3 % (ref 38–73)
NRBC BLD-RTO: 0 /100 WBC
OVALOCYTES BLD QL SMEAR: ABNORMAL
PLATELET # BLD AUTO: 318 K/UL (ref 150–450)
PLATELET BLD QL SMEAR: ABNORMAL
PMV BLD AUTO: 9.6 FL (ref 9.2–12.9)
POTASSIUM SERPL-SCNC: 5.6 MMOL/L (ref 3.5–5.1)
PROT SERPL-MCNC: 5.9 G/DL (ref 6–8.4)
RBC # BLD AUTO: 3.64 M/UL (ref 4–5.4)
SODIUM SERPL-SCNC: 137 MMOL/L (ref 136–145)
VANCOMYCIN TROUGH SERPL-MCNC: 13.3 UG/ML (ref 10–22)
WBC # BLD AUTO: 6.38 K/UL (ref 3.9–12.7)

## 2022-05-31 PROCEDURE — 94761 N-INVAS EAR/PLS OXIMETRY MLT: CPT

## 2022-05-31 PROCEDURE — 36415 COLL VENOUS BLD VENIPUNCTURE: CPT | Performed by: NURSE PRACTITIONER

## 2022-05-31 PROCEDURE — 11000001 HC ACUTE MED/SURG PRIVATE ROOM

## 2022-05-31 PROCEDURE — 85025 COMPLETE CBC W/AUTO DIFF WBC: CPT | Performed by: NURSE PRACTITIONER

## 2022-05-31 PROCEDURE — 25000003 PHARM REV CODE 250: Performed by: INTERNAL MEDICINE

## 2022-05-31 PROCEDURE — 25000003 PHARM REV CODE 250: Performed by: STUDENT IN AN ORGANIZED HEALTH CARE EDUCATION/TRAINING PROGRAM

## 2022-05-31 PROCEDURE — 63600175 PHARM REV CODE 636 W HCPCS: Performed by: STUDENT IN AN ORGANIZED HEALTH CARE EDUCATION/TRAINING PROGRAM

## 2022-05-31 PROCEDURE — 27000207 HC ISOLATION

## 2022-05-31 PROCEDURE — 27000221 HC OXYGEN, UP TO 24 HOURS

## 2022-05-31 PROCEDURE — 25000003 PHARM REV CODE 250: Performed by: NURSE PRACTITIONER

## 2022-05-31 PROCEDURE — 80053 COMPREHEN METABOLIC PANEL: CPT | Performed by: NURSE PRACTITIONER

## 2022-05-31 PROCEDURE — 80202 ASSAY OF VANCOMYCIN: CPT | Performed by: STUDENT IN AN ORGANIZED HEALTH CARE EDUCATION/TRAINING PROGRAM

## 2022-05-31 PROCEDURE — 63600175 PHARM REV CODE 636 W HCPCS: Performed by: INTERNAL MEDICINE

## 2022-05-31 RX ADMIN — FUROSEMIDE 40 MG: 40 INJECTION, SOLUTION INTRAMUSCULAR; INTRAVENOUS at 10:05

## 2022-05-31 RX ADMIN — CARVEDILOL 6.25 MG: 6.25 TABLET, FILM COATED ORAL at 09:05

## 2022-05-31 RX ADMIN — MUPIROCIN: 20 OINTMENT TOPICAL at 10:05

## 2022-05-31 RX ADMIN — FUROSEMIDE 40 MG: 40 INJECTION, SOLUTION INTRAMUSCULAR; INTRAVENOUS at 11:05

## 2022-05-31 RX ADMIN — MUPIROCIN: 20 OINTMENT TOPICAL at 09:05

## 2022-05-31 RX ADMIN — CARVEDILOL 6.25 MG: 6.25 TABLET, FILM COATED ORAL at 08:05

## 2022-05-31 RX ADMIN — FUROSEMIDE 40 MG: 40 INJECTION, SOLUTION INTRAMUSCULAR; INTRAVENOUS at 01:05

## 2022-05-31 RX ADMIN — POLYETHYLENE GLYCOL 3350 17 G: 17 POWDER, FOR SOLUTION ORAL at 09:05

## 2022-05-31 RX ADMIN — VANCOMYCIN HYDROCHLORIDE 1000 MG: 1 INJECTION, POWDER, LYOPHILIZED, FOR SOLUTION INTRAVENOUS at 11:05

## 2022-05-31 NOTE — PROGRESS NOTES
"  [facility]  Adult Nutrition  Progress Note    SUMMARY     Recommendations    1.) Continue enteral nutrition as prescribed, as tolerated  2.) Consider 2 packets Theodore daily with FWF to provide an additional 190 kcal, 5g pro(collagen), & 17g CHO (with L-glutamine, L-arganine, Zn, Vit C, E, & B12) for optimal wound healing  3.)  RD to follow up to monitor intake, tolerance, and labs.      Goals:  1.)  Pt will continue to tolerate >75% estimated energy and protein needs by RD follow up.      2.) pt will tolerate enteral feeds at goal rate  Nutrition Goal Status: goal met  Communication of RD Recs: POC; sticky note    Assessment and Plan  No PES warranted at this time    Reason for Assessment  Reason For Assessment: RD f/u  Relevant medical history: HLD, HTN, dysphagia, CHF, stroke, parkinsons, AMS  General Information Comments:     5/28: admits with UTI, bleeding and bruising. Pt nonverbal, peg present. RD consulted for tube feeds with 1.5 mls fluid restriction and healing sacrum wound. Per chart review, pt gaining weight since March of this year.    5/31: Pt continuing to tolerate TF @ goal. Weights fluctuate x ~ 3 months d/t fluid 110-165#. Will continue to monitor.      Nutrition Risk Screen  Nutrition Risk Screen: tube feeding or parenteral nutrition, large or nonhealing wound, burn or pressure injury, dysphagia or difficulty swallowing    Nutrition/Diet History  Patient Reported Diet/Restrictions/Preferences:  (EN via PEG)  Spiritual, Cultural Beliefs, Anabaptism Practices, Values that Affect Care: no  Food Allergies: NKFA  Factors Affecting Nutritional Intake: impaired cognitive status/motor control, chewing difficulties/inability to chew food, difficulty/impaired swallowing, inability to feed self, NPO    Anthropometrics  Temp: 98.2 °F (36.8 °C)  Height Method: Stated  Height: 5' 2" (157.5 cm)  Height (inches): 62 in  Weight Method: Bed Scale  Weight: 65 kg (143 lb 4.8 oz)  Weight (lb): 143.3 lb  Ideal " Body Weight (IBW), Female: 110 lb  % Ideal Body Weight, Female (lb): 141.82 %  BMI (Calculated): 26.2  BMI Grade: 25 - 29.9 - overweight  Usual Body Weight (UBW), k.09 kg (2022)  % Usual Body Weight: 116.95  % Weight Change From Usual Weight: 16.71 %       Labs  Pertinent Labs: reviewed  Lab Results   Component Value Date    ALBUMIN 2.3 (L) 2022    HGB 9.9 (L) 2022    HCT 31.8 (L) 2022    WBC 6.35 2022    CALCIUM 8.3 (L) 2022     Lab Results   Component Value Date     2022    K 4.2 2022    PHOS 3.4 2022    BUN 22 2022    CREATININE 0.7 2022    ESTGFRAFRICA >60 2022    EGFRNONAA >60 2022     No results found for: POCTGLUCOSE  Lab Results   Component Value Date    HGBA1C 5.9 (H) 2019       Lab Results   Component Value Date    ALT 6 (L) 2022    AST 25 2022    ALKPHOS 111 2022    BILITOT 0.5 2022     Meds  Pertinent Medications: reviewed    carbidopa-levodopa  mg  2 tablet Per G Tube TID    carvediloL  6.25 mg Per G Tube BID    furosemide (LASIX) injection  40 mg Intravenous Q12H    mupirocin   Nasal BID    polyethylene glycol  17 g Oral Daily    vancomycin (VANCOCIN) IVPB  1,000 mg Intravenous Q24H     PRN Meds:acetaminophen, albuterol-ipratropium, hydrALAZINE, ondansetron, Pharmacy to dose Vancomycin consult **AND** vancomycin - pharmacy to dose     Estimated/Assessed Needs  Weight Used For Calorie Calculations: 49.9 kg (110 lb) (IBW)  Energy Calorie Requirements (kcal): 2916-4980 (25-30)  Energy Need Method: Kcal/kg  Protein Requirements: 50-60 (1-1.2, age)  Weight Used For Protein Calculations: 49.9 kg (110 lb)  Estimated Fluid Requirement Method: RDA Method  RDA Method (mL): 1247    Nutrition Prescription Ordered  Current Diet Order: NPO  Current Nutrition Support Formula Ordered: Isosource 1.5  Current Nutrition Support Rate Ordered: 40 (ml)  Current Nutrition Support Frequency  Ordered: continuous    Evaluation of Received Nutrient/Fluid Intake  Enteral Calories (kcal): 1440  Enteral Protein (gm): 65  Enteral (Free Water) Fluid (mL): 733  Tolerance: tolerating  % Intake of Estimated Energy Needs: 75 - 100 %  % Meal Intake: NPO    Monitor and Evaluation  Food and Nutrient Intake: enteral nutrition intake  Food and Nutrient Administration: enteral and parenteral nutrition administration  Anthropometric Measurements: weight, weight change, body mass index  Biochemical Data, Medical Tests and Procedures: electrolyte and renal panel, lipid profile, gastrointestinal profile, glucose/endocrine profile, Inflammatory profile  Nutrition-Focused Physical Findings: overall appearance  Nutrition Follow-Up  Level of Risk/Frequency of Follow-up: moderate / Once weekly   Jes Carroll, MS, RD, LDN

## 2022-05-31 NOTE — PROGRESS NOTES
[facility] Medicine  Progress Note    Patient Name: Tanya Madrid  MRN: [id number]  Patient Class: IP- Inpatient   Admission Date: 5/27/2022  Length of Stay: 4 days  Attending Physician: Doreen Kramer MD  Primary Care Provider: Elva Ansari NP        Subjective:     Principal Problem:Urinary tract infection associated with indwelling urethral catheter        HPI:  Ms. Madrid is an elderly 82-year-old  female, nonverbal, bed-bound since cardiac arrest in March 2022, PMH significant for combined CHF, EF 10%, CAD, CVA, chronic indwelling Henley catheter, was brought to the ED by her daughter complaining of dark colored urine for the past few days.  Daughter reports no evidence of fever, chills, dysuria.  Unable to obtain any information from patient, due to her nonverbal state.  Patient had urine cultures obtained on 5/25 per home health nurse, and was empirically prescribed Bactrim.  Today urine cultures resulted growing Staphylococcus aureus, unknown sensitivities.  Afebrile in the ED.  /91.  WBC 14200, 0% bands, lactic acid 2.6.  Sodium 123, potassium 5.2, chloride 84, creatinine 0.8.  UA reveals dark-colored urine, positive for nitrites, 3+ leukocytes, > 100 rbc's, 75 WBCs.  Received IV Rocephin, IV vancomycin in the ED.  CT of the abdomen renal stone study, negative for hydronephrosis or obstruction.  But reveals moderate anasarca, and moderately large right and mild left-sided pleural effusion, similar to prior imaging studies.  Patient denies shortness of breath.  Currently on room air, oxygen saturations in the high 90s.    Admitting diagnosis:  Staphylococcus aureus UTI, due to chronic indwelling Henley catheter.  Severe sepsis.      Overview/Hospital Course:  5/28: Na: 122, nephrology consulted, recommend 1.5L fluid restriction. Enteral feeding order adjusted to reduce free water boluses. WBC trending down. Discussion with daughter on removing henley catheter,  she is aware of increased diaper changes to prevent skin breakdown,  due to infection risk dtr prefers it to be removed. Will have a voiding trial prior to d/c.    5/29: patient decrease mental response, no verbal. MRSA UTI, continue Vancomycin. Severe CHF, LVEF 10 %. Pulmonary following,  will not benefit from repeat thoracentesis.  Severe CHF, history of cardiac arrest nonverbal status post trach and PEG.  Grave prognosis  Palliative care consult/comfort care.    5/30: Palliative medicine consult d/c due to family preference. Family is not in agreement with recommendation for comfort care and do not want to discuss care plan with Palliative. Nephrology signed off. ID evaluated, ordered Blood culture and ECHO, for further evaluation, r/o bacteremia. CBC stable, labs stable. Abreu catheter removed, ordered voiding trial.     5/31: Blood cultures with no growth to date. Echo estimated EF 15/20%, no evidence of intracardiac vegetations. Awaiting ID recommendations for antibiotic regimen. Repeat CBC and CMP pending. Pulmonology recommends continuing Lasix diuresis. Repeat CXR ordered.       Interval History: Echo negative for intracardiac vegetations, awaiting ID recommendation for abx regimen    Review of Systems   Unable to perform ROS: Patient nonverbal   Objective:     Vital Signs (Most Recent):  Temp: 98.2 °F (36.8 °C) (05/31/22 0740)  Pulse: 73 (05/31/22 0740)  Resp: 20 (05/31/22 0740)  BP: (!) 151/92 (05/31/22 0740)  SpO2: 100 % (05/31/22 0740)   Vital Signs (24h Range):  Temp:  [97.5 °F (36.4 °C)-98.3 °F (36.8 °C)] 98.2 °F (36.8 °C)  Pulse:  [66-74] 73  Resp:  [20] 20  SpO2:  [93 %-100 %] 100 %  BP: (111-151)/(69-92) 151/92     Weight: 65 kg (143 lb 4.8 oz)  Body mass index is 26.21 kg/m².    Intake/Output Summary (Last 24 hours) at 5/31/2022 1007  Last data filed at 5/31/2022 0454  Gross per 24 hour   Intake --   Output 1900 ml   Net -1900 ml      Physical Exam  Constitutional:       General: She is not in  acute distress.     Appearance: She is cachectic.   HENT:      Head: Normocephalic and atraumatic.      Right Ear: Hearing and external ear normal.      Left Ear: Hearing and external ear normal.      Nose: No rhinorrhea.      Right Sinus: No maxillary sinus tenderness or frontal sinus tenderness.      Left Sinus: No maxillary sinus tenderness or frontal sinus tenderness.      Mouth/Throat:      Mouth: No oral lesions.      Pharynx: Uvula midline.   Eyes:      General:         Right eye: No discharge.         Left eye: No discharge.      Conjunctiva/sclera: Conjunctivae normal.      Pupils: Pupils are equal, round, and reactive to light.   Neck:      Thyroid: No thyromegaly.      Vascular: No carotid bruit.      Trachea: No tracheal deviation.   Cardiovascular:      Rate and Rhythm: Normal rate and regular rhythm.      Pulses:           Dorsalis pedis pulses are 2+ on the right side and 2+ on the left side.      Heart sounds: Normal heart sounds, S1 normal and S2 normal. No murmur heard.  Bilateral LE 2+ pitting edema   Pulmonary:      Effort: Pulmonary effort is normal. No respiratory distress.      Breath sounds: Normal breath sounds.   Chest:   Breasts:      Right: No supraclavicular adenopathy.      Left: No supraclavicular adenopathy.     Abdominal:      General: Bowel sounds are normal. There is no distension.      Palpations: Abdomen is soft. There is no mass.      Tenderness: There is no abdominal tenderness.       Musculoskeletal:         General: Normal range of motion.      Cervical back: Normal range of motion.   Lymphadenopathy:      Cervical: No cervical adenopathy.      Upper Body:      Right upper body: No supraclavicular adenopathy.      Left upper body: No supraclavicular adenopathy.   Skin:     General: Skin is warm and dry.      Capillary Refill: Capillary refill takes less than 2 seconds.      Findings: No rash.   Neurological:      Sensory: Sensory deficit present.      Motor: Weakness and  atrophy present.      Gait: Gait normal.      Comments: Non-verbal   Psychiatric:      Comments: Unable to assess: nonverbal       Significant Labs: All pertinent labs within the past 24 hours have been reviewed.  Blood Culture:   Recent Labs   Lab 05/30/22  1307 05/30/22  1308   LABBLOO No Growth to date No Growth to date       Significant Imaging: I have reviewed all pertinent imaging results/findings within the past 24 hours.      Assessment/Plan:      * Urinary tract infection associated with indwelling urethral catheter  -UA reviewed  -urine cultures 5/25/22 growing Staphylococcus aureus, unknown sensitivities  -IV Rocephin, IV vancomycin initiated in the ED  -ECHO negative for intracardiac vegetations, BC no growth to date - awaiting ID consult for abx recommendations  -Voiding trial complete, pt voids spontaneously with purwick in place     Advanced care planning/counseling discussion  Advance Care Planning     Date: 05/30/2022  Per recommendation from specialist, discussed comfort care with daughter at bedside. Daughter stated she did not want to change her mothers current care, reports she feels capable of continuing with needed care of her mother at home and does not desire to change to a comfort focused care plan. Stated she wants to continue with aggressive treatment including hospitalization, IV antibiotics, IV fluids and IV medications for treatment when needed and beneficial. Discussed her current condition, recurrent pleural effusions, severe CHF, dtr stated she understands these conditions could worsen but at this time feels her mother would benefit from continued aggressive treatment with exacerbations. Patient is a DNR, dtr stated she understands if her heart stops she would not receive CPR or be placed on a ventilator.         Combined systolic and diastolic congestive heart failure  Patient is identified as having Combined Systolic and Diastolic heart failure that is Chronic. CHF is currently  controlled. Latest ECHO performed and demonstrates- Results for orders placed during the hospital encounter of 03/10/22    Echo    Interpretation Summary  · The left ventricle is mildly enlarged with eccentric hypertrophy and severely decreased systolic function.  · Grade III left ventricular diastolic dysfunction.  · Moderate right ventricular enlargement with moderately reduced right ventricular systolic function.  · Severe left atrial enlargement.  · The estimated ejection fraction is 10%.  · There is severe left ventricular global hypokinesis.  · Severe right atrial enlargement.  · Moderate-to-severe mitral regurgitation.  · Moderate to severe tricuspid regurgitation.  · Moderate pulmonic regurgitation.  · Mechanically ventilated; cannot use inferior caval vein diameter to estimate central venous pressure.  · There is a large left pleural effusion.  . Continue Furosemide and monitor clinical status closely. Monitor on telemetry. Patient is off CHF pathway.  Monitor strict Is&Os and daily weights.  Place on fluid restriction of 1.5 L. Continue to stress to patient importance of self efficacy and  on diet for CHF. Last BNP reviewed- and noted below No results for input(s): BNP, BNPTRIAGEBLO in the last 168 hours..    5/31/22  ECHO   · The left ventricle is mildly enlarged with concentric hypertrophy and severely decreased systolic function.  · The estimated ejection fraction is 15 - 20%.  · Grade I left ventricular diastolic dysfunction.  · Mild right ventricular enlargement with moderately reduced right ventricular systolic function.  · Severe right atrial enlargement.  · Moderate left atrial enlargement.  · There is pulmonary hypertension.  · The estimated PA systolic pressure is 58 mmHg.  · No change from prior echo March 2022.          Hyponatremia  On admission, serum sodium 123, chloride 84, suggesting volume depletion.    -will be cautious with IV fluids due to abdominal anasarca, bilateral pleural  "effusions.  -hold diuretics, antidepressants  -repeat labs in a.m.  -Consult Nephrology- recommend 1.5L fluid restriction  -consult RD for recs for peg feeding/water bolus  --Sodium level improved 137 today. (5/30)    CMP pending       Recurrent right pleural effusion  Recurrent, thoracentesis 5/2022- 1200mL removed    --Pulmonary following, not a candidate for Thoracentesis, recommendations noted.   --Pulmonary recommends continuing Lasix for diuresis   --Repeat CXR ordered       Severe sepsis  This patient does have evidence of infective focus  My overall impression is sepsis. Vital signs were reviewed and noted in progress note.  Antibiotics given-   Antibiotics (From admission, onward)            Start     Stop Route Frequency Ordered    05/28/22 2200  vancomycin in dextrose 5 % 1 gram/250 mL IVPB 1,000 mg         -- IV Every 24 hours (non-standard times) 05/27/22 2250    05/28/22 2100  mupirocin 2 % ointment         06/02 2059 Nasl 2 times daily 05/28/22 1512 05/27/22 2032  vancomycin - pharmacy to dose  (vancomycin IVPB)        "And" Linked Group Details    -- IV pharmacy to manage frequency 05/27/22 1933        Cultures were taken-   Microbiology Results (last 7 days)     Procedure Component Value Units Date/Time    Blood culture [[id number]] Collected: 05/30/22 1307    Order Status: Completed Specimen: Blood from Peripheral, Left Arm Updated: 05/31/22 0515     Blood Culture, Routine No Growth to date    Blood culture [[id number]] Collected: 05/30/22 1308    Order Status: Completed Specimen: Blood from Peripheral, Left Updated: 05/31/22 0515     Blood Culture, Routine No Growth to date    Urine culture [[id number]]  (Abnormal)  (Susceptibility) Collected: 05/27/22 1952    Order Status: Completed Specimen: Urine Updated: 05/30/22 1045     Urine Culture, Routine METHICILLIN RESISTANT STAPHYLOCOCCUS AUREUS  >100,000cfu/ml      Narrative:      Specimen Source->Urine        Latest lactate reviewed, they are-  No " results for input(s): LACTATE in the last 72 hours.    Organ dysfunction indicated by Encephalopathy   Source- Urine    Source control Achieved by- IV Rocephin, IV vancomycin  --Pending blood culture (no growth to date 5/31/22)  --ECHO negative for intracardiac vegetations, awaiting abx regimen from ID       Anoxic encephalopathy  -history of cardiac arrest in March 2022, currently bed-bound, nonverbal        Bilateral pleural effusion  On admission, large right side pleural effusion, small left-sided, had thoracentesis earlier this month, 1200 cc drained, currently oxygenating well, not requiring supplemental oxygen, denies SOB      --Pulmonary following, not a candidate for Thoracentesis, recommendations noted.    --Pulmonary recommends continuing Lasix for diuresis   --Repeat CXR ordered     Hyperkalemia  mild hyperkalemia, potassium 5.2 on admission    -hold home dose Entresto  -improved, 4.2    CMP pending     History of CVA (cerebrovascular accident)        Parkinson disease  Dtr reports she has not been giving patient the medication regularly at home. Concerns about side effects.     -continue home dose Sinemet  -monitor for side effects    H/O Essential hypertension   Controlled, prescribed Lasix, Entresto, Coreg as outpatient, hold Entresto due to hypotension    --Continue Coreg and Lasix  --Vitals Q4H  --Hydralazine PRN            VTE Risk Mitigation (From admission, onward)         Ordered     Place sequential compression device  Until discontinued         05/27/22 2052                Discharge Planning   PARMINDER:      Code Status: DNR   Is the patient medically ready for discharge?:     Reason for patient still in hospital (select all that apply): Patient trending condition  Discharge Plan A: Home Health                  Cindy Manzano NP  Department of Hospital Medicine   [facility] - Med Surg

## 2022-05-31 NOTE — CONSULTS
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 Turned to right side with assist. Healing sttage 3 pressure injury noted to left buttock with mostly newly epithelialized pink scarring with <1cm area of partial thickness tissue loss. Moisture barrier in use. Repositioned with foam wedge, will need heels floated, turn q2 hours. Will follow.

## 2022-05-31 NOTE — H&P (VIEW-ONLY)
[facility] Medicine  Progress Note    Patient Name: Tanya Madrid  MRN: [id number]  Patient Class: IP- Inpatient   Admission Date: 5/27/2022  Length of Stay: 4 days  Attending Physician: Doreen Kramer MD  Primary Care Provider: Elva Ansari NP        Subjective:     Principal Problem:Urinary tract infection associated with indwelling urethral catheter        HPI:  Ms. Madrid is an elderly 82-year-old  female, nonverbal, bed-bound since cardiac arrest in March 2022, PMH significant for combined CHF, EF 10%, CAD, CVA, chronic indwelling Henley catheter, was brought to the ED by her daughter complaining of dark colored urine for the past few days.  Daughter reports no evidence of fever, chills, dysuria.  Unable to obtain any information from patient, due to her nonverbal state.  Patient had urine cultures obtained on 5/25 per home health nurse, and was empirically prescribed Bactrim.  Today urine cultures resulted growing Staphylococcus aureus, unknown sensitivities.  Afebrile in the ED.  /91.  WBC 51162, 0% bands, lactic acid 2.6.  Sodium 123, potassium 5.2, chloride 84, creatinine 0.8.  UA reveals dark-colored urine, positive for nitrites, 3+ leukocytes, > 100 rbc's, 75 WBCs.  Received IV Rocephin, IV vancomycin in the ED.  CT of the abdomen renal stone study, negative for hydronephrosis or obstruction.  But reveals moderate anasarca, and moderately large right and mild left-sided pleural effusion, similar to prior imaging studies.  Patient denies shortness of breath.  Currently on room air, oxygen saturations in the high 90s.    Admitting diagnosis:  Staphylococcus aureus UTI, due to chronic indwelling Henley catheter.  Severe sepsis.      Overview/Hospital Course:  5/28: Na: 122, nephrology consulted, recommend 1.5L fluid restriction. Enteral feeding order adjusted to reduce free water boluses. WBC trending down. Discussion with daughter on removing henley catheter,  she is aware of increased diaper changes to prevent skin breakdown,  due to infection risk dtr prefers it to be removed. Will have a voiding trial prior to d/c.    5/29: patient decrease mental response, no verbal. MRSA UTI, continue Vancomycin. Severe CHF, LVEF 10 %. Pulmonary following,  will not benefit from repeat thoracentesis.  Severe CHF, history of cardiac arrest nonverbal status post trach and PEG.  Grave prognosis  Palliative care consult/comfort care.    5/30: Palliative medicine consult d/c due to family preference. Family is not in agreement with recommendation for comfort care and do not want to discuss care plan with Palliative. Nephrology signed off. ID evaluated, ordered Blood culture and ECHO, for further evaluation, r/o bacteremia. CBC stable, labs stable. Abreu catheter removed, ordered voiding trial.     5/31: Blood cultures with no growth to date. Echo estimated EF 15/20%, no evidence of intracardiac vegetations. Awaiting ID recommendations for antibiotic regimen. Repeat CBC and CMP pending. Pulmonology recommends continuing Lasix diuresis. Repeat CXR ordered.       Interval History: Echo negative for intracardiac vegetations, awaiting ID recommendation for abx regimen    Review of Systems   Unable to perform ROS: Patient nonverbal   Objective:     Vital Signs (Most Recent):  Temp: 98.2 °F (36.8 °C) (05/31/22 0740)  Pulse: 73 (05/31/22 0740)  Resp: 20 (05/31/22 0740)  BP: (!) 151/92 (05/31/22 0740)  SpO2: 100 % (05/31/22 0740)   Vital Signs (24h Range):  Temp:  [97.5 °F (36.4 °C)-98.3 °F (36.8 °C)] 98.2 °F (36.8 °C)  Pulse:  [66-74] 73  Resp:  [20] 20  SpO2:  [93 %-100 %] 100 %  BP: (111-151)/(69-92) 151/92     Weight: 65 kg (143 lb 4.8 oz)  Body mass index is 26.21 kg/m².    Intake/Output Summary (Last 24 hours) at 5/31/2022 1007  Last data filed at 5/31/2022 0454  Gross per 24 hour   Intake --   Output 1900 ml   Net -1900 ml      Physical Exam  Constitutional:       General: She is not in  acute distress.     Appearance: She is cachectic.   HENT:      Head: Normocephalic and atraumatic.      Right Ear: Hearing and external ear normal.      Left Ear: Hearing and external ear normal.      Nose: No rhinorrhea.      Right Sinus: No maxillary sinus tenderness or frontal sinus tenderness.      Left Sinus: No maxillary sinus tenderness or frontal sinus tenderness.      Mouth/Throat:      Mouth: No oral lesions.      Pharynx: Uvula midline.   Eyes:      General:         Right eye: No discharge.         Left eye: No discharge.      Conjunctiva/sclera: Conjunctivae normal.      Pupils: Pupils are equal, round, and reactive to light.   Neck:      Thyroid: No thyromegaly.      Vascular: No carotid bruit.      Trachea: No tracheal deviation.   Cardiovascular:      Rate and Rhythm: Normal rate and regular rhythm.      Pulses:           Dorsalis pedis pulses are 2+ on the right side and 2+ on the left side.      Heart sounds: Normal heart sounds, S1 normal and S2 normal. No murmur heard.  Bilateral LE 2+ pitting edema   Pulmonary:      Effort: Pulmonary effort is normal. No respiratory distress.      Breath sounds: Normal breath sounds.   Chest:   Breasts:      Right: No supraclavicular adenopathy.      Left: No supraclavicular adenopathy.     Abdominal:      General: Bowel sounds are normal. There is no distension.      Palpations: Abdomen is soft. There is no mass.      Tenderness: There is no abdominal tenderness.       Musculoskeletal:         General: Normal range of motion.      Cervical back: Normal range of motion.   Lymphadenopathy:      Cervical: No cervical adenopathy.      Upper Body:      Right upper body: No supraclavicular adenopathy.      Left upper body: No supraclavicular adenopathy.   Skin:     General: Skin is warm and dry.      Capillary Refill: Capillary refill takes less than 2 seconds.      Findings: No rash.   Neurological:      Sensory: Sensory deficit present.      Motor: Weakness and  atrophy present.      Gait: Gait normal.      Comments: Non-verbal   Psychiatric:      Comments: Unable to assess: nonverbal       Significant Labs: All pertinent labs within the past 24 hours have been reviewed.  Blood Culture:   Recent Labs   Lab 05/30/22  1307 05/30/22  1308   LABBLOO No Growth to date No Growth to date       Significant Imaging: I have reviewed all pertinent imaging results/findings within the past 24 hours.      Assessment/Plan:      * Urinary tract infection associated with indwelling urethral catheter  -UA reviewed  -urine cultures 5/25/22 growing Staphylococcus aureus, unknown sensitivities  -IV Rocephin, IV vancomycin initiated in the ED  -ECHO negative for intracardiac vegetations, BC no growth to date - awaiting ID consult for abx recommendations  -Voiding trial complete, pt voids spontaneously with purwick in place     Advanced care planning/counseling discussion  Advance Care Planning     Date: 05/30/2022  Per recommendation from specialist, discussed comfort care with daughter at bedside. Daughter stated she did not want to change her mothers current care, reports she feels capable of continuing with needed care of her mother at home and does not desire to change to a comfort focused care plan. Stated she wants to continue with aggressive treatment including hospitalization, IV antibiotics, IV fluids and IV medications for treatment when needed and beneficial. Discussed her current condition, recurrent pleural effusions, severe CHF, dtr stated she understands these conditions could worsen but at this time feels her mother would benefit from continued aggressive treatment with exacerbations. Patient is a DNR, dtr stated she understands if her heart stops she would not receive CPR or be placed on a ventilator.         Combined systolic and diastolic congestive heart failure  Patient is identified as having Combined Systolic and Diastolic heart failure that is Chronic. CHF is currently  controlled. Latest ECHO performed and demonstrates- Results for orders placed during the hospital encounter of 03/10/22    Echo    Interpretation Summary  · The left ventricle is mildly enlarged with eccentric hypertrophy and severely decreased systolic function.  · Grade III left ventricular diastolic dysfunction.  · Moderate right ventricular enlargement with moderately reduced right ventricular systolic function.  · Severe left atrial enlargement.  · The estimated ejection fraction is 10%.  · There is severe left ventricular global hypokinesis.  · Severe right atrial enlargement.  · Moderate-to-severe mitral regurgitation.  · Moderate to severe tricuspid regurgitation.  · Moderate pulmonic regurgitation.  · Mechanically ventilated; cannot use inferior caval vein diameter to estimate central venous pressure.  · There is a large left pleural effusion.  . Continue Furosemide and monitor clinical status closely. Monitor on telemetry. Patient is off CHF pathway.  Monitor strict Is&Os and daily weights.  Place on fluid restriction of 1.5 L. Continue to stress to patient importance of self efficacy and  on diet for CHF. Last BNP reviewed- and noted below No results for input(s): BNP, BNPTRIAGEBLO in the last 168 hours..    5/31/22  ECHO   · The left ventricle is mildly enlarged with concentric hypertrophy and severely decreased systolic function.  · The estimated ejection fraction is 15 - 20%.  · Grade I left ventricular diastolic dysfunction.  · Mild right ventricular enlargement with moderately reduced right ventricular systolic function.  · Severe right atrial enlargement.  · Moderate left atrial enlargement.  · There is pulmonary hypertension.  · The estimated PA systolic pressure is 58 mmHg.  · No change from prior echo March 2022.          Hyponatremia  On admission, serum sodium 123, chloride 84, suggesting volume depletion.    -will be cautious with IV fluids due to abdominal anasarca, bilateral pleural  "effusions.  -hold diuretics, antidepressants  -repeat labs in a.m.  -Consult Nephrology- recommend 1.5L fluid restriction  -consult RD for recs for peg feeding/water bolus  --Sodium level improved 137 today. (5/30)    CMP pending       Recurrent right pleural effusion  Recurrent, thoracentesis 5/2022- 1200mL removed    --Pulmonary following, not a candidate for Thoracentesis, recommendations noted.   --Pulmonary recommends continuing Lasix for diuresis   --Repeat CXR ordered       Severe sepsis  This patient does have evidence of infective focus  My overall impression is sepsis. Vital signs were reviewed and noted in progress note.  Antibiotics given-   Antibiotics (From admission, onward)            Start     Stop Route Frequency Ordered    05/28/22 2200  vancomycin in dextrose 5 % 1 gram/250 mL IVPB 1,000 mg         -- IV Every 24 hours (non-standard times) 05/27/22 2250    05/28/22 2100  mupirocin 2 % ointment         06/02 2059 Nasl 2 times daily 05/28/22 1512 05/27/22 2032  vancomycin - pharmacy to dose  (vancomycin IVPB)        "And" Linked Group Details    -- IV pharmacy to manage frequency 05/27/22 1933        Cultures were taken-   Microbiology Results (last 7 days)     Procedure Component Value Units Date/Time    Blood culture [[id number]] Collected: 05/30/22 1307    Order Status: Completed Specimen: Blood from Peripheral, Left Arm Updated: 05/31/22 0515     Blood Culture, Routine No Growth to date    Blood culture [[id number]] Collected: 05/30/22 1308    Order Status: Completed Specimen: Blood from Peripheral, Left Updated: 05/31/22 0515     Blood Culture, Routine No Growth to date    Urine culture [[id number]]  (Abnormal)  (Susceptibility) Collected: 05/27/22 1952    Order Status: Completed Specimen: Urine Updated: 05/30/22 1045     Urine Culture, Routine METHICILLIN RESISTANT STAPHYLOCOCCUS AUREUS  >100,000cfu/ml      Narrative:      Specimen Source->Urine        Latest lactate reviewed, they are-  No " results for input(s): LACTATE in the last 72 hours.    Organ dysfunction indicated by Encephalopathy   Source- Urine    Source control Achieved by- IV Rocephin, IV vancomycin  --Pending blood culture (no growth to date 5/31/22)  --ECHO negative for intracardiac vegetations, awaiting abx regimen from ID       Anoxic encephalopathy  -history of cardiac arrest in March 2022, currently bed-bound, nonverbal        Bilateral pleural effusion  On admission, large right side pleural effusion, small left-sided, had thoracentesis earlier this month, 1200 cc drained, currently oxygenating well, not requiring supplemental oxygen, denies SOB      --Pulmonary following, not a candidate for Thoracentesis, recommendations noted.    --Pulmonary recommends continuing Lasix for diuresis   --Repeat CXR ordered     Hyperkalemia  mild hyperkalemia, potassium 5.2 on admission    -hold home dose Entresto  -improved, 4.2    CMP pending     History of CVA (cerebrovascular accident)        Parkinson disease  Dtr reports she has not been giving patient the medication regularly at home. Concerns about side effects.     -continue home dose Sinemet  -monitor for side effects    H/O Essential hypertension   Controlled, prescribed Lasix, Entresto, Coreg as outpatient, hold Entresto due to hypotension    --Continue Coreg and Lasix  --Vitals Q4H  --Hydralazine PRN            VTE Risk Mitigation (From admission, onward)         Ordered     Place sequential compression device  Until discontinued         05/27/22 2052                Discharge Planning   PARMINDER:      Code Status: DNR   Is the patient medically ready for discharge?:     Reason for patient still in hospital (select all that apply): Patient trending condition  Discharge Plan A: Home Health                  Cindy Manzano NP  Department of Hospital Medicine   [facility] - Med Surg

## 2022-05-31 NOTE — ASSESSMENT & PLAN NOTE
-UA reviewed  -urine cultures 5/25/22 growing Staphylococcus aureus, unknown sensitivities  -IV Rocephin, IV vancomycin initiated in the ED  -ECHO negative for intracardiac vegetations, BC no growth to date - awaiting ID consult for abx recommendations  -Voiding trial complete, pt voids spontaneously with purwick in place

## 2022-05-31 NOTE — ASSESSMENT & PLAN NOTE
"This patient does have evidence of infective focus  My overall impression is sepsis. Vital signs were reviewed and noted in progress note.  Antibiotics given-   Antibiotics (From admission, onward)            Start     Stop Route Frequency Ordered    05/28/22 2200  vancomycin in dextrose 5 % 1 gram/250 mL IVPB 1,000 mg         -- IV Every 24 hours (non-standard times) 05/27/22 2250    05/28/22 2100  mupirocin 2 % ointment         06/02 2059 Nasl 2 times daily 05/28/22 1512 05/27/22 2032  vancomycin - pharmacy to dose  (vancomycin IVPB)        "And" Linked Group Details    -- IV pharmacy to manage frequency 05/27/22 1933        Cultures were taken-   Microbiology Results (last 7 days)     Procedure Component Value Units Date/Time    Blood culture [[id number]] Collected: 05/30/22 1307    Order Status: Completed Specimen: Blood from Peripheral, Left Arm Updated: 05/31/22 0515     Blood Culture, Routine No Growth to date    Blood culture [[id number]] Collected: 05/30/22 1308    Order Status: Completed Specimen: Blood from Peripheral, Left Updated: 05/31/22 0515     Blood Culture, Routine No Growth to date    Urine culture [[id number]]  (Abnormal)  (Susceptibility) Collected: 05/27/22 1952    Order Status: Completed Specimen: Urine Updated: 05/30/22 1045     Urine Culture, Routine METHICILLIN RESISTANT STAPHYLOCOCCUS AUREUS  >100,000cfu/ml      Narrative:      Specimen Source->Urine        Latest lactate reviewed, they are-  No results for input(s): LACTATE in the last 72 hours.    Organ dysfunction indicated by Encephalopathy   Source- Urine    Source control Achieved by- IV Rocephin, IV vancomycin  --Pending blood culture (no growth to date 5/31/22)  --ECHO negative for intracardiac vegetations, awaiting abx regimen from ID     "

## 2022-05-31 NOTE — ASSESSMENT & PLAN NOTE
On admission, serum sodium 123, chloride 84, suggesting volume depletion.    -will be cautious with IV fluids due to abdominal anasarca, bilateral pleural effusions.  -hold diuretics, antidepressants  -repeat labs in a.m.  -Consult Nephrology- recommend 1.5L fluid restriction  -consult RD for recs for peg feeding/water bolus  --Sodium level improved 137 today. (5/30)    CMP pending

## 2022-05-31 NOTE — PLAN OF CARE
Pt remains free from falls/injuries this shift. Safety precautions maintained. No s/s of pain. No s/s of acute distress noted. VSS. Isosource infusing to PEG at goal rate of 40 mL/hr, tolerating well. Turned q 2hr. Will continue to monitor. Chart check completed.

## 2022-05-31 NOTE — ASSESSMENT & PLAN NOTE
Patient is identified as having Combined Systolic and Diastolic heart failure that is Chronic. CHF is currently controlled. Latest ECHO performed and demonstrates- Results for orders placed during the hospital encounter of 03/10/22    Echo    Interpretation Summary  · The left ventricle is mildly enlarged with eccentric hypertrophy and severely decreased systolic function.  · Grade III left ventricular diastolic dysfunction.  · Moderate right ventricular enlargement with moderately reduced right ventricular systolic function.  · Severe left atrial enlargement.  · The estimated ejection fraction is 10%.  · There is severe left ventricular global hypokinesis.  · Severe right atrial enlargement.  · Moderate-to-severe mitral regurgitation.  · Moderate to severe tricuspid regurgitation.  · Moderate pulmonic regurgitation.  · Mechanically ventilated; cannot use inferior caval vein diameter to estimate central venous pressure.  · There is a large left pleural effusion.  . Continue Furosemide and monitor clinical status closely. Monitor on telemetry. Patient is off CHF pathway.  Monitor strict Is&Os and daily weights.  Place on fluid restriction of 1.5 L. Continue to stress to patient importance of self efficacy and  on diet for CHF. Last BNP reviewed- and noted below No results for input(s): BNP, BNPTRIAGEBLO in the last 168 hours..    5/31/22  ECHO   · The left ventricle is mildly enlarged with concentric hypertrophy and severely decreased systolic function.  · The estimated ejection fraction is 15 - 20%.  · Grade I left ventricular diastolic dysfunction.  · Mild right ventricular enlargement with moderately reduced right ventricular systolic function.  · Severe right atrial enlargement.  · Moderate left atrial enlargement.  · There is pulmonary hypertension.  · The estimated PA systolic pressure is 58 mmHg.  · No change from prior echo March 2022.

## 2022-05-31 NOTE — ASSESSMENT & PLAN NOTE
Recurrent, thoracentesis 5/2022- 1200mL removed    --Pulmonary following, not a candidate for Thoracentesis, recommendations noted.   --Pulmonary recommends continuing Lasix for diuresis   --Repeat CXR ordered

## 2022-05-31 NOTE — ASSESSMENT & PLAN NOTE
On admission, large right side pleural effusion, small left-sided, had thoracentesis earlier this month, 1200 cc drained, currently oxygenating well, not requiring supplemental oxygen, denies SOB      --Pulmonary following, not a candidate for Thoracentesis, recommendations noted.    --Pulmonary recommends continuing Lasix for diuresis   --Repeat CXR ordered

## 2022-05-31 NOTE — SUBJECTIVE & OBJECTIVE
Interval History: Echo negative for intracardiac vegetations, awaiting ID recommendation for abx regimen    Review of Systems   Unable to perform ROS: Patient nonverbal   Objective:     Vital Signs (Most Recent):  Temp: 98.2 °F (36.8 °C) (05/31/22 0740)  Pulse: 73 (05/31/22 0740)  Resp: 20 (05/31/22 0740)  BP: (!) 151/92 (05/31/22 0740)  SpO2: 100 % (05/31/22 0740)   Vital Signs (24h Range):  Temp:  [97.5 °F (36.4 °C)-98.3 °F (36.8 °C)] 98.2 °F (36.8 °C)  Pulse:  [66-74] 73  Resp:  [20] 20  SpO2:  [93 %-100 %] 100 %  BP: (111-151)/(69-92) 151/92     Weight: 65 kg (143 lb 4.8 oz)  Body mass index is 26.21 kg/m².    Intake/Output Summary (Last 24 hours) at 5/31/2022 1007  Last data filed at 5/31/2022 0454  Gross per 24 hour   Intake --   Output 1900 ml   Net -1900 ml      Physical Exam  Constitutional:       General: She is not in acute distress.     Appearance: She is cachectic.   HENT:      Head: Normocephalic and atraumatic.      Right Ear: Hearing and external ear normal.      Left Ear: Hearing and external ear normal.      Nose: No rhinorrhea.      Right Sinus: No maxillary sinus tenderness or frontal sinus tenderness.      Left Sinus: No maxillary sinus tenderness or frontal sinus tenderness.      Mouth/Throat:      Mouth: No oral lesions.      Pharynx: Uvula midline.   Eyes:      General:         Right eye: No discharge.         Left eye: No discharge.      Conjunctiva/sclera: Conjunctivae normal.      Pupils: Pupils are equal, round, and reactive to light.   Neck:      Thyroid: No thyromegaly.      Vascular: No carotid bruit.      Trachea: No tracheal deviation.   Cardiovascular:      Rate and Rhythm: Normal rate and regular rhythm.      Pulses:           Dorsalis pedis pulses are 2+ on the right side and 2+ on the left side.      Heart sounds: Normal heart sounds, S1 normal and S2 normal. No murmur heard.  Pulmonary:      Effort: Pulmonary effort is normal. No respiratory distress.      Breath sounds: Normal  breath sounds.   Chest:   Breasts:      Right: No supraclavicular adenopathy.      Left: No supraclavicular adenopathy.     Abdominal:      General: Bowel sounds are normal. There is no distension.      Palpations: Abdomen is soft. There is no mass.      Tenderness: There is no abdominal tenderness.       Musculoskeletal:         General: Normal range of motion.      Cervical back: Normal range of motion.   Lymphadenopathy:      Cervical: No cervical adenopathy.      Upper Body:      Right upper body: No supraclavicular adenopathy.      Left upper body: No supraclavicular adenopathy.   Skin:     General: Skin is warm and dry.      Capillary Refill: Capillary refill takes less than 2 seconds.      Findings: No rash.   Neurological:      Sensory: Sensory deficit present.      Motor: Weakness and atrophy present.      Gait: Gait normal.      Comments: Non-verbal   Psychiatric:      Comments: Unable to assess: nonverbal       Significant Labs: All pertinent labs within the past 24 hours have been reviewed.  Blood Culture:   Recent Labs   Lab 05/30/22  1307 05/30/22  1308   LABBLOO No Growth to date No Growth to date       Significant Imaging: I have reviewed all pertinent imaging results/findings within the past 24 hours.

## 2022-05-31 NOTE — PLAN OF CARE
RD Recommendations    1.) Continue enteral nutrition as prescribed, as tolerated  2.) Consider 2 packets Theodore daily with FWF to provide an additional 190 kcal, 5g pro(collagen), & 17g CHO (with L-glutamine, L-arganine, Zn, Vit C, E, & B12) for optimal wound healing  3.)  RD to follow up to monitor intake, tolerance, and labs.      Jes Carroll, MS, RD, LDN

## 2022-05-31 NOTE — ASSESSMENT & PLAN NOTE
mild hyperkalemia, potassium 5.2 on admission    -hold home dose Entresto  -improved, 4.2    CMP pending

## 2022-06-01 ENCOUNTER — EXTERNAL HOME HEALTH (OUTPATIENT)
Dept: HOME HEALTH SERVICES | Facility: HOSPITAL | Age: 82
End: 2022-06-01
Payer: MEDICARE

## 2022-06-01 VITALS
HEART RATE: 66 BPM | BODY MASS INDEX: 28.11 KG/M2 | OXYGEN SATURATION: 94 % | WEIGHT: 152.75 LBS | HEIGHT: 62 IN | SYSTOLIC BLOOD PRESSURE: 159 MMHG | DIASTOLIC BLOOD PRESSURE: 85 MMHG | TEMPERATURE: 98 F | RESPIRATION RATE: 16 BRPM

## 2022-06-01 PROBLEM — A41.9 SEVERE SEPSIS: Status: RESOLVED | Noted: 2022-03-31 | Resolved: 2022-06-01

## 2022-06-01 PROBLEM — E87.1 HYPONATREMIA: Status: RESOLVED | Noted: 2022-05-27 | Resolved: 2022-06-01

## 2022-06-01 PROBLEM — E87.5 HYPERKALEMIA: Status: RESOLVED | Noted: 2020-07-12 | Resolved: 2022-06-01

## 2022-06-01 PROBLEM — R65.20 SEVERE SEPSIS: Status: RESOLVED | Noted: 2022-03-31 | Resolved: 2022-06-01

## 2022-06-01 PROCEDURE — 25000003 PHARM REV CODE 250: Performed by: NURSE PRACTITIONER

## 2022-06-01 PROCEDURE — 99900035 HC TECH TIME PER 15 MIN (STAT)

## 2022-06-01 PROCEDURE — 63600175 PHARM REV CODE 636 W HCPCS: Performed by: INTERNAL MEDICINE

## 2022-06-01 PROCEDURE — 25000003 PHARM REV CODE 250: Performed by: INTERNAL MEDICINE

## 2022-06-01 PROCEDURE — 27000221 HC OXYGEN, UP TO 24 HOURS

## 2022-06-01 PROCEDURE — 94761 N-INVAS EAR/PLS OXIMETRY MLT: CPT

## 2022-06-01 RX ORDER — PSEUDOEPHEDRINE/ACETAMINOPHEN 30MG-500MG
100 TABLET ORAL ONCE
Status: DISCONTINUED | OUTPATIENT
Start: 2022-06-01 | End: 2022-06-01 | Stop reason: HOSPADM

## 2022-06-01 RX ORDER — BISACODYL 10 MG
10 SUPPOSITORY, RECTAL RECTAL DAILY PRN
Status: DISCONTINUED | OUTPATIENT
Start: 2022-06-01 | End: 2022-06-01 | Stop reason: HOSPADM

## 2022-06-01 RX ORDER — SULFAMETHOXAZOLE AND TRIMETHOPRIM 200; 40 MG/5ML; MG/5ML
160 SUSPENSION ORAL 2 TIMES DAILY
Status: DISCONTINUED | OUTPATIENT
Start: 2022-06-01 | End: 2022-06-01 | Stop reason: HOSPADM

## 2022-06-01 RX ORDER — SULFAMETHOXAZOLE AND TRIMETHOPRIM 200; 40 MG/5ML; MG/5ML
160 SUSPENSION ORAL 2 TIMES DAILY
Qty: 560 ML | Refills: 0 | Status: SHIPPED | OUTPATIENT
Start: 2022-06-01 | End: 2022-06-16

## 2022-06-01 RX ORDER — SYRING-NEEDL,DISP,INSUL,0.3 ML 29 G X1/2"
296 SYRINGE, EMPTY DISPOSABLE MISCELLANEOUS ONCE
Status: DISCONTINUED | OUTPATIENT
Start: 2022-06-01 | End: 2022-06-01 | Stop reason: HOSPADM

## 2022-06-01 RX ADMIN — SULFAMETHOXAZOLE AND TRIMETHOPRIM 20 ML: 200; 40 SUSPENSION ORAL at 10:06

## 2022-06-01 RX ADMIN — BISACODYL 10 MG: 10 SUPPOSITORY RECTAL at 08:06

## 2022-06-01 RX ADMIN — CARVEDILOL 6.25 MG: 6.25 TABLET, FILM COATED ORAL at 08:06

## 2022-06-01 RX ADMIN — POLYETHYLENE GLYCOL 3350 17 G: 17 POWDER, FOR SOLUTION ORAL at 08:06

## 2022-06-01 RX ADMIN — FUROSEMIDE 40 MG: 40 INJECTION, SOLUTION INTRAMUSCULAR; INTRAVENOUS at 11:06

## 2022-06-01 NOTE — ASSESSMENT & PLAN NOTE
-UA reviewed  -urine cultures 5/25/22 growing Staphylococcus aureus, unknown sensitivities  -IV Rocephin, IV vancomycin initiated in the ED  -ECHO negative for intracardiac vegetations, BC no growth to date - awaiting ID consult for abx recommendations  -Voiding trial complete, pt voids spontaneously with purwick in place   --D/C on Bactrim x14 days

## 2022-06-01 NOTE — PLAN OF CARE
Problem: Infection  Goal: Absence of Infection Signs and Symptoms  Outcome: Ongoing, Progressing     Problem: Adult Inpatient Plan of Care  Goal: Plan of Care Review  Outcome: Ongoing, Progressing  Goal: Patient-Specific Goal (Individualized)  Outcome: Ongoing, Progressing  Goal: Absence of Hospital-Acquired Illness or Injury  Outcome: Ongoing, Progressing  Goal: Optimal Comfort and Wellbeing  Outcome: Ongoing, Progressing  Goal: Readiness for Transition of Care  Outcome: Ongoing, Progressing     Problem: Adjustment to Illness (Sepsis/Septic Shock)  Goal: Optimal Coping  Outcome: Ongoing, Progressing     Problem: Bleeding (Sepsis/Septic Shock)  Goal: Absence of Bleeding  Outcome: Ongoing, Progressing     Problem: Glycemic Control Impaired (Sepsis/Septic Shock)  Goal: Blood Glucose Level Within Desired Range  Outcome: Ongoing, Progressing     Problem: Infection Progression (Sepsis/Septic Shock)  Goal: Absence of Infection Signs and Symptoms  Outcome: Ongoing, Progressing     Problem: Nutrition Impaired (Sepsis/Septic Shock)  Goal: Optimal Nutrition Intake  Outcome: Ongoing, Progressing     Problem: Fluid and Electrolyte Imbalance (Acute Kidney Injury/Impairment)  Goal: Fluid and Electrolyte Balance  Outcome: Ongoing, Progressing     Problem: Oral Intake Inadequate (Acute Kidney Injury/Impairment)  Goal: Optimal Nutrition Intake  Outcome: Ongoing, Progressing     Problem: Renal Function Impairment (Acute Kidney Injury/Impairment)  Goal: Effective Renal Function  Outcome: Ongoing, Progressing     Problem: Impaired Wound Healing  Goal: Optimal Wound Healing  Outcome: Ongoing, Progressing     Problem: Skin Injury Risk Increased  Goal: Skin Health and Integrity  Outcome: Ongoing, Progressing     Problem: Fall Injury Risk  Goal: Absence of Fall and Fall-Related Injury  Outcome: Ongoing, Progressing     Problem: Coping Ineffective  Goal: Effective Coping  Outcome: Ongoing, Progressing

## 2022-06-01 NOTE — PROGRESS NOTES
Pharmacokinetic Assessment Follow Up: IV Vancomycin    Vancomycin serum concentration assessment(s):    The trough level was drawn correctly and can be used to guide therapy at this time. The measurement is within the desired definitive target range of 10 to 15 mcg/mL.    Vancomycin Regimen Plan:    Continue regimen to Vancomycin 1000 mg IV every 24 hours with next serum trough concentration measured at 2200 prior to 3rd dose on 06/02    Drug levels (last 3 results):  Recent Labs   Lab Result Units 05/29/22 2121 05/31/22  2224   Vancomycin-Trough ug/mL 11.8 13.3       Pharmacy will continue to follow and monitor vancomycin.    Please contact pharmacy at extension [phone] for questions regarding this assessment.    Thank you for the consult,   Estefany Orantes       Patient brief summary:  Tanya Madrid is a 82 y.o. female initiated on antimicrobial therapy with IV Vancomycin for treatment of urinary tract infection    The patient's current regimen is vancomycin 1000 mg IV every     Drug Allergies:   Review of patient's allergies indicates:   Allergen Reactions    Xanax [alprazolam]        Actual Body Weight:   65 kg    Renal Function:   Estimated Creatinine Clearance: 61.8 mL/min (based on SCr of 0.6 mg/dL).,     Dialysis Method (if applicable):  N/A    CBC (last 72 hours):  Recent Labs   Lab Result Units 05/30/22  1040 05/31/22  1115   WBC K/uL 6.35 6.38   Hemoglobin g/dL 9.9* 10.1*   Hematocrit % 31.8* 34.2*   Platelets K/uL 344 318   Gran % % 73.9* 67.3   Lymph % % 9.3* 14.4*   Mono % % 13.1 14.4   Eosinophil % % 1.7 2.2   Basophil % % 0.6 0.6   Differential Method  Automated Automated       Metabolic Panel (last 72 hours):  Recent Labs   Lab Result Units 05/29/22  0947 05/30/22  1040 05/31/22  1115   Sodium mmol/L 131* 137 137   Potassium mmol/L 3.8 4.2 5.6*   Chloride mmol/L 91* 95 97   CO2 mmol/L 30* 32* 26   Glucose mg/dL 112* 107 111*   BUN mg/dL 22 22 23   Creatinine mg/dL 0.7 0.7 0.6   Albumin g/dL  --    --  2.2*   Total Bilirubin mg/dL  --   --  0.3   Alkaline Phosphatase U/L  --   --  115   AST U/L  --   --  51*   ALT U/L  --   --  31       Vancomycin Administrations:  vancomycin given in the last 96 hours                     vancomycin in dextrose 5 % 1 gram/250 mL IVPB 1,000 mg (mg) 1,000 mg New Bag 05/31/22 2301     1,000 mg New Bag 05/30/22 2300     1,000 mg New Bag 05/29/22 2217     1,000 mg New Bag 05/28/22 2237                    Microbiologic Results:  Microbiology Results (last 7 days)       Procedure Component Value Units Date/Time    Blood culture [[id number]] Collected: 05/30/22 1307    Order Status: Completed Specimen: Blood from Peripheral, Left Arm Updated: 05/31/22 2222     Blood Culture, Routine No Growth to date      No Growth to date    Blood culture [[id number]] Collected: 05/30/22 1308    Order Status: Completed Specimen: Blood from Peripheral, Left Updated: 05/31/22 2222     Blood Culture, Routine No Growth to date      No Growth to date    Urine culture [[id number]]  (Abnormal)  (Susceptibility) Collected: 05/27/22 1952    Order Status: Completed Specimen: Urine Updated: 05/30/22 1045     Urine Culture, Routine METHICILLIN RESISTANT STAPHYLOCOCCUS AUREUS  >100,000cfu/ml      Narrative:      Specimen Source->Urine

## 2022-06-01 NOTE — PLAN OF CARE
[facility] - Med Surg  Discharge Final Note    Primary Care Provider: Elva Ansari NP    Expected Discharge Date: 6/1/2022    Final Discharge Note (most recent)     Final Note - 06/01/22 1421        Final Note    Assessment Type Final Discharge Note     Anticipated Discharge Disposition Home-[facility]     Hospital Resources/Appts/Education Provided Provided patient/caregiver with written discharge plan information;Appointments scheduled and added to AVS        Post-Acute Status    Discharge Delays None known at this time                 Important Message from Medicare  Important Message from Medicare regarding Discharge Appeal Rights: Given to patient/caregiver, Explained to patient/caregiver, Other (comments) (Pt non verbal, explained but unsure if understood. Left IMM at bedside w/ contact number)     Date IMM was signed: 06/01/22  Time IMM was signed: 1225    Patient to dc home with [facility]. FU appt scheduled and added to AVS. No other cm needs.

## 2022-06-01 NOTE — PROGRESS NOTES
Pharmacokinetic Assessment Sign Off: IV Vancomycin     Therapy with Vancomycin complete and/or consult discontinued by provider.  Pharmacy will sign off, please re-consult as needed.     Thank you for allowing us to participate in this patient's care.      Purvi Lao PharmD 06/01/2022 9:15 AM

## 2022-06-01 NOTE — ASSESSMENT & PLAN NOTE
Patient is identified as having Combined Systolic and Diastolic heart failure that is Chronic. CHF is currently controlled. Latest ECHO performed and demonstrates- Results for orders placed during the hospital encounter of 03/10/22    Echo    Interpretation Summary  · The left ventricle is mildly enlarged with eccentric hypertrophy and severely decreased systolic function.  · Grade III left ventricular diastolic dysfunction.  · Moderate right ventricular enlargement with moderately reduced right ventricular systolic function.  · Severe left atrial enlargement.  · The estimated ejection fraction is 10%.  · There is severe left ventricular global hypokinesis.  · Severe right atrial enlargement.  · Moderate-to-severe mitral regurgitation.  · Moderate to severe tricuspid regurgitation.  · Moderate pulmonic regurgitation.  · Mechanically ventilated; cannot use inferior caval vein diameter to estimate central venous pressure.  · There is a large left pleural effusion.  . Continue Furosemide and monitor clinical status closely. Monitor on telemetry. Patient is off CHF pathway.  Monitor strict Is&Os and daily weights.  Place on fluid restriction of 1.5 L. Continue to stress to patient importance of self efficacy and  on diet for CHF. Last BNP reviewed- and noted below No results for input(s): BNP, BNPTRIAGEBLO in the last 168 hours..    5/31/22  ECHO   · The left ventricle is mildly enlarged with concentric hypertrophy and severely decreased systolic function.  · The estimated ejection fraction is 15 - 20%.  · Grade I left ventricular diastolic dysfunction.  · Mild right ventricular enlargement with moderately reduced right ventricular systolic function.  · Severe right atrial enlargement.  · Moderate left atrial enlargement.  · There is pulmonary hypertension.  · The estimated PA systolic pressure is 58 mmHg.  · No change from prior echo March 2022.    --discharge on home regimen

## 2022-06-01 NOTE — ASSESSMENT & PLAN NOTE
Controlled, prescribed Lasix, Entresto, Coreg as outpatient, hold Entresto due to hypotension    --resume home regimen

## 2022-06-01 NOTE — NURSING
Pt being discharged Home in stable condition. IV removed, catheter intact, pt tolerated well.  Discharge instructions given to pt, pt verbalized understanding.

## 2022-06-01 NOTE — DISCHARGE SUMMARY
[facility]– [place] 2[place] Medicine  Discharge Summary      Patient Name: Tanya Madrid  MRN: [id number]  Patient Class: IP- Inpatient  Admission Date: 5/27/2022  Hospital Length of Stay: 5 days  Discharge Date and Time:  06/01/2022 3:59 PM  Attending Physician: Doreen Kramer MD   Discharging Provider: Cindy Manzano NP  Primary Care Provider: Elva Ansari NP      HPI:   Ms. Madrid is an elderly 82-year-old  female, nonverbal, bed-bound since cardiac arrest in March 2022, PMH significant for combined CHF, EF 10%, CAD, CVA, chronic indwelling Abreu catheter, was brought to the ED by her daughter complaining of dark colored urine for the past few days.  Daughter reports no evidence of fever, chills, dysuria.  Unable to obtain any information from patient, due to her nonverbal state.  Patient had urine cultures obtained on 5/25 per home health nurse, and was empirically prescribed Bactrim.  Today urine cultures resulted growing Staphylococcus aureus, unknown sensitivities.  Afebrile in the ED.  /91.  WBC 46303, 0% bands, lactic acid 2.6.  Sodium 123, potassium 5.2, chloride 84, creatinine 0.8.  UA reveals dark-colored urine, positive for nitrites, 3+ leukocytes, > 100 rbc's, 75 WBCs.  Received IV Rocephin, IV vancomycin in the ED.  CT of the abdomen renal stone study, negative for hydronephrosis or obstruction.  But reveals moderate anasarca, and moderately large right and mild left-sided pleural effusion, similar to prior imaging studies.  Patient denies shortness of breath.  Currently on room air, oxygen saturations in the high 90s.    Admitting diagnosis:  Staphylococcus aureus UTI, due to chronic indwelling Abreu catheter.  Severe sepsis.      * No surgery found *      Hospital Course:   5/28: Na: 122, nephrology consulted, recommend 1.5L fluid restriction. Enteral feeding order adjusted to reduce free water boluses. WBC trending down. Discussion with daughter on removing  henley catheter, she is aware of increased diaper changes to prevent skin breakdown,  due to infection risk dtr prefers it to be removed. Will have a voiding trial prior to d/c.    5/29: patient decrease mental response, no verbal. MRSA UTI, continue Vancomycin. Severe CHF, LVEF 10 %. Pulmonary following,  will not benefit from repeat thoracentesis.  Severe CHF, history of cardiac arrest nonverbal status post trach and PEG.  Grave prognosis  Palliative care consult/comfort care.    5/30: Palliative medicine consult d/c due to family preference. Family is not in agreement with recommendation for comfort care and do not want to discuss care plan with Palliative. Nephrology signed off. ID evaluated, ordered Blood culture and ECHO, for further evaluation, r/o bacteremia. CBC stable, labs stable. Henley catheter removed, ordered voiding trial.     5/31: Blood cultures with no growth to date. Echo estimated EF 15/20%, no evidence of intracardiac vegetations. Awaiting ID recommendations for antibiotic regimen. Repeat CBC and CMP pending. Pulmonology recommends continuing Lasix diuresis. Repeat CXR ordered.     6/1: Patient given suppository this AM, positive for BM. Will plan for discharge home with HH and NP at home. Discharge on Bactrim per sensitivity report. Plan discussed with daughter at bedside, dtr in agreement with plan.        Goals of Care Treatment Preferences:  Code Status: DNR          What is most important right now is to focus on improvement in condition but with limits to invasive therapies.  Accordingly, we have decided that the best plan to meet the patient's goals includes continuing with treatment.      Consults:   Consults (From admission, onward)        Status Ordering Provider     Inpatient consult to Pulmonology  Once        Provider:  Charli Vargas MD    Completed SID CHU     Inpatient consult to Pulmonology  Once        Provider:  Charli Vargas MD    Completed CHARLI VARGAS      Inpatient consult to Registered Dietitian/Nutritionist  Once        Provider:  (Not yet assigned)    Completed SANDRA GORDON.     Inpatient consult to Nephrology  Once        Provider:  (Not yet assigned)    Completed SID CHU.     Inpatient consult to Registered Dietitian/Nutritionist  Once        Provider:  (Not yet assigned)    Completed SID CHU     Inpatient consult to Infectious Diseases  Once        Provider:  Abhijit Fowler MD    Acknowledged DAYNE BRITTNI          * Urinary tract infection associated with catheterization of urinary tract  -UA reviewed  -urine cultures 5/25/22 growing Staphylococcus aureus, unknown sensitivities  -IV Rocephin, IV vancomycin initiated in the ED  -ECHO negative for intracardiac vegetations, BC no growth to date - awaiting ID consult for abx recommendations  -Voiding trial complete, pt voids spontaneously with purwick in place   --D/C on Bactrim x14 days    Advanced care planning/counseling discussion  Advance Care Planning     Date: 05/30/2022  Per recommendation from specialist, discussed comfort care with daughter at bedside. Daughter stated she did not want to change her mothers current care, reports she feels capable of continuing with needed care of her mother at home and does not desire to change to a comfort focused care plan. Stated she wants to continue with aggressive treatment including hospitalization, IV antibiotics, IV fluids and IV medications for treatment when needed and beneficial. Discussed her current condition, recurrent pleural effusions, severe CHF, dtr stated she understands these conditions could worsen but at this time feels her mother would benefit from continued aggressive treatment with exacerbations. Patient is a DNR, dtr stated she understands if her heart stops she would not receive CPR or be placed on a ventilator.         Combined systolic and diastolic congestive heart failure  Patient is identified as having Combined  Systolic and Diastolic heart failure that is Chronic. CHF is currently controlled. Latest ECHO performed and demonstrates- Results for orders placed during the hospital encounter of 03/10/22    Echo    Interpretation Summary  · The left ventricle is mildly enlarged with eccentric hypertrophy and severely decreased systolic function.  · Grade III left ventricular diastolic dysfunction.  · Moderate right ventricular enlargement with moderately reduced right ventricular systolic function.  · Severe left atrial enlargement.  · The estimated ejection fraction is 10%.  · There is severe left ventricular global hypokinesis.  · Severe right atrial enlargement.  · Moderate-to-severe mitral regurgitation.  · Moderate to severe tricuspid regurgitation.  · Moderate pulmonic regurgitation.  · Mechanically ventilated; cannot use inferior caval vein diameter to estimate central venous pressure.  · There is a large left pleural effusion.  . Continue Furosemide and monitor clinical status closely. Monitor on telemetry. Patient is off CHF pathway.  Monitor strict Is&Os and daily weights.  Place on fluid restriction of 1.5 L. Continue to stress to patient importance of self efficacy and  on diet for CHF. Last BNP reviewed- and noted below No results for input(s): BNP, BNPTRIAGEBLO in the last 168 hours..    5/31/22  ECHO   · The left ventricle is mildly enlarged with concentric hypertrophy and severely decreased systolic function.  · The estimated ejection fraction is 15 - 20%.  · Grade I left ventricular diastolic dysfunction.  · Mild right ventricular enlargement with moderately reduced right ventricular systolic function.  · Severe right atrial enlargement.  · Moderate left atrial enlargement.  · There is pulmonary hypertension.  · The estimated PA systolic pressure is 58 mmHg.  · No change from prior echo March 2022.    --discharge on home regimen      Recurrent right pleural effusion  Recurrent, thoracentesis 5/2022-  1200mL removed    --Pulmonary following, not a candidate for Thoracentesis, recommendations noted.   --Pulmonary recommends continuing Lasix for diuresis   --Repeat CXR ordered       Anoxic encephalopathy  -history of cardiac arrest in March 2022, currently bed-bound, nonverbal        Bilateral pleural effusion  On admission, large right side pleural effusion, small left-sided, had thoracentesis earlier this month, 1200 cc drained, currently oxygenating well, not requiring supplemental oxygen, denies SOB      --Pulmonary following, not a candidate for Thoracentesis, recommendations noted.    --Pulmonary recommends continuing Lasix      History of CVA (cerebrovascular accident)        Parkinson disease  Dtr reports she has not been giving patient the medication regularly at home. Concerns about side effects.     -continue home dose Sinemet  -monitor for side effects    H/O Essential hypertension   Controlled, prescribed Lasix, Entresto, Coreg as outpatient, hold Entresto due to hypotension    --resume home regimen        Final Active Diagnoses:    Diagnosis Date Noted POA    PRINCIPAL PROBLEM:  Urinary tract infection associated with catheterization of urinary tract [T83.511A, N39.0] 04/23/2022 Yes    Advanced care planning/counseling discussion [Z71.89] 05/30/2022 Not Applicable    Combined systolic and diastolic congestive heart failure [I50.40] 05/27/2022 Yes    Recurrent right pleural effusion [J90] 05/03/2022 Yes    Anoxic encephalopathy [G93.1] 03/11/2022 Yes    Bilateral pleural effusion [J90] 01/16/2021 Yes    History of CVA (cerebrovascular accident) [Z86.73] 07/24/2019 Not Applicable     Chronic    H/O Essential hypertension [I10] 07/24/2019 Yes     Chronic    Parkinson disease [G20] 07/11/2017 Yes     Chronic      Problems Resolved During this Admission:    Diagnosis Date Noted Date Resolved POA    Hyponatremia [E87.1] 05/27/2022 06/01/2022 Yes    Severe sepsis [A41.9, R65.20] 03/31/2022  06/01/2022 Yes    Hyperkalemia [E87.5] 07/12/2020 06/01/2022 Yes       Discharged Condition: stable    Disposition: Home or Self Care    Follow Up:    Patient Instructions:      Ambulatory referral/consult to [facility]   Standing Status: Future   Referral Priority: Routine Referral Type: Consultation   Referral Reason: Specialty Services Required   Number of Visits Requested: 1     Notify your health care provider if you experience any of the following:  temperature >100.4     Notify your health care provider if you experience any of the following:  persistent nausea and vomiting or diarrhea     Notify your health care provider if you experience any of the following:  severe uncontrolled pain     Change dressing (specify)   Order Comments: Dressing change:     daily     SUBSEQUENT HOME HEALTH ORDERS   Order Comments: Subsequent Home Health Orders    Current Medications:  Current Facility-Administered Medications:  acetaminophen tablet 650 mg, 650 mg, Oral, Q6H PRN, Bijan Tyler MD  albuterol-ipratropium 2.5 mg-0.5 mg/3 mL nebulizer solution 3 mL, 3 mL, Nebulization, Q4H PRN, Bijan Tyler MD, 3 mL at 05/28/22 0504  bisacodyL suppository 10 mg, 10 mg, Rectal, Daily PRN, April MARCIA Manzano NP, 10 mg at 06/01/22 0852  carbidopa-levodopa  mg per tablet 2 tablet, 2 tablet, Per G Tube, TID, Bijan Tyler MD, 2 tablet at 05/27/22 2228  carvediloL tablet 6.25 mg, 6.25 mg, Per G Tube, BID, Bijan Tyler MD, 6.25 mg at 06/01/22 0849  furosemide injection 40 mg, 40 mg, Intravenous, Q12H, Ramesh Silva MD, 40 mg at 06/01/22 1106  glycerin 99.5% topical solution 100 mL, 100 mL, Rectal, Once, Cindy Manzano NP   And  magnesium citrate solution 296 mL, 296 mL, Rectal, Once, Cindy Manzano NP   And  sodium chloride 0.9% bolus 500 mL, 500 mL, Rectal, Once, Cindy Manzano NP  hydrALAZINE injection 10 mg, 10 mg, Intravenous, Q6H PRN, April J. Jose Juan, NP  mupirocin 2 % ointment, , Nasal, BID,  Doreen Kramer MD, Given at 05/31/22 2202  ondansetron injection 4 mg, 4 mg, Intravenous, Q8H PRN, Bijan Tyler MD  polyethylene glycol packet 17 g, 17 g, Oral, Daily, Esteban Coronado NP, 17 g at 06/01/22 0849  sulfamethoxazole-trimethoprim 200-40 mg/5 ml suspension 20 mL, 160 mg of trimethoprim, Per G Tube, BID, Cindy Manzano NP, 20 mL at 06/01/22 1026        Nursing:   PEG Care:  Instruct patient/caregiver to clean site.  Monitor skin integrity.  Routine Skin for Bedridden Patients: Instruct patient/caregiver to apply moisture barrier cream to all skin folds and wet areas in perineal area daily and after baths and all bowel movements.  Heart Failure:      SN to instruct on the following:    Instruct on the definition of CHF.   Instruct on the signs/sympoms of CHF to be reported.   Instruct on and monitor daily weights.   Instruct on factors that cause exacerbation.   Instruct on action, dose, schedule, and side effects of medications.   Instruct on diet as prescribed.   Instruct on activity allowed.   Instruct on life-style modifications for life long management of CHF   SN to assess compliance with daily weights, diet, medications, fluid retention,     safety precautions, activities permitted and life-style modifications.   Additional 1-2 SN visits per week as needed for signs and symptoms     of CHF exacerbation.      For Weight Gain > 2-3 lbs in 1 day or 4-6 lbs over 1 week notify PCP:  CHF DIURETIC SLIDING SCALE     Skilled nurse visits daily to instruct and monitor medication adherence until target weight. Then resume prior order frequency.     If weight gain exceeds 5 lbs over target weight, call MD  If weight gain of 3-4 lbs over target weight, then:     Increase Furosemide - current dose (mg/day) to  double dose and frequency of twice daily until target weight achieved or maximum 3 days.     If already on max oral daily dose, see IV diuretic instructions.    After 3 days of increased oral diuretic  dose, get BMP. If patient is on increased oral diuretic dose greater than 5 days, repeat BMP at day 7 of increased dose.     Potassium supplementation   Scr > 1.5 mg/dl  Scr  1.5 mg/dl   K < 3.0 - NOTIFY MD and 40 mEq bid  40 mEq tid  K- 3.1-3.3  20 mEq bid  20 mEq tid   K 3.4-3.7  10 mEq bid  10 mEq tid     If target weight not reached after 5 days of increased oral diuretic, proceed to IV diuretic with daily patient contact to include face-to-face visit and telephone encounters.           Diet:   no change    Activities:   activity as tolerated    Labs:  SN to perform labs:  CBC: Weekly; 4 week(s) and BMP: Weekly; 4 week(s) and Report Lab results to PCP.    Referrals/ Consults   to evaluate for community resources/long-range planning.  Aide to provide assistance with personal care, ADLs, and vital signs.    Home Health Aide:  Nursing Weekly and Home Health Aide Weekly     Order Specific Question Answer Comments   What Home Health Agency is the patient currently using? [facility]      Tube Feedings/Formulas   Order Comments: Resume previous regimen     Order Specific Question Answer Comments   Select Adult Formula: Isosource 1.5 efra    Route: Gastrostomy      Activity as tolerated       Significant Diagnostic Studies: Labs:   CMP   Recent Labs   Lab 05/31/22  1115      K 5.6*   CL 97   CO2 26   *   BUN 23   CREATININE 0.6   CALCIUM 8.1*   PROT 5.9*   ALBUMIN 2.2*   BILITOT 0.3   ALKPHOS 115   AST 51*   ALT 31   ANIONGAP 14   ESTGFRAFRICA >60   EGFRNONAA >60    and CBC   Recent Labs   Lab 05/31/22  1115   WBC 6.38   HGB 10.1*   HCT 34.2*        Radiology: All imaging studies reviewed    Pending Diagnostic Studies:     None         Medications:  Reconciled Home Medications:      Medication List      START taking these medications    sulfamethoxazole-trimethoprim 200-40 mg/5 ml 200-40 mg/5 mL Susp  Commonly known as: BACTRIM,SEPTRA  20 mLs by Per G Tube route 2 (two) times  daily. for 14 days  Replaces: sulfamethoxazole-trimethoprim 800-160mg 800-160 mg Tab        CONTINUE taking these medications    carbidopa-levodopa  mg  mg per tablet  Commonly known as: SINEMET  2 tablets by Per G Tube route 3 (three) times daily.     carvediloL 6.25 MG tablet  Commonly known as: COREG  1 tablet (6.25 mg total) by Per G Tube route 2 (two) times daily.     clopidogreL 75 mg tablet  Commonly known as: PLAVIX  1 tablet (75 mg total) by Per G Tube route once daily.     esomeprazole 40 MG capsule  Commonly known as: NEXIUM  Take  40 mg(1 cap)  twice a day  , then after 40 mg(1 cap) daily   Through PEG tube     famotidine 20 MG tablet  Commonly known as: PEPCID  Take 20 mg by mouth once daily at 6am.     furosemide 40 MG tablet  Commonly known as: LASIX  1 tablet (40 mg total) by Per G Tube route 2 (two) times a day. Take per G tube twice daily as directed     ipratropium 0.02 % nebulizer solution  Commonly known as: ATROVENT  Take 2.5 mLs (500 mcg total) by nebulization 2 (two) times daily. Rescue     QUEtiapine 25 MG Tab  Commonly known as: SEROQUEL  Take 1 tablet (25 mg total) by mouth once daily. At bedtime     sacubitriL-valsartan  mg per tablet  Commonly known as: ENTRESTO  1 tablet by Per G Tube route 2 (two) times daily.     scopolamine 1.3-1.5 mg (1 mg over 3 days)  Commonly known as: TRANSDERM-SCOP  Place 1 patch onto the skin Every 3 (three) days.     traZODone 50 MG tablet  Commonly known as: DESYREL  Take 0.5 tablets (25 mg total) by mouth every evening.        STOP taking these medications    aspirin 81 MG Chew     losartan 50 MG tablet  Commonly known as: COZAAR     metoprolol succinate 50 MG 24 hr tablet  Commonly known as: TOPROL-XL     omeprazole magnesium 10 mg Sudr     pantoprazole 40 mg suspension  Commonly known as: PROTONIX     sulfamethoxazole-trimethoprim 800-160mg 800-160 mg Tab  Commonly known as: BACTRIM DS  Replaced by: sulfamethoxazole-trimethoprim 200-40  mg/5 ml 200-40 mg/5 mL Susp            Indwelling Lines/Drains at time of discharge:   Lines/Drains/Airways     Drain  Duration                Gastrostomy/Enterostomy 03/25/22 1320 Gastrostomy tube w/ balloon LUQ feeding 68 days    Female External Urinary Catheter 06/01/22 0700 <1 day                Time spent on the discharge of patient: 75 minutes         Cindy Manzano NP  Department of Hospital Medicine  '[facility]

## 2022-06-01 NOTE — PLAN OF CARE
Pt remains free from falls/injuries this shift. Safety precautions maintained. No s/s of pain. No s/s of acute distress noted. VSS. Isosource infusing at goal rate of 40 mL/hr. Will continue to monitor. Chart check completed.

## 2022-06-01 NOTE — ASSESSMENT & PLAN NOTE
On admission, large right side pleural effusion, small left-sided, had thoracentesis earlier this month, 1200 cc drained, currently oxygenating well, not requiring supplemental oxygen, denies SOB      --Pulmonary following, not a candidate for Thoracentesis, recommendations noted.    --Pulmonary recommends continuing Lasix

## 2022-06-02 ENCOUNTER — PATIENT MESSAGE (OUTPATIENT)
Dept: ADMINISTRATIVE | Facility: CLINIC | Age: 82
End: 2022-06-02
Payer: MEDICARE

## 2022-06-02 ENCOUNTER — PATIENT OUTREACH (OUTPATIENT)
Dept: ADMINISTRATIVE | Facility: CLINIC | Age: 82
End: 2022-06-02
Payer: MEDICARE

## 2022-06-02 NOTE — PROGRESS NOTES
C3 nurse attempted to contact Tanya Madrid  for a TCC post hospital discharge follow up call. No answer. Left voicemail with callback information. The patient does not have a scheduled HOSFU appointment. Message sent to PCP staff for assistance with scheduling visit with patient.

## 2022-06-03 ENCOUNTER — DOCUMENT SCAN (OUTPATIENT)
Dept: HOME HEALTH SERVICES | Facility: HOSPITAL | Age: 82
End: 2022-06-03
Payer: MEDICARE

## 2022-06-03 NOTE — PROGRESS NOTES
C3 nurse attempted to contact Tanya Madrid  for a TCC post hospital discharge follow up call. No answer. Left voicemail with callback information. The patient does not have a scheduled HOSFU appointment. Unable to route to PCP.

## 2022-06-04 LAB
BACTERIA BLD CULT: NORMAL
BACTERIA BLD CULT: NORMAL

## 2022-06-06 ENCOUNTER — HOSPITAL ENCOUNTER (EMERGENCY)
Facility: HOSPITAL | Age: 82
Discharge: HOME | End: 2022-06-06
Attending: EMERGENCY MEDICINE
Payer: MEDICARE

## 2022-06-06 VITALS
DIASTOLIC BLOOD PRESSURE: 76 MMHG | WEIGHT: 153.69 LBS | BODY MASS INDEX: 28.1 KG/M2 | TEMPERATURE: 98 F | SYSTOLIC BLOOD PRESSURE: 120 MMHG | OXYGEN SATURATION: 95 % | HEART RATE: 81 BPM | RESPIRATION RATE: 18 BRPM

## 2022-06-06 DIAGNOSIS — K94.23 PEG TUBE MALFUNCTION: Primary | ICD-10-CM

## 2022-06-06 PROCEDURE — 99285 EMERGENCY DEPT VISIT HI MDM: CPT | Mod: 25

## 2022-06-06 PROCEDURE — 25500020 PHARM REV CODE 255: Performed by: EMERGENCY MEDICINE

## 2022-06-06 RX ADMIN — DIATRIZOATE MEGLUMINE AND DIATRIZOATE SODIUM 40 ML: 660; 100 LIQUID ORAL; RECTAL at 05:06

## 2022-06-06 NOTE — INTERVAL H&P NOTE
The patient has been examined and the H&P has been reviewed:    I concur with the findings and no changes have occurred since H&P was written. G tube misplaced        There are no hospital problems to display for this patient.

## 2022-06-06 NOTE — ED PROVIDER NOTES
SCRIBE #1 NOTE: I, Ramesh Rojas, am scribing for, and in the presence of, Mathew Mar Jr., MD. I have scribed the entire note.      History      Chief Complaint   Patient presents with    PEG tube problem     Pt pulled out her peg tube this morning sometime between midnight and 10:00 am.  Pt's daughter has the tube with her, 18 Fr.       Review of patient's allergies indicates:   Allergen Reactions    Xanax [alprazolam]         HPI   HPI    6/6/2022, 1:26 PM   History obtained from the adult caretaker      History of Present Illness: Tanya Madrid is a 82 y.o. female patient with a PMHx of CHF, HLD, HTM, parkinson's disease, stroke, who presents to the Emergency Department for evaluation of PEG tube displaced which occurred today from the time 12AM-10 AM. Adult caretaker states the pt recieves medicine and nutritional supplements through the PEG tube. Symptoms are constant and moderate in severity. No mitigating or exacerbating factors reported. No associated sxs included. Patient denies any fever, chills, N/V/D, constipation, blood in stool, dysuria, hematuria, and all other sxs at this time. No prior Tx included. No further complaints or concerns at this time.       Arrival mode: Personal vehicle      PCP: Elva Ansari NP       Past Medical History:  Past Medical History:   Diagnosis Date    Acute CHF (congestive heart failure) 1/17/2021    Hyperlipemia     Hypertension     Parkinson's disease     Stroke 2016    Throat mass        Past Surgical History:  Past Surgical History:   Procedure Laterality Date    CLOSED REDUCTION Right 10/15/2020    Procedure: CLOSED REDUCTION;  Surgeon: Humberto Valdivia DO;  Location: [facility] OR;  Service: Orthopedics;  Laterality: Right;  ATTEMPTED    EVACUATION OF HEMATOMA Right 10/17/2020    Procedure: EVACUATION, HEMATOMA;  Surgeon: Humberto Valdivia DO;  Location: [facility] OR;  Service: Orthopedics;  Laterality: Right;    HEMIARTHROPLASTY OF HIP Left  7/12/2020    Procedure: HEMIARTHROPLASTY, HIP;  Surgeon: Humberto Valdivia DO;  Location: [facility] OR;  Service: Orthopedics;  Laterality: Left;    HEMIARTHROPLASTY OF HIP Right 10/2/2020    Procedure: HEMIARTHROPLASTY, HIP;  Surgeon: Loyd Kearney MD;  Location: [facility] OR;  Service: Orthopedics;  Laterality: Right;    HYSTERECTOMY      INSERTION OF PERCUTANEOUS ENDOSCOPIC GASTROSTOMY (PEG) FEEDING TUBE      OPEN REDUCTION OF DISLOCATION OF HIP Right 10/17/2020    Procedure: OPEN REDUCTION, DISLOCATION, HIP;  Surgeon: Humberto Valdivia DO;  Location: [facility] OR;  Service: Orthopedics;  Laterality: Right;    THROAT SURGERY      TONSILLECTOMY           Family History:  Family History   Family history unknown: Yes       Social History:  Social History     Tobacco Use    Smoking status: Never Smoker    Smokeless tobacco: Never Used   Substance and Sexual Activity    Alcohol use: No    Drug use: No    Sexual activity: Not Currently       ROS   Review of Systems   Constitutional: Negative for chills and fever.   HENT: Negative for sore throat.    Respiratory: Negative for shortness of breath.    Cardiovascular: Negative for chest pain.   Gastrointestinal: Negative for blood in stool, constipation, diarrhea, nausea and vomiting.        (+) PEG displaced   Genitourinary: Negative for dysuria and hematuria.   Musculoskeletal: Negative for back pain.   Skin: Negative for rash.   Neurological: Negative for weakness.   Hematological: Does not bruise/bleed easily.   All other systems reviewed and are negative.    Physical Exam      Initial Vitals [06/06/22 1230]   BP Pulse Resp Temp SpO2   124/85 81 20 97.8 °F (36.6 °C) --      MAP       --          Physical Exam  Nursing Notes and Vital Signs Reviewed.  Constitutional: Patient is in no acute distress. Well-developed and well-nourished.  Head: Atraumatic. Normocephalic.  Eyes:  EOM intact.  No scleral icterus.  ENT: Mucous membranes are moist.  Nares clear   Neck:  Full  ROM. No JVD.  Cardiovascular: Regular rate. Regular rhythm No murmurs, rubs, or gallops. Distal pulses are 2+ and symmetric  Pulmonary/Chest: No respiratory distress. Clear to auscultation bilaterally. No wheezing or rales.  Equal chest wall rise bilaterally  Abdominal: Soft and non-distended.  There is no tenderness.  No rebound, guarding, or rigidity. Good bowel sounds.  Peg site is very narrow.  Musculoskeletal: Moves all extremities.  Left-sided contractureSkin: Warm and dry.  Neurological:  At baseline.  Nonverbal.  Left arm is flexed at baseline contracture.    Psychiatric: Normal affect. Good eye contact. Appropriate in content.      ED Course    Procedures  ED Vital Signs:  Vitals:    06/06/22 1230 06/06/22 1355 06/06/22 1422 06/06/22 1532   BP: 124/85 (!) 139/93  120/76   Pulse: 81      Resp: 20      Temp: 97.8 °F (36.6 °C)      TempSrc: Axillary      Weight:   69.7 kg (153 lb 10.6 oz)      Imaging Results:  Imaging Results          FL Gastric Tube Replacement With Imaging (In process)                         The Emergency Provider reviewed the vital signs and test results, which are outlined above.    ED Discussion     2:52 PM  Back site was partially closed.  We were able feet a and out Abreu subsequently able to dilate to a 16 [place] Abreu.  Unable to pass a PEG tube.  I contacted All Rios with Interventional Radiology about possible dilation.  He is checking with his team to see if they can do it today.  Patient currently has a Abreu catheter keeping the site open.    2:59 PM: Dr. Ramachandran (IR) will place the PEG tube after he is done with a procedure.     4: 38 PM: Dr. Ramachandran successfully placed the PEG Tube.     4:43 PM: Reassessed pt at this time. Discussed with pt all pertinent ED information and results. Discussed pt dx and plan of tx. Gave pt all f/u and return to the ED instructions. All questions and concerns were addressed at this time. Pt expresses understanding of information and  instructions, and is comfortable with plan to discharge. Pt is stable for discharge.    I discussed with patient and/or family/caretaker that evaluation in the ED does not suggest any emergent or life threatening medical conditions requiring immediate intervention beyond what was provided in the ED, and I believe patient is safe for discharge.  Regardless, an unremarkable evaluation in the ED does not preclude the development or presence of a serious of life threatening condition. As such, patient was instructed to return immediately for any worsening or change in current symptoms.      PEG tube has been replaced under IR guidance by [facility].  It is safe to use.  Patient is stable safe discharge.    ED Medication(s):  Medications   diatrizoate meglumineand-diatrizoate sodium (GASTROVIEW) solution 40 mL (40 mLs Per G Tube Given 6/6/22 1708)       New Prescriptions    No medications on file        Medication List      ASK your doctor about these medications    aspirin 81 MG Chew  1 tablet (81 mg total) by Per G Tube route once daily.     carbidopa-levodopa  mg  mg per tablet  Commonly known as: SINEMET  2 tablets by Per G Tube route 3 (three) times daily.     carvediloL 6.25 MG tablet  Commonly known as: COREG  1 tablet (6.25 mg total) by Per G Tube route 2 (two) times daily.     clopidogreL 75 mg tablet  Commonly known as: PLAVIX  1 tablet (75 mg total) by Per G Tube route once daily.     esomeprazole 40 MG capsule  Commonly known as: NEXIUM  Take  40 mg(1 cap)  twice a day  , then after 40 mg(1 cap) daily   Through PEG tube     famotidine 20 MG tablet  Commonly known as: PEPCID     furosemide 40 MG tablet  Commonly known as: LASIX  1 tablet (40 mg total) by Per G Tube route 2 (two) times a day. Take per G tube twice daily as directed     ipratropium 0.02 % nebulizer solution  Commonly known as: ATROVENT  Take 2.5 mLs (500 mcg total) by nebulization 2 (two) times daily. Rescue      losartan 50 MG tablet  Commonly known as: COZAAR  Take 1 tablet (50 mg total) by mouth 2 (two) times a day.     metoprolol succinate 50 MG 24 hr tablet  Commonly known as: TOPROL-XL  Take 1 tablet (50 mg total) by mouth 2 (two) times daily.     omeprazole magnesium 10 mg Sudr  Take 40 mg by mouth once daily.     pantoprazole 40 mg suspension  Commonly known as: PROTONIX  Take 1 packet (40 mg total) by mouth 2 (two) times daily.     QUEtiapine 25 MG Tab  Commonly known as: SEROQUEL  Take 1 tablet (25 mg total) by mouth once daily. At bedtime     sacubitriL-valsartan  mg per tablet  Commonly known as: ENTRESTO  1 tablet by Per G Tube route 2 (two) times daily.     scopolamine 1.3-1.5 mg (1 mg over 3 days)  Commonly known as: TRANSDERM-SCOP  Place 1 patch onto the skin Every 3 (three) days.     sulfamethoxazole-trimethoprim 200-40 mg/5 ml 200-40 mg/5 mL Susp  Commonly known as: BACTRIM,SEPTRA  20 mLs by Per G Tube route 2 (two) times daily. for 14 days     traZODone 50 MG tablet  Commonly known as: DESYREL  Take 0.5 tablets (25 mg total) by mouth every evening.           Follow-up Information     Elva Ansari NP.    Specialties: Wound Care, Family Medicine  Contact information:  [address]  [facility] [address]  [phone]                           Medical Decision Making              Scribe Attestation:   Scribe #1: I performed the above scribed service and the documentation accurately describes the services I performed. I attest to the accuracy of the note.    Attending:   Physician Attestation Statement for Scribe #1: I, Mathew Mar Jr., MD, personally performed the services described in this documentation, as scribed by Ramesh Rojas, in my presence, and it is both accurate and complete.          Clinical Impression       ICD-10-CM ICD-9-CM   1. PEG tube malfunction  K94.23 536.42       Disposition:   Disposition: Discharged  Condition: Stable         Mathew Mar Jr., MD  06/06/22  [id number]

## 2022-06-09 ENCOUNTER — LAB VISIT (OUTPATIENT)
Dept: LAB | Facility: HOSPITAL | Age: 82
End: 2022-06-09
Attending: NURSE PRACTITIONER
Payer: MEDICARE

## 2022-06-09 DIAGNOSIS — I50.43 ACUTE ON CHRONIC COMBINED SYSTOLIC AND DIASTOLIC CHF (CONGESTIVE HEART FAILURE): ICD-10-CM

## 2022-06-09 LAB
ANION GAP SERPL CALC-SCNC: 19 MMOL/L (ref 8–16)
BUN SERPL-MCNC: 50 MG/DL (ref 8–23)
CALCIUM SERPL-MCNC: 9 MG/DL (ref 8.7–10.5)
CHLORIDE SERPL-SCNC: 99 MMOL/L (ref 95–110)
CO2 SERPL-SCNC: 28 MMOL/L (ref 23–29)
CREAT SERPL-MCNC: 1.2 MG/DL (ref 0.5–1.4)
EST. GFR  (AFRICAN AMERICAN): 48 ML/MIN/1.73 M^2
EST. GFR  (NON AFRICAN AMERICAN): 41.6 ML/MIN/1.73 M^2
GLUCOSE SERPL-MCNC: 138 MG/DL (ref 70–110)
POTASSIUM SERPL-SCNC: 4.1 MMOL/L (ref 3.5–5.1)
SODIUM SERPL-SCNC: 146 MMOL/L (ref 136–145)

## 2022-06-09 PROCEDURE — 36415 COLL VENOUS BLD VENIPUNCTURE: CPT | Performed by: NURSE PRACTITIONER

## 2022-06-09 PROCEDURE — 80048 BASIC METABOLIC PNL TOTAL CA: CPT | Performed by: NURSE PRACTITIONER

## 2022-06-09 PROCEDURE — 83880 ASSAY OF NATRIURETIC PEPTIDE: CPT | Performed by: NURSE PRACTITIONER

## 2022-06-10 ENCOUNTER — TELEPHONE (OUTPATIENT)
Dept: TRANSPLANT | Facility: CLINIC | Age: 82
End: 2022-06-10
Payer: MEDICARE

## 2022-06-10 ENCOUNTER — DOCUMENT SCAN (OUTPATIENT)
Dept: HOME HEALTH SERVICES | Facility: HOSPITAL | Age: 82
End: 2022-06-10
Payer: MEDICARE

## 2022-06-10 NOTE — TELEPHONE ENCOUNTER
Tiffany Sanchez, MARK-TOOTIE Farrell, VICKY  Caller: Unspecified (Today,  2:40 PM)  Ok can continue on current meds   Will review further changes at next visit     Tiffany     The patient has been notified of this information and all questions answered.

## 2022-06-10 NOTE — TELEPHONE ENCOUNTER
----- Message from JIE Jimenez sent at 6/10/2022  2:07 PM CDT -----  Please notify patient  K+ has returned to normal  Renal function has increased  Can we check on patient, see if there are any BP or weight logs to review    Tiffany

## 2022-06-10 NOTE — TELEPHONE ENCOUNTER
I spoke with pts daughter . bp from last 4 days 120/70, 123/79, 134/90, 139/85.   Says she is not able to weigh her at home.     No change in pts regular behaviors or fluid status

## 2022-06-11 LAB — NT-PROBNP SERPL-MCNC: 7582 PG/ML

## 2022-06-15 ENCOUNTER — TELEPHONE (OUTPATIENT)
Dept: ADMINISTRATIVE | Facility: CLINIC | Age: 82
End: 2022-06-15
Payer: MEDICARE

## 2022-06-15 DIAGNOSIS — J90 RECURRENT RIGHT PLEURAL EFFUSION: Primary | ICD-10-CM

## 2022-06-15 NOTE — TELEPHONE ENCOUNTER
Returned call to [place] with [facility]. Nurses have had trouble getting sticks for weekly labs. Patient has an appt at [facility] tomorrow and would like to have her labs drawn there. Orders placed for CBC and BMP to be drawn at [facility] tomorrow.

## 2022-06-15 NOTE — PROGRESS NOTES
"Subjective:   Patient ID:  Tanya Madrid is a 82 y.o. female who presents for evaluation of Congestive Heart Failure        Congestive Heart Failure  Associated symptoms include shortness of breath. Pertinent negatives include no abdominal pain, chest pain, claudication, near-syncope or palpitations.         Tanya Madrid is a 82 year old female who presents to CHF clinic for hospital follow up. She was admitted to [facility] due to cardiac arrest and had prolonged hospitalization and was eventually discharged home. She had g tube placed during admission.      Her current medical conditions include CHF, HFrEF of 10%, HTN, HLP, CVA, wheelchair bound. She is very debilitated at this visit. She is wheelchair bound today and is mainly nonverbal. She receives all her medications via g tube. She does have  services at this time and is starting to stand with therapy.      Patient's daughter is here today with her for the visit.      Patient has dependent edema today on exam. Patient answers appropriately to simple questions today. Mainly bedbound but does sit in the chair every day. Recently started treatment for UTI.   Denies chest pain today on exam. Has labored breathing noted which started a few days ago per patient's daughter report today.      5/12/2022 update     Tanya Madrid returns to clinic today for CHF follow up.      BP has been running high recently.   Patient is wheelchair bound today. Her daughter and son in law have to carry her to move her positions. She is max assist for position changes and to take "2-3 steps" per patient's daughter.      Has episodic increased work of breathing during visit today. Has been taking lasix BID since discharge. Has trace pedal edema today in office. BP log reviewed with elevated readings over the past week or so. Has been getting all of her meals via g tube.      Patient moans and reaches for provider during the visit today.   Patient has henley cath in place. " Is unable to maintain her own position in the wheelchair today. Denies any signs of bleeding.        This is a pleasant patient known to our service evidence congestive heart failure EF of 20% slightly improved from prior echoes last year.  Plan on increasing medication to now include carvedilol 3.125 mg b.i.d. as tolerated including Entresto which will continue.  Diuretics continue with Lasix no evidence of heart failure today doing fairly well she has a PEG tube and is nonverbal.      Review of Systems   Constitutional: Negative for chills, diaphoresis, night sweats, weight gain and weight loss.   HENT: Negative for congestion, hoarse voice, sore throat and stridor.    Eyes: Negative for double vision and pain.   Cardiovascular: Negative for chest pain, claudication, cyanosis, dyspnea on exertion, irregular heartbeat, leg swelling, near-syncope, orthopnea, palpitations, paroxysmal nocturnal dyspnea and syncope.   Respiratory: Negative for cough, hemoptysis, shortness of breath, sleep disturbances due to breathing, snoring, sputum production and wheezing.    Endocrine: Negative for cold intolerance, heat intolerance and polydipsia.   Hematologic/Lymphatic: Negative for bleeding problem. Does not bruise/bleed easily.   Skin: Negative for color change, dry skin and rash.   Musculoskeletal: Negative for joint swelling and muscle cramps.   Gastrointestinal: Negative for bloating, abdominal pain, constipation, diarrhea, dysphagia, melena, nausea and vomiting.   Genitourinary: Negative for flank pain and urgency.   Neurological: Negative for dizziness, focal weakness, headaches, light-headedness, loss of balance, seizures and weakness.   Psychiatric/Behavioral: Negative for altered mental status and memory loss. The patient is not nervous/anxious.      Family History   Family history unknown: Yes     Past Medical History:   Diagnosis Date    Acute CHF (congestive heart failure) 1/17/2021    Hyperlipemia      Hypertension     Parkinson's disease     Stroke 2016    Throat mass      Social History     Socioeconomic History    Marital status:    Tobacco Use    Smoking status: Never Smoker    Smokeless tobacco: Never Used   Substance and Sexual Activity    Alcohol use: No    Drug use: No    Sexual activity: Not Currently     Current Outpatient Medications on File Prior to Visit   Medication Sig Dispense Refill    carbidopa-levodopa  mg (SINEMET)  mg per tablet 2 tablets by Per G Tube route 3 (three) times daily. 180 tablet 0    carvediloL (COREG) 6.25 MG tablet 1 tablet (6.25 mg total) by Per G Tube route 2 (two) times daily. 60 tablet 0    clopidogreL (PLAVIX) 75 mg tablet 1 tablet (75 mg total) by Per G Tube route once daily. 30 tablet 0    esomeprazole (NEXIUM) 40 MG capsule Take  40 mg(1 cap)  twice a day  , then after 40 mg(1 cap) daily   Through PEG tube 60 capsule 3    famotidine (PEPCID) 20 MG tablet Take 20 mg by mouth once daily at 6am.      furosemide (LASIX) 40 MG tablet 1 tablet (40 mg total) by Per G Tube route 2 (two) times a day. Take per G tube twice daily as directed 180 tablet 3    ipratropium (ATROVENT) 0.02 % nebulizer solution Take 2.5 mLs (500 mcg total) by nebulization 2 (two) times daily. Rescue 150 mL 0    QUEtiapine (SEROQUEL) 25 MG Tab Take 1 tablet (25 mg total) by mouth once daily. At bedtime 30 tablet 11    sacubitriL-valsartan (ENTRESTO)  mg per tablet 1 tablet by Per G Tube route 2 (two) times daily. 60 tablet 6    scopolamine (TRANSDERM-SCOP) 1.3-1.5 mg (1 mg over 3 days) Place 1 patch onto the skin Every 3 (three) days. 10 patch 3    sulfamethoxazole-trimethoprim 200-40 mg/5 ml (BACTRIM,SEPTRA) 200-40 mg/5 mL Susp 20 mLs by Per G Tube route 2 (two) times daily. for 14 days 560 mL 0    traZODone (DESYREL) 50 MG tablet Take 0.5 tablets (25 mg total) by mouth every evening. 15 tablet 3    [DISCONTINUED] aspirin 81 MG Chew 1 tablet (81 mg total)  by Per G Tube route once daily.  0    [DISCONTINUED] losartan (COZAAR) 50 MG tablet Take 1 tablet (50 mg total) by mouth 2 (two) times a day. 60 tablet 6    [DISCONTINUED] metoprolol succinate (TOPROL-XL) 50 MG 24 hr tablet Take 1 tablet (50 mg total) by mouth 2 (two) times daily. 60 tablet 6    [DISCONTINUED] omeprazole magnesium 10 mg SuDR Take 40 mg by mouth once daily. (Patient not taking: Reported on 4/21/2022) 120 each 0    [DISCONTINUED] pantoprazole (PROTONIX) 40 mg suspension Take 1 packet (40 mg total) by mouth 2 (two) times daily. 60 packet 3     No current facility-administered medications on file prior to visit.     Review of patient's allergies indicates:   Allergen Reactions    Xanax [alprazolam]        Objective:     Physical Exam  Eyes:      Pupils: Pupils are equal, round, and reactive to light.   Neck:      Trachea: No tracheal deviation.   Cardiovascular:      Rate and Rhythm: Normal rate and regular rhythm.      Pulses: Intact distal pulses.           Carotid pulses are 2+ on the right side and 2+ on the left side.       Radial pulses are 2+ on the right side and 2+ on the left side.        Femoral pulses are 2+ on the right side and 2+ on the left side.       Popliteal pulses are 2+ on the right side and 2+ on the left side.        Dorsalis pedis pulses are 2+ on the right side and 2+ on the left side.        Posterior tibial pulses are 2+ on the right side and 2+ on the left side.      Heart sounds: Normal heart sounds. No murmur heard.    No friction rub. No gallop.   Pulmonary:      Effort: Pulmonary effort is normal. No respiratory distress.      Breath sounds: Normal breath sounds. No stridor. No wheezing or rales.   Chest:      Chest wall: No tenderness.   Abdominal:      General: There is no distension.      Tenderness: There is no abdominal tenderness. There is no rebound.   Musculoskeletal:         General: No tenderness.      Cervical back: Normal range of motion.   Skin:      General: Skin is warm and dry.   Neurological:      Mental Status: She is alert and oriented to person, place, and time.     · The left ventricle is mildly enlarged with concentric hypertrophy and severely decreased systolic function.  · The estimated ejection fraction is 15 - 20%.  · Grade I left ventricular diastolic dysfunction.  · Mild right ventricular enlargement with moderately reduced right ventricular systolic function.  · Severe right atrial enlargement.  · Moderate left atrial enlargement.  · There is pulmonary hypertension.  · The estimated PA systolic pressure is 58 mmHg.  · No change from prior echo March 2022.         Assessment:     1. Acute on chronic combined systolic and diastolic CHF (congestive heart failure)    2. Acute on chronic combined systolic and diastolic heart failure    3. Acute on chronic combined systolic and diastolic congestive heart failure    4. Elevated troponin    5. Mixed hyperlipidemia    6. H/O Essential hypertension    7. Recent H/O Out of hospital Cardiac arrest    8. CODY (acute kidney injury)        Plan:     Acute on chronic combined systolic and diastolic CHF (congestive heart failure)    Acute on chronic combined systolic and diastolic heart failure    Acute on chronic combined systolic and diastolic congestive heart failure    Elevated troponin    Mixed hyperlipidemia    H/O Essential hypertension    Recent H/O Out of hospital Cardiac arrest    CODY (acute kidney injury)      Impression:  1. Cardiac arrest history of congestive heart failure the patient is stable and continue with Entresto and increase medications now include carvedilol 3.125 mg b.i.d. as tolerates  2. Heart failure and edema will continue with Lasix as performed.  3. Improving BNP will continue to recheck BUN creatinine and BNP level in 3 weeks I will see the patient again in 4 weeks review daughter was present today and agrees with this approach.

## 2022-06-16 ENCOUNTER — LAB VISIT (OUTPATIENT)
Dept: LAB | Facility: HOSPITAL | Age: 82
End: 2022-06-16
Attending: PHYSICIAN ASSISTANT
Payer: MEDICARE

## 2022-06-16 ENCOUNTER — OFFICE VISIT (OUTPATIENT)
Dept: TRANSPLANT | Facility: CLINIC | Age: 82
End: 2022-06-16
Payer: MEDICARE

## 2022-06-16 VITALS
SYSTOLIC BLOOD PRESSURE: 126 MMHG | HEIGHT: 62 IN | DIASTOLIC BLOOD PRESSURE: 62 MMHG | BODY MASS INDEX: 28.1 KG/M2 | HEART RATE: 61 BPM

## 2022-06-16 DIAGNOSIS — J90 RECURRENT RIGHT PLEURAL EFFUSION: ICD-10-CM

## 2022-06-16 DIAGNOSIS — I10 ESSENTIAL HYPERTENSION: Chronic | ICD-10-CM

## 2022-06-16 DIAGNOSIS — I50.43 ACUTE ON CHRONIC COMBINED SYSTOLIC AND DIASTOLIC CONGESTIVE HEART FAILURE: ICD-10-CM

## 2022-06-16 DIAGNOSIS — E78.2 MIXED HYPERLIPIDEMIA: Chronic | ICD-10-CM

## 2022-06-16 DIAGNOSIS — R79.89 ELEVATED TROPONIN: ICD-10-CM

## 2022-06-16 DIAGNOSIS — I50.43 ACUTE ON CHRONIC COMBINED SYSTOLIC AND DIASTOLIC CHF (CONGESTIVE HEART FAILURE): Primary | ICD-10-CM

## 2022-06-16 DIAGNOSIS — N17.9 AKI (ACUTE KIDNEY INJURY): ICD-10-CM

## 2022-06-16 DIAGNOSIS — I46.9 CARDIAC ARREST: ICD-10-CM

## 2022-06-16 DIAGNOSIS — I50.43 ACUTE ON CHRONIC COMBINED SYSTOLIC AND DIASTOLIC HEART FAILURE: ICD-10-CM

## 2022-06-16 DIAGNOSIS — I50.43 ACUTE ON CHRONIC COMBINED SYSTOLIC AND DIASTOLIC CHF (CONGESTIVE HEART FAILURE): ICD-10-CM

## 2022-06-16 LAB
ANION GAP SERPL CALC-SCNC: 17 MMOL/L (ref 8–16)
BASOPHILS # BLD AUTO: 0.01 K/UL (ref 0–0.2)
BASOPHILS NFR BLD: 0.2 % (ref 0–1.9)
BUN SERPL-MCNC: 43 MG/DL (ref 8–23)
CALCIUM SERPL-MCNC: 9.3 MG/DL (ref 8.7–10.5)
CHLORIDE SERPL-SCNC: 105 MMOL/L (ref 95–110)
CO2 SERPL-SCNC: 24 MMOL/L (ref 23–29)
CREAT SERPL-MCNC: 1.1 MG/DL (ref 0.5–1.4)
DIFFERENTIAL METHOD: ABNORMAL
EOSINOPHIL # BLD AUTO: 0.2 K/UL (ref 0–0.5)
EOSINOPHIL NFR BLD: 4.5 % (ref 0–8)
ERYTHROCYTE [DISTWIDTH] IN BLOOD BY AUTOMATED COUNT: 18.8 % (ref 11.5–14.5)
EST. GFR  (AFRICAN AMERICAN): 53.3 ML/MIN/1.73 M^2
EST. GFR  (NON AFRICAN AMERICAN): 46.2 ML/MIN/1.73 M^2
GLUCOSE SERPL-MCNC: 127 MG/DL (ref 70–110)
HCT VFR BLD AUTO: 34 % (ref 37–48.5)
HGB BLD-MCNC: 9.9 G/DL (ref 12–16)
IMM GRANULOCYTES # BLD AUTO: 0.05 K/UL (ref 0–0.04)
IMM GRANULOCYTES NFR BLD AUTO: 1.1 % (ref 0–0.5)
LYMPHOCYTES # BLD AUTO: 0.7 K/UL (ref 1–4.8)
LYMPHOCYTES NFR BLD: 15.2 % (ref 18–48)
MCH RBC QN AUTO: 28 PG (ref 27–31)
MCHC RBC AUTO-ENTMCNC: 29.1 G/DL (ref 32–36)
MCV RBC AUTO: 96 FL (ref 82–98)
MONOCYTES # BLD AUTO: 0.6 K/UL (ref 0.3–1)
MONOCYTES NFR BLD: 12.3 % (ref 4–15)
NEUTROPHILS # BLD AUTO: 3 K/UL (ref 1.8–7.7)
NEUTROPHILS NFR BLD: 66.7 % (ref 38–73)
NRBC BLD-RTO: 0 /100 WBC
PLATELET # BLD AUTO: 299 K/UL (ref 150–450)
PMV BLD AUTO: 12.6 FL (ref 9.2–12.9)
POTASSIUM SERPL-SCNC: 5.1 MMOL/L (ref 3.5–5.1)
RBC # BLD AUTO: 3.54 M/UL (ref 4–5.4)
SODIUM SERPL-SCNC: 146 MMOL/L (ref 136–145)
WBC # BLD AUTO: 4.47 K/UL (ref 3.9–12.7)

## 2022-06-16 PROCEDURE — 99999 PR PBB SHADOW E&M-EST. PATIENT-LVL III: ICD-10-PCS | Mod: PBBFAC,,, | Performed by: INTERNAL MEDICINE

## 2022-06-16 PROCEDURE — 99999 PR PBB SHADOW E&M-EST. PATIENT-LVL III: CPT | Mod: PBBFAC,,, | Performed by: INTERNAL MEDICINE

## 2022-06-16 PROCEDURE — 85025 COMPLETE CBC W/AUTO DIFF WBC: CPT | Performed by: PHYSICIAN ASSISTANT

## 2022-06-16 PROCEDURE — 99214 OFFICE O/P EST MOD 30 MIN: CPT | Mod: S$PBB,,, | Performed by: INTERNAL MEDICINE

## 2022-06-16 PROCEDURE — 80048 BASIC METABOLIC PNL TOTAL CA: CPT | Performed by: PHYSICIAN ASSISTANT

## 2022-06-16 PROCEDURE — 36415 COLL VENOUS BLD VENIPUNCTURE: CPT | Performed by: PHYSICIAN ASSISTANT

## 2022-06-16 PROCEDURE — 99213 OFFICE O/P EST LOW 20 MIN: CPT | Mod: PBBFAC | Performed by: INTERNAL MEDICINE

## 2022-06-16 PROCEDURE — 99214 PR OFFICE/OUTPT VISIT, EST, LEVL IV, 30-39 MIN: ICD-10-PCS | Mod: S$PBB,,, | Performed by: INTERNAL MEDICINE

## 2022-06-16 RX ORDER — CARVEDILOL 3.12 MG/1
TABLET ORAL
Qty: 180 TABLET | Refills: 3 | Status: SHIPPED | OUTPATIENT
Start: 2022-06-16

## 2022-06-16 RX ORDER — CARVEDILOL 3.12 MG/1
3.12 TABLET ORAL 2 TIMES DAILY PRN
Qty: 60 TABLET | Refills: 0 | Status: SHIPPED | OUTPATIENT
Start: 2022-06-16 | End: 2022-06-16

## 2022-06-17 ENCOUNTER — DOCUMENT SCAN (OUTPATIENT)
Dept: HOME HEALTH SERVICES | Facility: HOSPITAL | Age: 82
End: 2022-06-17
Payer: MEDICARE

## 2022-06-21 ENCOUNTER — DOCUMENT SCAN (OUTPATIENT)
Dept: HOME HEALTH SERVICES | Facility: HOSPITAL | Age: 82
End: 2022-06-21
Payer: MEDICARE

## 2022-06-22 ENCOUNTER — DOCUMENT SCAN (OUTPATIENT)
Dept: HOME HEALTH SERVICES | Facility: HOSPITAL | Age: 82
End: 2022-06-22
Payer: MEDICARE

## 2022-06-23 ENCOUNTER — TELEPHONE (OUTPATIENT)
Dept: ADMINISTRATIVE | Facility: CLINIC | Age: 82
End: 2022-06-23
Payer: MEDICARE

## 2022-06-23 ENCOUNTER — LAB VISIT (OUTPATIENT)
Dept: LAB | Facility: HOSPITAL | Age: 82
End: 2022-06-23
Attending: NURSE PRACTITIONER
Payer: MEDICARE

## 2022-06-23 DIAGNOSIS — I50.40 HEART FAILURE, SYSTOLIC AND DIASTOLIC: Primary | ICD-10-CM

## 2022-06-23 DIAGNOSIS — D62 ACUTE BLOOD LOSS ANEMIA: ICD-10-CM

## 2022-06-23 DIAGNOSIS — E87.0 HYPEROSMOLALITY AND/OR HYPERNATREMIA: ICD-10-CM

## 2022-06-23 DIAGNOSIS — N17.9 ACUTE KIDNEY FAILURE, UNSPECIFIED: ICD-10-CM

## 2022-06-23 DIAGNOSIS — D62 ACUTE BLOOD LOSS ANEMIA: Primary | ICD-10-CM

## 2022-06-23 LAB
ANION GAP SERPL CALC-SCNC: 16 MMOL/L (ref 8–16)
ANION GAP SERPL CALC-SCNC: 16 MMOL/L (ref 8–16)
BASOPHILS # BLD AUTO: 0.03 K/UL (ref 0–0.2)
BASOPHILS # BLD AUTO: 0.03 K/UL (ref 0–0.2)
BASOPHILS NFR BLD: 0.6 % (ref 0–1.9)
BASOPHILS NFR BLD: 0.6 % (ref 0–1.9)
BUN SERPL-MCNC: 27 MG/DL (ref 8–23)
BUN SERPL-MCNC: 27 MG/DL (ref 8–23)
CALCIUM SERPL-MCNC: 8.8 MG/DL (ref 8.7–10.5)
CALCIUM SERPL-MCNC: 8.8 MG/DL (ref 8.7–10.5)
CHLORIDE SERPL-SCNC: 105 MMOL/L (ref 95–110)
CHLORIDE SERPL-SCNC: 105 MMOL/L (ref 95–110)
CO2 SERPL-SCNC: 22 MMOL/L (ref 23–29)
CO2 SERPL-SCNC: 22 MMOL/L (ref 23–29)
CREAT SERPL-MCNC: 0.8 MG/DL (ref 0.5–1.4)
CREAT SERPL-MCNC: 0.8 MG/DL (ref 0.5–1.4)
DIFFERENTIAL METHOD: ABNORMAL
DIFFERENTIAL METHOD: ABNORMAL
EOSINOPHIL # BLD AUTO: 0 K/UL (ref 0–0.5)
EOSINOPHIL # BLD AUTO: 0 K/UL (ref 0–0.5)
EOSINOPHIL NFR BLD: 0 % (ref 0–8)
EOSINOPHIL NFR BLD: 0 % (ref 0–8)
ERYTHROCYTE [DISTWIDTH] IN BLOOD BY AUTOMATED COUNT: 18.7 % (ref 11.5–14.5)
ERYTHROCYTE [DISTWIDTH] IN BLOOD BY AUTOMATED COUNT: 18.7 % (ref 11.5–14.5)
EST. GFR  (AFRICAN AMERICAN): >60 ML/MIN/1.73 M^2
EST. GFR  (AFRICAN AMERICAN): >60 ML/MIN/1.73 M^2
EST. GFR  (NON AFRICAN AMERICAN): >60 ML/MIN/1.73 M^2
EST. GFR  (NON AFRICAN AMERICAN): >60 ML/MIN/1.73 M^2
GLUCOSE SERPL-MCNC: 123 MG/DL (ref 70–110)
GLUCOSE SERPL-MCNC: 123 MG/DL (ref 70–110)
HCT VFR BLD AUTO: 39.2 % (ref 37–48.5)
HCT VFR BLD AUTO: 39.2 % (ref 37–48.5)
HGB BLD-MCNC: 11.7 G/DL (ref 12–16)
HGB BLD-MCNC: 11.7 G/DL (ref 12–16)
IMM GRANULOCYTES # BLD AUTO: 0.02 K/UL (ref 0–0.04)
IMM GRANULOCYTES # BLD AUTO: 0.02 K/UL (ref 0–0.04)
IMM GRANULOCYTES NFR BLD AUTO: 0.4 % (ref 0–0.5)
IMM GRANULOCYTES NFR BLD AUTO: 0.4 % (ref 0–0.5)
LYMPHOCYTES # BLD AUTO: 1 K/UL (ref 1–4.8)
LYMPHOCYTES # BLD AUTO: 1 K/UL (ref 1–4.8)
LYMPHOCYTES NFR BLD: 19.5 % (ref 18–48)
LYMPHOCYTES NFR BLD: 19.5 % (ref 18–48)
MCH RBC QN AUTO: 27.3 PG (ref 27–31)
MCH RBC QN AUTO: 27.3 PG (ref 27–31)
MCHC RBC AUTO-ENTMCNC: 29.8 G/DL (ref 32–36)
MCHC RBC AUTO-ENTMCNC: 29.8 G/DL (ref 32–36)
MCV RBC AUTO: 92 FL (ref 82–98)
MCV RBC AUTO: 92 FL (ref 82–98)
MONOCYTES # BLD AUTO: 0.6 K/UL (ref 0.3–1)
MONOCYTES # BLD AUTO: 0.6 K/UL (ref 0.3–1)
MONOCYTES NFR BLD: 11.5 % (ref 4–15)
MONOCYTES NFR BLD: 11.5 % (ref 4–15)
NEUTROPHILS # BLD AUTO: 3.3 K/UL (ref 1.8–7.7)
NEUTROPHILS # BLD AUTO: 3.3 K/UL (ref 1.8–7.7)
NEUTROPHILS NFR BLD: 68 % (ref 38–73)
NEUTROPHILS NFR BLD: 68 % (ref 38–73)
NRBC BLD-RTO: 0 /100 WBC
NRBC BLD-RTO: 0 /100 WBC
PLATELET # BLD AUTO: 343 K/UL (ref 150–450)
PLATELET # BLD AUTO: 343 K/UL (ref 150–450)
PMV BLD AUTO: 11.8 FL (ref 9.2–12.9)
PMV BLD AUTO: 11.8 FL (ref 9.2–12.9)
POTASSIUM SERPL-SCNC: 4.5 MMOL/L (ref 3.5–5.1)
POTASSIUM SERPL-SCNC: 4.5 MMOL/L (ref 3.5–5.1)
RBC # BLD AUTO: 4.28 M/UL (ref 4–5.4)
RBC # BLD AUTO: 4.28 M/UL (ref 4–5.4)
SODIUM SERPL-SCNC: 143 MMOL/L (ref 136–145)
SODIUM SERPL-SCNC: 143 MMOL/L (ref 136–145)
WBC # BLD AUTO: 4.87 K/UL (ref 3.9–12.7)
WBC # BLD AUTO: 4.87 K/UL (ref 3.9–12.7)

## 2022-06-23 PROCEDURE — 80048 BASIC METABOLIC PNL TOTAL CA: CPT | Performed by: NURSE PRACTITIONER

## 2022-06-23 PROCEDURE — 36415 COLL VENOUS BLD VENIPUNCTURE: CPT | Performed by: NURSE PRACTITIONER

## 2022-06-23 PROCEDURE — 85025 COMPLETE CBC W/AUTO DIFF WBC: CPT | Performed by: NURSE PRACTITIONER

## 2022-06-30 ENCOUNTER — LAB VISIT (OUTPATIENT)
Dept: LAB | Facility: HOSPITAL | Age: 82
End: 2022-06-30
Attending: NURSE PRACTITIONER
Payer: MEDICARE

## 2022-06-30 DIAGNOSIS — D62 ACUTE BLOOD LOSS ANEMIA: ICD-10-CM

## 2022-06-30 LAB
ANION GAP SERPL CALC-SCNC: 15 MMOL/L (ref 8–16)
BASOPHILS # BLD AUTO: 0.03 K/UL (ref 0–0.2)
BASOPHILS NFR BLD: 0.6 % (ref 0–1.9)
BUN SERPL-MCNC: 48 MG/DL (ref 8–23)
CALCIUM SERPL-MCNC: 9.1 MG/DL (ref 8.7–10.5)
CHLORIDE SERPL-SCNC: 103 MMOL/L (ref 95–110)
CO2 SERPL-SCNC: 25 MMOL/L (ref 23–29)
CREAT SERPL-MCNC: 1 MG/DL (ref 0.5–1.4)
DIFFERENTIAL METHOD: ABNORMAL
EOSINOPHIL # BLD AUTO: 0 K/UL (ref 0–0.5)
EOSINOPHIL NFR BLD: 0 % (ref 0–8)
ERYTHROCYTE [DISTWIDTH] IN BLOOD BY AUTOMATED COUNT: 19.8 % (ref 11.5–14.5)
EST. GFR  (AFRICAN AMERICAN): 60 ML/MIN/1.73 M^2
EST. GFR  (NON AFRICAN AMERICAN): 52 ML/MIN/1.73 M^2
GLUCOSE SERPL-MCNC: 72 MG/DL (ref 70–110)
HCT VFR BLD AUTO: 30.1 % (ref 37–48.5)
HGB BLD-MCNC: 9.3 G/DL (ref 12–16)
IMM GRANULOCYTES # BLD AUTO: 0.06 K/UL (ref 0–0.04)
IMM GRANULOCYTES NFR BLD AUTO: 1.2 % (ref 0–0.5)
LYMPHOCYTES # BLD AUTO: 1.2 K/UL (ref 1–4.8)
LYMPHOCYTES NFR BLD: 23.7 % (ref 18–48)
MCH RBC QN AUTO: 27.6 PG (ref 27–31)
MCHC RBC AUTO-ENTMCNC: 30.9 G/DL (ref 32–36)
MCV RBC AUTO: 89 FL (ref 82–98)
MONOCYTES # BLD AUTO: 1.1 K/UL (ref 0.3–1)
MONOCYTES NFR BLD: 22.7 % (ref 4–15)
NEUTROPHILS # BLD AUTO: 2.6 K/UL (ref 1.8–7.7)
NEUTROPHILS NFR BLD: 51.8 % (ref 38–73)
NRBC BLD-RTO: 5 /100 WBC
PLATELET # BLD AUTO: 133 K/UL (ref 150–450)
PLATELET BLD QL SMEAR: ABNORMAL
PMV BLD AUTO: 11.5 FL (ref 9.2–12.9)
POTASSIUM SERPL-SCNC: 5.2 MMOL/L (ref 3.5–5.1)
RBC # BLD AUTO: 3.37 M/UL (ref 4–5.4)
SODIUM SERPL-SCNC: 143 MMOL/L (ref 136–145)
WBC # BLD AUTO: 5.02 K/UL (ref 3.9–12.7)

## 2022-06-30 PROCEDURE — 80048 BASIC METABOLIC PNL TOTAL CA: CPT | Performed by: NURSE PRACTITIONER

## 2022-06-30 PROCEDURE — 36415 COLL VENOUS BLD VENIPUNCTURE: CPT | Performed by: NURSE PRACTITIONER

## 2022-06-30 PROCEDURE — 85025 COMPLETE CBC W/AUTO DIFF WBC: CPT | Performed by: NURSE PRACTITIONER

## 2022-07-07 ENCOUNTER — DOCUMENT SCAN (OUTPATIENT)
Dept: HOME HEALTH SERVICES | Facility: HOSPITAL | Age: 82
End: 2022-07-07
Payer: MEDICARE

## 2022-07-07 ENCOUNTER — LAB VISIT (OUTPATIENT)
Dept: LAB | Facility: HOSPITAL | Age: 82
End: 2022-07-07
Attending: INTERNAL MEDICINE
Payer: MEDICARE

## 2022-07-07 DIAGNOSIS — R79.89 ELEVATED TROPONIN: ICD-10-CM

## 2022-07-07 DIAGNOSIS — N17.9 AKI (ACUTE KIDNEY INJURY): ICD-10-CM

## 2022-07-07 DIAGNOSIS — I50.43 ACUTE ON CHRONIC COMBINED SYSTOLIC AND DIASTOLIC CONGESTIVE HEART FAILURE: ICD-10-CM

## 2022-07-07 DIAGNOSIS — I50.43 ACUTE ON CHRONIC COMBINED SYSTOLIC AND DIASTOLIC CHF (CONGESTIVE HEART FAILURE): ICD-10-CM

## 2022-07-07 DIAGNOSIS — E78.2 MIXED HYPERLIPIDEMIA: Chronic | ICD-10-CM

## 2022-07-07 DIAGNOSIS — I10 ESSENTIAL HYPERTENSION: Chronic | ICD-10-CM

## 2022-07-07 DIAGNOSIS — I46.9 CARDIAC ARREST: ICD-10-CM

## 2022-07-07 DIAGNOSIS — I50.43 ACUTE ON CHRONIC COMBINED SYSTOLIC AND DIASTOLIC HEART FAILURE: ICD-10-CM

## 2022-07-07 LAB
ANION GAP SERPL CALC-SCNC: 11 MMOL/L (ref 8–16)
BNP SERPL-MCNC: >4900 PG/ML (ref 0–99)
BUN SERPL-MCNC: 42 MG/DL (ref 8–23)
CALCIUM SERPL-MCNC: 9.2 MG/DL (ref 8.7–10.5)
CHLORIDE SERPL-SCNC: 104 MMOL/L (ref 95–110)
CO2 SERPL-SCNC: 29 MMOL/L (ref 23–29)
CREAT SERPL-MCNC: 1 MG/DL (ref 0.5–1.4)
EST. GFR  (AFRICAN AMERICAN): 59.8 ML/MIN/1.73 M^2
EST. GFR  (NON AFRICAN AMERICAN): 51.9 ML/MIN/1.73 M^2
GLUCOSE SERPL-MCNC: 138 MG/DL (ref 70–110)
POTASSIUM SERPL-SCNC: 4.3 MMOL/L (ref 3.5–5.1)
SODIUM SERPL-SCNC: 144 MMOL/L (ref 136–145)

## 2022-07-07 PROCEDURE — 80048 BASIC METABOLIC PNL TOTAL CA: CPT | Performed by: INTERNAL MEDICINE

## 2022-07-07 PROCEDURE — 83880 ASSAY OF NATRIURETIC PEPTIDE: CPT | Performed by: INTERNAL MEDICINE

## 2022-07-07 PROCEDURE — 36415 COLL VENOUS BLD VENIPUNCTURE: CPT | Performed by: INTERNAL MEDICINE

## 2022-07-11 ENCOUNTER — CARE AT HOME (OUTPATIENT)
Dept: HOME HEALTH SERVICES | Facility: CLINIC | Age: 82
End: 2022-07-11
Payer: MEDICARE

## 2022-07-11 VITALS
HEART RATE: 55 BPM | OXYGEN SATURATION: 99 % | DIASTOLIC BLOOD PRESSURE: 80 MMHG | SYSTOLIC BLOOD PRESSURE: 124 MMHG | TEMPERATURE: 97 F | RESPIRATION RATE: 18 BRPM

## 2022-07-11 DIAGNOSIS — T83.511D URINARY TRACT INFECTION ASSOCIATED WITH CATHETERIZATION OF URINARY TRACT, UNSPECIFIED INDWELLING URINARY CATHETER TYPE, SUBSEQUENT ENCOUNTER: Primary | ICD-10-CM

## 2022-07-11 DIAGNOSIS — Z09 FOLLOW UP: ICD-10-CM

## 2022-07-11 DIAGNOSIS — N39.0 URINARY TRACT INFECTION ASSOCIATED WITH CATHETERIZATION OF URINARY TRACT, UNSPECIFIED INDWELLING URINARY CATHETER TYPE, SUBSEQUENT ENCOUNTER: Primary | ICD-10-CM

## 2022-07-11 PROCEDURE — 99348 PR HOME VISIT,ESTAB PATIENT,LEVEL II: ICD-10-PCS | Mod: ,,, | Performed by: NURSE PRACTITIONER

## 2022-07-11 PROCEDURE — 99348 HOME/RES VST EST LOW MDM 30: CPT | Mod: ,,, | Performed by: NURSE PRACTITIONER

## 2022-07-12 NOTE — PROGRESS NOTES
[facility]  Medical Home Visit    Visit Date: 7/12/2022  Encounter Provider: Elva Ansari  PCP:  Elva Ansari NP    Subjective:      Patient ID: Tanya Madrid is a 82 y.o. female.    Consult Requested By:  No ref. provider found  Reason for Consult:  Follow up    Tanya is being seen at home due to being seen at home due to physical debility that presents a taxing effort to leave the home, to mitigate high risk of hospital readmission and/or due to the limited availability of reliable or safe options for transportation to the point of access to the provider. Prior to treatment on this visit the chart was reviewed and patient verbal consent was obtained.    Chief Complaint: Follow-up      Patient is being seen today for a follow to established care. Upon arrival patient was propped up in her bed awake and alert in no acute distress. Patient's shavonne Tripp (and caregiver) was present for the visit. She reports that the patent has been doing well since her last stay in [facility]. She no longer has a henley catheter and is urinating in a brief without issues. Patient is on straight tube feedings. Daughter voices no complaints of at this time.  VSS and daughter reports that patient is compliant to all medications.         Review of Systems   Constitutional: Negative for activity change, appetite change and fever.   Respiratory: Positive for choking (a little with tube feeding). Negative for shortness of breath.    Cardiovascular: Negative.    Gastrointestinal: Negative for abdominal distention, constipation, diarrhea and vomiting.   Endocrine: Negative.    Genitourinary: Positive for enuresis. Negative for difficulty urinating, dyspareunia, dysuria, hematuria and urgency.   Skin: Negative.    Neurological: Positive for speech difficulty and weakness. Negative for seizures.   Hematological: Negative.    Psychiatric/Behavioral: Positive for agitation (intermittent).        Assessments:  · Environmental: Patient lives in a single story home with her daughter. There are no steps a the entrance. Lighting is bright and temperature is comfortable  · Functional Status: Patient is bed bound and is dependent on others for ADLs  · Safety: Fall Precaution, Aspiration precautions  · Nutritional: Patient is on tube feedings  · Home Health/DME/Supplies: [facility], DMEs; wheelchair    Objective:   Physical Exam  Constitutional:       General: She is not in acute distress.  Cardiovascular:      Rate and Rhythm: Normal rate and regular rhythm.      Heart sounds: Normal heart sounds.   Pulmonary:      Breath sounds: Normal breath sounds.   Abdominal:      General: Bowel sounds are normal.      Palpations: Abdomen is soft.      Tenderness: There is no abdominal tenderness.       Musculoskeletal:         General: Swelling (to bilateral hands 1+) present.      Right lower leg: Edema (1+) present.      Left lower leg: Edema (2+) present.   Skin:     General: Skin is cool.      Capillary Refill: Capillary refill takes 2 to 3 seconds.      Nails: There is no clubbing.   Neurological:      Mental Status: She is alert. Mental status is at baseline.      Motor: Weakness present.   Psychiatric:         Speech: She is noncommunicative (only speaks one word answers).         Behavior: Behavior is cooperative.         Vitals:    07/11/22 0930   BP: 124/80   Pulse: (!) 55   Resp: 18   Temp: 97.3 °F (36.3 °C)   SpO2: 99%   PainSc: 0-No pain     There is no height or weight on file to calculate BMI.    Assessment:     1. Urinary tract infection associated with catheterization of urinary tract, unspecified indwelling urinary catheter type, subsequent encounter    2. Follow up        Plan:     Ethical / Legal: Advance Care Planning   · Surrogate decision maker:  Name Josee Lemus, Relationship: Daughter  · Code Status:  DNR  · [place]:  None  · Other advance directive:  None, Capacity to make medical  decisions:  No, Conflict None       1. Urinary tract infection associated with catheterization of urinary tract, unspecified indwelling urinary catheter type, subsequent encounter  Comments:  Patient's daughter reports that patient has not run a fever or have seen blood in her urine.  Call for any urinary signs and symptoms    2. Follow up       Encounter for Medical Follow-Up and Medication Review  - [facility] at [facility] to schedule follow-up visit with patient in 4 weeks or PRN     Patient Instructions Given:  - Continue all medications, treatments and therapies as ordered.   - Follow all instructions, recommendations as discussed.  - Maintain Safety Precautions at all times.  - Attend all medical appointments as scheduled.  - For worsening symptoms: call Primary Care Physician or Nurse Practitioner.  - For emergencies, call 911 or immediately report to the nearest emergency room       Were controlled substances prescribed?  No    Follow Up Appointments:   Future Appointments   Date Time Provider Department Center   7/14/2022  1:40 PM Miky Anaya MD [facility]       Signature: Elva Ansari NP

## 2022-07-14 ENCOUNTER — PATIENT MESSAGE (OUTPATIENT)
Dept: CARDIOLOGY | Facility: CLINIC | Age: 82
End: 2022-07-14
Payer: MEDICARE

## 2022-07-14 ENCOUNTER — OFFICE VISIT (OUTPATIENT)
Dept: CARDIOLOGY | Facility: CLINIC | Age: 82
End: 2022-07-14
Payer: MEDICARE

## 2022-07-14 VITALS
BODY MASS INDEX: 28.16 KG/M2 | RESPIRATION RATE: 16 BRPM | DIASTOLIC BLOOD PRESSURE: 86 MMHG | SYSTOLIC BLOOD PRESSURE: 134 MMHG | WEIGHT: 153 LBS | HEART RATE: 66 BPM | HEIGHT: 62 IN

## 2022-07-14 DIAGNOSIS — I50.43 ACUTE ON CHRONIC COMBINED SYSTOLIC AND DIASTOLIC HEART FAILURE: ICD-10-CM

## 2022-07-14 DIAGNOSIS — I46.9 CARDIAC ARREST: ICD-10-CM

## 2022-07-14 DIAGNOSIS — R79.89 ELEVATED TROPONIN: ICD-10-CM

## 2022-07-14 DIAGNOSIS — I50.43 ACUTE ON CHRONIC COMBINED SYSTOLIC AND DIASTOLIC CHF (CONGESTIVE HEART FAILURE): Primary | ICD-10-CM

## 2022-07-14 DIAGNOSIS — I50.43 ACUTE ON CHRONIC COMBINED SYSTOLIC AND DIASTOLIC CONGESTIVE HEART FAILURE: ICD-10-CM

## 2022-07-14 DIAGNOSIS — I10 ESSENTIAL HYPERTENSION: Chronic | ICD-10-CM

## 2022-07-14 DIAGNOSIS — E78.2 MIXED HYPERLIPIDEMIA: Chronic | ICD-10-CM

## 2022-07-14 PROCEDURE — 99214 OFFICE O/P EST MOD 30 MIN: CPT | Mod: S$PBB,,, | Performed by: INTERNAL MEDICINE

## 2022-07-14 PROCEDURE — 99999 PR PBB SHADOW E&M-EST. PATIENT-LVL III: ICD-10-PCS | Mod: PBBFAC,,, | Performed by: INTERNAL MEDICINE

## 2022-07-14 PROCEDURE — 99214 PR OFFICE/OUTPT VISIT, EST, LEVL IV, 30-39 MIN: ICD-10-PCS | Mod: S$PBB,,, | Performed by: INTERNAL MEDICINE

## 2022-07-14 PROCEDURE — 99999 PR PBB SHADOW E&M-EST. PATIENT-LVL III: CPT | Mod: PBBFAC,,, | Performed by: INTERNAL MEDICINE

## 2022-07-14 PROCEDURE — 99213 OFFICE O/P EST LOW 20 MIN: CPT | Mod: PBBFAC | Performed by: INTERNAL MEDICINE

## 2022-07-14 NOTE — PROGRESS NOTES
"Subjective:   Patient ID:  Tanya Madrid is a 82 y.o. female who presents for follow-up of No chief complaint on file.     Congestive Heart Failure  Associated symptoms include shortness of breath. Pertinent negatives include no abdominal pain, chest pain, claudication, near-syncope or palpitations.         Tanya Madrid is a 82 year old female who presents to CHF clinic for hospital follow up. She was admitted to [facility] due to cardiac arrest and had prolonged hospitalization and was eventually discharged home. She had g tube placed during admission.      Her current medical conditions include CHF, HFrEF of 10%, HTN, HLP, CVA, wheelchair bound. She is very debilitated at this visit. She is wheelchair bound today and is mainly nonverbal. She receives all her medications via g tube. She does have  services at this time and is starting to stand with therapy.      Patient's daughter is here today with her for the visit.      Patient has dependent edema today on exam. Patient answers appropriately to simple questions today. Mainly bedbound but does sit in the chair every day. Recently started treatment for UTI.   Denies chest pain today on exam. Has labored breathing noted which started a few days ago per patient's daughter report today.      5/12/2022 update     aTnya Madrid returns to clinic today for CHF follow up.      BP has been running high recently.   Patient is wheelchair bound today. Her daughter and son in law have to carry her to move her positions. She is max assist for position changes and to take "2-3 steps" per patient's daughter.      Has episodic increased work of breathing during visit today. Has been taking lasix BID since discharge. Has trace pedal edema today in office. BP log reviewed with elevated readings over the past week or so. Has been getting all of her meals via g tube.      Patient moans and reaches for provider during the visit today.   Patient has henley cath in place. " Is unable to maintain her own position in the wheelchair today. Denies any signs of bleeding.     This patient is doing well current medications and is having no evidence of heart failure or edema.  She is taking diuretics as well as Entresto and beta-blockers.  Clinically otherwise stable patient however is bed-bound and being cared for by her daughter.  I will see the patient in 3 months lab test be done before next visit.  Long-term prognosis is quite poor for this patient was unable to take care of herself and is in a wheelchair.      Review of Systems   Constitutional: Negative for chills, diaphoresis, night sweats, weight gain and weight loss.   HENT: Negative for congestion, hoarse voice, sore throat and stridor.    Eyes: Negative for double vision and pain.   Cardiovascular: Negative for chest pain, claudication, cyanosis, dyspnea on exertion, irregular heartbeat, leg swelling, near-syncope, orthopnea, palpitations, paroxysmal nocturnal dyspnea and syncope.   Respiratory: Negative for cough, hemoptysis, shortness of breath, sleep disturbances due to breathing, snoring, sputum production and wheezing.    Endocrine: Negative for cold intolerance, heat intolerance and polydipsia.   Hematologic/Lymphatic: Negative for bleeding problem. Does not bruise/bleed easily.   Skin: Negative for color change, dry skin and rash.   Musculoskeletal: Negative for joint swelling and muscle cramps.   Gastrointestinal: Negative for bloating, abdominal pain, constipation, diarrhea, dysphagia, melena, nausea and vomiting.   Genitourinary: Negative for flank pain and urgency.   Neurological: Negative for dizziness, focal weakness, headaches, light-headedness, loss of balance, seizures and weakness.   Psychiatric/Behavioral: Negative for altered mental status and memory loss. The patient is not nervous/anxious.      Family History   Family history unknown: Yes     Past Medical History:   Diagnosis Date    Acute CHF (congestive  heart failure) 1/17/2021    Hyperlipemia     Hypertension     Parkinson's disease     Stroke 2016    Throat mass      Social History     Socioeconomic History    Marital status:    Tobacco Use    Smoking status: Never Smoker    Smokeless tobacco: Never Used   Substance and Sexual Activity    Alcohol use: No    Drug use: No    Sexual activity: Not Currently     Current Outpatient Medications on File Prior to Visit   Medication Sig Dispense Refill    carbidopa-levodopa  mg (SINEMET)  mg per tablet 2 tablets by Per G Tube route 3 (three) times daily. 180 tablet 0    carvediloL (COREG) 3.125 MG tablet TAKE 1 TABLET PER G-TUBE TWICE DAILY AS NEEDED( AS BLOOD PRESSURE TOLERATES) 180 tablet 3    clopidogreL (PLAVIX) 75 mg tablet 1 tablet (75 mg total) by Per G Tube route once daily. 30 tablet 0    esomeprazole (NEXIUM) 40 MG capsule Take  40 mg(1 cap)  twice a day  , then after 40 mg(1 cap) daily   Through PEG tube 60 capsule 3    famotidine (PEPCID) 20 MG tablet Take 20 mg by mouth once daily at 6am.      furosemide (LASIX) 40 MG tablet 1 tablet (40 mg total) by Per G Tube route 2 (two) times a day. Take per G tube twice daily as directed 180 tablet 3    QUEtiapine (SEROQUEL) 25 MG Tab Take 1 tablet (25 mg total) by mouth once daily. At bedtime 30 tablet 11    sacubitriL-valsartan (ENTRESTO)  mg per tablet 1 tablet by Per G Tube route 2 (two) times daily. 60 tablet 6    scopolamine (TRANSDERM-SCOP) 1.3-1.5 mg (1 mg over 3 days) Place 1 patch onto the skin Every 3 (three) days. 10 patch 3    traZODone (DESYREL) 50 MG tablet Take 0.5 tablets (25 mg total) by mouth every evening. (Patient not taking: Reported on 6/16/2022) 15 tablet 3    [DISCONTINUED] aspirin 81 MG Chew 1 tablet (81 mg total) by Per G Tube route once daily.  0    [DISCONTINUED] losartan (COZAAR) 50 MG tablet Take 1 tablet (50 mg total) by mouth 2 (two) times a day. 60 tablet 6    [DISCONTINUED] metoprolol  succinate (TOPROL-XL) 50 MG 24 hr tablet Take 1 tablet (50 mg total) by mouth 2 (two) times daily. 60 tablet 6    [DISCONTINUED] omeprazole magnesium 10 mg SuDR Take 40 mg by mouth once daily. (Patient not taking: Reported on 4/21/2022) 120 each 0    [DISCONTINUED] pantoprazole (PROTONIX) 40 mg suspension Take 1 packet (40 mg total) by mouth 2 (two) times daily. 60 packet 3     No current facility-administered medications on file prior to visit.     Review of patient's allergies indicates:   Allergen Reactions    Xanax [alprazolam]        Objective:     Physical Exam  Eyes:      Pupils: Pupils are equal, round, and reactive to light.   Neck:      Trachea: No tracheal deviation.   Cardiovascular:      Rate and Rhythm: Normal rate and regular rhythm.      Pulses: Intact distal pulses.           Carotid pulses are 2+ on the right side and 2+ on the left side.       Radial pulses are 2+ on the right side and 2+ on the left side.        Femoral pulses are 2+ on the right side and 2+ on the left side.       Popliteal pulses are 2+ on the right side and 2+ on the left side.        Dorsalis pedis pulses are 2+ on the right side and 2+ on the left side.        Posterior tibial pulses are 2+ on the right side and 2+ on the left side.      Heart sounds: Normal heart sounds. No murmur heard.    No friction rub. No gallop.   Pulmonary:      Effort: Pulmonary effort is normal. No respiratory distress.      Breath sounds: Normal breath sounds. No stridor. No wheezing or rales.   Chest:      Chest wall: No tenderness.   Abdominal:      General: There is no distension.      Tenderness: There is no abdominal tenderness. There is no rebound.   Musculoskeletal:         General: No tenderness.      Cervical back: Normal range of motion.   Skin:     General: Skin is warm and dry.   Neurological:      Mental Status: She is alert and oriented to person, place, and time.        Ref Range & Units 7 d ago   (7/7/22) 2 wk ago   (6/30/22) 3  wk ago   (6/23/22) 3 wk ago   (6/23/22) 4 wk ago   (6/16/22) 1 mo ago   (6/9/22) 1 mo ago   (5/31/22)   Sodium 136 - 145 mmol/L 144  143  143  143  146 High   146 High   137    Potassium 3.5 - 5.1 mmol/L 4.3  5.2 High   4.5  4.5  5.1  4.1  5.6 High     Chloride 95 - 110 mmol/L 104  103  105  105  105  99  97    CO2 23 - 29 mmol/L 29  25  22 Low   22 Low   24  28  26    Glucose 70 - 110 mg/dL 138 High   72  123 High   123 High   127 High   138 High   111 High     BUN 8 - 23 mg/dL 42 High   48 High   27 High   27 High   43 High   50 High   23    Creatinine 0.5 - 1.4 mg/dL 1.0  1.0  0.8  0.8  1.1  1.2  0.6    Calcium 8.7 - 10.5 mg/dL 9.2  9.1  8.8  8.8  9.3  9.0  8.1 Low     Anion Gap 8 - 16 mmol/L 11  15  16  16  17 High   19 High   14    eGFR if African American >60 mL/min/1.73 m^2 59.8 Abnormal   60  >60  >60  53.3 Abnormal   48.0 Abnormal   >60    eGFR if non African American >60 mL/min/1.73 m^2 51.9 Abnormal   52 Abnormal  CM  >60 CM  >60 CM  46.2 Abnormal  CM  41.6 Abnormal  CM  >60 CM    Comment: Calculation used to obtain the estimated glomerular filtration      Echo 5/30/22:        Accession #: [id number]    Transthoracic echo (TTE) limited  Order# [id number]  Reading physician: Rod Leonardo MD Ordering physician: Abhijit Fowler MD Study date: 5/30/22       Reason for Exam  Priority: Routine  Dx: Endocarditis [I38 (ICD-10-CM)]   Comments: Rule out endocarditis           View Images Vital Vitrea     Show images for Echo    Summary    · The left ventricle is mildly enlarged with concentric hypertrophy and severely decreased systolic function.  · The estimated ejection fraction is 15 - 20%.  · Grade I left ventricular diastolic dysfunction.  · Mild right ventricular enlargement with moderately reduced right ventricular systolic function.  · Severe right atrial enlargement.  · Moderate left atrial enlargement.  · There is pulmonary hypertension.  · The estimated PA systolic pressure is 58 mmHg.  · No change from  prior echo March 2022.      Assessment:     1. Acute on chronic combined systolic and diastolic CHF (congestive heart failure)    2. Acute on chronic combined systolic and diastolic heart failure    3. Acute on chronic combined systolic and diastolic congestive heart failure    4. Elevated troponin    5. H/O Essential hypertension    6. Mixed hyperlipidemia    7. Recent H/O Out of hospital Cardiac arrest        Plan:     Acute on chronic combined systolic and diastolic CHF (congestive heart failure)    Acute on chronic combined systolic and diastolic heart failure    Acute on chronic combined systolic and diastolic congestive heart failure    Elevated troponin    H/O Essential hypertension    Mixed hyperlipidemia    Recent H/O Out of hospital Cardiac arrest    Impression 1. Acute on chronic heart failure patient continues on carvedilol and Entresto.  Patient is also on intermittent diuretics.  Patient is not a good candidate for spironolactone due to potassium elevation in the past.  Overall patient is stable today but prognosis is quite poor for this elderly patient who is bed-bound in a wheelchair and has difficulty with daily living activities.  Lab tests will be redone before next visit.

## 2022-07-15 ENCOUNTER — DOCUMENT SCAN (OUTPATIENT)
Dept: HOME HEALTH SERVICES | Facility: HOSPITAL | Age: 82
End: 2022-07-15
Payer: MEDICARE

## 2022-07-18 ENCOUNTER — DOCUMENT SCAN (OUTPATIENT)
Dept: HOME HEALTH SERVICES | Facility: HOSPITAL | Age: 82
End: 2022-07-18
Payer: MEDICARE

## 2022-07-19 ENCOUNTER — HOSPITAL ENCOUNTER (EMERGENCY)
Facility: HOSPITAL | Age: 82
Discharge: HOME | End: 2022-07-19
Attending: EMERGENCY MEDICINE
Payer: MEDICARE

## 2022-07-19 VITALS
DIASTOLIC BLOOD PRESSURE: 90 MMHG | SYSTOLIC BLOOD PRESSURE: 146 MMHG | TEMPERATURE: 98 F | HEART RATE: 81 BPM | RESPIRATION RATE: 20 BRPM | OXYGEN SATURATION: 95 %

## 2022-07-19 DIAGNOSIS — K94.23 PEG TUBE MALFUNCTION: Primary | ICD-10-CM

## 2022-07-19 LAB
ALLENS TEST: ABNORMAL
DELSYS: ABNORMAL
HCO3 UR-SCNC: 32 MMOL/L (ref 24–28)
PCO2 BLDA: 39.8 MMHG (ref 35–45)
PH SMN: 7.51 [PH] (ref 7.35–7.45)
PO2 BLDA: 66 MMHG (ref 80–100)
POC BE: 9 MMOL/L
POC SATURATED O2: 95 % (ref 95–100)
SAMPLE: ABNORMAL
SITE: ABNORMAL

## 2022-07-19 PROCEDURE — 82803 BLOOD GASES ANY COMBINATION: CPT

## 2022-07-19 PROCEDURE — 25500020 PHARM REV CODE 255: Performed by: EMERGENCY MEDICINE

## 2022-07-19 PROCEDURE — 36600 WITHDRAWAL OF ARTERIAL BLOOD: CPT | Mod: 59

## 2022-07-19 PROCEDURE — 99283 EMERGENCY DEPT VISIT LOW MDM: CPT | Mod: 25

## 2022-07-19 PROCEDURE — 43762 RPLC GTUBE NO REVJ TRC: CPT

## 2022-07-19 PROCEDURE — 99900035 HC TECH TIME PER 15 MIN (STAT)

## 2022-07-19 RX ADMIN — DIATRIZOATE MEGLUMINE AND DIATRIZOATE SODIUM 30 ML: 660; 100 LIQUID ORAL; RECTAL at 08:07

## 2022-07-20 NOTE — ED PROVIDER NOTES
SCRIBE #1 NOTE: I, Mason Buitrago, am scribing for, and in the presence of, Mathew Mar Jr., MD. I have scribed the entire note.       History     Chief Complaint   Patient presents with    Tube Displacement     PEG tube came out today.     Review of patient's allergies indicates:   Allergen Reactions    Xanax [alprazolam]          History of Present Illness     HPI    7/19/2022, 7:05 PM  History obtained from the patient      History of Present Illness: Tanya Madrid is a 82 y.o. female patient with a PMHx of HTN, HLD, Parkinson's, Acute CHF who presents to the Emergency Department for evaluation of Tube displacement which occurred just PTA. Pt's PEG tube came out today. Symptoms are constant and moderate in severity. No mitigating or exacerbating factors reported. No associated sxs reported. Patient denies any fever, chills, SOB, chest pain, back pain, N/V/D, dysuria, weakness, and all other sxs at this time. No prior Tx reported. No further complaints or concerns at this time.       Arrival mode: Personal vehicle    PCP: Elva Ansari NP        Past Medical History:  Past Medical History:   Diagnosis Date    Acute CHF (congestive heart failure) 1/17/2021    Hyperlipemia     Hypertension     Parkinson's disease     Stroke 2016    Throat mass        Past Surgical History:  Past Surgical History:   Procedure Laterality Date    CLOSED REDUCTION Right 10/15/2020    Procedure: CLOSED REDUCTION;  Surgeon: Humberto Valdivia DO;  Location: [facility] OR;  Service: Orthopedics;  Laterality: Right;  ATTEMPTED    EVACUATION OF HEMATOMA Right 10/17/2020    Procedure: EVACUATION, HEMATOMA;  Surgeon: Humberto Valdivia DO;  Location: [facility] OR;  Service: Orthopedics;  Laterality: Right;    HEMIARTHROPLASTY OF HIP Left 7/12/2020    Procedure: HEMIARTHROPLASTY, HIP;  Surgeon: uHmberto Valdivia DO;  Location: [facility] OR;  Service: Orthopedics;  Laterality: Left;    HEMIARTHROPLASTY OF HIP Right 10/2/2020     Procedure: HEMIARTHROPLASTY, HIP;  Surgeon: Loyd Kearney MD;  Location: [facility] OR;  Service: Orthopedics;  Laterality: Right;    HYSTERECTOMY      INSERTION OF PERCUTANEOUS ENDOSCOPIC GASTROSTOMY (PEG) FEEDING TUBE      OPEN REDUCTION OF DISLOCATION OF HIP Right 10/17/2020    Procedure: OPEN REDUCTION, DISLOCATION, HIP;  Surgeon: Humberto Valdivia DO;  Location: [facility] OR;  Service: Orthopedics;  Laterality: Right;    THROAT SURGERY      TONSILLECTOMY           Family History:  Family History   Family history unknown: Yes       Social History:  Social History     Tobacco Use    Smoking status: Never Smoker    Smokeless tobacco: Never Used   Substance and Sexual Activity    Alcohol use: No    Drug use: No    Sexual activity: Not Currently        Review of Systems     Review of Systems   Constitutional: Negative for chills and fever.   HENT: Negative for sore throat.    Respiratory: Negative for shortness of breath.    Cardiovascular: Negative for chest pain.   Gastrointestinal: Negative for diarrhea, nausea and vomiting.   Genitourinary: Negative for dysuria.   Musculoskeletal: Negative for back pain.   Skin: Negative for rash.   Neurological: Negative for weakness.   Hematological: Does not bruise/bleed easily.   All other systems reviewed and are negative.     Physical Exam     Initial Vitals [07/19/22 1849]   BP Pulse Resp Temp SpO2   128/74 80 (!) 34 97.8 °F (36.6 °C) (!) 82 %      MAP       --          Physical Exam  Nursing Notes and Vital Signs Reviewed.  Constitutional: Patient is in no acute distress. Well-developed and well-nourished.  Head: Atraumatic. Normocephalic.  Eyes:  EOM intact.  No scleral icterus.  ENT: Mucous membranes are moist.  Nares clear   Neck:  Full ROM. No JVD.  Cardiovascular: Regular rate. Regular rhythm No murmurs, rubs, or gallops. Distal pulses are 2+ and symmetric  Pulmonary/Chest: No respiratory distress. Clear to auscultation bilaterally. No wheezing or rales.  Equal  "chest wall rise bilaterally  Abdominal: Soft and non-distended.  There is no tenderness.  No rebound, guarding, or rigidity. Good bowel sounds.  Genitourinary: No CVA tenderness.  No suprapubic tenderness  Musculoskeletal: Moves all extremities. No obvious deformities.  5 x 5 strength in all extremities   Skin: Warm and dry.  Neurological:  Alert, awake, and appropriate.  Normal speech.  No acute focal neurological deficits are appreciated.  Two through 12 intact bilaterally.  Psychiatric: Normal affect. Good eye contact. Appropriate in content.     ED Course   Feeding Tube    Date/Time: 7/19/2022 7:59 PM  Location procedure was performed: [facility] EMERGENCY DEPARTMENT  Performed by: Mathew Mar Jr., MD  Authorized by: Mathew Mar Jr., MD   Pre-operative diagnosis: feeding tube malfunction  Post-operative diagnosis: maureen  Consent: Verbal consent obtained.  Risks and benefits: risks, benefits and alternatives were discussed  Consent given by: patient  Patient understanding: patient states understanding of the procedure being performed  Patient consent: the patient's understanding of the procedure matches consent given  Procedure consent: procedure consent matches procedure scheduled  Relevant documents: relevant documents present and verified  Test results: test results available and properly labeled  Site marked: the operative site was marked  Imaging studies: imaging studies available  Patient identity confirmed: verbally with patient  Time out: Immediately prior to procedure a "time out" was called to verify the correct patient, procedure, equipment, support staff and site/side marked as required.  Indications: tube dislodged  Preparation: Patient was prepped and draped in the usual sterile fashion.    Sedation:  Patient sedated: no    Local anesthesia used: no    Anesthesia:  Local anesthesia used: no  Tube type: Kangaroo.  Procedure type: replacement  Tube size: 20 Fr.  Bulb inflation volume: 10 (ml)  Bulb " inflation fluid: normal saline  Placement/position confirmation: x-ray  Tube placement difficulty: none  Complications: No  Specimens: No  Implants: No        ED Vital Signs:  Vitals:    07/19/22 1849 07/19/22 1900 07/19/22 2000   BP: 128/74 (!) 121/93 (!) 146/90   Pulse: 80 82 81   Resp: (!) 34 20 20   Temp: 97.8 °F (36.6 °C)     TempSrc: Oral     SpO2: (!) 82%  95%       Abnormal Lab Results:  Labs Reviewed   ISTAT PROCEDURE - Abnormal; Notable for the following components:       Result Value    POC PH 7.514 (*)     POC PO2 66 (*)     POC HCO3 32.0 (*)     All other components within normal limits        All Lab Results:  Results for orders placed or performed during the hospital encounter of 07/19/22   ISTAT PROCEDURE   Result Value Ref Range    POC PH 7.514 (H) 7.35 - 7.45    POC PCO2 39.8 35 - 45 mmHg    POC PO2 66 (L) 80 - 100 mmHg    POC HCO3 32.0 (H) 24 - 28 mmol/L    POC BE 9 -2 to 2 mmol/L    POC SATURATED O2 95 95 - 100 %    Sample ARTERIAL     Site LR     Allens Test Pass     DelSys Room Air        Imaging Results:  Imaging Results          XR Non-Rad Performed NG/Gastric Tube Check (Final result)  Result time 07/19/22 20:36:47   Procedure changed from FL Gastric Tube Replacement With Imaging     Final result by Elvira Vásquez MD (07/19/22 20:36:47)                 Impression:      Satisfactory positioning G-tube      Electronically signed by: Fahad Felix  Date:    07/19/2022  Time:    20:36             Narrative:    EXAMINATION:  XR NON-RADIOLOGIST PERFORMED NG/GASTRIC TUBE CHECK    CLINICAL HISTORY:  replaced peg;    TECHNIQUE:  G-tube check    COMPARISON:  None    FINDINGS:  A total of 30 cc of oral Gastroview was given.  Contrast is seen within the stomach.                                            The Emergency Provider reviewed the vital signs and test results, which are outlined above.     ED Discussion       9:06 PM  Patient is stable nontoxic.  Were unable get a good sat on the patient coming  in.  This is because the patient is contracted at baseline.  ABG shows the patient is not hypoxic.  She is at her baseline.  Peg tube was placed with dilation using serial Abreu catheters for without difficulty.  Patient tolerated procedure well.  She is stable safe for discharge in my opinion.                 ED Medication(s):  Medications   diatrizoate meglumineand-diatrizoate sodium (GASTROVIEW) solution 30 mL (30 mLs Oral Given 7/19/22 2029)       New Prescriptions    No medications on file        Follow-up Information     Elva Ansari NP.    Specialties: Wound Care, Family Medicine  Contact information:  [address]  [place] [address]  [phone]                             Scribe Attestation:   Scribe #1: I performed the above scribed service and the documentation accurately describes the services I performed. I attest to the accuracy of the note.     Attending:   Physician Attestation Statement for Scribe #1: I, Mathew Mar Jr., MD, personally performed the services described in this documentation, as scribed by Mason Buitrago, in my presence, and it is both accurate and complete.           Clinical Impression       ICD-10-CM ICD-9-CM   1. PEG tube malfunction  K94.23 536.42       Disposition:   Disposition: Discharged  Condition: Stable       Mathew Mar Jr., MD  07/19/22 2100

## 2022-07-22 ENCOUNTER — EXTERNAL HOME HEALTH (OUTPATIENT)
Dept: HOME HEALTH SERVICES | Facility: HOSPITAL | Age: 82
End: 2022-07-22
Payer: MEDICARE

## 2022-07-26 PROCEDURE — 99291 CRITICAL CARE FIRST HOUR: CPT | Mod: 25

## 2022-07-26 PROCEDURE — 96376 TX/PRO/DX INJ SAME DRUG ADON: CPT

## 2022-07-26 PROCEDURE — 96374 THER/PROPH/DIAG INJ IV PUSH: CPT

## 2022-07-27 ENCOUNTER — HOSPITAL ENCOUNTER (INPATIENT)
Facility: HOSPITAL | Age: 82
LOS: 3 days | Discharge: HOME HEALTH | DRG: 291 | End: 2022-07-30
Attending: EMERGENCY MEDICINE | Admitting: INTERNAL MEDICINE
Payer: MEDICARE

## 2022-07-27 DIAGNOSIS — I10 PRIMARY HYPERTENSION: ICD-10-CM

## 2022-07-27 DIAGNOSIS — G20.A1 PSYCHOSIS DUE TO PARKINSON'S DISEASE: ICD-10-CM

## 2022-07-27 DIAGNOSIS — R06.03 RESPIRATORY DISTRESS: ICD-10-CM

## 2022-07-27 DIAGNOSIS — I50.43 ACUTE ON CHRONIC COMBINED SYSTOLIC AND DIASTOLIC CONGESTIVE HEART FAILURE: Primary | ICD-10-CM

## 2022-07-27 DIAGNOSIS — I50.42 CHRONIC COMBINED SYSTOLIC AND DIASTOLIC HEART FAILURE: ICD-10-CM

## 2022-07-27 DIAGNOSIS — R06.02 SOB (SHORTNESS OF BREATH): ICD-10-CM

## 2022-07-27 DIAGNOSIS — F06.8 PSYCHOSIS DUE TO PARKINSON'S DISEASE: ICD-10-CM

## 2022-07-27 DIAGNOSIS — E87.20 LACTIC ACIDEMIA: ICD-10-CM

## 2022-07-27 PROBLEM — I50.9 ACUTE CONGESTIVE HEART FAILURE: Status: RESOLVED | Noted: 2022-07-27 | Resolved: 2022-07-27

## 2022-07-27 PROBLEM — I50.9 ACUTE CONGESTIVE HEART FAILURE: Status: ACTIVE | Noted: 2022-07-27

## 2022-07-27 PROBLEM — I50.41 ACUTE COMBINED SYSTOLIC AND DIASTOLIC CONGESTIVE HEART FAILURE: Status: ACTIVE | Noted: 2022-07-27

## 2022-07-27 LAB
ALBUMIN SERPL BCP-MCNC: 3.3 G/DL (ref 3.5–5.2)
ALLENS TEST: ABNORMAL
ALP SERPL-CCNC: 153 U/L (ref 55–135)
ALT SERPL W/O P-5'-P-CCNC: 38 U/L (ref 10–44)
ANION GAP SERPL CALC-SCNC: 13 MMOL/L (ref 8–16)
ANISOCYTOSIS BLD QL SMEAR: SLIGHT
AST SERPL-CCNC: 33 U/L (ref 10–40)
BACTERIA #/AREA URNS HPF: ABNORMAL /HPF
BASOPHILS # BLD AUTO: 0.01 K/UL (ref 0–0.2)
BASOPHILS NFR BLD: 0.2 % (ref 0–1.9)
BILIRUB SERPL-MCNC: 0.5 MG/DL (ref 0.1–1)
BILIRUB UR QL STRIP: NEGATIVE
BNP SERPL-MCNC: >4900 PG/ML (ref 0–99)
BUN SERPL-MCNC: 36 MG/DL (ref 8–23)
CALCIUM SERPL-MCNC: 9.2 MG/DL (ref 8.7–10.5)
CHLORIDE SERPL-SCNC: 105 MMOL/L (ref 95–110)
CLARITY UR: ABNORMAL
CO2 SERPL-SCNC: 31 MMOL/L (ref 23–29)
COLOR UR: YELLOW
CREAT SERPL-MCNC: 1 MG/DL (ref 0.5–1.4)
DACRYOCYTES BLD QL SMEAR: ABNORMAL
DELSYS: ABNORMAL
DIFFERENTIAL METHOD: ABNORMAL
EOSINOPHIL # BLD AUTO: 0.2 K/UL (ref 0–0.5)
EOSINOPHIL NFR BLD: 4.5 % (ref 0–8)
ERYTHROCYTE [DISTWIDTH] IN BLOOD BY AUTOMATED COUNT: 20 % (ref 11.5–14.5)
EST. GFR  (AFRICAN AMERICAN): 60 ML/MIN/1.73 M^2
EST. GFR  (NON AFRICAN AMERICAN): 52 ML/MIN/1.73 M^2
GLUCOSE SERPL-MCNC: 101 MG/DL (ref 70–110)
GLUCOSE UR QL STRIP: NEGATIVE
HCO3 UR-SCNC: 34.7 MMOL/L (ref 24–28)
HCT VFR BLD AUTO: 34.8 % (ref 37–48.5)
HGB BLD-MCNC: 10.1 G/DL (ref 12–16)
HGB UR QL STRIP: ABNORMAL
IMM GRANULOCYTES # BLD AUTO: 0.02 K/UL (ref 0–0.04)
IMM GRANULOCYTES NFR BLD AUTO: 0.5 % (ref 0–0.5)
KETONES UR QL STRIP: NEGATIVE
LACTATE SERPL-SCNC: 3.3 MMOL/L (ref 0.5–2.2)
LEUKOCYTE ESTERASE UR QL STRIP: ABNORMAL
LYMPHOCYTES # BLD AUTO: 1.2 K/UL (ref 1–4.8)
LYMPHOCYTES NFR BLD: 28.5 % (ref 18–48)
MCH RBC QN AUTO: 25.7 PG (ref 27–31)
MCHC RBC AUTO-ENTMCNC: 29 G/DL (ref 32–36)
MCV RBC AUTO: 89 FL (ref 82–98)
MICROSCOPIC COMMENT: ABNORMAL
MONOCYTES # BLD AUTO: 0.6 K/UL (ref 0.3–1)
MONOCYTES NFR BLD: 13.9 % (ref 4–15)
NEUTROPHILS # BLD AUTO: 2.1 K/UL (ref 1.8–7.7)
NEUTROPHILS NFR BLD: 52.4 % (ref 38–73)
NITRITE UR QL STRIP: NEGATIVE
NRBC BLD-RTO: 1 /100 WBC
OVALOCYTES BLD QL SMEAR: ABNORMAL
PCO2 BLDA: 50.9 MMHG (ref 35–45)
PH SMN: 7.44 [PH] (ref 7.35–7.45)
PH UR STRIP: 7 [PH] (ref 5–8)
PLATELET # BLD AUTO: 124 K/UL (ref 150–450)
PLATELET BLD QL SMEAR: ABNORMAL
PMV BLD AUTO: 12.3 FL (ref 9.2–12.9)
PO2 BLDA: 65 MMHG (ref 80–100)
POC BE: 11 MMOL/L
POC SATURATED O2: 93 % (ref 95–100)
POIKILOCYTOSIS BLD QL SMEAR: SLIGHT
POTASSIUM SERPL-SCNC: 4 MMOL/L (ref 3.5–5.1)
PROT SERPL-MCNC: 7 G/DL (ref 6–8.4)
PROT UR QL STRIP: ABNORMAL
RBC # BLD AUTO: 3.93 M/UL (ref 4–5.4)
RBC #/AREA URNS HPF: 5 /HPF (ref 0–4)
SAMPLE: ABNORMAL
SARS-COV-2 RDRP RESP QL NAA+PROBE: NEGATIVE
SITE: ABNORMAL
SODIUM SERPL-SCNC: 149 MMOL/L (ref 136–145)
SP GR UR STRIP: 1.01 (ref 1–1.03)
SQUAMOUS #/AREA URNS HPF: 0 /HPF
TARGETS BLD QL SMEAR: ABNORMAL
TROPONIN I SERPL DL<=0.01 NG/ML-MCNC: 0.03 NG/ML (ref 0–0.03)
URN SPEC COLLECT METH UR: ABNORMAL
UROBILINOGEN UR STRIP-ACNC: ABNORMAL EU/DL
WBC # BLD AUTO: 4.03 K/UL (ref 3.9–12.7)
WBC #/AREA URNS HPF: 14 /HPF (ref 0–5)

## 2022-07-27 PROCEDURE — 87086 URINE CULTURE/COLONY COUNT: CPT | Performed by: EMERGENCY MEDICINE

## 2022-07-27 PROCEDURE — 84484 ASSAY OF TROPONIN QUANT: CPT | Performed by: EMERGENCY MEDICINE

## 2022-07-27 PROCEDURE — 36600 WITHDRAWAL OF ARTERIAL BLOOD: CPT

## 2022-07-27 PROCEDURE — 81000 URINALYSIS NONAUTO W/SCOPE: CPT | Performed by: EMERGENCY MEDICINE

## 2022-07-27 PROCEDURE — 21400001 HC TELEMETRY ROOM

## 2022-07-27 PROCEDURE — 99215 OFFICE O/P EST HI 40 MIN: CPT | Mod: ,,, | Performed by: INTERNAL MEDICINE

## 2022-07-27 PROCEDURE — 93005 ELECTROCARDIOGRAM TRACING: CPT

## 2022-07-27 PROCEDURE — 93010 EKG 12-LEAD: ICD-10-PCS | Mod: ,,, | Performed by: INTERNAL MEDICINE

## 2022-07-27 PROCEDURE — 99215 PR OFFICE/OUTPT VISIT, EST, LEVL V, 40-54 MIN: ICD-10-PCS | Mod: ,,, | Performed by: INTERNAL MEDICINE

## 2022-07-27 PROCEDURE — 82803 BLOOD GASES ANY COMBINATION: CPT

## 2022-07-27 PROCEDURE — 25000003 PHARM REV CODE 250: Performed by: NURSE PRACTITIONER

## 2022-07-27 PROCEDURE — 63600175 PHARM REV CODE 636 W HCPCS: Performed by: EMERGENCY MEDICINE

## 2022-07-27 PROCEDURE — 25000003 PHARM REV CODE 250: Performed by: EMERGENCY MEDICINE

## 2022-07-27 PROCEDURE — 63600175 PHARM REV CODE 636 W HCPCS: Performed by: INTERNAL MEDICINE

## 2022-07-27 PROCEDURE — 93010 ELECTROCARDIOGRAM REPORT: CPT | Mod: ,,, | Performed by: INTERNAL MEDICINE

## 2022-07-27 PROCEDURE — 99900035 HC TECH TIME PER 15 MIN (STAT)

## 2022-07-27 PROCEDURE — 80053 COMPREHEN METABOLIC PANEL: CPT | Performed by: EMERGENCY MEDICINE

## 2022-07-27 PROCEDURE — 83880 ASSAY OF NATRIURETIC PEPTIDE: CPT | Performed by: EMERGENCY MEDICINE

## 2022-07-27 PROCEDURE — 83605 ASSAY OF LACTIC ACID: CPT | Performed by: EMERGENCY MEDICINE

## 2022-07-27 PROCEDURE — 25000003 PHARM REV CODE 250: Performed by: INTERNAL MEDICINE

## 2022-07-27 PROCEDURE — 85025 COMPLETE CBC W/AUTO DIFF WBC: CPT | Performed by: EMERGENCY MEDICINE

## 2022-07-27 PROCEDURE — U0002 COVID-19 LAB TEST NON-CDC: HCPCS | Performed by: EMERGENCY MEDICINE

## 2022-07-27 RX ORDER — FUROSEMIDE 10 MG/ML
40 INJECTION INTRAMUSCULAR; INTRAVENOUS DAILY
Status: DISCONTINUED | OUTPATIENT
Start: 2022-07-27 | End: 2022-07-28

## 2022-07-27 RX ORDER — FUROSEMIDE 10 MG/ML
40 INJECTION INTRAMUSCULAR; INTRAVENOUS
Status: COMPLETED | OUTPATIENT
Start: 2022-07-27 | End: 2022-07-27

## 2022-07-27 RX ORDER — QUETIAPINE FUMARATE 25 MG/1
25 TABLET, FILM COATED ORAL NIGHTLY
Status: DISCONTINUED | OUTPATIENT
Start: 2022-07-27 | End: 2022-07-27

## 2022-07-27 RX ORDER — FUROSEMIDE 10 MG/ML
60 INJECTION INTRAMUSCULAR; INTRAVENOUS
Status: COMPLETED | OUTPATIENT
Start: 2022-07-27 | End: 2022-07-27

## 2022-07-27 RX ORDER — SODIUM CHLORIDE 0.9 % (FLUSH) 0.9 %
10 SYRINGE (ML) INJECTION
Status: DISCONTINUED | OUTPATIENT
Start: 2022-07-27 | End: 2022-07-30 | Stop reason: HOSPADM

## 2022-07-27 RX ORDER — QUETIAPINE FUMARATE 25 MG/1
25 TABLET, FILM COATED ORAL NIGHTLY
Status: DISCONTINUED | OUTPATIENT
Start: 2022-07-27 | End: 2022-07-30 | Stop reason: HOSPADM

## 2022-07-27 RX ORDER — CARVEDILOL 3.12 MG/1
3.12 TABLET ORAL 2 TIMES DAILY WITH MEALS
Status: DISCONTINUED | OUTPATIENT
Start: 2022-07-27 | End: 2022-07-27

## 2022-07-27 RX ORDER — CARVEDILOL 3.12 MG/1
3.12 TABLET ORAL 2 TIMES DAILY WITH MEALS
Status: DISCONTINUED | OUTPATIENT
Start: 2022-07-27 | End: 2022-07-30 | Stop reason: HOSPADM

## 2022-07-27 RX ORDER — SCOLOPAMINE TRANSDERMAL SYSTEM 1 MG/1
1 PATCH, EXTENDED RELEASE TRANSDERMAL
Status: DISCONTINUED | OUTPATIENT
Start: 2022-07-27 | End: 2022-07-30 | Stop reason: HOSPADM

## 2022-07-27 RX ADMIN — NITROGLYCERIN 1 INCH: 20 OINTMENT TOPICAL at 03:07

## 2022-07-27 RX ADMIN — CARVEDILOL 3.12 MG: 3.12 TABLET, FILM COATED ORAL at 04:07

## 2022-07-27 RX ADMIN — QUETIAPINE FUMARATE 25 MG: 25 TABLET ORAL at 09:07

## 2022-07-27 RX ADMIN — SACUBITRIL AND VALSARTAN 2 TABLET: 49; 51 TABLET, FILM COATED ORAL at 04:07

## 2022-07-27 RX ADMIN — FUROSEMIDE 60 MG: 10 INJECTION, SOLUTION INTRAMUSCULAR; INTRAVENOUS at 07:07

## 2022-07-27 RX ADMIN — FUROSEMIDE 40 MG: 10 INJECTION, SOLUTION INTRAMUSCULAR; INTRAVENOUS at 03:07

## 2022-07-27 RX ADMIN — CARVEDILOL 3.12 MG: 3.12 TABLET, FILM COATED ORAL at 07:07

## 2022-07-27 RX ADMIN — SACUBITRIL AND VALSARTAN 2 TABLET: 49; 51 TABLET, FILM COATED ORAL at 09:07

## 2022-07-27 RX ADMIN — SCOPOLAMINE 1 PATCH: 1.5 PATCH, EXTENDED RELEASE TRANSDERMAL at 11:07

## 2022-07-27 NOTE — CONSULTS
[facility] - Emergency Dept.  Cardiology  Consult Note    Patient Name: Tanya Madrid  MRN: [id number]  Admission Date: 7/27/2022  Hospital Length of Stay: 0 days  Code Status: DNR   Attending Provider: Soco Suazo MD   Consulting Provider: Yeimy Gamez PA-C  Primary Care Physician: Elva Ansari NP  Principal Problem:Acute combined systolic and diastolic congestive heart failure    Patient information was obtained from patient, relative(s), past medical records and ER records.     Inpatient consult to Cardiology  Consult performed by: Yeimy Gamez PA-C  Consult ordered by: Abhijit Fowler MD        Subjective:     Chief Complaint:  SOB    HPI:   HPI obtained from chart and patient's daughter as patient is essentially non-verbal    Ms. 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 Associated symptoms included BLE edema. No apparent associated fever, chills, palpitations, near syncope, or syncope. Initial workup in ED revealed BNP of 4,900, elevated BP, troponin of 0.028, and lactic acidosis 3.3. CXR showed bilateral pleural effusions and patient subsequently admitted for further evaluation and treatment. Cardiology consulted to assist with management. Patient seen and examined today, resting in bed. Appears frail, ill. Moans at times, but no intelligible speech. Remains SOB with audible gasping. Compliant with her medications, daughter very involved in her care. Followed in CHF clinic. Of note, patient previously admitted to this facility in 3/22 due to cardiac arrest/severe CHF and underwent TTM protocol and required inotropic support with diuresis. Given her co-morbidities and advanced CHF, prognosis remains poor. Echo 5/22 showed EF of 15-20%, DD, pulmonary HTN.          Past Medical History:   Diagnosis Date    Acute CHF (congestive heart failure) 1/17/2021    Hyperlipemia     Hypertension     Parkinson's disease     Stroke 2016    Throat mass        Past Surgical History:   Procedure Laterality Date    CLOSED REDUCTION Right 10/15/2020    Procedure: CLOSED REDUCTION;  Surgeon: Humberto Valdivia DO;  Location: [facility] OR;  Service: Orthopedics;  Laterality: Right;  ATTEMPTED    EVACUATION OF HEMATOMA Right 10/17/2020    Procedure: EVACUATION, HEMATOMA;  Surgeon: Humberto Valdivia DO;  Location: [facility] OR;  Service: Orthopedics;  Laterality: Right;    HEMIARTHROPLASTY OF HIP Left 7/12/2020    Procedure: HEMIARTHROPLASTY, HIP;  Surgeon: Humberto Valdivia DO;  Location: [facility] OR;  Service: Orthopedics;  Laterality: Left;    HEMIARTHROPLASTY OF HIP Right 10/2/2020    Procedure: HEMIARTHROPLASTY, HIP;  Surgeon: Loyd Kearney MD;  Location: [facility] OR;  Service: Orthopedics;  Laterality: Right;    HYSTERECTOMY      INSERTION OF PERCUTANEOUS ENDOSCOPIC GASTROSTOMY (PEG) FEEDING TUBE      OPEN REDUCTION OF DISLOCATION OF HIP Right 10/17/2020    Procedure: OPEN REDUCTION, DISLOCATION, HIP;  Surgeon: Humberto Valdivia DO;  Location: [facility] OR;  Service: Orthopedics;  Laterality: Right;    THROAT SURGERY      TONSILLECTOMY         Review of patient's allergies indicates:   Allergen Reactions    Xanax [alprazolam]        No current facility-administered medications on file prior to encounter.     Current Outpatient Medications on File Prior to Encounter   Medication Sig    carbidopa-levodopa  mg (SINEMET)  mg per tablet 2 tablets by Per G Tube route 3 (three) times daily.    carvediloL (COREG) 3.125 MG tablet TAKE 1 TABLET PER G-TUBE TWICE DAILY AS NEEDED( AS BLOOD PRESSURE TOLERATES)    clopidogreL (PLAVIX) 75 mg tablet 1 tablet (75 mg total) by Per G Tube route once daily.    esomeprazole (NEXIUM) 40 MG capsule Take  40 mg(1 cap)  twice a day  , then after 40  mg(1 cap) daily   Through PEG tube    famotidine (PEPCID) 20 MG tablet Take 20 mg by mouth once daily at 6am.    furosemide (LASIX) 40 MG tablet 1 tablet (40 mg total) by Per G Tube route 2 (two) times a day. Take per G tube twice daily as directed    QUEtiapine (SEROQUEL) 25 MG Tab Take 1 tablet (25 mg total) by mouth once daily. At bedtime    sacubitriL-valsartan (ENTRESTO)  mg per tablet 1 tablet by Per G Tube route 2 (two) times daily.    scopolamine (TRANSDERM-SCOP) 1.3-1.5 mg (1 mg over 3 days) Place 1 patch onto the skin Every 3 (three) days.    traZODone (DESYREL) 50 MG tablet Take 0.5 tablets (25 mg total) by mouth every evening.    [DISCONTINUED] aspirin 81 MG Chew 1 tablet (81 mg total) by Per G Tube route once daily.    [DISCONTINUED] losartan (COZAAR) 50 MG tablet Take 1 tablet (50 mg total) by mouth 2 (two) times a day.    [DISCONTINUED] metoprolol succinate (TOPROL-XL) 50 MG 24 hr tablet Take 1 tablet (50 mg total) by mouth 2 (two) times daily.    [DISCONTINUED] omeprazole magnesium 10 mg SuDR Take 40 mg by mouth once daily. (Patient not taking: Reported on 4/21/2022)    [DISCONTINUED] pantoprazole (PROTONIX) 40 mg suspension Take 1 packet (40 mg total) by mouth 2 (two) times daily.     Family History       Family history is unknown by patient.          Tobacco Use    Smoking status: Never Smoker    Smokeless tobacco: Never Used   Substance and Sexual Activity    Alcohol use: No    Drug use: No    Sexual activity: Not Currently     Review of Systems   Reason unable to perform ROS: per daughter, patient non-verbal.   Cardiovascular:  Positive for dyspnea on exertion and leg swelling.   Respiratory:  Positive for shortness of breath.    Objective:     Vital Signs (Most Recent):  Temp: 98.8 °F (37.1 °C) (07/27/22 0004)  Pulse: 73 (07/27/22 0830)  Resp: (!) 23 (07/27/22 0830)  BP: (!) 151/110 (07/27/22 0830)  SpO2: 95 % (07/27/22 0830)   Vital Signs (24h Range):  Temp:  [98.8 °F  (37.1 °C)] 98.8 °F (37.1 °C)  Pulse:  [] 73  Resp:  [19-30] 23  SpO2:  [84 %-98 %] 95 %  BP: (137-199)/() 151/110     Weight: 61.1 kg (134 lb 11.2 oz)  Body mass index is 24.64 kg/m².    SpO2: 95 %  O2 Device (Oxygen Therapy): room air      Intake/Output Summary (Last 24 hours) at 7/27/2022 0851  Last data filed at 7/27/2022 0717  Gross per 24 hour   Intake --   Output 600 ml   Net -600 ml       Lines/Drains/Airways       Drain  Duration                  Gastrostomy/Enterostomy 03/25/22 1320 Gastrostomy tube w/ balloon LUQ feeding 123 days         Gastrostomy/Enterostomy 06/06/22 1612 Gastrostomy tube w/ balloon midline feeding 50 days              Peripheral Intravenous Line  Duration                  Peripheral IV - Single Lumen 07/27/22 0145 20 G Right Wrist <1 day                    Physical Exam  Vitals and nursing note reviewed.   Constitutional:       General: She is not in acute distress.     Appearance: She is well-developed. She is ill-appearing. She is not diaphoretic.      Comments: Frail, cachetic     HENT:      Head: Normocephalic and atraumatic.   Eyes:      General:         Right eye: No discharge.         Left eye: No discharge.      Pupils: Pupils are equal, round, and reactive to light.   Neck:      Thyroid: No thyromegaly.      Vascular: No JVD.      Trachea: No tracheal deviation.   Cardiovascular:      Rate and Rhythm: Normal rate and regular rhythm.      Heart sounds: Normal heart sounds, S1 normal and S2 normal. No murmur heard.  Pulmonary:      Effort: Respiratory distress (mild) present.      Breath sounds: Rales present. No wheezing.      Comments: Diminished BS at bases  Abdominal:      General: There is no distension.      Palpations: Abdomen is soft.      Tenderness: There is no rebound.      Comments: +PEG tube   Musculoskeletal:      Cervical back: Neck supple.      Right lower leg: Edema present.      Left lower leg: Edema present.      Comments: Upper extremity  contractures   Skin:     General: Skin is warm and dry.      Findings: No erythema.   Neurological:      Mental Status: She is alert.      Comments: Awake alert, moans at times     Psychiatric:         Behavior: Behavior normal.       Significant Labs: CMP   Recent Labs   Lab 07/27/22 0142   *   K 4.0      CO2 31*      BUN 36*   CREATININE 1.0   CALCIUM 9.2   PROT 7.0   ALBUMIN 3.3*   BILITOT 0.5   ALKPHOS 153*   AST 33   ALT 38   ANIONGAP 13   ESTGFRAFRICA 60   EGFRNONAA 52*   , CBC   Recent Labs   Lab 07/27/22 0142   WBC 4.03   HGB 10.1*   HCT 34.8*   *   , Troponin   Recent Labs   Lab 07/27/22 0142   TROPONINI 0.028*   , and All pertinent lab results from the last 24 hours have been reviewed.    Significant Imaging: Echocardiogram: Transthoracic echo (TTE) complete ([facility] Only):   Results for orders placed or performed during the hospital encounter of 05/27/22   Echo   Result Value Ref Range    BSA 1.76 m2    TDI SEPTAL 0.05 m/s    LV LATERAL E/E' RATIO 12.83 m/s    LV SEPTAL E/E' RATIO 15.40 m/s    IVC diameter 2.38 cm    Left Ventricular Outflow Tract Mean Velocity 0.27683305935 cm/s    Left Ventricular Outflow Tract Mean Gradient 1.32 mmHg    TDI LATERAL 0.06 m/s    LVIDd 4.19 3.5 - 6.0 cm    IVS 1.47 (A) 0.6 - 1.1 cm    Posterior Wall 1.32 (A) 0.6 - 1.1 cm    Ao root annulus 3.12 cm    LVIDs 3.83 2.1 - 4.0 cm    FS 9 28 - 44 %    Sinus 2.80 cm    STJ 2.62 cm    Ascending aorta 2.75 cm    LV mass 222.33 g    LA size 3.83 cm    TAPSE 1.49 cm    Left Ventricle Relative Wall Thickness 0.63 cm    AV mean gradient 4 mmHg    AV valve area 2.12 cm2    AV Velocity Ratio 0.60     AV index (prosthetic) 0.64     MV valve area p 1/2 method 6.65 cm2    E/A ratio 1.51     Mean e' 0.06 m/s    E wave deceleration time 114.151851296603485 msec    IVRT 99.385055513509322 msec    LVOT diameter 2.06 cm    LVOT area 3.3 cm2    LVOT peak angel 0.87 m/s    LVOT peak VTI 16.30 cm    Ao peak angel 1.44 m/s     Ao VTI 25.6 cm    RVOT peak harsha 0.37 m/s    RVOT peak VTI 7.6 cm    LVOT stroke volume 54.30 cm3    AV peak gradient 8 mmHg    PV mean gradient 0.44 mmHg    E/E' ratio 14.00 m/s    MV Peak E Harsha 0.77 m/s    TR Max Harsha 3.53 m/s    MV stenosis pressure 1/2 time 33.094025785067085 ms    MV Peak A Harsha 0.51 m/s    LV Systolic Volume 63.31 mL    LV Systolic Volume Index 36.8 mL/m2    LV Diastolic Volume 78.31 mL    LV Diastolic Volume Index 45.53 mL/m2    LV Mass Index 129 g/m2    RA Major Axis 4.96 cm    Left Atrium Minor Axis 3.44 cm    Left Atrium Major Axis 4.30 cm    Triscuspid Valve Regurgitation Peak Gradient 50 mmHg    RA Width 4.18 cm    Right Atrial Pressure (from IVC) 8 mmHg    EF 20 %    TV rest pulmonary artery pressure 58 mmHg    Narrative    · The left ventricle is mildly enlarged with concentric hypertrophy and   severely decreased systolic function.  · The estimated ejection fraction is 15 - 20%.  · Grade I left ventricular diastolic dysfunction.  · Mild right ventricular enlargement with moderately reduced right   ventricular systolic function.  · Severe right atrial enlargement.  · Moderate left atrial enlargement.  · There is pulmonary hypertension.  · The estimated PA systolic pressure is 58 mmHg.  · No change from prior echo March 2022.      , EKG: Reviewed, and X-Ray: CXR: X-Ray Chest 1 View (CXR): No results found for this visit on 07/27/22. and X-Ray Chest PA and Lateral (CXR): No results found for this visit on 07/27/22.    Assessment and Plan:   Patient with severe underlying CHF who presents with volume overload. Continue IV diuresis and OMT as tolerated. Prognosis remains poor.     Acute on chronic combined systolic and diastolic congestive heart failure  -Known severe underlying CHF with EF of 15-20%, presents with volume overload  -Continue IV diuresis  -Continue BB, Entresto  -Strict I's/O's  -Given patient's history, age, co-morbidities, prognosis remains poor      Elevated  troponin  -Troponin 0.028  -Elevation secondary to demand ischemia from severe decompensated CHF  -Continue supportive care/OMT as tolerated-Plavix, BB    Bilateral pleural effusion  -Assess response to IV diuresis     Parkinson disease  -Mgmt as per primary team    Hyperlipidemia  -As per primary team    H/O Essential hypertension  -Continue BB, Entresto  -Monitor BP trend        VTE Risk Mitigation (From admission, onward)         Ordered     IP VTE HIGH RISK PATIENT  Once         07/27/22 0640     Place sequential compression device  Until discontinued         07/27/22 0640                Thank you for your consult. I will follow-up with patient. Please contact us if you have any additional questions.    Yeimy Gamez PA-C  Cardiology   [facility] - Emergency Dept.

## 2022-07-27 NOTE — SUBJECTIVE & OBJECTIVE
Past Medical History:   Diagnosis Date    Acute CHF (congestive heart failure) 1/17/2021    Hyperlipemia     Hypertension     Parkinson's disease     Stroke 2016    Throat mass        Past Surgical History:   Procedure Laterality Date    CLOSED REDUCTION Right 10/15/2020    Procedure: CLOSED REDUCTION;  Surgeon: Humberto Valdivia DO;  Location: [facility] OR;  Service: Orthopedics;  Laterality: Right;  ATTEMPTED    EVACUATION OF HEMATOMA Right 10/17/2020    Procedure: EVACUATION, HEMATOMA;  Surgeon: Humberto Valdivia DO;  Location: [facility] OR;  Service: Orthopedics;  Laterality: Right;    HEMIARTHROPLASTY OF HIP Left 7/12/2020    Procedure: HEMIARTHROPLASTY, HIP;  Surgeon: Humberto Valdivia DO;  Location: [facility] OR;  Service: Orthopedics;  Laterality: Left;    HEMIARTHROPLASTY OF HIP Right 10/2/2020    Procedure: HEMIARTHROPLASTY, HIP;  Surgeon: Loyd Kearney MD;  Location: [facility] OR;  Service: Orthopedics;  Laterality: Right;    HYSTERECTOMY      INSERTION OF PERCUTANEOUS ENDOSCOPIC GASTROSTOMY (PEG) FEEDING TUBE      OPEN REDUCTION OF DISLOCATION OF HIP Right 10/17/2020    Procedure: OPEN REDUCTION, DISLOCATION, HIP;  Surgeon: Humberto Valdivia DO;  Location: [facility] OR;  Service: Orthopedics;  Laterality: Right;    THROAT SURGERY      TONSILLECTOMY         Review of patient's allergies indicates:   Allergen Reactions    Xanax [alprazolam]        No current facility-administered medications on file prior to encounter.     Current Outpatient Medications on File Prior to Encounter   Medication Sig    carbidopa-levodopa  mg (SINEMET)  mg per tablet 2 tablets by Per G Tube route 3 (three) times daily.    carvediloL (COREG) 3.125 MG tablet TAKE 1 TABLET PER G-TUBE TWICE DAILY AS NEEDED( AS BLOOD PRESSURE TOLERATES)    clopidogreL (PLAVIX) 75 mg tablet 1 tablet (75 mg total) by Per G Tube route once daily.    esomeprazole (NEXIUM) 40 MG capsule Take  40 mg(1 cap)  twice a day  , then after 40 mg(1 cap) daily   Through  PEG tube    famotidine (PEPCID) 20 MG tablet Take 20 mg by mouth once daily at 6am.    furosemide (LASIX) 40 MG tablet 1 tablet (40 mg total) by Per G Tube route 2 (two) times a day. Take per G tube twice daily as directed    QUEtiapine (SEROQUEL) 25 MG Tab Take 1 tablet (25 mg total) by mouth once daily. At bedtime    sacubitriL-valsartan (ENTRESTO)  mg per tablet 1 tablet by Per G Tube route 2 (two) times daily.    scopolamine (TRANSDERM-SCOP) 1.3-1.5 mg (1 mg over 3 days) Place 1 patch onto the skin Every 3 (three) days.    traZODone (DESYREL) 50 MG tablet Take 0.5 tablets (25 mg total) by mouth every evening.    [DISCONTINUED] aspirin 81 MG Chew 1 tablet (81 mg total) by Per G Tube route once daily.    [DISCONTINUED] losartan (COZAAR) 50 MG tablet Take 1 tablet (50 mg total) by mouth 2 (two) times a day.    [DISCONTINUED] metoprolol succinate (TOPROL-XL) 50 MG 24 hr tablet Take 1 tablet (50 mg total) by mouth 2 (two) times daily.    [DISCONTINUED] omeprazole magnesium 10 mg SuDR Take 40 mg by mouth once daily. (Patient not taking: Reported on 4/21/2022)    [DISCONTINUED] pantoprazole (PROTONIX) 40 mg suspension Take 1 packet (40 mg total) by mouth 2 (two) times daily.     Family History       Family history is unknown by patient.          Tobacco Use    Smoking status: Never Smoker    Smokeless tobacco: Never Used   Substance and Sexual Activity    Alcohol use: No    Drug use: No    Sexual activity: Not Currently     Review of Systems   Reason unable to perform ROS: per daughter, patient non-verbal.   Cardiovascular:  Positive for dyspnea on exertion and leg swelling.   Respiratory:  Positive for shortness of breath.    Objective:     Vital Signs (Most Recent):  Temp: 98.8 °F (37.1 °C) (07/27/22 0004)  Pulse: 73 (07/27/22 0830)  Resp: (!) 23 (07/27/22 0830)  BP: (!) 151/110 (07/27/22 0830)  SpO2: 95 % (07/27/22 0830)   Vital Signs (24h Range):  Temp:  [98.8 °F (37.1 °C)] 98.8 °F (37.1 °C)  Pulse:  []  73  Resp:  [19-30] 23  SpO2:  [84 %-98 %] 95 %  BP: (137-199)/() 151/110     Weight: 61.1 kg (134 lb 11.2 oz)  Body mass index is 24.64 kg/m².    SpO2: 95 %  O2 Device (Oxygen Therapy): room air      Intake/Output Summary (Last 24 hours) at 7/27/2022 0851  Last data filed at 7/27/2022 0717  Gross per 24 hour   Intake --   Output 600 ml   Net -600 ml       Lines/Drains/Airways       Drain  Duration                  Gastrostomy/Enterostomy 03/25/22 1320 Gastrostomy tube w/ balloon LUQ feeding 123 days         Gastrostomy/Enterostomy 06/06/22 1612 Gastrostomy tube w/ balloon midline feeding 50 days              Peripheral Intravenous Line  Duration                  Peripheral IV - Single Lumen 07/27/22 0145 20 G Right Wrist <1 day                    Physical Exam  Vitals and nursing note reviewed.   Constitutional:       General: She is not in acute distress.     Appearance: She is well-developed. She is ill-appearing. She is not diaphoretic.      Comments: Frail, cachetic     HENT:      Head: Normocephalic and atraumatic.   Eyes:      General:         Right eye: No discharge.         Left eye: No discharge.      Pupils: Pupils are equal, round, and reactive to light.   Neck:      Thyroid: No thyromegaly.      Vascular: No JVD.      Trachea: No tracheal deviation.   Cardiovascular:      Rate and Rhythm: Normal rate and regular rhythm.      Heart sounds: Normal heart sounds, S1 normal and S2 normal. No murmur heard.  Pulmonary:      Effort: Respiratory distress (mild) present.      Breath sounds: Rales present. No wheezing.      Comments: Diminished BS at bases  Abdominal:      General: There is no distension.      Palpations: Abdomen is soft.      Tenderness: There is no rebound.      Comments: +PEG tube   Musculoskeletal:      Cervical back: Neck supple.      Right lower leg: Edema present.      Left lower leg: Edema present.      Comments: Upper extremity contractures   Skin:     General: Skin is warm and dry.       Findings: No erythema.   Neurological:      Mental Status: She is alert.      Comments: Awake alert, moans at times     Psychiatric:         Behavior: Behavior normal.       Significant Labs: CMP   Recent Labs   Lab 07/27/22 0142   *   K 4.0      CO2 31*      BUN 36*   CREATININE 1.0   CALCIUM 9.2   PROT 7.0   ALBUMIN 3.3*   BILITOT 0.5   ALKPHOS 153*   AST 33   ALT 38   ANIONGAP 13   ESTGFRAFRICA 60   EGFRNONAA 52*   , CBC   Recent Labs   Lab 07/27/22 0142   WBC 4.03   HGB 10.1*   HCT 34.8*   *   , Troponin   Recent Labs   Lab 07/27/22 0142   TROPONINI 0.028*   , and All pertinent lab results from the last 24 hours have been reviewed.    Significant Imaging: Echocardiogram: Transthoracic echo (TTE) complete (Cupid Only):   Results for orders placed or performed during the hospital encounter of 05/27/22   Echo   Result Value Ref Range    BSA 1.76 m2    TDI SEPTAL 0.05 m/s    LV LATERAL E/E' RATIO 12.83 m/s    LV SEPTAL E/E' RATIO 15.40 m/s    IVC diameter 2.38 cm    Left Ventricular Outflow Tract Mean Velocity 0.15367085141 cm/s    Left Ventricular Outflow Tract Mean Gradient 1.32 mmHg    TDI LATERAL 0.06 m/s    LVIDd 4.19 3.5 - 6.0 cm    IVS 1.47 (A) 0.6 - 1.1 cm    Posterior Wall 1.32 (A) 0.6 - 1.1 cm    Ao root annulus 3.12 cm    LVIDs 3.83 2.1 - 4.0 cm    FS 9 28 - 44 %    Sinus 2.80 cm    STJ 2.62 cm    Ascending aorta 2.75 cm    LV mass 222.33 g    LA size 3.83 cm    TAPSE 1.49 cm    Left Ventricle Relative Wall Thickness 0.63 cm    AV mean gradient 4 mmHg    AV valve area 2.12 cm2    AV Velocity Ratio 0.60     AV index (prosthetic) 0.64     MV valve area p 1/2 method 6.65 cm2    E/A ratio 1.51     Mean e' 0.06 m/s    E wave deceleration time 114.759614953140377 msec    IVRT 99.563698206926578 msec    LVOT diameter 2.06 cm    LVOT area 3.3 cm2    LVOT peak angel 0.87 m/s    LVOT peak VTI 16.30 cm    Ao peak angel 1.44 m/s    Ao VTI 25.6 cm    RVOT peak angel 0.37 m/s    RVOT peak  VTI 7.6 cm    LVOT stroke volume 54.30 cm3    AV peak gradient 8 mmHg    PV mean gradient 0.44 mmHg    E/E' ratio 14.00 m/s    MV Peak E Harsha 0.77 m/s    TR Max Harsha 3.53 m/s    MV stenosis pressure 1/2 time 33.604772604544875 ms    MV Peak A Harsha 0.51 m/s    LV Systolic Volume 63.31 mL    LV Systolic Volume Index 36.8 mL/m2    LV Diastolic Volume 78.31 mL    LV Diastolic Volume Index 45.53 mL/m2    LV Mass Index 129 g/m2    RA Major Axis 4.96 cm    Left Atrium Minor Axis 3.44 cm    Left Atrium Major Axis 4.30 cm    Triscuspid Valve Regurgitation Peak Gradient 50 mmHg    RA Width 4.18 cm    Right Atrial Pressure (from IVC) 8 mmHg    EF 20 %    TV rest pulmonary artery pressure 58 mmHg    Narrative    · The left ventricle is mildly enlarged with concentric hypertrophy and   severely decreased systolic function.  · The estimated ejection fraction is 15 - 20%.  · Grade I left ventricular diastolic dysfunction.  · Mild right ventricular enlargement with moderately reduced right   ventricular systolic function.  · Severe right atrial enlargement.  · Moderate left atrial enlargement.  · There is pulmonary hypertension.  · The estimated PA systolic pressure is 58 mmHg.  · No change from prior echo March 2022.      , EKG: Reviewed, and X-Ray: CXR: X-Ray Chest 1 View (CXR): No results found for this visit on 07/27/22. and X-Ray Chest PA and Lateral (CXR): No results found for this visit on 07/27/22.

## 2022-07-27 NOTE — H&P
[place] [place] - Emergency Dept.  [facility] Medicine  History & Physical    Patient Name: Tanya Madrid  MRN: [id number]  Patient Class: OP- Observation  Admission Date: 7/27/2022  Attending Physician: Mj Nunez MD   Primary Care Provider: Elva Ansari NP         Patient information was obtained from relative(s) and ER records.     Subjective:     Principal Problem:Acute combined systolic and diastolic congestive heart failure    Chief Complaint:   Chief Complaint   Patient presents with    Shortness of Breath     Family states + Sob today.  + rales noted. Pt has difficulty clearing airway. Mental status baseline s/p stroke per daughter        HPI:   Patient is non verbal  Daughter is by bedside.     82 year old  female patient with a PMHx of acute CHF, HTN, stroke,s/p prior cardiac arrest  who presents to the Emergency Department for evaluation of congestion which onset gradually 1 day pta.  Daughter reported that she sounded congested and has leg swelling .Daughter denies any history of fever or chills.  Labs and imaging test-  Chest x-ray -vascular congestion.        Component      Latest Ref Rng & Units 7/27/2022 7/19/2022 5/3/2022               POC PH      7.35 - 7.45 7.441 7.514 (H) 7.485 (H)   POC PCO2      35 - 45 mmHg 50.9 (H) 39.8 33.5 (L)   POC PO2      80 - 100 mmHg 65 (L) 66 (L) 61 (L)     Component      Latest Ref Rng & Units 7/27/2022 7/7/2022   BNP      0 - 99 pg/mL >4,900 (H) >4,900 (H)   Sodium -149  · Recent echo-06/2022  · The left ventricle is mildly enlarged with concentric hypertrophy and severely decreased systolic function.  · The estimated ejection fraction is 15 - 20%.  · Grade I left ventricular diastolic dysfunction.  · Mild right ventricular enlargement with moderately reduced right ventricular systolic function.  · Severe right atrial enlargement.  · Moderate left atrial enlargement.  · There is pulmonary hypertension.  · The estimated PA systolic pressure is 58 mmHg.  · No change  from prior echo March 2022.           Past Medical History:   Diagnosis Date    Acute CHF (congestive heart failure) 1/17/2021    Hyperlipemia     Hypertension     Parkinson's disease     Stroke 2016    Throat mass        Past Surgical History:   Procedure Laterality Date    CLOSED REDUCTION Right 10/15/2020    Procedure: CLOSED REDUCTION;  Surgeon: Humberto Valdivia DO;  Location: [facility] OR;  Service: Orthopedics;  Laterality: Right;  ATTEMPTED    EVACUATION OF HEMATOMA Right 10/17/2020    Procedure: EVACUATION, HEMATOMA;  Surgeon: Humberto Valdivia DO;  Location: [facility] OR;  Service: Orthopedics;  Laterality: Right;    HEMIARTHROPLASTY OF HIP Left 7/12/2020    Procedure: HEMIARTHROPLASTY, HIP;  Surgeon: Humberto Valdivia DO;  Location: [facility] OR;  Service: Orthopedics;  Laterality: Left;    HEMIARTHROPLASTY OF HIP Right 10/2/2020    Procedure: HEMIARTHROPLASTY, HIP;  Surgeon: Loyd Kearney MD;  Location: [facility] OR;  Service: Orthopedics;  Laterality: Right;    HYSTERECTOMY      INSERTION OF PERCUTANEOUS ENDOSCOPIC GASTROSTOMY (PEG) FEEDING TUBE      OPEN REDUCTION OF DISLOCATION OF HIP Right 10/17/2020    Procedure: OPEN REDUCTION, DISLOCATION, HIP;  Surgeon: Humberto Valdivia DO;  Location: [facility] OR;  Service: Orthopedics;  Laterality: Right;    THROAT SURGERY      TONSILLECTOMY         Review of patient's allergies indicates:   Allergen Reactions    Xanax [alprazolam]        No current facility-administered medications on file prior to encounter.     Current Outpatient Medications on File Prior to Encounter   Medication Sig    carbidopa-levodopa  mg (SINEMET)  mg per tablet 2 tablets by Per G Tube route 3 (three) times daily.    carvediloL (COREG) 3.125 MG tablet TAKE 1 TABLET PER G-TUBE TWICE DAILY AS NEEDED( AS BLOOD PRESSURE TOLERATES)    clopidogreL (PLAVIX) 75 mg tablet 1 tablet (75 mg total) by Per G Tube route once daily.    esomeprazole (NEXIUM) 40 MG capsule Take  40 mg(1  cap)  twice a day  , then after 40 mg(1 cap) daily   Through PEG tube    famotidine (PEPCID) 20 MG tablet Take 20 mg by mouth once daily at 6am.    furosemide (LASIX) 40 MG tablet 1 tablet (40 mg total) by Per G Tube route 2 (two) times a day. Take per G tube twice daily as directed    QUEtiapine (SEROQUEL) 25 MG Tab Take 1 tablet (25 mg total) by mouth once daily. At bedtime    sacubitriL-valsartan (ENTRESTO)  mg per tablet 1 tablet by Per G Tube route 2 (two) times daily.    scopolamine (TRANSDERM-SCOP) 1.3-1.5 mg (1 mg over 3 days) Place 1 patch onto the skin Every 3 (three) days.    traZODone (DESYREL) 50 MG tablet Take 0.5 tablets (25 mg total) by mouth every evening.    [DISCONTINUED] aspirin 81 MG Chew 1 tablet (81 mg total) by Per G Tube route once daily.    [DISCONTINUED] losartan (COZAAR) 50 MG tablet Take 1 tablet (50 mg total) by mouth 2 (two) times a day.    [DISCONTINUED] metoprolol succinate (TOPROL-XL) 50 MG 24 hr tablet Take 1 tablet (50 mg total) by mouth 2 (two) times daily.    [DISCONTINUED] omeprazole magnesium 10 mg SuDR Take 40 mg by mouth once daily. (Patient not taking: Reported on 4/21/2022)    [DISCONTINUED] pantoprazole (PROTONIX) 40 mg suspension Take 1 packet (40 mg total) by mouth 2 (two) times daily.     Family History       Family history is unknown by patient.          Tobacco Use    Smoking status: Never Smoker    Smokeless tobacco: Never Used   Substance and Sexual Activity    Alcohol use: No    Drug use: No    Sexual activity: Not Currently     Review of Systems   Unable to perform ROS: Patient nonverbal   Objective:     Vital Signs (Most Recent):  Temp: 98.8 °F (37.1 °C) (07/27/22 0004)  Pulse: 69 (07/27/22 0504)  Resp: (!) 24 (07/27/22 0504)  BP: (!) 150/98 (07/27/22 0504)  SpO2: (!) 90 % (07/27/22 0416)   Vital Signs (24h Range):  Temp:  [98.8 °F (37.1 °C)] 98.8 °F (37.1 °C)  Pulse:  [] 69  Resp:  [19-30] 24  SpO2:  [84 %-98 %] 90 %  BP:  (137-199)/(92-98) 150/98     Weight: 61.1 kg (134 lb 11.2 oz)  Body mass index is 24.64 kg/m².    Physical Exam  Vitals and nursing note reviewed.   Constitutional:       Comments: Patient is non verbal   HENT:      Head: Normocephalic and atraumatic.      Mouth/Throat:      Mouth: Mucous membranes are moist.   Eyes:      General:         Right eye: No discharge.         Left eye: No discharge.   Cardiovascular:      Rate and Rhythm: Normal rate.   Pulmonary:      Breath sounds: Rales present.      Comments: Coarse breath sounds  Abdominal:      General: Abdomen is flat.      Comments: has PEG tube   Musculoskeletal:         General: Swelling present.      Cervical back: Normal range of motion.      Right lower leg: Edema present.      Left lower leg: Edema present.   Skin:     General: Skin is warm.   Neurological:      Mental Status: Mental status is at baseline.           Significant Labs: All pertinent labs within the past 24 hours have been reviewed.  BMP:   Recent Labs   Lab 07/27/22 0142      *   K 4.0      CO2 31*   BUN 36*   CREATININE 1.0   CALCIUM 9.2     CBC:   Recent Labs   Lab 07/27/22 0142   WBC 4.03   HGB 10.1*   HCT 34.8*   *       Significant Imaging: I have reviewed all pertinent imaging results/findings within the past 24 hours.    Assessment/Plan:     * Acute combined systolic and diastolic congestive heart failure  Will continue diuresis with lasix, cardiology consult . On entresto  Chest x-ray is shown below.      Advanced care planning/counseling discussion  She is DNR      Recent H/O Out of hospital Cardiac arrest  She is DNR, supportive care       Parkinson disease    Will resume Sinemet     Kyphosis of thoracic region  Continue supportive care         VTE Risk Mitigation (From admission, onward)    None             Abhijit Fowler MD  Department of Hospital Medicine   [facility] - Emergency Dept.

## 2022-07-27 NOTE — HPI
Patient is non verbal  Daughter is by bedside.     82 year old  female patient with a PMHx of acute CHF, HTN, stroke,s/p prior cardiac arrest  who presents to the Emergency Department for evaluation of congestion which onset gradually 1 day pta.  Daughter reported that she sounded congested and has leg swelling .Daughter denies any history of fever or chills.  Labs and imaging test-  Chest x-ray -vascular congestion.        Component      Latest Ref Rng & Units 7/27/2022 7/19/2022 5/3/2022               POC PH      7.35 - 7.45 7.441 7.514 (H) 7.485 (H)   POC PCO2      35 - 45 mmHg 50.9 (H) 39.8 33.5 (L)   POC PO2      80 - 100 mmHg 65 (L) 66 (L) 61 (L)     Component      Latest Ref Rng & Units 7/27/2022 7/7/2022   BNP      0 - 99 pg/mL >4,900 (H) >4,900 (H)   Sodium -149  Recent echo-06/2022  The left ventricle is mildly enlarged with concentric hypertrophy and severely decreased systolic function.  The estimated ejection fraction is 15 - 20%.  Grade I left ventricular diastolic dysfunction.  Mild right ventricular enlargement with moderately reduced right ventricular systolic function.  Severe right atrial enlargement.  Moderate left atrial enlargement.  There is pulmonary hypertension.  The estimated PA systolic pressure is 58 mmHg.  No change from prior echo March 2022.

## 2022-07-27 NOTE — PHARMACY MED REC
"Admission Medication History     The home medication history was taken by Patrick Estrada.    You may go to "Admission" then "Reconcile Home Medications" tabs to review and/or act upon these items.      The home medication list has been updated by the Pharmacy department.    Please read ALL comments highlighted in yellow.    Please address this information as you see fit.     Feel free to contact us if you have any questions or require assistance.      The medications listed below were removed from the home medication list. Please reorder if appropriate:  Patient reports no longer taking the following medication(s):   ASPRIN 81 MG CHEWABLE TABLET   CARBIDOPA-LEVODOPA  MG PER TABLET   FAMOTIDINE 20 MG TABLET   LOSARTAN 50 MG TABLET   METOPROLOL SUCCINATE 50 MG 24 HR TABLET   OMEPRAZOLE 40 MG CAPSULE   PANTOPRAZOLE 40 MG SUSPENSION    Medications listed below were obtained from: Patient/family, [employer] and Patient's pharmacy  (Not in a hospital admission)      Potential issues to be addressed PRIOR TO DISCHARGE: NONE    Patrick Estrada CPhT  Pharmacy Technician Specialist-Medication History  [facility] [phone]  Secure chat preferred     Current Outpatient Medications on File Prior to Encounter   Medication Sig Dispense Refill Last Dose    carvediloL (COREG) 3.125 MG tablet TAKE 1 TABLET PER G-TUBE TWICE DAILY AS NEEDED( AS BLOOD PRESSURE TOLERATES) 180 tablet 3 7/26/2022 at Unknown time    clopidogreL (PLAVIX) 75 mg tablet 1 tablet (75 mg total) by Per G Tube route once daily. 30 tablet 0 7/26/2022 at Unknown time    esomeprazole (NEXIUM) 40 MG capsule Take  40 mg(1 cap)  twice a day  , then after 40 mg(1 cap) daily   Through PEG tube 60 capsule 3 7/26/2022 at Unknown time    furosemide (LASIX) 40 MG tablet 1 tablet (40 mg total) by Per G Tube route 2 (two) times a day. Take per G tube twice daily as directed 180 tablet 3 7/26/2022 at Unknown time    QUEtiapine (SEROQUEL) 25 " MG Tab Take 1 tablet (25 mg total) by mouth once daily. At bedtime (Patient taking differently: Take 25 mg by mouth nightly as needed.) 30 tablet 11 Past Week at Monday, 07/25/22    sacubitriL-valsartan (ENTRESTO)  mg per tablet 1 tablet by Per G Tube route 2 (two) times daily. 60 tablet 6 7/26/2022 at Unknown time    scopolamine (TRANSDERM-SCOP) 1.3-1.5 mg (1 mg over 3 days) Place 1 patch onto the skin Every 3 (three) days. (Patient taking differently: Place 1 patch onto the skin Every 3 (three) days. as needed for coughing and secretions.) 10 patch 3 7/27/2022 at Unknown time    traZODone (DESYREL) 50 MG tablet Take 0.5 tablets (25 mg total) by mouth every evening. 15 tablet 3 Past Week at Unknown time    [DISCONTINUED] aspirin 81 MG Chew 1 tablet (81 mg total) by Per G Tube route once daily.  0     [DISCONTINUED] carbidopa-levodopa  mg (SINEMET)  mg per tablet 2 tablets by Per G Tube route 3 (three) times daily. 180 tablet 0     [DISCONTINUED] famotidine (PEPCID) 20 MG tablet Take 20 mg by mouth once daily at 6am.       [DISCONTINUED] losartan (COZAAR) 50 MG tablet Take 1 tablet (50 mg total) by mouth 2 (two) times a day. 60 tablet 6     [DISCONTINUED] metoprolol succinate (TOPROL-XL) 50 MG 24 hr tablet Take 1 tablet (50 mg total) by mouth 2 (two) times daily. 60 tablet 6     [DISCONTINUED] omeprazole magnesium 10 mg SuDR Take 40 mg by mouth once daily. (Patient not taking: Reported on 4/21/2022) 120 each 0     [DISCONTINUED] pantoprazole (PROTONIX) 40 mg suspension Take 1 packet (40 mg total) by mouth 2 (two) times daily. 60 packet 3                                  .

## 2022-07-27 NOTE — ASSESSMENT & PLAN NOTE
Will continue diuresis with lasix, cardiology consult . On entresto  Chest x-ray is shown below.

## 2022-07-27 NOTE — CONSULTS
Food & Nutrition  Education    Diet Education: Low Sodium and Fluid Restriction   Learners: Patient       Nutrition Education provided with handouts:       Comments:  Education will be provided upon d/c. Patient in ER. Will continue to monitor.     Please Re-consult as needed    Thanks!  Magalis Hernández RD,LDN

## 2022-07-27 NOTE — ASSESSMENT & PLAN NOTE
-Troponin 0.028  -Elevation secondary to demand ischemia from severe decompensated CHF  -Continue supportive care/OMT as tolerated-Plavix, BB

## 2022-07-27 NOTE — PLAN OF CARE
POC reviewed with pt. Pt verbalizes understanding of POC. No questions at this time.  Patient arouses to voice.  NADN.  G Tube in place.   NSR on cardiac monitor.  Purewick in place.   Waffle mattress in place.   Pt remains free of falls tonight.  No complaints at this time.  Safety measures in place. Will continue to monitor.

## 2022-07-27 NOTE — ED NOTES
Multiple attempts made by night shift to get pulse ox reading. Attempts were unsuccessful. Will request respiratory to assist.

## 2022-07-27 NOTE — HPI
HPI obtained from chart and patient's daughter as patient is essentially non-verbal    Ms. 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 Associated symptoms included BLE edema. No apparent associated fever, chills, palpitations, near syncope, or syncope. Initial workup in ED revealed BNP of 4,900, elevated BP, troponin of 0.028, and lactic acidosis 3.3. CXR showed bilateral pleural effusions and patient subsequently admitted for further evaluation and treatment. Cardiology consulted to assist with management. Patient seen and examined today, resting in bed. Appears frail, ill. Moans at times, but no intelligible speech. Remains SOB with audible gasping. Compliant with her medications, daughter very involved in her care. Followed in CHF clinic. Of note, patient previously admitted to this facility in 3/22 due to cardiac arrest/severe CHF and underwent TTM protocol and required inotropic support with diuresis. Given her co-morbidities and advanced CHF, prognosis remains poor. Echo 5/22 showed EF of 15-20%, DD, pulmonary HTN.

## 2022-07-27 NOTE — SIGNIFICANT EVENT
81 yo female admitted for acute on chronic systolic and diastolic HF LVEF 10%. Patient being treated with IV LASIX. Continue BB and entresto. Cardiology has been consulted. I agree with current medical management plan.

## 2022-07-27 NOTE — ASSESSMENT & PLAN NOTE
-Known severe underlying CHF with EF of 15-20%, presents with volume overload  -Continue IV diuresis  -Continue BB, Entresto  -Strict I's/O's  -Given patient's history, age, co-morbidities, prognosis remains poor

## 2022-07-27 NOTE — ED PROVIDER NOTES
SCRIBE #1 NOTE: I, Phani Fry, am scribing for, and in the presence of, Mj Nunez MD. I have scribed the entire note.       History     Chief Complaint   Patient presents with    Shortness of Breath     Family states + Sob today.  + rales noted. Pt has difficulty clearing airway. Mental status baseline s/p stroke per daughter     Review of patient's allergies indicates:   Allergen Reactions    Xanax [alprazolam]          History of Present Illness     HPI    7/27/2022, 12:27 AM  History obtained from the daughter     HPI limited due to patient being nonverbal.      History of Present Illness: Tanya Madrid is a 82 y.o. female patient with a PMHx of acute CHF, HTN, stroke who presents to the Emergency Department for evaluation of congestion which onset gradually 1 day pta. Pt's daughter states the pt sounding congested and has gradually been worsening. Symptoms are constant and moderate in severity. Patient's daughter denies any fever, cough, vomiting, diarrhea, diaphoresis, and all other sxs at this time. No prior Tx reported.       Arrival mode: Personal vehicle    PCP: Elva Ansari NP        Past Medical History:  Past Medical History:   Diagnosis Date    Acute CHF (congestive heart failure) 1/17/2021    Hyperlipemia     Hypertension     Parkinson's disease     Stroke 2016    Throat mass        Past Surgical History:  Past Surgical History:   Procedure Laterality Date    CLOSED REDUCTION Right 10/15/2020    Procedure: CLOSED REDUCTION;  Surgeon: Humberto Valdivia DO;  Location: [facility] OR;  Service: Orthopedics;  Laterality: Right;  ATTEMPTED    EVACUATION OF HEMATOMA Right 10/17/2020    Procedure: EVACUATION, HEMATOMA;  Surgeon: Humberto Valdivia DO;  Location: [facility] OR;  Service: Orthopedics;  Laterality: Right;    HEMIARTHROPLASTY OF HIP Left 7/12/2020    Procedure: HEMIARTHROPLASTY, HIP;  Surgeon: Humberto Valdivia DO;  Location: [facility] OR;  Service: Orthopedics;  Laterality: Left;     HEMIARTHROPLASTY OF HIP Right 10/2/2020    Procedure: HEMIARTHROPLASTY, HIP;  Surgeon: Loyd Kearney MD;  Location: [facility] OR;  Service: Orthopedics;  Laterality: Right;    HYSTERECTOMY      INSERTION OF PERCUTANEOUS ENDOSCOPIC GASTROSTOMY (PEG) FEEDING TUBE      OPEN REDUCTION OF DISLOCATION OF HIP Right 10/17/2020    Procedure: OPEN REDUCTION, DISLOCATION, HIP;  Surgeon: Humberto Valdivia DO;  Location: [facility] OR;  Service: Orthopedics;  Laterality: Right;    THROAT SURGERY      TONSILLECTOMY           Family History:  Family History   Family history unknown: Yes       Social History:  Social History     Tobacco Use    Smoking status: Never Smoker    Smokeless tobacco: Never Used   Substance and Sexual Activity    Alcohol use: No    Drug use: No    Sexual activity: Not Currently        Review of Systems     Review of Systems   Unable to perform ROS: Patient nonverbal   HENT: Positive for congestion.       Physical Exam     Initial Vitals   BP Pulse Resp Temp SpO2   07/27/22 0004 07/27/22 0004 07/27/22 0004 07/27/22 0004 07/27/22 0027   (!) 137/95 68 (!) 22 98.8 °F (37.1 °C) (!) 84 %      MAP       --                 Physical Exam   Nursing Notes and Vital Signs Reviewed.  Constitutional: Patient is in no acute distress. Contracted.  Head: Atraumatic. Normocephalic.  Eyes: PERRL. EOM intact. Conjunctivae are not pale. No scleral icterus.  ENT: Mucous membranes are moist. Gurgling sound present in oropharynx  Neck: Supple. Full ROM. No lymphadenopathy.  Cardiovascular: Regular rate. Regular rhythm. No murmurs, rubs, or gallops. Distal pulses are 2+ and symmetric.  Pulmonary/Chest: No respiratory distress. Clear to auscultation bilaterally. No wheezing or rales.  Abdominal: Soft and non-distended.  There is no tenderness.  No rebound, guarding, or rigidity. Good bowel sounds.  Genitourinary: No CVA tenderness  Musculoskeletal: Moves all extremities. No obvious deformities. 1+ edema in BLE. No calf  "tenderness.  Skin: Warm and dry.  Neurological:  Alert, awake, and appropriate. Noncommunicative. Nonverbal. Pt is at her baseline according to daughter. No acute focal neurological deficits are appreciated.  Psychiatric: Normal affect. Good eye contact. Appropriate in content.     ED Course   Critical Care    Date/Time: 7/27/2022 3:14 AM  Performed by: Mj Nunez MD  Authorized by: Mj Nunez MD   Direct patient critical care time: 25 minutes  Additional history critical care time: 10 minutes  Ordering / reviewing critical care time: 5 minutes  Documentation critical care time: 5 minutes  Total critical care time (exclusive of procedural time) : 45 minutes  Critical care time was exclusive of separately billable procedures and treating other patients and teaching time.  Critical care was necessary to treat or prevent imminent or life-threatening deterioration of the following conditions: CHF, respiratory distress, and lactic acidemia.  Critical care was time spent personally by me on the following activities: blood draw for specimens, development of treatment plan with patient or surrogate, discussions with consultants, interpretation of cardiac output measurements, evaluation of patient's response to treatment, examination of patient, obtaining history from patient or surrogate, ordering and performing treatments and interventions, ordering and review of laboratory studies, ordering and review of radiographic studies, re-evaluation of patient's condition, review of old charts and pulse oximetry.        ED Vital Signs:  Vitals:    07/27/22 0004 07/27/22 0027 07/27/22 0111 07/27/22 0252   BP: (!) 137/95 (!) 199/97     Pulse: 68 77 (!) 179 72   Resp: (!) 22 (!) 27 (!) 30 (!) 26   Temp: 98.8 °F (37.1 °C)      TempSrc: Oral      SpO2:  (!) 84% 97% 95%   Weight:  61.1 kg (134 lb 11.2 oz)     Height: 5' 2" (1.575 m)          Abnormal Lab Results:  Labs Reviewed   CBC W/ AUTO DIFFERENTIAL - Abnormal; Notable for the " following components:       Result Value    RBC 3.93 (*)     Hemoglobin 10.1 (*)     Hematocrit 34.8 (*)     MCH 25.7 (*)     MCHC 29.0 (*)     RDW 20.0 (*)     Platelets 124 (*)     nRBC 1 (*)     Platelet Estimate Decreased (*)     All other components within normal limits   COMPREHENSIVE METABOLIC PANEL - Abnormal; Notable for the following components:    Sodium 149 (*)     CO2 31 (*)     BUN 36 (*)     Albumin 3.3 (*)     Alkaline Phosphatase 153 (*)     eGFR if non  52 (*)     All other components within normal limits   B-TYPE NATRIURETIC PEPTIDE - Abnormal; Notable for the following components:    BNP >4,900 (*)     All other components within normal limits   LACTIC ACID, PLASMA - Abnormal; Notable for the following components:    Lactate (Lactic Acid) 3.3 (*)     All other components within normal limits   TROPONIN I - Abnormal; Notable for the following components:    Troponin I 0.028 (*)     All other components within normal limits   ISTAT PROCEDURE - Abnormal; Notable for the following components:    POC PCO2 50.9 (*)     POC PO2 65 (*)     POC HCO3 34.7 (*)     POC SATURATED O2 93 (*)     All other components within normal limits   SARS-COV-2 RNA AMPLIFICATION, QUAL   URINALYSIS, REFLEX TO URINE CULTURE        All Lab Results:  Results for orders placed or performed during the hospital encounter of 07/27/22   CBC auto differential   Result Value Ref Range    WBC 4.03 3.90 - 12.70 K/uL    RBC 3.93 (L) 4.00 - 5.40 M/uL    Hemoglobin 10.1 (L) 12.0 - 16.0 g/dL    Hematocrit 34.8 (L) 37.0 - 48.5 %    MCV 89 82 - 98 fL    MCH 25.7 (L) 27.0 - 31.0 pg    MCHC 29.0 (L) 32.0 - 36.0 g/dL    RDW 20.0 (H) 11.5 - 14.5 %    Platelets 124 (L) 150 - 450 K/uL    MPV 12.3 9.2 - 12.9 fL    Immature Granulocytes 0.5 0.0 - 0.5 %    Gran # (ANC) 2.1 1.8 - 7.7 K/uL    Immature Grans (Abs) 0.02 0.00 - 0.04 K/uL    Lymph # 1.2 1.0 - 4.8 K/uL    Mono # 0.6 0.3 - 1.0 K/uL    Eos # 0.2 0.0 - 0.5 K/uL    Baso # 0.01  0.00 - 0.20 K/uL    nRBC 1 (A) 0 /100 WBC    Gran % 52.4 38.0 - 73.0 %    Lymph % 28.5 18.0 - 48.0 %    Mono % 13.9 4.0 - 15.0 %    Eosinophil % 4.5 0.0 - 8.0 %    Basophil % 0.2 0.0 - 1.9 %    Platelet Estimate Decreased (A)     Aniso Slight     Poik Slight     Ovalocytes Occasional     Target Cells Moderate     Tear Drop Cells Occasional     Differential Method Automated    Comprehensive metabolic panel   Result Value Ref Range    Sodium 149 (H) 136 - 145 mmol/L    Potassium 4.0 3.5 - 5.1 mmol/L    Chloride 105 95 - 110 mmol/L    CO2 31 (H) 23 - 29 mmol/L    Glucose 101 70 - 110 mg/dL    BUN 36 (H) 8 - 23 mg/dL    Creatinine 1.0 0.5 - 1.4 mg/dL    Calcium 9.2 8.7 - 10.5 mg/dL    Total Protein 7.0 6.0 - 8.4 g/dL    Albumin 3.3 (L) 3.5 - 5.2 g/dL    Total Bilirubin 0.5 0.1 - 1.0 mg/dL    Alkaline Phosphatase 153 (H) 55 - 135 U/L    AST 33 10 - 40 U/L    ALT 38 10 - 44 U/L    Anion Gap 13 8 - 16 mmol/L    eGFR if African American 60 >60 mL/min/1.73 m^2    eGFR if non African American 52 (A) >60 mL/min/1.73 m^2   Brain natriuretic peptide   Result Value Ref Range    BNP >4,900 (H) 0 - 99 pg/mL   Lactic acid, plasma   Result Value Ref Range    Lactate (Lactic Acid) 3.3 (H) 0.5 - 2.2 mmol/L   COVID-19 Rapid Screening   Result Value Ref Range    SARS-CoV-2 RNA, Amplification, Qual Negative Negative   Troponin I   Result Value Ref Range    Troponin I 0.028 (H) 0.000 - 0.026 ng/mL   ISTAT PROCEDURE   Result Value Ref Range    POC PH 7.441 7.35 - 7.45    POC PCO2 50.9 (H) 35 - 45 mmHg    POC PO2 65 (L) 80 - 100 mmHg    POC HCO3 34.7 (H) 24 - 28 mmol/L    POC BE 11 -2 to 2 mmol/L    POC SATURATED O2 93 (L) 95 - 100 %    Sample ARTERIAL     Site LR     Allens Test Pass     DelSys Room Air          Imaging Results:  Imaging Results          X-Ray Chest AP Portable (In process)                Type of Interpretation: ED Physician (Independently Interpreted).  Radiology Procedure Done: Portable CXR.  Interpretation:  Stable          The EKG was ordered, reviewed, and independently interpreted by the ED provider.  Interpretation time: 01:10  Rate: 79 BPM  Rhythm: normal sinus rhythm  Interpretation: Left anterior fascicular block. Nonspecific ST and T wave abnormality. No STEMI.    The Emergency Provider reviewed the vital signs and test results, which are outlined above.     ED Discussion       3:12 AM: Discussed case with Jeffrey Kramer NP ([facility]). Dr. Simon agrees with current care and management of pt and accepts admission.   Admitting Service: Hospital Medicine  Admitting Physician: Dr. Simon  Admit to: Obs tele    3:12 AM: Re-evaluated pt. I have discussed test results, shared treatment plan, and the need for admission with patient and family at bedside. Pt and family express understanding at this time and agree with all information. All questions answered. Pt and family have no further questions or concerns at this time. Pt is ready for admit.         Medical Decision Making:   Clinical Tests:   Lab Tests: Ordered and Reviewed  Radiological Study: Ordered and Reviewed  Medical Tests: Ordered and Reviewed           ED Medication(s):  Medications   furosemide injection 40 mg (40 mg Intravenous Given 7/27/22 0304)   nitroGLYCERIN 2% TD oint ointment 1 inch (1 inch Transdermal Given 7/27/22 0310)       New Prescriptions    No medications on file               Scribe Attestation:   Scribe #1: I performed the above scribed service and the documentation accurately describes the services I performed. I attest to the accuracy of the note.     Attending:   Physician Attestation Statement for Scribe #1: I, Mj Nunez MD, personally performed the services described in this documentation, as scribed by Phani Fry, in my presence, and it is both accurate and complete.           Clinical Impression       ICD-10-CM ICD-9-CM   1. Acute on chronic combined systolic and diastolic congestive heart failure  I50.43 428.43      428.0   2. SOB (shortness of breath)  R06.02 786.05   3. Primary hypertension  I10 401.9   4. Respiratory distress  R06.03 786.09   5. Lactic acidemia  E87.2 276.2       Disposition:   Disposition: Placed in Observation  Condition: Joon Nunez MD  07/27/22 0554

## 2022-07-27 NOTE — SUBJECTIVE & OBJECTIVE
Past Medical History:   Diagnosis Date    Acute CHF (congestive heart failure) 1/17/2021    Hyperlipemia     Hypertension     Parkinson's disease     Stroke 2016    Throat mass        Past Surgical History:   Procedure Laterality Date    CLOSED REDUCTION Right 10/15/2020    Procedure: CLOSED REDUCTION;  Surgeon: Humberto Valdivia DO;  Location: [facility] OR;  Service: Orthopedics;  Laterality: Right;  ATTEMPTED    EVACUATION OF HEMATOMA Right 10/17/2020    Procedure: EVACUATION, HEMATOMA;  Surgeon: Humberto Valdivia DO;  Location: [facility] OR;  Service: Orthopedics;  Laterality: Right;    HEMIARTHROPLASTY OF HIP Left 7/12/2020    Procedure: HEMIARTHROPLASTY, HIP;  Surgeon: Humberto Valdivia DO;  Location: [facility] OR;  Service: Orthopedics;  Laterality: Left;    HEMIARTHROPLASTY OF HIP Right 10/2/2020    Procedure: HEMIARTHROPLASTY, HIP;  Surgeon: Loyd Kearney MD;  Location: [facility] OR;  Service: Orthopedics;  Laterality: Right;    HYSTERECTOMY      INSERTION OF PERCUTANEOUS ENDOSCOPIC GASTROSTOMY (PEG) FEEDING TUBE      OPEN REDUCTION OF DISLOCATION OF HIP Right 10/17/2020    Procedure: OPEN REDUCTION, DISLOCATION, HIP;  Surgeon: Humberto Valdivia DO;  Location: [facility] OR;  Service: Orthopedics;  Laterality: Right;    THROAT SURGERY      TONSILLECTOMY         Review of patient's allergies indicates:   Allergen Reactions    Xanax [alprazolam]        No current facility-administered medications on file prior to encounter.     Current Outpatient Medications on File Prior to Encounter   Medication Sig    carbidopa-levodopa  mg (SINEMET)  mg per tablet 2 tablets by Per G Tube route 3 (three) times daily.    carvediloL (COREG) 3.125 MG tablet TAKE 1 TABLET PER G-TUBE TWICE DAILY AS NEEDED( AS BLOOD PRESSURE TOLERATES)    clopidogreL (PLAVIX) 75 mg tablet 1 tablet (75 mg total) by Per G Tube route once daily.    esomeprazole (NEXIUM) 40 MG capsule Take  40 mg(1 cap)  twice a day  , then after 40 mg(1 cap) daily   Through  PEG tube    famotidine (PEPCID) 20 MG tablet Take 20 mg by mouth once daily at 6am.    furosemide (LASIX) 40 MG tablet 1 tablet (40 mg total) by Per G Tube route 2 (two) times a day. Take per G tube twice daily as directed    QUEtiapine (SEROQUEL) 25 MG Tab Take 1 tablet (25 mg total) by mouth once daily. At bedtime    sacubitriL-valsartan (ENTRESTO)  mg per tablet 1 tablet by Per G Tube route 2 (two) times daily.    scopolamine (TRANSDERM-SCOP) 1.3-1.5 mg (1 mg over 3 days) Place 1 patch onto the skin Every 3 (three) days.    traZODone (DESYREL) 50 MG tablet Take 0.5 tablets (25 mg total) by mouth every evening.    [DISCONTINUED] aspirin 81 MG Chew 1 tablet (81 mg total) by Per G Tube route once daily.    [DISCONTINUED] losartan (COZAAR) 50 MG tablet Take 1 tablet (50 mg total) by mouth 2 (two) times a day.    [DISCONTINUED] metoprolol succinate (TOPROL-XL) 50 MG 24 hr tablet Take 1 tablet (50 mg total) by mouth 2 (two) times daily.    [DISCONTINUED] omeprazole magnesium 10 mg SuDR Take 40 mg by mouth once daily. (Patient not taking: Reported on 4/21/2022)    [DISCONTINUED] pantoprazole (PROTONIX) 40 mg suspension Take 1 packet (40 mg total) by mouth 2 (two) times daily.     Family History       Family history is unknown by patient.          Tobacco Use    Smoking status: Never Smoker    Smokeless tobacco: Never Used   Substance and Sexual Activity    Alcohol use: No    Drug use: No    Sexual activity: Not Currently     Review of Systems   Unable to perform ROS: Patient nonverbal   Objective:     Vital Signs (Most Recent):  Temp: 98.8 °F (37.1 °C) (07/27/22 0004)  Pulse: 69 (07/27/22 0504)  Resp: (!) 24 (07/27/22 0504)  BP: (!) 150/98 (07/27/22 0504)  SpO2: (!) 90 % (07/27/22 0416)   Vital Signs (24h Range):  Temp:  [98.8 °F (37.1 °C)] 98.8 °F (37.1 °C)  Pulse:  [] 69  Resp:  [19-30] 24  SpO2:  [84 %-98 %] 90 %  BP: (137-199)/(92-98) 150/98     Weight: 61.1 kg (134 lb 11.2 oz)  Body mass index is 24.64  kg/m².    Physical Exam  Vitals and nursing note reviewed.   Constitutional:       Comments: Patient is non verbal   HENT:      Head: Normocephalic and atraumatic.      Mouth/Throat:      Mouth: Mucous membranes are moist.   Eyes:      General:         Right eye: No discharge.         Left eye: No discharge.   Cardiovascular:      Rate and Rhythm: Normal rate.   Pulmonary:      Breath sounds: Rales present.      Comments: Coarse breath sounds  Abdominal:      General: Abdomen is flat.      Comments: has PEG tube   Musculoskeletal:         General: Swelling present.      Cervical back: Normal range of motion.      Right lower leg: Edema present.      Left lower leg: Edema present.   Skin:     General: Skin is warm.   Neurological:      Mental Status: Mental status is at baseline.           Significant Labs: All pertinent labs within the past 24 hours have been reviewed.  BMP:   Recent Labs   Lab 07/27/22 0142      *   K 4.0      CO2 31*   BUN 36*   CREATININE 1.0   CALCIUM 9.2     CBC:   Recent Labs   Lab 07/27/22 0142   WBC 4.03   HGB 10.1*   HCT 34.8*   *       Significant Imaging: I have reviewed all pertinent imaging results/findings within the past 24 hours.

## 2022-07-28 LAB
ANION GAP SERPL CALC-SCNC: 14 MMOL/L (ref 8–16)
BUN SERPL-MCNC: 34 MG/DL (ref 8–23)
CALCIUM SERPL-MCNC: 9.2 MG/DL (ref 8.7–10.5)
CHLORIDE SERPL-SCNC: 103 MMOL/L (ref 95–110)
CO2 SERPL-SCNC: 32 MMOL/L (ref 23–29)
CREAT SERPL-MCNC: 0.9 MG/DL (ref 0.5–1.4)
EST. GFR  (AFRICAN AMERICAN): >60 ML/MIN/1.73 M^2
EST. GFR  (NON AFRICAN AMERICAN): 59 ML/MIN/1.73 M^2
GLUCOSE SERPL-MCNC: 72 MG/DL (ref 70–110)
LACTATE SERPL-SCNC: 2.5 MMOL/L (ref 0.5–2.2)
POTASSIUM SERPL-SCNC: 3.6 MMOL/L (ref 3.5–5.1)
SODIUM SERPL-SCNC: 149 MMOL/L (ref 136–145)

## 2022-07-28 PROCEDURE — 36415 COLL VENOUS BLD VENIPUNCTURE: CPT | Performed by: INTERNAL MEDICINE

## 2022-07-28 PROCEDURE — 25000003 PHARM REV CODE 250: Performed by: NURSE PRACTITIONER

## 2022-07-28 PROCEDURE — 83605 ASSAY OF LACTIC ACID: CPT | Performed by: PHYSICIAN ASSISTANT

## 2022-07-28 PROCEDURE — 80048 BASIC METABOLIC PNL TOTAL CA: CPT | Performed by: INTERNAL MEDICINE

## 2022-07-28 PROCEDURE — 94761 N-INVAS EAR/PLS OXIMETRY MLT: CPT

## 2022-07-28 PROCEDURE — 51702 INSERT TEMP BLADDER CATH: CPT

## 2022-07-28 PROCEDURE — 21400001 HC TELEMETRY ROOM

## 2022-07-28 PROCEDURE — 51798 US URINE CAPACITY MEASURE: CPT

## 2022-07-28 PROCEDURE — 63600175 PHARM REV CODE 636 W HCPCS: Performed by: INTERNAL MEDICINE

## 2022-07-28 PROCEDURE — 36415 COLL VENOUS BLD VENIPUNCTURE: CPT | Performed by: PHYSICIAN ASSISTANT

## 2022-07-28 PROCEDURE — 25000003 PHARM REV CODE 250: Performed by: INTERNAL MEDICINE

## 2022-07-28 PROCEDURE — 99233 SBSQ HOSP IP/OBS HIGH 50: CPT | Mod: ,,, | Performed by: INTERNAL MEDICINE

## 2022-07-28 PROCEDURE — 99233 PR SUBSEQUENT HOSPITAL CARE,LEVL III: ICD-10-PCS | Mod: ,,, | Performed by: INTERNAL MEDICINE

## 2022-07-28 RX ORDER — MUPIROCIN 20 MG/G
OINTMENT TOPICAL 2 TIMES DAILY
Status: DISCONTINUED | OUTPATIENT
Start: 2022-07-28 | End: 2022-07-30 | Stop reason: HOSPADM

## 2022-07-28 RX ORDER — FUROSEMIDE 10 MG/ML
40 INJECTION INTRAMUSCULAR; INTRAVENOUS 2 TIMES DAILY
Status: DISCONTINUED | OUTPATIENT
Start: 2022-07-28 | End: 2022-07-30 | Stop reason: HOSPADM

## 2022-07-28 RX ADMIN — MUPIROCIN: 20 OINTMENT TOPICAL at 10:07

## 2022-07-28 RX ADMIN — CARVEDILOL 3.12 MG: 3.12 TABLET, FILM COATED ORAL at 05:07

## 2022-07-28 RX ADMIN — CARVEDILOL 3.12 MG: 3.12 TABLET, FILM COATED ORAL at 07:07

## 2022-07-28 RX ADMIN — QUETIAPINE FUMARATE 25 MG: 25 TABLET ORAL at 09:07

## 2022-07-28 RX ADMIN — SACUBITRIL AND VALSARTAN 2 TABLET: 49; 51 TABLET, FILM COATED ORAL at 09:07

## 2022-07-28 RX ADMIN — SACUBITRIL AND VALSARTAN 2 TABLET: 49; 51 TABLET, FILM COATED ORAL at 08:07

## 2022-07-28 RX ADMIN — MUPIROCIN: 20 OINTMENT TOPICAL at 09:07

## 2022-07-28 RX ADMIN — FUROSEMIDE 40 MG: 10 INJECTION, SOLUTION INTRAMUSCULAR; INTRAVENOUS at 10:07

## 2022-07-28 RX ADMIN — FUROSEMIDE 40 MG: 10 INJECTION, SOLUTION INTRAMUSCULAR; INTRAVENOUS at 09:07

## 2022-07-28 NOTE — SUBJECTIVE & OBJECTIVE
Review of Systems   Unable to perform ROS: Patient nonverbal   Objective:     Vital Signs (Most Recent):  Temp: 97 °F (36.1 °C) (07/28/22 0724)  Pulse: (!) 57 (07/28/22 0900)  Resp: 18 (07/28/22 0714)  BP: (!) 142/92 (07/28/22 0714)  SpO2: 95 % (07/28/22 0714)   Vital Signs (24h Range):  Temp:  [97 °F (36.1 °C)-97.6 °F (36.4 °C)] 97 °F (36.1 °C)  Pulse:  [57-83] 57  Resp:  [17-25] 18  SpO2:  [95 %-97 %] 95 %  BP: (126-157)/(87-98) 142/92     Weight: 54 kg (119 lb 0.8 oz)  Body mass index is 21.77 kg/m².    Intake/Output Summary (Last 24 hours) at 7/28/2022 1059  Last data filed at 7/28/2022 0935  Gross per 24 hour   Intake 250 ml   Output --   Net 250 ml      Physical Exam  Vitals and nursing note reviewed.   Constitutional:       Comments: Patient is non verbal   HENT:      Head: Normocephalic and atraumatic.      Mouth/Throat:      Mouth: Mucous membranes are moist.   Eyes:      General:         Right eye: No discharge.         Left eye: No discharge.   Cardiovascular:      Rate and Rhythm: Normal rate.   Pulmonary:      Breath sounds: Rales present.      Comments: Coarse breath sounds  Abdominal:      General: Abdomen is flat.      Comments: has PEG tube   Musculoskeletal:         General: Swelling present.      Cervical back: Normal range of motion.      Right lower leg: Edema present.      Left lower leg: Edema present.   Skin:     General: Skin is warm.   Neurological:      Mental Status: Mental status is at baseline.       Significant Labs: All pertinent labs within the past 24 hours have been reviewed.    Significant Imaging: I have reviewed all pertinent imaging results/findings within the past 24 hours.

## 2022-07-28 NOTE — CONSULTS
[facility] - Telemetry ([facility])  Adult Nutrition  Consult Note    SUMMARY     Recommendations    Recommendation/Intervention:   1. When medically appropriate, Initiate Isosource 1.5 via PEG:   -Goal rate @ 40mL/hr.   -Provides 1440 calories, 65g protein, and 733mL H2O.   -100mL H2O flush q 4-6 hours or per MD/NP.   -Check Mg, K+, Na, Phos, and Glucose before and during initiation;Correct as indicated.     2. If bolus feeds preferred:   -4 cartons provides 1500 calories, 68g protein, and 764mL H2O.   -100mL H2O flush q bolus or per MD/NP.    Goals:   1. Enteral Nutrition initiation within 24 hours.    Nutrition Goal Status: new    Communication of RD Recs:  (Plan of care;Sticky Note)    Assessment and Plan    Nutrition Problem  Inadequate oral intake     Related to (etiology):   Decreased ability to consume sufficient energy     Signs and Symptoms (as evidenced by):   NPO    Interventions/Recommendations (treatment strategy):  1. When medically appropriate, Initiate Isosource 1.5 via PEG:   -Goal rate @ 40mL/hr.   -Provides 1440 calories, 65g protein, and 733mL H2O.   -100mL H2O flush q 4-6 hours or per MD/NP.   -Check Mg, K+, Na, Phos, and Glucose before and during initiation;Correct as indicated.     2. If bolus feeds preferred:   -4 cartons provides 1500 calories, 68g protein, and 764mL H2O.   -100mL H2O flush q bolus or per MD/NP.      Nutrition Diagnosis Status:   New         Malnutrition Assessment                                       Reason for Assessment    Reason For Assessment: consult  Diagnosis: cardiac disease  Relevant Medical History: HTn, HLD, stroke, CHF, parkinsons  Interdisciplinary Rounds: did not attend  General Information Comments:     7/28:RD consulted for TF. Patient is NPO. Patient has a G tube. Labs reviewed. Patient has 1+trace edema to legs. Patients last BM 7/27. Patients weight has been fluctuating per past encounters. Patient is non-verbal. Will continue to monitor.  Nutrition  "Discharge Planning: PEG    Nutrition Risk Screen    Nutrition Risk Screen: tube feeding or parenteral nutrition    Nutrition/Diet History    Patient Reported Diet/Restrictions/Preferences: no oral intake  Spiritual, Cultural Beliefs, Muslim Practices, Values that Affect Care: no  Food Allergies: NKFA  Factors Affecting Nutritional Intake: NPO    Anthropometrics    Temp: 97 °F (36.1 °C)  Height Method: Stated  Height: 5' 2" (157.5 cm)  Height (inches): 62 in  Weight Method: Bed Scale  Weight: 54 kg (119 lb 0.8 oz)  Weight (lb): 119.05 lb  Ideal Body Weight (IBW), Female: 110 lb  % Ideal Body Weight, Female (lb): 108.23 %  BMI (Calculated): 21.8  BMI Grade: 18.5-24.9 - normal       Lab/Procedures/Meds  BMP  Lab Results   Component Value Date     (H) 07/28/2022    K 3.6 07/28/2022     07/28/2022    CO2 32 (H) 07/28/2022    BUN 34 (H) 07/28/2022    CREATININE 0.9 07/28/2022    CALCIUM 9.2 07/28/2022    ANIONGAP 14 07/28/2022    ESTGFRAFRICA >60 07/28/2022    EGFRNONAA 59 (A) 07/28/2022     Lab Results   Component Value Date     (H) 07/28/2022    K 3.6 07/28/2022     07/28/2022    CO2 32 (H) 07/28/2022     Lab Results   Component Value Date    LABPROT 11.0 04/07/2022    ALBUMIN 3.3 (L) 07/27/2022     Lab Results   Component Value Date    CALCIUM 9.2 07/28/2022    PHOS 3.4 04/01/2022       Pertinent Labs Reviewed: reviewed  Pertinent Medications Reviewed: reviewed  Scheduled Meds:   carvediloL  3.125 mg Per G Tube BID WM    furosemide (LASIX) injection  40 mg Intravenous Daily    QUEtiapine  25 mg Per G Tube QHS    sacubitriL-valsartan  2 tablet Per G Tube BID    scopolamine  1 patch Transdermal Q3 Days     Continuous Infusions:  PRN Meds:.sodium chloride 0.9%    Physical Findings/Assessment         Estimated/Assessed Needs    Weight Used For Calorie Calculations: 54 kg (119 lb 0.8 oz)  Energy Calorie Requirements (kcal): 1320 (mifflin x 1.4AF)  Energy Need Method: [place]  Protein " Requirements: 64-81 (1.2-1.5g/kg, altered skin integrity)  Weight Used For Protein Calculations: 54 kg (119 lb 0.8 oz)  Fluid Requirements (mL): 1320  Estimated Fluid Requirement Method: RDA Method  RDA Method (mL): 1320  CHO Requirement: 165      Nutrition Prescription Ordered    Current Diet Order: NPO    Evaluation of Received Nutrient/Fluid Intake    % Kcal Needs: 0%  % Protein Needs: 0%  Energy Calories Required: not meeting needs  Protein Required: not meeting needs  Fluid Required: not meeting needs  Tolerance:  (Patient NPO)  % Intake of Estimated Energy Needs: 0 - 25 %  % Meal Intake: NPO    Nutrition Risk    Level of Risk/Frequency of Follow-up: moderate - high       Monitor and Evaluation    Food and Nutrient Intake: energy intake, enteral nutrition intake  Food and Nutrient Adminstration: enteral and parenteral nutrition administration  Anthropometric Measurements: weight, weight change, body mass index  Biochemical Data, Medical Tests and Procedures: electrolyte and renal panel, lipid profile, gastrointestinal profile, glucose/endocrine profile, inflammatory profile  Nutrition-Focused Physical Findings: overall appearance       Nutrition Follow-Up    RD Follow-up?: Yes   Magalis Hernández RD,DYLANN

## 2022-07-28 NOTE — PLAN OF CARE
Pt [place] orientation d/t pt being nonverbal. No signs of distress. PT is Calm and Cooperative.No signs of pain at this time. Vital signs stable. Sinus Rhythm on tele monitor. On room air.  PIV is patent. Pt incontinent of b/b. Pure wick in place. Peg tube patent and intact. Assist x2. Pt remains free of falls. Meds given as prescribed.  POC reviewed with family and family verbalized understanding. Pt educated on safety and fall risk. Verbalized understanding. Interventions implemented as appropriate. Pt turned frequently. Resting quietly in bed.Bed low. Side rails up x2, wheels locked, call light within reach.

## 2022-07-28 NOTE — PROGRESS NOTES
[facility]  Progress Note    Patient Name: Tanya Madrid  MRN: [id number]  Patient Class: IP- Inpatient   Admission Date: 7/27/2022  Length of Stay: 1 days  Attending Physician: Soco Suazo MD  Primary Care Provider: Elva Ansari NP        Subjective:     Principal Problem:Acute combined systolic and diastolic congestive heart failure        HPI:  Patient is non verbal  Daughter is by bedside.     82 year old  female patient with a PMHx of acute CHF, HTN, stroke,s/p prior cardiac arrest  who presents to the Emergency Department for evaluation of congestion which onset gradually 1 day pta.  Daughter reported that she sounded congested and has leg swelling .Daughter denies any history of fever or chills.  Labs and imaging test-  Chest x-ray -vascular congestion.        Component      Latest Ref Rng & Units 7/27/2022 7/19/2022 5/3/2022               POC PH      7.35 - 7.45 7.441 7.514 (H) 7.485 (H)   POC PCO2      35 - 45 mmHg 50.9 (H) 39.8 33.5 (L)   POC PO2      80 - 100 mmHg 65 (L) 66 (L) 61 (L)     Component      Latest Ref Rng & Units 7/27/2022 7/7/2022   BNP      0 - 99 pg/mL >4,900 (H) >4,900 (H)   Sodium -149  · Recent echo-06/2022  · The left ventricle is mildly enlarged with concentric hypertrophy and severely decreased systolic function.  · The estimated ejection fraction is 15 - 20%.  · Grade I left ventricular diastolic dysfunction.  · Mild right ventricular enlargement with moderately reduced right ventricular systolic function.  · Severe right atrial enlargement.  · Moderate left atrial enlargement.  · There is pulmonary hypertension.  · The estimated PA systolic pressure is 58 mmHg.  · No change from prior echo March 2022.           Overview/Hospital Course:  7/28/22 - Patient is non-verbal. When seen today, patient was lying in bed. Tubal feeds restarted today per dietitian. Abreu catheter placed today to manage urinary retention. Diureses in process;  patient responding appropriately. Vitals and labs are stable. Cardiology following. Continue current plan of care.         Review of Systems   Unable to perform ROS: Patient nonverbal   Objective:     Vital Signs (Most Recent):  Temp: 97 °F (36.1 °C) (07/28/22 0724)  Pulse: (!) 57 (07/28/22 0900)  Resp: 18 (07/28/22 0714)  BP: (!) 142/92 (07/28/22 0714)  SpO2: 95 % (07/28/22 0714)   Vital Signs (24h Range):  Temp:  [97 °F (36.1 °C)-97.6 °F (36.4 °C)] 97 °F (36.1 °C)  Pulse:  [57-83] 57  Resp:  [17-25] 18  SpO2:  [95 %-97 %] 95 %  BP: (126-157)/(87-98) 142/92     Weight: 54 kg (119 lb 0.8 oz)  Body mass index is 21.77 kg/m².    Intake/Output Summary (Last 24 hours) at 7/28/2022 1059  Last data filed at 7/28/2022 0935  Gross per 24 hour   Intake 250 ml   Output --   Net 250 ml      Physical Exam  Vitals and nursing note reviewed.   Constitutional:       Comments: Patient is non verbal   HENT:      Head: Normocephalic and atraumatic.      Mouth/Throat:      Mouth: Mucous membranes are moist.   Eyes:      General:         Right eye: No discharge.         Left eye: No discharge.   Cardiovascular:      Rate and Rhythm: Normal rate.   Pulmonary:      Breath sounds: Rales present.      Comments: Coarse breath sounds  Abdominal:      General: Abdomen is flat.      Comments: has PEG tube   Musculoskeletal:         General: Swelling present.      Cervical back: Normal range of motion.      Right lower leg: Edema present.      Left lower leg: Edema present.   Skin:     General: Skin is warm.   Neurological:      Mental Status: Mental status is at baseline.       Significant Labs: All pertinent labs within the past 24 hours have been reviewed.    Significant Imaging: I have reviewed all pertinent imaging results/findings within the past 24 hours.      Assessment/Plan:      * Acute combined systolic and diastolic congestive heart failure  -Cardiology consulted. Known severe underlying CHF with EF of 15-20%, presents with volume  overload  -Continue IV diuresis  -Continue BB, Entresto  -Strict I/Os  -Given patient's history, age, co-morbidities, prognosis remains poor    Chest x-ray is shown below.      Advanced care planning/counseling discussion  -DNR      Recent H/O Out of hospital Cardiac arrest  -DNR, supportive care       Elevated troponin  -Troponin 0.028  -Elevation secondary to demand ischemia from severe decompensated CHF  -Continue supportive care/OMT as tolerated-Plavix, BB    Bilateral pleural effusion  -Continue to monitor response to IV diuresis       Parkinson disease  -Continue Sinemet     Kyphosis of thoracic region  Continue supportive care       Hyperlipidemia  -Continue Crestor     H/O Essential hypertension  -Continue BB, Entresto   -Monitor BP trend         VTE Risk Mitigation (From admission, onward)         Ordered     IP VTE HIGH RISK PATIENT  Once         07/27/22 0640     Place sequential compression device  Until discontinued         07/27/22 0640                Discharge Planning   PARMINDER:      Code Status: DNR   Is the patient medically ready for discharge?:     Reason for patient still in hospital (select all that apply): Patient trending condition, Laboratory test and Treatment                     Sandhya Harris NP  Department of Hospital Medicine   [facility] - Telemetry ([facility])

## 2022-07-28 NOTE — HOSPITAL COURSE
7/28/22 - Patient is non-verbal. When seen today, patient was lying in bed. Tubal feeds restarted today per dietitian. Abreu catheter placed today to manage urinary retention. Diureses in process; patient responding appropriately. Vitals and labs are stable. Cardiology following. Due to recurrent admissions, palliative care consulted. Continue current plan of care.   7/29: Diuresing well. BNP chronically elevated. Will treat UTI with Rocephin as UA shows WBC 14. Likely d/c in the AM with home health  7/30: about 3L net negative. Plavix initiated and hgb remained stable. She will follow up with CHF clinic. Discharged with Augmentin for UTI. Home health ordered. Daughter is the sole caretaker. Instructed to follow up with PCP for post hospital visit stay check up. Referred to NP at home program as well.

## 2022-07-28 NOTE — ASSESSMENT & PLAN NOTE
-Cardiology consulted. Known severe underlying CHF with EF of 15-20%, presents with volume overload  -Continue IV diuresis  -Continue BB, Entresto  -Strict I/Os  -Given patient's history, age, co-morbidities, prognosis remains poor    Chest x-ray is shown below.

## 2022-07-28 NOTE — PLAN OF CARE
POC reviewed with pt. Pt verbalizes understanding of POC. No questions at this time.  Patient nonverbal, arouses to voice/pain. NADN.  NSR on cardiac monitor with some bradycardia.  Abreu in place.   Wedge, SCDs, and waffle mattress in place.   Pt remains free of falls. Patient turned and repositioned frequently.   Tube feeds going at goal of 40 mL/hour.  250mL water boluses q 4 hours administered per order.   No complaints at this time.  Safety measures in place. Will continue to monitor.  Hourly rounding complete.

## 2022-07-28 NOTE — HOSPITAL COURSE
7/28/22-Patient seen and examined today, resting in bed. Non-verbal, sister at bedside. No AEON. Diuresing well. Labs reviewed. Creatinine stable, Na 149. LA improved.     7/29/22-Patient seen and examined today. Non-verbal, sister again at bedside. Stable overnight, good UOP. BNP chronically elevated. Creatinine stable, continue IV diuresis.    7/30/22 - neg 3 L net negative so far, no acute events overnight, daughter present discussed she feels back at baseline ok for her to continue tx at home, will need to be careful with lasix dosing and increase PRN, has appt with CHF clinic on Thurs

## 2022-07-28 NOTE — PROGRESS NOTES
[facility] - Telemetry ([facility])  Cardiology  Progress Note    Patient Name: Tanya Madrid  MRN: [id number]  Admission Date: 7/27/2022  Hospital Length of Stay: 1 days  Code Status: DNR   Attending Physician: Soco Suazo MD   Primary Care Physician: Elva Ansari NP  Expected Discharge Date:   Principal Problem:Acute combined systolic and diastolic congestive heart failure    Subjective:   HPI:  HPI obtained from chart and patient's daughter as patient is essentially non-verbal     Ms. 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 Associated symptoms included BLE edema. No apparent associated fever, chills, palpitations, near syncope, or syncope. Initial workup in ED revealed BNP of 4,900, elevated BP, troponin of 0.028, and lactic acidosis 3.3. CXR showed bilateral pleural effusions and patient subsequently admitted for further evaluation and treatment. Cardiology consulted to assist with management. Patient seen and examined today, resting in bed. Appears frail, ill. Moans at times, but no intelligible speech. Remains SOB with audible gasping. Compliant with her medications, daughter very involved in her care. Followed in CHF clinic. Of note, patient previously admitted to this facility in 3/22 due to cardiac arrest/severe CHF and underwent TTM protocol and required inotropic support with diuresis. Given her co-morbidities and advanced CHF, prognosis remains poor. Echo 5/22 showed EF of 15-20%, DD, pulmonary HTN.    Hospital Course:   7/28/22-Patient seen and examined today, resting in bed. Non-verbal, sister at bedside. No AEON. Diuresing well. Labs reviewed. Creatinine stable, Na 149. LA improved.           Review of Systems   Unable to perform ROS: Patient nonverbal    Objective:     Vital Signs (Most Recent):  Temp: 96.1 °F (35.6 °C) (07/28/22 1215)  Pulse: (!) 51 (07/28/22 1215)  Resp: 14 (07/28/22 1215)  BP: (!) 142/98 (07/28/22 1215)  SpO2: 96 % (07/28/22 1215)   Vital Signs (24h Range):  Temp:  [96.1 °F (35.6 °C)-97.6 °F (36.4 °C)] 96.1 °F (35.6 °C)  Pulse:  [51-83] 51  Resp:  [14-24] 14  SpO2:  [95 %-97 %] 96 %  BP: (133-157)/(84-98) 142/98     Weight: 54 kg (119 lb 0.8 oz)  Body mass index is 21.77 kg/m².     SpO2: 96 %  O2 Device (Oxygen Therapy): room air      Intake/Output Summary (Last 24 hours) at 7/28/2022 1309  Last data filed at 7/28/2022 0935  Gross per 24 hour   Intake 250 ml   Output --   Net 250 ml       Lines/Drains/Airways       Drain  Duration                  Gastrostomy/Enterostomy 03/25/22 1320 Gastrostomy tube w/ balloon LUQ feeding 124 days         Gastrostomy/Enterostomy 06/06/22 1612 Gastrostomy tube w/ balloon midline feeding 51 days         Urethral Catheter 07/28/22 1036 Latex <1 day              Peripheral Intravenous Line  Duration                  Peripheral IV - Single Lumen 07/27/22 0145 20 G Right Wrist 1 day                    Physical Exam  Vitals and nursing note reviewed.   Constitutional:       General: She is not in acute distress.     Appearance: She is well-developed. She is ill-appearing. She is not diaphoretic.   HENT:      Head: Normocephalic and atraumatic.   Eyes:      General:         Right eye: No discharge.         Left eye: No discharge.      Pupils: Pupils are equal, round, and reactive to light.   Neck:      Thyroid: No thyromegaly.      Vascular: No JVD.      Trachea: No tracheal deviation.   Cardiovascular:      Rate and Rhythm: Normal rate and regular rhythm.      Heart sounds: Normal heart sounds, S1 normal and S2 normal. No murmur heard.  Pulmonary:      Effort: Pulmonary effort is normal. No respiratory distress.      Breath sounds: Rales present. No wheezing.   Abdominal:      General: There is no distension.       Tenderness: There is no rebound.   Musculoskeletal:      Cervical back: Neck supple.      Right lower leg: No edema.      Left lower leg: No edema.      Comments: Upper extremity contractures     Skin:     General: Skin is warm and dry.      Findings: No erythema.   Neurological:      Mental Status: She is alert.      Comments: Non-verbal       Significant Labs: CMP   Recent Labs   Lab 07/27/22 0142 07/28/22  0444   * 149*   K 4.0 3.6    103   CO2 31* 32*    72   BUN 36* 34*   CREATININE 1.0 0.9   CALCIUM 9.2 9.2   PROT 7.0  --    ALBUMIN 3.3*  --    BILITOT 0.5  --    ALKPHOS 153*  --    AST 33  --    ALT 38  --    ANIONGAP 13 14   ESTGFRAFRICA 60 >60   EGFRNONAA 52* 59*   , CBC   Recent Labs   Lab 07/27/22 0142   WBC 4.03   HGB 10.1*   HCT 34.8*   *   , Troponin   Recent Labs   Lab 07/27/22 0142   TROPONINI 0.028*   , and All pertinent lab results from the last 24 hours have been reviewed.    Significant Imaging: Echocardiogram: Transthoracic echo (TTE) complete (Cupid Only):   Results for orders placed or performed during the hospital encounter of 05/27/22   Echo   Result Value Ref Range    BSA 1.76 m2    TDI SEPTAL 0.05 m/s    LV LATERAL E/E' RATIO 12.83 m/s    LV SEPTAL E/E' RATIO 15.40 m/s    IVC diameter 2.38 cm    Left Ventricular Outflow Tract Mean Velocity 0.43154055381 cm/s    Left Ventricular Outflow Tract Mean Gradient 1.32 mmHg    TDI LATERAL 0.06 m/s    LVIDd 4.19 3.5 - 6.0 cm    IVS 1.47 (A) 0.6 - 1.1 cm    Posterior Wall 1.32 (A) 0.6 - 1.1 cm    Ao root annulus 3.12 cm    LVIDs 3.83 2.1 - 4.0 cm    FS 9 28 - 44 %    Sinus 2.80 cm    STJ 2.62 cm    Ascending aorta 2.75 cm    LV mass 222.33 g    LA size 3.83 cm    TAPSE 1.49 cm    Left Ventricle Relative Wall Thickness 0.63 cm    AV mean gradient 4 mmHg    AV valve area 2.12 cm2    AV Velocity Ratio 0.60     AV index (prosthetic) 0.64     MV valve area p 1/2 method 6.65 cm2    E/A ratio 1.51     Mean e' 0.06 m/s    E wave  deceleration time 114.526591349553579 msec    IVRT 99.903732868039886 msec    LVOT diameter 2.06 cm    LVOT area 3.3 cm2    LVOT peak harsha 0.87 m/s    LVOT peak VTI 16.30 cm    Ao peak harsha 1.44 m/s    Ao VTI 25.6 cm    RVOT peak harsha 0.37 m/s    RVOT peak VTI 7.6 cm    LVOT stroke volume 54.30 cm3    AV peak gradient 8 mmHg    PV mean gradient 0.44 mmHg    E/E' ratio 14.00 m/s    MV Peak E Harsha 0.77 m/s    TR Max Harsha 3.53 m/s    MV stenosis pressure 1/2 time 33.802793947459568 ms    MV Peak A Harsha 0.51 m/s    LV Systolic Volume 63.31 mL    LV Systolic Volume Index 36.8 mL/m2    LV Diastolic Volume 78.31 mL    LV Diastolic Volume Index 45.53 mL/m2    LV Mass Index 129 g/m2    RA Major Axis 4.96 cm    Left Atrium Minor Axis 3.44 cm    Left Atrium Major Axis 4.30 cm    Triscuspid Valve Regurgitation Peak Gradient 50 mmHg    RA Width 4.18 cm    Right Atrial Pressure (from IVC) 8 mmHg    EF 20 %    TV rest pulmonary artery pressure 58 mmHg    Narrative    · The left ventricle is mildly enlarged with concentric hypertrophy and   severely decreased systolic function.  · The estimated ejection fraction is 15 - 20%.  · Grade I left ventricular diastolic dysfunction.  · Mild right ventricular enlargement with moderately reduced right   ventricular systolic function.  · Severe right atrial enlargement.  · Moderate left atrial enlargement.  · There is pulmonary hypertension.  · The estimated PA systolic pressure is 58 mmHg.  · No change from prior echo March 2022.      , EKG: Reviewed, and X-Ray: CXR: X-Ray Chest 1 View (CXR): No results found for this visit on 07/27/22. and X-Ray Chest PA and Lateral (CXR): No results found for this visit on 07/27/22.    Assessment and Plan:   Patient who presents with decompensated combined CHF. Continue IV diuresis and OMT as tolerated.    Acute on chronic combined systolic and diastolic congestive heart failure  -Known severe underlying CHF with EF of 15-20%, presents with volume  overload  -Continue IV diuresis  -Continue BB, Entresto  -Strict I's/O's  -Given patient's history, age, co-morbidities, prognosis remains poor    7/28/22  -Improving, continue IV diuresis  -Continue BB, Entresto      Elevated troponin  -Troponin 0.028  -Elevation secondary to demand ischemia from severe decompensated CHF  -Continue supportive care/OMT as tolerated-Plavix, BB    Bilateral pleural effusion  -Assess response to IV diuresis     Parkinson disease  -Mgmt as per primary team    Hyperlipidemia  -As per primary team    H/O Essential hypertension  -Continue BB, Entresto  -Monitor BP trend        VTE Risk Mitigation (From admission, onward)         Ordered     IP VTE HIGH RISK PATIENT  Once         07/27/22 0640     Place sequential compression device  Until discontinued         07/27/22 0640                Yeimy Gamez PA-C  Cardiology  [facility] - Telemetry ([facility])

## 2022-07-28 NOTE — PLAN OF CARE
[facility] - Telemetry (Hospital)  Initial Discharge Assessment       Primary Care Provider: Elva Ansari NP    Admission Diagnosis: Primary hypertension [I10]  Respiratory distress [R06.03]  SOB (shortness of breath) [R06.02]  Acute on chronic combined systolic and diastolic congestive heart failure [I50.43]  Lactic acidemia [E87.2]    Admission Date: 7/27/2022  Expected Discharge Date:     Discharge Barriers Identified: None    Payor: MEDICARE / Plan: MEDICARE PART A & B / Product Type: Government /     Extended Emergency Contact Information  Primary Emergency Contact: Pretty Lemusilia  Address: [address] [place] [address] [facility]  Home Phone: [phone]  Mobile Phone: [phone]  Relation: Daughter    Discharge Plan A: Home, Home with family, Home Health         [id number] - [place] - [address] AT [facility]  [address]  [place] [address]  Phone: [phone] Fax: [phone]      Initial Assessment (most recent)     Adult Discharge Assessment - 07/28/22 1549        Discharge Assessment    Assessment Type Discharge Planning Assessment     Confirmed/corrected address, phone number and insurance Yes     Confirmed Demographics Correct on Facesheet     Source of Information family     Communicated PARMINDER with patient/caregiver Date not available/Unable to determine     Reason For Admission Acute combined systolic and diastolic CHF     Lives With spouse     Facility Arrived From: Home     Do you expect to return to your current living situation? Yes     Do you have help at home or someone to help you manage your care at home? Yes     Who are your caregiver(s) and their phone number(s)? Pt's Adenike cardona [phone]     Prior to hospitilization cognitive status: Unable to Assess     Current cognitive status: Unable to Assess     Walking or Climbing Stairs Difficulty ambulation difficulty, requires equipment      Mobility Management hospital bed and w/c     Dressing/Bathing Difficulty bathing difficulty, dependent;dressing difficulty, dependent     Dressing/Bathing Management DTR assists     Home Accessibility wheelchair accessible     Equipment Currently Used at Home wheelchair;hospital bed     Readmission within 30 days? Yes     Do you currently have service(s) that help you manage your care at home? Yes     Name and Contact number of agency [facility]     Is the pt/caregiver preference to resume services with current agency Yes     Do you take prescription medications? Yes     Do you have prescription coverage? Yes     Do you have any problems affording any of your prescribed medications? No     Is the patient taking medications as prescribed? yes     Who is going to help you get home at discharge? Pt's daughter     How do you get to doctors appointments? family or friend will provide     Are you on dialysis? No     Do you take coumadin? No     Discharge Plan A Home;Home with family;Home Health     DME Needed Upon Discharge  none     Discharge Plan discussed with: Adult children     Discharge Barriers Identified None               Swer met with patient and daughter, Adenike, to complete discharge assessment. Pt lives with daughter, Adenike. Adenike is pt's primary caregiver and assists pt with her ADLs as pt is dependent. Pt has a hospital bed and wheelchair for ambulation. Pt's current with [facility] with preference to resume at discharge. Family to provided discharge transportation.     Swer provided family with  contact number. No additional CM needs at this time. Swer to continue following.

## 2022-07-28 NOTE — SUBJECTIVE & OBJECTIVE
Review of Systems   Unable to perform ROS: Patient nonverbal   Objective:     Vital Signs (Most Recent):  Temp: 96.1 °F (35.6 °C) (07/28/22 1215)  Pulse: (!) 51 (07/28/22 1215)  Resp: 14 (07/28/22 1215)  BP: (!) 142/98 (07/28/22 1215)  SpO2: 96 % (07/28/22 1215)   Vital Signs (24h Range):  Temp:  [96.1 °F (35.6 °C)-97.6 °F (36.4 °C)] 96.1 °F (35.6 °C)  Pulse:  [51-83] 51  Resp:  [14-24] 14  SpO2:  [95 %-97 %] 96 %  BP: (133-157)/(84-98) 142/98     Weight: 54 kg (119 lb 0.8 oz)  Body mass index is 21.77 kg/m².     SpO2: 96 %  O2 Device (Oxygen Therapy): room air      Intake/Output Summary (Last 24 hours) at 7/28/2022 1309  Last data filed at 7/28/2022 0935  Gross per 24 hour   Intake 250 ml   Output --   Net 250 ml       Lines/Drains/Airways       Drain  Duration                  Gastrostomy/Enterostomy 03/25/22 1320 Gastrostomy tube w/ balloon LUQ feeding 124 days         Gastrostomy/Enterostomy 06/06/22 1612 Gastrostomy tube w/ balloon midline feeding 51 days         Urethral Catheter 07/28/22 1036 Latex <1 day              Peripheral Intravenous Line  Duration                  Peripheral IV - Single Lumen 07/27/22 0145 20 G Right Wrist 1 day                    Physical Exam  Vitals and nursing note reviewed.   Constitutional:       General: She is not in acute distress.     Appearance: She is well-developed. She is ill-appearing. She is not diaphoretic.   HENT:      Head: Normocephalic and atraumatic.   Eyes:      General:         Right eye: No discharge.         Left eye: No discharge.      Pupils: Pupils are equal, round, and reactive to light.   Neck:      Thyroid: No thyromegaly.      Vascular: No JVD.      Trachea: No tracheal deviation.   Cardiovascular:      Rate and Rhythm: Normal rate and regular rhythm.      Heart sounds: Normal heart sounds, S1 normal and S2 normal. No murmur heard.  Pulmonary:      Effort: Pulmonary effort is normal. No respiratory distress.      Breath sounds: Rales present. No  wheezing.   Abdominal:      General: There is no distension.      Tenderness: There is no rebound.   Musculoskeletal:      Cervical back: Neck supple.      Right lower leg: No edema.      Left lower leg: No edema.      Comments: Upper extremity contractures     Skin:     General: Skin is warm and dry.      Findings: No erythema.   Neurological:      Mental Status: She is alert.      Comments: Non-verbal       Significant Labs: CMP   Recent Labs   Lab 07/27/22  0142 07/28/22  0444   * 149*   K 4.0 3.6    103   CO2 31* 32*    72   BUN 36* 34*   CREATININE 1.0 0.9   CALCIUM 9.2 9.2   PROT 7.0  --    ALBUMIN 3.3*  --    BILITOT 0.5  --    ALKPHOS 153*  --    AST 33  --    ALT 38  --    ANIONGAP 13 14   ESTGFRAFRICA 60 >60   EGFRNONAA 52* 59*   , CBC   Recent Labs   Lab 07/27/22 0142   WBC 4.03   HGB 10.1*   HCT 34.8*   *   , Troponin   Recent Labs   Lab 07/27/22 0142   TROPONINI 0.028*   , and All pertinent lab results from the last 24 hours have been reviewed.    Significant Imaging: Echocardiogram: Transthoracic echo (TTE) complete (C[facility] Only):   Results for orders placed or performed during the hospital encounter of 05/27/22   Echo   Result Value Ref Range    BSA 1.76 m2    TDI SEPTAL 0.05 m/s    LV LATERAL E/E' RATIO 12.83 m/s    LV SEPTAL E/E' RATIO 15.40 m/s    IVC diameter 2.38 cm    Left Ventricular Outflow Tract Mean Velocity 0.64335158128 cm/s    Left Ventricular Outflow Tract Mean Gradient 1.32 mmHg    TDI LATERAL 0.06 m/s    LVIDd 4.19 3.5 - 6.0 cm    IVS 1.47 (A) 0.6 - 1.1 cm    Posterior Wall 1.32 (A) 0.6 - 1.1 cm    Ao root annulus 3.12 cm    LVIDs 3.83 2.1 - 4.0 cm    FS 9 28 - 44 %    Sinus 2.80 cm    STJ 2.62 cm    Ascending aorta 2.75 cm    LV mass 222.33 g    LA size 3.83 cm    TAPSE 1.49 cm    Left Ventricle Relative Wall Thickness 0.63 cm    AV mean gradient 4 mmHg    AV valve area 2.12 cm2    AV Velocity Ratio 0.60     AV index (prosthetic) 0.64     MV valve area p 1/2  method 6.65 cm2    E/A ratio 1.51     Mean e' 0.06 m/s    E wave deceleration time 114.316148266603618 msec    IVRT 99.638507661458396 msec    LVOT diameter 2.06 cm    LVOT area 3.3 cm2    LVOT peak harsha 0.87 m/s    LVOT peak VTI 16.30 cm    Ao peak harsha 1.44 m/s    Ao VTI 25.6 cm    RVOT peak harsha 0.37 m/s    RVOT peak VTI 7.6 cm    LVOT stroke volume 54.30 cm3    AV peak gradient 8 mmHg    PV mean gradient 0.44 mmHg    E/E' ratio 14.00 m/s    MV Peak E Harsha 0.77 m/s    TR Max Harsha 3.53 m/s    MV stenosis pressure 1/2 time 33.342219762544715 ms    MV Peak A Harsha 0.51 m/s    LV Systolic Volume 63.31 mL    LV Systolic Volume Index 36.8 mL/m2    LV Diastolic Volume 78.31 mL    LV Diastolic Volume Index 45.53 mL/m2    LV Mass Index 129 g/m2    RA Major Axis 4.96 cm    Left Atrium Minor Axis 3.44 cm    Left Atrium Major Axis 4.30 cm    Triscuspid Valve Regurgitation Peak Gradient 50 mmHg    RA Width 4.18 cm    Right Atrial Pressure (from IVC) 8 mmHg    EF 20 %    TV rest pulmonary artery pressure 58 mmHg    Narrative    · The left ventricle is mildly enlarged with concentric hypertrophy and   severely decreased systolic function.  · The estimated ejection fraction is 15 - 20%.  · Grade I left ventricular diastolic dysfunction.  · Mild right ventricular enlargement with moderately reduced right   ventricular systolic function.  · Severe right atrial enlargement.  · Moderate left atrial enlargement.  · There is pulmonary hypertension.  · The estimated PA systolic pressure is 58 mmHg.  · No change from prior echo March 2022.      , EKG: Reviewed, and X-Ray: CXR: X-Ray Chest 1 View (CXR): No results found for this visit on 07/27/22. and X-Ray Chest PA and Lateral (CXR): No results found for this visit on 07/27/22.

## 2022-07-28 NOTE — PLAN OF CARE
Recommendation/Intervention: 7/28  1. When medically appropriate, Initiate Isosource 1.5 via PEG:   -Goal rate @ 40mL/hr.   -Provides 1440 calories, 65g protein, and 733mL H2O.   -100mL H2O flush q 4-6 hours or per MD/NP.   -Check Mg, K+, Na, Phos, and Glucose before and during initiation;Correct as indicated.      2. If bolus feeds preferred:   -4 cartons provides 1500 calories, 68g protein, and 764mL H2O.   -100mL H2O flush q bolus or per MD/NP.    Magalis Hernández RD,LDN

## 2022-07-28 NOTE — ASSESSMENT & PLAN NOTE
-Known severe underlying CHF with EF of 15-20%, presents with volume overload  -Continue IV diuresis  -Continue BB, Entresto  -Strict I's/O's  -Given patient's history, age, co-morbidities, prognosis remains poor    7/28/22  -Improving, continue IV diuresis  -Continue BB, Entresto

## 2022-07-29 PROBLEM — N39.0 UTI (URINARY TRACT INFECTION): Status: ACTIVE | Noted: 2022-07-29

## 2022-07-29 LAB
ANION GAP SERPL CALC-SCNC: 12 MMOL/L (ref 8–16)
BACTERIA UR CULT: NORMAL
BACTERIA UR CULT: NORMAL
BNP SERPL-MCNC: >4900 PG/ML (ref 0–99)
BUN SERPL-MCNC: 37 MG/DL (ref 8–23)
CALCIUM SERPL-MCNC: 9.2 MG/DL (ref 8.7–10.5)
CHLORIDE SERPL-SCNC: 104 MMOL/L (ref 95–110)
CO2 SERPL-SCNC: 32 MMOL/L (ref 23–29)
CREAT SERPL-MCNC: 1 MG/DL (ref 0.5–1.4)
EST. GFR  (AFRICAN AMERICAN): 60 ML/MIN/1.73 M^2
EST. GFR  (NON AFRICAN AMERICAN): 52 ML/MIN/1.73 M^2
GLUCOSE SERPL-MCNC: 176 MG/DL (ref 70–110)
POTASSIUM SERPL-SCNC: 4.3 MMOL/L (ref 3.5–5.1)
SODIUM SERPL-SCNC: 148 MMOL/L (ref 136–145)

## 2022-07-29 PROCEDURE — 99233 SBSQ HOSP IP/OBS HIGH 50: CPT | Mod: ,,, | Performed by: INTERNAL MEDICINE

## 2022-07-29 PROCEDURE — 21400001 HC TELEMETRY ROOM

## 2022-07-29 PROCEDURE — 99233 PR SUBSEQUENT HOSPITAL CARE,LEVL III: ICD-10-PCS | Mod: ,,, | Performed by: INTERNAL MEDICINE

## 2022-07-29 PROCEDURE — 36415 COLL VENOUS BLD VENIPUNCTURE: CPT | Performed by: INTERNAL MEDICINE

## 2022-07-29 PROCEDURE — 63600175 PHARM REV CODE 636 W HCPCS: Performed by: NURSE PRACTITIONER

## 2022-07-29 PROCEDURE — 80048 BASIC METABOLIC PNL TOTAL CA: CPT | Performed by: INTERNAL MEDICINE

## 2022-07-29 PROCEDURE — 25000003 PHARM REV CODE 250: Performed by: NURSE PRACTITIONER

## 2022-07-29 PROCEDURE — 36415 COLL VENOUS BLD VENIPUNCTURE: CPT | Performed by: PHYSICIAN ASSISTANT

## 2022-07-29 PROCEDURE — 87040 BLOOD CULTURE FOR BACTERIA: CPT | Mod: 59 | Performed by: NURSE PRACTITIONER

## 2022-07-29 PROCEDURE — 94761 N-INVAS EAR/PLS OXIMETRY MLT: CPT

## 2022-07-29 PROCEDURE — 63600175 PHARM REV CODE 636 W HCPCS: Performed by: INTERNAL MEDICINE

## 2022-07-29 PROCEDURE — 83880 ASSAY OF NATRIURETIC PEPTIDE: CPT | Performed by: PHYSICIAN ASSISTANT

## 2022-07-29 RX ORDER — CLOPIDOGREL BISULFATE 75 MG/1
75 TABLET ORAL DAILY
Status: DISCONTINUED | OUTPATIENT
Start: 2022-07-29 | End: 2022-07-30 | Stop reason: HOSPADM

## 2022-07-29 RX ORDER — ACETAMINOPHEN 325 MG/1
650 TABLET ORAL EVERY 6 HOURS PRN
Status: DISCONTINUED | OUTPATIENT
Start: 2022-07-29 | End: 2022-07-30 | Stop reason: HOSPADM

## 2022-07-29 RX ORDER — HYDROCODONE BITARTRATE AND ACETAMINOPHEN 5; 325 MG/1; MG/1
1 TABLET ORAL EVERY 6 HOURS PRN
Status: DISCONTINUED | OUTPATIENT
Start: 2022-07-29 | End: 2022-07-30 | Stop reason: HOSPADM

## 2022-07-29 RX ADMIN — FUROSEMIDE 40 MG: 10 INJECTION, SOLUTION INTRAMUSCULAR; INTRAVENOUS at 08:07

## 2022-07-29 RX ADMIN — SACUBITRIL AND VALSARTAN 2 TABLET: 49; 51 TABLET, FILM COATED ORAL at 08:07

## 2022-07-29 RX ADMIN — QUETIAPINE FUMARATE 25 MG: 25 TABLET ORAL at 08:07

## 2022-07-29 RX ADMIN — MUPIROCIN: 20 OINTMENT TOPICAL at 08:07

## 2022-07-29 RX ADMIN — HYDROCODONE BITARTRATE AND ACETAMINOPHEN 1 TABLET: 5; 325 TABLET ORAL at 06:07

## 2022-07-29 RX ADMIN — CARVEDILOL 3.12 MG: 3.12 TABLET, FILM COATED ORAL at 05:07

## 2022-07-29 RX ADMIN — MUPIROCIN: 20 OINTMENT TOPICAL at 10:07

## 2022-07-29 RX ADMIN — SACUBITRIL AND VALSARTAN 2 TABLET: 49; 51 TABLET, FILM COATED ORAL at 10:07

## 2022-07-29 RX ADMIN — FUROSEMIDE 40 MG: 10 INJECTION, SOLUTION INTRAMUSCULAR; INTRAVENOUS at 09:07

## 2022-07-29 RX ADMIN — CLOPIDOGREL 75 MG: 75 TABLET, FILM COATED ORAL at 05:07

## 2022-07-29 RX ADMIN — CEFTRIAXONE 1 G: 1 INJECTION, SOLUTION INTRAVENOUS at 10:07

## 2022-07-29 RX ADMIN — CARVEDILOL 3.12 MG: 3.12 TABLET, FILM COATED ORAL at 10:07

## 2022-07-29 NOTE — PROGRESS NOTES
[facility] ([facility]  Progress Note    Patient Name: Tanya Madrid  MRN: [id number]  Patient Class: IP- Inpatient   Admission Date: 7/27/2022  Length of Stay: 2 days  Attending Physician: Soco Suazo MD  Primary Care Provider: Elva Ansari NP        Subjective:     Principal Problem:Acute combined systolic and diastolic congestive heart failure        HPI:  Patient is non verbal  Daughter is by bedside.     82 year old  female patient with a PMHx of acute CHF, HTN, stroke,s/p prior cardiac arrest  who presents to the Emergency Department for evaluation of congestion which onset gradually 1 day pta.  Daughter reported that she sounded congested and has leg swelling .Daughter denies any history of fever or chills.  Labs and imaging test-  Chest x-ray -vascular congestion.        Component      Latest Ref Rng & Units 7/27/2022 7/19/2022 5/3/2022               POC PH      7.35 - 7.45 7.441 7.514 (H) 7.485 (H)   POC PCO2      35 - 45 mmHg 50.9 (H) 39.8 33.5 (L)   POC PO2      80 - 100 mmHg 65 (L) 66 (L) 61 (L)     Component      Latest Ref Rng & Units 7/27/2022 7/7/2022   BNP      0 - 99 pg/mL >4,900 (H) >4,900 (H)   Sodium -149  · Recent echo-06/2022  · The left ventricle is mildly enlarged with concentric hypertrophy and severely decreased systolic function.  · The estimated ejection fraction is 15 - 20%.  · Grade I left ventricular diastolic dysfunction.  · Mild right ventricular enlargement with moderately reduced right ventricular systolic function.  · Severe right atrial enlargement.  · Moderate left atrial enlargement.  · There is pulmonary hypertension.  · The estimated PA systolic pressure is 58 mmHg.  · No change from prior echo March 2022.           Overview/Hospital Course:  7/28/22 - Patient is non-verbal. When seen today, patient was lying in bed. Tubal feeds restarted today per dietitian. Abreu catheter placed today to manage urinary retention. Diureses in process;  patient responding appropriately. Vitals and labs are stable. Cardiology following. Due to recurrent admissions, palliative care consulted. Continue current plan of care.   7/29: Diuresing well. BNP chronically elevated. Will treat UTI with Rocephin as UA shows WBC 14. Likely d/c in the AM with home health      Interval History: Will treat UTI with Rocephin. Diuresing well. Likely d/c in the AM.     Review of Systems   Unable to perform ROS: Patient nonverbal   Objective:     Vital Signs (Most Recent):  Temp: 97.8 °F (36.6 °C) (07/29/22 1203)  Pulse: 75 (07/29/22 1300)  Resp: 18 (07/29/22 1203)  BP: 137/80 (07/29/22 1205)  SpO2: 99 % (07/29/22 1203) Vital Signs (24h Range):  Temp:  [97.3 °F (36.3 °C)-97.8 °F (36.6 °C)] 97.8 °F (36.6 °C)  Pulse:  [59-92] 75  Resp:  [16-18] 18  SpO2:  [83 %-100 %] 99 %  BP: (117-144)/() 137/80     Weight: 57.8 kg (127 lb 6.8 oz)  Body mass index is 23.31 kg/m².    Intake/Output Summary (Last 24 hours) at 7/29/2022 1605  Last data filed at 7/29/2022 1510  Gross per 24 hour   Intake 976 ml   Output 1700 ml   Net -724 ml      Physical Exam  Vitals and nursing note reviewed.   Constitutional:       Comments: Patient is non verbal   HENT:      Head: Normocephalic and atraumatic.      Mouth/Throat:      Mouth: Mucous membranes are moist.   Eyes:      General:         Right eye: No discharge.         Left eye: No discharge.   Cardiovascular:      Rate and Rhythm: Normal rate.   Pulmonary:      Breath sounds: Rales present.      Comments: Coarse breath sounds  Abdominal:      General: Abdomen is flat.      Comments: has PEG tube   Musculoskeletal:         General: Swelling present.      Cervical back: Normal range of motion.      Right lower leg: No edema.      Left lower leg: No edema.   Skin:     General: Skin is warm.   Neurological:      Mental Status: Mental status is at baseline.       Significant Labs: All pertinent labs within the past 24 hours have been reviewed.    Significant  Imaging: I have reviewed all pertinent imaging results/findings within the past 24 hours.      Assessment/Plan:      * Acute combined systolic and diastolic congestive heart failure  -Cardiology consulted. Known severe underlying CHF with EF of 15-20%, presents with volume overload  -Continue IV diuresis  -Continue BB, Entresto  -Strict I/Os  -Given patient's history, age, co-morbidities, prognosis remains poor    Chest x-ray is shown below.      7/29:  Net negative about 2L  Cont BB and Entresto  BNP chronically elevated  Initiated on Plavix per Cards recs and will monitor for signs of bleeding    UTI (urinary tract infection)  UA on 7/27 + for WBC 14 and moderate bacteria  Will initiate Rocephin  Blood Cx X2 drawn    Advanced care planning/counseling discussion  -DNR      Recent H/O Out of hospital Cardiac arrest  -DNR, supportive care       Elevated troponin  -Troponin 0.028  -Elevation secondary to demand ischemia from severe decompensated CHF  -Continue supportive care/OMT as tolerated-Plavix, BB    Bilateral pleural effusion  -Continue to monitor response to IV diuresis     7/29:  About 2L net negative    Parkinson disease  -Continue Sinemet     Kyphosis of thoracic region  Continue supportive care       Hyperlipidemia  -Continue Crestor     H/O Essential hypertension  -Continue BB, Entresto   -Monitor BP trend       VTE Risk Mitigation (From admission, onward)         Ordered     IP VTE HIGH RISK PATIENT  Once         07/27/22 0640     Place sequential compression device  Until discontinued         07/27/22 0640                Discharge Planning   PARMINDER:      Code Status: DNR   Is the patient medically ready for discharge?:     Reason for patient still in hospital (select all that apply): Patient trending condition  Discharge Plan A: Home, Home with family, Home Health                  Jeffrey Kramer NP  Department of Hospital Medicine   [facility] - Telemetry ([facility])

## 2022-07-29 NOTE — ASSESSMENT & PLAN NOTE
-Cardiology consulted. Known severe underlying CHF with EF of 15-20%, presents with volume overload  -Continue IV diuresis  -Continue BB, Entresto  -Strict I/Os  -Given patient's history, age, co-morbidities, prognosis remains poor    Chest x-ray is shown below.      7/29:  Net negative about 2L  Cont BB and Entresto  BNP chronically elevated  Initiated on Plavix per Cards recs and will monitor for signs of bleeding

## 2022-07-29 NOTE — NURSING
Report receive from VICKY Stallings.  Pt in bed non verbal. Family at bedside   POC reviewed   Abreu intact draining; for retention    Peg tub feedings continuous at 40ml/hr   Side lying left   Bed low, side rails up x2, non slip socks in use, call light in reach   Reminded to call for assistance  Hourly rounding complete will continue to monitor

## 2022-07-29 NOTE — SUBJECTIVE & OBJECTIVE
Interval History: Will treat UTI with Rocephin. Diuresing well. Likely d/c in the AM.     Review of Systems   Unable to perform ROS: Patient nonverbal   Objective:     Vital Signs (Most Recent):  Temp: 97.8 °F (36.6 °C) (07/29/22 1203)  Pulse: 75 (07/29/22 1300)  Resp: 18 (07/29/22 1203)  BP: 137/80 (07/29/22 1205)  SpO2: 99 % (07/29/22 1203) Vital Signs (24h Range):  Temp:  [97.3 °F (36.3 °C)-97.8 °F (36.6 °C)] 97.8 °F (36.6 °C)  Pulse:  [59-92] 75  Resp:  [16-18] 18  SpO2:  [83 %-100 %] 99 %  BP: (117-144)/() 137/80     Weight: 57.8 kg (127 lb 6.8 oz)  Body mass index is 23.31 kg/m².    Intake/Output Summary (Last 24 hours) at 7/29/2022 1605  Last data filed at 7/29/2022 1510  Gross per 24 hour   Intake 976 ml   Output 1700 ml   Net -724 ml      Physical Exam  Vitals and nursing note reviewed.   Constitutional:       Comments: Patient is non verbal   HENT:      Head: Normocephalic and atraumatic.      Mouth/Throat:      Mouth: Mucous membranes are moist.   Eyes:      General:         Right eye: No discharge.         Left eye: No discharge.   Cardiovascular:      Rate and Rhythm: Normal rate.   Pulmonary:      Breath sounds: Rales present.      Comments: Coarse breath sounds  Abdominal:      General: Abdomen is flat.      Comments: has PEG tube   Musculoskeletal:         General: Swelling present.      Cervical back: Normal range of motion.      Right lower leg: No edema.      Left lower leg: No edema.   Skin:     General: Skin is warm.   Neurological:      Mental Status: Mental status is at baseline.       Significant Labs: All pertinent labs within the past 24 hours have been reviewed.    Significant Imaging: I have reviewed all pertinent imaging results/findings within the past 24 hours.

## 2022-07-29 NOTE — ASSESSMENT & PLAN NOTE
-Known severe underlying CHF with EF of 15-20%, presents with volume overload  -Continue IV diuresis  -Continue BB, Entresto  -Strict I's/O's  -Given patient's history, age, co-morbidities, prognosis remains poor    7/28/22  -Improving, continue IV diuresis  -Continue BB, Entresto    7/29/22  -Good UOP since admission, continue IV diuresis  -Continue BB, Entresto  -BNP chronically elevated

## 2022-07-29 NOTE — CONSULTS
[facility] - Telemetry ([facility])  Wound Care    Patient Name:  Tanya Madrid   MRN:  [id number]  Date: 7/29/2022  Diagnosis: Acute combined systolic and diastolic congestive heart failure    History:     Past Medical History:   Diagnosis Date    Acute CHF (congestive heart failure) 1/17/2021    Hyperlipemia     Hypertension     Parkinson's disease     Stroke 2016    Throat mass        Social History     Socioeconomic History    Marital status:    Tobacco Use    Smoking status: Never Smoker    Smokeless tobacco: Never Used   Substance and Sexual Activity    Alcohol use: No    Drug use: No    Sexual activity: Not Currently       Precautions:     Allergies as of 07/26/2022 - Reviewed 07/14/2022   Allergen Reaction Noted    Xanax [alprazolam]  10/14/2020       WO Assessment Details/Treatment     Consulted to this 82 year old female patient for wound care. PMHx of acute CHF, HTN, stroke, s/p prior cardiac arrest. She is awake and alert. Turned to right side with minimal assist. Bilateral heels intact with no redness. Buttocks with hypopigmented intact scarring consistent with healed pressure injury. No open wounds. Waffle mattress in place. Recommend continued pressure injury prevention measures. Please reconsult if needed.     07/29/2022

## 2022-07-29 NOTE — SUBJECTIVE & OBJECTIVE
Review of Systems   Unable to perform ROS: Patient nonverbal   Objective:     Vital Signs (Most Recent):  Temp: 97.8 °F (36.6 °C) (07/29/22 1203)  Pulse: 77 (07/29/22 1205)  Resp: 18 (07/29/22 1203)  BP: 137/80 (07/29/22 1205)  SpO2: 99 % (07/29/22 1203) Vital Signs (24h Range):  Temp:  [97.3 °F (36.3 °C)-97.8 °F (36.6 °C)] 97.8 °F (36.6 °C)  Pulse:  [59-92] 77  Resp:  [16-18] 18  SpO2:  [83 %-100 %] 99 %  BP: (117-144)/() 137/80     Weight: 57.8 kg (127 lb 6.8 oz)  Body mass index is 23.31 kg/m².     SpO2: 99 %  O2 Device (Oxygen Therapy): room air      Intake/Output Summary (Last 24 hours) at 7/29/2022 1311  Last data filed at 7/29/2022 0600  Gross per 24 hour   Intake --   Output 801 ml   Net -801 ml       Lines/Drains/Airways       Drain  Duration                  Gastrostomy/Enterostomy 03/25/22 1320 Gastrostomy tube w/ balloon LUQ feeding 125 days         Gastrostomy/Enterostomy 06/06/22 1612 Gastrostomy tube w/ balloon midline feeding 52 days         Urethral Catheter 07/28/22 1036 Latex 1 day              Peripheral Intravenous Line  Duration                  Peripheral IV - Single Lumen 07/27/22 0145 20 G Right Wrist 2 days                    Physical Exam  Vitals and nursing note reviewed.   Constitutional:       General: She is not in acute distress.     Appearance: She is well-developed. She is ill-appearing. She is not diaphoretic.   HENT:      Head: Normocephalic and atraumatic.   Eyes:      General:         Right eye: No discharge.         Left eye: No discharge.      Pupils: Pupils are equal, round, and reactive to light.   Neck:      Thyroid: No thyromegaly.      Vascular: No JVD.      Trachea: No tracheal deviation.   Cardiovascular:      Rate and Rhythm: Normal rate and regular rhythm.      Heart sounds: Normal heart sounds, S1 normal and S2 normal. No murmur heard.  Pulmonary:      Effort: Pulmonary effort is normal. No respiratory distress.      Breath sounds: Rales present. No  wheezing.   Abdominal:      General: There is no distension.      Tenderness: There is no rebound.   Musculoskeletal:      Cervical back: Neck supple.      Right lower leg: No edema.      Left lower leg: No edema.      Comments: Upper extremities with contractures     Skin:     General: Skin is warm and dry.      Findings: No erythema.   Neurological:      Mental Status: She is alert.      Comments: Awake, non-verbal       Significant Labs: CMP   Recent Labs   Lab 07/28/22  0444 07/29/22  0515   * 148*   K 3.6 4.3    104   CO2 32* 32*   GLU 72 176*   BUN 34* 37*   CREATININE 0.9 1.0   CALCIUM 9.2 9.2   ANIONGAP 14 12   ESTGFRAFRICA >60 60   EGFRNONAA 59* 52*   , CBC No results for input(s): WBC, HGB, HCT, PLT in the last 48 hours., INR No results for input(s): INR, PROTIME in the last 48 hours., Troponin No results for input(s): TROPONINI in the last 48 hours., and All pertinent lab results from the last 24 hours have been reviewed.    Significant Imaging: Echocardiogram: Transthoracic echo (TTE) complete (Cupid Only):   Results for orders placed or performed during the hospital encounter of 05/27/22   Echo   Result Value Ref Range    BSA 1.76 m2    TDI SEPTAL 0.05 m/s    LV LATERAL E/E' RATIO 12.83 m/s    LV SEPTAL E/E' RATIO 15.40 m/s    IVC diameter 2.38 cm    Left Ventricular Outflow Tract Mean Velocity 0.33331829536 cm/s    Left Ventricular Outflow Tract Mean Gradient 1.32 mmHg    TDI LATERAL 0.06 m/s    LVIDd 4.19 3.5 - 6.0 cm    IVS 1.47 (A) 0.6 - 1.1 cm    Posterior Wall 1.32 (A) 0.6 - 1.1 cm    Ao root annulus 3.12 cm    LVIDs 3.83 2.1 - 4.0 cm    FS 9 28 - 44 %    Sinus 2.80 cm    STJ 2.62 cm    Ascending aorta 2.75 cm    LV mass 222.33 g    LA size 3.83 cm    TAPSE 1.49 cm    Left Ventricle Relative Wall Thickness 0.63 cm    AV mean gradient 4 mmHg    AV valve area 2.12 cm2    AV Velocity Ratio 0.60     AV index (prosthetic) 0.64     MV valve area p 1/2 method 6.65 cm2    E/A ratio 1.51      Mean e' 0.06 m/s    E wave deceleration time 114.517179081305024 msec    IVRT 99.462829939594671 msec    LVOT diameter 2.06 cm    LVOT area 3.3 cm2    LVOT peak harsha 0.87 m/s    LVOT peak VTI 16.30 cm    Ao peak harsha 1.44 m/s    Ao VTI 25.6 cm    RVOT peak harsha 0.37 m/s    RVOT peak VTI 7.6 cm    LVOT stroke volume 54.30 cm3    AV peak gradient 8 mmHg    PV mean gradient 0.44 mmHg    E/E' ratio 14.00 m/s    MV Peak E Harsha 0.77 m/s    TR Max Harsha 3.53 m/s    MV stenosis pressure 1/2 time 33.265756622489038 ms    MV Peak A Harsha 0.51 m/s    LV Systolic Volume 63.31 mL    LV Systolic Volume Index 36.8 mL/m2    LV Diastolic Volume 78.31 mL    LV Diastolic Volume Index 45.53 mL/m2    LV Mass Index 129 g/m2    RA Major Axis 4.96 cm    Left Atrium Minor Axis 3.44 cm    Left Atrium Major Axis 4.30 cm    Triscuspid Valve Regurgitation Peak Gradient 50 mmHg    RA Width 4.18 cm    Right Atrial Pressure (from IVC) 8 mmHg    EF 20 %    TV rest pulmonary artery pressure 58 mmHg    Narrative    · The left ventricle is mildly enlarged with concentric hypertrophy and   severely decreased systolic function.  · The estimated ejection fraction is 15 - 20%.  · Grade I left ventricular diastolic dysfunction.  · Mild right ventricular enlargement with moderately reduced right   ventricular systolic function.  · Severe right atrial enlargement.  · Moderate left atrial enlargement.  · There is pulmonary hypertension.  · The estimated PA systolic pressure is 58 mmHg.  · No change from prior echo March 2022.      , EKG: Reviewed, and X-Ray: CXR: X-Ray Chest 1 View (CXR): No results found for this visit on 07/27/22. and X-Ray Chest PA and Lateral (CXR): No results found for this visit on 07/27/22.

## 2022-07-29 NOTE — PLAN OF CARE
Plan of care discussed with pt's daughter. Pt's daughter verbalized understanding. Free from injury. No s/s of distress noted. Vitals stable. IV SL. PEG feeding Isosource 1.5 @40 mL/hr. Pt tolerated well. Meds as ordered. Tele monitor in place. Q2 hour rounding. No complaints. Will continue to monitor pt. 12 hour chart review.

## 2022-07-29 NOTE — PROGRESS NOTES
[facility] - Telemetry ([facility])  Cardiology  Progress Note    Patient Name: Tanya Madrid  MRN: [id number]  Admission Date: 7/27/2022  Hospital Length of Stay: 2 days  Code Status: DNR   Attending Physician: Soco Suazo MD   Primary Care Physician: Elva Ansari NP  Expected Discharge Date:   Principal Problem:Acute combined systolic and diastolic congestive heart failure    Subjective:   HPI:  HPI obtained from chart and patient's daughter as patient is essentially non-verbal     Ms. 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 Associated symptoms included BLE edema. No apparent associated fever, chills, palpitations, near syncope, or syncope. Initial workup in ED revealed BNP of 4,900, elevated BP, troponin of 0.028, and lactic acidosis 3.3. CXR showed bilateral pleural effusions and patient subsequently admitted for further evaluation and treatment. Cardiology consulted to assist with management. Patient seen and examined today, resting in bed. Appears frail, ill. Moans at times, but no intelligible speech. Remains SOB with audible gasping. Compliant with her medications, daughter very involved in her care. Followed in CHF clinic. Of note, patient previously admitted to this facility in 3/22 due to cardiac arrest/severe CHF and underwent TTM protocol and required inotropic support with diuresis. Given her co-morbidities and advanced CHF, prognosis remains poor. Echo 5/22 showed EF of 15-20%, DD, pulmonary HTN.     Hospital Course:   7/28/22-Patient seen and examined today, resting in bed. Non-verbal, sister at bedside. No AEON. Diuresing well. Labs reviewed. Creatinine stable, Na 149. LA improved.    Hospital Course:   7/28/22-Patient seen and examined today, resting in bed.  Non-verbal, sister at bedside. No AEON. Diuresing well. Labs reviewed. Creatinine stable, Na 149. LA improved.     7/29/22-Patient seen and examined today. Non-verbal, sister again at bedside. Stable overnight, good UOP. BNP chronically elevated. Creatinine stable, continue IV diuresis.          Review of Systems   Unable to perform ROS: Patient nonverbal   Objective:     Vital Signs (Most Recent):  Temp: 97.8 °F (36.6 °C) (07/29/22 1203)  Pulse: 77 (07/29/22 1205)  Resp: 18 (07/29/22 1203)  BP: 137/80 (07/29/22 1205)  SpO2: 99 % (07/29/22 1203) Vital Signs (24h Range):  Temp:  [97.3 °F (36.3 °C)-97.8 °F (36.6 °C)] 97.8 °F (36.6 °C)  Pulse:  [59-92] 77  Resp:  [16-18] 18  SpO2:  [83 %-100 %] 99 %  BP: (117-144)/() 137/80     Weight: 57.8 kg (127 lb 6.8 oz)  Body mass index is 23.31 kg/m².     SpO2: 99 %  O2 Device (Oxygen Therapy): room air      Intake/Output Summary (Last 24 hours) at 7/29/2022 1311  Last data filed at 7/29/2022 0600  Gross per 24 hour   Intake --   Output 801 ml   Net -801 ml       Lines/Drains/Airways       Drain  Duration                  Gastrostomy/Enterostomy 03/25/22 1320 Gastrostomy tube w/ balloon LUQ feeding 125 days         Gastrostomy/Enterostomy 06/06/22 1612 Gastrostomy tube w/ balloon midline feeding 52 days         Urethral Catheter 07/28/22 1036 Latex 1 day              Peripheral Intravenous Line  Duration                  Peripheral IV - Single Lumen 07/27/22 0145 20 G Right Wrist 2 days                    Physical Exam  Vitals and nursing note reviewed.   Constitutional:       General: She is not in acute distress.     Appearance: She is well-developed. She is ill-appearing. She is not diaphoretic.   HENT:      Head: Normocephalic and atraumatic.   Eyes:      General:         Right eye: No discharge.         Left eye: No discharge.      Pupils: Pupils are equal, round, and reactive to light.   Neck:      Thyroid: No thyromegaly.      Vascular: No JVD.      Trachea: No  tracheal deviation.   Cardiovascular:      Rate and Rhythm: Normal rate and regular rhythm.      Heart sounds: Normal heart sounds, S1 normal and S2 normal. No murmur heard.  Pulmonary:      Effort: Pulmonary effort is normal. No respiratory distress.      Breath sounds: Rales present. No wheezing.   Abdominal:      General: There is no distension.      Tenderness: There is no rebound.   Musculoskeletal:      Cervical back: Neck supple.      Right lower leg: No edema.      Left lower leg: No edema.      Comments: Upper extremities with contractures     Skin:     General: Skin is warm and dry.      Findings: No erythema.   Neurological:      Mental Status: She is alert.      Comments: Awake, non-verbal       Significant Labs: CMP   Recent Labs   Lab 07/28/22  0444 07/29/22  0515   * 148*   K 3.6 4.3    104   CO2 32* 32*   GLU 72 176*   BUN 34* 37*   CREATININE 0.9 1.0   CALCIUM 9.2 9.2   ANIONGAP 14 12   ESTGFRAFRICA >60 60   EGFRNONAA 59* 52*   , CBC No results for input(s): WBC, HGB, HCT, PLT in the last 48 hours., INR No results for input(s): INR, PROTIME in the last 48 hours., Troponin No results for input(s): TROPONINI in the last 48 hours., and All pertinent lab results from the last 24 hours have been reviewed.    Significant Imaging: Echocardiogram: Transthoracic echo (TTE) complete (Cupid Only):   Results for orders placed or performed during the hospital encounter of 05/27/22   Echo   Result Value Ref Range    BSA 1.76 m2    TDI SEPTAL 0.05 m/s    LV LATERAL E/E' RATIO 12.83 m/s    LV SEPTAL E/E' RATIO 15.40 m/s    IVC diameter 2.38 cm    Left Ventricular Outflow Tract Mean Velocity 0.42903284452 cm/s    Left Ventricular Outflow Tract Mean Gradient 1.32 mmHg    TDI LATERAL 0.06 m/s    LVIDd 4.19 3.5 - 6.0 cm    IVS 1.47 (A) 0.6 - 1.1 cm    Posterior Wall 1.32 (A) 0.6 - 1.1 cm    Ao root annulus 3.12 cm    LVIDs 3.83 2.1 - 4.0 cm    FS 9 28 - 44 %    Sinus 2.80 cm    STJ 2.62 cm    Ascending  aorta 2.75 cm    LV mass 222.33 g    LA size 3.83 cm    TAPSE 1.49 cm    Left Ventricle Relative Wall Thickness 0.63 cm    AV mean gradient 4 mmHg    AV valve area 2.12 cm2    AV Velocity Ratio 0.60     AV index (prosthetic) 0.64     MV valve area p 1/2 method 6.65 cm2    E/A ratio 1.51     Mean e' 0.06 m/s    E wave deceleration time 114.668128445442115 msec    IVRT 99.945414346636759 msec    LVOT diameter 2.06 cm    LVOT area 3.3 cm2    LVOT peak harsha 0.87 m/s    LVOT peak VTI 16.30 cm    Ao peak harsha 1.44 m/s    Ao VTI 25.6 cm    RVOT peak harsha 0.37 m/s    RVOT peak VTI 7.6 cm    LVOT stroke volume 54.30 cm3    AV peak gradient 8 mmHg    PV mean gradient 0.44 mmHg    E/E' ratio 14.00 m/s    MV Peak E Harsha 0.77 m/s    TR Max Harsha 3.53 m/s    MV stenosis pressure 1/2 time 33.007438863157277 ms    MV Peak A Harsha 0.51 m/s    LV Systolic Volume 63.31 mL    LV Systolic Volume Index 36.8 mL/m2    LV Diastolic Volume 78.31 mL    LV Diastolic Volume Index 45.53 mL/m2    LV Mass Index 129 g/m2    RA Major Axis 4.96 cm    Left Atrium Minor Axis 3.44 cm    Left Atrium Major Axis 4.30 cm    Triscuspid Valve Regurgitation Peak Gradient 50 mmHg    RA Width 4.18 cm    Right Atrial Pressure (from IVC) 8 mmHg    EF 20 %    TV rest pulmonary artery pressure 58 mmHg    Narrative    · The left ventricle is mildly enlarged with concentric hypertrophy and   severely decreased systolic function.  · The estimated ejection fraction is 15 - 20%.  · Grade I left ventricular diastolic dysfunction.  · Mild right ventricular enlargement with moderately reduced right   ventricular systolic function.  · Severe right atrial enlargement.  · Moderate left atrial enlargement.  · There is pulmonary hypertension.  · The estimated PA systolic pressure is 58 mmHg.  · No change from prior echo March 2022.      , EKG: Reviewed, and X-Ray: CXR: X-Ray Chest 1 View (CXR): No results found for this visit on 07/27/22. and X-Ray Chest PA and Lateral (CXR): No results  found for this visit on 07/27/22.    Assessment and Plan:   Patient who presents with decompensated combined CHF. Continue IV diuresis and OMT as tolerated.    Acute on chronic combined systolic and diastolic congestive heart failure  -Known severe underlying CHF with EF of 15-20%, presents with volume overload  -Continue IV diuresis  -Continue BB, Entresto  -Strict I's/O's  -Given patient's history, age, co-morbidities, prognosis remains poor    7/28/22  -Improving, continue IV diuresis  -Continue BB, Entresto    7/29/22  -Good UOP since admission, continue IV diuresis  -Continue BB, Entresto  -BNP chronically elevated    Elevated troponin  -Troponin 0.028  -Elevation secondary to demand ischemia from severe decompensated CHF  -Continue supportive care/OMT as tolerated-Plavix, BB    Bilateral pleural effusion  -Assess response to IV diuresis     Parkinson disease  -Mgmt as per primary team    Hyperlipidemia  -As per primary team    H/O Essential hypertension  -Continue BB, Entresto  -Monitor BP trend        VTE Risk Mitigation (From admission, onward)         Ordered     IP VTE HIGH RISK PATIENT  Once         07/27/22 0640     Place sequential compression device  Until discontinued         07/27/22 0640                Yeimy Gamez PA-C  Cardiology  [facility] - Telemetry ([facility])

## 2022-07-29 NOTE — PLAN OF CARE
Pt non verbal. Eyes open spontaneously. POC reviewed with daughter at bedside  Pt remains free of injuries and falls; fall precaution in place   SR on tele monitor.   IV saline locked; intact  Abreu intact and draining; placed for retention   PEG w/ tube feeding 40mL/hr and water bolus 250mL Q4H    Current plan: supportive care, IV lasix to diuresis, enteral feeding    Waffle mattress   Bed low, side rails up x2, non slip socks in use, call light in reach   Reminded to call for assistance  Hourly rounding complete will continue to monitor

## 2022-07-30 VITALS
BODY MASS INDEX: 23.45 KG/M2 | SYSTOLIC BLOOD PRESSURE: 130 MMHG | DIASTOLIC BLOOD PRESSURE: 82 MMHG | TEMPERATURE: 97 F | OXYGEN SATURATION: 99 % | WEIGHT: 127.44 LBS | RESPIRATION RATE: 18 BRPM | HEART RATE: 71 BPM | HEIGHT: 62 IN

## 2022-07-30 LAB
ANION GAP SERPL CALC-SCNC: 10 MMOL/L (ref 8–16)
BASOPHILS # BLD AUTO: 0.01 K/UL (ref 0–0.2)
BASOPHILS NFR BLD: 0.3 % (ref 0–1.9)
BUN SERPL-MCNC: 34 MG/DL (ref 8–23)
CALCIUM SERPL-MCNC: 8.6 MG/DL (ref 8.7–10.5)
CHLORIDE SERPL-SCNC: 105 MMOL/L (ref 95–110)
CO2 SERPL-SCNC: 32 MMOL/L (ref 23–29)
CREAT SERPL-MCNC: 0.8 MG/DL (ref 0.5–1.4)
DIFFERENTIAL METHOD: ABNORMAL
EOSINOPHIL # BLD AUTO: 0.1 K/UL (ref 0–0.5)
EOSINOPHIL NFR BLD: 2.8 % (ref 0–8)
ERYTHROCYTE [DISTWIDTH] IN BLOOD BY AUTOMATED COUNT: 20.1 % (ref 11.5–14.5)
EST. GFR  (AFRICAN AMERICAN): >60 ML/MIN/1.73 M^2
EST. GFR  (NON AFRICAN AMERICAN): >60 ML/MIN/1.73 M^2
GLUCOSE SERPL-MCNC: 136 MG/DL (ref 70–110)
HCT VFR BLD AUTO: 33.2 % (ref 37–48.5)
HGB BLD-MCNC: 9.5 G/DL (ref 12–16)
IMM GRANULOCYTES # BLD AUTO: 0.01 K/UL (ref 0–0.04)
IMM GRANULOCYTES NFR BLD AUTO: 0.3 % (ref 0–0.5)
LYMPHOCYTES # BLD AUTO: 0.9 K/UL (ref 1–4.8)
LYMPHOCYTES NFR BLD: 25 % (ref 18–48)
MAGNESIUM SERPL-MCNC: 2.1 MG/DL (ref 1.6–2.6)
MCH RBC QN AUTO: 25.5 PG (ref 27–31)
MCHC RBC AUTO-ENTMCNC: 28.6 G/DL (ref 32–36)
MCV RBC AUTO: 89 FL (ref 82–98)
MONOCYTES # BLD AUTO: 0.7 K/UL (ref 0.3–1)
MONOCYTES NFR BLD: 18.8 % (ref 4–15)
NEUTROPHILS # BLD AUTO: 1.9 K/UL (ref 1.8–7.7)
NEUTROPHILS NFR BLD: 52.8 % (ref 38–73)
NRBC BLD-RTO: 1 /100 WBC
PLATELET # BLD AUTO: 120 K/UL (ref 150–450)
PMV BLD AUTO: 13.2 FL (ref 9.2–12.9)
POTASSIUM SERPL-SCNC: 3.4 MMOL/L (ref 3.5–5.1)
RBC # BLD AUTO: 3.73 M/UL (ref 4–5.4)
SODIUM SERPL-SCNC: 147 MMOL/L (ref 136–145)
WBC # BLD AUTO: 3.52 K/UL (ref 3.9–12.7)

## 2022-07-30 PROCEDURE — 94761 N-INVAS EAR/PLS OXIMETRY MLT: CPT

## 2022-07-30 PROCEDURE — 36415 COLL VENOUS BLD VENIPUNCTURE: CPT | Performed by: INTERNAL MEDICINE

## 2022-07-30 PROCEDURE — 83735 ASSAY OF MAGNESIUM: CPT | Performed by: INTERNAL MEDICINE

## 2022-07-30 PROCEDURE — 36415 COLL VENOUS BLD VENIPUNCTURE: CPT | Performed by: NURSE PRACTITIONER

## 2022-07-30 PROCEDURE — 25000003 PHARM REV CODE 250: Performed by: INTERNAL MEDICINE

## 2022-07-30 PROCEDURE — 80048 BASIC METABOLIC PNL TOTAL CA: CPT | Performed by: INTERNAL MEDICINE

## 2022-07-30 PROCEDURE — 63600175 PHARM REV CODE 636 W HCPCS: Performed by: INTERNAL MEDICINE

## 2022-07-30 PROCEDURE — 63600175 PHARM REV CODE 636 W HCPCS: Performed by: NURSE PRACTITIONER

## 2022-07-30 PROCEDURE — 99233 PR SUBSEQUENT HOSPITAL CARE,LEVL III: ICD-10-PCS | Mod: ,,, | Performed by: INTERNAL MEDICINE

## 2022-07-30 PROCEDURE — 85025 COMPLETE CBC W/AUTO DIFF WBC: CPT | Performed by: NURSE PRACTITIONER

## 2022-07-30 PROCEDURE — 25000003 PHARM REV CODE 250: Performed by: NURSE PRACTITIONER

## 2022-07-30 PROCEDURE — 99233 SBSQ HOSP IP/OBS HIGH 50: CPT | Mod: ,,, | Performed by: INTERNAL MEDICINE

## 2022-07-30 RX ORDER — AMOXICILLIN AND CLAVULANATE POTASSIUM 875; 125 MG/1; MG/1
1 TABLET, FILM COATED ORAL 2 TIMES DAILY
Qty: 10 TABLET | Refills: 0 | Status: SHIPPED | OUTPATIENT
Start: 2022-07-30 | End: 2022-08-04 | Stop reason: ALTCHOICE

## 2022-07-30 RX ORDER — CLOPIDOGREL BISULFATE 75 MG/1
75 TABLET ORAL DAILY
Qty: 30 TABLET | Refills: 2 | Status: SHIPPED | OUTPATIENT
Start: 2022-07-30 | End: 2022-10-28

## 2022-07-30 RX ORDER — QUETIAPINE FUMARATE 25 MG/1
25 TABLET, FILM COATED ORAL DAILY
Qty: 30 TABLET | Refills: 11 | Status: ON HOLD | OUTPATIENT
Start: 2022-07-30 | End: 2022-09-17 | Stop reason: SDUPTHER

## 2022-07-30 RX ORDER — LANOLIN ALCOHOL/MO/W.PET/CERES
400 CREAM (GRAM) TOPICAL DAILY
Status: DISCONTINUED | OUTPATIENT
Start: 2022-07-30 | End: 2022-07-30 | Stop reason: HOSPADM

## 2022-07-30 RX ADMIN — CLOPIDOGREL 75 MG: 75 TABLET, FILM COATED ORAL at 08:07

## 2022-07-30 RX ADMIN — CARVEDILOL 3.12 MG: 3.12 TABLET, FILM COATED ORAL at 08:07

## 2022-07-30 RX ADMIN — Medication 400 MG: at 09:07

## 2022-07-30 RX ADMIN — SCOPOLAMINE 1 PATCH: 1.5 PATCH, EXTENDED RELEASE TRANSDERMAL at 08:07

## 2022-07-30 RX ADMIN — MUPIROCIN: 20 OINTMENT TOPICAL at 08:07

## 2022-07-30 RX ADMIN — SACUBITRIL AND VALSARTAN 2 TABLET: 49; 51 TABLET, FILM COATED ORAL at 08:07

## 2022-07-30 RX ADMIN — CEFTRIAXONE 1 G: 1 INJECTION, SOLUTION INTRAVENOUS at 09:07

## 2022-07-30 RX ADMIN — POTASSIUM BICARBONATE 20 MEQ: 391 TABLET, EFFERVESCENT ORAL at 09:07

## 2022-07-30 RX ADMIN — FUROSEMIDE 40 MG: 10 INJECTION, SOLUTION INTRAMUSCULAR; INTRAVENOUS at 08:07

## 2022-07-30 NOTE — DISCHARGE INSTRUCTIONS
Take Augmentin (antibiotic) via G tube twice a day for 5 days  Resume Plavix  Follow up with PCP for post hospital visit stay check up  Follow up with Heart failure clinic as referrals made  Ask PCP about maintenance Macrobid antibiotic for recurrent UTIs

## 2022-07-30 NOTE — PLAN OF CARE
Pt non verbal. Eyes open spontaneously. POC reviewed with daughter at bedside  Pt remains free of injuries and falls; fall precaution in place   SR on tele monitor.   IV saline locked; intact  Abreu intact and draining; placed for retention   PEG w/ tube feeding 40mL/hr and water bolus 250mL Q4H    Current plan: supportive care, IV lasix to diuresis, enteral feeding    Waffle mattress. Turn Q2H, d/c plan home with family/home health   Bed low, side rails up x2, non slip socks in use, call light in reach   Reminded to call for assistance  Hourly rounding complete will continue to monitor

## 2022-07-30 NOTE — PLAN OF CARE
[facility] - Telemetry (Hospital)  Discharge Final Note    Primary Care Provider: Elva Ansari NP    Expected Discharge Date: 7/30/2022    Final Discharge Note (most recent)       Final Note - 07/30/22 0932          Final Note    Assessment Type Final Discharge Note (P)      Anticipated Discharge Disposition Home or Self Care (P)      Hospital Resources/Appts/Education Provided Post-Acute resouces added to AVS (P)         Post-Acute Status    Post-Acute Authorization Home Health (P)      Discharge Delays -- (P)    dc once sofi by doctor                    Important Message from Medicare         DC once physician rounds. Requested HH resumption orders. No other needs noted at this time.Rafael Gleason LMSW 7/30/2022 9:33 AM

## 2022-07-30 NOTE — PLAN OF CARE
Went over discharge instructions with family at bedside.  Stressed the importance of making and keeping all appointments.  Prescriptions to be picked up at listed pharmacy.  Daughter verbalized understanding.   Had all questions regarding discharge answered to satisfaction.  IV and henley cath removed without complications.  Tele box removed and returned to monitor tech.  Waiting for personal transportation to arrive.

## 2022-07-30 NOTE — DISCHARGE SUMMARY
[facility] - Tele[facility] ([facility] Medicine  Discharge Summary      Patient Name: Tanya Madrid  MRN: [id number]  Patient Class: IP- Inpatient  Admission Date: 7/27/2022  Hospital Length of Stay: 3 days  Discharge Date and Time: 7/30/2022 12:45 PM  Attending Physician: No att. providers found   Discharging Provider: Jeffrey Kramer NP  Primary Care Provider: Elva Ansari NP      HPI:   Patient is non verbal  Daughter is by bedside.     82 year old  female patient with a PMHx of acute CHF, HTN, stroke,s/p prior cardiac arrest  who presents to the Emergency Department for evaluation of congestion which onset gradually 1 day pta.  Daughter reported that she sounded congested and has leg swelling .Daughter denies any history of fever or chills.  Labs and imaging test-  Chest x-ray -vascular congestion.        Component      Latest Ref Rng & Units 7/27/2022 7/19/2022 5/3/2022               POC PH      7.35 - 7.45 7.441 7.514 (H) 7.485 (H)   POC PCO2      35 - 45 mmHg 50.9 (H) 39.8 33.5 (L)   POC PO2      80 - 100 mmHg 65 (L) 66 (L) 61 (L)     Component      Latest Ref Rng & Units 7/27/2022 7/7/2022   BNP      0 - 99 pg/mL >4,900 (H) >4,900 (H)   Sodium -149  · Recent echo-06/2022  · The left ventricle is mildly enlarged with concentric hypertrophy and severely decreased systolic function.  · The estimated ejection fraction is 15 - 20%.  · Grade I left ventricular diastolic dysfunction.  · Mild right ventricular enlargement with moderately reduced right ventricular systolic function.  · Severe right atrial enlargement.  · Moderate left atrial enlargement.  · There is pulmonary hypertension.  · The estimated PA systolic pressure is 58 mmHg.  · No change from prior echo March 2022.           * No surgery found *      Hospital Course:   7/28/22 - Patient is non-verbal. When seen today, patient was lying in bed. Tubal feeds restarted today per dietitian. Abreu catheter placed today to manage urinary retention.  Diureses in process; patient responding appropriately. Vitals and labs are stable. Cardiology following. Due to recurrent admissions, palliative care consulted. Continue current plan of care.   7/29: Diuresing well. BNP chronically elevated. Will treat UTI with Rocephin as UA shows WBC 14. Likely d/c in the AM with home health  7/30: about 3L net negative. Plavix initiated and hgb remained stable. She will follow up with CHF clinic. Discharged with Augmentin for UTI. Home health ordered. Daughter is the sole caretaker. Instructed to follow up with PCP for post hospital visit stay check up. Referred to NP at home program as well.          Goals of Care Treatment Preferences:  Code Status: DNR          What is most important right now is to focus on improvement in condition but with limits to invasive therapies.  Accordingly, we have decided that the best plan to meet the patient's goals includes continuing with treatment.      Consults:   Consults (From admission, onward)        Status Ordering Provider     Inpatient consult to Registered Dietitian/Nutritionist  Once        Provider:  (Not yet assigned)    Completed CHELLY RO     Inpatient consult to Cardiology  Once        Provider:  (Not yet assigned)    Completed CHARLEEN BROWN     Inpatient consult to Social Work/Case Management  Once        Provider:  (Not yet assigned)    Completed CHARLEEN BROWN     Inpatient consult to Registered Dietitian/Nutritionist  Once        Provider:  (Not yet assigned)    Completed CHARLEEN BROWN          No new Assessment & Plan notes have been filed under this hospital service since the last note was generated.  Service: Hospital Medicine    Final Active Diagnoses:    Diagnosis Date Noted POA    PRINCIPAL PROBLEM:  Acute combined systolic and diastolic congestive heart failure [I50.41] 07/27/2022 Yes    UTI (urinary tract infection) [N39.0] 07/29/2022 Yes    Acute on chronic combined systolic and diastolic congestive  heart failure [I50.43] 07/27/2022 Yes    Advanced care planning/counseling discussion [Z71.89] 05/30/2022 Not Applicable    Recent H/O Out of hospital Cardiac arrest [I46.9] 03/11/2022 Yes    Elevated troponin [R77.8] 04/09/2021 Yes    Bilateral pleural effusion [J90] 01/16/2021 Yes    Kyphosis of thoracic region [M40.204] 07/24/2019 Yes    H/O Essential hypertension [I10] 07/24/2019 Yes     Chronic    Parkinson disease [G20] 07/11/2017 Yes     Chronic    Hyperlipidemia [E78.5] 07/11/2017 Yes     Chronic      Problems Resolved During this Admission:    Diagnosis Date Noted Date Resolved POA    Acute congestive heart failure [I50.9] 07/27/2022 07/27/2022 Unknown       Discharged Condition: stable    Disposition: Home-Health Care Svc    Follow Up:   Follow-up Information     Elva Ansari NP Follow up.    Specialties: Wound Care, Family Medicine  Contact information:  [address] [place] [address] [id number] [phone]                       Patient Instructions:      Ambulatory referral/consult to [facility]   Standing Status: Future   Referral Priority: Routine Referral Type: Consultation   Referral Reason: Specialty Services Required   Number of Visits Requested: 1     Ambulatory referral/consult to [facility]   Standing Status: Future   Referral Priority: Routine Referral Type: Consultation   Referral Reason: Specialty Services Required   Requested Specialty: Cardiology   Number of Visits Requested: 1     SUBSEQUENT HOME HEALTH ORDERS   Order Comments: Subsequent Home Health Orders    Current Medications:  Current Facility-Administered Medications:  acetaminophen tablet 650 mg, 650 mg, Oral, Q6H PRN, Jeffrey Kramer NP  carvediloL tablet 3.125 mg, 3.125 mg, Per G Tube, BID WM, Sandhya Harris NP, 3.125 mg at 07/30/22 0820  cefTRIAXone (ROCEPHIN) 1 g/50 mL D5W IVPB, 1 g, Intravenous, Q24H, Jeffrey Kramer NP, Stopped at 07/29/22 1046  clopidogreL tablet 75 mg,  75 mg, Per G Tube, Daily, Jeffrey Kramer NP, 75 mg at 07/30/22 0820  furosemide injection 40 mg, 40 mg, Intravenous, BID, Jose E Swanson MD, 40 mg at 07/30/22 0822  HYDROcodone-acetaminophen 5-325 mg per tablet 1 tablet, 1 tablet, Oral, Q6H PRN, Jeffrey Kramer NP, 1 tablet at 07/29/22 1817  magnesium oxide tablet 400 mg, 400 mg, Per G Tube, Daily, Jose E Swanson MD  mupirocin 2 % ointment, , Nasal, BID, Soco Suazo MD, Given at 07/30/22 0822  potassium bicarbonate disintegrating tablet 20 mEq, 20 mEq, Per G Tube, Once, Jeffrey Kramer NP  QUEtiapine tablet 25 mg, 25 mg, Per G Tube, QHS, Sandhya Harris NP, 25 mg at 07/29/22 2053  sacubitriL-valsartan 49-51 mg per tablet 2 tablet, 2 tablet, Per G Tube, BID, Sandhya Harris NP, 2 tablet at 07/30/22 0820  scopolamine 1.3-1.5 mg (1 mg over 3 days) 1 patch, 1 patch, Transdermal, Q3 Days, Abhijit Fowler MD, 1 patch at 07/30/22 0844  sodium chloride 0.9% flush 10 mL, 10 mL, Intravenous, PRN, Abhijit Fowler MD        Nursing:   PEG Care:  Instruct patient/caregiver to clean site.  Monitor skin integrity.    Diet:   no change    Activities:   activity as tolerated    Labs:      Referrals/ Consults  Physical Therapy to evaluate and treat. Evaluate for home safety and equipment needs; Establish/upgrade home exercise program. Perform / instruct on therapeutic exercises, gait training, transfer training, and Range of Motion.  Occupational Therapy to evaluate and treat. Evaluate home environment for safety and equipment needs. Perform/Instruct on transfers, ADL training, ROM, and therapeutic exercises.    Home Health Aide:  Nursing Twice weekly, Physical Therapy Twice weekly and Three times weekly, and Occupational Therapy Twice weekly and Three times weekly     Order Specific Question Answer Comments   What Home Health Agency is the patient currently using? Other/External      Tube Feedings/Formulas     Order Specific Question Answer Comments   Select Adult Formula:  Isosource 1.5 efra    Route: Gastrostomy      Activity as tolerated       Significant Diagnostic Studies:   Results for orders placed or performed during the hospital encounter of 07/27/22   Urine culture    Specimen: Urine   Result Value Ref Range    Urine Culture, Routine       Multiple organisms isolated. None in predominance.  Repeat if    Urine Culture, Routine clinically necessary.    Blood culture    Specimen: Blood   Result Value Ref Range    Blood Culture, Routine No Growth to date    Blood culture    Specimen: Blood   Result Value Ref Range    Blood Culture, Routine No Growth to date    CBC auto differential   Result Value Ref Range    WBC 4.03 3.90 - 12.70 K/uL    RBC 3.93 (L) 4.00 - 5.40 M/uL    Hemoglobin 10.1 (L) 12.0 - 16.0 g/dL    Hematocrit 34.8 (L) 37.0 - 48.5 %    MCV 89 82 - 98 fL    MCH 25.7 (L) 27.0 - 31.0 pg    MCHC 29.0 (L) 32.0 - 36.0 g/dL    RDW 20.0 (H) 11.5 - 14.5 %    Platelets 124 (L) 150 - 450 K/uL    MPV 12.3 9.2 - 12.9 fL    Immature Granulocytes 0.5 0.0 - 0.5 %    Gran # (ANC) 2.1 1.8 - 7.7 K/uL    Immature Grans (Abs) 0.02 0.00 - 0.04 K/uL    Lymph # 1.2 1.0 - 4.8 K/uL    Mono # 0.6 0.3 - 1.0 K/uL    Eos # 0.2 0.0 - 0.5 K/uL    Baso # 0.01 0.00 - 0.20 K/uL    nRBC 1 (A) 0 /100 WBC    Gran % 52.4 38.0 - 73.0 %    Lymph % 28.5 18.0 - 48.0 %    Mono % 13.9 4.0 - 15.0 %    Eosinophil % 4.5 0.0 - 8.0 %    Basophil % 0.2 0.0 - 1.9 %    Platelet Estimate Decreased (A)     Aniso Slight     Poik Slight     Ovalocytes Occasional     Target Cells Moderate     Tear Drop Cells Occasional     Differential Method Automated    Comprehensive metabolic panel   Result Value Ref Range    Sodium 149 (H) 136 - 145 mmol/L    Potassium 4.0 3.5 - 5.1 mmol/L    Chloride 105 95 - 110 mmol/L    CO2 31 (H) 23 - 29 mmol/L    Glucose 101 70 - 110 mg/dL    BUN 36 (H) 8 - 23 mg/dL    Creatinine 1.0 0.5 - 1.4 mg/dL    Calcium 9.2 8.7 - 10.5 mg/dL    Total Protein 7.0 6.0 - 8.4 g/dL    Albumin 3.3 (L) 3.5 - 5.2 g/dL     Total Bilirubin 0.5 0.1 - 1.0 mg/dL    Alkaline Phosphatase 153 (H) 55 - 135 U/L    AST 33 10 - 40 U/L    ALT 38 10 - 44 U/L    Anion Gap 13 8 - 16 mmol/L    eGFR if African American 60 >60 mL/min/1.73 m^2    eGFR if non African American 52 (A) >60 mL/min/1.73 m^2   Brain natriuretic peptide   Result Value Ref Range    BNP >4,900 (H) 0 - 99 pg/mL   Lactic acid, plasma   Result Value Ref Range    Lactate (Lactic Acid) 3.3 (H) 0.5 - 2.2 mmol/L   COVID-19 Rapid Screening   Result Value Ref Range    SARS-CoV-2 RNA, Amplification, Qual Negative Negative   Troponin I   Result Value Ref Range    Troponin I 0.028 (H) 0.000 - 0.026 ng/mL   Urinalysis, Reflex to Urine Culture Urine, Clean Catch    Specimen: Urine   Result Value Ref Range    Specimen UA Urine, Clean Catch     Color, UA Yellow Yellow, Straw, Kassi    Appearance, UA Hazy (A) Clear    pH, UA 7.0 5.0 - 8.0    Specific Gravity, UA 1.015 1.005 - 1.030    Protein, UA Trace (A) Negative    Glucose, UA Negative Negative    Ketones, UA Negative Negative    Bilirubin (UA) Negative Negative    Occult Blood UA 2+ (A) Negative    Nitrite, UA Negative Negative    Urobilinogen, UA 2.0-3.0 (A) <2.0 EU/dL    Leukocytes, UA 3+ (A) Negative   Urinalysis Microscopic   Result Value Ref Range    RBC, UA 5 (H) 0 - 4 /hpf    WBC, UA 14 (H) 0 - 5 /hpf    Bacteria Moderate (A) None-Occ /hpf    Squam Epithel, UA 0 /hpf    Microscopic Comment SEE COMMENT    Basic metabolic panel   Result Value Ref Range    Sodium 149 (H) 136 - 145 mmol/L    Potassium 3.6 3.5 - 5.1 mmol/L    Chloride 103 95 - 110 mmol/L    CO2 32 (H) 23 - 29 mmol/L    Glucose 72 70 - 110 mg/dL    BUN 34 (H) 8 - 23 mg/dL    Creatinine 0.9 0.5 - 1.4 mg/dL    Calcium 9.2 8.7 - 10.5 mg/dL    Anion Gap 14 8 - 16 mmol/L    eGFR if African American >60 >60 mL/min/1.73 m^2    eGFR if non African American 59 (A) >60 mL/min/1.73 m^2   Lactic acid, plasma   Result Value Ref Range    Lactate (Lactic Acid) 2.5 (H) 0.5 - 2.2 mmol/L    Basic metabolic panel   Result Value Ref Range    Sodium 148 (H) 136 - 145 mmol/L    Potassium 4.3 3.5 - 5.1 mmol/L    Chloride 104 95 - 110 mmol/L    CO2 32 (H) 23 - 29 mmol/L    Glucose 176 (H) 70 - 110 mg/dL    BUN 37 (H) 8 - 23 mg/dL    Creatinine 1.0 0.5 - 1.4 mg/dL    Calcium 9.2 8.7 - 10.5 mg/dL    Anion Gap 12 8 - 16 mmol/L    eGFR if African American 60 >60 mL/min/1.73 m^2    eGFR if non African American 52 (A) >60 mL/min/1.73 m^2   Brain natriuretic peptide   Result Value Ref Range    BNP >4,900 (H) 0 - 99 pg/mL   Basic metabolic panel   Result Value Ref Range    Sodium 147 (H) 136 - 145 mmol/L    Potassium 3.4 (L) 3.5 - 5.1 mmol/L    Chloride 105 95 - 110 mmol/L    CO2 32 (H) 23 - 29 mmol/L    Glucose 136 (H) 70 - 110 mg/dL    BUN 34 (H) 8 - 23 mg/dL    Creatinine 0.8 0.5 - 1.4 mg/dL    Calcium 8.6 (L) 8.7 - 10.5 mg/dL    Anion Gap 10 8 - 16 mmol/L    eGFR if African American >60 >60 mL/min/1.73 m^2    eGFR if non African American >60 >60 mL/min/1.73 m^2   CBC Auto Differential   Result Value Ref Range    WBC 3.52 (L) 3.90 - 12.70 K/uL    RBC 3.73 (L) 4.00 - 5.40 M/uL    Hemoglobin 9.5 (L) 12.0 - 16.0 g/dL    Hematocrit 33.2 (L) 37.0 - 48.5 %    MCV 89 82 - 98 fL    MCH 25.5 (L) 27.0 - 31.0 pg    MCHC 28.6 (L) 32.0 - 36.0 g/dL    RDW 20.1 (H) 11.5 - 14.5 %    Platelets 120 (L) 150 - 450 K/uL    MPV 13.2 (H) 9.2 - 12.9 fL    Immature Granulocytes 0.3 0.0 - 0.5 %    Gran # (ANC) 1.9 1.8 - 7.7 K/uL    Immature Grans (Abs) 0.01 0.00 - 0.04 K/uL    Lymph # 0.9 (L) 1.0 - 4.8 K/uL    Mono # 0.7 0.3 - 1.0 K/uL    Eos # 0.1 0.0 - 0.5 K/uL    Baso # 0.01 0.00 - 0.20 K/uL    nRBC 1 (A) 0 /100 WBC    Gran % 52.8 38.0 - 73.0 %    Lymph % 25.0 18.0 - 48.0 %    Mono % 18.8 (H) 4.0 - 15.0 %    Eosinophil % 2.8 0.0 - 8.0 %    Basophil % 0.3 0.0 - 1.9 %    Differential Method Automated    Magnesium   Result Value Ref Range    Magnesium 2.1 1.6 - 2.6 mg/dL   ISTAT PROCEDURE   Result Value Ref Range    POC PH 7.441 7.35 -  7.45    POC PCO2 50.9 (H) 35 - 45 mmHg    POC PO2 65 (L) 80 - 100 mmHg    POC HCO3 34.7 (H) 24 - 28 mmol/L    POC BE 11 -2 to 2 mmol/L    POC SATURATED O2 93 (L) 95 - 100 %    Sample ARTERIAL     Site LR     Allens Test Pass     DelSys Room Air          Pending Diagnostic Studies:     None         Medications:  Reconciled Home Medications:      Medication List      START taking these medications    amoxicillin-clavulanate 875-125mg 875-125 mg per tablet  Commonly known as: AUGMENTIN  Take 1 tablet by mouth 2 (two) times daily. for 5 days        CHANGE how you take these medications    QUEtiapine 25 MG Tab  Commonly known as: SEROQUEL  Take 1 tablet (25 mg total) by mouth once daily. At bedtime  What changed:   · when to take this  · reasons to take this  · additional instructions     scopolamine 1.3-1.5 mg (1 mg over 3 days)  Commonly known as: TRANSDERM-SCOP  Place 1 patch onto the skin Every 3 (three) days.  What changed: additional instructions        CONTINUE taking these medications    carvediloL 3.125 MG tablet  Commonly known as: COREG  TAKE 1 TABLET PER G-TUBE TWICE DAILY AS NEEDED( AS BLOOD PRESSURE TOLERATES)     clopidogreL 75 mg tablet  Commonly known as: PLAVIX  1 tablet (75 mg total) by Per G Tube route once daily.     esomeprazole 40 MG capsule  Commonly known as: NEXIUM  Take  40 mg(1 cap)  twice a day  , then after 40 mg(1 cap) daily   Through PEG tube     furosemide 40 MG tablet  Commonly known as: LASIX  1 tablet (40 mg total) by Per G Tube route 2 (two) times a day. Take per G tube twice daily as directed     sacubitriL-valsartan  mg per tablet  Commonly known as: ENTRESTO  1 tablet by Per G Tube route 2 (two) times daily.     traZODone 50 MG tablet  Commonly known as: DESYREL  Take 0.5 tablets (25 mg total) by mouth every evening.        STOP taking these medications    aspirin 81 MG Chew     losartan 50 MG tablet  Commonly known as: COZAAR     metoprolol succinate 50 MG 24 hr  tablet  Commonly known as: TOPROL-XL     omeprazole magnesium 10 mg Sudr     pantoprazole 40 mg suspension  Commonly known as: PROTONIX            Indwelling Lines/Drains at time of discharge:   Lines/Drains/Airways     Drain  Duration                Gastrostomy/Enterostomy 03/25/22 1320 Gastrostomy tube w/ balloon LUQ feeding 127 days         Gastrostomy/Enterostomy 06/06/22 1612 Gastrostomy tube w/ balloon midline feeding 53 days         Urethral Catheter 07/28/22 1036 Latex 2 days                Time spent on the discharge of patient: 45 minutes         Jeffrey Kramer NP  Department of Hospital Medicine  [facility] - Telemetry ([facility])

## 2022-07-31 NOTE — SUBJECTIVE & OBJECTIVE
Review of Systems   Unable to perform ROS: Patient nonverbal   Objective:     Vital Signs (Most Recent):  Temp: 97.1 °F (36.2 °C) (07/30/22 0730)  Pulse: 71 (07/30/22 0901)  Resp: 18 (07/30/22 0730)  BP: 130/82 (07/30/22 0945)  SpO2: 99 % (07/30/22 0730) Vital Signs (24h Range):  Temp:  [96.7 °F (35.9 °C)-97.1 °F (36.2 °C)] 97.1 °F (36.2 °C)  Pulse:  [59-77] 71  Resp:  [16-18] 18  SpO2:  [91 %-99 %] 99 %  BP: (130-161)/(82-94) 130/82     Weight: 57.8 kg (127 lb 6.8 oz)  Body mass index is 23.31 kg/m².     SpO2: 99 %  O2 Device (Oxygen Therapy): room air      Intake/Output Summary (Last 24 hours) at 7/30/2022 1939  Last data filed at 7/30/2022 1206  Gross per 24 hour   Intake 750 ml   Output 2300 ml   Net -1550 ml         Lines/Drains/Airways       Drain  Duration                  Gastrostomy/Enterostomy 03/25/22 1320 Gastrostomy tube w/ balloon LUQ feeding 127 days         Gastrostomy/Enterostomy 06/06/22 1612 Gastrostomy tube w/ balloon midline feeding 54 days         Urethral Catheter 07/28/22 1036 Latex 2 days              Peripheral Intravenous Line  Duration                  Peripheral IV - Single Lumen 07/27/22 0145 20 G Right Wrist 3 days                    Physical Exam  Vitals and nursing note reviewed.   Constitutional:       General: She is not in acute distress.     Appearance: She is well-developed. She is ill-appearing. She is not diaphoretic.   HENT:      Head: Normocephalic and atraumatic.   Eyes:      General:         Right eye: No discharge.         Left eye: No discharge.      Pupils: Pupils are equal, round, and reactive to light.   Neck:      Thyroid: No thyromegaly.      Vascular: No JVD.      Trachea: No tracheal deviation.   Cardiovascular:      Rate and Rhythm: Normal rate and regular rhythm.      Heart sounds: Normal heart sounds, S1 normal and S2 normal. No murmur heard.  Pulmonary:      Effort: Pulmonary effort is normal. No respiratory distress.      Breath sounds: Rales present. No  wheezing.   Abdominal:      General: There is no distension.      Tenderness: There is no rebound.   Musculoskeletal:      Cervical back: Neck supple.      Right lower leg: No edema.      Left lower leg: No edema.      Comments: Upper extremities with contractures     Skin:     General: Skin is warm and dry.      Findings: No erythema.   Neurological:      Mental Status: She is alert.      Comments: Awake, non-verbal       Significant Labs: CMP   Recent Labs   Lab 07/29/22  0515 07/30/22  0531   * 147*   K 4.3 3.4*    105   CO2 32* 32*   * 136*   BUN 37* 34*   CREATININE 1.0 0.8   CALCIUM 9.2 8.6*   ANIONGAP 12 10   ESTGFRAFRICA 60 >60   EGFRNONAA 52* >60     , CBC   Recent Labs   Lab 07/30/22  0530   WBC 3.52*   HGB 9.5*   HCT 33.2*   *     , INR No results for input(s): INR, PROTIME in the last 48 hours., Troponin No results for input(s): TROPONINI in the last 48 hours., and All pertinent lab results from the last 24 hours have been reviewed.    Significant Imaging: Echocardiogram: Transthoracic echo (TTE) complete (Cupid Only):   Results for orders placed or performed during the hospital encounter of 05/27/22   Echo   Result Value Ref Range    BSA 1.76 m2    TDI SEPTAL 0.05 m/s    LV LATERAL E/E' RATIO 12.83 m/s    LV SEPTAL E/E' RATIO 15.40 m/s    IVC diameter 2.38 cm    Left Ventricular Outflow Tract Mean Velocity 0.23958157840 cm/s    Left Ventricular Outflow Tract Mean Gradient 1.32 mmHg    TDI LATERAL 0.06 m/s    LVIDd 4.19 3.5 - 6.0 cm    IVS 1.47 (A) 0.6 - 1.1 cm    Posterior Wall 1.32 (A) 0.6 - 1.1 cm    Ao root annulus 3.12 cm    LVIDs 3.83 2.1 - 4.0 cm    FS 9 28 - 44 %    Sinus 2.80 cm    STJ 2.62 cm    Ascending aorta 2.75 cm    LV mass 222.33 g    LA size 3.83 cm    TAPSE 1.49 cm    Left Ventricle Relative Wall Thickness 0.63 cm    AV mean gradient 4 mmHg    AV valve area 2.12 cm2    AV Velocity Ratio 0.60     AV index (prosthetic) 0.64     MV valve area p 1/2 method 6.65  cm2    E/A ratio 1.51     Mean e' 0.06 m/s    E wave deceleration time 114.589216743971402 msec    IVRT 99.497735588315037 msec    LVOT diameter 2.06 cm    LVOT area 3.3 cm2    LVOT peak harsha 0.87 m/s    LVOT peak VTI 16.30 cm    Ao peak harsha 1.44 m/s    Ao VTI 25.6 cm    RVOT peak harsha 0.37 m/s    RVOT peak VTI 7.6 cm    LVOT stroke volume 54.30 cm3    AV peak gradient 8 mmHg    PV mean gradient 0.44 mmHg    E/E' ratio 14.00 m/s    MV Peak E Harsha 0.77 m/s    TR Max Harsha 3.53 m/s    MV stenosis pressure 1/2 time 33.543093104840644 ms    MV Peak A Harsha 0.51 m/s    LV Systolic Volume 63.31 mL    LV Systolic Volume Index 36.8 mL/m2    LV Diastolic Volume 78.31 mL    LV Diastolic Volume Index 45.53 mL/m2    LV Mass Index 129 g/m2    RA Major Axis 4.96 cm    Left Atrium Minor Axis 3.44 cm    Left Atrium Major Axis 4.30 cm    Triscuspid Valve Regurgitation Peak Gradient 50 mmHg    RA Width 4.18 cm    Right Atrial Pressure (from IVC) 8 mmHg    EF 20 %    TV rest pulmonary artery pressure 58 mmHg    Narrative    · The left ventricle is mildly enlarged with concentric hypertrophy and   severely decreased systolic function.  · The estimated ejection fraction is 15 - 20%.  · Grade I left ventricular diastolic dysfunction.  · Mild right ventricular enlargement with moderately reduced right   ventricular systolic function.  · Severe right atrial enlargement.  · Moderate left atrial enlargement.  · There is pulmonary hypertension.  · The estimated PA systolic pressure is 58 mmHg.  · No change from prior echo March 2022.      , EKG: Reviewed, and X-Ray: CXR: X-Ray Chest 1 View (CXR): No results found for this visit on 07/27/22. and X-Ray Chest PA and Lateral (CXR): No results found for this visit on 07/27/22.

## 2022-07-31 NOTE — ASSESSMENT & PLAN NOTE
-Known severe underlying CHF with EF of 15-20%, presents with volume overload  -Continue IV diuresis  -Continue BB, Entresto  -Strict I's/O's  -Given patient's history, age, co-morbidities, prognosis remains poor    7/28/22  -Improving, continue IV diuresis  -Continue BB, Entresto    7/29/22  -Good UOP since admission, continue IV diuresis  -Continue BB, Entresto  -BNP chronically elevated    7/30/22  diurresed > 3 L  Stable for DC home and close f/u CHF clinic

## 2022-08-01 PROCEDURE — G0180 MD CERTIFICATION HHA PATIENT: HCPCS | Mod: ,,, | Performed by: NURSE PRACTITIONER

## 2022-08-01 PROCEDURE — G0180 PR HOME HEALTH MD CERTIFICATION: ICD-10-PCS | Mod: ,,, | Performed by: NURSE PRACTITIONER

## 2022-08-02 ENCOUNTER — PATIENT OUTREACH (OUTPATIENT)
Dept: ADMINISTRATIVE | Facility: CLINIC | Age: 82
End: 2022-08-02
Payer: MEDICARE

## 2022-08-02 NOTE — PROGRESS NOTES
C3 nurse spoke with Tanya Madrid's daugher, Adenike Lemus, for a TCC post hospital discharge follow up call. The patient has a scheduled HOSFU appointment with ADELAIDA Mora on 8/4/2022 @ 1300.

## 2022-08-04 ENCOUNTER — OFFICE VISIT (OUTPATIENT)
Dept: TRANSPLANT | Facility: CLINIC | Age: 82
End: 2022-08-04
Payer: MEDICARE

## 2022-08-04 VITALS
DIASTOLIC BLOOD PRESSURE: 92 MMHG | HEIGHT: 62 IN | SYSTOLIC BLOOD PRESSURE: 130 MMHG | HEART RATE: 62 BPM | BODY MASS INDEX: 23.31 KG/M2

## 2022-08-04 DIAGNOSIS — I10 ESSENTIAL HYPERTENSION: Chronic | ICD-10-CM

## 2022-08-04 DIAGNOSIS — I50.41 ACUTE COMBINED SYSTOLIC AND DIASTOLIC CONGESTIVE HEART FAILURE: Primary | ICD-10-CM

## 2022-08-04 DIAGNOSIS — I50.43 ACUTE ON CHRONIC COMBINED SYSTOLIC AND DIASTOLIC CONGESTIVE HEART FAILURE: ICD-10-CM

## 2022-08-04 DIAGNOSIS — Z71.89 ADVANCED CARE PLANNING/COUNSELING DISCUSSION: ICD-10-CM

## 2022-08-04 DIAGNOSIS — Z51.5 PALLIATIVE CARE ENCOUNTER: ICD-10-CM

## 2022-08-04 LAB
BACTERIA BLD CULT: NORMAL
BACTERIA BLD CULT: NORMAL

## 2022-08-04 PROCEDURE — 99204 OFFICE O/P NEW MOD 45 MIN: CPT | Mod: S$PBB,,, | Performed by: PHYSICIAN ASSISTANT

## 2022-08-04 PROCEDURE — 99999 PR PBB SHADOW E&M-EST. PATIENT-LVL III: ICD-10-PCS | Mod: PBBFAC,,, | Performed by: PHYSICIAN ASSISTANT

## 2022-08-04 PROCEDURE — 99999 PR PBB SHADOW E&M-EST. PATIENT-LVL III: CPT | Mod: PBBFAC,,, | Performed by: PHYSICIAN ASSISTANT

## 2022-08-04 PROCEDURE — 99204 PR OFFICE/OUTPT VISIT, NEW, LEVL IV, 45-59 MIN: ICD-10-PCS | Mod: S$PBB,,, | Performed by: PHYSICIAN ASSISTANT

## 2022-08-04 PROCEDURE — 99213 OFFICE O/P EST LOW 20 MIN: CPT | Mod: PBBFAC | Performed by: PHYSICIAN ASSISTANT

## 2022-08-04 NOTE — PROGRESS NOTES
[facility] TCC Provider Note (Initial Clinic) Consult Note    Date of original referral: 7/30/2022  Age: 82 y.o.  Gender: female  Ethnicity: Not  or /a   Number of admissions for CHF within the preceding year: more than 3   Duration of CHF: ?  Type of Congestive Heart Failure: Systolic   Etiology: Ischemic   Social history: none  Enrolled in Infusion suite: no    Diagnostic Labs:   EKG - 07/27/2022  CXR - 07/27/2022  ECHO - 05/30/2022  Stress test -   Stress echo -   Pharmacologic stress -   Cardiac catheterization -    Cardiac MRI -     Lab Results   Component Value Date     (H) 07/30/2022     (H) 07/29/2022    K 3.4 (L) 07/30/2022    K 4.3 07/29/2022     07/30/2022     07/29/2022    CO2 32 (H) 07/30/2022    CO2 32 (H) 07/29/2022     (H) 07/30/2022     (H) 07/29/2022    BUN 34 (H) 07/30/2022    BUN 37 (H) 07/29/2022    CREATININE 0.8 07/30/2022    CREATININE 1.0 07/29/2022    CALCIUM 8.6 (L) 07/30/2022    CALCIUM 9.2 07/29/2022    PROT 7.0 07/27/2022    PROT 5.9 (L) 05/31/2022    ALBUMIN 3.3 (L) 07/27/2022    ALBUMIN 2.2 (L) 05/31/2022    BILITOT 0.5 07/27/2022    BILITOT 0.3 05/31/2022    ALKPHOS 153 (H) 07/27/2022    ALKPHOS 115 05/31/2022    AST 33 07/27/2022    AST 51 (H) 05/31/2022    ALT 38 07/27/2022    ALT 31 05/31/2022    ANIONGAP 10 07/30/2022    ANIONGAP 12 07/29/2022    ESTGFRAFRICA >60 07/30/2022    ESTGFRAFRICA 60 07/29/2022    EGFRNONAA >60 07/30/2022    EGFRNONAA 52 (A) 07/29/2022       Lab Results   Component Value Date    WBC 3.52 (L) 07/30/2022    WBC 4.03 07/27/2022    RBC 3.73 (L) 07/30/2022    RBC 3.93 (L) 07/27/2022    HGB 9.5 (L) 07/30/2022    HGB 10.1 (L) 07/27/2022    HCT 33.2 (L) 07/30/2022    HCT 34.8 (L) 07/27/2022    MCV 89 07/30/2022    MCV 89 07/27/2022    MCH 25.5 (L) 07/30/2022    MCH 25.7 (L) 07/27/2022    MCHC 28.6 (L) 07/30/2022    MCHC 29.0 (L) 07/27/2022    RDW 20.1 (H) 07/30/2022    RDW 20.0 (H) 07/27/2022     (L) 07/30/2022      (L) 07/27/2022    MPV 13.2 (H) 07/30/2022    MPV 12.3 07/27/2022    IMMGR 0.3 07/30/2022    IMMGR 0.5 07/27/2022    IGABS 0.01 07/30/2022    IGABS 0.02 07/27/2022    LYMPH 0.9 (L) 07/30/2022    LYMPH 25.0 07/30/2022    MONO 0.7 07/30/2022    MONO 18.8 (H) 07/30/2022    EOS 0.1 07/30/2022    EOS 0.2 07/27/2022    BASO 0.01 07/30/2022    BASO 0.01 07/27/2022    NRBC 1 (A) 07/30/2022    NRBC 1 (A) 07/27/2022    GRAN 1.9 07/30/2022    GRAN 52.8 07/30/2022    EOSINOPHIL 2.8 07/30/2022    EOSINOPHIL 4.5 07/27/2022    BASOPHIL 0.3 07/30/2022    BASOPHIL 0.2 07/27/2022    PLTEST Decreased (A) 07/27/2022    PLTEST Clumped (A) 06/30/2022    ANISO Slight 07/27/2022    ANISO Slight 05/04/2022    HYPO Occasional 05/04/2022    HYPO Moderate 03/31/2022       Lab Results   Component Value Date    BNP >4,900 (H) 07/29/2022    BNP >4,900 (H) 07/27/2022    MG 2.1 07/30/2022    MG 1.9 05/28/2022    PHOS 3.4 04/01/2022    PHOS 3.6 03/26/2022    NTPROBNP 7582 (H) 06/09/2022    NTPROBNP 66106 (H) 03/03/2022    TROPONINI 0.028 (H) 07/27/2022    TROPONINI 0.024 05/03/2022    HGBA1C 5.9 (H) 07/24/2019    TSH 1.367 03/16/2022    TSH 1.551 02/13/2020       Lab Results   Component Value Date    IRON 99 03/26/2022    TIBC 293 03/26/2022    CHOL 197 07/24/2019    TRIG 67 07/24/2019    HDL 63 07/24/2019    LDLCALC 120.6 07/24/2019    CHOLHDL 32.0 07/24/2019    TOTALCHOLEST 3.1 07/24/2019    NONHDLCHOL 134 07/24/2019    COLORU Yellow 07/27/2022    APPEARANCEUA Hazy (A) 07/27/2022    PHUR 7.0 07/27/2022    SPECGRAV 1.015 07/27/2022    PROTEINUA Trace (A) 07/27/2022    GLUCUA Negative 07/27/2022    KETONESU Negative 07/27/2022    BILIRUBINUA Negative 07/27/2022    OCCULTUA 2+ (A) 07/27/2022    NITRITE Negative 07/27/2022    LEUKOCYTESUR 3+ (A) 07/27/2022       List all implanted cardiac devices:   Pacemaker: none  Implanted cardio-defibrillator: none  Loop recorder: no  Cardiomems: no    Current Outpatient Medications on File Prior to  Visit   Medication Sig Dispense Refill    amoxicillin-clavulanate 875-125mg (AUGMENTIN) 875-125 mg per tablet Take 1 tablet by mouth 2 (two) times daily. for 5 days 10 tablet 0    carvediloL (COREG) 3.125 MG tablet TAKE 1 TABLET PER G-TUBE TWICE DAILY AS NEEDED( AS BLOOD PRESSURE TOLERATES) 180 tablet 3    clopidogreL (PLAVIX) 75 mg tablet 1 tablet (75 mg total) by Per G Tube route once daily. 30 tablet 2    esomeprazole (NEXIUM) 40 MG capsule Take  40 mg(1 cap)  twice a day  , then after 40 mg(1 cap) daily   Through PEG tube 60 capsule 3    furosemide (LASIX) 40 MG tablet 1 tablet (40 mg total) by Per G Tube route 2 (two) times a day. Take per G tube twice daily as directed 180 tablet 3    QUEtiapine (SEROQUEL) 25 MG Tab Take 1 tablet (25 mg total) by mouth once daily. At bedtime 30 tablet 11    sacubitriL-valsartan (ENTRESTO)  mg per tablet 1 tablet by Per G Tube route 2 (two) times daily. 60 tablet 6    scopolamine (TRANSDERM-SCOP) 1.3-1.5 mg (1 mg over 3 days) Place 1 patch onto the skin Every 3 (three) days. (Patient taking differently: Place 1 patch onto the skin Every 3 (three) days. as needed for coughing and secretions.) 10 patch 3    traZODone (DESYREL) 50 MG tablet Take 0.5 tablets (25 mg total) by mouth every evening. 15 tablet 3    [DISCONTINUED] aspirin 81 MG Chew 1 tablet (81 mg total) by Per G Tube route once daily.  0    [DISCONTINUED] losartan (COZAAR) 50 MG tablet Take 1 tablet (50 mg total) by mouth 2 (two) times a day. 60 tablet 6    [DISCONTINUED] metoprolol succinate (TOPROL-XL) 50 MG 24 hr tablet Take 1 tablet (50 mg total) by mouth 2 (two) times daily. 60 tablet 6    [DISCONTINUED] omeprazole magnesium 10 mg SuDR Take 40 mg by mouth once daily. (Patient not taking: Reported on 4/21/2022) 120 each 0    [DISCONTINUED] pantoprazole (PROTONIX) 40 mg suspension Take 1 packet (40 mg total) by mouth 2 (two) times daily. 60 packet 3     No current facility-administered  medications on file prior to visit.         HPI:  Patient can walk     Patient sleeps on    Patient wakes up SOB, has to get out of bed, associated cough, sputum denies   Palpitations - denies   Dizzy, light-headed, pre-syncope or syncope denies   Since discharge frequency of performing weights, home weight and weight change-   Other information felt pertinent to HPI    Her current medical conditions include CHF, HFrEF of 10%, HTN, HLP, CVA, wheelchair bound who presents for first TCC visit post admit for ADHF. She presented to the ED for SOB and congestion  She was diuresed and palliative care consulted (DNR). She was treated for UTI with Rocephin.   Dc home on lasix 40 BID. Today here with daughter (mostly nonverbal) receives tube feeds. Weight stable        PHYSICAL: There were no vitals filed for this visit.   Wt Readings from Last 3 Encounters:   07/29/22 57.8 kg (127 lb 6.8 oz)   07/14/22 69.4 kg (153 lb)   06/06/22 69.7 kg (153 lb 10.6 oz)       JVD: no   Heart rhythm: regular  Cardiac murmur: No    S3: no  S4: no  Lungs: clear  Liver span: 10 cm:   Hepatojugular reflux: no  Edema: no      ASSESSMENT: acute on chronic systolic HF     PLAN:      Patient Instructions:    Instruct the patient to notify this clinic if HH, a physician or an advanced care provider wants to change medication one of their HF medications    Activity and Diet restrictions:   o Recommend 2-3 gram sodium restriction and 1500cc- 2000cc fluid restriction.  o Encourage physical activity with graded exercise program.  o Requested patient to weigh themselves daily, and to notify us if their weight increases by more than 3 lbs in 1 day or 5 lbs in 3 days.    Assigned dry weight on home scale:   Medication changes (include current dose and changed dose) Continue entresto  BID, coreg 3.125 BID, lasix 40 BID  Upcoming labs and date anticipated: RTC 2 weeks, not interested in pall care

## 2022-08-09 ENCOUNTER — CARE AT HOME (OUTPATIENT)
Dept: HOME HEALTH SERVICES | Facility: CLINIC | Age: 82
End: 2022-08-09
Payer: MEDICARE

## 2022-08-09 VITALS
RESPIRATION RATE: 18 BRPM | OXYGEN SATURATION: 99 % | DIASTOLIC BLOOD PRESSURE: 80 MMHG | HEART RATE: 65 BPM | SYSTOLIC BLOOD PRESSURE: 110 MMHG | TEMPERATURE: 98 F

## 2022-08-09 DIAGNOSIS — N39.0 FREQUENT UTI: Primary | ICD-10-CM

## 2022-08-09 DIAGNOSIS — L89.154 SACRAL DECUBITUS ULCER, STAGE IV: ICD-10-CM

## 2022-08-09 PROCEDURE — 99349 PR HOME VISIT,ESTAB PATIENT,LEVEL III: ICD-10-PCS | Mod: S$GLB,,, | Performed by: NURSE PRACTITIONER

## 2022-08-09 PROCEDURE — 99349 HOME/RES VST EST MOD MDM 40: CPT | Mod: S$GLB,,, | Performed by: NURSE PRACTITIONER

## 2022-08-09 RX ORDER — FAMOTIDINE 20 MG/1
20 TABLET, FILM COATED ORAL 2 TIMES DAILY
Qty: 60 TABLET | Refills: 11 | Status: SHIPPED | OUTPATIENT
Start: 2022-08-09 | End: 2023-08-09

## 2022-08-09 RX ORDER — NITROFURANTOIN (MACROCRYSTALS) 100 MG/1
100 CAPSULE ORAL NIGHTLY
Qty: 30 CAPSULE | Refills: 11 | Status: ON HOLD | OUTPATIENT
Start: 2022-08-09 | End: 2022-09-17 | Stop reason: HOSPADM

## 2022-08-09 NOTE — PROGRESS NOTES
[facility]  Transition of Care Home Visit    Visit Date: 8/9/2022  Encounter Provider: Elva Ansari   PCP:  Elva Ansari NP    PRESENTING HISTORY      Patient ID: Tanya Madrid is a 82 y.o. female.    Consult Requested By:  No ref. provider found  Reason for Consult:  Hospital Follow Up.    Tanya is being seen at home due to being seen at home due to physical debility that presents a taxing effort to leave the home, to mitigate high risk of hospital readmission and/or due to the limited availability of reliable or safe options for transportation to the point of access to the provider. Prior to treatment on this visit the chart was reviewed and patient verbal consent was obtained.      Chief Complaint: Transitional Care    Patient was admitted to hospital on 07/27 and discharged to home on 07/30    History of Present Illness: Ms. Tanya Madrid is a 82 y.o. female who was recently admitted to the with a PMHx of acute CHF, HTN, stroke who presents to the Emergency Department for evaluation of congestion which onset gradually 1 day pta. Pt's daughter states the pt sounding congested and has gradually been worsening. Symptoms are constant and moderate in severity. Patient's daughter denies any fever, cough, vomiting, diarrhea, diaphoresis, and all other sxs at this time. No prior Tx reported. hospital        ___________________________________________________________________    Today:    HPI:  Patient being seen today for a follow up visit for a hospital visit for CHF. See hospital course for details. Upon arrival patient was lying in her hospital bed alert. Patient is non verbal. Daughter was present for the visit and she is the main caregiver. Patient has a stage 2 decubitus to the sacrum. Daughter is using duoderm with aquacel on the wound. Spoke with Kade the PCM at [facility] about the patient's wound because the patient needs more wound care supplies. VSS and daughter reports compliance to  all medications.         Review of Systems   Constitutional: Negative.    HENT: Negative.    Eyes: Negative.    Respiratory: Positive for shortness of breath.    Cardiovascular: Negative.    Gastrointestinal:        Patient has PEG tube   Endocrine: Positive for cold intolerance.   Genitourinary: Negative.    Musculoskeletal: Negative.    Skin: Positive for wound (sacral stage 3 decubitus).   Allergic/Immunologic: Negative.    Neurological: Positive for speech difficulty (patient is non verbal) and weakness.   Hematological: Negative.        Assessments:  · Environmental: Patient lives in a single story home with her daughter and family. There are no steps at the entrance. Lighting is bright and temperature is comfortable.   · Functional Status: Patient is bed bound and needs assistance with ADLs  · Safety: Fall Precautions  · Nutritional: Adequate food in the home  · Home Health/DME/Supplies: [facility], DMEs: hospital bed, whellchair    PAST HISTORY:     Past Medical History:   Diagnosis Date    Acute CHF (congestive heart failure) 1/17/2021    Hyperlipemia     Hypertension     Parkinson's disease     Stroke 2016    Throat mass        Past Surgical History:   Procedure Laterality Date    CLOSED REDUCTION Right 10/15/2020    Procedure: CLOSED REDUCTION;  Surgeon: Humberto Valdivia DO;  Location: [facility] OR;  Service: Orthopedics;  Laterality: Right;  ATTEMPTED    EVACUATION OF HEMATOMA Right 10/17/2020    Procedure: EVACUATION, HEMATOMA;  Surgeon: Humberto Valdivia DO;  Location: [facility] OR;  Service: Orthopedics;  Laterality: Right;    HEMIARTHROPLASTY OF HIP Left 7/12/2020    Procedure: HEMIARTHROPLASTY, HIP;  Surgeon: Humberto Valdivia DO;  Location: [facility] OR;  Service: Orthopedics;  Laterality: Left;    HEMIARTHROPLASTY OF HIP Right 10/2/2020    Procedure: HEMIARTHROPLASTY, HIP;  Surgeon: Loyd Kearney MD;  Location: [facility] OR;  Service: Orthopedics;  Laterality: Right;    HYSTERECTOMY       INSERTION OF PERCUTANEOUS ENDOSCOPIC GASTROSTOMY (PEG) FEEDING TUBE      OPEN REDUCTION OF DISLOCATION OF HIP Right 10/17/2020    Procedure: OPEN REDUCTION, DISLOCATION, HIP;  Surgeon: Humberto Valdivia DO;  Location[facility];  Service: Orthopedics;  Laterality: Right;    THROAT SURGERY      TONSILLECTOMY         Family History   Family history unknown: Yes       Social History     Socioeconomic History    Marital status:    Tobacco Use    Smoking status: Never Smoker    Smokeless tobacco: Never Used   Substance and Sexual Activity    Alcohol use: No    Drug use: No    Sexual activity: Not Currently       MEDICATIONS & ALLERGIES:     Current Outpatient Medications on File Prior to Visit   Medication Sig Dispense Refill    carvediloL (COREG) 3.125 MG tablet TAKE 1 TABLET PER G-TUBE TWICE DAILY AS NEEDED( AS BLOOD PRESSURE TOLERATES) 180 tablet 3    clopidogreL (PLAVIX) 75 mg tablet 1 tablet (75 mg total) by Per G Tube route once daily. 30 tablet 2    furosemide (LASIX) 40 MG tablet 1 tablet (40 mg total) by Per G Tube route 2 (two) times a day. Take per G tube twice daily as directed 180 tablet 3    QUEtiapine (SEROQUEL) 25 MG Tab Take 1 tablet (25 mg total) by mouth once daily. At bedtime 30 tablet 11    sacubitriL-valsartan (ENTRESTO)  mg per tablet 1 tablet by Per G Tube route 2 (two) times daily. 60 tablet 6    scopolamine (TRANSDERM-SCOP) 1.3-1.5 mg (1 mg over 3 days) Place 1 patch onto the skin Every 3 (three) days. (Patient taking differently: Place 1 patch onto the skin Every 3 (three) days. as needed for coughing and secretions.) 10 patch 3    traZODone (DESYREL) 50 MG tablet Take 0.5 tablets (25 mg total) by mouth every evening. 15 tablet 3    [DISCONTINUED] aspirin 81 MG Chew 1 tablet (81 mg total) by Per G Tube route once daily.  0    [DISCONTINUED] esomeprazole (NEXIUM) 40 MG capsule Take  40 mg(1 cap)  twice a day  , then after 40 mg(1 cap) daily   Through PEG tube 60  capsule 3    [DISCONTINUED] losartan (COZAAR) 50 MG tablet Take 1 tablet (50 mg total) by mouth 2 (two) times a day. 60 tablet 6    [DISCONTINUED] metoprolol succinate (TOPROL-XL) 50 MG 24 hr tablet Take 1 tablet (50 mg total) by mouth 2 (two) times daily. 60 tablet 6    [DISCONTINUED] omeprazole magnesium 10 mg SuDR Take 40 mg by mouth once daily. (Patient not taking: Reported on 4/21/2022) 120 each 0    [DISCONTINUED] pantoprazole (PROTONIX) 40 mg suspension Take 1 packet (40 mg total) by mouth 2 (two) times daily. 60 packet 3     No current facility-administered medications on file prior to visit.        Review of patient's allergies indicates:   Allergen Reactions    Sinemet [carbidopa-levodopa] Anxiety and Other (See Comments)     Becomes agitated and fidgety.    Xanax [alprazolam] Anxiety and Other (See Comments)     Becomes agitated and fidgety.        OBJECTIVE:     Vital Signs:  Vitals:    08/09/22 1245   BP: 110/80   Pulse: 65   Resp: 18   Temp: 97.8 °F (36.6 °C)     There is no height or weight on file to calculate BMI.     Physical Exam:  Physical Exam  Vitals reviewed.   Constitutional:       General: She is not in acute distress.  HENT:      Head: Normocephalic.   Cardiovascular:      Rate and Rhythm: Normal rate and regular rhythm.      Pulses: Normal pulses.      Heart sounds: Normal heart sounds.   Pulmonary:      Effort: Pulmonary effort is normal.      Breath sounds: Decreased air movement present. Examination of the right-lower field reveals decreased breath sounds. Examination of the left-lower field reveals decreased breath sounds. Decreased breath sounds present.   Abdominal:      General: Bowel sounds are normal.      Palpations: Abdomen is soft.       Skin:     General: Skin is warm and dry.      Findings: Wound present.          Neurological:      Mental Status: She is alert. Mental status is at baseline.      Motor: Weakness present.   Psychiatric:         Speech: She is  noncommunicative.         Behavior: Behavior is agitated.         Laboratory  Lab Results   Component Value Date    WBC 3.52 (L) 07/30/2022    HGB 9.5 (L) 07/30/2022    HCT 33.2 (L) 07/30/2022    MCV 89 07/30/2022     (L) 07/30/2022     Lab Results   Component Value Date    INR 1.0 04/07/2022    INR 1.0 03/15/2022    INR 1.0 03/14/2022     Lab Results   Component Value Date    HGBA1C 5.9 (H) 07/24/2019     No results for input(s): POCTGLUCOSE in the last 72 hours.    Diagnostic Results:      TRANSITION OF CARE:     [facility] Contact Note: NA    Family and/or Caretaker present at visit?  Yes.  Diagnostic tests reviewed/disposition: No diagnosic tests pending after this hospitalization.  Disease/illness education: UTI, CHF  Home health/community services discussion/referrals: Patient has home health established at [facility].   Establishment or re-establishment of referral orders for community resources: No other necessary community resources.   Discussion with other health care providers: No discussion with other health care providers necessary.     Transition of Care Visit:  I have reviewed and updated the history and problem list.  I have reconciled the medication list.  I have discussed the hospitalization and current medical issues, prognosis and plans with the patient/family.  I  spent more than 50% of time discussing the care with the patient/family.  Total Face-to-Face Encounter: 60 minutes.    Medications Reconciliation:   I have reconciled the patient's home medications and discharge medications with the patient/family. I have updated all changes.  Refer to After-Visit Medication List.    ASSESSMENT & PLAN:     HIGH RISK CONDITION(S):  Parkinson's Disease, Multiple UTIs, CHF, HTN, Outside hospital cardiac arrest    1. Frequent UTI  Comments:  macrodantin 100mg per peg tube daily    2. Sacral decubitus ulcer, stage IV  Comments:   nurse to monitor and treat    Other orders  -      famotidine (PEPCID) 20 MG tablet  -     nitrofurantoin (MACRODANTIN) 100 MG capsule       Encounter for Medical Follow-Up and Medication Review  - [facility] to schedule follow-up visit with patient in 4 weeks or PRN     Patient Instructions Given:  - Continue all medications, treatments and therapies as ordered.   - Follow all instructions, recommendations as discussed.  - Maintain Safety Precautions at all times.  - Attend all medical appointments as scheduled.  - For worsening symptoms: call Primary Care Physician or Nurse Practitioner.  - For emergencies, call 911 or immediately report to the nearest emergency room     Were controlled substances prescribed?  No    Instructions for the patient:    Scheduled Follow-up :  Future Appointments   Date Time Provider Department Center   8/10/2022  2:00 PM ADELAIDA Mora [facility] [place]   11/8/2022 11:40 AM [facility]AT[facility] ANALISA [facility]   11/15/2022 11:40 AM Miky Anaya MD [facility]       After Visit Medication List :     Medication List          Accurate as of August 9, 2022  9:20 PM. If you have any questions, ask your nurse or doctor.            START taking these medications    famotidine 20 MG tablet  Commonly known as: PEPCID  1 tablet (20 mg total) by Per G Tube route 2 (two) times daily.  Started by: Elva Ansari NP     nitrofurantoin 100 MG capsule  Commonly known as: MACRODANTIN  Take 1 capsule (100 mg total) by mouth nightly.  Started by: Elva Ansari NP        CHANGE how you take these medications    scopolamine 1.3-1.5 mg (1 mg over 3 days)  Commonly known as: TRANSDERM-SCOP  Place 1 patch onto the skin Every 3 (three) days.  What changed: additional instructions        CONTINUE taking these medications    carvediloL 3.125 MG tablet  Commonly known as: COREG  TAKE 1 TABLET PER G-TUBE TWICE DAILY AS NEEDED( AS BLOOD PRESSURE TOLERATES)     clopidogreL 75 mg tablet  Commonly known as:  PLAVIX  1 tablet (75 mg total) by Per G Tube route once daily.     furosemide 40 MG tablet  Commonly known as: LASIX  1 tablet (40 mg total) by Per G Tube route 2 (two) times a day. Take per G tube twice daily as directed     QUEtiapine 25 MG Tab  Commonly known as: SEROQUEL  Take 1 tablet (25 mg total) by mouth once daily. At bedtime     sacubitriL-valsartan  mg per tablet  Commonly known as: ENTRESTO  1 tablet by Per G Tube route 2 (two) times daily.     traZODone 50 MG tablet  Commonly known as: DESYREL  Take 0.5 tablets (25 mg total) by mouth every evening.        STOP taking these medications    esomeprazole 40 MG capsule  Commonly known as: NEXIUM  Stopped by: Elva Ansari NP           Where to Get Your Medications      These medications [facility] to [id number] - [place] - [address] AT [facility]  [address], [place] [address]    Phone: [phone]   · famotidine 20 MG tablet  · nitrofurantoin 100 MG capsule         Signature: Elva Ansari NP

## 2022-08-10 ENCOUNTER — OFFICE VISIT (OUTPATIENT)
Dept: TRANSPLANT | Facility: CLINIC | Age: 82
End: 2022-08-10
Payer: MEDICARE

## 2022-08-10 VITALS
DIASTOLIC BLOOD PRESSURE: 80 MMHG | HEART RATE: 66 BPM | SYSTOLIC BLOOD PRESSURE: 126 MMHG | HEIGHT: 62 IN | BODY MASS INDEX: 23.31 KG/M2

## 2022-08-10 DIAGNOSIS — I50.41 ACUTE COMBINED SYSTOLIC AND DIASTOLIC CONGESTIVE HEART FAILURE: Primary | ICD-10-CM

## 2022-08-10 DIAGNOSIS — I50.43 ACUTE ON CHRONIC COMBINED SYSTOLIC AND DIASTOLIC CHF (CONGESTIVE HEART FAILURE): ICD-10-CM

## 2022-08-10 PROCEDURE — 99213 OFFICE O/P EST LOW 20 MIN: CPT | Mod: S$PBB,,, | Performed by: PHYSICIAN ASSISTANT

## 2022-08-10 PROCEDURE — 99213 PR OFFICE/OUTPT VISIT, EST, LEVL III, 20-29 MIN: ICD-10-PCS | Mod: S$PBB,,, | Performed by: PHYSICIAN ASSISTANT

## 2022-08-10 PROCEDURE — 99999 PR PBB SHADOW E&M-EST. PATIENT-LVL III: CPT | Mod: PBBFAC,,, | Performed by: PHYSICIAN ASSISTANT

## 2022-08-10 PROCEDURE — 99213 OFFICE O/P EST LOW 20 MIN: CPT | Mod: PBBFAC | Performed by: PHYSICIAN ASSISTANT

## 2022-08-10 PROCEDURE — 99999 PR PBB SHADOW E&M-EST. PATIENT-LVL III: ICD-10-PCS | Mod: PBBFAC,,, | Performed by: PHYSICIAN ASSISTANT

## 2022-08-10 NOTE — PROGRESS NOTES
HF TCC Provider Note (Follow-up) Consult Note      HPI:  Patient does not ambulate   Patient sleeps on 1 pillow   Patient wakes up SOB, has to get out of bed, associated cough, sputum denies   Palpitations - denies   Dizzy, light-headed, pre-syncope or syncope denies   Since discharge frequency of performing weights, home weight and weight change-not getting home weight    Other information felt pertinent to HPI    Since last visit has done well with fluid. Does have some increased secretions that daughter notes is hard to cough up. HH rec cough assist or suction. Labs from last week reviewed.      PHYSICAL:   Vitals:    08/10/22 1404   BP: 126/80   Pulse: 66      Wt Readings from Last 3 Encounters:   07/29/22 57.8 kg (127 lb 6.8 oz)   07/14/22 69.4 kg (153 lb)   06/06/22 69.7 kg (153 lb 10.6 oz)       JVD: no   Heart rhythm: regular  Cardiac murmur: No    S3: no  S4: no  Lungs: clear  Liver span: 8 cm  Hepatojugular reflux: no  Edema: no      ASSESSMENT: chronic systolic HF     PLAN:      Patient Instructions:    Instruct the patient to notify this clinic if HH, a physician or an advanced care provider wants to change medication one of their HF medications    Activity and Diet restrictions:   o Recommend 2-3 gram sodium restriction and 1500cc- 2000cc fluid restriction.  o Encourage physical activity with graded exercise program.  o Requested patient to weigh themselves daily, and to notify us if their weight increases by more than 3 lbs in 1 day or 5 lbs in 3 days.    Assigned dry weight on home scale: unable to get daily home weight  Medication changes (include current dose and changed dose) doing well on HF regimen post dc. Has regular follow up in cardiology clinic from previous appt. Told to call back with any changes in HF symptoms

## 2022-08-18 ENCOUNTER — DOCUMENT SCAN (OUTPATIENT)
Dept: HOME HEALTH SERVICES | Facility: HOSPITAL | Age: 82
End: 2022-08-18
Payer: MEDICARE

## 2022-08-22 ENCOUNTER — EXTERNAL HOME HEALTH (OUTPATIENT)
Dept: HOME HEALTH SERVICES | Facility: HOSPITAL | Age: 82
End: 2022-08-22
Payer: MEDICARE

## 2022-08-24 ENCOUNTER — DOCUMENT SCAN (OUTPATIENT)
Dept: HOME HEALTH SERVICES | Facility: HOSPITAL | Age: 82
End: 2022-08-24
Payer: MEDICARE

## 2022-09-06 ENCOUNTER — CARE AT HOME (OUTPATIENT)
Dept: HOME HEALTH SERVICES | Facility: CLINIC | Age: 82
End: 2022-09-06
Payer: MEDICARE

## 2022-09-06 VITALS
HEART RATE: 78 BPM | DIASTOLIC BLOOD PRESSURE: 80 MMHG | RESPIRATION RATE: 20 BRPM | SYSTOLIC BLOOD PRESSURE: 130 MMHG | OXYGEN SATURATION: 90 % | TEMPERATURE: 98 F

## 2022-09-06 DIAGNOSIS — R13.10 DYSPHAGIA, UNSPECIFIED TYPE: Primary | ICD-10-CM

## 2022-09-06 DIAGNOSIS — L89.153 SACRAL DECUBITUS ULCER, STAGE III: ICD-10-CM

## 2022-09-06 PROCEDURE — 99349 PR HOME VISIT,ESTAB PATIENT,LEVEL III: ICD-10-PCS | Mod: S$GLB,,, | Performed by: NURSE PRACTITIONER

## 2022-09-06 PROCEDURE — 99349 HOME/RES VST EST MOD MDM 40: CPT | Mod: S$GLB,,, | Performed by: NURSE PRACTITIONER

## 2022-09-06 NOTE — PROGRESS NOTES
[facility]  Medical Home Visit    Visit Date: 9/6/2022  Encounter Provider: Elva Ansari  PCP:  Elva Ansari NP    Subjective:      Patient ID: Tanya Madrid is a 82 y.o. female.    Consult Requested By:  No ref. provider found  Reason for Consult:  follow up established care    Tanya is being seen at home due to being seen at home due to physical debility that presents a taxing effort to leave the home, to mitigate high risk of hospital readmission and/or due to the limited availability of reliable or safe options for transportation to the point of access to the provider. Prior to treatment on this visit the chart was reviewed and patient verbal consent was obtained.    Chief Complaint: Follow-up      Patient is being seen as a follow up to established care. Upon arrival patient was asleep in bed and daughter was present for the visit. Daughter reports that the patient has been sleeping more the last month and she is not as alert as usual and she also reports that her hands are swelling and her decubitus on her sacrum is getting bigger.  VSS and daughter reports compliance to all medications.         Review of Systems   Constitutional:  Positive for activity change (decreased) and unexpected weight change (losing weight).   HENT:  Positive for drooling.    Eyes: Negative.    Respiratory:  Positive for cough (from secretions).    Gastrointestinal: Negative.    Endocrine: Negative.    Genitourinary: Negative.    Musculoskeletal:  Positive for arthralgias.   Skin:  Positive for wound (sacral decubitus).   Neurological:  Positive for weakness.   Psychiatric/Behavioral:  Positive for sleep disturbance (sleeping more >12 hrs/day).      Assessments:  Environmental: Patient lives in a single story home with her daughter. There are no steps at the entrance. Lighting is bright and temperature is comfortable  Functional Status: Patient is bedbound and needs assistance with ADLs  Safety: Fall Precautions,  Aspiration Precautions  Nutritional: Adequate food in the home and patient is on tube feedings  Home Health/DME/Supplies: [facility], DMEs: hospital bed, wheelchair    Objective:   Physical Exam  Constitutional:       General: She is sleeping. She is not in acute distress.     Appearance: She is cachectic.   HENT:      Head: Normocephalic.      Mouth/Throat:      Mouth: Mucous membranes are moist.      Comments: Patient has copious amounts of secretions in the mouth  Cardiovascular:      Rate and Rhythm: Normal rate and regular rhythm.      Heart sounds: Murmur heard.   Pulmonary:      Breath sounds: Examination of the right-upper field reveals rhonchi. Examination of the left-upper field reveals rhonchi. Rhonchi present.   Abdominal:      General: Bowel sounds are normal. There is no distension.      Palpations: Abdomen is soft.      Tenderness: There is no abdominal tenderness.   Musculoskeletal:         General: Swelling (to both hands) present.   Skin:     General: Skin is warm and dry.      Capillary Refill: Capillary refill takes 2 to 3 seconds.      Findings: Wound present.      Comments: Decubitus to the sacrum stage 3   Popped blister to the left hand    Neurological:      Mental Status: She is lethargic.      Motor: Weakness present.   Psychiatric:         Speech: She is noncommunicative.       Vitals:    09/06/22 1248   BP: 130/80   Pulse: 78   Resp: 20   Temp: 98.2 °F (36.8 °C)   SpO2: (!) 90%     There is no height or weight on file to calculate BMI.    Assessment:     1. Dysphagia, unspecified type    2. Sacral decubitus ulcer, stage III        Plan:     Ethical / Legal: Advance Care Planning   Surrogate decision maker:  Name Karla Lemus, Relationship: Daughter  Code Status:  DNR  [place]:  None  Other advance directive:  None, Capacity to make medical decisions:  No, Conflict None       1. Dysphagia, unspecified type  -     SUCTION MACHINE FOR HOME USE    2. Sacral decubitus ulcer, stage  III  Assessment & Plan:  Continue wound care per [facility]  Call for any problems       2. Follow up   Encounter for Medical Follow-Up and Medication Review  - [facility] at NP to schedule follow-up visit with patient in 4 weeks or PRN     Patient Instructions Given:  - Continue all medications, treatments and therapies as ordered.   - Follow all instructions, recommendations as discussed.  - Maintain Safety Precautions at all times.  - Attend all medical appointments as scheduled.  - For worsening symptoms: call Primary Care Physician or Nurse Practitioner.  - For emergencies, call 911 or immediately report to the nearest emergency room     Were controlled substances prescribed?  No    Follow Up Appointments:   Future Appointments   Date Time Provider Department Center   11/8/2022 11:40 AM [facility]AT[facility], [facility]   11/15/2022 11:40 AM Miky Anaya MD [facility]       Signature: Elva Ansari NP

## 2022-09-12 ENCOUNTER — OFFICE VISIT (OUTPATIENT)
Dept: CARDIOLOGY | Facility: CLINIC | Age: 82
End: 2022-09-12
Payer: MEDICARE

## 2022-09-12 ENCOUNTER — HOSPITAL ENCOUNTER (INPATIENT)
Facility: HOSPITAL | Age: 82
LOS: 5 days | Discharge: HOSPICE | DRG: 871 | End: 2022-09-17
Attending: EMERGENCY MEDICINE | Admitting: FAMILY MEDICINE
Payer: MEDICARE

## 2022-09-12 DIAGNOSIS — R62.7 FAILURE TO THRIVE IN ADULT: ICD-10-CM

## 2022-09-12 DIAGNOSIS — I50.42 CHRONIC COMBINED SYSTOLIC AND DIASTOLIC HEART FAILURE: ICD-10-CM

## 2022-09-12 DIAGNOSIS — I50.41 ACUTE COMBINED SYSTOLIC AND DIASTOLIC CONGESTIVE HEART FAILURE: Primary | ICD-10-CM

## 2022-09-12 DIAGNOSIS — A41.9 SEPSIS, DUE TO UNSPECIFIED ORGANISM, UNSPECIFIED WHETHER ACUTE ORGAN DYSFUNCTION PRESENT: ICD-10-CM

## 2022-09-12 DIAGNOSIS — G20.A1 PARKINSON DISEASE: Chronic | ICD-10-CM

## 2022-09-12 DIAGNOSIS — R41.82 ALTERED MENTAL STATUS, UNSPECIFIED ALTERED MENTAL STATUS TYPE: ICD-10-CM

## 2022-09-12 DIAGNOSIS — G20.A1 PSYCHOSIS DUE TO PARKINSON'S DISEASE: ICD-10-CM

## 2022-09-12 DIAGNOSIS — F06.8 PSYCHOSIS DUE TO PARKINSON'S DISEASE: ICD-10-CM

## 2022-09-12 DIAGNOSIS — Z86.73 HISTORY OF CVA (CEREBROVASCULAR ACCIDENT): Chronic | ICD-10-CM

## 2022-09-12 DIAGNOSIS — G47.00 INSOMNIA, UNSPECIFIED TYPE: ICD-10-CM

## 2022-09-12 DIAGNOSIS — E87.0 HYPERNATREMIA: Primary | ICD-10-CM

## 2022-09-12 DIAGNOSIS — R91.8 BILATERAL PULMONARY INFILTRATES ON CHEST X-RAY: ICD-10-CM

## 2022-09-12 DIAGNOSIS — R07.9 CHEST PAIN: ICD-10-CM

## 2022-09-12 DIAGNOSIS — J69.0 ASPIRATION PNEUMONIA, UNSPECIFIED ASPIRATION PNEUMONIA TYPE, UNSPECIFIED LATERALITY, UNSPECIFIED PART OF LUNG: ICD-10-CM

## 2022-09-12 LAB
ALBUMIN SERPL BCP-MCNC: 2.2 G/DL (ref 3.5–5.2)
ALP SERPL-CCNC: 150 U/L (ref 55–135)
ALT SERPL W/O P-5'-P-CCNC: 40 U/L (ref 10–44)
ANION GAP SERPL CALC-SCNC: 14 MMOL/L (ref 8–16)
ANISOCYTOSIS BLD QL SMEAR: SLIGHT
AST SERPL-CCNC: 83 U/L (ref 10–40)
BACTERIA #/AREA URNS HPF: NORMAL /HPF
BASOPHILS # BLD AUTO: 0.03 K/UL (ref 0–0.2)
BASOPHILS NFR BLD: 0.4 % (ref 0–1.9)
BILIRUB SERPL-MCNC: 0.7 MG/DL (ref 0.1–1)
BILIRUB UR QL STRIP: NEGATIVE
BNP SERPL-MCNC: 3968 PG/ML (ref 0–99)
BUN SERPL-MCNC: 50 MG/DL (ref 8–23)
CALCIUM SERPL-MCNC: 8.5 MG/DL (ref 8.7–10.5)
CHLORIDE SERPL-SCNC: 125 MMOL/L (ref 95–110)
CLARITY UR: CLEAR
CO2 SERPL-SCNC: 26 MMOL/L (ref 23–29)
COLOR UR: YELLOW
CREAT SERPL-MCNC: 1.2 MG/DL (ref 0.5–1.4)
D DIMER PPP IA.FEU-MCNC: 15.28 MG/L FEU
DACRYOCYTES BLD QL SMEAR: ABNORMAL
DIFFERENTIAL METHOD: ABNORMAL
EOSINOPHIL # BLD AUTO: 0 K/UL (ref 0–0.5)
EOSINOPHIL NFR BLD: 0.3 % (ref 0–8)
ERYTHROCYTE [DISTWIDTH] IN BLOOD BY AUTOMATED COUNT: 26.9 % (ref 11.5–14.5)
EST. GFR  (NO RACE VARIABLE): 45 ML/MIN/1.73 M^2
GLUCOSE SERPL-MCNC: 235 MG/DL (ref 70–110)
GLUCOSE UR QL STRIP: NEGATIVE
HCT VFR BLD AUTO: 40.7 % (ref 37–48.5)
HGB BLD-MCNC: 11.5 G/DL (ref 12–16)
HGB UR QL STRIP: NEGATIVE
HYALINE CASTS #/AREA URNS LPF: 0 /LPF
HYPOCHROMIA BLD QL SMEAR: ABNORMAL
IMM GRANULOCYTES # BLD AUTO: 0.05 K/UL (ref 0–0.04)
IMM GRANULOCYTES NFR BLD AUTO: 0.7 % (ref 0–0.5)
INFLUENZA A, MOLECULAR: NEGATIVE
INFLUENZA B, MOLECULAR: NEGATIVE
KETONES UR QL STRIP: NEGATIVE
LACTATE SERPL-SCNC: 2.3 MMOL/L (ref 0.5–2.2)
LACTATE SERPL-SCNC: 3.3 MMOL/L (ref 0.5–2.2)
LACTATE SERPL-SCNC: 4.8 MMOL/L (ref 0.5–2.2)
LEUKOCYTE ESTERASE UR QL STRIP: NEGATIVE
LYMPHOCYTES # BLD AUTO: 1.2 K/UL (ref 1–4.8)
LYMPHOCYTES NFR BLD: 15.9 % (ref 18–48)
MCH RBC QN AUTO: 24.5 PG (ref 27–31)
MCHC RBC AUTO-ENTMCNC: 28.3 G/DL (ref 32–36)
MCV RBC AUTO: 87 FL (ref 82–98)
MICROSCOPIC COMMENT: NORMAL
MONOCYTES # BLD AUTO: 0.4 K/UL (ref 0.3–1)
MONOCYTES NFR BLD: 6 % (ref 4–15)
NEUTROPHILS # BLD AUTO: 5.6 K/UL (ref 1.8–7.7)
NEUTROPHILS NFR BLD: 76.7 % (ref 38–73)
NITRITE UR QL STRIP: NEGATIVE
NRBC BLD-RTO: 0 /100 WBC
OVALOCYTES BLD QL SMEAR: ABNORMAL
PH UR STRIP: 6 [PH] (ref 5–8)
PLATELET # BLD AUTO: 156 K/UL (ref 150–450)
PMV BLD AUTO: ABNORMAL FL (ref 9.2–12.9)
POIKILOCYTOSIS BLD QL SMEAR: SLIGHT
POLYCHROMASIA BLD QL SMEAR: ABNORMAL
POTASSIUM SERPL-SCNC: 3.8 MMOL/L (ref 3.5–5.1)
PROT SERPL-MCNC: 7.3 G/DL (ref 6–8.4)
PROT UR QL STRIP: ABNORMAL
RBC # BLD AUTO: 4.69 M/UL (ref 4–5.4)
RBC #/AREA URNS HPF: 0 /HPF (ref 0–4)
SARS-COV-2 RDRP RESP QL NAA+PROBE: NEGATIVE
SODIUM SERPL-SCNC: 165 MMOL/L (ref 136–145)
SP GR UR STRIP: 1.02 (ref 1–1.03)
SPECIMEN SOURCE: NORMAL
TARGETS BLD QL SMEAR: ABNORMAL
TROPONIN I SERPL DL<=0.01 NG/ML-MCNC: 0.16 NG/ML (ref 0–0.03)
TROPONIN I SERPL DL<=0.01 NG/ML-MCNC: 0.16 NG/ML (ref 0–0.03)
URN SPEC COLLECT METH UR: ABNORMAL
UROBILINOGEN UR STRIP-ACNC: ABNORMAL EU/DL
WBC # BLD AUTO: 7.29 K/UL (ref 3.9–12.7)
WBC #/AREA URNS HPF: 2 /HPF (ref 0–5)

## 2022-09-12 PROCEDURE — 11000001 HC ACUTE MED/SURG PRIVATE ROOM

## 2022-09-12 PROCEDURE — 84484 ASSAY OF TROPONIN QUANT: CPT | Mod: 91 | Performed by: NURSE PRACTITIONER

## 2022-09-12 PROCEDURE — 83605 ASSAY OF LACTIC ACID: CPT | Mod: 91 | Performed by: NURSE PRACTITIONER

## 2022-09-12 PROCEDURE — 25000003 PHARM REV CODE 250: Performed by: NURSE PRACTITIONER

## 2022-09-12 PROCEDURE — 99213 OFFICE O/P EST LOW 20 MIN: CPT | Mod: S$PBB,,, | Performed by: PHYSICIAN ASSISTANT

## 2022-09-12 PROCEDURE — 85379 FIBRIN DEGRADATION QUANT: CPT | Performed by: EMERGENCY MEDICINE

## 2022-09-12 PROCEDURE — 93005 ELECTROCARDIOGRAM TRACING: CPT

## 2022-09-12 PROCEDURE — 87040 BLOOD CULTURE FOR BACTERIA: CPT | Mod: 59 | Performed by: PHYSICIAN ASSISTANT

## 2022-09-12 PROCEDURE — 63600175 PHARM REV CODE 636 W HCPCS: Performed by: EMERGENCY MEDICINE

## 2022-09-12 PROCEDURE — P9612 CATHETERIZE FOR URINE SPEC: HCPCS

## 2022-09-12 PROCEDURE — 25500020 PHARM REV CODE 255: Performed by: EMERGENCY MEDICINE

## 2022-09-12 PROCEDURE — 93010 EKG 12-LEAD: ICD-10-PCS | Mod: ,,, | Performed by: INTERNAL MEDICINE

## 2022-09-12 PROCEDURE — 96367 TX/PROPH/DG ADDL SEQ IV INF: CPT

## 2022-09-12 PROCEDURE — 84484 ASSAY OF TROPONIN QUANT: CPT | Performed by: PHYSICIAN ASSISTANT

## 2022-09-12 PROCEDURE — 87502 INFLUENZA DNA AMP PROBE: CPT | Performed by: PHYSICIAN ASSISTANT

## 2022-09-12 PROCEDURE — 96365 THER/PROPH/DIAG IV INF INIT: CPT

## 2022-09-12 PROCEDURE — 83880 ASSAY OF NATRIURETIC PEPTIDE: CPT | Performed by: EMERGENCY MEDICINE

## 2022-09-12 PROCEDURE — 85025 COMPLETE CBC W/AUTO DIFF WBC: CPT | Performed by: PHYSICIAN ASSISTANT

## 2022-09-12 PROCEDURE — 99291 CRITICAL CARE FIRST HOUR: CPT | Mod: 25

## 2022-09-12 PROCEDURE — 93010 ELECTROCARDIOGRAM REPORT: CPT | Mod: ,,, | Performed by: INTERNAL MEDICINE

## 2022-09-12 PROCEDURE — U0002 COVID-19 LAB TEST NON-CDC: HCPCS | Performed by: PHYSICIAN ASSISTANT

## 2022-09-12 PROCEDURE — 81000 URINALYSIS NONAUTO W/SCOPE: CPT | Performed by: PHYSICIAN ASSISTANT

## 2022-09-12 PROCEDURE — 83605 ASSAY OF LACTIC ACID: CPT | Mod: 91 | Performed by: PHYSICIAN ASSISTANT

## 2022-09-12 PROCEDURE — 63600175 PHARM REV CODE 636 W HCPCS: Performed by: NURSE PRACTITIONER

## 2022-09-12 PROCEDURE — 25000003 PHARM REV CODE 250: Performed by: EMERGENCY MEDICINE

## 2022-09-12 PROCEDURE — 99213 PR OFFICE/OUTPT VISIT, EST, LEVL III, 20-29 MIN: ICD-10-PCS | Mod: S$PBB,,, | Performed by: PHYSICIAN ASSISTANT

## 2022-09-12 PROCEDURE — 80053 COMPREHEN METABOLIC PANEL: CPT | Performed by: PHYSICIAN ASSISTANT

## 2022-09-12 PROCEDURE — 25000003 PHARM REV CODE 250: Performed by: INTERNAL MEDICINE

## 2022-09-12 RX ORDER — CLOPIDOGREL BISULFATE 75 MG/1
75 TABLET ORAL DAILY
Status: DISCONTINUED | OUTPATIENT
Start: 2022-09-13 | End: 2022-09-14

## 2022-09-12 RX ORDER — FAMOTIDINE 20 MG/1
20 TABLET, FILM COATED ORAL DAILY
Status: DISCONTINUED | OUTPATIENT
Start: 2022-09-12 | End: 2022-09-14

## 2022-09-12 RX ORDER — CEFEPIME HYDROCHLORIDE 1 G/50ML
2 INJECTION, SOLUTION INTRAVENOUS
Status: DISCONTINUED | OUTPATIENT
Start: 2022-09-13 | End: 2022-09-13

## 2022-09-12 RX ORDER — CEFEPIME HYDROCHLORIDE 1 G/50ML
1 INJECTION, SOLUTION INTRAVENOUS
Status: DISCONTINUED | OUTPATIENT
Start: 2022-09-12 | End: 2022-09-12

## 2022-09-12 RX ORDER — LANOLIN ALCOHOL/MO/W.PET/CERES
800 CREAM (GRAM) TOPICAL
Status: DISCONTINUED | OUTPATIENT
Start: 2022-09-12 | End: 2022-09-17 | Stop reason: HOSPADM

## 2022-09-12 RX ORDER — INSULIN ASPART 100 [IU]/ML
0-5 INJECTION, SOLUTION INTRAVENOUS; SUBCUTANEOUS EVERY 6 HOURS PRN
Status: DISCONTINUED | OUTPATIENT
Start: 2022-09-12 | End: 2022-09-12

## 2022-09-12 RX ORDER — NALOXONE HCL 0.4 MG/ML
0.02 VIAL (ML) INJECTION
Status: DISCONTINUED | OUTPATIENT
Start: 2022-09-12 | End: 2022-09-17 | Stop reason: HOSPADM

## 2022-09-12 RX ORDER — CEFEPIME HYDROCHLORIDE 1 G/50ML
2 INJECTION, SOLUTION INTRAVENOUS
Status: COMPLETED | OUTPATIENT
Start: 2022-09-12 | End: 2022-09-12

## 2022-09-12 RX ORDER — ONDANSETRON 2 MG/ML
4 INJECTION INTRAMUSCULAR; INTRAVENOUS EVERY 8 HOURS PRN
Status: DISCONTINUED | OUTPATIENT
Start: 2022-09-12 | End: 2022-09-17 | Stop reason: HOSPADM

## 2022-09-12 RX ORDER — PROCHLORPERAZINE EDISYLATE 5 MG/ML
5 INJECTION INTRAMUSCULAR; INTRAVENOUS EVERY 6 HOURS PRN
Status: DISCONTINUED | OUTPATIENT
Start: 2022-09-12 | End: 2022-09-17 | Stop reason: HOSPADM

## 2022-09-12 RX ORDER — VANCOMYCIN HCL IN 5 % DEXTROSE 1G/250ML
20 PLASTIC BAG, INJECTION (ML) INTRAVENOUS
Status: COMPLETED | OUTPATIENT
Start: 2022-09-12 | End: 2022-09-12

## 2022-09-12 RX ORDER — METRONIDAZOLE 500 MG/1
500 TABLET ORAL EVERY 8 HOURS
Status: DISCONTINUED | OUTPATIENT
Start: 2022-09-12 | End: 2022-09-14

## 2022-09-12 RX ORDER — DEXTROSE MONOHYDRATE 50 MG/ML
INJECTION, SOLUTION INTRAVENOUS CONTINUOUS
Status: ACTIVE | OUTPATIENT
Start: 2022-09-12 | End: 2022-09-13

## 2022-09-12 RX ORDER — GLUCAGON 1 MG
1 KIT INJECTION
Status: DISCONTINUED | OUTPATIENT
Start: 2022-09-12 | End: 2022-09-17 | Stop reason: HOSPADM

## 2022-09-12 RX ORDER — INDOMETHACIN 50 MG/1
50 SUPPOSITORY RECTAL
Status: COMPLETED | OUTPATIENT
Start: 2022-09-12 | End: 2022-09-12

## 2022-09-12 RX ORDER — IBUPROFEN 200 MG
24 TABLET ORAL
Status: DISCONTINUED | OUTPATIENT
Start: 2022-09-12 | End: 2022-09-12

## 2022-09-12 RX ORDER — SODIUM CHLORIDE 0.9 % (FLUSH) 0.9 %
10 SYRINGE (ML) INJECTION EVERY 8 HOURS PRN
Status: DISCONTINUED | OUTPATIENT
Start: 2022-09-12 | End: 2022-09-17 | Stop reason: HOSPADM

## 2022-09-12 RX ORDER — ACETAMINOPHEN 325 MG/1
650 TABLET ORAL EVERY 4 HOURS PRN
Status: DISCONTINUED | OUTPATIENT
Start: 2022-09-12 | End: 2022-09-17 | Stop reason: HOSPADM

## 2022-09-12 RX ORDER — QUETIAPINE FUMARATE 25 MG/1
25 TABLET, FILM COATED ORAL NIGHTLY PRN
Status: DISCONTINUED | OUTPATIENT
Start: 2022-09-12 | End: 2022-09-17 | Stop reason: HOSPADM

## 2022-09-12 RX ORDER — INSULIN ASPART 100 [IU]/ML
1-10 INJECTION, SOLUTION INTRAVENOUS; SUBCUTANEOUS EVERY 6 HOURS PRN
Status: DISCONTINUED | OUTPATIENT
Start: 2022-09-12 | End: 2022-09-17 | Stop reason: HOSPADM

## 2022-09-12 RX ORDER — CARVEDILOL 3.12 MG/1
3.12 TABLET ORAL 2 TIMES DAILY
Status: DISCONTINUED | OUTPATIENT
Start: 2022-09-12 | End: 2022-09-13

## 2022-09-12 RX ORDER — IPRATROPIUM BROMIDE AND ALBUTEROL SULFATE 2.5; .5 MG/3ML; MG/3ML
3 SOLUTION RESPIRATORY (INHALATION) EVERY 6 HOURS PRN
Status: DISCONTINUED | OUTPATIENT
Start: 2022-09-12 | End: 2022-09-17 | Stop reason: HOSPADM

## 2022-09-12 RX ORDER — ENOXAPARIN SODIUM 100 MG/ML
30 INJECTION SUBCUTANEOUS EVERY 24 HOURS
Status: DISCONTINUED | OUTPATIENT
Start: 2022-09-12 | End: 2022-09-13

## 2022-09-12 RX ADMIN — INDOMETHACIN 50 MG: 50 SUPPOSITORY RECTAL at 09:09

## 2022-09-12 RX ADMIN — SODIUM CHLORIDE, SODIUM LACTATE, POTASSIUM CHLORIDE, AND CALCIUM CHLORIDE 1000 ML: .6; .31; .03; .02 INJECTION, SOLUTION INTRAVENOUS at 06:09

## 2022-09-12 RX ADMIN — METRONIDAZOLE 500 MG: 500 TABLET ORAL at 11:09

## 2022-09-12 RX ADMIN — DEXTROSE: 5 SOLUTION INTRAVENOUS at 11:09

## 2022-09-12 RX ADMIN — FAMOTIDINE 20 MG: 20 TABLET ORAL at 11:09

## 2022-09-12 RX ADMIN — VANCOMYCIN HYDROCHLORIDE 1000 MG: 1 INJECTION, POWDER, LYOPHILIZED, FOR SOLUTION INTRAVENOUS at 07:09

## 2022-09-12 RX ADMIN — IOHEXOL 100 ML: 350 INJECTION, SOLUTION INTRAVENOUS at 08:09

## 2022-09-12 RX ADMIN — ENOXAPARIN SODIUM 30 MG: 30 INJECTION SUBCUTANEOUS at 11:09

## 2022-09-12 RX ADMIN — CEFEPIME HYDROCHLORIDE 2 G: 2 INJECTION, SOLUTION INTRAVENOUS at 06:09

## 2022-09-12 NOTE — Clinical Note
Diagnosis: Hypernatremia [198349]   Admitting Provider:: ANURAG PINEDO [[id number]]   Future Attending Provider: ANURAG PINEDO [[id number]]   Reason for IP Medical Treatment  (Clinical interventions that can only be accomplished in the IP setting? ) :: Sepsis   Estimated Length of Stay:: 2 midnights   I certify that Inpatient services for greater than or equal to 2 midnights are medically necessary:: No   Plans for Post-Acute care--if anticipated (pick the single best option):: A. No post acute care anticipated at this time

## 2022-09-12 NOTE — PROGRESS NOTES
Subjective:    Patient ID:  Tanya Madrid is a 82 y.o. female who presents for follow-up of ADHF      HPI  82  year old female with CHF, HFrEF of 10%, HTN, HLP, CVA, wheelchair bound who presents for emergency clinic visit after being discharged from [facility] last month. She comes in today with daughter and states she has been more SOB, lots of phlegm she is not able to cough, PND, hypotension and now fever. She has been giving her tylenol. Is holding entresto and coreg for SBP 80s. Is still giving lasix. TMax 101    Of note pt wheelchair bound, does not speak       Review of Systems   Constitutional: Positive for chills, decreased appetite, fever and malaise/fatigue. Negative for diaphoresis, night sweats and weight gain.   HENT:  Positive for congestion. Negative for stridor.    Cardiovascular:  Positive for dyspnea on exertion, leg swelling and orthopnea. Negative for chest pain, claudication, cyanosis, irregular heartbeat, near-syncope, palpitations, paroxysmal nocturnal dyspnea and syncope.   Endocrine: Negative for cold intolerance.   Musculoskeletal:  Negative for arthritis.   Gastrointestinal:  Negative for bloating, abdominal pain, constipation, nausea and vomiting.   Neurological:  Negative for aphonia.   Psychiatric/Behavioral:  Negative for altered mental status.       Objective:    Physical Exam    Eyes:      Pupils: Pupils are equal, round, and reactive to light.   Neck:      Trachea: No tracheal deviation.   Cardiovascular:      Rate and Rhythm: Normal rate and regular rhythm.      Pulses: Intact distal pulses.           Carotid pulses are 2+ on the right side and 2+ on the left side.       Radial pulses are 2+ on the right side and 2+ on the left side.        Femoral pulses are 2+ on the right side and 2+ on the left side.       Popliteal pulses are 2+ on the right side and 2+ on the left side.        Dorsalis pedis pulses are 2+ on the right side and 2+ on the left side.        Posterior tibial  pulses are 2+ on the right side and 2+ on the left side.      Heart sounds: Normal heart sounds. No murmur heard.    No friction rub. No gallop.   Pulmonary:      Effort: Pulmonary effort is normal. No respiratory distress.      Breath sounds: Normal breath sounds. Rales and wheezing present.  JVD at 16 mmHg   Chest:      Chest wall: No tenderness.   Abdominal:      General: There is no distension.      Tenderness: There is no abdominal tenderness. There is no rebound.   Musculoskeletal:         General: No tenderness.      Cervical back: Normal range of motion.   Skin:     General: Skin is warm and dry.   Neurological:      Mental Status: She is alert and oriented to person, place, and time.    The left ventricle is mildly enlarged with concentric hypertrophy and severely decreased systolic function.  The estimated ejection fraction is 15 - 20%.  Grade I left ventricular diastolic dysfunction.  Mild right ventricular enlargement with moderately reduced right ventricular systolic function.  Severe right atrial enlargement.  Moderate left atrial enlargement.  There is pulmonary hypertension.  The estimated PA systolic pressure is 58 mmHg.  No change from prior echo March 2022.  Assessment:       1. Acute combined systolic and diastolic congestive heart failure         Plan:       Patient advised to go to ER for hypotension and fever, likely infection related.  Recommend inpatient palliative care consult with poor prognosis and multiple HF admits in one year

## 2022-09-12 NOTE — FIRST PROVIDER EVALUATION
Medical screening examination initiated.  I have conducted a focused provider triage encounter, findings are as follows:    Brief history of present illness:  fever 101 at home and fatigue.  CHF patient    Vitals:    09/12/22 1431   BP: 105/66   BP Location: Right arm   Patient Position: Sitting   Pulse: 96   Resp: (!) 30   Temp: 97.8 °F (36.6 °C)   TempSrc: Axillary   SpO2: (!) 92%       Pertinent physical exam:  coarse breath sounds    Brief workup plan:  sepsis    Preliminary workup initiated; this workup will be continued and followed by the physician or advanced practice provider that is assigned to the patient when roomed.

## 2022-09-12 NOTE — ED PROVIDER NOTES
SCRIBE #1 NOTE: I, Mirna Harris, am scribing for, and in the presence of, Ck Soto MD. I have scribed the HPI, ROS, and PEx.     SCRIBE #2 NOTE: I, Omar Bear, am scribing for, and in the presence of,  Ck Soto MD. I have scribed the remaining portions of the note not scribed by Scribe #1.   History      Chief Complaint   Patient presents with    Fatigue     Fever, lethargic, and hypotension. Pt. Was sent over from her [facility] Office.        Review of patient's allergies indicates:   Allergen Reactions    Sinemet [carbidopa-levodopa] Anxiety and Other (See Comments)     Becomes agitated and fidgety.    Xanax [alprazolam] Anxiety and Other (See Comments)     Becomes agitated and fidgety.         HPI   HPI    9/12/2022, 3:59 PM   History obtained from the patient's daughter at bedside  HPI/ROS limited secondary to pt being nonverbal      History of Present Illness: Tanya Madrid is a 82 y.o. female patient with a PMHx of acute CHF, HLD, HTN, Parkinson's disease, stroke, throat mass, and cardiac arrest who presents to the Emergency Department after being referred to the ED by ADELAIDA Sears (Heart Transplant/HF) for further evaluation of hypotension and fever. Per the pt's daughter, the pt went into cardiac arrest in March of this year and has never fully recovered, but the pt has been more fatigued for the past 3 to 4 days. The pt's daughter also reports that the pt has had blood pressures in the 80s and 90s systolic, bilateral hand swelling, a fever, and a cough. The pt's daughter gave the pt Tylenol for her fever. Pt's daughter reports that the pt's urine output and smell have been normal. Also, the pt's daughter reports that she did not give the pt blood pressure medication this morning.      Arrival mode: Personal vehicle     PCP: Elva Ansari NP       Past Medical History:  Past Medical History:   Diagnosis Date    Acute CHF (congestive heart failure) 1/17/2021    Hyperlipemia      Hypertension     Parkinson's disease     Stroke 2016    Throat mass        Past Surgical History:  Past Surgical History:   Procedure Laterality Date    CLOSED REDUCTION Right 10/15/2020    Procedure: CLOSED REDUCTION;  Surgeon: Humberto Valdivia DO;  Location: [facility] OR;  Service: Orthopedics;  Laterality: Right;  ATTEMPTED    EVACUATION OF HEMATOMA Right 10/17/2020    Procedure: EVACUATION, HEMATOMA;  Surgeon: Humberto Valdivia DO;  Location: [facility] OR;  Service: Orthopedics;  Laterality: Right;    HEMIARTHROPLASTY OF HIP Left 7/12/2020    Procedure: HEMIARTHROPLASTY, HIP;  Surgeon: Humberto Valdivia DO;  Location: [facility] OR;  Service: Orthopedics;  Laterality: Left;    HEMIARTHROPLASTY OF HIP Right 10/2/2020    Procedure: HEMIARTHROPLASTY, HIP;  Surgeon: Loyd Kearney MD;  Location: [facility] OR;  Service: Orthopedics;  Laterality: Right;    HYSTERECTOMY      INSERTION OF PERCUTANEOUS ENDOSCOPIC GASTROSTOMY (PEG) FEEDING TUBE      OPEN REDUCTION OF DISLOCATION OF HIP Right 10/17/2020    Procedure: OPEN REDUCTION, DISLOCATION, HIP;  Surgeon: Humberto Valdivia DO;  Location: [facility] OR;  Service: Orthopedics;  Laterality: Right;    THROAT SURGERY      TONSILLECTOMY           Family History:  Family History   Family history unknown: Yes       Social History:  Social History     Tobacco Use    Smoking status: Never    Smokeless tobacco: Never   Substance and Sexual Activity    Alcohol use: No    Drug use: No    Sexual activity: Not Currently       ROS   Review of Systems   Unable to perform ROS: Patient nonverbal     Physical Exam      Initial Vitals [09/12/22 1431]   BP Pulse Resp Temp SpO2   105/66 96 (!) 30 97.8 °F (36.6 °C) (!) 92 %      MAP       --          Physical Exam  Nursing Notes and Vital Signs Reviewed.  Constitutional: Patient is in no acute distress. Well-developed and well-nourished.  Head: Atraumatic. Normocephalic.  Eyes: PERRL. EOM intact. Conjunctivae are not pale. No scleral icterus.  ENT: Mucous  membranes are moist. Oropharynx is clear and symmetric.    Neck: Supple. Full ROM. No lymphadenopathy.  Cardiovascular: Regular rate. Regular rhythm. No murmurs, rubs, or gallops. Distal pulses are 2+ and symmetric.  Pulmonary/Chest: No respiratory distress. Course breath sounds bilaterally.  Abdominal: Soft and non-distended.  There is no tenderness.  No rebound, guarding, or rigidity.   Musculoskeletal: Moves all extremities. No obvious deformities. Edema to bilateral hands. No leg swelling.  Skin: There is a stage 3 pressure ulcer to the R ischial region. See picture below.  Neurological:  Awake.  No acute focal neurological deficits are appreciated.  Psychiatric: Normal affect. Good eye contact. Appropriate in content.        ED Course    Critical Care    Date/Time: 9/12/2022 8:26 PM  Performed by: Ck Soto MD  Authorized by: Ck Soto MD   Direct patient critical care time: 10 minutes  Additional history critical care time: 5 minutes  Ordering / reviewing critical care time: 10 minutes  Documentation critical care time: 5 minutes  Consulting other physicians critical care time: 5 minutes  Total critical care time (exclusive of procedural time) : 35 minutes  Critical care time was exclusive of separately billable procedures and treating other patients.  Critical care was necessary to treat or prevent imminent or life-threatening deterioration of the following conditions: sepsis.  Critical care was time spent personally by me on the following activities: blood draw for specimens, development of treatment plan with patient or surrogate, discussions with consultants, interpretation of cardiac output measurements, evaluation of patient's response to treatment, obtaining history from patient or surrogate, examination of patient, ordering and review of laboratory studies, ordering and performing treatments and interventions, ordering and review of radiographic studies, pulse oximetry, re-evaluation of  patient's condition and review of old charts.      ED Vital Signs:  Vitals:    09/12/22 1431 09/12/22 1603 09/12/22 1628 09/12/22 1723   BP: 105/66 (!) 114/59  124/74   Pulse: 96 96 94    Resp: (!) 30 (!) 28     Temp: 97.8 °F (36.6 °C)      TempSrc: Axillary      SpO2: (!) 92%      Weight:        09/12/22 1741 09/12/22 1843 09/12/22 1902 09/12/22 1918   BP:  126/87 (!) 140/79    Pulse:  99 99    Resp:       Temp:    (!) 102.1 °F (38.9 °C)   TempSrc:    Rectal   SpO2:  95% 96%    Weight: 52.4 kg (115 lb 8 oz)       09/12/22 1935 09/12/22 2000 09/12/22 2002 09/12/22 2145   BP:  129/61     Pulse: 96   77   Resp:   17 19   Temp:       TempSrc:       SpO2: 98%   96%   Weight:           Abnormal Lab Results:  Labs Reviewed   CBC W/ AUTO DIFFERENTIAL - Abnormal; Notable for the following components:       Result Value    Hemoglobin 11.5 (*)     MCH 24.5 (*)     MCHC 28.3 (*)     RDW 26.9 (*)     Immature Granulocytes 0.7 (*)     Immature Grans (Abs) 0.05 (*)     Gran % 76.7 (*)     Lymph % 15.9 (*)     All other components within normal limits   COMPREHENSIVE METABOLIC PANEL - Abnormal; Notable for the following components:    Sodium 165 (*)     Chloride 125 (*)     Glucose 235 (*)     BUN 50 (*)     Calcium 8.5 (*)     Albumin 2.2 (*)     Alkaline Phosphatase 150 (*)     AST 83 (*)     eGFR 45 (*)     All other components within normal limits    Narrative:       NA critical result(s) called and verbal readback obtained from   CANDIE CERON RN by COY 09/12/2022 17:14   LACTIC ACID, PLASMA - Abnormal; Notable for the following components:    Lactate (Lactic Acid) 4.8 (*)     All other components within normal limits    Narrative:       LA  critical result(s) called and verbal readback obtained from   CANDIE CERON RN by COY 09/12/2022 17:15   URINALYSIS, REFLEX TO URINE CULTURE - Abnormal; Notable for the following components:    Protein, UA 1+ (*)     Urobilinogen, UA 2.0-3.0 (*)     All other components within normal  limits    Narrative:     Specimen Source->Urine   TROPONIN I - Abnormal; Notable for the following components:    Troponin I 0.162 (*)     All other components within normal limits   LACTIC ACID, PLASMA - Abnormal; Notable for the following components:    Lactate (Lactic Acid) 3.3 (*)     All other components within normal limits   D DIMER, QUANTITATIVE - Abnormal; Notable for the following components:    D-Dimer 15.28 (*)     All other components within normal limits   B-TYPE NATRIURETIC PEPTIDE - Abnormal; Notable for the following components:    BNP 3,968 (*)     All other components within normal limits   TROPONIN I - Abnormal; Notable for the following components:    Troponin I 0.163 (*)     All other components within normal limits    Narrative:     STAT, if not done in ED, then at 2nd and 6th hour from  initial draw.   INFLUENZA A & B BY MOLECULAR   CULTURE, BLOOD   CULTURE, BLOOD   SARS-COV-2 RNA AMPLIFICATION, QUAL   URINALYSIS MICROSCOPIC    Narrative:     Specimen Source->Urine   LACTIC ACID, PLASMA   POCT GLUCOSE MONITORING CONTINUOUS        All Lab Results:  Results for orders placed or performed during the hospital encounter of 09/12/22   Influenza A & B by Molecular    Specimen: Nasopharyngeal Swab   Result Value Ref Range    Influenza A, Molecular Negative Negative    Influenza B, Molecular Negative Negative    Flu A & B Source Nasal swab    CBC auto differential   Result Value Ref Range    WBC 7.29 3.90 - 12.70 K/uL    RBC 4.69 4.00 - 5.40 M/uL    Hemoglobin 11.5 (L) 12.0 - 16.0 g/dL    Hematocrit 40.7 37.0 - 48.5 %    MCV 87 82 - 98 fL    MCH 24.5 (L) 27.0 - 31.0 pg    MCHC 28.3 (L) 32.0 - 36.0 g/dL    RDW 26.9 (H) 11.5 - 14.5 %    Platelets 156 150 - 450 K/uL    MPV SEE COMMENT 9.2 - 12.9 fL    Immature Granulocytes 0.7 (H) 0.0 - 0.5 %    Gran # (ANC) 5.6 1.8 - 7.7 K/uL    Immature Grans (Abs) 0.05 (H) 0.00 - 0.04 K/uL    Lymph # 1.2 1.0 - 4.8 K/uL    Mono # 0.4 0.3 - 1.0 K/uL    Eos # 0.0 0.0 -  0.5 K/uL    Baso # 0.03 0.00 - 0.20 K/uL    nRBC 0 0 /100 WBC    Gran % 76.7 (H) 38.0 - 73.0 %    Lymph % 15.9 (L) 18.0 - 48.0 %    Mono % 6.0 4.0 - 15.0 %    Eosinophil % 0.3 0.0 - 8.0 %    Basophil % 0.4 0.0 - 1.9 %    Aniso Slight     Poik Slight     Poly Occasional     Hypo Occasional     Ovalocytes Occasional     Target Cells Occasional     Tear Drop Cells Occasional     Differential Method Automated    Comprehensive metabolic panel   Result Value Ref Range    Sodium 165 (HH) 136 - 145 mmol/L    Potassium 3.8 3.5 - 5.1 mmol/L    Chloride 125 (H) 95 - 110 mmol/L    CO2 26 23 - 29 mmol/L    Glucose 235 (H) 70 - 110 mg/dL    BUN 50 (H) 8 - 23 mg/dL    Creatinine 1.2 0.5 - 1.4 mg/dL    Calcium 8.5 (L) 8.7 - 10.5 mg/dL    Total Protein 7.3 6.0 - 8.4 g/dL    Albumin 2.2 (L) 3.5 - 5.2 g/dL    Total Bilirubin 0.7 0.1 - 1.0 mg/dL    Alkaline Phosphatase 150 (H) 55 - 135 U/L    AST 83 (H) 10 - 40 U/L    ALT 40 10 - 44 U/L    Anion Gap 14 8 - 16 mmol/L    eGFR 45 (A) >60 mL/min/1.73 m^2   Lactic acid, plasma #1   Result Value Ref Range    Lactate (Lactic Acid) 4.8 (HH) 0.5 - 2.2 mmol/L   Urinalysis, Reflex to Urine Culture Urine, Catheterized    Specimen: Urine   Result Value Ref Range    Specimen UA Urine, Catheterized     Color, UA Yellow Yellow, Straw, Kassi    Appearance, UA Clear Clear    pH, UA 6.0 5.0 - 8.0    Specific Gravity, UA 1.020 1.005 - 1.030    Protein, UA 1+ (A) Negative    Glucose, UA Negative Negative    Ketones, UA Negative Negative    Bilirubin (UA) Negative Negative    Occult Blood UA Negative Negative    Nitrite, UA Negative Negative    Urobilinogen, UA 2.0-3.0 (A) <2.0 EU/dL    Leukocytes, UA Negative Negative   Troponin I   Result Value Ref Range    Troponin I 0.162 (H) 0.000 - 0.026 ng/mL   COVID-19 Rapid Screening   Result Value Ref Range    SARS-CoV-2 RNA, Amplification, Qual Negative Negative   Lactic acid, plasma #2   Result Value Ref Range    Lactate (Lactic Acid) 3.3 (H) 0.5 - 2.2 mmol/L    D dimer, quantitative   Result Value Ref Range    D-Dimer 15.28 (H) <0.50 mg/L FEU   Brain natriuretic peptide   Result Value Ref Range    BNP 3,968 (H) 0 - 99 pg/mL   Urinalysis Microscopic   Result Value Ref Range    RBC, UA 0 0 - 4 /hpf    WBC, UA 2 0 - 5 /hpf    Bacteria None None-Occ /hpf    Hyaline Casts, UA 0 0-1/lpf /lpf    Microscopic Comment SEE COMMENT    Troponin I   Result Value Ref Range    Troponin I 0.163 (H) 0.000 - 0.026 ng/mL         Imaging Results:  Imaging Results              CTA Chest Non-Coronary (PE Study) (Final result)  Result time 09/12/22 20:13:37      Final result by Cornell Meier MD (09/12/22 20:13:37)                   Impression:      No evidence of pulmonary embolism.    Areas of consolidation within the bilateral lower lobes and right middle lobe concerning for multifocal airspace disease or aspiration.    See additional findings above    All CT scans at this facility use dose modulation, iterative reconstruction and/or weight based dosing when appropriate to reduce radiation dose to as low as reasonably achievable.      Electronically signed by: Cornell Meier MD  Date:    09/12/2022  Time:    20:13               Narrative:    EXAMINATION:  CTA CHEST NON CORONARY    CLINICAL HISTORY:  Chest pain and shortness of breath    TECHNIQUE:  After the intravenous administration of 100 cc of Omni 350 nonionic contrast using CT pulmonary angio technique, 1.25  Mm axial images were acquired using helical CT technique from the lung apices through costophrenic sulci.  Sagittal coronal and oblique MIPS were also submitted for interpretation.    COMPARISON:  Chest x-ray dated 09/12/2022    FINDINGS:  -Pulmonary arteries: Pulmonary arteries are well opacified.  No evidence of pulmonary embolism.  No evidence of pulmonary hypertension.  No right heart strain is identified with RV/LV ratio < 0.9.    -Lungs: Areas of consolidation within the bilateral lower lobes and right middle lobe  concerning for multifocal airspace disease or aspiration.    -Pleura: Small right and trace left pleural effusion.    -Mediastinum/Edith:No significant adenopathy    -Axilla: Shotty adenopathy.    -Thyroid: Normal lower gland.    -Heart/Aorta: Heart size is enlarged.  Moderate  coronary artery disease.  No pericardial effusion. Aorta normal caliber.   Aorta demonstrates mild atherosclerotic disease.  Mild coronary disease.    -Bones/Chest Wall: Diffuse osteopenia and moderate degenerative changes.  Moderate scoliosis.  Diffuse anasarca.    -Upper Abdomen: Mild constipation.  G tube appears appropriate.                                       X-Ray Chest AP Portable (Final result)  Result time 09/12/22 17:26:49      Final result by Cornell Meier MD (09/12/22 17:26:49)                   Impression:      Overall improved aeration compared to prior      Electronically signed by: Cornell Meier MD  Date:    09/12/2022  Time:    17:26               Narrative:    EXAMINATION:  XR CHEST AP PORTABLE    CLINICAL HISTORY:  Sepsis;    TECHNIQUE:  Single frontal view of the chest was performed.    COMPARISON:  07/27/2022.    FINDINGS:  Patchy infiltrates are seen at the lung bases bilaterally.  Decreased right pleural effusion.  No pneumothorax.  Heart size is borderline enlarged.  Aorta demonstrates atherosclerotic disease.  Scattered degenerative changes and diffuse osteopenia.  In comparison to the prior study, there is no adverse interval changes.                                       CT Head Without Contrast (Final result)  Result time 09/12/22 17:02:03      Final result by Elvira Vásquez MD (09/12/22 17:02:03)                   Impression:      Atrophy and chronic white matter changes.    No hemorrhage mass effect or midline shift    Some element of sinus disease    All CT scans   are performed using dose optimization techniques including the following: automated exposure control; adjustment of the mA and/or kV; use of  iterative reconstruction technique.  Dose modulation was employed for ALARA by means of: Automated exposure control; adjustment of the mA and/or kV according to patient size (this includes techniques or standardized protocols for targeted exams where dose is matched to indication/reason for exam; i.e. extremities or head); and/or use of iterative reconstructive technique.      Electronically signed by: Fahad Elvira  Date:    09/12/2022  Time:    17:02               Narrative:    EXAMINATION:  CT HEAD WITHOUT CONTRAST    CLINICAL HISTORY:  Mental status change, unknown cause;    TECHNIQUE:  Low dose axial CT images obtained throughout the head without intravenous contrast. Sagittal and coronal reconstructions were performed.    COMPARISON:  None.    FINDINGS:  Atrophy and chronic white matter changes.  No extra-axial blood or fluid collections.    No parenchymal mass, hemorrhage, edema or major vascular distribution infarct.    Skull/extracranial contents (limited evaluation): No fracture.  Mild maxillary sinus mucosal thickening.  Right sphenoid sinus opacification                                     The EKG was ordered, reviewed, and independently interpreted by the ED provider.  Interpretation time: 17:17  Rate: 100 BPM  Rhythm: Sinus rhythm with premature atrial complexes with aberrant conduction  Interpretation: Left anterior fascicular block. Nonspecific T wave abnormality. No STEMI.           The Emergency Provider reviewed the vital signs and test results, which are outlined above.    ED Discussion         8:25 PM: Discussed case with Abhijit Fowler MD (Hospital Medicine). Dr. oT agrees with current care and management of pt and accepts admission.   Admitting Service: Hospital Medicine  Admitting Physician: Dr. To  Admit to: Inpatient med surg    8:25 PM: Re-evaluated pt. I have discussed test results, shared treatment plan, and the need for admission with patient and family at bedside. Pt and family  express understanding at this time and agree with all information. All questions answered. Pt and family have no further questions or concerns at this time. Pt is ready for admit.           ED Medication(s):  Medications   vancomycin - pharmacy to dose (has no administration in time range)   acetaminophen suppository 650 mg (650 mg Rectal Not Given 9/12/22 2030)   carvediloL tablet 3.125 mg (has no administration in time range)   clopidogreL tablet 75 mg (has no administration in time range)   famotidine tablet 20 mg (has no administration in time range)   QUEtiapine tablet 25 mg (has no administration in time range)   sacubitriL-valsartan 49-51 mg per tablet 2 tablet (has no administration in time range)   sodium chloride 0.9% flush 10 mL (has no administration in time range)   albuterol-ipratropium 2.5 mg-0.5 mg/3 mL nebulizer solution 3 mL (has no administration in time range)   ondansetron injection 4 mg (has no administration in time range)   prochlorperazine injection Soln 5 mg (has no administration in time range)   acetaminophen tablet 650 mg (has no administration in time range)   naloxone 0.4 mg/mL injection 0.02 mg (has no administration in time range)   magnesium oxide tablet 800 mg (has no administration in time range)   magnesium oxide tablet 800 mg (has no administration in time range)   enoxaparin injection 30 mg (has no administration in time range)   metroNIDAZOLE tablet 500 mg (has no administration in time range)   cefepime in dextrose 5 % IVPB 2 g (has no administration in time range)   dextrose 5 % infusion (has no administration in time range)   glucagon (human recombinant) injection 1 mg (has no administration in time range)   dextrose 10% bolus 125 mL (has no administration in time range)   dextrose 10% bolus 250 mL (has no administration in time range)   insulin aspart U-100 pen 0-5 Units (has no administration in time range)   lactated ringers bolus 1,000 mL (0 mLs Intravenous Stopped  9/12/22 1859)   vancomycin in dextrose 5 % 1 gram/250 mL IVPB 1,000 mg (0 mg Intravenous Stopped 9/12/22 2030)   cefepime in dextrose 5 % IVPB 2 g (0 g Intravenous Stopped 9/12/22 1837)   iohexoL (OMNIPAQUE 350) injection 100 mL (100 mLs Intravenous Given 9/12/22 2001)   indomethacin 50 mg suppository 50 mg (50 mg Rectal Given 9/12/22 2125)           New Prescriptions    No medications on file         Medical Decision Making    Medical Decision Making:   Clinical Tests:   Lab Tests: Ordered and Reviewed  Radiological Study: Ordered and Reviewed  Medical Tests: Ordered and Reviewed         Scribe Attestation:   Scribe #1: I performed the above scribed service and the documentation accurately describes the services I performed. I attest to the accuracy of the note.    Attending:   Physician Attestation Statement for Scribe #1: I, Ck Soto MD, personally performed the services described in this documentation, as scribed by Mirna Harris, in my presence, and it is both accurate and complete.       Scribe Attestation:   Scribe #2: I performed the above scribed service and the documentation accurately describes the services I performed. I attest to the accuracy of the note.    Attending Attestation:           Physician Attestation for Scribe:    Physician Attestation Statement for Scribe #2: I, Ck Soto MD, reviewed documentation, as scribed by Omar Bear in my presence, and it is both accurate and complete. I also acknowledge and confirm the content of the note done by Scribe #1.        Clinical Impression       ICD-10-CM ICD-9-CM   1. Hypernatremia  E87.0 276.0   2. Altered mental status, unspecified altered mental status type  R41.82 780.97   3. Bilateral pulmonary infiltrates on chest x-ray  R91.8 793.19   4. Sepsis, due to unspecified organism, unspecified whether acute organ dysfunction present  A41.9 038.9     995.91   5. Chest pain  R07.9 786.50       Disposition:   Disposition: Admitted  Condition:  Joon Soto MD  09/12/22 0103

## 2022-09-13 LAB
ALBUMIN SERPL BCP-MCNC: 1.8 G/DL (ref 3.5–5.2)
ALP SERPL-CCNC: 113 U/L (ref 55–135)
ALT SERPL W/O P-5'-P-CCNC: 28 U/L (ref 10–44)
ANION GAP SERPL CALC-SCNC: 10 MMOL/L (ref 8–16)
ANION GAP SERPL CALC-SCNC: 8 MMOL/L (ref 8–16)
AST SERPL-CCNC: 50 U/L (ref 10–40)
BASOPHILS # BLD AUTO: 0.03 K/UL (ref 0–0.2)
BASOPHILS NFR BLD: 0.5 % (ref 0–1.9)
BILIRUB SERPL-MCNC: 0.9 MG/DL (ref 0.1–1)
BUN SERPL-MCNC: 50 MG/DL (ref 8–23)
BUN SERPL-MCNC: 51 MG/DL (ref 8–23)
CALCIUM SERPL-MCNC: 7.2 MG/DL (ref 8.7–10.5)
CALCIUM SERPL-MCNC: 7.6 MG/DL (ref 8.7–10.5)
CHLORIDE SERPL-SCNC: 123 MMOL/L (ref 95–110)
CHLORIDE SERPL-SCNC: 126 MMOL/L (ref 95–110)
CO2 SERPL-SCNC: 27 MMOL/L (ref 23–29)
CO2 SERPL-SCNC: 27 MMOL/L (ref 23–29)
CREAT SERPL-MCNC: 0.9 MG/DL (ref 0.5–1.4)
CREAT SERPL-MCNC: 1 MG/DL (ref 0.5–1.4)
DIFFERENTIAL METHOD: ABNORMAL
EOSINOPHIL # BLD AUTO: 0.1 K/UL (ref 0–0.5)
EOSINOPHIL NFR BLD: 1.1 % (ref 0–8)
ERYTHROCYTE [DISTWIDTH] IN BLOOD BY AUTOMATED COUNT: 26.1 % (ref 11.5–14.5)
EST. GFR  (NO RACE VARIABLE): 56 ML/MIN/1.73 M^2
EST. GFR  (NO RACE VARIABLE): >60 ML/MIN/1.73 M^2
GLUCOSE SERPL-MCNC: 169 MG/DL (ref 70–110)
GLUCOSE SERPL-MCNC: 243 MG/DL (ref 70–110)
HCT VFR BLD AUTO: 36.6 % (ref 37–48.5)
HGB BLD-MCNC: 9.9 G/DL (ref 12–16)
IMM GRANULOCYTES # BLD AUTO: 0.05 K/UL (ref 0–0.04)
IMM GRANULOCYTES NFR BLD AUTO: 0.8 % (ref 0–0.5)
LYMPHOCYTES # BLD AUTO: 0.8 K/UL (ref 1–4.8)
LYMPHOCYTES NFR BLD: 12.2 % (ref 18–48)
MAGNESIUM SERPL-MCNC: 2.4 MG/DL (ref 1.6–2.6)
MCH RBC QN AUTO: 23.7 PG (ref 27–31)
MCHC RBC AUTO-ENTMCNC: 27 G/DL (ref 32–36)
MCV RBC AUTO: 88 FL (ref 82–98)
MONOCYTES # BLD AUTO: 0.4 K/UL (ref 0.3–1)
MONOCYTES NFR BLD: 6.3 % (ref 4–15)
NEUTROPHILS # BLD AUTO: 5.1 K/UL (ref 1.8–7.7)
NEUTROPHILS NFR BLD: 79.1 % (ref 38–73)
NRBC BLD-RTO: 0 /100 WBC
PLATELET # BLD AUTO: 114 K/UL (ref 150–450)
PMV BLD AUTO: ABNORMAL FL (ref 9.2–12.9)
POCT GLUCOSE: 205 MG/DL (ref 70–110)
POTASSIUM SERPL-SCNC: 3.1 MMOL/L (ref 3.5–5.1)
POTASSIUM SERPL-SCNC: 3.2 MMOL/L (ref 3.5–5.1)
PROT SERPL-MCNC: 5.8 G/DL (ref 6–8.4)
RBC # BLD AUTO: 4.17 M/UL (ref 4–5.4)
SODIUM SERPL-SCNC: 160 MMOL/L (ref 136–145)
SODIUM SERPL-SCNC: 161 MMOL/L (ref 136–145)
TROPONIN I SERPL DL<=0.01 NG/ML-MCNC: 0.1 NG/ML (ref 0–0.03)
TROPONIN I SERPL DL<=0.01 NG/ML-MCNC: 0.13 NG/ML (ref 0–0.03)
VANCOMYCIN SERPL-MCNC: 10.3 UG/ML
WBC # BLD AUTO: 6.49 K/UL (ref 3.9–12.7)

## 2022-09-13 PROCEDURE — 36569 INSJ PICC 5 YR+ W/O IMAGING: CPT

## 2022-09-13 PROCEDURE — 80053 COMPREHEN METABOLIC PANEL: CPT | Performed by: NURSE PRACTITIONER

## 2022-09-13 PROCEDURE — 63600175 PHARM REV CODE 636 W HCPCS: Performed by: FAMILY MEDICINE

## 2022-09-13 PROCEDURE — 27000221 HC OXYGEN, UP TO 24 HOURS

## 2022-09-13 PROCEDURE — 11000001 HC ACUTE MED/SURG PRIVATE ROOM

## 2022-09-13 PROCEDURE — 99900035 HC TECH TIME PER 15 MIN (STAT)

## 2022-09-13 PROCEDURE — 99223 PR INITIAL HOSPITAL CARE,LEVL III: ICD-10-PCS | Mod: 95,,, | Performed by: INTERNAL MEDICINE

## 2022-09-13 PROCEDURE — 94761 N-INVAS EAR/PLS OXIMETRY MLT: CPT

## 2022-09-13 PROCEDURE — 84484 ASSAY OF TROPONIN QUANT: CPT | Performed by: NURSE PRACTITIONER

## 2022-09-13 PROCEDURE — 85025 COMPLETE CBC W/AUTO DIFF WBC: CPT | Performed by: EMERGENCY MEDICINE

## 2022-09-13 PROCEDURE — 25000003 PHARM REV CODE 250: Performed by: FAMILY MEDICINE

## 2022-09-13 PROCEDURE — 80048 BASIC METABOLIC PNL TOTAL CA: CPT | Mod: XB | Performed by: NURSE PRACTITIONER

## 2022-09-13 PROCEDURE — 83735 ASSAY OF MAGNESIUM: CPT | Performed by: NURSE PRACTITIONER

## 2022-09-13 PROCEDURE — 25000003 PHARM REV CODE 250: Performed by: INTERNAL MEDICINE

## 2022-09-13 PROCEDURE — 80202 ASSAY OF VANCOMYCIN: CPT | Performed by: EMERGENCY MEDICINE

## 2022-09-13 PROCEDURE — 25000003 PHARM REV CODE 250: Performed by: NURSE PRACTITIONER

## 2022-09-13 PROCEDURE — 63600175 PHARM REV CODE 636 W HCPCS: Performed by: NURSE PRACTITIONER

## 2022-09-13 PROCEDURE — 99223 1ST HOSP IP/OBS HIGH 75: CPT | Mod: 95,,, | Performed by: INTERNAL MEDICINE

## 2022-09-13 PROCEDURE — C1751 CATH, INF, PER/CENT/MIDLINE: HCPCS

## 2022-09-13 PROCEDURE — 21400001 HC TELEMETRY ROOM

## 2022-09-13 PROCEDURE — 36415 COLL VENOUS BLD VENIPUNCTURE: CPT | Performed by: NURSE PRACTITIONER

## 2022-09-13 RX ORDER — ENOXAPARIN SODIUM 100 MG/ML
40 INJECTION SUBCUTANEOUS EVERY 24 HOURS
Status: DISCONTINUED | OUTPATIENT
Start: 2022-09-14 | End: 2022-09-17 | Stop reason: HOSPADM

## 2022-09-13 RX ORDER — DEXTROSE MONOHYDRATE 50 MG/ML
INJECTION, SOLUTION INTRAVENOUS CONTINUOUS
Status: DISCONTINUED | OUTPATIENT
Start: 2022-09-13 | End: 2022-09-15

## 2022-09-13 RX ORDER — CEFEPIME HYDROCHLORIDE 1 G/50ML
2 INJECTION, SOLUTION INTRAVENOUS
Status: DISCONTINUED | OUTPATIENT
Start: 2022-09-14 | End: 2022-09-17

## 2022-09-13 RX ADMIN — METRONIDAZOLE 500 MG: 500 TABLET ORAL at 10:09

## 2022-09-13 RX ADMIN — ENOXAPARIN SODIUM 30 MG: 30 INJECTION SUBCUTANEOUS at 05:09

## 2022-09-13 RX ADMIN — DEXTROSE: 5 SOLUTION INTRAVENOUS at 10:09

## 2022-09-13 RX ADMIN — VANCOMYCIN HYDROCHLORIDE 750 MG: 750 INJECTION, POWDER, LYOPHILIZED, FOR SOLUTION INTRAVENOUS at 06:09

## 2022-09-13 RX ADMIN — CEFEPIME HYDROCHLORIDE 2 G: 2 INJECTION, SOLUTION INTRAVENOUS at 05:09

## 2022-09-13 RX ADMIN — SODIUM CHLORIDE 1000 ML: 0.9 INJECTION, SOLUTION INTRAVENOUS at 12:09

## 2022-09-13 NOTE — NURSING
Contacted Jeffrey Kramer NP of pt's blood pressure not reading x 2 rn's assessment. Manual BP maintained, Pt symptomatic. No IV access. Awaiting NP orders.

## 2022-09-13 NOTE — CONSULTS
Pharmacokinetic Assessment Follow Up: IV Vancomycin    Vancomycin serum concentration assessment(s):    The random level was drawn correctly and can be used to guide therapy at this time. The measurement is below the desired definitive target range of 15 to 20 mcg/mL.    Vancomycin Regimen Plan:    Begin a scheduled regimen with Vancomycin 750 mg IV every 24 hours with next serum trough concentration measured at 1730 prior to the next dose on 9/14.    Drug levels (last 3 results):  Recent Labs   Lab Result Units 09/13/22  1433   Vancomycin, Random ug/mL 10.3       Pharmacy will continue to follow and monitor vancomycin.    Please contact pharmacy at [phone] for questions regarding this assessment.    Thank you for the consult,   vOidio Maradiaga, PharmD 9/13/2022 6:43 PM       Patient brief summary:  Tanya Madrid is a 82 y.o. female initiated on antimicrobial therapy with IV Vancomycin for treatment of lower respiratory infection.    The patient's newly scheduled regimen is vancomycin 750 mg q24 h.    Drug Allergies:   Review of patient's allergies indicates:   Allergen Reactions    Sinemet [carbidopa-levodopa] Anxiety and Other (See Comments)     Becomes agitated and fidgety.    Xanax [alprazolam] Anxiety and Other (See Comments)     Becomes agitated and fidgety.        Actual Body Weight:   52.5 kg    Renal Function:   Estimated Creatinine Clearance: 36.8 mL/min (based on SCr of 0.9 mg/dL).,     Dialysis Method (if applicable):  N/A    CBC (last 72 hours):  Recent Labs   Lab Result Units 09/12/22  1620 09/13/22  0442   WBC K/uL 7.29 6.49   Hemoglobin g/dL 11.5* 9.9*   Hematocrit % 40.7 36.6*   Platelets K/uL 156 114*   Gran % % 76.7* 79.1*   Lymph % % 15.9* 12.2*   Mono % % 6.0 6.3   Eosinophil % % 0.3 1.1   Basophil % % 0.4 0.5   Differential Method  Automated Automated       Metabolic Panel (last 72 hours):  Recent Labs   Lab Result Units 09/12/22  1620 09/12/22  1840 09/13/22  0442 09/13/22  1433    Sodium mmol/L 165*  --  161* 160*   Potassium mmol/L 3.8  --  3.1* 3.2*   Chloride mmol/L 125*  --  126* 123*   CO2 mmol/L 26  --  27 27   Glucose mg/dL 235*  --  243* 169*   Glucose, UA   --  Negative  --   --    BUN mg/dL 50*  --  50* 51*   Creatinine mg/dL 1.2  --  1.0 0.9   Albumin g/dL 2.2*  --  1.8*  --    Total Bilirubin mg/dL 0.7  --  0.9  --    Alkaline Phosphatase U/L 150*  --  113  --    AST U/L 83*  --  50*  --    ALT U/L 40  --  28  --    Magnesium mg/dL  --   --  2.4  --        Vancomycin Administrations:  vancomycin given in the last 96 hours                     vancomycin 750 mg in dextrose 5 % 250 mL IVPB (ready to mix system) (mg) 750 mg New Bag 09/13/22 1824    vancomycin in dextrose 5 % 1 gram/250 mL IVPB 1,000 mg (mg) 1,000 mg New Bag 09/12/22 1900                    Microbiologic Results:  Microbiology Results (last 7 days)       Procedure Component Value Units Date/Time    Blood culture x two cultures. Draw prior to antibiotics. [[id number]] Collected: 09/12/22 1619    Order Status: Completed Specimen: Blood from Peripheral, Upper Arm, Left Updated: 09/13/22 1115     Blood Culture, Routine No Growth to date    Narrative:      Aerobic and anaerobic    Blood culture x two cultures. Draw prior to antibiotics. [[id number]] Collected: 09/12/22 1619    Order Status: Completed Specimen: Blood from Peripheral, Antecubital, Left Updated: 09/13/22 1115     Blood Culture, Routine No Growth to date    Narrative:      Aerobic and anaerobic    Influenza A & B by Molecular [[id number]] Collected: 09/12/22 1726    Order Status: Completed Specimen: Nasopharyngeal Swab Updated: 09/12/22 1808     Influenza A, Molecular Negative     Influenza B, Molecular Negative     Flu A & B Source Nasal swab

## 2022-09-13 NOTE — ED NOTES
Secure chat from pharmacy regarding rectal tylenol on backorder; MD notified via secure chat as well.

## 2022-09-13 NOTE — ED NOTES
T/c to pharmacy regarding tylenol not loaded in pyxis.  Was informed that it will be delivered by pharmacy to the ED.

## 2022-09-13 NOTE — PROGRESS NOTES
[facility] Medicine  Progress Note    Patient Name: Tanya Madrid  MRN: [id number]  Patient Class: IP- Inpatient   Admission Date: 9/12/2022  Length of Stay: 1 days  Attending Physician: Dontrell To MD  Primary Care Provider: Elva Ansari NP        Subjective:     Principal Problem:Sepsis        HPI:  Tanya Madrid is a 82 y.o. female patient with a PMHx of acute CHF, HLD, HTN, Parkinson's disease, stroke, throat mass, and cardiac arrest who presents to the Emergency Department after being referred to the ED by ADELAIDA Sears (Transplant) for further evaluation of hypotension and fever. Per the pt's daughter, the pt went into cardiac arrest in March of this year and has never fully recovered, but the pt has been more fatigued for the past 3 to 4 days. The pt's daughter also reports that the pt has had blood pressures in the 80s and 90s systolic, bilateral hand swelling, a fever, and a cough. The pt's daughter gave the pt Tylenol for her fever. Pt's daughter reports that the pt's urine output and smell have been normal. Also, the pt's daughter reports that she did not give the pt blood pressure medication this morning.  V/S Temp max 102.1, pulse 96, B/P 140/79, RR 28.   Labs note left shift, D dimer 15.28 (CTA negative for PE), Na 165, albumin 2.2, BNP 3968, lactate 3.3  Pt given 1 liter fluid bolus, Vancomycin and Cefepime.   Diagnosis include: Sepsis, Aspiration PNA, Failure to thrive and hypernatremia            Overview/Hospital Course:  Admitted for hypernatremia and sepsis. Initiated on D5 gentle IVF. Cautious in treating hyponatremia due to concern for pontine myelinolysis. CT chest negative for PE but showed: Areas of consolidation within the bilateral lower lobes and right middle lobe concerning for multifocal airspace disease or aspiration. Explained to family that there is always a risk of aspiration despite presence of PEG and NPO status. On IV abx. KUB does not show  signs of PEG migration. Wound care consulted for buttock wound and family would like to defer debridement at this time. Palliative consulted and family would like to proceed with treatment for now.   Plan to treat hypernatremia and PNA at this time. HOLD TF due to aspiration.  PICC line ordered and can also be used for PPN/TPN nutrition to avoid further aspiration      Interval History:  CT chest negative for PE but showed: Areas of consolidation within the bilateral lower lobes and right middle lobe concerning for multifocal airspace disease or aspiration. Explained to family that there is always a risk of aspiration despite presence of PEG and NPO status. On IV abx. KUB does not show signs of PEG migration. Wound care consulted for buttock wound and family would like to defer debridement at this time. Palliative consulted and family would like to proceed with treatment for now.   Plan to treat hypernatremia and PNA at this time. HOLD TF due to aspiration.      Review of Systems   Unable to perform ROS: Patient nonverbal   Objective:     Vital Signs (Most Recent):  Temp: 96.1 °F (35.6 °C) (09/13/22 1137)  Pulse: 63 (09/13/22 1137)  Resp: 17 (09/13/22 1137)  BP: 102/62 (09/13/22 1137)  SpO2: 100 % (09/13/22 1022) Vital Signs (24h Range):  Temp:  [95.1 °F (35.1 °C)-102.1 °F (38.9 °C)] 96.1 °F (35.6 °C)  Pulse:  [60-99] 63  Resp:  [15-30] 17  SpO2:  [92 %-100 %] 100 %  BP: ()/(45-87) 102/62     Weight: 52.5 kg (115 lb 11.9 oz)  Body mass index is 21.17 kg/m².    Intake/Output Summary (Last 24 hours) at 9/13/2022 1330  Last data filed at 9/13/2022 0112  Gross per 24 hour   Intake 2299 ml   Output --   Net 2299 ml      Physical Exam  Vitals and nursing note reviewed.   Constitutional:       Appearance: She is ill-appearing.      Comments: Patient is non verbal   HENT:      Head: Normocephalic and atraumatic.      Mouth/Throat:      Mouth: Mucous membranes are moist.   Eyes:      General:         Right eye: No  discharge.         Left eye: No discharge.   Cardiovascular:      Rate and Rhythm: Normal rate.   Pulmonary:      Breath sounds: Rales present.      Comments: Coarse breath sounds  Abdominal:      Tenderness: There is no abdominal tenderness.      Comments: has PEG tube   Musculoskeletal:         General: Swelling present.      Cervical back: Normal range of motion.      Right lower leg: No edema.      Left lower leg: No edema.   Skin:     General: Skin is warm.   Neurological:      Mental Status: Mental status is at baseline. She is disoriented.       Significant Labs: All pertinent labs within the past 24 hours have been reviewed.    Significant Imaging: I have reviewed all pertinent imaging results/findings within the past 24 hours.      Assessment/Plan:      * Sepsis  This patient does have evidence of infective focus - aspiration pneumonia  My overall impression is sepsis. Vital signs were reviewed and noted in progress note.  Antibiotics given-   Antibiotics (From admission, onward)      Start     Stop Route Frequency Ordered    09/13/22 1800  cefepime in dextrose 5 % IVPB 2 g         -- IV Every 24 hours (non-standard times) 09/12/22 2139 09/12/22 2245  metroNIDAZOLE tablet 500 mg         -- PER G TUBE Every 8 hours 09/12/22 2139 09/12/22 1900  vancomycin - pharmacy to dose  (vancomycin IVPB)        See Hyperspace for full Linked Orders Report.    -- IV pharmacy to manage frequency 09/12/22 1801          Cultures were taken-   Microbiology Results (last 7 days)       Procedure Component Value Units Date/Time    Blood culture x two cultures. Draw prior to antibiotics. [[id number]] Collected: 09/12/22 1619    Order Status: Completed Specimen: Blood from Peripheral, Upper Arm, Left Updated: 09/13/22 1115     Blood Culture, Routine No Growth to date    Narrative:      Aerobic and anaerobic    Blood culture x two cultures. Draw prior to antibiotics. [[id number]] Collected: 09/12/22 1619    Order Status:  Completed Specimen: Blood from Peripheral, Antecubital, Left Updated: 09/13/22 1115     Blood Culture, Routine No Growth to date    Narrative:      Aerobic and anaerobic    Influenza A & B by Molecular [[id number]] Collected: 09/12/22 1726    Order Status: Completed Specimen: Nasopharyngeal Swab Updated: 09/12/22 1808     Influenza A, Molecular Negative     Influenza B, Molecular Negative     Flu A & B Source Nasal swab          Latest lactate reviewed, they are-  Recent Labs   Lab 09/12/22  1620 09/12/22  1908 09/12/22  2158   LACTATE 4.8* 3.3* 2.3*       Organ dysfunction indicated by Encephalopathy   Source-   pneumonia    Source control Achieved by antibiotics    9/13:  CT chest negative for PE but showed: Areas of consolidation within the bilateral lower lobes and right middle lobe concerning for multifocal airspace disease or aspiration. Explained to family that there is always a risk of aspiration despite presence of PEG and NPO status  On IV abx   KUB does not show signs of PEG migration   Wound care consulted for buttock wound and family would like to defer debridement at this time   Palliative consulted and family would like to proceed with treatment for now.   Plan to treat hypernatremia and PNA at this time   HOLD TF due to aspiration.  PICC line ordered and can also be used for PPN/TPN nutrition to avoid further aspiration temporarily    Failure to thrive in adult  Palliative Care Consult      Debility   Patient's daughter is sole caretaker   Bed bound       Hypernatremia  Continue gentle IV hydration  Na improved 165>161      Palliative care by specialist  DNR  Family would like to pursue all treatment at this time       Aspiration pneumonia  -IV antibioitcs    9/13:   CT chest negative for PE but showed: Areas of consolidation within the bilateral lower lobes and right middle lobe concerning for multifocal airspace disease or aspiration. Explained to family that there is always a risk of aspiration  despite presence of PEG and NPO status.   On IV abx   KUB does not show signs of PEG migrationPlan to treat hypernatremia and PNA at this time. HOLD TF due to aspiration.  PICC line ordered and can also be used for PPN/TPN nutrition to avoid further aspiration temporarily       Acute on chronic combined systolic and diastolic CHF (congestive heart failure)  BNP 3900 and slightly lower than previous values  Supplemental oxygen to maintain sats > 92 %  Continue Entresto as B/P tolerates  Decompensation ?   - hold lasix at this time  Pt appears dehydrated due to hypernatremia    History of CVA (cerebrovascular accident)  Pt is nonverbal, W/C bound  Neuro checks  Pt is not prescribed ASA or STATIN        Parkinson disease  Supportive care           VTE Risk Mitigation (From admission, onward)           Ordered     enoxaparin injection 30 mg  Daily         09/12/22 2137     IP VTE HIGH RISK PATIENT  Once         09/12/22 2137     Place sequential compression device  Until discontinued         09/12/22 2137                    Discharge Planning   PARMINDER:      Code Status: DNR   Is the patient medically ready for discharge?:     Reason for patient still in hospital (select all that apply): Treatment                     Jeffrey Kramer NP  Department of Hospital Medicine   [facility]

## 2022-09-13 NOTE — CONSULTS
[facility]  Palliative Medicine  Consult Note    Patient Name: Tanya Madrid  MRN: [id number]  Admission Date: 9/12/2022  Hospital Length of Stay: 1 days  Code Status: DNR   Attending Provider: Dontrell To MD  Consulting Provider: Ladi Colvin MD  Primary Care Physician: Elva Ansari NP  Principal Problem:Sepsis    Patient information was obtained from relative(s), caregiver / friend, past medical records and primary team.      Consults  Assessment/Plan:     Palliative care by specialist  Impression:    Symptoms:  Decreased level of consciousness. Daughter doing great job with management of patient who is quite fragile.      Medicolegal: Does not have decision making capacity.  Daughter Adenike  is surrogate decision maker.        Psychosocial:  support system consists of family and Yarsanism friends.     Spiritual: Shinto    Prognostication: poor    Understanding of disease and Illness Trajectory: Family  has  good understanding of her illness, they can benefit from continued education on what to expect in the future.      Goals of care:  Treat treatable illnesses however is DNR/no ventilator  Advance Care Planning     Date: 09/13/2022  Advance Care Planning     Date: 09/13/2022    Today a meeting took place: bedside    Patient Participation: Patient is unable to participate     Attendees (Name and  Relationship to patient): daughter    Staff attendees (Name and  Role): Ladi Colvin MD    ACP Conversation (General): Understanding of current condition Daughter knows patient is not doing well. Her goal is to keep her as good as possible.     Code Status: DNR; status confirmed/order placed in chart     ACP Documents: None    Goals of care: The family endorses that what is most important right now is to focus on symptom/pain control and extending life as long as possible, even it it means sacrificing quality    Accordingly, we have decided that the best plan to meet the patient's goals includes  continuing with treatment      Recommendations/  Follow-up tasks: Other (specify below) Continue discussions with daughter as hospitalization progresses. What is fixable and what is not.                    Code status: DNR    Advance directives:none      Summary and recommendations:  Continue current plans to treat aspiration pneumonia, hypernatremia and sacral wound.  Continue supporting daughter.  DNR confirmed.                     Thank you for your consult. I will follow-up with patient. Please contact us if you have any additional questions.    Subjective:     HPI:   Tanya Madrid is a 82 y.o. female patient with a PMHx of acute CHF, HLD, HTN, Parkinson's disease, stroke, throat mass, and cardiac arrest who presents to the Emergency Department after being referred to the ED by ADELAIDA Sears (Cardiology) for further evaluation of hypotension and fever. Per the pt's daughter, the pt went into cardiac arrest in March of this year and has never fully recovered, but the pt has been more fatigued for the past 3 to 4 days. The pt's daughter also reports that the pt has had blood pressures in the 80s and 90s systolic, bilateral hand swelling, a fever, and a cough.     Geriatrics/Palliative Medicine has been asked to see to assist with goals of care and planning.      Hospital Course:  No notes on file    Interval History: Patient unresponsive in bed in Trendelenberg with Bear Hugger in place. Laying on left side. Normally patient is bed to chair bound. She does not have any contractures. Daughter has been caring for decubitus ulcer of the sacrum. She uses pressure off loading and various treatments.    Past Medical History:   Diagnosis Date    Acute CHF (congestive heart failure) 1/17/2021    Hyperlipemia     Hypertension     Parkinson's disease     Stroke 2016    Throat mass        Past Surgical History:   Procedure Laterality Date    CLOSED REDUCTION Right 10/15/2020    Procedure: CLOSED REDUCTION;   Surgeon: Humberto Valdivia DO;  Location: [facility] OR;  Service: Orthopedics;  Laterality: Right;  ATTEMPTED    EVACUATION OF HEMATOMA Right 10/17/2020    Procedure: EVACUATION, HEMATOMA;  Surgeon: Humberto Valdivia DO;  Location: [facility] OR;  Service: Orthopedics;  Laterality: Right;    HEMIARTHROPLASTY OF HIP Left 7/12/2020    Procedure: HEMIARTHROPLASTY, HIP;  Surgeon: Humberto Valdivia DO;  Location: [facility] OR;  Service: Orthopedics;  Laterality: Left;    HEMIARTHROPLASTY OF HIP Right 10/2/2020    Procedure: HEMIARTHROPLASTY, HIP;  Surgeon: Loyd Kearney MD;  Location: [facility] OR;  Service: Orthopedics;  Laterality: Right;    HYSTERECTOMY      INSERTION OF PERCUTANEOUS ENDOSCOPIC GASTROSTOMY (PEG) FEEDING TUBE      OPEN REDUCTION OF DISLOCATION OF HIP Right 10/17/2020    Procedure: OPEN REDUCTION, DISLOCATION, HIP;  Surgeon: Humberto Valdivia DO;  Location: [facility] OR;  Service: Orthopedics;  Laterality: Right;    THROAT SURGERY      TONSILLECTOMY         Review of patient's allergies indicates:   Allergen Reactions    Sinemet [carbidopa-levodopa] Anxiety and Other (See Comments)     Becomes agitated and fidgety.    Xanax [alprazolam] Anxiety and Other (See Comments)     Becomes agitated and fidgety.        Medications:  Continuous Infusions:   dextrose 5 % 50 mL/hr at 09/13/22 1027     Scheduled Meds:   ceFEPime (MAXIPIME) IVPB  2 g Intravenous Q24H    clopidogreL  75 mg Per G Tube Daily    enoxaparin  30 mg Subcutaneous Daily    famotidine  20 mg Per G Tube Daily    metroNIDAZOLE  500 mg Per G Tube Q8H     PRN Meds:acetaminophen, albuterol-ipratropium, dextrose 10%, dextrose 10%, glucagon (human recombinant), insulin aspart U-100, magnesium oxide, magnesium oxide, naloxone, ondansetron, prochlorperazine, QUEtiapine, sodium chloride 0.9%, Pharmacy to dose Vancomycin consult **AND** vancomycin - pharmacy to dose    Family History       Family history is unknown by patient.          Tobacco Use     Smoking status: Never    Smokeless tobacco: Never   Substance and Sexual Activity    Alcohol use: No    Drug use: No    Sexual activity: Not Currently       Review of Systems   Unable to perform ROS: Patient unresponsive   Objective:     Vital Signs (Most Recent):  Temp: (!) 95.9 °F (35.5 °C) (bear hugger applied) (09/13/22 1022)  Pulse: 60 (09/13/22 1022)  Resp: 20 (09/13/22 1022)  BP: 109/73 (09/13/22 1022)  SpO2: 100 % (09/13/22 1022)   Vital Signs (24h Range):  Temp:  [95.1 °F (35.1 °C)-102.1 °F (38.9 °C)] 95.9 °F (35.5 °C)  Pulse:  [60-99] 60  Resp:  [15-30] 20  SpO2:  [92 %-100 %] 100 %  BP: ()/(45-87) 109/73     Weight: 52.5 kg (115 lb 11.9 oz)  Body mass index is 21.17 kg/m².    Physical Exam  Vitals and nursing note reviewed.   Constitutional:       Appearance: She is ill-appearing.   HENT:      Head: Normocephalic and atraumatic.      Right Ear: External ear normal.      Left Ear: External ear normal.      Nose: Nose normal.      Mouth/Throat:      Mouth: Mucous membranes are dry.   Eyes:      Conjunctiva/sclera: Conjunctivae normal.   Cardiovascular:      Rate and Rhythm: Bradycardia present.   Pulmonary:      Effort: Pulmonary effort is normal.   Abdominal:      Comments: PEG in place.   Musculoskeletal:         General: No swelling.      Cervical back: No rigidity or tenderness.   Skin:     General: Skin is warm and dry.      Comments: Large sacral wound with central area of unstageable nonblanchable. See wound note. Wound care and I examined the wound together.   Neurological:      Comments: Unresponsive.   Psychiatric:      Comments: Unable to assess.       Review of Symptoms      Symptom Assessment (ESAS 0-10 Scale)  Unable to complete assessment due to Patient unresponsive     CAM / Delirium:  Negative  Constipation:  Negative  Diarrhea:  Negative      Bowel Management Plan (BMP):  Yes      Pain Assessment:  OME in 24 hours:  0  Location(s):      Pain Assessment in Advanced Demential Scale  (PAINAD)   Breathing - Independent of vocalization:  0  Negative vocalization:  0  Facial expression:  0  Body language:  0  Consolability:  0  Total:  0    Modified Guillaume Scale:  0.5    Performance Status:  20    Living Arrangements:  Lives with family and Lives in home    Psychosocial/Cultural: Daughter cares for patient.  Patient's sister is helpful but not able to perform total care.    Spiritual:  F - Korin and Belief:  Sikh  I - Importance:  Unknown  C - Community:  Unknown  A - Address in Care:  Unknown      Advance Care Planning   Advance Directives:   Living Will: No    LaPOST: No    Do Not Resuscitate Status: Yes    Medical Power of : No    Agent's Name:  Adenike Lemus   Agent's Contact Number:  [phone]    Decision Making:  Family answered questions and Patient unable to communicate due to disease severity/cognitive impairment       Significant Labs: All pertinent labs within the past 24 hours have been reviewed.  CBC:   Recent Labs   Lab 09/13/22 0442   WBC 6.49   HGB 9.9*   HCT 36.6*   MCV 88   *     BMP:  Recent Labs   Lab 09/13/22 0442   *   *   K 3.1*   *   CO2 27   BUN 50*   CREATININE 1.0   CALCIUM 7.6*   MG 2.4     LFT:  Lab Results   Component Value Date    AST 50 (H) 09/13/2022    ALKPHOS 113 09/13/2022    BILITOT 0.9 09/13/2022     Albumin:   Albumin   Date Value Ref Range Status   09/13/2022 1.8 (L) 3.5 - 5.2 g/dL Final     Protein:   Total Protein   Date Value Ref Range Status   09/13/2022 5.8 (L) 6.0 - 8.4 g/dL Final     Lactic acid:   Lab Results   Component Value Date    LACTATE 2.3 (H) 09/12/2022    LACTATE 3.3 (H) 09/12/2022       Significant Imaging: I have reviewed all pertinent imaging results/findings within the past 24 hours.      Ladi Colvin MD  Palliative Medicine  [facility]

## 2022-09-13 NOTE — HPI
Tanya Madrid is a 82 y.o. female patient with a PMHx of acute CHF, HLD, HTN, Parkinson's disease, stroke, throat mass, and cardiac arrest who presents to the Emergency Department after being referred to the ED by ADELAIDA Sears (Transplant) for further evaluation of hypotension and fever. Per the pt's daughter, the pt went into cardiac arrest in March of this year and has never fully recovered, but the pt has been more fatigued for the past 3 to 4 days. The pt's daughter also reports that the pt has had blood pressures in the 80s and 90s systolic, bilateral hand swelling, a fever, and a cough. The pt's daughter gave the pt Tylenol for her fever. Pt's daughter reports that the pt's urine output and smell have been normal. Also, the pt's daughter reports that she did not give the pt blood pressure medication this morning.  V/S Temp max 102.1, pulse 96, B/P 140/79, RR 28.   Labs note left shift, D dimer 15.28 (CTA negative for PE), Na 165, albumin 2.2, BNP 3968, lactate 3.3  Pt given 1 liter fluid bolus, Vancomycin and Cefepime.   Diagnosis include: Sepsis, Aspiration PNA, Failure to thrive and hypernatremia

## 2022-09-13 NOTE — SUBJECTIVE & OBJECTIVE
Interval History: Patient unresponsive in bed in Trendelenberg with Bear Hugger in place. Laying on left side. Normally patient is bed to chair bound. She does not have any contractures. Daughter has been caring for decubitus ulcer of the sacrum. She uses pressure off loading and various treatments.    Past Medical History:   Diagnosis Date    Acute CHF (congestive heart failure) 1/17/2021    Hyperlipemia     Hypertension     Parkinson's disease     Stroke 2016    Throat mass        Past Surgical History:   Procedure Laterality Date    CLOSED REDUCTION Right 10/15/2020    Procedure: CLOSED REDUCTION;  Surgeon: Humberto Valdivia DO;  Location: [facility] OR;  Service: Orthopedics;  Laterality: Right;  ATTEMPTED    EVACUATION OF HEMATOMA Right 10/17/2020    Procedure: EVACUATION, HEMATOMA;  Surgeon: Humberto Valdivia DO;  Location: [facility] OR;  Service: Orthopedics;  Laterality: Right;    HEMIARTHROPLASTY OF HIP Left 7/12/2020    Procedure: HEMIARTHROPLASTY, HIP;  Surgeon: Humberto Valdivia DO;  Location: [facility] OR;  Service: Orthopedics;  Laterality: Left;    HEMIARTHROPLASTY OF HIP Right 10/2/2020    Procedure: HEMIARTHROPLASTY, HIP;  Surgeon: Loyd Kearney MD;  Location: [facility] OR;  Service: Orthopedics;  Laterality: Right;    HYSTERECTOMY      INSERTION OF PERCUTANEOUS ENDOSCOPIC GASTROSTOMY (PEG) FEEDING TUBE      OPEN REDUCTION OF DISLOCATION OF HIP Right 10/17/2020    Procedure: OPEN REDUCTION, DISLOCATION, HIP;  Surgeon: Humberto Valdivia DO;  Location: [facility] OR;  Service: Orthopedics;  Laterality: Right;    THROAT SURGERY      TONSILLECTOMY         Review of patient's allergies indicates:   Allergen Reactions    Sinemet [carbidopa-levodopa] Anxiety and Other (See Comments)     Becomes agitated and fidgety.    Xanax [alprazolam] Anxiety and Other (See Comments)     Becomes agitated and fidgety.        Medications:  Continuous Infusions:   dextrose 5 % 50 mL/hr at 09/13/22 1027     Scheduled Meds:   ceFEPime (MAXIPIME)  IVPB  2 g Intravenous Q24H    clopidogreL  75 mg Per G Tube Daily    enoxaparin  30 mg Subcutaneous Daily    famotidine  20 mg Per G Tube Daily    metroNIDAZOLE  500 mg Per G Tube Q8H     PRN Meds:acetaminophen, albuterol-ipratropium, dextrose 10%, dextrose 10%, glucagon (human recombinant), insulin aspart U-100, magnesium oxide, magnesium oxide, naloxone, ondansetron, prochlorperazine, QUEtiapine, sodium chloride 0.9%, Pharmacy to dose Vancomycin consult **AND** vancomycin - pharmacy to dose    Family History       Family history is unknown by patient.          Tobacco Use    Smoking status: Never    Smokeless tobacco: Never   Substance and Sexual Activity    Alcohol use: No    Drug use: No    Sexual activity: Not Currently       Review of Systems   Unable to perform ROS: Patient unresponsive   Objective:     Vital Signs (Most Recent):  Temp: (!) 95.9 °F (35.5 °C) (bear hugger applied) (09/13/22 1022)  Pulse: 60 (09/13/22 1022)  Resp: 20 (09/13/22 1022)  BP: 109/73 (09/13/22 1022)  SpO2: 100 % (09/13/22 1022)   Vital Signs (24h Range):  Temp:  [95.1 °F (35.1 °C)-102.1 °F (38.9 °C)] 95.9 °F (35.5 °C)  Pulse:  [60-99] 60  Resp:  [15-30] 20  SpO2:  [92 %-100 %] 100 %  BP: ()/(45-87) 109/73     Weight: 52.5 kg (115 lb 11.9 oz)  Body mass index is 21.17 kg/m².    Physical Exam  Vitals and nursing note reviewed.   Constitutional:       Appearance: She is ill-appearing.   HENT:      Head: Normocephalic and atraumatic.      Right Ear: External ear normal.      Left Ear: External ear normal.      Nose: Nose normal.      Mouth/Throat:      Mouth: Mucous membranes are dry.   Eyes:      Conjunctiva/sclera: Conjunctivae normal.   Cardiovascular:      Rate and Rhythm: Bradycardia present.   Pulmonary:      Effort: Pulmonary effort is normal.   Abdominal:      Comments: PEG in place.   Musculoskeletal:         General: No swelling.      Cervical back: No rigidity or tenderness.   Skin:     General: Skin is warm and dry.       Comments: Large sacral wound with central area of unstageable nonblanchable. See wound note. Wound care and I examined the wound together.   Neurological:      Comments: Unresponsive.   Psychiatric:      Comments: Unable to assess.       Review of Symptoms      Symptom Assessment (ESAS 0-10 Scale)  Unable to complete assessment due to Patient unresponsive     CAM / Delirium:  Negative  Constipation:  Negative  Diarrhea:  Negative      Bowel Management Plan (BMP):  Yes      Pain Assessment:  OME in 24 hours:  0  Location(s):      Pain Assessment in Advanced Demential Scale (PAINAD)   Breathing - Independent of vocalization:  0  Negative vocalization:  0  Facial expression:  0  Body language:  0  Consolability:  0  Total:  0    Modified Guillaume Scale:  0.5    Performance Status:  20    Living Arrangements:  Lives with family and Lives in home    Psychosocial/Cultural: Daughter cares for patient.  Patient's sister is helpful but not able to perform total care.    Spiritual:  F - Korin and Belief:  Mandaeism  I - Importance:  Unknown  C - Community:  Unknown  A - Address in Care:  Unknown      Advance Care Planning   Advance Directives:   Living Will: No    LaPOST: No    Do Not Resuscitate Status: Yes    Medical Power of : No    Agent's Name:  Adenike Lemus   Agent's Contact Number:  [phone]    Decision Making:  Family answered questions and Patient unable to communicate due to disease severity/cognitive impairment       Significant Labs: All pertinent labs within the past 24 hours have been reviewed.  CBC:   Recent Labs   Lab 09/13/22 0442   WBC 6.49   HGB 9.9*   HCT 36.6*   MCV 88   *     BMP:  Recent Labs   Lab 09/13/22 0442   *   *   K 3.1*   *   CO2 27   BUN 50*   CREATININE 1.0   CALCIUM 7.6*   MG 2.4     LFT:  Lab Results   Component Value Date    AST 50 (H) 09/13/2022    ALKPHOS 113 09/13/2022    BILITOT 0.9 09/13/2022     Albumin:   Albumin   Date Value Ref Range Status    09/13/2022 1.8 (L) 3.5 - 5.2 g/dL Final     Protein:   Total Protein   Date Value Ref Range Status   09/13/2022 5.8 (L) 6.0 - 8.4 g/dL Final     Lactic acid:   Lab Results   Component Value Date    LACTATE 2.3 (H) 09/12/2022    LACTATE 3.3 (H) 09/12/2022       Significant Imaging: I have reviewed all pertinent imaging results/findings within the past 24 hours.

## 2022-09-13 NOTE — ASSESSMENT & PLAN NOTE
BNP 3900 and slightly lower than previous values  Supplemental oxygen to maintain sats > 92 %  Continue Entresto as B/P tolerates  Decompensation ?   - hold lasix at this time  Pt appears dehydrated due to hypernatremia

## 2022-09-13 NOTE — CONSULTS
Consult completed by Dr. Ladi Colvin.    Son Kurtz MSW, \[facility] [place] [facility]\""W-BACS  Palliative Medicine  [phone]

## 2022-09-13 NOTE — H&P
[facility] access hospital - Emergency Dept.  [facility]  History & Physical    Patient Name: Tanya Madrid  MRN: [id number]  Patient Class: IP- Inpatient  Admission Date: 9/12/2022  Attending Physician: No att. providers found   Primary Care Provider: Elva Ansari NP         Patient information was obtained from past medical records and ER records.     Subjective:     Principal Problem:Sepsis    Chief Complaint:   Chief Complaint   Patient presents with    Fatigue     Fever, lethargic, and hypotension. Pt. Was sent over from her Dr. Office.         HPI: Tanya Madrid is a 82 y.o. female patient with a PMHx of acute CHF, HLD, HTN, Parkinson's disease, stroke, throat mass, and cardiac arrest who presents to the Emergency Department after being referred to the ED by ADELAIDA Sears (Transplant) for further evaluation of hypotension and fever. Per the pt's daughter, the pt went into cardiac arrest in March of this year and has never fully recovered, but the pt has been more fatigued for the past 3 to 4 days. The pt's daughter also reports that the pt has had blood pressures in the 80s and 90s systolic, bilateral hand swelling, a fever, and a cough. The pt's daughter gave the pt Tylenol for her fever. Pt's daughter reports that the pt's urine output and smell have been normal. Also, the pt's daughter reports that she did not give the pt blood pressure medication this morning.  V/S Temp max 102.1, pulse 96, B/P 140/79, RR 28.   Labs note left shift, D dimer 15.28 (CTA negative for PE), Na 165, albumin 2.2, BNP 3968, lactate 3.3  Pt given 1 liter fluid bolus, Vancomycin and Cefepime.   Diagnosis include: Sepsis, Aspiration PNA, Failure to thrive and hypernatremia            Past Medical History:   Diagnosis Date    Acute CHF (congestive heart failure) 1/17/2021    Hyperlipemia     Hypertension     Parkinson's disease     Stroke 2016    Throat mass        Past Surgical History:   Procedure Laterality Date     CLOSED REDUCTION Right 10/15/2020    Procedure: CLOSED REDUCTION;  Surgeon: Humberto Valdivia DO;  Location: [facility] OR;  Service: Orthopedics;  Laterality: Right;  ATTEMPTED    EVACUATION OF HEMATOMA Right 10/17/2020    Procedure: EVACUATION, HEMATOMA;  Surgeon: Humberto Valdivia DO;  Location: [facility] OR;  Service: Orthopedics;  Laterality: Right;    HEMIARTHROPLASTY OF HIP Left 7/12/2020    Procedure: HEMIARTHROPLASTY, HIP;  Surgeon: Humberto Valdivia DO;  Location: [facility] OR;  Service: Orthopedics;  Laterality: Left;    HEMIARTHROPLASTY OF HIP Right 10/2/2020    Procedure: HEMIARTHROPLASTY, HIP;  Surgeon: Loyd Kearney MD;  Location: [facility] OR;  Service: Orthopedics;  Laterality: Right;    HYSTERECTOMY      INSERTION OF PERCUTANEOUS ENDOSCOPIC GASTROSTOMY (PEG) FEEDING TUBE      OPEN REDUCTION OF DISLOCATION OF HIP Right 10/17/2020    Procedure: OPEN REDUCTION, DISLOCATION, HIP;  Surgeon: Humberto Valdivia DO;  Location: [facility] OR;  Service: Orthopedics;  Laterality: Right;    THROAT SURGERY      TONSILLECTOMY         Review of patient's allergies indicates:   Allergen Reactions    Sinemet [carbidopa-levodopa] Anxiety and Other (See Comments)     Becomes agitated and fidgety.    Xanax [alprazolam] Anxiety and Other (See Comments)     Becomes agitated and fidgety.        No current facility-administered medications on file prior to encounter.     Current Outpatient Medications on File Prior to Encounter   Medication Sig    carvediloL (COREG) 3.125 MG tablet TAKE 1 TABLET PER G-TUBE TWICE DAILY AS NEEDED( AS BLOOD PRESSURE TOLERATES)    clopidogreL (PLAVIX) 75 mg tablet 1 tablet (75 mg total) by Per G Tube route once daily.    famotidine (PEPCID) 20 MG tablet 1 tablet (20 mg total) by Per G Tube route 2 (two) times daily.    furosemide (LASIX) 40 MG tablet 1 tablet (40 mg total) by Per G Tube route 2 (two) times a day. Take per G tube twice daily as directed    nitrofurantoin (MACRODANTIN) 100 MG capsule  Take 1 capsule (100 mg total) by mouth nightly.    sacubitriL-valsartan (ENTRESTO)  mg per tablet 1 tablet by Per G Tube route 2 (two) times daily.    QUEtiapine (SEROQUEL) 25 MG Tab Take 1 tablet (25 mg total) by mouth once daily. At bedtime    scopolamine (TRANSDERM-SCOP) 1.3-1.5 mg (1 mg over 3 days) Place 1 patch onto the skin Every 3 (three) days. (Patient taking differently: Place 1 patch onto the skin Every 3 (three) days. as needed for coughing and secretions.)    traZODone (DESYREL) 50 MG tablet Take 0.5 tablets (25 mg total) by mouth every evening.    [DISCONTINUED] aspirin 81 MG Chew 1 tablet (81 mg total) by Per G Tube route once daily.    [DISCONTINUED] losartan (COZAAR) 50 MG tablet Take 1 tablet (50 mg total) by mouth 2 (two) times a day.    [DISCONTINUED] metoprolol succinate (TOPROL-XL) 50 MG 24 hr tablet Take 1 tablet (50 mg total) by mouth 2 (two) times daily.    [DISCONTINUED] omeprazole magnesium 10 mg SuDR Take 40 mg by mouth once daily. (Patient not taking: Reported on 4/21/2022)    [DISCONTINUED] pantoprazole (PROTONIX) 40 mg suspension Take 1 packet (40 mg total) by mouth 2 (two) times daily.     Family History       Family history is unknown by patient.          Tobacco Use    Smoking status: Never    Smokeless tobacco: Never   Substance and Sexual Activity    Alcohol use: No    Drug use: No    Sexual activity: Not Currently     Review of Systems   Unable to perform ROS: Mental status change   Objective:     Vital Signs (Most Recent):  Temp: (!) 102.1 °F (38.9 °C) (physician notified via secure chat) (09/12/22 1918)  Pulse: 77 (09/12/22 2145)  Resp: 19 (09/12/22 2145)  BP: 129/61 (09/12/22 2000)  SpO2: 96 % (09/12/22 2145)   Vital Signs (24h Range):  Temp:  [97.8 °F (36.6 °C)-102.1 °F (38.9 °C)] 102.1 °F (38.9 °C)  Pulse:  [77-99] 77  Resp:  [17-30] 19  SpO2:  [92 %-98 %] 96 %  BP: (105-140)/(59-87) 129/61     Weight: 52.4 kg (115 lb 8 oz)  Body mass index is 21.13  kg/m².    Physical Exam  Vitals and nursing note reviewed.   Constitutional:       Appearance: She is ill-appearing.      Comments: Patient is non verbal   HENT:      Head: Normocephalic and atraumatic.      Mouth/Throat:      Mouth: Mucous membranes are moist.   Eyes:      General:         Right eye: No discharge.         Left eye: No discharge.   Cardiovascular:      Rate and Rhythm: Normal rate.   Pulmonary:      Breath sounds: Rales present.      Comments: Coarse breath sounds  Abdominal:      Tenderness: There is no abdominal tenderness.      Comments: has PEG tube   Musculoskeletal:         General: Swelling present.      Cervical back: Normal range of motion.      Right lower leg: No edema.      Left lower leg: No edema.   Skin:     General: Skin is warm.   Neurological:      Mental Status: Mental status is at baseline. She is disoriented.           Significant Labs: All pertinent labs within the past 24 hours have been reviewed.  Bilirubin:   Recent Labs   Lab 09/12/22  1620   BILITOT 0.7     BMP:   Recent Labs   Lab 09/12/22  1620   *   *   K 3.8   *   CO2 26   BUN 50*   CREATININE 1.2   CALCIUM 8.5*     CBC:   Recent Labs   Lab 09/12/22  1620   WBC 7.29   HGB 11.5*   HCT 40.7        CMP:   Recent Labs   Lab 09/12/22  1620   *   K 3.8   *   CO2 26   *   BUN 50*   CREATININE 1.2   CALCIUM 8.5*   PROT 7.3   ALBUMIN 2.2*   BILITOT 0.7   ALKPHOS 150*   AST 83*   ALT 40   ANIONGAP 14     Lactic Acid:   Recent Labs   Lab 09/12/22  1620 09/12/22  1908   LACTATE 4.8* 3.3*     Troponin:   Recent Labs   Lab 09/12/22  1620 09/12/22  2158   TROPONINI 0.162* 0.163*     Urine Studies:   Recent Labs   Lab 09/12/22  1840   COLORU Yellow   APPEARANCEUA Clear   PHUR 6.0   SPECGRAV 1.020   PROTEINUA 1+*   GLUCUA Negative   KETONESU Negative   BILIRUBINUA Negative   OCCULTUA Negative   NITRITE Negative   UROBILINOGEN 2.0-3.0*   LEUKOCYTESUR Negative   RBCUA 0   WBCUA 2   BACTERIA  None   HYALINECASTS 0       Significant Imaging: I have reviewed all pertinent imaging results/findings within the past 24 hours.  I have reviewed and interpreted all pertinent imaging results/findings within the past 24 hours.    Imaging Results              CTA Chest Non-Coronary (PE Study) (Final result)  Result time 09/12/22 20:13:37      Final result by Cornell Meier MD (09/12/22 20:13:37)                   Impression:      No evidence of pulmonary embolism.    Areas of consolidation within the bilateral lower lobes and right middle lobe concerning for multifocal airspace disease or aspiration.    See additional findings above    All CT scans at this facility use dose modulation, iterative reconstruction and/or weight based dosing when appropriate to reduce radiation dose to as low as reasonably achievable.      Electronically signed by: Cornell Meier MD  Date:    09/12/2022  Time:    20:13               Narrative:    EXAMINATION:  CTA CHEST NON CORONARY    CLINICAL HISTORY:  Chest pain and shortness of breath    TECHNIQUE:  After the intravenous administration of 100 cc of Omni 350 nonionic contrast using CT pulmonary angio technique, 1.25  Mm axial images were acquired using helical CT technique from the lung apices through costophrenic sulci.  Sagittal coronal and oblique MIPS were also submitted for interpretation.    COMPARISON:  Chest x-ray dated 09/12/2022    FINDINGS:  -Pulmonary arteries: Pulmonary arteries are well opacified.  No evidence of pulmonary embolism.  No evidence of pulmonary hypertension.  No right heart strain is identified with RV/LV ratio < 0.9.    -Lungs: Areas of consolidation within the bilateral lower lobes and right middle lobe concerning for multifocal airspace disease or aspiration.    -Pleura: Small right and trace left pleural effusion.    -Mediastinum/Edith:No significant adenopathy    -Axilla: Shotty adenopathy.    -Thyroid: Normal lower gland.    -Heart/Aorta: Heart size  is enlarged.  Moderate  coronary artery disease.  No pericardial effusion. Aorta normal caliber.   Aorta demonstrates mild atherosclerotic disease.  Mild coronary disease.    -Bones/Chest Wall: Diffuse osteopenia and moderate degenerative changes.  Moderate scoliosis.  Diffuse anasarca.    -Upper Abdomen: Mild constipation.  G tube appears appropriate.                                       X-Ray Chest AP Portable (Final result)  Result time 09/12/22 17:26:49      Final result by Cornell Meier MD (09/12/22 17:26:49)                   Impression:      Overall improved aeration compared to prior      Electronically signed by: Cornell Meier MD  Date:    09/12/2022  Time:    17:26               Narrative:    EXAMINATION:  XR CHEST AP PORTABLE    CLINICAL HISTORY:  Sepsis;    TECHNIQUE:  Single frontal view of the chest was performed.    COMPARISON:  07/27/2022.    FINDINGS:  Patchy infiltrates are seen at the lung bases bilaterally.  Decreased right pleural effusion.  No pneumothorax.  Heart size is borderline enlarged.  Aorta demonstrates atherosclerotic disease.  Scattered degenerative changes and diffuse osteopenia.  In comparison to the prior study, there is no adverse interval changes.                                       CT Head Without Contrast (Final result)  Result time 09/12/22 17:02:03      Final result by Elvira Vásquez MD (09/12/22 17:02:03)                   Impression:      Atrophy and chronic white matter changes.    No hemorrhage mass effect or midline shift    Some element of sinus disease    All CT scans   are performed using dose optimization techniques including the following: automated exposure control; adjustment of the mA and/or kV; use of iterative reconstruction technique.  Dose modulation was employed for ALARA by means of: Automated exposure control; adjustment of the mA and/or kV according to patient size (this includes techniques or standardized protocols for targeted exams where dose is  matched to indication/reason for exam; i.e. extremities or head); and/or use of iterative reconstructive technique.      Electronically signed by: Fahad Elvira  Date:    09/12/2022  Time:    17:02               Narrative:    EXAMINATION:  CT HEAD WITHOUT CONTRAST    CLINICAL HISTORY:  Mental status change, unknown cause;    TECHNIQUE:  Low dose axial CT images obtained throughout the head without intravenous contrast. Sagittal and coronal reconstructions were performed.    COMPARISON:  None.    FINDINGS:  Atrophy and chronic white matter changes.  No extra-axial blood or fluid collections.    No parenchymal mass, hemorrhage, edema or major vascular distribution infarct.    Skull/extracranial contents (limited evaluation): No fracture.  Mild maxillary sinus mucosal thickening.  Right sphenoid sinus opacification                                    I have independently reviewed and interpreted the EKG.     I have independently reviewed all pertinent labs within the past 24 hours.    I have independently reviewed, visualized and interpreted all pertinent imaging results within the past 24 hours and discussed the findings with the ED physician, Dr. Soto          Assessment/Plan:     * Sepsis  This patient does have evidence of infective focus - aspiration pneumonia  My overall impression is sepsis. Vital signs were reviewed and noted in progress note.  Antibiotics given-   Antibiotics (From admission, onward)    Start     Stop Route Frequency Ordered    09/12/22 1900  vancomycin - pharmacy to dose  (vancomycin IVPB)        See Hyperspace for full Linked Orders Report.    -- IV pharmacy to manage frequency 09/12/22 1801        Cultures were taken-   Microbiology Results (last 7 days)     Procedure Component Value Units Date/Time    Influenza A & B by Molecular [[id number]] Collected: 09/12/22 1726    Order Status: Completed Specimen: Nasopharyngeal Swab Updated: 09/12/22 1808     Influenza A, Molecular Negative      Influenza B, Molecular Negative     Flu A & B Source Nasal swab    Blood culture x two cultures. Draw prior to antibiotics. [[id number]] Collected: 09/12/22 1619    Order Status: Sent Specimen: Blood from Peripheral, Upper Arm, Left Updated: 09/12/22 1620    Blood culture x two cultures. Draw prior to antibiotics. [[id number]] Collected: 09/12/22 1619    Order Status: Sent Specimen: Blood from Peripheral, Antecubital, Left Updated: 09/12/22 1619        Latest lactate reviewed, they are-  Recent Labs   Lab 09/12/22 1620 09/12/22  1908   LACTATE 4.8* 3.3*       Organ dysfunction indicated by Encephalopathy   Source-   pneumonia    Source control Achieved by antibiotics      Hypernatremia  Continue gentle IV hydration  Free water per Feeding tube      Aspiration pneumonia  -IV antibioitcs      Acute on chronic combined systolic and diastolic CHF (congestive heart failure)  BNP 3900 and slightly lower than previous values  Supplemental oxygen to maintain sats > 92 %  Continue Entresto as B/P tolerates  Decompensation ?   - hold lasix at this time  Pt appears dehydrated due to hypernatremia    History of CVA (cerebrovascular accident)  Pt is nonverbal, W/C bound  Neuro checks  Pt is not prescribed ASA or STATIN        Parkinson disease  Continue Sinemet  Continue Tubefeedibng      Failure to thrive in adult  Palliative Care Consult      Debility           VTE Risk Mitigation (From admission, onward)         Ordered     enoxaparin injection 30 mg  Daily         09/12/22 2137     IP VTE HIGH RISK PATIENT  Once         09/12/22 2137     Place sequential compression device  Until discontinued         09/12/22 2137                   Bijan Tyler MD  Department of Hospital Medicine   '[facility] - Emergency Dept.

## 2022-09-13 NOTE — ED NOTES
Soiled brief removed, patient cleaned with warm wipes, brief applied to patient, and mepilex applied to buttocks wound.

## 2022-09-13 NOTE — SUBJECTIVE & OBJECTIVE
Interval History:  CT chest negative for PE but showed: Areas of consolidation within the bilateral lower lobes and right middle lobe concerning for multifocal airspace disease or aspiration. Explained to family that there is always a risk of aspiration despite presence of PEG and NPO status. On IV abx. KUB does not show signs of PEG migration. Wound care consulted for buttock wound and family would like to defer debridement at this time. Palliative consulted and family would like to proceed with treatment for now.   Plan to treat hypernatremia and PNA at this time. HOLD TF due to aspiration.      Review of Systems   Unable to perform ROS: Patient nonverbal   Objective:     Vital Signs (Most Recent):  Temp: 96.1 °F (35.6 °C) (09/13/22 1137)  Pulse: 63 (09/13/22 1137)  Resp: 17 (09/13/22 1137)  BP: 102/62 (09/13/22 1137)  SpO2: 100 % (09/13/22 1022) Vital Signs (24h Range):  Temp:  [95.1 °F (35.1 °C)-102.1 °F (38.9 °C)] 96.1 °F (35.6 °C)  Pulse:  [60-99] 63  Resp:  [15-30] 17  SpO2:  [92 %-100 %] 100 %  BP: ()/(45-87) 102/62     Weight: 52.5 kg (115 lb 11.9 oz)  Body mass index is 21.17 kg/m².    Intake/Output Summary (Last 24 hours) at 9/13/2022 1330  Last data filed at 9/13/2022 0112  Gross per 24 hour   Intake 2299 ml   Output --   Net 2299 ml      Physical Exam  Vitals and nursing note reviewed.   Constitutional:       Appearance: She is ill-appearing.      Comments: Patient is non verbal   HENT:      Head: Normocephalic and atraumatic.      Mouth/Throat:      Mouth: Mucous membranes are moist.   Eyes:      General:         Right eye: No discharge.         Left eye: No discharge.   Cardiovascular:      Rate and Rhythm: Normal rate.   Pulmonary:      Breath sounds: Rales present.      Comments: Coarse breath sounds  Abdominal:      Tenderness: There is no abdominal tenderness.      Comments: has PEG tube   Musculoskeletal:         General: Swelling present.      Cervical back: Normal range of motion.       Right lower leg: No edema.      Left lower leg: No edema.   Skin:     General: Skin is warm.   Neurological:      Mental Status: Mental status is at baseline. She is disoriented.       Significant Labs: All pertinent labs within the past 24 hours have been reviewed.    Significant Imaging: I have reviewed all pertinent imaging results/findings within the past 24 hours.

## 2022-09-13 NOTE — CONSULTS
[facility] Surg  Wound Care    Patient Name:  Tanya Madrid   MRN:  [id number]  Date: 9/13/2022  Diagnosis: Sepsis    History:     Past Medical History:   Diagnosis Date    Acute CHF (congestive heart failure) 1/17/2021    Hyperlipemia     Hypertension     Parkinson's disease     Stroke 2016    Throat mass        Social History     Socioeconomic History    Marital status:    Tobacco Use    Smoking status: Never    Smokeless tobacco: Never   Substance and Sexual Activity    Alcohol use: No    Drug use: No    Sexual activity: Not Currently       Precautions:     Allergies as of 09/12/2022 - Reviewed 09/12/2022   Allergen Reaction Noted    Sinemet [carbidopa-levodopa] Anxiety and Other (See Comments) 07/27/2022    Xanax [alprazolam] Anxiety and Other (See Comments) 10/14/2020       [facility] Assessment Details/Treatment     Consulted on this 81 y/o F patient due to present on admission wound to buttock/coccygeal region. Patient admitted from home with sepsis and has PMH significant for CHF, parkinson's disease, CVA, cardiac arrest. She has HH services to treat pressure injury to left buttock/coccygeal region.   Patient is frail appearing. Appears contracted x4, however, daughter at bedside who is primary caregiver states she can stretch out limbs and tries to keep them stretched out to prevent contractures. Limbs are tight, but able to straighten, passive ROM completed at this time.   Bilateral heels intact.   Left dorsal hand has intact serous fluid filled blister noted that measures 1x1cm. Also noted to left dorsal hand is open blister that is being treated per HH with honey alginate and foam dressing. Dressing was applied yesterday so maintained at this time. Wound bed is moist pink and yellow subcutaneous tissue, measures 2x2x0.1cm.   Turned to left side. Unstageable pressure injury noted to left buttock extending to coccyx, and adjacent to anus. Wound measures 11x6x0.2cm. wound bed is 50% gran/tan adherent  leathery slough, 25% moist red/pink tissue, and 25% moist yellow adherent slough. Edges irregular. Cleansed with saline and patted dry. Due to location, thick layer TRIAD hydrophilic wound dressing paste applied to cover wound, then secured with sacral foam dressing. Would recommend changing daily and as needed due to fecal incontinence.   Depending upon goals of care, would recommend continuing wound care to promote autolytic debridement vs. General surgery consult for debridment and diverting colostomy. I did mention this to daughter and we agree this is not within current foals of care for this patient, which is reasonable.   Patient on IsoTour bed with MADHURI pump, recommend turn q2 hours with foam wedge, float heels. Will follow.      Left buttock/coccygeal region         09/13/22 1030   Positioning   Body Position turned;right;position maintained   Head of Bed (HOB) Positioning HOB not elevated due to hypotension   Positioning/Transfer Devices pillows;in use;wedge;other (see comments)  (bear hugger)        Altered Skin Integrity 03/31/22 Left Buttocks #1  Full thickness tissue loss. Base is covered by slough and/or eschar in the wound bed   Date First Assessed: 03/31/22   Altered Skin Integrity Present on Admission: yes  Side: Left  Location: (c) Buttocks  Wound Number: #1  Is this injury device related?: No  Primary Wound Type: (c)   Description of Altered Skin Integrity: Full thickness...   Wound Image    Description of Altered Skin Integrity Full thickness tissue loss. Base is covered by slough and/or eschar in the wound bed   Dressing Appearance Intact;Moist drainage   Drainage Amount Scant   Drainage Characteristics/Odor Serous   Appearance Gray;Tan;Yellow;Pink;Slough;Moist   Tissue loss description Full thickness   Black (%), Wound Tissue Color 50 %   Red (%), Wound Tissue Color 25 %   Yellow (%), Wound Tissue Color 25 %   Periwound Area Intact   Wound Edges Open   Wound Length (cm) 11 cm   Wound Width (cm)  6 cm   Wound Depth (cm) 0.2 cm   Wound Volume (cm^3) 13.2 cm^3   Wound Surface Area (cm^2) 66 cm^2   Care Cleansed with:;Sterile normal saline;Applied:;Skin Barrier   Dressing Applied;Foam  (TRIAD paste)   Periwound Care Absorptive dressing applied;Dry periwound area maintained;Topical treatment applied   Dressing Change Due 09/14/22        Altered Skin Integrity 09/13/22 0959 Left dorsal Wrist   Date First Assessed/Time First Assessed: 09/13/22 0959   Altered Skin Integrity Present on Admission: yes  Side: Left  Orientation: dorsal  Location: Wrist   Description of Altered Skin Integrity   (not pressure-related skin injury)   Dressing Appearance Intact;Moist drainage   Drainage Amount Scant   Drainage Characteristics/Odor Serous   Appearance Pink;Yellow;Moist;Adipose   Tissue loss description Full thickness   Periwound Area Blistered   Wound Edges Open   Wound Length (cm) 2 cm   Wound Width (cm) 2 cm   Wound Depth (cm) 0.1 cm   Wound Volume (cm^3) 0.4 cm^3   Wound Surface Area (cm^2) 4 cm^2   Dressing Honey;Foam;Reinforced   Dressing Change Due 09/19/22       Recommendations made to primary team for wound care, pressure injury prevention . Orders placed.     09/13/2022

## 2022-09-13 NOTE — ASSESSMENT & PLAN NOTE
Impression:    Symptoms:  Decreased level of consciousness. Daughter doing great job with management of patient who is quite fragile.      Medicolegal: Does not have decision making capacity.  Daughter Adenike  is surrogate decision maker.        Psychosocial:  support system consists of family and [employer] friends.     Spiritual: Uatsdin    Prognostication: poor    Understanding of disease and Illness Trajectory: Family  has  good understanding of her illness, they can benefit from continued education on what to expect in the future.      Goals of care:  Treat treatable illnesses however is DNR/no ventilator  Advance Care Planning     Date: 09/13/2022  Advance Care Planning     Date: 09/13/2022    Today a meeting took place: bedside    Patient Participation: Patient is unable to participate     Attendees (Name and  Relationship to patient): daughter    Staff attendees (Name and  Role): Ladi Colvin MD    ACP Conversation (General): Understanding of current condition Daughter knows patient is not doing well. Her goal is to keep her as good as possible.     Code Status: DNR; status confirmed/order placed in chart     ACP Documents: None    Goals of care: The family endorses that what is most important right now is to focus on symptom/pain control and extending life as long as possible, even it it means sacrificing quality    Accordingly, we have decided that the best plan to meet the patient's goals includes continuing with treatment      Recommendations/  Follow-up tasks: Other (specify below) Continue discussions with daughter as hospitalization progresses. What is fixable and what is not.                      Code status: DNR    Advance directives:none      Summary and recommendations:  Continue current plans to treat aspiration pneumonia, hypernatremia and sacral wound.  Continue supporting daughter.  DNR confirmed.

## 2022-09-13 NOTE — SUBJECTIVE & OBJECTIVE
Past Medical History:   Diagnosis Date    Acute CHF (congestive heart failure) 1/17/2021    Hyperlipemia     Hypertension     Parkinson's disease     Stroke 2016    Throat mass        Past Surgical History:   Procedure Laterality Date    CLOSED REDUCTION Right 10/15/2020    Procedure: CLOSED REDUCTION;  Surgeon: Humberto Valdivia DO;  Location: [facility] OR;  Service: Orthopedics;  Laterality: Right;  ATTEMPTED    EVACUATION OF HEMATOMA Right 10/17/2020    Procedure: EVACUATION, HEMATOMA;  Surgeon: Humberto Valdivia DO;  Location: [facility] OR;  Service: Orthopedics;  Laterality: Right;    HEMIARTHROPLASTY OF HIP Left 7/12/2020    Procedure: HEMIARTHROPLASTY, HIP;  Surgeon: Humberto Valdivia DO;  Location: [facility] OR;  Service: Orthopedics;  Laterality: Left;    HEMIARTHROPLASTY OF HIP Right 10/2/2020    Procedure: HEMIARTHROPLASTY, HIP;  Surgeon: Loyd Kearney MD;  Location: [facility] OR;  Service: Orthopedics;  Laterality: Right;    HYSTERECTOMY      INSERTION OF PERCUTANEOUS ENDOSCOPIC GASTROSTOMY (PEG) FEEDING TUBE      OPEN REDUCTION OF DISLOCATION OF HIP Right 10/17/2020    Procedure: OPEN REDUCTION, DISLOCATION, HIP;  Surgeon: Humberto Valdivia DO;  Location: [facility] OR;  Service: Orthopedics;  Laterality: Right;    THROAT SURGERY      TONSILLECTOMY         Review of patient's allergies indicates:   Allergen Reactions    Sinemet [carbidopa-levodopa] Anxiety and Other (See Comments)     Becomes agitated and fidgety.    Xanax [alprazolam] Anxiety and Other (See Comments)     Becomes agitated and fidgety.        No current facility-administered medications on file prior to encounter.     Current Outpatient Medications on File Prior to Encounter   Medication Sig    carvediloL (COREG) 3.125 MG tablet TAKE 1 TABLET PER G-TUBE TWICE DAILY AS NEEDED( AS BLOOD PRESSURE TOLERATES)    clopidogreL (PLAVIX) 75 mg tablet 1 tablet (75 mg total) by Per G Tube route once daily.    famotidine (PEPCID) 20 MG tablet 1 tablet (20 mg total) by  Per G Tube route 2 (two) times daily.    furosemide (LASIX) 40 MG tablet 1 tablet (40 mg total) by Per G Tube route 2 (two) times a day. Take per G tube twice daily as directed    nitrofurantoin (MACRODANTIN) 100 MG capsule Take 1 capsule (100 mg total) by mouth nightly.    sacubitriL-valsartan (ENTRESTO)  mg per tablet 1 tablet by Per G Tube route 2 (two) times daily.    QUEtiapine (SEROQUEL) 25 MG Tab Take 1 tablet (25 mg total) by mouth once daily. At bedtime    scopolamine (TRANSDERM-SCOP) 1.3-1.5 mg (1 mg over 3 days) Place 1 patch onto the skin Every 3 (three) days. (Patient taking differently: Place 1 patch onto the skin Every 3 (three) days. as needed for coughing and secretions.)    traZODone (DESYREL) 50 MG tablet Take 0.5 tablets (25 mg total) by mouth every evening.    [DISCONTINUED] aspirin 81 MG Chew 1 tablet (81 mg total) by Per G Tube route once daily.    [DISCONTINUED] losartan (COZAAR) 50 MG tablet Take 1 tablet (50 mg total) by mouth 2 (two) times a day.    [DISCONTINUED] metoprolol succinate (TOPROL-XL) 50 MG 24 hr tablet Take 1 tablet (50 mg total) by mouth 2 (two) times daily.    [DISCONTINUED] omeprazole magnesium 10 mg SuDR Take 40 mg by mouth once daily. (Patient not taking: Reported on 4/21/2022)    [DISCONTINUED] pantoprazole (PROTONIX) 40 mg suspension Take 1 packet (40 mg total) by mouth 2 (two) times daily.     Family History       Family history is unknown by patient.          Tobacco Use    Smoking status: Never    Smokeless tobacco: Never   Substance and Sexual Activity    Alcohol use: No    Drug use: No    Sexual activity: Not Currently     Review of Systems   Unable to perform ROS: Mental status change   Objective:     Vital Signs (Most Recent):  Temp: (!) 102.1 °F (38.9 °C) (physician notified via secure chat) (09/12/22 1918)  Pulse: 77 (09/12/22 2145)  Resp: 19 (09/12/22 2145)  BP: 129/61 (09/12/22 2000)  SpO2: 96 % (09/12/22 4168)   Vital Signs (24h Range):  Temp:  [97.8  °F (36.6 °C)-102.1 °F (38.9 °C)] 102.1 °F (38.9 °C)  Pulse:  [77-99] 77  Resp:  [17-30] 19  SpO2:  [92 %-98 %] 96 %  BP: (105-140)/(59-87) 129/61     Weight: 52.4 kg (115 lb 8 oz)  Body mass index is 21.13 kg/m².    Physical Exam  Vitals and nursing note reviewed.   Constitutional:       Appearance: She is ill-appearing.      Comments: Patient is non verbal   HENT:      Head: Normocephalic and atraumatic.      Mouth/Throat:      Mouth: Mucous membranes are moist.   Eyes:      General:         Right eye: No discharge.         Left eye: No discharge.   Cardiovascular:      Rate and Rhythm: Normal rate.   Pulmonary:      Breath sounds: Rales present.      Comments: Coarse breath sounds  Abdominal:      Tenderness: There is no abdominal tenderness.      Comments: has PEG tube   Musculoskeletal:         General: Swelling present.      Cervical back: Normal range of motion.      Right lower leg: No edema.      Left lower leg: No edema.   Skin:     General: Skin is warm.   Neurological:      Mental Status: Mental status is at baseline. She is disoriented.           Significant Labs: All pertinent labs within the past 24 hours have been reviewed.  Bilirubin:   Recent Labs   Lab 09/12/22  1620   BILITOT 0.7     BMP:   Recent Labs   Lab 09/12/22  1620   *   *   K 3.8   *   CO2 26   BUN 50*   CREATININE 1.2   CALCIUM 8.5*     CBC:   Recent Labs   Lab 09/12/22  1620   WBC 7.29   HGB 11.5*   HCT 40.7        CMP:   Recent Labs   Lab 09/12/22  1620   *   K 3.8   *   CO2 26   *   BUN 50*   CREATININE 1.2   CALCIUM 8.5*   PROT 7.3   ALBUMIN 2.2*   BILITOT 0.7   ALKPHOS 150*   AST 83*   ALT 40   ANIONGAP 14     Lactic Acid:   Recent Labs   Lab 09/12/22  1620 09/12/22  1908   LACTATE 4.8* 3.3*     Troponin:   Recent Labs   Lab 09/12/22  1620 09/12/22  2158   TROPONINI 0.162* 0.163*     Urine Studies:   Recent Labs   Lab 09/12/22  1840   COLORU Yellow   APPEARANCEUA Clear   PHUR 6.0    SPECGRAV 1.020   PROTEINUA 1+*   GLUCUA Negative   KETONESU Negative   BILIRUBINUA Negative   OCCULTUA Negative   NITRITE Negative   UROBILINOGEN 2.0-3.0*   LEUKOCYTESUR Negative   RBCUA 0   WBCUA 2   BACTERIA None   HYALINECASTS 0       Significant Imaging: I have reviewed all pertinent imaging results/findings within the past 24 hours.  I have reviewed and interpreted all pertinent imaging results/findings within the past 24 hours.    Imaging Results              CTA Chest Non-Coronary (PE Study) (Final result)  Result time 09/12/22 20:13:37      Final result by Cornell Meier MD (09/12/22 20:13:37)                   Impression:      No evidence of pulmonary embolism.    Areas of consolidation within the bilateral lower lobes and right middle lobe concerning for multifocal airspace disease or aspiration.    See additional findings above    All CT scans at this facility use dose modulation, iterative reconstruction and/or weight based dosing when appropriate to reduce radiation dose to as low as reasonably achievable.      Electronically signed by: Cornell Meier MD  Date:    09/12/2022  Time:    20:13               Narrative:    EXAMINATION:  CTA CHEST NON CORONARY    CLINICAL HISTORY:  Chest pain and shortness of breath    TECHNIQUE:  After the intravenous administration of 100 cc of Omni 350 nonionic contrast using CT pulmonary angio technique, 1.25  Mm axial images were acquired using helical CT technique from the lung apices through costophrenic sulci.  Sagittal coronal and oblique MIPS were also submitted for interpretation.    COMPARISON:  Chest x-ray dated 09/12/2022    FINDINGS:  -Pulmonary arteries: Pulmonary arteries are well opacified.  No evidence of pulmonary embolism.  No evidence of pulmonary hypertension.  No right heart strain is identified with RV/LV ratio < 0.9.    -Lungs: Areas of consolidation within the bilateral lower lobes and right middle lobe concerning for multifocal airspace disease  or aspiration.    -Pleura: Small right and trace left pleural effusion.    -Mediastinum/Edith:No significant adenopathy    -Axilla: Shotty adenopathy.    -Thyroid: Normal lower gland.    -Heart/Aorta: Heart size is enlarged.  Moderate  coronary artery disease.  No pericardial effusion. Aorta normal caliber.   Aorta demonstrates mild atherosclerotic disease.  Mild coronary disease.    -Bones/Chest Wall: Diffuse osteopenia and moderate degenerative changes.  Moderate scoliosis.  Diffuse anasarca.    -Upper Abdomen: Mild constipation.  G tube appears appropriate.                                       X-Ray Chest AP Portable (Final result)  Result time 09/12/22 17:26:49      Final result by Cornell Meier MD (09/12/22 17:26:49)                   Impression:      Overall improved aeration compared to prior      Electronically signed by: Cornell Meier MD  Date:    09/12/2022  Time:    17:26               Narrative:    EXAMINATION:  XR CHEST AP PORTABLE    CLINICAL HISTORY:  Sepsis;    TECHNIQUE:  Single frontal view of the chest was performed.    COMPARISON:  07/27/2022.    FINDINGS:  Patchy infiltrates are seen at the lung bases bilaterally.  Decreased right pleural effusion.  No pneumothorax.  Heart size is borderline enlarged.  Aorta demonstrates atherosclerotic disease.  Scattered degenerative changes and diffuse osteopenia.  In comparison to the prior study, there is no adverse interval changes.                                       CT Head Without Contrast (Final result)  Result time 09/12/22 17:02:03      Final result by Elvira Vásquez MD (09/12/22 17:02:03)                   Impression:      Atrophy and chronic white matter changes.    No hemorrhage mass effect or midline shift    Some element of sinus disease    All CT scans   are performed using dose optimization techniques including the following: automated exposure control; adjustment of the mA and/or kV; use of iterative reconstruction technique.  Dose  modulation was employed for ALARA by means of: Automated exposure control; adjustment of the mA and/or kV according to patient size (this includes techniques or standardized protocols for targeted exams where dose is matched to indication/reason for exam; i.e. extremities or head); and/or use of iterative reconstructive technique.      Electronically signed by: Fahad Felix  Date:    09/12/2022  Time:    17:02               Narrative:    EXAMINATION:  CT HEAD WITHOUT CONTRAST    CLINICAL HISTORY:  Mental status change, unknown cause;    TECHNIQUE:  Low dose axial CT images obtained throughout the head without intravenous contrast. Sagittal and coronal reconstructions were performed.    COMPARISON:  None.    FINDINGS:  Atrophy and chronic white matter changes.  No extra-axial blood or fluid collections.    No parenchymal mass, hemorrhage, edema or major vascular distribution infarct.    Skull/extracranial contents (limited evaluation): No fracture.  Mild maxillary sinus mucosal thickening.  Right sphenoid sinus opacification                                    I have independently reviewed and interpreted the EKG.     I have independently reviewed all pertinent labs within the past 24 hours.    I have independently reviewed, visualized and interpreted all pertinent imaging results within the past 24 hours and discussed the findings with the ED physician, Dr. Soto

## 2022-09-13 NOTE — NURSING
Notified Mary Kramer NP and Dr. To via secure message system regarding a possible picc placement. Pt's IV access bad, not being able to find IV access, and lab not being able to draw her labs.  Awaiting response.

## 2022-09-13 NOTE — ASSESSMENT & PLAN NOTE
This patient does have evidence of infective focus - aspiration pneumonia  My overall impression is sepsis. Vital signs were reviewed and noted in progress note.  Antibiotics given-   Antibiotics (From admission, onward)    Start     Stop Route Frequency Ordered    09/13/22 1800  cefepime in dextrose 5 % IVPB 2 g         -- IV Every 24 hours (non-standard times) 09/12/22 2139 09/12/22 2245  metroNIDAZOLE tablet 500 mg         -- PER G TUBE Every 8 hours 09/12/22 2139 09/12/22 1900  vancomycin - pharmacy to dose  (vancomycin IVPB)        See Hyperspace for full Linked Orders Report.    -- IV pharmacy to manage frequency 09/12/22 1801        Cultures were taken-   Microbiology Results (last 7 days)     Procedure Component Value Units Date/Time    Blood culture x two cultures. Draw prior to antibiotics. [[id number]] Collected: 09/12/22 1619    Order Status: Completed Specimen: Blood from Peripheral, Upper Arm, Left Updated: 09/13/22 1115     Blood Culture, Routine No Growth to date    Narrative:      Aerobic and anaerobic    Blood culture x two cultures. Draw prior to antibiotics. [[id number]] Collected: 09/12/22 1619    Order Status: Completed Specimen: Blood from Peripheral, Antecubital, Left Updated: 09/13/22 1115     Blood Culture, Routine No Growth to date    Narrative:      Aerobic and anaerobic    Influenza A & B by Molecular [[id number]] Collected: 09/12/22 1726    Order Status: Completed Specimen: Nasopharyngeal Swab Updated: 09/12/22 1808     Influenza A, Molecular Negative     Influenza B, Molecular Negative     Flu A & B Source Nasal swab        Latest lactate reviewed, they are-  Recent Labs   Lab 09/12/22  1620 09/12/22  1908 09/12/22  2158   LACTATE 4.8* 3.3* 2.3*       Organ dysfunction indicated by Encephalopathy   Source-   pneumonia    Source control Achieved by antibiotics    9/13:  CT chest negative for PE but showed: Areas of consolidation within the bilateral lower lobes and right middle lobe  concerning for multifocal airspace disease or aspiration. Explained to family that there is always a risk of aspiration despite presence of PEG and NPO status  On IV abx   KUB does not show signs of PEG migration   Wound care consulted for buttock wound and family would like to defer debridement at this time   Palliative consulted and family would like to proceed with treatment for now.   Plan to treat hypernatremia and PNA at this time   HOLD TF due to aspiration.  PICC line ordered and can also be used for PPN/TPN nutrition to avoid further aspiration temporarily

## 2022-09-13 NOTE — PLAN OF CARE
[facility] - Med Surg  Initial Discharge Assessment       Primary Care Provider: Elva Ansari NP    Admission Diagnosis: Hypernatremia [E87.0]  Bilateral pulmonary infiltrates on chest x-ray [R91.8]  Chest pain [R07.9]  Altered mental status, unspecified altered mental status type [R41.82]  Sepsis, due to unspecified organism, unspecified whether acute organ dysfunction present [A41.9]    Admission Date: 9/12/2022  Expected Discharge Date:     Discharge Barriers Identified: None    Payor: MEDICARE / Plan: MEDICARE PART A & B / Product Type: Government /     Extended Emergency Contact Information  Primary Emergency Contact: Adenike Lemus  Address: [address] [place] [address] [facility]  Home Phone: [phone]  Mobile Phone: [phone]  Relation: Daughter    Discharge Plan A: Home Health  Discharge Plan B: [id number] - [place] - [address] AT [facility]  [address]  [place] [address]  Phone: [phone] Fax: [phone]  My Chart; Active    Swer met with pt's daughter for initial assessment. Swer explained role of discharge planner. Pt lives with daughter and needed assistance with ADLs prior to admission. Pt's family is help at home and will provide transportation at discharge. [facility] Pt does not have a problem obtaining medication and daughter provides transportation to medical appointments. CM needs to be determined based on hospital progress.    SDOH completed.     Initial Assessment (most recent)       Adult Discharge Assessment - 09/13/22 1507          Discharge Assessment    Assessment Type Discharge Planning Assessment     Confirmed/corrected address, phone number and insurance Yes     Confirmed Demographics Correct on Facesheet     Source of Information family     If unable to respond/provide information was family/caregiver  contacted? Yes     Contact Name/Number Adenike Lemus (daughter) [phone]     Communicated PARMINDER with patient/caregiver Date not available/Unable to determine     Reason For Admission hypernatremia     Lives With child(anthony), adult     Facility Arrived From: home     Do you expect to return to your current living situation? Yes     Do you have help at home or someone to help you manage your care at home? Yes     Who are your caregiver(s) and their phone number(s)? daughter     Prior to hospitilization cognitive status: Unable to Assess     Current cognitive status: Unable to Assess     Walking or Climbing Stairs Difficulty ambulation difficulty, requires equipment     Dressing/Bathing Difficulty bathing difficulty, requires equipment     Home Accessibility wheelchair accessible     Home Layout Able to live on 1st floor     Equipment Currently Used at Home wheelchair;hospital bed     Readmission within 30 days? No     Patient currently being followed by outpatient case management? No     Do you currently have service(s) that help you manage your care at home? No     Do you take prescription medications? Yes     Do you have prescription coverage? Yes     Do you have any problems affording any of your prescribed medications? No     Is the patient taking medications as prescribed? yes     Who is going to help you get home at discharge? daughter     How do you get to doctors appointments? family or friend will provide     Are you on dialysis? No     Discharge Plan A Home Health     Discharge Plan B Home Health     DME Needed Upon Discharge  none     Discharge Plan discussed with: Adult children     Discharge Barriers Identified None        Physical Activity    On average, how many days per week do you engage in moderate to strenuous exercise (like a brisk walk)? 0 days     On average, how many minutes do you engage in exercise at this level? 0 min        Financial Resource Strain    How hard is it for you to pay for the  very basics like food, housing, medical care, and heating? Not hard at all        Housing Stability    In the last 12 months, was there a time when you were not able to pay the mortgage or rent on time? No     In the last 12 months, how many places have you lived? 1     In the last 12 months, was there a time when you did not have a steady place to sleep or slept in a shelter (including now)? No        Transportation Needs    In the past 12 months, has lack of transportation kept you from medical appointments or from getting medications? No     In the past 12 months, has lack of transportation kept you from meetings, work, or from getting things needed for daily living? No        Food Insecurity    Within the past 12 months, you worried that your food would run out before you got the money to buy more. Never true     Within the past 12 months, the food you bought just didn't last and you didn't have money to get more. Never true        Stress    Do you feel stress - tense, restless, nervous, or anxious, or unable to sleep at night because your mind is troubled all the time - these days? Not at all        Social Connections    In a typical week, how many times do you talk on the phone with family, friends, or neighbors? Once a week     How often do you get together with friends or relatives? Once a week     How often do you attend [employer] or Church services? Never     Do you belong to any clubs or organizations such as Advent groups, unions, fraternal or athletic groups, or school groups? No     How often do you attend meetings of the clubs or organizations you belong to? Never     Are you , , , , never , or living with a partner?         Alcohol Use    Q1: How often do you have a drink containing alcohol? Never     Q2: How many drinks containing alcohol do you have on a typical day when you are drinking? Patient does not drink     Q3: How often do you have six or more  drinks on one occasion? Never

## 2022-09-13 NOTE — CARE UPDATE
Admitted for hypernatremia and sepsis. Initiated on D5 gentle IVF. Cautious in treating hyponatremia due to concern for pontine myelinolysis. CT chest negative for PE but showed: Areas of consolidation within the bilateral lower lobes and right middle lobe concerning for multifocal airspace disease or aspiration. Explained to family that there is always a risk of aspiration despite presence of PEG and NPO status. On IV abx. KUB does not show signs of PEG migration. Wound care consulted for buttock wound and family would like to defer debridement at this time. Palliative consulted and family would like to proceed with treatment for now.   Plan to treat hypernatremia and PNA at this time. HOLD TF due to aspiration.  PICC line ordered and can also be used for PPN/TPN nutrition to avoid further aspiration

## 2022-09-13 NOTE — HOSPITAL COURSE
Admitted for hypernatremia and sepsis. Initiated on D5 gentle IVF. Cautious in treating hyponatremia due to concern for pontine myelinolysis. CT chest negative for PE but showed: Areas of consolidation within the bilateral lower lobes and right middle lobe concerning for multifocal airspace disease or aspiration. Explained to family that there is always a risk of aspiration despite presence of PEG and NPO status. On IV abx. KUB does not show signs of PEG migration. Wound care consulted for buttock wound and family would like to defer debridement at this time. Palliative consulted and family would like to proceed with treatment for now.   Plan to treat hypernatremia and PNA at this time. HOLD TF due to aspiration.  PICC line ordered and can also be used for PPN/TPN nutrition to avoid further aspiration    9/14: Neprhology consulted to assist with Hypernatremia.  SLP provided education material to the family explaining that having a PEG in place does not completely negate the risk of aspiration.  Plan to have an educational meeting with Palliative at home liasion with [facility] hospice tomorrow to help achieve goal of decreasing hospitalizations. Mentation wise patient remains nonverbal. PICC in place and BP more stable. Will incraese D5 infusion to rate for now and wait palliative meeting tomorrow.     9/15: Hypernatremia improved Na 151. Decision was made to resume TF and risk of breathing deterioration due to aspiration explained in detail and discuused with daughter. She met with [facility] liasion and will meet with [facility] liasion to discuss options of home based palliative to prevent hospitalizations. Daughter is agreeable with this plan and would simply like to stablize patient and take her home and is not ready for comfort care measures yet.     9/16  Patient remains in decline. Long discussion had with daughter, Adenike Lemus, at bedside and updated on patient's status and discharge plan of care. Explained to  daughter that patient with ongoing aspiration pneumonia with high risk for recurrence and she is not a candidate for continuous tube feeding pump or TPN.  Discussed with daughter that patient's prognosis poor/ grim and explained to her that she is in decline. Awaiting daughter to choose Home with Palliative care vs Hospice.   9/17 Patient no longer requiring supplemental O2. Condition stable at this time but short term prognosis is grim and suspect patient will continue to have recurrent aspiration and continue to decline. Discussed again with daughter and family at bedside. Daughter has picked [facility] to follow patient at home and Hospice currently delivering equipment to home this am. Plan discharge later today with hospice. Patient does not qualify for home O2.    poor minus

## 2022-09-13 NOTE — HPI
Tanya Madrid is a 82 y.o. female patient with a PMHx of acute CHF, HLD, HTN, Parkinson's disease, stroke, throat mass, and cardiac arrest who presents to the Emergency Department after being referred to the ED by ADELAIDA Sears (Cardiology) for further evaluation of hypotension and fever. Per the pt's daughter, the pt went into cardiac arrest in March of this year and has never fully recovered, but the pt has been more fatigued for the past 3 to 4 days. The pt's daughter also reports that the pt has had blood pressures in the 80s and 90s systolic, bilateral hand swelling, a fever, and a cough.     Geriatrics/Palliative Medicine has been asked to see to assist with goals of care and planning.

## 2022-09-13 NOTE — ASSESSMENT & PLAN NOTE
-IV antibioitcs    9/13:   CT chest negative for PE but showed: Areas of consolidation within the bilateral lower lobes and right middle lobe concerning for multifocal airspace disease or aspiration. Explained to family that there is always a risk of aspiration despite presence of PEG and NPO status.   On IV abx   KUB does not show signs of PEG migrationPlan to treat hypernatremia and PNA at this time. HOLD TF due to aspiration.  PICC line ordered and can also be used for PPN/TPN nutrition to avoid further aspiration temporarily

## 2022-09-13 NOTE — PROGRESS NOTES
Pharmacist Renal Dose Adjustment Note    Tanya Madrid is a 82 y.o. female being treated with the medication famotidine    Patient Data:    Vital Signs (Most Recent):  Temp: (!) 102.1 °F (38.9 °C) (physician notified via secure chat) (09/12/22 1918)  Pulse: 96 (09/12/22 1935)  Resp: 17 (09/12/22 2002)  BP: 129/61 (09/12/22 2000)  SpO2: 98 % (09/12/22 1935)   Vital Signs (72h Range):  Temp:  [97.8 °F (36.6 °C)-102.1 °F (38.9 °C)]   Pulse:  [94-99]   Resp:  [17-30]   BP: (105-140)/(59-87)   SpO2:  [92 %-98 %]      Recent Labs   Lab 09/12/22  1620   CREATININE 1.2     Serum creatinine: 1.2 mg/dL 09/12/22 1620  Estimated creatinine clearance: 27.6 mL/min    Medication:famotidine dose: 20mg frequency bid will be changed to medication:famotidine dose:20mg frequency:daily    Pharmacist's Name: Stan Awan  Pharmacist's Extension: [phone]

## 2022-09-13 NOTE — ASSESSMENT & PLAN NOTE
This patient does have evidence of infective focus - aspiration pneumonia  My overall impression is sepsis. Vital signs were reviewed and noted in progress note.  Antibiotics given-   Antibiotics (From admission, onward)    Start     Stop Route Frequency Ordered    09/12/22 1900  vancomycin - pharmacy to dose  (vancomycin IVPB)        See Hyperspace for full Linked Orders Report.    -- IV pharmacy to manage frequency 09/12/22 1801        Cultures were taken-   Microbiology Results (last 7 days)     Procedure Component Value Units Date/Time    Influenza A & B by Molecular [[id number]] Collected: 09/12/22 1726    Order Status: Completed Specimen: Nasopharyngeal Swab Updated: 09/12/22 1808     Influenza A, Molecular Negative     Influenza B, Molecular Negative     Flu A & B Source Nasal swab    Blood culture x two cultures. Draw prior to antibiotics. [[id number]] Collected: 09/12/22 1619    Order Status: Sent Specimen: Blood from Peripheral, Upper Arm, Left Updated: 09/12/22 1620    Blood culture x two cultures. Draw prior to antibiotics. [[id number]] Collected: 09/12/22 1619    Order Status: Sent Specimen: Blood from Peripheral, Antecubital, Left Updated: 09/12/22 1619        Latest lactate reviewed, they are-  Recent Labs   Lab 09/12/22  1620 09/12/22  1908   LACTATE 4.8* 3.3*       Organ dysfunction indicated by Encephalopathy   Source-   pneumonia    Source control Achieved by antibiotics

## 2022-09-13 NOTE — NURSING TRANSFER
Nursing Transfer Note      9/13/2022     Reason patient is being transferred: unit room 541    Transfer From: ED    Transfer via bed    Transfer with to O2, IV FLUIDS    Transported by RN X 2    Medicines sent: N/A    Any special needs or follow-up needed: N/A    Chart send with patient: Yes    Notified: daughter    Upon arrival to floor: cardiac monitor applied, patient oriented to room, call bell in reach, and bed in lowest position, vitals maintained, fluids restarted, skin assessment provided, assessment in progress, bear hugger maintained, tele box applied

## 2022-09-14 ENCOUNTER — DOCUMENT SCAN (OUTPATIENT)
Dept: HOME HEALTH SERVICES | Facility: HOSPITAL | Age: 82
End: 2022-09-14
Payer: MEDICARE

## 2022-09-14 LAB
ALBUMIN SERPL BCP-MCNC: 1.7 G/DL (ref 3.5–5.2)
ALP SERPL-CCNC: 114 U/L (ref 55–135)
ALT SERPL W/O P-5'-P-CCNC: 21 U/L (ref 10–44)
ANION GAP SERPL CALC-SCNC: 10 MMOL/L (ref 8–16)
AST SERPL-CCNC: 34 U/L (ref 10–40)
BASOPHILS # BLD AUTO: 0.03 K/UL (ref 0–0.2)
BASOPHILS NFR BLD: 0.6 % (ref 0–1.9)
BILIRUB SERPL-MCNC: 0.6 MG/DL (ref 0.1–1)
BUN SERPL-MCNC: 45 MG/DL (ref 8–23)
CALCIUM SERPL-MCNC: 7.9 MG/DL (ref 8.7–10.5)
CHLORIDE SERPL-SCNC: 121 MMOL/L (ref 95–110)
CO2 SERPL-SCNC: 28 MMOL/L (ref 23–29)
CREAT SERPL-MCNC: 0.9 MG/DL (ref 0.5–1.4)
DIFFERENTIAL METHOD: ABNORMAL
EOSINOPHIL # BLD AUTO: 0.2 K/UL (ref 0–0.5)
EOSINOPHIL NFR BLD: 4.3 % (ref 0–8)
ERYTHROCYTE [DISTWIDTH] IN BLOOD BY AUTOMATED COUNT: 25.3 % (ref 11.5–14.5)
EST. GFR  (NO RACE VARIABLE): >60 ML/MIN/1.73 M^2
GLUCOSE SERPL-MCNC: 107 MG/DL (ref 70–110)
HCT VFR BLD AUTO: 36.4 % (ref 37–48.5)
HGB BLD-MCNC: 10.2 G/DL (ref 12–16)
IMM GRANULOCYTES # BLD AUTO: 0.05 K/UL (ref 0–0.04)
IMM GRANULOCYTES NFR BLD AUTO: 0.9 % (ref 0–0.5)
LYMPHOCYTES # BLD AUTO: 0.7 K/UL (ref 1–4.8)
LYMPHOCYTES NFR BLD: 13.3 % (ref 18–48)
MCH RBC QN AUTO: 24.5 PG (ref 27–31)
MCHC RBC AUTO-ENTMCNC: 28 G/DL (ref 32–36)
MCV RBC AUTO: 88 FL (ref 82–98)
MONOCYTES # BLD AUTO: 0.3 K/UL (ref 0.3–1)
MONOCYTES NFR BLD: 5.8 % (ref 4–15)
NEUTROPHILS # BLD AUTO: 4 K/UL (ref 1.8–7.7)
NEUTROPHILS NFR BLD: 75.1 % (ref 38–73)
NRBC BLD-RTO: 0 /100 WBC
PLATELET # BLD AUTO: 122 K/UL (ref 150–450)
PMV BLD AUTO: ABNORMAL FL (ref 9.2–12.9)
POCT GLUCOSE: 106 MG/DL (ref 70–110)
POCT GLUCOSE: 132 MG/DL (ref 70–110)
POCT GLUCOSE: 138 MG/DL (ref 70–110)
POCT GLUCOSE: 156 MG/DL (ref 70–110)
POCT GLUCOSE: 163 MG/DL (ref 70–110)
POTASSIUM SERPL-SCNC: 3.1 MMOL/L (ref 3.5–5.1)
PROT SERPL-MCNC: 5.7 G/DL (ref 6–8.4)
RBC # BLD AUTO: 4.16 M/UL (ref 4–5.4)
SODIUM SERPL-SCNC: 159 MMOL/L (ref 136–145)
VANCOMYCIN TROUGH SERPL-MCNC: 13.3 UG/ML (ref 10–22)
WBC # BLD AUTO: 5.34 K/UL (ref 3.9–12.7)

## 2022-09-14 PROCEDURE — 27000221 HC OXYGEN, UP TO 24 HOURS

## 2022-09-14 PROCEDURE — S0030 INJECTION, METRONIDAZOLE: HCPCS | Performed by: NURSE PRACTITIONER

## 2022-09-14 PROCEDURE — 85025 COMPLETE CBC W/AUTO DIFF WBC: CPT | Performed by: NURSE PRACTITIONER

## 2022-09-14 PROCEDURE — 25000003 PHARM REV CODE 250: Performed by: NURSE PRACTITIONER

## 2022-09-14 PROCEDURE — 99900035 HC TECH TIME PER 15 MIN (STAT)

## 2022-09-14 PROCEDURE — 63600175 PHARM REV CODE 636 W HCPCS: Performed by: FAMILY MEDICINE

## 2022-09-14 PROCEDURE — 21400001 HC TELEMETRY ROOM

## 2022-09-14 PROCEDURE — 25000003 PHARM REV CODE 250: Performed by: FAMILY MEDICINE

## 2022-09-14 PROCEDURE — 97535 SELF CARE MNGMENT TRAINING: CPT

## 2022-09-14 PROCEDURE — 80053 COMPREHEN METABOLIC PANEL: CPT | Performed by: NURSE PRACTITIONER

## 2022-09-14 PROCEDURE — 80202 ASSAY OF VANCOMYCIN: CPT | Performed by: FAMILY MEDICINE

## 2022-09-14 PROCEDURE — 94761 N-INVAS EAR/PLS OXIMETRY MLT: CPT

## 2022-09-14 PROCEDURE — 63600175 PHARM REV CODE 636 W HCPCS: Performed by: INTERNAL MEDICINE

## 2022-09-14 PROCEDURE — 99223 PR INITIAL HOSPITAL CARE,LEVL III: ICD-10-PCS | Mod: ,,, | Performed by: INTERNAL MEDICINE

## 2022-09-14 PROCEDURE — 99223 1ST HOSP IP/OBS HIGH 75: CPT | Mod: ,,, | Performed by: INTERNAL MEDICINE

## 2022-09-14 RX ORDER — VANCOMYCIN HCL IN 5 % DEXTROSE 1G/250ML
1000 PLASTIC BAG, INJECTION (ML) INTRAVENOUS
Status: DISCONTINUED | OUTPATIENT
Start: 2022-09-14 | End: 2022-09-14

## 2022-09-14 RX ORDER — METRONIDAZOLE 500 MG/100ML
500 INJECTION, SOLUTION INTRAVENOUS
Status: DISCONTINUED | OUTPATIENT
Start: 2022-09-14 | End: 2022-09-17

## 2022-09-14 RX ORDER — VANCOMYCIN HCL IN 5 % DEXTROSE 1G/250ML
1000 PLASTIC BAG, INJECTION (ML) INTRAVENOUS
Status: DISCONTINUED | OUTPATIENT
Start: 2022-09-15 | End: 2022-09-17

## 2022-09-14 RX ADMIN — METRONIDAZOLE 500 MG: 500 SOLUTION INTRAVENOUS at 02:09

## 2022-09-14 RX ADMIN — DEXTROSE: 5 SOLUTION INTRAVENOUS at 02:09

## 2022-09-14 RX ADMIN — INSULIN ASPART 2 UNITS: 100 INJECTION, SOLUTION INTRAVENOUS; SUBCUTANEOUS at 01:09

## 2022-09-14 RX ADMIN — VANCOMYCIN HYDROCHLORIDE 1000 MG: 1 INJECTION, POWDER, LYOPHILIZED, FOR SOLUTION INTRAVENOUS at 07:09

## 2022-09-14 RX ADMIN — ENOXAPARIN SODIUM 40 MG: 100 INJECTION SUBCUTANEOUS at 05:09

## 2022-09-14 RX ADMIN — FAMOTIDINE 20 MG: 20 TABLET ORAL at 10:09

## 2022-09-14 RX ADMIN — CEFEPIME HYDROCHLORIDE 2 G: 2 INJECTION, SOLUTION INTRAVENOUS at 05:09

## 2022-09-14 RX ADMIN — VANCOMYCIN HYDROCHLORIDE 750 MG: 750 INJECTION, POWDER, LYOPHILIZED, FOR SOLUTION INTRAVENOUS at 06:09

## 2022-09-14 RX ADMIN — CLOPIDOGREL 75 MG: 75 TABLET, FILM COATED ORAL at 10:09

## 2022-09-14 RX ADMIN — METRONIDAZOLE 500 MG: 500 TABLET ORAL at 05:09

## 2022-09-14 RX ADMIN — METRONIDAZOLE 500 MG: 500 SOLUTION INTRAVENOUS at 08:09

## 2022-09-14 NOTE — PROGRESS NOTES
Pharmacist Renal Dose Adjustment Note    Tanya Madrid is a 82 y.o. female being treated with the medication cefepime.     Patient Data:    Vital Signs (Most Recent):  Temp: 97.3 °F (36.3 °C) (09/13/22 1824)  Pulse: 69 (09/13/22 1824)  Resp: 16 (09/13/22 1824)  BP: 104/62 (09/13/22 1824)  SpO2: 98 % (09/13/22 1824) Vital Signs (72h Range):  Temp:  [95.1 °F (35.1 °C)-102.1 °F (38.9 °C)]   Pulse:  [60-99]   Resp:  [15-30]   BP: ()/(45-87)   SpO2:  [92 %-100 %]      Recent Labs   Lab 09/12/22  1620 09/13/22  0442 09/13/22  1433   CREATININE 1.2 1.0 0.9     Serum creatinine: 0.9 mg/dL 09/13/22 1433  Estimated creatinine clearance: 36.8 mL/min    Cefepime 2 g q24 h has been changed to 2 g q12 h per [facility]'s renal dose adjustment protocol.     Pharmacist's Name: Ovidio Maradiaga, PharmD 9/13/2022 7:37 PM  Pharmacist's Extension: [phone]

## 2022-09-14 NOTE — SUBJECTIVE & OBJECTIVE
Interval History: Neprhology consulted to assist with Hypernatremia.  SLP provided education material to the family explaining that having a PEG in place does not completely negate the risk of aspiration.  Plan to have an educational meeting with Palliative at home liasion with [facility] hospice tomorrow to help achieve goal of decreasing hospitalizations. Mentation wise patient remains nonverbal. PICC in place and BP more stable. Will incraese D5 infusion to rate for now and wait palliative meeting tomorrow.   Review of Systems   Unable to perform ROS: Patient nonverbal   Objective:     Vital Signs (Most Recent):  Temp: 97.9 °F (36.6 °C) (09/14/22 1626)  Pulse: 71 (09/14/22 1626)  Resp: (!) 22 (09/14/22 1626)  BP: 116/69 (09/14/22 1626)  SpO2: (!) 92 % (09/14/22 1626)   Vital Signs (24h Range):  Temp:  [97.1 °F (36.2 °C)-98 °F (36.7 °C)] 97.9 °F (36.6 °C)  Pulse:  [66-77] 71  Resp:  [15-22] 22  SpO2:  [92 %-100 %] 92 %  BP: ()/(56-69) 116/69     Weight: 52.5 kg (115 lb 11.9 oz)  Body mass index is 21.17 kg/m².    Intake/Output Summary (Last 24 hours) at 9/14/2022 1709  Last data filed at 9/14/2022 0900  Gross per 24 hour   Intake 605.24 ml   Output 750 ml   Net -144.76 ml      Physical Exam  Vitals and nursing note reviewed.   Constitutional:       Appearance: She is ill-appearing.      Comments: Patient is non verbal   HENT:      Head: Normocephalic and atraumatic.      Mouth/Throat:      Mouth: Mucous membranes are moist.   Eyes:      General:         Right eye: No discharge.         Left eye: No discharge.   Cardiovascular:      Rate and Rhythm: Normal rate.   Pulmonary:      Breath sounds: Rales present.      Comments: Coarse breath sounds  Abdominal:      Tenderness: There is no abdominal tenderness.      Comments: has PEG tube   Musculoskeletal:         General: Swelling present.      Cervical back: Normal range of motion.      Right lower leg: No edema.      Left lower leg: No edema.   Skin:     General:  Skin is warm.   Neurological:      Mental Status: Mental status is at baseline. She is disoriented.       Significant Labs: All pertinent labs within the past 24 hours have been reviewed.    Significant Imaging: I have reviewed all pertinent imaging results/findings within the past 24 hours.

## 2022-09-14 NOTE — ASSESSMENT & PLAN NOTE
Despite PEG tube, has aspirated  Aspiration pneumonia  Blood cx neg  Abx reviewed  Agree with current mgmt

## 2022-09-14 NOTE — PROGRESS NOTES
[facility] Medicine  Progress Note    Patient Name: Tanya Madrid  MRN: [id number]  Patient Class: IP- Inpatient   Admission Date: 9/12/2022  Length of Stay: 2 days  Attending Physician: Dontrell To MD  Primary Care Provider: Elva Ansari NP        Subjective:     Principal Problem:Sepsis        HPI:  Tanya Madrid is a 82 y.o. female patient with a PMHx of acute CHF, HLD, HTN, Parkinson's disease, stroke, throat mass, and cardiac arrest who presents to the Emergency Department after being referred to the ED by ADELAIDA Sears (Transplant) for further evaluation of hypotension and fever. Per the pt's daughter, the pt went into cardiac arrest in March of this year and has never fully recovered, but the pt has been more fatigued for the past 3 to 4 days. The pt's daughter also reports that the pt has had blood pressures in the 80s and 90s systolic, bilateral hand swelling, a fever, and a cough. The pt's daughter gave the pt Tylenol for her fever. Pt's daughter reports that the pt's urine output and smell have been normal. Also, the pt's daughter reports that she did not give the pt blood pressure medication this morning.  V/S Temp max 102.1, pulse 96, B/P 140/79, RR 28.   Labs note left shift, D dimer 15.28 (CTA negative for PE), Na 165, albumin 2.2, BNP 3968, lactate 3.3  Pt given 1 liter fluid bolus, Vancomycin and Cefepime.   Diagnosis include: Sepsis, Aspiration PNA, Failure to thrive and hypernatremia            Overview/Hospital Course:  Admitted for hypernatremia and sepsis. Initiated on D5 gentle IVF. Cautious in treating hyponatremia due to concern for pontine myelinolysis. CT chest negative for PE but showed: Areas of consolidation within the bilateral lower lobes and right middle lobe concerning for multifocal airspace disease or aspiration. Explained to family that there is always a risk of aspiration despite presence of PEG and NPO status. On IV abx. KUB does not show  signs of PEG migration. Wound care consulted for buttock wound and family would like to defer debridement at this time. Palliative consulted and family would like to proceed with treatment for now.   Plan to treat hypernatremia and PNA at this time. HOLD TF due to aspiration.  PICC line ordered and can also be used for PPN/TPN nutrition to avoid further aspiration    9/14: Neprhology consulted to assist with Hypernatremia.  SLP provided education material to the family explaining that having a PEG in place does not completely negate the risk of aspiration.  Plan to have an educational meeting with Palliative at home liasion with [facility] tomorrow to help achieve goal of decreasing hospitalizations. Mentation wise patient remains nonverbal. PICC in place and BP more stable. Will incraese D5 infusion to rate for now and wait palliative meeting tomorrow.       Interval History: Neprhology consulted to assist with Hypernatremia.  SLP provided education material to the family explaining that having a PEG in place does not completely negate the risk of aspiration.  Plan to have an educational meeting with Palliative at home liasion with [facility] tomorrow to help achieve goal of decreasing hospitalizations. Mentation wise patient remains nonverbal. PICC in place and BP more stable. Will incraese D5 infusion to rate for now and wait palliative meeting tomorrow.   Review of Systems   Unable to perform ROS: Patient nonverbal   Objective:     Vital Signs (Most Recent):  Temp: 97.9 °F (36.6 °C) (09/14/22 1626)  Pulse: 71 (09/14/22 1626)  Resp: (!) 22 (09/14/22 1626)  BP: 116/69 (09/14/22 1626)  SpO2: (!) 92 % (09/14/22 1626)   Vital Signs (24h Range):  Temp:  [97.1 °F (36.2 °C)-98 °F (36.7 °C)] 97.9 °F (36.6 °C)  Pulse:  [66-77] 71  Resp:  [15-22] 22  SpO2:  [92 %-100 %] 92 %  BP: ()/(56-69) 116/69     Weight: 52.5 kg (115 lb 11.9 oz)  Body mass index is 21.17 kg/m².    Intake/Output Summary (Last 24 hours)  at 9/14/2022 1709  Last data filed at 9/14/2022 0900  Gross per 24 hour   Intake 605.24 ml   Output 750 ml   Net -144.76 ml      Physical Exam  Vitals and nursing note reviewed.   Constitutional:       Appearance: She is ill-appearing.      Comments: Patient is non verbal   HENT:      Head: Normocephalic and atraumatic.      Mouth/Throat:      Mouth: Mucous membranes are moist.   Eyes:      General:         Right eye: No discharge.         Left eye: No discharge.   Cardiovascular:      Rate and Rhythm: Normal rate.   Pulmonary:      Breath sounds: Rales present.      Comments: Coarse breath sounds  Abdominal:      Tenderness: There is no abdominal tenderness.      Comments: has PEG tube   Musculoskeletal:         General: Swelling present.      Cervical back: Normal range of motion.      Right lower leg: No edema.      Left lower leg: No edema.   Skin:     General: Skin is warm.   Neurological:      Mental Status: Mental status is at baseline. She is disoriented.       Significant Labs: All pertinent labs within the past 24 hours have been reviewed.    Significant Imaging: I have reviewed all pertinent imaging results/findings within the past 24 hours.      Assessment/Plan:      * Sepsis  This patient does have evidence of infective focus - aspiration pneumonia  My overall impression is sepsis. Vital signs were reviewed and noted in progress note.  Antibiotics given-   Antibiotics (From admission, onward)    Start     Stop Route Frequency Ordered    09/14/22 1300  metronidazole IVPB 500 mg         -- IV Every 8 hours (non-standard times) 09/14/22 1156    09/14/22 0600  cefepime in dextrose 5 % IVPB 2 g         -- IV Every 12 hours (non-standard times) 09/13/22 1936    09/13/22 1700  vancomycin 750 mg in dextrose 5 % 250 mL IVPB (ready to mix system)         -- IV Every 24 hours (non-standard times) 09/13/22 1611    09/12/22 1900  vancomycin - pharmacy to dose  (vancomycin IVPB)        See Hyperspace for full Linked  Orders Report.    -- IV pharmacy to manage frequency 09/12/22 1801        Cultures were taken-   Microbiology Results (last 7 days)     Procedure Component Value Units Date/Time    Blood culture x two cultures. Draw prior to antibiotics. [[id number]] Collected: 09/12/22 1619    Order Status: Completed Specimen: Blood from Peripheral, Antecubital, Left Updated: 09/14/22 0613     Blood Culture, Routine No Growth to date      No Growth to date    Narrative:      Aerobic and anaerobic    Blood culture x two cultures. Draw prior to antibiotics. [[id number]] Collected: 09/12/22 1619    Order Status: Completed Specimen: Blood from Peripheral, Upper Arm, Left Updated: 09/14/22 0613     Blood Culture, Routine No Growth to date      No Growth to date    Narrative:      Aerobic and anaerobic    Influenza A & B by Molecular [[id number]] Collected: 09/12/22 1726    Order Status: Completed Specimen: Nasopharyngeal Swab Updated: 09/12/22 1808     Influenza A, Molecular Negative     Influenza B, Molecular Negative     Flu A & B Source Nasal swab        Latest lactate reviewed, they are-  Recent Labs   Lab 09/12/22  1620 09/12/22  1908 09/12/22  2158   LACTATE 4.8* 3.3* 2.3*       Organ dysfunction indicated by Encephalopathy   Source-   pneumonia    Source control Achieved by antibiotics    9/13:  CT chest negative for PE but showed: Areas of consolidation within the bilateral lower lobes and right middle lobe concerning for multifocal airspace disease or aspiration. Explained to family that there is always a risk of aspiration despite presence of PEG and NPO status  On IV abx   KUB does not show signs of PEG migration   Wound care consulted for buttock wound and family would like to defer debridement at this time   Palliative consulted and family would like to proceed with treatment for now.   Plan to treat hypernatremia and PNA at this time   HOLD TF due to aspiration.  PICC line ordered and can also be used for PPN/TPN nutrition to  avoid further aspiration temporarily    9/14:  Neprhology consulted to assist with Hypernatremia.  SLP provided education material to the family explaining that having a PEG in place does not completely negate the risk of aspiration.  Plan to have an educational meeting with Palliative at home liasion with [facility] tomorrow to help achieve goal of decreasing hospitalizations. Mentation wise patient remains nonverbal. PICC in place and BP more stable. Will incraese D5 infusion to rate for now and wait palliative meeting tomorrow.     Failure to thrive in adult  Palliative Care Consult      Debility         Hypernatremia  Continue gentle IV hydration  Na improved 165>161    9/14:    165>160>159  Neprhology consulted to assist with Hypernatremia.  SLP provided education material to the family explaining that having a PEG in place does not completely negate the risk of aspiration.  Plan to have an educational meeting with Palliative at home program  liasion with [facility] tomorrow to help achieve goal of decreasing hospitalizations. Mentation wise patient remains nonverbal. PICC in place and BP more stable. Will incraese D5 infusion to rate for now and wait palliative meeting tomorrow.         Palliative care by specialist  DNR  Family would like to pursue all treatment at this time       Aspiration pneumonia  -IV antibioitcs    9/13:   CT chest negative for PE but showed: Areas of consolidation within the bilateral lower lobes and right middle lobe concerning for multifocal airspace disease or aspiration. Explained to family that there is always a risk of aspiration despite presence of PEG and NPO status.   On IV abx   KUB does not show signs of PEG migrationPlan to treat hypernatremia and PNA at this time. HOLD TF due to aspiration.  PICC line ordered and can also be used for PPN/TPN nutrition to avoid further aspiration temporarily     9/14:  Cont IV abx      Acute on chronic combined systolic and  diastolic CHF (congestive heart failure)  BNP 3900 and slightly lower than previous values  Supplemental oxygen to maintain sats > 92 %  Continue Entresto as B/P tolerates  Decompensation ?   - hold lasix at this time  Pt appears dehydrated due to hypernatremia    History of CVA (cerebrovascular accident)  Pt is nonverbal, W/C bound  Neuro checks  Pt is not prescribed ASA or STATIN        Parkinson disease  Resume meds when appropriate         VTE Risk Mitigation (From admission, onward)         Ordered     enoxaparin injection 40 mg  Daily         09/13/22 1936     IP VTE HIGH RISK PATIENT  Once         09/12/22 2137     Place sequential compression device  Until discontinued         09/12/22 2137                Discharge Planning   PARMINDER:      Code Status: DNR   Is the patient medically ready for discharge?:     Reason for patient still in hospital (select all that apply): Patient trending condition  Discharge Plan A: Home Health                  Jeffrey Kramer NP  Department of Hospital Medicine   [facility] - Med Surg

## 2022-09-14 NOTE — CONSULTS
Nutrition consult received regarding possible TPN initiation.   RD working remotely.  Spoke w/ pharmacist - PEG present, however pt w/ aspiration risk; PICC line to be placed.     Recommendations/Interventions:  1) If bolus TFs warranted, rec'd Isosource 1.5 - 4 cans/day to provide 1500 calories, 68 g of protein, 764 mL fluid.  2) If PN desired, rec'd 235g D, 75g AA + IV Lipids to provide 1600 calories & 75 g of protein (GIR: 3.1).  3) RD to monitor & follow-up.    Thanks!  Katie MS, RD, LDN

## 2022-09-14 NOTE — ASSESSMENT & PLAN NOTE
Due to dehydration resulting in intravascular fluid depletion  Pertinent neg: no GI losses reported  Urinary specific gravity very high, c/w dehydration    Best way to replace free water is by the enteral route  Pt has a PEG tube but there is risk of aspiration, per primary team  Continue replacement with IV route: D5W or 1/4 NS IVF's OK  Monitor labs

## 2022-09-14 NOTE — HPI
Pt was seen and examined. Chart, Labs and meds reviewed. Discussed with other providers. Pt is a 83 y/o female who was admitted with moderate to severe hypernatremia () and mild increase in s Cr form 0.8 to 1.2. Pt may have had aspiration pneumonia, resulting in worsening of her medical condition, and marked decrease in PO intake of nutrition. No direct h/o can be obtained. Sister at bed side who provided some h/o.

## 2022-09-14 NOTE — PLAN OF CARE
Problem: Infection  Goal: Absence of Infection Signs and Symptoms  Outcome: Ongoing, Progressing     Problem: Adult Inpatient Plan of Care  Goal: Plan of Care Review  Outcome: Ongoing, Progressing  Goal: Patient-Specific Goal (Individualized)  Outcome: Ongoing, Progressing  Goal: Absence of Hospital-Acquired Illness or Injury  Outcome: Ongoing, Progressing  Goal: Optimal Comfort and Wellbeing  Outcome: Ongoing, Progressing  Goal: Readiness for Transition of Care  Outcome: Ongoing, Progressing     Problem: Coping Ineffective  Goal: Effective Coping  Outcome: Ongoing, Progressing     Problem: Skin Injury Risk Increased  Goal: Skin Health and Integrity  Outcome: Ongoing, Progressing     Problem: Adjustment to Illness (Sepsis/Septic Shock)  Goal: Optimal Coping  Outcome: Ongoing, Progressing     Problem: Bleeding (Sepsis/Septic Shock)  Goal: Absence of Bleeding  Outcome: Ongoing, Progressing     Problem: Glycemic Control Impaired (Sepsis/Septic Shock)  Goal: Blood Glucose Level Within Desired Range  Outcome: Ongoing, Progressing     Problem: Infection Progression (Sepsis/Septic Shock)  Goal: Absence of Infection Signs and Symptoms  Outcome: Ongoing, Progressing     Problem: Nutrition Impaired (Sepsis/Septic Shock)  Goal: Optimal Nutrition Intake  Outcome: Ongoing, Progressing     Problem: Fluid and Electrolyte Imbalance (Acute Kidney Injury/Impairment)  Goal: Fluid and Electrolyte Balance  Outcome: Ongoing, Progressing     Problem: Oral Intake Inadequate (Acute Kidney Injury/Impairment)  Goal: Optimal Nutrition Intake  Outcome: Ongoing, Progressing     Problem: Renal Function Impairment (Acute Kidney Injury/Impairment)  Goal: Effective Renal Function  Outcome: Ongoing, Progressing     Problem: Impaired Wound Healing  Goal: Optimal Wound Healing  Outcome: Ongoing, Progressing     Problem: Fall Injury Risk  Goal: Absence of Fall and Fall-Related Injury  Outcome: Ongoing, Progressing

## 2022-09-14 NOTE — PLAN OF CARE
Dextrose 5% infusing @A 50mL/hr via PICC. IV ABT therapy remains in progress. No adverse reactions noted, remains afebrile. 2L/NC. Requires total care with ADL's and bed mobility. Turned Q2hrs. Wound care performed as ordered. Abreu cath intact. Heart monitor 8693. Peg tube to ABD. Accu checks Q6hrs.

## 2022-09-14 NOTE — ASSESSMENT & PLAN NOTE
This patient does have evidence of infective focus - aspiration pneumonia  My overall impression is sepsis. Vital signs were reviewed and noted in progress note.  Antibiotics given-   Antibiotics (From admission, onward)    Start     Stop Route Frequency Ordered    09/14/22 1300  metronidazole IVPB 500 mg         -- IV Every 8 hours (non-standard times) 09/14/22 1156    09/14/22 0600  cefepime in dextrose 5 % IVPB 2 g         -- IV Every 12 hours (non-standard times) 09/13/22 1936    09/13/22 1700  vancomycin 750 mg in dextrose 5 % 250 mL IVPB (ready to mix system)         -- IV Every 24 hours (non-standard times) 09/13/22 1611    09/12/22 1900  vancomycin - pharmacy to dose  (vancomycin IVPB)        See Hyperspace for full Linked Orders Report.    -- IV pharmacy to manage frequency 09/12/22 1801        Cultures were taken-   Microbiology Results (last 7 days)     Procedure Component Value Units Date/Time    Blood culture x two cultures. Draw prior to antibiotics. [[id number]] Collected: 09/12/22 1619    Order Status: Completed Specimen: Blood from Peripheral, Antecubital, Left Updated: 09/14/22 0613     Blood Culture, Routine No Growth to date      No Growth to date    Narrative:      Aerobic and anaerobic    Blood culture x two cultures. Draw prior to antibiotics. [[id number]] Collected: 09/12/22 1619    Order Status: Completed Specimen: Blood from Peripheral, Upper Arm, Left Updated: 09/14/22 0613     Blood Culture, Routine No Growth to date      No Growth to date    Narrative:      Aerobic and anaerobic    Influenza A & B by Molecular [[id number]] Collected: 09/12/22 1726    Order Status: Completed Specimen: Nasopharyngeal Swab Updated: 09/12/22 1808     Influenza A, Molecular Negative     Influenza B, Molecular Negative     Flu A & B Source Nasal swab        Latest lactate reviewed, they are-  Recent Labs   Lab 09/12/22  1620 09/12/22  1908 09/12/22  2158   LACTATE 4.8* 3.3* 2.3*       Organ dysfunction indicated  by Encephalopathy   Source-   pneumonia    Source control Achieved by antibiotics    9/13:  CT chest negative for PE but showed: Areas of consolidation within the bilateral lower lobes and right middle lobe concerning for multifocal airspace disease or aspiration. Explained to family that there is always a risk of aspiration despite presence of PEG and NPO status  On IV abx   KUB does not show signs of PEG migration   Wound care consulted for buttock wound and family would like to defer debridement at this time   Palliative consulted and family would like to proceed with treatment for now.   Plan to treat hypernatremia and PNA at this time   HOLD TF due to aspiration.  PICC line ordered and can also be used for PPN/TPN nutrition to avoid further aspiration temporarily    9/14:  Neprhology consulted to assist with Hypernatremia.  SLP provided education material to the family explaining that having a PEG in place does not completely negate the risk of aspiration.  Plan to have an educational meeting with Palliative at home liasion with [facility] hospice tomorrow to help achieve goal of decreasing hospitalizations. Mentation wise patient remains nonverbal. PICC in place and BP more stable. Will incraese D5 infusion to rate for now and wait palliative meeting tomorrow.

## 2022-09-14 NOTE — ASSESSMENT & PLAN NOTE
Continue gentle IV hydration  Na improved 165>161    9/14:    165>160>159  Neprhology consulted to assist with Hypernatremia.  SLP provided education material to the family explaining that having a PEG in place does not completely negate the risk of aspiration.  Plan to have an educational meeting with Palliative at home program  liasion with [facility] hospice tomorrow to help achieve goal of decreasing hospitalizations. Mentation wise patient remains nonverbal. PICC in place and BP more stable. Will incraese D5 infusion to rate for now and wait palliative meeting tomorrow.

## 2022-09-14 NOTE — PROGRESS NOTES
Pharmacist Renal Dose Adjustment Note    Tanya Madrid is a 82 y.o. female being treated with the medication enoxaparin.     Patient Data:    Vital Signs (Most Recent):  Temp: 97.3 °F (36.3 °C) (09/13/22 1824)  Pulse: 69 (09/13/22 1824)  Resp: 16 (09/13/22 1824)  BP: 104/62 (09/13/22 1824)  SpO2: 98 % (09/13/22 1824) Vital Signs (72h Range):  Temp:  [95.1 °F (35.1 °C)-102.1 °F (38.9 °C)]   Pulse:  [60-99]   Resp:  [15-30]   BP: ()/(45-87)   SpO2:  [92 %-100 %]      Recent Labs   Lab 09/12/22  1620 09/13/22  0442 09/13/22  1433   CREATININE 1.2 1.0 0.9     Serum creatinine: 0.9 mg/dL 09/13/22 1433  Estimated creatinine clearance: 36.8 mL/min    Enoxaparin 30 mg subQ q24 h has been changed to 40 mg q24 h per [facility]'s renal dose adjustment protocol.     Pharmacist's Name: Ovidio Maradiaga PharmD 9/13/2022 7:38 PM  Pharmacist's Extension: [phone]

## 2022-09-14 NOTE — PROCEDURES
"Tanya Madrid is a 82 y.o. female patient.    Temp: 97.3 °F (36.3 °C) (09/14/22 0012)  Pulse: 66 (09/14/22 0120)  Resp: 16 (09/14/22 0012)  BP: 91/63 (09/14/22 0012)  SpO2: 100 % (09/14/22 0012)  Weight: 52.5 kg (115 lb 11.9 oz) (09/13/22 0918)  Height: 5' 2" (157.5 cm) (09/13/22 0918)    PICC  Date/Time: 9/14/2022 7:38 PM  Performed by: Vladimir Delgado RN  Consent Done: Yes  Time out: Immediately prior to procedure a time out was called to verify the correct patient, procedure, equipment, support staff and site/side marked as required  Indications: med administration and vascular access  Anesthesia: local infiltration  Local anesthetic: lidocaine 1% without epinephrine  Anesthetic Total (mL): 3  Preparation: skin prepped with ChloraPrep  Skin prep agent dried: skin prep agent completely dried prior to procedure  Sterile barriers: all five maximum sterile barriers used - cap, mask, sterile gown, sterile gloves, and large sterile sheet  Hand hygiene: hand hygiene performed prior to central venous catheter insertion  Location details: right basilic  Catheter type: double lumen  Catheter size: 5 Fr  Catheter Length: 33cm    Ultrasound guidance: yes  Vessel Caliber: medium and patent, compressibility normal  Vascular Doppler: not done  Needle advanced into vessel with real time Ultrasound guidance.  Image recorded and saved.  Sterile sheath used.  no esophageal manometryNumber of attempts: 1  Post-procedure: blood return through all ports, chlorhexidine patch and sterile dressing applied  Estimated blood loss (mL): 0  Specimens: No  Implants: No  Assessment: placement verified by x-ray  Complications: none  Comments: See rad. report        Name Vladimir Delgado RN  9/14/2022    "

## 2022-09-14 NOTE — CONSULTS
[facility] Surg  Nephrology  Consult Note      Patient Name: Tanya Madrid  MRN: [id number]  Admission Date: 9/12/2022  Hospital Length of Stay: 2 days  Attending Provider: Dontrell To MD   Primary Care Physician: Elva Ansari NP  Principal Problem:Sepsis    Reason for consult: hypernatremia    Consults  Subjective:     HPI: Pt was seen and examined. Chart, Labs and meds reviewed. Discussed with other providers. Pt is a 83 y/o female who was admitted with moderate to severe hypernatremia () and mild increase in s Cr form 0.8 to 1.2. Pt may have had aspiration pneumonia (noted has a PEG tube), resulting in worsening of her medical condition, and marked decrease in PO intake of nutrition. No direct h/o can be obtained. Sister at bed side who provided some h/o.        Past Medical History:   Diagnosis Date    Acute CHF (congestive heart failure) 1/17/2021    Hyperlipemia     Hypertension     Parkinson's disease     Stroke 2016    Throat mass        Past Surgical History:   Procedure Laterality Date    CLOSED REDUCTION Right 10/15/2020    Procedure: CLOSED REDUCTION;  Surgeon: Humberto Valdivia DO;  Location: [facility] OR;  Service: Orthopedics;  Laterality: Right;  ATTEMPTED    EVACUATION OF HEMATOMA Right 10/17/2020    Procedure: EVACUATION, HEMATOMA;  Surgeon: Humberto Valdivia DO;  Location: [facility] OR;  Service: Orthopedics;  Laterality: Right;    HEMIARTHROPLASTY OF HIP Left 7/12/2020    Procedure: HEMIARTHROPLASTY, HIP;  Surgeon: Humberto Valdivia DO;  Location: [facility] OR;  Service: Orthopedics;  Laterality: Left;    HEMIARTHROPLASTY OF HIP Right 10/2/2020    Procedure: HEMIARTHROPLASTY, HIP;  Surgeon: Loyd Kearney MD;  Location: [facility] OR;  Service: Orthopedics;  Laterality: Right;    HYSTERECTOMY      INSERTION OF PERCUTANEOUS ENDOSCOPIC GASTROSTOMY (PEG) FEEDING TUBE      OPEN REDUCTION OF DISLOCATION OF HIP Right 10/17/2020    Procedure: OPEN REDUCTION, DISLOCATION, HIP;  Surgeon: Humberto KENNEDY  DO Shea;  Location: [facility];  Service: Orthopedics;  Laterality: Right;    THROAT SURGERY      TONSILLECTOMY         Review of patient's allergies indicates:   Allergen Reactions    Sinemet [carbidopa-levodopa] Anxiety and Other (See Comments)     Becomes agitated and fidgety.    Xanax [alprazolam] Anxiety and Other (See Comments)     Becomes agitated and fidgety.      Current Facility-Administered Medications   Medication Frequency    acetaminophen tablet 650 mg Q4H PRN    albuterol-ipratropium 2.5 mg-0.5 mg/3 mL nebulizer solution 3 mL Q6H PRN    cefepime in dextrose 5 % IVPB 2 g Q12H    dextrose 10% bolus 125 mL PRN    dextrose 10% bolus 250 mL PRN    dextrose 5 % infusion Continuous    enoxaparin injection 40 mg Daily    glucagon (human recombinant) injection 1 mg PRN    insulin aspart U-100 pen 1-10 Units Q6H PRN    magnesium oxide tablet 800 mg PRN    magnesium oxide tablet 800 mg PRN    metronidazole IVPB 500 mg Q8H    naloxone 0.4 mg/mL injection 0.02 mg PRN    ondansetron injection 4 mg Q8H PRN    prochlorperazine injection Soln 5 mg Q6H PRN    QUEtiapine tablet 25 mg Nightly PRN    sodium chloride 0.9% flush 10 mL Q8H PRN    vancomycin - pharmacy to dose pharmacy to manage frequency    vancomycin 750 mg in dextrose 5 % 250 mL IVPB (ready to mix system) Q24H     Family History       Family history is unknown by patient.          Tobacco Use    Smoking status: Never    Smokeless tobacco: Never   Substance and Sexual Activity    Alcohol use: No    Drug use: No    Sexual activity: Not Currently     Review of Systems   Unable to perform ROS: Patient unresponsive   Objective:     Vital Signs (Most Recent):  Temp: 97.9 °F (36.6 °C) (09/14/22 1144)  Pulse: 68 (09/14/22 1144)  Resp: 16 (09/14/22 0940)  BP: 109/67 (09/14/22 1144)  SpO2: 99 % (09/14/22 0940)  O2 Device (Oxygen Therapy): nasal cannula (09/14/22 0940) Vital Signs (24h Range):  Temp:  [97.1 °F (36.2 °C)-98 °F (36.7 °C)] 97.9 °F (36.6 °C)  Pulse:   [62-77] 68  Resp:  [15-20] 16  SpO2:  [95 %-100 %] 99 %  BP: ()/(50-68) 109/67     Weight: 52.5 kg (115 lb 11.9 oz) (09/13/22 0918)  Body mass index is 21.17 kg/m².  Body surface area is 1.52 meters squared.    I/O last 3 completed shifts:  In: 1854.2 [I.V.:605.2; IV Piggyback:1249]  Out: 1350 [Urine:1350]    Physical Exam  Vitals and nursing note reviewed.   Constitutional:       Appearance: She is ill-appearing.   HENT:      Mouth/Throat:      Mouth: Mucous membranes are dry.   Cardiovascular:      Rate and Rhythm: Normal rate and regular rhythm.      Pulses: Normal pulses.      Heart sounds: Normal heart sounds.   Pulmonary:      Effort: Pulmonary effort is normal.      Breath sounds: Normal breath sounds.   Abdominal:      General: Abdomen is flat.      Tenderness: There is no abdominal tenderness.   Musculoskeletal:      Right lower leg: No edema.      Left lower leg: No edema.   Skin:     General: Skin is dry.      Comments: Loss of skin turgor       Significant Labs: reviewed  BMP  Lab Results   Component Value Date     (H) 09/14/2022    K 3.1 (L) 09/14/2022     (H) 09/14/2022    CO2 28 09/14/2022    BUN 45 (H) 09/14/2022    CREATININE 0.9 09/14/2022    CALCIUM 7.9 (L) 09/14/2022    ANIONGAP 10 09/14/2022    ESTGFRAFRICA >60 07/30/2022    EGFRNONAA >60 07/30/2022     Lab Results   Component Value Date    WBC 5.34 09/14/2022    HGB 10.2 (L) 09/14/2022    HCT 36.4 (L) 09/14/2022    MCV 88 09/14/2022     (L) 09/14/2022     Lactic acid 3.3, was 4.8  U/a: specific gravity 1.02      Significant Imaging: reviewed    Assessment/Plan:     83 y/o female with hypernatremia and FFT:    Hypernatremia  s Na has improved (admit Na was 165) with hypotonic fluid replacement  Due to dehydration resulting in intravascular fluid depletion  Pertinent neg: no GI losses reported  Urinary specific gravity very high, c/w dehydration  Has mild CODY due to dehydration    Best way to replace free water is by the  enteral route  Pt has a PEG tube but there is risk of aspiration, per primary team  Continue replacement with IV route: D5W or 1/4 NS IVF's OK  Monitor labs     Aspiration pneumonia  Despite PEG tube, has aspirated  Aspiration pneumonia  Blood cx neg  Abx reviewed  Agree with current mgmt      Failure to thrive in adult  Pt is advanced age  Bed bound  Pneumonia  Noted palliative care referral       Plans and recommendations:  As discussed above  Total time spent 70 minutes including time needed to review the records, the   patient evaluation, documentation, face-to-face discussion with the patient,   more than 50% of the time was spent on coordination of care and counseling.    Level V visit.    Zeyad Mejia MD   Nephrology  [facility] S[facility]

## 2022-09-14 NOTE — PT/OT/SLP EVAL
Speech Language Pathology Note    Patient Name:  Tanya Madrid   MRN:  [id number]  Admitting Diagnosis: Sepsis    Recommendations:                  Diet recommendations:  NPO, NPO   Aspiration Precautions: Frequent oral care   General Precautions: Standard,      History:     Past Medical History:   Diagnosis Date    Acute CHF (congestive heart failure) 1/17/2021    Hyperlipemia     Hypertension     Parkinson's disease     Stroke 2016    Throat mass        Past Surgical History:   Procedure Laterality Date    CLOSED REDUCTION Right 10/15/2020    Procedure: CLOSED REDUCTION;  Surgeon: Humberto Valdivia DO;  Location: [facility] OR;  Service: Orthopedics;  Laterality: Right;  ATTEMPTED    EVACUATION OF HEMATOMA Right 10/17/2020    Procedure: EVACUATION, HEMATOMA;  Surgeon: Humberto Valdivia DO;  Location: [facility] OR;  Service: Orthopedics;  Laterality: Right;    HEMIARTHROPLASTY OF HIP Left 7/12/2020    Procedure: HEMIARTHROPLASTY, HIP;  Surgeon: Humberto Valdivia DO;  Location: [facility] OR;  Service: Orthopedics;  Laterality: Left;    HEMIARTHROPLASTY OF HIP Right 10/2/2020    Procedure: HEMIARTHROPLASTY, HIP;  Surgeon: Loyd Kearney MD;  Location: [facility] OR;  Service: Orthopedics;  Laterality: Right;    HYSTERECTOMY      INSERTION OF PERCUTANEOUS ENDOSCOPIC GASTROSTOMY (PEG) FEEDING TUBE      OPEN REDUCTION OF DISLOCATION OF HIP Right 10/17/2020    Procedure: OPEN REDUCTION, DISLOCATION, HIP;  Surgeon: Humberto Valdivia DO;  Location: [facility] OR;  Service: Orthopedics;  Laterality: Right;    THROAT SURGERY      TONSILLECTOMY         Subjective     Pt is well known to this ST from previous admissions.  She is living with her daughter, is bedbound with a sacral wound, and NPO with PEG tube feedings.  Pt is not communicative and total care.  She was admitted this stay for sepsis 2/2 bilateral pneumonia (suspected aspiration), hypernatremia, CHF, and a wound.  ST consulted for family education.    Pain/Comfort:  Pain Rating 1:   (no obversable signs of pain, pain did not wake)    Objective:   ST educated the daughter on dysphagia and aspiration in pt's with neurological disorders (h/o CVA and Parkinson's), oral hygiene and importance of oral care,  the prevalence of continued aspiration even with a PEG tube, end of life, and hospice support vs home health.  She reported pt having difficulty managing her secretions and the home health nurse ordering a suction for home use.  Pt is not a candidate for skilled therapy 2/2 to her inability to maintain appropriate level of alertness and participate.  The daughter verbalized understanding and stated that she has been thinking that she needs Hospice services.        Based on patient's current poor oral health status and compromised immune function, patient remains at high risk of pulmonary compromise associated with aspiration at this time (Alisha, 2005).  Reference:   MARCIA Brito (2005). Pneumonia: Factors beyond aspiration. Perspectives on Swallowing and Swallowing Disorders (Dysphagia), 14(1), 10-16. https://doi.org/10.1044/sasd14.1.10    Assessment:     Tanya Madrid is a 82 y.o. female with acute on chronic severe dysphagia and cognitive deficits.  She is not a candidate for skilled ST.        Time Tracking:     SLP Treatment Date:   09/14/22  Speech Start Time:  1400  Speech Stop Time:  1423     Speech Total Time (min):  23 min    Billable Minutes: Self Care/Home Management Training 23 09/14/2022        [see HPI] : see HPI [Negative] : Heme/Lymph

## 2022-09-14 NOTE — SUBJECTIVE & OBJECTIVE
Past Medical History:   Diagnosis Date    Acute CHF (congestive heart failure) 1/17/2021    Hyperlipemia     Hypertension     Parkinson's disease     Stroke 2016    Throat mass        Past Surgical History:   Procedure Laterality Date    CLOSED REDUCTION Right 10/15/2020    Procedure: CLOSED REDUCTION;  Surgeon: Humberto Valdivia DO;  Location: [facility];  Service: Orthopedics;  Laterality: Right;  ATTEMPTED    EVACUATION OF HEMATOMA Right 10/17/2020    Procedure: EVACUATION, HEMATOMA;  Surgeon: Humberto Valdivia DO;  Location: [facility] OR;  Service: Orthopedics;  Laterality: Right;    HEMIARTHROPLASTY OF HIP Left 7/12/2020    Procedure: HEMIARTHROPLASTY, HIP;  Surgeon: Humberto Valdivia DO;  Location: [facility] OR;  Service: Orthopedics;  Laterality: Left;    HEMIARTHROPLASTY OF HIP Right 10/2/2020    Procedure: HEMIARTHROPLASTY, HIP;  Surgeon: Loyd Kearney MD;  Location: [facility] OR;  Service: Orthopedics;  Laterality: Right;    HYSTERECTOMY      INSERTION OF PERCUTANEOUS ENDOSCOPIC GASTROSTOMY (PEG) FEEDING TUBE      OPEN REDUCTION OF DISLOCATION OF HIP Right 10/17/2020    Procedure: OPEN REDUCTION, DISLOCATION, HIP;  Surgeon: Humberto Valdivia DO;  Location: [facility] OR;  Service: Orthopedics;  Laterality: Right;    THROAT SURGERY      TONSILLECTOMY         Review of patient's allergies indicates:   Allergen Reactions    Sinemet [carbidopa-levodopa] Anxiety and Other (See Comments)     Becomes agitated and fidgety.    Xanax [alprazolam] Anxiety and Other (See Comments)     Becomes agitated and fidgety.      Current Facility-Administered Medications   Medication Frequency    acetaminophen tablet 650 mg Q4H PRN    albuterol-ipratropium 2.5 mg-0.5 mg/3 mL nebulizer solution 3 mL Q6H PRN    cefepime in dextrose 5 % IVPB 2 g Q12H    dextrose 10% bolus 125 mL PRN    dextrose 10% bolus 250 mL PRN    dextrose 5 % infusion Continuous    enoxaparin injection 40 mg Daily    glucagon (human recombinant) injection 1 mg PRN    insulin  aspart U-100 pen 1-10 Units Q6H PRN    magnesium oxide tablet 800 mg PRN    magnesium oxide tablet 800 mg PRN    metronidazole IVPB 500 mg Q8H    naloxone 0.4 mg/mL injection 0.02 mg PRN    ondansetron injection 4 mg Q8H PRN    prochlorperazine injection Soln 5 mg Q6H PRN    QUEtiapine tablet 25 mg Nightly PRN    sodium chloride 0.9% flush 10 mL Q8H PRN    vancomycin - pharmacy to dose pharmacy to manage frequency    vancomycin 750 mg in dextrose 5 % 250 mL IVPB (ready to mix system) Q24H     Family History       Family history is unknown by patient.          Tobacco Use    Smoking status: Never    Smokeless tobacco: Never   Substance and Sexual Activity    Alcohol use: No    Drug use: No    Sexual activity: Not Currently     Review of Systems   Unable to perform ROS: Patient unresponsive   Objective:     Vital Signs (Most Recent):  Temp: 97.9 °F (36.6 °C) (09/14/22 1144)  Pulse: 68 (09/14/22 1144)  Resp: 16 (09/14/22 0940)  BP: 109/67 (09/14/22 1144)  SpO2: 99 % (09/14/22 0940)  O2 Device (Oxygen Therapy): nasal cannula (09/14/22 0940) Vital Signs (24h Range):  Temp:  [97.1 °F (36.2 °C)-98 °F (36.7 °C)] 97.9 °F (36.6 °C)  Pulse:  [62-77] 68  Resp:  [15-20] 16  SpO2:  [95 %-100 %] 99 %  BP: ()/(50-68) 109/67     Weight: 52.5 kg (115 lb 11.9 oz) (09/13/22 0918)  Body mass index is 21.17 kg/m².  Body surface area is 1.52 meters squared.    I/O last 3 completed shifts:  In: 1854.2 [I.V.:605.2; IV Piggyback:1249]  Out: 1350 [Urine:1350]    Physical Exam  Vitals and nursing note reviewed.   Constitutional:       Appearance: She is ill-appearing.   HENT:      Mouth/Throat:      Mouth: Mucous membranes are dry.   Cardiovascular:      Rate and Rhythm: Normal rate and regular rhythm.      Pulses: Normal pulses.      Heart sounds: Normal heart sounds.   Pulmonary:      Effort: Pulmonary effort is normal.      Breath sounds: Normal breath sounds.   Abdominal:      General: Abdomen is flat.      Tenderness: There is no  abdominal tenderness.   Musculoskeletal:      Right lower leg: No edema.      Left lower leg: No edema.   Skin:     General: Skin is dry.      Comments: Loss of skin turgor       Significant Labs: reviewed  BMP  Lab Results   Component Value Date     (H) 09/14/2022    K 3.1 (L) 09/14/2022     (H) 09/14/2022    CO2 28 09/14/2022    BUN 45 (H) 09/14/2022    CREATININE 0.9 09/14/2022    CALCIUM 7.9 (L) 09/14/2022    ANIONGAP 10 09/14/2022    ESTGFRAFRICA >60 07/30/2022    EGFRNONAA >60 07/30/2022     Lab Results   Component Value Date    WBC 5.34 09/14/2022    HGB 10.2 (L) 09/14/2022    HCT 36.4 (L) 09/14/2022    MCV 88 09/14/2022     (L) 09/14/2022     Lactic acid 3.3, was 4.8  U/a: specific gravity 1.02      Significant Imaging: reviewed

## 2022-09-14 NOTE — ASSESSMENT & PLAN NOTE
-IV antibioitcs    9/13:   CT chest negative for PE but showed: Areas of consolidation within the bilateral lower lobes and right middle lobe concerning for multifocal airspace disease or aspiration. Explained to family that there is always a risk of aspiration despite presence of PEG and NPO status.   On IV abx   KUB does not show signs of PEG migrationPlan to treat hypernatremia and PNA at this time. HOLD TF due to aspiration.  PICC line ordered and can also be used for PPN/TPN nutrition to avoid further aspiration temporarily     9/14:  Cont IV abx

## 2022-09-15 LAB
ANION GAP SERPL CALC-SCNC: 9 MMOL/L (ref 8–16)
BASOPHILS # BLD AUTO: 0.03 K/UL (ref 0–0.2)
BASOPHILS NFR BLD: 0.6 % (ref 0–1.9)
BUN SERPL-MCNC: 37 MG/DL (ref 8–23)
CALCIUM SERPL-MCNC: 8 MG/DL (ref 8.7–10.5)
CHLORIDE SERPL-SCNC: 116 MMOL/L (ref 95–110)
CO2 SERPL-SCNC: 26 MMOL/L (ref 23–29)
CREAT SERPL-MCNC: 0.8 MG/DL (ref 0.5–1.4)
DIFFERENTIAL METHOD: ABNORMAL
EOSINOPHIL # BLD AUTO: 0.2 K/UL (ref 0–0.5)
EOSINOPHIL NFR BLD: 3.3 % (ref 0–8)
ERYTHROCYTE [DISTWIDTH] IN BLOOD BY AUTOMATED COUNT: 25.1 % (ref 11.5–14.5)
EST. GFR  (NO RACE VARIABLE): >60 ML/MIN/1.73 M^2
GIANT PLATELETS BLD QL SMEAR: PRESENT
GLUCOSE SERPL-MCNC: 135 MG/DL (ref 70–110)
HCT VFR BLD AUTO: 31.9 % (ref 37–48.5)
HGB BLD-MCNC: 9.1 G/DL (ref 12–16)
IMM GRANULOCYTES # BLD AUTO: 0.07 K/UL (ref 0–0.04)
IMM GRANULOCYTES NFR BLD AUTO: 1.4 % (ref 0–0.5)
LYMPHOCYTES # BLD AUTO: 0.9 K/UL (ref 1–4.8)
LYMPHOCYTES NFR BLD: 17.8 % (ref 18–48)
MAGNESIUM SERPL-MCNC: 2.4 MG/DL (ref 1.6–2.6)
MCH RBC QN AUTO: 24.6 PG (ref 27–31)
MCHC RBC AUTO-ENTMCNC: 28.5 G/DL (ref 32–36)
MCV RBC AUTO: 86 FL (ref 82–98)
MONOCYTES # BLD AUTO: 0.3 K/UL (ref 0.3–1)
MONOCYTES NFR BLD: 7 % (ref 4–15)
NEUTROPHILS # BLD AUTO: 3.4 K/UL (ref 1.8–7.7)
NEUTROPHILS NFR BLD: 69.9 % (ref 38–73)
NRBC BLD-RTO: 0 /100 WBC
PHOSPHATE SERPL-MCNC: 3.4 MG/DL (ref 2.7–4.5)
PLATELET # BLD AUTO: 166 K/UL (ref 150–450)
PLATELET BLD QL SMEAR: ABNORMAL
PMV BLD AUTO: ABNORMAL FL (ref 9.2–12.9)
POCT GLUCOSE: 124 MG/DL (ref 70–110)
POCT GLUCOSE: 152 MG/DL (ref 70–110)
POCT GLUCOSE: 187 MG/DL (ref 70–110)
POTASSIUM SERPL-SCNC: 3.1 MMOL/L (ref 3.5–5.1)
RBC # BLD AUTO: 3.7 M/UL (ref 4–5.4)
SODIUM SERPL-SCNC: 151 MMOL/L (ref 136–145)
TRIGL SERPL-MCNC: 112 MG/DL (ref 30–150)
WBC # BLD AUTO: 4.84 K/UL (ref 3.9–12.7)

## 2022-09-15 PROCEDURE — 94761 N-INVAS EAR/PLS OXIMETRY MLT: CPT

## 2022-09-15 PROCEDURE — 63600175 PHARM REV CODE 636 W HCPCS: Performed by: INTERNAL MEDICINE

## 2022-09-15 PROCEDURE — 85025 COMPLETE CBC W/AUTO DIFF WBC: CPT | Performed by: NURSE PRACTITIONER

## 2022-09-15 PROCEDURE — 21400001 HC TELEMETRY ROOM

## 2022-09-15 PROCEDURE — 99900035 HC TECH TIME PER 15 MIN (STAT)

## 2022-09-15 PROCEDURE — 84478 ASSAY OF TRIGLYCERIDES: CPT | Performed by: NURSE PRACTITIONER

## 2022-09-15 PROCEDURE — 25000003 PHARM REV CODE 250: Performed by: NURSE PRACTITIONER

## 2022-09-15 PROCEDURE — 63600175 PHARM REV CODE 636 W HCPCS: Performed by: FAMILY MEDICINE

## 2022-09-15 PROCEDURE — 99233 PR SUBSEQUENT HOSPITAL CARE,LEVL III: ICD-10-PCS | Mod: ,,, | Performed by: INTERNAL MEDICINE

## 2022-09-15 PROCEDURE — 80048 BASIC METABOLIC PNL TOTAL CA: CPT | Performed by: NURSE PRACTITIONER

## 2022-09-15 PROCEDURE — 99233 SBSQ HOSP IP/OBS HIGH 50: CPT | Mod: ,,, | Performed by: INTERNAL MEDICINE

## 2022-09-15 PROCEDURE — 25000003 PHARM REV CODE 250: Performed by: INTERNAL MEDICINE

## 2022-09-15 PROCEDURE — 83735 ASSAY OF MAGNESIUM: CPT | Performed by: NURSE PRACTITIONER

## 2022-09-15 PROCEDURE — S0030 INJECTION, METRONIDAZOLE: HCPCS | Performed by: NURSE PRACTITIONER

## 2022-09-15 PROCEDURE — 25000003 PHARM REV CODE 250: Performed by: FAMILY MEDICINE

## 2022-09-15 PROCEDURE — 27000221 HC OXYGEN, UP TO 24 HOURS

## 2022-09-15 PROCEDURE — 84100 ASSAY OF PHOSPHORUS: CPT | Performed by: NURSE PRACTITIONER

## 2022-09-15 RX ORDER — PANTOPRAZOLE SODIUM 40 MG/1
40 FOR SUSPENSION ORAL 2 TIMES DAILY
Status: DISCONTINUED | OUTPATIENT
Start: 2022-09-15 | End: 2022-09-17 | Stop reason: HOSPADM

## 2022-09-15 RX ORDER — CLOPIDOGREL BISULFATE 75 MG/1
75 TABLET ORAL DAILY
Status: DISCONTINUED | OUTPATIENT
Start: 2022-09-16 | End: 2022-09-17 | Stop reason: HOSPADM

## 2022-09-15 RX ORDER — MUPIROCIN 20 MG/G
OINTMENT TOPICAL 2 TIMES DAILY
Status: DISCONTINUED | OUTPATIENT
Start: 2022-09-15 | End: 2022-09-17 | Stop reason: HOSPADM

## 2022-09-15 RX ORDER — CARVEDILOL 3.12 MG/1
3.12 TABLET ORAL 2 TIMES DAILY
Status: DISCONTINUED | OUTPATIENT
Start: 2022-09-15 | End: 2022-09-17 | Stop reason: HOSPADM

## 2022-09-15 RX ADMIN — ENOXAPARIN SODIUM 40 MG: 100 INJECTION SUBCUTANEOUS at 05:09

## 2022-09-15 RX ADMIN — INSULIN ASPART 2 UNITS: 100 INJECTION, SOLUTION INTRAVENOUS; SUBCUTANEOUS at 05:09

## 2022-09-15 RX ADMIN — POTASSIUM CHLORIDE: 149 INJECTION, SOLUTION, CONCENTRATE INTRAVENOUS at 10:09

## 2022-09-15 RX ADMIN — METRONIDAZOLE 500 MG: 500 SOLUTION INTRAVENOUS at 01:09

## 2022-09-15 RX ADMIN — CEFEPIME HYDROCHLORIDE 2 G: 2 INJECTION, SOLUTION INTRAVENOUS at 05:09

## 2022-09-15 RX ADMIN — METRONIDAZOLE 500 MG: 500 SOLUTION INTRAVENOUS at 04:09

## 2022-09-15 RX ADMIN — MUPIROCIN: 20 OINTMENT TOPICAL at 09:09

## 2022-09-15 RX ADMIN — PANTOPRAZOLE SODIUM 40 MG: 40 GRANULE, DELAYED RELEASE ORAL at 09:09

## 2022-09-15 RX ADMIN — DEXTROSE: 5 SOLUTION INTRAVENOUS at 04:09

## 2022-09-15 RX ADMIN — VANCOMYCIN HYDROCHLORIDE 1000 MG: 1 INJECTION, POWDER, LYOPHILIZED, FOR SOLUTION INTRAVENOUS at 08:09

## 2022-09-15 RX ADMIN — METRONIDAZOLE 500 MG: 500 SOLUTION INTRAVENOUS at 09:09

## 2022-09-15 NOTE — CONSULTS
Pharmacokinetic Assessment Follow Up: IV Vancomycin    Vancomycin serum concentration assessment(s):    The trough level was drawn correctly and can be used to guide therapy at this time. The measurement is below the desired definitive target range of 15 to 20 mcg/mL.    Vancomycin Regimen Plan:    Change regimen to Vancomycin 1000 mg IV every 24 hours with next serum trough concentration measured at 1730 prior to the third new dose on 9/17.    Drug levels (last 3 results):  Recent Labs   Lab Result Units 09/13/22  1433 09/14/22  1734   Vancomycin, Random ug/mL 10.3  --    Vancomycin-Trough ug/mL  --  13.3       Pharmacy will continue to follow and monitor vancomycin.    Please contact pharmacy at [phone] for questions regarding this assessment.    Thank you for the consult,   Ovidio Maradiaga, PharmD 9/14/2022 7:18 PM       Patient brief summary:  Tanya Madrid is a 82 y.o. female initiated on antimicrobial therapy with IV Vancomycin for treatment of lower respiratory infection    The patient's new regimen is vancomycin 1000 mg q24 h.    Drug Allergies:   Review of patient's allergies indicates:   Allergen Reactions    Sinemet [carbidopa-levodopa] Anxiety and Other (See Comments)     Becomes agitated and fidgety.    Xanax [alprazolam] Anxiety and Other (See Comments)     Becomes agitated and fidgety.        Actual Body Weight:   52.5 kg    Renal Function:   Estimated Creatinine Clearance: 36.8 mL/min (based on SCr of 0.9 mg/dL).,     Dialysis Method (if applicable):  N/A    CBC (last 72 hours):  Recent Labs   Lab Result Units 09/12/22  1620 09/13/22  0442 09/14/22  0550   WBC K/uL 7.29 6.49 5.34   Hemoglobin g/dL 11.5* 9.9* 10.2*   Hematocrit % 40.7 36.6* 36.4*   Platelets K/uL 156 114* 122*   Gran % % 76.7* 79.1* 75.1*   Lymph % % 15.9* 12.2* 13.3*   Mono % % 6.0 6.3 5.8   Eosinophil % % 0.3 1.1 4.3   Basophil % % 0.4 0.5 0.6   Differential Method  Automated Automated Automated       Metabolic Panel (last  72 hours):  Recent Labs   Lab Result Units 09/12/22  1620 09/12/22  1840 09/13/22  0442 09/13/22  1433 09/14/22  0550   Sodium mmol/L 165*  --  161* 160* 159*   Potassium mmol/L 3.8  --  3.1* 3.2* 3.1*   Chloride mmol/L 125*  --  126* 123* 121*   CO2 mmol/L 26  --  27 27 28   Glucose mg/dL 235*  --  243* 169* 107   Glucose, UA   --  Negative  --   --   --    BUN mg/dL 50*  --  50* 51* 45*   Creatinine mg/dL 1.2  --  1.0 0.9 0.9   Albumin g/dL 2.2*  --  1.8*  --  1.7*   Total Bilirubin mg/dL 0.7  --  0.9  --  0.6   Alkaline Phosphatase U/L 150*  --  113  --  114   AST U/L 83*  --  50*  --  34   ALT U/L 40  --  28  --  21   Magnesium mg/dL  --   --  2.4  --   --        Vancomycin Administrations:  vancomycin given in the last 96 hours                     vancomycin 750 mg in dextrose 5 % 250 mL IVPB (ready to mix system) (mg) 750 mg New Bag 09/14/22 1840     750 mg New Bag 09/13/22 1824    vancomycin in dextrose 5 % 1 gram/250 mL IVPB 1,000 mg (mg) 1,000 mg New Bag 09/12/22 1900                    Microbiologic Results:  Microbiology Results (last 7 days)       Procedure Component Value Units Date/Time    Blood culture x two cultures. Draw prior to antibiotics. [[id number]] Collected: 09/12/22 1619    Order Status: Completed Specimen: Blood from Peripheral, Antecubital, Left Updated: 09/14/22 0613     Blood Culture, Routine No Growth to date      No Growth to date    Narrative:      Aerobic and anaerobic    Blood culture x two cultures. Draw prior to antibiotics. [[id number]] Collected: 09/12/22 1619    Order Status: Completed Specimen: Blood from Peripheral, Upper Arm, Left Updated: 09/14/22 0613     Blood Culture, Routine No Growth to date      No Growth to date    Narrative:      Aerobic and anaerobic    Influenza A & B by Molecular [[id number]] Collected: 09/12/22 1726    Order Status: Completed Specimen: Nasopharyngeal Swab Updated: 09/12/22 1806     Influenza A, Molecular Negative     Influenza B, Molecular  Negative     Flu A & B Source Nasal swab

## 2022-09-15 NOTE — PROGRESS NOTES
[facility]  Nephrology  Progress Note    Patient Name: Tanya Madrid  MRN: [id number]  Admission Date: 9/12/2022  Hospital Length of Stay: 3 days  Attending Provider: Dontrell To MD   Primary Care Physician: Elva Ansari NP  Principal Problem:Sepsis    Subjective:     HPI: Pt was seen and examined. Chart, Labs and meds reviewed. Discussed with other providers. Pt is a 81 y/o female who was admitted with moderate to severe hypernatremia () and mild increase in s Cr form 0.8 to 1.2. Pt may have had aspiration pneumonia, resulting in worsening of her medical condition, and marked decrease in PO intake of nutrition. No direct h/o can be obtained. Sister at bed side who provided some h/o.        Interval History: Pt was seen and examined. Labs and meds reviewed. Discussed with other providers. No new events, no overall change. Spoke with pt;s daughter and sister.    Review of patient's allergies indicates:   Allergen Reactions    Sinemet [carbidopa-levodopa] Anxiety and Other (See Comments)     Becomes agitated and fidgety.    Xanax [alprazolam] Anxiety and Other (See Comments)     Becomes agitated and fidgety.      Current Facility-Administered Medications   Medication Frequency    acetaminophen tablet 650 mg Q4H PRN    albuterol-ipratropium 2.5 mg-0.5 mg/3 mL nebulizer solution 3 mL Q6H PRN    cefepime in dextrose 5 % IVPB 2 g Q12H    dextrose 10% bolus 125 mL PRN    dextrose 10% bolus 250 mL PRN    dextrose 5 % 1,000 mL with potassium chloride 20 mEq infusion Continuous    enoxaparin injection 40 mg Daily    glucagon (human recombinant) injection 1 mg PRN    insulin aspart U-100 pen 1-10 Units Q6H PRN    magnesium oxide tablet 800 mg PRN    magnesium oxide tablet 800 mg PRN    metronidazole IVPB 500 mg Q8H    naloxone 0.4 mg/mL injection 0.02 mg PRN    ondansetron injection 4 mg Q8H PRN    prochlorperazine injection Soln 5 mg Q6H PRN    QUEtiapine tablet 25 mg Nightly PRN     sodium chloride 0.9% flush 10 mL Q8H PRN    vancomycin - pharmacy to dose pharmacy to manage frequency    vancomycin in dextrose 5 % 1 gram/250 mL IVPB 1,000 mg Q24H       Objective:     Vital Signs (Most Recent):  Temp: 97.7 °F (36.5 °C) (09/15/22 1215)  Pulse: 74 (09/15/22 1215)  Resp: 17 (09/15/22 1215)  BP: (!) 148/65 (09/15/22 1215)  SpO2: (!) 93 % (09/15/22 1215)  O2 Device (Oxygen Therapy): nasal cannula (09/15/22 0848)   Vital Signs (24h Range):  Temp:  [97.4 °F (36.3 °C)-98.3 °F (36.8 °C)] 97.7 °F (36.5 °C)  Pulse:  [62-77] 74  Resp:  [15-22] 17  SpO2:  [92 %-99 %] 93 %  BP: ()/(52-69) 148/65     Weight: 52.5 kg (115 lb 11.9 oz) (09/13/22 0918)  Body mass index is 21.17 kg/m².  Body surface area is 1.52 meters squared.    I/O last 3 completed shifts:  In: 2041.3 [I.V.:1279.9; IV Piggyback:761.4]  Out: 950 [Urine:950]    Physical Exam  Vitals and nursing note reviewed.   Cardiovascular:      Rate and Rhythm: Normal rate and regular rhythm.      Pulses: Normal pulses.      Heart sounds: Normal heart sounds.   Pulmonary:      Effort: Pulmonary effort is normal.      Breath sounds: Normal breath sounds.   Abdominal:      Palpations: Abdomen is soft.      Tenderness: There is no abdominal tenderness.   Musculoskeletal:      Right lower leg: No edema.      Left lower leg: No edema.       Significant Labs: reviewed  BMP  Lab Results   Component Value Date     (H) 09/15/2022    K 3.1 (L) 09/15/2022     (H) 09/15/2022    CO2 26 09/15/2022    BUN 37 (H) 09/15/2022    CREATININE 0.8 09/15/2022    CALCIUM 8.0 (L) 09/15/2022    ANIONGAP 9 09/15/2022    ESTGFRAFRICA >60 07/30/2022    EGFRNONAA >60 07/30/2022     Lab Results   Component Value Date    WBC 4.84 09/15/2022    HGB 9.1 (L) 09/15/2022    HCT 31.9 (L) 09/15/2022    MCV 86 09/15/2022     09/15/2022     Blood cx's neg x 2    Significant Imaging: CXR reviewed    Assessment/Plan:     83 y/o female with hypernatremia and  FFT:     Hypernatremia  s Na has further improved (admit Na was 165) with hypotonic fluid replacement  Due to dehydration resulting in intravascular fluid depletion  Pertinent neg: no GI losses reported  Urinary specific gravity very high, c/w dehydration  Mild CODY due to dehydration, resolved. Cr normal  Hypokalemia: due to decreased intake and to IVF's: will replace by adding to the IVF's     Best way to replace free water is by the enteral route  Pt has a PEG tube but there is risk of aspiration, per primary team  Continue replacement with IV route: D5W or 1/4 NS IVF's OK  Monitor labs      Aspiration pneumonia  Despite PEG tube, has aspirated  Aspiration pneumonia  Blood cx neg  Abx reviewed  Agree with current mgmt        Failure to thrive in adult  Pt is advanced age  Bed bound  Pneumonia  Noted palliative care referral         Plans and recommendations:  As discussed above  Total time spent 40 minutes including time needed to review the records, the   patient evaluation, documentation, face-to-face discussion with the patient,   more than 50% of the time was spent on coordination of care and counseling.        Zeyad Mejia MD  Nephrology  [facility]

## 2022-09-15 NOTE — ASSESSMENT & PLAN NOTE
Family interested in home based palliative to avoid hospitalizations   She met with [facility] liasion and will meet with [facility] liasion to discuss options of home based palliative to prevent hospitalizations. Daughter is agreeable with this plan and would simply like to stablize patient and take her home and is not ready for comfort care measures yet.

## 2022-09-15 NOTE — PROGRESS NOTES
[facility] Medicine  Progress Note    Patient Name: Tanya Madrid  MRN: [id number]  Patient Class: IP- Inpatient   Admission Date: 9/12/2022  Length of Stay: 3 days  Attending Physician: Dontrell To MD  Primary Care Provider: Elva Ansari NP        Subjective:     Principal Problem:Sepsis        HPI:  Tanya Madrid is a 82 y.o. female patient with a PMHx of acute CHF, HLD, HTN, Parkinson's disease, stroke, throat mass, and cardiac arrest who presents to the Emergency Department after being referred to the ED by ADELAIDA Sears (Transplant) for further evaluation of hypotension and fever. Per the pt's daughter, the pt went into cardiac arrest in March of this year and has never fully recovered, but the pt has been more fatigued for the past 3 to 4 days. The pt's daughter also reports that the pt has had blood pressures in the 80s and 90s systolic, bilateral hand swelling, a fever, and a cough. The pt's daughter gave the pt Tylenol for her fever. Pt's daughter reports that the pt's urine output and smell have been normal. Also, the pt's daughter reports that she did not give the pt blood pressure medication this morning.  V/S Temp max 102.1, pulse 96, B/P 140/79, RR 28.   Labs note left shift, D dimer 15.28 (CTA negative for PE), Na 165, albumin 2.2, BNP 3968, lactate 3.3  Pt given 1 liter fluid bolus, Vancomycin and Cefepime.   Diagnosis include: Sepsis, Aspiration PNA, Failure to thrive and hypernatremia            Overview/Hospital Course:  Admitted for hypernatremia and sepsis. Initiated on D5 gentle IVF. Cautious in treating hyponatremia due to concern for pontine myelinolysis. CT chest negative for PE but showed: Areas of consolidation within the bilateral lower lobes and right middle lobe concerning for multifocal airspace disease or aspiration. Explained to family that there is always a risk of aspiration despite presence of PEG and NPO status. On IV abx. KUB does not show  signs of PEG migration. Wound care consulted for buttock wound and family would like to defer debridement at this time. Palliative consulted and family would like to proceed with treatment for now.   Plan to treat hypernatremia and PNA at this time. HOLD TF due to aspiration.  PICC line ordered and can also be used for PPN/TPN nutrition to avoid further aspiration    9/14: Neprhology consulted to assist with Hypernatremia.  SLP provided education material to the family explaining that having a PEG in place does not completely negate the risk of aspiration.  Plan to have an educational meeting with Palliative at home liasion with [facility] tomorrow to help achieve goal of decreasing hospitalizations. Mentation wise patient remains nonverbal. PICC in place and BP more stable. Will incraese D5 infusion to rate for now and wait palliative meeting tomorrow.     9/15: Hypernatremia improved Na 151. Decision was made to resume TF and risk of breathing deterioration due to aspiration explained in detail and discuused with daughter. She met with [facility] to prevent hospitalizations. Daughter is agreeable with this plan and would simply like to stablize patient and take her home and is not ready for comfort care measures yet.       Interval History: Hypernatremia improved Na 151. Decision was made to resume TF and risk of breathing deterioration due to aspiration explained in detail and discuused with daughter. She met with [facility] liasion and will meet with [facility] liasion to discuss options of home based palliative to prevent hospitalizations. Daughter is agreeable with this plan and would simly like to stablize patient and take her home and is not ready for comfort care measures yet.     Review of Systems   Unable to perform ROS: Patient nonverbal   Objective:     Vital Signs (Most Recent):  Temp: 97.7 °F (36.5 °C) (09/15/22  1215)  Pulse: 71 (09/15/22 1310)  Resp: 17 (09/15/22 1215)  BP: (!) 148/65 (09/15/22 1215)  SpO2: (!) 93 % (09/15/22 1215)   Vital Signs (24h Range):  Temp:  [97.4 °F (36.3 °C)-98.3 °F (36.8 °C)] 97.7 °F (36.5 °C)  Pulse:  [62-77] 71  Resp:  [15-18] 17  SpO2:  [93 %-99 %] 93 %  BP: ()/(52-66) 148/65     Weight: 52.5 kg (115 lb 11.9 oz)  Body mass index is 21.17 kg/m².    Intake/Output Summary (Last 24 hours) at 9/15/2022 1645  Last data filed at 9/15/2022 1309  Gross per 24 hour   Intake 1436.07 ml   Output 200 ml   Net 1236.07 ml      Physical Exam  Vitals and nursing note reviewed.   Constitutional:       Appearance: She is ill-appearing.      Comments: Patient is non verbal   HENT:      Head: Normocephalic and atraumatic.      Mouth/Throat:      Mouth: Mucous membranes are moist.   Eyes:      General:         Right eye: No discharge.         Left eye: No discharge.   Cardiovascular:      Rate and Rhythm: Normal rate.   Pulmonary:      Breath sounds: Rales present.      Comments: Coarse breath sounds  Abdominal:      Tenderness: There is no abdominal tenderness.      Comments: has PEG tube   Musculoskeletal:         General: Swelling present.      Cervical back: Normal range of motion.      Right lower leg: No edema.      Left lower leg: No edema.   Skin:     General: Skin is warm.   Neurological:      Mental Status: Mental status is at baseline. She is disoriented.       Significant Labs: All pertinent labs within the past 24 hours have been reviewed.    Significant Imaging: I have reviewed all pertinent imaging results/findings within the past 24 hours.      Assessment/Plan:      * Sepsis  This patient does have evidence of infective focus - aspiration pneumonia  My overall impression is sepsis. Vital signs were reviewed and noted in progress note.  Antibiotics given-   Antibiotics (From admission, onward)    Start     Stop Route Frequency Ordered    09/15/22 2100  mupirocin 2 % ointment         09/20 2059  Nasl 2 times daily 09/15/22 1643    09/15/22 1830  vancomycin in dextrose 5 % 1 gram/250 mL IVPB 1,000 mg         -- IV Every 24 hours (non-standard times) 09/14/22 1916    09/14/22 1300  metronidazole IVPB 500 mg         -- IV Every 8 hours (non-standard times) 09/14/22 1156    09/14/22 0600  cefepime in dextrose 5 % IVPB 2 g         -- IV Every 12 hours (non-standard times) 09/13/22 1936    09/12/22 1900  vancomycin - pharmacy to dose  (vancomycin IVPB)        See Hyperspace for full Linked Orders Report.    -- IV pharmacy to manage frequency 09/12/22 1801        Cultures were taken-   Microbiology Results (last 7 days)     Procedure Component Value Units Date/Time    Blood culture x two cultures. Draw prior to antibiotics. [[id number]] Collected: 09/12/22 1619    Order Status: Completed Specimen: Blood from Peripheral, Upper Arm, Left Updated: 09/15/22 0613     Blood Culture, Routine No Growth to date      No Growth to date      No Growth to date    Narrative:      Aerobic and anaerobic    Blood culture x two cultures. Draw prior to antibiotics. [[id number]] Collected: 09/12/22 1619    Order Status: Completed Specimen: Blood from Peripheral, Antecubital, Left Updated: 09/15/22 0613     Blood Culture, Routine No Growth to date      No Growth to date      No Growth to date    Narrative:      Aerobic and anaerobic    Influenza A & B by Molecular [[id number]] Collected: 09/12/22 1726    Order Status: Completed Specimen: Nasopharyngeal Swab Updated: 09/12/22 1808     Influenza A, Molecular Negative     Influenza B, Molecular Negative     Flu A & B Source Nasal swab        Latest lactate reviewed, they are-  Recent Labs   Lab 09/12/22 1908 09/12/22  2158   LACTATE 3.3* 2.3*       Organ dysfunction indicated by Encephalopathy   Source-   pneumonia    Source control Achieved by antibiotics    9/13:  CT chest negative for PE but showed: Areas of consolidation within the bilateral lower lobes and right middle lobe concerning  for multifocal airspace disease or aspiration. Explained to family that there is always a risk of aspiration despite presence of PEG and NPO status  On IV abx   KUB does not show signs of PEG migration   Wound care consulted for buttock wound and family would like to defer debridement at this time   Palliative consulted and family would like to proceed with treatment for now.   Plan to treat hypernatremia and PNA at this time   HOLD TF due to aspiration.  PICC line ordered and can also be used for PPN/TPN nutrition to avoid further aspiration temporarily    9/14:  Neprhology consulted to assist with Hypernatremia.  SLP provided education material to the family explaining that having a PEG in place does not completely negate the risk of aspiration.  Plan to have an educational meeting with Palliative at home liasion with [facility] hospice tomorrow to help achieve goal of decreasing hospitalizations. Mentation wise patient remains nonverbal. PICC in place and BP more stable. Will incraese D5 infusion to rate for now and wait palliative meeting tomorrow.     9/15:  Hypernatremia improved Na 151   Decision was made to resume TF and risk of breathing deterioration due to aspiration explained in detail and discuused with daughter.  Cont IV abx   BC x2 NGT    Failure to thrive in adult  Palliative Care Consult      Debility   Family interested in home based palliative to avoid hospitalizations   She met with [facility] liasion and will meet with [facility] liasion to discuss options of home based palliative to prevent hospitalizations. Daughter is agreeable with this plan and would simply like to stablize patient and take her home and is not ready for comfort care measures yet.         Hypernatremia  Continue gentle IV hydration  Na improved 165>161    9/14:    165>160>159  Neprhology consulted to assist with Hypernatremia.  SLP provided education material to the family explaining that having a PEG in place does not completely  "negate the risk of aspiration.  Plan to have an educational meeting with Palliative at home program  liasion with [facility] hospice tomorrow to help achieve goal of decreasing hospitalizations. Mentation wise patient remains nonverbal. PICC in place and BP more stable. Will incraese D5 infusion to rate for now and wait palliative meeting tomorrow.     9/15:  165>160>159>151  Improved  Free water flushes with TF  2L free water deficit per calculations           Palliative care by specialist  DNR  Family would like to pursue all treatment at this time       Aspiration pneumonia  -IV antibioitcs    9/13:   CT chest negative for PE but showed: Areas of consolidation within the bilateral lower lobes and right middle lobe concerning for multifocal airspace disease or aspiration. Explained to family that there is always a risk of aspiration despite presence of PEG and NPO status.   On IV abx   KUB does not show signs of PEG migrationPlan to treat hypernatremia and PNA at this time. HOLD TF due to aspiration.  PICC line ordered and can also be used for PPN/TPN nutrition to avoid further aspiration temporarily     9/14:  Cont IV abx    9/15:  Repeat CXR today showed overall worsening I.e: There has been slight interval worsening of the appearance of the left lung.  There is a moderate amount of alveolar consolidation scattered throughout the midportion of the left lung. There is a moderate amount of alveolar consolidation scattered throughout the inferior half of the right lung.  An infectious process cannot be excluded.    There is opacification of the right costophrenic angle.  This is characteristic of a pleural effusion.  The left costophrenic angle is not well seen.    There is moderate cardiomegaly  Risks of resuming TF explained in detail to patient's daughter--she states she does not want her mother "to starve to death"  Cont treating PNA with IV abx          Acute on chronic combined systolic and diastolic CHF " (congestive heart failure)  BNP 3900 and slightly lower than previous values  Supplemental oxygen to maintain sats > 92 %  Continue Entresto as B/P tolerates  Decompensation ?   - hold lasix at this time  Pt appears dehydrated due to hypernatremia    History of CVA (cerebrovascular accident)  Pt is nonverbal, W/C bound  Neuro checks  Pt is not prescribed ASA or STATIN        Parkinson disease  Resume meds when appropriate         VTE Risk Mitigation (From admission, onward)         Ordered     enoxaparin injection 40 mg  Daily         09/13/22 1936     IP VTE HIGH RISK PATIENT  Once         09/12/22 2137     Place sequential compression device  Until discontinued         09/12/22 2137                Discharge Planning   PARMINDER:      Code Status: DNR   Is the patient medically ready for discharge?:     Reason for patient still in hospital (select all that apply): Patient trending condition  Discharge Plan A: Home Health                  Jeffrey Kramer NP  Department of Hospital Medicine   [facility]

## 2022-09-15 NOTE — ASSESSMENT & PLAN NOTE
Continue gentle IV hydration  Na improved 165>161    9/14:    165>160>159  Neprhology consulted to assist with Hypernatremia.  SLP provided education material to the family explaining that having a PEG in place does not completely negate the risk of aspiration.  Plan to have an educational meeting with Palliative at home program  liasion with [facility] hospice tomorrow to help achieve goal of decreasing hospitalizations. Mentation wise patient remains nonverbal. PICC in place and BP more stable. Will incraese D5 infusion to rate for now and wait palliative meeting tomorrow.     9/15:  165>160>159>151  Improved  Free water flushes with TF  2L free water deficit per calculations

## 2022-09-15 NOTE — SUBJECTIVE & OBJECTIVE
Interval History: Hypernatremia improved Na 151. Decision was made to resume TF and risk of breathing deterioration due to aspiration explained in detail and discuused with daughter. She met with [facility] liasion and will meet with [facility] liasion to discuss options of home based palliative to prevent hospitalizations. Daughter is agreeable with this plan and would simly like to stablize patient and take her home and is not ready for comfort care measures yet.     Review of Systems   Unable to perform ROS: Patient nonverbal   Objective:     Vital Signs (Most Recent):  Temp: 97.7 °F (36.5 °C) (09/15/22 1215)  Pulse: 71 (09/15/22 1310)  Resp: 17 (09/15/22 1215)  BP: (!) 148/65 (09/15/22 1215)  SpO2: (!) 93 % (09/15/22 1215)   Vital Signs (24h Range):  Temp:  [97.4 °F (36.3 °C)-98.3 °F (36.8 °C)] 97.7 °F (36.5 °C)  Pulse:  [62-77] 71  Resp:  [15-18] 17  SpO2:  [93 %-99 %] 93 %  BP: ()/(52-66) 148/65     Weight: 52.5 kg (115 lb 11.9 oz)  Body mass index is 21.17 kg/m².    Intake/Output Summary (Last 24 hours) at 9/15/2022 1645  Last data filed at 9/15/2022 1309  Gross per 24 hour   Intake 1436.07 ml   Output 200 ml   Net 1236.07 ml      Physical Exam  Vitals and nursing note reviewed.   Constitutional:       Appearance: She is ill-appearing.      Comments: Patient is non verbal   HENT:      Head: Normocephalic and atraumatic.      Mouth/Throat:      Mouth: Mucous membranes are moist.   Eyes:      General:         Right eye: No discharge.         Left eye: No discharge.   Cardiovascular:      Rate and Rhythm: Normal rate.   Pulmonary:      Breath sounds: Rales present.      Comments: Coarse breath sounds  Abdominal:      Tenderness: There is no abdominal tenderness.      Comments: has PEG tube   Musculoskeletal:         General: Swelling present.      Cervical back: Normal range of motion.      Right lower leg: No edema.      Left lower leg: No edema.   Skin:     General: Skin is warm.   Neurological:      Mental  Status: Mental status is at baseline. She is disoriented.       Significant Labs: All pertinent labs within the past 24 hours have been reviewed.    Significant Imaging: I have reviewed all pertinent imaging results/findings within the past 24 hours.

## 2022-09-15 NOTE — ASSESSMENT & PLAN NOTE
This patient does have evidence of infective focus - aspiration pneumonia  My overall impression is sepsis. Vital signs were reviewed and noted in progress note.  Antibiotics given-   Antibiotics (From admission, onward)    Start     Stop Route Frequency Ordered    09/15/22 2100  mupirocin 2 % ointment         09/20 2059 Nasl 2 times daily 09/15/22 1643    09/15/22 1830  vancomycin in dextrose 5 % 1 gram/250 mL IVPB 1,000 mg         -- IV Every 24 hours (non-standard times) 09/14/22 1916    09/14/22 1300  metronidazole IVPB 500 mg         -- IV Every 8 hours (non-standard times) 09/14/22 1156    09/14/22 0600  cefepime in dextrose 5 % IVPB 2 g         -- IV Every 12 hours (non-standard times) 09/13/22 1936    09/12/22 1900  vancomycin - pharmacy to dose  (vancomycin IVPB)        See Hyperspace for full Linked Orders Report.    -- IV pharmacy to manage frequency 09/12/22 1801        Cultures were taken-   Microbiology Results (last 7 days)     Procedure Component Value Units Date/Time    Blood culture x two cultures. Draw prior to antibiotics. [[id number]] Collected: 09/12/22 1619    Order Status: Completed Specimen: Blood from Peripheral, Upper Arm, Left Updated: 09/15/22 0613     Blood Culture, Routine No Growth to date      No Growth to date      No Growth to date    Narrative:      Aerobic and anaerobic    Blood culture x two cultures. Draw prior to antibiotics. [[id number]] Collected: 09/12/22 1619    Order Status: Completed Specimen: Blood from Peripheral, Antecubital, Left Updated: 09/15/22 0613     Blood Culture, Routine No Growth to date      No Growth to date      No Growth to date    Narrative:      Aerobic and anaerobic    Influenza A & B by Molecular [[id number]] Collected: 09/12/22 1726    Order Status: Completed Specimen: Nasopharyngeal Swab Updated: 09/12/22 1808     Influenza A, Molecular Negative     Influenza B, Molecular Negative     Flu A & B Source Nasal swab        Latest lactate reviewed, they  are-  Recent Labs   Lab 09/12/22  1908 09/12/22  2158   LACTATE 3.3* 2.3*       Organ dysfunction indicated by Encephalopathy   Source-   pneumonia    Source control Achieved by antibiotics    9/13:  CT chest negative for PE but showed: Areas of consolidation within the bilateral lower lobes and right middle lobe concerning for multifocal airspace disease or aspiration. Explained to family that there is always a risk of aspiration despite presence of PEG and NPO status  On IV abx   KUB does not show signs of PEG migration   Wound care consulted for buttock wound and family would like to defer debridement at this time   Palliative consulted and family would like to proceed with treatment for now.   Plan to treat hypernatremia and PNA at this time   HOLD TF due to aspiration.  PICC line ordered and can also be used for PPN/TPN nutrition to avoid further aspiration temporarily    9/14:  Neprhology consulted to assist with Hypernatremia.  SLP provided education material to the family explaining that having a PEG in place does not completely negate the risk of aspiration.  Plan to have an educational meeting with Palliative at home liasion with [facility] hospice tomorrow to help achieve goal of decreasing hospitalizations. Mentation wise patient remains nonverbal. PICC in place and BP more stable. Will incraese D5 infusion to rate for now and wait palliative meeting tomorrow.     9/15:  Hypernatremia improved Na 151   Decision was made to resume TF and risk of breathing deterioration due to aspiration explained in detail and discuused with daughter.  Cont IV abx   BC x2 NGT

## 2022-09-15 NOTE — ASSESSMENT & PLAN NOTE
"-IV antibioitcs    9/13:   CT chest negative for PE but showed: Areas of consolidation within the bilateral lower lobes and right middle lobe concerning for multifocal airspace disease or aspiration. Explained to family that there is always a risk of aspiration despite presence of PEG and NPO status.   On IV abx   KUB does not show signs of PEG migrationPlan to treat hypernatremia and PNA at this time. HOLD TF due to aspiration.  PICC line ordered and can also be used for PPN/TPN nutrition to avoid further aspiration temporarily     9/14:  Cont IV abx    9/15:  Repeat CXR today showed overall worsening I.e: There has been slight interval worsening of the appearance of the left lung.  There is a moderate amount of alveolar consolidation scattered throughout the midportion of the left lung. There is a moderate amount of alveolar consolidation scattered throughout the inferior half of the right lung.  An infectious process cannot be excluded.    There is opacification of the right costophrenic angle.  This is characteristic of a pleural effusion.  The left costophrenic angle is not well seen.    There is moderate cardiomegaly  Risks of resuming TF explained in detail to patient's daughter--she states she does not want her mother "to starve to death"  Cont treating PNA with IV abx        "

## 2022-09-15 NOTE — SUBJECTIVE & OBJECTIVE
Interval History: Pt was seen and examined. Labs and meds reviewed. Discussed with other providers. No new events, no overall change. Spoke with pt;s daughter and sister.    Review of patient's allergies indicates:   Allergen Reactions    Sinemet [carbidopa-levodopa] Anxiety and Other (See Comments)     Becomes agitated and fidgety.    Xanax [alprazolam] Anxiety and Other (See Comments)     Becomes agitated and fidgety.      Current Facility-Administered Medications   Medication Frequency    acetaminophen tablet 650 mg Q4H PRN    albuterol-ipratropium 2.5 mg-0.5 mg/3 mL nebulizer solution 3 mL Q6H PRN    cefepime in dextrose 5 % IVPB 2 g Q12H    dextrose 10% bolus 125 mL PRN    dextrose 10% bolus 250 mL PRN    dextrose 5 % 1,000 mL with potassium chloride 20 mEq infusion Continuous    enoxaparin injection 40 mg Daily    glucagon (human recombinant) injection 1 mg PRN    insulin aspart U-100 pen 1-10 Units Q6H PRN    magnesium oxide tablet 800 mg PRN    magnesium oxide tablet 800 mg PRN    metronidazole IVPB 500 mg Q8H    naloxone 0.4 mg/mL injection 0.02 mg PRN    ondansetron injection 4 mg Q8H PRN    prochlorperazine injection Soln 5 mg Q6H PRN    QUEtiapine tablet 25 mg Nightly PRN    sodium chloride 0.9% flush 10 mL Q8H PRN    vancomycin - pharmacy to dose pharmacy to manage frequency    vancomycin in dextrose 5 % 1 gram/250 mL IVPB 1,000 mg Q24H       Objective:     Vital Signs (Most Recent):  Temp: 97.7 °F (36.5 °C) (09/15/22 1215)  Pulse: 74 (09/15/22 1215)  Resp: 17 (09/15/22 1215)  BP: (!) 148/65 (09/15/22 1215)  SpO2: (!) 93 % (09/15/22 1215)  O2 Device (Oxygen Therapy): nasal cannula (09/15/22 0848)   Vital Signs (24h Range):  Temp:  [97.4 °F (36.3 °C)-98.3 °F (36.8 °C)] 97.7 °F (36.5 °C)  Pulse:  [62-77] 74  Resp:  [15-22] 17  SpO2:  [92 %-99 %] 93 %  BP: ()/(52-69) 148/65     Weight: 52.5 kg (115 lb 11.9 oz) (09/13/22 0918)  Body mass index is 21.17 kg/m².  Body surface area is 1.52 meters  squared.    I/O last 3 completed shifts:  In: 2041.3 [I.V.:1279.9; IV Piggyback:761.4]  Out: 950 [Urine:950]    Physical Exam  Vitals and nursing note reviewed.   Cardiovascular:      Rate and Rhythm: Normal rate and regular rhythm.      Pulses: Normal pulses.      Heart sounds: Normal heart sounds.   Pulmonary:      Effort: Pulmonary effort is normal.      Breath sounds: Normal breath sounds.   Abdominal:      Palpations: Abdomen is soft.      Tenderness: There is no abdominal tenderness.   Musculoskeletal:      Right lower leg: No edema.      Left lower leg: No edema.       Significant Labs: reviewed  BMP  Lab Results   Component Value Date     (H) 09/15/2022    K 3.1 (L) 09/15/2022     (H) 09/15/2022    CO2 26 09/15/2022    BUN 37 (H) 09/15/2022    CREATININE 0.8 09/15/2022    CALCIUM 8.0 (L) 09/15/2022    ANIONGAP 9 09/15/2022    ESTGFRAFRICA >60 07/30/2022    EGFRNONAA >60 07/30/2022     Lab Results   Component Value Date    WBC 4.84 09/15/2022    HGB 9.1 (L) 09/15/2022    HCT 31.9 (L) 09/15/2022    MCV 86 09/15/2022     09/15/2022     Blood cx's neg x 2    Significant Imaging: CXR reviewed

## 2022-09-15 NOTE — PLAN OF CARE
Pt remains free from falls/injuries this shift. Safety precautions maintained. No signs of pain. No s/s of acute distress noted. Will continue to monitor. Chart check completed.

## 2022-09-15 NOTE — CONSULTS
Per consult, referrals sent to [facility] and [facility] via [facility].    Update: SHARI received a message from Ana at [facility]. Per ana she spoke with the patients daughter and they will plan to meet tomorrow morning at 9AM.  09/15/2022    3:08PM

## 2022-09-15 NOTE — ASSESSMENT & PLAN NOTE
83 y/o female with hypernatremia and FFT:     Hypernatremia  s Na has improved (admit Na was 165) with hypotonic fluid replacement  Due to dehydration resulting in intravascular fluid depletion  Pertinent neg: no GI losses reported  Urinary specific gravity very high, c/w dehydration  Has mild CODY due to dehydration     Best way to replace free water is by the enteral route  Pt has a PEG tube but there is risk of aspiration, per primary team  Continue replacement with IV route: D5W or 1/4 NS IVF's OK  Monitor labs      Aspiration pneumonia  Despite PEG tube, has aspirated  Aspiration pneumonia  Blood cx neg  Abx reviewed  Agree with current mgmt        Failure to thrive in adult  Pt is advanced age  Bed bound  Pneumonia  Noted palliative care referral         Plans and recommendations:  As discussed above  Total time spent 70 minutes including time needed to review the records, the   patient evaluation, documentation, face-to-face discussion with the patient,   more than 50% of the time was spent on coordination of care and counseling.

## 2022-09-16 ENCOUNTER — DOCUMENT SCAN (OUTPATIENT)
Dept: HOME HEALTH SERVICES | Facility: HOSPITAL | Age: 82
End: 2022-09-16
Payer: MEDICARE

## 2022-09-16 PROBLEM — E83.51 HYPOCALCEMIA: Status: ACTIVE | Noted: 2022-09-16

## 2022-09-16 PROBLEM — D69.6 THROMBOCYTOPENIA: Status: ACTIVE | Noted: 2022-09-16

## 2022-09-16 PROBLEM — Z86.69 HISTORY OF PARKINSON'S DISEASE: Status: ACTIVE | Noted: 2022-09-16

## 2022-09-16 PROBLEM — R13.10 DYSPHAGIA: Status: ACTIVE | Noted: 2022-09-16

## 2022-09-16 PROBLEM — R65.20 SEVERE SEPSIS: Status: ACTIVE | Noted: 2022-09-12

## 2022-09-16 LAB
ANION GAP SERPL CALC-SCNC: 9 MMOL/L (ref 8–16)
BUN SERPL-MCNC: 30 MG/DL (ref 8–23)
CALCIUM SERPL-MCNC: 7.4 MG/DL (ref 8.7–10.5)
CHLORIDE SERPL-SCNC: 116 MMOL/L (ref 95–110)
CO2 SERPL-SCNC: 24 MMOL/L (ref 23–29)
CREAT SERPL-MCNC: 0.8 MG/DL (ref 0.5–1.4)
EST. GFR  (NO RACE VARIABLE): >60 ML/MIN/1.73 M^2
GLUCOSE SERPL-MCNC: 146 MG/DL (ref 70–110)
MAGNESIUM SERPL-MCNC: 2.2 MG/DL (ref 1.6–2.6)
PHOSPHATE SERPL-MCNC: 2.7 MG/DL (ref 2.7–4.5)
POCT GLUCOSE: 118 MG/DL (ref 70–110)
POCT GLUCOSE: 165 MG/DL (ref 70–110)
POCT GLUCOSE: 172 MG/DL (ref 70–110)
POCT GLUCOSE: 182 MG/DL (ref 70–110)
POTASSIUM SERPL-SCNC: 3.8 MMOL/L (ref 3.5–5.1)
SODIUM SERPL-SCNC: 149 MMOL/L (ref 136–145)

## 2022-09-16 PROCEDURE — 25000003 PHARM REV CODE 250: Performed by: NURSE PRACTITIONER

## 2022-09-16 PROCEDURE — S0030 INJECTION, METRONIDAZOLE: HCPCS | Performed by: NURSE PRACTITIONER

## 2022-09-16 PROCEDURE — 63600175 PHARM REV CODE 636 W HCPCS: Performed by: FAMILY MEDICINE

## 2022-09-16 PROCEDURE — 25000003 PHARM REV CODE 250: Performed by: FAMILY MEDICINE

## 2022-09-16 PROCEDURE — 83735 ASSAY OF MAGNESIUM: CPT | Performed by: NURSE PRACTITIONER

## 2022-09-16 PROCEDURE — 27000221 HC OXYGEN, UP TO 24 HOURS

## 2022-09-16 PROCEDURE — 63600175 PHARM REV CODE 636 W HCPCS: Performed by: NURSE PRACTITIONER

## 2022-09-16 PROCEDURE — 99900035 HC TECH TIME PER 15 MIN (STAT)

## 2022-09-16 PROCEDURE — 80048 BASIC METABOLIC PNL TOTAL CA: CPT | Performed by: NURSE PRACTITIONER

## 2022-09-16 PROCEDURE — 21400001 HC TELEMETRY ROOM

## 2022-09-16 PROCEDURE — 99233 PR SUBSEQUENT HOSPITAL CARE,LEVL III: ICD-10-PCS | Mod: ,,, | Performed by: INTERNAL MEDICINE

## 2022-09-16 PROCEDURE — 84100 ASSAY OF PHOSPHORUS: CPT | Performed by: NURSE PRACTITIONER

## 2022-09-16 PROCEDURE — 94761 N-INVAS EAR/PLS OXIMETRY MLT: CPT

## 2022-09-16 PROCEDURE — 99233 SBSQ HOSP IP/OBS HIGH 50: CPT | Mod: ,,, | Performed by: INTERNAL MEDICINE

## 2022-09-16 RX ORDER — ASCORBIC ACID 500 MG
500 TABLET ORAL NIGHTLY
Status: DISCONTINUED | OUTPATIENT
Start: 2022-09-16 | End: 2022-09-17 | Stop reason: HOSPADM

## 2022-09-16 RX ORDER — ZINC SULFATE 50(220)MG
220 CAPSULE ORAL NIGHTLY
Status: DISCONTINUED | OUTPATIENT
Start: 2022-09-16 | End: 2022-09-17 | Stop reason: HOSPADM

## 2022-09-16 RX ADMIN — CEFEPIME HYDROCHLORIDE 2 G: 2 INJECTION, SOLUTION INTRAVENOUS at 05:09

## 2022-09-16 RX ADMIN — INSULIN ASPART 2 UNITS: 100 INJECTION, SOLUTION INTRAVENOUS; SUBCUTANEOUS at 05:09

## 2022-09-16 RX ADMIN — OXYCODONE HYDROCHLORIDE AND ACETAMINOPHEN 500 MG: 500 TABLET ORAL at 09:09

## 2022-09-16 RX ADMIN — VANCOMYCIN HYDROCHLORIDE 1000 MG: 1 INJECTION, POWDER, LYOPHILIZED, FOR SOLUTION INTRAVENOUS at 05:09

## 2022-09-16 RX ADMIN — CEFEPIME HYDROCHLORIDE 2 G: 2 INJECTION, SOLUTION INTRAVENOUS at 06:09

## 2022-09-16 RX ADMIN — METRONIDAZOLE 500 MG: 500 SOLUTION INTRAVENOUS at 09:09

## 2022-09-16 RX ADMIN — METRONIDAZOLE 500 MG: 500 SOLUTION INTRAVENOUS at 05:09

## 2022-09-16 RX ADMIN — ENOXAPARIN SODIUM 40 MG: 100 INJECTION SUBCUTANEOUS at 05:09

## 2022-09-16 RX ADMIN — METRONIDAZOLE 500 MG: 500 SOLUTION INTRAVENOUS at 12:09

## 2022-09-16 RX ADMIN — PANTOPRAZOLE SODIUM 40 MG: 40 GRANULE, DELAYED RELEASE ORAL at 09:09

## 2022-09-16 RX ADMIN — ZINC SULFATE 220 MG (50 MG) CAPSULE 220 MG: CAPSULE at 09:09

## 2022-09-16 RX ADMIN — POTASSIUM CHLORIDE: 149 INJECTION, SOLUTION, CONCENTRATE INTRAVENOUS at 12:09

## 2022-09-16 NOTE — PROGRESS NOTES
[facility]  Nephrology  Progress Note    Patient Name: Tanya Madrid  MRN: [id number]  Admission Date: 9/12/2022  Hospital Length of Stay: 4 days  Attending Provider: Dontrell To MD   Primary Care Physician: Elva Ansari NP  Principal Problem:Sepsis    Subjective:     HPI: Pt was seen and examined. Chart, Labs and meds reviewed. Discussed with other providers. Pt is a 83 y/o female who was admitted with moderate to severe hypernatremia () and mild increase in s Cr form 0.8 to 1.2. Pt may have had aspiration pneumonia, resulting in worsening of her medical condition, and marked decrease in PO intake of nutrition. No direct h/o can be obtained. Sister at bed side who provided some h/o.      No new subjective & objective note has been filed under this hospital service since the last note was generated.      *For 9/16/22: Seen and examined; first visit with patient; family at bedside. Meds and labs reviewed.       Chest CTA BL  CV RRR without murmur  Ext trace edema  Abd soft NT ND  Neuro - lethargic  Assessment/Plan:     83 y/o female with hypernatremia and FFT:     Hypernatremia  s Na has further improved (admit Na was 165) with hypotonic fluid replacement  Due to dehydration resulting in intravascular fluid depletion  Pertinent neg: no GI losses reported  Urinary specific gravity very high, c/w dehydration  Mild CODY due to dehydration, resolved. Cr normal  Hypokalemia: due to decreased intake and to IVF's: will replace by adding to the IVF's     Best way to replace free water is by the enteral route  Pt has a PEG tube but there is risk of aspiration, per primary team  Continue replacement with IV route: D5W or 1/4 NS IVF's OK  Monitor labs      Aspiration pneumonia  Despite PEG tube, has aspirated  Aspiration pneumonia  Blood cx neg  Abx reviewed  Agree with current mgmt        Failure to thrive in adult  Pt is advanced age  Bed bound  Pneumonia  Noted palliative care referral         Plans and  recommendations:  As discussed above  Total time spent 40 minutes including time needed to review the records, the   patient evaluation, documentation, face-to-face discussion with the patient,   more than 50% of the time was spent on coordination of care and counseling.    *For 9/16/22: Ask dietitian to optimize caloric intake; patient only getting 1200 calories daily on current regimen; continue free water via IVF until better/stable Na range. Prog appears poor overall.     Ramesh Silva MD  Nephrology  [facility]

## 2022-09-16 NOTE — ASSESSMENT & PLAN NOTE
"Pt is nonverbal, W/C bound  Neuro checks  Pt is not prescribed ASA or STATIN  9/16 Remains debilitated, Daughter reports Hx recent "Cardiac arrest" earlier  this year      "

## 2022-09-16 NOTE — NURSING
Pt remains free from falls/injuries this shift. Safety precautions maintained. No signs of pain, Bolus feeds resumed at 125ml Q4, pt tolerating so far with no vomiting or residual. No s/s of acute distress noted. Will continue to monitor. Chart check completed.

## 2022-09-16 NOTE — PROGRESS NOTES
[facility] Medicine  Progress Note    Patient Name: Tanya Madrid  MRN: [id number]  Patient Class: IP- Inpatient   Admission Date: 9/12/2022  Length of Stay: 4 days  Attending Physician: Dontrell To MD  Primary Care Provider: Elva Ansari NP      Subjective:     Principal Problem:Severe sepsis secondary to aspiration pneumonia    HPI:  Tanya Madrid is a 82 y.o. female patient with a PMHx of acute CHF, HLD, HTN, Parkinson's disease, stroke, throat mass, and cardiac arrest who presents to the Emergency Department after being referred to the ED by ADELAIDA Sears (Transplant) for further evaluation of hypotension and fever. Per the pt's daughter, the pt went into cardiac arrest in March of this year and has never fully recovered, but the pt has been more fatigued for the past 3 to 4 days. The pt's daughter also reports that the pt has had blood pressures in the 80s and 90s systolic, bilateral hand swelling, a fever, and a cough. The pt's daughter gave the pt Tylenol for her fever. Pt's daughter reports that the pt's urine output and smell have been normal. Also, the pt's daughter reports that she did not give the pt blood pressure medication this morning.  V/S Temp max 102.1, pulse 96, B/P 140/79, RR 28.   Labs note left shift, D dimer 15.28 (CTA negative for PE), Na 165, albumin 2.2, BNP 3968, lactate 3.3  Pt given 1 liter fluid bolus, Vancomycin and Cefepime.   Diagnosis include: Sepsis, Aspiration PNA, Failure to thrive and hypernatremia          Overview/Hospital Course:  Admitted for hypernatremia and sepsis. Initiated on D5 gentle IVF. Cautious in treating hyponatremia due to concern for pontine myelinolysis. CT chest negative for PE but showed: Areas of consolidation within the bilateral lower lobes and right middle lobe concerning for multifocal airspace disease or aspiration. Explained to family that there is always a risk of aspiration despite presence of PEG and NPO  status. On IV abx. KUB does not show signs of PEG migration. Wound care consulted for buttock wound and family would like to defer debridement at this time. Palliative consulted and family would like to proceed with treatment for now.   Plan to treat hypernatremia and PNA at this time. HOLD TF due to aspiration.  PICC line ordered and can also be used for PPN/TPN nutrition to avoid further aspiration    9/14: Neprhology consulted to assist with Hypernatremia.  SLP provided education material to the family explaining that having a PEG in place does not completely negate the risk of aspiration.  Plan to have an educational meeting with Palliative at home liasion with [facility] hospice tomorrow to help achieve goal of decreasing hospitalizations. Mentation wise patient remains nonverbal. PICC in place and BP more stable. Will incraese D5 infusion to rate for now and wait palliative meeting tomorrow.     9/15: Hypernatremia improved Na 151. Decision was made to resume TF and risk of breathing deterioration due to aspiration explained in detail and discuused with daughter. She met with [facility] liasion and will meet with [facility] liasion to discuss options of home based palliative to prevent hospitalizations. Daughter is agreeable with this plan and would simply like to stablize patient and take her home and is not ready for comfort care measures yet.     9/16  Patient remains in decline. Long discussion had with daughter, Adenike Lemus, at bedside and updated on patient's status and discharge plan of care. Explained to daughter that patient with ongoing aspiration pneumonia with high risk for recurrence and she is not a candidate for continuous tube feeding pump or TPN.  Discussed with daughter that patient's prognosis poor/ grim and explained to her that she is in decline. Awaiting daughter to choose Home with Palliative care vs Hospice.       Interval History:  Patient remains in decline. Long discussion had with  daughter, Adenike Lemus, at bedside and updated on patient's status and discharge plan of care. Explained to daughter that patient with ongoing aspiration pneumonia with high risk for recurrence and she is not a candidate for continuous tube feeding pump or TPN.  Discussed with daughter that patient's prognosis poor/ grim and explained to her that she is in decline. Awaiting daughter to choose Home with Palliative care vs Hospice.     Review of Systems   Unable to perform ROS: Patient nonverbal   Constitutional:  Positive for activity change and fatigue. Negative for chills, diaphoresis and fever.        Chronic debility   HENT:  Negative for ear discharge, ear pain and facial swelling.    Eyes:  Negative for redness.   Respiratory:  Positive for cough.    Gastrointestinal:  Positive for nausea and vomiting. Negative for diarrhea.   Endocrine: Negative for polydipsia and polyphagia.   Genitourinary:  Negative for hematuria.   Musculoskeletal:  Positive for gait problem. Negative for neck stiffness.   Skin:  Positive for pallor.   Allergic/Immunologic: Negative for food allergies.   Neurological:  Positive for speech difficulty and weakness.   Hematological:  Does not bruise/bleed easily.   Psychiatric/Behavioral:  Positive for confusion.    Objective:     Vital Signs (Most Recent):  Temp: 98.8 °F (37.1 °C) (09/16/22 1104)  Pulse: 79 (09/16/22 1300)  Resp: 17 (09/16/22 1104)  BP: 125/67 (09/16/22 1104)  SpO2: (!) 94 % (09/16/22 1104)   Vital Signs (24h Range):  Temp:  [97.8 °F (36.6 °C)-98.9 °F (37.2 °C)] 98.8 °F (37.1 °C)  Pulse:  [42-87] 79  Resp:  [16-18] 17  SpO2:  [93 %-99 %] 94 %  BP: ()/(53-82) 125/67     Weight: 52.5 kg (115 lb 11.9 oz)  Body mass index is 21.17 kg/m².    Intake/Output Summary (Last 24 hours) at 9/16/2022 1448  Last data filed at 9/16/2022 0523  Gross per 24 hour   Intake 2613.89 ml   Output 550 ml   Net 2063.89 ml      Physical Exam  Vitals and nursing note reviewed.   Constitutional:        Appearance: She is ill-appearing.      Comments: Unresponsive at this time  No extremity movement noted at this time    Frail, elderly   HENT:      Head: Atraumatic.      Mouth/Throat:      Mouth: Mucous membranes are moist.   Eyes:      General:         Right eye: No discharge.         Left eye: No discharge.   Cardiovascular:      Rate and Rhythm: Normal rate.   Pulmonary:      Breath sounds: Rhonchi and rales present.      Comments: BBS diminished  Coarse breath sounds  Abdominal:      General: Bowel sounds are normal.      Palpations: Abdomen is soft.      Tenderness: There is no abdominal tenderness. There is no guarding.      Comments:  PEG tube noted   Genitourinary:     Comments: Abreu catheter noted draining john colored urine  Musculoskeletal:      Cervical back: No rigidity or tenderness.      Comments: Stiff joints  Generalized weakness   Skin:     General: Skin is warm and dry.      Capillary Refill: Capillary refill takes 2 to 3 seconds.      Coloration: Skin is pale.   Neurological:      Motor: Weakness present.      Comments: Unresponsive at this time   Psychiatric:      Comments: Nonverbal  No response noted to commands       Lab Results   Component Value Date    WBC 4.84 09/15/2022    HGB 9.1 (L) 09/15/2022    HCT 31.9 (L) 09/15/2022    MCV 86 09/15/2022     09/15/2022       BMP  Lab Results   Component Value Date     (H) 09/16/2022    K 3.8 09/16/2022     (H) 09/16/2022    CO2 24 09/16/2022    BUN 30 (H) 09/16/2022    CREATININE 0.8 09/16/2022    CALCIUM 7.4 (L) 09/16/2022    ANIONGAP 9 09/16/2022    ESTGFRAFRICA >60 07/30/2022    EGFRNONAA >60 07/30/2022           Assessment/Plan:      * Severe sepsis secondary to aspiration pneumonia  This patient does have evidence of infective focus - aspiration pneumonia  My overall impression is Severe  sepsis. Vital signs were reviewed and noted in progress note.  Antibiotics given-   Antibiotics (From admission, onward)    Start      Stop Route Frequency Ordered    09/15/22 2100  mupirocin 2 % ointment         09/20 2059 Nasl 2 times daily 09/15/22 1643    09/15/22 1830  vancomycin in dextrose 5 % 1 gram/250 mL IVPB 1,000 mg         -- IV Every 24 hours (non-standard times) 09/14/22 1916    09/14/22 1300  metronidazole IVPB 500 mg         -- IV Every 8 hours (non-standard times) 09/14/22 1156    09/14/22 0600  cefepime in dextrose 5 % IVPB 2 g         -- IV Every 12 hours (non-standard times) 09/13/22 1936    09/12/22 1900  vancomycin - pharmacy to dose  (vancomycin IVPB)        See Hyperspace for full Linked Orders Report.    -- IV pharmacy to manage frequency 09/12/22 1801        Cultures were taken-   Microbiology Results (last 7 days)     Procedure Component Value Units Date/Time    Blood culture x two cultures. Draw prior to antibiotics. [[id number]] Collected: 09/12/22 1619    Order Status: Completed Specimen: Blood from Peripheral, Antecubital, Left Updated: 09/16/22 0612     Blood Culture, Routine No Growth to date      No Growth to date      No Growth to date      No Growth to date    Narrative:      Aerobic and anaerobic    Blood culture x two cultures. Draw prior to antibiotics. [[id number]] Collected: 09/12/22 1619    Order Status: Completed Specimen: Blood from Peripheral, Upper Arm, Left Updated: 09/16/22 0612     Blood Culture, Routine No Growth to date      No Growth to date      No Growth to date      No Growth to date    Narrative:      Aerobic and anaerobic    Influenza A & B by Molecular [[id number]] Collected: 09/12/22 1726    Order Status: Completed Specimen: Nasopharyngeal Swab Updated: 09/12/22 1808     Influenza A, Molecular Negative     Influenza B, Molecular Negative     Flu A & B Source Nasal swab        Organ dysfunction indicated by Encephalopathy , Hypoxemia    Source- Aspiration pneumonia    Source control Achieved by Iv antibiotics    9/13:  CT chest negative for PE but showed: Areas of consolidation within the  bilateral lower lobes and right middle lobe concerning for multifocal airspace disease or aspiration. Explained to family that there is always a risk of aspiration despite presence of PEG and NPO status  On IV abx   KUB does not show signs of PEG migration   Wound care consulted for buttock wound and family would like to defer debridement at this time   Palliative consulted and family would like to proceed with treatment for now.   Plan to treat hypernatremia and PNA at this time   HOLD TF due to aspiration.  PICC line ordered and can also be used for PPN/TPN nutrition to avoid further aspiration temporarily    9/14:  Neprhology consulted to assist with Hypernatremia.  SLP provided education material to the family explaining that having a PEG in place does not completely negate the risk of aspiration.  Plan to have an educational meeting with Palliative at home liasion with [facility] tomorrow to help achieve goal of decreasing hospitalizations. Mentation wise patient remains nonverbal. PICC in place and BP more stable. Will incraese D5 infusion to rate for now and wait palliative meeting tomorrow.     9/15:  Hypernatremia improved Na 151   Decision was made to resume TF and risk of breathing deterioration due to aspiration explained in detail and discuused with daughter.  Cont IV abx   BC x2 NGT    9/16 High risk for re aspiration  Hospice and comfort care recommended      History of Parkinson's disease  9/16 Noted  Monitor      Hypocalcemia excluded  9/16 Corrected calcium level is 9.2 - Wnl      Thrombocytopenia  9/16 Resolved  Monitor for recurrence      Dysphagia  9/16 Chronic  High risk for reaspiration      Failure to thrive in adult  Palliative Care Consult  9/16 Discussed Hospice and Palliative care options with daughter- At least 30 minute conversation      Debility   Family interested in home based palliative to avoid hospitalizations   She met with [facility] liasion and will meet with [facility]  liasion to discuss options of home based palliative to prevent hospitalizations. Daughter is agreeable with this plan and would simply like to stablize patient and take her home and is not ready for comfort care measures yet.   9/16 Fall and skin precautions  Turn q 2 hours  Patient remains in decline and completely debilitated             Hypernatremia  Continue gentle IV hydration  Na improved 165>161    9/14:    165>160>159  Neprhology consulted to assist with Hypernatremia.  SLP provided education material to the family explaining that having a PEG in place does not completely negate the risk of aspiration.  Plan to have an educational meeting with Palliative at home program  liasion with [facility] hospice tomorrow to help achieve goal of decreasing hospitalizations. Mentation wise patient remains nonverbal. PICC in place and BP more stable. Will incraese D5 infusion to rate for now and wait palliative meeting tomorrow.     9/15:  165>160>159>151  Improved  Free water flushes with TF  2L free water deficit per calculations   9/16  today          Palliative care by specialist  DNR  Family would like to pursue all treatment at this time   9/16 Noted      Aspiration pneumonia  -IV antibioitcs    9/13:   CT chest negative for PE but showed: Areas of consolidation within the bilateral lower lobes and right middle lobe concerning for multifocal airspace disease or aspiration. Explained to family that there is always a risk of aspiration despite presence of PEG and NPO status.   On IV abx   KUB does not show signs of PEG migrationPlan to treat hypernatremia and PNA at this time. HOLD TF due to aspiration.  PICC line ordered and can also be used for PPN/TPN nutrition to avoid further aspiration temporarily     9/14:  Cont IV abx    9/15:  Repeat CXR today showed overall worsening I.e: There has been slight interval worsening of the appearance of the left lung.  There is a moderate amount of alveolar consolidation  "scattered throughout the midportion of the left lung. There is a moderate amount of alveolar consolidation scattered throughout the inferior half of the right lung.  An infectious process cannot be excluded.    There is opacification of the right costophrenic angle.  This is characteristic of a pleural effusion.  The left costophrenic angle is not well seen.    There is moderate cardiomegaly  Risks of resuming TF explained in detail to patient's daughter--she states she does not want her mother "to starve to death"  Cont treating PNA with IV abx    9/16 High risk recurrent aspiration  Patient not tolerating current tube feedings. Staff repost nausea and vomiting earlier. Long discussion with daughter reguarding aspiration and risk for re aspiration. Daughter wants tube feeding resumed. Will try smaller dose to see if patient can tolerate. Plan discharge home tomorrow with either  Palliative Care Vs Hospice once daughter decides.      Acute on chronic combined systolic and diastolic CHF (congestive heart failure)  BNP 3900 and slightly lower than previous values  Supplemental oxygen to maintain sats > 92 %  Continue Entresto as B/P tolerates  Decompensation ?   - hold lasix at this time  Pt appears dehydrated due to hypernatremia    9/16 Monitor  Stable    Normocytic anemia  9/16 Add MVI      History of CVA (cerebrovascular accident)  Pt is nonverbal, W/C bound  Neuro checks  Pt is not prescribed ASA or STATIN  9/16 Remains debilitated, Daughter reports Hx recent "Cardiac arrest" earlier  this year        Parkinson disease  9/16 History noted        VTE Risk Mitigation (From admission, onward)         Ordered     enoxaparin injection 40 mg  Daily         09/13/22 1936     IP VTE HIGH RISK PATIENT  Once         09/12/22 2137     Place sequential compression device  Until discontinued         09/12/22 2137                Discharge Planning   PARMINDER:      Code Status: DNR   Is the patient medically ready for discharge?:    "  Reason for patient still in hospital (select all that apply): Treatment  Discharge Plan A: Home Health      Time spent seeing patient( greater than 1/2 spent in direct contact) : 58 minutes      MARK Short  Department of Hospital Medicine   'Count includes the [facility] Surg

## 2022-09-16 NOTE — ASSESSMENT & PLAN NOTE
BNP 3900 and slightly lower than previous values  Supplemental oxygen to maintain sats > 92 %  Continue Entresto as B/P tolerates  Decompensation ?   - hold lasix at this time  Pt appears dehydrated due to hypernatremia    9/16 Monitor  Stable

## 2022-09-16 NOTE — PLAN OF CARE
Cm received a call from Jessica at [facility] requesting patiens diet/nutrition info. Cm sent dietician note and nourishments via [facility].

## 2022-09-16 NOTE — PLAN OF CARE
SHARI spoke with jessica with [facility], per jessica she met with the patients daughter. Per jessica, patients daughter is open to hospice, but would like more time to think about it. Shari informed I would FU once family makes decsion.    Shari received a call from patients daughter regarding hospice setup. Patients daughter requested that referral be sent to [facility]. Shari received a call from Jessica at [facility] requesting hospice referral be sent. Shari informed that I had just spoken with patients daughter and referral was sent via careport. Per jessica, she will FU with shari once meeting has been arranged.

## 2022-09-16 NOTE — ASSESSMENT & PLAN NOTE
Palliative Care Consult  9/16 Discussed Hospice and Palliative care options with daughter- At least 30 minute conversation

## 2022-09-16 NOTE — NURSING
Holding morning insulin because her feedings are being held due to the pt not being able to tolerate. Pt vomited small amount 2x. Blood sugar 172

## 2022-09-16 NOTE — NURSING
"Pt not tolerating PEG feeding. Pt vomited. Head of the bed remains elevated, pt suctioned. Messaged [facility] call team Lissa Stephanie NP to tell her morning feeding will be held. She stated "Ok"  "

## 2022-09-16 NOTE — PROGRESS NOTES
[facility]  Nephrology  Progress Note    Patient Name: Tanya Madrid  MRN: [id number]  Admission Date: 9/12/2022  Hospital Length of Stay: 4 days  Attending Provider: Dontrell To MD   Primary Care Physician: Elva Ansari NP  Principal Problem:Hypernatremia    Subjective:     HPI: Pt was seen and examined. Chart, Labs and meds reviewed. Discussed with other providers. Pt is a 81 y/o female who was admitted with moderate to severe hypernatremia () and mild increase in s Cr form 0.8 to 1.2. Pt may have had aspiration pneumonia, resulting in worsening of her medical condition, and marked decrease in PO intake of nutrition. No direct h/o can be obtained. Sister at bed side who provided some h/o.      No new subjective & objective note has been filed under this hospital service since the last note was generated.      *For 9/16/22: Seen and examined; first visit with patient; family at bedside. Meds and labs reviewed.       Chest CTA BL  CV RRR without murmur  Ext trace edema  Abd soft NT ND  Neuro - lethargic  Assessment/Plan:     81 y/o female with hypernatremia and FFT:     Hypernatremia  s Na has further improved (admit Na was 165) with hypotonic fluid replacement  Due to dehydration resulting in intravascular fluid depletion  Pertinent neg: no GI losses reported  Urinary specific gravity very high, c/w dehydration  Mild CODY due to dehydration, resolved. Cr normal  Hypokalemia: due to decreased intake and to IVF's: will replace by adding to the IVF's     Best way to replace free water is by the enteral route  Pt has a PEG tube but there is risk of aspiration, per primary team  Continue replacement with IV route: D5W or 1/4 NS IVF's OK  Monitor labs      Aspiration pneumonia  Despite PEG tube, has aspirated  Aspiration pneumonia  Blood cx neg  Abx reviewed  Agree with current mgmt        Failure to thrive in adult  Pt is advanced age  Bed bound  Pneumonia  Noted palliative care referral          Plans and recommendations:  As discussed above  Total time spent 40 minutes including time needed to review the records, the   patient evaluation, documentation, face-to-face discussion with the patient,   more than 50% of the time was spent on coordination of care and counseling.    *For 9/16/22: Ask dietitian to optimize caloric intake; patient only getting 1200 calories daily on current regimen; continue free water via IVF until better/stable Na range. Prog appears poor overall.    Ramesh Silva MD  Nephrology  [facility] - Med Surg

## 2022-09-16 NOTE — SUBJECTIVE & OBJECTIVE
Interval History:  Patient remains in decline. Long discussion had with daughter, Adenike Lemus, at bedside and updated on patient's status and discharge plan of care. Explained to daughter that patient with ongoing aspiration pneumonia with high risk for recurrence and she is not a candidate for continuous tube feeding pump or TPN.  Discussed with daughter that patient's prognosis poor/ grim and explained to her that she is in decline. Awaiting daughter to choose Home with Palliative care vs Hospice.     Review of Systems   Unable to perform ROS: Patient nonverbal   Constitutional:  Positive for activity change and fatigue. Negative for chills, diaphoresis and fever.        Chronic debility   HENT:  Negative for ear discharge, ear pain and facial swelling.    Eyes:  Negative for redness.   Respiratory:  Positive for cough.    Gastrointestinal:  Positive for nausea and vomiting. Negative for diarrhea.   Endocrine: Negative for polydipsia and polyphagia.   Genitourinary:  Negative for hematuria.   Musculoskeletal:  Positive for gait problem. Negative for neck stiffness.   Skin:  Positive for pallor.   Allergic/Immunologic: Negative for food allergies.   Neurological:  Positive for speech difficulty and weakness.   Hematological:  Does not bruise/bleed easily.   Psychiatric/Behavioral:  Positive for confusion.    Objective:     Vital Signs (Most Recent):  Temp: 98.8 °F (37.1 °C) (09/16/22 1104)  Pulse: 79 (09/16/22 1300)  Resp: 17 (09/16/22 1104)  BP: 125/67 (09/16/22 1104)  SpO2: (!) 94 % (09/16/22 1104)   Vital Signs (24h Range):  Temp:  [97.8 °F (36.6 °C)-98.9 °F (37.2 °C)] 98.8 °F (37.1 °C)  Pulse:  [42-87] 79  Resp:  [16-18] 17  SpO2:  [93 %-99 %] 94 %  BP: ()/(53-82) 125/67     Weight: 52.5 kg (115 lb 11.9 oz)  Body mass index is 21.17 kg/m².    Intake/Output Summary (Last 24 hours) at 9/16/2022 1448  Last data filed at 9/16/2022 0523  Gross per 24 hour   Intake 2613.89 ml   Output 550 ml   Net 2063.89 ml       Physical Exam  Vitals and nursing note reviewed.   Constitutional:       Appearance: She is ill-appearing.      Comments: Unresponsive at this time  No extremity movement noted at this time    Frail, elderly   HENT:      Head: Atraumatic.      Mouth/Throat:      Mouth: Mucous membranes are moist.   Eyes:      General:         Right eye: No discharge.         Left eye: No discharge.   Cardiovascular:      Rate and Rhythm: Normal rate.   Pulmonary:      Breath sounds: Rhonchi and rales present.      Comments: BBS diminished  Coarse breath sounds  Abdominal:      General: Bowel sounds are normal.      Palpations: Abdomen is soft.      Tenderness: There is no abdominal tenderness. There is no guarding.      Comments:  PEG tube noted   Genitourinary:     Comments: Abreu catheter noted draining john colored urine  Musculoskeletal:      Cervical back: No rigidity or tenderness.      Comments: Stiff joints  Generalized weakness   Skin:     General: Skin is warm and dry.      Capillary Refill: Capillary refill takes 2 to 3 seconds.      Coloration: Skin is pale.   Neurological:      Motor: Weakness present.      Comments: Unresponsive at this time   Psychiatric:      Comments: Nonverbal  No response noted to commands       Lab Results   Component Value Date    WBC 4.84 09/15/2022    HGB 9.1 (L) 09/15/2022    HCT 31.9 (L) 09/15/2022    MCV 86 09/15/2022     09/15/2022       BMP  Lab Results   Component Value Date     (H) 09/16/2022    K 3.8 09/16/2022     (H) 09/16/2022    CO2 24 09/16/2022    BUN 30 (H) 09/16/2022    CREATININE 0.8 09/16/2022    CALCIUM 7.4 (L) 09/16/2022    ANIONGAP 9 09/16/2022    ESTGFRAFRICA >60 07/30/2022    EGFRNONAA >60 07/30/2022

## 2022-09-16 NOTE — ASSESSMENT & PLAN NOTE
This patient does have evidence of infective focus - aspiration pneumonia  My overall impression is Severe  sepsis. Vital signs were reviewed and noted in progress note.  Antibiotics given-   Antibiotics (From admission, onward)    Start     Stop Route Frequency Ordered    09/15/22 2100  mupirocin 2 % ointment         09/20 2059 Nasl 2 times daily 09/15/22 1643    09/15/22 1830  vancomycin in dextrose 5 % 1 gram/250 mL IVPB 1,000 mg         -- IV Every 24 hours (non-standard times) 09/14/22 1916    09/14/22 1300  metronidazole IVPB 500 mg         -- IV Every 8 hours (non-standard times) 09/14/22 1156    09/14/22 0600  cefepime in dextrose 5 % IVPB 2 g         -- IV Every 12 hours (non-standard times) 09/13/22 1936    09/12/22 1900  vancomycin - pharmacy to dose  (vancomycin IVPB)        See Hyperspace for full Linked Orders Report.    -- IV pharmacy to manage frequency 09/12/22 1801        Cultures were taken-   Microbiology Results (last 7 days)     Procedure Component Value Units Date/Time    Blood culture x two cultures. Draw prior to antibiotics. [[id number]] Collected: 09/12/22 1619    Order Status: Completed Specimen: Blood from Peripheral, Antecubital, Left Updated: 09/16/22 0612     Blood Culture, Routine No Growth to date      No Growth to date      No Growth to date      No Growth to date    Narrative:      Aerobic and anaerobic    Blood culture x two cultures. Draw prior to antibiotics. [[id number]] Collected: 09/12/22 1619    Order Status: Completed Specimen: Blood from Peripheral, Upper Arm, Left Updated: 09/16/22 0612     Blood Culture, Routine No Growth to date      No Growth to date      No Growth to date      No Growth to date    Narrative:      Aerobic and anaerobic    Influenza A & B by Molecular [[id number]] Collected: 09/12/22 1726    Order Status: Completed Specimen: Nasopharyngeal Swab Updated: 09/12/22 1808     Influenza A, Molecular Negative     Influenza B, Molecular Negative     Flu A & B  Source Nasal swab        Organ dysfunction indicated by Encephalopathy , Hypoxemia    Source- Aspiration pneumonia    Source control Achieved by Iv antibiotics    9/13:  CT chest negative for PE but showed: Areas of consolidation within the bilateral lower lobes and right middle lobe concerning for multifocal airspace disease or aspiration. Explained to family that there is always a risk of aspiration despite presence of PEG and NPO status  On IV abx   KUB does not show signs of PEG migration   Wound care consulted for buttock wound and family would like to defer debridement at this time   Palliative consulted and family would like to proceed with treatment for now.   Plan to treat hypernatremia and PNA at this time   HOLD TF due to aspiration.  PICC line ordered and can also be used for PPN/TPN nutrition to avoid further aspiration temporarily    9/14:  Neprhology consulted to assist with Hypernatremia.  SLP provided education material to the family explaining that having a PEG in place does not completely negate the risk of aspiration.  Plan to have an educational meeting with Palliative at home liasion with [facility] hospice tomorrow to help achieve goal of decreasing hospitalizations. Mentation wise patient remains nonverbal. PICC in place and BP more stable. Will incraese D5 infusion to rate for now and wait palliative meeting tomorrow.     9/15:  Hypernatremia improved Na 151   Decision was made to resume TF and risk of breathing deterioration due to aspiration explained in detail and discuused with daughter.  Cont IV abx   BC x2 NGT    9/16 High risk for re aspiration  Hospice and comfort care recommended

## 2022-09-16 NOTE — PLAN OF CARE
Nutrition Plan of Care:    Recommendations     Recommendation/Intervention:   1.  Continue with tube feeding via peg    2. Weekly weights    3. Collaboration with medical providers     Goals: Patient to receive adequate nutritional support prior to discharge.     Nutrition Goal Status: goal met     Communication of RD Recs: other (comment) (poc)     Assessment and Plan     Nutrition Problem  Inadequate oral intake     Related to (etiology):   Decreased ability to consume foods  Aspiration      Signs and Symptoms (as evidenced by):   Npo status  Alternate source of nutrition via PEG     Interventions/Recommendations (treatment strategy):  1.  Continue with tube feeding via peg    2. Weekly weights    3. Collaboration with medical providers     Nutrition Diagnosis Status:   Bob Avina MS, RDN, LDN

## 2022-09-16 NOTE — ASSESSMENT & PLAN NOTE
"-IV antibioitcs    9/13:   CT chest negative for PE but showed: Areas of consolidation within the bilateral lower lobes and right middle lobe concerning for multifocal airspace disease or aspiration. Explained to family that there is always a risk of aspiration despite presence of PEG and NPO status.   On IV abx   KUB does not show signs of PEG migrationPlan to treat hypernatremia and PNA at this time. HOLD TF due to aspiration.  PICC line ordered and can also be used for PPN/TPN nutrition to avoid further aspiration temporarily     9/14:  Cont IV abx    9/15:  Repeat CXR today showed overall worsening I.e: There has been slight interval worsening of the appearance of the left lung.  There is a moderate amount of alveolar consolidation scattered throughout the midportion of the left lung. There is a moderate amount of alveolar consolidation scattered throughout the inferior half of the right lung.  An infectious process cannot be excluded.    There is opacification of the right costophrenic angle.  This is characteristic of a pleural effusion.  The left costophrenic angle is not well seen.    There is moderate cardiomegaly  Risks of resuming TF explained in detail to patient's daughter--she states she does not want her mother "to starve to death"  Cont treating PNA with IV abx    9/16 High risk recurrent aspiration  Patient not tolerating current tube feedings. Staff repost nausea and vomiting earlier. Long discussion with daughter reguarding aspiration and risk for re aspiration. Daughter wants tube feeding resumed. Will try smaller dose to see if patient can tolerate. Plan discharge home tomorrow with either  Palliative Care Vs Hospice once daughter decides.    "

## 2022-09-16 NOTE — ASSESSMENT & PLAN NOTE
Continue gentle IV hydration  Na improved 165>161    9/14:    165>160>159  Neprhology consulted to assist with Hypernatremia.  SLP provided education material to the family explaining that having a PEG in place does not completely negate the risk of aspiration.  Plan to have an educational meeting with Palliative at home program  liasion with [facility] tomorrow to help achieve goal of decreasing hospitalizations. Mentation wise patient remains nonverbal. PICC in place and BP more stable. Will incraese D5 infusion to rate for now and wait palliative meeting tomorrow.     9/15:  165>160>159>151  Improved  Free water flushes with TF  2L free water deficit per calculations   9/16  today

## 2022-09-16 NOTE — PROGRESS NOTES
[facility] - Med Surg  Adult Nutrition  Progress Note    SUMMARY       Recommendations    Recommendation/Intervention:   1.  Continue with tube feeding via peg    2. Weekly weights    3. Collaboration with medical providers    Goals: Patient to receive adequate nutritional support prior to discharge.    Nutrition Goal Status: goal met    Communication of RD Recs: other (comment) (poc)    Assessment and Plan    Nutrition Problem  Inadequate oral intake    Related to (etiology):   Decreased ability to consume foods  Aspiration     Signs and Symptoms (as evidenced by):   Npo status  Alternate source of nutrition via PEG    Interventions/Recommendations (treatment strategy):  1.  Continue with tube feeding via peg    2. Weekly weights    3. Collaboration with medical providers    Nutrition Diagnosis Status:   New      Malnutrition Assessment         Micronutrient Evaluation: suspected deficiency  Micronutrient Evaluation Comments: Patient with failure to thrive diagnosis.                             Reason for Assessment    Reason For Assessment: RD follow-up    Diagnosis: pulmonary disease, other (see comments) (FTT in adult)  Patient Active Problem List   Diagnosis    H/O Essential hypertension    GERD (gastroesophageal reflux disease)    Hyperlipidemia    Insomnia    Kyphosis of thoracic region    Benign neoplasm of larynx    Laryngeal papillomatosis    Laryngeal stenosis    Parkinson disease    Pharyngoesophageal dysphagia    Rheumatoid arthritis    Scoliosis of lumbar spine    History of CVA (cerebrovascular accident)    Closed fracture of left hip with routine healing    Acute blood loss anemia    Status post hip hemiarthroplasty    Chronic hip pain after total replacement of right hip joint    Hip prosthesis dislocation, right    Normocytic anemia    Decreased range of motion of lower extremity    Bilateral pleural effusion    Acute on chronic combined systolic and diastolic CHF (congestive heart failure)    Elevated  troponin    Recent H/O Out of hospital Cardiac arrest    Mass of sphenoid sinus    Aspiration pneumonia    Hypokalemia    Anoxic encephalopathy    CODY (acute kidney injury)    Gram-positive bacteremia- completed Rocephin x 2 weeks     Palliative care by specialist    Hypernatremia    Upper GI bleed    Acute on chronic combined systolic and diastolic heart failure    Debility    Urinary tract infection associated with catheterization of urinary tract    Abreu catheter problem, initial encounter    Feeding tube dysfunction    Recurrent right pleural effusion    Combined systolic and diastolic congestive heart failure    Advanced care planning/counseling discussion    Acute on chronic combined systolic and diastolic congestive heart failure    Acute combined systolic and diastolic congestive heart failure    Frequent UTI    Sacral decubitus ulcer, stage III    Failure to thrive in adult    Severe sepsis secondary to aspiration pneumonia    Dysphagia    Thrombocytopenia    Hypocalcemia excluded    History of Parkinson's disease     Past Medical History:   Diagnosis Date    Acute CHF (congestive heart failure) 1/17/2021    Hyperlipemia     Hypertension     Parkinson's disease     Stroke 2016    Throat mass      Interdisciplinary Rounds: did not attend    General Information Comments:   9/16: Patient with Isosource 1.5, 4 cans per day via peg.  High aspiration precautions.  RD previously consulted for tube feeding and tpn recommendation which remain in chart notes.  Patient is currently tolerating tube feeding: Isosource 1.5, 1 can every 4 hours.  LBM:9/15.  Labs reviewed. NKFA.  Patient with DNR status.  (Remote)    Nutrition Discharge Planning: Patient to consume to tolerate alternate source of nutrition post discharge.    Nutrition Risk Screen    Nutrition Risk Screen: large or nonhealing wound, burn or pressure injury    Nutrition/Diet History    Spiritual, Cultural Beliefs, Sikhism Practices, Values that Affect  "Care: no  Food Allergies: NKFA  Factors Affecting Nutritional Intake: chewing difficulties/inability to chew food, difficulty/impaired swallowing    Anthropometrics    Temp: 98.8 °F (37.1 °C)  Height Method: Stated  Height: 5' 2" (157.5 cm)  Height (inches): 62 in  Weight Method: Bed Scale  Weight: 52.5 kg (115 lb 11.9 oz)  Weight (lb): 115.74 lb  Ideal Body Weight (IBW), Female: 110 lb  % Ideal Body Weight, Female (lb): 105.22 %  BMI (Calculated): 21.2  BMI Grade: 18.5-24.9 - normal     Wt Readings from Last 10 Encounters:   09/16/22 52.5 kg (115 lb 11.9 oz)   07/29/22 57.8 kg (127 lb 6.8 oz)   07/14/22 69.4 kg (153 lb)   06/06/22 69.7 kg (153 lb 10.6 oz)   06/01/22 69.3 kg (152 lb 12.5 oz)   05/07/22 52.1 kg (114 lb 13.8 oz)   04/22/22 63 kg (139 lb)   04/07/22 63 kg (139 lb)   04/04/22 62.9 kg (138 lb 10.7 oz)   03/29/22 59.6 kg (131 lb 6.3 oz)         Lab/Procedures/Meds    Pertinent Labs Reviewed: reviewed  Pertinent Medications Reviewed: reviewed  BMP  Lab Results   Component Value Date     (H) 09/16/2022    K 3.8 09/16/2022     (H) 09/16/2022    CO2 24 09/16/2022    BUN 30 (H) 09/16/2022    CREATININE 0.8 09/16/2022    CALCIUM 7.4 (L) 09/16/2022    ANIONGAP 9 09/16/2022    ESTGFRAFRICA >60 07/30/2022    EGFRNONAA >60 07/30/2022     Lab Results   Component Value Date    CALCIUM 7.4 (L) 09/16/2022    PHOS 2.7 09/16/2022     Lab Results   Component Value Date    HGBA1C 5.9 (H) 07/24/2019     Lab Results   Component Value Date    LABPROT 11.0 04/07/2022    ALBUMIN 1.7 (L) 09/14/2022     Scheduled Meds:   ascorbic acid (vitamin C)  500 mg Per G Tube QHS    carvediloL  3.125 mg Per G Tube BID    ceFEPime (MAXIPIME) IVPB  2 g Intravenous Q12H    clopidogreL  75 mg Per G Tube Daily    enoxaparin  40 mg Subcutaneous Daily    metronidazole  500 mg Intravenous Q8H    multivit-min-ferrous gluconate  15 mL Per G Tube Daily    mupirocin   Nasal BID    pantoprazole  40 mg Per G Tube BID    sacubitriL-valsartan  " 1 tablet Per G Tube BID    vancomycin (VANCOCIN) IVPB  1,000 mg Intravenous Q24H    zinc sulfate  220 mg Per G Tube QHS     Continuous Infusions:   D5W 1000 ml + Additives 50 mL/hr at 09/16/22 1254     PRN Meds:.acetaminophen, albuterol-ipratropium, dextrose 10%, dextrose 10%, glucagon (human recombinant), insulin aspart U-100, magnesium oxide, magnesium oxide, naloxone, ondansetron, prochlorperazine, QUEtiapine, sodium chloride 0.9%, Pharmacy to dose Vancomycin consult **AND** vancomycin - pharmacy to dose    Physical Findings/Assessment         Estimated/Assessed Needs    Weight Used For Calorie Calculations: 52.5 kg (115 lb 11.9 oz)  Energy Calorie Requirements (kcal): 25-30kcals/kg (1313-1575kcals/kg)  Energy Need Method: Kcal/kg  Protein Requirements: 1.2-1.5g/kg  Weight Used For Protein Calculations: 52.5 kg (115 lb 11.9 oz)  Fluid Requirements (mL): 1988  Estimated Fluid Requirement Method: other (see comments)  RDA Method (mL): 25  CHO Requirement: 200      Nutrition Prescription Ordered    Current Diet Order: npo  Current Nutrition Support Formula Ordered: Isosource 1.5  Current Nutrition Support Frequency Ordered: 1 can every 6 hours    Evaluation of Received Nutrient/Fluid Intake    Enteral Calories (kcal): 1500  Enteral Protein (gm): 68  Enteral (Free Water) Fluid (mL): 764  % Kcal Needs: 100  % Protein Needs: 100  Energy Calories Required: meeting needs  Protein Required: meeting needs  Fluid Required: meeting needs  Tolerance: tolerating  % Intake of Estimated Energy Needs: 75 - 100 %  % Meal Intake: NPO  Intake/Output - Last 3 Shifts         09/14 0700  09/15 0659 09/15 0700 09/16 0659 09/16 0700 09/17 0659    P.O. 0 0     I.V. (mL/kg) 674.7 (12.9) 1171 (22.3)     NG/GT  950 230    IV Piggyback 761.4 492.9     Total Intake(mL/kg) 1436.1 (27.4) 2613.9 (49.8) 230 (4.4)    Urine (mL/kg/hr) 200 (0.2) 550 (0.4)     Stool 0      Total Output 200 550     Net +1236.1 +2063.9 +230           Stool Occurrence  1 x  1 x            Nutrition Risk    Level of Risk/Frequency of Follow-up: moderate     Monitor and Evaluation    Food and Nutrient Intake: enteral nutrition intake  Food and Nutrient Adminstration: enteral and parenteral nutrition administration  Knowledge/Beliefs/Attitudes: other (specify)  Physical Activity and Function: nutrition-related ADLs and IADLs  Anthropometric Measurements: body mass index, weight change, weight, height/length  Biochemical Data, Medical Tests and Procedures: electrolyte and renal panel, lipid profile, gastrointestinal profile, glucose/endocrine profile, inflammatory profile     Nutrition Follow-Up    RD Follow-up?: Yes  Greta Avina, MS, RDN, LDN

## 2022-09-17 VITALS
OXYGEN SATURATION: 94 % | BODY MASS INDEX: 21.3 KG/M2 | SYSTOLIC BLOOD PRESSURE: 118 MMHG | RESPIRATION RATE: 18 BRPM | WEIGHT: 115.75 LBS | HEIGHT: 62 IN | HEART RATE: 83 BPM | TEMPERATURE: 98 F | DIASTOLIC BLOOD PRESSURE: 74 MMHG

## 2022-09-17 PROBLEM — E83.51 HYPOCALCEMIA: Status: RESOLVED | Noted: 2022-09-16 | Resolved: 2022-09-17

## 2022-09-17 PROBLEM — I50.43 ACUTE ON CHRONIC COMBINED SYSTOLIC AND DIASTOLIC CHF (CONGESTIVE HEART FAILURE): Status: RESOLVED | Noted: 2021-01-17 | Resolved: 2022-09-17

## 2022-09-17 PROBLEM — A41.9 SEVERE SEPSIS: Status: RESOLVED | Noted: 2022-09-12 | Resolved: 2022-09-17

## 2022-09-17 PROBLEM — R65.20 SEVERE SEPSIS: Status: RESOLVED | Noted: 2022-09-12 | Resolved: 2022-09-17

## 2022-09-17 PROBLEM — E87.0 HYPERNATREMIA: Status: RESOLVED | Noted: 2022-03-19 | Resolved: 2022-09-17

## 2022-09-17 LAB
ALBUMIN SERPL BCP-MCNC: 1.5 G/DL (ref 3.5–5.2)
ALP SERPL-CCNC: 105 U/L (ref 55–135)
ALT SERPL W/O P-5'-P-CCNC: 13 U/L (ref 10–44)
ANION GAP SERPL CALC-SCNC: 7 MMOL/L (ref 8–16)
AST SERPL-CCNC: 25 U/L (ref 10–40)
BASOPHILS # BLD AUTO: 0.02 K/UL (ref 0–0.2)
BASOPHILS NFR BLD: 0.3 % (ref 0–1.9)
BILIRUB DIRECT SERPL-MCNC: 0.2 MG/DL (ref 0.1–0.3)
BILIRUB SERPL-MCNC: 0.3 MG/DL (ref 0.1–1)
BUN SERPL-MCNC: 22 MG/DL (ref 8–23)
CALCIUM SERPL-MCNC: 7 MG/DL (ref 8.7–10.5)
CHLORIDE SERPL-SCNC: 112 MMOL/L (ref 95–110)
CO2 SERPL-SCNC: 23 MMOL/L (ref 23–29)
CREAT SERPL-MCNC: 0.7 MG/DL (ref 0.5–1.4)
DIFFERENTIAL METHOD: ABNORMAL
EOSINOPHIL # BLD AUTO: 0.2 K/UL (ref 0–0.5)
EOSINOPHIL NFR BLD: 2.9 % (ref 0–8)
ERYTHROCYTE [DISTWIDTH] IN BLOOD BY AUTOMATED COUNT: 24.5 % (ref 11.5–14.5)
EST. GFR  (NO RACE VARIABLE): >60 ML/MIN/1.73 M^2
GLUCOSE SERPL-MCNC: 245 MG/DL (ref 70–110)
HCT VFR BLD AUTO: 29.2 % (ref 37–48.5)
HGB BLD-MCNC: 8.2 G/DL (ref 12–16)
IMM GRANULOCYTES # BLD AUTO: 0.09 K/UL (ref 0–0.04)
IMM GRANULOCYTES NFR BLD AUTO: 1.4 % (ref 0–0.5)
LYMPHOCYTES # BLD AUTO: 1.1 K/UL (ref 1–4.8)
LYMPHOCYTES NFR BLD: 17.3 % (ref 18–48)
MAGNESIUM SERPL-MCNC: 2.1 MG/DL (ref 1.6–2.6)
MCH RBC QN AUTO: 24.1 PG (ref 27–31)
MCHC RBC AUTO-ENTMCNC: 28.1 G/DL (ref 32–36)
MCV RBC AUTO: 86 FL (ref 82–98)
MONOCYTES # BLD AUTO: 0.5 K/UL (ref 0.3–1)
MONOCYTES NFR BLD: 7.9 % (ref 4–15)
NEUTROPHILS # BLD AUTO: 4.4 K/UL (ref 1.8–7.7)
NEUTROPHILS NFR BLD: 70.2 % (ref 38–73)
NRBC BLD-RTO: 0 /100 WBC
PHOSPHATE SERPL-MCNC: 2.4 MG/DL (ref 2.7–4.5)
PLATELET # BLD AUTO: 134 K/UL (ref 150–450)
PLATELET BLD QL SMEAR: ABNORMAL
PMV BLD AUTO: ABNORMAL FL (ref 9.2–12.9)
POCT GLUCOSE: 100 MG/DL (ref 70–110)
POCT GLUCOSE: 159 MG/DL (ref 70–110)
POTASSIUM SERPL-SCNC: 4.8 MMOL/L (ref 3.5–5.1)
PROT SERPL-MCNC: 4.4 G/DL (ref 6–8.4)
RBC # BLD AUTO: 3.4 M/UL (ref 4–5.4)
SODIUM SERPL-SCNC: 142 MMOL/L (ref 136–145)
WBC # BLD AUTO: 6.24 K/UL (ref 3.9–12.7)

## 2022-09-17 PROCEDURE — 83735 ASSAY OF MAGNESIUM: CPT | Performed by: NURSE PRACTITIONER

## 2022-09-17 PROCEDURE — 85025 COMPLETE CBC W/AUTO DIFF WBC: CPT | Performed by: NURSE PRACTITIONER

## 2022-09-17 PROCEDURE — 99232 PR SUBSEQUENT HOSPITAL CARE,LEVL II: ICD-10-PCS | Mod: ,,, | Performed by: INTERNAL MEDICINE

## 2022-09-17 PROCEDURE — 80076 HEPATIC FUNCTION PANEL: CPT | Performed by: NURSE PRACTITIONER

## 2022-09-17 PROCEDURE — 27000221 HC OXYGEN, UP TO 24 HOURS

## 2022-09-17 PROCEDURE — 25000003 PHARM REV CODE 250: Performed by: NURSE PRACTITIONER

## 2022-09-17 PROCEDURE — 94618 PULMONARY STRESS TESTING: CPT

## 2022-09-17 PROCEDURE — 80048 BASIC METABOLIC PNL TOTAL CA: CPT | Performed by: NURSE PRACTITIONER

## 2022-09-17 PROCEDURE — 84100 ASSAY OF PHOSPHORUS: CPT | Performed by: NURSE PRACTITIONER

## 2022-09-17 PROCEDURE — 99232 SBSQ HOSP IP/OBS MODERATE 35: CPT | Mod: ,,, | Performed by: INTERNAL MEDICINE

## 2022-09-17 PROCEDURE — 63600175 PHARM REV CODE 636 W HCPCS: Performed by: FAMILY MEDICINE

## 2022-09-17 PROCEDURE — S0030 INJECTION, METRONIDAZOLE: HCPCS | Performed by: NURSE PRACTITIONER

## 2022-09-17 PROCEDURE — 99900035 HC TECH TIME PER 15 MIN (STAT)

## 2022-09-17 PROCEDURE — 94761 N-INVAS EAR/PLS OXIMETRY MLT: CPT

## 2022-09-17 PROCEDURE — 25000003 PHARM REV CODE 250: Performed by: FAMILY MEDICINE

## 2022-09-17 RX ORDER — DOXYCYCLINE HYCLATE 100 MG
100 TABLET ORAL EVERY 12 HOURS
Status: DISCONTINUED | OUTPATIENT
Start: 2022-09-17 | End: 2022-09-17 | Stop reason: HOSPADM

## 2022-09-17 RX ORDER — TRAZODONE HYDROCHLORIDE 50 MG/1
25 TABLET ORAL NIGHTLY
Start: 2022-09-17

## 2022-09-17 RX ORDER — DOXYCYCLINE 100 MG/1
100 CAPSULE ORAL EVERY 12 HOURS
Qty: 14 CAPSULE | Refills: 0 | Status: SHIPPED | OUTPATIENT
Start: 2022-09-17 | End: 2022-09-24

## 2022-09-17 RX ORDER — SCOLOPAMINE TRANSDERMAL SYSTEM 1 MG/1
1 PATCH, EXTENDED RELEASE TRANSDERMAL
Start: 2022-09-17

## 2022-09-17 RX ORDER — ACETAMINOPHEN 325 MG/1
650 TABLET ORAL EVERY 4 HOURS PRN
Refills: 0
Start: 2022-09-17

## 2022-09-17 RX ORDER — QUETIAPINE FUMARATE 25 MG/1
25 TABLET, FILM COATED ORAL DAILY
Start: 2022-09-17

## 2022-09-17 RX ADMIN — MULTIVITAMIN 15 ML: LIQUID ORAL at 11:09

## 2022-09-17 RX ADMIN — SACUBITRIL AND VALSARTAN 1 TABLET: 49; 51 TABLET, FILM COATED ORAL at 11:09

## 2022-09-17 RX ADMIN — METRONIDAZOLE 500 MG: 500 SOLUTION INTRAVENOUS at 04:09

## 2022-09-17 RX ADMIN — MUPIROCIN: 20 OINTMENT TOPICAL at 11:09

## 2022-09-17 RX ADMIN — CLOPIDOGREL 75 MG: 75 TABLET, FILM COATED ORAL at 11:09

## 2022-09-17 RX ADMIN — CEFEPIME HYDROCHLORIDE 2 G: 2 INJECTION, SOLUTION INTRAVENOUS at 06:09

## 2022-09-17 RX ADMIN — DOXYCYCLINE HYCLATE 100 MG: 100 TABLET, COATED ORAL at 03:09

## 2022-09-17 RX ADMIN — PANTOPRAZOLE SODIUM 40 MG: 40 GRANULE, DELAYED RELEASE ORAL at 11:09

## 2022-09-17 RX ADMIN — CARVEDILOL 3.12 MG: 3.12 TABLET, FILM COATED ORAL at 11:09

## 2022-09-17 NOTE — PROGRESS NOTES
Home Oxygen Evaluation - [facility] - Cardiopulmonary Department      Date Performed: 2022      1) Patient's Home O2 Sat on room air, while at rest: Room Air SpO2 At Rest: 96 %        If O2 sats on room air at rest are 88% or below, patient qualifies.  Document O2 liter flow needed in Step 2.  If O2 sats are 89% or above, complete Step 3.        2)  If patient is not ambulated and O2 sats are <88%, what is the O2 liter flow required to meet ordered saturation? Home O2 Eval Comments: Pt unable to ambulate    If O2 sats on room air while exercising remain 89% or above patient does not qualify, no further testing needed Document N/A in step 3. If O2 sats on room air while exercising are 88% or below, continue to Step 4.    3) Patient's O2 Sat on room air while exercisin) Patient's O2 Sat while exercising on O2:   at           (Must show improvement from #4 for patients to qualify)      Daughter stated Pt has Home O2 supplied by hospice

## 2022-09-17 NOTE — DISCHARGE SUMMARY
[facility]  Discharge Summary    Patient Name: Tanya Madrid  MRN: [id number]  Patient Class: IP- Inpatient  Admission Date: 9/12/2022  Hospital Length of Stay: 5 days  Discharge Date and Time:  09/17/2022 3:58 PM  Attending Physician: Rupesh Simon MD   Discharging Provider: MARK Short  Primary Care Provider: Elva Ansari NP    HPI:   Tanya Madrid is a 82 y.o. female patient with a PMHx of acute CHF, HLD, HTN, Parkinson's disease, stroke, throat mass, and cardiac arrest who presents to the Emergency Department after being referred to the ED by ADELAIDA Sears (Transplant) for further evaluation of hypotension and fever. Per the pt's daughter, the pt went into cardiac arrest in March of this year and has never fully recovered, but the pt has been more fatigued for the past 3 to 4 days. The pt's daughter also reports that the pt has had blood pressures in the 80s and 90s systolic, bilateral hand swelling, a fever, and a cough. The pt's daughter gave the pt Tylenol for her fever. Pt's daughter reports that the pt's urine output and smell have been normal. Also, the pt's daughter reports that she did not give the pt blood pressure medication this morning.  V/S Temp max 102.1, pulse 96, B/P 140/79, RR 28.   Labs note left shift, D dimer 15.28 (CTA negative for PE), Na 165, albumin 2.2, BNP 3968, lactate 3.3  Pt given 1 liter fluid bolus, Vancomycin and Cefepime.   Diagnosis include: Sepsis, Aspiration PNA, Failure to thrive and hypernatremia          * No surgery found *      Hospital Course:   Admitted for hypernatremia and sepsis. Initiated on D5 gentle IVF. Cautious in treating hyponatremia due to concern for pontine myelinolysis. CT chest negative for PE but showed: Areas of consolidation within the bilateral lower lobes and right middle lobe concerning for multifocal airspace disease or aspiration. Explained to family that there is always a risk of  aspiration despite presence of PEG and NPO status. On IV abx. KUB does not show signs of PEG migration. Wound care consulted for buttock wound and family would like to defer debridement at this time. Palliative consulted and family would like to proceed with treatment for now.   Plan to treat hypernatremia and PNA at this time. HOLD TF due to aspiration.  PICC line ordered and can also be used for PPN/TPN nutrition to avoid further aspiration    9/14: Neprhology consulted to assist with Hypernatremia.  SLP provided education material to the family explaining that having a PEG in place does not completely negate the risk of aspiration.  Plan to have an educational meeting with Palliative at home liasion with [facility] hospice tomorrow to help achieve goal of decreasing hospitalizations. Mentation wise patient remains nonverbal. PICC in place and BP more stable. Will incraese D5 infusion to rate for now and wait palliative meeting tomorrow.     9/15: Hypernatremia improved Na 151. Decision was made to resume TF and risk of breathing deterioration due to aspiration explained in detail and discuused with daughter. She met with [facility] liasion and will meet with [facility] liasion to discuss options of home based palliative to prevent hospitalizations. Daughter is agreeable with this plan and would simply like to stablize patient and take her home and is not ready for comfort care measures yet.     9/16  Patient remains in decline. Long discussion had with daughter, Adenike Lemus, at bedside and updated on patient's status and discharge plan of care. Explained to daughter that patient with ongoing aspiration pneumonia with high risk for recurrence and she is not a candidate for continuous tube feeding pump or TPN.  Discussed with daughter that patient's prognosis poor/ grim and explained to her that she is in decline. Awaiting daughter to choose Home with Palliative care vs Hospice.   9/17 Patient no longer requiring  supplemental O2. Condition stable at this time but short term prognosis is grim and suspect patient will continue to have recurrent aspiration and continue to decline. Discussed again with daughter and family at bedside. Daughter has picked [facility] to follow patient at home and Hospice currently delivering equipment to home this am. Plan discharge later today with hospice. Patient does not qualify for home O2.      Goals of Care Treatment Preferences:  Code Status: DNR    Health care agent: Adenike Lemus  [facility] care agent number: [phone]          What is most important right now is to focus on symptom/pain control, extending life as long as possible, even it it means sacrificing quality.  Accordingly, we have decided that the best plan to meet the patient's goals includes continuing with treatment.      Consults:   Consults (From admission, onward)        Status Ordering Provider     Inpatient consult to Social Work/Case Management           Completed ANURAG PINEDO     Inpatient consult to Registered Dietitian/Nutritionist              Completed THADDEUS CHU     Inpatient consult to Nephrology  Once        Provider:  Zeyad Mejia MD    Acknowledged THADDEUS CHU     Inpatient consult to PICC team ([facility])             Acknowledged THADDEUS CHU     Inpatient consult to Midline team           Acknowledged THADDEUS CHU     Inpatient consult to Palliative Care             Completed EMELIA MACHUCA     Pharmacy to dose Vancomycin consult              Acknowledged NICOLE TAYLOR        Service: Hospital Medicine    Final Active Diagnoses:    Diagnosis Date Noted POA    Dysphagia [R13.10] 09/16/2022 Yes    Thrombocytopenia [D69.6] 09/16/2022 Yes    History of Parkinson's disease [Z86.69] 09/16/2022 Not Applicable    Failure to thrive in adult [R62.7] 09/12/2022 Yes    Debility [R53.81] 04/21/2022 Yes    Palliative care by specialist [Z51.5] 03/15/2022 Not Applicable    Aspiration  pneumonia [J69.0] 03/11/2022 Yes    Normocytic anemia [D64.9] 10/15/2020 Yes    History of CVA (cerebrovascular accident) [Z86.73] 07/24/2019 Not Applicable     Chronic      Problems Resolved During this Admission:    Diagnosis Date Noted Date Resolved POA    PRINCIPAL PROBLEM:  Severe sepsis secondary to aspiration pneumonia [A41.9, R65.20] 09/12/2022 09/17/2022 Yes    Hypocalcemia excluded [E83.51] 09/16/2022 09/17/2022 Yes    Hypernatremia [E87.0] 03/19/2022 09/17/2022 Yes    Acute on chronic combined systolic and diastolic CHF (congestive heart failure) [I50.43] 01/17/2021 09/17/2022 Yes       Discharged Condition: stable but short term prognosis is grim. Patient in decline.     Disposition:  Home with Hospice [facility].     Follow Up:   Follow-up Information     Hospice Follow up in 1 day(s).                     Patient Instructions:      Ambulatory referral/consult to Hospice   Standing Status: Future   Referral Priority: Routine Referral Type: Consultation   Referral Reason: Specialty Services Required   Requested Specialty: Hospice and Palliative Medicine   Number of Visits Requested: 1     Diet NPO   Order Comments: Nothing by Mouth    Medications per Peg tube     Notify your health care provider if you experience any of the following:   Order Comments: Call Hospice with any questions or decline in condition     Activity as tolerated   Order Comments: Fall, skin, and aspiration precautions  Turn q 2 hours  Suction prn       Significant Diagnostic Studies:    CMP   Recent Labs   Lab 09/16/22  0516 09/17/22  0502   * 142   K 3.8 4.8   * 112*   CO2 24 23   * 245*   BUN 30* 22   CREATININE 0.8 0.7   CALCIUM 7.4* 7.0*   PROT  --  4.4*   ALBUMIN  --  1.5*   BILITOT  --  0.3   ALKPHOS  --  105   AST  --  25   ALT  --  13   ANIONGAP 9 7*   , CBC   Recent Labs   Lab 09/17/22  0502   WBC 6.24   HGB 8.2*   HCT 29.2*   *    Troponin   Recent Labs   Lab 09/12/22  2158 09/13/22  0442  09/13/22  1432   TROPONINI 0.163* 0.134* 0.099*        Procedure Component Value Units Date/Time   X-Ray Chest 1 View [[id number]] Resulted: 09/15/22 1325   Order Status: Completed Updated: 09/15/22 1327   Narrative:     EXAMINATION:   XR CHEST 1 VIEW     CLINICAL HISTORY:   fluid overload;     COMPARISON:   09/13/2022     FINDINGS:   The current examination is limited secondary to the patient's chin being projected over the superior aspect of the thorax.  A right PICC remains in place.  There is moderate cardiomegaly.  There has been slight interval worsening of the appearance of the left lung.  There is a moderate amount of alveolar consolidation scattered throughout the midportion of the left lung.  There is a moderate amount of alveolar consolidation scattered throughout the inferior half of the right lung.  There is opacification of the right costophrenic angle.  The left costophrenic angle is not well seen.  There is no pneumothorax.    Impression:       1. There has been slight interval worsening of the appearance of the left lung.  There is a moderate amount of alveolar consolidation scattered throughout the midportion of the left lung. There is a moderate amount of alveolar consolidation scattered throughout the inferior half of the right lung.  An infectious process cannot be excluded.   2. There is opacification of the right costophrenic angle.  This is characteristic of a pleural effusion.  The left costophrenic angle is not well seen.   3. There is moderate cardiomegaly.   4. A right PICC remains in place.   .       Electronically signed by: Miky Kuhn MD   Date: 09/15/2022   Time: 13:25   X-Ray Chest 1 View [[id number]] Resulted: 09/13/22 2015   Order Status: Completed Updated: 09/13/22 2018   Narrative:     EXAMINATION:   XR CHEST 1 VIEW     CLINICAL HISTORY:   picc;     TECHNIQUE:   Single frontal view of the chest was performed.     COMPARISON:   None     FINDINGS:   Cardiomegaly.  Patchy  airspace disease right lower lung zone.  Mild perihilar interstitial edema.  Right-sided PICC line catheter satisfactory in position.     Bones are intact.    Impression:       As above       Electronically signed by: Fahad Felix   Date: 09/13/2022   Time: 20:15   X-Ray Abdomen AP 1 View [[id number]] Resulted: 09/13/22 1032   Order Status: Completed Updated: 09/13/22 1035   Narrative:     EXAMINATION:   XR ABDOMEN AP 1 VIEW     CLINICAL HISTORY:   PEG out of place;     COMPARISON:   07/19/2022     FINDINGS:   The current examination is limited secondary to patient rotation to the left.  A PEG tube is projected over the left side of the abdomen in the expected location of the stomach.  There are no dilated loops of bowel visualized.  There is no pneumoperitoneum.    Impression:       1. The current examination is limited secondary to patient rotation to the left.   2. A PEG tube is projected over the left side of the abdomen in the expected location of the stomach.  There are no dilated loops of bowel visualized.       Electronically signed by: Miky Kuhn MD   Date: 09/13/2022   Time: 10:32   CTA Chest Non-Coronary (PE Study) [[id number]] Resulted: 09/12/22 2013   Order Status: Completed Updated: 09/12/22 2016   Narrative:     EXAMINATION:   CTA CHEST NON CORONARY     CLINICAL HISTORY:   Chest pain and shortness of breath     TECHNIQUE:   After the intravenous administration of 100 cc of Omni 350 nonionic contrast using CT pulmonary angio technique, 1.25  Mm axial images were acquired using helical CT technique from the lung apices through costophrenic sulci.  Sagittal coronal and oblique MIPS were also submitted for interpretation.     COMPARISON:   Chest x-ray dated 09/12/2022     FINDINGS:   -Pulmonary arteries: Pulmonary arteries are well opacified.  No evidence of pulmonary embolism.  No evidence of pulmonary hypertension.  No right heart strain is identified with RV/LV ratio < 0.9.     -Lungs: Areas of  consolidation within the bilateral lower lobes and right middle lobe concerning for multifocal airspace disease or aspiration.     -Pleura: Small right and trace left pleural effusion.     -Mediastinum/Edith:No significant adenopathy     -Axilla: Shotty adenopathy.     -Thyroid: Normal lower gland.     -Heart/Aorta: Heart size is enlarged.  Moderate  coronary artery disease.  No pericardial effusion. Aorta normal caliber.   Aorta demonstrates mild atherosclerotic disease.  Mild coronary disease.     -Bones/Chest Wall: Diffuse osteopenia and moderate degenerative changes.  Moderate scoliosis.  Diffuse anasarca.     -Upper Abdomen: Mild constipation.  G tube appears appropriate.    Impression:       No evidence of pulmonary embolism.     Areas of consolidation within the bilateral lower lobes and right middle lobe concerning for multifocal airspace disease or aspiration.     See additional findings above     All CT scans at this facility use dose modulation, iterative reconstruction and/or weight based dosing when appropriate to reduce radiation dose to as low as reasonably achievable.       Electronically signed by: Cornell Meier MD   Date: 09/12/2022   Time: 20:13   X-Ray Chest AP Portable [[id number]] Resulted: 09/12/22 1726   Order Status: Completed Updated: 09/12/22 1729   Narrative:     EXAMINATION:   XR CHEST AP PORTABLE     CLINICAL HISTORY:   Sepsis;     TECHNIQUE:   Single frontal view of the chest was performed.     COMPARISON:   07/27/2022.     FINDINGS:   Patchy infiltrates are seen at the lung bases bilaterally.  Decreased right pleural effusion.  No pneumothorax.  Heart size is borderline enlarged.  Aorta demonstrates atherosclerotic disease.  Scattered degenerative changes and diffuse osteopenia.  In comparison to the prior study, there is no adverse interval changes.    Impression:       Overall improved aeration compared to prior       Electronically signed by: Cornell Meier MD   Date: 09/12/2022    Time: 17:26   CT Head Without Contrast [[id number]] Resulted: 09/12/22 1702   Order Status: Completed Updated: 09/12/22 1704   Narrative:     EXAMINATION:   CT HEAD WITHOUT CONTRAST     CLINICAL HISTORY:   Mental status change, unknown cause;     TECHNIQUE:   Low dose axial CT images obtained throughout the head without intravenous contrast. Sagittal and coronal reconstructions were performed.     COMPARISON:   None.     FINDINGS:   Atrophy and chronic white matter changes.  No extra-axial blood or fluid collections.     No parenchymal mass, hemorrhage, edema or major vascular distribution infarct.     Skull/extracranial contents (limited evaluation): No fracture.  Mild maxillary sinus mucosal thickening.  Right sphenoid sinus opacification    Impression:       Atrophy and chronic white matter changes.     No hemorrhage mass effect or midline shift     Some element of sinus disease     All CT scans   are performed using dose optimization techniques including the following: automated exposure control; adjustment of the mA and/or kV; use of iterative reconstruction technique.  Dose modulation was employed for ALARA by means of: Automated exposure control; adjustment of the mA and/or kV according to patient size (this includes techniques or standardized protocols for targeted exams where dose is matched to indication/reason for exam; i.e. extremities or head); and/or use of iterative reconstructive technique.       Electronically signed by: Fahad Felix   Date: 09/12/2022   Time: 17:02         Procedure Component Value Units Date/Time   Influenza A & B by Molecular [[id number]] Collected: 09/12/22 1726   Order Status: Completed Specimen: Nasopharyngeal Swab Updated: 09/12/22 1808    Influenza A, Molecular Negative    Influenza B, Molecular Negative    Flu A & B Source Nasal swab   Blood culture x two cultures. Draw prior to antibiotics. [[id number]] Collected: 09/12/22 1619   Order Status: Completed Specimen: Blood  from Peripheral, Antecubital, Left Updated: 09/17/22 0612    Blood Culture, Routine No Growth to date     No Growth to date     No Growth to date     No Growth to date     No Growth to date   Narrative:     Aerobic and anaerobic   Blood culture x two cultures. Draw prior to antibiotics. [[id number]] Collected: 09/12/22 1619   Order Status: Completed Specimen: Blood from Peripheral, Upper Arm, Left Updated: 09/17/22 0612    Blood Culture, Routine No Growth to date     No Growth to date     No Growth to date     No Growth to date     No Growth to date   Narrative:     Aerobic and anaerobic           Pending Diagnostic Studies:     None         Medications:  Reconciled Home Medications:      Medication List      START taking these medications    acetaminophen 325 MG tablet  Commonly known as: TYLENOL  2 tablets (650 mg total) by Per G Tube route every 4 (four) hours as needed for Pain or Temperature greater than (101).     doxycycline 100 MG capsule  Commonly known as: MONODOX  1 capsule (100 mg total) by Per G Tube route every 12 (twelve) hours. for 7 days        CHANGE how you take these medications    QUEtiapine 25 MG Tab  Commonly known as: SEROQUEL  1 tablet (25 mg total) by Per G Tube route once daily. At bedtime  What changed: how to take this     traZODone 50 MG tablet  Commonly known as: DESYREL  0.5 tablets (25 mg total) by Per G Tube route every evening.  What changed: how to take this        CONTINUE taking these medications    carvediloL 3.125 MG tablet  Commonly known as: COREG  TAKE 1 TABLET PER G-TUBE TWICE DAILY AS NEEDED( AS BLOOD PRESSURE TOLERATES)     clopidogreL 75 mg tablet  Commonly known as: PLAVIX  1 tablet (75 mg total) by Per G Tube route once daily.     famotidine 20 MG tablet  Commonly known as: PEPCID  1 tablet (20 mg total) by Per G Tube route 2 (two) times daily.     sacubitriL-valsartan  mg per tablet  Commonly known as: ENTRESTO  1 tablet by Per G Tube route 2 (two) times  daily.     scopolamine 1.3-1.5 mg (1 mg over 3 days)  Commonly known as: TRANSDERM-SCOP  Place 1 patch onto the skin Every 3 (three) days. as needed for coughing and secretions.        STOP taking these medications    aspirin 81 MG Chew     furosemide 40 MG tablet  Commonly known as: LASIX     losartan 50 MG tablet  Commonly known as: COZAAR     metoprolol succinate 50 MG 24 hr tablet  Commonly known as: TOPROL-XL     nitrofurantoin 100 MG capsule  Commonly known as: MACRODANTIN     omeprazole magnesium 10 mg Sudr     pantoprazole 40 mg suspension  Commonly known as: PROTONIX            Indwelling Lines/Drains at time of discharge:   Lines/Drains/Airways     Peripherally Inserted Central Catheter Line  Duration           PICC Double Lumen 09/13/22 1940 right basilic 3 days          Drain  Duration                Gastrostomy/Enterostomy 03/25/22 1320 Gastrostomy tube w/ balloon LUQ feeding 176 days         Gastrostomy/Enterostomy 06/06/22 1612 Gastrostomy tube w/ balloon midline feeding 102 days         Urethral Catheter 09/12/22 1840 4 days                Time spent on the discharge of patient: 78 minutes         MARK Short  Department of Hospital Medicine  [facility]

## 2022-09-17 NOTE — PLAN OF CARE
[facility] - Med Surg  Discharge Final Note    Primary Care Provider: Elva Ansari NP    Expected Discharge Date: 9/17/2022    Final Discharge Note (most recent)       Final Note - 09/17/22 1336          Final Note    Assessment Type Final Discharge Note (P)      Anticipated Discharge Disposition Hospice/Home (P)         Post-Acute Status    Post-Acute Authorization Hospice (P)    Hospice [facility] Status Set-up Complete/Auth obtained (P)      Discharge Delays None known at this time (P)                      Important Message from Medicare  Important Message from Medicare regarding Discharge Appeal Rights: Given to patient/caregiver, Explained to patient/caregiver, Signed/date by patient/caregiver     Date IMM was signed: 09/16/22  Time IMM was signed: 1320    Contact Info       Hospice        Next Steps: Follow up in 1 day(s)            Sanjana Guzmán LMSW 9/17/2022 1:37 PM

## 2022-09-17 NOTE — NURSING
Pt resting comfortably. No signs of pain. Bolus feeds 125ml  Q4H. O2 on at 1 liter NC. Side rails up X2. No residual or vomiting. Will continue to monitor

## 2022-09-17 NOTE — PROGRESS NOTES
[facility]rology  Progress Note    Patient Name: Tanya Madrid  MRN: [id number]  Admission Date: 9/12/2022  Hospital Length of Stay: 5 days  Attending Provider: Rupesh Simon MD   Primary Care Physician: Elva Ansari NP  Principal Problem:Severe sepsis    Subjective:     HPI: Pt was seen and examined. Chart, Labs and meds reviewed. Discussed with other providers. Pt is a 81 y/o female who was admitted with moderate to severe hypernatremia () and mild increase in s Cr form 0.8 to 1.2. Pt may have had aspiration pneumonia, resulting in worsening of her medical condition, and marked decrease in PO intake of nutrition. No direct h/o can be obtained. Sister at bed side who provided some h/o.      No new subjective & objective note has been filed under this hospital service since the last note was generated.      *For 9/16/22: Seen and examined; first visit with patient; family at bedside. Meds and labs reviewed.     *For 9/17/22: None voiced acutely overnight.       Chest CTA BL  CV RRR without murmur  Ext trace edema  Abd soft NT ND  Neuro - lethargic  Assessment/Plan:     81 y/o female with hypernatremia and FFT:     Hypernatremia  s Na has further improved (admit Na was 165) with hypotonic fluid replacement  Due to dehydration resulting in intravascular fluid depletion  Pertinent neg: no GI losses reported  Urinary specific gravity very high, c/w dehydration  Mild CODY due to dehydration, resolved. Cr normal  Hypokalemia: due to decreased intake and to IVF's: will replace by adding to the IVF's     Best way to replace free water is by the enteral route  Pt has a PEG tube but there is risk of aspiration, per primary team  Continue replacement with IV route: D5W or 1/4 NS IVF's OK  Monitor labs      Aspiration pneumonia  Despite PEG tube, has aspirated  Aspiration pneumonia  Blood cx neg  Abx reviewed  Agree with current mgmt        Failure to thrive in adult  Pt is advanced age  Bed  bound  Pneumonia  Noted palliative care referral         Plans and recommendations:  As discussed above  Total time spent 40 minutes including time needed to review the records, the   patient evaluation, documentation, face-to-face discussion with the patient,   more than 50% of the time was spent on coordination of care and counseling.    *For 9/16/22: Ask dietitian to optimize caloric intake; patient only getting 1200 calories daily on current regimen; continue free water via IVF until better/stable Na range. Prog appears poor overall.    *For 9/17/22: Na back to normal; excellent GFR; remains on low dose of IVF (dextrose gtt) for calories; no further renal recommendations at this point; please re-consult PRN; will sign off.      Ramesh Silva MD  Nephrology  [facility]

## 2022-09-17 NOTE — NURSING
Pt sitting up in bed and requiring suction. Pt vomited a small amount. Will hold feeding and water flush

## 2022-09-17 NOTE — CONSULTS
Pharmacy consult sign off    Therapy with vancomycin is complete and/or consult has been discontinued by the provider.     Pharmacy will sign off. Please re-consult as needed.    Thank you for allowing us to participate in this patient's care.   Luis Schroeder, PharmD 9/17/2022 1:59 PM

## 2022-09-17 NOTE — CARE UPDATE
Patient discussed with pharmacy who recommended doxycycline 100 mg per peg bid x 7 days for discharge.

## 2022-09-18 LAB
BACTERIA BLD CULT: NORMAL
BACTERIA BLD CULT: NORMAL

## 2022-09-18 NOTE — NURSING
Family refused ambulance transportation and chose to transport patient home via personal vehicle. PICC removed per MD order with no complications. Patient stable on discharge.

## 2022-09-19 ENCOUNTER — PATIENT OUTREACH (OUTPATIENT)
Dept: ADMINISTRATIVE | Facility: CLINIC | Age: 82
End: 2022-09-19
Payer: MEDICARE

## 2022-09-19 NOTE — PHYSICIAN QUERY
PT Name: Tanya Madrid  MR #: [id number]     DOCUMENTATION CLARIFICATION     CDS/: CALI Chang, RN, CDS              Contact information:[email or web address]   This form is a permanent document in the medical record.     Query Date: September 19, 2022    By submitting this query, we are merely seeking further clarification of documentation.  Please utilize your independent clinical judgment when addressing the question(s) below.    The Medical Record contains the following:   Indicators   Supporting Clinical Findings Location in Medical Record    Non-blanchable erythema/redness     X Ulcer/Injury/Skin Breakdown  Daughter has been caring for decubitus ulcer of the sacrum.     Large sacral wound with central area of unstageable nonblanchable     Wound care consulted for buttock wound and family would like to defer debridement at this time    Palliative care, Dr. Colvin, 9/13           SONA CARDOSO NP/Dr. To, 9/14    Deep Tissue Injury     X Wound care consult  Unstageable pressure injury noted to left buttock extending to coccyx, and adjacent to anus.      Wound measures 11x6x0.2cm. wound bed is 50% gran/tan adherent leathery slough, 25% moist red/pink tissue, and 25% moist yellow adherent slough. Edges irregular        Wound care, 9/13   X Acute/Chronic Illness  PMHx of acute CHF, HLD, HTN, Parkinson's disease, stroke, throat mass, and cardiac arrest     Diagnosis include: Sepsis, Aspiration PNA, Failure to thrive and hypernatremia, Pt is nonverbal, W/C bound    SONA CARDOSO, VANITA/Dr. To, 9/14   X Medication/Treatment  Cleansed with saline and patted dry. Due to location, thick layer TRIAD hydrophilic wound dressing paste applied to cover wound, then secured with sacral foam dressing. Would recommend changing daily and as needed due to fecal incontinence.     Wound care, 9/13    Other       The clinical guidelines noted are only a system guideline. It does not replace the providers clinical  judgment.    Per the National Pressure Injury Advisory Panel:   A pressure injury is localized damage to the skin and underlying soft tissue usually over a bony prominence or related to a medical or other device. The injury can present as intact skin or an open ulcer and may be painful. The injury occurs as a result of intense and/or prolonged pressure or pressure in combination with shear. The tolerance of soft tissue for pressure and shear may also be affected by microclimate, nutrition, perfusion, co-morbidities and condition of the soft tissue.       Stage 1 Pressure Injury:  Intact skin with a localized area of non-blanchable erythema, which may appear differently in darkly pigmented skin. Color changes do not include purple or maroon discoloration; these may indicate deep tissue pressure injury.    Stage 2 Pressure Injury:  Partial-thickness loss of skin with exposed dermis. The wound bed is viable, pink or red, moist, and may also present as an intact or ruptured serum-filled blister.    Stage 3 Pressure Injury:  Full-thickness loss of skin, in which adipose (fat) is visible in the ulcer and granulation tissue and epibole (rolled wound edges) are often present. Slough and/or eschar may be visible. Undermining and tunneling may occur.    Stage 4 Pressure Injury:  Full-thickness skin and tissue loss with exposed or directly palpable fascia, muscle, tendon, ligament, cartilage or bone in the ulcer. Slough and/or eschar may be visible. Epibole (rolled edges), undermining and/or tunneling often occur.    Unstageable Pressure Injury:  Full-thickness skin and tissue loss in which the extent of tissue damage within the ulcer cannot be confirmed because it is obscured by slough or eschar. If slough or eschar is removed, a Stage 3 or Stage 4 pressure injury will be revealed.    Deep Tissue Pressure Injury:  Intact or non-intact skin with localized area of persistent non-blanchable deep red, maroon, purple  discoloration or epidermal separation revealing a dark wound bed or blood filled blister. This injury results from intense and/or prolonged pressure and shear forces at the bone-muscle interface. The wound may evolve rapidly to reveal the actual extent of tissue injury, or may resolve without tissue loss. If necrotic tissue, subcutaneous tissue, granulation tissue, fascia, muscle or other underlying structures are visible, this indicates a full thickness pressure injury (Unstageable, Stage 3 or Stage 4). Do not use DTPI to describe vascular, traumatic, neuropathic, or dermatologic conditions.       Provider, please provide the integumentary diagnosis related to the documentation of Left Buttock:     [  x ] Pressure Injury/Decubitus Ulcer, Unstageable   [   ] Other Integumentary Diagnosis (please specify):______________   [  ] Clinically Undetermined     Provider, please provide the integumentary diagnosis related to the documentation of Sacral/Coccyx:     [  x ] Pressure Injury/Decubitus Ulcer, Unstageable   [   ] Other Integumentary Diagnosis (please specify):______________   [  ] Clinically Undetermined           Please document in your progress notes daily for the duration of treatment until resolved and include in your discharge summary.    Reference:    FAISAL Forman., Desmond, MARCIA HEALY., Goldberg, M., HUNTER Long., FAISAL Redmond, & MONET Love. (2016). Revised National Pressure Ulcer Advisory Panel Pressure Injury Staging System: Revised Pressure Injury Staging System. J Wound Ostomy Continence Nurs, 43(6), 585-597. doi:10.1097/won.4024260395168600    Form No.[id number]

## 2022-09-19 NOTE — PHYSICIAN QUERY
PT Name: Tanya Madrid  MR #: [id number]     DOCUMENTATION CLARIFICATION     CDS/: CALI Chang, RN, CDS               Contact information:[email or web address]  This form is a permanent document in the medical record.     Query Date: September 19, 2022    By submitting this query, we are merely seeking further clarification of documentation.  Please utilize your independent clinical judgment when addressing the question(s) below.    The Medical Record contains the following   Indicators Supporting Clinical Findings Location in Medical Record   X Heart Failure documented  Acute on chronic combined systolic and diastolic CHF (congestive heart failure)     Decompensation ?   - hold lasix at this time   Pt appears dehydrated due to hypernatremia    SONA CARDOSO NP/Dr. To, 9/13   X BNP  BNP 3900 and slightly lower than previous values    SONA CARDOSO NP/Dr. To, 9/13   X EF/Echo  Echo interpretation:  The left ventricle is mildly enlarged with concentric hypertrophy and severely decreased systolic function.  The estimated ejection fraction is 15 - 20%.  Grade I left ventricular diastolic dysfunction.  Mild right ventricular enlargement with moderately reduced right ventricular systolic function.  Severe right atrial enlargement.  Moderate left atrial enlargement.  There is pulmonary hypertension.  The estimated PA systolic pressure is 58mmHg. No change from prior echo March 2022.    Echo, 5/30/22 Previous encounter, Current encounter, 9/12   X Radiology findings  CXR findings:  Patchy infiltrates are seen at the lung bases bilaterally.  Decreased right pleural effusion.  No pneumothorax.  Heart size is borderline enlarged.  Aorta demonstrates atherosclerotic disease.  Scattered degenerative changes and diffuse osteopenia.  In comparison to the prior study, there is no adverse interval changes     CXR impression:  Cardiomegaly.  Patchy airspace disease right lower lung zone.  Mild perihilar  interstitial edema     CXR impression:   1. There has been slight interval worsening of the appearance of the left lung.  There is a moderate amount of alveolar consolidation scattered throughout the midportion of the left lung. There is a moderate amount of alveolar consolidation scattered throughout the inferior half of the right lung.  An infectious process cannot be excluded.  2. There is opacification of the right costophrenic angle.  This is characteristic of a pleural effusion.  The left costophrenic angle is not well seen.  3. There is moderate cardiomegaly.     CTA chest Impression:   No evidence of pulmonary embolism.   Areas of consolidation within the bilateral lower lobes and right middle lobe concerning for multifocal airspace disease or aspiration.     CXR 9/12               CXR 9/13         CXR 9/15                         CTA chest, 9/12   X Subjective/Objective Respiratory Conditions  Rales, coarse breath sounds  SONA CARDOSO NP/Dr. To, 9/13    Recent/Current MI      Heart Transplant, LVAD     X Edema, JVD  Swelling    SONA CARDOSO NP/Dr. oT, 9/13    Ascites     X Diuretics/Meds  Hold Lasix    SONA CARDOSO NP/Dr. To, 9/13   X Other Treatment  Supplemental oxygen to maintain sats > 92 %   Continue Entresto as B/P tolerates    SONA CARDOSO NP/Dr. To, 9/13   X Other  Diagnosis include: Sepsis, Aspiration PNA, Failure to thrive and hypernatremia    SONA CARDOSO NP/Dr. To, 9/13     Heart failure is a clinical diagnosis which includes symptomatic fluid retention, elevated intracardiac pressures, and/or the inability of the heart to deliver adequate blood flow.    Heart Failure with reduced Ejection Fraction (HFrEF) or Systolic Heart Failure (loses ability to contract normally, EF is <40%)    Heart Failure with preserved Ejection Fraction (HFpEF) or Diastolic Heart Failure (stiff ventricles, does not relax properly, EF is >50%)     Heart Failure with Combined Systolic and Diastolic Failure  (stiff ventricles, does not relax properly and EF is <50%)    Mid-range or mildly reduced ejection fraction (HFmrEF) is classified as systolic heart failure.  Congestive heart failure with a recovered EF is classified as Diastolic Heart Failure.  Common clues to acute exacerbation:  Rapidly progressive symptoms (w/in 2 weeks of presentation), using IV diuretics, using supplemental O2, pulmonary edema on Xray, new or worsening pleural effusion, +JVD or other signs of volume overload, MI w/in 4 weeks, and/or BNP >500  The clinical guidelines noted are only system guidelines, and do not replace the providers clinical judgment.    Provider, please the Heart Failure diagnosis associated with the above clinical findings.    [  x ]  Acute on Chronic Combined Systolic and Diastolic Heart Failure - worsening of CHF signs/symptoms in preexisting CHF   [   ]  Chronic Combined Systolic and Diastolic Heart Failure - pre-existing and stable   [   ]  Other (please specify): ___________________________________   [  ]  Clinically Undetermined       Please document in your progress notes daily for the duration of treatment until resolved and include in your discharge summary.    References:  American Heart Association editorial staff. (2017, May). Ejection Fraction Heart Failure Measurement. American Heart Association. https://www.heart.org/en/health-topics/heart-failure/diagnosing-heart-failure/ejection-fraction-heart-failure-measurement#:~:text=Ejection%20fraction%20(EF)%20is%20a,pushed%20out%20with%20each%20heartbeat  BRE Lemus (2020, December 15). Heart failure with preserved ejection fraction: Clinical manifestations and diagnosis. [employer]. https://www.[employer].com/contents/heart-failure-with-preserved-ejection-fraction-clinical-manifestations-and-diagnosis.  ICD-10-CM/PCS Coding Clinic Third Quarter ICD-10, Effective with discharges: September 8, 2020 [facility] § Heart failure with mid-range or mildly  reduced ejection fraction (2020).  ICD-10-CM/PCS Coding Clinic Third Quarter ICD-10, Effective with discharges: September 8, 2020 [facility] § Heart failure with recovered ejection fraction (2020).  Form No. [id number]

## 2022-09-20 ENCOUNTER — DOCUMENT SCAN (OUTPATIENT)
Dept: HOME HEALTH SERVICES | Facility: HOSPITAL | Age: 82
End: 2022-09-20
Payer: MEDICARE

## 2022-11-14 PROBLEM — N39.0 FREQUENT UTI: Status: RESOLVED | Noted: 2022-07-29 | Resolved: 2022-11-14

## 2022-12-12 PROBLEM — K92.2 UPPER GI BLEED: Status: RESOLVED | Noted: 2022-03-31 | Resolved: 2022-12-12

## 2022-12-19 PROBLEM — J69.0 ASPIRATION PNEUMONIA: Status: RESOLVED | Noted: 2022-03-11 | Resolved: 2022-12-19

## 2023-01-12 NOTE — PLAN OF CARE
[address] [place] [address] and Primary Care  [place] [address]  [address]  [facility] [address]  Phone:  [phone]  Fax: [phone]       Chief Complaint   Patient presents with    Annual Wellness Visit     Patient here for Annual Wellness Exam.    .      SUBJECTIVE:    Reese Rojas is a 74 y.o. male  Dictation on: 01/12/2023 10:34 AM by: Altagracia Moore [[id number]]          Current Outpatient Medications   Medication Sig Dispense Refill    amLODIPine (NORVASC) 5 mg tablet TAKE 1 TABLET BY MOUTH ONE TIME DAILY 90 Tablet 3    MULTIVITAMIN PO Take  by mouth. Takes one po once daily.        Past Medical History:   Diagnosis Date    GERD (gastroesophageal reflux disease)     PUD (peptic ulcer disease)      Past Surgical History:   Procedure Laterality Date    COLONOSCOPY N/A 11/14/2017    COLONOSCOPY performed by Kindra Flores MD at [facility] ENDOSCOPY    COLONOSCOPY N/A 2/23/2021    COLONOSCOPY   :- performed by Ewelina Huang MD at [facility] ENDOSCOPY    HX COLONOSCOPY  2017    Diverticulosis and 3 tubular adenomas    HX GI      colonoscopy x 2/EGD - hx colon polyp     Allergies   Allergen Reactions    Bee Sting [Sting, Bee] Swelling         REVIEW OF SYSTEMS:  General: negative for - chills or fever  ENT: negative for - headaches, nasal congestion or tinnitus  Respiratory: negative for - cough, hemoptysis, shortness of breath or wheezing  Cardiovascular : negative for - chest pain, edema, palpitations or shortness of breath  Gastrointestinal: negative for - abdominal pain, blood in stools, heartburn or nausea/vomiting  Genito-Urinary: no dysuria, trouble voiding, or hematuria  Musculoskeletal: negative for - gait disturbance, joint pain, joint stiffness or joint swelling  Neurological: no TIA or stroke symptoms  Hematologic: no bruises, no bleeding, no swollen glands  Integument: no lumps, mole changes, nail changes or rash  Endocrine: no malaise/lethargy or unexpected weight changes      Social History     Socioeconomic Per prev SWer assessment patient current with [facility]. DC orders sent via careport to [facility].Rafael Gleason LMSW 7/30/2022 4:15 PM     History    Marital status:    Tobacco Use    Smoking status: Never    Smokeless tobacco: Never   Vaping Use    Vaping Use: Never used   Substance and Sexual Activity    Alcohol use: Yes     Comment: occasional    Drug use: No    Sexual activity: Yes     Partners: Female     Family History   Problem Relation Age of Onset    Cancer Mother         lung    Other Father         aneurysm in \"head\" or ?neck       OBJECTIVE:    Visit Vitals  BP (!) 142/78   Pulse 61   Temp 97.4 °F (36.3 °C) (Oral)   Resp 18   Ht 6' 2\" (1.88 m)   Wt 177 lb 14.4 oz (80.7 kg)   SpO2 100%   BMI 22.84 kg/m²     CONSTITUTIONAL: well , well nourished, appears age appropriate  EYES: perrla, eom intact  ENMT:moist mucous membranes, pharynx clear  NECK: supple. Thyroid normal  RESPIRATORY: Chest: clear to ascultation and percussion   CARDIOVASCULAR: Heart: regular rate and rhythm  GASTROINTESTINAL: Abdomen: soft, bowel sounds active  HEMATOLOGIC: no pathological lymph nodes palpated  MUSCULOSKELETAL: Extremities: no edema, pulse 1+   INTEGUMENT: No unusual rashes or suspicious skin lesions noted. Nails appear normal.  NEUROLOGIC: non-focal exam   MENTAL STATUS: alert and oriented, appropriate affect      ASSESSMENT:  1. Essential hypertension    2. Dyslipidemia    3. Prostate cancer ([facility] [address]    4. Diverticulosis of large intestine without hemorrhage        PLAN:   Dictation on: 01/12/2023 10:35 AM by: George Jimenes     Orders Placed This Encounter    METABOLIC PANEL, BASIC    APOLIPOPROTEIN B    CRP, Nuala Meigs, MD   This is the Subsequent Medicare Annual Wellness Exam, performed 12 months or more after the Initial AWV or the last Subsequent AWV    I have reviewed the patient's medical history in detail and updated the computerized patient record. Assessment/Plan   Education and counseling provided:  Are appropriate based on today's review and evaluation    1.  Essential hypertension  - METABOLIC PANEL, BASIC; Future  2. Dyslipidemia  -     APOLIPOPROTEIN B; Future  -     CRP, HIGH SENSITIVITY; Future  3. Prostate cancer ([facility] [address]  4. Diverticulosis of large intestine without hemorrhage       Depression Risk Factor Screening     3 most recent PHQ Screens 1/12/2023   Little interest or pleasure in doing things Not at all   Feeling down, depressed, irritable, or hopeless Not at all   Total Score PHQ 2 0   Trouble falling or staying asleep, or sleeping too much Not at all   Feeling tired or having little energy Not at all   Poor appetite, weight loss, or overeating Not at all   Feeling bad about yourself - or that you are a failure or have let yourself or your family down Not at all   Trouble concentrating on things such as school, work, reading, or watching TV Not at all   Moving or speaking so slowly that other people could have noticed; or the opposite being so fidgety that others notice Not at all   Thoughts of being better off dead, or hurting yourself in some way Not at all   PHQ 9 Score 0   How difficult have these problems made it for you to do your work, take care of your home and get along with others Not difficult at all       Alcohol & Drug Abuse Risk Screen    Do you average more than 1 drink per night or more than 7 drinks a week: No    In the past three months have you have had more than 4 drinks containing alcohol on one occasion: No          Functional Ability and Level of Safety    Hearing: Hearing is good. Activities of Daily Living: The home contains: no safety equipment. Patient does total self care      Ambulation: with no difficulty     Fall Risk:  Fall Risk Assessment, last 12 mths 1/12/2023   Able to walk? Yes   Fall in past 12 months? 0   Do you feel unsteady?  0   Are you worried about falling 0      Abuse Screen:  Patient is not abused       Cognitive Screening    Has your family/caregiver stated any concerns about your memory: no     Cognitive Screening: Not applicable. Health Maintenance Due     Health Maintenance Due   Topic Date Due    COVID-19 Vaccine (1) Never done    Shingles Vaccine (1 of 2) Never done    Pneumococcal 65+ years (2 - PPSV23 if available, else PCV20) 12/08/2020    Flu Vaccine (1) 08/01/2022       Patient Care Team   Patient Care Team:  Juan David Bolivar MD as PCP - General (Internal Medicine Physician)  Juan David Bolivar MD as PCP - [facility]    History     Patient Active Problem List   Diagnosis Code    Essential hypertension I10    Dyslipidemia E78.5    PVC (premature ventricular contraction) I49.3    Diverticulosis of large intestine without hemorrhage K57.30    Prostate cancer ([facility] [address] C61     Past Medical History:   Diagnosis Date    GERD (gastroesophageal reflux disease)     PUD (peptic ulcer disease)       Past Surgical History:   Procedure Laterality Date    COLONOSCOPY N/A 11/14/2017    COLONOSCOPY performed by Geovanny Rios MD at [facility] ENDOSCOPY    COLONOSCOPY N/A 2/23/2021    COLONOSCOPY   :- performed by Elizabeth Moffett MD at [facility] ENDOSCOPY    HX COLONOSCOPY  2017    Diverticulosis and 3 tubular adenomas    HX GI      colonoscopy x 2/EGD - hx colon polyp     Current Outpatient Medications   Medication Sig Dispense Refill    amLODIPine (NORVASC) 5 mg tablet TAKE 1 TABLET BY MOUTH ONE TIME DAILY 90 Tablet 3    MULTIVITAMIN PO Take  by mouth. Takes one po once daily.        Allergies   Allergen Reactions    Bee Sting [Sting, Bee] Swelling       Family History   Problem Relation Age of Onset    Cancer Mother         lung    Other Father         aneurysm in \"head\" or ?neck     Social History     Tobacco Use    Smoking status: Never    Smokeless tobacco: Never   Substance Use Topics    Alcohol use: Yes     Comment: occasional         Alfredo Pritchett MD

## 2023-05-13 NOTE — ED NOTES
May 13, 2023    To Whom It May Concern:         This is confirmation that ALBIN FRANCO attended her scheduled appointment with NYDIA Groves on 5/13/23.  Please excuse her absence starting 5/12/2023.  She may return to work on 5/14/2023.         If you have any questions please do not hesitate to call me at the phone number listed below.    Sincerely,          HENRY Groves.  [phone]                   Unsuccessful attempt to obtain IV access x 2.

## 2023-06-15 NOTE — PROGRESS NOTES
[facility] ([facility])  Cardiology  Progress Note    Patient Name: Tanya Madrid  MRN: [id number]  Admission Date: 7/27/2022  Hospital Length of Stay: 3 days  Code Status: Prior   Attending Physician: No att. providers found   Primary Care Physician: Elva Ansari NP  Expected Discharge Date: 7/30/2022  Principal Problem:Acute combined systolic and diastolic congestive heart failure    Subjective:     Hospital Course:   7/28/22-Patient seen and examined today, resting in bed. Non-verbal, sister at bedside. No AEON. Diuresing well. Labs reviewed. Creatinine stable, Na 149. LA improved.     7/29/22-Patient seen and examined today. Non-verbal, sister again at bedside. Stable overnight, good UOP. BNP chronically elevated. Creatinine stable, continue IV diuresis.    7/30/22 - neg 3 L net negative so far, no acute events overnight, daughter present discussed she feels back at baseline ok for her to continue tx at home, will need to be careful with lasix dosing and increase PRN, has appt with CHF clinic on Thurs          Review of Systems   Unable to perform ROS: Patient nonverbal   Objective:     Vital Signs (Most Recent):  Temp: 97.1 °F (36.2 °C) (07/30/22 0730)  Pulse: 71 (07/30/22 0901)  Resp: 18 (07/30/22 0730)  BP: 130/82 (07/30/22 0945)  SpO2: 99 % (07/30/22 0730) Vital Signs (24h Range):  Temp:  [96.7 °F (35.9 °C)-97.1 °F (36.2 °C)] 97.1 °F (36.2 °C)  Pulse:  [59-77] 71  Resp:  [16-18] 18  SpO2:  [91 %-99 %] 99 %  BP: (130-161)/(82-94) 130/82     Weight: 57.8 kg (127 lb 6.8 oz)  Body mass index is 23.31 kg/m².     SpO2: 99 %  O2 Device (Oxygen Therapy): room air      Intake/Output Summary (Last 24 hours) at 7/30/2022 1939  Last data filed at 7/30/2022 1206  Gross per 24 hour   Intake 750 ml   Output 2300 ml   Net -1550 ml         Lines/Drains/Airways       Drain  Duration                  Gastrostomy/Enterostomy 03/25/22 1320 Gastrostomy tube w/ balloon LUQ feeding 127 days          Gastrostomy/Enterostomy 06/06/22 1612 Gastrostomy tube w/ balloon midline feeding 54 days         Urethral Catheter 07/28/22 1036 Latex 2 days              Peripheral Intravenous Line  Duration                  Peripheral IV - Single Lumen 07/27/22 0145 20 G Right Wrist 3 days                    Physical Exam  Vitals and nursing note reviewed.   Constitutional:       General: She is not in acute distress.     Appearance: She is well-developed. She is ill-appearing. She is not diaphoretic.   HENT:      Head: Normocephalic and atraumatic.   Eyes:      General:         Right eye: No discharge.         Left eye: No discharge.      Pupils: Pupils are equal, round, and reactive to light.   Neck:      Thyroid: No thyromegaly.      Vascular: No JVD.      Trachea: No tracheal deviation.   Cardiovascular:      Rate and Rhythm: Normal rate and regular rhythm.      Heart sounds: Normal heart sounds, S1 normal and S2 normal. No murmur heard.  Pulmonary:      Effort: Pulmonary effort is normal. No respiratory distress.      Breath sounds: Rales present. No wheezing.   Abdominal:      General: There is no distension.      Tenderness: There is no rebound.   Musculoskeletal:      Cervical back: Neck supple.      Right lower leg: No edema.      Left lower leg: No edema.      Comments: Upper extremities with contractures     Skin:     General: Skin is warm and dry.      Findings: No erythema.   Neurological:      Mental Status: She is alert.      Comments: Awake, non-verbal       Significant Labs: CMP   Recent Labs   Lab 07/29/22  0515 07/30/22  0531   * 147*   K 4.3 3.4*    105   CO2 32* 32*   * 136*   BUN 37* 34*   CREATININE 1.0 0.8   CALCIUM 9.2 8.6*   ANIONGAP 12 10   ESTGFRAFRICA 60 >60   EGFRNONAA 52* >60     , CBC   Recent Labs   Lab 07/30/22  0530   WBC 3.52*   HGB 9.5*   HCT 33.2*   *     , INR No results for input(s): INR, PROTIME in the last 48 hours., Troponin No results for input(s): TROPONINI  in the last 48 hours., and All pertinent lab results from the last 24 hours have been reviewed.    Significant Imaging: Echocardiogram: Transthoracic echo (TTE) complete ([facility] Only):   Results for orders placed or performed during the hospital encounter of 05/27/22   Echo   Result Value Ref Range    BSA 1.76 m2    TDI SEPTAL 0.05 m/s    LV LATERAL E/E' RATIO 12.83 m/s    LV SEPTAL E/E' RATIO 15.40 m/s    IVC diameter 2.38 cm    Left Ventricular Outflow Tract Mean Velocity 0.34941171058 cm/s    Left Ventricular Outflow Tract Mean Gradient 1.32 mmHg    TDI LATERAL 0.06 m/s    LVIDd 4.19 3.5 - 6.0 cm    IVS 1.47 (A) 0.6 - 1.1 cm    Posterior Wall 1.32 (A) 0.6 - 1.1 cm    Ao root annulus 3.12 cm    LVIDs 3.83 2.1 - 4.0 cm    FS 9 28 - 44 %    Sinus 2.80 cm    STJ 2.62 cm    Ascending aorta 2.75 cm    LV mass 222.33 g    LA size 3.83 cm    TAPSE 1.49 cm    Left Ventricle Relative Wall Thickness 0.63 cm    AV mean gradient 4 mmHg    AV valve area 2.12 cm2    AV Velocity Ratio 0.60     AV index (prosthetic) 0.64     MV valve area p 1/2 method 6.65 cm2    E/A ratio 1.51     Mean e' 0.06 m/s    E wave deceleration time 114.138122915944806 msec    IVRT 99.284320669503681 msec    LVOT diameter 2.06 cm    LVOT area 3.3 cm2    LVOT peak harsha 0.87 m/s    LVOT peak VTI 16.30 cm    Ao peak harsha 1.44 m/s    Ao VTI 25.6 cm    RVOT peak harsha 0.37 m/s    RVOT peak VTI 7.6 cm    LVOT stroke volume 54.30 cm3    AV peak gradient 8 mmHg    PV mean gradient 0.44 mmHg    E/E' ratio 14.00 m/s    MV Peak E Harsha 0.77 m/s    TR Max Harsha 3.53 m/s    MV stenosis pressure 1/2 time 33.858638955158830 ms    MV Peak A Harsha 0.51 m/s    LV Systolic Volume 63.31 mL    LV Systolic Volume Index 36.8 mL/m2    LV Diastolic Volume 78.31 mL    LV Diastolic Volume Index 45.53 mL/m2    LV Mass Index 129 g/m2    RA Major Axis 4.96 cm    Left Atrium Minor Axis 3.44 cm    Left Atrium Major Axis 4.30 cm    Triscuspid Valve Regurgitation Peak Gradient 50 mmHg    RA Width  4.18 cm    Right Atrial Pressure (from IVC) 8 mmHg    EF 20 %    TV rest pulmonary artery pressure 58 mmHg    Narrative    · The left ventricle is mildly enlarged with concentric hypertrophy and   severely decreased systolic function.  · The estimated ejection fraction is 15 - 20%.  · Grade I left ventricular diastolic dysfunction.  · Mild right ventricular enlargement with moderately reduced right   ventricular systolic function.  · Severe right atrial enlargement.  · Moderate left atrial enlargement.  · There is pulmonary hypertension.  · The estimated PA systolic pressure is 58 mmHg.  · No change from prior echo March 2022.      , EKG: Reviewed, and X-Ray: CXR: X-Ray Chest 1 View (CXR): No results found for this visit on 07/27/22. and X-Ray Chest PA and Lateral (CXR): No results found for this visit on 07/27/22.    Assessment and Plan:     Brief HPI: stable for DC, close follow up this week in CHF clinic    Acute on chronic combined systolic and diastolic congestive heart failure  -Known severe underlying CHF with EF of 15-20%, presents with volume overload  -Continue IV diuresis  -Continue BB, Entresto  -Strict I's/O's  -Given patient's history, age, co-morbidities, prognosis remains poor    7/28/22  -Improving, continue IV diuresis  -Continue BB, Entresto    7/29/22  -Good UOP since admission, continue IV diuresis  -Continue BB, Entresto  -BNP chronically elevated    7/30/22  diurresed > 3 L  Stable for DC home and close f/u CHF clinic    Elevated troponin  -Troponin 0.028  -Elevation secondary to demand ischemia from severe decompensated CHF  -Continue supportive care/OMT as tolerated-Plavix, BB    Bilateral pleural effusion  -Assess response to IV diuresis     Parkinson disease  -Mgmt as per primary team    Hyperlipidemia  -As per primary team    H/O Essential hypertension  -Continue BB, Entresto  -Monitor BP trend        VTE Risk Mitigation (From admission, onward)         Ordered     IP VTE HIGH RISK  PATIENT  Once         07/27/22 0640                Jose E Swanson Md, MD  Cardiology  [facility] - Tele[facility] ([facility])   Speaking Coherently

## 2023-06-26 NOTE — ASSESSMENT & PLAN NOTE
Initial CXR on admit suggestive of right-sided pulmonary opacities possibly infection, aspiration, or edema.  Antibiotic include Rocephin initiated   Blood cx from 3/10/22 (+) for ALPHA HEMOLYTIC STREP  Tracheal aspirate - neg   Repeat blood cultures from 3/15/22- NGTD   No

## 2023-07-17 NOTE — PROGRESS NOTES
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 CPR initiated after call to EMS. ROSC obtained by EMS (downtime not reported but required one shock and arrived to ED ~ one hour after EMS called)  Intubated in ED  Eval revealed leukocytosis, hypokalemia 2.9, compensated anion gap acidosis, BNP > 4900, troponin 0.041, lactate 3.6, procalcitonin 2.08  Admitted to ICU on mechanical ventilation; cooling for targeted temperature management for coma post arrest initiated       Subjective:     Interval History:   3/15: chart reviewed. On vent. Off all sedation for 2 days. Open eyes, not f/u commands. On minimal vent setting. BP stable. No fever.    Objective:     Vital Signs (Most Recent):  Temp: 98.4 °F (36.9 °C) (03/15/22 0315)  Pulse: 79 (03/15/22 0700)  Resp: 11 (03/15/22 0700)  BP: (!) 177/86 (03/14/22 1142)  SpO2: 100 % (03/15/22 0700) Vital Signs (24h Range):  Temp:  [98 °F (36.7 °C)-98.4 °F (36.9 °C)] 98.4 °F (36.9 °C)  Pulse:  [65-84] 79  Resp:  [10-16] 11  SpO2:  [87 %-100 %] 100 %  BP: (177)/(86) 177/86  Arterial Line BP: ()/(12-88) 114/52     Weight: 49.7 kg (109 lb 9.1 oz)  Body mass index is 20.04 kg/m².      Intake/Output Summary (Last 24 hours) at 3/15/2022 0856  Last data filed at 3/15/2022 0600  Gross per 24 hour   Intake 95.58 ml   Output 1010 ml   Net -914.42 ml       Physical Exam  Vitals and nursing note reviewed.    Constitutional:       General: She is in acute distress.      Appearance: She is ill-appearing.      Comments: On vent. Not sedated. Open eyes. Not f/u and commands.    HENT:      Head: Atraumatic.      Nose: Nose normal.      Mouth/Throat:      Mouth: Mucous membranes are moist.      Comments: Oral intubated  Eyes:      General: No scleral icterus.     Extraocular Movements: Extraocular movements intact.      Pupils: Pupils are equal, round, and reactive to light.   Cardiovascular:      Rate and Rhythm: Normal rate and regular rhythm.      Pulses: Normal pulses.      Heart sounds: No murmur heard.  Pulmonary:      Effort: Respiratory distress present.      Breath sounds: Rhonchi and rales present.      Comments: On vent.   Abdominal:      General: There is no distension.      Palpations: Abdomen is soft.   Musculoskeletal:         General: Swelling (trace on b/l legs) present. No deformity.      Cervical back: Neck supple. No rigidity.   Skin:     General: Skin is warm and dry.      Capillary Refill: Capillary refill takes less than 2 seconds.      Coloration: Skin is not jaundiced.   Neurological:      Comments: Opens eyes only.  Not followup commands.  Responds to pain         Vents:  Vent Mode: A/C (03/15/22 0700)  Set Rate: 10 BPM (03/15/22 0700)  Vt Set: 0 mL (03/15/22 0700)  Pressure Support: 0 cmH20 (03/15/22 0700)  PEEP/CPAP: 5 cmH20 (03/15/22 0700)  Oxygen Concentration (%): 30 (03/15/22 0700)  Peak Airway Pressure: 13 cmH2O (03/15/22 0700)  Plateau Pressure: 16 cmH20 (03/15/22 0700)  Total Ve: 5.41 mL (03/15/22 0700)  F/VT Ratio<105 (RSBI): (!) 24.07 (03/15/22 0700)    Lines/Drains/Airways     Central Venous Catheter Line  Duration           Percutaneous Central Line Insertion/Assessment - Triple Lumen  03/10/22 2215 right subclavian 4 days          Drain  Duration                NG/OG Tube 03/10/22 1957 16 Fr. Center mouth 4 days         Urethral Catheter 03/10/22 2032 Non-latex 16 Fr. 4 days           Airway  Duration                Airway 4 days         Airway - Non-Surgical 03/10/22 1945 Endotracheal Tube 4 days          Arterial Line  Duration           Arterial Line 03/11/22 0310 Right Radial 4 days          Peripheral Intravenous Line  Duration                Peripheral IV - Single Lumen 03/10/22 1945 18 G Right Forearm 4 days         Peripheral IV - Single Lumen 03/10/22 2200 18 G Anterior;Distal;Left Upper Arm 4 days                Significant Labs:    CBC/Anemia Profile:  Recent Labs   Lab 03/14/22  0457 03/15/22  0400   WBC 12.52 9.56   HGB 11.0* 10.4*   HCT 33.2* 32.2*    244   MCV 89 92   RDW 15.8* 15.8*        Chemistries:  Recent Labs   Lab 03/13/22  2319 03/14/22  0457 03/15/22  0400    139 142   K 3.7 3.7 3.1*    104 106   CO2 20* 20* 22*   BUN 46* 48* 56*   CREATININE 3.5* 3.3* 3.8*   CALCIUM 8.4* 8.5* 8.7   MG 2.3 2.4 2.5   PHOS 5.9* 5.9* 6.0*     Recent Labs     03/15/22  0623   PH 7.457*   PCO2 34.4*   PO2 156*   HCO3 24.3   POCSATURATED 99   BE 0     Blood cx: 3/10: STREPTOCOCCUS ANGINOSUS     Significant Imaging:  CXR 3/13. Film reviewed: Tubes in good position.  Cardiomegaly.  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] 03/11/2022 Yes    Aspiration pneumonia [J69.0] 03/11/2022 Yes    Hypokalemia [E87.6] 03/11/2022 Yes    On mechanically assisted ventilation [Z99.11] 03/11/2022 Not Applicable    Anoxic encephalopathy [G93.1] 03/11/2022 Yes    Elevated troponin [R77.8] 04/09/2021 Yes    Acute on chronic combined systolic and diastolic CHF (congestive heart failure) [I50.43] 01/17/2021 Yes    Essential hypertension [I10]  07/24/2019 Yes     Chronic    History of CVA (cerebrovascular accident) [Z86.73] 07/24/2019 Not Applicable     Chronic    Parkinson disease [G20] 07/11/2017 Yes     Chronic    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 ABG reviewed  -VAP prophylaxis  -not ready for SBT due to poor mental status.  -will need a trach and a PEG    Acute anoxic encephalopathy  -status post cardiac arrest with prolonged downtime  -s/p TTM  -CTH admission unremarkable  -poor mental status, open eyes but not follow commands.  Positive gag and cough reflex.   -continue hold all sedation.  Close neuro check    S/p out of hospital Cardiac arrest  -likely due to cardiac event  -status post TTM    Troponin elevation  -Peak trop 0.438.   -likely secondary to demand ischemia  -cardiology consulted  -cont ASA/plavix    Acute on chronic combined systolic and diastolic CHF (congestive heart failure)  -Decompensated, BNP chronically elevated  -On inotropic support with dobutamine drip  -Echo shows worsening EF of 10%, Bi-V failure  -cardiology follow-up    CODY   -Monitor I&O. Pharmacy to assist with renal dosing medications.  -Avoid hypotension and nephrotoxins.  -Monitor electrolytes and replete per protocol.  -Renal US to rule out hydronephrosis    Streptococcal bacteremia  -On IV Rocephin   -source of infection unclear.   -No evidence of endocarditis on 2D echo.  Will have to discuss with family goal of care /comfort measures prior to deciding on pursuing MACHO.    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    Critical Care Time: total time spent on behalf of this patient was 45 minutes. Included discussion with RN, bedside evaluation of the patient, review of the electronic medical record/labs/x-rays and medical decision making. No procedures were performed during this time.    Plan of care discussed with Staff Nurse.    I personally viewed and interpreted the patient'slabs, ECG, X-Ray, new clinical lab test results and new imaging test results.    Patient is DNR. Pending family decision on trach and PEG    Georgia Cruz MD  Pulmonology  [facility] - Intensive Care ([facility])     PAST MEDICAL HISTORY:  No pertinent past medical history

## 2024-12-06 NOTE — SIGNIFICANT EVENT
Responding to deterioration alert. Patient's respiration rate was 30. Discussed with nurse Nalini, who states that she was having a panic attack. Both daughter and patient states that she has this episode every morning and takes Ativan. Ativan was discontinued yesterday due to lethargy and hypotension. Will closely monitor. Patient respirations improved with breathing and calming exercises.     
Attending Attestation (For Attendings USE Only)...

## (undated) DEVICE — SOL IRR NACL .9% 3000ML

## (undated) DEVICE — SEE MEDLINE ITEM 157131

## (undated) DEVICE — PAD ABD 8X10 STERILE

## (undated) DEVICE — SEE MEDLINE ITEM 157125

## (undated) DEVICE — DRAPE HIP TIBURON 87X115X134

## (undated) DEVICE — DRAPE INCISE IOBAN 2 23X33IN

## (undated) DEVICE — DRAPE INCISE IOBAN 2 13X13IN

## (undated) DEVICE — PILLOW ABDUCTION MED

## (undated) DEVICE — SUPPORT ULNA NERVE PROTECTOR

## (undated) DEVICE — SEE MEDLINE ITEM 157216

## (undated) DEVICE — SEE MEDLINE ITEM 152622

## (undated) DEVICE — COVER OVERHEAD SURG LT BLUE

## (undated) DEVICE — MANIFOLD 4 PORT

## (undated) DEVICE — GOWN SURG 2XL DISP TIE BACK

## (undated) DEVICE — SEE MEDLINE ITEM 146292

## (undated) DEVICE — ELECTRODE BLD EXT 6.50 ST DISP

## (undated) DEVICE — UNDERGLOVES BIOGEL PI SIZE 8

## (undated) DEVICE — DRAPE INCISE IOBAN 2 23X17IN

## (undated) DEVICE — KIT TOTAL HIP BN PREPARATION

## (undated) DEVICE — GLOVE SURGICAL LATEX SZ 8

## (undated) DEVICE — SEE MEDLINE ITEM 157117

## (undated) DEVICE — SEE MEDLINE ITEM 146298

## (undated) DEVICE — DRESSING AQUACEL AG 3.5X10IN

## (undated) DEVICE — STAPLER SKIN PROXIMATE WIDE

## (undated) DEVICE — SEE MEDLINE ITEM 157172

## (undated) DEVICE — SEALER AQUAMANTYS 3.48MM

## (undated) DEVICE — SUT 0 60IN PDS II VIO MONO

## (undated) DEVICE — POSITIONER HEAD DONUT 9IN FOAM

## (undated) DEVICE — BLADE SAGITTAL 18 X 1.27 X 90M

## (undated) DEVICE — SUT VICRYL 2-0 36 CT-1

## (undated) DEVICE — SUT ETHIBOND XTRA 5 V-37 GR

## (undated) DEVICE — SUT VICRYL 2-0 27 CT-1

## (undated) DEVICE — SEE MEDLINE ITEM 152739

## (undated) DEVICE — SEE MEDLINE ITEM 157027

## (undated) DEVICE — KIT IRR SUCTION HND PIECE

## (undated) DEVICE — APPLICATOR CHLORAPREP ORN 26ML

## (undated) DEVICE — DRAPE PLASTIC U 60X72

## (undated) DEVICE — GLOVE BIOGEL ECLIPSE SZ 7.5

## (undated) DEVICE — TOWER MIX CEMENT BONE SMARTMIX

## (undated) DEVICE — ELECTRODE REM PLYHSV RETURN 9

## (undated) DEVICE — SUT PDS II 96 1 VIO

## (undated) DEVICE — SOL 9P NACL IRR PIC IL

## (undated) DEVICE — DRAPE STERI INSTRUMENT 1018

## (undated) DEVICE — BLADE SAG DUAL 18MMX1.27MMX90M

## (undated) DEVICE — SEE MEDLINE ITEM 146345

## (undated) DEVICE — SEE MEDLINE ITEM 152529

## (undated) DEVICE — SPONGE LAP 18X18 PREWASHED

## (undated) DEVICE — GAUZE SPONGE 4X4 12PLY

## (undated) DEVICE — TAPE SURGICAL MICROFOAM 3IN

## (undated) DEVICE — DRAPE STERI U-SHAPED 47X51IN

## (undated) DEVICE — KIT PREVENA INCISION MGMT 13CM

## (undated) DEVICE — Device

## (undated) DEVICE — CONTAINER SPECIMEN STRL 4OZ

## (undated) DEVICE — SUT VICRYL PLUS 0 CT1 36IN

## (undated) DEVICE — SUT VICRYL CTD 2-0 GI 27 SH

## (undated) DEVICE — SUT PDS II O CTX MONO 60

## (undated) DEVICE — EVACUATOR PENCIL SMOKE NEPTUNE

## (undated) DEVICE — INTERPULSE SET

## (undated) DEVICE — TAPE SILK 3IN

## (undated) DEVICE — SUT VICRYL BR 1 GEN 27 CT-1

## (undated) DEVICE — SUT MONO VIOLET CT-1 27IN 2-0

## (undated) DEVICE — SUT PROLENE 0 CT1 30IN BLUE

## (undated) DEVICE — TIP SUCTION YANKAUER

## (undated) DEVICE — DRAPE HIP PCH 112X137X89IN